# Patient Record
Sex: MALE | Race: BLACK OR AFRICAN AMERICAN | NOT HISPANIC OR LATINO | Employment: UNEMPLOYED | URBAN - METROPOLITAN AREA
[De-identification: names, ages, dates, MRNs, and addresses within clinical notes are randomized per-mention and may not be internally consistent; named-entity substitution may affect disease eponyms.]

---

## 2017-06-24 ENCOUNTER — HISTORICAL (OUTPATIENT)
Dept: ADMINISTRATIVE | Facility: HOSPITAL | Age: 52
End: 2017-06-24

## 2017-06-24 LAB
ABS NEUT (OLG): 7.22 X10(3)/MCL (ref 2.1–9.2)
ALBUMIN SERPL-MCNC: 4.1 GM/DL (ref 3.4–5)
ALBUMIN/GLOB SERPL: 1 RATIO (ref 1.1–2)
ALP SERPL-CCNC: 85 UNIT/L (ref 50–136)
ALT SERPL-CCNC: 46 UNIT/L (ref 12–78)
AMPHET UR QL SCN: NORMAL
APAP SERPL-MCNC: <2 MCG/ML (ref 10–30)
APPEARANCE, UA: CLEAR
AST SERPL-CCNC: 46 UNIT/L (ref 15–37)
BACTERIA SPEC CULT: ABNORMAL /HPF
BARBITURATE SCN PRESENT UR: NORMAL
BASOPHILS # BLD AUTO: 0.1 X10(3)/MCL (ref 0–0.2)
BASOPHILS NFR BLD AUTO: 0 %
BENZODIAZ UR QL SCN: NORMAL
BILIRUB SERPL-MCNC: 0.5 MG/DL (ref 0.2–1)
BILIRUB UR QL STRIP: ABNORMAL
BILIRUBIN DIRECT+TOT PNL SERPL-MCNC: 0.2 MG/DL (ref 0–0.5)
BILIRUBIN DIRECT+TOT PNL SERPL-MCNC: 0.3 MG/DL (ref 0–0.8)
BUN SERPL-MCNC: 37 MG/DL (ref 7–18)
CALCIUM SERPL-MCNC: 9 MG/DL (ref 8.5–10.1)
CANNABINOIDS UR QL SCN: NORMAL
CHLORIDE SERPL-SCNC: 100 MMOL/L (ref 98–107)
CK SERPL-CCNC: 1212 UNIT/L (ref 39–308)
CO2 SERPL-SCNC: 21 MMOL/L (ref 21–32)
COCAINE UR QL SCN: NORMAL
COLOR UR: ABNORMAL
CREAT SERPL-MCNC: 1.63 MG/DL (ref 0.7–1.3)
EOSINOPHIL # BLD AUTO: 0.3 X10(3)/MCL (ref 0–0.9)
EOSINOPHIL NFR BLD AUTO: 3 %
ERYTHROCYTE [DISTWIDTH] IN BLOOD BY AUTOMATED COUNT: 14.1 % (ref 11.5–17)
ETHANOL SERPL-MCNC: <3 MG/DL (ref 0–3)
GLOBULIN SER-MCNC: 4 GM/DL (ref 2.4–3.5)
GLUCOSE (UA): NEGATIVE
GLUCOSE SERPL-MCNC: 131 MG/DL (ref 74–106)
HCT VFR BLD AUTO: 41.4 % (ref 42–52)
HGB BLD-MCNC: 14.3 GM/DL (ref 14–18)
HGB UR QL STRIP: NEGATIVE
HYALINE CASTS #/AREA URNS LPF: ABNORMAL /[LPF]
KETONES UR QL STRIP: ABNORMAL
LEUKOCYTE ESTERASE UR QL STRIP: NEGATIVE
LYMPHOCYTES # BLD AUTO: 3.6 X10(3)/MCL (ref 0.6–4.6)
LYMPHOCYTES NFR BLD AUTO: 29 %
MCH RBC QN AUTO: 27.2 PG (ref 27–31)
MCHC RBC AUTO-ENTMCNC: 34.5 GM/DL (ref 33–36)
MCV RBC AUTO: 78.9 FL (ref 80–94)
MONOCYTES # BLD AUTO: 1.2 X10(3)/MCL (ref 0.1–1.3)
MONOCYTES NFR BLD AUTO: 10 %
NEUTROPHILS # BLD AUTO: 7.22 X10(3)/MCL (ref 2.1–9.2)
NEUTROPHILS NFR BLD AUTO: 58 %
NITRITE UR QL STRIP: NEGATIVE
OPIATES UR QL SCN: NORMAL
PCP UR QL: NORMAL
PH UR STRIP.AUTO: 5 [PH] (ref 5–7.5)
PH UR STRIP: 5 [PH] (ref 5–9)
PLATELET # BLD AUTO: 257 X10(3)/MCL (ref 130–400)
PMV BLD AUTO: 9.2 FL (ref 9.4–12.4)
POTASSIUM SERPL-SCNC: 3.2 MMOL/L (ref 3.5–5.1)
PROT SERPL-MCNC: 8.1 GM/DL (ref 6.4–8.2)
PROT UR QL STRIP: NEGATIVE
RBC # BLD AUTO: 5.25 X10(6)/MCL (ref 4.7–6.1)
RBC #/AREA URNS HPF: ABNORMAL /[HPF]
SALICYLATES SERPL-MCNC: 2.7 MG/DL (ref 2.8–20)
SODIUM SERPL-SCNC: 137 MMOL/L (ref 136–145)
SP GR FLD REFRACTOMETRY: 1.02 (ref 1–1.03)
SP GR UR STRIP: 1.02 (ref 1–1.03)
SQUAMOUS EPITHELIAL, UA: ABNORMAL
TSH SERPL-ACNC: 1.3 MIU/ML (ref 0.36–3.74)
UROBILINOGEN UR STRIP-ACNC: 1
WBC # SPEC AUTO: 12.4 X10(3)/MCL (ref 4.5–11.5)
WBC #/AREA URNS HPF: ABNORMAL /HPF

## 2017-06-25 LAB
CK SERPL-CCNC: 786 UNIT/L (ref 39–308)
CK SERPL-CCNC: 805 UNIT/L (ref 39–308)
CK SERPL-CCNC: 860 UNIT/L (ref 39–308)
CK SERPL-CCNC: 887 UNIT/L (ref 39–308)

## 2017-06-26 LAB
CK SERPL-CCNC: 681 UNIT/L (ref 39–308)
HAV IGM SERPL QL IA: NEGATIVE
HBV CORE IGM SERPL QL IA: NEGATIVE
HBV SURFACE AG SERPL QL IA: NEGATIVE
HCV AB SERPL QL IA: POSITIVE
HEPATITIS PANEL INTERP: ABNORMAL
MAGNESIUM SERPL-MCNC: 2 MG/DL (ref 1.8–2.4)

## 2017-06-27 LAB
ABS NEUT (OLG): 4.59 X10(3)/MCL (ref 2.1–9.2)
ALBUMIN SERPL-MCNC: 3.5 GM/DL (ref 3.4–5)
ALBUMIN/GLOB SERPL: 1 {RATIO}
ALP SERPL-CCNC: 77 UNIT/L (ref 50–136)
ALT SERPL-CCNC: 38 UNIT/L (ref 12–78)
AST SERPL-CCNC: 46 UNIT/L (ref 15–37)
BASOPHILS # BLD AUTO: 0.1 X10(3)/MCL (ref 0–0.2)
BASOPHILS NFR BLD AUTO: 1 %
BILIRUB SERPL-MCNC: 0.3 MG/DL (ref 0.2–1)
BILIRUBIN DIRECT+TOT PNL SERPL-MCNC: 0.1 MG/DL (ref 0–0.5)
BILIRUBIN DIRECT+TOT PNL SERPL-MCNC: 0.2 MG/DL (ref 0–0.8)
BUN SERPL-MCNC: 10 MG/DL (ref 7–18)
CALCIUM SERPL-MCNC: 8.8 MG/DL (ref 8.5–10.1)
CHLORIDE SERPL-SCNC: 106 MMOL/L (ref 98–107)
CK MB SERPL-MCNC: 3.4 NG/ML (ref 0.5–3.6)
CK SERPL-CCNC: 713 UNIT/L (ref 39–308)
CK SERPL-CCNC: 722 UNIT/L (ref 39–308)
CO2 SERPL-SCNC: 24 MMOL/L (ref 21–32)
COLOR STL: NORMAL
CONSISTENCY STL: NORMAL
CREAT SERPL-MCNC: 0.66 MG/DL (ref 0.7–1.3)
EOSINOPHIL # BLD AUTO: 0.4 X10(3)/MCL (ref 0–0.9)
EOSINOPHIL NFR BLD AUTO: 4 %
ERYTHROCYTE [DISTWIDTH] IN BLOOD BY AUTOMATED COUNT: 14.1 % (ref 11.5–17)
GLOBULIN SER-MCNC: 3.4 GM/DL (ref 2.4–3.5)
GLUCOSE SERPL-MCNC: 95 MG/DL (ref 74–106)
HCT VFR BLD AUTO: 40.5 % (ref 42–52)
HGB BLD-MCNC: 13.9 GM/DL (ref 14–18)
LYMPHOCYTES # BLD AUTO: 2.5 X10(3)/MCL (ref 0.6–4.6)
LYMPHOCYTES NFR BLD AUTO: 31 %
MAGNESIUM SERPL-MCNC: 1.7 MG/DL (ref 1.8–2.4)
MCH RBC QN AUTO: 27.7 PG (ref 27–31)
MCHC RBC AUTO-ENTMCNC: 34.3 GM/DL (ref 33–36)
MCV RBC AUTO: 80.7 FL (ref 80–94)
MONOCYTES # BLD AUTO: 0.6 X10(3)/MCL (ref 0.1–1.3)
MONOCYTES NFR BLD AUTO: 7 %
NEUTROPHILS # BLD AUTO: 4.59 X10(3)/MCL (ref 2.1–9.2)
NEUTROPHILS NFR BLD AUTO: 57 %
PLATELET # BLD AUTO: 251 X10(3)/MCL (ref 130–400)
PMV BLD AUTO: 9.4 FL (ref 9.4–12.4)
POTASSIUM SERPL-SCNC: 4.4 MMOL/L (ref 3.5–5.1)
PROT SERPL-MCNC: 6.9 GM/DL (ref 6.4–8.2)
RBC # BLD AUTO: 5.02 X10(6)/MCL (ref 4.7–6.1)
SODIUM SERPL-SCNC: 139 MMOL/L (ref 136–145)
TROPONIN I SERPL-MCNC: <0.02 NG/ML (ref 0.02–0.49)
WBC # SPEC AUTO: 8.1 X10(3)/MCL (ref 4.5–11.5)

## 2017-06-28 LAB
ABS NEUT (OLG): 5.47 X10(3)/MCL (ref 2.1–9.2)
BASOPHILS # BLD AUTO: 0 X10(3)/MCL (ref 0–0.2)
BASOPHILS NFR BLD AUTO: 1 %
CK SERPL-CCNC: 583 UNIT/L (ref 39–308)
EOSINOPHIL # BLD AUTO: 0.4 X10(3)/MCL (ref 0–0.9)
EOSINOPHIL NFR BLD AUTO: 5 %
ERYTHROCYTE [DISTWIDTH] IN BLOOD BY AUTOMATED COUNT: 13.8 % (ref 11.5–17)
HCT VFR BLD AUTO: 40.5 % (ref 42–52)
HGB BLD-MCNC: 13.9 GM/DL (ref 14–18)
LYMPHOCYTES # BLD AUTO: 1.9 X10(3)/MCL (ref 0.6–4.6)
LYMPHOCYTES NFR BLD AUTO: 22 %
MCH RBC QN AUTO: 27.3 PG (ref 27–31)
MCHC RBC AUTO-ENTMCNC: 34.3 GM/DL (ref 33–36)
MCV RBC AUTO: 79.6 FL (ref 80–94)
MONOCYTES # BLD AUTO: 0.7 X10(3)/MCL (ref 0.1–1.3)
MONOCYTES NFR BLD AUTO: 8 %
NEUTROPHILS # BLD AUTO: 5.47 X10(3)/MCL (ref 2.1–9.2)
NEUTROPHILS NFR BLD AUTO: 64 %
PLATELET # BLD AUTO: 233 X10(3)/MCL (ref 130–400)
PMV BLD AUTO: 9.5 FL (ref 9.4–12.4)
RBC # BLD AUTO: 5.09 X10(6)/MCL (ref 4.7–6.1)
WBC # SPEC AUTO: 8.6 X10(3)/MCL (ref 4.5–11.5)

## 2017-06-29 LAB — CK SERPL-CCNC: 480 UNIT/L (ref 39–308)

## 2017-07-13 ENCOUNTER — HISTORICAL (OUTPATIENT)
Dept: ADMINISTRATIVE | Facility: HOSPITAL | Age: 52
End: 2017-07-13

## 2017-07-13 LAB
ABS NEUT (OLG): 5.61 X10(3)/MCL (ref 2.1–9.2)
ALBUMIN SERPL-MCNC: 4.1 GM/DL (ref 3.4–5)
ALBUMIN/GLOB SERPL: 1 RATIO (ref 1–2)
ALP SERPL-CCNC: 80 UNIT/L (ref 45–117)
ALT SERPL-CCNC: 73 UNIT/L (ref 12–78)
AMPHET UR QL SCN: NEGATIVE
AST SERPL-CCNC: 46 UNIT/L (ref 15–37)
BARBITURATE SCN PRESENT UR: NEGATIVE
BASOPHILS # BLD AUTO: 0.07 X10(3)/MCL
BASOPHILS NFR BLD AUTO: 1 % (ref 0–1)
BENZODIAZ UR QL SCN: NEGATIVE
BILIRUB SERPL-MCNC: 0.4 MG/DL (ref 0.2–1)
BILIRUBIN DIRECT+TOT PNL SERPL-MCNC: 0.1 MG/DL
BILIRUBIN DIRECT+TOT PNL SERPL-MCNC: 0.3 MG/DL
BUN SERPL-MCNC: 12 MG/DL (ref 7–18)
CALCIUM SERPL-MCNC: 9.3 MG/DL (ref 8.5–10.1)
CANNABINOIDS UR QL SCN: NEGATIVE
CHLORIDE SERPL-SCNC: 103 MMOL/L (ref 98–107)
CO2 SERPL-SCNC: 25 MMOL/L (ref 21–32)
COCAINE UR QL SCN: NEGATIVE
CREAT SERPL-MCNC: 0.8 MG/DL (ref 0.6–1.3)
EOSINOPHIL # BLD AUTO: 0.34 X10(3)/MCL
EOSINOPHIL NFR BLD AUTO: 4 % (ref 0–5)
ERYTHROCYTE [DISTWIDTH] IN BLOOD BY AUTOMATED COUNT: 14.6 % (ref 11.5–14.5)
ETHANOL SERPL-MCNC: <3 MG/DL
FERRITIN SERPL-MCNC: 67 NG/ML (ref 26–388)
GLOBULIN SER-MCNC: 4.4 GM/ML (ref 2.3–3.5)
GLUCOSE SERPL-MCNC: 85 MG/DL (ref 74–106)
HAV AB SER QL IA: NONREACTIVE
HBV CORE AB SERPL QL IA: NONREACTIVE
HBV SURFACE AB SER-ACNC: <3.1 MIU/ML
HBV SURFACE AB SERPL IA-ACNC: NONREACTIVE M[IU]/ML
HBV SURFACE AG SERPL QL IA: NEGATIVE
HCT VFR BLD AUTO: 43.5 % (ref 40–51)
HGB BLD-MCNC: 15 GM/DL (ref 13.5–17.5)
HIV 1+2 AB+HIV1 P24 AG SERPL QL IA: NONREACTIVE
IMM GRANULOCYTES # BLD AUTO: 0.02 10*3/UL
IMM GRANULOCYTES NFR BLD AUTO: 0 %
INR PPP: 1.01 (ref 0.9–1.2)
IRON SATN MFR SERPL: 27.1 % (ref 15–50)
IRON SERPL-MCNC: 94 MCG/DL (ref 65–175)
LYMPHOCYTES # BLD AUTO: 2.57 X10(3)/MCL
LYMPHOCYTES NFR BLD AUTO: 28 % (ref 15–40)
MCH RBC QN AUTO: 27.3 PG (ref 26–34)
MCHC RBC AUTO-ENTMCNC: 34.5 GM/DL (ref 31–37)
MCV RBC AUTO: 79.2 FL (ref 80–100)
MONOCYTES # BLD AUTO: 0.45 X10(3)/MCL
MONOCYTES NFR BLD AUTO: 5 % (ref 4–12)
NEUTROPHILS # BLD AUTO: 5.61 X10(3)/MCL
NEUTROPHILS NFR BLD AUTO: 62 X10(3)/MCL
OPIATES UR QL SCN: NEGATIVE
PCP UR QL: NEGATIVE
PH UR STRIP.AUTO: 5 [PH] (ref 5–8)
PLATELET # BLD AUTO: 280 X10(3)/MCL (ref 130–400)
PMV BLD AUTO: 8.9 FL (ref 7.4–10.4)
POTASSIUM SERPL-SCNC: 3.9 MMOL/L (ref 3.5–5.1)
PROT SERPL-MCNC: 8.5 GM/DL (ref 6.4–8.2)
PROTHROMBIN TIME: 13.1 SECOND(S) (ref 11.9–14.4)
RBC # BLD AUTO: 5.49 X10(6)/MCL (ref 4.5–5.9)
RPR SER QL: NORMAL
SODIUM SERPL-SCNC: 137 MMOL/L (ref 136–145)
T4 FREE SERPL-MCNC: 1.04 NG/DL (ref 0.76–1.46)
TEMPERATURE, URINE (OHS): 25 DEGC (ref 20–25)
TIBC SERPL-MCNC: 347 MCG/DL (ref 250–450)
TRANSFERRIN SERPL-MCNC: 283 MG/DL (ref 200–360)
TSH SERPL-ACNC: 0.9 MIU/L (ref 0.36–3.74)
WBC # SPEC AUTO: 9.1 X10(3)/MCL (ref 4.5–11)

## 2017-11-17 ENCOUNTER — HISTORICAL (OUTPATIENT)
Dept: ADMINISTRATIVE | Facility: HOSPITAL | Age: 52
End: 2017-11-17

## 2017-12-20 ENCOUNTER — HISTORICAL (OUTPATIENT)
Dept: ADMINISTRATIVE | Facility: HOSPITAL | Age: 52
End: 2017-12-20

## 2018-03-19 ENCOUNTER — HISTORICAL (OUTPATIENT)
Dept: ADMINISTRATIVE | Facility: HOSPITAL | Age: 53
End: 2018-03-19

## 2018-03-21 ENCOUNTER — HISTORICAL (OUTPATIENT)
Dept: ADMINISTRATIVE | Facility: HOSPITAL | Age: 53
End: 2018-03-21

## 2018-05-18 ENCOUNTER — HISTORICAL (OUTPATIENT)
Dept: ADMINISTRATIVE | Facility: HOSPITAL | Age: 53
End: 2018-05-18

## 2018-06-23 ENCOUNTER — HISTORICAL (OUTPATIENT)
Dept: ADMINISTRATIVE | Facility: HOSPITAL | Age: 53
End: 2018-06-23

## 2018-10-16 ENCOUNTER — HISTORICAL (OUTPATIENT)
Dept: ADMINISTRATIVE | Facility: HOSPITAL | Age: 53
End: 2018-10-16

## 2018-10-21 ENCOUNTER — HISTORICAL (OUTPATIENT)
Dept: ADMINISTRATIVE | Facility: HOSPITAL | Age: 53
End: 2018-10-21

## 2019-02-05 ENCOUNTER — HISTORICAL (OUTPATIENT)
Dept: ADMINISTRATIVE | Facility: HOSPITAL | Age: 54
End: 2019-02-05

## 2019-07-06 ENCOUNTER — HISTORICAL (OUTPATIENT)
Dept: ADMINISTRATIVE | Facility: HOSPITAL | Age: 54
End: 2019-07-06

## 2019-07-06 LAB
ABS NEUT (OLG): 3.15 X10(3)/MCL (ref 2.1–9.2)
ALBUMIN SERPL-MCNC: 3.7 GM/DL (ref 3.4–5)
ALBUMIN/GLOB SERPL: 1 RATIO (ref 1.1–2)
ALP SERPL-CCNC: 108 UNIT/L (ref 45–117)
ALT SERPL-CCNC: 75 UNIT/L (ref 12–78)
AMPHET UR QL SCN: NEGATIVE
APPEARANCE, UA: CLEAR
AST SERPL-CCNC: 57 UNIT/L (ref 15–37)
BACTERIA #/AREA URNS AUTO: ABNORMAL /[HPF]
BARBITURATE SCN PRESENT UR: NEGATIVE
BASOPHILS # BLD AUTO: 0.1 X10(3)/MCL
BASOPHILS NFR BLD AUTO: 1 %
BENZODIAZ UR QL SCN: NEGATIVE
BILIRUB SERPL-MCNC: 0.4 MG/DL (ref 0.2–1)
BILIRUB UR QL STRIP: NEGATIVE
BILIRUBIN DIRECT+TOT PNL SERPL-MCNC: 0.1 MG/DL
BILIRUBIN DIRECT+TOT PNL SERPL-MCNC: 0.3 MG/DL
BUN SERPL-MCNC: 17 MG/DL (ref 7–18)
CALCIUM SERPL-MCNC: 9 MG/DL (ref 8.5–10.1)
CANNABINOIDS UR QL SCN: NEGATIVE
CHLORIDE SERPL-SCNC: 103 MMOL/L (ref 98–107)
CO2 SERPL-SCNC: 26 MMOL/L (ref 21–32)
COCAINE UR QL SCN: NEGATIVE
COLOR UR: YELLOW
CREAT SERPL-MCNC: 1.2 MG/DL (ref 0.6–1.3)
EOSINOPHIL # BLD AUTO: 0.34 10*3/UL
EOSINOPHIL NFR BLD AUTO: 4 %
ERYTHROCYTE [DISTWIDTH] IN BLOOD BY AUTOMATED COUNT: 13.1 % (ref 11.5–14.5)
ETHANOL SERPL-MCNC: <3 MG/DL
GLOBULIN SER-MCNC: 3.8 GM/ML (ref 2.3–3.5)
GLUCOSE (UA): NORMAL
GLUCOSE SERPL-MCNC: 163 MG/DL (ref 74–106)
HCT VFR BLD AUTO: 46.4 % (ref 40–51)
HGB BLD-MCNC: 15.9 GM/DL (ref 13.5–17.5)
HGB UR QL STRIP: 0.1 MG/DL
HYALINE CASTS #/AREA URNS LPF: ABNORMAL /[LPF]
IMM GRANULOCYTES # BLD AUTO: 0.01 10*3/UL
IMM GRANULOCYTES NFR BLD AUTO: 0 %
KETONES UR QL STRIP: 10 MG/DL
LEUKOCYTE ESTERASE UR QL STRIP: NEGATIVE
LIPASE SERPL-CCNC: 75 UNIT/L (ref 73–393)
LYMPHOCYTES # BLD AUTO: 3.77 X10(3)/MCL
LYMPHOCYTES NFR BLD AUTO: 47 % (ref 13–40)
MCH RBC QN AUTO: 28.2 PG (ref 26–34)
MCHC RBC AUTO-ENTMCNC: 34.3 GM/DL (ref 31–37)
MCV RBC AUTO: 82.4 FL (ref 80–100)
MONOCYTES # BLD AUTO: 0.63 X10(3)/MCL
MONOCYTES NFR BLD AUTO: 8 % (ref 4–12)
NEUTROPHILS # BLD AUTO: 3.15 X10(3)/MCL
NEUTROPHILS NFR BLD AUTO: 39 X10(3)/MCL
NITRITE UR QL STRIP: NEGATIVE
OPIATES UR QL SCN: NEGATIVE
PCP UR QL: NEGATIVE
PH UR STRIP.AUTO: 5.5 [PH] (ref 5–8)
PH UR STRIP: 5.5 [PH] (ref 4.5–8)
PLATELET # BLD AUTO: 258 X10(3)/MCL (ref 130–400)
PMV BLD AUTO: 9.5 FL (ref 7.4–10.4)
POTASSIUM SERPL-SCNC: 3.6 MMOL/L (ref 3.5–5.1)
PROT SERPL-MCNC: 7.5 GM/DL (ref 6.4–8.2)
PROT UR QL STRIP: 100 MG/DL
RBC # BLD AUTO: 5.63 X10(6)/MCL (ref 4.5–5.9)
RBC #/AREA URNS AUTO: ABNORMAL /[HPF]
SODIUM SERPL-SCNC: 139 MMOL/L (ref 136–145)
SP GR UR STRIP: 1.03 (ref 1–1.03)
SQUAMOUS #/AREA URNS LPF: ABNORMAL /[LPF]
TEMPERATURE, URINE (OHS): 20.1 DEGC (ref 20–25)
UROBILINOGEN UR STRIP-ACNC: 4 MG/DL
WBC # SPEC AUTO: 8 X10(3)/MCL (ref 4.5–11)
WBC #/AREA URNS AUTO: ABNORMAL /HPF

## 2019-10-12 ENCOUNTER — HISTORICAL (OUTPATIENT)
Dept: ADMINISTRATIVE | Facility: HOSPITAL | Age: 54
End: 2019-10-12

## 2019-10-14 LAB
FINAL CULTURE: NO GROWTH
FINAL CULTURE: NO GROWTH

## 2019-10-17 LAB
FINAL CULTURE: NORMAL

## 2019-10-22 ENCOUNTER — HISTORICAL (OUTPATIENT)
Dept: ADMINISTRATIVE | Facility: HOSPITAL | Age: 54
End: 2019-10-22

## 2019-10-22 LAB
ABS NEUT (OLG): 9.44 X10(3)/MCL (ref 2.1–9.2)
ALBUMIN SERPL-MCNC: 3.4 GM/DL (ref 3.4–5)
ALBUMIN/GLOB SERPL: 0.7 RATIO (ref 1.1–2)
ALP SERPL-CCNC: 79 UNIT/L (ref 50–136)
ALT SERPL-CCNC: 74 UNIT/L (ref 12–78)
AMPHET UR QL SCN: NEGATIVE
APAP SERPL-MCNC: <2 MCG/ML (ref 10–30)
APPEARANCE, UA: CLEAR
AST SERPL-CCNC: 47 UNIT/L (ref 15–37)
BACTERIA SPEC CULT: ABNORMAL /HPF
BARBITURATE SCN PRESENT UR: NEGATIVE
BASOPHILS # BLD AUTO: 0.1 X10(3)/MCL (ref 0–0.2)
BASOPHILS NFR BLD AUTO: 1 %
BENZODIAZ UR QL SCN: NEGATIVE
BILIRUB SERPL-MCNC: 0.6 MG/DL (ref 0.2–1)
BILIRUB UR QL STRIP: NEGATIVE
BILIRUBIN DIRECT+TOT PNL SERPL-MCNC: 0.3 MG/DL (ref 0–0.5)
BILIRUBIN DIRECT+TOT PNL SERPL-MCNC: 0.3 MG/DL (ref 0–0.8)
BUN SERPL-MCNC: 17 MG/DL (ref 7–18)
CALCIUM SERPL-MCNC: 8.7 MG/DL (ref 8.5–10.1)
CANNABINOIDS UR QL SCN: NEGATIVE
CHLORIDE SERPL-SCNC: 105 MMOL/L (ref 98–107)
CK SERPL-CCNC: 321 UNIT/L (ref 39–308)
CO2 SERPL-SCNC: 27 MMOL/L (ref 21–32)
COCAINE UR QL SCN: NEGATIVE
COLOR UR: YELLOW
CREAT SERPL-MCNC: 0.96 MG/DL (ref 0.7–1.3)
EOSINOPHIL # BLD AUTO: 0.1 X10(3)/MCL (ref 0–0.9)
EOSINOPHIL NFR BLD AUTO: 1 %
ERYTHROCYTE [DISTWIDTH] IN BLOOD BY AUTOMATED COUNT: 14.3 % (ref 11.5–17)
ETHANOL SERPL-MCNC: 4 MG/DL (ref 0–3)
GLOBULIN SER-MCNC: 4.8 GM/DL (ref 2.4–3.5)
GLUCOSE (UA): ABNORMAL
GLUCOSE SERPL-MCNC: 101 MG/DL (ref 74–106)
HCT VFR BLD AUTO: 45.2 % (ref 42–52)
HGB BLD-MCNC: 14.9 GM/DL (ref 14–18)
HGB UR QL STRIP: NEGATIVE
KETONES UR QL STRIP: NEGATIVE
LEUKOCYTE ESTERASE UR QL STRIP: NEGATIVE
LYMPHOCYTES # BLD AUTO: 1.9 X10(3)/MCL (ref 0.6–4.6)
LYMPHOCYTES NFR BLD AUTO: 16 %
MCH RBC QN AUTO: 28 PG (ref 27–31)
MCHC RBC AUTO-ENTMCNC: 33 GM/DL (ref 33–36)
MCV RBC AUTO: 84.8 FL (ref 80–94)
MONOCYTES # BLD AUTO: 0.8 X10(3)/MCL (ref 0.1–1.3)
MONOCYTES NFR BLD AUTO: 6 %
NEUTROPHILS # BLD AUTO: 9.44 X10(3)/MCL (ref 2.1–9.2)
NEUTROPHILS NFR BLD AUTO: 76 %
NITRITE UR QL STRIP: NEGATIVE
OPIATES UR QL SCN: NEGATIVE
PCP UR QL: NEGATIVE
PH UR STRIP.AUTO: 5.5 [PH] (ref 5–7.5)
PH UR STRIP: 5.5 [PH] (ref 5–9)
PLATELET # BLD AUTO: 352 X10(3)/MCL (ref 130–400)
PMV BLD AUTO: 8.7 FL (ref 9.4–12.4)
POTASSIUM SERPL-SCNC: 4.1 MMOL/L (ref 3.5–5.1)
PROT SERPL-MCNC: 8.2 GM/DL (ref 6.4–8.2)
PROT UR QL STRIP: NEGATIVE
RBC # BLD AUTO: 5.33 X10(6)/MCL (ref 4.7–6.1)
RBC #/AREA URNS HPF: ABNORMAL /[HPF]
SALICYLATES SERPL-MCNC: 1.8 MG/DL (ref 2.8–20)
SODIUM SERPL-SCNC: 139 MMOL/L (ref 136–145)
SP GR FLD REFRACTOMETRY: 1.02 (ref 1–1.03)
SP GR UR STRIP: 1.02 (ref 1–1.03)
SQUAMOUS EPITHELIAL, UA: ABNORMAL
T4 FREE SERPL-MCNC: 1.36 NG/DL (ref 0.76–1.46)
TSH SERPL-ACNC: 1.27 MIU/L (ref 0.36–3.74)
UROBILINOGEN UR STRIP-ACNC: 1
WBC # SPEC AUTO: 12.4 X10(3)/MCL (ref 4.5–11.5)
WBC #/AREA URNS HPF: ABNORMAL /HPF

## 2019-11-05 ENCOUNTER — HISTORICAL (OUTPATIENT)
Dept: ADMINISTRATIVE | Facility: HOSPITAL | Age: 54
End: 2019-11-05

## 2019-11-05 LAB
ABS NEUT (OLG): 2.76 X10(3)/MCL (ref 2.1–9.2)
ALBUMIN SERPL-MCNC: 3.5 GM/DL (ref 3.4–5)
ALBUMIN/GLOB SERPL: 0.9 RATIO (ref 1.1–2)
ALP SERPL-CCNC: 88 UNIT/L (ref 50–136)
ALT SERPL-CCNC: 35 UNIT/L (ref 12–78)
AST SERPL-CCNC: 39 UNIT/L (ref 15–37)
BASOPHILS # BLD AUTO: 0 X10(3)/MCL (ref 0–0.2)
BASOPHILS NFR BLD AUTO: 1 %
BILIRUB SERPL-MCNC: 0.3 MG/DL (ref 0.2–1)
BILIRUBIN DIRECT+TOT PNL SERPL-MCNC: 0.1 MG/DL (ref 0–0.5)
BILIRUBIN DIRECT+TOT PNL SERPL-MCNC: 0.2 MG/DL (ref 0–0.8)
BUN SERPL-MCNC: 16 MG/DL (ref 7–18)
CALCIUM SERPL-MCNC: 8.7 MG/DL (ref 8.5–10.1)
CHLORIDE SERPL-SCNC: 105 MMOL/L (ref 98–107)
CO2 SERPL-SCNC: 30 MMOL/L (ref 21–32)
CREAT SERPL-MCNC: 0.65 MG/DL (ref 0.7–1.3)
EOSINOPHIL # BLD AUTO: 0.1 X10(3)/MCL (ref 0–0.9)
EOSINOPHIL NFR BLD AUTO: 2 %
ERYTHROCYTE [DISTWIDTH] IN BLOOD BY AUTOMATED COUNT: 15 % (ref 11.5–17)
GLOBULIN SER-MCNC: 3.8 GM/DL (ref 2.4–3.5)
GLUCOSE SERPL-MCNC: 81 MG/DL (ref 74–106)
HCT VFR BLD AUTO: 47.4 % (ref 42–52)
HGB BLD-MCNC: 15.2 GM/DL (ref 14–18)
LYMPHOCYTES # BLD AUTO: 2.4 X10(3)/MCL (ref 0.6–4.6)
LYMPHOCYTES NFR BLD AUTO: 41 %
MCH RBC QN AUTO: 28.1 PG (ref 27–31)
MCHC RBC AUTO-ENTMCNC: 32.1 GM/DL (ref 33–36)
MCV RBC AUTO: 87.6 FL (ref 80–94)
MONOCYTES # BLD AUTO: 0.6 X10(3)/MCL (ref 0.1–1.3)
MONOCYTES NFR BLD AUTO: 10 %
NEUTROPHILS # BLD AUTO: 2.76 X10(3)/MCL (ref 2.1–9.2)
NEUTROPHILS NFR BLD AUTO: 46 %
PLATELET # BLD AUTO: 283 X10(3)/MCL (ref 130–400)
PMV BLD AUTO: 9.5 FL (ref 9.4–12.4)
POC TROPONIN: 0.01 NG/ML (ref 0–0.08)
POTASSIUM SERPL-SCNC: 4.7 MMOL/L (ref 3.5–5.1)
PROT SERPL-MCNC: 7.3 GM/DL (ref 6.4–8.2)
RBC # BLD AUTO: 5.41 X10(6)/MCL (ref 4.7–6.1)
SODIUM SERPL-SCNC: 140 MMOL/L (ref 136–145)
TROPONIN I SERPL-MCNC: <0.02 NG/ML (ref 0.02–0.49)
WBC # SPEC AUTO: 6 X10(3)/MCL (ref 4.5–11.5)

## 2019-11-06 ENCOUNTER — HISTORICAL (OUTPATIENT)
Dept: ADMINISTRATIVE | Facility: HOSPITAL | Age: 54
End: 2019-11-06

## 2019-11-06 LAB
AMPHET UR QL SCN: NEGATIVE
APPEARANCE, UA: CLEAR
BACTERIA #/AREA URNS AUTO: ABNORMAL /HPF
BARBITURATE SCN PRESENT UR: NEGATIVE
BENZODIAZ UR QL SCN: NEGATIVE
BILIRUB UR QL STRIP: NEGATIVE
BUN SERPL-MCNC: 12 MG/DL (ref 7–18)
CALCIUM SERPL-MCNC: 8.8 MG/DL (ref 8.5–10.1)
CANNABINOIDS UR QL SCN: NEGATIVE
CHLORIDE SERPL-SCNC: 105 MMOL/L (ref 98–107)
CK MB SERPL-MCNC: 4.3 NG/ML (ref 0.5–3.6)
CK MB SERPL-MCNC: 4.8 NG/ML (ref 0.5–3.6)
CK MB SERPL-MCNC: 5.2 NG/ML (ref 1–3.6)
CK SERPL-CCNC: 136 UNIT/L (ref 39–308)
CK SERPL-CCNC: 142 UNIT/L (ref 39–308)
CK SERPL-CCNC: 178 UNIT/L (ref 39–308)
CO2 SERPL-SCNC: 28 MMOL/L (ref 21–32)
COCAINE UR QL SCN: NEGATIVE
COLOR UR: NORMAL
CREAT SERPL-MCNC: 0.8 MG/DL (ref 0.6–1.3)
CREAT/UREA NIT SERPL: 15
ERYTHROCYTE [DISTWIDTH] IN BLOOD BY AUTOMATED COUNT: 14.8 % (ref 11.5–14.5)
GLUCOSE (UA): NEGATIVE MG/DL
GLUCOSE SERPL-MCNC: 116 MG/DL (ref 74–106)
HCT VFR BLD AUTO: 47.1 % (ref 40–51)
HGB BLD-MCNC: 15.5 GM/DL (ref 13.5–17.5)
HGB UR QL STRIP: NEGATIVE
HYALINE CASTS #/AREA URNS LPF: ABNORMAL /LPF
KETONES UR QL STRIP: NEGATIVE
LEUKOCYTE ESTERASE UR QL STRIP: NEGATIVE
MCH RBC QN AUTO: 28.7 PG (ref 26–34)
MCHC RBC AUTO-ENTMCNC: 32.9 GM/DL (ref 31–37)
MCV RBC AUTO: 87.2 FL (ref 80–100)
NITRITE UR QL STRIP: NEGATIVE
OPIATES UR QL SCN: NEGATIVE
PCP UR QL: NEGATIVE
PH UR STRIP.AUTO: 6.5 [PH] (ref 5–8)
PH UR STRIP: 6.5 [PH] (ref 4.5–8)
PLATELET # BLD AUTO: 298 X10(3)/MCL (ref 130–400)
PMV BLD AUTO: 8.9 FL (ref 7.4–10.4)
POTASSIUM SERPL-SCNC: 4 MMOL/L (ref 3.5–5.1)
PROT UR QL STRIP: NEGATIVE
RBC # BLD AUTO: 5.4 X10(6)/MCL (ref 4.5–5.9)
RBC #/AREA URNS AUTO: ABNORMAL /HPF
SODIUM SERPL-SCNC: 138 MMOL/L (ref 136–145)
SP GR UR STRIP: 1.01 (ref 1–1.03)
SQUAMOUS #/AREA URNS LPF: ABNORMAL /LPF
TEMPERATURE, URINE (OHS): 23 DEGC (ref 20–25)
TROPONIN I SERPL-MCNC: 0.02 NG/ML (ref 0.02–0.49)
TROPONIN I SERPL-MCNC: <0.015 NG/ML (ref 0–0.05)
TROPONIN I SERPL-MCNC: <0.02 NG/ML (ref 0.02–0.49)
UROBILINOGEN UR STRIP-ACNC: NORMAL
WBC # SPEC AUTO: 7 X10(3)/MCL (ref 4.5–11)
WBC #/AREA URNS AUTO: ABNORMAL /HPF

## 2019-11-09 ENCOUNTER — HISTORICAL (OUTPATIENT)
Dept: ADMINISTRATIVE | Facility: HOSPITAL | Age: 54
End: 2019-11-09

## 2019-11-09 LAB
ABS NEUT (OLG): 4.97 X10(3)/MCL (ref 2.1–9.2)
ALBUMIN SERPL-MCNC: 3.9 GM/DL (ref 3.4–5)
ALBUMIN/GLOB SERPL: 0.9 RATIO (ref 1.1–2)
ALP SERPL-CCNC: 92 UNIT/L (ref 50–136)
ALT SERPL-CCNC: 43 UNIT/L (ref 12–78)
AST SERPL-CCNC: 38 UNIT/L (ref 15–37)
BASOPHILS # BLD AUTO: 0.1 X10(3)/MCL (ref 0–0.2)
BASOPHILS NFR BLD AUTO: 1 %
BILIRUB SERPL-MCNC: 0.4 MG/DL (ref 0.2–1)
BILIRUBIN DIRECT+TOT PNL SERPL-MCNC: 0.2 MG/DL (ref 0–0.5)
BILIRUBIN DIRECT+TOT PNL SERPL-MCNC: 0.2 MG/DL (ref 0–0.8)
BUN SERPL-MCNC: 14 MG/DL (ref 7–18)
CALCIUM SERPL-MCNC: 9.9 MG/DL (ref 8.5–10.1)
CHLORIDE SERPL-SCNC: 103 MMOL/L (ref 98–107)
CO2 SERPL-SCNC: 28 MMOL/L (ref 21–32)
CREAT SERPL-MCNC: 0.78 MG/DL (ref 0.7–1.3)
EOSINOPHIL # BLD AUTO: 0.2 X10(3)/MCL (ref 0–0.9)
EOSINOPHIL NFR BLD AUTO: 2 %
ERYTHROCYTE [DISTWIDTH] IN BLOOD BY AUTOMATED COUNT: 14.4 % (ref 11.5–17)
GLOBULIN SER-MCNC: 4.2 GM/DL (ref 2.4–3.5)
GLUCOSE SERPL-MCNC: 110 MG/DL (ref 74–106)
HCT VFR BLD AUTO: 51.1 % (ref 42–52)
HGB BLD-MCNC: 16.6 GM/DL (ref 14–18)
LYMPHOCYTES # BLD AUTO: 2.7 X10(3)/MCL (ref 0.6–4.6)
LYMPHOCYTES NFR BLD AUTO: 31 %
MCH RBC QN AUTO: 28 PG (ref 27–31)
MCHC RBC AUTO-ENTMCNC: 32.5 GM/DL (ref 33–36)
MCV RBC AUTO: 86.2 FL (ref 80–94)
MONOCYTES # BLD AUTO: 0.6 X10(3)/MCL (ref 0.1–1.3)
MONOCYTES NFR BLD AUTO: 7 %
NEUTROPHILS # BLD AUTO: 4.97 X10(3)/MCL (ref 2.1–9.2)
NEUTROPHILS NFR BLD AUTO: 58 %
PLATELET # BLD AUTO: 296 X10(3)/MCL (ref 130–400)
PMV BLD AUTO: 9.1 FL (ref 9.4–12.4)
POC TROPONIN: 0.01 NG/ML (ref 0–0.08)
POC TROPONIN: 0.01 NG/ML (ref 0–0.08)
POTASSIUM SERPL-SCNC: 4.4 MMOL/L (ref 3.5–5.1)
PROT SERPL-MCNC: 8.1 GM/DL (ref 6.4–8.2)
RBC # BLD AUTO: 5.93 X10(6)/MCL (ref 4.7–6.1)
SODIUM SERPL-SCNC: 137 MMOL/L (ref 136–145)
TROPONIN I SERPL-MCNC: <0.02 NG/ML (ref 0.02–0.49)
WBC # SPEC AUTO: 8.6 X10(3)/MCL (ref 4.5–11.5)

## 2019-11-11 ENCOUNTER — HISTORICAL (OUTPATIENT)
Dept: ADMINISTRATIVE | Facility: HOSPITAL | Age: 54
End: 2019-11-11

## 2019-11-11 LAB
FLUAV AG UPPER RESP QL IA.RAPID: NEGATIVE
FLUBV AG UPPER RESP QL IA.RAPID: NEGATIVE

## 2019-11-12 ENCOUNTER — HISTORICAL (OUTPATIENT)
Dept: ADMINISTRATIVE | Facility: HOSPITAL | Age: 54
End: 2019-11-12

## 2019-11-12 LAB
ABS NEUT (OLG): 4.27 X10(3)/MCL (ref 2.1–9.2)
ALBUMIN SERPL-MCNC: 3.9 GM/DL (ref 3.4–5)
ALBUMIN/GLOB SERPL: 1 RATIO (ref 1.1–2)
ALP SERPL-CCNC: 103 UNIT/L (ref 50–136)
ALT SERPL-CCNC: 45 UNIT/L (ref 12–78)
AST SERPL-CCNC: 38 UNIT/L (ref 15–37)
BASOPHILS # BLD AUTO: 0.1 X10(3)/MCL (ref 0–0.2)
BASOPHILS NFR BLD AUTO: 1 %
BILIRUB SERPL-MCNC: 0.3 MG/DL (ref 0.2–1)
BILIRUBIN DIRECT+TOT PNL SERPL-MCNC: 0.1 MG/DL (ref 0–0.5)
BILIRUBIN DIRECT+TOT PNL SERPL-MCNC: 0.2 MG/DL (ref 0–0.8)
BUN SERPL-MCNC: 18 MG/DL (ref 7–18)
CALCIUM SERPL-MCNC: 9.7 MG/DL (ref 8.5–10.1)
CHLORIDE SERPL-SCNC: 103 MMOL/L (ref 98–107)
CO2 SERPL-SCNC: 32 MMOL/L (ref 21–32)
CREAT SERPL-MCNC: 0.81 MG/DL (ref 0.7–1.3)
EOSINOPHIL # BLD AUTO: 0.1 X10(3)/MCL (ref 0–0.9)
EOSINOPHIL NFR BLD AUTO: 2 %
ERYTHROCYTE [DISTWIDTH] IN BLOOD BY AUTOMATED COUNT: 14.6 % (ref 11.5–17)
GLOBULIN SER-MCNC: 4.1 GM/DL (ref 2.4–3.5)
GLUCOSE SERPL-MCNC: 91 MG/DL (ref 74–106)
HCT VFR BLD AUTO: 50.6 % (ref 42–52)
HGB BLD-MCNC: 16.4 GM/DL (ref 14–18)
LYMPHOCYTES # BLD AUTO: 2.3 X10(3)/MCL (ref 0.6–4.6)
LYMPHOCYTES NFR BLD AUTO: 31 %
MCH RBC QN AUTO: 28.3 PG (ref 27–31)
MCHC RBC AUTO-ENTMCNC: 32.4 GM/DL (ref 33–36)
MCV RBC AUTO: 87.4 FL (ref 80–94)
MONOCYTES # BLD AUTO: 0.6 X10(3)/MCL (ref 0.1–1.3)
MONOCYTES NFR BLD AUTO: 8 %
NEUTROPHILS # BLD AUTO: 4.27 X10(3)/MCL (ref 2.1–9.2)
NEUTROPHILS NFR BLD AUTO: 58 %
PLATELET # BLD AUTO: 267 X10(3)/MCL (ref 130–400)
PMV BLD AUTO: 9.1 FL (ref 9.4–12.4)
POTASSIUM SERPL-SCNC: 4.6 MMOL/L (ref 3.5–5.1)
PROT SERPL-MCNC: 8 GM/DL (ref 6.4–8.2)
RBC # BLD AUTO: 5.79 X10(6)/MCL (ref 4.7–6.1)
SODIUM SERPL-SCNC: 139 MMOL/L (ref 136–145)
TROPONIN I SERPL-MCNC: <0.02 NG/ML (ref 0.02–0.49)
WBC # SPEC AUTO: 7.4 X10(3)/MCL (ref 4.5–11.5)

## 2019-11-14 ENCOUNTER — HISTORICAL (OUTPATIENT)
Dept: ADMINISTRATIVE | Facility: HOSPITAL | Age: 54
End: 2019-11-14

## 2019-11-14 LAB
ABS NEUT (OLG): 4.18 X10(3)/MCL (ref 2.1–9.2)
ALBUMIN SERPL-MCNC: 3.7 GM/DL (ref 3.4–5)
ALBUMIN/GLOB SERPL: 1 RATIO (ref 1.1–2)
ALP SERPL-CCNC: 92 UNIT/L (ref 50–136)
ALT SERPL-CCNC: 50 UNIT/L (ref 12–78)
AMPHET UR QL SCN: NEGATIVE
APAP SERPL-MCNC: <2 MCG/ML (ref 10–30)
APPEARANCE, UA: CLEAR
AST SERPL-CCNC: 42 UNIT/L (ref 15–37)
BACTERIA SPEC CULT: ABNORMAL /HPF
BARBITURATE SCN PRESENT UR: NEGATIVE
BASOPHILS # BLD AUTO: 0.1 X10(3)/MCL (ref 0–0.2)
BASOPHILS NFR BLD AUTO: 1 %
BENZODIAZ UR QL SCN: NEGATIVE
BILIRUB SERPL-MCNC: 0.4 MG/DL (ref 0.2–1)
BILIRUB UR QL STRIP: NEGATIVE
BILIRUBIN DIRECT+TOT PNL SERPL-MCNC: 0.1 MG/DL (ref 0–0.5)
BILIRUBIN DIRECT+TOT PNL SERPL-MCNC: 0.3 MG/DL (ref 0–0.8)
BUN SERPL-MCNC: 24 MG/DL (ref 7–18)
CALCIUM SERPL-MCNC: 8.6 MG/DL (ref 8.5–10.1)
CANNABINOIDS UR QL SCN: NEGATIVE
CHLORIDE SERPL-SCNC: 106 MMOL/L (ref 98–107)
CO2 SERPL-SCNC: 25 MMOL/L (ref 21–32)
COCAINE UR QL SCN: NEGATIVE
COLOR UR: YELLOW
CREAT SERPL-MCNC: 0.93 MG/DL (ref 0.7–1.3)
EOSINOPHIL # BLD AUTO: 0.2 X10(3)/MCL (ref 0–0.9)
EOSINOPHIL NFR BLD AUTO: 2 %
ERYTHROCYTE [DISTWIDTH] IN BLOOD BY AUTOMATED COUNT: 14.4 % (ref 11.5–17)
ETHANOL SERPL-MCNC: <3 MG/DL
GLOBULIN SER-MCNC: 3.8 GM/DL (ref 2.4–3.5)
GLUCOSE (UA): NEGATIVE
GLUCOSE SERPL-MCNC: 92 MG/DL (ref 74–106)
HCT VFR BLD AUTO: 49.6 % (ref 42–52)
HGB BLD-MCNC: 16.1 GM/DL (ref 14–18)
HGB UR QL STRIP: ABNORMAL
KETONES UR QL STRIP: NEGATIVE
LEUKOCYTE ESTERASE UR QL STRIP: NEGATIVE
LYMPHOCYTES # BLD AUTO: 3.9 X10(3)/MCL (ref 0.6–4.6)
LYMPHOCYTES NFR BLD AUTO: 43 %
MCH RBC QN AUTO: 28.8 PG (ref 27–31)
MCHC RBC AUTO-ENTMCNC: 32.5 GM/DL (ref 33–36)
MCV RBC AUTO: 88.6 FL (ref 80–94)
MONOCYTES # BLD AUTO: 0.6 X10(3)/MCL (ref 0.1–1.3)
MONOCYTES NFR BLD AUTO: 7 %
NEUTROPHILS # BLD AUTO: 4.18 X10(3)/MCL (ref 2.1–9.2)
NEUTROPHILS NFR BLD AUTO: 46 %
NITRITE UR QL STRIP: NEGATIVE
OPIATES UR QL SCN: NEGATIVE
PCP UR QL: NEGATIVE
PH UR STRIP.AUTO: 5.5 [PH] (ref 5–7.5)
PH UR STRIP: 5.5 [PH] (ref 5–9)
PLATELET # BLD AUTO: 244 X10(3)/MCL (ref 130–400)
PMV BLD AUTO: 9 FL (ref 9.4–12.4)
POTASSIUM SERPL-SCNC: 4.2 MMOL/L (ref 3.5–5.1)
PROT SERPL-MCNC: 7.5 GM/DL (ref 6.4–8.2)
PROT UR QL STRIP: NEGATIVE
RBC # BLD AUTO: 5.6 X10(6)/MCL (ref 4.7–6.1)
RBC #/AREA URNS HPF: 5 /HPF (ref 0–2)
SALICYLATES SERPL-MCNC: <1.7 MG/DL (ref 2.8–20)
SODIUM SERPL-SCNC: 139 MMOL/L (ref 136–145)
SP GR FLD REFRACTOMETRY: 1.03 (ref 1–1.03)
SP GR UR STRIP: 1.03 (ref 1–1.03)
SQUAMOUS EPITHELIAL, UA: ABNORMAL
TSH SERPL-ACNC: 1.42 MIU/L (ref 0.36–3.74)
UROBILINOGEN UR STRIP-ACNC: 1
WBC # SPEC AUTO: 9 X10(3)/MCL (ref 4.5–11.5)
WBC #/AREA URNS HPF: ABNORMAL /HPF

## 2019-11-30 ENCOUNTER — HISTORICAL (OUTPATIENT)
Dept: ADMINISTRATIVE | Facility: HOSPITAL | Age: 54
End: 2019-11-30

## 2019-11-30 LAB
ABS NEUT (OLG): 3.67 X10(3)/MCL (ref 2.1–9.2)
ALBUMIN SERPL-MCNC: 3.8 GM/DL (ref 3.4–5)
ALBUMIN/GLOB SERPL: 0.9 {RATIO}
ALP SERPL-CCNC: 65 UNIT/L (ref 50–136)
ALT SERPL-CCNC: 44 UNIT/L (ref 12–78)
AMPHET UR QL SCN: NEGATIVE
APAP SERPL-MCNC: <2 MCG/ML (ref 10–30)
APPEARANCE, UA: CLEAR
AST SERPL-CCNC: 31 UNIT/L (ref 15–37)
BACTERIA SPEC CULT: NORMAL /HPF
BARBITURATE SCN PRESENT UR: NEGATIVE
BASOPHILS # BLD AUTO: 0.1 X10(3)/MCL (ref 0–0.2)
BASOPHILS NFR BLD AUTO: 1 %
BENZODIAZ UR QL SCN: NEGATIVE
BILIRUB SERPL-MCNC: 0.7 MG/DL (ref 0.2–1)
BILIRUB UR QL STRIP: NEGATIVE
BILIRUBIN DIRECT+TOT PNL SERPL-MCNC: 0.2 MG/DL (ref 0–0.2)
BILIRUBIN DIRECT+TOT PNL SERPL-MCNC: 0.5 MG/DL (ref 0–0.8)
BUN SERPL-MCNC: 16 MG/DL (ref 7–18)
CALCIUM SERPL-MCNC: 9.3 MG/DL (ref 8.5–10.1)
CANNABINOIDS UR QL SCN: NEGATIVE
CHLORIDE SERPL-SCNC: 104 MMOL/L (ref 98–107)
CO2 SERPL-SCNC: 26 MMOL/L (ref 21–32)
COCAINE UR QL SCN: NEGATIVE
COLOR UR: YELLOW
CREAT SERPL-MCNC: 0.71 MG/DL (ref 0.7–1.3)
EOSINOPHIL # BLD AUTO: 0.2 X10(3)/MCL (ref 0–0.9)
EOSINOPHIL NFR BLD AUTO: 3 %
ERYTHROCYTE [DISTWIDTH] IN BLOOD BY AUTOMATED COUNT: 14.5 % (ref 11.5–17)
ETHANOL SERPL-MCNC: <3 MG/DL (ref 0–3)
GLOBULIN SER-MCNC: 4.4 GM/DL (ref 2.4–3.5)
GLUCOSE (UA): NEGATIVE
GLUCOSE SERPL-MCNC: 107 MG/DL (ref 74–106)
HCT VFR BLD AUTO: 48.5 % (ref 42–52)
HGB BLD-MCNC: 16 GM/DL (ref 14–18)
HGB UR QL STRIP: NEGATIVE
KETONES UR QL STRIP: NEGATIVE
LEUKOCYTE ESTERASE UR QL STRIP: NEGATIVE
LYMPHOCYTES # BLD AUTO: 2.2 X10(3)/MCL (ref 0.6–4.6)
LYMPHOCYTES NFR BLD AUTO: 33 %
MCH RBC QN AUTO: 28.3 PG (ref 27–31)
MCHC RBC AUTO-ENTMCNC: 33 GM/DL (ref 33–36)
MCV RBC AUTO: 85.8 FL (ref 80–94)
MONOCYTES # BLD AUTO: 0.4 X10(3)/MCL (ref 0.1–1.3)
MONOCYTES NFR BLD AUTO: 7 %
NEUTROPHILS # BLD AUTO: 3.67 X10(3)/MCL (ref 2.1–9.2)
NEUTROPHILS NFR BLD AUTO: 55 %
NITRITE UR QL STRIP: NEGATIVE
OPIATES UR QL SCN: NEGATIVE
PCP UR QL: NEGATIVE
PH UR STRIP.AUTO: 6 [PH] (ref 5–7.5)
PH UR STRIP: 6 [PH] (ref 5–9)
PLATELET # BLD AUTO: 256 X10(3)/MCL (ref 130–400)
PMV BLD AUTO: 9.4 FL (ref 9.4–12.4)
POTASSIUM SERPL-SCNC: 4 MMOL/L (ref 3.5–5.1)
PROT SERPL-MCNC: 8.2 GM/DL (ref 6.4–8.2)
PROT UR QL STRIP: NEGATIVE
RBC # BLD AUTO: 5.65 X10(6)/MCL (ref 4.7–6.1)
RBC #/AREA URNS HPF: NORMAL /[HPF]
SALICYLATES SERPL-MCNC: <1.7 MG/DL (ref 2.8–20)
SODIUM SERPL-SCNC: 138 MMOL/L (ref 136–145)
SP GR FLD REFRACTOMETRY: 1.02 (ref 1–1.03)
SP GR UR STRIP: 1.02 (ref 1–1.03)
SQUAMOUS EPITHELIAL, UA: NORMAL
TSH SERPL-ACNC: 0.94 MIU/L (ref 0.36–3.74)
UROBILINOGEN UR STRIP-ACNC: 1
WBC # SPEC AUTO: 6.6 X10(3)/MCL (ref 4.5–11.5)
WBC #/AREA URNS HPF: NORMAL /HPF

## 2019-12-02 LAB
CHOLEST SERPL-MCNC: 166 MG/DL (ref 0–199)
CHOLEST/HDLC SERPL: 4 {RATIO}
HDLC SERPL-MCNC: 43 MG/DL
LDLC SERPL CALC-MCNC: 82 MG/DL (ref 0–129)
TRIGL SERPL-MCNC: 204 MG/DL (ref 0–149)
VLDLC SERPL CALC-MCNC: 41 MG/DL

## 2019-12-05 LAB — CARBAMAZEPINE FREE SERPL-MCNC: 8.4 MG/ML (ref 4–12)

## 2019-12-20 ENCOUNTER — HISTORICAL (OUTPATIENT)
Dept: ADMINISTRATIVE | Facility: HOSPITAL | Age: 54
End: 2019-12-20

## 2019-12-20 LAB
ABS NEUT (OLG): 10.27 X10(3)/MCL (ref 2.1–9.2)
ALBUMIN SERPL-MCNC: 3.9 GM/DL (ref 3.4–5)
ALBUMIN/GLOB SERPL: 1.1 RATIO (ref 1.1–2)
ALP SERPL-CCNC: 62 UNIT/L (ref 50–136)
ALT SERPL-CCNC: 63 UNIT/L (ref 12–78)
AMMONIA PLAS-MSCNC: 24 MCMOL/L (ref 11–32)
AMPHET UR QL SCN: NEGATIVE
APPEARANCE, UA: CLEAR
AST SERPL-CCNC: 60 UNIT/L (ref 15–37)
BACTERIA SPEC CULT: ABNORMAL /HPF
BARBITURATE SCN PRESENT UR: NEGATIVE
BASOPHILS # BLD AUTO: 0.1 X10(3)/MCL (ref 0–0.2)
BASOPHILS NFR BLD AUTO: 1 %
BENZODIAZ UR QL SCN: NEGATIVE
BILIRUB SERPL-MCNC: 0.7 MG/DL (ref 0.2–1)
BILIRUB UR QL STRIP: NEGATIVE
BILIRUBIN DIRECT+TOT PNL SERPL-MCNC: 0.2 MG/DL (ref 0–0.5)
BILIRUBIN DIRECT+TOT PNL SERPL-MCNC: 0.5 MG/DL (ref 0–0.8)
BUN SERPL-MCNC: 16 MG/DL (ref 7–18)
CALCIUM SERPL-MCNC: 9 MG/DL (ref 8.5–10.1)
CANNABINOIDS UR QL SCN: NEGATIVE
CHLORIDE SERPL-SCNC: 105 MMOL/L (ref 98–107)
CK SERPL-CCNC: 750 UNIT/L (ref 39–308)
CO2 SERPL-SCNC: 25 MMOL/L (ref 21–32)
COCAINE UR QL SCN: NEGATIVE
COLOR UR: YELLOW
CREAT SERPL-MCNC: 1.14 MG/DL (ref 0.7–1.3)
EOSINOPHIL # BLD AUTO: 0.1 X10(3)/MCL (ref 0–0.9)
EOSINOPHIL NFR BLD AUTO: 1 %
ERYTHROCYTE [DISTWIDTH] IN BLOOD BY AUTOMATED COUNT: 13.6 % (ref 11.5–17)
ETHANOL SERPL-MCNC: 10 MG/DL (ref 0–3)
GLOBULIN SER-MCNC: 3.5 GM/DL (ref 2.4–3.5)
GLUCOSE (UA): NEGATIVE
GLUCOSE SERPL-MCNC: 184 MG/DL (ref 74–106)
HCT VFR BLD AUTO: 48.4 % (ref 42–52)
HGB BLD-MCNC: 15.8 GM/DL (ref 14–18)
HGB UR QL STRIP: NEGATIVE
KETONES UR QL STRIP: ABNORMAL
LEUKOCYTE ESTERASE UR QL STRIP: NEGATIVE
LYMPHOCYTES # BLD AUTO: 2.1 X10(3)/MCL (ref 0.6–4.6)
LYMPHOCYTES NFR BLD AUTO: 15 %
MCH RBC QN AUTO: 28.4 PG (ref 27–31)
MCHC RBC AUTO-ENTMCNC: 32.6 GM/DL (ref 33–36)
MCV RBC AUTO: 86.9 FL (ref 80–94)
MONOCYTES # BLD AUTO: 1.3 X10(3)/MCL (ref 0.1–1.3)
MONOCYTES NFR BLD AUTO: 9 %
NEUTROPHILS # BLD AUTO: 10.27 X10(3)/MCL (ref 2.1–9.2)
NEUTROPHILS NFR BLD AUTO: 74 %
NITRITE UR QL STRIP: NEGATIVE
OPIATES UR QL SCN: NEGATIVE
PCP UR QL: NEGATIVE
PH UR STRIP.AUTO: 6 [PH] (ref 5–7.5)
PH UR STRIP: 6 [PH] (ref 5–9)
PLATELET # BLD AUTO: 318 X10(3)/MCL (ref 130–400)
PMV BLD AUTO: 9.1 FL (ref 9.4–12.4)
POTASSIUM SERPL-SCNC: 4.1 MMOL/L (ref 3.5–5.1)
PROT SERPL-MCNC: 7.4 GM/DL (ref 6.4–8.2)
PROT UR QL STRIP: ABNORMAL
RBC # BLD AUTO: 5.57 X10(6)/MCL (ref 4.7–6.1)
RBC #/AREA URNS HPF: ABNORMAL /[HPF]
SODIUM SERPL-SCNC: 141 MMOL/L (ref 136–145)
SP GR FLD REFRACTOMETRY: 1.02 (ref 1–1.03)
SP GR UR STRIP: 1.02 (ref 1–1.03)
SQUAMOUS EPITHELIAL, UA: ABNORMAL
T4 FREE SERPL-MCNC: 1.09 NG/DL (ref 0.76–1.46)
TROPONIN I SERPL-MCNC: 0.03 NG/ML (ref 0.02–0.49)
TSH SERPL-ACNC: 1.18 MIU/L (ref 0.36–3.74)
UA WBC MAN: ABNORMAL
UROBILINOGEN UR STRIP-ACNC: 1
WBC # SPEC AUTO: 13.9 X10(3)/MCL (ref 4.5–11.5)

## 2019-12-21 LAB
ABS NEUT (OLG): 3.54 X10(3)/MCL (ref 2.1–9.2)
ALBUMIN SERPL-MCNC: 3.2 GM/DL (ref 3.4–5)
ALBUMIN/GLOB SERPL: 1 RATIO (ref 1.1–2)
ALP SERPL-CCNC: 51 UNIT/L (ref 50–136)
ALT SERPL-CCNC: 53 UNIT/L (ref 12–78)
AST SERPL-CCNC: 56 UNIT/L (ref 15–37)
BASOPHILS # BLD AUTO: 0.1 X10(3)/MCL (ref 0–0.2)
BASOPHILS NFR BLD AUTO: 1 %
BILIRUB SERPL-MCNC: 0.7 MG/DL (ref 0.2–1)
BILIRUBIN DIRECT+TOT PNL SERPL-MCNC: 0.2 MG/DL (ref 0–0.5)
BILIRUBIN DIRECT+TOT PNL SERPL-MCNC: 0.5 MG/DL (ref 0–0.8)
BUN SERPL-MCNC: 19 MG/DL (ref 7–18)
CALCIUM SERPL-MCNC: 8.2 MG/DL (ref 8.5–10.1)
CARBAMAZEPINE FREE SERPL-MCNC: 1.9 MG/ML (ref 4–12)
CHLORIDE SERPL-SCNC: 109 MMOL/L (ref 98–107)
CO2 SERPL-SCNC: 25 MMOL/L (ref 21–32)
CREAT SERPL-MCNC: 0.82 MG/DL (ref 0.7–1.3)
EOSINOPHIL # BLD AUTO: 0.4 X10(3)/MCL (ref 0–0.9)
EOSINOPHIL NFR BLD AUTO: 4 %
ERYTHROCYTE [DISTWIDTH] IN BLOOD BY AUTOMATED COUNT: 13.6 % (ref 11.5–17)
GLOBULIN SER-MCNC: 3.1 GM/DL (ref 2.4–3.5)
GLUCOSE SERPL-MCNC: 88 MG/DL (ref 74–106)
HCT VFR BLD AUTO: 41.5 % (ref 42–52)
HGB BLD-MCNC: 13.5 GM/DL (ref 14–18)
LYMPHOCYTES # BLD AUTO: 3.3 X10(3)/MCL (ref 0.6–4.6)
LYMPHOCYTES NFR BLD AUTO: 40 %
MCH RBC QN AUTO: 28.1 PG (ref 27–31)
MCHC RBC AUTO-ENTMCNC: 32.5 GM/DL (ref 33–36)
MCV RBC AUTO: 86.5 FL (ref 80–94)
MONOCYTES # BLD AUTO: 0.9 X10(3)/MCL (ref 0.1–1.3)
MONOCYTES NFR BLD AUTO: 11 %
NEUTROPHILS # BLD AUTO: 3.54 X10(3)/MCL (ref 2.1–9.2)
NEUTROPHILS NFR BLD AUTO: 43 %
PLATELET # BLD AUTO: 271 X10(3)/MCL (ref 130–400)
PMV BLD AUTO: 9.2 FL (ref 9.4–12.4)
POTASSIUM SERPL-SCNC: 3.8 MMOL/L (ref 3.5–5.1)
PROT SERPL-MCNC: 6.3 GM/DL (ref 6.4–8.2)
RBC # BLD AUTO: 4.8 X10(6)/MCL (ref 4.7–6.1)
SODIUM SERPL-SCNC: 142 MMOL/L (ref 136–145)
WBC # SPEC AUTO: 8.2 X10(3)/MCL (ref 4.5–11.5)

## 2019-12-22 LAB — CK SERPL-CCNC: 467 UNIT/L (ref 39–308)

## 2020-02-23 ENCOUNTER — HISTORICAL (OUTPATIENT)
Dept: ADMINISTRATIVE | Facility: HOSPITAL | Age: 55
End: 2020-02-23

## 2020-08-14 ENCOUNTER — HISTORICAL (OUTPATIENT)
Dept: ADMINISTRATIVE | Facility: HOSPITAL | Age: 55
End: 2020-08-14

## 2021-04-29 ENCOUNTER — HISTORICAL (OUTPATIENT)
Dept: ADMINISTRATIVE | Facility: HOSPITAL | Age: 56
End: 2021-04-29

## 2021-04-29 LAB
ABS NEUT (OLG): 2.42 X10(3)/MCL (ref 2.1–9.2)
ALBUMIN SERPL-MCNC: 3.9 GM/DL (ref 3.5–5)
ALBUMIN/GLOB SERPL: 1 RATIO (ref 1.1–2)
ALP SERPL-CCNC: 62 UNIT/L (ref 40–150)
ALT SERPL-CCNC: 38 UNIT/L (ref 0–55)
AST SERPL-CCNC: 36 UNIT/L (ref 5–34)
BASOPHILS # BLD AUTO: 0 X10(3)/MCL (ref 0–0.2)
BASOPHILS NFR BLD AUTO: 1 %
BILIRUB SERPL-MCNC: 0.8 MG/DL
BILIRUBIN DIRECT+TOT PNL SERPL-MCNC: 0.3 MG/DL (ref 0–0.5)
BILIRUBIN DIRECT+TOT PNL SERPL-MCNC: 0.5 MG/DL (ref 0–0.8)
BUN SERPL-MCNC: 14.8 MG/DL (ref 8.4–25.7)
CALCIUM SERPL-MCNC: 9.7 MG/DL (ref 8.4–10.2)
CHLORIDE SERPL-SCNC: 107 MMOL/L (ref 98–107)
CO2 SERPL-SCNC: 28 MMOL/L (ref 22–29)
CREAT SERPL-MCNC: 0.81 MG/DL (ref 0.73–1.18)
EOSINOPHIL # BLD AUTO: 0.2 X10(3)/MCL (ref 0–0.9)
EOSINOPHIL NFR BLD AUTO: 5 %
ERYTHROCYTE [DISTWIDTH] IN BLOOD BY AUTOMATED COUNT: 13.8 % (ref 11.5–14.5)
FERRITIN SERPL-MCNC: 191.36 NG/ML (ref 21.81–274.66)
GLOBULIN SER-MCNC: 3.8 GM/DL (ref 2.4–3.5)
GLUCOSE SERPL-MCNC: 94 MG/DL (ref 74–100)
HAV AB SER QL IA: NONREACTIVE
HBV CORE AB SERPL QL IA: NONREACTIVE
HBV SURFACE AB SER-ACNC: 0.03 MIU/ML
HBV SURFACE AB SERPL IA-ACNC: NONREACTIVE M[IU]/ML
HBV SURFACE AG SERPL QL IA: NONREACTIVE
HCT VFR BLD AUTO: 47.1 % (ref 40–51)
HCV AB SERPL QL IA: REACTIVE
HGB BLD-MCNC: 15.9 GM/DL (ref 13.5–17.5)
HIV 1+2 AB+HIV1 P24 AG SERPL QL IA: NONREACTIVE
INR PPP: 1.02 (ref 0.9–1.2)
IRON SATN MFR SERPL: 57 % (ref 20–50)
IRON SERPL-MCNC: 171 UG/DL (ref 65–175)
LYMPHOCYTES # BLD AUTO: 1.4 X10(3)/MCL (ref 0.6–4.6)
LYMPHOCYTES NFR BLD AUTO: 31 %
MCH RBC QN AUTO: 29 PG (ref 26–34)
MCHC RBC AUTO-ENTMCNC: 33.8 GM/DL (ref 31–37)
MCV RBC AUTO: 85.9 FL (ref 80–100)
MONOCYTES # BLD AUTO: 0.4 X10(3)/MCL (ref 0.1–1.3)
MONOCYTES NFR BLD AUTO: 10 %
NEUTROPHILS # BLD AUTO: 2.42 X10(3)/MCL (ref 2.1–9.2)
NEUTROPHILS NFR BLD AUTO: 54 %
PLATELET # BLD AUTO: 191 X10(3)/MCL (ref 130–400)
PMV BLD AUTO: 9.6 FL (ref 7.4–10.4)
POTASSIUM SERPL-SCNC: 3.9 MMOL/L (ref 3.5–5.1)
PROT SERPL-MCNC: 7.7 GM/DL (ref 6.4–8.3)
PROTHROMBIN TIME: 13 SECOND(S) (ref 11.9–14.4)
RBC # BLD AUTO: 5.48 X10(6)/MCL (ref 4.5–5.9)
SODIUM SERPL-SCNC: 142 MMOL/L (ref 136–145)
T PALLIDUM AB SER QL: NONREACTIVE
T4 FREE SERPL-MCNC: 0.94 NG/DL (ref 0.7–1.48)
TIBC SERPL-MCNC: 127 UG/DL (ref 69–240)
TIBC SERPL-MCNC: 298 UG/DL (ref 250–450)
TRANSFERRIN SERPL-MCNC: 252 MG/DL (ref 174–364)
TSH SERPL-ACNC: 0.21 UIU/ML (ref 0.35–4.94)
WBC # SPEC AUTO: 4.5 X10(3)/MCL (ref 4.5–11)

## 2022-01-06 ENCOUNTER — HOSPITAL ENCOUNTER (OUTPATIENT)
Dept: EMERGENCY MEDICINE | Facility: HOSPITAL | Age: 57
End: 2022-01-07
Attending: INTERNAL MEDICINE | Admitting: INTERNAL MEDICINE

## 2022-01-06 LAB
ABS NEUT (OLG): 4.12 X10(3)/MCL (ref 2.1–9.2)
ALBUMIN SERPL-MCNC: 4 GM/DL (ref 3.5–5)
ALBUMIN/GLOB SERPL: 1.2 RATIO (ref 1.1–2)
ALP SERPL-CCNC: 56 UNIT/L (ref 40–150)
ALT SERPL-CCNC: 12 UNIT/L (ref 0–55)
AMPHET UR QL SCN: NEGATIVE
AST SERPL-CCNC: 16 UNIT/L (ref 5–34)
BARBITURATE SCN PRESENT UR: NEGATIVE
BASOPHILS # BLD AUTO: 0.1 X10(3)/MCL (ref 0–0.2)
BASOPHILS NFR BLD AUTO: 1 %
BENZODIAZ UR QL SCN: NEGATIVE
BILIRUB SERPL-MCNC: 0.9 MG/DL
BILIRUBIN DIRECT+TOT PNL SERPL-MCNC: 0.3 MG/DL (ref 0–0.5)
BILIRUBIN DIRECT+TOT PNL SERPL-MCNC: 0.6 MG/DL (ref 0–0.8)
BNP BLD-MCNC: 42.4 PG/ML
BUN SERPL-MCNC: 14.1 MG/DL (ref 8.4–25.7)
CALCIUM SERPL-MCNC: 9.6 MG/DL (ref 8.7–10.5)
CANNABINOIDS UR QL SCN: NEGATIVE
CHLORIDE SERPL-SCNC: 108 MMOL/L (ref 98–107)
CK MB SERPL-MCNC: 4.2 NG/ML
CO2 SERPL-SCNC: 23 MMOL/L (ref 22–29)
COCAINE UR QL SCN: NEGATIVE
CREAT SERPL-MCNC: 0.84 MG/DL (ref 0.73–1.18)
EOSINOPHIL # BLD AUTO: 0.2 X10(3)/MCL (ref 0–0.9)
EOSINOPHIL NFR BLD AUTO: 3 %
ERYTHROCYTE [DISTWIDTH] IN BLOOD BY AUTOMATED COUNT: 14.1 % (ref 11.5–17)
FENTANYL UR QL SCN: NEGATIVE
GLOBULIN SER-MCNC: 3.4 GM/DL (ref 2.4–3.5)
GLUCOSE SERPL-MCNC: 86 MG/DL (ref 74–100)
HCT VFR BLD AUTO: 45.9 % (ref 42–52)
HGB BLD-MCNC: 15.9 GM/DL (ref 14–18)
LYMPHOCYTES # BLD AUTO: 2.6 X10(3)/MCL (ref 0.6–4.6)
LYMPHOCYTES NFR BLD AUTO: 35 %
MCH RBC QN AUTO: 27.8 PG (ref 27–31)
MCHC RBC AUTO-ENTMCNC: 34.6 GM/DL (ref 33–36)
MCV RBC AUTO: 80.4 FL (ref 80–94)
MDMA UR QL SCN: NEGATIVE
MONOCYTES # BLD AUTO: 0.4 X10(3)/MCL (ref 0.1–1.3)
MONOCYTES NFR BLD AUTO: 6 %
NEUTROPHILS # BLD AUTO: 4.12 X10(3)/MCL (ref 2.1–9.2)
NEUTROPHILS NFR BLD AUTO: 55 %
OPIATES UR QL SCN: NEGATIVE
PCP UR QL: NEGATIVE
PH UR STRIP.AUTO: 5 [PH] (ref 5–7.5)
PLATELET # BLD AUTO: 273 X10(3)/MCL (ref 130–400)
PMV BLD AUTO: 9.7 FL (ref 9.4–12.4)
POTASSIUM SERPL-SCNC: 4.3 MMOL/L (ref 3.5–5.1)
PROT SERPL-MCNC: 7.4 GM/DL (ref 6.4–8.3)
RBC # BLD AUTO: 5.71 X10(6)/MCL (ref 4.7–6.1)
SARS-COV-2 AG RESP QL IA.RAPID: NEGATIVE
SODIUM SERPL-SCNC: 137 MMOL/L (ref 136–145)
SP GR FLD REFRACTOMETRY: 1.01 (ref 1–1.03)
TROPONIN I SERPL-MCNC: 0.01 NG/ML (ref 0–0.04)
TROPONIN I SERPL-MCNC: <0.01 NG/ML (ref 0–0.04)
WBC # SPEC AUTO: 7.5 X10(3)/MCL (ref 4.5–11.5)

## 2022-01-07 LAB
ABS NEUT (OLG): 3.25 X10(3)/MCL (ref 2.1–9.2)
BASOPHILS # BLD AUTO: 0.1 X10(3)/MCL (ref 0–0.2)
BASOPHILS NFR BLD AUTO: 1 %
BUN SERPL-MCNC: 14.2 MG/DL (ref 8.4–25.7)
CALCIUM SERPL-MCNC: 9.4 MG/DL (ref 8.7–10.5)
CHLORIDE SERPL-SCNC: 107 MMOL/L (ref 98–107)
CHOLEST SERPL-MCNC: 170 MG/DL
CHOLEST/HDLC SERPL: 5 {RATIO} (ref 0–5)
CO2 SERPL-SCNC: 21 MMOL/L (ref 22–29)
CREAT SERPL-MCNC: 0.83 MG/DL (ref 0.73–1.18)
CREAT/UREA NIT SERPL: 17
EOSINOPHIL # BLD AUTO: 0.3 X10(3)/MCL (ref 0–0.9)
EOSINOPHIL NFR BLD AUTO: 4 %
ERYTHROCYTE [DISTWIDTH] IN BLOOD BY AUTOMATED COUNT: 14 % (ref 11.5–17)
EST CREAT CLEARANCE SER (OHS): 95.45 ML/MIN
EST. AVERAGE GLUCOSE BLD GHB EST-MCNC: 122.6 MG/DL
GLUCOSE SERPL-MCNC: 89 MG/DL (ref 74–100)
HBA1C MFR BLD: 5.9 %
HCT VFR BLD AUTO: 46.3 % (ref 42–52)
HDLC SERPL-MCNC: 31 MG/DL (ref 35–60)
HGB BLD-MCNC: 15.9 GM/DL (ref 14–18)
LDLC SERPL CALC-MCNC: 116 MG/DL (ref 50–140)
LYMPHOCYTES # BLD AUTO: 3.1 X10(3)/MCL (ref 0.6–4.6)
LYMPHOCYTES NFR BLD AUTO: 43 %
MCH RBC QN AUTO: 28.4 PG (ref 27–31)
MCHC RBC AUTO-ENTMCNC: 34.3 GM/DL (ref 33–36)
MCV RBC AUTO: 82.8 FL (ref 80–94)
MONOCYTES # BLD AUTO: 0.4 X10(3)/MCL (ref 0.1–1.3)
MONOCYTES NFR BLD AUTO: 6 %
NEUTROPHILS # BLD AUTO: 3.25 X10(3)/MCL (ref 2.1–9.2)
NEUTROPHILS NFR BLD AUTO: 45 %
PLATELET # BLD AUTO: 247 X10(3)/MCL (ref 130–400)
PMV BLD AUTO: 9.8 FL (ref 9.4–12.4)
POTASSIUM SERPL-SCNC: 3.7 MMOL/L (ref 3.5–5.1)
RBC # BLD AUTO: 5.59 X10(6)/MCL (ref 4.7–6.1)
SODIUM SERPL-SCNC: 137 MMOL/L (ref 136–145)
TRIGL SERPL-MCNC: 115 MG/DL (ref 34–140)
TROPONIN I SERPL-MCNC: 0.01 NG/ML (ref 0–0.04)
VLDLC SERPL CALC-MCNC: 23 MG/DL
WBC # SPEC AUTO: 7.2 X10(3)/MCL (ref 4.5–11.5)

## 2022-03-23 ENCOUNTER — HOSPITAL ENCOUNTER (INPATIENT)
Facility: HOSPITAL | Age: 57
LOS: 2 days | Discharge: HOME | DRG: 282 | End: 2022-03-25
Attending: THORACIC SURGERY (CARDIOTHORACIC VASCULAR SURGERY) | Admitting: THORACIC SURGERY (CARDIOTHORACIC VASCULAR SURGERY)
Payer: MEDICAID

## 2022-03-23 DIAGNOSIS — I65.23 BILATERAL CAROTID ARTERY STENOSIS: ICD-10-CM

## 2022-03-23 DIAGNOSIS — I25.10 CAD (CORONARY ARTERY DISEASE): ICD-10-CM

## 2022-03-23 DIAGNOSIS — I21.4 NSTEMI (NON-ST ELEVATED MYOCARDIAL INFARCTION): ICD-10-CM

## 2022-03-23 PROBLEM — F31.9 BIPOLAR 1 DISORDER: Status: ACTIVE | Noted: 2022-03-23

## 2022-03-23 PROBLEM — I21.09 ACUTE MYOCARDIAL INFARCTION OF ANTEROLATERAL WALL: Status: ACTIVE | Noted: 2022-03-23

## 2022-03-23 PROBLEM — Z72.0 TOBACCO ABUSE: Status: ACTIVE | Noted: 2022-03-23

## 2022-03-23 LAB
ANION GAP SERPL CALC-SCNC: 8 MMOL/L (ref 8–16)
APTT BLDCRRT: 27 SEC (ref 21–32)
BASOPHILS # BLD AUTO: 0.04 K/UL (ref 0–0.2)
BASOPHILS NFR BLD: 0.4 % (ref 0–1.9)
BUN SERPL-MCNC: 15 MG/DL (ref 6–20)
CALCIUM SERPL-MCNC: 9.5 MG/DL (ref 8.7–10.5)
CHLORIDE SERPL-SCNC: 104 MMOL/L (ref 95–110)
CO2 SERPL-SCNC: 25 MMOL/L (ref 23–29)
CREAT SERPL-MCNC: 0.8 MG/DL (ref 0.5–1.4)
DIFFERENTIAL METHOD: NORMAL
EOSINOPHIL # BLD AUTO: 0.3 K/UL (ref 0–0.5)
EOSINOPHIL NFR BLD: 2.5 % (ref 0–8)
ERYTHROCYTE [DISTWIDTH] IN BLOOD BY AUTOMATED COUNT: 13.8 % (ref 11.5–14.5)
EST. GFR  (AFRICAN AMERICAN): >60 ML/MIN/1.73 M^2
EST. GFR  (NON AFRICAN AMERICAN): >60 ML/MIN/1.73 M^2
GLUCOSE SERPL-MCNC: 162 MG/DL (ref 70–110)
HCT VFR BLD AUTO: 42.8 % (ref 40–54)
HGB BLD-MCNC: 14.3 G/DL (ref 14–18)
IMM GRANULOCYTES # BLD AUTO: 0.04 K/UL (ref 0–0.04)
IMM GRANULOCYTES NFR BLD AUTO: 0.4 % (ref 0–0.5)
INR PPP: 1 (ref 0.8–1.2)
LYMPHOCYTES # BLD AUTO: 1.9 K/UL (ref 1–4.8)
LYMPHOCYTES NFR BLD: 18.5 % (ref 18–48)
MCH RBC QN AUTO: 28 PG (ref 27–31)
MCHC RBC AUTO-ENTMCNC: 33.4 G/DL (ref 32–36)
MCV RBC AUTO: 84 FL (ref 82–98)
MONOCYTES # BLD AUTO: 0.8 K/UL (ref 0.3–1)
MONOCYTES NFR BLD: 7.7 % (ref 4–15)
NEUTROPHILS # BLD AUTO: 7.4 K/UL (ref 1.8–7.7)
NEUTROPHILS NFR BLD: 70.5 % (ref 38–73)
NRBC BLD-RTO: 0 /100 WBC
PLATELET # BLD AUTO: 216 K/UL (ref 150–450)
PMV BLD AUTO: 9.9 FL (ref 9.2–12.9)
POTASSIUM SERPL-SCNC: 4 MMOL/L (ref 3.5–5.1)
PROTHROMBIN TIME: 10.8 SEC (ref 9–12.5)
RBC # BLD AUTO: 5.1 M/UL (ref 4.6–6.2)
SODIUM SERPL-SCNC: 137 MMOL/L (ref 136–145)
WBC # BLD AUTO: 10.49 K/UL (ref 3.9–12.7)

## 2022-03-23 PROCEDURE — 80048 BASIC METABOLIC PNL TOTAL CA: CPT | Performed by: STUDENT IN AN ORGANIZED HEALTH CARE EDUCATION/TRAINING PROGRAM

## 2022-03-23 PROCEDURE — 85610 PROTHROMBIN TIME: CPT | Performed by: STUDENT IN AN ORGANIZED HEALTH CARE EDUCATION/TRAINING PROGRAM

## 2022-03-23 PROCEDURE — 63600175 PHARM REV CODE 636 W HCPCS: Performed by: STUDENT IN AN ORGANIZED HEALTH CARE EDUCATION/TRAINING PROGRAM

## 2022-03-23 PROCEDURE — 85025 COMPLETE CBC W/AUTO DIFF WBC: CPT | Performed by: STUDENT IN AN ORGANIZED HEALTH CARE EDUCATION/TRAINING PROGRAM

## 2022-03-23 PROCEDURE — 25000003 PHARM REV CODE 250: Performed by: STUDENT IN AN ORGANIZED HEALTH CARE EDUCATION/TRAINING PROGRAM

## 2022-03-23 PROCEDURE — 36415 COLL VENOUS BLD VENIPUNCTURE: CPT | Performed by: STUDENT IN AN ORGANIZED HEALTH CARE EDUCATION/TRAINING PROGRAM

## 2022-03-23 PROCEDURE — 85730 THROMBOPLASTIN TIME PARTIAL: CPT | Performed by: STUDENT IN AN ORGANIZED HEALTH CARE EDUCATION/TRAINING PROGRAM

## 2022-03-23 PROCEDURE — 20600001 HC STEP DOWN PRIVATE ROOM

## 2022-03-23 RX ORDER — OLANZAPINE 5 MG/1
5 TABLET, ORALLY DISINTEGRATING ORAL NIGHTLY
Status: DISCONTINUED | OUTPATIENT
Start: 2022-03-24 | End: 2022-03-25 | Stop reason: HOSPADM

## 2022-03-23 RX ORDER — ACETAMINOPHEN 325 MG/1
650 TABLET ORAL EVERY 4 HOURS PRN
Status: DISCONTINUED | OUTPATIENT
Start: 2022-03-23 | End: 2022-03-25 | Stop reason: HOSPADM

## 2022-03-23 RX ORDER — TALC
6 POWDER (GRAM) TOPICAL NIGHTLY PRN
Status: DISCONTINUED | OUTPATIENT
Start: 2022-03-23 | End: 2022-03-25 | Stop reason: HOSPADM

## 2022-03-23 RX ORDER — NAPROXEN SODIUM 220 MG/1
81 TABLET, FILM COATED ORAL DAILY
Status: DISCONTINUED | OUTPATIENT
Start: 2022-03-23 | End: 2022-03-25 | Stop reason: HOSPADM

## 2022-03-23 RX ORDER — SODIUM CHLORIDE 0.9 % (FLUSH) 0.9 %
10 SYRINGE (ML) INJECTION
Status: DISCONTINUED | OUTPATIENT
Start: 2022-03-23 | End: 2022-03-25 | Stop reason: HOSPADM

## 2022-03-23 RX ORDER — ENOXAPARIN SODIUM 100 MG/ML
1 INJECTION SUBCUTANEOUS
Status: DISCONTINUED | OUTPATIENT
Start: 2022-03-23 | End: 2022-03-25 | Stop reason: HOSPADM

## 2022-03-23 RX ORDER — AMLODIPINE BESYLATE 5 MG/1
5 TABLET ORAL DAILY
Status: DISCONTINUED | OUTPATIENT
Start: 2022-03-24 | End: 2022-03-25 | Stop reason: HOSPADM

## 2022-03-23 RX ORDER — ONDANSETRON 2 MG/ML
4 INJECTION INTRAMUSCULAR; INTRAVENOUS EVERY 8 HOURS PRN
Status: DISCONTINUED | OUTPATIENT
Start: 2022-03-23 | End: 2022-03-25 | Stop reason: HOSPADM

## 2022-03-23 RX ORDER — ACETAMINOPHEN 325 MG/1
650 TABLET ORAL EVERY 8 HOURS PRN
Status: DISCONTINUED | OUTPATIENT
Start: 2022-03-23 | End: 2022-03-25 | Stop reason: HOSPADM

## 2022-03-23 RX ORDER — METOPROLOL TARTRATE 50 MG/1
50 TABLET ORAL DAILY
Status: DISCONTINUED | OUTPATIENT
Start: 2022-03-23 | End: 2022-03-25 | Stop reason: HOSPADM

## 2022-03-23 RX ORDER — PRAVASTATIN SODIUM 40 MG/1
40 TABLET ORAL NIGHTLY
Status: DISCONTINUED | OUTPATIENT
Start: 2022-03-23 | End: 2022-03-25 | Stop reason: HOSPADM

## 2022-03-23 RX ORDER — POLYETHYLENE GLYCOL 3350 17 G/17G
17 POWDER, FOR SOLUTION ORAL DAILY
Status: DISCONTINUED | OUTPATIENT
Start: 2022-03-24 | End: 2022-03-25 | Stop reason: HOSPADM

## 2022-03-23 RX ADMIN — Medication 6 MG: at 10:03

## 2022-03-23 RX ADMIN — PRAVASTATIN SODIUM 40 MG: 40 TABLET ORAL at 10:03

## 2022-03-23 RX ADMIN — ENOXAPARIN SODIUM 70 MG: 100 INJECTION SUBCUTANEOUS at 10:03

## 2022-03-23 RX ADMIN — ASPIRIN 81 MG CHEWABLE TABLET 81 MG: 81 TABLET CHEWABLE at 10:03

## 2022-03-24 DIAGNOSIS — I25.10 CORONARY ARTERY DISEASE INVOLVING NATIVE CORONARY ARTERY OF NATIVE HEART WITHOUT ANGINA PECTORIS: Primary | ICD-10-CM

## 2022-03-24 LAB
ANION GAP SERPL CALC-SCNC: 9 MMOL/L (ref 8–16)
ASCENDING AORTA: 3.88 CM
AV INDEX (PROSTH): 0.86
AV MEAN GRADIENT: 4 MMHG
AV PEAK GRADIENT: 7 MMHG
AV VALVE AREA: 3.88 CM2
AV VELOCITY RATIO: 0.84
BASOPHILS # BLD AUTO: 0.06 K/UL (ref 0–0.2)
BASOPHILS NFR BLD: 0.7 % (ref 0–1.9)
BSA FOR ECHO PROCEDURE: 1.84 M2
BUN SERPL-MCNC: 14 MG/DL (ref 6–20)
CALCIUM SERPL-MCNC: 9.3 MG/DL (ref 8.7–10.5)
CHLORIDE SERPL-SCNC: 104 MMOL/L (ref 95–110)
CO2 SERPL-SCNC: 23 MMOL/L (ref 23–29)
CREAT SERPL-MCNC: 0.7 MG/DL (ref 0.5–1.4)
CV ECHO LV RWT: 0.4 CM
DIFFERENTIAL METHOD: ABNORMAL
DOP CALC AO PEAK VEL: 1.31 M/S
DOP CALC AO VTI: 22.73 CM
DOP CALC LVOT AREA: 4.5 CM2
DOP CALC LVOT DIAMETER: 2.39 CM
DOP CALC LVOT PEAK VEL: 1.1 M/S
DOP CALC LVOT STROKE VOLUME: 88.11 CM3
DOP CALCLVOT PEAK VEL VTI: 19.65 CM
E WAVE DECELERATION TIME: 134.1 MSEC
E/A RATIO: 0.61
ECHO LV POSTERIOR WALL: 1.06 CM (ref 0.6–1.1)
EJECTION FRACTION: 38 %
EOSINOPHIL # BLD AUTO: 0.3 K/UL (ref 0–0.5)
EOSINOPHIL NFR BLD: 3.6 % (ref 0–8)
ERYTHROCYTE [DISTWIDTH] IN BLOOD BY AUTOMATED COUNT: 13.9 % (ref 11.5–14.5)
EST. GFR  (AFRICAN AMERICAN): >60 ML/MIN/1.73 M^2
EST. GFR  (NON AFRICAN AMERICAN): >60 ML/MIN/1.73 M^2
FRACTIONAL SHORTENING: 25 % (ref 28–44)
GLUCOSE SERPL-MCNC: 106 MG/DL (ref 70–110)
HCT VFR BLD AUTO: 41.7 % (ref 40–54)
HGB BLD-MCNC: 13.6 G/DL (ref 14–18)
IMM GRANULOCYTES # BLD AUTO: 0.01 K/UL (ref 0–0.04)
IMM GRANULOCYTES NFR BLD AUTO: 0.1 % (ref 0–0.5)
INTERVENTRICULAR SEPTUM: 1.22 CM (ref 0.6–1.1)
LA MAJOR: 4.17 CM
LA MINOR: 3.75 CM
LA WIDTH: 3.68 CM
LEFT ATRIUM SIZE: 3.84 CM
LEFT ATRIUM VOLUME INDEX MOD: 29.3 ML/M2
LEFT ATRIUM VOLUME INDEX: 26.1 ML/M2
LEFT ATRIUM VOLUME MOD: 53.4 CM3
LEFT ATRIUM VOLUME: 47.43 CM3
LEFT INTERNAL DIMENSION IN SYSTOLE: 3.98 CM (ref 2.1–4)
LEFT VENTRICLE DIASTOLIC VOLUME INDEX: 74.73 ML/M2
LEFT VENTRICLE DIASTOLIC VOLUME: 136.01 ML
LEFT VENTRICLE MASS INDEX: 132 G/M2
LEFT VENTRICLE SYSTOLIC VOLUME INDEX: 37.9 ML/M2
LEFT VENTRICLE SYSTOLIC VOLUME: 69.05 ML
LEFT VENTRICULAR INTERNAL DIMENSION IN DIASTOLE: 5.31 CM (ref 3.5–6)
LEFT VENTRICULAR MASS: 239.82 G
LYMPHOCYTES # BLD AUTO: 2.4 K/UL (ref 1–4.8)
LYMPHOCYTES NFR BLD: 26.9 % (ref 18–48)
MCH RBC QN AUTO: 27.9 PG (ref 27–31)
MCHC RBC AUTO-ENTMCNC: 32.6 G/DL (ref 32–36)
MCV RBC AUTO: 86 FL (ref 82–98)
MONOCYTES # BLD AUTO: 0.8 K/UL (ref 0.3–1)
MONOCYTES NFR BLD: 8.6 % (ref 4–15)
MV A" WAVE DURATION": 9.13 MSEC
MV PEAK A VEL: 1.09 M/S
MV PEAK E VEL: 0.66 M/S
MV STENOSIS PRESSURE HALF TIME: 38.89 MS
MV VALVE AREA P 1/2 METHOD: 5.66 CM2
NEUTROPHILS # BLD AUTO: 5.4 K/UL (ref 1.8–7.7)
NEUTROPHILS NFR BLD: 60.1 % (ref 38–73)
NRBC BLD-RTO: 0 /100 WBC
PISA MRMAX VEL: 0.05 M/S
PLATELET # BLD AUTO: 235 K/UL (ref 150–450)
PMV BLD AUTO: 10.2 FL (ref 9.2–12.9)
POTASSIUM SERPL-SCNC: 3.8 MMOL/L (ref 3.5–5.1)
PULM VEIN S/D RATIO: 0.97
PV PEAK D VEL: 0.39 M/S
PV PEAK S VEL: 0.38 M/S
RA MAJOR: 3.44 CM
RA PRESSURE: 3 MMHG
RA WIDTH: 2.72 CM
RBC # BLD AUTO: 4.87 M/UL (ref 4.6–6.2)
RIGHT VENTRICULAR END-DIASTOLIC DIMENSION: 2.77 CM
SINUS: 3.51 CM
SODIUM SERPL-SCNC: 136 MMOL/L (ref 136–145)
STJ: 3.4 CM
TRICUSPID ANNULAR PLANE SYSTOLIC EXCURSION: 2.02 CM
WBC # BLD AUTO: 8.96 K/UL (ref 3.9–12.7)

## 2022-03-24 PROCEDURE — 80048 BASIC METABOLIC PNL TOTAL CA: CPT | Performed by: STUDENT IN AN ORGANIZED HEALTH CARE EDUCATION/TRAINING PROGRAM

## 2022-03-24 PROCEDURE — 20600001 HC STEP DOWN PRIVATE ROOM

## 2022-03-24 PROCEDURE — 36415 COLL VENOUS BLD VENIPUNCTURE: CPT | Performed by: STUDENT IN AN ORGANIZED HEALTH CARE EDUCATION/TRAINING PROGRAM

## 2022-03-24 PROCEDURE — 85025 COMPLETE CBC W/AUTO DIFF WBC: CPT | Performed by: STUDENT IN AN ORGANIZED HEALTH CARE EDUCATION/TRAINING PROGRAM

## 2022-03-24 PROCEDURE — 63600175 PHARM REV CODE 636 W HCPCS: Performed by: STUDENT IN AN ORGANIZED HEALTH CARE EDUCATION/TRAINING PROGRAM

## 2022-03-24 PROCEDURE — 25000003 PHARM REV CODE 250: Performed by: STUDENT IN AN ORGANIZED HEALTH CARE EDUCATION/TRAINING PROGRAM

## 2022-03-24 PROCEDURE — 93880 PR DUPLEX SCAN EXTRACRANIAL,BILAT: ICD-10-PCS | Mod: 26,,, | Performed by: SURGERY

## 2022-03-24 PROCEDURE — 93880 EXTRACRANIAL BILAT STUDY: CPT | Mod: 26,,, | Performed by: SURGERY

## 2022-03-24 RX ORDER — TRAZODONE HYDROCHLORIDE 100 MG/1
100 TABLET ORAL NIGHTLY
Status: ON HOLD | COMMUNITY
Start: 2022-02-16 | End: 2022-06-17 | Stop reason: HOSPADM

## 2022-03-24 RX ORDER — DOXEPIN HYDROCHLORIDE 100 MG/1
100 CAPSULE ORAL NIGHTLY
Status: ON HOLD | COMMUNITY
Start: 2022-01-11 | End: 2022-03-24 | Stop reason: SDUPTHER

## 2022-03-24 RX ORDER — OXCARBAZEPINE 150 MG/1
150 TABLET, FILM COATED ORAL 2 TIMES DAILY
Status: ON HOLD | COMMUNITY
Start: 2022-02-16 | End: 2022-06-17 | Stop reason: HOSPADM

## 2022-03-24 RX ORDER — OLANZAPINE 5 MG/1
5 TABLET ORAL NIGHTLY
Status: ON HOLD | COMMUNITY
Start: 2022-02-16 | End: 2022-06-17 | Stop reason: HOSPADM

## 2022-03-24 RX ORDER — DOXEPIN HYDROCHLORIDE 25 MG/1
CAPSULE ORAL
Status: ON HOLD | COMMUNITY
Start: 2022-02-16 | End: 2022-06-17 | Stop reason: HOSPADM

## 2022-03-24 RX ORDER — AMLODIPINE BESYLATE 5 MG/1
5 TABLET ORAL NIGHTLY
Status: ON HOLD | COMMUNITY
Start: 2022-02-16 | End: 2022-03-24 | Stop reason: CLARIF

## 2022-03-24 RX ADMIN — OLANZAPINE 5 MG: 5 TABLET, ORALLY DISINTEGRATING ORAL at 08:03

## 2022-03-24 RX ADMIN — Medication 6 MG: at 08:03

## 2022-03-24 RX ADMIN — ACETAMINOPHEN 650 MG: 325 TABLET ORAL at 09:03

## 2022-03-24 RX ADMIN — METOPROLOL TARTRATE 50 MG: 50 TABLET, FILM COATED ORAL at 08:03

## 2022-03-24 RX ADMIN — TRAZODONE HYDROCHLORIDE 25 MG: 50 TABLET ORAL at 08:03

## 2022-03-24 RX ADMIN — ENOXAPARIN SODIUM 70 MG: 100 INJECTION SUBCUTANEOUS at 10:03

## 2022-03-24 RX ADMIN — ASPIRIN 81 MG CHEWABLE TABLET 81 MG: 81 TABLET CHEWABLE at 08:03

## 2022-03-24 RX ADMIN — METOPROLOL TARTRATE 50 MG: 50 TABLET, FILM COATED ORAL at 12:03

## 2022-03-24 RX ADMIN — ENOXAPARIN SODIUM 70 MG: 100 INJECTION SUBCUTANEOUS at 09:03

## 2022-03-24 RX ADMIN — PRAVASTATIN SODIUM 40 MG: 40 TABLET ORAL at 08:03

## 2022-03-24 RX ADMIN — AMLODIPINE BESYLATE 5 MG: 5 TABLET ORAL at 08:03

## 2022-03-24 RX ADMIN — ACETAMINOPHEN 650 MG: 325 TABLET ORAL at 12:03

## 2022-03-24 NOTE — PLAN OF CARE
Problem: Adult Inpatient Plan of Care  Goal: Plan of Care Review  Outcome: Ongoing, Progressing  Goal: Patient-Specific Goal (Individualized)  Outcome: Ongoing, Progressing  Goal: Absence of Hospital-Acquired Illness or Injury  Outcome: Ongoing, Progressing  Goal: Optimal Comfort and Wellbeing  Outcome: Ongoing, Progressing  Goal: Readiness for Transition of Care  Outcome: Ongoing, Progressing     Problem: Fall Injury Risk  Goal: Absence of Fall and Fall-Related Injury  Outcome: Ongoing, Progressing   Cardiology Step Down. See progress note and flowsheet. Plan: Continue to monitor patient and report any abnormalities in labs, vitals signs, and body system functions to physician as indicated. Patient was given education on their disease process and medications.

## 2022-03-24 NOTE — PLAN OF CARE
Pt assessed at bedside this am.  Pt went to CT and back now ambulatory on unit as told by physician to ambulate in the hallway on unit.  Pt daughter called and requested information about pt.  Pt verbalizes okay to give information to her.  Pt plan of care reviewed with pt and daughter.  Pt is pending surgery Tuesday.  Pt off unit again went for echo and carotid ultrasound. Will continue to monitor pt and keep him updated on new orders.

## 2022-03-24 NOTE — HPI
Kendall Duff is a 55 yo AAM with hx of HTN, HLD, CVA, tobacco abuse, CAD/PCI x3, and bipolar 1 disorder who was admitted to OSH on 3/20/22 with chief complaint of heavy, nonradiating, substernal chest pain that started the night prior and worsened the present afternoon. His associated symptoms at the time were N/V with pain lasting for hours and only relieved with nitroglycerin. Pertinent initial labs displayed troponin of 24,780 with EKG showing RBBB with acute anterolateral infract confirming STEMI. Pt was treated with ASA, plavix, nitro, morphine, heparin, zofran, and 1L NS. C[facility] performed PCI to LAD with angiogram showing multi-vessel disease. Cardiac echo showed ejection fraction of 35% and apical anterolateral wall akinesis. Was treated with Integrilin IV. Pt was transferred to our service for CABG evaluation and states that he currently is asymptomatic: negative for CP, SOB, headache, blurry vision, abdominal pain, or changes in urinary/bowel habits.

## 2022-03-24 NOTE — H&P
[facility]  History & Physical    SUBJECTIVE:     Chief Complaint:   CABG Eval    History of Present Illness:  Kendall Duff is a 55 yo AAM with hx of HTN, HLD, CVA, tobacco abuse, CAD/PCI x3, and bipolar 1 disorder who was admitted to OSH on 3/20/22 with chief complaint of heavy, nonradiating, substernal chest pain that started the night prior and worsened the present afternoon. His associated symptoms at the time were N/V with pain lasting for hours and only relieved with nitroglycerin. Pertinent initial labs displayed troponin of 24,780 with EKG showing RBBB with acute anterolateral infract confirming STEMI. Pt was treated with ASA, plavix, nitro, morphine, heparin, zofran, and 1L NS. CIS performed PCI to LAD with angiogram showing multi-vessel disease. Cardiac echo showed ejection fraction of 35% and apical anterolateral wall akinesis. Was treated with Integrilin IV. Pt was transferred to our service for CABG evaluation and states that he currently is asymptomatic: negative for CP, SOB, headache, blurry vision, abdominal pain, or changes in urinary/bowel habits.    Allergies:  Review of patient's allergies indicates:  Not on File    Home Medications:  No current facility-administered medications on file prior to encounter.     No current outpatient medications on file prior to encounter.       No past medical history on file.  No past surgical history on file.  No family history on file.        Review of Systems:  As Per HPI    OBJECTIVE:     Vital Signs:  Temp: 99.2 °F (37.3 °C) (03/23/22 2000)  Pulse: 89 (03/24/22 0004)  Resp: 20 (03/23/22 2000)  BP: (!) 151/104 (03/23/22 2000)  SpO2: 98 % (03/23/22 2000)    Physical Exam:  Gen: NAD  HEENT: anicteric sclera, MMM  Chest: RR, unlabored respirations  Abd: s/nt/nd  Ext: well perfused, no edema    Laboratory:  Labs Reviewed   BASIC METABOLIC PANEL - Abnormal; Notable for the following components:       Result Value    Glucose 162 (*)     All other components within  normal limits   CBC W/ AUTO DIFFERENTIAL   APTT   PROTIME-INR   POCT GLUCOSE MONITORING CONTINUOUS       Diagnostic Results:  None    ASSESSMENT:     57 yo M with above noted hx who presents as transfer from OSH for multivessel CABG evaluation.      PLAN:     -Cardiac diet  -MM pain control  -tLOV  -ASA, Statin  -tylenol for pain  -home meds  -CT noncon chest  -daily labs        Kehinde Donahue MD  [employer] General Surgery PGY2  12:16 AM    I have seen the patient and reviewed the resident's note above. I have personally interviewed and examined the patient at bedside and agree with the findings.     Mr. Duff is a pleasant 56 year old male smoker (2ppd x 40 years) with heart failure reduced ejection fraction (35% ejection fraction) secondary to ischemic cardiomyopathy s/p multiple stents in the past, prior stroke (no residual deficits), COPD, bipolar disorder, and hypertension who presented to an outside with STEMI, underwent multiple balloon angioplasty procedures to the totally occluded LAD then transferred to [facility].  Coronary angiogram details: small to moderate LAD target but after multiple balloon angioplasties, 80% proximal lesion in a bifurcating first diagonal artery with an upper branch that bifurcates with small to moderate targets, lower branch which has a moderate sized vessel (supplies much of the lateral left ventricle), 70% mid LCx lesion with a small to moderate sized OM1, possible totally occluded distal LCx with a small to moderate left posterolateral ventricular artery (difficult visualization on this angiogram), and 80% distal RCA with a moderate sized PDA.  The transthoracic echo is pending.      CT chest noncontrast shows minimal ascending aortic and aortic arch calcifications, 4.0cm ascending aorta at the level of the right pulmonary artery, and significant emphysema.  Carotid ultrasound shows nonsignificant disease.    Given his recent balloon angioplasty to the LAD, we will repeat  the coronary angiogram (by Dr. Gil) in one month and likely plan for coronary artery bypass surgery x 4 or 5 (LIMA-LAD, possible SVG-D1 upper branch, SVG-D1 lower branch, SVG-OM1, possible SVG-LPL, SVG-PDA) within a few days of the coronary angiogram (since he lives three hours away).  We will treat him with ASA and plavix until that time.      I had a lengthy discussion with him about the risks vs. benefits of the surgery.  We discussed the risks including the predicted chance of mortality as well as morbidity such as stroke, kidney injury, respiratory failure, limb ischemia, myocardial infarction, sternal wound infection, and bleeding.  The Society of Thoracic Surgery (STS) risk score was also discussed.  With this history, I noted the patient has a higher chance of respiratory complications due to his heavy smoking history.  Additionally, we discussed the likely length of stay in the ICU and in the hospital, as well as the overall recovery period.  Mr. Duff is in agreement.    Dr. Miller is in agreement with the plan.  Either Dr. Miller or I will perform the surgery.      Moshe Spence MD  Cardiothoracic Surgery  [facility]

## 2022-03-24 NOTE — ASSESSMENT & PLAN NOTE
- Amlodipine daily  - ASA  - Therapeutic Lovenox BID  - Metoprolol daily  - Statin  - Bowel Regimen   - CT Chest  - ECHO  - Carotids   - Plans for OR on Tuesday

## 2022-03-24 NOTE — PROGRESS NOTES
[facility] - Cardiology Stepdown  Cardiothoracic Surgery  Progress Note    Patient Name: Kendall Duff  MRN: [id number]  Admission Date: 3/23/2022  Hospital Length of Stay: 1 days  Code Status: Full Code   Attending Physician: Moshe Spence MD   Referring Provider: Raffy White MD  Principal Problem:CAD (coronary artery disease)    Subjective:     Post-Op Info:  Procedure(s) (LRB):  CORONARY ARTERY BYPASS GRAFT (CABG) (Left)  HARVEST-VEIN-ENDOVASCULAR (Left)         Interval History: Admitted yesterday, plans to continue medical optimization.      Review of Systems   Constitutional: Negative for malaise/fatigue.   Cardiovascular:  Negative for chest pain, claudication, dyspnea on exertion, irregular heartbeat, leg swelling and palpitations.   Respiratory:  Negative for cough and shortness of breath.    Hematologic/Lymphatic: Negative for bleeding problem.   Gastrointestinal:  Negative for abdominal pain.   Genitourinary:  Negative for dysuria.   Neurological:  Negative for headaches and weakness.   Medications:  Continuous Infusions:  Scheduled Meds:   amLODIPine  5 mg Oral Daily    aspirin  81 mg Oral Daily    enoxaparin  1 mg/kg Subcutaneous Q12H    metoprolol tartrate  50 mg Oral Daily    olanzapine zydis  5 mg Oral QHS    polyethylene glycol  17 g Oral Daily    pravastatin  40 mg Oral QHS    traZODone  25 mg Oral QHS     PRN Meds:acetaminophen, acetaminophen, melatonin, ondansetron, sodium chloride 0.9%     Objective:     Vital Signs (Most Recent):  Temp: 98 °F (36.7 °C) (03/24/22 0857)  Pulse: 83 (03/24/22 0857)  Resp: 20 (03/24/22 0857)  BP: (!) 126/97 (03/24/22 0858)  SpO2: 99 % (03/24/22 0857)   Vital Signs (24h Range):  Temp:  [98 °F (36.7 °C)-100.3 °F (37.9 °C)] 98 °F (36.7 °C)  Pulse:  [73-95] 83  Resp:  [17-25] 20  SpO2:  [95 %-99 %] 99 %  BP: (121-151)/() 126/97     Weight: 73.1 kg (161 lb 2.5 oz)  Body mass index is 26.01 kg/m².    SpO2: 99 %  O2 Device (Oxygen Therapy): room  air    Intake/Output - Last 3 Shifts         03/22 0700  03/23 0659 03/23 0700  03/24 0659 03/24 0700 03/25 0659    P.O.  700     Total Intake(mL/kg)  700 (9.6)     Urine (mL/kg/hr)  800     Total Output  800     Net  -100            Stool Occurrence   1 x            Lines/Drains/Airways       Peripheral Intravenous Line  Duration                  Peripheral IV - Single Lumen 03/23/22 1200 20 G Anterior;Proximal;Right Forearm <1 day                    Physical Exam  HENT:      Head: Normocephalic and atraumatic.   Eyes:      Extraocular Movements: Extraocular movements intact.   Cardiovascular:      Rate and Rhythm: Normal rate and regular rhythm.   Pulmonary:      Effort: Pulmonary effort is normal.   Abdominal:      General: Abdomen is flat.      Palpations: Abdomen is soft.   Musculoskeletal:         General: Normal range of motion.      Cervical back: Normal range of motion.   Skin:     General: Skin is warm and dry.      Capillary Refill: Capillary refill takes less than 2 seconds.   Neurological:      General: No focal deficit present.       Significant Labs:  BMP:   Recent Labs   Lab 03/24/22  0447         K 3.8      CO2 23   BUN 14   CREATININE 0.7   CALCIUM 9.3     CBC:   Recent Labs   Lab 03/24/22  0447   WBC 8.96   RBC 4.87   HGB 13.6*   HCT 41.7      MCV 86   MCH 27.9   MCHC 32.6     CMP:   Recent Labs   Lab 03/24/22  0447      CALCIUM 9.3      K 3.8   CO2 23      BUN 14   CREATININE 0.7     Coagulation:   Recent Labs   Lab 03/23/22  2159   INR 1.0   APTT 27.0       Significant Diagnostics:  I have reviewed and interpreted all pertinent imaging results/findings within the past 24 hours.    Assessment/Plan:     * CAD (coronary artery disease)  - Amlodipine daily  - ASA  - Therapeutic Lovenox BID  - Metoprolol daily  - Statin  - Bowel Regimen   - CT Chest  - ECHO  - Carotids   - Plans for OR on Tuesday    Bilateral carotid artery stenosis  - Carotids  ordered    Bipolar 1 disorder  - Continue home meds        Laisha May NP  Cardiothoracic Surgery  [facility] - Cardiology Stepdown

## 2022-03-24 NOTE — SUBJECTIVE & OBJECTIVE
Interval History: Admitted yesterday, plans to continue medical optimization.      Review of Systems   Constitutional: Negative for malaise/fatigue.   Cardiovascular:  Negative for chest pain, claudication, dyspnea on exertion, irregular heartbeat, leg swelling and palpitations.   Respiratory:  Negative for cough and shortness of breath.    Hematologic/Lymphatic: Negative for bleeding problem.   Gastrointestinal:  Negative for abdominal pain.   Genitourinary:  Negative for dysuria.   Neurological:  Negative for headaches and weakness.   Medications:  Continuous Infusions:  Scheduled Meds:   amLODIPine  5 mg Oral Daily    aspirin  81 mg Oral Daily    enoxaparin  1 mg/kg Subcutaneous Q12H    metoprolol tartrate  50 mg Oral Daily    olanzapine zydis  5 mg Oral QHS    polyethylene glycol  17 g Oral Daily    pravastatin  40 mg Oral QHS    traZODone  25 mg Oral QHS     PRN Meds:acetaminophen, acetaminophen, melatonin, ondansetron, sodium chloride 0.9%     Objective:     Vital Signs (Most Recent):  Temp: 98 °F (36.7 °C) (03/24/22 0857)  Pulse: 83 (03/24/22 0857)  Resp: 20 (03/24/22 0857)  BP: (!) 126/97 (03/24/22 0858)  SpO2: 99 % (03/24/22 0857)   Vital Signs (24h Range):  Temp:  [98 °F (36.7 °C)-100.3 °F (37.9 °C)] 98 °F (36.7 °C)  Pulse:  [73-95] 83  Resp:  [17-25] 20  SpO2:  [95 %-99 %] 99 %  BP: (121-151)/() 126/97     Weight: 73.1 kg (161 lb 2.5 oz)  Body mass index is 26.01 kg/m².    SpO2: 99 %  O2 Device (Oxygen Therapy): room air    Intake/Output - Last 3 Shifts         03/22 0700  03/23 0659 03/23 0700 03/24 0659 03/24 0700 03/25 0659    P.O.  700     Total Intake(mL/kg)  700 (9.6)     Urine (mL/kg/hr)  800     Total Output  800     Net  -100            Stool Occurrence   1 x            Lines/Drains/Airways       Peripheral Intravenous Line  Duration                  Peripheral IV - Single Lumen 03/23/22 1200 20 G Anterior;Proximal;Right Forearm <1 day                    Physical Exam  HENT:      Head:  Normocephalic and atraumatic.   Eyes:      Extraocular Movements: Extraocular movements intact.   Cardiovascular:      Rate and Rhythm: Normal rate and regular rhythm.   Pulmonary:      Effort: Pulmonary effort is normal.   Abdominal:      General: Abdomen is flat.      Palpations: Abdomen is soft.   Musculoskeletal:         General: Normal range of motion.      Cervical back: Normal range of motion.   Skin:     General: Skin is warm and dry.      Capillary Refill: Capillary refill takes less than 2 seconds.   Neurological:      General: No focal deficit present.       Significant Labs:  BMP:   Recent Labs   Lab 03/24/22  0447         K 3.8      CO2 23   BUN 14   CREATININE 0.7   CALCIUM 9.3     CBC:   Recent Labs   Lab 03/24/22  0447   WBC 8.96   RBC 4.87   HGB 13.6*   HCT 41.7      MCV 86   MCH 27.9   MCHC 32.6     CMP:   Recent Labs   Lab 03/24/22  0447      CALCIUM 9.3      K 3.8   CO2 23      BUN 14   CREATININE 0.7     Coagulation:   Recent Labs   Lab 03/23/22  2159   INR 1.0   APTT 27.0       Significant Diagnostics:  I have reviewed and interpreted all pertinent imaging results/findings within the past 24 hours.

## 2022-03-24 NOTE — PLAN OF CARE
[facility] - Cardiology Stepdown  Initial Discharge Assessment       CM completed pt's initial d/c assessment the bedside. Pt alert and oriented. Pt plans to return home with his dtr Arabella bradley [phone]. Pt has 3-4 JORGE and no DME. Prior to admit, pt was ind with all ADLs. His dtr can assist him if needed at d/c.     CM/SW will cont to follow for d/c needs.     Kyra Izaguirre, RN Case Manager  [facility]  [phone]    Primary Care Provider: Primary Doctor No    Admission Diagnosis: NSTEMI (non-ST elevated myocardial infarction) [I21.4]    Admission Date: 3/23/2022  Expected Discharge Date: 3/31/2022    Discharge Barriers Identified: (P) None    Payor: MEDICAID / Plan: [place] [employer] / Product Type: Managed Medicaid /     No emergency contact information on file.    Discharge Plan A: (P) Home with family  Discharge Plan B: (P) Group home      [id number] - [place] - [address] AT [facility] [address]  [facility] [place] [address]  Phone: [phone] Fax: [phone]      Initial Assessment (most recent)       Adult Discharge Assessment - 03/24/22 1237          Discharge Assessment    Assessment Type Discharge Planning Assessment     Confirmed/corrected address, phone number and insurance Yes     Confirmed Demographics Contacted registration to update   Pt stated his address is now [address] [place] [address] [place]    Source of Information patient     Reason For Admission CAD     Lives With child(karo), adult     Do you expect to return to your current living situation? Yes     Do you have help at home or someone to help you manage your care at home? Yes     Who are your caregiver(s) and their phone number(s)? Arabella Bradley (dtr)     Current cognitive status: Alert/Oriented (P)      Walking or Climbing Stairs Difficulty none (P)      Dressing/Bathing Difficulty none (P)      Home Accessibility stairs to enter home (P)      Number of Stairs, Main  Entrance four (P)      Surface of Stairs, Main Entrance hardwood (P)      Stair Railings, Main Entrance railings on both sides of stairs (P)      Home Layout Able to live on 1st floor (P)      Equipment Currently Used at Home none (P)      Readmission within 30 days? No (P)      Patient currently being followed by outpatient case management? No (P)      Do you currently have service(s) that help you manage your care at home? No (P)      Do you take prescription medications? Yes (P)      Do you have prescription coverage? Yes (P)      Do you have any problems affording any of your prescribed medications? No (P)      Is the patient taking medications as prescribed? yes (P)      Who is going to help you get home at discharge? Pt thinks his dtr can pick him up (P)      How do you get to doctors appointments? family or friend will provide (P)      Are you on dialysis? No (P)      Do you take coumadin? No (P)      Discharge Plan A Home with family (P)      Discharge Plan B Group home (P)      DME Needed Upon Discharge  none (P)      Discharge Plan discussed with: Patient (P)      Discharge Barriers Identified None (P)         Relationship/Environment    Name(s) of Who Lives With Patient Arabella Bradley (dtr) (P)

## 2022-03-24 NOTE — ASSESSMENT & PLAN NOTE
57 yo M with above noted hx who presents as transfer from OSH for multivessel CABG evaluation.    -Cardiac diet  -MM pain control  -tLOV  -ASA, Statin  -tylenol for pain  -home meds  -CT noncon chest  -daily labs

## 2022-03-24 NOTE — SUBJECTIVE & OBJECTIVE
No past medical history on file.    No past surgical history on file.    Review of patient's allergies indicates:  Not on File    Family History    None       Tobacco Use    Smoking status: Not on file    Smokeless tobacco: Not on file   Substance and Sexual Activity    Alcohol use: Not on file    Drug use: Not on file    Sexual activity: Not on file      Review of Systems  Objective:     Vital Signs (Most Recent):  Temp: 99.2 °F (37.3 °C) (03/23/22 2000)  Pulse: 88 (03/23/22 2000)  Resp: 20 (03/23/22 2000)  BP: (!) 151/104 (03/23/22 2000)  SpO2: 98 % (03/23/22 2000)   Vital Signs (24h Range):  Temp:  [98.4 °F (36.9 °C)-99.2 °F (37.3 °C)] 99.2 °F (37.3 °C)  Pulse:  [88-97] 88  Resp:  [19-25] 20  SpO2:  [95 %-98 %] 98 %  BP: (137-151)/() 151/104     Weight: 73.1 kg (161 lb 2.5 oz)  Body mass index is 26.01 kg/m².    No intake or output data in the 24 hours ending 03/23/22 9497    Gen: NAD  HEENT: anicteric sclera, MMM  Chest: RR, unlabored respirations  Abd: s/nt/nd  Ext: well perfused, no edema      Vents:       Lines/Drains/Airways       None                   Significant Labs:    CBC/Anemia Profile:  Recent Labs   Lab 03/23/22 2159   WBC 10.49   HGB 14.3   HCT 42.8      MCV 84   RDW 13.8        Chemistries:  Recent Labs   Lab 03/23/22  2158      K 4.0      CO2 25   BUN 15   CREATININE 0.8   CALCIUM 9.5       All pertinent labs within the past 24 hours have been reviewed.    Significant Imaging: I have reviewed all pertinent imaging results/findings within the past 24 hours.  I have reviewed and interpreted all pertinent imaging results/findings within the past 24 hours.

## 2022-03-25 VITALS
TEMPERATURE: 98 F | OXYGEN SATURATION: 94 % | WEIGHT: 162.69 LBS | BODY MASS INDEX: 26.14 KG/M2 | DIASTOLIC BLOOD PRESSURE: 89 MMHG | HEART RATE: 93 BPM | RESPIRATION RATE: 20 BRPM | SYSTOLIC BLOOD PRESSURE: 124 MMHG | HEIGHT: 66 IN

## 2022-03-25 DIAGNOSIS — I25.10 CORONARY ARTERY DISEASE INVOLVING NATIVE CORONARY ARTERY OF NATIVE HEART WITHOUT ANGINA PECTORIS: Primary | ICD-10-CM

## 2022-03-25 LAB
ANION GAP SERPL CALC-SCNC: 9 MMOL/L (ref 8–16)
BASOPHILS # BLD AUTO: 0.06 K/UL (ref 0–0.2)
BASOPHILS NFR BLD: 0.6 % (ref 0–1.9)
BUN SERPL-MCNC: 18 MG/DL (ref 6–20)
CALCIUM SERPL-MCNC: 10 MG/DL (ref 8.7–10.5)
CHLORIDE SERPL-SCNC: 101 MMOL/L (ref 95–110)
CO2 SERPL-SCNC: 25 MMOL/L (ref 23–29)
CREAT SERPL-MCNC: 0.8 MG/DL (ref 0.5–1.4)
DIFFERENTIAL METHOD: NORMAL
EOSINOPHIL # BLD AUTO: 0.4 K/UL (ref 0–0.5)
EOSINOPHIL NFR BLD: 4.3 % (ref 0–8)
ERYTHROCYTE [DISTWIDTH] IN BLOOD BY AUTOMATED COUNT: 13.7 % (ref 11.5–14.5)
EST. GFR  (AFRICAN AMERICAN): >60 ML/MIN/1.73 M^2
EST. GFR  (NON AFRICAN AMERICAN): >60 ML/MIN/1.73 M^2
GLUCOSE SERPL-MCNC: 89 MG/DL (ref 70–110)
HCT VFR BLD AUTO: 43 % (ref 40–54)
HGB BLD-MCNC: 14 G/DL (ref 14–18)
IMM GRANULOCYTES # BLD AUTO: 0.02 K/UL (ref 0–0.04)
IMM GRANULOCYTES NFR BLD AUTO: 0.2 % (ref 0–0.5)
LYMPHOCYTES # BLD AUTO: 3.2 K/UL (ref 1–4.8)
LYMPHOCYTES NFR BLD: 34 % (ref 18–48)
MCH RBC QN AUTO: 27.6 PG (ref 27–31)
MCHC RBC AUTO-ENTMCNC: 32.6 G/DL (ref 32–36)
MCV RBC AUTO: 85 FL (ref 82–98)
MONOCYTES # BLD AUTO: 0.8 K/UL (ref 0.3–1)
MONOCYTES NFR BLD: 9 % (ref 4–15)
NEUTROPHILS # BLD AUTO: 4.8 K/UL (ref 1.8–7.7)
NEUTROPHILS NFR BLD: 51.9 % (ref 38–73)
NRBC BLD-RTO: 0 /100 WBC
PLATELET # BLD AUTO: 290 K/UL (ref 150–450)
PMV BLD AUTO: 10.1 FL (ref 9.2–12.9)
POCT GLUCOSE: 107 MG/DL (ref 70–110)
POTASSIUM SERPL-SCNC: 4.2 MMOL/L (ref 3.5–5.1)
RBC # BLD AUTO: 5.07 M/UL (ref 4.6–6.2)
SODIUM SERPL-SCNC: 135 MMOL/L (ref 136–145)
WBC # BLD AUTO: 9.29 K/UL (ref 3.9–12.7)

## 2022-03-25 PROCEDURE — 80048 BASIC METABOLIC PNL TOTAL CA: CPT | Performed by: STUDENT IN AN ORGANIZED HEALTH CARE EDUCATION/TRAINING PROGRAM

## 2022-03-25 PROCEDURE — 63600175 PHARM REV CODE 636 W HCPCS: Performed by: STUDENT IN AN ORGANIZED HEALTH CARE EDUCATION/TRAINING PROGRAM

## 2022-03-25 PROCEDURE — 25000003 PHARM REV CODE 250: Performed by: STUDENT IN AN ORGANIZED HEALTH CARE EDUCATION/TRAINING PROGRAM

## 2022-03-25 PROCEDURE — 36415 COLL VENOUS BLD VENIPUNCTURE: CPT | Performed by: STUDENT IN AN ORGANIZED HEALTH CARE EDUCATION/TRAINING PROGRAM

## 2022-03-25 PROCEDURE — 85025 COMPLETE CBC W/AUTO DIFF WBC: CPT | Performed by: STUDENT IN AN ORGANIZED HEALTH CARE EDUCATION/TRAINING PROGRAM

## 2022-03-25 RX ORDER — METOPROLOL TARTRATE 50 MG/1
50 TABLET ORAL DAILY
Qty: 30 TABLET | Refills: 11 | Status: ON HOLD | OUTPATIENT
Start: 2022-03-26 | End: 2022-06-17 | Stop reason: HOSPADM

## 2022-03-25 RX ORDER — CLOPIDOGREL BISULFATE 75 MG/1
75 TABLET ORAL DAILY
Qty: 30 TABLET | Refills: 11 | Status: ON HOLD | OUTPATIENT
Start: 2022-03-25 | End: 2022-06-17 | Stop reason: HOSPADM

## 2022-03-25 RX ORDER — AMLODIPINE BESYLATE 5 MG/1
5 TABLET ORAL DAILY
Qty: 30 TABLET | Refills: 11 | Status: ON HOLD | OUTPATIENT
Start: 2022-03-26 | End: 2022-10-28

## 2022-03-25 RX ORDER — PRAVASTATIN SODIUM 40 MG/1
40 TABLET ORAL NIGHTLY
Qty: 90 TABLET | Refills: 3 | Status: ON HOLD | OUTPATIENT
Start: 2022-03-25 | End: 2022-06-17 | Stop reason: HOSPADM

## 2022-03-25 RX ORDER — NAPROXEN SODIUM 220 MG/1
81 TABLET, FILM COATED ORAL DAILY
Qty: 360 TABLET | Refills: 0 | Status: ON HOLD | OUTPATIENT
Start: 2022-03-26 | End: 2022-06-17 | Stop reason: HOSPADM

## 2022-03-25 RX ADMIN — ASPIRIN 81 MG CHEWABLE TABLET 81 MG: 81 TABLET CHEWABLE at 09:03

## 2022-03-25 RX ADMIN — AMLODIPINE BESYLATE 5 MG: 5 TABLET ORAL at 09:03

## 2022-03-25 RX ADMIN — ENOXAPARIN SODIUM 70 MG: 100 INJECTION SUBCUTANEOUS at 09:03

## 2022-03-25 RX ADMIN — METOPROLOL TARTRATE 50 MG: 50 TABLET, FILM COATED ORAL at 09:03

## 2022-03-25 NOTE — HOSPITAL COURSE
Patient seen and examined by team.  Decision was made to discharge patient home with follow up in one month.  Patient and family made aware.  He will see Dr. Margarito Gil for angiogram and at that time, surgical decision will be made.  Patient reports that he is currently taking HCV treatment.  Spoke with  who will get the records for his treating hepatology team.  He will be discharge home on ASA an Plavix.

## 2022-03-25 NOTE — DISCHARGE SUMMARY
[facility] - Cardiology Stepdown  Cardiothoracic Surgery  Discharge Summary      Patient Name: Kendall Duff  MRN: [id number]  Admission Date: 3/23/2022  Hospital Length of Stay: 2 days  Discharge Date and Time:  03/25/2022 12:23 PM  Attending Physician: Moshe Spence MD   Discharging Provider: Laisha May NP  Primary Care Provider: Primary Doctor No    HPI:   Kendall Duff is a 57 yo AAM with hx of HTN, HLD, CVA, tobacco abuse, CAD/PCI x3, and bipolar 1 disorder who was admitted to OSH on 3/20/22 with chief complaint of heavy, nonradiating, substernal chest pain that started the night prior and worsened the present afternoon. His associated symptoms at the time were N/V with pain lasting for hours and only relieved with nitroglycerin. Pertinent initial labs displayed troponin of 24,780 with EKG showing RBBB with acute anterolateral infract confirming STEMI. Pt was treated with ASA, plavix, nitro, morphine, heparin, zofran, and 1L NS. CIS performed PCI to LAD with angiogram showing multi-vessel disease. Cardiac echo showed ejection fraction of 35% and apical anterolateral wall akinesis. Was treated with Integrilin IV. Pt was transferred to our service for CABG evaluation and states that he currently is asymptomatic: negative for CP, SOB, headache, blurry vision, abdominal pain, or changes in urinary/bowel habits.      Procedure(s) (LRB):  CORONARY ARTERY BYPASS GRAFT (CABG) (Left)  HARVEST-VEIN-ENDOVASCULAR (Left)      Indwelling Lines/Drains at time of discharge:   Lines/Drains/Airways     None               Hospital Course: Patient seen and examined by team.  Decision was made to discharge patient home with follow up in one month.  Patient and family made aware.  He will see Dr. Margarito Gil for angiogram and at that time, surgical decision will be made.  Patient reports that he is currently taking HCV treatment.  Spoke with  who will get the records for his treating hepatology team.  He will be  discharge home on ASA an Plavix.      Goals of Care Treatment Preferences:  Code Status: Full Code          [id number]}    Pending Diagnostic Studies:     Procedure Component Value Units Date/Time    US Liver with Doppler (xpd) [[id number]]     Order Status: Sent Lab Status: No result           No new Assessment & Plan notes have been filed under this hospital service since the last note was generated.  Service: Cardiothoracic Surgery    Final Active Diagnoses:    Diagnosis Date Noted POA    PRINCIPAL PROBLEM:  CAD (coronary artery disease) [I25.10] 2022 Yes    NSTEMI (non-ST elevated myocardial infarction) [I21.4]  Unknown    Bilateral carotid artery stenosis [I65.23]  Unknown    Acute myocardial infarction of anterolateral wall [I21.09] 2022 Yes    Bipolar 1 disorder [F31.9] 2022 Yes    Tobacco abuse [Z72.0] 2022 Yes      Problems Resolved During this Admission:      Discharged Condition: stable    Disposition: Home or Self Care    Follow Up:    Patient Instructions:   No discharge procedures on file.  Medications:  Reconciled Home Medications:      Medication List      START taking these medications    amLODIPine 5 MG tablet  Commonly known as: NORVASC  Take 1 tablet (5 mg total) by mouth once daily.  Start taking on: 2022     aspirin 81 MG Chew  Chew and swallow 1 tablet (81 mg total) by mouth once daily.  Start taking on: 2022     clopidogreL 75 mg tablet  Commonly known as: PLAVIX  Take 1 tablet (75 mg total) by mouth once daily.     metoprolol tartrate 50 MG tablet  Commonly known as: LOPRESSOR  Take 1 tablet (50 mg total) by mouth once daily.  Start taking on: 2022     pravastatin 40 MG tablet  Commonly known as: PRAVACHOL  Take 1 tablet (40 mg total) by mouth every evening.        CONTINUE taking these medications    doxepin 25 MG capsule  Commonly known as: SINEQUAN  SMARTSI Capsule(s) By Mouth Every Evening     OLANZapine 5 MG tablet  Commonly known  as: ZyPREXA  Take 5 mg by mouth nightly.     OXcarbazepine 150 MG Tab  Commonly known as: TRILEPTAL  Take 150 mg by mouth 2 (two) times daily.     traZODone 100 MG tablet  Commonly known as: DESYREL  Take 100 mg by mouth nightly.          Time spent on the discharge of patient: 35 minutes    Laisha May NP  Cardiothoracic Surgery  [facility] - Cardiology Stepdown

## 2022-03-25 NOTE — PLAN OF CARE
Patient is ready for discharge. Patient stable alert and oriented. Tele, and IVs removed. No complaints of pain. Discussed discharge plan. Reviewed medications and side effects, appointments, and answered questions with patient. Awaiting Wheelchair van for transport home.

## 2022-03-25 NOTE — PLAN OF CARE
Medical records request faxed to [facility] at [phone] per CTS request.     Lucas Torres RN CM  Case Management  [id number]

## 2022-03-25 NOTE — HPI
Kendall Duff is a 57 yo AAM with hx of HTN, HLD, CVA, tobacco abuse, CAD/PCI x3, and bipolar 1 disorder who was admitted to OSH on 3/20/22 with chief complaint of heavy, nonradiating, substernal chest pain that started the night prior and worsened the present afternoon. His associated symptoms at the time were N/V with pain lasting for hours and only relieved with nitroglycerin. Pertinent initial labs displayed troponin of 24,780 with EKG showing RBBB with acute anterolateral infract confirming STEMI. Pt was treated with ASA, plavix, nitro, morphine, heparin, zofran, and 1L NS. CIS performed PCI to LAD with angiogram showing multi-vessel disease. Cardiac echo showed ejection fraction of 35% and apical anterolateral wall akinesis. Was treated with Integrilin IV. Pt was transferred to our service for CABG evaluation and states that he currently is asymptomatic: negative for CP, SOB, headache, blurry vision, abdominal pain, or changes in urinary/bowel habits.

## 2022-03-25 NOTE — PLAN OF CARE
[facility] - Cardiology Stepdown  Discharge Final Note    Primary Care Provider: Primary Doctor No    Expected Discharge Date: 3/25/2022    Final Discharge Note (most recent)     Final Note - 03/25/22 1438        Final Note    Assessment Type Final Discharge Note     Anticipated Discharge Disposition Home or Self Care with Planned Readmission               SW met with pt who confirmed that he was a transfer from another facility and now needs transportation home.  Pt notified that SW will schedule transportation but that it might be very late by the time it pick pt up.  Pt voiced understanding.  Transportation home scheduled via ambulatory/wheelchair van.  JANUSZ Neal notified of the above as well.    Pt scheduled to be readmitted for CABG on 3/29/22.     Isabel Flores LMSW  [facility]  [id number]

## 2022-03-29 NOTE — PHYSICIAN QUERY
PT Name: Kendall Duff  MR #: [id number]     DOCUMENTATION CLARIFICATION      CDS/: Luz Burger               Contact information:[email or web address]  This form is a permanent document in the medical record.    Query Date: March 29, 2022    By submitting this query, we are merely seeking further clarification of documentation to reflect the severity of illness of your patient. Please utilize your independent clinical judgment when addressing the question(s) below.     The Medical Record contains the following:   Indicators   Supporting Clinical Findings Location in Medical Record   x Chest Pain, Angina Substernal chest pain H&P    Coronary Artery Disease      EKG      Troponin      Echo Results      Angiography     x Documentation of acute cardiac condition NSTEMI (non-ST elevated myocardial infarction)    EKG showing RBBB with acute anterolateral infract confirming STEMI.   Discharge summary under active diagnosis    Discharge summary   x Medication/Treatment Pt was treated with ASA, plavix, nitro, morphine, heparin    Critical care surgery H&P    Other        Provider, please clarify the conflicting cardiac diagnosis related to the above documentation:  [   x] NSTEMI   [   ] STEMI (please specify site: anterolateral   [   ] Other Cardiac Diagnosis (please specify): _______________   [   ] Clinically Undetermined       Present on admission (POA) status:  [ x  ] Yes (Y)   [   ] No (N)   [   ] Documentation insufficient to determine if condition is POA (U)   [   ]  Clinically Undetermined (W)     Please document in your progress notes daily for the duration of treatment until resolved, and include in your discharge summary.  Form No. [id number]

## 2022-04-01 ENCOUNTER — HOSPITAL ENCOUNTER (INPATIENT)
Facility: HOSPITAL | Age: 57
LOS: 77 days | Discharge: HOME HEALTH | DRG: 003 | End: 2022-06-17
Attending: INTERNAL MEDICINE | Admitting: INTERNAL MEDICINE
Payer: MEDICAID

## 2022-04-01 ENCOUNTER — HISTORICAL (OUTPATIENT)
Dept: ADMINISTRATIVE | Facility: HOSPITAL | Age: 57
End: 2022-04-01

## 2022-04-01 PROCEDURE — 99285 EMERGENCY DEPT VISIT HI MDM: CPT | Mod: 25

## 2022-04-01 PROCEDURE — 80053 COMPREHEN METABOLIC PANEL: CPT

## 2022-04-01 PROCEDURE — 87635 SARS-COV-2 COVID-19 AMP PRB: CPT | Mod: QW

## 2022-04-01 PROCEDURE — 94761 N-INVAS EAR/PLS OXIMETRY MLT: CPT

## 2022-04-01 PROCEDURE — P9045 ALBUMIN (HUMAN), 5%, 250 ML: HCPCS

## 2022-04-01 PROCEDURE — 84484 ASSAY OF TROPONIN QUANT: CPT

## 2022-04-01 PROCEDURE — 99990 CHARGE CONVERSION: CPT | Mod: QW

## 2022-04-01 PROCEDURE — 71045 X-RAY EXAM CHEST 1 VIEW: CPT

## 2022-04-01 PROCEDURE — 93005 ELECTROCARDIOGRAM TRACING: CPT

## 2022-04-01 PROCEDURE — 85730 THROMBOPLASTIN TIME PARTIAL: CPT

## 2022-04-01 PROCEDURE — 80047 BASIC METABLC PNL IONIZED CA: CPT | Mod: QW

## 2022-04-01 PROCEDURE — 93880 EXTRACRANIAL BILAT STUDY: CPT

## 2022-04-01 PROCEDURE — 96375 TX/PRO/DX INJ NEW DRUG ADDON: CPT

## 2022-04-01 PROCEDURE — 85610 PROTHROMBIN TIME: CPT

## 2022-04-01 PROCEDURE — 96365 THER/PROPH/DIAG IV INF INIT: CPT

## 2022-04-01 PROCEDURE — 36415 COLL VENOUS BLD VENIPUNCTURE: CPT

## 2022-04-01 PROCEDURE — 85025 COMPLETE CBC W/AUTO DIFF WBC: CPT

## 2022-04-01 PROCEDURE — 82550 ASSAY OF CK (CPK): CPT

## 2022-04-01 PROCEDURE — 82553 CREATINE MB FRACTION: CPT

## 2022-04-01 PROCEDURE — A9150 MISC/EXPER NON-PRESCRIPT DRU: HCPCS

## 2022-04-02 PROCEDURE — 94761 N-INVAS EAR/PLS OXIMETRY MLT: CPT

## 2022-04-02 PROCEDURE — A9150 MISC/EXPER NON-PRESCRIPT DRU: HCPCS

## 2022-04-02 PROCEDURE — 99990 CHARGE CONVERSION: CPT

## 2022-04-02 PROCEDURE — 84484 ASSAY OF TROPONIN QUANT: CPT

## 2022-04-02 PROCEDURE — 83735 ASSAY OF MAGNESIUM: CPT

## 2022-04-02 PROCEDURE — 85730 THROMBOPLASTIN TIME PARTIAL: CPT

## 2022-04-02 PROCEDURE — 36415 COLL VENOUS BLD VENIPUNCTURE: CPT

## 2022-04-02 PROCEDURE — 80053 COMPREHEN METABOLIC PANEL: CPT

## 2022-04-02 PROCEDURE — 85025 COMPLETE CBC W/AUTO DIFF WBC: CPT

## 2022-04-03 PROCEDURE — 85730 THROMBOPLASTIN TIME PARTIAL: CPT

## 2022-04-03 PROCEDURE — 36415 COLL VENOUS BLD VENIPUNCTURE: CPT

## 2022-04-03 PROCEDURE — A9150 MISC/EXPER NON-PRESCRIPT DRU: HCPCS

## 2022-04-03 PROCEDURE — 99990 CHARGE CONVERSION: CPT

## 2022-04-04 PROCEDURE — A9150 MISC/EXPER NON-PRESCRIPT DRU: HCPCS

## 2022-04-04 PROCEDURE — 80053 COMPREHEN METABOLIC PANEL: CPT

## 2022-04-04 PROCEDURE — 99990 CHARGE CONVERSION: CPT

## 2022-04-04 PROCEDURE — 85730 THROMBOPLASTIN TIME PARTIAL: CPT

## 2022-04-04 PROCEDURE — 85025 COMPLETE CBC W/AUTO DIFF WBC: CPT

## 2022-04-04 PROCEDURE — 36415 COLL VENOUS BLD VENIPUNCTURE: CPT

## 2022-04-05 PROCEDURE — 86900 BLOOD TYPING SEROLOGIC ABO: CPT

## 2022-04-05 PROCEDURE — 99990 CHARGE CONVERSION: CPT

## 2022-04-05 PROCEDURE — A9150 MISC/EXPER NON-PRESCRIPT DRU: HCPCS

## 2022-04-05 PROCEDURE — 86920 COMPATIBILITY TEST SPIN: CPT

## 2022-04-05 PROCEDURE — 86850 RBC ANTIBODY SCREEN: CPT

## 2022-04-05 PROCEDURE — 86901 BLOOD TYPING SEROLOGIC RH(D): CPT

## 2022-04-05 PROCEDURE — 85730 THROMBOPLASTIN TIME PARTIAL: CPT

## 2022-04-05 PROCEDURE — 80053 COMPREHEN METABOLIC PANEL: CPT

## 2022-04-05 PROCEDURE — 36415 COLL VENOUS BLD VENIPUNCTURE: CPT

## 2022-04-05 PROCEDURE — 85025 COMPLETE CBC W/AUTO DIFF WBC: CPT

## 2022-04-06 PROCEDURE — 85014 HEMATOCRIT: CPT

## 2022-04-06 PROCEDURE — 94003 VENT MGMT INPAT SUBQ DAY: CPT

## 2022-04-06 PROCEDURE — 99990 CHARGE CONVERSION: CPT

## 2022-04-06 PROCEDURE — A9150 MISC/EXPER NON-PRESCRIPT DRU: HCPCS

## 2022-04-06 PROCEDURE — 84132 ASSAY OF SERUM POTASSIUM: CPT

## 2022-04-06 PROCEDURE — 85025 COMPLETE CBC W/AUTO DIFF WBC: CPT

## 2022-04-06 PROCEDURE — 85730 THROMBOPLASTIN TIME PARTIAL: CPT

## 2022-04-06 PROCEDURE — P9045 ALBUMIN (HUMAN), 5%, 250 ML: HCPCS

## 2022-04-06 PROCEDURE — 80048 BASIC METABOLIC PNL TOTAL CA: CPT

## 2022-04-06 PROCEDURE — 36415 COLL VENOUS BLD VENIPUNCTURE: CPT

## 2022-04-06 PROCEDURE — 36600 WITHDRAWAL OF ARTERIAL BLOOD: CPT

## 2022-04-06 PROCEDURE — 82805 BLOOD GASES W/O2 SATURATION: CPT

## 2022-04-06 PROCEDURE — 93005 ELECTROCARDIOGRAM TRACING: CPT

## 2022-04-06 PROCEDURE — 85018 HEMOGLOBIN: CPT

## 2022-04-06 PROCEDURE — 71045 X-RAY EXAM CHEST 1 VIEW: CPT

## 2022-04-06 PROCEDURE — 94799 UNLISTED PULMONARY SVC/PX: CPT

## 2022-04-06 PROCEDURE — 80053 COMPREHEN METABOLIC PANEL: CPT

## 2022-04-06 PROCEDURE — 85610 PROTHROMBIN TIME: CPT

## 2022-04-06 PROCEDURE — 71046 X-RAY EXAM CHEST 2 VIEWS: CPT

## 2022-04-06 PROCEDURE — C9248 INJ, CLEVIDIPINE BUTYRATE: HCPCS

## 2022-04-06 PROCEDURE — 94002 VENT MGMT INPAT INIT DAY: CPT

## 2022-04-07 ENCOUNTER — HISTORICAL (OUTPATIENT)
Dept: ADMINISTRATIVE | Facility: HOSPITAL | Age: 57
End: 2022-04-07
Payer: MEDICAID

## 2022-04-07 PROCEDURE — 99990 CHARGE CONVERSION: CPT

## 2022-04-07 PROCEDURE — 85025 COMPLETE CBC W/AUTO DIFF WBC: CPT

## 2022-04-07 PROCEDURE — 36415 COLL VENOUS BLD VENIPUNCTURE: CPT

## 2022-04-07 PROCEDURE — P9045 ALBUMIN (HUMAN), 5%, 250 ML: HCPCS

## 2022-04-07 PROCEDURE — A9150 MISC/EXPER NON-PRESCRIPT DRU: HCPCS

## 2022-04-07 PROCEDURE — 80048 BASIC METABOLIC PNL TOTAL CA: CPT

## 2022-04-07 PROCEDURE — 71045 X-RAY EXAM CHEST 1 VIEW: CPT

## 2022-04-08 PROCEDURE — 71045 X-RAY EXAM CHEST 1 VIEW: CPT

## 2022-04-08 PROCEDURE — 80053 COMPREHEN METABOLIC PANEL: CPT

## 2022-04-08 PROCEDURE — 99990 CHARGE CONVERSION: CPT

## 2022-04-08 PROCEDURE — 36415 COLL VENOUS BLD VENIPUNCTURE: CPT

## 2022-04-08 PROCEDURE — A9150 MISC/EXPER NON-PRESCRIPT DRU: HCPCS

## 2022-04-08 PROCEDURE — 85025 COMPLETE CBC W/AUTO DIFF WBC: CPT

## 2022-04-09 PROCEDURE — 74230 X-RAY XM SWLNG FUNCJ C+: CPT

## 2022-04-09 PROCEDURE — 92611 MOTION FLUOROSCOPY/SWALLOW: CPT | Mod: GN

## 2022-04-09 PROCEDURE — 85025 COMPLETE CBC W/AUTO DIFF WBC: CPT

## 2022-04-09 PROCEDURE — 36415 COLL VENOUS BLD VENIPUNCTURE: CPT

## 2022-04-09 PROCEDURE — 92610 EVALUATE SWALLOWING FUNCTION: CPT | Mod: GN

## 2022-04-09 PROCEDURE — A9150 MISC/EXPER NON-PRESCRIPT DRU: HCPCS

## 2022-04-09 PROCEDURE — 97162 PT EVAL MOD COMPLEX 30 MIN: CPT | Mod: GP

## 2022-04-09 PROCEDURE — 99990 CHARGE CONVERSION: CPT | Mod: GN

## 2022-04-10 PROCEDURE — 71045 X-RAY EXAM CHEST 1 VIEW: CPT

## 2022-04-10 PROCEDURE — 70450 CT HEAD/BRAIN W/O DYE: CPT

## 2022-04-10 PROCEDURE — 97110 THERAPEUTIC EXERCISES: CPT

## 2022-04-10 PROCEDURE — 94760 N-INVAS EAR/PLS OXIMETRY 1: CPT

## 2022-04-10 PROCEDURE — 36415 COLL VENOUS BLD VENIPUNCTURE: CPT

## 2022-04-10 PROCEDURE — 82805 BLOOD GASES W/O2 SATURATION: CPT

## 2022-04-10 PROCEDURE — 36600 WITHDRAWAL OF ARTERIAL BLOOD: CPT

## 2022-04-10 PROCEDURE — 85025 COMPLETE CBC W/AUTO DIFF WBC: CPT

## 2022-04-10 PROCEDURE — 99990 CHARGE CONVERSION: CPT

## 2022-04-10 PROCEDURE — A9150 MISC/EXPER NON-PRESCRIPT DRU: HCPCS

## 2022-04-11 PROCEDURE — 94761 N-INVAS EAR/PLS OXIMETRY MLT: CPT

## 2022-04-11 PROCEDURE — 83735 ASSAY OF MAGNESIUM: CPT

## 2022-04-11 PROCEDURE — 97530 THERAPEUTIC ACTIVITIES: CPT | Mod: GP

## 2022-04-11 PROCEDURE — 80053 COMPREHEN METABOLIC PANEL: CPT

## 2022-04-11 PROCEDURE — A9150 MISC/EXPER NON-PRESCRIPT DRU: HCPCS

## 2022-04-11 PROCEDURE — 85025 COMPLETE CBC W/AUTO DIFF WBC: CPT

## 2022-04-11 PROCEDURE — 36415 COLL VENOUS BLD VENIPUNCTURE: CPT

## 2022-04-11 PROCEDURE — 99990 CHARGE CONVERSION: CPT | Mod: GP

## 2022-04-11 PROCEDURE — 71045 X-RAY EXAM CHEST 1 VIEW: CPT

## 2022-04-11 PROCEDURE — 95812 EEG 41-60 MINUTES: CPT

## 2022-04-11 PROCEDURE — 84100 ASSAY OF PHOSPHORUS: CPT

## 2022-04-12 PROCEDURE — 80053 COMPREHEN METABOLIC PANEL: CPT

## 2022-04-12 PROCEDURE — 85025 COMPLETE CBC W/AUTO DIFF WBC: CPT

## 2022-04-12 PROCEDURE — A9150 MISC/EXPER NON-PRESCRIPT DRU: HCPCS

## 2022-04-12 PROCEDURE — 74018 RADEX ABDOMEN 1 VIEW: CPT | Mod: 52

## 2022-04-12 PROCEDURE — 71045 X-RAY EXAM CHEST 1 VIEW: CPT

## 2022-04-12 PROCEDURE — 93005 ELECTROCARDIOGRAM TRACING: CPT

## 2022-04-12 PROCEDURE — 99990 CHARGE CONVERSION: CPT | Mod: 52

## 2022-04-12 PROCEDURE — 51798 US URINE CAPACITY MEASURE: CPT

## 2022-04-12 PROCEDURE — 36415 COLL VENOUS BLD VENIPUNCTURE: CPT

## 2022-04-12 PROCEDURE — 51702 INSERT TEMP BLADDER CATH: CPT

## 2022-04-13 PROCEDURE — 99990 CHARGE CONVERSION: CPT

## 2022-04-13 PROCEDURE — 83735 ASSAY OF MAGNESIUM: CPT

## 2022-04-13 PROCEDURE — 84100 ASSAY OF PHOSPHORUS: CPT

## 2022-04-13 PROCEDURE — 85025 COMPLETE CBC W/AUTO DIFF WBC: CPT

## 2022-04-13 PROCEDURE — 80053 COMPREHEN METABOLIC PANEL: CPT

## 2022-04-13 PROCEDURE — A9150 MISC/EXPER NON-PRESCRIPT DRU: HCPCS

## 2022-04-13 PROCEDURE — 71045 X-RAY EXAM CHEST 1 VIEW: CPT

## 2022-04-13 PROCEDURE — 36415 COLL VENOUS BLD VENIPUNCTURE: CPT

## 2022-04-13 PROCEDURE — 93005 ELECTROCARDIOGRAM TRACING: CPT

## 2022-04-14 PROCEDURE — 84100 ASSAY OF PHOSPHORUS: CPT

## 2022-04-14 PROCEDURE — 85025 COMPLETE CBC W/AUTO DIFF WBC: CPT

## 2022-04-14 PROCEDURE — 36415 COLL VENOUS BLD VENIPUNCTURE: CPT

## 2022-04-14 PROCEDURE — 99990 CHARGE CONVERSION: CPT

## 2022-04-14 PROCEDURE — 80048 BASIC METABOLIC PNL TOTAL CA: CPT

## 2022-04-14 PROCEDURE — 71045 X-RAY EXAM CHEST 1 VIEW: CPT

## 2022-04-14 PROCEDURE — 83735 ASSAY OF MAGNESIUM: CPT

## 2022-04-14 PROCEDURE — A9150 MISC/EXPER NON-PRESCRIPT DRU: HCPCS

## 2022-04-15 PROCEDURE — 36415 COLL VENOUS BLD VENIPUNCTURE: CPT

## 2022-04-15 PROCEDURE — 71045 X-RAY EXAM CHEST 1 VIEW: CPT

## 2022-04-15 PROCEDURE — 99990 CHARGE CONVERSION: CPT | Mod: 52

## 2022-04-15 PROCEDURE — 85025 COMPLETE CBC W/AUTO DIFF WBC: CPT

## 2022-04-15 PROCEDURE — A9150 MISC/EXPER NON-PRESCRIPT DRU: HCPCS

## 2022-04-15 PROCEDURE — 93005 ELECTROCARDIOGRAM TRACING: CPT

## 2022-04-15 PROCEDURE — 94761 N-INVAS EAR/PLS OXIMETRY MLT: CPT

## 2022-04-15 PROCEDURE — 70551 MRI BRAIN STEM W/O DYE: CPT | Mod: 52

## 2022-04-16 PROCEDURE — 84100 ASSAY OF PHOSPHORUS: CPT

## 2022-04-16 PROCEDURE — 71260 CT THORAX DX C+: CPT

## 2022-04-16 PROCEDURE — 36600 WITHDRAWAL OF ARTERIAL BLOOD: CPT

## 2022-04-16 PROCEDURE — 80053 COMPREHEN METABOLIC PANEL: CPT

## 2022-04-16 PROCEDURE — 87070 CULTURE OTHR SPECIMN AEROBIC: CPT

## 2022-04-16 PROCEDURE — 93005 ELECTROCARDIOGRAM TRACING: CPT

## 2022-04-16 PROCEDURE — 87075 CULTR BACTERIA EXCEPT BLOOD: CPT

## 2022-04-16 PROCEDURE — 94002 VENT MGMT INPAT INIT DAY: CPT

## 2022-04-16 PROCEDURE — 83735 ASSAY OF MAGNESIUM: CPT

## 2022-04-16 PROCEDURE — 94799 UNLISTED PULMONARY SVC/PX: CPT

## 2022-04-16 PROCEDURE — 71045 X-RAY EXAM CHEST 1 VIEW: CPT

## 2022-04-16 PROCEDURE — 82805 BLOOD GASES W/O2 SATURATION: CPT

## 2022-04-16 PROCEDURE — 87184 SC STD DISK METHOD PER PLATE: CPT

## 2022-04-16 PROCEDURE — 84484 ASSAY OF TROPONIN QUANT: CPT

## 2022-04-16 PROCEDURE — 36415 COLL VENOUS BLD VENIPUNCTURE: CPT

## 2022-04-16 PROCEDURE — 85025 COMPLETE CBC W/AUTO DIFF WBC: CPT

## 2022-04-16 PROCEDURE — A9150 MISC/EXPER NON-PRESCRIPT DRU: HCPCS

## 2022-04-16 PROCEDURE — 31500 INSERT EMERGENCY AIRWAY: CPT

## 2022-04-16 PROCEDURE — 99990 CHARGE CONVERSION: CPT

## 2022-04-16 PROCEDURE — 94640 AIRWAY INHALATION TREATMENT: CPT

## 2022-04-16 PROCEDURE — 74018 RADEX ABDOMEN 1 VIEW: CPT

## 2022-04-16 PROCEDURE — 94003 VENT MGMT INPAT SUBQ DAY: CPT

## 2022-04-17 PROCEDURE — 80053 COMPREHEN METABOLIC PANEL: CPT

## 2022-04-17 PROCEDURE — 85025 COMPLETE CBC W/AUTO DIFF WBC: CPT

## 2022-04-17 PROCEDURE — 31500 INSERT EMERGENCY AIRWAY: CPT

## 2022-04-17 PROCEDURE — 71045 X-RAY EXAM CHEST 1 VIEW: CPT

## 2022-04-17 PROCEDURE — 87205 SMEAR GRAM STAIN: CPT

## 2022-04-17 PROCEDURE — 82805 BLOOD GASES W/O2 SATURATION: CPT

## 2022-04-17 PROCEDURE — 36573 INSJ PICC RS&I 5 YR+: CPT

## 2022-04-17 PROCEDURE — 87102 FUNGUS ISOLATION CULTURE: CPT

## 2022-04-17 PROCEDURE — A9150 MISC/EXPER NON-PRESCRIPT DRU: HCPCS

## 2022-04-17 PROCEDURE — 99990 CHARGE CONVERSION: CPT

## 2022-04-17 PROCEDURE — 87070 CULTURE OTHR SPECIMN AEROBIC: CPT

## 2022-04-17 PROCEDURE — 94640 AIRWAY INHALATION TREATMENT: CPT

## 2022-04-17 PROCEDURE — 36415 COLL VENOUS BLD VENIPUNCTURE: CPT

## 2022-04-17 PROCEDURE — 36600 WITHDRAWAL OF ARTERIAL BLOOD: CPT

## 2022-04-17 PROCEDURE — 83735 ASSAY OF MAGNESIUM: CPT

## 2022-04-17 PROCEDURE — 87184 SC STD DISK METHOD PER PLATE: CPT

## 2022-04-17 PROCEDURE — 94003 VENT MGMT INPAT SUBQ DAY: CPT

## 2022-04-17 PROCEDURE — 87075 CULTR BACTERIA EXCEPT BLOOD: CPT

## 2022-04-17 PROCEDURE — 80048 BASIC METABOLIC PNL TOTAL CA: CPT

## 2022-04-18 PROCEDURE — 36600 WITHDRAWAL OF ARTERIAL BLOOD: CPT

## 2022-04-18 PROCEDURE — 99990 CHARGE CONVERSION: CPT

## 2022-04-18 PROCEDURE — 71045 X-RAY EXAM CHEST 1 VIEW: CPT

## 2022-04-18 PROCEDURE — 85025 COMPLETE CBC W/AUTO DIFF WBC: CPT

## 2022-04-18 PROCEDURE — 36415 COLL VENOUS BLD VENIPUNCTURE: CPT

## 2022-04-18 PROCEDURE — 80202 ASSAY OF VANCOMYCIN: CPT

## 2022-04-18 PROCEDURE — A9150 MISC/EXPER NON-PRESCRIPT DRU: HCPCS

## 2022-04-18 PROCEDURE — 82805 BLOOD GASES W/O2 SATURATION: CPT

## 2022-04-18 PROCEDURE — 94640 AIRWAY INHALATION TREATMENT: CPT

## 2022-04-18 PROCEDURE — 94003 VENT MGMT INPAT SUBQ DAY: CPT

## 2022-04-19 PROCEDURE — 94761 N-INVAS EAR/PLS OXIMETRY MLT: CPT

## 2022-04-19 PROCEDURE — 80053 COMPREHEN METABOLIC PANEL: CPT

## 2022-04-19 PROCEDURE — 84100 ASSAY OF PHOSPHORUS: CPT

## 2022-04-19 PROCEDURE — 71045 X-RAY EXAM CHEST 1 VIEW: CPT

## 2022-04-19 PROCEDURE — 94640 AIRWAY INHALATION TREATMENT: CPT

## 2022-04-19 PROCEDURE — 85025 COMPLETE CBC W/AUTO DIFF WBC: CPT

## 2022-04-19 PROCEDURE — A9150 MISC/EXPER NON-PRESCRIPT DRU: HCPCS

## 2022-04-19 PROCEDURE — 82805 BLOOD GASES W/O2 SATURATION: CPT

## 2022-04-19 PROCEDURE — 94003 VENT MGMT INPAT SUBQ DAY: CPT

## 2022-04-19 PROCEDURE — 36415 COLL VENOUS BLD VENIPUNCTURE: CPT

## 2022-04-19 PROCEDURE — 99990 CHARGE CONVERSION: CPT

## 2022-04-19 PROCEDURE — 83735 ASSAY OF MAGNESIUM: CPT

## 2022-04-19 PROCEDURE — 36600 WITHDRAWAL OF ARTERIAL BLOOD: CPT

## 2022-04-19 PROCEDURE — 94799 UNLISTED PULMONARY SVC/PX: CPT

## 2022-04-20 PROCEDURE — 97606 NEG PRS WND THER DME>50 SQCM: CPT

## 2022-04-20 PROCEDURE — 84100 ASSAY OF PHOSPHORUS: CPT

## 2022-04-20 PROCEDURE — 94799 UNLISTED PULMONARY SVC/PX: CPT

## 2022-04-20 PROCEDURE — 36600 WITHDRAWAL OF ARTERIAL BLOOD: CPT

## 2022-04-20 PROCEDURE — 85025 COMPLETE CBC W/AUTO DIFF WBC: CPT

## 2022-04-20 PROCEDURE — 80053 COMPREHEN METABOLIC PANEL: CPT

## 2022-04-20 PROCEDURE — 99990 CHARGE CONVERSION: CPT

## 2022-04-20 PROCEDURE — 83735 ASSAY OF MAGNESIUM: CPT

## 2022-04-20 PROCEDURE — 36415 COLL VENOUS BLD VENIPUNCTURE: CPT

## 2022-04-20 PROCEDURE — 94640 AIRWAY INHALATION TREATMENT: CPT

## 2022-04-20 PROCEDURE — 94003 VENT MGMT INPAT SUBQ DAY: CPT

## 2022-04-20 PROCEDURE — 94761 N-INVAS EAR/PLS OXIMETRY MLT: CPT

## 2022-04-20 PROCEDURE — 80202 ASSAY OF VANCOMYCIN: CPT

## 2022-04-20 PROCEDURE — 82805 BLOOD GASES W/O2 SATURATION: CPT

## 2022-04-20 PROCEDURE — A9150 MISC/EXPER NON-PRESCRIPT DRU: HCPCS

## 2022-04-21 PROCEDURE — 99990 CHARGE CONVERSION: CPT

## 2022-04-21 PROCEDURE — 84134 ASSAY OF PREALBUMIN: CPT

## 2022-04-21 PROCEDURE — 36415 COLL VENOUS BLD VENIPUNCTURE: CPT

## 2022-04-21 PROCEDURE — 94799 UNLISTED PULMONARY SVC/PX: CPT

## 2022-04-21 PROCEDURE — 94640 AIRWAY INHALATION TREATMENT: CPT

## 2022-04-21 PROCEDURE — 94003 VENT MGMT INPAT SUBQ DAY: CPT

## 2022-04-21 PROCEDURE — 85025 COMPLETE CBC W/AUTO DIFF WBC: CPT

## 2022-04-21 PROCEDURE — A9150 MISC/EXPER NON-PRESCRIPT DRU: HCPCS

## 2022-04-22 PROCEDURE — 94640 AIRWAY INHALATION TREATMENT: CPT

## 2022-04-22 PROCEDURE — 36415 COLL VENOUS BLD VENIPUNCTURE: CPT

## 2022-04-22 PROCEDURE — 83735 ASSAY OF MAGNESIUM: CPT

## 2022-04-22 PROCEDURE — 94799 UNLISTED PULMONARY SVC/PX: CPT

## 2022-04-22 PROCEDURE — 84100 ASSAY OF PHOSPHORUS: CPT

## 2022-04-22 PROCEDURE — 82805 BLOOD GASES W/O2 SATURATION: CPT

## 2022-04-22 PROCEDURE — 97606 NEG PRS WND THER DME>50 SQCM: CPT

## 2022-04-22 PROCEDURE — 36600 WITHDRAWAL OF ARTERIAL BLOOD: CPT

## 2022-04-22 PROCEDURE — 85025 COMPLETE CBC W/AUTO DIFF WBC: CPT

## 2022-04-22 PROCEDURE — 80053 COMPREHEN METABOLIC PANEL: CPT

## 2022-04-22 PROCEDURE — 94761 N-INVAS EAR/PLS OXIMETRY MLT: CPT

## 2022-04-22 PROCEDURE — A9150 MISC/EXPER NON-PRESCRIPT DRU: HCPCS

## 2022-04-22 PROCEDURE — 99990 CHARGE CONVERSION: CPT

## 2022-04-22 PROCEDURE — 94003 VENT MGMT INPAT SUBQ DAY: CPT

## 2022-04-22 PROCEDURE — 80202 ASSAY OF VANCOMYCIN: CPT

## 2022-04-23 VITALS
BODY MASS INDEX: 26.46 KG/M2 | OXYGEN SATURATION: 98 % | SYSTOLIC BLOOD PRESSURE: 148 MMHG | HEIGHT: 67 IN | WEIGHT: 168.56 LBS | DIASTOLIC BLOOD PRESSURE: 98 MMHG

## 2022-04-23 PROCEDURE — 99990 CHARGE CONVERSION: CPT

## 2022-04-23 PROCEDURE — 94003 VENT MGMT INPAT SUBQ DAY: CPT

## 2022-04-23 PROCEDURE — A9150 MISC/EXPER NON-PRESCRIPT DRU: HCPCS

## 2022-04-23 PROCEDURE — 94799 UNLISTED PULMONARY SVC/PX: CPT

## 2022-04-23 PROCEDURE — 94761 N-INVAS EAR/PLS OXIMETRY MLT: CPT

## 2022-04-23 PROCEDURE — 80202 ASSAY OF VANCOMYCIN: CPT

## 2022-04-23 PROCEDURE — 36415 COLL VENOUS BLD VENIPUNCTURE: CPT

## 2022-04-23 PROCEDURE — 86140 C-REACTIVE PROTEIN: CPT

## 2022-04-23 PROCEDURE — 94640 AIRWAY INHALATION TREATMENT: CPT

## 2022-04-23 PROCEDURE — 85651 RBC SED RATE NONAUTOMATED: CPT

## 2022-04-23 PROCEDURE — 85025 COMPLETE CBC W/AUTO DIFF WBC: CPT

## 2022-04-24 PROCEDURE — 87077 CULTURE AEROBIC IDENTIFY: CPT

## 2022-04-24 PROCEDURE — 80053 COMPREHEN METABOLIC PANEL: CPT

## 2022-04-24 PROCEDURE — A9150 MISC/EXPER NON-PRESCRIPT DRU: HCPCS

## 2022-04-24 PROCEDURE — 94799 UNLISTED PULMONARY SVC/PX: CPT

## 2022-04-24 PROCEDURE — 93005 ELECTROCARDIOGRAM TRACING: CPT

## 2022-04-24 PROCEDURE — 94003 VENT MGMT INPAT SUBQ DAY: CPT

## 2022-04-24 PROCEDURE — 87184 SC STD DISK METHOD PER PLATE: CPT

## 2022-04-24 PROCEDURE — 85025 COMPLETE CBC W/AUTO DIFF WBC: CPT

## 2022-04-24 PROCEDURE — 94761 N-INVAS EAR/PLS OXIMETRY MLT: CPT

## 2022-04-24 PROCEDURE — 36415 COLL VENOUS BLD VENIPUNCTURE: CPT

## 2022-04-24 PROCEDURE — 87040 BLOOD CULTURE FOR BACTERIA: CPT

## 2022-04-24 PROCEDURE — 99990 CHARGE CONVERSION: CPT

## 2022-04-24 PROCEDURE — 87070 CULTURE OTHR SPECIMN AEROBIC: CPT

## 2022-04-25 PROCEDURE — 36415 COLL VENOUS BLD VENIPUNCTURE: CPT

## 2022-04-25 PROCEDURE — 97606 NEG PRS WND THER DME>50 SQCM: CPT

## 2022-04-25 PROCEDURE — 80202 ASSAY OF VANCOMYCIN: CPT

## 2022-04-25 PROCEDURE — 99990 CHARGE CONVERSION: CPT

## 2022-04-25 PROCEDURE — A9150 MISC/EXPER NON-PRESCRIPT DRU: HCPCS

## 2022-04-25 PROCEDURE — 94003 VENT MGMT INPAT SUBQ DAY: CPT

## 2022-04-25 PROCEDURE — 84134 ASSAY OF PREALBUMIN: CPT

## 2022-04-25 PROCEDURE — 85025 COMPLETE CBC W/AUTO DIFF WBC: CPT

## 2022-04-26 PROCEDURE — 36415 COLL VENOUS BLD VENIPUNCTURE: CPT

## 2022-04-26 PROCEDURE — 80053 COMPREHEN METABOLIC PANEL: CPT

## 2022-04-26 PROCEDURE — 94003 VENT MGMT INPAT SUBQ DAY: CPT

## 2022-04-26 PROCEDURE — 85025 COMPLETE CBC W/AUTO DIFF WBC: CPT

## 2022-04-26 PROCEDURE — 99990 CHARGE CONVERSION: CPT

## 2022-04-26 PROCEDURE — 94761 N-INVAS EAR/PLS OXIMETRY MLT: CPT

## 2022-04-26 PROCEDURE — A9150 MISC/EXPER NON-PRESCRIPT DRU: HCPCS

## 2022-04-26 PROCEDURE — 94799 UNLISTED PULMONARY SVC/PX: CPT

## 2022-04-26 PROCEDURE — 71045 X-RAY EXAM CHEST 1 VIEW: CPT

## 2022-04-27 PROCEDURE — 94799 UNLISTED PULMONARY SVC/PX: CPT

## 2022-04-27 PROCEDURE — A9150 MISC/EXPER NON-PRESCRIPT DRU: HCPCS

## 2022-04-27 PROCEDURE — 36415 COLL VENOUS BLD VENIPUNCTURE: CPT

## 2022-04-27 PROCEDURE — 97606 NEG PRS WND THER DME>50 SQCM: CPT

## 2022-04-27 PROCEDURE — 80202 ASSAY OF VANCOMYCIN: CPT

## 2022-04-27 PROCEDURE — 85025 COMPLETE CBC W/AUTO DIFF WBC: CPT

## 2022-04-27 PROCEDURE — 99990 CHARGE CONVERSION: CPT

## 2022-04-27 PROCEDURE — 84134 ASSAY OF PREALBUMIN: CPT

## 2022-04-28 PROCEDURE — 80053 COMPREHEN METABOLIC PANEL: CPT

## 2022-04-28 PROCEDURE — 94761 N-INVAS EAR/PLS OXIMETRY MLT: CPT

## 2022-04-28 PROCEDURE — 31500 INSERT EMERGENCY AIRWAY: CPT

## 2022-04-28 PROCEDURE — 99990 CHARGE CONVERSION: CPT

## 2022-04-28 PROCEDURE — 36415 COLL VENOUS BLD VENIPUNCTURE: CPT

## 2022-04-28 PROCEDURE — 94799 UNLISTED PULMONARY SVC/PX: CPT

## 2022-04-28 PROCEDURE — A9150 MISC/EXPER NON-PRESCRIPT DRU: HCPCS

## 2022-04-28 PROCEDURE — 94003 VENT MGMT INPAT SUBQ DAY: CPT

## 2022-04-28 PROCEDURE — 85025 COMPLETE CBC W/AUTO DIFF WBC: CPT

## 2022-04-29 DIAGNOSIS — R13.19 ESOPHAGEAL DYSPHAGIA: Primary | ICD-10-CM

## 2022-04-29 DIAGNOSIS — I49.9 IRREGULAR HEART RHYTHM: ICD-10-CM

## 2022-04-29 DIAGNOSIS — I25.10 CORONARY ARTERY DISEASE: ICD-10-CM

## 2022-04-29 DIAGNOSIS — I25.10 CORONARY ARTERY DISEASE, UNSPECIFIED VESSEL OR LESION TYPE, UNSPECIFIED WHETHER ANGINA PRESENT, UNSPECIFIED WHETHER NATIVE OR TRANSPLANTED HEART: ICD-10-CM

## 2022-04-29 DIAGNOSIS — Z95.1 HX OF CABG: ICD-10-CM

## 2022-04-29 DIAGNOSIS — T81.32XD STERNAL WOUND DEHISCENCE, SUBSEQUENT ENCOUNTER: ICD-10-CM

## 2022-04-29 DIAGNOSIS — R07.9 CHEST PAIN: ICD-10-CM

## 2022-04-29 DIAGNOSIS — R13.10 DYSPHAGIA, UNSPECIFIED TYPE: ICD-10-CM

## 2022-04-29 DIAGNOSIS — I82.409 DVT (DEEP VENOUS THROMBOSIS): ICD-10-CM

## 2022-04-29 PROCEDURE — 80048 BASIC METABOLIC PNL TOTAL CA: CPT

## 2022-04-29 PROCEDURE — 85025 COMPLETE CBC W/AUTO DIFF WBC: CPT

## 2022-04-29 PROCEDURE — 36415 COLL VENOUS BLD VENIPUNCTURE: CPT

## 2022-04-29 PROCEDURE — 36600 WITHDRAWAL OF ARTERIAL BLOOD: CPT

## 2022-04-29 PROCEDURE — 84134 ASSAY OF PREALBUMIN: CPT

## 2022-04-29 PROCEDURE — A9150 MISC/EXPER NON-PRESCRIPT DRU: HCPCS

## 2022-04-29 PROCEDURE — 97606 NEG PRS WND THER DME>50 SQCM: CPT

## 2022-04-29 PROCEDURE — 99990 CHARGE CONVERSION: CPT

## 2022-04-29 PROCEDURE — 94799 UNLISTED PULMONARY SVC/PX: CPT

## 2022-04-29 PROCEDURE — 94003 VENT MGMT INPAT SUBQ DAY: CPT

## 2022-04-29 PROCEDURE — 71045 X-RAY EXAM CHEST 1 VIEW: CPT

## 2022-04-29 PROCEDURE — 82805 BLOOD GASES W/O2 SATURATION: CPT

## 2022-04-30 PROCEDURE — 80053 COMPREHEN METABOLIC PANEL: CPT

## 2022-04-30 PROCEDURE — A9150 MISC/EXPER NON-PRESCRIPT DRU: HCPCS

## 2022-04-30 PROCEDURE — 36415 COLL VENOUS BLD VENIPUNCTURE: CPT

## 2022-04-30 PROCEDURE — 99990 CHARGE CONVERSION: CPT

## 2022-04-30 PROCEDURE — 80202 ASSAY OF VANCOMYCIN: CPT

## 2022-04-30 PROCEDURE — 94799 UNLISTED PULMONARY SVC/PX: CPT

## 2022-04-30 PROCEDURE — 85025 COMPLETE CBC W/AUTO DIFF WBC: CPT

## 2022-04-30 PROCEDURE — 94003 VENT MGMT INPAT SUBQ DAY: CPT

## 2022-04-30 RX ORDER — ENOXAPARIN SODIUM 100 MG/ML
40 INJECTION SUBCUTANEOUS EVERY 24 HOURS
Status: DISCONTINUED | OUTPATIENT
Start: 2022-05-01 | End: 2022-05-11

## 2022-04-30 RX ORDER — POTASSIUM CHLORIDE 14.9 MG/ML
60 INJECTION INTRAVENOUS
Status: DISCONTINUED | OUTPATIENT
Start: 2022-04-30 | End: 2022-06-10

## 2022-04-30 RX ORDER — ONDANSETRON 2 MG/ML
4 INJECTION INTRAMUSCULAR; INTRAVENOUS EVERY 4 HOURS PRN
Status: DISCONTINUED | OUTPATIENT
Start: 2022-04-30 | End: 2022-05-15

## 2022-04-30 RX ORDER — SODIUM CHLORIDE 0.9 % (FLUSH) 0.9 %
10 SYRINGE (ML) INJECTION
Status: DISCONTINUED | OUTPATIENT
Start: 2022-04-30 | End: 2022-06-17 | Stop reason: HOSPADM

## 2022-04-30 RX ORDER — ZIPRASIDONE MESYLATE 20 MG/ML
20 INJECTION, POWDER, LYOPHILIZED, FOR SOLUTION INTRAMUSCULAR EVERY 8 HOURS PRN
Status: DISCONTINUED | OUTPATIENT
Start: 2022-04-30 | End: 2022-06-17 | Stop reason: HOSPADM

## 2022-04-30 RX ORDER — RIFAMPIN 300 MG/1
300 CAPSULE ORAL 2 TIMES DAILY
Status: DISCONTINUED | OUTPATIENT
Start: 2022-05-01 | End: 2022-06-05

## 2022-04-30 RX ORDER — NOREPINEPHRINE BITARTRATE 0.03 MG/ML
0-3 INJECTION, SOLUTION INTRAVENOUS CONTINUOUS
Status: DISCONTINUED | OUTPATIENT
Start: 2022-04-30 | End: 2022-05-22

## 2022-04-30 RX ORDER — OXYCODONE HYDROCHLORIDE 5 MG/1
10 TABLET ORAL EVERY 4 HOURS PRN
Status: DISCONTINUED | OUTPATIENT
Start: 2022-04-30 | End: 2022-06-17 | Stop reason: HOSPADM

## 2022-04-30 RX ORDER — DOXEPIN HYDROCHLORIDE 25 MG/1
50 CAPSULE ORAL NIGHTLY
Status: DISCONTINUED | OUTPATIENT
Start: 2022-05-01 | End: 2022-06-17 | Stop reason: HOSPADM

## 2022-04-30 RX ORDER — LORAZEPAM 2 MG/ML
1 INJECTION INTRAMUSCULAR ONCE AS NEEDED
Status: COMPLETED | OUTPATIENT
Start: 2022-04-30 | End: 2022-05-07

## 2022-04-30 RX ORDER — MORPHINE SULFATE 4 MG/ML
2 INJECTION, SOLUTION INTRAMUSCULAR; INTRAVENOUS
Status: DISCONTINUED | OUTPATIENT
Start: 2022-05-01 | End: 2022-06-17 | Stop reason: HOSPADM

## 2022-04-30 RX ORDER — BISACODYL 10 MG
10 SUPPOSITORY, RECTAL RECTAL DAILY PRN
Status: DISCONTINUED | OUTPATIENT
Start: 2022-04-30 | End: 2022-06-17 | Stop reason: HOSPADM

## 2022-04-30 RX ORDER — LORAZEPAM 2 MG/ML
0.5 INJECTION INTRAMUSCULAR ONCE AS NEEDED
Status: COMPLETED | OUTPATIENT
Start: 2022-04-30 | End: 2022-05-08

## 2022-04-30 RX ORDER — SODIUM CHLORIDE 0.9 % (FLUSH) 0.9 %
10 SYRINGE (ML) INJECTION EVERY 6 HOURS
Status: DISCONTINUED | OUTPATIENT
Start: 2022-04-30 | End: 2022-05-05

## 2022-04-30 RX ORDER — PROPOFOL 10 MG/ML
5-50 INJECTION, EMULSION INTRAVENOUS CONTINUOUS
Status: DISCONTINUED | OUTPATIENT
Start: 2022-04-30 | End: 2022-05-03

## 2022-04-30 RX ORDER — POTASSIUM CHLORIDE 14.9 MG/ML
40 INJECTION INTRAVENOUS
Status: DISCONTINUED | OUTPATIENT
Start: 2022-04-30 | End: 2022-06-10

## 2022-04-30 RX ORDER — ATORVASTATIN CALCIUM 40 MG/1
80 TABLET, FILM COATED ORAL DAILY
Status: DISCONTINUED | OUTPATIENT
Start: 2022-05-01 | End: 2022-06-17 | Stop reason: HOSPADM

## 2022-04-30 RX ORDER — LISINOPRIL 20 MG/1
20 TABLET ORAL DAILY
COMMUNITY
Start: 2022-02-16 | End: 2022-07-30

## 2022-04-30 RX ORDER — HYDRALAZINE HYDROCHLORIDE 20 MG/ML
10 INJECTION INTRAMUSCULAR; INTRAVENOUS
Status: DISCONTINUED | OUTPATIENT
Start: 2022-04-30 | End: 2022-06-17 | Stop reason: HOSPADM

## 2022-04-30 RX ORDER — ACETAMINOPHEN 325 MG/1
650 TABLET ORAL EVERY 4 HOURS PRN
Status: DISCONTINUED | OUTPATIENT
Start: 2022-05-01 | End: 2022-06-17 | Stop reason: HOSPADM

## 2022-04-30 RX ORDER — METOPROLOL SUCCINATE 25 MG/1
25 TABLET, EXTENDED RELEASE ORAL DAILY
Status: ON HOLD | COMMUNITY
Start: 2022-03-27 | End: 2022-06-17 | Stop reason: HOSPADM

## 2022-04-30 RX ORDER — METOPROLOL TARTRATE 25 MG/1
25 TABLET, FILM COATED ORAL DAILY
Status: DISCONTINUED | OUTPATIENT
Start: 2022-05-01 | End: 2022-06-16

## 2022-04-30 RX ORDER — OXCARBAZEPINE 300 MG/1
300 TABLET, FILM COATED ORAL 2 TIMES DAILY
Status: DISCONTINUED | OUTPATIENT
Start: 2022-05-01 | End: 2022-06-09

## 2022-04-30 RX ORDER — FENTANYL CITRATE 50 UG/ML
50 INJECTION, SOLUTION INTRAMUSCULAR; INTRAVENOUS
Status: DISCONTINUED | OUTPATIENT
Start: 2022-04-30 | End: 2022-06-10

## 2022-04-30 RX ORDER — DOXEPIN HYDROCHLORIDE 25 MG/1
50 CAPSULE ORAL NIGHTLY
Status: ON HOLD | COMMUNITY
Start: 2022-02-16 | End: 2022-06-17 | Stop reason: HOSPADM

## 2022-04-30 RX ORDER — NAPROXEN SODIUM 220 MG/1
81 TABLET, FILM COATED ORAL DAILY
Status: DISCONTINUED | OUTPATIENT
Start: 2022-05-01 | End: 2022-05-06

## 2022-04-30 RX ORDER — MORPHINE SULFATE 4 MG/ML
4 INJECTION, SOLUTION INTRAMUSCULAR; INTRAVENOUS EVERY 4 HOURS PRN
Status: DISCONTINUED | OUTPATIENT
Start: 2022-04-30 | End: 2022-06-10

## 2022-04-30 RX ORDER — OXYCODONE HYDROCHLORIDE 5 MG/1
5 TABLET ORAL EVERY 4 HOURS PRN
Status: DISCONTINUED | OUTPATIENT
Start: 2022-04-30 | End: 2022-06-17 | Stop reason: HOSPADM

## 2022-04-30 RX ORDER — FAMOTIDINE 10 MG/ML
20 INJECTION INTRAVENOUS 2 TIMES DAILY
Status: DISCONTINUED | OUTPATIENT
Start: 2022-05-01 | End: 2022-06-10

## 2022-04-30 RX ORDER — SODIUM CHLORIDE 0.9 % (FLUSH) 0.9 %
10 SYRINGE (ML) INJECTION EVERY 12 HOURS
Status: DISCONTINUED | OUTPATIENT
Start: 2022-04-30 | End: 2022-06-17 | Stop reason: HOSPADM

## 2022-04-30 RX ORDER — FENTANYL CITRATE 50 UG/ML
100 INJECTION, SOLUTION INTRAMUSCULAR; INTRAVENOUS
Status: DISCONTINUED | OUTPATIENT
Start: 2022-05-01 | End: 2022-06-10

## 2022-04-30 RX ORDER — POTASSIUM CHLORIDE 14.9 MG/ML
20 INJECTION INTRAVENOUS
Status: DISCONTINUED | OUTPATIENT
Start: 2022-04-30 | End: 2022-06-10

## 2022-04-30 RX ORDER — NITROGLYCERIN 0.4 MG/1
0.4 TABLET SUBLINGUAL EVERY 5 MIN PRN
Status: DISCONTINUED | OUTPATIENT
Start: 2022-04-30 | End: 2022-06-17 | Stop reason: HOSPADM

## 2022-04-30 RX ORDER — HALOPERIDOL 5 MG/1
5 TABLET ORAL 2 TIMES DAILY
Status: DISCONTINUED | OUTPATIENT
Start: 2022-05-01 | End: 2022-05-21

## 2022-04-30 RX ORDER — OLANZAPINE 10 MG/2ML
10 INJECTION, POWDER, FOR SOLUTION INTRAMUSCULAR EVERY 8 HOURS PRN
Status: DISCONTINUED | OUTPATIENT
Start: 2022-04-30 | End: 2022-06-17 | Stop reason: HOSPADM

## 2022-04-30 RX ORDER — SODIUM CHLORIDE 0.9 % (FLUSH) 0.9 %
20 SYRINGE (ML) INJECTION
Status: DISCONTINUED | OUTPATIENT
Start: 2022-04-30 | End: 2022-05-05 | Stop reason: SDUPTHER

## 2022-04-30 RX ORDER — SODIUM CHLORIDE 0.9 % (FLUSH) 0.9 %
10 SYRINGE (ML) INJECTION
Status: DISCONTINUED | OUTPATIENT
Start: 2022-04-30 | End: 2022-05-05

## 2022-04-30 RX ORDER — IPRATROPIUM BROMIDE AND ALBUTEROL SULFATE 2.5; .5 MG/3ML; MG/3ML
3 SOLUTION RESPIRATORY (INHALATION) EVERY 12 HOURS PRN
Status: DISCONTINUED | OUTPATIENT
Start: 2022-04-30 | End: 2022-06-17 | Stop reason: HOSPADM

## 2022-04-30 RX ORDER — EZETIMIBE 10 MG/1
10 TABLET ORAL NIGHTLY
Status: DISCONTINUED | OUTPATIENT
Start: 2022-05-01 | End: 2022-06-17 | Stop reason: HOSPADM

## 2022-04-30 RX ORDER — BENZTROPINE MESYLATE 1 MG/1
1 TABLET ORAL 2 TIMES DAILY
Status: DISCONTINUED | OUTPATIENT
Start: 2022-05-01 | End: 2022-06-17 | Stop reason: HOSPADM

## 2022-04-30 NOTE — ED PROVIDER NOTES
Patient:   Kendall Duff             MRN: [id number]            FIN: [id number]               Age:   54 years     Sex:  Male     :  1965   Associated Diagnoses:   Psychiatric problem; Suicidal ideations   Author:   Jose Li MD      Basic Information   Time seen: Date & time 10/22/2019 12:56:00.   History source: Patient.   Arrival mode: Ambulance.   History limitation: None.      History of Present Illness   The patient presents with \53 y/o marshall with hx of bipolar schizophrenia and HTN presents to the ED after being found walking through traffic. According to EMS he reportedly told the police that he wanted to kill himself. The pt states that he has been off of his medication for two years, however, upon further history from staff, he was seen alert and normal visiting somebody in the hospital one month ago. The pt denies any symptoms and states he is non-suicidal and non-homicidal. He also reports that he would like to get back on his medication. Upon chart review, the pt was recently incarcerated for 60 days and was seen at [facility] for weakness and fatigue after refusing to drink, eat, or take his medication for two weeks.  .  The onset was just prior to arrival.  The course/duration of symptoms is constant.  Character of symptoms anxious suicidal thoughts.  The degree of symptoms is minimal.  Self injury: none.  The exacerbating factor is none.  The relieving factor is none.  Risk factors consist of non-compliance and Bipolar Schizophrenia.  Prior episodes: none.  Therapy today: none.  Associated symptoms: none.  Additional history: Recently incarcerated for 60 days (10.17.19).        Review of Systems   Constitutional symptoms:  Negative except as documented in HPI.   Skin symptoms:  Negative except as documented in HPI.   Eye symptoms:  Negative except as documented in HPI.   ENMT symptoms:  Negative except as documented in HPI.   Respiratory symptoms:  Negative except as documented in HPI.    Cardiovascular symptoms:  Negative except as documented in HPI.   Gastrointestinal symptoms:  Negative except as documented in HPI.   Genitourinary symptoms:  Negative except as documented in HPI.   Musculoskeletal symptoms:  Negative except as documented in HPI.   Neurologic symptoms:  Negative except as documented in HPI.   Psychiatric symptoms:  Suicidal, Not homicidal,    Endocrine symptoms:  Negative except as documented in HPI.   Hematologic/Lymphatic symptoms:  Negative except as documented in HPI.   Allergy/immunologic symptoms:  Negative except as documented in HPI.             Additional review of systems information: All other systems reviewed and otherwise negative.      Health Status   Allergies:    Allergic Reactions (All)  Severity Not Documented  Depakote- Seizure.  Depakote ER- Seizures.  Lithium- Seizures.  SEROquel- Seizures.  Canceled/Inactive Reactions (All)  No Known Allergies  No Known Medication Allergies.   Medications: Per nurse's notes.   Immunizations: Per nurse's notes.      Past Medical/ Family/ Social History   Medical history:    Resolved  HTN (hypertension) ([id number]):  Resolved.  Convulsions/seizures ([id number]):  Resolved.  Bipolar ([id number]):  Resolved.  Chronic hepatitis C ([id number]):  Resolved.,   CVA - Cerebrovascular accident   H/O: depression   Hep C w/o coma, chronic   Knowledge deficit   Malnutrition   Obesity   Suicidal ideation   Tobacco user .   Surgical history:    Intracoronary Stent Placement 1st Vessel on 8/14/2015 at 49 Years.  Comments:  8/17/2015 14:19 CDT - Mae Latham  auto-populated from documented surgical case  Intracoronary Stent Placement 1st Vessel (None) on 8/13/2015 at 49 Years.  Comments:  8/13/2015 16:44 CDT - Su Steinberg RN  auto-populated from documented surgical case  Left Heart Cath w/COR Angio Ventriculography (None) on 8/13/2015 at 49  Years.  Comments:  8/13/2015 16:44 JAGRUTI Steinberg RN, Su  auto-populated from documented surgical case.   Family history:    Father  Liver failure.....  Mother  Metastatic cancer  .   Social history:    Social & Psychosocial Habits    Alcohol  05/06/2012 Risk Assessment: Denies Alcohol Use    07/05/2018  Use: Past    Type: Beer, Liquor, Wine    Frequency: Daily      Comment: jessica - 10/08/2018 07:14 - Elizabeth Bhatti RN    Employment/School  12/31/2018  Status: Unemployed    Activity level: Desk/Office    Highest education: High school    Hazardous equipment operation: No    Home/Environment  12/31/2018  Lives with: Siblings    Living situation: Home/Independent    Alcohol abuse in household: No    Substance abuse in household: No    Smoker in household: No    Injuries/Abuse/Neglect in household: No    Feels unsafe at home: No    Safe place to go: Yes    Agency(s)/Others notified: No    Family/Friends available to help: Yes    Concern for family members at home: No    Major illness in household: No    Financial concerns: No    Concerns over TV/Computer/Game use: No    Nutrition/Health  12/31/2018  Type of diet: Regular    Wants to lose weight: No    Sleeping concerns: No    Feels highly stressed: Yes    Sexual  12/31/2018  Sexually active: No    First active at age: 10 Years    Current partners: 0    Number of lifetime partners: 5    Do you think of your sexual orientation as: Heterosexual    Uses condoms: No    History of sexual abuse: No    Substance Use  04/20/2013 Risk Assessment: Denies Substance Abuse      Comment: denies - 10/08/2018 07:14 - Elizabeth Bhatti RN    Tobacco  04/15/2015  Use: Current every day smoker    Type: Cigarettes    Tobacco use per day: 20    05/11/2012 Risk Assessment: High Risk    10/08/2018  Use: Current every day smoker    Patient Wants Consult For Cessation Counseling Yes    Tobacco use per day: 40    12/10/2018  Use: Current every day smoker    Patient Wants Consult For  Cessation Counseling No    12/16/2018  Use: Current every day smoker    Patient Wants Consult For Cessation Counseling N/A    12/31/2018  Use: Current every day smoker    Type: Cigarettes    Patient Wants Consult For Cessation Counseling No    Tobacco use per day: 20    Number of years: 43    Total pack years: 33    Started at age: 10 Years    Ready to change: No    Concerns about tobacco use in household: No    01/28/2019  Use: 10 or more cigarettes (1/    Type: Cigarettes    Patient Wants Consult For Cessation Counseling N/A    02/27/2019  Use: 10 or more cigarettes (1/    Patient Wants Consult For Cessation Counseling N/A    03/08/2019  Use: 10 or more cigarettes (1/    Patient Wants Consult For Cessation Counseling No    07/06/2019  Use: 10 or more cigarettes (1/    Type: Cigarettes    Patient Wants Consult For Cessation Counseling No    Tobacco use per day: 20    09/14/2019  Use: 10 or more cigarettes (1/    Type: Cigarettes    Patient Wants Consult For Cessation Counseling No    10/11/2019  Use: Smoker, current status un    Patient Wants Consult For Cessation Counseling N/A    Abuse/Neglect  07/06/2019  SHX Any signs of abuse or neglect No    09/14/2019  SHX Any signs of abuse or neglect No  , Tobacco use: Regularly.      Physical Examination               Vital Signs      Vital Signs (last 24 hrs)_____  Last Charted___________  Temp Oral     36.6 DegC  (OCT 22 12:43)  Heart Rate Peripheral   95 bpm  (OCT 22 12:43)  Resp Rate         18 br/min  (OCT 22 12:43)  SBP      H 154mmHg  (OCT 22 12:43)  DBP      81 mmHg  (OCT 22 12:43)  SpO2      100 %  (OCT 22 12:43)  .   Basic Oxygen Information   10/22/2019 12:43 CDT     SpO2                      100 %                             Oxygen Therapy            Room air  .   Measurements   10/22/2019 12:43 CDT     Weight Dosing             77 kg                             Weight Measured and Calculated in Lbs     169.75 lb                             Weight Estimated           77 kg  .   General:  Alert, no acute distress.    Skin:  Warm, dry, no rash.    Head:  Normocephalic, atraumatic.    Neck:  Supple, trachea midline, no JVD.    Eye:  Pupils are equal, round and reactive to light, extraocular movements are intact, normal conjunctiva, vision unchanged.    Ears, nose, mouth and throat:  Tympanic membranes clear, no pharyngeal erythema or exudate, Dry oral mucosa.    Cardiovascular:  Regular rate and rhythm, No murmur, No edema.    Respiratory:  Lungs are clear to auscultation, respirations are non-labored, breath sounds are equal, Symmetrical chest wall expansion.    Chest wall:  No tenderness.   Back:  Normal range of motion.   Musculoskeletal:  Normal ROM, normal strength, no tenderness.    Gastrointestinal:  Soft, Nontender, Non distended, Normal bowel sounds, No organomegaly.    Neurological:  Alert and oriented to person, place, time, and situation, No focal neurological deficit observed, CN II-XII intact, normal sensory observed, normal motor observed, normal speech observed.    Psychiatric:  Cooperative, normal judgment, Mood and affect: perseveration, Abnormal / Psychotic thoughts: not suicidal, not homicidal.             Medical Decision Making   Differential Diagnosis:  Depression, suicide risk, schizophrenia, bipolar disorder.    Rationale:  Patient with a history of hepatitis C, hypertension and bipolar disorder here for intention to jump out into the street in front of cars according to [place] [employer] here denies any suicidal homicidal ideation denies any hallucinations.  Patient here is having perseverations and at times appears confused.  See completed.  Vital signs stable, afebrile.  Exam as above.  EKG without ischemic changes when compared to prior.  K mildly elevated at 321, will give single bolus of IV fluids and patient may be able to drink p.o. fluids after urine no kidney dysfunction..  Labs including CMP, CBC, TFTs UA and UDS all unrevealing,  "positive for ethanol at 4.  Patient medically cleared for transfer when accepted.  Of note, patient states that has been off his medications for 3 years however was recently seen here over the last few weeks on his medications and alert and oriented..   Documents reviewed:  Emergency department nurses' notes.   Orders  Launch Order Profile (Selected)   Inpatient Orders  Ordered  30 Day Readmission: 10/22/19 12:47:58 CDT, Stop date 10/22/19 12:47:58 CDT, "This patient has had an inpatient, observation, outpatient bedded or emergency visit within the last 30 days."  EKG Adult 12 Lead: 10/22/19 13:04:00 CDT, Stat, Once, 10/22/19 13:04:00 CDT, Ambulatory, Standard Precautions, -1, -1, 10/22/19 13:04:00 CDT  Regular Diet: 10/22/19 13:10:00 CDT, Start Meal: Now  Ordered (Dispatched)  Urinalysis with microscopic a reflex to culture: Stat collect, Urine, 10/22/19 13:05:00 CDT, Stop date 10/22/19 13:05:00 CDT, Nurse collect  Urine Drug Screen: Stat collect, Urine, 10/22/19 13:04:00 CDT, Once, Stop date 10/22/19 13:05:00 CDT, Nurse collect, Print Label By Order Location  Ordered (In-Lab)  CK: STAT collect, 10/22/19 13:20:31 CDT, BLOOD, Collected, Stop date 10/22/19 13:20:00 CDT, Lab Collect  CMP: STAT collect, 10/22/19 13:20:31 CDT, BLOOD, Collected, Stop date 10/22/19 13:05:00 CDT, Lab Collect  Ethanol Level: STAT collect, 10/22/19 13:20:31 CDT, BLOOD, Collected, Stop date 10/22/19 13:20:00 CDT, Lab Collect  T4 Free: STAT collect, 10/22/19 13:20:31 CDT, BLOOD, Collected, Stop date 10/22/19 13:20:00 CDT, Lab Collect  TSH: STAT collect, 10/22/19 13:20:31 CDT, BLOOD, Collected, Stop date 10/22/19 13:20:00 CDT, Lab Collect  Completed  Automated Diff: STAT collect, 10/22/19 13:20:00 CDT, Blood, Collected, Stop date 10/22/19 13:20:00 CDT, Lab Collect, Print Label By Order Location, 10/22/19 13:04:00 CDT  CBC - includes Diff: STAT collect, 10/22/19 13:20:31 CDT, BLOOD, Collected, Stop date 10/22/19 13:20:00 CDT, Lab Collect. "   Electrocardiogram:  Time 10/22/2019 13:36:00, rate 83, normal sinus rhythm, No ST-T changes, no ectopy, normal SC & QRS intervals, EP Interp, poor quality baseline.    Results review:  Lab results : Lab View   10/22/2019 14:44 CDT     U pH                      5.5                             U Spec Grav               1.022                             UA Appear                 CLEAR                             UA Color                  YELLOW                             UA Spec Grav              1.022                             UA Bili                   Negative                             UA pH                     5.5                             UA Urobilinogen           1.0                             UA Blood                  Negative                             UA Glucose                Trace                             UA Ketones                Negative                             UA Protein                Negative                             UA Nitrite                Negative                             UA Leuk Est               Negative                             UA WBC                    NONE SEEN /HPF                             UA RBC                    None Seen                             UA Bacteria               NONE SEEN /HPF                             UA Squam Epithelial       NONE SEEN    10/22/2019 13:20 CDT     Sodium Lvl                139 mmol/L                             Potassium Lvl             4.1 mmol/L                             Chloride                  105 mmol/L                             CO2                       27.0 mmol/L                             Calcium Lvl               8.7 mg/dL                             Glucose Lvl               101 mg/dL                             BUN                       17.0 mg/dL                             Creatinine                0.96 mg/dL                             eGFR-AA                   >60 mL/min/1.73 m2  NA                              eGFR-JAYDON                  >60 mL/min/1.73 m2  NA                             Bili Total                0.6 mg/dL                             Bili Direct               0.30 mg/dL                             Bili Indirect             0.30 mg/dL                             AST                       47 unit/L  HI                             ALT                       74 unit/L                             Alk Phos                  79 unit/L                             Total Protein             8.2 gm/dL                             Albumin Lvl               3.40 gm/dL                             Globulin                  4.80 gm/dL  HI                             A/G Ratio                 0.7 ratio  LOW                             Total CK                  321 unit/L  HI                             T4 Free                   1.36 ng/dL                             TSH                       1.270 mIU/L                             WBC                       12.4 x10(3)/mcL  HI                             RBC                       5.33 x10(6)/mcL                             Hgb                       14.9 gm/dL                             Hct                       45.2 %                             Platelet                  352 x10(3)/mcL                             MCV                       84.8 fL                             MCH                       28.0 pg                             MCHC                      33.0 gm/dL                             RDW                       14.3 %                             MPV                       8.7 fL  LOW                             Abs Neut                  9.44 x10(3)/mcL  HI                             Neutro Auto               76 %  NA                             Lymph Auto                16 %  NA                             Mono Auto                 6 %  NA                             Eos Auto                  1 %  NA                             Abs Eos                   0.1  x10(3)/mcL                             Basophil Auto             1 %  NA                             Abs Neutro                9.44 x10(3)/mcL  HI                             Abs Lymph                 1.9 x10(3)/mcL                             Abs Mono                  0.8 x10(3)/mcL                             Abs Baso                  0.1 x10(3)/mcL                             Ethanol Lvl               4.0 mg/dL  HI  .      Impression and Plan   Diagnosis   Psychiatric problem (PNED [id number])   Suicidal ideations ([id number] R45.851)   Plan   Disposition: Medically cleared, Patient care transitioned to: Patient cleared medically for transfer to psychiatric facility once accepted.    Counseled: Patient, Regarding diagnosis, Regarding diagnostic results, Regarding treatment plan, Patient indicated understanding of instructions.    Notes:   IAlia, acted solely as a scribe for and in the presence of Dr. Li who performed the service., IJose M.D., personally performed the history, physical exam and medical decision making; and confirmed the accuracy of the information in the transcribed note.

## 2022-04-30 NOTE — ED PROVIDER NOTES
"   Patient:   Kendall Duff             MRN: [id number]            FIN: [id number]               Age:   54 years     Sex:  Male     :  1965   Associated Diagnoses:   None   Author:   Gabriela SOMERS, Jarrod MA      Addendum      Teaching-Supervisory Addendum-Brief   I participated in the following activities of this patients care: the medical history, the physical exam, medical decision making.   I personally performed: supervision of the patient's care, the medical history, the physical exam.   The case was discussed with: the physician assistant.   Evaluation and management service: I agree with the evaluation and management decisions made in this patient's care.   Results interpretation: I agree with the study interpretation in this patient's care.   Notes: I had face-to-face interaction with this patient..   My face-to-face interaction with this patient reveals that he has been having chest pain "a few days ".  He cannot really describe the pain but states she's had it before.    On physical examination normal heart tones  and lung sounds.    Initial ED evaluation is unremarkable.  PA has discussed with cardiology and will admit.   "

## 2022-04-30 NOTE — ED PROVIDER NOTES
"   Patient:   Kendall Duff             MRN: [id number]            FIN: [id number]               Age:   54 years     Sex:  Male     :  1965   Associated Diagnoses:   Rhabdomyolysis; Altered mental status, unspecified   Author:   Jose Li MD      Report Summary       General:       Alert, no acute distress.       Basic Information   Time seen: Date & time 2019 13:51:00.   History source: Patient, EMS.   Arrival mode: Ambulance.   History limitation: Clinical condition.   Additional information: Chief Complaint from Nursing Triage Note : Chief Complaint   2019 13:47 CST     Chief Complaint           pt found walking in road; currently not answering questions; denies any complaints; hx of psych  .      History of Present Illness   The patient presents with Pt found walking around [facility] in traffic.  EMS reports he was unaware of his surroundings until he "yawned and apparently became wide awake".  Pt does not know why he is here. ([facility]) and     I, Dr. Li, assumed care of this patient at 1403.        55 y/o AAM with history of bipolar disorder and hep C presents to ED by ambulance after being found walking on the street by [facility].  Pt had AMS, and was nonverbal when found however is now verbal. Pt states that he lives near [facility]. He denies headaches, chest pain, SOB, and denies any suicidal ideation or wanting to hurt anyone. .  The onset was just prior to arrival.  The course/duration of symptoms is constant.  Character of symptoms AMS.  The degree of symptoms is minimal.  Self injury: none.  The exacerbating factor is none.  The relieving factor is none.  Risk factors consist of none.  Prior episodes: occasional.  Therapy today: emergency medical services.  Associated symptoms: denies headache, denies chest pain and denies shortness of breath.  Additional history: none.        Review of Systems   Constitutional symptoms:  Negative except as " documented in HPI.   Skin symptoms:  Negative except as documented in HPI.   Eye symptoms:  Negative except as documented in HPI.   ENMT symptoms:  Negative except as documented in HPI.   Respiratory symptoms:  denies SOB.   Cardiovascular symptoms:  Denies chest pain.   Gastrointestinal symptoms:  Negative except as documented in HPI.   Genitourinary symptoms:  Negative except as documented in HPI.   Musculoskeletal symptoms:  Negative except as documented in HPI.   Neurologic symptoms:  No headache,    Psychiatric symptoms:  Denies SI, HI, and hallucinations.   Endocrine symptoms:  Negative except as documented in HPI.   Hematologic/Lymphatic symptoms:  Negative except as documented in HPI.   Allergy/immunologic symptoms:  Negative except as documented in HPI.      Health Status   Allergies:    Allergic Reactions (Selected)  Severity Not Documented  Depakote- Seizure.  Depakote ER- Seizures.  Lithium- Seizures.  SEROquel- Seizures..   Medications:  (Selected)   Inpatient Medications  Ordered  cloniDINE 0.1 mg oral tablet: 0.1 mg, form: Tab, Oral, Once, first dose 04/20/13 14:27:00 CDT, stop date 04/20/13 14:27:00 CDT, STAT  magnesium citrate: 300 mL, 1 bottle(s) =, form: Soln, Oral, Once, first dose 05/09/16 18:00:00 CDT, stop date 05/09/16 18:00:00 CDT  Prescriptions  Prescribed  Augmentin 875 mg-125 mg oral tablet: = 1 tab(s), Oral, BID, # 8 tab(s), 0 Refill(s)  Norvasc 10 mg oral tablet: 10 mg = 1 tab(s), Oral, Daily, # 30 tab(s), 0 Refill(s)  Prozac 20 mg oral capsule: 20 mg = 1 cap(s), Oral, Daily, # 30 cap(s), 0 Refill(s)  Remeron 15 mg oral tablet: 15 mg = 1 tab(s), Oral, At Bedtime, # 30 tab(s), 0 Refill(s)  Tegretol 200 mg oral tablet: 200 mg = 1 tab(s), Oral, BID, # 60 tab(s), 0 Refill(s)  aspirin 81 mg oral Delayed Release (EC) tablet: 81 mg = 1 tab(s), Oral, Daily, # 30 tab(s), 0 Refill(s)  cloniDINE 0.1 mg oral tablet: 0.1 mg = 1 tab(s), Oral, BID, # 60 tab(s), 0 Refill(s), per nurse's notes.       Past Medical/ Family/ Social History   Medical history:    Resolved  Bipolar ([id number]):  Resolved.  Chronic hepatitis C ([id number]):  Resolved.  Convulsions/seizures ([id number]):  Resolved.  HTN (hypertension) ([id number]):  Resolved.  Obesity ([id number]):  Resolved..   Surgical history:    Intracoronary Stent Placement 1st Vessel on 8/14/2015 at 49 Years.  Comments:  8/17/2015 14:19 Mae Singh  auto-populated from documented surgical case  Intracoronary Stent Placement 1st Vessel (None) on 8/13/2015 at 49 Years.  Comments:  8/13/2015 16:44 Su Mata RN  auto-populated from documented surgical case  Left Heart Cath w/COR Angio Ventriculography (None) on 8/13/2015 at 49 Years.  Comments:  8/13/2015 16:44 Su Mata RN  auto-populated from documented surgical case.   Family history:    Metastatic cancer  Mother  Liver failure.....  Father  .   Social history: Alcohol use: Denies, Drug use: Denies.      Physical Examination               Vital Signs   Vital Signs   12/20/2019 13:47 CST     Temperature Oral          37 DegC                             Temperature Oral (calculated)             98.60 DegF                             Peripheral Pulse Rate     88 bpm                             Respiratory Rate          18 br/min                             SpO2                      99 %                             Oxygen Therapy            Room air                             Systolic Blood Pressure   107 mmHg                             Diastolic Blood Pressure  70 mmHg  .   Measurements   12/20/2019 13:47 CST     Weight Dosing             73 kg                             Weight Measured and Calculated in Lbs     160.94 lb                             Weight Estimated          73 kg                             Height/Length Dosing      172 cm                             Height/Length Estimated   172 cm                              Body Mass Index Estimated 24.68 kg/m2  .   Basic Oxygen Information   12/20/2019 13:47 CST     SpO2                      99 %                             Oxygen Therapy            Room air  .   General:  Alert, no acute distress.    Skin:  Warm, dry, no rash.    Head:  Normocephalic, atraumatic.    Neck:  Supple, trachea midline, no JVD.    Eye:  Pupils are equal, round and reactive to light, extraocular movements are intact, normal conjunctiva, vision unchanged.    Ears, nose, mouth and throat:  Tympanic membranes clear, oral mucosa moist, no pharyngeal erythema or exudate.    Cardiovascular:  Regular rate and rhythm, No murmur, No edema.    Respiratory:  Lungs are clear to auscultation, respirations are non-labored, breath sounds are equal, Symmetrical chest wall expansion.    Chest wall:  No tenderness.   Back:  Normal range of motion.   Musculoskeletal:  Normal ROM, normal strength, no tenderness.    Gastrointestinal:  Soft, Nontender, Non distended, Normal bowel sounds, No organomegaly.    Neurological:  No focal neurological deficit observed, CN II-XII intact, A/O x 3, Slow to answer questions, not reacting to internal stimuli.    Psychiatric:  Cooperative, appropriate mood & affect, normal judgment, Abnormal / Psychotic thoughts: not suicidal, not homicidal.                   Medical Decision Making   Differential Diagnosis:  Schizophrenia, bipolar disorder, alcohol intoxication, drug abuse, dementia, hallucination, psychosis, delirium, not suicide risk.    Rationale:  54-year-old male with a history of bipolar as well as prior drug abuse, cocaine specifically, here with altered mental status, walking down the road nearby.  Vital signs stable, mild tachycardia,  afebrile.  Exam he is alert and oriented x3 however at times has episodes of waxing and waning slurred speech to gibberish but no RIS.  No indication for PEC however patient is altered more so than anything and denies any  recent drug use or being off of his medications.  Labs with only moderate elevation from his baseline kidney function but CK at 750.  Urinalysis pending.  Otherwise labs unremarkable revealing for etiology with only mild elevation of EtOH, negative UDS.  IV fluids given.  Upon reexamination of patient he is still altered unable to clearly communicate with me as well admit for altered mental status to the hospitalist..   Documents reviewed:  Emergency department nurses' notes.   Orders  Launch Order Profile (Selected)   Inpatient Orders  InProcess (In Process)  Urine Drug Screen: Stat collect, Urine, 12/20/19 15:02:00 CST, Once, Stop date 12/20/19 15:02:00 CST, Nurse collect, Print Label By Order Location  Ordered  Document [place]-Suicide Severity Rating Scale (C-SSRS): 12/20/19 13:50:00 CST, Once  Completed  Ammonia Level: STAT collect, 12/20/19 15:09:19 CST, BLOOD, Collected, Stop date 12/20/19 15:09:00 CST, Lab Collect  Automated Diff: STAT collect, 12/20/19 15:09:00 CST, Blood, Collected, Stop date 12/20/19 15:09:00 CST, Lab Collect, Print Label By Order Location, 12/20/19 15:02:00 CST  CBC - includes Diff: STAT collect, 12/20/19 15:09:19 CST, BLOOD, Collected, Stop date 12/20/19 15:09:00 CST, Lab Collect  CK: STAT collect, 12/20/19 15:09:19 CST, BLOOD, Collected, Stop date 12/20/19 15:09:00 CST, Lab Collect  CMP: STAT collect, 12/20/19 15:09:19 CST, BLOOD, Collected, Stop date 12/20/19 15:02:00 CST, Lab Collect  EKG Adult 12 Lead: 12/20/19 15:02:00 CST, Stat, Once, 12/20/19 15:02:00 CST, Ambulatory, Standard Precautions, -1, -1, 12/20/19 15:02:00 CST  Estimated Glomerular Filtration Rate: STAT collect, 12/20/19 15:09:00 CST, Blood, Collected, Stop date 12/20/19 15:09:00 CST, Lab Collect, Print Label By Order Location, 12/20/19 15:02:00 CST  Ethanol Level: STAT collect, 12/20/19 15:09:19 CST, BLOOD, Collected, Stop date 12/20/19 15:09:00 CST, Lab Collect  T4 Free: STAT collect, 12/20/19 15:09:19 CST, BLOOD,  Collected, Stop date 12/20/19 15:09:00 CST, Lab Collect  TSH: STAT collect, 12/20/19 15:09:19 CST, BLOOD, Collected, Stop date 12/20/19 15:09:00 CST, Lab Collect  Troponin-I: STAT collect, 12/20/19 15:09:19 CST, BLOOD, Collected, Stop date 12/20/19 15:09:00 CST, Lab Collect.   Results review:  Lab results : Lab View   12/20/2019 16:37 CST     U pH                      6.0                             U Spec Grav               1.020    12/20/2019 15:09 CST     Sodium Lvl                141 mmol/L                             Potassium Lvl             4.1 mmol/L                             Chloride                  105 mmol/L                             CO2                       25.0 mmol/L                             Calcium Lvl               9.0 mg/dL                             Glucose Lvl               184 mg/dL  HI                             BUN                       16.0 mg/dL                             Creatinine                1.14 mg/dL                             eGFR-AA                   >60 mL/min/1.73 m2  NA                             eGFR-JAYDON                  >60 mL/min/1.73 m2  NA                             Bili Total                0.7 mg/dL                             Bili Direct               0.20 mg/dL                             Bili Indirect             0.50 mg/dL                             AST                       60 unit/L  HI                             ALT                       63 unit/L                             Alk Phos                  62 unit/L                             Total Protein             7.4 gm/dL                             Albumin Lvl               3.90 gm/dL                             Globulin                  3.50 gm/dL                             A/G Ratio                 1.1 ratio                             Ammonia Lvl               24.0 mcmol/L                             Total CK                  750 unit/L  HI                             Troponin-I                 0.03 ng/mL                             T4 Free                   1.09 ng/dL                             TSH                       1.180 mIU/L                             WBC                       13.9 x10(3)/mcL  HI                             RBC                       5.57 x10(6)/mcL                             Hgb                       15.8 gm/dL                             Hct                       48.4 %                             Platelet                  318 x10(3)/mcL                             MCV                       86.9 fL                             MCH                       28.4 pg                             MCHC                      32.6 gm/dL  LOW                             RDW                       13.6 %                             MPV                       9.1 fL  LOW                             Abs Neut                  10.27 x10(3)/mcL  HI                             Neutro Auto               74 %  NA                             Lymph Auto                15 %  NA                             Mono Auto                 9 %  NA                             Eos Auto                  1 %  NA                             Abs Eos                   0.1 x10(3)/mcL                             Basophil Auto             1 %  NA                             Abs Neutro                10.27 x10(3)/mcL  HI                             Abs Lymph                 2.1 x10(3)/mcL                             Abs Mono                  1.3 x10(3)/mcL                             Abs Baso                  0.1 x10(3)/mcL                             Ethanol Lvl               10.0 mg/dL  HI  .      Impression and Plan   Diagnosis   Rhabdomyolysis ([id number] M62.82)   Altered mental status, unspecified ([id number] R41.82)      Calls-Consults   -  12/20/2019 17:47:00 , Jeffery SOMERS, Lee SALGADO, recommends CT Head.    Plan   Condition: Unchanged.    Disposition: Admit time  12/20/2019 17:43:00, Admit to Inpatient Unit.    Counseled:  Patient, Regarding diagnosis, Regarding diagnostic results, Regarding treatment plan, Patient indicated understanding of instructions.    Notes: ICoty, acted solely as a scribe for and in the presence of Dr. Li who performed the service. IRos, acted solely as a scribe for and in the presence of Dr. Li who performed the service. , IJose M.D., personally performed the history, physical exam and medical decision making; and confirmed the accuracy of the information in the transcribed note.

## 2022-04-30 NOTE — ED PROVIDER NOTES
Patient:   Kendall Duff             MRN: [id number]            FIN: [id number]               Age:   51 years     Sex:  Male     :  1965   Associated Diagnoses:   Psychosis   Author:   Tianna SOMERS, Aleks DAVE      Basic Information   Time seen: Date & time 2017 20:13:00.   History source: EMS.   Arrival mode: Ambulance.   History limitation: None.   Additional information: Chief Complaint from Nursing Triage Note : Chief Complaint   2017 19:56 CDT      Chief Complaint           Pt brought in because he was yelling in the streets. [employer] on scene on [facility] arrival. Pt admits to smoking Spice.  .      History of Present Illness   The patient presents with 51 year old male presents to the ED via EMS after patient was found yelling in the street. Patient admits to smoking Spice.  Patient says he is having racing thoughts an feels anxious..  The onset was just prior to arrival.  The course/duration of symptoms is constant.  Character of symptoms anxious agitated.  The degree of symptoms is moderate.  Self injury: none.  The exacerbating factor is none.  The relieving factor is none.  Risk factors consist of drug abuse.  Prior episodes: chronic.  Therapy today: emergency medical services.  Associated symptoms: none.  Additional history: eligible for legal hold.        Review of Systems   Constitutional symptoms:  Negative except as documented in HPI.   Skin symptoms:  Negative except as documented in HPI.   Eye symptoms:  Negative except as documented in HPI.   ENMT symptoms:  Negative except as documented in HPI.   Respiratory symptoms:  Negative except as documented in HPI.   Cardiovascular symptoms:  Negative except as documented in HPI.   Gastrointestinal symptoms:  Negative except as documented in HPI.   Genitourinary symptoms:  Negative except as documented in HPI.   Musculoskeletal symptoms:  Negative except as documented in HPI.   Neurologic symptoms:  Negative except as documented in HPI.    Psychiatric symptoms:  Anxiety, irritability, substance abuse.    Endocrine symptoms:  Negative except as documented in HPI.   Hematologic/Lymphatic symptoms:  Negative except as documented in HPI.   Allergy/immunologic symptoms:  Negative except as documented in HPI.             Additional review of systems information: All other systems reviewed and otherwise negative.      Health Status   Allergies:    Allergic Reactions (Selected)  Severity Not Documented  Depakote- No reactions were documented.  Lithium- No reactions were documented.  SEROquel- No reactions were documented..   Medications:  (Selected)   Inpatient Medications  Ordered  cloniDINE 0.1 mg oral tablet: 0.1 mg, form: Tab, Oral, Once, first dose 04/20/13 14:27:00 CDT, stop date 04/20/13 14:27:00 CDT, STAT  magnesium citrate: 300 mL, 1 bottle(s) =, form: Soln, Oral, Once, first dose 05/09/16 18:00:00 CDT, stop date 05/09/16 18:00:00 CDT  Prescriptions  Prescribed  lisinopril 20 mg oral tablet: 20 mg = 1 tab(s), Oral, Daily, # 30 tab(s), 0 Refill(s)  risperidone 3 mg oral tablet: 3 mg = 1 tab(s), Oral, BID, # 28 tab(s), 0 Refill(s)  Documented Medications  Documented  OMEPRAZOLE 40MG CAP: 40 mg = 1 cap(s), Oral, Daily  SERTRALINE 50MG TAB: 50 mg = 1 tab(s), Oral, Daily  Trileptal 600 mg oral tablet: 600 mg = 1 tab(s), Oral, BID, # 60 tab(s), 0 Refill(s)  Vistaril 50 mg oral capsule: 50 mg = 1 cap(s), Oral, QID, 0 Refill(s)  Zestril 10 mg oral tablet: 10 mg = 1 tab(s), Oral, Daily, # 30 tab(s), 0 Refill(s)  amLODIPine 10 mg oral tablet: 10 mg = 1 tab(s), Oral, At Bedtime, # 30 tab(s), 0 Refill(s)  traZODone 100 mg oral tablet: Oral, At Bedtime, 0 Refill(s).   Immunizations: Per nurse's notes.      Past Medical/ Family/ Social History   Medical history:    Resolved  HTN (hypertension) ([id number]):  Resolved.  Convulsions/seizures ([id number]):  Resolved.  Bipolar ([id number]):  Resolved.  Chronic  hepatitis C ([id number]):  Resolved., Reviewed as documented in chart.   Surgical history:    Intracoronary Stent Placement 1st Vessel on 8/14/2015 at 49 Years.  Comments:  8/17/2015 14:Mae Miller  auto-populated from documented surgical case  Intracoronary Stent Placement 1st Vessel (None) on 8/13/2015 at 49 Years.  Comments:  8/13/2015 16:44 - Su Steinberg RN  auto-populated from documented surgical case  Left Heart Cath w/COR Angio Ventriculography (None) on 8/13/2015 at 49 Years.  Comments:  8/13/2015 16:Gigi - Su Steinbreg RN  auto-populated from documented surgical case, Reviewed as documented in chart.   Family history: Not significant.   Social history: Drug use: Spice.      Physical Examination               Vital Signs   Vital Signs   6/24/2017 19:56 CDT      Temperature Oral          37.2 DegC                             Peripheral Pulse Rate     86 bpm                             Respiratory Rate          18 br/min                             SpO2                      99 %                             Systolic Blood Pressure   101 mmHg                             Diastolic Blood Pressure  70 mmHg  .      Vital Signs (last 24 hrs)_____  Last Charted___________  Temp Oral     37.2 DegC  (JUN 24 19:56)  Heart Rate Peripheral   86 bpm  (JUN 24 19:56)  Resp Rate         18 br/min  (JUN 24 19:56)  SBP      101 mmHg  (JUN 24 19:56)  DBP      70 mmHg  (JUN 24 19:56)  SpO2      99 %  (JUN 24 19:56)  .               Per nurse's notes.   Measurements   6/24/2017 19:56 CDT      Weight Dosing             79 kg                             Weight Measured and Calculated in Lbs     174.16 lb                             Weight Estimated          79 kg  .   Basic Oxygen Information   6/24/2017 19:56 CDT      SpO2                      99 %  .   General:  Alert, anxious.    Skin:  Warm, dry, pink, intact.    Head:  Normocephalic, atraumatic.    Neck:  Supple, trachea midline, no  tenderness, no JVD.    Eye:  Pupils are equal, round and reactive to light, extraocular movements are intact, normal conjunctiva (moist and pink), midrange and equal .    Ears, nose, mouth and throat:  Oral mucosa moist (moist and pink), no pharyngeal erythema or exudate.    Cardiovascular:  Regular rate and rhythm, No murmur, Normal peripheral perfusion, No edema.    Respiratory:  Lungs are clear to auscultation, respirations are non-labored, breath sounds are equal, Symmetrical chest wall expansion.    Chest wall:  No tenderness.   Back:  Nontender, Normal range of motion.    Musculoskeletal:  Normal ROM, normal strength, no tenderness, no swelling, no deformity.    Gastrointestinal:  Soft, Nontender, Non distended, Normal bowel sounds, No Pulsatile Mass, Guarding: Negative, Rebound: Negative.    Neurological:  Alert and oriented to person, place, time, and situation, No focal neurological deficit observed, CN II-XII intact, normal sensory observed, normal motor observed, normal speech observed, normal coordination observed.    Lymphatics:  No lymphadenopathy.   Psychiatric:  Cooperative, Mood and affect: Anxious, paranoid, Abnormal / Psychotic thoughts: Tangential, flight of ideas.       Medical Decision Making   Documents reviewed:  Emergency department nurses' notes.   Results review:  Lab results : Lab View   6/24/2017 21:51 CDT      U pH                      5.0                             U Spec Grav               1.022                             UA Appear                 CLEAR                             UA Color                  MISHA                             UA Spec Grav              1.022                             UA Bili                   1+                             UA pH                     5.0                             UA Urobilinogen           1.0                             UA Blood                  Negative                             UA Glucose                Negative                              UA Ketones                Trace                             UA Protein                Negative                             UA Nitrite                Negative                             UA Leuk Est               Negative                             UA WBC                    NONE SEEN /HPF                             UA RBC                    NONE SEEN                             UA Bacteria               NONE SEEN /HPF                             UA Squam Epithelial       NONE SEEN                             UA Hyal Cast              Many                             U Amph Scr                NEG                             U Katia Scr                NEG                             U Benzodia Scr            NEG                             U Cannab Scr              NEG                             U Cocaine Scr             NEG                             U Opiate Scr              NEG                             U Phencyclidine Scr       NEG    6/24/2017 20:57 CDT      Sodium Lvl                137 mmol/L                             Potassium Lvl             3.2 mmol/L  LOW                             Chloride                  100 mmol/L                             CO2                       21.0 mmol/L                             Calcium Lvl               9.0 mg/dL                             Glucose Lvl               131 mg/dL  HI                             BUN                       37.0 mg/dL  HI                             Creatinine                1.63 mg/dL  HI                             eGFR-AA                   57 mL/min/1.73 m2  NA                             eGFR-JAYDON                  47 mL/min/1.73 m2  NA                             Bili Total                0.5 mg/dL                             Bili Direct               0.20 mg/dL                             Bili Indirect             0.30 mg/dL                             AST                       46 unit/L  HI                             ALT                        46 unit/L                             Alk Phos                  85 unit/L                             Total Protein             8.1 gm/dL                             Albumin Lvl               4.10 gm/dL                             Globulin                  4.00 gm/dL  HI                             A/G Ratio                 1.0 ratio  LOW                             Total CK                  1,212 unit/L  HI                             TSH                       1.300 mIU/mL                             WBC                       12.4 x10(3)/mcL  HI                             RBC                       5.25 x10(6)/mcL                             Hgb                       14.3 gm/dL                             Hct                       41.4 %  LOW                             Platelet                  257 x10(3)/mcL                             MCV                       78.9 fL  LOW                             MCH                       27.2 pg                             MCHC                      34.5 gm/dL                             RDW                       14.1 %                             MPV                       9.2 fL  LOW                             Abs Neut                  7.22 x10(3)/mcL                             Neutro Auto               58 %  NA                             Lymph Auto                29 %  NA                             Mono Auto                 10 %  NA                             Eos Auto                  3 %  NA                             Abs Eos                   0.3 x10(3)/mcL                             Basophil Auto             0 %  NA                             Abs Neutro                7.22 x10(3)/mcL                             Abs Lymph                 3.6 x10(3)/mcL                             Abs Mono                  1.2 x10(3)/mcL                             Abs Baso                  0.1 x10(3)/mcL                             Acetaminoph Lvl           <2.0  mcg/mL  LOW                             Salicylate Lvl            2.7 mg/dL  LOW                             Ethanol Lvl               <3.0 mg/dL  ,    No qualifying data available.       Reexamination/ Reevaluation   Vital signs   Basic Oxygen Information   6/24/2017 19:56 CDT      SpO2                      99 %        Impression and Plan   Diagnosis   Psychosis ([id number])   Plan   Condition: Stable.    Disposition: Physicians emergency certificate has been filled out and the patient is medically cleared pending an improved cpk, awaiting transfer to psychiatric facility, pt given IVF - 2L, if cpk is still high, will give IVF through the night and recheck in am.    Counseled: Patient, Regarding diagnosis, Regarding diagnostic results, Regarding treatment plan, Patient indicated understanding of instructions.    Notes: I, Harvinder Mayfield, acted solely as a scribe for and in the presence of Dr. Wynn who performed the service..       Addendum      Teaching-Supervisory Addendum-Brief   Notes: This note accurately reflects work and decisions made by oleg Wynn   .

## 2022-04-30 NOTE — ED PROVIDER NOTES
"   Patient:   Kendall Duff             MRN: [id number]            FIN: [id number]               Age:   54 years     Sex:  Male     :  1965   Associated Diagnoses:   Chest pain   Author:   Brandon Felder PA-C      Basic Information   Time seen: Date & time 2019 15:52:00.   History source: Patient.   Arrival mode: Private vehicle.   History limitation: None.   Additional information: Chief Complaint from Nursing Triage Note : Chief Complaint   2019 15:44 CST      Chief Complaint           Left side chest pain that has been hurting for "a while".  Could not be more specific  .      History of Present Illness   The patient presents with 54-year-old -American male presents to the ER complaining of chest pain "for a while". Denies any other symptoms at this time.~Sort note by JAS Lopes NP and     I, Brandon Felder PA-C have assumed care of the patient.  54-year-old male with history of prior MI, hypertension, and smoking presents to ED for left-sided chest pain.  Patient states he has left-sided chest pain that radiates to the left side of his neck.  Patient is unsure of when his prior MI was.  Patient denies shortness of breath, abdominal pain, nausea, vomiting, syncope, dizziness, headache.  The course/duration of symptoms is worsening.  Radiating pain: left side of the neck. The character of symptoms is pressure.  The degree at onset was moderate.  The degree at maximum was moderate.  The degree at present is moderate.  Risk factors consist of hypertension and smoking.  Prior episodes: Prior MI.  Therapy today None.  Associated symptoms: denies shortness of breath, denies nausea, denies vomiting, denies diaphoresis, denies anxiety and denies palpitations.        Review of Systems   Constitutional symptoms:  No fever, no chills.    Skin symptoms:  No rash,    Eye symptoms:  Negative except as documented in HPI.   ENMT symptoms:  Negative except as documented in HPI.   Respiratory symptoms:  No " shortness of breath, no cough.    Cardiovascular symptoms:  Chest pain, left chest, anterior, pressure, No palpitations,    Gastrointestinal symptoms:  No abdominal pain, no nausea, no vomiting, no diarrhea.    Genitourinary symptoms:  No dysuria, no hematuria.    Musculoskeletal symptoms:  No back pain, no Muscle pain.    Neurologic symptoms:  No headache, no dizziness, no altered level of consciousness.    Psychiatric symptoms:  Negative except as documented in HPI.   Endocrine symptoms:  Negative except as documented in HPI.   Hematologic/Lymphatic symptoms:  Negative except as documented in HPI.   Allergy/immunologic symptoms:  Negative except as documented in HPI.             Additional review of systems information: All other systems reviewed and otherwise negative.      Health Status   Allergies:    Allergic Reactions (Selected)  Severity Not Documented  Depakote- Seizure.  Depakote ER- Seizures.  Lithium- Seizures.  SEROquel- Seizures.,    Allergies (4) Active Reaction  Depakote seizure  Depakote ER seizures  lithium seizures  SEROquel seizures  .   Medications:  (Selected)   Inpatient Medications  Ordered  cloniDINE 0.1 mg oral tablet: 0.1 mg, form: Tab, Oral, Once, first dose 04/20/13 14:27:00 CDT, stop date 04/20/13 14:27:00 CDT, STAT  magnesium citrate: 300 mL, 1 bottle(s) =, form: Soln, Oral, Once, first dose 05/09/16 18:00:00 CDT, stop date 05/09/16 18:00:00 CDT  Prescriptions  Prescribed  [facility] Zyprexa 10 mg oral tablet: 10 mg = 1 tab(s), Oral, QHS Resp, # 30 tab(s), 0 Refill(s), called to pharmacy (Rx)  Norvasc 5 mg oral tablet: 5 mg = 1 tab(s), Oral, Daily-Resp, # 30 tab(s), 0 Refill(s)  Tegretol 200 mg oral tablet: 200 mg = 1 tab(s), Oral, BID-Resp, # 60 tab(s), 0 Refill(s)  cloniDINE 0.1 mg oral tablet: 0.1 mg = 1 tab(s), Oral, BID-Resp, # 60 tab(s), 0 Refill(s).      Past Medical/ Family/ Social History   Medical history:    Resolved  HTN (hypertension) ([id number]):   Resolved.  Convulsions/seizures ([id number]):  Resolved.  Bipolar ([id number]):  Resolved.  Chronic hepatitis C ([id number]):  Resolved.  Obesity ([id number]):  Resolved..   Surgical history:    Intracoronary Stent Placement 1st Vessel on 8/14/2015 at 49 Years.  Comments:  8/17/2015 14:19 Mae Singh  auto-populated from documented surgical case  Intracoronary Stent Placement 1st Vessel (None) on 8/13/2015 at 49 Years.  Comments:  8/13/2015 16:44 Su Mata RN  auto-populated from documented surgical case  Left Heart Cath w/COR Angio Ventriculography (None) on 8/13/2015 at 49 Years.  Comments:  8/13/2015 16:44 Su Mata RN  auto-populated from documented surgical case.   Family history:    Metastatic cancer  Mother  Liver failure.....  Father  .   Social history: Reviewed as documented in chart.      Physical Examination               Vital Signs      Vital Signs (last 24 hrs)_____  Last Charted___________  SBP      H 171mmHg  (NOV 05 18:37)  DBP      H 117mmHg  (NOV 05 18:37)  .   General:  Alert, no acute distress.    Skin:  Warm, dry.    Head:  Normocephalic, atraumatic.    Neck:  Supple.   Eye:  Pupils are equal, round and reactive to light, extraocular movements are intact.    Cardiovascular:  Regular rate and rhythm, No murmur, Normal peripheral perfusion, No edema.    Respiratory:  Lungs are clear to auscultation, respirations are non-labored, breath sounds are equal, Symmetrical chest wall expansion.    Chest wall:  No tenderness, No deformity.    Back:  Nontender, Normal range of motion.    Musculoskeletal:  Normal ROM, normal strength.    Gastrointestinal:  Soft, Nontender, Non distended, Normal bowel sounds.    Neurological:  Alert and oriented to person, place, time, and situation, No focal neurological deficit observed, CN II-XII intact, normal speech observed.    Psychiatric:  Cooperative.      Medical Decision Making    Differential Diagnosis:  Angina.   Documents reviewed:  Emergency department nurses' notes.   Orders  Launch Orders   Pharmacy:  nitroglycerin 0.4 mg sublingual TAB (Order): 0.4 mg, form: Tab-SL, SL, q5min PRN for chest pain, first dose 11/5/2019 18:28 CST, STAT  aspirin 81 mg oral tablet, CHEWABLE (Order): 324 mg, form: Tab-Chew, Oral, Once, first dose 11/5/2019 18:28 CST, stop date 11/5/2019 18:28 CST, STAT, 4 chew tab = 5 grain ASA  , Launch Orders   Admit/Transfer/Discharge:  Admit as Inpatient (Order): 11/5/2019 18:43 CST, Telemetry with Monitor Gonzales Silva DO, Chest pain, No  , Launch Orders   Nutrition Services:  Cardiac Meal Plan (Order): 11/5/2019 18:46 CST, Start Meal: Next Meal  Laboratory:  CK MB (Order): Timed collect, 11/6/2019 0:46 CST, Blood, q6hr for 2 dose(s), Stop date 11/6/2019 12:59 CST, Lab Collect, Print Label By Order Location, 11/6/2019 1:00 CST  CPK (Order): Timed collect, 11/6/2019 0:46 CST, Blood, q6hr for 2 dose(s), Stop date 11/6/2019 12:59 CST, Lab Collect, Print Label By Order Location, 11/6/2019 1:00 CST  Troponin-I (Order): Timed collect, 11/6/2019 0:46 CST, Blood, q6hr for 2 dose(s), Stop date 11/6/2019 12:59 CST, Lab Collect, Print Label By Order Location, 11/6/2019 1:00 CST  Patient Care:  STAT 12 lead EKG for any chest pain or arrhythmia ans call MD with results (Order): 11/5/2019 18:46 CST, STAT 12 lead EKG for any chest pain or arrhythmia ans call MD with results  Check Magnesium level and Potassium level PRN for arrhythmias and call MD with results (Order): 11/5/2019 18:46 CST, Check Magnesium level and Potassium level PRN for arrhythmias and call MD with results  Notify MD for blood glucose less than 70 or greater than 400 (Order): 11/5/2019 18:46 CST, Notify MD for blood glucose less than 70 or greater than 400  For CHF patients: notify MD if urinary output is less than 750 ml in 8 hours since initial dose of diuretic. (Order): 11/5/2019 18:46 CST, For CHF patients:  notify MD if urinary output is less than 750 ml in 8 hours since initial dose of diuretic.  Peripheral IV Insertion (Order): 11/5/2019 18:46 CST  Weight (Order): 11/6/2019 5:00 CST, Daily at 0500  Intake and Output (Order): 11/5/2019 18:46 CST, qShift  Up ad Tricia (Order): 11/5/2019 18:46 CST, Constant Order  Vital Signs (Order): 11/5/2019 18:46 CST, q4hr  Pharmacy:  hydrALAZINE (Order): 10 mg, form: Injection, IV Push, q2hr PRN for hypertension, first dose 11/5/2019 18:46 CST, STAT, SBP > 160mmHg or DBP > 100 mmHg, if HR < 60 or when BP does not respond to Labetolol  labetalol (Order): 10 mg, form: Injection, IV Push, q1hr PRN for hypertension, first dose 11/5/2019 18:46 CST, STAT, SBP > 160mmHg or DBP > 110 mmHg, may repeat hourly as necessary if HR > 60  Tylenol (Order): 650 mg, form: Supp, MO, q6hr PRN for pain, first dose 11/5/2019 18:46 CST, STAT, mild or fever if patient unable to swallow; No more than 4 grams in 24 hours  Tylenol (Order): 1,000 mg, form: Tab, Oral, q6hr PRN for pain, mild, first dose 11/5/2019 18:46 CST, STAT, or fever; No more than 4 grams in 24 hours  Norco 5 mg-325 mg oral tablet (Order): 2 tab(s), form: Tab, Oral, q4hr PRN for pain, severe, first dose 11/5/2019 18:46 CST  Norco 5 mg-325 mg oral tablet (Order): 1 tab(s), form: Tab, Oral, q4hr PRN for pain, moderate, first dose 11/5/2019 18:46 CST  nitroglycerin (Order): 0.4 mg, form: Tab, SL, q5min PRN for chest pain, Order duration: 3 dose(s) - Limited # of times, first dose 11/5/2019 18:46 CST, STAT, if systolic BP > 100 until pain relieved of at level 1  aspirin (Order): 325 mg, form: Tab, Oral, Daily, first dose 11/5/2019 18:46 CST, STAT  Cardiology:  Telemetry Monitoring (Order): 11/5/2019 18:46 CST, Ambulatory, Standard Precautions, CM Telemetry Monitoring  Consults:  Consult to Pulmonary Rehabilitation (Order): 11/5/2019 18:46 CST, for smoking cessation  Consult to Cardiac Rehabilitation (Order): 11/5/2019 18:46 CST,  Education  Consult to Nutritionist Adult (Order): 11/5/2019 18:46 CST, for education on cardiac  Respiratory Therapy:  Oxygen Therapy (Order): 11/5/2019 18:46 CST, Routine, 2, Nasal Cannula, PRN, to maintain O2 Sat > 92%, CM Oxygen  .    Electrocardiogram:  Time 11/5/2019 15:43:00, rate 88, normal sinus rhythm, No ST-T changes, no ectopy.    Results review:  Lab results : Lab View   11/5/2019 16:59 CST      POC Troponin              0.01 ng/mL    11/5/2019 15:50 CST      Sodium Lvl                140 mmol/L                             Potassium Lvl             4.7 mmol/L                             Chloride                  105 mmol/L                             CO2                       30.0 mmol/L                             Calcium Lvl               8.7 mg/dL                             Glucose Lvl               81 mg/dL                             BUN                       16.0 mg/dL                             Creatinine                0.65 mg/dL  LOW                             eGFR-AA                   >60 mL/min/1.73 m2  NA                             eGFR-JAYDON                  >60 mL/min/1.73 m2  NA                             Bili Total                0.3 mg/dL                             Bili Direct               0.10 mg/dL                             Bili Indirect             0.20 mg/dL                             AST                       39 unit/L  HI                             ALT                       35 unit/L                             Alk Phos                  88 unit/L                             Total Protein             7.3 gm/dL                             Albumin Lvl               3.50 gm/dL                             Globulin                  3.80 gm/dL  HI                             A/G Ratio                 0.9 ratio  LOW                             Troponin-I                <0.02 ng/mL  LOW                             WBC                       6.0 x10(3)/mcL                              RBC                       5.41 x10(6)/mcL                             Hgb                       15.2 gm/dL                             Hct                       47.4 %                             Platelet                  283 x10(3)/mcL                             MCV                       87.6 fL                             MCH                       28.1 pg                             MCHC                      32.1 gm/dL  LOW                             RDW                       15.0 %                             MPV                       9.5 fL                             Abs Neut                  2.76 x10(3)/mcL                             Neutro Auto               46 %  NA                             Lymph Auto                41 %  NA                             Mono Auto                 10 %  NA                             Eos Auto                  2 %  NA                             Abs Eos                   0.1 x10(3)/mcL                             Basophil Auto             1 %  NA                             Abs Neutro                2.76 x10(3)/mcL                             Abs Lymph                 2.4 x10(3)/mcL                             Abs Mono                  0.6 x10(3)/mcL                             Abs Baso                  0.0 x10(3)/mcL    .   Radiology results:  Rad Results (ST)  < 12 hrs   Accession: [id number]  Order: XR Chest 2 Views  Report Dt/Tm: 11/05/2019 16:54  Report:      CLINICAL: Chest pain.     COMPARISON: October 11, 2019 and March 19, 2018.                       FINDINGS: Cardiopericardial silhouette is within normal limits. There  is chronic mildly displaced fracture of the right posterolateral 10th  rib. No acute dense focal or segmental consolidation, congestive  process, pleural effusions or pneumothorax.       IMPRESSION:     No acute findings identified.        .      Impression and Plan   Diagnosis   Chest pain ([id number]-AC R07.9)      Calls-Consults   -  Spoke with  MARINA Purdy with [facility]-pt to be admitted for cardiac lab trends and observation  Spoke with Dr. Ochoa-he is aware of the patient's admission and has made face to face contact with the patient. .   Plan   Disposition: Admit time  11/5/2019 18:41:00, Admit to Inpatient Unit, Gonzales Silva DO

## 2022-04-30 NOTE — ED PROVIDER NOTES
"   Patient:   Kendall Duff             MRN: [id number]            FIN: [id number]               Age:   54 years     Sex:  Male     :  1965   Associated Diagnoses:   Paranoid psychosis; Passive suicidal ideations   Author:   Arti Hankins MD      Basic Information   Time seen: Date & time 2019 07:44:00.   History source: Patient.   Arrival mode: Private vehicle.   History limitation: None.   Additional information: Chief Complaint from Nursing Triage Note : Chief Complaint   2019 7:43 CST      Chief Complaint           pt presents c/o anxiety.  denies si/hi.  denies pain/trauma.  states " i want to drop dead, my body, just something is not right"  .      History of Present Illness   The patient presents with anxiety, This is a 54-year-old male who presents with multiple somatic complaints which she relates to his anxiety that he states his prescribed medicine is not helping.  Patient states he feels like he wants to "drop dead" due to family issues at home. and     53 y/o BM with a history of bipolar disorder presents to the ED for complaints of anxiety. States his medication is not helping his anxious thoughts and he wants to be admitted to the hospital.  Field with his family is out to get him.  States something is wrong with them, he does not know what it is but states that he is not safe if he stays living with them.  Per triage note pt states "I want to drop dead, my body, just something is not right. Denies suicidal or homicidal ideations at time of my examination..  The onset was chronic.  The course/duration of symptoms is fluctuating in intensity.  Character of symptoms anxious paranoid.  The degree of symptoms is moderate.  Self injury: none.  The exacerbating factor is family problems.  The relieving factor is none.  Risk factors consist of not suicide risk and not homicide risk.  Prior episodes: chronic.  Therapy today: none.  Associated symptoms: none.  Additional history: " eligible for legal hold.        Review of Systems   Constitutional symptoms:  No fever, no chills.    Skin symptoms:  No jaundice, no rash.    Eye symptoms:  Vision unchangedNo blurred vision,    Respiratory symptoms:  No shortness of breath, no cough, no sputum production.    Cardiovascular symptoms:  No chest pain, no palpitations, no diaphoresis, no peripheral edema.    Gastrointestinal symptoms:  No abdominal pain, no nausea, no vomiting, no diarrhea, no constipation.    Neurologic symptoms:  No headache, no dizziness.    , not suicidal, not homicidal. Hematologic/Lymphatic symptoms:  Bleeding tendency negative,    Allergy/immunologic symptoms:  No impaired immunity,              Additional review of systems information: All other systems reviewed and otherwise negative.      Health Status   Allergies:    Allergic Reactions (Selected)  Severity Not Documented  Depakote- Seizure.  Depakote ER- Seizures.  Lithium- Seizures.  SEROquel- Seizures..   Medications: Per nurse's notes.      Past Medical/ Family/ Social History   Medical history:    Resolved  Bipolar ([id number]):  Resolved.  Chronic hepatitis C ([id number]):  Resolved.  Convulsions/seizures ([id number]):  Resolved.  HTN (hypertension) ([id number]):  Resolved.  Obesity ([id number]):  Resolved..   Surgical history:    Intracoronary Stent Placement 1st Vessel on 8/14/2015 at 49 Years.  Comments:  8/17/2015 14:19 Mae Singh  auto-populated from documented surgical case  Intracoronary Stent Placement 1st Vessel (None) on 8/13/2015 at 49 Years.  Comments:  8/13/2015 16:44 Su Mata RN  auto-populated from documented surgical case  Left Heart Cath w/COR Angio Ventriculography (None) on 8/13/2015 at 49 Years.  Comments:  8/13/2015 16:44 Su Mata RN  auto-populated from documented surgical case.   Family history:    Metastatic cancer  Mother  Liver  failure.....  Father  .   Social history: Alcohol use: Past, Tobacco use: Regularly, Drug use: Past.      Physical Examination               Vital Signs   Vital Signs   11/30/2019 7:43 CST      Temperature Oral          36.7 DegC                             Temperature Oral (calculated)             98.06 DegF                             Peripheral Pulse Rate     78 bpm                             Respiratory Rate          20 br/min                             SpO2                      99 %                             Oxygen Therapy            Room air                             Systolic Blood Pressure   174 mmHg  HI                             Diastolic Blood Pressure  101 mmHg  HI  .   Measurements   11/30/2019 7:43 CST      Weight Dosing             73 kg                             Weight Measured and Calculated in Lbs     160.94 lb                             Weight Estimated          73 kg  .   Basic Oxygen Information   11/30/2019 7:55 CST      Oxygen Therapy            Room air  General:  Alert, no acute distress   Neurological:  Alert and oriented to person, place, time, and situation   Skin:  Warm, dry   Head:  Normocephalic, atraumatic   Neck:  Supple, trachea midline   Eye:  Normal conjunctiva   Ears, nose, mouth and throat:  Oral mucosa moist   Cardiovascular:  Regular rate and rhythm, No murmur, Normal peripheral perfusion, No edema   Respiratory:  Lungs are clear to auscultation, respirations are non-labored   Gastrointestinal:  Soft, Nontender, Non distended, Normal bowel sounds   Psychiatric:  Cooperative, Rambling, Mood and affect: Paranoid, Abnormal / Psychotic thoughts: not suicidal, not homicidal.       Medical Decision Making   Rationale:  Patient well known to this emergency Department with frequent presentations for worsening paranoid psychosis.  Currently, having thoughts that his family is out to harm him, made vague statements to the triage personnel that he wanted to die rather than  "coping with these feelings but denies suicidal ideations at time of my evaluation.  States that he is not safe to go home at this time.  Physicians emergency certificate filed for grave disability/acute psychosis. Physical examination and laboratory studies with no acute abnormalities to suggest acute medical disease that would preclude transfer to a psychiatric facility for further evaluation and treatment.  .   Documents reviewed:  Emergency department nurses' notes.   Orders  Launch Order Profile (Selected)   Inpatient Orders  Ordered  30 Day Readmission: 11/30/19 7:48:10 CST, Stop date 11/30/19 7:48:10 CST, "This patient has had an inpatient, observation, outpatient bedded or emergency visit within the last 30 days."  Document [place]-Suicide Severity Rating Scale (C-SSRS): 11/30/19 7:48:10 CST, Once  Completed  Acetaminophen Level: STAT collect, 11/30/19 8:11:09 CST, BLOOD, Collected, Stop date 11/30/19 8:11:00 CST, Lab Collect  Automated Diff: STAT collect, 11/30/19 8:11:00 CST, Blood, Collected, Stop date 11/30/19 8:11:00 CST, Lab Collect, Print Label By Order Location, 11/30/19 7:47:00 CST  CBC w/ Auto Diff: STAT collect, 11/30/19 8:11:09 CST, BLOOD, Collected, Stop date 11/30/19 8:11:00 CST, Lab Collect  CMP: STAT collect, 11/30/19 8:11:09 CST, BLOOD, Collected, Stop date 11/30/19 8:11:00 CST, Lab Collect  Estimated Glomerular Filtration Rate: STAT collect, 11/30/19 8:11:00 CST, Blood, Collected, Stop date 11/30/19 8:11:00 CST, Lab Collect, Print Label By Order Location, 11/30/19 7:47:00 CST  EtOH Level: STAT collect, 11/30/19 8:11:09 CST, BLOOD, Collected, Stop date 11/30/19 8:11:00 CST, Lab Collect  Salicylate Level: STAT collect, 11/30/19 8:11:09 CST, BLOOD, Collected, Stop date 11/30/19 8:11:00 CST, Lab Collect  TSH: STAT collect, 11/30/19 8:11:09 CST, BLOOD, Collected, Stop date 11/30/19 7:47:00 CST, Lab Collect  Urinalysis Complete a reflex to culture: Stat collect, Urine, 11/30/19 7:47:00 CST, Stop " date 11/30/19 7:47:00 CST, Nurse collect, Print Label By Order Location  Urine Drug Screen: Stat collect, Urine, 11/30/19 7:47:00 CST, Stop date 11/30/19 7:47:00 CST, Nurse collect, Print Label By Order Location.   Results review:  Lab results : Lab [facility]   11/30/2019 8:11 CST      Sodium Lvl                138 mmol/L                             Potassium Lvl             4.0 mmol/L                             Chloride                  104 mmol/L                             CO2                       26.0 mmol/L                             Calcium Lvl               9.3 mg/dL                             Glucose Lvl               107 mg/dL  HI                             BUN                       16.0 mg/dL                             Creatinine                0.71 mg/dL                             eGFR-AA                   >60 mL/min/1.73 m2  NA                             eGFR-JAYDON                  >60 mL/min/1.73 m2  NA                             Bili Total                0.7 mg/dL                             Bili Direct               0.20 mg/dL                             Bili Indirect             0.50 mg/dL                             AST                       31 unit/L                             ALT                       44 unit/L                             Alk Phos                  65 unit/L                             Total Protein             8.2 gm/dL                             Albumin Lvl               3.80 gm/dL                             Globulin                  4.40 gm/dL  HI                             A/G Ratio                 0.9  NA                             TSH                       0.941 mIU/L                             WBC                       6.6 x10(3)/mcL                             RBC                       5.65 x10(6)/mcL                             Hgb                       16.0 gm/dL                             Hct                       48.5 %                             Platelet                   256 x10(3)/mcL                             MCV                       85.8 fL                             MCH                       28.3 pg                             MCHC                      33.0 gm/dL                             RDW                       14.5 %                             MPV                       9.4 fL                             Abs Neut                  3.67 x10(3)/mcL                             Neutro Auto               55 %  NA                             Lymph Auto                33 %  NA                             Mono Auto                 7 %  NA                             Eos Auto                  3 %  NA                             Abs Eos                   0.2 x10(3)/mcL                             Basophil Auto             1 %  NA                             Abs Neutro                3.67 x10(3)/mcL                             Abs Lymph                 2.2 x10(3)/mcL                             Abs Mono                  0.4 x10(3)/mcL                             Abs Baso                  0.1 x10(3)/mcL                             Acetaminoph Lvl           <2.0 mcg/mL  LOW                             Salicylate Lvl            <1.7 mg/dL  LOW                             Ethanol Lvl               <3.0 mg/dL    11/30/2019 8:10 CST      U pH                      6.0                             U Spec Grav               1.020                             UA Appear                 CLEAR                             UA Color                  YELLOW                             UA Spec Grav              1.020                             UA Bili                   Negative                             UA pH                     6.0                             UA Urobilinogen           1.0                             UA Blood                  Negative                             UA Glucose                Negative                             UA Ketones                Negative                              UA Protein                Negative                             UA Nitrite                Negative                             UA Leuk Est               Negative                             UA WBC                    NONE SEEN /HPF                             UA RBC                    NONE SEEN                             UA Bacteria               NONE SEEN /HPF                             UA Squam Epithelial       NONE SEEN                             U Amph Scr                Negative                             U Katia Scr                Negative                             U Benzodia Scr            Negative                             U Cannab Scr              Negative                             U Cocaine Scr             Negative                             U Opiate Scr              Negative                             U Phencyclidine Scr       Negative  , Interpretation Labs unremarkable.       Impression and Plan   Diagnosis   Paranoid psychosis ([id number] F22)   Passive suicidal ideations ([id number])   Plan   Condition: Stable.    Disposition: Medically cleared, Time 11/30/2019 10:25:00, Referral to psychiatric services.    Counseled: Patient, Regarding diagnosis, Regarding diagnostic results, Regarding treatment plan, Patient indicated understanding of instructions.    Notes: IAshleigh, acted solely as a scribe for and in the presence of Dr. Hankins who performed the service. , I, Arti Hankins, have independently performed the history, physical, medical decision making and procedures as documented above and agree with the scribe's documentation. .

## 2022-04-30 NOTE — CONSULTS
"   Patient:   Kendall Duff             MRN: [id number]            FIN: [id number]               Age:   54 years     Sex:  Male     :  1965   Associated Diagnoses:   Schizoaffective disorder   Author:   Joanne Hull      Basic Information   Source of history:  Self.       Chief Complaint      History of Present Illness   55y/o BM with a PMH of Hypertension, Hepatitis C, Seizures, and a long history of Bipolar Disorder who presented to the [facility] ER on 10/12 with complaints of fatigue and weakness after a 2 week spell of not eating or drinking water. Mr Duff was accompanied by a  from the detention where he is incarcerated for the last 60 days who helped with most of the history. Mr Duff was reported to have been refusing his regular medications as well as most of his meals. He denied having any fever, chest pain, cough, shortness of breath, sputum production, abdominal pain, nausea/vomiting, headache, dizziness, loss of consciousness, numbness, weakness, hallucinations or suicidal ideation but did report oliguria progressing to anuria for the last 1 week. He expressed the desire to go home but was told that he needed to be admitted to the hospital in order to ensure adequate rehydration.   Patient reports the reason for seeking treatment is, "My kidneys. They told me something about getting my kidneys right." Patient states that he just stopped eating in shelter; "I don't know why. I just freaked out. I had my own room. My own bed. I guess I got depressed and frustrated." States that he was in shelter because he was disturbing the peace. Told the  that he did not do that. He says that he does not remember disturbing the peace. States that he had not been to shelter in a long time. States that he is eating now; "I'm eating good! I eat good everytime. I appreciate what you're doing for me." Patient denies feeling sad or frustrated. Starts to tell this provider that he has not shaved " because he does not have a good razor. States that he does not like having hair on his face.  Patient is awake, alert, and oriented to place and situation. Is calm and cooperative. Makes good eye contact. Speech is rapid and difficult to understand at times as his pronunciation is poor but he answers questions appropriately. He denies hearing any voices and there is no obvious evidence of hallucinations or delusions. He denies being depressed or having any thoughts to harm himself or anyone else. States that while in snf, he was not trying to harm himself or anyone else; that he stayed to himself. Again, states that he was depressed and frustrated because he was in snf. He is concerned about getting in touch with his sister. States that he does not know where his sister is; that she has throat cancer and might be at home or in the hospital. He does not have his sisters number. When he is discharged, he wants to go to his sisters house. He is concerned that his sister is taking his money. States that he has a  named Sachin who is trying to help him get an apartment. This provider is having a difficult time understanding where Sachin works. It sounds as if he is saying [facility], in the [employer], near the [facility]. He used to see Dr. Maldonado but not anymore. He also used to be a patient with the [employer].          Histories     Psychiatric Hx  Patient has a long history of a psychotic disorder (Schizophrenia, Bipolar Disorder, Schizoaffective Disorder) and has had numerous in-patient psychiatric hospitalizations. He stopped eating, drinking and taking medications while in snf for disturbing the peace. Currently, there is no obvious evidence of AV Hallucinations. He denies being depressed or having any SI/HI. It appears that one of the most recent psychiatric hospitalizations was at [facility] in January 2019 for SI and paranoia. He was discharged on Tegretol and Zyprexa. Both of those have been  restarted and he denies having any side effects from the medication. States that he cannot take Depakote, Lithium, or Seroquel.           Past Medical History:    Resolved  HTN (hypertension) ([id number]):  Resolved.  Convulsions/seizures ([id number]):  Resolved.  Bipolar ([id number]):  Resolved.  Chronic hepatitis C ([id number]):  Resolved.   Family History:    Metastatic cancer  Mother  Liver failure.....  Father     Procedure history:    Intracoronary Stent Placement 1st Vessel on 8/14/2015 at 49 Years.  Comments:  8/17/2015 14:19 Mae Singh  auto-populated from documented surgical case  Intracoronary Stent Placement 1st Vessel (None) on 8/13/2015 at 49 Years.  Comments:  8/13/2015 16:44 Su Mata RN  auto-populated from documented surgical case  Left Heart Cath w/COR Angio Ventriculography (None) on 8/13/2015 at 49 Years.  Comments:  8/13/2015 16:44 Su Mata RN  auto-populated from documented surgical case      Health Status   Allergies:    Allergic Reactions (Selected)  Severity Not Documented  Depakote- Seizure.  Depakote ER- Seizures.  Lithium- Seizures.  SEROquel- Seizures.   Current medications:    Medications (8) Active  Scheduled: (6)  aspirin 81 mg Chew tab  81 mg 1 tab(s), Oral, Daily  carbamazepine 200 mg Tab UD  200 mg 1 tab(s), Oral, BID  folic acid 1 mg Tab UD  1 mg 1 tab(s), Oral, Daily  heparin 5000 units/ml Inj-1ml  5,000 units 1 mL, Subcutaneous, q12hr  OLANZapine 10 mg Tab UD ([facility])  10 mg 1 tab(s), Oral, Daily  thiamine 100 mg (Vit B1) Tab UD  100 mg 1 tab(s), Oral, Daily  Continuous: (1)  sodium chloride 0.45% 1,000 mL  1,000 mL, IV, 100 mL/hr  PRN: (1)  labetalol 5 mg/mL - 4ml syringe  5 mg 1 mL, IV Push, q2hr     Home meds reviewed      Review of Systems   Constitutional:  Negative except as documented in history of present illness.    Neurologic:  Alert and oriented X4.     Psychiatric:  No homocidal ideation, No depression, Not suicidal.       Physical Examination   Vital Signs   10/14/2019 9:45 CDT      Systolic Blood Pressure   117 mmHg                             Diastolic Blood Pressure  79 mmHg    10/14/2019 8:59 CDT      24 HR Intake Totals       124.67 mL                             24 HR Output Totals       0 mL                             24 HR I&O Balance         124.67 mL    10/14/2019 8:00 CDT      Temperature Oral          36.7 DegC                             Temperature Oral (calculated)             98.06 DegF                             Peripheral Pulse Rate     73 bpm                             Respiratory Rate          20 br/min                             SpO2                      96 %                             Oxygen Therapy            Room air                             Systolic Blood Pressure   149 mmHg  HI                             Diastolic Blood Pressure  110 mmHg  HI                             Mean Arterial Pressure, Cuff              123 mmHg    10/14/2019 7:51 CDT      FIO2                      21 %                             SpO2                      98 %                             Oxygen Therapy            Room air    10/14/2019 4:59 CDT      24 HR Intake Totals       1,954.68 mL                             24 HR Output Totals       2,000 mL                             24 HR I&O Balance         -45.32 mL    10/14/2019 4:00 CDT      Temperature Oral          36.8 DegC                             Temperature Oral (calculated)             98.24 DegF                             Peripheral Pulse Rate     64 bpm                             Respiratory Rate          20 br/min                             SpO2                      96 %                             Oxygen Therapy            Room air                             Systolic Blood Pressure   110 mmHg                             Diastolic Blood Pressure  69 mmHg                             Mean  Arterial Pressure, Cuff              83 mmHg     VS reviewed   General:  Alert and oriented, No acute distress.    Cognition and Speech:  Oriented, Functional cognition intact.         Articulation and speech: Poor articulation, Fast paced.    Psychiatric:  Cooperative, Non-suicidal.         Mood and affect: Calm.         Judgment: Appropriate in social situations.         Abnormal / Psychotic thoughts: No delusions, No grandiose ideas, No hallucinations, No homicidal ideation, No obsessions, No paranoia, No preoccupation with violence, No suicidal ideation.         Thought process: Appropriate.       Review / Management   Laboratory Results   Today's Lab Results : PowerNote Discrete Results   10/14/2019 4:29 CDT      Sodium Lvl                140 mmol/L                             Potassium Lvl             3.7 mmol/L                             Chloride                  108 mmol/L  HI                             CO2                       26 mmol/L                             Calcium Lvl               7.9 mg/dL  LOW                             Magnesium Lvl             2.1 mg/dL                             Glucose Lvl               156 mg/dL  HI                             BUN                       25 mg/dL  HI                             Creatinine                0.90 mg/dL                             BUN/Creat Ratio           28  NA                             eGFR-AA                   >105 mL/min                             eGFR-JAYDON                  93 mL/min                             Phosphorus                1.5 mg/dL  LOW                             Total CK                  1,718 unit/L  HI                             CK MB                     12.0 ng/mL  HI    10/13/2019 23:00 CDT     Sodium Lvl                143 mmol/L                             Potassium Lvl             4.2 mmol/L                             Chloride                  107 mmol/L                             CO2                       30  mmol/L                             Calcium Lvl               8.2 mg/dL  LOW                             Magnesium Lvl             2.0 mg/dL                             Glucose Lvl               89 mg/dL                             BUN                       29 mg/dL  HI                             Creatinine                0.90 mg/dL                             BUN/Creat Ratio           32  NA                             eGFR-AA                   >105 mL/min                             eGFR-JAYDON                  93 mL/min                             Phosphorus                2.1 mg/dL  LOW                             Total CK                  2,333 unit/L  HI                             CK MB                     15.7 ng/mL  HI    10/13/2019 16:55 CDT     Sodium Lvl                141 mmol/L                             Potassium Lvl             4.0 mmol/L                             Chloride                  107 mmol/L                             CO2                       31 mmol/L                             Calcium Lvl               8.2 mg/dL  LOW                             Magnesium Lvl             2.2 mg/dL                             Glucose Lvl               89 mg/dL                             BUN                       36 mg/dL  HI                             Creatinine                1.20 mg/dL                             BUN/Creat Ratio           30  NA                             eGFR-AA                   81 mL/min  LOW                             eGFR-JAYDON                  67 mL/min  LOW                             Phosphorus                2.4 mg/dL  LOW                             Total CK                  3,378 unit/L  HI                             CK MB                     25.8 ng/mL  HI    10/13/2019 11:00 CDT     Sodium Lvl                146 mmol/L  HI                             Potassium Lvl             3.9 mmol/L                             Chloride                  110 mmol/L  HI                              CO2                       29 mmol/L                             Calcium Lvl               8.5 mg/dL                             Magnesium Lvl             2.5 mg/dL                             Glucose Lvl               74 mg/dL                             BUN                       53 mg/dL  HI                             Creatinine                1.10 mg/dL                             BUN/Creat Ratio           48  NA                             eGFR-AA                   89 mL/min  LOW                             eGFR-JAYDON                  74 mL/min  LOW                             Phosphorus                2.0 mg/dL  LOW                             Total CK                  5,116 unit/L  HI                             CK MB                     46.2 ng/mL  HI    10/13/2019 8:05 CDT      WBC                       6.5 x10(3)/mcL                             RBC                       5.29 x10(6)/mcL                             Hgb                       14.9 gm/dL                             Hct                       44.5 %                             Platelet                  200 x10(3)/mcL                             MCV                       84.1 fL                             MCH                       28.2 pg                             MCHC                      33.5 gm/dL                             RDW                       13.5 %                             MPV                       10.7 fL  HI                             Abs Neut                  3.92 x10(3)/mcL                             Neutro Auto               61 %  NA                             Lymph Auto                27 %  NA                             Mono Auto                 10 %  NA                             Eos Auto                  2 %  NA                             Abs Eos                   0.1 x10(3)/mcL                             Basophil Auto             0 %  NA                             Abs Neutro                3.92 x10(3)/mcL                              Abs Lymph                 1.7 x10(3)/mcL                             Abs Mono                  0.7 x10(3)/mcL                             Abs Baso                  0.0 x10(3)/mcL                             IG%                       0 %  NA                             IG#                       0.0200  NA                             PT                        13.7 second(s)                             INR                       1.06                             PTT                       28.2 second(s)                             Sodium Lvl                144 mmol/L                             Potassium Lvl             3.9 mmol/L                             Chloride                  109 mmol/L  HI                             CO2                       30 mmol/L                             Calcium Lvl               8.3 mg/dL  LOW                             Magnesium Lvl             2.5 mg/dL                             Glucose Lvl               81 mg/dL                             EAG                       126 mg/dL  HI                             BUN                       51 mg/dL  HI                             Creatinine                1.20 mg/dL                             eGFR-AA                   81 mL/min  LOW                             eGFR-JAYDON                  67 mL/min  LOW                             Bili Total                0.9 mg/dL                             Bili Direct               0.3 mg/dL  HI                             Bili Indirect             0.6 mg/dL                             AST                       255 unit/L  HI                             ALT                       232 unit/L  HI                             Alk Phos                  60 unit/L                             Total Protein             6.7 gm/dL                             Albumin Lvl               2.9 gm/dL  LOW                             Globulin                  3.80 gm/mL  HI                              A/G Ratio                 0.8 ratio  LOW                             Phosphorus                2.2 mg/dL  LOW                             Hgb A1c                   6.0 %                             Total CK                  5,344 unit/L  HI                             CK MB                     47.0 ng/mL  HI     , Reviewed labs      Impression and Plan   Diagnosis     Schizoaffective disorder ([id number] F25.9).         Axis I    see diagnosis list above  Axis II    deferred  Axis III    see diagnosis list above  Axis IV    X problems with primary support group (divorce, abuse, deaths, births, etc.)    X problems related to social environment (prison, living alone/friendships, etc. )    _ educational problems (literacy, academic problems, conflict with teachers)    _ occupational problems (unemployment, difficult work conditions, job dissatisfaction, etc. )    _ housing problems (homelessness, unsafe neighborhood, problems with neighbors, etc. )    _ economic problems (poverty, insufficient finances, etc. )    _ problems with access to cliff care services (inadequate health care, transportation to health care, health insurance, etc. )    _ problems related to interaction with the legal system/crime (arrest, incarceration, victim of crime, etc. )    _ other psychosocial and environmental problems (disasters, problems with health care providers, etc. )    Axis V (GAF)     _ 100 no symptoms    _ 90 minimal symptoms, good functioning    _ 80 transient symptoms that are expected reactions to psychosocial stressors    _ 70 mild symptoms OR some difficulty in social, occupational, or school functioning    X 60 moderate symptoms OR moderate difficulty in social, occupational, or school functioning    _ 50 serious symptoms, OR any serious impairment in social, occupational, or school functioning    _ 40 some impairment in reality testing or communication OR major impairment in several areas such as work, school,  family relations, judgement, thinking, or mood    _ 30 behavior is considerably influenced by delusions/hallucinations OR serious impairment communication/judgement OR inability to function in almost all areas    _ 20 some danger of hurting self or others OR occasionally fails to maintain minimal personal hygiene OR gross impairment in communication    _ 10 persistent danger of severely hurting self or others OR persistent inability to maintain minimal personal hygiene OR serious suicidal act with clear expectation of death      Impression/Recommendations:  At this time, patient does not meet criteria for in-patient psychiatric hospitalization as he is not a danger to himself or others, and is not gravely disabled.   Continue Tegretol and Zyprexa.  Attempt to contact his sister      Thank you for the consult. Please feel free to contact me with any questions.        Inpatient detoxification treatment plans will be individually tailored to address the patient's acute intoxication/withdrawal symptoms. The patient's response will be monitored and the treatment plan will be updated as changes are observed. During treatment, our team will facilitate the patient's progression to the next, most appropriate level of care.

## 2022-04-30 NOTE — CONSULTS
Patient:   Kendall Duff             MRN: [id number]            FIN: [id number]               Age:   56 years     Sex:  Male     :  1965   Associated Diagnoses:   None   Author:   Slade GRAY, Hyun      Chief Complaint   Consult for chest pain      History of Present Illness   Mr. Duff is a 56 year old, known to Dr. Wright, who presented to [facility] on 22 with c/o chest pain and tightness. He was seen at clinic yesterday, and had elevated bp. He stated that he has been off of his bp meds for several days. He has a history of HTN, CVA, CAD s/p coronary angioplasty, VHD, pericarditis, MI, and Hepatitis C. He stated that his chest pain was right sided, and stabbing. Troponin was negative, and EKG revealed no ischemic changes. [facility] is being consulted for chest pain.    PMH: Bipolar, HTN, CVA, CAD s/p coronary angioplasty, VHD, pericarditis, MI, and Hepatitis C  PSH: Coronary stent placement, [facility]  Family History: Mother-metastatic cancer; Father-liver failure  Social History: Tobacco-current everyday smoker; Alcohol-denies; Illicit drug use-cocaine abuser    Previous Diagnostics:    Echo 2..  The study quality is average.   The left ventricle is normal in size. Global left ventricular systolic function is normal. The left ventricular ejection fraction is 60%. Severe septal left ventricular hypertrophy is noted. Mild-moderate posterior left ventricular hypertrophy is noted.  Mild (1+) mitral regurgitation. Trace tricuspid regurgitation.   The pulmonary artery systolic pressure is 18 mmHg         Review of Systems   Constitutional:  Negative.    Respiratory:  Negative.    Cardiovascular:  Negative except as documented in history of present illness.    Musculoskeletal:  Negative.    Neurologic:  Alert and oriented X4.       Health Status   Allergies:    Allergic Reactions (Selected)  Severity Not Documented  Depakote- Seizure.  Depakote ER- Seizures.  Divalproex sodium- Seizure.  Lithium- Seizures and  unknown.  QUEtiapine- Unknown.  SEROquel- Seizures.,    Allergies (6) Active Reaction  Depakote seizure  Depakote ER seizures  divalproex sodium seizure  lithium unknown  QUEtiapine unknown  SEROquel seizures     Current medications:  (Selected)   Inpatient Medications  Ordered  IVF Normal Saline NS Bolus 1000ml: 1,000 mL, 1,000 mL, IV, 1000 mL/hr, start date 10/11/19 21:04:00 CDT, stop date 10/11/19 21:04:00 CDT, STAT  IVF Normal Saline NS Bolus 1000ml: 1,000 mL, 1,000 mL, IV, 1000 mL/hr, start date 10/11/19 21:04:00 CDT, stop date 10/11/19 21:04:00 CDT, STAT  Lovenox: 40 mg, form: Injection, Subcutaneous, Daily, first dose 01/07/22 9:00:00 CST  Protonix: 40 mg, form: Tab-EC, Oral, Daily, first dose 01/07/22 6:00:00 CST  Zofran: 4 mg, form: Injection, IV Push, q4hr PRN for nausea, first dose 01/06/22 21:28:00 CST, choose first if ordered with other treatments for nausea  acetaminophen: 1,000 mg, form: Tab, Oral, q6hr PRN for pain, first dose 01/06/22 21:28:00 CST, mild/moderate  acetaminophen: 650 mg, form: Liquid, Oral, q6hr PRN for fever, first dose 01/06/22 21:28:00 CST, > 38.1 degrees Celsius  aspirin 81 mg oral Delayed Release (EC) tablet: 81 mg, form: Tab-EC, Oral, Daily, first dose 01/07/22 9:00:00 CST  atropine: 0.5 mg, form: Injection, IV Push, Once-NOW, first dose 10/11/19 21:17:00 CDT, stop date 10/11/19 21:17:00 CDT, STAT  cloniDINE 0.1 mg oral tablet: 0.1 mg, form: Tab, Oral, Once, first dose 04/20/13 14:27:00 CDT, stop date 04/20/13 14:27:00 CDT, STAT  lisinopril 10 mg oral tablet: 20 mg, form: Tab, Oral, Daily, first dose 01/07/22 9:00:00 CST  magnesium citrate: 300 mL, 1 bottle(s) =, form: Soln, Oral, Once, first dose 05/09/16 18:00:00 CDT, stop date 05/09/16 18:00:00 CDT  nicotine 21 mg/24 hr transdermal film, extended release: 21 mg, form: Patch-24, TD, Daily, first dose 01/07/22 9:00:00 CST, ***** WASTE SORT CODE:  [facility] *****  Future  Twinrix Adult intramuscular suspension: 1 mL, form: Susp, IM,  Once-Unscheduled, first dose 06/27/21 7:00:00 CDT, Future Order  Twinrix Adult intramuscular suspension: 1 mL, form: Susp, IM, Once-Unscheduled, first dose 11/27/21 7:00:00 CST, Future Order  Prescriptions  Prescribed  Epclusa 400 mg-100 mg oral tablet: = 1 tab(s), Oral, Daily, # 28 tab(s), 2 Refill(s)  Plavix 75 mg oral tablet: 75 mg = 1 tab(s), Oral, Daily, # 30 tab(s), 0 Refill(s), other reason (Rx)  pravastatin 20 mg oral tablet: 20 mg = 1 tab(s), Oral, Daily, discontinue atorvastatin and replace with pravastatin while taking epclusa, # 30 tab(s), 2 Refill(s), Pharmacy: [id number], 170, cm, Height/Length Dosing, 06/07/21 9:26:00 CDT, 75, kg, Weight Dosing, 06/...  Documented Medications  Documented  Abilify Maintena Prefilled Syringe 400 mg intramuscular injection, extended release: 400 mg = 400 mg, IM, qMonth  atorvastatin 80 mg oral tablet: 80 mg = 1 tab(s), Oral, Daily  doxepin 25 mg oral capsule: 25 mg = 1 cap(s), Oral, qPM  doxepin 50 mg oral capsule: 50 mg = 1 cap(s), Oral, qPM  ezetimibe 10 mg oral tablet: 10 mg = 1 tab(s), Oral, Daily  lisinopril 20 mg oral tablet: 20 mg = 1 tab(s), Oral, Daily  lisinopril 40 mg oral tablet: 40 mg = 1 tab(s), Oral, Daily  olanzapine 10 mg oral tablet: 10 mg = 1 tab(s), Oral, qPM,    Medications (8) Active  Scheduled: (5)  aspirin 81 mg EC Tab  81 mg 1 tab(s), Oral, Daily  enoxaparin 40mg/0.4ml Inj  40 mg 0.4 mL, Subcutaneous, Daily  lisinopril 10 mg Tab UD  20 mg 2 tab(s), Oral, Daily  nicotine 21 mg/24 hr Pat  21 mg 1 patch(es), TD, Daily  pantoprazole 40 mg EC Tab  40 mg 1 tab(s), Oral, Daily  Continuous: (0)  PRN: (3)  acetaminophen 500 mg Tab  1,000 mg 2 tab(s), Oral, q6hr  acetaminophen 650 mg/20.3mL Liqu Adult UD  650 mg 20.3 mL, Oral, q6hr  ondansetron 2 mg/mL inj - 2mL  4 mg 2 mL, IV Push, q4hr     Problem list:    Active Problems (10)  Bipolar disorder, current episode depressed, severe, without psychotic features   H/O: depression   Hep C w/o  coma, chronic   Knowledge deficit   Malnutrition   Paranoia   Schizoaffective disorder, depressive type   Suicidal ideation   Tobacco user   Tobacco user         Physical Examination   General:  Alert and oriented.    Respiratory:  Lungs are clear to auscultation, Respirations are non-labored.    Cardiovascular:  Normal rate, Regular rhythm, Good pulses equal in all extremities, Normal peripheral perfusion.       Vital Signs (last 24 hrs)_____  Last Charted___________  Temp Oral     36.7 DegC  (JAN 07 08:00)  Heart Rate Peripheral   67 bpm  (JAN 07 08:00)  Resp Rate         18 br/min  (JAN 07 08:00)  SBP      H 142mmHg  (JAN 07 08:00)  DBP      H 94mmHg  (JAN 07 08:00)  SpO2      97 %  (JAN 07 08:00)  Weight      76.5 kg  (JAN 06 22:41)  Height      174 cm  (JAN 06 22:41)  BMI      25.27  (JAN 06 22:41)        Review / Management   Laboratory Results   Today's Lab Results : PowerNote Discrete Results   1/7/2022 3:07 CST        WBC                       7.2 x10(3)/mcL                             RBC                       5.59 x10(6)/mcL                             Hgb                       15.9 gm/dL                             Hct                       46.3 %                             Platelet                  247 x10(3)/mcL                             Sodium Lvl                137 mmol/L                             Potassium Lvl             3.7 mmol/L                             Chloride                  107 mmol/L                             CO2                       21 mmol/L  LOW                             Calcium Lvl               9.4 mg/dL                             Glucose Lvl               89 mg/dL                             EAG                       122.6 mg/dL  NA                             BUN                       14.2 mg/dL                             Creatinine                0.83 mg/dL                             Chol                      170 mg/dL                             HDL                        31 mg/dL  LOW                             Trig                      115 mg/dL                             LDL                       116.00 mg/dL                             Chol/HDL                  5                             VLDL                      23  NA                             Troponin-I                0.014 ng/mL    1/6/2022 22:28 CST       Troponin-I                0.015 ng/mL    1/6/2022 14:23 CST       Troponin-I                <0.010 ng/mL        Radiology results   Rad Results Last 72 Hrs   Accession: [id number]  Order: XR Chest 2 Views  Report Dt/Tm: 01/06/2022 14:28  Report:   EXAMINATION  XR Chest 2 Views     INDICATION  Chest Pain     Comparison: None     FINDINGS  The heart is normal in size. The lungs are clear without focal  consolidation. There is no pleural effusion or pneumothorax. There is  no acute bony abnormality identified.     IMPRESSION  No acute abnormality of the chest.          Impression and Plan       Impression:  Atypical chest pain   -Troponin negative x 3   -EKG without ischemic changes  CAD   -[facility] 2015-PTCA/stent RCA and Left Cx  HTN-above goal  CVA  VHD   -Mild MR  Bipolar  Hepatitis C  Polysubstance abuse-tobacco and cocaine  Noncompliance-patient has been out of his bp medications for several days.    Plan:  Continue Asa/Statin/ACEi/BB  Follow up with Dr. Wright in 5-7 days.  Will be available as needed.

## 2022-04-30 NOTE — DISCHARGE SUMMARY
Patient:   Kendall Duff             MRN: [id number]            FIN: [id number]               Age:   54 years     Sex:  Male     :  1965   Associated Diagnoses:   Bipolar disorder, current episode depressed, severe, without psychotic features   Author:   Genoveva SOMERS, Aly HALE      Discharge Information      Discharge Summary Information   Admit/Discharge Dates   Admit Date: 2019  Discharge Date: 2019     Physicians   Attending Physician - Genoveva SOMERS, Aly HALE  Admitting Physician - Genoveva SOMERS, Aly HALE  Consulting Physician - Mega SOMERS, Kelton FREEMAN  Consulting Physician - Josh Bryant MD  Primary Care Physician - No PCP, No     Procedures   No procedures recorded for this visit.   Immunizations   No immunizations recorded for this visit.     Discharge Medications   Prescribed  FLUoxetine (Prozac 20 mg oral capsule) 20 mg, Oral, Daily  amLODIPine (Norvasc 10 mg oral tablet) 10 mg, Oral, Daily  amoxicillin-clavulanate (Augmentin 875 mg-125 mg oral tablet) 1 tab(s), Oral, BID  aspirin (aspirin 81 mg oral Delayed Release (EC) tablet) 81 mg, Oral, Daily  carBAMazepine (Tegretol 200 mg oral tablet) 200 mg, Oral, BID  cloNIDine (cloniDINE 0.1 mg oral tablet) 0.1 mg, Oral, BID  mirtazapine (Remeron 15 mg oral tablet) 15 mg, Oral, At Bedtime  Discontinue  OLANZapine ([facility] Zyprexa 10 mg oral tablet) 10 mg, Oral, QHS Resp  amLODIPine (Norvasc 5 mg oral tablet) 5 mg, Oral, Daily-Resp  amoxicillin (amoxicillin 875 mg oral tablet) 875 mg, Oral, BID  aspirin (aspirin 81 mg oral Delayed Release (EC) tablet) 81 mg, Oral, Daily  carBAMazepine (Tegretol 200 mg oral tablet) 200 mg, Oral, BID-Resp  cloNIDine (cloniDINE 0.1 mg oral tablet) 0.1 mg, Oral, BID-Resp  nitroglycerin (nitroglycerin 0.4 mg sublingual TAB) 0.4 mg, SL, q5min, PRN chest pain  ondansetron (Zofran ODT 4 mg oral tablet, disintegrating) 1, Oral, q4hr        Education   Suicidal Feelings: How to Help Yourself  Seasonal Affective  Disorder  Schizoaffective Disorder  Insomnia  BRIEF INTERVENTION - Alcohol Use and Your Health (Custom) (Custom) (Custom)  Discharge - 12/05/19 8:00:00 CST, Home         Followup   Ant, on 12/06/2019        Hospital Course   Hospital Course   Time Spent on Discharge: <30 minutes.     HPI:  54 year old male who left another psych hospital last week and says he was feeling bad so he went back to the ER. He was anxious and went to ER for chest pain. States he did not feel safe at home and had SI but no plan. No psychosis. No substance use. He did not get meds fill after recent hospital stay.     Hospital Course:    The patient was admitted to the psychiatric unit and provided with individual and group therapy. The patient had a physical exam and any medical issues were addressed by the primary care doctor. Medications were started and adjusted as needed. He was sexually inappropriate at times. Pharmacy was unable to get abilify maintena injection so he stayed on oral meds here. The patient responded well to treatment. The patient's mood and anxiety improved. Psychotic features resolved. At the time of discharge, the patient was more hopeful and had no suicidal or homicidal ideations. The patient was assessed to not be an acute danger to self or others. The patient had no side effects on the medications. Vitals signs were stable and there were no signs of withdrawals. The patient was safe to discharge home.       Physical Examination   Vital Signs   12/5/2019 7:14 CST       Temperature Oral          36.9 DegC                             Temperature Oral (calculated)             98.42 DegF                             Peripheral Pulse Rate     82 bpm                             Respiratory Rate          18 br/min                             Systolic Blood Pressure   146 mmHg  HI                             Diastolic Blood Pressure  94 mmHg  HI                             Mean Arterial Pressure, Cuff              111  mmHg    12/5/2019 5:00 CST       Hours of Sleep            7.75    12/4/2019 7:02 CST       Temperature Oral          36.8 DegC                             Temperature Oral (calculated)             98.24 DegF                             Peripheral Pulse Rate     75 bpm                             Respiratory Rate          18 br/min                             SpO2                      98 %                             Systolic Blood Pressure   125 mmHg                             Diastolic Blood Pressure  86 mmHg                             Mean Arterial Pressure, Cuff              99 mmHg    12/4/2019 5:00 CST       Hours of Sleep            7.5        Vital Signs (last 24 hrs)_____  Last Charted___________  Temp Oral     36.9 DegC  (DEC 05 07:14)  Heart Rate Peripheral   82 bpm  (DEC 05 07:14)  Resp Rate         18 br/min  (DEC 05 07:14)  SBP      H 146mmHg  (DEC 05 07:14)  DBP      H 94mmHg  (DEC 05 07:14)     Measurements from flowsheet : Measurements   12/4/2019 22:18 CST      Weight Measured and Calculated in Lbs     160.94 lb  (In Error)       Discharge Plan   Discharge Summary Plan   Discharge disposition: discharge to home self care.     Diagnosis     Bipolar disorder, current episode depressed, severe, without psychotic features ([id number] F31.4).

## 2022-04-30 NOTE — ED PROVIDER NOTES
Patient:   Kendall Duff             MRN: [id number]            FIN: [id number]               Age:   56 years     Sex:  Male     :  1965   Associated Diagnoses:   Chest pain, rule out acute myocardial infarction; CAD (coronary artery disease)   Author:   Letty Gandhi DO      Basic Information   Time seen: Date & time 2022 13:51:00.   History source: Patient, EMS.   Arrival mode: Ambulance.   History limitation: None.   Additional information: Chief Complaint from Nursing Triage Note : Chief Complaint   2022 13:37 CST       Chief Complaint           presents via [facility] c/o chest pain x 30 mins. out of bp meds x 3 meds. received 324 mg ASA and 1 SL nitro.  .      History of Present Illness   The patient presents with chest pain and     SORT: HTN CAD stented male who reports out of blood pressure medication several days seen at clinic with concerns for chest pain tightness and hypertension transported by ambulance to emergency department for evaluation given aspirin and nitroglycerin with moderate relief.  Letty Cutler DO took over this patient at 1938.    55 y/o male with hx of HTN, CVA, and hep C presents to the ED via EMS presents to the ED complaining of right sided stabbing chest pain onset 6 hours ago. Associated symptoms include SOB and nausea. The pt states the pain lasted for around 2 hours and resolved with NTG. He reports he was not doing anything when the pain started. The pt notes he stopped taking his BP medications. Therapy today includes ASA and NTG en route.     .  The onset was 6  hours ago.  The course/duration of symptoms is episodic: with a single episode and lasting 2  hour(s).  Location: Right chest. Radiating pain: none. The character of symptoms is stabbing.  The degree at onset was moderate.  The degree at maximum was moderate.  The degree at present is none.  The exacerbating factor is none.  The relieving factor is none.  Risk factors consist of  hypertension and smoking.  Prior episodes: none.  Therapy today Nitroglycerin and Aspirin.  Associated symptoms: shortness of breath and nausea.        Review of Systems   Constitutional symptoms:  No fever, no chills   Skin symptoms:  No rash, no lesion.    Eye symptoms:  Vision unchanged   ENMT symptoms:  No sore throat, no nasal congestion   Respiratory symptoms:  Shortness of breathNo cough   Cardiovascular symptoms:  Chest pain, right chest, no palpitations   Gastrointestinal symptoms:  Nausea, no abdominal pain, no vomiting, no diarrhea   Genitourinary symptoms:  No dysuria, no hematuria.    Neurologic symptoms:  No headache, no weakness.    Allergy/immunologic symptoms:  No recurrent infections, no impaired immunity.       Health Status   Allergies:    Allergic Reactions (Selected)  Severity Not Documented  Depakote- Seizure.  Depakote ER- Seizures.  Divalproex sodium- Seizure.  Lithium- Seizures and unknown.  QUEtiapine- Unknown.  SEROquel- Seizures..   Medications:  (Selected)   Inpatient Medications  Ordered  IVF Normal Saline NS Bolus 1000ml: 1,000 mL, 1,000 mL, IV, 1000 mL/hr, start date 10/11/19 21:04:00 CDT, stop date 10/11/19 21:04:00 CDT, STAT  IVF Normal Saline NS Bolus 1000ml: 1,000 mL, 1,000 mL, IV, 1000 mL/hr, start date 10/11/19 21:04:00 CDT, stop date 10/11/19 21:04:00 CDT, STAT  atropine: 0.5 mg, form: Injection, IV Push, Once-NOW, first dose 10/11/19 21:17:00 CDT, stop date 10/11/19 21:17:00 CDT, STAT  cloniDINE 0.1 mg oral tablet: 0.1 mg, form: Tab, Oral, Once, first dose 04/20/13 14:27:00 CDT, stop date 04/20/13 14:27:00 CDT, STAT  magnesium citrate: 300 mL, 1 bottle(s) =, form: Soln, Oral, Once, first dose 05/09/16 18:00:00 CDT, stop date 05/09/16 18:00:00 CDT  Future  Twinrix Adult intramuscular suspension: 1 mL, form: Susp, IM, Once-Unscheduled, first dose 06/27/21 7:00:00 CDT, Future Order  Twinrix Adult intramuscular suspension: 1 mL, form: Susp, IM, Once-Unscheduled, first dose  11/27/21 7:00:00 CST, Future Order  Prescriptions  Prescribed  Epclusa 400 mg-100 mg oral tablet: = 1 tab(s), Oral, Daily, # 28 tab(s), 2 Refill(s)  Plavix 75 mg oral tablet: 75 mg = 1 tab(s), Oral, Daily, # 30 tab(s), 0 Refill(s), other reason (Rx)  pravastatin 20 mg oral tablet: 20 mg = 1 tab(s), Oral, Daily, discontinue atorvastatin and replace with pravastatin while taking epclusa, # 30 tab(s), 2 Refill(s), Pharmacy: [id number], 170, cm, Height/Length Dosing, 06/07/21 9:26:00 CDT, 75, kg, Weight Dosing, 06/...  Documented Medications  Documented  Abilify Maintena Prefilled Syringe 400 mg intramuscular injection, extended release: 400 mg = 400 mg, IM, qMonth  atorvastatin 80 mg oral tablet: 80 mg = 1 tab(s), Oral, Daily  doxepin 25 mg oral capsule: 25 mg = 1 cap(s), Oral, qPM  doxepin 50 mg oral capsule: 50 mg = 1 cap(s), Oral, qPM  ezetimibe 10 mg oral tablet: 10 mg = 1 tab(s), Oral, Daily  lisinopril 20 mg oral tablet: 20 mg = 1 tab(s), Oral, Daily  lisinopril 40 mg oral tablet: 40 mg = 1 tab(s), Oral, Daily  olanzapine 10 mg oral tablet: 10 mg = 1 tab(s), Oral, qPM.   Immunizations: Unknown.      Past Medical/ Family/ Social History   Medical history:    Resolved  Bipolar ([id number]):  Resolved.  Chronic hepatitis C ([id number]):  Resolved.  Convulsions/seizures ([id number]):  Resolved.  CVA - Cerebrovascular accident ([id number]):  Resolved.  HTN (hypertension) ([id number]):  Resolved.  Obesity ([id number]):  Resolved., bipolar disorder  Paranoia   Schizoaffective disorder, depressive type .   Surgical history:    Intracoronary Stent Placement 1st Vessel on 8/14/2015 at 49 Years.  Comments:  8/17/2015 14:19 JAGRUTI - Mae Latham  auto-populated from documented surgical case  Intracoronary Stent Placement 1st Vessel (None) on 8/13/2015 at 49 Years.  Comments:  8/13/2015 16:44 JAGRUTI - Su Steinberg RN  auto-populated  from documented surgical case  Left Heart Cath w/COR Angio Ventriculography (None) on 8/13/2015 at 49 Years.  Comments:  8/13/2015 16:44 JACQUET - Idris BOUDREAUX, Su  auto-populated from documented surgical case.   Family history:    Metastatic cancer  Mother  Liver failure.....  Father  .   Social history: Alcohol use: Denies, Tobacco use: Regularly, Drug use: Hx of cocaine abuse.      Physical Examination               Vital Signs   Vital Signs   1/6/2022 13:37 CST       Temperature Oral          36.8 DegC                             Temperature Oral (calculated)             98.24 DegF                             Peripheral Pulse Rate     77 bpm                             Respiratory Rate          18 br/min                             SpO2                      98 %                             Oxygen Therapy            Room air                             Systolic Blood Pressure   157 mmHg  HI                             Diastolic Blood Pressure  112 mmHg  HI  .   Basic Oxygen Information   1/6/2022 13:37 CST       SpO2                      98 %                             Oxygen Therapy            Room air  .   General:  Alert, no acute distress   [place] coma scale:  Eye response: 4 /4, verbal response: 5 /5, motor response: 6 /6, Total score: Total score: 15.    Neurological:  Alert and oriented to person, place, time, and situation, No focal neurological deficit observed, normal motor observed, normal speech observed   Skin:  Warm, dry, no pallor   Head:  Normocephalic, atraumatic   Neck:  Supple, trachea midline.    Eye:  Pupils are equal, round and reactive to light, extraocular movements are intact, normal conjunctiva   Ears, nose, mouth and throat:  External ear: Bilateral, normal, Nose: Bilateral nares, normal, Mouth: Normal, oral mucosa moist.    Cardiovascular:  Regular rate and rhythm, No murmur, Normal peripheral perfusion, No edema   Respiratory:  Lungs are clear to auscultation, respirations are  non-labored   Gastrointestinal:  Soft, Nontender, Non distended, Guarding: Negative, Rebound: Negative.    Musculoskeletal:  Normal ROM.   Lymphatics:  No lymphadenopathy.   Psychiatric:  Cooperative, appropriate mood & affect      Medical Decision Making   Documents reviewed:  Emergency department nurses' notes, prior records, [facility] with stents in 2015, admit in 05/2021 with COVID and NSTEMI with trop up to 21, hx of cocaine abuse .    Orders  Launch Order Profile (Selected)   Inpatient Orders  Ordered  Capacity Management Bed Request: 01/06/22 20:46:07 CST, Remote Telemetry  EKG Adult 12 Lead: 01/06/22 14:12:00 CST, Stat, Once, 01/06/22 14:12:00 CST, Ambulatory, Standard Precautions, -1, -1, 01/06/22 14:12:00 CST  Place in Outpatient Observation: 01/06/22 20:45:00 CST, Remote Telemetry Ata SOMERS, Belinda WANG, No  Saline Lock Insert: 01/06/22 14:12:00 CST, Stop date 01/06/22 14:12:00 CST  Ordered (Dispatched)  Urine Drug Screen: Stat collect, Urine, 01/06/22 20:41:00 CST, Stop date 01/06/22 20:41:00 CST, Nurse collect  Completed  Automated Diff: NOW collect, 01/06/22 14:23:00 CST, Blood, Collected, Stop date 01/06/22 14:23:00 CST, Lab Collect, Print Label By Order Location, 01/06/22 14:12:00 CST  BNP: STAT collect, 01/06/22 14:23:14 CST, BLOOD, Collected, Stop date 01/06/22 14:23:00 CST, Lab Collect  CBC w/ Auto Diff: NOW collect, 01/06/22 14:23:14 CST, BLOOD, Collected, Stop date 01/06/22 14:23:00 CST, Lab Collect  CK MB: STAT collect, 01/06/22 14:23:14 CST, BLOOD, Collected, Stop date 01/06/22 14:23:00 CST, Lab Collect  CMP: STAT collect, 01/06/22 14:23:14 CST, BLOOD, Collected, Stop date 01/06/22 14:23:00 CST, Lab Collect  EKG Adult 12 Lead: 01/06/22 13:42:00 CST, Stat, Once, 01/06/22 13:42:00 CST, Ambulatory, Standard Precautions, -1, -1, 01/06/22 13:42:00 CST  Estimated Glomerular Filtration Rate: STAT collect, 01/06/22 14:23:00 CST, Blood, Collected, Stop date 01/06/22 14:23:00 CST, Lab Collect, Print Label By Order  Location, 01/06/22 14:12:00 CST  Troponin-I: STAT collect, 01/06/22 14:23:14 CST, BLOOD, Collected, Stop date 01/06/22 14:23:00 CST, Lab Collect  XR Chest 2 Views: Stat, 01/06/22 14:12:00 CST, Chest Pain, None, Ambulatory, Rad Type, Not Scheduled, 01/06/22 14:12:00 CST  Discontinued  Nitro-Bid 2% transdermal oint: 1 in, form: Ointment, TOP, Once, first dose 01/06/22 14:14:00 CST, stop date 01/06/22 14:14:00 CST, STAT.   Electrocardiogram:  Time 1/6/2022 13:32:00, rate 70, normal sinus rhythm, no ectopy, normal ID & QRS intervals, EP Interp, STT segments Non specific changes, Previous EKG available No changes.    Results review:  Lab results : Lab View   1/6/2022 14:23 CST       Sodium Lvl                137 mmol/L                             Potassium Lvl             4.3 mmol/L                             Chloride                  108 mmol/L  HI                             CO2                       23 mmol/L                             Calcium Lvl               9.6 mg/dL                             Glucose Lvl               86 mg/dL                             BUN                       14.1 mg/dL                             Creatinine                0.84 mg/dL                             eGFR-AA                   >60  NA                             eGFR-JAYDON                  >60 mL/min/1.73 m2  NA                             Bili Total                0.9 mg/dL                             Bili Direct               0.3 mg/dL                             Bili Indirect             0.60 mg/dL                             AST                       16 unit/L                             ALT                       12 unit/L                             Alk Phos                  56 unit/L                             Total Protein             7.4 gm/dL                             Albumin Lvl               4.0 gm/dL                             Globulin                  3.4 gm/dL                             A/G Ratio                  1.2 ratio                             BNP                       42.4 pg/mL                             CK MB                     4.2 ng/mL                             Troponin-I                <0.010 ng/mL                             WBC                       7.5 x10(3)/mcL                             RBC                       5.71 x10(6)/mcL                             Hgb                       15.9 gm/dL                             Hct                       45.9 %                             Platelet                  273 x10(3)/mcL                             MCV                       80.4 fL                             MCH                       27.8 pg                             MCHC                      34.6 gm/dL                             RDW                       14.1 %                             MPV                       9.7 fL                             Abs Neut                  4.12 x10(3)/mcL                             Neutro Auto               55 %  NA                             Lymph Auto                35 %  NA                             Mono Auto                 6 %  NA                             Eos Auto                  3 %  NA                             Abs Eos                   0.2 x10(3)/mcL                             Basophil Auto             1 %  NA                             Abs Neutro                4.12 x10(3)/mcL                             Abs Lymph                 2.6 x10(3)/mcL                             Abs Mono                  0.4 x10(3)/mcL                             Abs Baso                  0.1 x10(3)/mcL  .   Radiology results:  Rad Results (ST)  < 12 hrs   Accession: [id number]  Order: XR Chest 2 Views  Report Dt/Tm: 01/06/2022 14:28  Report:   EXAMINATION  XR Chest 2 Views     INDICATION  Chest Pain     Comparison: None     FINDINGS  The heart is normal in size. The lungs are clear without focal  consolidation. There is no pleural effusion or pneumothorax. There  is  no acute bony abnormality identified.     IMPRESSION  No acute abnormality of the chest.    .   Notes:  55 yo male with chest pain with associated nausea and SOB, given ASA and Nitro in route with relief. Significant CAD hx, will admit for serial enzymes .      Impression and Plan   Diagnosis   Chest pain, rule out acute myocardial infarction ([id number] R07.9)   CAD (coronary artery disease) ([id number] I25.10)      Calls-Consults   -  1/6/2022 20:40:00 , Hospitalist, consult.    -  1/6/2022 20:46:00 , Jackie BELL, Rashard Dos Santos, ADAN, phone call, consult.    Plan   Disposition: Place in Observation Telemetry Unit, Ata SOMERS, Belinda KRAUS    Counseled: Patient, Regarding diagnosis, Regarding diagnostic results, Regarding treatment plan, Patient indicated understanding of instructions.    Notes: I, April Bryant, acted solely as a scribe for and in the presence of Dr. Gandhi who performed this service., I, Dr. Gandhi, personally performed the services described in this documentation as scribed in my presence, and it is both accurate and complete..

## 2022-04-30 NOTE — ED PROVIDER NOTES
"   Patient:   Kendall Duff             MRN: [id number]            FIN: [id number]               Age:   53 years     Sex:  Male     :  1965   Associated Diagnoses:   Anxiety   Author:   Shiv Addison MD, James J      Basic Information   Time seen: Date & time 2019 02:15:00.   History source: Patient.   Arrival mode: Private vehicle.   History limitation: None.   Additional information: Chief Complaint from Nursing Triage Note : Chief Complaint   2019 2:09 CDT        Chief Complaint           not feeling well/ c/o "bad nerves". denies back pain.  .      History of Present Illness   The patient presents with "feeling sick" and This 53-year-old patient  with a very vague complaint that he feels nervous.  He is asking for a full physical but he does not have any real complaint.  He denies nausea vomiting diarrhea constipation abdominal pain chest pain and there is no fever..  The onset was 6  hours ago.  The course/duration of symptoms is improving.  Location: generalized. The degree at onset was minimal.  The degree at present is minimal.  Risk factors consist of none.  Therapy today: over the counter medications.  Associated symptoms: none.  Additional history: sexual history: non-contributory.        Review of Systems   Constitutional symptoms:  No fever, no chills, no sweats, no weakness, no fatigue.    Skin symptoms:  No jaundice, no rash.    Eye symptoms:  Vision unchanged.   ENMT symptoms:  No ear pain, no sore throat.    Respiratory symptoms:  No shortness of breath, no orthopnea, no cough, no hemoptysis.    Cardiovascular symptoms:  No chest pain, no palpitations, no tachycardia, no syncope.    Gastrointestinal symptoms:  No abdominal pain, no nausea, no vomiting.    Genitourinary symptoms:  No dysuria,    Musculoskeletal symptoms:  No back pain,    Neurologic symptoms:  No headache, no altered level of consciousness.              Additional review of systems information: All other systems " reviewed and otherwise negative.      Health Status   Allergies:    Allergic Reactions (Selected)  Severity Not Documented  Depakote- Seizure.  Depakote ER- Seizures.  Lithium- Seizures.  SEROquel- Seizures.,    Allergies (4) Active Reaction  Depakote seizure  Depakote ER seizures  lithium seizures  SEROquel seizures  .      Past Medical/ Family/ Social History   Medical history:    Resolved  HTN (hypertension) ([id number]):  Resolved.  Convulsions/seizures ([id number]):  Resolved.  Bipolar ([id number]):  Resolved.  Chronic hepatitis C ([id number]):  Resolved..   Surgical history:    Intracoronary Stent Placement 1st Vessel on 8/14/2015 at 49 Years.  Comments:  8/17/2015 14:19 Mae Singh  auto-populated from documented surgical case  Intracoronary Stent Placement 1st Vessel (None) on 8/13/2015 at 49 Years.  Comments:  8/13/2015 16:44 Su Mata RN  auto-populated from documented surgical case  Left Heart Cath w/COR Angio Ventriculography (None) on 8/13/2015 at 49 Years.  Comments:  8/13/2015 16:44 Su Mata RN  auto-populated from documented surgical case.   Family history:    Metastatic cancer  Mother  Liver failure                                                                                                                                                                                                                           07-JUN-2017 17:48:24<$>  Father  .   Social history:    Social & Psychosocial Habits    Alcohol  05/06/2012 Risk Assessment: Denies Alcohol Use    07/05/2018  Use: Past    Type: Beer, Liquor, Wine    Frequency: Daily      Comment: jessica - 10/08/2018 07:14 - Elizabeth Bhatti RN    Employment/School  12/31/2018  Status: Unemployed    Activity level: Desk/Office    Highest education: High school    Hazardous equipment operation: No    Home/Environment  12/31/2018  Lives with:  Siblings    Living situation: Home/Independent    Alcohol abuse in household: No    Substance abuse in household: No    Smoker in household: No    Injuries/Abuse/Neglect in household: No    Feels unsafe at home: No    Safe place to go: Yes    Agency(s)/Others notified: No    Family/Friends available to help: Yes    Concern for family members at home: No    Major illness in household: No    Financial concerns: No    Concerns over TV/Computer/Game use: No    Nutrition/Health  12/31/2018  Type of diet: Regular    Wants to lose weight: No    Sleeping concerns: No    Feels highly stressed: Yes    Sexual  12/31/2018  Sexually active: No    First active at age: 10 Years    Current partners: 0    Number of lifetime partners: 5    Do you think of your sexual orientation as: Heterosexual    Uses condoms: No    History of sexual abuse: No    Substance Use  04/20/2013 Risk Assessment: Denies Substance Abuse      Comment: denies - 10/08/2018 07:14 - Davy BOUDREAUX, Elizabeth    Tobacco  04/15/2015  Use: Current every day smoker    Type: Cigarettes    Tobacco use per day: 20    05/11/2012 Risk Assessment: High Risk    10/08/2018  Use: Current every day smoker    Patient Wants Consult For Cessation Counseling Yes    Tobacco use per day: 40    12/10/2018  Use: Current every day smoker    Patient Wants Consult For Cessation Counseling No    12/16/2018  Use: Current every day smoker    Patient Wants Consult For Cessation Counseling N/A    12/31/2018  Use: Current every day smoker    Type: Cigarettes    Patient Wants Consult For Cessation Counseling No    Tobacco use per day: 20    Number of years: 43    Total pack years: 33    Started at age: 10 Years    Ready to change: No    Concerns about tobacco use in household: No    01/28/2019  Use: 10 or more cigarettes (1/    Type: Cigarettes    Patient Wants Consult For Cessation Counseling N/A    02/27/2019  Use: 10 or more cigarettes (1/    Patient Wants Consult For Cessation Counseling  N/A    03/08/2019  Use: 10 or more cigarettes (1/    Patient Wants Consult For Cessation Counseling No    07/06/2019  Use: 10 or more cigarettes (1/    Type: Cigarettes    Patient Wants Consult For Cessation Counseling No    Tobacco use per day: 20    Abuse/Neglect  07/06/2019  SHX Any signs of abuse or neglect No  .   Problem list:    Active Problems (7)  CVA - Cerebrovascular accident   H/O: depression   Hep C w/o coma, chronic   Obesity   Suicidal ideation   Tobacco user   Tobacco user   .      Physical Examination               Vital Signs   Vital Signs   7/6/2019 5:14 CDT        Peripheral Pulse Rate     74 bpm                             SpO2                      97 %    7/6/2019 5:00 CDT        Peripheral Pulse Rate     75 bpm                             SpO2                      98 %                             Systolic Blood Pressure   145 mmHg  HI                             Diastolic Blood Pressure  90 mmHg                             Mean Arterial Pressure, Cuff              108 mmHg    7/6/2019 4:22 CDT        Peripheral Pulse Rate     69 bpm                             SpO2                      96 %    7/6/2019 2:41 CDT        Respiratory Rate          18 br/min                             SpO2                      97 %    7/6/2019 2:09 CDT        Temperature Oral          36.9 DegC                             Temperature Oral (calculated)             98.42 DegF                             Peripheral Pulse Rate     107 bpm  HI                             Respiratory Rate          18 br/min                             SpO2                      100 %                             Oxygen Therapy            Room air                             Systolic Blood Pressure   157 mmHg  HI                             Diastolic Blood Pressure  133 mmHg  HI  .      Vital Signs (last 24 hrs)_____  Last Charted___________  Temp Oral     36.9 DegC  (JUL 06 02:09)  Heart Rate Peripheral   74 bpm  (JUL 06 05:14)  Resp  Rate         18 br/min  (JUL 06 02:41)  SBP      H 145mmHg  (JUL 06 05:00)  DBP      90 mmHg  (JUL 06 05:00)  SpO2      97 %  (JUL 06 05:14)  Weight      73.7 kg  (JUL 06 02:09)  Height      164 cm  (JUL 06 02:09)  BMI      27.4  (JUL 06 02:09)  .   Measurements   7/6/2019 2:09 CDT        Weight Dosing             73.7 kg                             Weight Measured           73.7 kg                             Weight Measured and Calculated in Lbs     162.48 lb                             Height/Length Dosing      164 cm                             Height/Length Measured    164 cm                             Body Mass Index Measured  27.4 kg/m2  .   Basic Oxygen Information   7/6/2019 5:14 CDT        SpO2                      97 %    7/6/2019 5:00 CDT        SpO2                      98 %    7/6/2019 4:22 CDT        SpO2                      96 %    7/6/2019 2:41 CDT        SpO2                      97 %    7/6/2019 2:09 CDT        SpO2                      100 %                             Oxygen Therapy            Room air  .   General:  Alert, no acute distress.    Skin:  Warm, dry.    Head:  Normocephalic, atraumatic.    Neck:  Supple, trachea midline.    Eye:  Pupils are equal, round and reactive to light, extraocular movements are intact, vision grossly normal.    Ears, nose, mouth and throat:  Oral mucosa moist.   Cardiovascular:  Regular rate and rhythm, No murmur.    Respiratory:  Lungs are clear to auscultation, respirations are non-labored, breath sounds are equal.    Chest wall:  No tenderness, No deformity.    Back:  Nontender.   Musculoskeletal:  Normal ROM.   Gastrointestinal:  Soft, Nontender, Non distended.    Neurological:  Alert and oriented to person, place, time, and situation, No focal neurological deficit observed, CN II-XII intact.    Lymphatics:  No lymphadenopathy.   Psychiatric:  Cooperative.      Medical Decision Making   Differential Diagnosis:  Dehydration, weakness, urosepsis,  dizziness.    Documents reviewed:  Emergency department nurses' notes, emergency department records, prior records.    Electrocardiogram:  Time 7/6/2019 06:58:00, rate 84, normal sinus rhythm, No ST-T changes, no ectopy, normal CT & QRS intervals.    Results review:     Labs (Last four charted values)  WBC                  8.0 (JUL 06)   Hgb                  15.9 (JUL 06)   Hct                  46.4 (JUL 06)   Plt                  258 (JUL 06)   Na                   139 (JUL 06)   K                    3.6 (JUL 06)   CO2                  26 (JUL 06)   Cl                   103 (JUL 06)   Cr                   1.20 (JUL 06)   BUN                  17 (JUL 06)   Glucose Random       H 163 (JUL 06) , All Results   7/6/2019 2:25 CDT        WBC                       8.0 x10(3)/mcL                             RBC                       5.63 x10(6)/mcL                             Hgb                       15.9 gm/dL                             Hct                       46.4 %                             Platelet                  258 x10(3)/mcL                             MCV                       82.4 fL                             MCH                       28.2 pg                             MCHC                      34.3 gm/dL                             RDW                       13.1 %                             MPV                       9.5 fL                             Abs Neut                  3.15 x10(3)/mcL                             Neutro Auto               39 x10(3)/mcL  NA                             Lymph Auto                47 %  HI                             Mono Auto                 8 %                             Eos Auto                  4  NA                             Abs Eos                   0.34  NA                             Basophil Auto             1  NA                             Abs Neutro                3.15 x10(3)/mcL  NA                             Abs Lymph                 3.77 x10(3)/mcL   NA                             Abs Mono                  0.63 x10(3)/mcL  NA                             Abs Baso                  0.10 x10(3)/mcL  NA                             IG%                       0 %  NA                             IG#                       0.0100  NA                             Sodium Lvl                139 mmol/L                             Potassium Lvl             3.6 mmol/L                             Chloride                  103 mmol/L                             CO2                       26 mmol/L                             Calcium Lvl               9.0 mg/dL                             Glucose Lvl               163 mg/dL  HI                             BUN                       17 mg/dL                             Creatinine                1.20 mg/dL                             eGFR-AA                   81 mL/min  LOW                             eGFR-JAYDON                  67 mL/min  LOW                             Bili Total                0.4 mg/dL                             Bili Direct               0.1 mg/dL                             Bili Indirect             0.3 mg/dL                             AST                       57 unit/L  HI                             ALT                       75 unit/L                             Alk Phos                  108 unit/L                             Total Protein             7.5 gm/dL                             Albumin Lvl               3.7 gm/dL                             Globulin                  3.80 gm/mL  HI                             A/G Ratio                 1.0 ratio  LOW                             Lipase Lvl                75 unit/L    7/6/2019 2:18 CDT        UA Appear                 Clear                             UA Color                  YELLOW                             UA Spec Grav              1.027                             UA Bili                   Negative                             UA pH                      5.5                             UA Urobilinogen           4 mg/dL                             UA Blood                  0.1 mg/dL                             UA Glucose                Normal                             UA Ketones                10 mg/dL                             UA Protein                100 mg/dL                             UA Nitrite                Negative                             UA Leuk Est               Negative                             UA WBC Interp             0-2 /HPF                             UA RBC Interp             6-10                             UA Bact Interp            None Seen                             UA Squam Epi Interp       2-20                             UA Hyal Cast Interp       3-5                             U Temp                    20.1 DegC  .    Chest X-Ray:  Time reported 7/6/2019 06:59:00, no acute disease process.       Reexamination/ Reevaluation   Vital signs   Basic Oxygen Information   7/6/2019 5:14 CDT        SpO2                      97 %    7/6/2019 5:00 CDT        SpO2                      98 %    7/6/2019 4:22 CDT        SpO2                      96 %    7/6/2019 2:41 CDT        SpO2                      97 %    7/6/2019 2:09 CDT        SpO2                      100 %                             Oxygen Therapy            Room air        Impression and Plan   Diagnosis   Anxiety ([id number] F41.9)   Plan   Disposition: Medically cleared, Discharged: time  7/6/2019 07:00:00.    Patient was given the following educational materials: Generalized Anxiety Disorder, Adult, Generalized Anxiety Disorder, Adult.    Follow up with: No No PCP; [facility] Within 5 to 7 days.    Counseled: Patient, Regarding diagnosis, Regarding treatment plan, Patient indicated understanding of instructions.    Notes: Patient has rested well and looks much better on my 2nd examination he is feeling well and wishes to be discharged.  He needs to be seen in medical  clinic for his anxiety bipolar and other issues.

## 2022-04-30 NOTE — ED PROVIDER NOTES
"   Patient:   Kendall Duff             MRN: [id number]            FIN: [id number]               Age:   54 years     Sex:  Male     :  1965   Associated Diagnoses:   Atypical chest pain   Author:   Melody Mcelroy MD      Basic Information   Time seen: Date & time 2019 09:32:00.   History source: Patient.   Arrival mode: Private vehicle.   History limitation: None.   Provider/Visit info:    No qualifying data available.   .   History of Present Illness   The patient presents with 55y/o M presents to the ED with chest pain upon awakening. Reports " I'm just feeling bad and my whole body feels numb." Ben FNP-C and     I, Dr. Mcelroy, assumed care of this patient at 1003.     54 y/r old AAM present to ED with a hx of HTN, Convulsion/seizures, bipolar disorder, and chronic hepatitis C, with c/o fluctuating chest pain. Pt states he had a MI a few months ago and describes his pain as "feels like he is going to have another MI and feels like his heart is not there." Pt denies any LE edema, nausea, SOB, or vomiting. Pt states he took ibuprofen with no relief, and takes ASA almost everydayand is not on any blood thinners and takes Lisinopril for HTN..  The onset was 2019  and Hours PTA.  The course/duration of symptoms is fluctuating in intensity.  Location: chest. Radiating pain: none. The character of symptoms is "Feels like he is going to have a MI".  The degree at onset was minimal.  The degree at maximum was moderate.  The degree at present is minimal.  The exacerbating factor is none.  The relieving factor is none.  Risk factors consist of hypertension and smoking.  Prior episodes: none.  Therapy today Aspirin and Ibuprofen.  Associated symptoms: denies shortness of breath, denies nausea and denies vomiting.        Review of Systems   Constitutional symptoms:  No fever, no chills, no sweats.    Skin symptoms:  No rash, no petechiae.    Eye symptoms:  Vision unchangedNo pain, no " discharge, no icterus, no diplopia, no blurred vision, no blindness.    ENMT symptoms:  No ear pain, no sore throat, no nasal congestion, no sinus pain.    Respiratory symptoms:  No shortness of breath, no orthopnea, no cough, no hemoptysis, no stridor, no wheezing.    Cardiovascular symptoms:  Chest pain, intermittentNo palpitations, no syncope, no diaphoresis, no peripheral edema.    Gastrointestinal symptoms:  No abdominal pain, no nausea, no vomiting, no diarrhea, no constipation, no rectal bleeding, no rectal pain.    Genitourinary symptoms:  No dysuria, no hematuria   Musculoskeletal symptoms:  No back pain, no Muscle pain   Neurologic symptoms:  No headache, no dizziness, no altered level of consciousness, no numbness, no tingling.    Psychiatric symptoms:  Negative except as documented in HPI   Endocrine symptoms:  Negative except as documented in HPI.   Hematologic/Lymphatic symptoms:  Negative except as documented in HPI      Health Status   Allergies:    Allergic Reactions (Selected)  Severity Not Documented  Depakote- Seizure.  Depakote ER- Seizures.  Lithium- Seizures.  SEROquel- Seizures..   Medications:  (Selected)   Inpatient Medications  Ordered  cloniDINE 0.1 mg oral tablet: 0.1 mg, form: Tab, Oral, Once, first dose 04/20/13 14:27:00 CDT, stop date 04/20/13 14:27:00 CDT, STAT  magnesium citrate: 300 mL, 1 bottle(s) =, form: Soln, Oral, Once, first dose 05/09/16 18:00:00 CDT, stop date 05/09/16 18:00:00 CDT  Prescriptions  Prescribed  [facility] Zyprexa 10 mg oral tablet: 10 mg = 1 tab(s), Oral, QHS Resp, # 30 tab(s), 0 Refill(s), called to pharmacy (Rx)  Norvasc 5 mg oral tablet: 5 mg = 1 tab(s), Oral, Daily-Resp, # 30 tab(s), 0 Refill(s)  Tegretol 200 mg oral tablet: 200 mg = 1 tab(s), Oral, BID-Resp, # 60 tab(s), 0 Refill(s)  aspirin 81 mg oral Delayed Release (EC) tablet: 81 mg = 1 tab(s), Oral, Daily, # 30 tab(s), 11 Refill(s)  cloniDINE 0.1 mg oral tablet: 0.1 mg = 1 tab(s), Oral, BID-Resp, # 60  tab(s), 0 Refill(s)  nitroglycerin 0.4 mg sublingual TAB: 0.4 mg = 1 tab(s), SL, q5min, PRN PRN chest pain, # 100 tab(s), 3 Refill(s).      Past Medical/ Family/ Social History   Medical history:    Resolved  HTN (hypertension) ([id number]):  Resolved.  Convulsions/seizures ([id number]):  Resolved.  Bipolar ([id number]):  Resolved.  Chronic hepatitis C ([id number]):  Resolved.  Obesity ([id number]):  Resolved..   Surgical history:    Intracoronary Stent Placement 1st Vessel on 8/14/2015 at 49 Years.  Comments:  8/17/2015 14:19 Mae Singh  auto-populated from documented surgical case  Intracoronary Stent Placement 1st Vessel (None) on 8/13/2015 at 49 Years.  Comments:  8/13/2015 16:44 Su Mata RN  auto-populated from documented surgical case  Left Heart Cath w/COR Angio Ventriculography (None) on 8/13/2015 at 49 Years.  Comments:  8/13/2015 16:44 Su Mata RN  auto-populated from documented surgical case.   Family history:    Metastatic cancer  Mother  Liver failure.....  Father  .   Social history: Alcohol use: past, Tobacco use: Regularly, Drug use: past; cocaine.      Physical Examination               Vital Signs   Vital Signs   11/9/2019 9:46 CST       Peripheral Pulse Rate     73 bpm                             Heart Rate Monitored      72 bpm                             Respiratory Rate          28 br/min  HI                             SpO2                      99 %                             Oxygen Therapy            Room air                             Systolic Blood Pressure   155 mmHg  HI                             Diastolic Blood Pressure  120 mmHg  HI                             Mean Arterial Pressure, Cuff              132 mmHg    11/9/2019 9:42 CST       Peripheral Pulse Rate     75 bpm                             Heart Rate Monitored      73 bpm                             Respiratory  Rate          24 br/min                             SpO2                      98 %                             Oxygen Therapy            Room air                             Systolic Blood Pressure   180 mmHg  HI                             Diastolic Blood Pressure  121 mmHg  HI                             Mean Arterial Pressure, Cuff              141 mmHg    11/9/2019 9:30 CST       Temperature Oral          36.8 DegC                             Temperature Oral (calculated)             98.24 DegF                             Peripheral Pulse Rate     73 bpm                             Respiratory Rate          18 br/min                             SpO2                      98 %                             Oxygen Therapy            Room air                             Systolic Blood Pressure   167 mmHg  HI                             Diastolic Blood Pressure  120 mmHg  HI  .      No qualifying data available.   Measurements   11/9/2019 9:30 CST       Weight Dosing             90 kg                             Weight Measured and Calculated in Lbs     198.41 lb                             Weight Estimated          90 kg  .   Basic Oxygen Information   11/9/2019 9:46 CST       SpO2                      99 %                             Oxygen Therapy            Room air    11/9/2019 9:42 CST       SpO2                      98 %                             Oxygen Therapy            Room air    11/9/2019 9:30 CST       SpO2                      98 %                             Oxygen Therapy            Room air  .   General:  Alert, no acute distress   Skin:  Warm, dry, intact   Head:  Normocephalic, atraumatic   Neck:  Supple, trachea midline, no tenderness   Eye:  Pupils are equal, round and reactive to light, extraocular movements are intact   Cardiovascular:  Regular rate and rhythm, No murmur, Normal peripheral perfusion   Respiratory:  Lungs are clear to auscultation, respirations are non-labored.   "  Gastrointestinal:  Soft, Nontender   Back:  Nontender, Normal range of motion   Musculoskeletal:  Normal ROM, normal strength   Neurological:  Alert and oriented to person, place, time, and situation, No focal neurological deficit observed.    Psychiatric:  Cooperative, appropriate mood & affect      Medical Decision Making   Differential Diagnosis:  Unstable angina, angina, atypical chest pain, pneumonia, gastroesophageal reflux disease, costochondritis, pleurisy, chest wall pain, dyspnea.    Rationale:  54-year-old male with a history of hypertension and previous MI presents to ED with right-sided chest pain is very atypical in nature.  Mainly asking for food.  EKG without any changestroponin within normal limits.  Pain improved with eating.  We'll repeat troponin and if negative discharge patient with instructions to follow-up with his cardiologist.   Documents reviewed:  Emergency department nurses' notes, emergency department records.    Orders  Launch Order Profile (Selected)   Inpatient Orders  Ordered  30 Day Readmission: 11/09/19 9:36:04 CST, Stop date 11/09/19 9:36:04 CST, "This patient has had an inpatient, observation, outpatient bedded or emergency visit within the last 30 days."  Ordered (Collected)  POC iSTAT ER Troponin request: BLOOD, STAT collect, Collected, 11/09/19 9:46:31 CST, Stop date 11/09/19 9:46:00 CST, Lab Collect, Print Label By Order Location  Ordered (In-Lab)  CMP: STAT collect, 11/09/19 9:46:31 CST, BLOOD, Collected, Stop date 11/09/19 9:46:00 CST, Lab Collect  Troponin-I: STAT collect, 11/09/19 9:46:31 CST, BLOOD, Collected, Stop date 11/09/19 9:46:00 CST, Lab Collect  Completed  Automated Diff: NOW collect, 11/09/19 9:46:00 CST, Blood, Collected, Stop date 11/09/19 9:46:00 CST, Lab Collect, Print Label By Order Location, 11/09/19 9:33:00 CST  CBC w/ Auto Diff: NOW collect, 11/09/19 9:46:31 CST, BLOOD, Collected, Stop date 11/09/19 9:46:00 CST, Lab Collect  EKG Adult 12 Lead: " 11/09/19 9:33:00 CST, Stat, Once, 11/09/19 9:33:00 CST, Ambulatory, Standard Precautions, -1, -1, 11/09/19 9:33:00 CST  POC Istat ER Troponin: Blood, Stat collect, Collected, 11/09/19 9:50:27 CST  Tylenol: 500 mg, form: Tab, Oral, Once, first dose 11/09/19 10:08:00 CST, stop date 11/09/19 10:08:00 CST, STAT  XR Chest 2 Views: Stat, 11/09/19 9:33:00 CST, Chest Pain, None, Ambulatory, Rad Type, Not Scheduled, 11/09/19 9:33:00 CST  aspirin 81 mg oral tablet, CHEWABLE: 324 mg, form: Tab-Chew, Oral, Once, first dose 11/09/19 10:08:00 CST, stop date 11/09/19 10:08:00 CST, STAT, 4 chew tab = 5 grain ASA.   Electrocardiogram:  Time 11/9/2019 09:36:00, rate 72, normal sinus rhythm, No ST-T changes, no ectopy, normal NJ & QRS intervals, EP Interp.    Results review:  Lab results : Lab View   11/9/2019 13:37 CST      POC Troponin              0.01 ng/mL    11/9/2019 9:50 CST       POC Troponin              0.01 ng/mL    11/9/2019 9:46 CST       Sodium Lvl                137 mmol/L                             Potassium Lvl             4.4 mmol/L                             Chloride                  103 mmol/L                             CO2                       28.0 mmol/L                             Calcium Lvl               9.9 mg/dL                             Glucose Lvl               110 mg/dL  HI                             BUN                       14.0 mg/dL                             Creatinine                0.78 mg/dL                             eGFR-AA                   >60 mL/min/1.73 m2  NA                             eGFR-JAYDON                  >60 mL/min/1.73 m2  NA                             Bili Total                0.4 mg/dL                             Bili Direct               0.20 mg/dL                             Bili Indirect             0.20 mg/dL                             AST                       38 unit/L  HI                             ALT                       43 unit/L                              Alk Phos                  92 unit/L                             Total Protein             8.1 gm/dL                             Albumin Lvl               3.90 gm/dL                             Globulin                  4.20 gm/dL  HI                             A/G Ratio                 0.9 ratio  LOW                             Troponin-I                <0.02 ng/mL  LOW                             WBC                       8.6 x10(3)/mcL                             RBC                       5.93 x10(6)/mcL                             Hgb                       16.6 gm/dL                             Hct                       51.1 %                             Platelet                  296 x10(3)/mcL                             MCV                       86.2 fL                             MCH                       28.0 pg                             MCHC                      32.5 gm/dL  LOW                             RDW                       14.4 %                             MPV                       9.1 fL  LOW                             Abs Neut                  4.97 x10(3)/mcL                             Neutro Auto               58 %  NA                             Lymph Auto                31 %  NA                             Mono Auto                 7 %  NA                             Eos Auto                  2 %  NA                             Abs Eos                   0.2 x10(3)/mcL                             Basophil Auto             1 %  NA                             Abs Neutro                4.97 x10(3)/mcL                             Abs Lymph                 2.7 x10(3)/mcL                             Abs Mono                  0.6 x10(3)/mcL                             Abs Baso                  0.1 x10(3)/mcL  .   Radiology results:  Radiologist's interpretation: : Diagnostic Radiology   11/9/2019 10:00 CST      XR Chest 2 Views            , Rad Results (ST)  < 12 hrs   Accession:  [id number]  Order: XR Chest 2 Views  Report Dt/Tm: 11/09/2019 10:05  Report:      CLINICAL:  Chest pain.     COMPARISON: November 6, 2019.     FINDINGS:  Cardiopericardial silhouette is within normal limits.   Lungs are without dense focal or segmental consolidation, congestion,  pleural effusion or pneumothorax.       IMPRESSION:     NO ACUTE CARDIOPULMONARY PROCESS IDENTIFIED.       .       Reexamination/ Reevaluation   Time: 11/9/2019 12:18:00 .   Course: improving.   Pain status: decreased.   Notes: resolved with food.      Impression and Plan   Diagnosis   Atypical chest pain ([id number] R07.89)   Plan   Condition: Improved.    Disposition: Discharged: Time  11/9/2019 13:58:00, to home.    Patient was given the following educational materials: Chest Wall Pain, Easy-to-Read, OTC Pain Meds Pain Without a Known Cause (MRNAQUIN), Nonspecific Chest Pain, Easy-to-Read.    Follow up with: ; Siri Richards Call for followup appointment; Follow-up with PCP in 3 to 5 days; Report to Emergency Department if symptoms return or worsen, Report to Emergency Department if symptoms return or worsen; Follow-up with PCP in 3 to 5 days; Siri Richards Call for followup appointment.    Counseled: Patient, Regarding diagnosis, Regarding diagnostic results, Regarding treatment plan, Patient indicated understanding of instructions.    Orders: Launch Orders   Patient Care:  Discontinue IV (Order): 11/9/2019 13:58 CST  Admit/Transfer/Discharge:  Discharge Activity (Order): Activity as Tolerated  Discharge (Order): 11/9/2019 13:58 CST, Home.    Notes: IMiguel A, acted solely as a scribe for and in the presence of Dr. Mcelroy who performed the service., ICoty, acted solely as a scribe for and in the presence of Dr. Mcelroy who performed the service. .       Addendum   I, Melody Mcelroy MD, performed the history, physical examination, MDM and procedures as above and agree with the scribe's documentation

## 2022-04-30 NOTE — ED PROVIDER NOTES
"   Patient:   Kendall Duff             MRN: [id number]            FIN: [id number]               Age:   54 years     Sex:  Male     :  1965   Associated Diagnoses:   Schizoaffective disorder; Nausea in adult; Headache; Cephalgia; Hypertension   Author:   Viola SOMERS, Anthony HANDY      Basic Information   Time seen: Date & time 2019 12:51:00.   History source: Patient.   Arrival mode: Private vehicle, walking.   History limitation: None.   Additional information: Patient's physician(s): ALFREDO, Chief Complaint from Nursing Triage Note : Chief Complaint   2019 10:27 CST     Chief Complaint           patient reports having headache. started on antibiotics yesterday for strep throat. states is not feeling better. BP currently 195/126. states he took BP medications this AM>  (Modified)   2019 7:54 CST      Chief Complaint           Patient c/o cough, throat pain for 2 days, subjective fever  .      History of Present Illness   The patient presents with   55 y/o black male with hx of HTN and bipolar schizophrenia presents to the ED c/o a headache. He reports nausea however denies any other problems. He was dx with strep throat yesterday and was given Bicillin. The pt states this throat feels "a little" better. While in the ER his blood pressure was 195/126 and the pt states that he thinks he took his medication today but he is not certain. .  The onset was this morning .  The course/duration of symptoms is improving.  Location: generalized. Radiating pain: none. The character of symptoms is throbbing.  The degree at onset was minimal.  The degree at maximum was minimal.  The degree at present is none.  The exacerbating factor is none.  The relieving factor is none.  Risk factors consist of hypertension.  Prior episodes: none.  Therapy today: none.  Preceding symptoms: none.  Associated symptoms: nausea.  Additional history: none.        Review of Systems   Constitutional symptoms:  Negative except as " documented in HPI.   Skin symptoms:  Negative except as documented in HPI.   Eye symptoms:  Negative except as documented in HPI.   ENMT symptoms:  Negative except as documented in HPI.   Respiratory symptoms:  Negative except as documented in HPI.   Cardiovascular symptoms:  Negative except as documented in HPI.   Gastrointestinal symptoms:  Nausea.   Genitourinary symptoms:  Negative except as documented in HPI.   Musculoskeletal symptoms:  Negative except as documented in HPI.   Neurologic symptoms:  Headache.   Psychiatric symptoms:  Negative except as documented in HPI.   Endocrine symptoms:  Negative except as documented in HPI.   Hematologic/Lymphatic symptoms:  Negative except as documented in HPI.   Allergy/immunologic symptoms:  Negative except as documented in HPI.             Additional review of systems information: All other systems reviewed and otherwise negative.      Health Status   Allergies:    Allergic Reactions (Selected)  Severity Not Documented  Depakote- Seizure.  Depakote ER- Seizures.  Lithium- Seizures.  SEROquel- Seizures..   Medications:  (Selected)   Inpatient Medications  Ordered  Zofran ODT: 4 mg, form: Tab-Dis, Oral, Once, first dose 11/12/19 12:50:00 CST, stop date 11/12/19 12:50:00 CST, STAT  acetaminophen 325 mg oral tablet: 650 mg, form: Tab, Oral, Once, first dose 11/12/19 12:50:00 CST, stop date 11/12/19 12:50:00 CST, STAT  cloniDINE 0.1 mg oral tablet: 0.1 mg, form: Tab, Oral, Once, first dose 04/20/13 14:27:00 CDT, stop date 04/20/13 14:27:00 CDT, STAT  magnesium citrate: 300 mL, 1 bottle(s) =, form: Soln, Oral, Once, first dose 05/09/16 18:00:00 CDT, stop date 05/09/16 18:00:00 CDT  Prescriptions  Prescribed  [facility] Zyprexa 10 mg oral tablet: 10 mg = 1 tab(s), Oral, QHS Resp, # 30 tab(s), 0 Refill(s), called to pharmacy (Rx)  Norvasc 5 mg oral tablet: 5 mg = 1 tab(s), Oral, Daily-Resp, # 30 tab(s), 0 Refill(s)  Tegretol 200 mg oral tablet: 200 mg = 1 tab(s), Oral, BID-Resp, #  60 tab(s), 0 Refill(s)  Tessalon Perles 100 mg oral capsule: 100 mg = 1 cap(s), Oral, TID, PRN PRN as needed for cough, X 7 day(s), # 21 cap(s), 0 Refill(s)  aspirin 81 mg oral Delayed Release (EC) tablet: 81 mg = 1 tab(s), Oral, Daily, # 30 tab(s), 11 Refill(s)  cloniDINE 0.1 mg oral tablet: 0.1 mg = 1 tab(s), Oral, BID-Resp, # 60 tab(s), 0 Refill(s)  nitroglycerin 0.4 mg sublingual TAB: 0.4 mg = 1 tab(s), SL, q5min, PRN PRN chest pain, # 100 tab(s), 3 Refill(s).   Immunizations: Tetanus up to date, influenza, pneumococcal.      Past Medical/ Family/ Social History   Medical history:    Resolved  HTN (hypertension) ([id number]):  Resolved.  Convulsions/seizures ([id number]):  Resolved.  Bipolar ([id number]):  Resolved.  Chronic hepatitis C ([id number]):  Resolved.  Obesity ([id number]):  Resolved..   Surgical history:    Intracoronary Stent Placement 1st Vessel on 2015 at 49 Years.  Comments:  2015 14:19 Mae Singh  auto-populated from documented surgical case  Intracoronary Stent Placement 1st Vessel (None) on 2015 at 49 Years.  Comments:  2015 16:44 Su Mata RN  auto-populated from documented surgical case  Left Heart Cath w/COR Angio Ventriculography (None) on 2015 at 49 Years.  Comments:  2015 16:44 Su Mata RN  auto-populated from documented surgical case.   Family history:   Mother:  with hx of breast cancer    Father:  with hx of liver failure.   Social history: Alcohol use: Denies, Tobacco use: Regularly, Drug use: Denies, Occupation: On disability, Family/social situation: Unmarried.   Problem list:    Active Problems (9)  CVA - Cerebrovascular accident   H/O: depression   Hep C w/o coma, chronic   Knowledge deficit   Malnutrition   Schizoaffective disorder, depressive type   Suicidal ideation   Tobacco user   Tobacco user   .      Physical  Examination               Vital Signs   Vital Signs   11/12/2019 12:22 CST     Peripheral Pulse Rate     69 bpm                             SpO2                      97 %                             Oxygen Therapy            Room air                             Systolic Blood Pressure   124 mmHg                             Diastolic Blood Pressure  99 mmHg  HI                             Mean Arterial Pressure, Cuff              107 mmHg    11/12/2019 12:03 CST     Peripheral Pulse Rate     72 bpm                             SpO2                      99 %                             Oxygen Therapy            Room air                             Systolic Blood Pressure   134 mmHg                             Diastolic Blood Pressure  96 mmHg  HI    11/12/2019 11:44 CST     Systolic Blood Pressure   154 mmHg  HI                             Diastolic Blood Pressure  108 mmHg  HI    11/12/2019 10:27 CST     Temperature Temporal Artery               36.6 DegC                             Peripheral Pulse Rate     74 bpm                             Respiratory Rate          20 br/min                             SpO2                      99 %                             Oxygen Therapy            Room air                             Systolic Blood Pressure   195 mmHg  HI                             Diastolic Blood Pressure  126 mmHg  HI    11/11/2019 7:54 CST      Temperature Oral          36.6 DegC                             Temperature Oral (calculated)             97.88 DegF                             Peripheral Pulse Rate     85 bpm                             Respiratory Rate          18 br/min                             SpO2                      96 %                             Oxygen Therapy            Room air                             Systolic Blood Pressure   156 mmHg  HI                             Diastolic Blood Pressure  100 mmHg  HI  .      Vital Signs (last 24 hrs)_____  Last Charted___________  Heart  Rate Peripheral   69 bpm  (NOV 12 12:22)  Resp Rate         20 br/min  (NOV 12 10:27)  SBP      124 mmHg  (NOV 12 12:22)  DBP      H 99mmHg  (NOV 12 12:22)  SpO2      97 %  (NOV 12 12:22)  .   Measurements   11/11/2019 7:54 CST      Weight Dosing             68 kg                             Weight Measured and Calculated in Lbs     149.91 lb                             Weight Estimated          68 kg                             Height/Length Estimated   170.1 cm                             Body Mass Index Estimated 23.5 kg/m2  .   Basic Oxygen Information   11/12/2019 12:22 CST     SpO2                      97 %                             Oxygen Therapy            Room air    11/12/2019 12:03 CST     SpO2                      99 %                             Oxygen Therapy            Room air    11/12/2019 10:27 CST     SpO2                      99 %                             Oxygen Therapy            Room air    11/11/2019 7:54 CST      SpO2                      96 %                             Oxygen Therapy            Room air  .   General:  Alert, no acute distress, black male , not anxious, not ill-appearing.    Skin:  Warm, dry, no pallor, no rash, normal for ethnicity.    Head:  Normocephalic, atraumatic.    Neck:  Supple, trachea midline, no tenderness, no carotid bruit.    Eye:  Pupils are equal, round and reactive to light, extraocular movements are intact, normal conjunctiva.    Ears, nose, mouth and throat:  Tympanic membranes clear, oral mucosa moist, no pharyngeal erythema or exudate.    Cardiovascular:  Regular rate and rhythm, No murmur, Normal peripheral perfusion, No edema.    Respiratory:  Respirations are non-labored, breath sounds are equal, Breath sounds: Bilateral, rhonchi present (smokers).    Chest wall:  No tenderness, No deformity.    Back:  Nontender, Normal range of motion, Normal alignment, no step-offs.    Musculoskeletal:  Normal ROM, normal strength, no tenderness, no swelling,  "no deformity.    Gastrointestinal:  Soft, Nontender, Non distended, Normal bowel sounds, No organomegaly.    Genitourinary:  no CVA tenderness.   Neurological:  Alert and oriented to person, place, time, and situation, No focal neurological deficit observed, CN II-XII intact, normal sensory observed, normal motor observed, normal speech observed, normal coordination observed, normal and symmetrical reflexes, no meningeal signs noted upon examination.    Lymphatics:  No lymphadenopathy.   Psychiatric:  Cooperative, appropriate mood & affect, normal judgment, non-suicidal.       Medical Decision Making   Documents reviewed:  Emergency department nurses' notes.   Orders  Launch Order Profile (Selected)   Inpatient Orders  Ordered  30 Day Readmission: 11/12/19 10:30:33 CST, Stop date 11/12/19 10:30:33 CST, "This patient has had an inpatient, observation, outpatient bedded or emergency visit within the last 30 days."  Meal Supervision: 11/12/19 11:03:29 CST  Completed  Automated Diff: NOW collect, 11/12/19 10:55:00 CST, Blood, Collected, Stop date 11/12/19 10:55:00 CST, Lab Collect, Print Label By Order Location, 11/12/19 10:30:00 CST  CBC w/ Auto Diff: NOW collect, 11/12/19 10:55:51 CST, BLOOD, Collected, Stop date 11/12/19 10:55:00 CST, Lab Collect  CMP: STAT collect, 11/12/19 10:55:51 CST, BLOOD, Collected, Stop date 11/12/19 10:55:00 CST, Lab Collect  EKG Adult 12 Lead: 11/12/19 10:31:00 CST, Stat, Once, 11/12/19 10:31:00 CST, Ambulatory, Standard Precautions, -1, -1, 11/12/19 10:31:00 CST  Estimated Glomerular Filtration Rate: STAT collect, 11/12/19 10:55:00 CST, Blood, Collected, Stop date 11/12/19 10:55:00 CST, Lab Collect, Print Label By Order Location, 11/12/19 10:30:00 CST  Troponin-I: STAT collect, 11/12/19 10:55:51 CST, BLOOD, Collected, Stop date 11/12/19 10:55:00 CST, Lab Collect  Zofran ODT: 4 mg, form: Tab-Dis, Oral, Once, first dose 11/12/19 12:50:00 CST, stop date 11/12/19 12:50:00 CST, " STAT  acetaminophen 325 mg oral tablet: 650 mg, form: Tab, Oral, Once, first dose 19 12:50:00 CST, stop date 19 12:50:00 CST, STAT  cloniDINE 0.1 mg oral tablet: 0.2 mg, form: Tab, Oral, Once, first dose 19 10:30:00 CST, stop date 19 10:30:00 CST, STAT  Prescriptions  Prescribed  Zofran ODT 4 mg oral tablet, disintegratin, Oral, q4hr, # 10 tab(s), 0 Refill(s).   Electrocardiogram:  Time 2019 11:40:00, rate 73, normal sinus rhythm, No ST-T changes, no ectopy, normal KS & QRS intervals, EP Interp.    Results review:  Lab results : Lab View   2019 10:55 CST     Sodium Lvl                139 mmol/L                             Potassium Lvl             4.6 mmol/L                             Chloride                  103 mmol/L                             CO2                       32.0 mmol/L                             Calcium Lvl               9.7 mg/dL                             Glucose Lvl               91 mg/dL                             BUN                       18.0 mg/dL                             Creatinine                0.81 mg/dL                             eGFR-AA                   >60 mL/min/1.73 m2  NA                             eGFR-JAYDON                  >60 mL/min/1.73 m2  NA                             Bili Total                0.3 mg/dL                             Bili Direct               0.10 mg/dL                             Bili Indirect             0.20 mg/dL                             AST                       38 unit/L  HI                             ALT                       45 unit/L                             Alk Phos                  103 unit/L                             Total Protein             8.0 gm/dL                             Albumin Lvl               3.90 gm/dL                             Globulin                  4.10 gm/dL  HI                             A/G Ratio                 1.0 ratio  LOW                             Troponin-I                 <0.02 ng/mL                             WBC                       7.4 x10(3)/mcL                             RBC                       5.79 x10(6)/mcL                             Hgb                       16.4 gm/dL                             Hct                       50.6 %                             Platelet                  267 x10(3)/mcL                             MCV                       87.4 fL                             MCH                       28.3 pg                             MCHC                      32.4 gm/dL  LOW                             RDW                       14.6 %                             MPV                       9.1 fL  LOW                             Abs Neut                  4.27 x10(3)/mcL                             Neutro Auto               58 %  NA                             Lymph Auto                31 %  NA                             Mono Auto                 8 %  NA                             Eos Auto                  2 %  NA                             Abs Eos                   0.1 x10(3)/mcL                             Basophil Auto             1 %  NA                             Abs Neutro                4.27 x10(3)/mcL                             Abs Lymph                 2.3 x10(3)/mcL                             Abs Mono                  0.6 x10(3)/mcL                             Abs Baso                  0.1 x10(3)/mcL    11/11/2019 8:02 CST      Strep A PCR               DETECTED    11/11/2019 8:00 CST      Influ A PCR               Negative                             Influ B PCR               Negative  .      Reexamination/ Reevaluation   Vital signs   Basic Oxygen Information   11/12/2019 12:22 CST     SpO2                      97 %                             Oxygen Therapy            Room air    11/12/2019 12:03 CST     SpO2                      99 %                             Oxygen Therapy            Room air    11/12/2019 10:27 CST     SpO2                       99 %                             Oxygen Therapy            Room air    11/11/2019 7:54 CST      SpO2                      96 %                             Oxygen Therapy            Room air        Impression and Plan   Diagnosis   Schizoaffective disorder (IJO50-RM F25.9)   Nausea in adult (EEC67-CB R11.0)   Headache (PNED [id number])   Cephalgia (DPS58-MC R51)   Hypertension (ANE63-VK I10)   Plan   Condition: Unchanged.    Disposition: Discharged: Time  11/12/2019 13:15:00, to home.    Prescriptions: Launch prescriptions   Pharmacy:  Zofran ODT 4 mg oral tablet, disintegrating (Prescribe): 1, Oral, q4hr, # 10 tab(s), 0 Refill(s).    Patient was given the following educational materials: General Headache Without Cause, Hypertension, Schizoaffective Disorder.    Follow up with: Follow-up with PCP in 3 to 5 days Please contact your doctors at [facility] and seek follow-up in the next one to 5 days    You must stop smoking    Take your blood pressure and other medications as they are prescribed please    The nausea medication should help if needed take it only if needed for nausea.    Counseled: Patient, Regarding diagnosis, Regarding diagnostic results, Regarding treatment plan, Regarding prescription, Patient indicated understanding of instructions.    Notes:   I, Alia Jain, acted solely as a scribe for and in the presence of Dr. Rod who performed the service., I, Anthony Rod MD, a physician licensed to practice in this state, have  performed the physical evaluation,  history gathering,  and medical decision making that is reflected in this record..   ILANA Rod MD.

## 2022-04-30 NOTE — ED PROVIDER NOTES
Patient:   Kendall Duff             MRN: [id number]            FIN: [id number]               Age:   54 years     Sex:  Male     :  1965   Associated Diagnoses:   Psychosis, paranoid   Author:   Shana LAYTON MD, Jamel BAHENA      Basic Information   Time seen: Date & time 2019 22:49:00.   History source: Patient.   Arrival mode: Private vehicle.   History limitation: Clinical condition.   Provider/Visit info:    No qualifying data available.   .   History of Present Illness   The patient presents with 55y/o M presents to the ED with family reports patient wondering off and she has to find him daily. He denies any SI/HI. .Reports not feeling well. Ben BELL-SHRUTI and     I, Dr. Saldana, assumed care of this patient at 2309.    55 y/o AAM with hx of bipolar, depression, HTN presents to the ED after patient has been wandering off from family. Pt reports that he has been paranoid for some time now. He states that he has been complaint with all of his medications and denies using any drugs. Pt does state that he has been having auditory hallucinations telling him that his head hurt. Family was not in the room during initial exam, but told nurse that they have not seen the patient attempt to harm himself at home and they personally give him his medications daily. Pt denies any SI or HI. .  The onset was Ongoing.  The course/duration of symptoms is constant.  Character of symptoms paranoid.  The degree of symptoms is moderate.  Self injury: none.  The exacerbating factor is none.  The relieving factor is none.  Risk factors consist of not suicide risk and not homicide risk.  Prior episodes: none.  Therapy today: none.  Associated symptoms: none.  Additional history: none.        Review of Systems             Additional review of systems information: Unable to obtain due to: Clinical condition, altered mental status, mentally impaired.      Health Status   Allergies:    Allergic Reactions  (Selected)  Severity Not Documented  Depakote- Seizure.  Depakote ER- Seizures.  Lithium- Seizures.  SEROquel- Seizures..   Medications: Per nurse's notes.   Immunizations: Up to date.      Past Medical/ Family/ Social History   Medical history:    Resolved  HTN (hypertension) ([id number]):  Resolved.  Convulsions/seizures ([id number]):  Resolved.  Bipolar ([id number]):  Resolved.  Chronic hepatitis C ([id number]):  Resolved.  Obesity ([id number]):  Resolved..   Surgical history:    Intracoronary Stent Placement 1st Vessel on 8/14/2015 at 49 Years.  Comments:  8/17/2015 14:19 Mae Singh  auto-populated from documented surgical case  Intracoronary Stent Placement 1st Vessel (None) on 8/13/2015 at 49 Years.  Comments:  8/13/2015 16:44 Su Mata RN  auto-populated from documented surgical case  Left Heart Cath w/COR Angio Ventriculography (None) on 8/13/2015 at 49 Years.  Comments:  8/13/2015 16:44 Su Mata RN  auto-populated from documented surgical case.   Family history:    Metastatic cancer  Mother  Liver failure.....  Father  .   Social history: Alcohol use: Occasionally, Tobacco use: Regularly, Drug use: Cocaine, Occupation: On disability.      Physical Examination               Vital Signs   Vital Signs   11/14/2019 22:59 CST     Systolic Blood Pressure   157 mmHg  HI                             Diastolic Blood Pressure  97 mmHg  HI                             Mean Arterial Pressure, Cuff              117 mmHg    11/14/2019 22:49 CST     Temperature Oral          36.6 DegC                             Temperature Oral (calculated)             97.88 DegF                             Peripheral Pulse Rate     84 bpm                             Respiratory Rate          20 br/min                             SpO2                      98 %                             Oxygen Therapy            Room air                              Systolic Blood Pressure   198 mmHg  HI                             Diastolic Blood Pressure  125 mmHg  HI  .   Measurements   11/14/2019 22:49 CST     Weight Dosing             72.5 kg                             Weight Measured and Calculated in Lbs     159.83 lb                             Weight Estimated          72.5 kg                             Height/Length Dosing      170 cm                             Height/Length Estimated   170 cm                             Body Mass Index Estimated 25.09 kg/m2  .   Basic Oxygen Information   11/14/2019 22:49 CST     SpO2                      98 %                             Oxygen Therapy            Room air  .   General:  Alert, no acute distress   Skin:  Warm, dry   Head:  Normocephalic, atraumatic   Neck:  Supple, trachea midline, no tenderness   Eye:  Pupils are equal, round and reactive to light, extraocular movements are intact.    Ears, nose, mouth and throat:  Oral mucosa moist, no pharyngeal erythema or exudate.    Cardiovascular:  Regular rate and rhythm, No murmur, Normal peripheral perfusion, No edema.    Respiratory:  Lungs are clear to auscultation, respirations are non-labored, breath sounds are equal, Symmetrical chest wall expansion.    Chest wall:  No tenderness.   Back:  Nontender, Normal range of motion, Normal alignment.    Musculoskeletal:  Normal ROM, normal strength, no tenderness.    Gastrointestinal:  Soft, Nontender, Non distended, Normal bowel sounds   Neurological:  Alert and oriented to person, place, time, and situation, No focal neurological deficit observed, CN II-XII intact.    Lymphatics:  No lymphadenopathy.   Psychiatric:  Cooperative, Nonviolent, Responding to internal stimuli, Mood and affect: Paranoid, Abnormal / Psychotic thoughts: Delusional, tangential, flight of ideas, not suicidal, not homicidal.       Medical Decision Making   Documents reviewed:  Emergency department nurses' notes, emergency  "department records.    Orders  Launch Order Profile (Selected)   Inpatient Orders  Ordered  30 Day Readmission: 11/14/19 22:52:31 CST, Stop date 11/14/19 22:52:31 CST, "This patient has had an inpatient, observation, outpatient bedded or emergency visit within the last 30 days."  Document [place]-Suicide Severity Rating Scale (C-SSRS): 11/14/19 22:52:30 CST, Once  Ordered (Collected)  Acetaminophen Level: STAT collect, 11/14/19 23:40:14 CST, BLOOD, Collected, Stop date 11/14/19 23:40:00 CST, Lab Collect  Automated Diff: STAT collect, 11/14/19 23:40:00 CST, Blood, Collected, Stop date 11/14/19 23:40:00 CST, Lab Collect, Print Label By Order Location, 11/14/19 23:33:00 CST  CBC w/ Auto Diff: STAT collect, 11/14/19 23:40:14 CST, BLOOD, Collected, Stop date 11/14/19 23:40:00 CST, Lab Collect  CMP: STAT collect, 11/14/19 23:40:14 CST, BLOOD, Collected, Stop date 11/14/19 23:40:00 CST, Lab Collect  EtOH Level: STAT collect, 11/14/19 23:40:14 CST, BLOOD, Collected, Stop date 11/14/19 23:40:00 CST, Lab Collect  Salicylate Level: STAT collect, 11/14/19 23:40:14 CST, BLOOD, Collected, Stop date 11/14/19 23:40:00 CST, Lab Collect  TSH: STAT collect, 11/14/19 23:40:14 CST, BLOOD, Collected, Stop date 11/14/19 23:33:00 CST, Lab Collect  Ordered (In-Lab)  Urinalysis Complete a reflex to culture: Stat collect, Urine, 11/14/19 23:33:00 CST, Stop date 11/14/19 23:33:00 CST, Nurse collect  Urine Drug Screen: Stat collect, Urine, 11/14/19 23:33:00 CST, Stop date 11/14/19 23:33:00 CST, Nurse collect.   Results review:  Lab results : Lab View   11/14/2019 23:40 CST     WBC                       9.0 x10(3)/mcL                             RBC                       5.60 x10(6)/mcL                             Hgb                       16.1 gm/dL                             Hct                       49.6 %                             Platelet                  244 x10(3)/mcL                             MCV                       88.6 fL       "                       MCH                       28.8 pg                             MCHC                      32.5 gm/dL  LOW                             RDW                       14.4 %                             MPV                       9.0 fL  LOW                             Abs Neut                  4.18 x10(3)/mcL                             Neutro Auto               46 %  NA                             Lymph Auto                43 %  NA                             Mono Auto                 7 %  NA                             Eos Auto                  2 %  NA                             Abs Eos                   0.2 x10(3)/mcL                             Basophil Auto             1 %  NA                             Abs Neutro                4.18 x10(3)/mcL                             Abs Lymph                 3.9 x10(3)/mcL                             Abs Mono                  0.6 x10(3)/mcL                             Abs Baso                  0.1 x10(3)/mcL    11/14/2019 23:30 CST     U pH                      5.5                             U Spec Grav               1.026                             UA Appear                 CLEAR                             UA Color                  YELLOW                             UA Spec Grav              1.026                             UA Bili                   Negative                             UA pH                     5.5                             UA Urobilinogen           1.0                             UA Blood                  Trace                             UA Glucose                Negative                             UA Ketones                Negative                             UA Protein                Negative                             UA Nitrite                Negative                             UA Leuk Est               Negative                             UA WBC                    NONE SEEN /HPF                             UA RBC                     5 /HPF  HI                             UA Bacteria               NONE SEEN /HPF                             UA Squam Epithelial       NONE SEEN                             U Amph Scr                Negative                             U Katia Scr                Negative                             U Benzodia Scr            Negative                             U Cannab Scr              Negative                             U Cocaine Scr             Negative                             U Opiate Scr              Negative                             U Phencyclidine Scr       Negative  .      Reexamination/ Reevaluation   Time: 11/15/2019 01:09:00 .   Interventions: gravely disabled physician's emergency certificate filled out will place and psychiatric facility.      Impression and Plan   Diagnosis   Psychosis, paranoid ([id number])   Plan   Disposition: Medically cleared, Discharged: To psychiatric facility.    Counseled: Patient, Regarding diagnosis, Regarding diagnostic results, Regarding treatment plan, Patient indicated understanding of instructions.    Notes: I, Zack Singh, acted solely as a scribe for and in the presence of Dr. Saldana who performed the service. ,       This scribes note accurately reflects the work done by me I have reviewed the note and personally performed a history and physical and agree with all the documentation and findings.

## 2022-04-30 NOTE — PSYCH
"   Patient:   Kendall Duff             MRN: [id number]            FIN: [id number]               Age:   54 years     Sex:  Male     :  1965   Associated Diagnoses:   Bipolar disorder, current episode depressed, severe, without psychotic features   Author:   Aly Tomas MD      Chief Complaint    Believe this patient requires inpatient stay. I do not believe this pateint could be adequately treated in a less restrictive setting. I have based my decision on the following information:   Justification for 24 hr care   Psychiatric diagnosis indicated by a DSM-IV diagnosis on Axis I,  Potentially or actively dangerous to self, other or property with need for controlled environment,  Gravely  disabled due to inability to care for self, hallucinations, delusions, agitation, anxiety, depression or other factors      History of Present Illness   cc: "I was feeling bad"    54 year old male who left another psych hospital last week and says he was feeling bad so he went back to the ER. He was anxious and went to ER for chest pain. States he did not feel safe at home and had SI but no plan. No psychosis. No substance use. He did not get meds fill after recent hospital stay.     Psychiatric History: past admits, med noncompliant, history of bipolar  Family Psychiatric History: metal illness  Substance Use History: no recent alcohol or drugs; smokes 1 ppd  Social History: Lives with sister. 3 children. Never . Unemployed. No legal issues. 10th grade.  Medical History: HTN; CAD      Health Status   Current medications:  (Selected)   Inpatient Medications  Ordered  Atarax: 50 mg, form: Tab, Oral, q4hr PRN for anxiety, first dose 19 16:26:00 CST  Ativan 1 mg oral tablet: 2 mg, form: Tab, Oral, q6hr PRN for other (see comment), first dose 19 16:26:00 CST, exacerbation of anxiety  Ativan 2 mg/mL injectable solution: 2 mg, form: Injection, IM, q6hr PRN for other (see comment), first dose 19 " 16:26:00 CST, exacerbation of anxiety  Augmentin 875 mg-125 mg oral tablet: 875 mg, form: Tab, Oral, BID, Order duration: 7 day(s), first dose 12/01/19 21:00:00 CST, stop date 12/08/19 20:59:00 CST  Benadryl: 100 mg, form: Cap, Oral, At Bedtime PRN for insomnia, first dose 11/30/19 16:26:00 CST  Benadryl: 50 mg, form: Cap, Oral, q6hr PRN for other (see comment), first dose 11/30/19 16:26:00 CST, dystonia prevention  Benadryl: 50 mg, form: Injection, IM, q6hr PRN for other (see comment), first dose 11/30/19 16:26:00 CST, dystonia prevention  Haldol 5 mg/mL injectable solution: 10 mg, form: Injection, IM, q6hr PRN for other (see comment), first dose 11/30/19 16:26:00 CST, psychosis exacerbation  Haldol: 10 mg, form: Tab, Oral, q6hr PRN for other (see comment), first dose 11/30/19 16:26:00 CST, psychosis exacerbation  Milk of Magnesia: 30 mL, form: Susp, Oral, Daily PRN for constipation, first dose 11/30/19 16:26:00 CST  Mylanta Max with Simethicone  (400/400/40mg per 5mL) UD Susp: 30 mL, form: Susp, Oral, q4hr PRN for indigestion, first dose 11/30/19 16:26:00 CST  Norvasc: 5 mg, form: Tab, Oral, Daily, first dose 12/01/19 11:00:00 CST  Tylenol 325 mg oral tablet: 650 mg, form: Tab, Oral, q4hr PRN for pain, first dose 11/30/19 16:26:00 CST  acetaminophen: 650 mg, form: Tab, Oral, q4hr PRN for headache, first dose 11/30/19 10:50:00 CST, STAT, (maximum 4000 mg per day)  aspirin 81 mg oral Delayed Release (EC) tablet: 81 mg, form: Tab-EC, Oral, Daily, first dose 12/01/19 11:00:00 CST  cloNIDine: 0.1 mg, form: Tab, Oral, BID, first dose 12/01/19 21:00:00 CST  cloniDINE 0.1 mg oral tablet: 0.1 mg, form: Tab, Oral, Once, first dose 04/20/13 14:27:00 CDT, stop date 04/20/13 14:27:00 CDT, STAT  magnesium citrate: 300 mL, 1 bottle(s) =, form: Soln, Oral, Once, first dose 05/09/16 18:00:00 CDT, stop date 05/09/16 18:00:00 CDT  Prescriptions  Prescribed  [facility] Zyprexa 10 mg oral tablet: 10 mg = 1 tab(s), Oral, QHS Resp, # 30  tab(s), 0 Refill(s), called to pharmacy (Rx)  Norvasc 5 mg oral tablet: 5 mg = 1 tab(s), Oral, Daily-Resp, # 30 tab(s), 0 Refill(s)  Tegretol 200 mg oral tablet: 200 mg = 1 tab(s), Oral, BID-Resp, # 60 tab(s), 0 Refill(s)  Zofran ODT 4 mg oral tablet, disintegratin, Oral, q4hr, # 10 tab(s), 0 Refill(s)  aspirin 81 mg oral Delayed Release (EC) tablet: 81 mg = 1 tab(s), Oral, Daily, # 30 tab(s), 11 Refill(s)  cloniDINE 0.1 mg oral tablet: 0.1 mg = 1 tab(s), Oral, BID-Resp, # 60 tab(s), 0 Refill(s)  nitroglycerin 0.4 mg sublingual TAB: 0.4 mg = 1 tab(s), SL, q5min, PRN PRN chest pain, # 100 tab(s), 3 Refill(s)  Documented Medications  Documented  amoxicillin 875 mg oral tablet: 875 mg = 1 tab(s), Oral, BID, # 20 tab(s), 0 Refill(s)      Physical Examination      Vital Signs (last 24 hrs)_____  Last Charted___________  Temp Oral     36.6 DegC  (DEC 02 06:57)  Heart Rate Peripheral   78 bpm  (DEC 02 06:57)  Resp Rate         18 br/min  (DEC 02 06:57)  SBP      H 150mmHg  (DEC 02 06:57)  DBP      H 91mmHg  (DEC 02 06:57)  SpO2      98 %  (DEC 01 18:15)        Assessment and Plan   Mood:  Depressed, Anxious.    Thought Process:  Appropriate.    Delusions:  Not present.    Speech:  Normal.    Attitude:  Cooperative.    Affect:  Constricted.    Appearance:  Appropriate.    Hallucinations:  Not present.    Motor Activity:  Appropriate.    Attention/Concentration:  Impaired.    Judgement:  Impaired.    Orientation:  Time, Place, Person, Situation.    Memory:          Immediate: 3/3.         Recent: 3/3.         Remote: Intact.    Abstract Thinking:  Age appropriate.    Gross Est. of Intelligence:  Average.    Assets:  Independent.    Insight:  Limited.    Homicidality:  Not present.    Suicidality:  Passive death wishes.       Impression and Plan   Diagnosis     Bipolar disorder, current episode depressed, severe, without psychotic features ([id number] F31.4).     Patient Instructions:       Counseled: Patient, Regarding  treatment, Regarding medications, Importance of compliance with chosen treatment.    Summary:  He had depression and SI, no psychosis. He is frequently in the hospital for similar complaints. Will restart meds and work on discharge planning. He is on abilify maintena and will restart tegretol and prozac. Tegretol level on 12/5/19.    Orders     Orders (Selected)   Inpatient Orders  Ordered  Atarax: 50 mg, form: Tab, Oral, q4hr PRN for anxiety, first dose 11/30/19 16:26:00 CST  Ativan 1 mg oral tablet: 2 mg, form: Tab, Oral, q6hr PRN for other (see comment), first dose 11/30/19 16:26:00 CST, exacerbation of anxiety  Ativan 2 mg/mL injectable solution: 2 mg, form: Injection, IM, q6hr PRN for other (see comment), first dose 11/30/19 16:26:00 CST, exacerbation of anxiety  Augmentin 875 mg-125 mg oral tablet: 875 mg, form: Tab, Oral, BID, Order duration: 7 day(s), first dose 12/01/19 21:00:00 CST, stop date 12/08/19 20:59:00 CST  Benadryl: 100 mg, form: Cap, Oral, At Bedtime PRN for insomnia, first dose 11/30/19 16:26:00 CST  Benadryl: 50 mg, form: Cap, Oral, q6hr PRN for other (see comment), first dose 11/30/19 16:26:00 CST, dystonia prevention  Benadryl: 50 mg, form: Injection, IM, q6hr PRN for other (see comment), first dose 11/30/19 16:26:00 CST, dystonia prevention  Haldol 5 mg/mL injectable solution: 10 mg, form: Injection, IM, q6hr PRN for other (see comment), first dose 11/30/19 16:26:00 CST, psychosis exacerbation  Haldol: 10 mg, form: Tab, Oral, q6hr PRN for other (see comment), first dose 11/30/19 16:26:00 CST, psychosis exacerbation  Milk of Magnesia: 30 mL, form: Susp, Oral, Daily PRN for constipation, first dose 11/30/19 16:26:00 CST  Mylanta Max with Simethicone  (400/400/40mg per 5mL) UD Susp: 30 mL, form: Susp, Oral, q4hr PRN for indigestion, first dose 11/30/19 16:26:00 CST  Norvasc: 10 mg, form: Tab, Oral, Daily, first dose 12/02/19 9:00:00 CST  PROzac: 20 mg, form: Cap, Oral, Daily, first dose  12/02/19 9:00:00 CST  Tegretol 200 mg oral tablet: 200 mg, form: Tab, Oral, BID, first dose 12/02/19 9:00:00 CST  Tylenol 325 mg oral tablet: 650 mg, form: Tab, Oral, q4hr PRN for pain, first dose 11/30/19 16:26:00 CST  acetaminophen: 650 mg, form: Tab, Oral, q4hr PRN for headache, first dose 11/30/19 10:50:00 CST, STAT, (maximum 4000 mg per day)  aspirin 81 mg oral Delayed Release (EC) tablet: 81 mg, form: Tab-EC, Oral, Daily, first dose 12/01/19 11:00:00 CST  cloNIDine: 0.1 mg, form: Tab, Oral, BID, first dose 12/01/19 21:00:00 CST.     Projected Discharge Date:  12/5/2019 .

## 2022-04-30 NOTE — DISCHARGE SUMMARY
Patient:   Kendall Duff             MRN: [id number]            FIN: [id number]               Age:   51 years     Sex:  Male     :  1965   Associated Diagnoses:   None   Author:   Wong Coley MD      Physical Examination   General:  Alert and oriented, No acute distress.    Eye:  Pupils are equal, round and reactive to light, Extraocular movements are intact, Normal conjunctiva.    HENT:  Normocephalic, Normal hearing, Oral mucosa is moist.    Neck:  Supple, No carotid bruit, No jugular venous distention, No thyromegaly.    Respiratory:  Lungs are clear to auscultation, Respirations are non-labored, Breath sounds are equal, No chest wall tenderness.    Cardiovascular:  Normal rate, Regular rhythm, No murmur, No gallop, No edema.    Gastrointestinal:  Soft, Non-tender, Non-distended, Normal bowel sounds, No organomegaly.       Vital Signs (last 24 hrs)_____  Last Charted___________  Temp Oral     36.6 DegC  (:)  Heart Rate Peripheral   75 bpm  (:)  Resp Rate         18 br/min  ( 03:00)  SBP      H 151mmHg  (:)  DBP      90 mmHg  (:)  SpO2      98 %  (:)     Lymphatics:  No lymphadenopathy neck, axilla, groin.    Musculoskeletal:  Normal range of motion, Normal strength, No tenderness, No swelling, Normal gait.    Integumentary:  Warm, Dry, Intact, No pallor, No rash.    Neurologic:  Alert, Oriented, Normal sensory, Normal motor function, No focal deficits, Cranial Nerves II-XII are grossly intact, Normal deep tendon reflexes.    Psychiatric:  Cooperative, Appropriate mood & affect.    Genitourinary:  No costovertebral angle tenderness, No inguinal tenderness.       Hospital Course   Hospital Course   Admitted from: 52 yo AAM with bipolar, schizophrenia, chronic Hep C, and htn was brought in by EMS after he was found yelling in the street.  He admited to smoking Spice earlier.  In ED he complained of racing thoughts and anxiety.  He  received haldol and ativan in ED.  Initial CK was 1,200 and began trending down with aggressive IV fluids . Pt was PEC/CEC'd .  Psych facility will accept a patient with CK levels less than 500.  Rhabdomyolysis now resolved and CK levels less than 500. Blood Pressure medicines were adjusted for adequate control.  Patient during his stay at complained of some blood in the stools, which occult blood stool was tested and was negative.  He also complained of mild right low quadrant pain for which abdominal ultrasound was done and was negative.  H&H remained stable range.  Over the course of his stay patient is now clinically improved and hemodynamically stable for discharge to psych when accepted and arranged.    Discharge diagnosis:  Rhabdomyolosis  Acute agitated psychosis with history of schizophrenia and bipolar  Essential Hypertension  ANA  Hypokalemia  Tobacco abuse/synthetic marijuana abuse  History of Hep C     Discharge to psych when accepted.  Diet = regular  Encourage oral hydration  Activity ad erika.  Follow-up with PCP ×1 week  Further follow-up per psych  Total discharge time = 31 minutes.

## 2022-04-30 NOTE — H&P
"   Patient:   Kendall Duff             MRN: [id number]            FIN: [id number]               Age:   54 years     Sex:  Male     :  1965   Associated Diagnoses:   None   Author:   Josh Bryant MD      Basic Information   Source of history:  Self, Medical record.       Chief Complaint   2019 7:43 CST      pt presents c/o anxiety.  denies si/hi.  denies pain/trauma.  states " i want to drop dead, my body, just something is not right"        History of Present Illness   PEC'd for passive SI. history of somatic complaints per ER notes. He does c/o cough with yellow sputum today. denies fever/chills. does smoke.      Review of Systems   Constitutional:  Negative.    Eye:  Negative.    Ear/Nose/Mouth/Throat:  Negative.    Respiratory:  Cough, Sputum production, No wheezing.    Cardiovascular:  Negative.    Gastrointestinal:  Negative.    Genitourinary:  Negative.    Musculoskeletal:  Negative.    Integumentary:  Negative.    Neurologic:  Negative.       Health Status   Allergies:    Allergic Reactions (Selected)  Severity Not Documented  Depakote- Seizure.  Depakote ER- Seizures.  Lithium- Seizures.  SEROquel- Seizures.,    Allergies (4) Active Reaction  Depakote seizure  Depakote ER seizures  lithium seizures  SEROquel seizures     Problem list:    Active Problems (10)  CVA - Cerebrovascular accident   H/O: depression   Hep C w/o coma, chronic   Knowledge deficit   Malnutrition   Paranoia   Schizoaffective disorder, depressive type   Suicidal ideation   Tobacco user   Tobacco user         Histories   Past Medical History:    Resolved  HTN (hypertension) ([id number]):  Resolved.  Convulsions/seizures ([id number]):  Resolved.  Bipolar ([id number]):  Resolved.  Chronic hepatitis C ([id number]):  Resolved.  Obesity ([id number]):  Resolved.   Family History:    Metastatic cancer  Mother  Liver failure.....  Father     Procedure " history:    Intracoronary Stent Placement 1st Vessel on 8/14/2015 at 49 Years.  Comments:  8/17/2015 14:19 Mae Singh  auto-populated from documented surgical case  Intracoronary Stent Placement 1st Vessel (None) on 8/13/2015 at 49 Years.  Comments:  8/13/2015 16:44 Su Mata RN  auto-populated from documented surgical case  Left Heart Cath w/COR Angio Ventriculography (None) on 8/13/2015 at 49 Years.  Comments:  8/13/2015 16:44 Su Mata RN  auto-populated from documented surgical case   Social History        Social & Psychosocial Habits    Alcohol  05/06/2012 Risk Assessment: Denies Alcohol Use    11/06/2019  Use: Past    Employment/School  12/31/2018  Status: Unemployed    Activity level: Desk/Office    Highest education: High school    Hazardous equipment operation: No    Home/Environment  12/31/2018  Lives with: Siblings    Living situation: Home/Independent    Alcohol abuse in household: No    Substance abuse in household: No    Smoker in household: No    Injuries/Abuse/Neglect in household: No    Feels unsafe at home: No    Safe place to go: Yes    Agency(s)/Others notified: No    Family/Friends available to help: Yes    Concern for family members at home: No    Major illness in household: No    Financial concerns: No    Concerns over TV/Computer/Game use: No    11/06/2019  Lives with: Resides with nephew    Living situation: Home with assistance    Nutrition/Health  12/31/2018  Type of diet: Regular    Wants to lose weight: No    Sleeping concerns: No    Feels highly stressed: Yes    Sexual  12/31/2018  Sexually active: No    First active at age: 10 Years    Current partners: 0    Number of lifetime partners: 5    Do you think of your sexual orientation as: Heterosexual    Uses condoms: No    History of sexual abuse: No    Substance Use  04/20/2013 Risk Assessment: Denies Substance Abuse    Tobacco  05/11/2012 Risk Assessment: High Risk    11/09/2019  Use: Smoker, current  status un    Patient Wants Consult For Cessation Counseling N/A    11/12/2019  Use: 10 or more cigarettes (1/    Patient Wants Consult For Cessation Counseling N/A    11/30/2019  Use: 10 or more cigarettes (1/    Patient Wants Consult For Cessation Counseling No    Abuse/Neglect  07/06/2019  SHX Any signs of abuse or neglect No    11/09/2019  SHX Any signs of abuse or neglect No    11/12/2019  SHX Any signs of abuse or neglect No    11/30/2019  SHX Any signs of abuse or neglect No    Feels unsafe at home: No    Safe place to go: Yes  .        Physical Examination   General:  Alert and oriented, No acute distress.    Eye:  Normal conjunctiva.    HENT:  Oral mucosa is moist, No pharyngeal erythema.    Neck:  Supple, No lymphadenopathy.    Respiratory:  Lungs are clear to auscultation, Respirations are non-labored.    Cardiovascular:  Normal rate, Regular rhythm, No murmur, No gallop, No edema.    Gastrointestinal:  Soft, Non-tender, Non-distended.    Vital Signs   12/1/2019 7:01 CST       Temperature Oral          36.6 DegC                             Peripheral Pulse Rate     88 bpm                             Respiratory Rate          20 br/min                             Systolic Blood Pressure   141 mmHg  HI                             Diastolic Blood Pressure  104 mmHg  HI     Musculoskeletal:  No deformity, Normal gait.    Integumentary:  No rash.    Neurologic:  Normal deep tendon reflexes.         Cranial nerves: II : Optic ( Both eyes, Intact ), III/IV/VI: Oculomotor/trochlear/abducens ( Both eyes, Intact ), V: Trigeminal ( Bilaterally, Intact ), VII: Facial ( Bilateral, Forehead wrinkle ), VIII: Acoustic ( Bilateral ears, Intact ), IX/X:  Glossopharyngeal/Vagus ( Intact, Uvula elevation ), XI: Accessory ( Intact, Trapezius strength ), XII: Hypoglossal ( Intact, Speech ).    Psychiatric:  Cooperative.       Impression and Plan   1. mood disorder- per psychiatry  2. cough- probable mild bronchitis, continue  augmentin listed on home meds  3. htn- resume norvasc on home meds, hold off on clonidine for now  4. hx cva per records- resume aspirin daily

## 2022-04-30 NOTE — H&P
Patient:   Kendall Duff             MRN: [id number]            FIN: [id number]               Age:   51 years     Sex:  Male     :  1965   Associated Diagnoses:   None   Author:   Brandon SOMERS, Silas WANG      Basic Information   Source of history:  Self, Medical record.    Referral source:  Emergency department.    History limitation:  Clinical condition.    Advance directive:  Full code.    PCP Luz Moy MD      History of Present Illness   Chief Complaint - found yelling in the street  50 yo WM with bipolar, schizophrenia, chronic Hep C, and htn was brought in by EMS after he was found yelling in the street.  He admited to smoking Spice earlier.  In ED he complained of racing thoughts and anxiety.  He received haldol and ativan in ED and is currently sleeping.  Initial CK was 1,200 and began trending down with aggressive IV fluids and he was cleared for transfer to psych facility, however last CK had increased and thus he was admitted for observation.  Patient is under PEC.  UDS was negative      Review of Systems   Unable to obtain:  Due to clinical condition.       Health Status   Allergies:    Allergic Reactions (Selected)  Severity Not Documented  Depakote- No reactions were documented.  Lithium- No reactions were documented.  SEROquel- No reactions were documented.   Current medications:  (Selected)   Inpatient Medications  Ordered  Ativan 2 mg oral tablet: 2 mg, form: Tab, Oral, TID PRN for as needed for anxiety, first dose 17 14:37:00 CDT,   Ativan: 2 mg, form: Injection, IM, q6hr PRN for restlessness, first dose 17 23:41:00 CDT, ,  Benadryl: 50 mg, form: Injection, IM, q8hr PRN for other (see comment), first dose 17 23:41:00 CDT, dystonia symptoms (may give IV, IM, or PO), ,  Haldol: 5 mg, form: Injection, IM, q4hr PRN for agitation, first dose 17 23:41:00 CDT, may give IM or PO, ,051  IVF Normal Saline NS Bolus 1000ml 2,000 mL: 2,000 mL, 2,000 mL,  IV, 2,000 mL/hr, start date 06/24/17 22:08:00 CDT  Normal Saline (0.9% NS) IV 1,000 mL: 1,000 mL, 1,000 mL, IV, 250 mL/hr, start date 06/25/17 3:04:00 CDT  Normal Saline (0.9% NS) IV 1000 mL: 1,000 mL, 1,000 mL, IV, 250 mL/hr, start date 06/25/17 20:42:00 CDT  acetaminophen: 650 mg, form: Tab, Oral, q4hr PRN for headache, first dose 06/24/17 23:41:00 CDT, (maximum 4000 mg per day), 26,051  cloniDINE 0.1 mg oral tablet: 0.1 mg, form: Tab, Oral, Once, first dose 04/20/13 14:27:00 CDT, stop date 04/20/13 14:27:00 CDT, STAT  magnesium citrate: 300 mL, 1 bottle(s) =, form: Soln, Oral, Once, first dose 05/09/16 18:00:00 CDT, stop date 05/09/16 18:00:00 CDT  Prescriptions  Prescribed  risperidone 3 mg oral tablet: 3 mg = 1 tab(s), Oral, BID, # 28 tab(s), 0 Refill(s)  Documented Medications  Documented  Invega Sustenna 156 mg/mL intramuscular suspension, extended release: 0 Refill(s)  OMEPRAZOLE 40MG CAP: 40 mg = 1 cap(s), Oral, Daily  Prilosec 40 mg oral DR capsule: 40 mg = 1 cap(s), Oral, Daily, 0 Refill(s)  SERTRALINE 50MG TAB: 50 mg = 1 tab(s), Oral, Daily  Trileptal 600 mg oral tablet: 600 mg = 1 tab(s), Oral, BID, # 60 tab(s), 0 Refill(s)  Vistaril 50 mg oral capsule: 50 mg = 1 cap(s), Oral, QID, 0 Refill(s)  Zestril 10 mg oral tablet: 10 mg = 1 tab(s), Oral, Daily, # 30 tab(s), 0 Refill(s)  Zoloft 50 mg oral tablet: 50 mg = 1 tab(s), Oral, Daily, 0 Refill(s)  amLODIPine 10 mg oral tablet: 10 mg = 1 tab(s), Oral, At Bedtime, # 30 tab(s), 0 Refill(s)  traZODone 100 mg oral tablet: Oral, At Bedtime, 0 Refill(s)      Histories   Past Medical History:Schizophrenia, bipolar, chronic Hep C  Past Surgical History: negative  Family History: negative  Social History:  smokes 1 ppd x 30 yrs, denies alcohol, admits to smoking spice, but denies other drug use         Physical Examination   Vital Signs   6/26/2017 0:00 CDT       Peripheral Pulse Rate     69 bpm                             Respiratory Rate          18 br/min                              SpO2                      96 %                             Systolic Blood Pressure   138 mmHg                             Diastolic Blood Pressure  94 mmHg  HI                             Blood Pressure Location   Right arm                             Blood Pressure Cuff Size  Adult long large    6/25/2017 20:00 CDT      Peripheral Pulse Rate     74 bpm                             Respiratory Rate          18 br/min     General:  No acute distress, sedated and sleeping.    Eye:  Normal conjunctiva.    HENT:  Normocephalic.    Neck:  No carotid bruit, No jugular venous distention.    Respiratory:  Lungs are clear to auscultation, Respirations are non-labored, Breath sounds are equal.    Cardiovascular:  Normal rate, Regular rhythm, No murmur.    Gastrointestinal:  Soft, Non-tender, Non-distended, Normal bowel sounds.    Genitourinary:  No costovertebral angle tenderness.    Lymphatics:  No lymphadenopathy neck, axilla, groin.    Musculoskeletal:  No tenderness, No swelling.    Integumentary:  Warm, Dry, Intact.    Neurologic:  No focal deficits.       Review / Management   Results review:  All Results   6/25/2017 19:58 CDT      Total CK                  805 unit/L  HI    6/25/2017 14:10 CDT      Total CK                  786 unit/L  HI    6/25/2017 7:25 CDT       Total CK                  860 unit/L  HI    6/25/2017 2:11 CDT       Total CK                  887 unit/L  HI    6/24/2017 21:51 CDT      U Amph Scr                NEG                             U Katia Scr                NEG                             U Benzodia Scr            NEG                             U Cannab Scr              NEG                             U Cocaine Scr             NEG                             U Opiate Scr              NEG                             U Phencyclidine Scr       NEG    6/24/2017 20:57 CDT      Sodium Lvl                137 mmol/L                             Potassium Lvl             3.2 mmol/L   LOW                             Chloride                  100 mmol/L                             CO2                       21.0 mmol/L                             Calcium Lvl               9.0 mg/dL                             Glucose Lvl               131 mg/dL  HI                             BUN                       37.0 mg/dL  HI                             Creatinine                1.63 mg/dL  HI                             eGFR-AA                   57 mL/min/1.73 m2  NA                             eGFR-JAYDON                  47 mL/min/1.73 m2  NA                             Bili Total                0.5 mg/dL                             Bili Direct               0.20 mg/dL                             Bili Indirect             0.30 mg/dL                             AST                       46 unit/L  HI                             ALT                       46 unit/L                             Alk Phos                  85 unit/L                             Total Protein             8.1 gm/dL                             Albumin Lvl               4.10 gm/dL                             Globulin                  4.00 gm/dL  HI                             A/G Ratio                 1.0 ratio  LOW                             Total CK                  1,212 unit/L  HI                             TSH                       1.300 mIU/mL                             Acetaminoph Lvl           <2.0 mcg/mL  LOW                             Salicylate Lvl            2.7 mg/dL  LOW                             Ethanol Lvl               <3.0 mg/dL  .    Course:  Progressing as expected.       Impression and Plan   52 yo WM with  1)rhabdomyolosis   continue aggressive IV fluids.  repeat CK this AM.  If improved he can be transferred out to inpatient psych facility  2)acute agitated psychosis with history of schizophrenia and bipolar   likely exacerbated by smoking Spice.  haldol and ativan prn.  resume his home antipsychotics.   Patient is under PEC  3)ANA   aggressive IV hydration.  repeat bmp this AM  4)hypokalemia   kcl 40meq IV.  check Mg  5)tobacco abuse   counseled.  nicotine patch  6)history of Hep C   documented in medical record.  no hep panel in [facility].  Will get hep panel in AM for confirmation  7)prevention   scd's for dvt prophylaxis.      Admission took 74 minutes

## 2022-05-01 LAB — VANCOMYCIN TROUGH SERPL-MCNC: 30 UG/ML (ref 15–20)

## 2022-05-01 PROCEDURE — 25000003 PHARM REV CODE 250

## 2022-05-01 PROCEDURE — 63600175 PHARM REV CODE 636 W HCPCS

## 2022-05-01 PROCEDURE — A4216 STERILE WATER/SALINE, 10 ML: HCPCS

## 2022-05-01 PROCEDURE — 36415 COLL VENOUS BLD VENIPUNCTURE: CPT

## 2022-05-01 PROCEDURE — 20000000 HC ICU ROOM

## 2022-05-01 PROCEDURE — 27000124 HC DRESSING, WOUND VAC

## 2022-05-01 PROCEDURE — 27000221 HC OXYGEN, UP TO 24 HOURS

## 2022-05-01 PROCEDURE — 27200966 HC CLOSED SUCTION SYSTEM

## 2022-05-01 PROCEDURE — 99990 CHARGE CONVERSION: CPT

## 2022-05-01 PROCEDURE — 25000003 PHARM REV CODE 250: Performed by: INTERNAL MEDICINE

## 2022-05-01 PROCEDURE — 63600175 PHARM REV CODE 636 W HCPCS: Performed by: INTERNAL MEDICINE

## 2022-05-01 PROCEDURE — 80202 ASSAY OF VANCOMYCIN: CPT

## 2022-05-01 PROCEDURE — 94003 VENT MGMT INPAT SUBQ DAY: CPT

## 2022-05-01 RX ORDER — DEXMEDETOMIDINE HYDROCHLORIDE 4 UG/ML
0-1.4 INJECTION, SOLUTION INTRAVENOUS CONTINUOUS
Status: DISCONTINUED | OUTPATIENT
Start: 2022-05-01 | End: 2022-05-03

## 2022-05-01 RX ORDER — DEXMEDETOMIDINE HYDROCHLORIDE 4 UG/ML
INJECTION, SOLUTION INTRAVENOUS
Status: COMPLETED
Start: 2022-05-01 | End: 2022-05-06

## 2022-05-01 RX ORDER — MUPIROCIN 20 MG/G
OINTMENT TOPICAL 2 TIMES DAILY
Status: DISPENSED | OUTPATIENT
Start: 2022-05-01 | End: 2022-05-06

## 2022-05-01 RX ADMIN — RIFAMPIN 300 MG: 300 CAPSULE ORAL at 09:05

## 2022-05-01 RX ADMIN — VANCOMYCIN HYDROCHLORIDE 1250 MG: 1.25 INJECTION, POWDER, LYOPHILIZED, FOR SOLUTION INTRAVENOUS at 06:05

## 2022-05-01 RX ADMIN — BENZTROPINE MESYLATE 1 MG: 1 TABLET ORAL at 02:05

## 2022-05-01 RX ADMIN — DEXMEDETOMIDINE HYDROCHLORIDE 1.4 MCG/KG/HR: 400 INJECTION INTRAVENOUS at 09:05

## 2022-05-01 RX ADMIN — BENZTROPINE MESYLATE 1 MG: 1 TABLET ORAL at 09:05

## 2022-05-01 RX ADMIN — DEXMEDETOMIDINE HYDROCHLORIDE 1.2 MCG/KG/HR: 400 INJECTION INTRAVENOUS at 09:05

## 2022-05-01 RX ADMIN — FAMOTIDINE 20 MG: 10 INJECTION, SOLUTION INTRAVENOUS at 09:05

## 2022-05-01 RX ADMIN — LEVETIRACETAM: 100 INJECTION, SOLUTION INTRAVENOUS at 09:05

## 2022-05-01 RX ADMIN — LEVETIRACETAM 500 MG: 100 INJECTION, SOLUTION INTRAVENOUS at 09:05

## 2022-05-01 RX ADMIN — PROPOFOL 50 MCG/KG/MIN: 10 INJECTION, EMULSION INTRAVENOUS at 02:05

## 2022-05-01 RX ADMIN — SODIUM CHLORIDE, PRESERVATIVE FREE 10 ML: 5 INJECTION INTRAVENOUS at 06:05

## 2022-05-01 RX ADMIN — PROPOFOL 50 MCG/KG/MIN: 10 INJECTION, EMULSION INTRAVENOUS at 09:05

## 2022-05-01 RX ADMIN — ENOXAPARIN SODIUM 40 MG: 40 INJECTION SUBCUTANEOUS at 09:05

## 2022-05-01 RX ADMIN — OXCARBAZEPINE 300 MG: 300 TABLET, FILM COATED ORAL at 09:05

## 2022-05-01 RX ADMIN — HALOPERIDOL 5 MG: 5 TABLET ORAL at 09:05

## 2022-05-01 RX ADMIN — VANCOMYCIN HYDROCHLORIDE 1250 MG: 1.25 INJECTION, POWDER, LYOPHILIZED, FOR SOLUTION INTRAVENOUS at 02:05

## 2022-05-01 RX ADMIN — SODIUM CHLORIDE, PRESERVATIVE FREE 10 ML: 5 INJECTION INTRAVENOUS at 09:05

## 2022-05-01 RX ADMIN — SODIUM CHLORIDE, PRESERVATIVE FREE 10 ML: 5 INJECTION INTRAVENOUS at 02:05

## 2022-05-01 RX ADMIN — DOXEPIN HYDROCHLORIDE 50 MG: 50 CAPSULE ORAL at 09:05

## 2022-05-01 RX ADMIN — EZETIMIBE 10 MG: 10 TABLET ORAL at 09:05

## 2022-05-01 RX ADMIN — DEXMEDETOMIDINE HYDROCHLORIDE 1.4 MCG/KG/HR: 400 INJECTION INTRAVENOUS at 06:05

## 2022-05-01 RX ADMIN — VANCOMYCIN HYDROCHLORIDE 1250 MG: 1.25 INJECTION, POWDER, LYOPHILIZED, FOR SOLUTION INTRAVENOUS at 09:05

## 2022-05-01 RX ADMIN — PROPOFOL 50 MCG/KG/MIN: 10 INJECTION, EMULSION INTRAVENOUS at 06:05

## 2022-05-01 RX ADMIN — MUPIROCIN: 20 OINTMENT TOPICAL at 09:05

## 2022-05-01 RX ADMIN — DEXMEDETOMIDINE HYDROCHLORIDE 1.4 MCG/KG/HR: 400 INJECTION INTRAVENOUS at 02:05

## 2022-05-01 RX ADMIN — Medication 0.02 MCG/KG/MIN: at 09:05

## 2022-05-01 RX ADMIN — ASPIRIN 81 MG CHEWABLE TABLET 81 MG: 81 TABLET CHEWABLE at 09:05

## 2022-05-01 NOTE — PROGRESS NOTES
[facility] [address]  Critical Care - Medicine  Progress Note    Patient Name: Kendall Duff  MRN: [id number]  Admission Date: 4/1/2022  Hospital Length of Stay: 30 days  Code Status: Prior  Attending Provider: Carroll Hammer Jr., MD  Primary Care Provider: Primary Doctor No   Principal Problem: <principal problem not specified>    Subjective:     HPI: 55 yo admitted 4/1 and underwent CABG x 4 4/6.  Hx of schizophrenia and BPAD    Hospital/ICU Course: Developed MRSA sternal wound  And underwent debridement with wound vac on 4/17.  Putum culture from 4/26 with Klebsiella and proteus    Interval History/Significant Events:Remains intubated overnight.  On low dose vaspressors    Objective:     Vital Signs (Most Recent):  Temp: 99.1 °F (37.3 °C) (05/01/22 0700)  Pulse: 64 (05/01/22 0705)  Resp: (!) 22 (05/01/22 0705)  BP: 118/77 (05/01/22 0700)  SpO2: 98 % (05/01/22 0705) Vital Signs (24h Range):  Temp:  [97.7 °F (36.5 °C)-99.1 °F (37.3 °C)] 99.1 °F (37.3 °C)  Pulse:  [59-67] 64  Resp:  [17-33] 22  SpO2:  [95 %-100 %] 98 %  BP: ()/(63-89) 118/77     Weight: 76.1 kg (167 lb 12.3 oz)  Body mass index is 23.49 kg/m².      Intake/Output Summary (Last 24 hours) at 5/1/2022 0945  Last data filed at 5/1/2022 0532  Gross per 24 hour   Intake 1344 ml   Output 900 ml   Net 444 ml       Review of Systems     Physical Exam  Constitutional:       General: He is not in acute distress.     Comments: intubated   HENT:      Head: Normocephalic.   Cardiovascular:      Rate and Rhythm: Normal rate.   Pulmonary:      Effort: No respiratory distress.      Breath sounds: No stridor. Rhonchi present. No wheezing or rales.      Comments: Central wound vac  Neurological:      Comments: Sedated with precidex and propofol         Vents:  Vent Mode: A/C (05/01/22 0412)  Set Rate: 16 BPM (05/01/22 0412)  Vt Set: 450 mL (05/01/22 0412)  PEEP/CPAP: 5 cmH20 (05/01/22 0412)  Peak Airway Pressure: 22 cmH2O (05/01/22 0412)  Total  Ve: 12.2 mL (05/01/22 0412)  F/VT Ratio<105 (RSBI): (!) 68.65 (05/01/22 0412)    Lines/Drains/Airways     Peripherally Inserted Central Catheter Line  Duration           PICC Triple Lumen 05/01/22 0523 right basilic <1 day          Airway  Duration                Airway - Non-Surgical 05/01/22 0524 Endotracheal Tube <1 day          Peripheral Intravenous Line  Duration                Peripheral IV - Single Lumen 05/01/22 0522 18 G Anterior;Left;Proximal Forearm <1 day                  Assessment/Plan:     Assessment:  1. 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 MRSA Sternal wound dehiscence with debridement and wound vac 4/17  3. Respiratory culture positive Klebsiella and Proteus  4. Malnutrition.    Plan  - Continue MV support.  Adjust accordingly  - Enteral tube feeds  - Pepcid and lovenox prophylaxis        Critical care was time spent personally by me on the following activities: development of treatment plan with patient or surrogate and bedside caregivers, discussions with consultants, evaluation of patient's response to treatment, examination of patient, ordering and performing treatments and interventions, ordering and review of laboratory studies, ordering and review of radiographic studies, pulse oximetry, re-evaluation of patient's condition.  This critical care time did not overlap with that of any other provider or involve time for any procedures.     Sukhjinder Michael MD  Critical Care - Medicine  [facility] - [facility]

## 2022-05-02 LAB
PREALB SERPL-MCNC: 13 MG/DL (ref 18–45)
VANCOMYCIN TROUGH SERPL-MCNC: 17 UG/ML (ref 15–20)
VANCOMYCIN TROUGH SERPL-MCNC: 44.6 UG/ML (ref 15–20)

## 2022-05-02 PROCEDURE — 25000003 PHARM REV CODE 250

## 2022-05-02 PROCEDURE — 27200966 HC CLOSED SUCTION SYSTEM

## 2022-05-02 PROCEDURE — 27000124 HC DRESSING, WOUND VAC

## 2022-05-02 PROCEDURE — 84134 ASSAY OF PREALBUMIN: CPT | Performed by: SURGERY

## 2022-05-02 PROCEDURE — 25000003 PHARM REV CODE 250: Performed by: INTERNAL MEDICINE

## 2022-05-02 PROCEDURE — 63600175 PHARM REV CODE 636 W HCPCS: Performed by: INTERNAL MEDICINE

## 2022-05-02 PROCEDURE — 36415 COLL VENOUS BLD VENIPUNCTURE: CPT | Performed by: SURGERY

## 2022-05-02 PROCEDURE — 36415 COLL VENOUS BLD VENIPUNCTURE: CPT | Performed by: INTERNAL MEDICINE

## 2022-05-02 PROCEDURE — 27000221 HC OXYGEN, UP TO 24 HOURS

## 2022-05-02 PROCEDURE — 80202 ASSAY OF VANCOMYCIN: CPT | Performed by: INTERNAL MEDICINE

## 2022-05-02 PROCEDURE — 63600175 PHARM REV CODE 636 W HCPCS

## 2022-05-02 PROCEDURE — 97606 NEG PRS WND THER DME>50 SQCM: CPT

## 2022-05-02 PROCEDURE — 94761 N-INVAS EAR/PLS OXIMETRY MLT: CPT

## 2022-05-02 PROCEDURE — A4216 STERILE WATER/SALINE, 10 ML: HCPCS

## 2022-05-02 PROCEDURE — 94003 VENT MGMT INPAT SUBQ DAY: CPT

## 2022-05-02 PROCEDURE — 20000000 HC ICU ROOM

## 2022-05-02 RX ADMIN — PROPOFOL 50 MCG/KG/MIN: 10 INJECTION, EMULSION INTRAVENOUS at 08:05

## 2022-05-02 RX ADMIN — VANCOMYCIN HYDROCHLORIDE 1250 MG: 1.25 INJECTION, POWDER, LYOPHILIZED, FOR SOLUTION INTRAVENOUS at 05:05

## 2022-05-02 RX ADMIN — VANCOMYCIN HYDROCHLORIDE 1250 MG: 1.25 INJECTION, POWDER, LYOPHILIZED, FOR SOLUTION INTRAVENOUS at 10:05

## 2022-05-02 RX ADMIN — FAMOTIDINE 20 MG: 10 INJECTION, SOLUTION INTRAVENOUS at 08:05

## 2022-05-02 RX ADMIN — SODIUM CHLORIDE, PRESERVATIVE FREE 10 ML: 5 INJECTION INTRAVENOUS at 12:05

## 2022-05-02 RX ADMIN — EZETIMIBE 10 MG: 10 TABLET ORAL at 08:05

## 2022-05-02 RX ADMIN — DEXMEDETOMIDINE HYDROCHLORIDE 1.4 MCG/KG/HR: 400 INJECTION, SOLUTION INTRAVENOUS at 03:05

## 2022-05-02 RX ADMIN — SODIUM CHLORIDE, PRESERVATIVE FREE 10 ML: 5 INJECTION INTRAVENOUS at 06:05

## 2022-05-02 RX ADMIN — ATORVASTATIN CALCIUM 80 MG: 40 TABLET, FILM COATED ORAL at 09:05

## 2022-05-02 RX ADMIN — BENZTROPINE MESYLATE 1 MG: 1 TABLET ORAL at 09:05

## 2022-05-02 RX ADMIN — DEXMEDETOMIDINE HYDROCHLORIDE 1.4 MCG/KG/HR: 400 INJECTION INTRAVENOUS at 03:05

## 2022-05-02 RX ADMIN — OXCARBAZEPINE 300 MG: 300 TABLET, FILM COATED ORAL at 09:05

## 2022-05-02 RX ADMIN — METOPROLOL TARTRATE 25 MG: 25 TABLET, FILM COATED ORAL at 09:05

## 2022-05-02 RX ADMIN — HALOPERIDOL 5 MG: 5 TABLET ORAL at 08:05

## 2022-05-02 RX ADMIN — SODIUM CHLORIDE, PRESERVATIVE FREE 10 ML: 5 INJECTION INTRAVENOUS at 05:05

## 2022-05-02 RX ADMIN — SODIUM CHLORIDE, PRESERVATIVE FREE 10 ML: 5 INJECTION INTRAVENOUS at 08:05

## 2022-05-02 RX ADMIN — PROPOFOL 50 MCG/KG/MIN: 10 INJECTION, EMULSION INTRAVENOUS at 01:05

## 2022-05-02 RX ADMIN — MUPIROCIN: 20 OINTMENT TOPICAL at 09:05

## 2022-05-02 RX ADMIN — PROPOFOL 50 MCG/KG/MIN: 10 INJECTION, EMULSION INTRAVENOUS at 03:05

## 2022-05-02 RX ADMIN — OXCARBAZEPINE 300 MG: 300 TABLET, FILM COATED ORAL at 08:05

## 2022-05-02 RX ADMIN — ASPIRIN 81 MG CHEWABLE TABLET 81 MG: 81 TABLET CHEWABLE at 09:05

## 2022-05-02 RX ADMIN — FAMOTIDINE 20 MG: 10 INJECTION, SOLUTION INTRAVENOUS at 09:05

## 2022-05-02 RX ADMIN — DOXEPIN HYDROCHLORIDE 50 MG: 50 CAPSULE ORAL at 08:05

## 2022-05-02 RX ADMIN — VANCOMYCIN HYDROCHLORIDE 1250 MG: 1.25 INJECTION, POWDER, LYOPHILIZED, FOR SOLUTION INTRAVENOUS at 02:05

## 2022-05-02 RX ADMIN — PROPOFOL 50 MCG/KG/MIN: 10 INJECTION, EMULSION INTRAVENOUS at 05:05

## 2022-05-02 RX ADMIN — DEXMEDETOMIDINE HYDROCHLORIDE 1.4 MCG/KG/HR: 400 INJECTION, SOLUTION INTRAVENOUS at 05:05

## 2022-05-02 RX ADMIN — DEXMEDETOMIDINE HYDROCHLORIDE 1.4 MCG/KG/HR: 400 INJECTION INTRAVENOUS at 05:05

## 2022-05-02 RX ADMIN — ENOXAPARIN SODIUM 40 MG: 40 INJECTION SUBCUTANEOUS at 09:05

## 2022-05-02 RX ADMIN — MUPIROCIN: 20 OINTMENT TOPICAL at 08:05

## 2022-05-02 RX ADMIN — HALOPERIDOL 5 MG: 5 TABLET ORAL at 09:05

## 2022-05-02 RX ADMIN — LEVETIRACETAM 500 MG: 100 INJECTION, SOLUTION INTRAVENOUS at 09:05

## 2022-05-02 RX ADMIN — RIFAMPIN 300 MG: 300 CAPSULE ORAL at 08:05

## 2022-05-02 RX ADMIN — CEFTRIAXONE SODIUM 2 G: 2 INJECTION, POWDER, FOR SOLUTION INTRAMUSCULAR; INTRAVENOUS at 12:05

## 2022-05-02 RX ADMIN — DEXMEDETOMIDINE HYDROCHLORIDE 1.4 MCG/KG/HR: 400 INJECTION, SOLUTION INTRAVENOUS at 08:05

## 2022-05-02 RX ADMIN — LEVETIRACETAM 500 MG: 100 INJECTION, SOLUTION INTRAVENOUS at 08:05

## 2022-05-02 RX ADMIN — RIFAMPIN 300 MG: 300 CAPSULE ORAL at 09:05

## 2022-05-02 RX ADMIN — BENZTROPINE MESYLATE 1 MG: 1 TABLET ORAL at 10:05

## 2022-05-02 RX ADMIN — DEXMEDETOMIDINE HYDROCHLORIDE 1.4 MCG/KG/HR: 400 INJECTION INTRAVENOUS at 08:05

## 2022-05-02 NOTE — HISTORICAL [facility]
This is a historical note converted from [facility]. Formatting and pictures may have been removed.  Please reference [facility] for original formatting and attached multimedia. Admit and Discharge Dates  Admit Date: 01/06/2022  Discharge Date: 01/07/2022  Physicians  Attending Physician - Ata SOMERS, Belinda WANG  Admitting Physician - Ata SOMERS, Belinda WANG  Consulting Physician - Viral SOMERS, Costa  Primary Care Physician - Tejal Mobley NP  Discharge Diagnosis  ?atypical chest pain  CAD stent  HTN  Bipolar  Hepatitis C  Noncompliance  Surgical Procedures  No procedures recorded for this visit.  Immunizations  No immunizations recorded for this visit.  Admission Information  Mr. Duff is a 48-year-old male?who presented to the ED?with c/o midsternal chest pain starting this morning, along with?mild SOB and nausea.? Patient states that it lasted?a couple of hours?and was relieved?by nitroglycerin and aspirin given by EMS.? Patient states that whenever?the pain started he was not doing anything.? ED lab work today was unremarkable. ?Troponin negative.? EKG showed NSR. ?Chest x-ray negative.? Patient has not had any recurrence of pain.? Pain not reproducible with palpation.??Patient was moderately hypertensive on arrival to ED, but normalized. ?Cardiology was consulted in the ED. ?The patient was admitted to hospital medicine for management. [1]  ?  ?  Essentially admitted for work-up of chest pain.?Cardiology has seen him this morning and cleared him for discharge with outpatient follow-up.?He will follow-up with Dr. Wright. I have prescribed him aspirin as well?in addition to his lisinopril 20 mg which he normally takes at home but ran out of it?for the last couple of days.?Otherwise well be sending him home today.?Troponin x3 has been negative.?Patient denied having any complaints this morning no chest pain or shortness of breath.  Time Spent on discharge  35 minutes  Objective  Vitals & Measurements  T:?36.8? ?C (Oral)? TMIN:?36.4? ?C  (Oral)? TMAX:?36.8? ?C (Oral)? HR:?66(Peripheral)? RR:?18? BP:?149/98? SpO2:?100%? WT:?76.5?kg? BMI:?25.27?  Physical Exam  General: In no acute distress, afebrile  Chest: Clear to auscultation bilaterally  Heart: S1, S2, no appreciable murmur  Abdomen: Soft, nontender, BS +  MSK: Warm, no lower extremity edema, no clubbing or cyanosis  Neurologic: Alert and oriented x4, moving all extremities with good strength  Patient Discharge Condition  Improved and stable  Home   Discharge Medication Reconciliation  Prescribed  aspirin (aspirin 81 mg oral Delayed Release (EC) tablet)?81 mg, Oral, Daily  lisinopril (lisinopril 20 mg oral tablet)?20 mg, Oral, Daily  Continue  aripiprazole (ARIPiprazole 2 mg oral tablet)?2 mg, Oral, Daily  atorvastatin (atorvastatin 80 mg oral tablet)?80 mg, Oral, Daily  doxepin (doxepin 25 mg oral capsule)?25 mg, Oral, qPM  nystatin topical (Nystop 100,000 units/g topical powder)?1 elizabeth, TOP, BID  pravastatin (pravastatin 20 mg oral tablet)?20 mg, Oral, Daily  sofosbuvir-velpatasvir (Epclusa 400 mg-100 mg oral tablet)?1 tab(s), Oral, Daily  Discontinue  OLANZapine (olanzapine 10 mg oral tablet)?10 mg, Oral, qPM  aripiprazole (Abilify Maintena Prefilled Syringe 400 mg intramuscular injection, extended release)?400 mg, IM, qMonth  clopidogrel (Plavix 75 mg oral tablet)?75 mg, Oral, Daily  doxepin (doxepin 50 mg oral capsule)?50 mg, Oral, qPM  ezetimibe (ezetimibe 10 mg oral tablet)?10 mg, Oral, Daily  lisinopril (lisinopril 40 mg oral tablet)?40 mg, Oral, Daily  Education and Orders Provided  Angina, Easy-to-Read  Discharge - 01/07/22 12:11:00 CST, Home, Give all scheduled vaccinations prior to discharge.?  Discharge Activity - Activity as Tolerated?  Discharge Diet - Low Sodium?  Follow up  Anthony Wright, within 5 to 7 days  Car Seat Challenge  No Qualifying Data     [1]?Admission H & P; Amelia Garcia 01/06/2022 21:22 CST

## 2022-05-02 NOTE — HISTORICAL [facility]
This is a historical note converted from Cerall. Formatting and pictures may have been removed.  Please reference Cerall for original formatting and attached multimedia. Admit and Discharge Dates  Admit Date: 12/20/2019  Discharge Date: 12/23/2019  Physicians  Attending Physician - Lee Gil MD  Admitting Physician - Lee Gil MD  Primary Care Physician - No PCP, No  Discharge Diagnosis  Hallucination?R44.3  Medical non-compliance?Z91.19  Rhabdomyolysis?M62.82  Schizoaffective psychosis?F25.9  Surgical Procedures  No procedures recorded for this visit.  Immunizations  No immunizations recorded for this visit.  Admission Information  ?  ?  Mr. Duff is a 54-year-old -American male with past mental history of bipolar disorder and schizoaffective disorder presents to the emergency department after found walking in the road not answering questions.? EMS states that he was unaware of his surroundings and initially was nonverbal but when he arrived to the emergency department he started speaking.? He denied any chest pain, shortness of breath, fever, chills or any suicidal or homicidal ideations.? Patient does have a past medical history of cocaine abuse, however UDS is negative.? Upon arrival into the ED patient was mildly tachycardic but hemodynamically stable and afebrile.? Laboratory work remarkable for a CK of 750, WBCs 13,700 with a left shift, EtOH 10, UDS negative.? Patient was given IV fluids but he is still altered and unable to clearly communicate and therefore he is being admitted to the hospitalist service for further management.? Of note, patient was admitted to inpatient psychiatric facility earlier this month for bipolar disorder with suicidal ideations.  Patient was kept?under PEC?during this hospitalization. ?His mentation had improved. ?However did complain of some hallucinations.? Say that?he does not have money for his medications and he is not able to get his medications as outpatient  once he is discharged from the psych facility.? We will go ahead and discharge him back to psych facility?for?again reinitiation of his medication adjustments of his medication.? Hopefully he can get some?kind of?help from case management?there?to help get his medications.  Time Spent on discharge  35 minutes  Objective  Vitals & Measurements  T:?36.5? ?C (Oral)? TMIN:?36.4? ?C (Oral)? TMAX:?37? ?C (Oral)? HR:?67(Peripheral)? RR:?18? BP:?153/90? SpO2:?97%?  Physical Exam  General:?In no acute distress, afebrile  Chest: Clear to auscultation bilaterally  Heart:?S1, S2, no appreciable murmur  Abdomen:?Soft, nontender, BS +  MSK:?Warm, no lower extremity edema  Neurologic:?Alert and oriented x2?not oriented to?time?and situation, moving all extremities with good strength, difficult to understand speech  Psych: Hallucinations +,?denies suicidal or?homicidal ideations, denies?thoughts of harming others  Patient Discharge Condition  Improved and stable  Discharge Disposition  [facility]   Discharge Medication Reconciliation  Continue  FLUoxetine (Prozac 20 mg oral capsule)?20 mg, Oral, Daily  amLODIPine (amLODIPine 5 mg oral tablet)?5 mg, Oral, Daily  aripiprazole (Abilify Maintena Prefilled Syringe 400 mg intramuscular injection, extended release)?400 mg, IM, qMonth  carBAMazepine (Tegretol 200 mg oral tablet)?200 mg, Oral, BID  mirtazapine (Remeron 15 mg oral tablet)?15 mg, Oral, At Bedtime  propranolol (propranolol 10 mg oral tablet)?10 mg, Oral, BID  Discontinue  OLANZapine (olanzapine 10 mg oral tablet)?10 mg, Oral, qPM  amLODIPine (Norvasc 10 mg oral tablet)?10 mg, Oral, Daily  amoxicillin-clavulanate (Augmentin 875 mg-125 mg oral tablet)?1 tab(s), Oral, BID  aspirin (aspirin 81 mg oral Delayed Release (EC) tablet)?81 mg, Oral, Daily  benzonatate (benzonatate 100 mg oral capsule)?100 mg, Oral, TID  cloNIDine (cloniDINE 0.1 mg oral tablet)?0.1 mg, Oral, BID  multivitamin (Multiple  Vitamins oral tablet)?1 tab(s), Oral, Daily  temazepam (temazepam 30 mg oral capsule)?30 mg, Oral, qPM  traZODone (traZODone 100 mg oral tablet)?100 mg, Oral, TID  Education and Orders Provided  Discharge - 12/23/19 8:37:00 CST, Discharge to Psychiatric Facility, Give all scheduled vaccinations prior to discharge.?  Discharge Activity - Activity as Tolerated?  Discharge Diet - Low Sodium?  Follow up  [facility] [address] [phone]     [1] Progress/SOAP Note; Jeffery SOMERS, Lee SALGADO 12/22/2019 11:30 CST

## 2022-05-02 NOTE — HISTORICAL [facility]
This is a historical note converted from Neo. Formatting and pictures may have been removed.  Please reference Cerall for original formatting and attached multimedia. Chief Complaint  Fatigue, Weakness  History of Present Illness  Mr Kendall Duff is a 54 year old gentleman with a PMH of Hypertension, Bipolar Disorder, Tobacco Smoking?who presented to the [facility] ER on 10/12 with complaints of fatigue and weakness after a 2 week spell of not eating or drinking water. Mr Duff was accompanied by a  from the retirement where he is incarcerated for the last 60 days who helped with most of the history. Mr Duff was reported to have been refusing his regular medications as well as most of his meals. He denied having any fever, chest pain, cough, shortness of breath, sputum production, abdominal pain, nausea/vomiting, headache, dizziness, loss of consciousness, numbness, weakness, hallucinations or suicidal ideation?but did report oliguria progressing to anuria for the last 1 week. He expressed the desire to go home but was told that he needed to be admitted to the hospital in order to ensure adequate rehydration. Internal Medicine was paged for further evaluation and management of Mr Linton symptoms.  ?  In the ER: Mr Duff was found to have a BP of 58/45 mm Hg. His BUN/Creatinine were 151/7.7, AST/ALT were 370/207, Mg was 3.3, Phos was 6.8?and Lactic Acid was 1.4. His CK was 77985 and CK MB was 226 with a Troponin of 0.29. He had a WBC count of 14.1 and a H/H of 13.7/41.7. His EKG showed NSR with no abnormalities and?a bedside?Echocardiogram showed a normal EF with no pericardial effusion. He was started on 4 Liters of IV NS as boluses and given Aspirin 81 mg PO.  ?  PMH:  Hypertension, Bipolar Disorder  ?  FH:  Father: Hepatic Failure  Mother: Metastatic Cancer  ?  SH:  Tobacco Smoking: current smoker, 2 ppd x 42 years  Alcohol Use: Prior history of abuse  Illicit Drugs: Crack use in the past  Review of  Systems  Constitutional: no fever, chills, weight loss, night sweats, fatigue and weakness present for the last few days  ENMT: no ear pain or sore throat, no nasal congestion, no nasal discharge, no sinus pain  Respiratory: no cough, shortness of breath or wheezing, no orthopnea  Cardiovascular: no chest pain, palpitations or syncope  Gastrointestinal: no abdominal pain, no nausea, vomiting, diarrhea or constipation  Genitourinary: no dysuria, urinary urgency or hematuria  Musculoskeletal: no myalgias or back pain, no joint pain, no joint swelling  Physical Exam  Vitals & Measurements  T:?36.4? ?C (Oral)? TMIN:?36.4? ?C (Oral)? TMAX:?36.8? ?C (Oral)? HR:?62(Monitored)? RR:?20? BP:?109/71? SpO2:?98%? WT:?67?kg? BMI:?22.65?  General: Alert?male lying comfortably in bed, in no acute distress, mildly incoherent  HENT: oral and oropharyngeal mucosa moist, pink, with no erythema or exudates, no ear pain or discharge  Neck: normal neck movement,?no lymph nodes or swellings, no JVD or carotid bruit  Respiratory: clear breathing sounds bilaterally, no rales, rhonchi or wheezes  Cardiovascular: clear S1 and S2, no murmurs, rubs or gallops  Peripheral Vascular: no lesions, ulcers or erosions, normal peripheral pulses and no pedal edema  Gastrointestinal: soft, non-tender, non-distended abdomen, normal bowel sounds  Integumentary: normal skin color, no rashes or lesions  Neurological: Patient only oriented to person, not to place or time; CN II-XII intact; motor strength 5/5 in B/L upper and lower extremities; sensation intact to gross and fine touch; no past pointing and no dysdiadochokinesia  Assessment/Plan  Orders:  heparin, 1 mL, 5,000 units =, form: Injection, Subcutaneous, q12hr, first dose 10/12/19 0:43:00 CDT  labetalol, 5 mg, form: Soln, IV Push, q2hr PRN for hypertension, first dose 10/12/19 0:43:00 CDT, Routine, SBP > 180 and/or DBP > 110 not to exceed 300mg/24hrs  Lactated Ringers Injection intravenous solution  1,000 mL, 1,000 mL, 1,000 mL, IV, 200 mL/hr, start date 10/12/19 0:43:00 CDT, 1.79, m2  Admit as Inpatient, 10/12/19 0:43:00 CDT, Telemetry with Monitor Angelika SOMERS, Aleks Borja, No, Admit to List 4  CBC w/ Auto Diff, Routine collect, 10/13/19 5:00:00 CDT, Blood, Once, Stop date 10/13/19 5:00:00 CDT, Lab Collect, 10/13/19 5:00:00 CDT  Comprehensive Metabolic Panel, Routine collect, 10/13/19 5:00:00 CDT, Blood, Once, Stop date 10/13/19 5:00:00 CDT, Lab Collect, 10/13/19 5:00:00 CDT  Notify Provider, 10/12/19 0:43:00 CDT, Of chest pain [AFTER STAT: 1) EKG, 2) Pulse Ox, 3) Vital Signs]  Notify Provider, 10/12/19 0:43:00 CDT, of patient?s room number and as needed with any acute change or worsening condition  Notify Provider Vital Signs, 10/12/19 0:43:00 CDT, HR > 135, HR < 55, SBP > 190, SBP < 90, RR > 28, UO < 200mL in 8hr  Remove Compression Device, Inspect skin, Reapply, and Document Assessment, 10/12/19 0:43:00 CDT, qShift  Up ad Tricia, 10/12/19 0:43:00 CDT, Constant Order, with assistance ONLY  Vital Signs, 10/12/19 0:43:00 CDT, q4hr  Weight, 10/12/19 0:43:00 CDT, Daily at 0500  ?  ANA secondary to Dehydration vs Rhabdomyolysis  Metabolic Acidosis secondary to Uremia  Possible ATN?  Patient has not been eating or drinking fluids for the last 2 weeks  Patient had a BP of 58/45 mm Hg today  Patient had a BUN/Cr of 151/7.7 on admission  Patient had AST/ALT of 370/207 likely due to ischemic injury vs hemoconcentration 2/2 dehydration  Patient had a CK of 86674 with CK MB of 226 possibly due to muscle injury  Patients UA showed Sp Gravity of 1.011,?Protein of 50, WBCs of 6-10, RBCs of 3-5  Patient started on 4 Liters of IV NS as boluses  Patient continued on IV  ml/hour as maintenance fluids  Continue monitoring patient  ?  Troponinemia likely 2/2 demand ischemia from dehydration  Patient did not have any symptoms of chest pain or shortness of breath  Patient had a Troponin of 0.29 on admission with a CK MB of  226  Patients EKG showed NSR with HR of 62/min?and no other abnormalities  Bedside Echocardiogram in ER showed normal EF and no pericardial effusion  Patient?started?on?Aspirin 81 mg QD  Troponin levels repeated x 3, follow up  ?  Hypertension  Patients antihypertensive medications (Propanolol 10 mg, Lisinopril 20 mg, Amlodipine 5 mg)?held due to low BP at presentation  Patient on Labetalol 5 mg PRN in case BP goes high  Patient on low sodium meals  Continue monitoring patients BP  ?  Bipolar Disorder  Patient did not appear manic or agitated  Patient continued on Zyprexa 10 mg  ?  H/O Psychosis  Patient did not appear to be experiencing psychosis and denied hallucinations  Patient continued on Carbamazepine 200 mg?and Olanzapine 10 mg  ?  Prophylaxis: Heparin 5000 units  Disposition: Continue rehydrating patient; follow up on repeated Troponin levels; continue monitoring patients BP   Problem List/Past Medical History  Ongoing  CVA - Cerebrovascular accident  H/O: depression  Hep C w/o coma, chronic  Obesity  Suicidal ideation  Tobacco user  Tobacco user  Historical  Bipolar  Chronic hepatitis C  Convulsions/seizures  HTN (hypertension)  Procedure/Surgical History  Incision and drainage of abscess (eg, carbuncle, suppurative hidradenitis, cutaneous or subcutaneous abscess, cyst, furuncle, or paronychia); simple or single (10/15/2018)  Drainage of Buttock Skin, External Approach (07/11/2016)  Drainage of Left Upper Leg Skin, External Approach (07/11/2016)  Incision and drainage of abscess (eg, carbuncle, suppurative hidradenitis, cutaneous or subcutaneous abscess, cyst, furuncle, or paronychia); complicated or multiple (07/11/2016)  INSERTION OF DRUG-ELUTING CORONARY ARTERY STENT(S) (08/14/2015)  Insertion of one vascular stent (08/14/2015)  Intracoronary Stent Placement 1st Vessel (08/14/2015)  Percutaneous Transluminal Coronary Angioplasty [PTCA] (08/14/2015)  Procedure on single vessel (08/14/2015)  Coronary  arteriography using two catheters (08/13/2015)  INJECTION OR INFUSION OF PLATELET INHIBITOR (08/13/2015)  INSERTION OF DRUG-ELUTING CORONARY ARTERY STENT(S) (08/13/2015)  Insertion of two vascular stents (08/13/2015)  Intracoronary Stent Placement 1st Vessel (None) (08/13/2015)  Left heart cardiac catheterization (08/13/2015)  Left Heart Cath w/COR Angio Ventriculography (None) (08/13/2015)  Percutaneous Transluminal Coronary Angioplasty [PTCA] (08/13/2015)  Procedure on single vessel (08/13/2015)  Incision and drainage of abscess (eg, carbuncle, suppurative hidradenitis, cutaneous or subcutaneous abscess, cyst, furuncle, or paronychia); simple or single (04/15/2015)  Other incision with drainage of skin and subcutaneous tissue (04/15/2015)   Medications  Inpatient  aspirin 81 mg oral tablet, CHEWABLE, 81 mg= 1 tab(s), Oral, Daily  cloniDINE 0.1 mg oral tablet, 0.1 mg= 1 tab(s), Oral, Once  folic acid 1 mg oral tablet, 1 mg= 1 tab(s), Oral, Daily  heparin, 5000 units= 1 mL, Subcutaneous, q12hr  IVF Lactated Ringers LR Infusion 1,000 mL, 1000 mL, IV  labetalol, 5 mg= 1 mL, IV Push, q2hr, PRN  [facility] Zyprexa 10 mg oral tablet, 10 mg= 1 tab(s), Oral, Daily  magnesium citrate, 1 bottle(s)= 300 mL, Oral, Once  NS 1,000 mL, 1000 mL, IV  NS 1,000 mL, 1000 mL, IV  Tegretol 200 mg oral tablet, 1 tablet, Oral, BID  thiamine, 100 mg= 1 tab(s), Oral, Daily  Home  Abilify Maintena 400 mg intramuscular injection, extended release  hydrochlorothiazide-losartan 12.5 mg-50 mg oral tablet, 1 tab(s), Oral, Daily  Norvasc 5 mg oral tablet, 5 mg= 1 tab(s), Oral, Daily  Tegretol 200 mg oral tablet, 1 tablet, Oral, BID  ZyPREXA 10 mg oral tablet, 10 mg= 1 tab(s), Oral, Daily  Allergies  Depakote?(seizure)  Depakote ER?(seizures)  SEROquel?(seizures)  lithium?(seizures)  Social History  Abuse/Neglect  No, 09/14/2019  No, 07/06/2019  Alcohol - Denies Alcohol Use, 05/06/2012  Past, Beer, Wine, Liquor, Daily,  07/05/2018  Employment/School  Unemployed, Activity level: Desk/Office. Highest education level: High school. Operates hazardous equipment: No., 12/31/2018  Home/Environment  Lives with Siblings. Living situation: Home/Independent. Alcohol abuse in household: No. Substance abuse in household: No. Smoker in household: No. Injuries/Abuse/Neglect in household: No. Feels unsafe at home: No. Safe place to go: Yes. Agency(s)/Others notified: No. Family/Friends available for support: Yes. Concern for family members at home: No. Major illness in household: No. Financial concerns: No. TV/Computer concerns: No., 12/31/2018  Nutrition/Health  Regular, Wants to lose weight: No. Sleeping concerns: No. Feels highly stressed: Yes., 12/31/2018  Sexual  Sexually active: No. Sexually active at age 10 Years. Number of current partners 0. Number of lifetime partners 5. Sexual orientation: Heterosexual. Uses condoms: No. History of sexual abuse: No., 12/31/2018  Substance Use - Denies Substance Abuse, 04/20/2013  Tobacco - High Risk, 05/11/2012  Smoker, current status unknown, N/A, 10/11/2019  10 or more cigarettes (1/2 pack or more)/day in last 30 days, Cigarettes, No, 09/14/2019  10 or more cigarettes (1/2 pack or more)/day in last 30 days, Cigarettes, No, 20 per day., 07/06/2019  10 or more cigarettes (1/2 pack or more)/day in last 30 days, No, 03/08/2019  10 or more cigarettes (1/2 pack or more)/day in last 30 days, N/A, 02/27/2019  10 or more cigarettes (1/2 pack or more)/day in last 30 days, Cigarettes, N/A, 01/28/2019  Current every day smoker, Cigarettes, No, 20 per day. 43 year(s). Total pack years: 33. Started age 10 Years. Ready to change: No. Household tobacco concerns: No., 12/31/2018  Current every day smoker, N/A, 12/16/2018  Current every day smoker, No, 12/10/2018  Current every day smoker, Yes, 40 per day., 10/08/2018  Current every day smoker, Cigarettes, 20 per day., 04/15/2015  Family History  Liver failure.....:  Father.  Metastatic cancer: Mother.  Immunizations  Vaccine Date Status   tetanus/diphtheria/pertussis, acel(Tdap) 12/10/2018 Given   Lab Results  Labs Last 24 Hours?  ?Chemistry? Hematology/Coagulation?   Sodium Lvl: 145 mmol/L (10/11/19 22:00:00) PT:?16.3 second(s)?High (10/11/19 22:00:00)   Potassium Lvl: 4.2 mmol/L (10/11/19 22:00:00) INR:?1.32?High (10/11/19 22:00:00)   Chloride:?112 mmol/L?High (10/11/19 22:00:00) PTT: 26.9 second(s) (10/11/19 22:00:00)   CO2:?20 mmol/L?Low (10/11/19 22:00:00) WBC:?14.1 x10(3)/mcL?High (10/11/19 22:00:00)   Calcium Lvl:?7.5 mg/dL?Low (10/11/19 22:00:00) RBC: 4.89 x10(6)/mcL (10/11/19 22:00:00)   Magnesium Lvl:?3.3 mg/dL?High (10/11/19 22:00:00) Hgb: 13.7 gm/dL (10/11/19 22:00:00)   Glucose Lvl:?70 mg/dL?Low (10/11/19 22:00:00) Hct: 41.7 % (10/11/19 22:00:00)   BUN:?151 mg/dL?High (10/11/19 22:00:00) Platelet: 217 x10(3)/mcL (10/11/19 22:00:00)   Creatinine:?7.7 mg/dL?High (10/11/19 22:00:00) MCV: 85.3 fL (10/11/19 22:00:00)   eGFR-AA:?9 mL/min?Low (10/11/19 22:00:00) MCH: 28 pg (10/11/19 22:00:00)   eGFR-JAYDON:?8 mL/min?Low (10/11/19 22:00:00) MCHC: 32.9 gm/dL (10/11/19 22:00:00)   Bili Total: 0.7 mg/dL (10/11/19 22:00:00) RDW: 14.1 % (10/11/19 22:00:00)   Bili Direct:?0.4 mg/dL?High (10/11/19 22:00:00) MPV:?10.5 fL?High (10/11/19 22:00:00)   Bili Indirect: 0.3 mg/dL (10/11/19 22:00:00) Abs Neut:?11.08 x10(3)/mcL?High (10/11/19 22:00:00)   AST:?370 unit/L?High (10/11/19 22:00:00) Neutro Auto: 79 % (10/11/19 22:00:00)   ALT:?207 unit/L?High (10/11/19 22:00:00) Lymph Auto: 12 % (10/11/19 22:00:00)   Alk Phos: 48 unit/L (10/11/19 22:00:00) Mono Auto: 8 % (10/11/19 22:00:00)   Total Protein: 6.9 gm/dL (10/11/19 22:00:00) Eos Auto: 0 % (10/11/19 22:00:00)   Albumin Lvl:?3.2 gm/dL?Low (10/11/19 22:00:00) Abs Eos: 0 x10(3)/mcL (10/11/19 22:00:00)   Globulin:?3.7 gm/mL?High (10/11/19 22:00:00) Basophil Auto: 0 % (10/11/19 22:00:00)   A/G Ratio:?0.9 ratio?Low (10/11/19 22:00:00) Abs  Neutro:?11.08 x10(3)/mcL?High (10/11/19 22:00:00)   Phosphorus:?6.8 mg/dL?High (10/11/19 22:00:00) Abs Lymph: 1.7 x10(3)/mcL (10/11/19 22:00:00)   Lactic Acid Lvl: 1.4 mmol/L (10/11/19 22:00:00) Abs Mono: 1.2 x10(3)/mcL (10/11/19 22:00:00)   Total CK:?58903 unit/L?High (10/11/19 22:00:00) Abs Baso: 0 x10(3)/mcL (10/11/19 22:00:00)   CK MB:?226.4 ng/mL?High (10/11/19 22:00:00) IG%: 0 % (10/11/19 22:00:00)   POC Troponin:?0.29 ng/mL?High (10/11/19 21:56:00) IG#: 0.05 (10/11/19 22:00:00)   U Creatinine: 136 mg/dL (10/12/19 02:15:00)    U Creatinine: 143 mg/dL (10/12/19 02:15:00)    U pH: 5 (10/12/19 02:15:00)    U Prot/Creat:?789 mg/gm?High (10/12/19 02:15:00)    U Sodium: 55 mmol/L (10/12/19 02:15:00)    U Sodium: 55 mmol/L (10/12/19 02:15:00)    FESodium%: 2 % (10/12/19 02:15:00)    U Protein Lvl: 107.3 mg/dL (10/12/19 02:15:00)        PGY 2 addendum: Patient was seen and examined with Dr. Marie. Agree with assessment and plan. ?  ?  Essentially a 54-year-old -American male with significant past medical history of hypertension, schizophrenia, chronic tobacco use, hepatitis C treatment naïve and history of depression with psychosis presented to the ED after sustaining a fall and feeling lightheaded in his cell. [facility], patient was found to be severely hypertensive with systolic in the 70s. He was then immediately transferred to the ED for further evaluation. Patient states that he wanted to go home and thought that if he refused to eat or drink anything they would eventually let him go. He stopped eating and drinking approximately 2 weeks ago. ?He also stopped taking his medications. ?When he arrived patient was found to have a blood pressure of 50/45 and was bradycardic. ?Patient received 0.5 atropine and aggressive fluid hydration which his blood pressure adequately improved. ?Labs were significant for a BUN of 151 creatinine of 7.7, , , magnesium 3.3, phosphorus  6.8, CK [address], CK , troponin 0.29, WBC 14.1, Hemoglobin 13.7 and Hematocrit 41.7. Bedside echo showed EF. Received 4L of NS.?  ?  Physical exam: Elderly man, thin laying comfortably in bed alert and oriented x3. ?Lungs were clear to auscultation. ?Regular rate and rhythm without murmur. ?Soft nontender abdomen normal bowel sounds. ?Moves all 4 extremities spontaneously, no facial droop noted speech is clear and coherent answers questions appropriately denies any SI/HI  ?  Assessment/plan:  Acute renal failure, prerenal versus intrinsic renal-multifactorial with dehydration and ATN secondary to rhabdomyolysis  Metabolic acidosis secondary to uremia  Electrolyte abnormalities due to above  Rhabdomyolysis  Troponinemia, most likely demand ischemia and severe rhabdomyolysis  Transaminitis  Coagulopathy  Leukocytosis, most likely hemoconcentrated  History of alcohol abuse  Schizophrenia  Hypertension  Chronic tobacco use  ?  Aggressive IV hydration with BMP, magnesium, and phosphorus q6hr. ?Consult renal. ?Will not initiate ACS protocol at this time suspect troponin to decrease as rhabdomyolysis improves. ?We will continue to trend troponin, EKG CK-MB and CK every 6 hours. ?Will start aspirin. ?Ordered thiamine and folic acid due to his history of alcohol abuse. ?Consult dietitian for acute severe malnutrition and concerns for refeeding syndrome.  ?   Essentially, this is a 54-year-old male?with a history of schizophrenia/bipolar?who voluntarily stopped eating and drinking for 2 weeks and subsequently developed rhabdomyolysis with acute kidney injury. ?His hypotension and bradycardia, which were present on arrival, have resolved.  ?  Vitals?stable this morning  Physical exam?unremarkable  Labs show improvement of CK?to 15,000, improvement of creatinine to 5.2, AST/ALT stable?at 328/208  UOP has been poor?at about 300 cc?over 12 hours  ?  Plan:  -Continue aggressive IVFs for rhabdo and ANA. ?Nephrology consulted?and  changed patient to 1/2?NS?with HCO3. ?Appreciate their assistance with this patient  -Must monitor for?refeeding syndrome and rapid electrolyte derangements associated with it.? BMP every 6 hours?with mag and phosphorus levels. ?For now, limit?dietary intake to 1200 dari.? Nutrition services consulted  -If patient?still?in-house?Monday,?consider consultation to?psych NP   I have performed a history and physical examination of the patient and discussed the management with the resident.  ???  [x ] I reviewed the residents note and agree with the documented findings and plan of care.  [ ] I reviewed the residents note and agree with the documented findings and plan of care except:  ?   55 yo male with history of recent incarceration and schizoaffective disorder whom voluntarily stopped PO intake x 2 weeks admitted with impressive ANA and rhabdo and subsequent uremic acidosis, troponinemia.? On IVF hydration, obtaining urine studies (though keeping in mind he has a history of being on lithium in the past) as well as monitoring urine output.? Monitor electrolytes to include phos re: refeeding syndrome.?  ?   MD Rita  ID Staff

## 2022-05-02 NOTE — PROGRESS NOTES
Pharmacokinetic Assessment Follow Up: IV Vancomycin    Vancomycin serum concentration assessment(s):    The trough level was drawn correctly and can be used to guide therapy at this time. The measurement is within the desired definitive target range of 15 to 20 mcg/mL.    Vancomycin Regimen Plan:    Continue regimen to Vancomycin 1250mg mg IV every 8 hours with next serum trough concentration measured at 1300 prior to 6th dose on 5/4    Drug levels (last 3 results):  No results for input(s): VANCOMYCINRA, VANCOMYCINPE, VANCOMYCINTR, VANCOTROUGH in the last 72 hours.    Pharmacy will continue to follow and monitor vancomycin.    Please contact pharmacy at extension [phone] for questions regarding this assessment.    Thank you for the consult,   Oneal Edmonds       Patient brief summary:  Kendall Duff is a 56 y.o. male initiated on antimicrobial therapy with IV Vancomycin for treatment of post-op wound infection    The patient's current regimen is 1250 mg IV q8h    Drug Allergies:   Review of patient's allergies indicates:   Allergen Reactions    Depakote [divalproex]     Divalproex sodium Other (See Comments)    Lithium     Lithium analogues     Quetiapine Other (See Comments)       Actual Body Weight:   70kg    Renal Function:   CrCl cannot be calculated (Patient's most recent lab result is older than the maximum 7 days allowed.).,     Dialysis Method (if applicable):  N/A    CBC (last 72 hours):  No results for input(s): WHITE BLOOD CELL COUNT, HEMOGLOBIN, HEMATOCRIT, PLATELETS, GRAN%, LYMPH%, MONO%, EOSINOPHIL%, BASOPHIL%, DIFFERENTIAL METHOD in the last 72 hours.    Metabolic Panel (last 72 hours):  No results for input(s): SODIUM, POTASSIUM, CHLORIDE, CO2, GLUCOSE, BUN BLD, CREATININE, ALBUMIN, BILIRUBIN TOTAL, ALK PHOS, AST, ALT, MAGNESIUM, PHOSPHORUS in the last 72 hours.    Vancomycin Administrations:  vancomycin given in the last 96 hours                   vancomycin (VANCOCIN) 1,250 mg in dextrose 5 % 250  mL IVPB (mg) 1,250 mg New Bag 05/02/22 0506     1,250 mg New Bag 05/01/22 2157     1,250 mg New Bag  1401     1,250 mg New Bag  0600                Microbiologic Results:  Microbiology Results (last 7 days)     ** No results found for the last 168 hours. **

## 2022-05-02 NOTE — HISTORICAL [facility] CERNER
This is a historical note converted from [employer]. Formatting and pictures may have been removed.  Please reference [employer] for original formatting and attached multimedia. Admit and Discharge Dates  Admit Date: 10/12/2019  Discharge Date: 10/15/2019  Physicians  Attending Physician - Angelika SOMERS, Aleks Borja  Admitting Physician - Angelika SOMERS, Aleks Borja  Primary Care Physician - Chinmay SOMERS , Luz MA  Primary Care Physician - Mary Anne PCP, No  Discharge Diagnosis  Acute renal failure due to rhabdomyolysis?N17.9,?ANA (acute kidney injury)?N17.9  Dehydration, severe?E86.0  Unspecified severe protein-calorie malnutrition?E43  Schizoaffective disorder (ZDU63-GA F25.9)  Transaminitis  Troponinemia  HTN  Tobacco abuse  Surgical Procedures  No procedures recorded for this visit.  Immunizations  No immunizations recorded for this visit.  Admission Information  Mr Kendall Duff is a 54 year old gentleman with a PMH of Hypertension, Bipolar Disorder, Tobacco Smoking?who presented to the [facility] ER on 10/12 with complaints of fatigue and weakness after a 2 week spell of not eating or drinking water. Mr Duff was accompanied by a  from the [facility] where he is incarcerated for the last 60 days who helped with most of the history. Mr Duff was reported to have been refusing his regular medications as well as most of his meals. He denied having any fever, chest pain, cough, shortness of breath, sputum production, abdominal pain, nausea/vomiting, headache, dizziness, loss of consciousness, numbness, weakness, hallucinations or suicidal ideation?but did report oliguria progressing to anuria for the last 1 week. He expressed the desire to go home but was told that he needed to be admitted to the hospital in order to ensure adequate rehydration. Internal Medicine was paged for further evaluation and management of Mr Linton symptoms.  ?   In the ER: Mr Duff was found to have a BP of 58/45 mm Hg. His BUN/Creatinine were 151/7.7,  AST/ALT were 370/207, Mg was 3.3, Phos was 6.8?and Lactic Acid was 1.4. His CK was 54275 and CK MB was 226 with a Troponin of 0.29. He had a WBC count of 14.1 and a H/H of 13.7/41.7. His EKG showed NSR with no abnormalities and?a bedside?Echocardiogram showed a normal EF with no pericardial effusion. He was started on 4 Liters of IV NS as boluses and given Aspirin 81 mg PO.  ?  Hospital Course  55 y/o male was admitted for Acute kidney injury, rhabdomyolysis, transaminitis, troponinemia. On admission, he was found to have BUN/creatinine?151/7.7, CK 23,386,?CK-MB?226.4, AST/ALT of?370/207, Phos 6.8. Labs were remarkable for severe azotemia, metabolic acidosis, hyperphosphatemia.??Renal was?consulted for assistance with treatment of acute kidney injury. Patient was?started on?IVF hydration with 1/2?NS?0.45%, with continue serial monitoring of electrolytes. Electrolytes were replete as needed. Renal indices improved significantly with IVF hydration and CK/CKMB trended down. ANA and metabolic acidosis resolved around 10/14/19. Patients phosphorous started decreasing on 10/13/19 and became critically low indicating likely refeeding syndrome. He was started on aggressive phosphorous replacement with IV k-phos and neutro-phos oral powder packets. The phosphorous started increasing and stabilized to 2.6 on day of discharge. He was discharged with 2 phosphate oral powder packets BID for 3 days. He was advised to eat regular meals daily and not to go into starvation mode again. He was told to f/u with his PCP in 1-2wks.  ?   Patient had?abdominal ultrasound that revealed a liver?that was?normal in appearance;?increased gallbladder wall thickness, but no stones or?ductal dilation noted. HIV and hepatitis panels was negative except for HepC Ab reactive. Need to w/u HepC viral titers as OP. Troponinemia was likely 2/2 demand ischemia from dehydration, but?improved during hospital stay. Patient had no complaints of?chest pain or  shortness of breath. EKG within normal limits.? For his hx of Bipolar Disorder/Schizoaffective Disorder/Hx of psychosis, patient had no complaints of hallucinations?nor did he exhibit any symptoms of psychosis during this admission. psych was consulted. They advised to continue carbamazepine 200mg BID , olanzapine 10mg daily.  Significant Findings  Abdominal US 10/12/19  The gallbladder is free of stones. There is mild nonspecific gallbladder wall thickening. The common bile duct is normal in caliber and measures 4 mm.  The liver is not significantly enlarged. There is no focal liver lesion. Normal hepatopetal flow is noted in the portal vein.?  The right kidney measures 10 cm, while the left measures 12 cm. There is no hydronephrosis or solid renal mass. There is a 1 cm cyst right kidney.  ?  Time Spent on discharge  >45min  Objective  Vitals & Measurements  T:?36.6? ?C (Oral)? TMIN:?36.3? ?C (Oral)? TMAX:?36.9? ?C (Oral)? HR:?89(Monitored)? RR:?20? BP:?132/87? SpO2:?96%?  Physical Exam  General: Alert. Responsive. Not in?acute distress. Afebrile.  HEENT: Normocephalic, Atraumatic, No scleral icterus, Oral mucosa moist.  Neck: No JVD  Pulm: CTAB. No wheezes or crackles. symmetrical chest expansion.  Cardio:?RRR. no appreciable murmurs/gallops.  Abdomen: BS+, soft, non-distended, non-tender  Extremity:? no LE edema. good equal pulses felt bilaterally in all extremities.  Neurologic: alert and oriented x 3. Responds to questions/commands appropriately.  MSK: No obvious deformities. Moving all extremities purposefully.  Skin: Warm, dry and without rashes.  Patient Discharge Condition  Patient is clinically and hemodynamically stable for discharge.?  Discharge Disposition  Discharge to home.?  f/u with PCP in 1-2 weeks   Discharge Medication Reconciliation  Prescribed  OLANZapine (ZyPREXA 10 mg oral tablet)?10 mg, Oral, Daily  OLANZapine (ZyPREXA 10 mg oral tablet)?10 mg, Oral, Daily  amLODIPine (Norvasc 5 mg oral  tablet)?5 mg, Oral, Daily  amLODIPine (Norvasc 5 mg oral tablet)?5 mg, Oral, Daily  carBAMazepine (Tegretol 200 mg oral tablet)?1 tablet, Oral, BID  carBAMazepine (Tegretol 200 mg oral tablet)?1 tablet, Oral, BID  potassium phosphate-sodium phosphate (Phosphorous Supplement oral powder for reconstitution)?See Instructions  Continue  aripiprazole (Abilify Maintena 400 mg intramuscular injection, extended release)  Discontinue  hydrochlorothiazide-losartan (hydrochlorothiazide-losartan 12.5 mg-50 mg oral tablet)?1 tab(s), Oral, Daily  Education and Orders Provided  Refeeding Syndrome  Discharge - 10/15/19 12:03:00 CDT, Home?  Follow up  Report to Emergency Department if symptoms return or worsen  follow up with PCP in 1 -2 weeks      I was present with the Resident during discharge day management.  ???  [x ] I discussed the case with the Resident and agree with the discharge plan as above.  [ ] I discussed the case with the Resident and agree with the discharge plan as above except:  ???  Time spent on discharge [40 ] minutes  ?   Voluntary refusal of PO intake?while incarcerated which led to ANA and rhabdomyolysis coupled with refeeding syndrome and hypophosphatemia.? Significantly improved after aggressive IVF, electrolyte replacement and slow nutrition replacement over several days.?  ?   MD Rita  ID Staff

## 2022-05-02 NOTE — HISTORICAL [facility]
This is a historical note converted from [employer]. Formatting and pictures may have been removed.  Please reference Noe for original formatting and attached multimedia. Chief Complaint  presents via [facility] c/o chest pain x 30 mins. out of bp meds x 3 meds. received 324 mg ASA and 1 SL nitro.  History of Present Illness  Mr. Duff is a 48-year-old male?who presented to the ED?with c/o midsternal chest pain starting this morning, along with?mild SOB and nausea.? Patient states that it lasted?a couple of hours?and was relieved?by nitroglycerin and aspirin given by EMS.? Patient states that whenever?the pain started he was not doing anything.? ED lab work today was unremarkable. ?Troponin negative.? EKG showed NSR. ?Chest x-ray negative.? Patient has not had any recurrence of pain.? Pain not reproducible with palpation.??Patient was moderately hypertensive on arrival to ED, but normalized. ?Cardiology was consulted in the ED. ?The patient was admitted to hospital medicine for management.  ?  Review of Systems  Except as documented all systems reviewed and negative.  Physical Exam  Vitals & Measurements  T:?36.4? ?C (Oral)? TMIN:?36.4? ?C (Oral)? TMAX:?36.8? ?C (Oral)? HR:?65(Peripheral)? RR:?18? BP:?141/90? SpO2:?99%?  General: Awake, alert,?in no acute distress  Eye: PERRL, clear conjunctiva  HENT:,Atraumatic, normocephalic,?oropharynx and nasal mucosal surfaces moist  Neck: supple, full ROM  Respiratory:?No respiratory distress, no stridor, Lungs CTA bilaterally  Cardiovascular:?regular rate and rhythm without murmurs, gallops or rubs  Gastrointestinal:?soft, non-tender, non-distended with normal bowel sounds  Genitourinary: no CVA tenderness to palpation  Musculoskeletal:?full range of motion of all extremities  Integumentary: warm and dry  Neurologic: Awake, alert, answering questions, following commands. No facial droop.  Psychiatric: cooperative, appropriate mood and affect  Cognition and Speech: clear and  coherent  ?  Assessment/Plan  ?  1.? Atypical chest pain  2.? Hypertensive urgency  ?  ?   Hx:?CAD with stents, bipolar,?schizoaffective disorder, chronic hep C,?hypertension, tobacco use  ?   PLAN:  -Cardiology consulted-appreciate recommendations  -Troponin q6 hours until peak  -Antihypertensives as needed  -Nicotine patch  -Resume home meds as appropriate once reconciled  -Labs in AM  ?  ?   DVT Prophylaxis: Lovenox  GI Prophylaxis:? Protonix  ?   PCP:?Non-staff MD  ?   Code status: Full  ?  I, Amelia Bone NP have reviewed and discussed this case with Dr. Berumen.  Please see addendum for further assessment and plan from attending MD  ?  -----------------  Belinda Berumen MD  I personally performed a face-to-face evaluation of this patient?on [1/6/2021].?I?have reviewed?and agree?with the?nurse practitioners?history, physical exam and plan of care.  ?  Substernal chest pain?starting this morning?report pain is sharp?as well as squeezing, nonradiating, relieved by nitro?by EMS. ?Report similar?episode of chest pain few weeks ago?that resolved on its own. EKG?no ST-T wave changes. ?Initial troponin negative.  Currently chest pain-free  Continue aspirin?Plavix and statin?and ACE inhibitor  Spent 5 minutes counseling patient on smoking cessation, state will try to quit, nicotine patch offered   Problem List/Past Medical History  CAD with stents  Bipolar disorder  Chronic hepatitis C?on Epclusa  Schizoaffective disorder  Suicidal ideation  Hypertension  Smoker  Procedure/Surgical History  Incision and drainage of abscess (eg, carbuncle, suppurative hidradenitis, cutaneous or subcutaneous abscess, cyst, furuncle, or paronychia); simple or single (10/15/2018)  Drainage of Buttock Skin, External Approach (07/11/2016)  Drainage of Left Upper Leg Skin, External Approach (07/11/2016)  Incision and drainage of abscess (eg, carbuncle, suppurative hidradenitis, cutaneous or subcutaneous abscess, cyst, furuncle, or paronychia);  complicated or multiple (07/11/2016)  INSERTION OF DRUG-ELUTING CORONARY ARTERY STENT(S) (08/14/2015)  Insertion of one vascular stent (08/14/2015)  Intracoronary Stent Placement 1st Vessel (08/14/2015)  Percutaneous Transluminal Coronary Angioplasty [PTCA] (08/14/2015)  Procedure on single vessel (08/14/2015)  Coronary arteriography using two catheters (08/13/2015)  INJECTION OR INFUSION OF PLATELET INHIBITOR (08/13/2015)  INSERTION OF DRUG-ELUTING CORONARY ARTERY STENT(S) (08/13/2015)  Insertion of two vascular stents (08/13/2015)  Intracoronary Stent Placement 1st Vessel (None) (08/13/2015)  Left heart cardiac catheterization (08/13/2015)  Left Heart Cath w/COR Angio Ventriculography (None) (08/13/2015)  Percutaneous Transluminal Coronary Angioplasty [PTCA] (08/13/2015)  Procedure on single vessel (08/13/2015)  Incision and drainage of abscess (eg, carbuncle, suppurative hidradenitis, cutaneous or subcutaneous abscess, cyst, furuncle, or paronychia); simple or single (04/15/2015)  Other incision with drainage of skin and subcutaneous tissue (04/15/2015)   Medications  Inpatient  acetaminophen, 650 mg= 20.3 mL, Oral, q6hr, PRN  acetaminophen, 1000 mg= 2 tab(s), Oral, q6hr, PRN  aspirin 81 mg oral Delayed Release (EC) tablet, 81 mg= 1 tab(s), Oral, Daily  atropine, 0.5 mg= 1.25 mL, IV Push, Once-NOW  cloniDINE 0.1 mg oral tablet, 0.1 mg= 1 tab(s), Oral, Once  IVF Normal Saline NS Bolus 1000ml, 1000 mL, IV, Once-NOW  IVF Normal Saline NS Bolus 1000ml, 1000 mL, IV, Once-NOW  lisinopril 10 mg oral tablet, 20 mg= 2 tab(s), Oral, Daily  Lovenox, 40 mg= 0.4 mL, Subcutaneous, Daily  magnesium citrate, 1 bottle(s)= 300 mL, Oral, Once  nicotine 21 mg/24 hr transdermal film, extended release, 21 mg= 1 patch(es), TD, Daily  Protonix, 40 mg= 1 tab(s), Oral, Daily  Zofran, 4 mg= 2 mL, IV Push, q4hr, PRN  Home  Abilify Maintena Prefilled Syringe 400 mg intramuscular injection, extended release, 400 mg= 400 mg, IM,  qMonth  atorvastatin 80 mg oral tablet, 80 mg= 1 tab(s), Oral, Daily  doxepin 25 mg oral capsule, 25 mg= 1 cap(s), Oral, qPM  doxepin 50 mg oral capsule, 50 mg= 1 cap(s), Oral, qPM  Epclusa 400 mg-100 mg oral tablet, 1 tab(s), Oral, Daily, 2 refills  ezetimibe 10 mg oral tablet, 10 mg= 1 tab(s), Oral, Daily  lisinopril 20 mg oral tablet, 20 mg= 1 tab(s), Oral, Daily,? ?Not taking: Last Dose Date/Time Unknown  lisinopril 40 mg oral tablet, 40 mg= 1 tab(s), Oral, Daily  olanzapine 10 mg oral tablet, 10 mg= 1 tab(s), Oral, qPM,? ?Not taking  Plavix 75 mg oral tablet, 75 mg= 1 tab(s), Oral, Daily  pravastatin 20 mg oral tablet, 20 mg= 1 tab(s), Oral, Daily, 2 refills,? ?Not taking  Allergies  Depakote?(seizure)  Depakote ER?(seizures)  QUEtiapine?(unknown)  SEROquel?(seizures)  divalproex sodium?(seizure)  lithium?(unknown, seizures)  Social History  Alcohol - Denies Alcohol Use, 05/06/2012  Never, 05/27/2021  Substance Use - Denies Substance Abuse, 04/20/2013  Past, Cocaine, 04/29/2021  Tobacco - High Risk, 05/11/2012  10 or more cigarettes (1/2 pack or more)/day in last 30 days, Cigarettes, No, 20 per day., 09/07/2021  Family History  Liver failure.....: Father.  Metastatic cancer: Mother.  Immunizations  Vaccine Date Status   hepatitis A-hepatitis B vaccine 05/27/2021 Given   influenza virus vaccine, inactivated 12/17/2020 Recorded   tetanus/diphtheria/pertussis, acel(Tdap) 12/10/2018 Given   influenza virus vaccine, inactivated 10/18/2017 Recorded   hepatitis A-hepatitis B vaccine 07/03/2012 Recorded   influenza virus vaccine, inactivated 11/04/2009 Recorded   Lab Results  Labs Last 24 Hours?  ?Chemistry? Hematology/Coagulation?   Sodium Lvl: 137 mmol/L (01/06/22 14:23:00) WBC: 7.5 x10(3)/mcL (01/06/22 14:23:00)   Potassium Lvl: 4.3 mmol/L (01/06/22 14:23:00) RBC: 5.71 x10(6)/mcL (01/06/22 14:23:00)   Chloride:?108 mmol/L?High (01/06/22 14:23:00) Hgb: 15.9 gm/dL (01/06/22 14:23:00)   CO2: 23 mmol/L (01/06/22  14:23:00) Hct: 45.9 % (01/06/22 14:23:00)   Calcium Lvl: 9.6 mg/dL (01/06/22 14:23:00) Platelet: 273 x10(3)/mcL (01/06/22 14:23:00)   Glucose Lvl: 86 mg/dL (01/06/22 14:23:00) MCV: 80.4 fL (01/06/22 14:23:00)   BUN: 14.1 mg/dL (01/06/22 14:23:00) MCH: 27.8 pg (01/06/22 14:23:00)   Creatinine: 0.84 mg/dL (01/06/22 14:23:00) MCHC: 34.6 gm/dL (01/06/22 14:23:00)   eGFR-AA: >60 (01/06/22 14:23:00) RDW: 14.1 % (01/06/22 14:23:00)   eGFR-JAYDON: >60 (01/06/22 14:23:00) MPV: 9.7 fL (01/06/22 14:23:00)   Bili Total: 0.9 mg/dL (01/06/22 14:23:00) Abs Neut: 4.12 x10(3)/mcL (01/06/22 14:23:00)   Bili Direct: 0.3 mg/dL (01/06/22 14:23:00) Neutro Auto: 55 % (01/06/22 14:23:00)   Bili Indirect: 0.6 mg/dL (01/06/22 14:23:00) Lymph Auto: 35 % (01/06/22 14:23:00)   AST: 16 unit/L (01/06/22 14:23:00) Mono Auto: 6 % (01/06/22 14:23:00)   ALT: 12 unit/L (01/06/22 14:23:00) Eos Auto: 3 % (01/06/22 14:23:00)   Alk Phos: 56 unit/L (01/06/22 14:23:00) Abs Eos: 0.2 x10(3)/mcL (01/06/22 14:23:00)   Total Protein: 7.4 gm/dL (01/06/22 14:23:00) Basophil Auto: 1 % (01/06/22 14:23:00)   Albumin Lvl: 4 gm/dL (01/06/22 14:23:00) Abs Neutro: 4.12 x10(3)/mcL (01/06/22 14:23:00)   Globulin: 3.4 gm/dL (01/06/22 14:23:00) Abs Lymph: 2.6 x10(3)/mcL (01/06/22 14:23:00)   A/G Ratio: 1.2 ratio (01/06/22 14:23:00) Abs Mono: 0.4 x10(3)/mcL (01/06/22 14:23:00)   BNP: 42.4 pg/mL (01/06/22 14:23:00) Abs Baso: 0.1 x10(3)/mcL (01/06/22 14:23:00)   CK MB: 4.2 ng/mL (01/06/22 14:23:00)    Troponin-I: <0.010 (01/06/22 14:23:00)    Diagnostic Results  Accession:?[id number]  Order:?XR Chest 2 Views  Report Dt/Tm:?01/06/2022 14:28  Report:?  EXAMINATION  XR Chest 2 Views  ?  INDICATION  Chest Pain  ?  Comparison: None  ?  FINDINGS  The heart is normal in size. The lungs are clear without focal  consolidation. There is no pleural effusion or pneumothorax. There is  no acute bony abnormality identified.  ?  IMPRESSION  No acute abnormality of the chest.  ?

## 2022-05-02 NOTE — HISTORICAL [facility]
This is a historical note converted from Cerall. Formatting and pictures may have been removed.  Please reference Noe for original formatting and attached multimedia. Date of consultation: 10/13/2019  ?  Reason for consultation: Acute kidney injury  ?  Consulting physician: Dr. Carney  ?  History of present illness: This is a 54-year-old -American male with past medical history of bipolar disorder, schizophrenia, hypertension, CVA, and tobacco abuse.? Patient presented to the emergency department on 10/12/2019 with complaints of weakness and fatigue, patient stated that he has not been eating or drinking water for approximately 2 weeks.? He additionally reported decreased urinary output, denied shortness of breath, chills, fever, or chest pain.? Laboratory evaluation remarkable for severe azotemia (BUN of 138, creatinine 5.2), metabolic acidosis, hyperphosphatemia, and markedly elevated CK (36163).? Patient was admitted to medicine service for treatment of acute kidney injury and rhabdomyolysis.? We have been consulted for assistance with treatment of acute kidney injury.  ?  Review of systems: Negative except as stated in the history of present illness.  ?  Allergies: Depakote, lithium, Seroquel  ?  Past medical history: Hypertension, schizophrenia/bipolar disorder, hepatitis C, smoking.  ?  Surgical history: None  ?  Family history: Positive for liver disease and cancer.  ?  Social history: Prior history of alcohol and illicit drug use, current everyday smoker.  ?  Physical exam  36.7, 65, 114/79, 20, 98%  General appearance: Appears in no acute distress.  HEENT: NC/AT. PERRLA, EOMI, no scleral icterus. No thyromegaly, JVD, neck supple.?  Chest: Equal expansion, clear to auscultation bilaterally, respirations unlabored.  Heart: S1-S2.  Abdomen: Benign.?  Genitourinary: Deferred  Extremities: No edema, no cyanosis or clubbing, pulses equal and palpable throughout.  Neurological: No focal  deficits.  ?  Medications?  aspirin 81 mg Chew tab ?81 mg 1 tab(s), Oral, Daily  carbamazepine 200 mg Tab UD ?1 tablet, Oral, BID  folic acid 1 mg Tab UD ?1 mg 1 tab(s), Oral, Daily  heparin 5000 units/ml Inj-1ml ?5,000 units 1 mL, Subcutaneous, q12hr  OLANZapine 10 mg Tab UD ([facility]) ?10 mg 1 tab(s), Oral, Daily  thiamine 100 mg (Vit B1) Tab UD ?100 mg 1 tab(s), Oral, Daily  Continuous: (1)  sodium chloride 0.45% 1,000 mL ?1,000 mL, IV, 150 mL/hr  PRN: (1)  labetalol 5 mg/mL - 4ml syringe ?5 mg 1 mL, IV Push, q2hr  ?   Labs?  ?Chemistry? Hematology/Coagulation?   Sodium Lvl:?146 mmol/L?High (10/12/19 22:57:00) WBC: 6.5 x10(3)/mcL (10/13/19 08:05:00)   Potassium Lvl: 3.7 mmol/L (10/12/19 22:57:00) RBC: 5.29 x10(6)/mcL (10/13/19 08:05:00)   Chloride:?112 mmol/L?High (10/12/19 22:57:00) Hgb: 14.9 gm/dL (10/13/19 08:05:00)   CO2: 27 mmol/L (10/12/19 22:57:00) Hct: 44.5 % (10/13/19 08:05:00)   Calcium Lvl:?8 mg/dL?Low (10/12/19 22:57:00) Platelet: 200 x10(3)/mcL (10/13/19 08:05:00)   Magnesium Lvl: 2.6 mg/dL (10/12/19 22:57:00) MCV: 84.1 fL (10/13/19 08:05:00)   Glucose Lvl:?112 mg/dL?High (10/12/19 22:57:00) MCH: 28.2 pg (10/13/19 08:05:00)   BUN:?79 mg/dL?High (10/12/19 22:57:00) MCHC: 33.5 gm/dL (10/13/19 08:05:00)   Creatinine:?1.8 mg/dL?High (10/12/19 22:57:00) RDW: 13.5 % (10/13/19 08:05:00)   BUN/Creat Ratio: 44 (10/12/19 22:57:00) MPV:?10.7 fL?High (10/13/19 08:05:00)   eGFR-AA:?50 mL/min?Low (10/12/19 22:57:00) Abs Neut: 3.92 x10(3)/mcL (10/13/19 08:05:00)   eGFR-JAYDON:?42 mL/min?Low (10/12/19 22:57:00) Neutro Auto: 61 % (10/13/19 08:05:00)   Phosphorus: 2.8 mg/dL (10/12/19 22:57:00) Lymph Auto: 27 % (10/13/19 08:05:00)   Total CK:?7320 unit/L?High (10/12/19 22:57:00) Mono Auto: 10 % (10/13/19 08:05:00)   CK MB:?71.5 ng/mL?High (10/12/19 22:57:00) Eos Auto: 2 % (10/13/19 08:05:00)   Troponin-I:?0.196 ng/mL?High (10/12/19 17:25:00) Abs Eos: 0.1 x10(3)/mcL (10/13/19 08:05:00)   ? Basophil Auto: 0 % (10/13/19  08:05:00)   ? Abs Neutro: 3.92 x10(3)/mcL (10/13/19 08:05:00)   ? Abs Lymph: 1.7 x10(3)/mcL (10/13/19 08:05:00)   ? Abs Mono: 0.7 x10(3)/mcL (10/13/19 08:05:00)   ? Abs Baso: 0 x10(3)/mcL (10/13/19 08:05:00)   ? IG%: 0 % (10/13/19 08:05:00)   ? IG#: 0.02 (10/13/19 08:05:00)   ?  Imaging  US Abdomen Complete 10/12/2019 13:10  ?  The liver is not significantly enlarged. There is no focal liver lesion. Normal hepatopetal flow is noted in the portal vein.?The gallbladder is free of stones. There is mild nonspecific gallbladder wall thickening. The common bile duct is normal in caliber and measures 4 mm.?The right kidney measures 10 cm, while the left measures 12 cm. There  is no hydronephrosis or solid renal mass. There is a 1 cm cyst right kidney. The spleen is normal in size and echogenicity and measures 11 cm. ?  ?  Impression:?  1. No appreciable sonographic abnormality of the liver.  2. Mild nonspecific gallbladder wall thickening. No gallstones or biliary ductal dilatation.  ?  XR Chest 1 View?10/12/2019 09:09?????  FINDINGS: Cardiopericardial silhouette is within normal limits. ?Lungs are without dense focal or segmental consolidation, congestive process, pleural effusions or pneumothorax.??  ?  Impression  Acute kidney injury, secondary to intravascular volume depletion/rhabdomyolysis, resolving  Metabolic acidosis, resolved  Hypernatremia/hyperchloremia  Hyperphosphatemia, resolved  Transaminitis  Bipolar disorder  ?  Recommendations  Renal indices have improved significantly with?hydration, CK is trending down.? Continue?IV fluids, half-normal saline at 150 mL/min, continue serial monitoring of electrolytes, replete as needed.  ?  ?   Thank you very much for your consultation.?  ?   I saw and evaluated the patient. Pertinent physical exam findings, laboratory and diagnostic results, current treatment plan reviewed and discussed. I agree with findings and plan of care as documented in the Nurse Practitioners  note.  ?

## 2022-05-02 NOTE — HISTORICAL [facility]
This is a historical note converted from Noe. Formatting and pictures may have been removed.  Please reference Cerall for original formatting and attached multimedia. Chief Complaint  pt found walking in road; currently not answering questions; denies any complaints; hx of psych  History of Present Illness  Mr. Duff is a 54-year-old -American male with past mental history of bipolar disorder and schizoaffective disorder presents to the emergency department after found walking in the road not answering questions.? EMS states that he was unaware of his surroundings and initially was nonverbal but when he arrived to the emergency department he started speaking.? He denied any chest pain, shortness of breath, fever, chills or any suicidal or homicidal ideations.? Patient does have a past medical history of cocaine abuse, however UDS is negative.? Upon arrival into the ED patient was mildly tachycardic but hemodynamically stable and afebrile.? Laboratory work remarkable for a CK of 750, WBCs 13,700 with a left shift, EtOH 10, UDS negative.? Patient was given IV fluids but he is still altered and unable to clearly communicate and therefore he is being admitted to the hospitalist service for further management.??Of note, patient was admitted to inpatient psychiatric facility earlier this month?for bipolar disorder with?suicidal ideations.  Review of Systems  unable to obtain  Physical Exam  Vitals & Measurements  T:?36.8? ?C (Oral)? TMIN:?36.7? ?C (Temporal Artery)? TMAX:?37? ?C (Oral)? HR:?79(Monitored)? RR:?14? BP:?151/89? SpO2:?99%? WT:?73?kg?  General:?Awake, alert, oriented in no acute distress  HEENT:?Normocephalic atraumatic,?pupils equal and reactive, repetitive lip smacking  Cardiovascular: Regular rate and rhythm, normal peripheral perfusion  Respiratory: Lungs clear to auscultation bilaterally,?no tachypnea or accessory muscle use  Abdomen:?Soft, nontender, nondistended?with positive bowel  sounds  Extremities: Warm and well perfused, no clubbing or cyanosis  Neuro:?Grossly without focal deficits  Skin:?Warm, dry and intact  Psych:?Cooperative  ?  Assessment/Plan  Acute Encephalopathy, likely acute psychosis  Elevated CPK  SIRS  Lip Smacking ? tardive Dyskinesia  Bipolar Disorder with hx of SI earlier this month  Schizoaffective Disorder  Chronic Hep C  Essential HTN- stable  Chronic Hep C  ?  ?  PLAN:  - neuro checks Q4H. Pt denies SI/HI per ED recs, pt with unintelligible speech at present.  - Psychiatry consult pending. check tegretol level  - BC x2 collected. Hold off on abx, no s/s of infection at present. CBC in AM.  - IV hydration. Repeat CPK in AM  - HOLD psych meds until evaluated by psych, nursing staff to get list of home meds in AM and place in chart  - AM labs  ?  IYvonne, FNP-C have reviewed and discussed this case with Dr. Berumen  ----------  IBelinda MD, reviewed the NP documentation, treatment plan, and medical decision making; and I had face-to-face time with this patient.??Labs and imaging were reviewed and I agree with history, physical and medical decision making as detailed above.  ?  Physical exam:gibberish speech, inappropriate affect, follow commands with focal motor weakness  ?   Assessment & Plan: Acute psychosis -- psychiatry consult, 1:1 observation  ?   Problem List/Past Medical History  Bipolar disorder  Chronic hepatitis C  Schizoaffective disorder  Suicidal ideation  Hypertension  ?  Procedure/Surgical History  Incision and drainage of abscess (eg, carbuncle, suppurative hidradenitis, cutaneous or subcutaneous abscess, cyst, furuncle, or paronychia); simple or single (10/15/2018)  Drainage of Buttock Skin, External Approach (07/11/2016)  Drainage of Left Upper Leg Skin, External Approach (07/11/2016)  Incision and drainage of abscess (eg, carbuncle, suppurative hidradenitis, cutaneous or subcutaneous abscess, cyst, furuncle, or paronychia); complicated or  multiple (07/11/2016)  INSERTION OF DRUG-ELUTING CORONARY ARTERY STENT(S) (08/14/2015)  Insertion of one vascular stent (08/14/2015)  Intracoronary Stent Placement 1st Vessel (08/14/2015)  Percutaneous Transluminal Coronary Angioplasty [PTCA] (08/14/2015)  Procedure on single vessel (08/14/2015)  Coronary arteriography using two catheters (08/13/2015)  INJECTION OR INFUSION OF PLATELET INHIBITOR (08/13/2015)  INSERTION OF DRUG-ELUTING CORONARY ARTERY STENT(S) (08/13/2015)  Insertion of two vascular stents (08/13/2015)  Intracoronary Stent Placement 1st Vessel (None) (08/13/2015)  Left heart cardiac catheterization (08/13/2015)  Left Heart Cath w/COR Angio Ventriculography (None) (08/13/2015)  Percutaneous Transluminal Coronary Angioplasty [PTCA] (08/13/2015)  Procedure on single vessel (08/13/2015)  Incision and drainage of abscess (eg, carbuncle, suppurative hidradenitis, cutaneous or subcutaneous abscess, cyst, furuncle, or paronychia); simple or single (04/15/2015)  Other incision with drainage of skin and subcutaneous tissue (04/15/2015)   Medications  Inpatient  acetaminophen, 1000 mg= 2 tab(s), Oral, q6hr, PRN  cloniDINE 0.1 mg oral tablet, 0.1 mg= 1 tab(s), Oral, Once  Haldol 5 mg/mL injectable solution, 5 mg= 1 mL, IM, q4hr, PRN  magnesium citrate, 1 bottle(s)= 300 mL, Oral, Once  NS (0.9% Sodium Chloride) Infusion 1,000 mL, 1000 mL, IV  Zofran, 4 mg= 2 mL, IV Push, q4hr, PRN  Home  Abilify Maintena Prefilled Syringe 400 mg intramuscular injection, extended release, 400 mg, IM, qMonth  amLODIPine 5 mg oral tablet, 5 mg= 1 tab(s), Oral, Daily  aspirin 81 mg oral Delayed Release (EC) tablet, 81 mg= 1 tab(s), Oral, Daily,? ?Not taking  Augmentin 875 mg-125 mg oral tablet, 1 tab(s), Oral, BID,? ?Not taking  benzonatate 100 mg oral capsule, 100 mg= 1 cap(s), Oral, TID,? ?Not taking  cloniDINE 0.1 mg oral tablet, 0.1 mg= 1 tab(s), Oral, BID,? ?Not taking  Multiple Vitamins oral tablet, 1 tab(s), Oral,  Daily  Norvasc 10 mg oral tablet, 10 mg= 1 tab(s), Oral, Daily,? ?Not taking  olanzapine 10 mg oral tablet, 10 mg= 1 tab(s), Oral, qPM  propranolol 10 mg oral tablet, 10 mg= 1 tab(s), Oral, BID  Prozac 20 mg oral capsule, 20 mg= 1 cap(s), Oral, Daily,? ?Not taking  Remeron 15 mg oral tablet, 15 mg= 1 tab(s), Oral, At Bedtime,? ?Not taking  Tegretol 200 mg oral tablet, 200 mg= 1 tab(s), Oral, BID,? ?Not taking  temazepam 30 mg oral capsule, 30 mg= 1 cap(s), Oral, qPM  traZODone 100 mg oral tablet, 100 mg= 1 tab(s), Oral, TID  Allergies  Depakote?(seizure)  Depakote ER?(seizures)  SEROquel?(seizures)  lithium?(seizures)  Social History  Abuse/Neglect  No, 12/20/2019  No, No, Yes, 11/30/2019  No, 11/12/2019  No, 11/09/2019  Alcohol - Denies Alcohol Use, 05/06/2012  Past, 11/06/2019  Past, Past alcohol use, unknown amount or duration., 10/23/2019  Employment/School  Unemployed, Activity level: Desk/Office. Highest education level: High school. Operates hazardous equipment: No., 12/31/2018  Home/Environment  Lives with Resides with nephew . Living situation: Home with assistance., 11/06/2019  Lives with Siblings. Living situation: Home/Independent. Alcohol abuse in household: No. Substance abuse in household: No. Smoker in household: No. Injuries/Abuse/Neglect in household: No. Feels unsafe at home: No. Safe place to go: Yes. Agency(s)/Others notified: No. Family/Friends available for support: Yes. Concern for family members at home: No. Major illness in household: No. Financial concerns: No. TV/Computer concerns: No., 12/31/2018  Nutrition/Health  Regular, Wants to lose weight: No. Sleeping concerns: No. Feels highly stressed: Yes., 12/31/2018  Sexual  Sexually active: No. Sexually active at age 10 Years. Number of current partners 0. Number of lifetime partners 5. Sexual orientation: Heterosexual. Uses condoms: No. History of sexual abuse: No., 12/31/2018  Substance Use - Denies Substance Abuse, 04/20/2013  Past,  Cocaine, 10/23/2019  Tobacco - High Risk, 05/11/2012  10 or more cigarettes (1/2 pack or more)/day in last 30 days, Cigarettes, No, 30 per day. 40 year(s). Household tobacco concerns: No., 12/20/2019  10 or more cigarettes (1/2 pack or more)/day in last 30 days, No, 11/30/2019  10 or more cigarettes (1/2 pack or more)/day in last 30 days, N/A, 11/12/2019  Smoker, current status unknown, N/A, 11/09/2019  Family History  Liver failure.....: Father.  Metastatic cancer: Mother.  Immunizations  Vaccine Date Status   tetanus/diphtheria/pertussis, acel(Tdap) 12/10/2018 Given   Lab Results  Labs Last 24 Hours?  ?Chemistry? Hematology/Coagulation?   Sodium Lvl: 141 mmol/L (12/20/19 15:09:00) WBC:?13.9 x10(3)/mcL?High (12/20/19 15:09:00)   Potassium Lvl: 4.1 mmol/L (12/20/19 15:09:00) RBC: 5.57 x10(6)/mcL (12/20/19 15:09:00)   Chloride: 105 mmol/L (12/20/19 15:09:00) Hgb: 15.8 gm/dL (12/20/19 15:09:00)   CO2: 25 mmol/L (12/20/19 15:09:00) Hct: 48.4 % (12/20/19 15:09:00)   Calcium Lvl: 9 mg/dL (12/20/19 15:09:00) Platelet: 318 x10(3)/mcL (12/20/19 15:09:00)   Glucose Lvl:?184 mg/dL?High (12/20/19 15:09:00) MCV: 86.9 fL (12/20/19 15:09:00)   BUN: 16 mg/dL (12/20/19 15:09:00) MCH: 28.4 pg (12/20/19 15:09:00)   Creatinine: 1.14 mg/dL (12/20/19 15:09:00) MCHC:?32.6 gm/dL?Low (12/20/19 15:09:00)   eGFR-AA: >60 (12/20/19 15:09:00) RDW: 13.6 % (12/20/19 15:09:00)   eGFR-JAYDON: >60 (12/20/19 15:09:00) MPV:?9.1 fL?Low (12/20/19 15:09:00)   Bili Total: 0.7 mg/dL (12/20/19 15:09:00) Abs Neut:?10.27 x10(3)/mcL?High (12/20/19 15:09:00)   Bili Direct: 0.2 mg/dL (12/20/19 15:09:00) Neutro Auto: 74 % (12/20/19 15:09:00)   Bili Indirect: 0.5 mg/dL (12/20/19 15:09:00) Lymph Auto: 15 % (12/20/19 15:09:00)   AST:?60 unit/L?High (12/20/19 15:09:00) Mono Auto: 9 % (12/20/19 15:09:00)   ALT: 63 unit/L (12/20/19 15:09:00) Eos Auto: 1 % (12/20/19 15:09:00)   Alk Phos: 62 unit/L (12/20/19 15:09:00) Abs Eos: 0.1 x10(3)/mcL (12/20/19 15:09:00)   Total  Protein: 7.4 gm/dL (12/20/19 15:09:00) Basophil Auto: 1 % (12/20/19 15:09:00)   Albumin Lvl: 3.9 gm/dL (12/20/19 15:09:00) Abs Neutro:?10.27 x10(3)/mcL?High (12/20/19 15:09:00)   Globulin: 3.5 gm/dL (12/20/19 15:09:00) Abs Lymph: 2.1 x10(3)/mcL (12/20/19 15:09:00)   A/G Ratio: 1.1 ratio (12/20/19 15:09:00) Abs Mono: 1.3 x10(3)/mcL (12/20/19 15:09:00)   Ammonia Lvl: 24 mcmol/L (12/20/19 15:09:00) Abs Baso: 0.1 x10(3)/mcL (12/20/19 15:09:00)   Total CK:?750 unit/L?High (12/20/19 15:09:00)    Troponin-I: 0.03 ng/mL (12/20/19 15:09:00)    T4 Free: 1.09 ng/dL (12/20/19 15:09:00)    TSH: 1.18 mIU/L (12/20/19 15:09:00)    U pH: 6 (12/20/19 16:37:16)    U Spec Grav: 1.02 (12/20/19 16:37:16)

## 2022-05-02 NOTE — CONSULTS
"  [facility] - [address] [place] [address] ICU  Adult Nutrition  Consult Note    SUMMARY     Recommendations    Recommendation/Intervention: TF recommendations: Peptamen Intense VHP, goal rate of 70mL/hr. At goal (based on a 20hr per day run time), TF will provide:  1400 kcal (64% est needs, 89% with kcal from meds), 129g protein (98% est needs), 1176mL (45% est needs)  Goals: Meet >/=75% est energy and protein requirements by f/u  Nutrition Goal Status: new  Communication of RD Recs: reviewed with RN    Assessment and Plan  Consult to evaluate pt's protein intake. Per RN, MD will not take pt to surgery until prealbumin increased. Explained to RN that prealbumin can be negatively impacted by a pt's CRP level.  Will increase TF goal rate to better meet protein needs vs giving [employer] protein packets. Pt remains on vent and receiving Kcal from meds.      Malnutrition Assessment    Subcutaneous Fat Loss (Final Summary): well nourished  Muscle Loss Evaluation (Final Summary): well nourished  Fluid Accumulation Evaluation:  (unable to evaluate)    Moderate Weight Loss (Malnutrition):  (unable to evaluate)    Reason for Assessment    Reason For Assessment: consult, RD follow-up  Diagnosis: other (see comments) (1. CAD with 4 vessel CABG 4/6  2. MRSA Sternal wound dehiscence with debridement and wound vac 4/17.  Plastics following  3. Respiratory culture positive Klebsiella and Proteus  4. Malnutrition.  5. Respiratory failure.  Intubated 4/15)  Relevant Medical History: Hepatitis C, Depression, Bipolar Disorder, Stroke, HTN    Nutrition Risk Screen    Nutrition Risk Screen: tube feeding or parenteral nutrition    Nutrition/Diet History    Factors Affecting Nutritional Intake: on mechanical ventilation    Anthropometrics    Temp: 99.5 °F (37.5 °C)  Height Method: Estimated  Height: 5' 10.87" (180 cm)  Height (inches): 70.87 in  Weight Method: Estimated  Weight: 87.7 kg (193 lb 5.5 oz)  Weight (lb): 193.35 lb  Ideal Body " Weight (IBW), Male: 171.22 lb  % Ideal Body Weight, Male (lb): 112.92 %  BMI (Calculated): 27.1  BMI Grade: 25 - 29.9 - overweight       Lab/Procedures/Meds    Pertinent Labs Reviewed: reviewed  Pertinent Labs Comments: 5/2/22: Prealbumin 13  Pertinent Medications Reviewed: reviewed  Pertinent Medications Comments: diprivan @ 21mL/hr (+554 kcal/day)      Estimated/Assessed Needs    Weight Used For Calorie Calculations: 87.7 kg (193 lb 5.5 oz)  Energy Calorie Requirements (kcal): 25-30  Energy Need Method: Kcal/kg  Protein Requirements: 1.5g/kg  Weight Used For Protein Calculations: 87.7 kg (193 lb 5.5 oz)  Fluid Requirements (mL): 2631 (30mL/kg)      Nutrition Prescription Ordered    Current Nutrition Support Formula Ordered: Peptamen Intense VHP  Current Nutrition Support Rate Ordered: 55 (ml) (mL/hr)    Evaluation of Received Nutrient/Fluid Intake    Enteral Calories (kcal): 1100  Enteral Protein (gm): 101  Enteral (Free Water) Fluid (mL): 924  Lipid Calories (kcals): 554 kcals  % Kcal Needs: 75 (TF kcal + lipid kcal)  % Protein Needs: 77  Energy Calories Required: meeting needs  Protein Required: meeting needs  Fluid Required: not meeting needs  Tolerance: tolerating  % Intake of Estimated Energy Needs: 75 - 100 %  % Meal Intake: NPO     Nutrition Problem  Inadequate oral intake    Related to (etiology):   Intubation     Signs and Symptoms (as evidenced by):   NPO    Interventions/Recommendations (treatment strategy):  Enteral nutrition rate; collaboration with others    Nutrition Diagnosis Status:   New      Nutrition Risk    Level of Risk/Frequency of Follow-up: high       Monitor and Evaluation    Food and Nutrient Intake: enteral nutrition intake  Food and Nutrient Adminstration: enteral and parenteral nutrition administration       Nutrition Follow-Up    5/6/22

## 2022-05-02 NOTE — PROGRESS NOTES
[facility] [address]  Critical Care - Medicine  Progress Note    Patient Name: Kendall Duff  MRN: [id number]  Admission Date: 4/1/2022  Hospital Length of Stay: 31 days  Code Status: Prior  Attending Provider: Carroll Hammer Jr., MD  Primary Care Provider: Primary Doctor No   Principal Problem: <principal problem not specified>    Subjective:     HPI: 55 yo admitted 4/1 and underwent CABG x 4 on 4/6.  Hx of schizophrenia and BPAD    Hospital/ICU Course: Developed MRSA sternal wound. Intubated 4/15.   Underwent debridement with wound vac on 4/17.  Sputum culture from 4/26 with Klebsiella and proteus    Interval History/Significant Events:Remains intubated overnight.  On low dose vaspressors    Objective:     Vital Signs (Most Recent):  Temp: 99.3 °F (37.4 °C) (05/01/22 1600)  Pulse: 65 (05/02/22 0715)  Resp: (!) 31 (05/02/22 0715)  BP: 98/71 (05/02/22 0715)  SpO2: 99 % (05/02/22 0715) Vital Signs (24h Range):  Temp:  [99.1 °F (37.3 °C)-99.3 °F (37.4 °C)] 99.3 °F (37.4 °C)  Pulse:  [59-83] 65  Resp:  [15-42] 31  SpO2:  [93 %-100 %] 99 %  BP: ()/(61-92) 98/71     Weight: 87.7 kg (193 lb 5.5 oz)  Body mass index is 27.07 kg/m².      Intake/Output Summary (Last 24 hours) at 5/2/2022 0821  Last data filed at 5/2/2022 0600  Gross per 24 hour   Intake 2310.95 ml   Output 4600 ml   Net -2289.05 ml       Review of Systems     Physical Exam  Constitutional:       General: He is not in acute distress.     Comments: intubated   HENT:      Head: Normocephalic.   Cardiovascular:      Rate and Rhythm: Normal rate.   Pulmonary:      Effort: No respiratory distress.      Breath sounds: No stridor. Rhonchi present. No wheezing or rales.      Comments: Central wound vac  Neurological:      Comments: Sedated with precidex and propofol         Vents:  Vent Mode: PRVC A/C (05/02/22 0630)  Set Rate: 16 BPM (05/02/22 0433)  Vt Set: 450 mL (05/02/22 0630)  PEEP/CPAP: 3 cmH20 (05/02/22 0630)  Oxygen Concentration  (%): 35 (05/02/22 0715)  Peak Airway Pressure: 21 cmH2O (05/02/22 0630)  Total Ve: 12.9 mL (05/02/22 0630)  F/VT Ratio<105 (RSBI): (!) 73.56 (05/01/22 1948)    Lines/Drains/Airways     Peripherally Inserted Central Catheter Line  Duration           PICC Triple Lumen 05/01/22 0523 right basilic 1 day          Drain  Duration                NG/OG Tube orogastric Center mouth -- days          Airway  Duration                Airway - Non-Surgical 05/01/22 0524 Endotracheal Tube 1 day          Peripheral Intravenous Line  Duration                Peripheral IV - Single Lumen 05/01/22 0522 18 G Anterior;Left;Proximal Forearm 1 day                  Assessment/Plan:     Assessment:  1. 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 MRSA Sternal wound dehiscence with debridement and wound vac 4/17.  Plastics following  3. Respiratory culture positive Klebsiella and Proteus  4. Malnutrition.  5. Respiratory failure.  Intubated 4/15    Plan  - Continue MV support.  Adjust accordingly  - Enteral tube feeds  - Pepcid and lovenox prophylaxis  - Probably should proceed with trach and PEG this week        Critical care was time spent personally by me on the following activities: development of treatment plan with patient or surrogate and bedside caregivers, discussions with consultants, evaluation of patient's response to treatment, examination of patient, ordering and performing treatments and interventions, ordering and review of laboratory studies, ordering and review of radiographic studies, pulse oximetry, re-evaluation of patient's condition.  This critical care time did not overlap with that of any other provider or involve time for any procedures.     Sukhjinder Michael MD  Critical Care - Medicine  [facility] - [facility] [address] [facility]

## 2022-05-03 LAB
ANION GAP SERPL CALC-SCNC: 9 MEQ/L
BASOPHILS # BLD AUTO: 0.09 X10(3)/MCL (ref 0–0.2)
BASOPHILS NFR BLD AUTO: 1.1 %
BUN SERPL-MCNC: 14.2 MG/DL (ref 8.4–25.7)
CALCIUM SERPL-MCNC: 8.3 MG/DL (ref 8.4–10.2)
CHLORIDE SERPL-SCNC: 107 MMOL/L (ref 98–107)
CO2 SERPL-SCNC: 22 MMOL/L (ref 22–29)
CREAT SERPL-MCNC: 0.51 MG/DL (ref 0.73–1.18)
CREAT/UREA NIT SERPL: 28
EOSINOPHIL # BLD AUTO: 0.99 X10(3)/MCL (ref 0–0.9)
EOSINOPHIL NFR BLD AUTO: 11.6 %
ERYTHROCYTE [DISTWIDTH] IN BLOOD BY AUTOMATED COUNT: 15 % (ref 11.5–17)
GLUCOSE SERPL-MCNC: 143 MG/DL (ref 74–100)
HCT VFR BLD AUTO: 35.4 % (ref 42–52)
HGB BLD-MCNC: 10.6 GM/DL (ref 14–18)
IMM GRANULOCYTES # BLD AUTO: 0.04 X10(3)/MCL (ref 0–0.02)
IMM GRANULOCYTES NFR BLD AUTO: 0.5 % (ref 0–0.43)
LYMPHOCYTES # BLD AUTO: 1.93 X10(3)/MCL (ref 0.6–4.6)
LYMPHOCYTES NFR BLD AUTO: 22.7 %
MCH RBC QN AUTO: 26.8 PG (ref 27–31)
MCHC RBC AUTO-ENTMCNC: 29.9 MG/DL (ref 33–36)
MCV RBC AUTO: 89.6 FL (ref 80–94)
MONOCYTES # BLD AUTO: 0.84 X10(3)/MCL (ref 0.1–1.3)
MONOCYTES NFR BLD AUTO: 9.9 %
NEUTROPHILS # BLD AUTO: 4.6 X10(3)/MCL (ref 2.1–9.2)
NEUTROPHILS NFR BLD AUTO: 54.2 %
NRBC BLD AUTO-RTO: 0 %
PLATELET # BLD AUTO: 331 X10(3)/MCL (ref 130–400)
PMV BLD AUTO: 10.2 FL (ref 9.4–12.4)
POTASSIUM SERPL-SCNC: 4.5 MMOL/L (ref 3.5–5.1)
RBC # BLD AUTO: 3.95 X10(6)/MCL (ref 4.7–6.1)
SODIUM SERPL-SCNC: 138 MMOL/L (ref 136–145)
WBC # SPEC AUTO: 8.5 X10(3)/MCL (ref 4.5–11.5)

## 2022-05-03 PROCEDURE — 27000124 HC DRESSING, WOUND VAC

## 2022-05-03 PROCEDURE — 25000003 PHARM REV CODE 250: Performed by: HOSPITALIST

## 2022-05-03 PROCEDURE — A4216 STERILE WATER/SALINE, 10 ML: HCPCS

## 2022-05-03 PROCEDURE — 63600175 PHARM REV CODE 636 W HCPCS: Performed by: INTERNAL MEDICINE

## 2022-05-03 PROCEDURE — 85025 COMPLETE CBC W/AUTO DIFF WBC: CPT | Performed by: HOSPITALIST

## 2022-05-03 PROCEDURE — 63600175 PHARM REV CODE 636 W HCPCS

## 2022-05-03 PROCEDURE — 27000221 HC OXYGEN, UP TO 24 HOURS

## 2022-05-03 PROCEDURE — 36415 COLL VENOUS BLD VENIPUNCTURE: CPT | Performed by: HOSPITALIST

## 2022-05-03 PROCEDURE — 99231 SBSQ HOSP IP/OBS SF/LOW 25: CPT | Mod: ,,, | Performed by: SURGERY

## 2022-05-03 PROCEDURE — 99231 PR SUBSEQUENT HOSPITAL CARE,LEVL I: ICD-10-PCS | Mod: ,,, | Performed by: SURGERY

## 2022-05-03 PROCEDURE — 25000003 PHARM REV CODE 250: Performed by: INTERNAL MEDICINE

## 2022-05-03 PROCEDURE — 25000003 PHARM REV CODE 250

## 2022-05-03 PROCEDURE — 80048 BASIC METABOLIC PNL TOTAL CA: CPT | Performed by: HOSPITALIST

## 2022-05-03 PROCEDURE — 94003 VENT MGMT INPAT SUBQ DAY: CPT

## 2022-05-03 PROCEDURE — 27200966 HC CLOSED SUCTION SYSTEM

## 2022-05-03 PROCEDURE — 63600175 PHARM REV CODE 636 W HCPCS: Performed by: HOSPITALIST

## 2022-05-03 PROCEDURE — 20000000 HC ICU ROOM

## 2022-05-03 RX ORDER — PROPOFOL 10 MG/ML
5-50 INJECTION, EMULSION INTRAVENOUS CONTINUOUS
Status: DISCONTINUED | OUTPATIENT
Start: 2022-05-03 | End: 2022-05-22

## 2022-05-03 RX ORDER — DEXMEDETOMIDINE HYDROCHLORIDE 4 UG/ML
0-1.4 INJECTION, SOLUTION INTRAVENOUS CONTINUOUS
Status: DISCONTINUED | OUTPATIENT
Start: 2022-05-03 | End: 2022-05-22

## 2022-05-03 RX ORDER — GLYCOPYRROLATE 1 MG/1
1 TABLET ORAL 3 TIMES DAILY
Status: DISCONTINUED | OUTPATIENT
Start: 2022-05-03 | End: 2022-06-09

## 2022-05-03 RX ADMIN — Medication 0.02 MCG/KG/MIN: at 09:05

## 2022-05-03 RX ADMIN — FAMOTIDINE 20 MG: 10 INJECTION, SOLUTION INTRAVENOUS at 09:05

## 2022-05-03 RX ADMIN — PROPOFOL 50 MCG/KG/MIN: 10 INJECTION, EMULSION INTRAVENOUS at 06:05

## 2022-05-03 RX ADMIN — HALOPERIDOL 5 MG: 5 TABLET ORAL at 09:05

## 2022-05-03 RX ADMIN — VANCOMYCIN HYDROCHLORIDE 1250 MG: 1.25 INJECTION, POWDER, LYOPHILIZED, FOR SOLUTION INTRAVENOUS at 03:05

## 2022-05-03 RX ADMIN — MUPIROCIN: 20 OINTMENT TOPICAL at 09:05

## 2022-05-03 RX ADMIN — SODIUM CHLORIDE, PRESERVATIVE FREE 10 ML: 5 INJECTION INTRAVENOUS at 12:05

## 2022-05-03 RX ADMIN — PROPOFOL 50 MCG/KG/MIN: 10 INJECTION, EMULSION INTRAVENOUS at 07:05

## 2022-05-03 RX ADMIN — DEXMEDETOMIDINE HYDROCHLORIDE 1.4 MCG/KG/HR: 400 INJECTION, SOLUTION INTRAVENOUS at 04:05

## 2022-05-03 RX ADMIN — DEXMEDETOMIDINE HYDROCHLORIDE 1.4 MCG/KG/HR: 400 INJECTION, SOLUTION INTRAVENOUS at 12:05

## 2022-05-03 RX ADMIN — SODIUM CHLORIDE, PRESERVATIVE FREE 10 ML: 5 INJECTION INTRAVENOUS at 09:05

## 2022-05-03 RX ADMIN — RIFAMPIN 300 MG: 300 CAPSULE ORAL at 09:05

## 2022-05-03 RX ADMIN — SODIUM CHLORIDE, PRESERVATIVE FREE 10 ML: 5 INJECTION INTRAVENOUS at 05:05

## 2022-05-03 RX ADMIN — BENZTROPINE MESYLATE 1 MG: 1 TABLET ORAL at 09:05

## 2022-05-03 RX ADMIN — DOXEPIN HYDROCHLORIDE 50 MG: 50 CAPSULE ORAL at 09:05

## 2022-05-03 RX ADMIN — DEXMEDETOMIDINE HYDROCHLORIDE 1.4 MCG/KG/HR: 400 INJECTION INTRAVENOUS at 12:05

## 2022-05-03 RX ADMIN — LEVETIRACETAM 500 MG: 100 INJECTION, SOLUTION INTRAVENOUS at 09:05

## 2022-05-03 RX ADMIN — DEXMEDETOMIDINE HYDROCHLORIDE 1.4 MCG/KG/HR: 400 INJECTION INTRAVENOUS at 04:05

## 2022-05-03 RX ADMIN — ENOXAPARIN SODIUM 40 MG: 40 INJECTION SUBCUTANEOUS at 09:05

## 2022-05-03 RX ADMIN — DEXMEDETOMIDINE HYDROCHLORIDE 1 MCG/KG/HR: 400 INJECTION INTRAVENOUS at 07:05

## 2022-05-03 RX ADMIN — PROPOFOL 50 MCG/KG/MIN: 10 INJECTION, EMULSION INTRAVENOUS at 09:05

## 2022-05-03 RX ADMIN — GLYCOPYRROLATE 1 MG: 1 TABLET ORAL at 05:05

## 2022-05-03 RX ADMIN — PROPOFOL 50 MCG/KG/MIN: 10 INJECTION, EMULSION INTRAVENOUS at 04:05

## 2022-05-03 RX ADMIN — VANCOMYCIN HYDROCHLORIDE 1250 MG: 1.25 INJECTION, POWDER, LYOPHILIZED, FOR SOLUTION INTRAVENOUS at 09:05

## 2022-05-03 RX ADMIN — EZETIMIBE 10 MG: 10 TABLET ORAL at 09:05

## 2022-05-03 RX ADMIN — OXCARBAZEPINE 300 MG: 300 TABLET, FILM COATED ORAL at 09:05

## 2022-05-03 RX ADMIN — SODIUM CHLORIDE, PRESERVATIVE FREE 10 ML: 5 INJECTION INTRAVENOUS at 06:05

## 2022-05-03 RX ADMIN — DEXMEDETOMIDINE HYDROCHLORIDE 1 MCG/KG/HR: 400 INJECTION INTRAVENOUS at 09:05

## 2022-05-03 RX ADMIN — ATORVASTATIN CALCIUM 80 MG: 40 TABLET, FILM COATED ORAL at 09:05

## 2022-05-03 RX ADMIN — DEXMEDETOMIDINE HYDROCHLORIDE 1.4 MCG/KG/HR: 400 INJECTION INTRAVENOUS at 01:05

## 2022-05-03 RX ADMIN — PROPOFOL 50 MCG/KG/MIN: 10 INJECTION, EMULSION INTRAVENOUS at 01:05

## 2022-05-03 RX ADMIN — ASPIRIN 81 MG CHEWABLE TABLET 81 MG: 81 TABLET CHEWABLE at 09:05

## 2022-05-03 RX ADMIN — GLYCOPYRROLATE 1 MG: 1 TABLET ORAL at 09:05

## 2022-05-03 RX ADMIN — VANCOMYCIN HYDROCHLORIDE 1250 MG: 1.25 INJECTION, POWDER, LYOPHILIZED, FOR SOLUTION INTRAVENOUS at 06:05

## 2022-05-03 RX ADMIN — PROPOFOL 50 MCG/KG/MIN: 10 INJECTION, EMULSION INTRAVENOUS at 12:05

## 2022-05-03 RX ADMIN — CEFTRIAXONE SODIUM 2 G: 2 INJECTION, POWDER, FOR SOLUTION INTRAMUSCULAR; INTRAVENOUS at 12:05

## 2022-05-03 NOTE — CONSULTS
"Gastroenterology     CC:  Dysphagia    HPI 56 y.o. male status post four-vessel coronary artery bypass graft on 04/06/2022 who we have asked to see for possible gastrostomy tube placement.  The patient has developed respiratory failure requiring mechanical ventilation.  Family is considering trach and PEG    Past Medical History:   Diagnosis Date    Bipolar disorder, unspecified     Chronic hepatitis C     Hypertension     Obesity, unspecified     Seizures     Stroke        Past Surgical History:   Procedure Laterality Date    CORONARY STENT PLACEMENT  08/14/2015    DEBRIDEMENT  04/17/2022    LEFT HEART CATHETERIZATION Left 08/13/2015       Social History     Tobacco Use    Smoking status: Current Every Day Smoker     Types: Cigarettes    Smokeless tobacco: Never Used   Substance Use Topics    Alcohol use: Never    Drug use: Not Currently     Types: Cocaine       Family History   Problem Relation Age of Onset    Cancer Mother     Liver disease Father        Review of Systems  Unobtainable  Physical Examination  /89   Pulse 67   Temp 98.8 °F (37.1 °C) (Oral)   Resp (!) 23   Ht 5' 10.87" (1.8 m)   Wt 88.5 kg (195 lb 1.7 oz)   SpO2 97%   BMI 27.31 kg/m²   General unresponsive on mechanical ventilator  HENT: Normocephalic, atraumatic, neck symmetrical, no nasal discharge ET tube in place  Eyes: conjunctivae/corneas clear, PERRL, EOM's intact  Lungs: clear to auscultation bilaterally, no dullness to percussion bilaterally  Heart: regular rate and rhythm without rub; no displacement of the PMI.  CABG incision clean   Abdomen:  Soft, nontender, no masses organomegaly appreciated, bowel sounds present  Extremities: extremities symmetric; no clubbing, cyanosis, or edema  Integument: Skin color, texture, turgor normal; no rashes; hair distrubution normal  Neurologic:  Unresponsive on ventilator      Imaging:    I have personally reviewed these images    Assessment:   Dysphagia associated with " respiratory failure    Plan:Okay for gastrostomy tube placement.  Family to decide whether they want to pursue.

## 2022-05-03 NOTE — NURSING
Attempted to wean sedation for a RASS of -3, Patient is now  Tachypenic, O2 sats 89%, coughing, and desynchronous with the ventilator. Dr. Michael notified ok to increase propofol to previous rate of 50mcg.

## 2022-05-03 NOTE — PROGRESS NOTES
PRS  Intubated and sedated  VAC in place and functional, changed yesterday, pictures evaluated  Prealbumin 13  On levo    A/P:  Sternal nonunion/dehisence.  Pt pending reconstruction once prealbumin reaches a more acceptable level.  He will also need to be off vasoactive medications prior to an attempt at reconstruction.  I am ok with trach and peg if needed.  Will follow closely.  Continue VAC changes.

## 2022-05-03 NOTE — PROGRESS NOTES
[facility] [address]  Critical Care - Medicine  Progress Note    Patient Name: Kendall Duff  MRN: [id number]  Admission Date: 4/1/2022  Hospital Length of Stay: 32 days  Code Status: Prior  Attending Provider: Carroll Hammer Jr., MD  Primary Care Provider: Primary Doctor No   Principal Problem: <principal problem not specified>    Subjective:     HPI: 55 yo admitted 4/1 and underwent CABG x 4 on 4/6.  Hx of schizophrenia and BPAD    Hospital/ICU Course: Developed MRSA sternal wound. Intubated 4/15.   Underwent debridement with wound vac on 4/17.  Sputum culture from 4/26 with Klebsiella and proteus    Interval History/Significant Events: Remains intubated overnight.  On low dose vaspressors secondary to sedation Rx    Objective:     Vital Signs (Most Recent):  Temp: 98.8 °F (37.1 °C) (05/03/22 0400)  Pulse: 61 (05/03/22 0545)  Resp: (!) 26 (05/03/22 0545)  BP: 107/67 (05/03/22 0545)  SpO2: 98 % (05/03/22 0545) Vital Signs (24h Range):  Temp:  [98.4 °F (36.9 °C)-99.5 °F (37.5 °C)] 98.8 °F (37.1 °C)  Pulse:  [59-70] 61  Resp:  [7-37] 26  SpO2:  [92 %-100 %] 98 %  BP: ()/(61-90) 107/67     Weight: 88.5 kg (195 lb 1.7 oz)  Body mass index is 27.31 kg/m².      Intake/Output Summary (Last 24 hours) at 5/3/2022 1035  Last data filed at 5/3/2022 0800  Gross per 24 hour   Intake 3208 ml   Output 4100 ml   Net -892 ml       Review of Systems     Physical Exam  Constitutional:       General: He is not in acute distress.     Comments: intubated   HENT:      Head: Normocephalic.   Cardiovascular:      Rate and Rhythm: Normal rate.   Pulmonary:      Effort: No respiratory distress.      Breath sounds: No stridor. Rhonchi present. No wheezing or rales.      Comments: Central wound vac  Neurological:      Comments: Sedated with precidex and propofol         Vents:  Vent Mode: PRVC A/C (05/03/22 0859)  Set Rate: 16 BPM (05/03/22 0859)  Vt Set: 450 mL (05/03/22 0859)  PEEP/CPAP: 5 cmH20 (05/03/22  0859)  Oxygen Concentration (%): 35 (05/03/22 0545)  Peak Airway Pressure: 17 cmH2O (05/03/22 0859)  Total Ve: 13.1 mL (05/03/22 0859)  F/VT Ratio<105 (RSBI): (!) 66.34 (05/03/22 0110)    Lines/Drains/Airways     Peripherally Inserted Central Catheter Line  Duration           PICC Triple Lumen 05/01/22 0523 right basilic 2 days          Drain  Duration                NG/OG Tube orogastric Center mouth -- days          Airway  Duration                Airway - Non-Surgical 05/01/22 0524 Endotracheal Tube 2 days          Peripheral Intravenous Line  Duration                Peripheral IV - Single Lumen 05/01/22 0522 18 G Anterior;Left;Proximal Forearm 2 days                  Assessment/Plan:     Assessment:  1. 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 MRSA Sternal wound dehiscence with debridement and wound vac 4/17.  Plastics following  3. Respiratory culture positive Klebsiella and Proteus  4. Malnutrition.  5. Respiratory failure.  Intubated 4/15    Plan  - Continue MV support.  Adjust accordingly  - Enteral tube feeds  - Pepcid and lovenox prophylaxis  - Should proceed with trach and PEG this week.  Discussed with daughter Henrik over the phone.  She is ok with PEG but is hesitant about trach due patient's previous wishes.  She will talk with rest of family and call back with desires        Critical care was time spent personally by me on the following activities: development of treatment plan with patient or surrogate and bedside caregivers, discussions with consultants, evaluation of patient's response to treatment, examination of patient, ordering and performing treatments and interventions, ordering and review of laboratory studies, ordering and review of radiographic studies, pulse oximetry, re-evaluation of patient's condition.  This critical care time did not overlap with that of any other provider or involve time for any procedures.     Sukhjinder Michael MD  Critical Care - Medicine  [facility] - [address]  [facility]

## 2022-05-04 LAB
BASE EXCESS ARTERIAL: 2.4 MMOL/L (ref -2–2)
CO2 TOTAL: 27.4
COHB ART: 1.7
COHB ART: 93.9
HCO3 ARTERIAL: 26.3 MMOL/L (ref 18–23)
IONIZED CALCIUM (RESP. THERAPY): 1.14
METHB ART: 1.6
PCO2 ARTERIAL: 37
PH ARTERIAL: 7.46
PO2 ARTERIAL: 79 MM HG (ref 83–108)
POTASSIUM BLOOD ARTERIAL: 4
SATURATED O2 ARTERIAL, I-STAT: 96.2
SODIUM BLOOD ARTERIAL: 133
THB ART: 10.3
VANCOMYCIN TROUGH SERPL-MCNC: 19.7 UG/ML (ref 15–20)

## 2022-05-04 PROCEDURE — 82803 BLOOD GASES ANY COMBINATION: CPT

## 2022-05-04 PROCEDURE — 63600175 PHARM REV CODE 636 W HCPCS: Performed by: INTERNAL MEDICINE

## 2022-05-04 PROCEDURE — 25000003 PHARM REV CODE 250

## 2022-05-04 PROCEDURE — 97606 NEG PRS WND THER DME>50 SQCM: CPT

## 2022-05-04 PROCEDURE — 80202 ASSAY OF VANCOMYCIN: CPT | Performed by: INTERNAL MEDICINE

## 2022-05-04 PROCEDURE — 27000124 HC DRESSING, WOUND VAC

## 2022-05-04 PROCEDURE — 94761 N-INVAS EAR/PLS OXIMETRY MLT: CPT

## 2022-05-04 PROCEDURE — 25000003 PHARM REV CODE 250: Performed by: HOSPITALIST

## 2022-05-04 PROCEDURE — 20000000 HC ICU ROOM

## 2022-05-04 PROCEDURE — 36415 COLL VENOUS BLD VENIPUNCTURE: CPT | Performed by: INTERNAL MEDICINE

## 2022-05-04 PROCEDURE — A4216 STERILE WATER/SALINE, 10 ML: HCPCS

## 2022-05-04 PROCEDURE — 27000221 HC OXYGEN, UP TO 24 HOURS

## 2022-05-04 PROCEDURE — 36600 WITHDRAWAL OF ARTERIAL BLOOD: CPT

## 2022-05-04 PROCEDURE — 27200966 HC CLOSED SUCTION SYSTEM

## 2022-05-04 PROCEDURE — 63600175 PHARM REV CODE 636 W HCPCS

## 2022-05-04 PROCEDURE — 99900035 HC TECH TIME PER 15 MIN (STAT)

## 2022-05-04 PROCEDURE — 63600175 PHARM REV CODE 636 W HCPCS: Performed by: HOSPITALIST

## 2022-05-04 PROCEDURE — 94003 VENT MGMT INPAT SUBQ DAY: CPT

## 2022-05-04 PROCEDURE — 25000003 PHARM REV CODE 250: Performed by: INTERNAL MEDICINE

## 2022-05-04 RX ADMIN — LEVETIRACETAM 500 MG: 100 INJECTION, SOLUTION INTRAVENOUS at 10:05

## 2022-05-04 RX ADMIN — ASPIRIN 81 MG CHEWABLE TABLET 81 MG: 81 TABLET CHEWABLE at 10:05

## 2022-05-04 RX ADMIN — SODIUM CHLORIDE, PRESERVATIVE FREE 10 ML: 5 INJECTION INTRAVENOUS at 09:05

## 2022-05-04 RX ADMIN — FAMOTIDINE 20 MG: 10 INJECTION, SOLUTION INTRAVENOUS at 09:05

## 2022-05-04 RX ADMIN — LEVETIRACETAM 500 MG: 100 INJECTION, SOLUTION INTRAVENOUS at 09:05

## 2022-05-04 RX ADMIN — VANCOMYCIN HYDROCHLORIDE 1250 MG: 1.25 INJECTION, POWDER, LYOPHILIZED, FOR SOLUTION INTRAVENOUS at 05:05

## 2022-05-04 RX ADMIN — OXCARBAZEPINE 300 MG: 300 TABLET, FILM COATED ORAL at 09:05

## 2022-05-04 RX ADMIN — FAMOTIDINE 20 MG: 10 INJECTION, SOLUTION INTRAVENOUS at 10:05

## 2022-05-04 RX ADMIN — BENZTROPINE MESYLATE 1 MG: 1 TABLET ORAL at 11:05

## 2022-05-04 RX ADMIN — PROPOFOL 50 MCG/KG/MIN: 10 INJECTION, EMULSION INTRAVENOUS at 06:05

## 2022-05-04 RX ADMIN — HALOPERIDOL 5 MG: 5 TABLET ORAL at 10:05

## 2022-05-04 RX ADMIN — SODIUM CHLORIDE, PRESERVATIVE FREE 10 ML: 5 INJECTION INTRAVENOUS at 06:05

## 2022-05-04 RX ADMIN — DEXMEDETOMIDINE HYDROCHLORIDE 1 MCG/KG/HR: 400 INJECTION INTRAVENOUS at 06:05

## 2022-05-04 RX ADMIN — RIFAMPIN 300 MG: 300 CAPSULE ORAL at 09:05

## 2022-05-04 RX ADMIN — EZETIMIBE 10 MG: 10 TABLET ORAL at 09:05

## 2022-05-04 RX ADMIN — PROPOFOL 50 MCG/KG/MIN: 10 INJECTION, EMULSION INTRAVENOUS at 12:05

## 2022-05-04 RX ADMIN — MUPIROCIN: 20 OINTMENT TOPICAL at 09:05

## 2022-05-04 RX ADMIN — VANCOMYCIN HYDROCHLORIDE 1250 MG: 1.25 INJECTION, POWDER, LYOPHILIZED, FOR SOLUTION INTRAVENOUS at 10:05

## 2022-05-04 RX ADMIN — PROPOFOL 50 MCG/KG/MIN: 10 INJECTION, EMULSION INTRAVENOUS at 10:05

## 2022-05-04 RX ADMIN — OXCARBAZEPINE 300 MG: 300 TABLET, FILM COATED ORAL at 10:05

## 2022-05-04 RX ADMIN — HALOPERIDOL 5 MG: 5 TABLET ORAL at 09:05

## 2022-05-04 RX ADMIN — PROPOFOL 50 MCG/KG/MIN: 10 INJECTION, EMULSION INTRAVENOUS at 01:05

## 2022-05-04 RX ADMIN — BENZTROPINE MESYLATE 1 MG: 1 TABLET ORAL at 09:05

## 2022-05-04 RX ADMIN — SODIUM CHLORIDE, PRESERVATIVE FREE 10 ML: 5 INJECTION INTRAVENOUS at 12:05

## 2022-05-04 RX ADMIN — SODIUM CHLORIDE, PRESERVATIVE FREE 10 ML: 5 INJECTION INTRAVENOUS at 05:05

## 2022-05-04 RX ADMIN — ENOXAPARIN SODIUM 40 MG: 40 INJECTION SUBCUTANEOUS at 10:05

## 2022-05-04 RX ADMIN — DOXEPIN HYDROCHLORIDE 50 MG: 50 CAPSULE ORAL at 09:05

## 2022-05-04 RX ADMIN — GLYCOPYRROLATE 1 MG: 1 TABLET ORAL at 04:05

## 2022-05-04 RX ADMIN — GLYCOPYRROLATE 1 MG: 1 TABLET ORAL at 11:05

## 2022-05-04 RX ADMIN — PROPOFOL 50 MCG/KG/MIN: 10 INJECTION, EMULSION INTRAVENOUS at 09:05

## 2022-05-04 RX ADMIN — DEXMEDETOMIDINE HYDROCHLORIDE 1 MCG/KG/HR: 400 INJECTION INTRAVENOUS at 10:05

## 2022-05-04 RX ADMIN — DEXMEDETOMIDINE HYDROCHLORIDE 1 MCG/KG/HR: 400 INJECTION INTRAVENOUS at 12:05

## 2022-05-04 RX ADMIN — ATORVASTATIN CALCIUM 80 MG: 40 TABLET, FILM COATED ORAL at 10:05

## 2022-05-04 RX ADMIN — RIFAMPIN 300 MG: 300 CAPSULE ORAL at 10:05

## 2022-05-04 RX ADMIN — CEFTRIAXONE SODIUM 2 G: 2 INJECTION, POWDER, FOR SOLUTION INTRAMUSCULAR; INTRAVENOUS at 12:05

## 2022-05-04 RX ADMIN — VANCOMYCIN HYDROCHLORIDE 1250 MG: 1.25 INJECTION, POWDER, LYOPHILIZED, FOR SOLUTION INTRAVENOUS at 02:05

## 2022-05-04 RX ADMIN — GLYCOPYRROLATE 1 MG: 1 TABLET ORAL at 10:05

## 2022-05-04 RX ADMIN — PROPOFOL 0.02 MCG/KG/MIN: 10 INJECTION, EMULSION INTRAVENOUS at 03:05

## 2022-05-04 NOTE — PROGRESS NOTES
Pharmacokinetic Assessment Follow Up: IV Vancomycin    Start Date: 04/16/2022    Vancomycin serum concentration assessment(s):    The trough level was drawn correctly and can be used to guide therapy at this time. The measurement is within the desired definitive target range of 10 to 15 mcg/mL.    Vancomycin Regimen Plan:    Continue regimen to Vancomycin 1250 mg IV every 8 hours with next serum trough concentration measured at 0500 prior to 6th dose on 05/06.    Drug levels (last 3 results):  Vancomycin trough is 19.7 @ 1255 on 05/04.    Pharmacy will continue to follow and monitor vancomycin.    Please contact pharmacy at extension [phone] for questions regarding this assessment.    Thank you for the consult,   Rachel Garcia       Patient brief summary:  Kendall Duff is a 56 y.o. male initiated on antimicrobial therapy with IV Vancomycin for treatment of skin & soft tissue infection    The patient's current regimen is 1250mg q8hr    Drug Allergies:   Review of patient's allergies indicates:   Allergen Reactions    Depakote [divalproex]     Divalproex sodium Other (See Comments)    Lithium     Lithium analogues     Quetiapine Other (See Comments)       Actual Body Weight:   88.5kg    Renal Function:   Estimated Creatinine Clearance: 167.5 mL/min (A) (based on SCr of 0.51 mg/dL (L)).,     Dialysis Method (if applicable):  N/A    CBC (last 72 hours):  Recent Labs   Lab Result Units 05/03/22  0116   WBC x10(3)/mcL 8.5   Hgb gm/dL 10.6*   Hct % 35.4*   Platelet x10(3)/mcL 331   Mono Auto % 9.9   Eos Auto % 11.6   Basophil Auto % 1.1       Metabolic Panel (last 72 hours):  Recent Labs   Lab Result Units 05/03/22  0116 05/04/22  0555   Sodium   --  133   Sodium Level mmol/L 138  --    Potassium Level mmol/L 4.5  --    Potassium, Bld   --  4.0   Chloride mmol/L 107  --    Carbon Dioxide mmol/L 22  --    CO2 TOTAL   --  27.4   Glucose Level mg/dL 143*  --    Blood Urea Nitrogen mg/dL 14.2  --    Creatinine mg/dL 0.51*  --         Vancomycin Administrations:  vancomycin given in the last 96 hours                     vancomycin (VANCOCIN) 1,250 mg in dextrose 5 % 250 mL IVPB (mg) 1,250 mg New Bag 05/04/22 1444     1,250 mg New Bag  0532     1,250 mg New Bag 05/03/22 2158     1,250 mg New Bag  1534     1,250 mg New Bag  0600     1,250 mg New Bag 05/02/22 2231     1,250 mg New Bag  1400     1,250 mg New Bag  0506     1,250 mg New Bag 05/01/22 2157     1,250 mg New Bag  1401     1,250 mg New Bag  0600                    Microbiologic Results:  Microbiology Results (last 7 days)       ** No results found for the last 168 hours. **

## 2022-05-04 NOTE — PROGRESS NOTES
[facility] [address]  Critical Care - Medicine  Progress Note    Patient Name: Kendall Duff  MRN: [id number]  Admission Date: 4/1/2022  Hospital Length of Stay: 33 days  Code Status: Prior  Attending Provider: Carroll Hammer Jr., MD  Primary Care Provider: Primary Doctor No   Principal Problem: <principal problem not specified>    Subjective:     HPI:  55 yo admitted 4/1 and underwent CABG x 4 on 4/6.  Hx of schizophrenia and BPAD       Hospital/ICU Course: Developed MRSA sternal wound. Intubated 4/15.   Underwent debridement with wound vac on 4/17.  Sputum culture from 4/26 with Klebsiella and proteus    Interval History/Significant Events: No change overnight.  Intubated and sedated    Objective:     Vital Signs (Most Recent):  Temp: 98.1 °F (36.7 °C) (05/04/22 0400)  Pulse: 69 (05/04/22 0836)  Resp: (!) 32 (05/04/22 0836)  BP: (!) 88/61 (05/04/22 0630)  SpO2: 97 % (05/04/22 0836) Vital Signs (24h Range):  Temp:  [98.1 °F (36.7 °C)-98.8 °F (37.1 °C)] 98.1 °F (36.7 °C)  Pulse:  [62-82] 69  Resp:  [0-36] 32  SpO2:  [89 %-100 %] 97 %  BP: ()/(53-91) 88/61     Weight: 88.5 kg (195 lb 1.7 oz)  Body mass index is 27.31 kg/m².      Intake/Output Summary (Last 24 hours) at 5/4/2022 0903  Last data filed at 5/4/2022 0532  Gross per 24 hour   Intake 5320.47 ml   Output 2675 ml   Net 2645.47 ml        Physical Exam  Constitutional:       General: He is not in acute distress.     Appearance: He is not toxic-appearing.      Comments: Intubated and sedated   HENT:      Head: Normocephalic and atraumatic.   Eyes:      Extraocular Movements: Extraocular movements intact.      Pupils: Pupils are equal, round, and reactive to light.   Cardiovascular:      Rate and Rhythm: Normal rate and regular rhythm.      Pulses: Normal pulses.      Heart sounds: No murmur heard.    No gallop.   Pulmonary:      Effort: No respiratory distress.      Breath sounds: No stridor. No wheezing, rhonchi or rales.   Abdominal:       General: Abdomen is flat.      Palpations: Abdomen is soft.   Musculoskeletal:         General: No swelling or deformity.      Left lower leg: No edema.   Skin:     General: Skin is warm.      Coloration: Skin is not jaundiced.      Findings: No bruising.   Neurological:      Comments: Patient sedated and therefore unable to accurately exam           Current Facility-Administered Medications:     acetaminophen tablet 650 mg, 650 mg, Oral, Q4H PRN, Juancarlos Foreman MD    albuterol-ipratropium 2.5 mg-0.5 mg/3 mL nebulizer solution 3 mL, 3 mL, Nebulization, Q12H PRN, Abstracted Order, MD    aspirin chewable tablet 81 mg, 81 mg, Per OG tube, Daily, Abstracted MD Kellen, 81 mg at 05/03/22 0942    atorvastatin tablet 80 mg, 80 mg, Oral, Daily, Abstracted MD Kellen, 80 mg at 05/03/22 0939    benztropine tablet 1 mg, 1 mg, Per OG tube, BID, Abstracted MD Kellen, 1 mg at 05/03/22 2154    bisacodyL suppository 10 mg, 10 mg, Rectal, Daily PRN, Abstracted MD Kellen    cefTRIAXone (ROCEPHIN) 2 g in dextrose 5 % 50 mL IVPB, 2 g, Intravenous, Q24H, Carroll Hammer Jr., MD, Stopped at 05/04/22 0030    dexmedetomidine (PRECEDEX) 400mcg/100mL 0.9% NaCL infusion, 0-1.4 mcg/kg/hr, Intravenous, Continuous, Sukhjinder Michael MD, Last Rate: 19 mL/hr at 05/04/22 0646, 1 mcg/kg/hr at 05/04/22 0646    dextrose 10% bolus 250 mL, 250 mL, Intravenous, Once PRN, Juancarlos Foreman MD    doxepin capsule 50 mg, 50 mg, Oral, QHS, Abstracted Order, MD, 50 mg at 05/03/22 2154    enoxaparin injection 40 mg, 40 mg, Subcutaneous, Daily, Abstracted MD Kellen, 40 mg at 05/03/22 0939    ezetimibe tablet 10 mg, 10 mg, Oral, QHS, Abstracted Order, MD, 10 mg at 05/03/22 2155    famotidine (PF) injection 20 mg, 20 mg, Intravenous, BID, Abstracted MD Kellen, 20 mg at 05/03/22 2155    fentaNYL injection 100 mcg, 100 mcg, Intravenous, Q1H PRN, Abstracted Order, MD    fentaNYL injection 50 mcg, 50 mcg, Intravenous, Q1H PRN, Abstracted Order, MD     glycopyrrolate tablet 1 mg, 1 mg, Per NG tube, TID, Sukhjinder Michael MD, 1 mg at 05/03/22 2155    haloperidoL tablet 5 mg, 5 mg, Per OG tube, BID, Abstracted Order, MD, 5 mg at 05/03/22 2155    hydrALAZINE injection 10 mg, 10 mg, Intravenous, Q2H PRN, Abstracted Order, MD    levETIRAcetam (KEPPRA) 500 mg in sodium chloride 0.9 % 100 mL IVPB (MB+), 500 mg, Intravenous, Q12H, Carroll Hammer Jr., MD, 500 mg at 05/03/22 2153    lorazepam injection 0.5 mg, 0.5 mg, Intravenous, Once PRN, Abstracted Order, MD    lorazepam injection 1 mg, 1 mg, Intravenous, Once PRN, Abstracted Order, MD    metoprolol tartrate (LOPRESSOR) tablet 25 mg, 25 mg, Per OG tube, Daily, Abstracted Order, MD, 25 mg at 05/02/22 0919    morphine injection 2 mg, 2 mg, Intravenous, Q1H PRN, Abstracted Order, MD    morphine injection 4 mg, 4 mg, Intravenous, Q4H PRN, Abstracted Order, MD    mupirocin 2 % ointment, , Nasal, BID, Carroll Hammer Jr., MD, Given at 05/03/22 2156    nitroGLYCERIN SL tablet 0.4 mg, 0.4 mg, Sublingual, Q5 Min PRN, Abstracted Order, MD    NORepinephrine bitartrate-NaCl 8 mg/250 mL (32 mcg/mL) infusion, 0-3 mcg/kg/min (Dosing Weight), Intravenous, Continuous, Abstracted Order, MD, Paused at 05/04/22 0600    OLANZapine injection 10 mg, 10 mg, Intramuscular, Q8H PRN, Abstracted Order, MD    ondansetron injection 4 mg, 4 mg, Intravenous, Q4H PRN, Abstracted Order, MD    OXcarbazepine tablet 300 mg, 300 mg, Per OG tube, BID, Abstracted Order, MD, 300 mg at 05/03/22 2156    oxyCODONE immediate release tablet 10 mg, 10 mg, Oral, Q4H PRN, Abstracted Order, MD    oxyCODONE immediate release tablet 5 mg, 5 mg, Oral, Q4H PRN, Abstracted Order, MD    potassium chloride 20 mEq in 100 mL IVPB (FOR CENTRAL LINE ADMINISTRATION ONLY), 20 mEq, Intravenous, PRN, Abstracted Order, MD    potassium chloride 20 mEq in 100 mL IVPB (FOR CENTRAL LINE ADMINISTRATION ONLY), 40 mEq, Intravenous, PRN, Abstracted Order, MD    potassium  chloride 20 mEq in 100 mL IVPB (FOR CENTRAL LINE ADMINISTRATION ONLY), 60 mEq, Intravenous, PRN, Juancarlos Foreman MD    propofol (DIPRIVAN) 10 mg/mL infusion, 5-50 mcg/kg/min (Dosing Weight), Intravenous, Continuous, Sukhjinder Michael MD, Last Rate: 0.1 mL/hr at 05/04/22 0350, 0.02 mcg/kg/min at 05/04/22 0350    rifAMpin capsule 300 mg, 300 mg, Oral, BID, Juancarlos Foreman MD, 300 mg at 05/03/22 2154    Flushing PICC Protocol, , , Until Discontinued **AND** sodium chloride 0.9% flush 10 mL, 10 mL, Intravenous, Q6H, 10 mL at 05/04/22 0532 **AND** sodium chloride 0.9% flush 10 mL, 10 mL, Intravenous, PRN, Juancarlos Foreman MD    sodium chloride 0.9% flush 10 mL, 10 mL, Intravenous, PRN, Juancarlos Foreman MD    sodium chloride 0.9% flush 10 mL, 10 mL, Intravenous, Q12H, Juancarlos Foreman MD, 10 mL at 05/03/22 2100    sodium chloride 0.9% flush 20 mL, 20 mL, Intravenous, PRN, Juancarlos Foreman MD    vancomycin (VANCOCIN) 1,250 mg in dextrose 5 % 250 mL IVPB, 1,250 mg, Intravenous, Q8H, Juancarlos Foreman MD, Stopped at 05/04/22 0702    ziprasidone injection 20 mg, 20 mg, Intramuscular, Q8H PRN, Juancarlos Foreman MD     Vents:  Vent Mode: A/C (05/04/22 0836)  Set Rate: 16 BPM (05/04/22 0836)  Vt Set: 450 mL (05/04/22 0836)  PEEP/CPAP: 5 cmH20 (05/04/22 0836)  Oxygen Concentration (%): 35 (05/04/22 0836)  Peak Airway Pressure: 18 cmH2O (05/04/22 0836)  Total Ve: 12.7 mL (05/04/22 0836)  F/VT Ratio<105 (RSBI): (!) 77.86 (05/04/22 0836)    Lines/Drains/Airways     Peripherally Inserted Central Catheter Line  Duration           PICC Triple Lumen 05/01/22 0523 right basilic 3 days          Drain  Duration                NG/OG Tube 05/03/22 1530 Left nostril <1 day          Airway  Duration                Airway - Non-Surgical 05/01/22 0524 Endotracheal Tube 3 days          Peripheral Intravenous Line  Duration                Peripheral IV - Single Lumen 05/01/22 0522 18 G Anterior;Left;Proximal Forearm 3 days                 Significant Labs:    Lab Results   Component Value Date    WBC 8.5 05/03/2022    HGB 10.6 (L) 05/03/2022    HCT 35.4 (L) 05/03/2022    MCV 89.6 05/03/2022     03/25/2022         BMP  Lab Results   Component Value Date     05/04/2022    K 4.0 05/04/2022     03/25/2022    CO2 22 05/03/2022    BUN 14.2 05/03/2022    CREATININE 0.51 (L) 05/03/2022    CALCIUM 8.3 (L) 05/03/2022    ANIONGAP 9 03/25/2022    ESTGFRAFRICA >60.0 03/25/2022    EGFRNONAA >60.0 03/25/2022       ABG  No results for input(s): PH, PO2, PCO2, HCO3, BE in the last 168 hours.        Significant Imaging:  I have reviewed all pertinent imaging results/findings within the past 24 hours.  CXR: I have reviewed all pertinent results/findings within the past 24 hours and my personal findings are:  CXR is unchanged    DVT Prophylaxis: LOVENOX   GI prophylaxis: PEPCID    Assessment/Plan:        Assessment:  1. 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 MRSA Sternal wound dehiscence with debridement and wound vac 4/17.  Plastics following  3. Respiratory culture positive Klebsiella and Proteus  4. Malnutrition.  5. Respiratory failure.  Intubated 4/15     Plan  - Continue MV support.  Adjust accordingly  - Enteral tube feeds  - Pepcid and lovenox prophylaxis  - Family is planning on extubation/withdrawal of care 5/6         Greater than 30 minutes of critical care was time spent personally by me on the following activities: development of treatment plan with patient or surrogate and bedside caregivers, discussions with consultants, evaluation of patient's response to treatment, examination of patient, ordering and performing treatments and interventions, ordering and review of laboratory studies, ordering and review of radiographic studies, pulse oximetry, re-evaluation of patient's condition.  This critical care time did not overlap with that of any other provider or involve time for any procedures.     Sukhjinder Michael MD  Critical Care -  Medicine  [facility] - [address] [place] [address]

## 2022-05-05 PROCEDURE — 25000003 PHARM REV CODE 250

## 2022-05-05 PROCEDURE — 27200966 HC CLOSED SUCTION SYSTEM

## 2022-05-05 PROCEDURE — 63600175 PHARM REV CODE 636 W HCPCS

## 2022-05-05 PROCEDURE — 63600175 PHARM REV CODE 636 W HCPCS: Performed by: HOSPITALIST

## 2022-05-05 PROCEDURE — 27000221 HC OXYGEN, UP TO 24 HOURS

## 2022-05-05 PROCEDURE — 25000003 PHARM REV CODE 250: Performed by: HOSPITALIST

## 2022-05-05 PROCEDURE — A4216 STERILE WATER/SALINE, 10 ML: HCPCS

## 2022-05-05 PROCEDURE — 63600175 PHARM REV CODE 636 W HCPCS: Performed by: INTERNAL MEDICINE

## 2022-05-05 PROCEDURE — 25000003 PHARM REV CODE 250: Performed by: INTERNAL MEDICINE

## 2022-05-05 PROCEDURE — 94003 VENT MGMT INPAT SUBQ DAY: CPT

## 2022-05-05 PROCEDURE — 20000000 HC ICU ROOM

## 2022-05-05 RX ADMIN — GLYCOPYRROLATE 1 MG: 1 TABLET ORAL at 09:05

## 2022-05-05 RX ADMIN — HALOPERIDOL 5 MG: 5 TABLET ORAL at 08:05

## 2022-05-05 RX ADMIN — FAMOTIDINE 20 MG: 10 INJECTION, SOLUTION INTRAVENOUS at 08:05

## 2022-05-05 RX ADMIN — OXCARBAZEPINE 300 MG: 300 TABLET, FILM COATED ORAL at 08:05

## 2022-05-05 RX ADMIN — GLYCOPYRROLATE 1 MG: 1 TABLET ORAL at 08:05

## 2022-05-05 RX ADMIN — ENOXAPARIN SODIUM 40 MG: 40 INJECTION SUBCUTANEOUS at 08:05

## 2022-05-05 RX ADMIN — MUPIROCIN: 20 OINTMENT TOPICAL at 08:05

## 2022-05-05 RX ADMIN — DOXEPIN HYDROCHLORIDE 50 MG: 50 CAPSULE ORAL at 08:05

## 2022-05-05 RX ADMIN — PROPOFOL 50 MCG/KG/MIN: 10 INJECTION, EMULSION INTRAVENOUS at 03:05

## 2022-05-05 RX ADMIN — DEXMEDETOMIDINE HYDROCHLORIDE 1 MCG/KG/HR: 400 INJECTION INTRAVENOUS at 05:05

## 2022-05-05 RX ADMIN — SODIUM CHLORIDE, PRESERVATIVE FREE 10 ML: 5 INJECTION INTRAVENOUS at 12:05

## 2022-05-05 RX ADMIN — SODIUM CHLORIDE, PRESERVATIVE FREE 10 ML: 5 INJECTION INTRAVENOUS at 08:05

## 2022-05-05 RX ADMIN — RIFAMPIN 300 MG: 300 CAPSULE ORAL at 08:05

## 2022-05-05 RX ADMIN — LEVETIRACETAM 500 MG: 100 INJECTION, SOLUTION INTRAVENOUS at 08:05

## 2022-05-05 RX ADMIN — ASPIRIN 81 MG CHEWABLE TABLET 81 MG: 81 TABLET CHEWABLE at 08:05

## 2022-05-05 RX ADMIN — SODIUM CHLORIDE, PRESERVATIVE FREE 10 ML: 5 INJECTION INTRAVENOUS at 05:05

## 2022-05-05 RX ADMIN — VANCOMYCIN HYDROCHLORIDE 1250 MG: 1.25 INJECTION, POWDER, LYOPHILIZED, FOR SOLUTION INTRAVENOUS at 05:05

## 2022-05-05 RX ADMIN — EZETIMIBE 10 MG: 10 TABLET ORAL at 08:05

## 2022-05-05 RX ADMIN — PROPOFOL 40 MCG/KG/MIN: 10 INJECTION, EMULSION INTRAVENOUS at 03:05

## 2022-05-05 RX ADMIN — METOPROLOL TARTRATE 25 MG: 25 TABLET, FILM COATED ORAL at 08:05

## 2022-05-05 RX ADMIN — ATORVASTATIN CALCIUM 80 MG: 40 TABLET, FILM COATED ORAL at 08:05

## 2022-05-05 RX ADMIN — BENZTROPINE MESYLATE 1 MG: 1 TABLET ORAL at 08:05

## 2022-05-05 RX ADMIN — DEXMEDETOMIDINE HYDROCHLORIDE 0.8 MCG/KG/HR: 400 INJECTION INTRAVENOUS at 03:05

## 2022-05-05 RX ADMIN — MUPIROCIN: 20 OINTMENT TOPICAL at 09:05

## 2022-05-05 RX ADMIN — CEFTRIAXONE SODIUM 2 G: 2 INJECTION, POWDER, FOR SOLUTION INTRAMUSCULAR; INTRAVENOUS at 12:05

## 2022-05-05 RX ADMIN — VANCOMYCIN HYDROCHLORIDE 1250 MG: 1.25 INJECTION, POWDER, LYOPHILIZED, FOR SOLUTION INTRAVENOUS at 09:05

## 2022-05-05 RX ADMIN — VANCOMYCIN HYDROCHLORIDE 1250 MG: 1.25 INJECTION, POWDER, LYOPHILIZED, FOR SOLUTION INTRAVENOUS at 02:05

## 2022-05-05 RX ADMIN — GLYCOPYRROLATE 1 MG: 1 TABLET ORAL at 03:05

## 2022-05-05 RX ADMIN — DEXMEDETOMIDINE HYDROCHLORIDE 1 MCG/KG/HR: 400 INJECTION, SOLUTION INTRAVENOUS at 05:05

## 2022-05-05 RX ADMIN — PROPOFOL 45 MCG/KG/MIN: 10 INJECTION, EMULSION INTRAVENOUS at 08:05

## 2022-05-05 NOTE — PROGRESS NOTES
Mr. Duff remains intubated and sedated today.  He is on no pressors and is on 30% FiO2 on the vent.  I had long discussion with his daughter.  I do not believe withdrawal of care with the appropriate decision here as the patient is not terminal.  Preoperatively the patient was able to mentate well and I had a good discussion with him about his surgery.  Though not an easy way to recovery I believe the patient will benefit from tracheostomy and PEG tube placement.  I discussed with Dr. Weiss the need for pectoral flaps.

## 2022-05-05 NOTE — PROGRESS NOTES
[facility] [address]  Critical Care - Medicine  Progress Note    Patient Name: Kendall Duff  MRN: [id number]  Admission Date: 4/1/2022  Hospital Length of Stay: 34 days  Code Status: Prior  Attending Provider: Carroll Hammer Jr., MD  Primary Care Provider: Primary Doctor No   Principal Problem: <principal problem not specified>    Subjective:     HPI:  57 yo admitted 4/1 and underwent CABG x 4 on 4/6.  Hx of schizophrenia and BPAD       Hospital/ICU Course: Developed MRSA sternal wound. Intubated 4/15.   Underwent debridement with wound vac on 4/17.  Sputum culture from 4/26 with Klebsiella and proteus    Interval History/Significant Events: No change overnight.  Intubated and sedated. Off vasopressor support    Objective:     Vital Signs (Most Recent):  Temp: 98.5 °F (36.9 °C) (05/05/22 0400)  Pulse: (!) 117 (05/05/22 0809)  Resp: (!) 24 (05/05/22 0700)  BP: (!) 142/92 (05/05/22 0809)  SpO2: 100 % (05/05/22 0700) Vital Signs (24h Range):  Temp:  [98.1 °F (36.7 °C)-98.7 °F (37.1 °C)] 98.5 °F (36.9 °C)  Pulse:  [] 117  Resp:  [2-31] 24  SpO2:  [86 %-100 %] 100 %  BP: ()/() 142/92     Weight: 88.5 kg (195 lb 1.7 oz)  Body mass index is 27.31 kg/m².      Intake/Output Summary (Last 24 hours) at 5/5/2022 0907  Last data filed at 5/5/2022 0526  Gross per 24 hour   Intake 2828.33 ml   Output 2500 ml   Net 328.33 ml        Physical Exam  Constitutional:       General: He is not in acute distress.     Appearance: He is not toxic-appearing.      Comments: Intubated and sedated   HENT:      Head: Normocephalic and atraumatic.   Eyes:      Extraocular Movements: Extraocular movements intact.      Pupils: Pupils are equal, round, and reactive to light.   Cardiovascular:      Rate and Rhythm: Normal rate and regular rhythm.      Pulses: Normal pulses.      Heart sounds: No murmur heard.    No gallop.   Pulmonary:      Effort: No respiratory distress.      Breath sounds: No stridor. No  wheezing, rhonchi or rales.   Abdominal:      General: Abdomen is flat.      Palpations: Abdomen is soft.   Musculoskeletal:         General: No swelling or deformity.      Left lower leg: No edema.   Skin:     General: Skin is warm.      Coloration: Skin is not jaundiced.      Findings: No bruising.   Neurological:      Comments: Patient sedated and therefore unable to accurately exam           Current Facility-Administered Medications:     acetaminophen tablet 650 mg, 650 mg, Oral, Q4H PRN, Abstracted Order, MD    albuterol-ipratropium 2.5 mg-0.5 mg/3 mL nebulizer solution 3 mL, 3 mL, Nebulization, Q12H PRN, Abstracted Order, MD    aspirin chewable tablet 81 mg, 81 mg, Per OG tube, Daily, Abstracted MD Kellen, 81 mg at 05/05/22 0809    atorvastatin tablet 80 mg, 80 mg, Oral, Daily, Abstracted Order, MD, 80 mg at 05/05/22 0809    benztropine tablet 1 mg, 1 mg, Per OG tube, BID, Abstracted Order, MD, 1 mg at 05/05/22 0813    bisacodyL suppository 10 mg, 10 mg, Rectal, Daily PRN, Abstracted Order, MD    cefTRIAXone (ROCEPHIN) 2 g in dextrose 5 % 50 mL IVPB, 2 g, Intravenous, Q24H, Carroll Hammer Jr., MD, Stopped at 05/05/22 0054    dexmedetomidine (PRECEDEX) 400mcg/100mL 0.9% NaCL infusion, 0-1.4 mcg/kg/hr, Intravenous, Continuous, Sukhjinder Michael MD, Last Rate: 0.8 mL/hr at 05/05/22 0630, 0.04 mcg/kg/hr at 05/05/22 0630    dextrose 10% bolus 250 mL, 250 mL, Intravenous, Once PRN, Abstracted Order, MD    doxepin capsule 50 mg, 50 mg, Oral, QHS, Abstracted Order, MD, 50 mg at 05/04/22 2132    enoxaparin injection 40 mg, 40 mg, Subcutaneous, Daily, Abstracted Order, MD, 40 mg at 05/05/22 0809    ezetimibe tablet 10 mg, 10 mg, Oral, QHS, Abstracted Order, MD, 10 mg at 05/04/22 2133    famotidine (PF) injection 20 mg, 20 mg, Intravenous, BID, Abstracted Order, MD, 20 mg at 05/05/22 0809    fentaNYL injection 100 mcg, 100 mcg, Intravenous, Q1H PRN, Abstracted Order, MD    fentaNYL injection 50 mcg, 50 mcg,  Intravenous, Q1H PRN, Abstracted Order, MD    glycopyrrolate tablet 1 mg, 1 mg, Per NG tube, TID, Sukhjinder Michael MD, 1 mg at 05/05/22 0812    haloperidoL tablet 5 mg, 5 mg, Per OG tube, BID, Abstracted Order, MD, 5 mg at 05/05/22 0809    hydrALAZINE injection 10 mg, 10 mg, Intravenous, Q2H PRN, Abstracted Order, MD    levETIRAcetam (KEPPRA) 500 mg in sodium chloride 0.9 % 100 mL IVPB (MB+), 500 mg, Intravenous, Q12H, Carroll Hammer Jr., MD, 500 mg at 05/05/22 0809    lorazepam injection 0.5 mg, 0.5 mg, Intravenous, Once PRN, Abstracted Order, MD    lorazepam injection 1 mg, 1 mg, Intravenous, Once PRN, Abstracted Order, MD    metoprolol tartrate (LOPRESSOR) tablet 25 mg, 25 mg, Per OG tube, Daily, Abstracted Order, MD, 25 mg at 05/05/22 0809    morphine injection 2 mg, 2 mg, Intravenous, Q1H PRN, Abstracted Order, MD    morphine injection 4 mg, 4 mg, Intravenous, Q4H PRN, Abstracted Order, MD    mupirocin 2 % ointment, , Nasal, BID, Carroll Hammer Jr., MD, Given at 05/04/22 2136    nitroGLYCERIN SL tablet 0.4 mg, 0.4 mg, Sublingual, Q5 Min PRN, Abstracted Order, MD    NORepinephrine bitartrate-NaCl 8 mg/250 mL (32 mcg/mL) infusion, 0-3 mcg/kg/min (Dosing Weight), Intravenous, Continuous, Abstracted Order, MD, Paused at 05/04/22 0600    OLANZapine injection 10 mg, 10 mg, Intramuscular, Q8H PRN, Abstracted Order, MD    ondansetron injection 4 mg, 4 mg, Intravenous, Q4H PRN, Abstracted Order, MD    OXcarbazepine tablet 300 mg, 300 mg, Per OG tube, BID, Abstracted Order, MD, 300 mg at 05/05/22 0809    oxyCODONE immediate release tablet 10 mg, 10 mg, Oral, Q4H PRN, Abstracted Order, MD    oxyCODONE immediate release tablet 5 mg, 5 mg, Oral, Q4H PRN, Abstracted Order, MD    potassium chloride 20 mEq in 100 mL IVPB (FOR CENTRAL LINE ADMINISTRATION ONLY), 20 mEq, Intravenous, PRN, Abstracted Order, MD    potassium chloride 20 mEq in 100 mL IVPB (FOR CENTRAL LINE ADMINISTRATION ONLY), 40 mEq, Intravenous,  PRN, Juancarlos Foreman MD    potassium chloride 20 mEq in 100 mL IVPB (FOR CENTRAL LINE ADMINISTRATION ONLY), 60 mEq, Intravenous, PRN, Juancarlos Foreman MD    propofol (DIPRIVAN) 10 mg/mL infusion, 5-50 mcg/kg/min (Dosing Weight), Intravenous, Continuous, Sukhjinder Michael MD, Last Rate: 9.1 mL/hr at 05/05/22 0642, 20 mcg/kg/min at 05/05/22 0642    rifAMpin capsule 300 mg, 300 mg, Oral, BID, Juancarlos Foreman MD, 300 mg at 05/05/22 0809    Flushing PICC Protocol, , , Until Discontinued **AND** sodium chloride 0.9% flush 10 mL, 10 mL, Intravenous, Q6H, 10 mL at 05/05/22 0527 **AND** sodium chloride 0.9% flush 10 mL, 10 mL, Intravenous, PRN, Juancarlos Foreman MD    sodium chloride 0.9% flush 10 mL, 10 mL, Intravenous, PRN, Juancarlos Foreman MD    sodium chloride 0.9% flush 10 mL, 10 mL, Intravenous, Q12H, Juancarlos Foreman MD, 10 mL at 05/05/22 0821    sodium chloride 0.9% flush 20 mL, 20 mL, Intravenous, PRN, Juancarlos Foreman MD    vancomycin (VANCOCIN) 1,250 mg in dextrose 5 % 250 mL IVPB, 1,250 mg, Intravenous, Q8H, Juancarlos Foreman MD, Last Rate: 166.7 mL/hr at 05/05/22 0645, Restarted at 05/05/22 0645    ziprasidone injection 20 mg, 20 mg, Intramuscular, Q8H PRN, Juancarlos Foreman MD     Vents:  Vent Mode: A/C (05/05/22 0730)  Set Rate: 16 BPM (05/05/22 0730)  Vt Set: 450 mL (05/05/22 0730)  PEEP/CPAP: 5 cmH20 (05/05/22 0730)  Oxygen Concentration (%): 30 (05/05/22 0700)  Peak Airway Pressure: 10 cmH2O (05/05/22 0730)  Total Ve: 11.8 mL (05/05/22 0730)  F/VT Ratio<105 (RSBI): (!) 52.97 (05/05/22 0600)    Lines/Drains/Airways     Peripherally Inserted Central Catheter Line  Duration           PICC Triple Lumen 05/01/22 0523 right basilic 4 days          Drain  Duration                NG/OG Tube 05/03/22 1530 Left nostril 1 day          Airway  Duration                Airway - Non-Surgical 05/01/22 0524 Endotracheal Tube 4 days          Peripheral Intravenous Line  Duration                Peripheral IV -  Single Lumen 05/01/22 0522 18 G Anterior;Left;Proximal Forearm 4 days                Significant Labs:    Lab Results   Component Value Date    WBC 8.5 05/03/2022    HGB 10.6 (L) 05/03/2022    HCT 35.4 (L) 05/03/2022    MCV 89.6 05/03/2022     03/25/2022         BMP  Lab Results   Component Value Date     05/04/2022    K 4.0 05/04/2022     03/25/2022    CO2 22 05/03/2022    BUN 14.2 05/03/2022    CREATININE 0.51 (L) 05/03/2022    CALCIUM 8.3 (L) 05/03/2022    ANIONGAP 9 03/25/2022    ESTGFRAFRICA >60.0 03/25/2022    EGFRNONAA >60 05/03/2022       ABG  No results for input(s): PH, PO2, PCO2, HCO3, BE in the last 168 hours.        Significant Imaging:  No new imaging    DVT Prophylaxis: LOVENOX   GI prophylaxis: PEPCID    Assessment/Plan:        Assessment:  1. 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 MRSA Sternal wound dehiscence with debridement and wound vac 4/17.  Plastics following  3. Respiratory culture positive Klebsiella and Proteus  4. Malnutrition.  5. Respiratory failure.  Intubated 4/15     Plan  - Continue MV support.  Adjust accordingly  - Enteral tube feeds  - Pepcid and lovenox prophylaxis  - Family is planning on extubation/withdrawal of care 5/6         Greater than 30 minutes of critical care was time spent personally by me on the following activities: development of treatment plan with patient or surrogate and bedside caregivers, discussions with consultants, evaluation of patient's response to treatment, examination of patient, ordering and performing treatments and interventions, ordering and review of laboratory studies, ordering and review of radiographic studies, pulse oximetry, re-evaluation of patient's condition.  This critical care time did not overlap with that of any other provider or involve time for any procedures.     Sukhjinder Michael MD  Critical Care - Medicine  [facility] [address] [facility]

## 2022-05-05 NOTE — PROGRESS NOTES
"[facility] - [address] [place] [address] ICU  Adult Nutrition  Progress Note    SUMMARY       Recommendations    Recommendation/Intervention:   Peptamen VHP @ 55ml/hr (goal rate, based on tube feeding running ~20 hours/day + 1 packet [employer] Protein NoCarb 4x/day    Goals: Meet >/=75% est energy and protein requirements by f/u  Nutrition Goal Status: progressing towards goal  Communication of RD Recs: reviewed with RN    Assessment and Plan    Discussed with RN. Protein packets to be given. Receiving kcal from meds.      Malnutrition Assessment     Subcutaneous Fat Loss (Final Summary): well nourished  Muscle Loss Evaluation (Final Summary): well nourished  Fluid Accumulation Evaluation:  (unable to evaluate)    Moderate Weight Loss (Malnutrition):  (unable to evaluate)    Reason for Assessment    Reason For Assessment: RD follow-up  Diagnosis: other (see comments) (1. CAD with 4 vessel CABG 4/6  2. MRSA Sternal wound dehiscence with debridement and wound vac 4/17.  Plastics following  3. Respiratory culture positive Klebsiella and Proteus  4. Malnutrition.  5. Respiratory failure.  Intubated 4/15)  Relevant Medical History: Hepatitis C, Depression, Bipolar Disorder, Stroke, HTN    Nutrition Risk Screen    Nutrition Risk Screen: tube feeding or parenteral nutrition    Nutrition/Diet History    Factors Affecting Nutritional Intake: on mechanical ventilation    Anthropometrics    Temp: 99.5 °F (37.5 °C)  Height Method: Estimated  Height: 5' 10.87" (180 cm)  Height (inches): 70.87 in  Weight Method: Bed Scale  Weight: 88.5 kg (195 lb 1.7 oz)  Weight (lb): 195.11 lb  Ideal Body Weight (IBW), Male: 171.22 lb  % Ideal Body Weight, Male (lb): 112.92 %  BMI (Calculated): 27.3  BMI Grade: 25 - 29.9 - overweight       Lab/Procedures/Meds    Pertinent Labs Reviewed: reviewed  Pertinent Labs Comments: 5/3/22 Glu 143  Pertinent Medications Reviewed: reviewed  Pertinent Medications Comments: diprivan @ 19ml/hr " (500kcal/day)    Estimated/Assessed Needs    Weight Used For Calorie Calculations: 87.7 kg (193 lb 5.5 oz)  Energy Calorie Requirements (kcal): 2167kcal  Energy Need Method: [place] [employer]  Protein Requirements: 133gm  Weight Used For Protein Calculations: 87.7 kg (193 lb 5.5 oz)  Fluid Requirements (mL): 30mL/kg  Estimated Fluid Requirement Method: RDA Method  RDA Method (mL): 2167     Nutrition Prescription Ordered    Current Nutrition Support Formula Ordered: Peptamen Intense VHP  Current Nutrition Support Rate Ordered: 55 (ml) (mL/hr)    Evaluation of Received Nutrient/Fluid Intake    Enteral Calories (kcal): 1340  Enteral Protein (gm): 101  Enteral (Free Water) Fluid (mL): 924  Lipid Calories (kcals): 554 kcals  % Kcal Needs: 62  Other Protein (gm): 60  Total Protein (gm): 161  Total Protein (gm/kg): 1.8  % Protein Needs: 124  Energy Calories Required: meeting needs  Protein Required: meeting needs  Fluid Required: not meeting needs  Tolerance: tolerating  % Intake of Estimated Energy Needs: 75 - 100 %  % Meal Intake: NPO    Nutrition Risk    Level of Risk/Frequency of Follow-up: high     Monitor and Evaluation    Food and Nutrient Intake: enteral nutrition intake  Food and Nutrient Adminstration: enteral and parenteral nutrition administration     Nutrition Follow-Up    RD Follow-up?: Yes

## 2022-05-06 LAB
PREALB SERPL-MCNC: 13.1 MG/DL (ref 18–45)
VANCOMYCIN TROUGH SERPL-MCNC: 18.3 UG/ML (ref 15–20)

## 2022-05-06 PROCEDURE — 80202 ASSAY OF VANCOMYCIN: CPT | Performed by: INTERNAL MEDICINE

## 2022-05-06 PROCEDURE — 25000003 PHARM REV CODE 250

## 2022-05-06 PROCEDURE — 63600175 PHARM REV CODE 636 W HCPCS

## 2022-05-06 PROCEDURE — 25000003 PHARM REV CODE 250: Performed by: HOSPITALIST

## 2022-05-06 PROCEDURE — A4216 STERILE WATER/SALINE, 10 ML: HCPCS

## 2022-05-06 PROCEDURE — 99231 SBSQ HOSP IP/OBS SF/LOW 25: CPT | Mod: ,,, | Performed by: SURGERY

## 2022-05-06 PROCEDURE — 94003 VENT MGMT INPAT SUBQ DAY: CPT

## 2022-05-06 PROCEDURE — 36415 COLL VENOUS BLD VENIPUNCTURE: CPT | Performed by: SURGERY

## 2022-05-06 PROCEDURE — 99231 PR SUBSEQUENT HOSPITAL CARE,LEVL I: ICD-10-PCS | Mod: ,,, | Performed by: SURGERY

## 2022-05-06 PROCEDURE — 94761 N-INVAS EAR/PLS OXIMETRY MLT: CPT

## 2022-05-06 PROCEDURE — 63600175 PHARM REV CODE 636 W HCPCS: Performed by: HOSPITALIST

## 2022-05-06 PROCEDURE — 99900035 HC TECH TIME PER 15 MIN (STAT)

## 2022-05-06 PROCEDURE — 84134 ASSAY OF PREALBUMIN: CPT | Performed by: SURGERY

## 2022-05-06 PROCEDURE — 25000003 PHARM REV CODE 250: Performed by: INTERNAL MEDICINE

## 2022-05-06 PROCEDURE — 20000000 HC ICU ROOM

## 2022-05-06 PROCEDURE — 99900026 HC AIRWAY MAINTENANCE (STAT)

## 2022-05-06 PROCEDURE — 97606 NEG PRS WND THER DME>50 SQCM: CPT

## 2022-05-06 PROCEDURE — 63600175 PHARM REV CODE 636 W HCPCS: Performed by: INTERNAL MEDICINE

## 2022-05-06 PROCEDURE — 27200966 HC CLOSED SUCTION SYSTEM

## 2022-05-06 PROCEDURE — 27000221 HC OXYGEN, UP TO 24 HOURS

## 2022-05-06 RX ORDER — CEFTRIAXONE 2 G/50ML
2 INJECTION, SOLUTION INTRAVENOUS
Status: DISCONTINUED | OUTPATIENT
Start: 2022-05-06 | End: 2022-05-12

## 2022-05-06 RX ADMIN — CEFTRIAXONE 2 G: 2 INJECTION, SOLUTION INTRAVENOUS at 04:05

## 2022-05-06 RX ADMIN — BENZTROPINE MESYLATE 1 MG: 1 TABLET ORAL at 08:05

## 2022-05-06 RX ADMIN — PROPOFOL 45 MCG/KG/MIN: 10 INJECTION, EMULSION INTRAVENOUS at 01:05

## 2022-05-06 RX ADMIN — PROPOFOL 40 MCG/KG/MIN: 10 INJECTION, EMULSION INTRAVENOUS at 10:05

## 2022-05-06 RX ADMIN — GLYCOPYRROLATE 1 MG: 1 TABLET ORAL at 03:05

## 2022-05-06 RX ADMIN — HALOPERIDOL 5 MG: 5 TABLET ORAL at 08:05

## 2022-05-06 RX ADMIN — RIFAMPIN 300 MG: 300 CAPSULE ORAL at 08:05

## 2022-05-06 RX ADMIN — FAMOTIDINE 20 MG: 10 INJECTION, SOLUTION INTRAVENOUS at 08:05

## 2022-05-06 RX ADMIN — SODIUM CHLORIDE, PRESERVATIVE FREE 10 ML: 5 INJECTION INTRAVENOUS at 08:05

## 2022-05-06 RX ADMIN — PROPOFOL 35 MCG/KG/MIN: 10 INJECTION, EMULSION INTRAVENOUS at 10:05

## 2022-05-06 RX ADMIN — VANCOMYCIN HYDROCHLORIDE 1250 MG: 1.25 INJECTION, POWDER, LYOPHILIZED, FOR SOLUTION INTRAVENOUS at 05:05

## 2022-05-06 RX ADMIN — DOXEPIN HYDROCHLORIDE 50 MG: 50 CAPSULE ORAL at 08:05

## 2022-05-06 RX ADMIN — LEVETIRACETAM 500 MG: 100 INJECTION, SOLUTION INTRAVENOUS at 08:05

## 2022-05-06 RX ADMIN — VANCOMYCIN HYDROCHLORIDE 1250 MG: 1.25 INJECTION, POWDER, LYOPHILIZED, FOR SOLUTION INTRAVENOUS at 02:05

## 2022-05-06 RX ADMIN — OXCARBAZEPINE 300 MG: 300 TABLET, FILM COATED ORAL at 08:05

## 2022-05-06 RX ADMIN — DEXMEDETOMIDINE HYDROCHLORIDE 0.8 MCG/KG/HR: 400 INJECTION INTRAVENOUS at 05:05

## 2022-05-06 RX ADMIN — GLYCOPYRROLATE 1 MG: 1 TABLET ORAL at 10:05

## 2022-05-06 RX ADMIN — ATORVASTATIN CALCIUM 80 MG: 40 TABLET, FILM COATED ORAL at 08:05

## 2022-05-06 RX ADMIN — ACETAMINOPHEN 650 MG: 325 TABLET ORAL at 08:05

## 2022-05-06 RX ADMIN — VANCOMYCIN HYDROCHLORIDE 1250 MG: 1.25 INJECTION, POWDER, LYOPHILIZED, FOR SOLUTION INTRAVENOUS at 10:05

## 2022-05-06 RX ADMIN — METOPROLOL TARTRATE 25 MG: 25 TABLET, FILM COATED ORAL at 08:05

## 2022-05-06 RX ADMIN — EZETIMIBE 10 MG: 10 TABLET ORAL at 08:05

## 2022-05-06 RX ADMIN — PROPOFOL 35 MCG/KG/MIN: 10 INJECTION, EMULSION INTRAVENOUS at 04:05

## 2022-05-06 RX ADMIN — DEXMEDETOMIDINE HYDROCHLORIDE 0.8 MCG/KG/HR: 400 INJECTION INTRAVENOUS at 02:05

## 2022-05-06 RX ADMIN — GLYCOPYRROLATE 1 MG: 1 TABLET ORAL at 08:05

## 2022-05-06 RX ADMIN — PROPOFOL 45 MCG/KG/MIN: 10 INJECTION, EMULSION INTRAVENOUS at 05:05

## 2022-05-06 RX ADMIN — DEXMEDETOMIDINE HYDROCHLORIDE 0.8 MCG/KG/HR: 400 INJECTION, SOLUTION INTRAVENOUS at 04:05

## 2022-05-06 RX ADMIN — DEXMEDETOMIDINE HYDROCHLORIDE 0.8 MCG/KG/HR: 400 INJECTION INTRAVENOUS at 04:05

## 2022-05-06 NOTE — PROGRESS NOTES
Pharmacokinetic Assessment Follow Up: IV Vancomycin    Vancomycin serum concentration assessment(s):    The trough level was drawn correctly and can be used to guide therapy at this time. The measurement is within the desired definitive target range of 15 to 20 mcg/mL.    Vancomycin Regimen Plan:    Continue regimen to Vancomycin 1250 mg IV every 8 hours with next serum trough concentration measured at 0500 prior to 7th dose on 05/09.    Drug levels (last 3 results):  Vancomycin trough is 18.3 at 0500 on 05/09.  Previous vancomycin trough was 19.7 at 1255 on 05/04.    Pharmacy will continue to follow and monitor vancomycin.    Please contact pharmacy at extension [phone] for questions regarding this assessment.    Thank you for the consult,   Rachel Garcia       Patient brief summary:  Kendall Duff is a 56 y.o. male initiated on antimicrobial therapy with IV Vancomycin for treatment of skin & soft tissue infection    The patient's current regimen is 1250mg IV q8hr.    Drug Allergies:   Review of patient's allergies indicates:   Allergen Reactions    Depakote [divalproex]     Divalproex sodium Other (See Comments)    Lithium     Lithium analogues     Quetiapine Other (See Comments)       Actual Body Weight:   88 kg    Renal Function:   Estimated Creatinine Clearance: 167.5 mL/min (A) (based on SCr of 0.51 mg/dL (L)).,     Dialysis Method (if applicable):  N/A    CBC (last 72 hours):  No results for input(s): WHITE BLOOD CELL COUNT, HEMOGLOBIN, HEMATOCRIT, PLATELETS, GRAN%, LYMPH%, MONO%, EOSINOPHIL%, BASOPHIL%, DIFFERENTIAL METHOD in the last 72 hours.    Metabolic Panel (last 72 hours):  Recent Labs   Lab Result Units 05/04/22  0555   Sodium  133   Potassium, Bld  4.0   CO2 TOTAL  27.4       Vancomycin Administrations:  vancomycin given in the last 96 hours                     vancomycin (VANCOCIN) 1,250 mg in dextrose 5 % 250 mL IVPB (mg) 1,250 mg New Bag 05/06/22 0558     1,250 mg New Bag 05/05/22 9171     1,250 mg  New Bag  1457      Restarted  0645     1,250 mg New Bag  0525     1,250 mg New Bag 05/04/22 2239     1,250 mg New Bag  1444     1,250 mg New Bag  0532     1,250 mg New Bag 05/03/22 2158     1,250 mg New Bag  1534     1,250 mg New Bag  0600     1,250 mg New Bag 05/02/22 2231     1,250 mg New Bag  1400                    Microbiologic Results:  Microbiology Results (last 7 days)       ** No results found for the last 168 hours. **

## 2022-05-06 NOTE — PROGRESS NOTES
PRS  Intubated and sedated  VAC in place and functional    Family has decided to proceed with further care.  I will place the patient on the schedule for reconstruction of his sternum on Thursday. His prealbumin remains low and I will also ask nutrition to increase his protein intake.  Stop ASA in prep for surgery.  Will hold Lovenox on the the day of surgery.  Continue VAC dressing changes in the interim.  OK to proceed with PEG and Trach from my standpoint.  Will follow.

## 2022-05-06 NOTE — PROGRESS NOTES
[facility] [address]  Critical Care - Medicine  Progress Note    Patient Name: Kendall Duff  MRN: [id number]  Admission Date: 4/1/2022  Hospital Length of Stay: 35 days  Code Status: Prior  Attending Provider: Max Castillo MD  Primary Care Provider: Primary Doctor No   Principal Problem: <principal problem not specified>    Subjective:     HPI:  57 yo admitted 4/1 and underwent CABG x 4 on 4/6.  Hx of schizophrenia and BPAD       Hospital/ICU Course: Developed MRSA sternal wound. Intubated 4/15.   Underwent debridement with wound vac on 4/17.  Sputum culture from 4/26 with Klebsiella and proteus    Interval History/Significant Events:  No events overnight.  Family did have further discussions with Dr. Carney after Dr. Michael and they have decided to pursue trach and PEG.    Objective:     Vital Signs (Most Recent):  Temp: 99 °F (37.2 °C) (05/06/22 0800)  Pulse: 70 (05/06/22 0951)  Resp: (!) 0 (05/06/22 0951)  BP: (!) 137/96 (05/06/22 0902)  SpO2: 99 % (05/06/22 0951) Vital Signs (24h Range):  Temp:  [98.5 °F (36.9 °C)-100.4 °F (38 °C)] 99 °F (37.2 °C)  Pulse:  [] 70  Resp:  [0-40] 0  SpO2:  [95 %-100 %] 99 %  BP: ()/() 137/96     Weight: 88.5 kg (195 lb 1.7 oz)  Body mass index is 27.31 kg/m².      Intake/Output Summary (Last 24 hours) at 5/6/2022 1009  Last data filed at 5/6/2022 0800  Gross per 24 hour   Intake 3608.51 ml   Output 3675 ml   Net -66.49 ml        Physical Exam  Constitutional:       General: He is not in acute distress.     Appearance: He is not toxic-appearing.      Comments: Intubated and sedated   HENT:      Head: Normocephalic and atraumatic.   Eyes:      Extraocular Movements: Extraocular movements intact.      Pupils: Pupils are equal, round, and reactive to light.   Cardiovascular:      Rate and Rhythm: Normal rate and regular rhythm.      Pulses: Normal pulses.      Heart sounds: No murmur heard.    No gallop.   Pulmonary:      Effort: No respiratory  distress.      Breath sounds: No stridor. No wheezing, rhonchi or rales.   Abdominal:      General: Abdomen is flat.      Palpations: Abdomen is soft.   Musculoskeletal:         General: No swelling or deformity.      Left lower leg: No edema.   Skin:     General: Skin is warm.      Coloration: Skin is not jaundiced.      Findings: No bruising.   Neurological:      Comments: Patient sedated and therefore unable to accurately exam           Current Facility-Administered Medications:     acetaminophen tablet 650 mg, 650 mg, Oral, Q4H PRN, Juancarlos Foreman MD    albuterol-ipratropium 2.5 mg-0.5 mg/3 mL nebulizer solution 3 mL, 3 mL, Nebulization, Q12H PRN, Juancarlos Foreman MD    atorvastatin tablet 80 mg, 80 mg, Oral, Daily, Juacnarlos Foreman MD, 80 mg at 05/06/22 0824    benztropine tablet 1 mg, 1 mg, Per OG tube, BID, Abstracted MD Kellen, 1 mg at 05/06/22 0824    bisacodyL suppository 10 mg, 10 mg, Rectal, Daily PRN, Juancarlos Foreman MD    cefTRIAXone 2 gram/50 mL IVPB 2 g, 2 g, Intravenous, Q24H, Carroll Hammer Jr., MD, FCCP, Stopped at 05/06/22 0433    dexmedetomidine (PRECEDEX) 400mcg/100mL 0.9% NaCL infusion, 0-1.4 mcg/kg/hr, Intravenous, Continuous, Sukhjinder Michael MD, Last Rate: 15.2 mL/hr at 05/06/22 0557, 0.8 mcg/kg/hr at 05/06/22 0557    dextrose 10% bolus 250 mL, 250 mL, Intravenous, Once PRN, Juancarlos Foreman MD    doxepin capsule 50 mg, 50 mg, Oral, QHS, Abstracted MD Kellen, 50 mg at 05/05/22 2022    enoxaparin injection 40 mg, 40 mg, Subcutaneous, Daily, Juancarlos Foreman MD, 40 mg at 05/05/22 0809    ezetimibe tablet 10 mg, 10 mg, Oral, QHS, Abstracted MD Kellen, 10 mg at 05/05/22 2021    famotidine (PF) injection 20 mg, 20 mg, Intravenous, BID, Juancarlos Foreman MD, 20 mg at 05/06/22 0824    fentaNYL injection 100 mcg, 100 mcg, Intravenous, Q1H PRN, Abstracted Order, MD    fentaNYL injection 50 mcg, 50 mcg, Intravenous, Q1H PRN, Abstracted Order, MD    glycopyrrolate tablet 1 mg, 1  mg, Per NG tube, TID, Sukhjinder Michael MD, 1 mg at 05/06/22 0824    haloperidoL tablet 5 mg, 5 mg, Per OG tube, BID, Abstracted Order, MD, 5 mg at 05/06/22 0824    hydrALAZINE injection 10 mg, 10 mg, Intravenous, Q2H PRN, Abstracted Order, MD    levETIRAcetam (KEPPRA) 500 mg in sodium chloride 0.9 % 100 mL IVPB (MB+), 500 mg, Intravenous, Q12H, Carroll Hammer Jr., MD, FCCP, 500 mg at 05/06/22 0824    lorazepam injection 0.5 mg, 0.5 mg, Intravenous, Once PRN, Abstracted Order, MD    lorazepam injection 1 mg, 1 mg, Intravenous, Once PRN, Abstracted Order, MD    metoprolol tartrate (LOPRESSOR) tablet 25 mg, 25 mg, Per OG tube, Daily, Abstracted Order, MD, 25 mg at 05/06/22 0824    morphine injection 2 mg, 2 mg, Intravenous, Q1H PRN, Abstracted Order, MD    morphine injection 4 mg, 4 mg, Intravenous, Q4H PRN, Abstracted Order, MD    nitroGLYCERIN SL tablet 0.4 mg, 0.4 mg, Sublingual, Q5 Min PRN, Abstracted Order, MD    NORepinephrine bitartrate-NaCl 8 mg/250 mL (32 mcg/mL) infusion, 0-3 mcg/kg/min (Dosing Weight), Intravenous, Continuous, Abstracted Order, MD, Paused at 05/04/22 0600    OLANZapine injection 10 mg, 10 mg, Intramuscular, Q8H PRN, Abstracted Order, MD    ondansetron injection 4 mg, 4 mg, Intravenous, Q4H PRN, Abstracted Order, MD    OXcarbazepine tablet 300 mg, 300 mg, Per OG tube, BID, Abstracted Order, MD, 300 mg at 05/06/22 0824    oxyCODONE immediate release tablet 10 mg, 10 mg, Oral, Q4H PRN, Abstracted Order, MD    oxyCODONE immediate release tablet 5 mg, 5 mg, Oral, Q4H PRN, Abstracted Order, MD    potassium chloride 20 mEq in 100 mL IVPB (FOR CENTRAL LINE ADMINISTRATION ONLY), 20 mEq, Intravenous, PRN, Abstracted Order, MD    potassium chloride 20 mEq in 100 mL IVPB (FOR CENTRAL LINE ADMINISTRATION ONLY), 40 mEq, Intravenous, PRN, Abstracted Order, MD    potassium chloride 20 mEq in 100 mL IVPB (FOR CENTRAL LINE ADMINISTRATION ONLY), 60 mEq, Intravenous, PRN, Abstracted Order,  MD    propofol (DIPRIVAN) 10 mg/mL infusion, 5-50 mcg/kg/min (Dosing Weight), Intravenous, Continuous, Sukhjinder Michael MD, Last Rate: 20.5 mL/hr at 05/06/22 0558, 45 mcg/kg/min at 05/06/22 0558    rifAMpin capsule 300 mg, 300 mg, Oral, BID, Juancarlos Foreman MD, 300 mg at 05/06/22 0824    sodium chloride 0.9% flush 10 mL, 10 mL, Intravenous, PRN, Juancarlos Foreman MD    sodium chloride 0.9% flush 10 mL, 10 mL, Intravenous, Q12H, Juancarlos Foreman MD, 10 mL at 05/06/22 0825    vancomycin (VANCOCIN) 1,250 mg in dextrose 5 % 250 mL IVPB, 1,250 mg, Intravenous, Q8H, Juancarlos Foreman MD, Stopped at 05/06/22 0728    ziprasidone injection 20 mg, 20 mg, Intramuscular, Q8H PRN, Juancarlos Order, MD     Vents:  Vent Mode: PRVC A/C (05/06/22 0844)  Set Rate: 16 BPM (05/06/22 0844)  Vt Set: 450 mL (05/06/22 0844)  PEEP/CPAP: 5 cmH20 (05/06/22 0844)  Oxygen Concentration (%): 30 (05/06/22 0952)  Peak Airway Pressure: 12 cmH2O (05/06/22 0844)  Plateau Pressure: 6 cmH20 (05/06/22 0600)  Total Ve: 14.8 mL (05/06/22 0844)  F/VT Ratio<105 (RSBI): (!) 70.92 (05/06/22 0844)    Lines/Drains/Airways     Peripherally Inserted Central Catheter Line  Duration           PICC Triple Lumen 05/01/22 0523 right basilic 5 days          Drain  Duration                NG/OG Tube 05/03/22 1530 Left nostril 2 days          Airway  Duration                Airway - Non-Surgical 05/01/22 0524 Endotracheal Tube 5 days          Peripheral Intravenous Line  Duration                Peripheral IV - Single Lumen 05/01/22 0522 18 G Anterior;Left;Proximal Forearm 5 days                Significant Labs:    Lab Results   Component Value Date    WBC 8.5 05/03/2022    HGB 10.6 (L) 05/03/2022    HCT 35.4 (L) 05/03/2022    MCV 89.6 05/03/2022     03/25/2022         BMP  Lab Results   Component Value Date     05/04/2022    K 4.0 05/04/2022     03/25/2022    CO2 22 05/03/2022    BUN 14.2 05/03/2022    CREATININE 0.51 (L) 05/03/2022    CALCIUM  8.3 (L) 05/03/2022    ANIONGAP 9 03/25/2022    ESTGFRAFRICA >60.0 03/25/2022    EGFRNONAA >60 05/03/2022       ABG  No results for input(s): PH, PO2, PCO2, HCO3, BE in the last 168 hours.        Significant Imaging:  No new imaging    DVT Prophylaxis: LOVENOX   GI prophylaxis: PEPCID    Assessment/Plan:        Assessment:  1. 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 MRSA Sternal wound dehiscence with debridement and wound vac 4/17.  Plastics following  3. Respiratory culture positive Klebsiella and Proteus  4. Malnutrition.  5. Respiratory failure.  Intubated 4/15     Plan  - Continue MV support.  Adjust accordingly  - Enteral tube feeds  - Pepcid and lovenox prophylaxis  -  family has now decided to proceed with PEG and trach in preparation for pectoral flap.  Dr. Weiss reportedly is planning on surgery late next week provided his nutritional status continues to improve.  I did explain to the family that I anticipate his recovery will take quite some time and that his underlying psychiatric issues will complicate things.         Greater than 30 minutes of critical care was time spent personally by me on the following activities: development of treatment plan with patient or surrogate and bedside caregivers, discussions with consultants, evaluation of patient's response to treatment, examination of patient, ordering and performing treatments and interventions, ordering and review of laboratory studies, ordering and review of radiographic studies, pulse oximetry, re-evaluation of patient's condition.  This critical care time did not overlap with that of any other provider or involve time for any procedures.     Max Castillo MD  Critical Care - Medicine  [facility] - [facility] [address] [facility]

## 2022-05-07 LAB
ANION GAP SERPL CALC-SCNC: 8 MEQ/L
BE (B): 1.3
BUN SERPL-MCNC: 16.3 MG/DL (ref 8.4–25.7)
CALCIUM SERPL-MCNC: 8 MG/DL (ref 8.4–10.2)
CARBOXYHEMOGLOBIN ARTERIAL: 2
CHLORIDE SERPL-SCNC: 105 MMOL/L (ref 98–107)
CO2 SERPL-SCNC: 25 MMOL/L (ref 22–29)
CO2 TOTAL: 27.1
CREAT SERPL-MCNC: 0.48 MG/DL (ref 0.73–1.18)
CREAT/UREA NIT SERPL: 34
GLUCOSE SERPL-MCNC: 131 MG/DL (ref 74–100)
HCO3 ARTERIAL: 25.9 MMOL/L (ref 18–23)
IONIZED CALCIUM (RESP. THERAPY): 1.16
Lab: 135
METHEMOGLOBIN: 0.5 % (ref 0–3)
PCO2 ARTERIAL: 40
PH ARTERIAL: 7.42
PO2 ARTERIAL: 77
POTASSIUM (RESP. THERAPY): 4
POTASSIUM SERPL-SCNC: 4 MMOL/L (ref 3.5–5.1)
PREALB SERPL-MCNC: 12.7 MG/DL (ref 18–45)
SATURATED O2 ARTERIAL, I-STAT: 95.5
SODIUM SERPL-SCNC: 138 MMOL/L (ref 136–145)
TOTAL HEMOGLOBIN, PLASMA: 10.4

## 2022-05-07 PROCEDURE — 20000000 HC ICU ROOM

## 2022-05-07 PROCEDURE — 27000221 HC OXYGEN, UP TO 24 HOURS

## 2022-05-07 PROCEDURE — 99900026 HC AIRWAY MAINTENANCE (STAT)

## 2022-05-07 PROCEDURE — 25000003 PHARM REV CODE 250: Performed by: HOSPITALIST

## 2022-05-07 PROCEDURE — 63600175 PHARM REV CODE 636 W HCPCS: Performed by: INTERNAL MEDICINE

## 2022-05-07 PROCEDURE — 84134 ASSAY OF PREALBUMIN: CPT | Performed by: INTERNAL MEDICINE

## 2022-05-07 PROCEDURE — 63600175 PHARM REV CODE 636 W HCPCS

## 2022-05-07 PROCEDURE — 94003 VENT MGMT INPAT SUBQ DAY: CPT | Performed by: INTERNAL MEDICINE

## 2022-05-07 PROCEDURE — A4216 STERILE WATER/SALINE, 10 ML: HCPCS

## 2022-05-07 PROCEDURE — 36600 WITHDRAWAL OF ARTERIAL BLOOD: CPT

## 2022-05-07 PROCEDURE — 80048 BASIC METABOLIC PNL TOTAL CA: CPT | Performed by: INTERNAL MEDICINE

## 2022-05-07 PROCEDURE — 99900035 HC TECH TIME PER 15 MIN (STAT)

## 2022-05-07 PROCEDURE — 25000003 PHARM REV CODE 250

## 2022-05-07 PROCEDURE — 63600175 PHARM REV CODE 636 W HCPCS: Performed by: HOSPITALIST

## 2022-05-07 PROCEDURE — 27000124 HC DRESSING, WOUND VAC

## 2022-05-07 PROCEDURE — 36415 COLL VENOUS BLD VENIPUNCTURE: CPT | Performed by: INTERNAL MEDICINE

## 2022-05-07 PROCEDURE — 94761 N-INVAS EAR/PLS OXIMETRY MLT: CPT

## 2022-05-07 PROCEDURE — 99900026 HC AIRWAY MAINTENANCE (STAT): Performed by: INTERNAL MEDICINE

## 2022-05-07 PROCEDURE — 25000003 PHARM REV CODE 250: Performed by: INTERNAL MEDICINE

## 2022-05-07 RX ADMIN — BENZTROPINE MESYLATE 1 MG: 1 TABLET ORAL at 09:05

## 2022-05-07 RX ADMIN — LORAZEPAM 1 MG: 2 INJECTION INTRAMUSCULAR; INTRAVENOUS at 05:05

## 2022-05-07 RX ADMIN — METOPROLOL TARTRATE 25 MG: 25 TABLET, FILM COATED ORAL at 08:05

## 2022-05-07 RX ADMIN — LEVETIRACETAM 500 MG: 100 INJECTION, SOLUTION INTRAVENOUS at 08:05

## 2022-05-07 RX ADMIN — RIFAMPIN 300 MG: 300 CAPSULE ORAL at 08:05

## 2022-05-07 RX ADMIN — PROPOFOL 27.59 MCG/KG/MIN: 10 INJECTION, EMULSION INTRAVENOUS at 12:05

## 2022-05-07 RX ADMIN — HALOPERIDOL 5 MG: 5 TABLET ORAL at 09:05

## 2022-05-07 RX ADMIN — PROPOFOL 40 MCG/KG/MIN: 10 INJECTION, EMULSION INTRAVENOUS at 03:05

## 2022-05-07 RX ADMIN — LEVETIRACETAM 500 MG: 100 INJECTION, SOLUTION INTRAVENOUS at 09:05

## 2022-05-07 RX ADMIN — FAMOTIDINE 20 MG: 10 INJECTION, SOLUTION INTRAVENOUS at 08:05

## 2022-05-07 RX ADMIN — SODIUM CHLORIDE, PRESERVATIVE FREE 10 ML: 5 INJECTION INTRAVENOUS at 08:05

## 2022-05-07 RX ADMIN — DOXEPIN HYDROCHLORIDE 50 MG: 50 CAPSULE ORAL at 09:05

## 2022-05-07 RX ADMIN — DEXMEDETOMIDINE HYDROCHLORIDE 0.8 MCG/KG/HR: 400 INJECTION INTRAVENOUS at 03:05

## 2022-05-07 RX ADMIN — DEXMEDETOMIDINE HYDROCHLORIDE 0.74 MCG/KG/HR: 400 INJECTION INTRAVENOUS at 12:05

## 2022-05-07 RX ADMIN — OXCARBAZEPINE 300 MG: 300 TABLET, FILM COATED ORAL at 08:05

## 2022-05-07 RX ADMIN — FENTANYL CITRATE 50 MCG: 50 INJECTION INTRAMUSCULAR; INTRAVENOUS at 05:05

## 2022-05-07 RX ADMIN — PROPOFOL 40 MCG/KG/MIN: 10 INJECTION, EMULSION INTRAVENOUS at 10:05

## 2022-05-07 RX ADMIN — GLYCOPYRROLATE 1 MG: 1 TABLET ORAL at 08:05

## 2022-05-07 RX ADMIN — FAMOTIDINE 20 MG: 10 INJECTION, SOLUTION INTRAVENOUS at 09:05

## 2022-05-07 RX ADMIN — VANCOMYCIN HYDROCHLORIDE 1250 MG: 1.25 INJECTION, POWDER, LYOPHILIZED, FOR SOLUTION INTRAVENOUS at 04:05

## 2022-05-07 RX ADMIN — GLYCOPYRROLATE 1 MG: 1 TABLET ORAL at 02:05

## 2022-05-07 RX ADMIN — ATORVASTATIN CALCIUM 80 MG: 40 TABLET, FILM COATED ORAL at 08:05

## 2022-05-07 RX ADMIN — SODIUM CHLORIDE, PRESERVATIVE FREE 10 ML: 5 INJECTION INTRAVENOUS at 09:05

## 2022-05-07 RX ADMIN — CEFTRIAXONE 2 G: 2 INJECTION, SOLUTION INTRAVENOUS at 04:05

## 2022-05-07 RX ADMIN — BENZTROPINE MESYLATE 1 MG: 1 TABLET ORAL at 08:05

## 2022-05-07 RX ADMIN — DEXMEDETOMIDINE HYDROCHLORIDE 0.92 MCG/KG/HR: 400 INJECTION INTRAVENOUS at 07:05

## 2022-05-07 RX ADMIN — EZETIMIBE 10 MG: 10 TABLET ORAL at 09:05

## 2022-05-07 RX ADMIN — GLYCOPYRROLATE 1 MG: 1 TABLET ORAL at 09:05

## 2022-05-07 RX ADMIN — HALOPERIDOL 5 MG: 5 TABLET ORAL at 08:05

## 2022-05-07 RX ADMIN — PROPOFOL 41.39 MCG/KG/MIN: 10 INJECTION, EMULSION INTRAVENOUS at 07:05

## 2022-05-07 RX ADMIN — VANCOMYCIN HYDROCHLORIDE 1250 MG: 1.25 INJECTION, POWDER, LYOPHILIZED, FOR SOLUTION INTRAVENOUS at 09:05

## 2022-05-07 RX ADMIN — OXCARBAZEPINE 300 MG: 300 TABLET, FILM COATED ORAL at 09:05

## 2022-05-07 RX ADMIN — VANCOMYCIN HYDROCHLORIDE 1250 MG: 1.25 INJECTION, POWDER, LYOPHILIZED, FOR SOLUTION INTRAVENOUS at 06:05

## 2022-05-07 RX ADMIN — ENOXAPARIN SODIUM 40 MG: 40 INJECTION SUBCUTANEOUS at 08:05

## 2022-05-07 RX ADMIN — RIFAMPIN 300 MG: 300 CAPSULE ORAL at 09:05

## 2022-05-07 NOTE — PROGRESS NOTES
[facility] [address]  Critical Care - Medicine  Progress Note    Patient Name: Kendall Duff  MRN: [id number]  Admission Date: 4/1/2022  Hospital Length of Stay: 36 days  Code Status: Prior  Attending Provider: aMx Castillo MD  Primary Care Provider: Primary Doctor No   Principal Problem: <principal problem not specified>    Subjective:     HPI:  57 yo admitted 4/1 and underwent CABG x 4 on 4/6.  Hx of schizophrenia and BPAD       Hospital/ICU Course: Developed MRSA sternal wound. Intubated 4/15.   Underwent debridement with wound vac on 4/17.  Sputum culture from 4/26 with Klebsiella and proteus    Interval History/Significant Events:  Patient remains intubated mechanically ventilated and sedated on Precedex.  He does squirm intermittently to minimal stimuli.  Plan is for pectoral flap later next week.    Objective:     Vital Signs (Most Recent):  Temp: 98.7 °F (37.1 °C) (05/07/22 0400)  Pulse: 70 (05/07/22 0600)  Resp: (!) 0 (05/07/22 0600)  BP: 94/69 (05/07/22 0829)  SpO2: 100 % (05/07/22 0600) Vital Signs (24h Range):  Temp:  [98.2 °F (36.8 °C)-101.1 °F (38.4 °C)] 98.7 °F (37.1 °C)  Pulse:  [70-96] 70  Resp:  [0-35] 0  SpO2:  [98 %-100 %] 100 %  BP: ()/(63-91) 94/69     Weight: 78.5 kg (173 lb 1 oz)  Body mass index is 24.23 kg/m².      Intake/Output Summary (Last 24 hours) at 5/7/2022 0924  Last data filed at 5/7/2022 0500  Gross per 24 hour   Intake 3006.07 ml   Output 3415 ml   Net -408.93 ml        Physical Exam  Constitutional:       General: He is not in acute distress.     Appearance: He is not toxic-appearing.      Comments: Intubated and sedated   HENT:      Head: Normocephalic and atraumatic.   Eyes:      Extraocular Movements: Extraocular movements intact.      Pupils: Pupils are equal, round, and reactive to light.   Cardiovascular:      Rate and Rhythm: Normal rate and regular rhythm.      Pulses: Normal pulses.      Heart sounds: No murmur heard.    No gallop.    Pulmonary:      Effort: No respiratory distress.      Breath sounds: No stridor. No wheezing, rhonchi or rales.   Abdominal:      General: Abdomen is flat.      Palpations: Abdomen is soft.   Musculoskeletal:         General: No swelling or deformity.      Left lower leg: No edema.   Skin:     General: Skin is warm.      Coloration: Skin is not jaundiced.      Findings: No bruising.   Neurological:      Comments: Patient sedated and therefore unable to accurately exam           Current Facility-Administered Medications:     acetaminophen tablet 650 mg, 650 mg, Oral, Q4H PRN, Juancarlos Foreman MD, 650 mg at 05/06/22 2029    albuterol-ipratropium 2.5 mg-0.5 mg/3 mL nebulizer solution 3 mL, 3 mL, Nebulization, Q12H PRN, Juancarlos Foreman MD    atorvastatin tablet 80 mg, 80 mg, Oral, Daily, Abstracted MD Kellen, 80 mg at 05/07/22 0829    benztropine tablet 1 mg, 1 mg, Per OG tube, BID, Abstracted MD Kellen, 1 mg at 05/07/22 0832    bisacodyL suppository 10 mg, 10 mg, Rectal, Daily PRN, Abstracted MD Kellen    cefTRIAXone 2 gram/50 mL IVPB 2 g, 2 g, Intravenous, Q24H, Carroll Hammer Jr., [facility], [facility], Stopped at 05/07/22 0529    dexmedetomidine (PRECEDEX) 400mcg/100mL 0.9% NaCL infusion, 0-1.4 mcg/kg/hr, Intravenous, Continuous, Sukhjinder Michael MD, Last Rate: 15.2 mL/hr at 05/07/22 0340, 0.8 mcg/kg/hr at 05/07/22 0340    dextrose 10% bolus 250 mL, 250 mL, Intravenous, Once PRN, Abstracted MD Kellen    doxepin capsule 50 mg, 50 mg, Oral, QHS, Abstracted MD Kellen, 50 mg at 05/06/22 2031    enoxaparin injection 40 mg, 40 mg, Subcutaneous, Daily, Abstracted MD Kellen, 40 mg at 05/07/22 0828    ezetimibe tablet 10 mg, 10 mg, Oral, QHS, Juancarlos Foreman MD, 10 mg at 05/06/22 2031    famotidine (PF) injection 20 mg, 20 mg, Intravenous, BID, Abstracted MD Kellen, 20 mg at 05/07/22 0829    fentaNYL injection 100 mcg, 100 mcg, Intravenous, Q1H PRN, Abstracted Order, MD    fentaNYL injection 50 mcg, 50 mcg,  Intravenous, Q1H PRN, Abstracted Order, MD    glycopyrrolate tablet 1 mg, 1 mg, Per NG tube, TID, Sukhjinder Michael MD, 1 mg at 05/07/22 0832    haloperidoL tablet 5 mg, 5 mg, Per OG tube, BID, Abstracted Order, MD, 5 mg at 05/07/22 0831    hydrALAZINE injection 10 mg, 10 mg, Intravenous, Q2H PRN, Abstracted Order, MD    levETIRAcetam (KEPPRA) 500 mg in sodium chloride 0.9 % 100 mL IVPB (MB+), 500 mg, Intravenous, Q12H, Carroll Hammer Jr., MD, FCCP, 500 mg at 05/07/22 0829    lorazepam injection 0.5 mg, 0.5 mg, Intravenous, Once PRN, Abstracted Order, MD    lorazepam injection 1 mg, 1 mg, Intravenous, Once PRN, Abstracted Order, MD    metoprolol tartrate (LOPRESSOR) tablet 25 mg, 25 mg, Per OG tube, Daily, Abstracted Order, MD, 25 mg at 05/07/22 0829    morphine injection 2 mg, 2 mg, Intravenous, Q1H PRN, Abstracted Order, MD    morphine injection 4 mg, 4 mg, Intravenous, Q4H PRN, Abstracted Order, MD    nitroGLYCERIN SL tablet 0.4 mg, 0.4 mg, Sublingual, Q5 Min PRN, Abstracted Order, MD    NORepinephrine bitartrate-NaCl 8 mg/250 mL (32 mcg/mL) infusion, 0-3 mcg/kg/min (Dosing Weight), Intravenous, Continuous, Abstracted Order, MD, Paused at 05/04/22 0600    OLANZapine injection 10 mg, 10 mg, Intramuscular, Q8H PRN, Abstracted Order, MD    ondansetron injection 4 mg, 4 mg, Intravenous, Q4H PRN, Abstracted Order, MD    OXcarbazepine tablet 300 mg, 300 mg, Per OG tube, BID, Abstracted Order, MD, 300 mg at 05/07/22 0830    oxyCODONE immediate release tablet 10 mg, 10 mg, Oral, Q4H PRN, Abstracted Order, MD    oxyCODONE immediate release tablet 5 mg, 5 mg, Oral, Q4H PRN, Abstracted Order, MD    potassium chloride 20 mEq in 100 mL IVPB (FOR CENTRAL LINE ADMINISTRATION ONLY), 20 mEq, Intravenous, PRN, Abstracted Order, MD    potassium chloride 20 mEq in 100 mL IVPB (FOR CENTRAL LINE ADMINISTRATION ONLY), 40 mEq, Intravenous, PRN, Abstracted Order, MD    potassium chloride 20 mEq in 100 mL IVPB (FOR  CENTRAL LINE ADMINISTRATION ONLY), 60 mEq, Intravenous, PRN, Juancarlos Foreman MD    propofol (DIPRIVAN) 10 mg/mL infusion, 5-50 mcg/kg/min (Dosing Weight), Intravenous, Continuous, Sukhjinder Michael MD, Last Rate: 16 mL/hr at 05/07/22 0500, 35 mcg/kg/min at 05/07/22 0500    rifAMpin capsule 300 mg, 300 mg, Oral, BID, Juancarlos Foreman MD, 300 mg at 05/07/22 0830    sodium chloride 0.9% flush 10 mL, 10 mL, Intravenous, PRN, Juancarlos Foreman MD    sodium chloride 0.9% flush 10 mL, 10 mL, Intravenous, Q12H, Juancarlos Foreman MD, 10 mL at 05/07/22 0850    vancomycin (VANCOCIN) 1,250 mg in dextrose 5 % 250 mL IVPB, 1,250 mg, Intravenous, Q8H, Juancarlos Foreman MD, Last Rate: 166.7 mL/hr at 05/07/22 0629, 1,250 mg at 05/07/22 0629    ziprasidone injection 20 mg, 20 mg, Intramuscular, Q8H PRN, Juancarlos Foreman MD     Vents:  Vent Mode: A/C (05/07/22 0413)  Set Rate: 16 BPM (05/07/22 0413)  Vt Set: 450 mL (05/07/22 0413)  PEEP/CPAP: 5 cmH20 (05/07/22 0413)  Oxygen Concentration (%): 30 (05/07/22 0600)  Peak Airway Pressure: 12 cmH2O (05/07/22 0413)  Plateau Pressure: 6 cmH20 (05/06/22 0600)  Total Ve: 10.5 mL (05/07/22 0413)  F/VT Ratio<105 (RSBI): (!) 46.22 (05/07/22 0413)    Lines/Drains/Airways     Peripherally Inserted Central Catheter Line  Duration           PICC Triple Lumen 05/01/22 0523 right basilic 6 days          Drain  Duration                NG/OG Tube 05/06/22 0800 Dane sump 16 Fr. Center mouth 1 day          Airway  Duration                Airway - Non-Surgical 05/01/22 0524 Endotracheal Tube 6 days          Peripheral Intravenous Line  Duration                Peripheral IV - Single Lumen 05/01/22 0522 18 G Anterior;Left;Proximal Forearm 6 days                Significant Labs:    Lab Results   Component Value Date    WBC 8.5 05/03/2022    HGB 10.6 (L) 05/03/2022    HCT 35.4 (L) 05/03/2022    MCV 89.6 05/03/2022     03/25/2022         BMP  Lab Results   Component Value Date     05/04/2022     K 4.0 05/04/2022     03/25/2022    CO2 22 05/03/2022    BUN 14.2 05/03/2022    CREATININE 0.51 (L) 05/03/2022    CALCIUM 8.3 (L) 05/03/2022    ANIONGAP 9 03/25/2022    ESTGFRAFRICA >60.0 03/25/2022    EGFRNONAA >60 05/03/2022       ABG  No results for input(s): PH, PO2, PCO2, HCO3, BE in the last 168 hours.        Significant Imaging:  No new imaging    DVT Prophylaxis: LOVENOX   GI prophylaxis: PEPCID    Assessment/Plan:        Assessment:  1. 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 MRSA Sternal wound dehiscence with debridement and wound vac 4/17.  Plastics following  3. Respiratory culture positive Klebsiella and Proteus  4. Malnutrition.  5. Respiratory failure.  Intubated 4/15     Plan  - Continue MV support.  Adjust accordingly  - Enteral tube feeds  - Pepcid and lovenox prophylaxis  -  family has now decided to proceed with PEG and trach in preparation for pectoral flap.  Dr. Weiss reportedly is planning on surgery late next week provided his nutritional status continues to improve.  I did explain to the family that I anticipate his recovery will take quite some time and that his underlying psychiatric issues will complicate things.    Will check pre-albumin, routine labs, and ABG         Greater than 30 minutes of critical care was time spent personally by me on the following activities: development of treatment plan with patient or surrogate and bedside caregivers, discussions with consultants, evaluation of patient's response to treatment, examination of patient, ordering and performing treatments and interventions, ordering and review of laboratory studies, ordering and review of radiographic studies, pulse oximetry, re-evaluation of patient's condition.  This critical care time did not overlap with that of any other provider or involve time for any procedures.     Max Castillo MD  Critical Care - Medicine  [facility] - [address] [place] [address]

## 2022-05-08 PROCEDURE — 27000221 HC OXYGEN, UP TO 24 HOURS

## 2022-05-08 PROCEDURE — 99900026 HC AIRWAY MAINTENANCE (STAT): Performed by: INTERNAL MEDICINE

## 2022-05-08 PROCEDURE — 63600175 PHARM REV CODE 636 W HCPCS: Performed by: INTERNAL MEDICINE

## 2022-05-08 PROCEDURE — 20000000 HC ICU ROOM

## 2022-05-08 PROCEDURE — 94003 VENT MGMT INPAT SUBQ DAY: CPT | Performed by: INTERNAL MEDICINE

## 2022-05-08 PROCEDURE — 25000003 PHARM REV CODE 250

## 2022-05-08 PROCEDURE — 63600175 PHARM REV CODE 636 W HCPCS

## 2022-05-08 PROCEDURE — 99900026 HC AIRWAY MAINTENANCE (STAT)

## 2022-05-08 PROCEDURE — 25000003 PHARM REV CODE 250: Performed by: INTERNAL MEDICINE

## 2022-05-08 PROCEDURE — 25000003 PHARM REV CODE 250: Performed by: HOSPITALIST

## 2022-05-08 PROCEDURE — A4216 STERILE WATER/SALINE, 10 ML: HCPCS

## 2022-05-08 PROCEDURE — 63600175 PHARM REV CODE 636 W HCPCS: Performed by: HOSPITALIST

## 2022-05-08 PROCEDURE — 27000124 HC DRESSING, WOUND VAC

## 2022-05-08 PROCEDURE — 99900035 HC TECH TIME PER 15 MIN (STAT)

## 2022-05-08 PROCEDURE — 27200966 HC CLOSED SUCTION SYSTEM

## 2022-05-08 PROCEDURE — 94761 N-INVAS EAR/PLS OXIMETRY MLT: CPT

## 2022-05-08 RX ORDER — LORAZEPAM 2 MG/ML
1 INJECTION INTRAMUSCULAR
Status: CANCELLED | OUTPATIENT
Start: 2022-05-08

## 2022-05-08 RX ORDER — LORAZEPAM 2 MG/ML
2 INJECTION INTRAMUSCULAR
Status: DISCONTINUED | OUTPATIENT
Start: 2022-05-08 | End: 2022-06-10

## 2022-05-08 RX ADMIN — EZETIMIBE 10 MG: 10 TABLET ORAL at 09:05

## 2022-05-08 RX ADMIN — DEXMEDETOMIDINE HYDROCHLORIDE 1 MCG/KG/HR: 400 INJECTION INTRAVENOUS at 05:05

## 2022-05-08 RX ADMIN — VANCOMYCIN HYDROCHLORIDE 1250 MG: 1.25 INJECTION, POWDER, LYOPHILIZED, FOR SOLUTION INTRAVENOUS at 11:05

## 2022-05-08 RX ADMIN — RIFAMPIN 300 MG: 300 CAPSULE ORAL at 08:05

## 2022-05-08 RX ADMIN — SODIUM CHLORIDE, PRESERVATIVE FREE 10 ML: 5 INJECTION INTRAVENOUS at 08:05

## 2022-05-08 RX ADMIN — OXCARBAZEPINE 300 MG: 300 TABLET, FILM COATED ORAL at 08:05

## 2022-05-08 RX ADMIN — PROPOFOL 30 MCG/KG/MIN: 10 INJECTION, EMULSION INTRAVENOUS at 04:05

## 2022-05-08 RX ADMIN — HALOPERIDOL 5 MG: 5 TABLET ORAL at 09:05

## 2022-05-08 RX ADMIN — METOPROLOL TARTRATE 25 MG: 25 TABLET, FILM COATED ORAL at 08:05

## 2022-05-08 RX ADMIN — GLYCOPYRROLATE 1 MG: 1 TABLET ORAL at 08:05

## 2022-05-08 RX ADMIN — ENOXAPARIN SODIUM 40 MG: 40 INJECTION SUBCUTANEOUS at 08:05

## 2022-05-08 RX ADMIN — ACETAMINOPHEN 650 MG: 325 TABLET ORAL at 09:05

## 2022-05-08 RX ADMIN — GLYCOPYRROLATE 1 MG: 1 TABLET ORAL at 09:05

## 2022-05-08 RX ADMIN — PROPOFOL 36.79 MCG/KG/MIN: 10 INJECTION, EMULSION INTRAVENOUS at 10:05

## 2022-05-08 RX ADMIN — SODIUM CHLORIDE, PRESERVATIVE FREE 10 ML: 5 INJECTION INTRAVENOUS at 09:05

## 2022-05-08 RX ADMIN — FAMOTIDINE 20 MG: 10 INJECTION, SOLUTION INTRAVENOUS at 08:05

## 2022-05-08 RX ADMIN — PROPOFOL 25 MCG/KG/MIN: 10 INJECTION, EMULSION INTRAVENOUS at 03:05

## 2022-05-08 RX ADMIN — LEVETIRACETAM 500 MG: 100 INJECTION, SOLUTION INTRAVENOUS at 08:05

## 2022-05-08 RX ADMIN — OXCARBAZEPINE 300 MG: 300 TABLET, FILM COATED ORAL at 09:05

## 2022-05-08 RX ADMIN — HALOPERIDOL 5 MG: 5 TABLET ORAL at 08:05

## 2022-05-08 RX ADMIN — LORAZEPAM 0.5 MG: 2 INJECTION INTRAMUSCULAR; INTRAVENOUS at 10:05

## 2022-05-08 RX ADMIN — PROPOFOL 35 MCG/KG/MIN: 10 INJECTION, EMULSION INTRAVENOUS at 09:05

## 2022-05-08 RX ADMIN — CEFTRIAXONE 2 G: 2 INJECTION, SOLUTION INTRAVENOUS at 04:05

## 2022-05-08 RX ADMIN — VANCOMYCIN HYDROCHLORIDE 1250 MG: 1.25 INJECTION, POWDER, LYOPHILIZED, FOR SOLUTION INTRAVENOUS at 02:05

## 2022-05-08 RX ADMIN — LEVETIRACETAM 500 MG: 100 INJECTION, SOLUTION INTRAVENOUS at 09:05

## 2022-05-08 RX ADMIN — ATORVASTATIN CALCIUM 80 MG: 40 TABLET, FILM COATED ORAL at 08:05

## 2022-05-08 RX ADMIN — DOXEPIN HYDROCHLORIDE 50 MG: 50 CAPSULE ORAL at 09:05

## 2022-05-08 RX ADMIN — RIFAMPIN 300 MG: 300 CAPSULE ORAL at 09:05

## 2022-05-08 RX ADMIN — DEXMEDETOMIDINE HYDROCHLORIDE 0.92 MCG/KG/HR: 400 INJECTION INTRAVENOUS at 11:05

## 2022-05-08 RX ADMIN — FAMOTIDINE 20 MG: 10 INJECTION, SOLUTION INTRAVENOUS at 09:05

## 2022-05-08 RX ADMIN — BENZTROPINE MESYLATE 1 MG: 1 TABLET ORAL at 08:05

## 2022-05-08 RX ADMIN — GLYCOPYRROLATE 1 MG: 1 TABLET ORAL at 02:05

## 2022-05-08 RX ADMIN — DEXMEDETOMIDINE HYDROCHLORIDE 1 MCG/KG/HR: 400 INJECTION INTRAVENOUS at 11:05

## 2022-05-08 RX ADMIN — LORAZEPAM 2 MG: 2 INJECTION INTRAMUSCULAR; INTRAVENOUS at 10:05

## 2022-05-08 RX ADMIN — BENZTROPINE MESYLATE 1 MG: 1 TABLET ORAL at 09:05

## 2022-05-08 NOTE — PROGRESS NOTES
[facility] [address] ICU  Critical Care - Medicine  Progress Note    Patient Name: Kendall Duff  MRN: [id number]  Admission Date: 4/1/2022  Hospital Length of Stay: 37 days  Code Status: Prior  Attending Provider: Max Castillo MD  Primary Care Provider: Primary Doctor No   Principal Problem: <principal problem not specified>    Subjective:     HPI:  57 yo admitted 4/1 and underwent CABG x 4 on 4/6.  Hx of schizophrenia and BPAD       Hospital/ICU Course: Developed MRSA sternal wound. Intubated 4/15.   Underwent debridement with wound vac on 4/17.  Sputum culture from 4/26 with Klebsiella and proteus    Interval History/Significant Events:  Patient has remained intubated mechanically ventilated sedated.  We have weaned vent rate slightly.  For some reason the routine labs are ordered either have never been drawn or are not resulted.  We will follow up on that    Objective:     Vital Signs (Most Recent):  Temp: 99.7 °F (37.6 °C) (05/08/22 0800)  Pulse: 76 (05/08/22 0645)  Resp: (!) 2 (05/08/22 0645)  BP: (!) 91/59 (05/08/22 0815)  SpO2: 95 % (05/08/22 0645) Vital Signs (24h Range):  Temp:  [98.2 °F (36.8 °C)-100.2 °F (37.9 °C)] 99.7 °F (37.6 °C)  Pulse:  [62-89] 76  Resp:  [2-51] 2  SpO2:  [90 %-100 %] 95 %  BP: ()/(53-98) 91/59     Weight: 80.2 kg (176 lb 12.9 oz)  Body mass index is 24.75 kg/m².      Intake/Output Summary (Last 24 hours) at 5/8/2022 0821  Last data filed at 5/8/2022 0800  Gross per 24 hour   Intake 3229.76 ml   Output 3270 ml   Net -40.24 ml        Physical Exam  Vitals reviewed.   Constitutional:       General: He is not in acute distress.     Appearance: He is not toxic-appearing.      Comments: Intubated and sedated   HENT:      Head: Normocephalic and atraumatic.      Mouth/Throat:      Comments: Tongue with some persistent swelling  Eyes:      Extraocular Movements: Extraocular movements intact.      Pupils: Pupils are equal, round, and reactive to light.    Cardiovascular:      Rate and Rhythm: Normal rate and regular rhythm.      Pulses: Normal pulses.      Heart sounds: No murmur heard.    No gallop.   Pulmonary:      Effort: No respiratory distress.      Breath sounds: No stridor. No wheezing, rhonchi or rales.   Abdominal:      General: Abdomen is flat.      Palpations: Abdomen is soft.   Musculoskeletal:         General: No swelling or deformity.      Left lower leg: No edema.   Skin:     General: Skin is warm.      Coloration: Skin is not jaundiced.      Findings: No bruising.   Neurological:      Comments: Patient sedated and therefore unable to accurately exam           Current Facility-Administered Medications:     acetaminophen tablet 650 mg, 650 mg, Oral, Q4H PRN, Abstracted MD Kellen, 650 mg at 05/06/22 2029    albuterol-ipratropium 2.5 mg-0.5 mg/3 mL nebulizer solution 3 mL, 3 mL, Nebulization, Q12H PRN, Abstracted MD Kellen    atorvastatin tablet 80 mg, 80 mg, Oral, Daily, Abstracted MD Kellen, 80 mg at 05/08/22 0818    benztropine tablet 1 mg, 1 mg, Per OG tube, BID, Abstracted MD Kellen, 1 mg at 05/08/22 0816    bisacodyL suppository 10 mg, 10 mg, Rectal, Daily PRN, Abstracted MD Kellen    cefTRIAXone 2 gram/50 mL IVPB 2 g, 2 g, Intravenous, Q24H, Carroll Hammer Jr., MD, FCCP, Stopped at 05/08/22 0441    dexmedetomidine (PRECEDEX) 400mcg/100mL 0.9% NaCL infusion, 0-1.4 mcg/kg/hr, Intravenous, Continuous, Sukhjinder Michael MD, Last Rate: 19 mL/hr at 05/08/22 0542, 1 mcg/kg/hr at 05/08/22 0542    dextrose 10% bolus 250 mL, 250 mL, Intravenous, Once PRN, Abstracted MD Kellen    doxepin capsule 50 mg, 50 mg, Oral, QHS, Abstracted MD Kellen, 50 mg at 05/07/22 2150    enoxaparin injection 40 mg, 40 mg, Subcutaneous, Daily, Abstracted MD Kellen, 40 mg at 05/08/22 0817    ezetimibe tablet 10 mg, 10 mg, Oral, QHS, Abstracted MD Kellen, 10 mg at 05/07/22 2150    famotidine (PF) injection 20 mg, 20 mg, Intravenous, BID, Abstracted Order, MD, 20 mg at  05/08/22 0815    fentaNYL injection 100 mcg, 100 mcg, Intravenous, Q1H PRN, Abstracted Order, MD    fentaNYL injection 50 mcg, 50 mcg, Intravenous, Q1H PRN, Abstracted Order, MD, 50 mcg at 05/07/22 1707    glycopyrrolate tablet 1 mg, 1 mg, Per NG tube, TID, Sukhjinder Michael MD, 1 mg at 05/08/22 0817    haloperidoL tablet 5 mg, 5 mg, Per OG tube, BID, Abstracted Order, MD, 5 mg at 05/08/22 0817    hydrALAZINE injection 10 mg, 10 mg, Intravenous, Q2H PRN, Abstracted Order, MD    levETIRAcetam (KEPPRA) 500 mg in sodium chloride 0.9 % 100 mL IVPB (MB+), 500 mg, Intravenous, Q12H, Carroll Hammer Jr., MD, FCCP, 500 mg at 05/08/22 0814    lorazepam injection 0.5 mg, 0.5 mg, Intravenous, Once PRN, Abstracted Order, MD    metoprolol tartrate (LOPRESSOR) tablet 25 mg, 25 mg, Per OG tube, Daily, Abstracted Order, MD, 25 mg at 05/08/22 0815    morphine injection 2 mg, 2 mg, Intravenous, Q1H PRN, Abstracted Order, MD    morphine injection 4 mg, 4 mg, Intravenous, Q4H PRN, Abstracted Order, MD    nitroGLYCERIN SL tablet 0.4 mg, 0.4 mg, Sublingual, Q5 Min PRN, Abstracted Order, MD    NORepinephrine bitartrate-NaCl 8 mg/250 mL (32 mcg/mL) infusion, 0-3 mcg/kg/min (Dosing Weight), Intravenous, Continuous, Abstracted Order, MD, Paused at 05/04/22 0600    OLANZapine injection 10 mg, 10 mg, Intramuscular, Q8H PRN, Abstracted Order, MD    ondansetron injection 4 mg, 4 mg, Intravenous, Q4H PRN, Abstracted Order, MD    OXcarbazepine tablet 300 mg, 300 mg, Per OG tube, BID, Abstracted Order, MD, 300 mg at 05/08/22 0818    oxyCODONE immediate release tablet 10 mg, 10 mg, Oral, Q4H PRN, Abstracted Order, MD    oxyCODONE immediate release tablet 5 mg, 5 mg, Oral, Q4H PRN, Abstracted Order, MD    potassium chloride 20 mEq in 100 mL IVPB (FOR CENTRAL LINE ADMINISTRATION ONLY), 20 mEq, Intravenous, PRN, Abstracted Order, MD    potassium chloride 20 mEq in 100 mL IVPB (FOR CENTRAL LINE ADMINISTRATION ONLY), 40 mEq, Intravenous,  PRN, Juancarlos Foreman MD    potassium chloride 20 mEq in 100 mL IVPB (FOR CENTRAL LINE ADMINISTRATION ONLY), 60 mEq, Intravenous, PRN, Juancarlos Foreman MD    propofol (DIPRIVAN) 10 mg/mL infusion, 5-50 mcg/kg/min (Dosing Weight), Intravenous, Continuous, Sukhjinder Michael MD, Last Rate: 18.3 mL/hr at 05/08/22 0625, 40 mcg/kg/min at 05/08/22 0625    rifAMpin capsule 300 mg, 300 mg, Oral, BID, Juancarlos Foreman MD, 300 mg at 05/08/22 0817    sodium chloride 0.9% flush 10 mL, 10 mL, Intravenous, PRN, Juancarlos Foreman MD    sodium chloride 0.9% flush 10 mL, 10 mL, Intravenous, Q12H, Juancarlos Foreman MD, 10 mL at 05/08/22 0819    vancomycin (VANCOCIN) 1,250 mg in dextrose 5 % 250 mL IVPB, 1,250 mg, Intravenous, Q8H, Juancarlos Foreman MD, Stopped at 05/07/22 2323    ziprasidone injection 20 mg, 20 mg, Intramuscular, Q8H PRN, Juancarlos Foreman MD     Vents:  Vent Mode: PRVC A/C (05/08/22 0436)  Set Rate: 14 BPM (05/08/22 0436)  Vt Set: 450 mL (05/08/22 0436)  PEEP/CPAP: 5 cmH20 (05/08/22 0436)  Oxygen Concentration (%): 30 (05/08/22 0645)  Peak Airway Pressure: 10 cmH2O (05/08/22 0436)  Plateau Pressure: 6 cmH20 (05/06/22 0600)  Total Ve: 11 mL (05/08/22 0436)  F/VT Ratio<105 (RSBI): (!) 50 (05/08/22 0436)    Lines/Drains/Airways     Peripherally Inserted Central Catheter Line  Duration           PICC Triple Lumen 05/01/22 0523 right basilic 7 days          Drain  Duration                NG/OG Tube 05/06/22 0800 Orange sump 16 Fr. Center mouth 2 days          Airway  Duration                Airway - Non-Surgical 05/01/22 0524 Endotracheal Tube 7 days          Peripheral Intravenous Line  Duration                Peripheral IV - Single Lumen 05/01/22 0522 18 G Anterior;Left;Proximal Forearm 7 days                Significant Labs:    Lab Results   Component Value Date    WBC 8.5 05/03/2022    HGB 10.6 (L) 05/03/2022    HCT 35.4 (L) 05/03/2022    MCV 89.6 05/03/2022     03/25/2022         BMP  Lab Results    Component Value Date     05/04/2022    K 4.0 05/04/2022     03/25/2022    CO2 25 05/07/2022    BUN 16.3 05/07/2022    CREATININE 0.48 (L) 05/07/2022    CALCIUM 8.0 (L) 05/07/2022    ANIONGAP 9 03/25/2022    ESTGFRAFRICA >60.0 03/25/2022    EGFRNONAA >60 05/07/2022       ABG  No results for input(s): PH, PO2, PCO2, HCO3, BE in the last 168 hours.        Significant Imaging:  No new imaging    DVT Prophylaxis: LOVENOX   GI prophylaxis: PEPCID    Assessment/Plan:        Assessment:  1. 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 MRSA Sternal wound dehiscence with debridement and wound vac 4/17.  Plastics following  3. Respiratory culture positive Klebsiella and Proteus  4. Malnutrition.  5. Respiratory failure.  Intubated 4/15     Plan  - Continue MV support.  Adjust accordingly  - Enteral tube feeds  - Pepcid and lovenox prophylaxis  -  continuing to wean vent rate slightly.  We will plan on tracheostomy either tomorrow or Tuesday depending upon availability of help.  Will follow up on what happened to the routine labs ordered yesterday.  Continue sedation       Greater than 30 minutes of critical care was time spent personally by me on the following activities: development of treatment plan with patient or surrogate and bedside caregivers, discussions with consultants, evaluation of patient's response to treatment, examination of patient, ordering and performing treatments and interventions, ordering and review of laboratory studies, ordering and review of radiographic studies, pulse oximetry, re-evaluation of patient's condition.  This critical care time did not overlap with that of any other provider or involve time for any procedures.     Max Castillo MD  Critical Care - Medicine  [facility] - [address] [place] [address]

## 2022-05-09 LAB
ALBUMIN SERPL-MCNC: 1.8 GM/DL (ref 3.5–5)
ALBUMIN/GLOB SERPL: 0.4 RATIO (ref 1.1–2)
ALP SERPL-CCNC: 100 UNIT/L (ref 40–150)
ALT SERPL-CCNC: 18 UNIT/L (ref 0–55)
AST SERPL-CCNC: 31 UNIT/L (ref 5–34)
BASOPHILS # BLD AUTO: 0.07 X10(3)/MCL (ref 0–0.2)
BASOPHILS NFR BLD AUTO: 1.1 %
BILIRUBIN DIRECT+TOT PNL SERPL-MCNC: 0.1 MG/DL (ref 0–0.8)
BILIRUBIN DIRECT+TOT PNL SERPL-MCNC: 0.2 MG/DL (ref 0–0.5)
BILIRUBIN DIRECT+TOT PNL SERPL-MCNC: 0.3 MG/DL
BUN SERPL-MCNC: 19.8 MG/DL (ref 8.4–25.7)
CALCIUM SERPL-MCNC: 8.2 MG/DL (ref 8.4–10.2)
CHLORIDE SERPL-SCNC: 106 MMOL/L (ref 98–107)
CO2 SERPL-SCNC: 26 MMOL/L (ref 22–29)
CREAT SERPL-MCNC: 0.53 MG/DL (ref 0.73–1.18)
EOSINOPHIL # BLD AUTO: 0.74 X10(3)/MCL (ref 0–0.9)
EOSINOPHIL NFR BLD AUTO: 11.1 %
ERYTHROCYTE [DISTWIDTH] IN BLOOD BY AUTOMATED COUNT: 14.9 % (ref 11.5–17)
GLOBULIN SER-MCNC: 4.1 GM/DL (ref 2.4–3.5)
GLUCOSE SERPL-MCNC: 136 MG/DL (ref 74–100)
HCT VFR BLD AUTO: 32 % (ref 42–52)
HGB BLD-MCNC: 10.1 GM/DL (ref 14–18)
IMM GRANULOCYTES # BLD AUTO: 0.01 X10(3)/MCL (ref 0–0.02)
IMM GRANULOCYTES NFR BLD AUTO: 0.2 % (ref 0–0.43)
LYMPHOCYTES # BLD AUTO: 1.44 X10(3)/MCL (ref 0.6–4.6)
LYMPHOCYTES NFR BLD AUTO: 21.7 %
MCH RBC QN AUTO: 27 PG (ref 27–31)
MCHC RBC AUTO-ENTMCNC: 31.6 MG/DL (ref 33–36)
MCV RBC AUTO: 85.6 FL (ref 80–94)
MONOCYTES # BLD AUTO: 0.67 X10(3)/MCL (ref 0.1–1.3)
MONOCYTES NFR BLD AUTO: 10.1 %
NEUTROPHILS # BLD AUTO: 3.7 X10(3)/MCL (ref 2.1–9.2)
NEUTROPHILS NFR BLD AUTO: 55.8 %
NRBC BLD AUTO-RTO: 0 %
PLATELET # BLD AUTO: 363 X10(3)/MCL (ref 130–400)
PMV BLD AUTO: 9.4 FL (ref 9.4–12.4)
POTASSIUM SERPL-SCNC: 4.2 MMOL/L (ref 3.5–5.1)
PROT SERPL-MCNC: 5.9 GM/DL (ref 6.4–8.3)
RBC # BLD AUTO: 3.74 X10(6)/MCL (ref 4.7–6.1)
SODIUM SERPL-SCNC: 140 MMOL/L (ref 136–145)
VANCOMYCIN TROUGH SERPL-MCNC: 17.3 UG/ML (ref 15–20)
WBC # SPEC AUTO: 6.7 X10(3)/MCL (ref 4.5–11.5)

## 2022-05-09 PROCEDURE — A4216 STERILE WATER/SALINE, 10 ML: HCPCS

## 2022-05-09 PROCEDURE — 99231 PR SUBSEQUENT HOSPITAL CARE,LEVL I: ICD-10-PCS | Mod: ,,, | Performed by: SURGERY

## 2022-05-09 PROCEDURE — 63600175 PHARM REV CODE 636 W HCPCS

## 2022-05-09 PROCEDURE — 27100171 HC OXYGEN HIGH FLOW UP TO 24 HOURS

## 2022-05-09 PROCEDURE — 97606 NEG PRS WND THER DME>50 SQCM: CPT

## 2022-05-09 PROCEDURE — 99231 SBSQ HOSP IP/OBS SF/LOW 25: CPT | Mod: ,,, | Performed by: SURGERY

## 2022-05-09 PROCEDURE — 25000003 PHARM REV CODE 250

## 2022-05-09 PROCEDURE — 20000000 HC ICU ROOM

## 2022-05-09 PROCEDURE — 25000003 PHARM REV CODE 250: Performed by: INTERNAL MEDICINE

## 2022-05-09 PROCEDURE — 25000003 PHARM REV CODE 250: Performed by: HOSPITALIST

## 2022-05-09 PROCEDURE — 94761 N-INVAS EAR/PLS OXIMETRY MLT: CPT

## 2022-05-09 PROCEDURE — 36415 COLL VENOUS BLD VENIPUNCTURE: CPT | Performed by: INTERNAL MEDICINE

## 2022-05-09 PROCEDURE — 80202 ASSAY OF VANCOMYCIN: CPT | Performed by: INTERNAL MEDICINE

## 2022-05-09 PROCEDURE — 94003 VENT MGMT INPAT SUBQ DAY: CPT

## 2022-05-09 PROCEDURE — 63600175 PHARM REV CODE 636 W HCPCS: Performed by: INTERNAL MEDICINE

## 2022-05-09 PROCEDURE — 63600175 PHARM REV CODE 636 W HCPCS: Performed by: HOSPITALIST

## 2022-05-09 PROCEDURE — 27200966 HC CLOSED SUCTION SYSTEM

## 2022-05-09 PROCEDURE — 99900026 HC AIRWAY MAINTENANCE (STAT)

## 2022-05-09 PROCEDURE — 85025 COMPLETE CBC W/AUTO DIFF WBC: CPT | Performed by: INTERNAL MEDICINE

## 2022-05-09 PROCEDURE — 80053 COMPREHEN METABOLIC PANEL: CPT | Performed by: INTERNAL MEDICINE

## 2022-05-09 RX ADMIN — FAMOTIDINE 20 MG: 10 INJECTION, SOLUTION INTRAVENOUS at 08:05

## 2022-05-09 RX ADMIN — VANCOMYCIN HYDROCHLORIDE 1250 MG: 1.25 INJECTION, POWDER, LYOPHILIZED, FOR SOLUTION INTRAVENOUS at 06:05

## 2022-05-09 RX ADMIN — SODIUM CHLORIDE, PRESERVATIVE FREE 10 ML: 5 INJECTION INTRAVENOUS at 08:05

## 2022-05-09 RX ADMIN — LORAZEPAM 2 MG: 2 INJECTION INTRAMUSCULAR; INTRAVENOUS at 08:05

## 2022-05-09 RX ADMIN — GLYCOPYRROLATE 1 MG: 1 TABLET ORAL at 02:05

## 2022-05-09 RX ADMIN — BENZTROPINE MESYLATE 1 MG: 1 TABLET ORAL at 08:05

## 2022-05-09 RX ADMIN — DEXMEDETOMIDINE HYDROCHLORIDE 1 MCG/KG/HR: 400 INJECTION INTRAVENOUS at 01:05

## 2022-05-09 RX ADMIN — OXCARBAZEPINE 300 MG: 300 TABLET, FILM COATED ORAL at 08:05

## 2022-05-09 RX ADMIN — RIFAMPIN 300 MG: 300 CAPSULE ORAL at 08:05

## 2022-05-09 RX ADMIN — DEXMEDETOMIDINE HYDROCHLORIDE 1 MCG/KG/HR: 400 INJECTION INTRAVENOUS at 07:05

## 2022-05-09 RX ADMIN — PROPOFOL 35 MCG/KG/MIN: 10 INJECTION, EMULSION INTRAVENOUS at 01:05

## 2022-05-09 RX ADMIN — HALOPERIDOL 5 MG: 5 TABLET ORAL at 08:05

## 2022-05-09 RX ADMIN — DOXEPIN HYDROCHLORIDE 50 MG: 50 CAPSULE ORAL at 08:05

## 2022-05-09 RX ADMIN — LEVETIRACETAM 500 MG: 100 INJECTION, SOLUTION INTRAVENOUS at 08:05

## 2022-05-09 RX ADMIN — VANCOMYCIN HYDROCHLORIDE 1250 MG: 1.25 INJECTION, POWDER, LYOPHILIZED, FOR SOLUTION INTRAVENOUS at 10:05

## 2022-05-09 RX ADMIN — SODIUM CHLORIDE, PRESERVATIVE FREE 10 ML: 5 INJECTION INTRAVENOUS at 09:05

## 2022-05-09 RX ADMIN — GLYCOPYRROLATE 1 MG: 1 TABLET ORAL at 08:05

## 2022-05-09 RX ADMIN — CEFTRIAXONE 2 G: 2 INJECTION, SOLUTION INTRAVENOUS at 04:05

## 2022-05-09 RX ADMIN — ENOXAPARIN SODIUM 40 MG: 40 INJECTION SUBCUTANEOUS at 08:05

## 2022-05-09 RX ADMIN — PROPOFOL 30 MCG/KG/MIN: 10 INJECTION, EMULSION INTRAVENOUS at 07:05

## 2022-05-09 RX ADMIN — VANCOMYCIN HYDROCHLORIDE 1250 MG: 1.25 INJECTION, POWDER, LYOPHILIZED, FOR SOLUTION INTRAVENOUS at 02:05

## 2022-05-09 RX ADMIN — GLYCOPYRROLATE 1 MG: 1 TABLET ORAL at 10:05

## 2022-05-09 RX ADMIN — EZETIMIBE 10 MG: 10 TABLET ORAL at 08:05

## 2022-05-09 RX ADMIN — PROPOFOL 20 MCG/KG/MIN: 10 INJECTION, EMULSION INTRAVENOUS at 04:05

## 2022-05-09 RX ADMIN — ATORVASTATIN CALCIUM 80 MG: 40 TABLET, FILM COATED ORAL at 08:05

## 2022-05-09 RX ADMIN — DEXMEDETOMIDINE HYDROCHLORIDE 1 MCG/KG/HR: 400 INJECTION INTRAVENOUS at 04:05

## 2022-05-09 NOTE — PROGRESS NOTES
"Gastroenterology Progress Note    Subjective:  Patient remains intubated, sedated, and has OG tube. Trach planned for sometime this week. GI on the case for PEG.      ROS:    ROS      Vital Signs:  BP (!) 81/58 (BP Location: Left arm, Patient Position: Lying)   Pulse 60   Temp 98.8 °F (37.1 °C) (Oral)   Resp (!) 23   Ht 5' 10.87" (1.8 m)   Wt 79.7 kg (175 lb 11.3 oz)   SpO2 97%   BMI 24.60 kg/m²   Body mass index is 24.6 kg/m².    Physical Exam:    Physical Exam  Constitutional:       General: He is not in acute distress.  HENT:      Head: Normocephalic and atraumatic.      Mouth/Throat:      Comments: Swollen tongue protruding from mouth  Pulmonary:      Comments: intubated  Abdominal:      General: There is no distension.      Palpations: Abdomen is soft.      Tenderness: There is no abdominal tenderness.   Neurological:      Comments: sedated         Labs:  Recent Results (from the past 48 hour(s))   VANCOMYCIN, TROUGH    Collection Time: 05/09/22  5:03 AM   Result Value Ref Range    Vancomycin Trough 17.3 15.0 - 20.0 ug/ml   Comprehensive Metabolic Panel    Collection Time: 05/09/22  8:46 AM   Result Value Ref Range    Sodium Level 140 136 - 145 mmol/L    Potassium Level 4.2 3.5 - 5.1 mmol/L    Chloride 106 98 - 107 mmol/L    Carbon Dioxide 26 22 - 29 mmol/L    Glucose Level 136 (H) 74 - 100 mg/dL    Blood Urea Nitrogen 19.8 8.4 - 25.7 mg/dL    Creatinine 0.53 (L) 0.73 - 1.18 mg/dL    Calcium Level Total 8.2 (L) 8.4 - 10.2 mg/dL    Protein Total 5.9 (L) 6.4 - 8.3 gm/dL    Albumin Level 1.8 (L) 3.5 - 5.0 gm/dL    Globulin 4.1 (H) 2.4 - 3.5 gm/dL    Albumin/Globulin Ratio 0.4 (L) 1.1 - 2.0 ratio    Bilirubin Total 0.3 <=1.5 mg/dL    Bilirubin Direct 0.2 0.0 - 0.5 mg/dL    Bilirubin Indirect 0.10 0.00 - 0.80 mg/dL    Alkaline Phosphatase 100 40 - 150 unit/L    Alanine Aminotransferase 18 0 - 55 unit/L    Aspartate Aminotransferase 31 5 - 34 unit/L    Estimated GFR- >60 mls/min/1.73/m2    " Estimated GFR-Non  >60 mls/min/1.73/m2   CBC with Differential    Collection Time: 05/09/22  8:46 AM   Result Value Ref Range    WBC 6.7 4.5 - 11.5 x10(3)/mcL    RBC 3.74 (L) 4.70 - 6.10 x10(6)/mcL    Hgb 10.1 (L) 14.0 - 18.0 gm/dL    Hct 32.0 (L) 42.0 - 52.0 %    MCV 85.6 80.0 - 94.0 fL    MCH 27.0 27.0 - 31.0 pg    MCHC 31.6 (L) 33.0 - 36.0 mg/dL    RDW 14.9 11.5 - 17.0 %    Platelet 363 130 - 400 x10(3)/mcL    MPV 9.4 9.4 - 12.4 fL    Neutro Auto 55.8 %    Lymph Auto 21.7 %    Mono Auto 10.1 %    Eos Auto 11.1 %    Basophil Auto 1.1 %    Abs Lymph 1.44 0.6 - 4.6 x10(3)/mcL    Abs Neutro 3.7 2.1 - 9.2 x10(3)/mcL    Abs Mono 0.67 0.1 - 1.3 x10(3)/mcL    Abs Eos 0.74 0 - 0.9 x10(3)/mcL    Abs Baso 0.07 0 - 0.2 x10(3)/mcL    IG# 0.01 0 - 0.0155 x10(3)/mcL    IG% 0.2 0 - 0.43 %    NRBC% 0.0 %         Assessment/Plan:  1. Coronary artery disease      57 yo admitted 4/1 and underwent CABG x 4 on 4/6.  Hx of schizophrenia and BPAD. Remains intubated and sedated.    1. Alternative means of nutrition  -Trach likely tomorrow and sternal reconstruction Wednesday , per ICU notes  -Will discuss appropriate timing for PEG. Would need to hold Lovenox morning of procedure. His swollen tongue may stop us from placing the tube endoscopically       Daniel Oneil

## 2022-05-09 NOTE — PROGRESS NOTES
"[facility] - [address] [place] [address] ICU  Adult Nutrition  Progress Note    SUMMARY       Recommendations    Recommendation/Intervention:   Peptamen VHP @ 70ml/hr (goal rate, based on tube feeding running ~20 hours/day) + 1 packet [employer] Protein NoCarb 4x/day    Goals: Meet >/=75% est energy and protein requirements by f/u  Nutrition Goal Status: progressing towards goal  Communication of RD Recs: reviewed with RN    Malnutrition Assessment    Subcutaneous Fat Loss (Final Summary): well nourished  Muscle Loss Evaluation (Final Summary): well nourished  Fluid Accumulation Evaluation:  (unable to evaluate)    Moderate Weight Loss (Malnutrition):  (unable to evaluate)    Reason for Assessment    Reason For Assessment: RD follow-up  Diagnosis: other (see comments) (1. CAD with 4 vessel CABG 4/6  2. MRSA Sternal wound dehiscence with debridement and wound vac 4/17.  Plastics following  3. Respiratory culture positive Klebsiella and Proteus  4. Malnutrition.  5. Respiratory failure.  Intubated 4/15)  Relevant Medical History: Hepatitis C, Depression, Bipolar Disorder, Stroke, HTN    General Information Comments: Tube feeding and protein held at this time. Plans for trach today. Discussed with RNs on multiple occasions regarding giving protein packets. Plans to restart tube feeding post- trach placement. Receiving kcal from meds.    Nutrition Risk Screen    Nutrition Risk Screen: tube feeding or parenteral nutrition    Nutrition/Diet History    Factors Affecting Nutritional Intake: on mechanical ventilation    Anthropometrics    Temp: 98.8 °F (37.1 °C)  Height Method: Estimated  Height: 5' 10.87" (180 cm)  Height (inches): 70.87 in  Weight Method: Bed Scale  Weight: 79.7 kg (175 lb 11.3 oz)  Weight (lb): 175.71 lb  Ideal Body Weight (IBW), Male: 171.22 lb  % Ideal Body Weight, Male (lb): 112.92 %  BMI (Calculated): 24.6  BMI Grade: 25 - 29.9 - overweight    Lab/Procedures/Meds    Pertinent Labs Reviewed: " reviewed  Pertinent Labs Comments: 5/9/22 Glu 136, Crea 0.53  Pertinent Medications Reviewed: reviewed  Pertinent Medications Comments: diprivan @ 16ml/hr    Estimated/Assessed Needs    Weight Used For Calorie Calculations: 87.7 kg (193 lb 5.5 oz)  Energy Calorie Requirements (kcal): 2167kcal  Energy Need Method: [facility]  Protein Requirements: 133gm  Weight Used For Protein Calculations: 87.7 kg (193 lb 5.5 oz)  Fluid Requirements (mL): 30mL/kg  Estimated Fluid Requirement Method: RDA Method  RDA Method (mL): 2167     Nutrition Prescription Ordered    Current Nutrition Support Formula Ordered: Peptamen Intense VHP  Current Nutrition Support Rate Ordered: 70 (ml)    Evaluation of Received Nutrient/Fluid Intake    Enteral Calories (kcal): 1400  Enteral Protein (gm): 130  Enteral (Free Water) Fluid (mL): 1176  Lipid Calories (kcals): 420 kcals  Other Calories (kcal): 240  Total Calories (kcal): 1640  % Kcal Needs: 76% (95% with meds)  Other Protein (gm): 60  Total Protein (gm): 190  Total Protein (gm/kg): 2.38  % Protein Needs: 143  Energy Calories Required: meeting needs  Protein Required: meeting needs  Fluid Required: not meeting needs  Tolerance: tolerating  % Intake of Estimated Energy Needs: 75 - 100 %  % Meal Intake: NPO    Nutrition Risk    Level of Risk/Frequency of Follow-up: high     Monitor and Evaluation    Food and Nutrient Intake: enteral nutrition intake  Food and Nutrient Adminstration: enteral and parenteral nutrition administration     Nutrition Follow-Up    RD Follow-up?: Yes

## 2022-05-09 NOTE — PROGRESS NOTES
[facility] [address] ICU  Critical Care - Medicine  Progress Note    Patient Name: Kendall Duff  MRN: [id number]  Admission Date: 4/1/2022  Hospital Length of Stay: 38 days  Code Status: Prior  Attending Provider: Max Castillo MD  Primary Care Provider: Primary Doctor No   Principal Problem: <principal problem not specified>    Subjective:     HPI:  57 yo admitted 4/1 and underwent CABG x 4 on 4/6.  Hx of schizophrenia and BPAD       Hospital/ICU Course: Developed MRSA sternal wound. Intubated 4/15.   Underwent debridement with wound vac on 4/17.  Sputum culture from 4/26 with Klebsiella and proteus    Interval History/Significant Events:  Patient remains intubated sedated mechanically ventilated.  Case was discussed with Dr. Weiss this morning.  Plan is for flap on a Wednesday.  We will perform tracheostomy prior to that.  Tongue remains swollen as well seemingly a bit more so today where is it was slightly improved yesterday.    Objective:     Vital Signs (Most Recent):  Temp: 99.1 °F (37.3 °C) (05/09/22 0715)  Pulse: 64 (05/09/22 0730)  Resp: (!) 24 (05/09/22 0730)  BP: 104/73 (05/09/22 0730)  SpO2: 100 % (05/09/22 0730) Vital Signs (24h Range):  Temp:  [98.8 °F (37.1 °C)-100.4 °F (38 °C)] 99.1 °F (37.3 °C)  Pulse:  [] 64  Resp:  [10-44] 24  SpO2:  [88 %-100 %] 100 %  BP: ()/() 104/73     Weight: 79.7 kg (175 lb 11.3 oz)  Body mass index is 24.6 kg/m².      Intake/Output Summary (Last 24 hours) at 5/9/2022 0815  Last data filed at 5/9/2022 0701  Gross per 24 hour   Intake 3431.58 ml   Output 2800 ml   Net 631.58 ml        Physical Exam  Vitals reviewed.   Constitutional:       General: He is not in acute distress.     Appearance: He is not toxic-appearing.      Comments: Intubated and sedated   HENT:      Head: Normocephalic and atraumatic.      Mouth/Throat:      Comments: Tongue with some persistent swelling  Eyes:      Extraocular Movements: Extraocular movements intact.       Pupils: Pupils are equal, round, and reactive to light.   Cardiovascular:      Rate and Rhythm: Normal rate and regular rhythm.      Pulses: Normal pulses.      Heart sounds: No murmur heard.    No gallop.   Pulmonary:      Effort: No respiratory distress.      Breath sounds: No stridor. No wheezing, rhonchi or rales.   Abdominal:      General: Abdomen is flat.      Palpations: Abdomen is soft.   Musculoskeletal:         General: No swelling or deformity.      Left lower leg: No edema.   Skin:     General: Skin is warm.      Coloration: Skin is not jaundiced.      Findings: No bruising.   Neurological:      Comments: Patient sedated and therefore unable to accurately exam           Current Facility-Administered Medications:     acetaminophen tablet 650 mg, 650 mg, Oral, Q4H PRN, Abstracted MD Kellen, 650 mg at 05/08/22 0928    albuterol-ipratropium 2.5 mg-0.5 mg/3 mL nebulizer solution 3 mL, 3 mL, Nebulization, Q12H PRN, Juancarlos Foreman MD    atorvastatin tablet 80 mg, 80 mg, Oral, Daily, Abstracted MD Kellen, 80 mg at 05/08/22 0818    benztropine tablet 1 mg, 1 mg, Per OG tube, BID, Abstracted MD Kellen, 1 mg at 05/08/22 2134    bisacodyL suppository 10 mg, 10 mg, Rectal, Daily PRN, Juancarlos Foreman MD    cefTRIAXone 2 gram/50 mL IVPB 2 g, 2 g, Intravenous, Q24H, Carroll Hammer Jr., MD, FCCP, Stopped at 05/09/22 0439    dexmedetomidine (PRECEDEX) 400mcg/100mL 0.9% NaCL infusion, 0-1.4 mcg/kg/hr, Intravenous, Continuous, Sukhjinder Michael MD, Last Rate: 19 mL/hr at 05/09/22 0730, 1 mcg/kg/hr at 05/09/22 0730    dextrose 10% bolus 250 mL, 250 mL, Intravenous, Once PRN, Juancarlos Foreman MD    doxepin capsule 50 mg, 50 mg, Oral, QHS, Abstracted MD Kellen, 50 mg at 05/08/22 2134    enoxaparin injection 40 mg, 40 mg, Subcutaneous, Daily, Abstracted MD Kellen, 40 mg at 05/08/22 0817    ezetimibe tablet 10 mg, 10 mg, Oral, QHS, Abstracted Order, MD, 10 mg at 05/08/22 2133    famotidine (PF) injection 20 mg,  20 mg, Intravenous, BID, Abstracted Order, MD, 20 mg at 05/08/22 2132    fentaNYL injection 100 mcg, 100 mcg, Intravenous, Q1H PRN, Abstracted MD Kellen    fentaNYL injection 50 mcg, 50 mcg, Intravenous, Q1H PRN, Abstracted Order, MD, 50 mcg at 05/07/22 1707    glycopyrrolate tablet 1 mg, 1 mg, Per NG tube, TID, Sukhjinder Michael MD, 1 mg at 05/08/22 2134    haloperidoL tablet 5 mg, 5 mg, Per OG tube, BID, Abstracted Order, MD, 5 mg at 05/08/22 2132    hydrALAZINE injection 10 mg, 10 mg, Intravenous, Q2H PRN, Abstracted Order, MD    levETIRAcetam (KEPPRA) 500 mg in sodium chloride 0.9 % 100 mL IVPB (MB+), 500 mg, Intravenous, Q12H, Carroll Hammer Jr., MD, FCCP, 500 mg at 05/08/22 2132    lorazepam injection 2 mg, 2 mg, Intravenous, Q2H PRN, Max Castillo MD, 2 mg at 05/08/22 2201    metoprolol tartrate (LOPRESSOR) tablet 25 mg, 25 mg, Per OG tube, Daily, Abstracted Order, MD, 25 mg at 05/08/22 0815    morphine injection 2 mg, 2 mg, Intravenous, Q1H PRN, Abstracted MD Kellen    morphine injection 4 mg, 4 mg, Intravenous, Q4H PRN, Abstracted Order, MD    nitroGLYCERIN SL tablet 0.4 mg, 0.4 mg, Sublingual, Q5 Min PRN, Abstracted Order, MD    NORepinephrine bitartrate-NaCl 8 mg/250 mL (32 mcg/mL) infusion, 0-3 mcg/kg/min (Dosing Weight), Intravenous, Continuous, Abstracted Order, MD, Paused at 05/04/22 0600    OLANZapine injection 10 mg, 10 mg, Intramuscular, Q8H PRN, Abstracted MD Kellen    ondansetron injection 4 mg, 4 mg, Intravenous, Q4H PRN, Juancarlos Order, MD    OXcarbazepine tablet 300 mg, 300 mg, Per OG tube, BID, Abstracted Order, MD, 300 mg at 05/08/22 2132    oxyCODONE immediate release tablet 10 mg, 10 mg, Oral, Q4H PRN, Abstracted Order, MD    oxyCODONE immediate release tablet 5 mg, 5 mg, Oral, Q4H PRN, Abstracted Order, MD    potassium chloride 20 mEq in 100 mL IVPB (FOR CENTRAL LINE ADMINISTRATION ONLY), 20 mEq, Intravenous, PRN, Abstracted Order, MD    potassium chloride 20 mEq in  100 mL IVPB (FOR CENTRAL LINE ADMINISTRATION ONLY), 40 mEq, Intravenous, PRN, Juancarlos Foreman MD    potassium chloride 20 mEq in 100 mL IVPB (FOR CENTRAL LINE ADMINISTRATION ONLY), 60 mEq, Intravenous, PRN, Juancarlos Foreman MD    propofol (DIPRIVAN) 10 mg/mL infusion, 5-50 mcg/kg/min (Dosing Weight), Intravenous, Continuous, Sukhjinder Michael MD, Last Rate: 16 mL/hr at 05/09/22 0530, 35 mcg/kg/min at 05/09/22 0530    rifAMpin capsule 300 mg, 300 mg, Oral, BID, Juancarlos Foreman MD, 300 mg at 05/08/22 2132    sodium chloride 0.9% flush 10 mL, 10 mL, Intravenous, PRN, Juancarlos Foreman MD    sodium chloride 0.9% flush 10 mL, 10 mL, Intravenous, Q12H, Juancarlos Foreman MD, 10 mL at 05/08/22 2135    vancomycin (VANCOCIN) 1,250 mg in dextrose 5 % 250 mL IVPB, 1,250 mg, Intravenous, Q8H, Juancarlos Foreman MD, Last Rate: 166.7 mL/hr at 05/09/22 0629, 1,250 mg at 05/09/22 0629    ziprasidone injection 20 mg, 20 mg, Intramuscular, Q8H PRN, Juancarlos Foreman MD     Vents:  Vent Mode: A/C (05/09/22 0730)  Set Rate: 14 BPM (05/09/22 0730)  Vt Set: 450 mL (05/09/22 0730)  PEEP/CPAP: 5 cmH20 (05/09/22 0730)  Oxygen Concentration (%): 40 (05/09/22 0730)  Peak Airway Pressure: 17 cmH2O (05/09/22 0730)  Plateau Pressure: 6 cmH20 (05/06/22 0600)  Total Ve: 11.5 mL (05/09/22 0415)  F/VT Ratio<105 (RSBI): (!) 48.19 (05/09/22 0730)    Lines/Drains/Airways     Peripherally Inserted Central Catheter Line  Duration           PICC Triple Lumen 05/01/22 0523 right basilic 8 days          Drain  Duration                NG/OG Tube 05/06/22 0800 [place] sump 16 Fr. Center mouth 3 days          Airway  Duration                Airway - Non-Surgical 05/01/22 0524 Endotracheal Tube 8 days                Significant Labs:    Lab Results   Component Value Date    WBC 8.5 05/03/2022    HGB 10.6 (L) 05/03/2022    HCT 35.4 (L) 05/03/2022    MCV 89.6 05/03/2022     03/25/2022         BMP  Lab Results   Component Value Date      05/04/2022    K 4.0 05/04/2022     03/25/2022    CO2 25 05/07/2022    BUN 16.3 05/07/2022    CREATININE 0.48 (L) 05/07/2022    CALCIUM 8.0 (L) 05/07/2022    ANIONGAP 9 03/25/2022    ESTGFRAFRICA >60.0 03/25/2022    EGFRNONAA >60 05/07/2022       ABG  No results for input(s): PH, PO2, PCO2, HCO3, BE in the last 168 hours.        Significant Imaging:  No new imaging    DVT Prophylaxis: LOVENOX   GI prophylaxis: PEPCID    Assessment/Plan:        Assessment:  1. 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 MRSA Sternal wound dehiscence with debridement and wound vac 4/17.  Plastics following  3. Respiratory culture positive Klebsiella and Proteus  4. Malnutrition.  5. Respiratory failure.  Intubated 4/15     Plan  - Continue MV support.  Adjust accordingly  - Enteral tube feeds  - Pepcid and lovenox prophylaxis    I have attempted to review his labs yet again but no new results are populate Ng in the computer.  I attempted to order labs again this morning and was prompted by the EMR to indicate if I wanted them added to the samples that were drawn 3 hours ago.  There are no orders in the computer for labs drawn 3 hours ago and a phone call to the lab confirms that no labs were drawn 3 hours ago.    Trach likely to be done tomorrow     Greater than 30 minutes of critical care was time spent personally by me on the following activities: development of treatment plan with patient or surrogate and bedside caregivers, discussions with consultants, evaluation of patient's response to treatment, examination of patient, ordering and performing treatments and interventions, ordering and review of laboratory studies, ordering and     Max MARIBELL Castillo MD  Critical Care - Medicine  [facility] [address] [facility]

## 2022-05-09 NOTE — PLAN OF CARE
Chart review done - pt remains sedated and intubated. Sternal wound debrided and wound vac applied 4/17.    Plan Flap Dr. Weiss on Wed. 5/11.  Trach will be before Flap.

## 2022-05-09 NOTE — NURSING
Dr. Castillo otified due to patient's systolic BP in the 80's-90's throughout the day, MAP between 60-65. Ok, per MD. Keep MAP >60.

## 2022-05-09 NOTE — PROGRESS NOTES
PRS  Intubated and sedated  VAC in place and functional  VAC change later today with wound care  Plan for sternal reconstruction on Wed afternoon.

## 2022-05-09 NOTE — PROGRESS NOTES
Pharmacokinetic Assessment Follow Up: IV Vancomycin    Vancomycin serum concentration assessment(s):    The trough level was drawn correctly and can be used to guide therapy at this time. The measurement is within the desired definitive target range of 15 to 20 mcg/mL.    Vancomycin Regimen Plan:    Continue regimen to Vancomycin 1250 mg IV every 8 hours with next serum trough concentration measured at 0500 prior to tenth dose on 05/13.    Drug levels (last 3 results):    Vancomycin trough is 17.3 at 0503 on 05/09.  Previous vancomycin trough was 18.3 at 0500 on 05/06.    Pharmacy will continue to follow and monitor vancomycin.    Please contact pharmacy at extension [phone] for questions regarding this assessment.    Thank you for the consult,   Rachel Garcia       Patient brief summary:  Kendall Duff is a 56 y.o. male initiated on antimicrobial therapy with IV Vancomycin for treatment of skin & soft tissue infection    The patient's current regimen is 1250mg q8hr    Drug Allergies:   Review of patient's allergies indicates:   Allergen Reactions    Depakote [divalproex]     Divalproex sodium Other (See Comments)    Lithium     Lithium analogues     Quetiapine Other (See Comments)       Actual Body Weight:   79.7 kg    Renal Function:   Estimated Creatinine Clearance: 178 mL/min (A) (based on SCr of 0.48 mg/dL (L)).,     Dialysis Method (if applicable):  N/A    CBC (last 72 hours):  No results for input(s): WHITE BLOOD CELL COUNT, HEMOGLOBIN, HEMATOCRIT, PLATELETS, GRAN%, LYMPH%, MONO%, EOSINOPHIL%, BASOPHIL%, DIFFERENTIAL METHOD in the last 72 hours.    Metabolic Panel (last 72 hours):  Recent Labs   Lab Result Units 05/07/22  1025 05/07/22  1038   Sodium Level mmol/L 138  --    SODIUM (RESP. THERAPY)   --  135   Potassium Level mmol/L 4.0  --    POTASSIUM (RESP. THERAPY)   --  4.0   Chloride mmol/L 105  --    Carbon Dioxide mmol/L 25  --    CO2 TOTAL   --  27.1   Glucose Level mg/dL 131*  --    Blood Urea Nitrogen  mg/dL 16.3  --    Creatinine mg/dL 0.48*  --        Vancomycin Administrations:  vancomycin given in the last 96 hours                     vancomycin (VANCOCIN) 1,250 mg in dextrose 5 % 250 mL IVPB (mg) 1,250 mg New Bag 05/09/22 0629     1,250 mg New Bag 05/08/22 2306     1,250 mg New Bag  1407     1,250 mg New Bag 05/07/22 2153     1,250 mg New Bag  1607     1,250 mg New Bag  0629     1,250 mg New Bag 05/06/22 2216     1,250 mg New Bag  1454     1,250 mg New Bag  0558     1,250 mg New Bag 05/05/22 2143     1,250 mg New Bag  1457                    Microbiologic Results:  Microbiology Results (last 7 days)       ** No results found for the last 168 hours. **

## 2022-05-10 LAB
HCO3 ARTERIAL: 26 MMOL/L (ref 18–23)
INR BLD: 1.17 (ref 0–1.3)
PROTHROMBIN TIME: 14.6 SECONDS (ref 12.5–14.5)
SARS-COV-2 RDRP RESP QL NAA+PROBE: NOT DETECTED

## 2022-05-10 PROCEDURE — 94003 VENT MGMT INPAT SUBQ DAY: CPT

## 2022-05-10 PROCEDURE — 99900035 HC TECH TIME PER 15 MIN (STAT)

## 2022-05-10 PROCEDURE — A4216 STERILE WATER/SALINE, 10 ML: HCPCS

## 2022-05-10 PROCEDURE — 94761 N-INVAS EAR/PLS OXIMETRY MLT: CPT

## 2022-05-10 PROCEDURE — 27201112

## 2022-05-10 PROCEDURE — 99900026 HC AIRWAY MAINTENANCE (STAT)

## 2022-05-10 PROCEDURE — 25000003 PHARM REV CODE 250: Performed by: INTERNAL MEDICINE

## 2022-05-10 PROCEDURE — 36415 COLL VENOUS BLD VENIPUNCTURE: CPT | Performed by: INTERNAL MEDICINE

## 2022-05-10 PROCEDURE — 99231 SBSQ HOSP IP/OBS SF/LOW 25: CPT | Mod: ,,, | Performed by: SURGERY

## 2022-05-10 PROCEDURE — 63600175 PHARM REV CODE 636 W HCPCS: Performed by: INTERNAL MEDICINE

## 2022-05-10 PROCEDURE — 25000003 PHARM REV CODE 250

## 2022-05-10 PROCEDURE — 27000221 HC OXYGEN, UP TO 24 HOURS

## 2022-05-10 PROCEDURE — 25000003 PHARM REV CODE 250: Performed by: HOSPITALIST

## 2022-05-10 PROCEDURE — 43246 EGD PLACE GASTROSTOMY TUBE: CPT | Performed by: INTERNAL MEDICINE

## 2022-05-10 PROCEDURE — 63600175 PHARM REV CODE 636 W HCPCS: Performed by: HOSPITALIST

## 2022-05-10 PROCEDURE — 31622 DX BRONCHOSCOPE/WASH: CPT

## 2022-05-10 PROCEDURE — 99900025 HC BRONCHOSCOPY-ASST (STAT)

## 2022-05-10 PROCEDURE — 27200966 HC CLOSED SUCTION SYSTEM

## 2022-05-10 PROCEDURE — 36600 WITHDRAWAL OF ARTERIAL BLOOD: CPT

## 2022-05-10 PROCEDURE — 20000000 HC ICU ROOM

## 2022-05-10 PROCEDURE — 87635 SARS-COV-2 COVID-19 AMP PRB: CPT | Performed by: INTERNAL MEDICINE

## 2022-05-10 PROCEDURE — 63600175 PHARM REV CODE 636 W HCPCS

## 2022-05-10 PROCEDURE — 85610 PROTHROMBIN TIME: CPT | Performed by: INTERNAL MEDICINE

## 2022-05-10 PROCEDURE — 99231 PR SUBSEQUENT HOSPITAL CARE,LEVL I: ICD-10-PCS | Mod: ,,, | Performed by: SURGERY

## 2022-05-10 RX ORDER — CISATRACURIUM BESYLATE 2 MG/ML
20 INJECTION, SOLUTION INTRAVENOUS ONCE
Status: COMPLETED | OUTPATIENT
Start: 2022-05-10 | End: 2022-05-10

## 2022-05-10 RX ORDER — MORPHINE SULFATE 10 MG/ML
10 INJECTION INTRAMUSCULAR; INTRAVENOUS; SUBCUTANEOUS ONCE
Status: COMPLETED | OUTPATIENT
Start: 2022-05-10 | End: 2022-05-10

## 2022-05-10 RX ORDER — MIDAZOLAM HYDROCHLORIDE 5 MG/ML
4 INJECTION INTRAMUSCULAR; INTRAVENOUS ONCE
Status: COMPLETED | OUTPATIENT
Start: 2022-05-10 | End: 2022-05-10

## 2022-05-10 RX ADMIN — GLYCOPYRROLATE 1 MG: 1 TABLET ORAL at 04:05

## 2022-05-10 RX ADMIN — MORPHINE SULFATE 5 MG: 10 INJECTION INTRAVENOUS at 11:05

## 2022-05-10 RX ADMIN — FAMOTIDINE 20 MG: 10 INJECTION, SOLUTION INTRAVENOUS at 08:05

## 2022-05-10 RX ADMIN — DEXMEDETOMIDINE HYDROCHLORIDE 1.2 MCG/KG/HR: 400 INJECTION INTRAVENOUS at 04:05

## 2022-05-10 RX ADMIN — PROPOFOL 30 MCG/KG/MIN: 10 INJECTION, EMULSION INTRAVENOUS at 03:05

## 2022-05-10 RX ADMIN — CISATRACURIUM BESYLATE 20 MG: 2 INJECTION INTRAVENOUS at 11:05

## 2022-05-10 RX ADMIN — EZETIMIBE 10 MG: 10 TABLET ORAL at 08:05

## 2022-05-10 RX ADMIN — BENZTROPINE MESYLATE 1 MG: 1 TABLET ORAL at 08:05

## 2022-05-10 RX ADMIN — PROPOFOL 35 MCG/KG/MIN: 10 INJECTION, EMULSION INTRAVENOUS at 01:05

## 2022-05-10 RX ADMIN — LEVETIRACETAM 500 MG: 100 INJECTION, SOLUTION INTRAVENOUS at 08:05

## 2022-05-10 RX ADMIN — VANCOMYCIN HYDROCHLORIDE 1250 MG: 1.25 INJECTION, POWDER, LYOPHILIZED, FOR SOLUTION INTRAVENOUS at 03:05

## 2022-05-10 RX ADMIN — HALOPERIDOL 5 MG: 5 TABLET ORAL at 08:05

## 2022-05-10 RX ADMIN — DEXMEDETOMIDINE HYDROCHLORIDE 1.2 MCG/KG/HR: 400 INJECTION INTRAVENOUS at 08:05

## 2022-05-10 RX ADMIN — OXCARBAZEPINE 300 MG: 300 TABLET, FILM COATED ORAL at 08:05

## 2022-05-10 RX ADMIN — RIFAMPIN 300 MG: 300 CAPSULE ORAL at 08:05

## 2022-05-10 RX ADMIN — GLYCOPYRROLATE 1 MG: 1 TABLET ORAL at 08:05

## 2022-05-10 RX ADMIN — DEXMEDETOMIDINE HYDROCHLORIDE 0.9 MCG/KG/HR: 400 INJECTION INTRAVENOUS at 01:05

## 2022-05-10 RX ADMIN — MIDAZOLAM HYDROCHLORIDE 2 MG: 5 INJECTION, SOLUTION INTRAMUSCULAR; INTRAVENOUS at 11:05

## 2022-05-10 RX ADMIN — PROPOFOL 30 MCG/KG/MIN: 10 INJECTION, EMULSION INTRAVENOUS at 08:05

## 2022-05-10 RX ADMIN — GLYCOPYRROLATE 1 MG: 1 TABLET ORAL at 09:05

## 2022-05-10 RX ADMIN — CEFTRIAXONE 2 G: 2 INJECTION, SOLUTION INTRAVENOUS at 04:05

## 2022-05-10 RX ADMIN — DEXMEDETOMIDINE HYDROCHLORIDE 1.2 MCG/KG/HR: 400 INJECTION INTRAVENOUS at 11:05

## 2022-05-10 RX ADMIN — SODIUM CHLORIDE, PRESERVATIVE FREE 10 ML: 5 INJECTION INTRAVENOUS at 08:05

## 2022-05-10 RX ADMIN — ATORVASTATIN CALCIUM 80 MG: 40 TABLET, FILM COATED ORAL at 08:05

## 2022-05-10 RX ADMIN — DOXEPIN HYDROCHLORIDE 50 MG: 50 CAPSULE ORAL at 08:05

## 2022-05-10 RX ADMIN — DEXMEDETOMIDINE HYDROCHLORIDE 1.2 MCG/KG/HR: 400 INJECTION INTRAVENOUS at 03:05

## 2022-05-10 RX ADMIN — VANCOMYCIN HYDROCHLORIDE 1250 MG: 1.25 INJECTION, POWDER, LYOPHILIZED, FOR SOLUTION INTRAVENOUS at 06:05

## 2022-05-10 RX ADMIN — PROPOFOL 30 MCG/KG/MIN: 10 INJECTION, EMULSION INTRAVENOUS at 04:05

## 2022-05-10 RX ADMIN — VANCOMYCIN HYDROCHLORIDE 1250 MG: 1.25 INJECTION, POWDER, LYOPHILIZED, FOR SOLUTION INTRAVENOUS at 09:05

## 2022-05-10 NOTE — [employer]OVATION PATIENT INSTRUCTIONS
Discharge Summary/Instructions after an Endoscopic Procedure  Patient Name: Kendall Duff  Patient MRN: [id number]  Patient YOB: 1965  Tuesday, May 10, 2022  Ravindra Tineo MD  Dear patient,  As a result of recent federal legislation (The Federal Cures Act), you may   receive lab or pathology results from your procedure in your [facility]   account before your physician is able to contact you. Your physician or   their representative will relay the results to you with their   recommendations at their soonest availability.  Thank you,  RESTRICTIONS:  During your procedure today, you received medications for sedation.  These   medications may affect your judgment, balance and coordination.  Therefore,   for 24 hours, you have the following restrictions:   - DO NOT drive a car, operate machinery, make legal/financial decisions,   sign important papers or drink alcohol.    ACTIVITY:  Today: no heavy lifting, straining or running due to procedural   sedation/anesthesia.  The following day: return to full activity including work.  DIET:  Eat and drink normally unless instructed otherwise.     TREATMENT FOR COMMON SIDE EFFECTS:  - Mild abdominal pain, nausea, belching, bloating or excessive gas:  rest,   eat lightly and use a heating pad.  - Sore Throat: treat with throat lozenges and/or gargle with warm salt   water.  - Because air was used during the procedure, expelling large amounts of air   from your rectum or belching is normal.  - If a bowel prep was taken, you may not have a bowel movement for 1-3 days.    This is normal.  SYMPTOMS TO WATCH FOR AND REPORT TO YOUR PHYSICIAN:  1. Abdominal pain or bloating, other than gas cramps.  2. Chest pain.  3. Back pain.  4. Signs of infection such as: chills or fever occurring within 24 hours   after the procedure.  5. Rectal bleeding, which would show as bright red, maroon, or black stools.   (A tablespoon of blood from the rectum is not serious, especially  if   hemorrhoids are present.)  6. Vomiting.  7. Weakness or dizziness.  GO DIRECTLY TO THE NEAREST EMERGENCY ROOM IF YOU HAVE ANY OF THE FOLLOWING:      Difficulty breathing              Chills and/or fever over 101 F   Persistent vomiting and/or vomiting blood   Severe abdominal pain   Severe chest pain   Black, tarry stools   Bleeding- more than one tablespoon   Any other symptom or condition that you feel may need urgent attention  Your doctor recommends these additional instructions:  If any biopsies were taken, your doctors clinic will contact you in 1 to 2   weeks with any results.  ok for water and meds today  ok for feeds in am  will check on him tomorrow.  For questions, problems or results please call your physician - Ravindra Tineo MD at Work:  [phone].  [facility] EMERGENCY ROOM PHONE NUMBER: [phone]  IF A COMPLICATION OR EMERGENCY SITUATION ARISES AND YOU ARE UNABLE TO REACH   YOUR PHYSICIAN - GO DIRECTLY TO THE EMERGENCY ROOM.  MD Ravindra Streeter MD  5/10/2022 4:08:47 PM  This report has been verified and signed electronically.  Dear patient,  As a result of recent federal legislation (The Federal Cures Act), you may   receive lab or pathology results from your procedure in your [facility]   account before your physician is able to contact you. Your physician or   their representative will relay the results to you with their   recommendations at their soonest availability.  Thank you,  PROVATION

## 2022-05-10 NOTE — PROGRESS NOTES
[facility] [address] ICU  Critical Care - Medicine  Progress Note    Patient Name: Kendall Duff  MRN: [id number]  Admission Date: 4/1/2022  Hospital Length of Stay: 39 days  Code Status: Prior  Attending Provider: Max Castillo MD  Primary Care Provider: Primary Doctor No   Principal Problem: <principal problem not specified>    Subjective:     HPI:  55 yo admitted 4/1 and underwent CABG x 4 on 4/6.  Hx of schizophrenia and BPAD       Hospital/ICU Course: Developed MRSA sternal wound. Intubated 4/15.   Underwent debridement with wound vac on 4/17.  Sputum culture from 4/26 with Klebsiella and proteus    Interval History/Significant Events:  No change in condition overnight.  Blood pressure at times runs slightly low but adequate.  Tongue remains quite swollen.  He is to undergo flap tomorrow.  We are performing tracheostomy shortly.  ABG was done earlier to reassess acid-base status and things look nominal.    Objective:     Vital Signs (Most Recent):  Temp: 98.6 °F (37 °C) (05/10/22 0400)  Pulse: (!) 56 (05/10/22 0900)  Resp: 12 (05/10/22 0900)  BP: 107/77 (05/10/22 0900)  SpO2: 99 % (05/10/22 0900) Vital Signs (24h Range):  Temp:  [97 °F (36.1 °C)-98.8 °F (37.1 °C)] 98.6 °F (37 °C)  Pulse:  [55-81] 56  Resp:  [6-31] 12  SpO2:  [92 %-100 %] 99 %  BP: ()/(56-98) 107/77     Weight: 78.6 kg (173 lb 4.5 oz)  Body mass index is 24.26 kg/m².      Intake/Output Summary (Last 24 hours) at 5/10/2022 1118  Last data filed at 5/10/2022 0500  Gross per 24 hour   Intake 1674.24 ml   Output 800 ml   Net 874.24 ml        Physical Exam  Vitals reviewed.   Constitutional:       General: He is not in acute distress.     Appearance: He is not toxic-appearing.      Comments: Intubated and sedated   HENT:      Head: Normocephalic and atraumatic.      Mouth/Throat:      Comments: Tongue with some persistent swelling  Eyes:      Extraocular Movements: Extraocular movements intact.      Pupils: Pupils are equal,  round, and reactive to light.   Cardiovascular:      Rate and Rhythm: Normal rate and regular rhythm.      Pulses: Normal pulses.      Heart sounds: No murmur heard.    No gallop.   Pulmonary:      Effort: No respiratory distress.      Breath sounds: No stridor. No wheezing, rhonchi or rales.   Abdominal:      General: Abdomen is flat.      Palpations: Abdomen is soft.   Musculoskeletal:         General: No swelling or deformity.      Left lower leg: No edema.   Skin:     General: Skin is warm.      Coloration: Skin is not jaundiced.      Findings: No bruising.   Neurological:      Comments: Patient sedated and therefore unable to accurately exam           Current Facility-Administered Medications:     acetaminophen tablet 650 mg, 650 mg, Oral, Q4H PRN, Juancarlos Foreman MD, 650 mg at 05/08/22 0928    albuterol-ipratropium 2.5 mg-0.5 mg/3 mL nebulizer solution 3 mL, 3 mL, Nebulization, Q12H PRN, Juancarlos Foreman MD    atorvastatin tablet 80 mg, 80 mg, Oral, Daily, Juancarlos Foreman MD, 80 mg at 05/10/22 0834    benztropine tablet 1 mg, 1 mg, Per OG tube, BID, Juancarlos Foreman MD, 1 mg at 05/10/22 0833    bisacodyL suppository 10 mg, 10 mg, Rectal, Daily PRN, Juancarlos Foreman MD    cefTRIAXone 2 gram/50 mL IVPB 2 g, 2 g, Intravenous, Q24H, Carroll Hammer Jr., MD, FCCP, Stopped at 05/10/22 0456    cisatracurium injection 20 mg, 20 mg, Intravenous, Once, Max Castillo MD    dexmedetomidine (PRECEDEX) 400mcg/100mL 0.9% NaCL infusion, 0-1.4 mcg/kg/hr, Intravenous, Continuous, Sukhjinder Michael MD, Last Rate: 22.8 mL/hr at 05/10/22 0837, 1.2 mcg/kg/hr at 05/10/22 0837    dextrose 10% bolus 250 mL, 250 mL, Intravenous, Once PRN, Juancarlos Foreman MD    doxepin capsule 50 mg, 50 mg, Oral, QHS, Juancarlos Foreman MD, 50 mg at 05/09/22 2039    enoxaparin injection 40 mg, 40 mg, Subcutaneous, Daily, Juancarlos Foreman MD, 40 mg at 05/09/22 0843    ezetimibe tablet 10 mg, 10 mg, Oral, QHS, Abstracted Order, MD,  10 mg at 05/09/22 2040    famotidine (PF) injection 20 mg, 20 mg, Intravenous, BID, Juancarlos Foreman MD, 20 mg at 05/10/22 0836    fentaNYL injection 100 mcg, 100 mcg, Intravenous, Q1H PRN, Juancarlos Foreman MD    fentaNYL injection 50 mcg, 50 mcg, Intravenous, Q1H PRN, Juancarlos Foreman MD, 50 mcg at 05/07/22 1707    glycopyrrolate tablet 1 mg, 1 mg, Per NG tube, TID, Sukhjinder Michael MD, 1 mg at 05/10/22 0836    haloperidoL tablet 5 mg, 5 mg, Per OG tube, BID, Abstracted MD Kellen, 5 mg at 05/10/22 0834    hydrALAZINE injection 10 mg, 10 mg, Intravenous, Q2H PRN, Juancarlos Foreman MD    levETIRAcetam (KEPPRA) 500 mg in sodium chloride 0.9 % 100 mL IVPB (MB+), 500 mg, Intravenous, Q12H, Carroll Hammer Jr., MD, FCCP, 500 mg at 05/10/22 0833    lorazepam injection 2 mg, 2 mg, Intravenous, Q2H PRN, Max Castillo MD, 2 mg at 05/09/22 2036    metoprolol tartrate (LOPRESSOR) tablet 25 mg, 25 mg, Per OG tube, Daily, Juancarlos Foreman MD, 25 mg at 05/08/22 0815    midazolam (VERSED) 5 mg/mL injection 4 mg, 4 mg, Intravenous, Once, Max Castillo MD    morphine injection 10 mg, 10 mg, Intravenous, Once, Max Castillo MD    morphine injection 2 mg, 2 mg, Intravenous, Q1H PRN, Juancarlos Foreman MD    morphine injection 4 mg, 4 mg, Intravenous, Q4H PRN, Juancarlos Foreman MD    nitroGLYCERIN SL tablet 0.4 mg, 0.4 mg, Sublingual, Q5 Min PRN, Juancarlos Foreman MD    NORepinephrine bitartrate-NaCl 8 mg/250 mL (32 mcg/mL) infusion, 0-3 mcg/kg/min (Dosing Weight), Intravenous, Continuous, Juancarlos Foreman MD, Paused at 05/04/22 0600    OLANZapine injection 10 mg, 10 mg, Intramuscular, Q8H PRN, Juancarlos Foreman MD    ondansetron injection 4 mg, 4 mg, Intravenous, Q4H PRN, Abstracted Order, MD    OXcarbazepine tablet 300 mg, 300 mg, Per OG tube, BID, Abstracted Order, MD, 300 mg at 05/10/22 0834    oxyCODONE immediate release tablet 10 mg, 10 mg, Oral, Q4H PRN, Abstracted Order, MD    oxyCODONE  immediate release tablet 5 mg, 5 mg, Oral, Q4H PRN, Juancarlos Foreman, MD    potassium chloride 20 mEq in 100 mL IVPB (FOR CENTRAL LINE ADMINISTRATION ONLY), 20 mEq, Intravenous, PRN, Juancarlos Foreman, MD    potassium chloride 20 mEq in 100 mL IVPB (FOR CENTRAL LINE ADMINISTRATION ONLY), 40 mEq, Intravenous, PRN, Juancarlos Foreman, MD    potassium chloride 20 mEq in 100 mL IVPB (FOR CENTRAL LINE ADMINISTRATION ONLY), 60 mEq, Intravenous, PRN, Juancarlos Foreman MD    propofol (DIPRIVAN) 10 mg/mL infusion, 5-50 mcg/kg/min (Dosing Weight), Intravenous, Continuous, Sukhjinder Michael MD, Last Rate: 13.7 mL/hr at 05/10/22 0836, 30 mcg/kg/min at 05/10/22 0836    rifAMpin capsule 300 mg, 300 mg, Oral, BID, Abstracted MD Kellen, 300 mg at 05/10/22 0834    sodium chloride 0.9% flush 10 mL, 10 mL, Intravenous, PRN, Juancarlos Foreman MD    sodium chloride 0.9% flush 10 mL, 10 mL, Intravenous, Q12H, Juancarlos Foreman MD, 10 mL at 05/09/22 2041    vancomycin (VANCOCIN) 1,250 mg in dextrose 5 % 250 mL IVPB, 1,250 mg, Intravenous, Q8H, Juancarlos Foreman MD, Stopped at 05/10/22 0737    ziprasidone injection 20 mg, 20 mg, Intramuscular, Q8H PRN, Juancarlos Foreman MD     Vents:  Vent Mode: PRVC A/C (05/10/22 0443)  Set Rate: 14 BPM (05/10/22 0806)  Vt Set: 450 mL (05/10/22 0443)  PEEP/CPAP: 5 cmH20 (05/10/22 0443)  Oxygen Concentration (%): 40 (05/10/22 0900)  Peak Airway Pressure: 20 cmH2O (05/10/22 0443)  Plateau Pressure: 6 cmH20 (05/06/22 0600)  Total Ve: 9.5 mL (05/10/22 0443)  F/VT Ratio<105 (RSBI): (!) 43.91 (05/10/22 0443)    Lines/Drains/Airways     Peripherally Inserted Central Catheter Line  Duration           PICC Triple Lumen 05/01/22 0523 right basilic 9 days          Drain  Duration                NG/OG Tube 05/06/22 0800 Titus sump 16 Fr. Center mouth 4 days          Airway  Duration                Airway - Non-Surgical 05/01/22 0524 Endotracheal Tube 9 days                Significant Labs:    Lab Results    Component Value Date    WBC 6.7 05/09/2022    HGB 10.1 (L) 05/09/2022    HCT 32.0 (L) 05/09/2022    MCV 85.6 05/09/2022     03/25/2022         BMP  Lab Results   Component Value Date     05/04/2022    K 4.0 05/04/2022     03/25/2022    CO2 26 05/09/2022    BUN 19.8 05/09/2022    CREATININE 0.53 (L) 05/09/2022    CALCIUM 8.2 (L) 05/09/2022    ANIONGAP 9 03/25/2022    ESTGFRAFRICA >60.0 03/25/2022    EGFRNONAA >60 05/09/2022       ABG  Recent Labs   Lab 05/10/22  0927   PH 7.41   PO2 105*   PCO2 41           Significant Imaging:  No new imaging    DVT Prophylaxis: LOVENOX   GI prophylaxis: PEPCID    Assessment/Plan:        Assessment:  1. CAD with 4 vessel CABG 4/6  2. MRSA Sternal wound dehiscence with debridement and wound vac 4/17.  Plastics following  3. Respiratory culture positive Klebsiella and Proteus  4. Malnutrition.  5. Respiratory failure.  Intubated 4/15     Plan  - Continue MV support.  Adjust accordingly  - Enteral tube feeds  - Pepcid and lovenox prophylaxis will resume immediately following trach  - tracheostomy to be performed shortly followed by flap by plastics tomorrow  - plan for aggressive mechanical ventilatory weaning following flap    Critical care diagnosis:  Respiratory failure  Critical care interventions:  Direct hands-on care of the patient, review of lab and physiologic parameters  Critical care time:  31 minutes personally excluding any procedural time       Max Castillo MD  Critical Care - Medicine  [facility] [address] [facility]

## 2022-05-10 NOTE — OP NOTE
[facility]                       [address] [id number]    PATIENT NAME:      NITESH BHANDARI   YOB: 1965  CSN:               [id number]  MRN:               [id number]  ADMIT DATE:        04/01/2022 12:44:00  PHYSICIAN:         Max Castillo MD                          OPERATIVE REPORT      DATE OF SURGERY:    05/10/2022 00:00:00    SURGEON:  Max Castillo MD    PROCEDURE:  Percutaneous dilatational tracheostomy.    INDICATIONS:  Respiratory failure.    FINAL DIAGNOSIS:  Respiratory failure.    Consent was obtained verbally from family.  Sedation was existing propofol   infusion with the addition of 5 mg of morphine IV, 2 mg of Versed IV, and 20 mg   of Nimbex, or neuromuscular blockade.  The procedure was performed by myself   with the assistance of Dr. Jarrod Ramires, who performed a therapeutic   bronchoscopy for removal of secretions and to aid in the visualization of the   trachea.  Please see his note dictated separately.    DESCRIPTION OF PROCEDURE:  After the patient was adequately sedated and   paralyzed, he was placed on 100% Fi02 and the vent rate was increased.  Dr. Ramires performed his therapeutic bronchoscopy, please see his note at that time.    He then provided visualization so that I was able to puncture the midline on   the trachea between the 1st and 2nd tracheal rings and pass a finder needle in   place.  I then passed a larger needle with catheter in the same location.  I   removed the needles, leaving the catheter in place.  Guidewire was then passed   and visualized moving distally and caudally.  Angiocath was then removed over   the guidewire.  A small nick was made in the skin at the guidewire entry point,   and the small blue dilator was passed until it was visualized entering the   tracheal lumen over the guidewire.  It was then removed and the larger Blue   Rhino tapered  dilator was advanced until the sizing william was visualized within   the tracheal lumen.  It was then withdrawn and an 8.0 percutaneous trach was   passed over the guidewire into position and then the guidewire assembly was   removed.  The scope was then quickly passed via the lumen of the new trach to   confirm its positioning, which was appropriate.  Inner cannula was then placed   and the patient was ventilated.  I then sutured the trach in place with four   simple sutures located one at each of the four corners of the trach face plate.    Patient tolerated the procedure well.  No immediate complications.  Estimated   blood loss was less than 5 cc.  Chest x-ray is pending.        ______________________________  MD BECKY Bell/AQS  DD:  05/10/2022  Time:  11:57AM  DT:  05/10/2022  Time:  01:26PM  Job #:  [id number]      OPERATIVE REPORT

## 2022-05-10 NOTE — PROGRESS NOTES
PRS  Intubated and sedated, trach planned for later today  AFVSS  VAC in place and functional  Recon of sternum planned for tomorrow afternoon.  NPO.  Hold Lovenox tomorrow.

## 2022-05-10 NOTE — PROCEDURES
[facility] [address]  Bronchoscopy   Procedure Note    SUMMARY     Date of Procedure: 5/10/2022    Procedure: Bronchoscopy, Diagnostic  Bronchoscopy, Therapeutic    Provider: Jarrod Ramires MD    Pre-Procedure Diagnosis:  Bronchoscopy for percutaneous tracheostomy    Post-Procedure Diagnosis:  Same    Indication:  Airway clearance and survey while performing percutaneous tracheostomy    Anesthesia: General Anesthesia, see MAR for neuromuscular blockade and sedation use        Description of the Findings of the Procedure:     After consent was obtained from the appropriate legal guardian the bronchocope was inserted into the main airway via the endotracheal tube. An anatomical survey was done of the main airways and the subsegmental bronchus.  There is mucous plugging seen in the endotracheal tube as well as at the level the left mainstem bronchus which was easily aspirated back with full survey of the trachea judson left and right mainstem bronchi visualized to be widely patent without any signs of mucosal abnormalities or obstruction, all subsegments of the left lung and right lung were performed demonstrating widely patent airways without any signs of mucosal abnormalities or obstructions.  The bronchoscope was then withdrawn back to the level of the endotracheal tube in the trachea with visualization of the distal trachea performed while the endotracheal tube was pulled back to above the 1st tracheal ring for performance of the percutaneous tracheostomy.  A percutaneous tracheostomy procedure was performed by my partner Dr. Castillo.  Please see his note for further details    Significant Surgical Tasks Conducted by the Assistant(s), if Applicable:  Dr. Max Castillo    Complications: None; patient tolerated the procedure well.    Estimated Blood Loss (EBL): Minimal    Condition: Good        Disposition: ICU - intubated and hemodynamically stable.    Attestation: I performed the  procedure.       Jarrod Ramires M.D.  Pulmonology/Critical Care

## 2022-05-11 ENCOUNTER — ANESTHESIA EVENT (OUTPATIENT)
Dept: SURGERY | Facility: HOSPITAL | Age: 57
DRG: 003 | End: 2022-05-11
Payer: MEDICAID

## 2022-05-11 ENCOUNTER — ANESTHESIA (OUTPATIENT)
Dept: SURGERY | Facility: HOSPITAL | Age: 57
DRG: 003 | End: 2022-05-11
Payer: MEDICAID

## 2022-05-11 LAB
ABO + RH BLD: NORMAL
ABO + RH BLD: NORMAL
BLD PROD TYP BPU: NORMAL
BLD PROD TYP BPU: NORMAL
BLOOD UNIT EXPIRATION DATE: NORMAL
BLOOD UNIT EXPIRATION DATE: NORMAL
BLOOD UNIT TYPE CODE: 5100
BLOOD UNIT TYPE CODE: 5100
CROSSMATCH INTERPRETATION: NORMAL
CROSSMATCH INTERPRETATION: NORMAL
DISPENSE STATUS: NORMAL
DISPENSE STATUS: NORMAL
GROUP & RH: NORMAL
INDIRECT COOMBS GEL: NORMAL
UNIT NUMBER: NORMAL
UNIT NUMBER: NORMAL

## 2022-05-11 PROCEDURE — 25000003 PHARM REV CODE 250: Performed by: HOSPITALIST

## 2022-05-11 PROCEDURE — 27201423 OPTIME MED/SURG SUP & DEVICES STERILE SUPPLY: Performed by: SURGERY

## 2022-05-11 PROCEDURE — 27100171 HC OXYGEN HIGH FLOW UP TO 24 HOURS

## 2022-05-11 PROCEDURE — 36000708 HC OR TIME LEV III 1ST 15 MIN: Performed by: SURGERY

## 2022-05-11 PROCEDURE — 63600175 PHARM REV CODE 636 W HCPCS: Performed by: HOSPITALIST

## 2022-05-11 PROCEDURE — 94761 N-INVAS EAR/PLS OXIMETRY MLT: CPT

## 2022-05-11 PROCEDURE — P9016 RBC LEUKOCYTES REDUCED: HCPCS | Performed by: INTERNAL MEDICINE

## 2022-05-11 PROCEDURE — 15002 PR WOUND PREP, PED, TRK/ARM/LG 1ST 100 CM: ICD-10-PCS | Mod: 59,,, | Performed by: SURGERY

## 2022-05-11 PROCEDURE — 36000709 HC OR TIME LEV III EA ADD 15 MIN: Performed by: SURGERY

## 2022-05-11 PROCEDURE — 25000003 PHARM REV CODE 250

## 2022-05-11 PROCEDURE — 15003 WOUND PREP ADDL 100 CM: CPT | Mod: 59,,, | Performed by: SURGERY

## 2022-05-11 PROCEDURE — 15002 WOUND PREP TRK/ARM/LEG: CPT | Mod: 59,,, | Performed by: SURGERY

## 2022-05-11 PROCEDURE — 87205 SMEAR GRAM STAIN: CPT | Performed by: SURGERY

## 2022-05-11 PROCEDURE — C1729 CATH, DRAINAGE: HCPCS | Performed by: SURGERY

## 2022-05-11 PROCEDURE — 86850 RBC ANTIBODY SCREEN: CPT | Performed by: SURGERY

## 2022-05-11 PROCEDURE — A4216 STERILE WATER/SALINE, 10 ML: HCPCS

## 2022-05-11 PROCEDURE — 87070 CULTURE OTHR SPECIMN AEROBIC: CPT | Performed by: SURGERY

## 2022-05-11 PROCEDURE — 15003 PR WOUND PREP, PED, TRK/ARM/LG ADDL 100 CM: ICD-10-PCS | Mod: 59,,, | Performed by: SURGERY

## 2022-05-11 PROCEDURE — 87102 FUNGUS ISOLATION CULTURE: CPT | Performed by: SURGERY

## 2022-05-11 PROCEDURE — 37000008 HC ANESTHESIA 1ST 15 MINUTES: Performed by: SURGERY

## 2022-05-11 PROCEDURE — 99900026 HC AIRWAY MAINTENANCE (STAT)

## 2022-05-11 PROCEDURE — 37000009 HC ANESTHESIA EA ADD 15 MINS: Performed by: SURGERY

## 2022-05-11 PROCEDURE — 97606 NEG PRS WND THER DME>50 SQCM: CPT

## 2022-05-11 PROCEDURE — 15734 MUSCLE-SKIN GRAFT TRUNK: CPT | Mod: ,,, | Performed by: SURGERY

## 2022-05-11 PROCEDURE — 87206 SMEAR FLUORESCENT/ACID STAI: CPT | Performed by: SURGERY

## 2022-05-11 PROCEDURE — 20000000 HC ICU ROOM

## 2022-05-11 PROCEDURE — 63600175 PHARM REV CODE 636 W HCPCS: Performed by: INTERNAL MEDICINE

## 2022-05-11 PROCEDURE — 25000003 PHARM REV CODE 250: Performed by: INTERNAL MEDICINE

## 2022-05-11 PROCEDURE — 86920 COMPATIBILITY TEST SPIN: CPT | Performed by: INTERNAL MEDICINE

## 2022-05-11 PROCEDURE — 63600175 PHARM REV CODE 636 W HCPCS: Performed by: NURSE ANESTHETIST, CERTIFIED REGISTERED

## 2022-05-11 PROCEDURE — 63600175 PHARM REV CODE 636 W HCPCS

## 2022-05-11 PROCEDURE — 63600175 PHARM REV CODE 636 W HCPCS: Mod: JG | Performed by: SURGERY

## 2022-05-11 PROCEDURE — 87075 CULTR BACTERIA EXCEPT BLOOD: CPT | Performed by: SURGERY

## 2022-05-11 PROCEDURE — 27200966 HC CLOSED SUCTION SYSTEM

## 2022-05-11 PROCEDURE — 36415 COLL VENOUS BLD VENIPUNCTURE: CPT | Performed by: SURGERY

## 2022-05-11 PROCEDURE — 94003 VENT MGMT INPAT SUBQ DAY: CPT

## 2022-05-11 PROCEDURE — 15734 PR MUSCLE-SKIN FLAP,TRUNK: ICD-10-PCS | Mod: ,,, | Performed by: SURGERY

## 2022-05-11 DEVICE — IMPLANTABLE DEVICE: Type: IMPLANTABLE DEVICE | Site: CHEST | Status: FUNCTIONAL

## 2022-05-11 RX ORDER — HEPARIN 100 UNIT/ML
SYRINGE INTRAVENOUS
Status: DISPENSED
Start: 2022-05-11 | End: 2022-05-11

## 2022-05-11 RX ORDER — METHYLENE BLUE 5 MG/ML
INJECTION INTRAVENOUS
Status: DISCONTINUED | OUTPATIENT
Start: 2022-05-11 | End: 2022-05-11 | Stop reason: HOSPADM

## 2022-05-11 RX ORDER — MIDAZOLAM HYDROCHLORIDE 1 MG/ML
INJECTION INTRAMUSCULAR; INTRAVENOUS
Status: DISCONTINUED | OUTPATIENT
Start: 2022-05-11 | End: 2022-05-11

## 2022-05-11 RX ORDER — LIDOCAINE HYDROCHLORIDE 20 MG/ML
INJECTION, SOLUTION EPIDURAL; INFILTRATION; INTRACAUDAL; PERINEURAL
Status: DISPENSED
Start: 2022-05-11 | End: 2022-05-11

## 2022-05-11 RX ORDER — PROPOFOL 10 MG/ML
VIAL (ML) INTRAVENOUS
Status: DISCONTINUED | OUTPATIENT
Start: 2022-05-11 | End: 2022-05-11

## 2022-05-11 RX ORDER — ENOXAPARIN SODIUM 100 MG/ML
1 INJECTION SUBCUTANEOUS
Status: DISCONTINUED | OUTPATIENT
Start: 2022-05-11 | End: 2022-05-20

## 2022-05-11 RX ORDER — HYDROCODONE BITARTRATE AND ACETAMINOPHEN 500; 5 MG/1; MG/1
TABLET ORAL
Status: DISCONTINUED | OUTPATIENT
Start: 2022-05-11 | End: 2022-06-10

## 2022-05-11 RX ORDER — FENTANYL CITRATE 50 UG/ML
INJECTION, SOLUTION INTRAMUSCULAR; INTRAVENOUS
Status: DISCONTINUED | OUTPATIENT
Start: 2022-05-11 | End: 2022-05-11

## 2022-05-11 RX ORDER — ROCURONIUM BROMIDE 10 MG/ML
INJECTION, SOLUTION INTRAVENOUS
Status: DISCONTINUED | OUTPATIENT
Start: 2022-05-11 | End: 2022-05-11

## 2022-05-11 RX ADMIN — FENTANYL CITRATE 50 MCG: 50 INJECTION, SOLUTION INTRAMUSCULAR; INTRAVENOUS at 02:05

## 2022-05-11 RX ADMIN — PROPOFOL 30 MCG/KG/MIN: 10 INJECTION, EMULSION INTRAVENOUS at 05:05

## 2022-05-11 RX ADMIN — PROPOFOL 30 MG: 10 INJECTION, EMULSION INTRAVENOUS at 04:05

## 2022-05-11 RX ADMIN — ROCURONIUM BROMIDE 10 MG: 10 INJECTION, SOLUTION INTRAVENOUS at 02:05

## 2022-05-11 RX ADMIN — VANCOMYCIN HYDROCHLORIDE 1250 MG: 1.25 INJECTION, POWDER, LYOPHILIZED, FOR SOLUTION INTRAVENOUS at 06:05

## 2022-05-11 RX ADMIN — FAMOTIDINE 20 MG: 10 INJECTION, SOLUTION INTRAVENOUS at 08:05

## 2022-05-11 RX ADMIN — MIDAZOLAM 2 MG: 1 INJECTION INTRAMUSCULAR; INTRAVENOUS at 01:05

## 2022-05-11 RX ADMIN — FAMOTIDINE 20 MG: 10 INJECTION, SOLUTION INTRAVENOUS at 09:05

## 2022-05-11 RX ADMIN — BENZTROPINE MESYLATE 1 MG: 1 TABLET ORAL at 09:05

## 2022-05-11 RX ADMIN — DEXMEDETOMIDINE HYDROCHLORIDE 1.2 MCG/KG/HR: 400 INJECTION INTRAVENOUS at 03:05

## 2022-05-11 RX ADMIN — DEXMEDETOMIDINE HYDROCHLORIDE 1.2 MCG/KG/HR: 400 INJECTION INTRAVENOUS at 08:05

## 2022-05-11 RX ADMIN — LEVETIRACETAM 500 MG: 100 INJECTION, SOLUTION INTRAVENOUS at 08:05

## 2022-05-11 RX ADMIN — VANCOMYCIN HYDROCHLORIDE 1250 MG: 1.25 INJECTION, POWDER, LYOPHILIZED, FOR SOLUTION INTRAVENOUS at 10:05

## 2022-05-11 RX ADMIN — SODIUM CHLORIDE, PRESERVATIVE FREE 10 ML: 5 INJECTION INTRAVENOUS at 08:05

## 2022-05-11 RX ADMIN — VANCOMYCIN HYDROCHLORIDE 1250 MG: 1.25 INJECTION, POWDER, LYOPHILIZED, FOR SOLUTION INTRAVENOUS at 02:05

## 2022-05-11 RX ADMIN — BENZTROPINE MESYLATE 1 MG: 1 TABLET ORAL at 08:05

## 2022-05-11 RX ADMIN — DOXEPIN HYDROCHLORIDE 50 MG: 50 CAPSULE ORAL at 09:05

## 2022-05-11 RX ADMIN — GLYCOPYRROLATE 1 MG: 1 TABLET ORAL at 08:05

## 2022-05-11 RX ADMIN — RIFAMPIN 300 MG: 300 CAPSULE ORAL at 08:05

## 2022-05-11 RX ADMIN — CEFTRIAXONE 2 G: 2 INJECTION, SOLUTION INTRAVENOUS at 03:05

## 2022-05-11 RX ADMIN — FENTANYL CITRATE 100 MCG: 50 INJECTION INTRAMUSCULAR; INTRAVENOUS at 09:05

## 2022-05-11 RX ADMIN — ROCURONIUM BROMIDE 20 MG: 10 INJECTION, SOLUTION INTRAVENOUS at 02:05

## 2022-05-11 RX ADMIN — HALOPERIDOL 5 MG: 5 TABLET ORAL at 09:05

## 2022-05-11 RX ADMIN — PROPOFOL 40 MCG/KG/MIN: 10 INJECTION, EMULSION INTRAVENOUS at 11:05

## 2022-05-11 RX ADMIN — OXCARBAZEPINE 300 MG: 300 TABLET, FILM COATED ORAL at 09:05

## 2022-05-11 RX ADMIN — HALOPERIDOL 5 MG: 5 TABLET ORAL at 08:05

## 2022-05-11 RX ADMIN — EZETIMIBE 10 MG: 10 TABLET ORAL at 09:05

## 2022-05-11 RX ADMIN — ROCURONIUM BROMIDE 50 MG: 10 INJECTION, SOLUTION INTRAVENOUS at 01:05

## 2022-05-11 RX ADMIN — RIFAMPIN 300 MG: 300 CAPSULE ORAL at 09:05

## 2022-05-11 RX ADMIN — PROPOFOL 35 MCG/KG/MIN: 10 INJECTION, EMULSION INTRAVENOUS at 03:05

## 2022-05-11 RX ADMIN — ACETAMINOPHEN 650 MG: 325 TABLET ORAL at 09:05

## 2022-05-11 RX ADMIN — GLYCOPYRROLATE 1 MG: 1 TABLET ORAL at 10:05

## 2022-05-11 RX ADMIN — MORPHINE SULFATE 4 MG: 4 INJECTION INTRAVENOUS at 07:05

## 2022-05-11 RX ADMIN — OXCARBAZEPINE 300 MG: 300 TABLET, FILM COATED ORAL at 08:05

## 2022-05-11 RX ADMIN — PROPOFOL 50 MCG/KG/MIN: 10 INJECTION, EMULSION INTRAVENOUS at 09:05

## 2022-05-11 RX ADMIN — DEXMEDETOMIDINE HYDROCHLORIDE 1.2 MCG/KG/HR: 400 INJECTION INTRAVENOUS at 10:05

## 2022-05-11 RX ADMIN — ATORVASTATIN CALCIUM 80 MG: 40 TABLET, FILM COATED ORAL at 08:05

## 2022-05-11 RX ADMIN — DEXMEDETOMIDINE HYDROCHLORIDE 1.2 MCG/KG/HR: 400 INJECTION INTRAVENOUS at 05:05

## 2022-05-11 RX ADMIN — LEVETIRACETAM 500 MG: 100 INJECTION, SOLUTION INTRAVENOUS at 09:05

## 2022-05-11 RX ADMIN — PROPOFOL 30 MCG/KG/MIN: 10 INJECTION, EMULSION INTRAVENOUS at 08:05

## 2022-05-11 NOTE — PROGRESS NOTES
Received call from RN that pt's DVT study was positive. However, daily prophylactic Lovenox held today for trach/peg and pt scheduled to go to OR for flap in AM. Will continue to hold lovenox in AM and will plan to switch to FD Lovenox after OR procedure tomorrow.

## 2022-05-11 NOTE — TRANSFER OF CARE
"Anesthesia Transfer of Care Note    Patient: Kendall Duff    Procedure(s) Performed: Procedure(s) (LRB):  CLOSURE, STERNAL INCISION, REPEAT (N/A)    Patient location: ICU    Transport from OR: Upon arrival to PACU/ICU, patient attached to ventilator and auscultated to confirm bilateral breath sounds and adequate TV. Continuous ECG monitoring in transport. Continuous SpO2 monitoring in transport. Transported from OR intubated on 100% O2 by AMBU with assisted ventilation    Post pain: adequate analgesia    Post assessment: no apparent anesthetic complications    Post vital signs: stable    Level of consciousness: sedated    Nausea/Vomiting: no nausea/vomiting    Complications: none    Transfer of care protocol was followed      Last vitals:   Visit Vitals  /87 (BP Location: Left arm, Patient Position: Lying)   Pulse 86   Temp 36.6 °C (97.9 °F)   Resp (!) 24   Ht 5' 10.87" (1.8 m)   Wt 77.2 kg (170 lb 3.1 oz)   SpO2 99%   BMI 23.83 kg/m²     "

## 2022-05-11 NOTE — PROGRESS NOTES
PRS  Pt taken to surgery for uncomplicated sternal wound debridement and reconstruction with bilateral pec major muscle flaps and fasciocutaneous adv flaps.  Sternal wound and bone cultures taken.    Arms in at sides at all times. Drain care.

## 2022-05-11 NOTE — PROGRESS NOTES
[facility] [address] ICU  Critical Care - Medicine  Progress Note    Patient Name: Kendall Duff  MRN: [id number]  Admission Date: 4/1/2022  Hospital Length of Stay: 40 days  Code Status: Prior  Attending Provider: Max Castillo MD  Primary Care Provider: Primary Doctor No   Principal Problem: <principal problem not specified>    Subjective:     HPI:  57 yo admitted 4/1 and underwent CABG x 4 on 4/6.  Hx of schizophrenia and BPAD       Hospital/ICU Course: Developed MRSA sternal wound. Intubated 4/15.   Underwent debridement with wound vac on 4/17.  Sputum culture from 4/26 with Klebsiella and proteus    Interval History/Significant Events:  Patient had tracheostomy and PEG placement yesterday without incident.  He is slated to undergo flap procedure later today for his sternal wound.  Of note, NIVA done yesterday on the right upper extremity revealed the presence of a DVT.  Objective:     Vital Signs (Most Recent):  Temp: 98.6 °F (37 °C) (05/11/22 0400)  Pulse: (!) 58 (05/11/22 0745)  Resp: (!) 21 (05/11/22 0745)  BP: 115/79 (05/11/22 0730)  SpO2: 97 % (05/11/22 0745) Vital Signs (24h Range):  Temp:  [98.2 °F (36.8 °C)-98.7 °F (37.1 °C)] 98.6 °F (37 °C)  Pulse:  [54-87] 58  Resp:  [12-24] 21  SpO2:  [84 %-100 %] 97 %  BP: ()/(53-80) 115/79     Weight: 77.2 kg (170 lb 3.1 oz)  Body mass index is 23.83 kg/m².      Intake/Output Summary (Last 24 hours) at 5/11/2022 0813  Last data filed at 5/11/2022 0600  Gross per 24 hour   Intake 1591.91 ml   Output 2800 ml   Net -1208.09 ml        Physical Exam  Vitals reviewed.   Constitutional:       General: He is not in acute distress.     Appearance: He is not toxic-appearing.      Comments: Sedated and mechanically ventilated via trach.   HENT:      Head: Normocephalic and atraumatic.      Mouth/Throat:      Comments: Tongue swelling notably improved  Eyes:      Extraocular Movements: Extraocular movements intact.      Pupils: Pupils are equal, round,  and reactive to light.   Cardiovascular:      Rate and Rhythm: Normal rate and regular rhythm.      Pulses: Normal pulses.      Heart sounds: No murmur heard.    No gallop.   Pulmonary:      Effort: No respiratory distress.      Breath sounds: No stridor. No wheezing, rhonchi or rales.   Abdominal:      General: Abdomen is flat.      Palpations: Abdomen is soft.      Comments: PEG in place   Musculoskeletal:         General: No swelling or deformity.      Right lower leg: Edema present.      Left lower leg: No edema.   Skin:     General: Skin is warm.      Coloration: Skin is not jaundiced.      Findings: No bruising.   Neurological:      Comments: Patient sedated and therefore unable to accurately exam           Current Facility-Administered Medications:     acetaminophen tablet 650 mg, 650 mg, Oral, Q4H PRN, Abstracted MD Kellen, 650 mg at 05/08/22 0928    albuterol-ipratropium 2.5 mg-0.5 mg/3 mL nebulizer solution 3 mL, 3 mL, Nebulization, Q12H PRN, Juancarlos Foreman MD    atorvastatin tablet 80 mg, 80 mg, Oral, Daily, Abstracted MD Kellen, 80 mg at 05/10/22 0834    benztropine tablet 1 mg, 1 mg, Per OG tube, BID, Abstracted MD Kellen, 1 mg at 05/10/22 2012    bisacodyL suppository 10 mg, 10 mg, Rectal, Daily PRN, Juancarlos Foreman MD    cefTRIAXone 2 gram/50 mL IVPB 2 g, 2 g, Intravenous, Q24H, Carroll Hammer Jr., MD, FCCP, Stopped at 05/11/22 0422    dexmedetomidine (PRECEDEX) 400mcg/100mL 0.9% NaCL infusion, 0-1.4 mcg/kg/hr, Intravenous, Continuous, Sukhjinder Michael MD, Last Rate: 22.8 mL/hr at 05/11/22 0352, 1.2 mcg/kg/hr at 05/11/22 0352    dextrose 10% bolus 250 mL, 250 mL, Intravenous, Once PRN, Juancarlos Foreman MD    doxepin capsule 50 mg, 50 mg, Oral, QHS, Juancarlos Foreman MD, 50 mg at 05/10/22 2011    enoxaparin injection 80 mg, 1 mg/kg (Dosing Weight), Subcutaneous, Q12H, Mariah Zhang MD    ezetimibe tablet 10 mg, 10 mg, Oral, QHS, Abstracted Order, MD, 10 mg at 05/10/22 2012     famotidine (PF) injection 20 mg, 20 mg, Intravenous, BID, Abstracted Order, MD, 20 mg at 05/10/22 2012    fentaNYL injection 100 mcg, 100 mcg, Intravenous, Q1H PRN, Abstracted MD Kellen    fentaNYL injection 50 mcg, 50 mcg, Intravenous, Q1H PRN, Abstracted Order, MD, 50 mcg at 05/07/22 1707    glycopyrrolate tablet 1 mg, 1 mg, Per NG tube, TID, Sukhjinder Michael MD, 1 mg at 05/10/22 2141    haloperidoL tablet 5 mg, 5 mg, Per OG tube, BID, Abstracted Order, MD, 5 mg at 05/10/22 2012    heparin, porcine (PF) (heparin flush 100 units/mL) 100 unit/mL injection flush, , , ,     hydrALAZINE injection 10 mg, 10 mg, Intravenous, Q2H PRN, Abstracted MD Kellen    levETIRAcetam (KEPPRA) 500 mg in sodium chloride 0.9 % 100 mL IVPB (MB+), 500 mg, Intravenous, Q12H, Carroll Hammer Jr., MD, FCCP, 500 mg at 05/10/22 2011    LIDOcaine (PF) 20 mg/mL (2%) 20 mg/mL (2 %) injection, , , ,     lorazepam injection 2 mg, 2 mg, Intravenous, Q2H PRN, Max Castillo MD, 2 mg at 05/09/22 2036    metoprolol tartrate (LOPRESSOR) tablet 25 mg, 25 mg, Per OG tube, Daily, Abstracted Order, MD, 25 mg at 05/08/22 0815    morphine injection 2 mg, 2 mg, Intravenous, Q1H PRN, Abstracted MD Kellen    morphine injection 4 mg, 4 mg, Intravenous, Q4H PRN, Abstracted Order, MD    nitroGLYCERIN SL tablet 0.4 mg, 0.4 mg, Sublingual, Q5 Min PRN, Abstracted MD Kellen    NORepinephrine bitartrate-NaCl 8 mg/250 mL (32 mcg/mL) infusion, 0-3 mcg/kg/min (Dosing Weight), Intravenous, Continuous, Abstracted MD Kellen, Paused at 05/04/22 0600    OLANZapine injection 10 mg, 10 mg, Intramuscular, Q8H PRN, Abstracted MD Kellen    ondansetron injection 4 mg, 4 mg, Intravenous, Q4H PRN, Abstracted Order, MD    OXcarbazepine tablet 300 mg, 300 mg, Per OG tube, BID, Abstracted Order, MD, 300 mg at 05/10/22 2012    oxyCODONE immediate release tablet 10 mg, 10 mg, Oral, Q4H PRN, Abstracted Order, MD    oxyCODONE immediate release tablet 5 mg, 5 mg, Oral, Q4H  PRN, Abstracted Kellen, MD    potassium chloride 20 mEq in 100 mL IVPB (FOR CENTRAL LINE ADMINISTRATION ONLY), 20 mEq, Intravenous, PRN, Juancarlos Foreman, MD    potassium chloride 20 mEq in 100 mL IVPB (FOR CENTRAL LINE ADMINISTRATION ONLY), 40 mEq, Intravenous, PRN, Juancarlos Order, MD    potassium chloride 20 mEq in 100 mL IVPB (FOR CENTRAL LINE ADMINISTRATION ONLY), 60 mEq, Intravenous, PRN, Juancarlos Foreman MD    propofol (DIPRIVAN) 10 mg/mL infusion, 5-50 mcg/kg/min (Dosing Weight), Intravenous, Continuous, Sukhjinder Michael MD, Last Rate: 16 mL/hr at 05/11/22 0352, 35 mcg/kg/min at 05/11/22 0352    rifAMpin capsule 300 mg, 300 mg, Oral, BID, Juancarlos Foreman MD, 300 mg at 05/10/22 2011    sodium chloride 0.9% flush 10 mL, 10 mL, Intravenous, PRN, Juancarlos Foreman MD    sodium chloride 0.9% flush 10 mL, 10 mL, Intravenous, Q12H, Juancarlos Foreman MD, 10 mL at 05/10/22 2020    vancomycin (VANCOCIN) 1,250 mg in dextrose 5 % 250 mL IVPB, 1,250 mg, Intravenous, Q8H, Juancarlos Foreman MD, Last Rate: 166.7 mL/hr at 05/11/22 0602, 1,250 mg at 05/11/22 0602    ziprasidone injection 20 mg, 20 mg, Intramuscular, Q8H PRN, Juancarlos Foreman MD     Vents:  Vent Mode: PRVC A/C (05/11/22 0451)  Set Rate: 16 BPM (05/11/22 0451)  Vt Set: 450 mL (05/11/22 0451)  PEEP/CPAP: 5 cmH20 (05/11/22 0451)  Oxygen Concentration (%): 40 (05/11/22 0745)  Peak Airway Pressure: 12 cmH2O (05/11/22 0451)  Plateau Pressure: 6 cmH20 (05/06/22 0600)  Total Ve: 9.5 mL (05/11/22 0451)  F/VT Ratio<105 (RSBI): (!) 53.49 (05/11/22 0451)    Lines/Drains/Airways     Peripherally Inserted Central Catheter Line  Duration           PICC Triple Lumen 05/01/22 0523 right basilic 10 days          Drain  Duration                Gastrostomy/Enterostomy 05/10/22  Percutaneous endoscopic gastrostomy (PEG) midline feeding 1 day          Airway  Duration                Surgical Airway 05/10/22 1139 Jared Cuffed <1 day                Significant  Labs:    Lab Results   Component Value Date    WBC 6.7 05/09/2022    HGB 10.1 (L) 05/09/2022    HCT 32.0 (L) 05/09/2022    MCV 85.6 05/09/2022     05/09/2022         BMP  Lab Results   Component Value Date     05/09/2022    K 4.2 05/09/2022     03/25/2022    CO2 26 05/09/2022    BUN 19.8 05/09/2022    CREATININE 0.53 (L) 05/09/2022    CALCIUM 8.2 (L) 05/09/2022    ANIONGAP 9 03/25/2022    ESTGFRAFRICA >60.0 03/25/2022    EGFRNONAA >60 05/09/2022       ABG  Recent Labs   Lab 05/10/22  0927   PH 7.41   PO2 105*   PCO2 41           Significant Imaging:  No new imaging    DVT Prophylaxis: LOVENOX - held for procedure today  GI prophylaxis: PEPCID    Assessment/Plan:        Assessment:  1. CAD with 4 vessel CABG 4/6  2. MRSA Sternal wound dehiscence with debridement and wound vac 4/17.  Plastics following  3. Respiratory culture positive Klebsiella and Proteus  4. Malnutrition.  5. Respiratory failure.  Intubated 4/15  6. RUE DVT     Plan  - Continue MV support.  Adjust accordingly  - Enteral tube feeds  - Going for flap today.  - Needs full dose lovenox following surgery    Critical care diagnosis:  Respiratory failure  Critical care interventions:  Direct hands-on care of the patient, review of lab and physiologic parameters  Critical care time:  32 minutes personally excluding any procedural time       Max Castillo MD  Critical Care - Medicine  [facility] - [address] [facility]

## 2022-05-11 NOTE — ANESTHESIA POSTPROCEDURE EVALUATION
Anesthesia Post Evaluation    Patient: Kendall Duff    Procedure(s) Performed: Procedure(s) (LRB):  CLOSURE, STERNAL INCISION, REPEAT (N/A)    Final Anesthesia Type: general      Patient location during evaluation: ICU  Patient participation: No - Unable to Participate, Sedation  Level of consciousness: sedated  Post-procedure vital signs: reviewed and stable  Pain management: adequate  Airway patency: patent  JACQUELIN mitigation strategies: Multimodal analgesia  PONV status at discharge: No PONV  Anesthetic complications: no      Respiratory status: ETT  Hydration status: euvolemic  Follow-up not needed.          Vitals Value Taken Time   /87 05/11/22 1658   Temp 36.6 05/11/22 1707   Pulse 111 05/11/22 1706   Resp 33 05/11/22 1706   SpO2 88 % 05/11/22 1706   Vitals shown include unvalidated device data.      No case tracking events are documented in the log.      Pain/Diogo Score: Pain Rating Prior to Med Admin: 0 (5/10/2022 11:05 AM)

## 2022-05-11 NOTE — ANESTHESIA PREPROCEDURE EVALUATION
2022  Kendall Duff is a 56 y.o., male . Recent MI NSTEMI s/p CABG 2022. MRSA, Wound dehisce for sternal debride and closure. Tracheostomy vent ICU on Precedex and Propofol  Infusion. No IV  Pressors.    Pre-operative evaluation for Procedure(s) (LRB):  CLOSURE, STERNAL INCISION, REPEAT (N/A)    Kendall Duff is a 56 y.o. male     Patient Active Problem List   Diagnosis    Coronary artery disease    Acute myocardial infarction of anterolateral wall    Bipolar 1 disorder    Tobacco abuse    NSTEMI (non-ST elevated myocardial infarction)    Bilateral carotid artery stenosis       Review of patient's allergies indicates:   Allergen Reactions    Depakote [divalproex]     Divalproex sodium Other (See Comments)    Lithium     Lithium analogues     Quetiapine Other (See Comments)       No current facility-administered medications on file prior to encounter.     Current Outpatient Medications on File Prior to Encounter   Medication Sig Dispense Refill    amLODIPine (NORVASC) 5 MG tablet Take 1 tablet (5 mg total) by mouth once daily. 30 tablet 11    aspirin 81 MG Chew Chew and swallow 1 tablet (81 mg total) by mouth once daily. 360 tablet 0    doxepin (SINEQUAN) 25 MG capsule Take 50 mg by mouth nightly.      lisinopriL (PRINIVIL,ZESTRIL) 20 MG tablet Take 20 mg by mouth once daily.      clopidogreL (PLAVIX) 75 mg tablet Take 1 tablet (75 mg total) by mouth once daily. 30 tablet 11    doxepin (SINEQUAN) 25 MG capsule SMARTSI Capsule(s) By Mouth Every Evening      metoprolol succinate (TOPROL-XL) 25 MG 24 hr tablet Take 25 mg by mouth once daily.      metoprolol tartrate (LOPRESSOR) 50 MG tablet Take 1 tablet (50 mg total) by mouth once daily. 30 tablet 11    OLANZapine (ZYPREXA) 5 MG tablet Take 5 mg by mouth nightly.      OXcarbazepine (TRILEPTAL) 150 MG Tab Take 150 mg by mouth 2 (two)  times daily.      pravastatin (PRAVACHOL) 40 MG tablet Take 1 tablet (40 mg total) by mouth every evening. 90 tablet 3    traZODone (DESYREL) 100 MG tablet Take 100 mg by mouth nightly.         Past Surgical History:   Procedure Laterality Date    CORONARY STENT PLACEMENT  08/14/2015    DEBRIDEMENT  04/17/2022    LEFT HEART CATHETERIZATION Left 08/13/2015       Social History     Socioeconomic History    Marital status: Single   Tobacco Use    Smoking status: Current Every Day Smoker     Types: Cigarettes    Smokeless tobacco: Never Used   Substance and Sexual Activity    Alcohol use: Never    Drug use: Not Currently     Types: Cocaine       Blood Type O Pos  CBC:   Recent Labs     05/09/22  0846   WBC 6.7   RBC 3.74*   HGB 10.1*   HCT 32.0*      MCV 85.6   MCH 27.0   MCHC 31.6*       CMP:   Recent Labs     05/09/22  0846      K 4.2   CO2 26   BUN 19.8   CREATININE 0.53*   CALCIUM 8.2*   ALBUMIN 1.8*   ALKPHOS 100   ALT 18   AST 31   BILITOT 0.3       INR  Recent Labs     05/10/22  0546   INR 1.17   PROTIME 14.6*           Diagnostic Studies:  CXR5/10/2022 :Interval insertion of tracheostomy cannula. CMG.  Increase in interstitial and pulmo vasc markings indicating some degree of congestion.  US Carotid 3/24/2022: Bilat 1-39%  EKG :      2D Echo :  No results found for this or any previous visit..      Pre-op Assessment    I have reviewed the Patient Summary Reports.     I have reviewed the Nursing Notes. I have reviewed the NPO Status.   I have reviewed the Medications.     Review of Systems  Anesthesia Hx:  No problems with previous Anesthesia  History of prior surgery of interest to airway management or planning: heart surgery. Previous anesthesia: General 4/first week/2022 CABG: ; with general anesthesia.  Procedure performed at an [facility] Facility. Airway issues documented on chart review include GETA  Denies Family Hx of Anesthesia complications.   Denies Personal Hx of Anesthesia  complications.   Social:  Smoker Chr COcaine abuse.   Hematology/Oncology:     Oncology Normal    -- Anemia: Hematology Comments: Plavix    EENT/Dental:EENT/Dental Normal   Cardiovascular:   Hypertension Past MI CAD  CABG/stent  hyperlipidemia ECG has been reviewed. Noncompliant meds. 3/23/2022: NSTEMI. 3/29/2022 CABGx3.    Pulmonary:  Pulmonary Normal On Albuterol Nebs. Tracheostomy in ICU on Precedex and Diprivan Infusion, Berto and Versed.Other  Meds for agitation.   Renal/:  Renal/ Normal     Hepatic/GI:   Liver Disease, Hepatitis    Musculoskeletal:  Musculoskeletal Normal    Neurological:   TIA, CVA Seizures B ICA stenoses 1-39%.   SZ type?: Keppra. Last SZ: ?   Endocrine:  Endocrine Normal    Dermatological:  Skin Normal    Psych:   anxiety depression Bipolar.Schizoaffective disorder.SinequanBenrexa,         Physical Exam  General: Tracheostomy ICU  Airway:  Pre-Existing Airway: Tracheostomy tube        Anesthesia Plan  Type of Anesthesia, risks & benefits discussed:    Anesthesia Type: Gen ETT  Intra-op Monitoring Plan: Standard ASA Monitors and Central Line  Post Op Pain Control Plan: multimodal analgesia  Induction:  IV  Airway Plan: Via Tracheostomy  Informed Consent: Informed consent signed with the Patient representative and all parties understand the risks and agree with anesthesia plan.  All questions answered.   ASA Score: 4 Emergent  Day of Surgery Review of History & Physical: H&P Update referred to the surgeon/provider.I have interviewed and examined the patient. I have reviewed the patient's H&P dated: 4/22/2022.   Anesthesia Plan Notes: Consent obtained from Ms Bell witnessed by Nurse. Triple lumen Tracheostomy in situ.    Ready For Surgery From Anesthesia Perspective.     .

## 2022-05-11 NOTE — PLAN OF CARE
tracheostomy and PEG placement yesterday   He is slated to undergo flap procedure later today for his sternal wound. NIVA done yesterday on the right upper extremity revealed the presence of a DVT.

## 2022-05-11 NOTE — PROGRESS NOTES
"Gastroenterology Progress Note    Subjective:    S/p EGD + PEG. Tube feedings not yet started until after sternal flap surgery. No issues with PEG.      ROS:    ROS      Vital Signs:  /78   Pulse (!) 58   Temp 99.1 °F (37.3 °C)   Resp (!) 21   Ht 5' 10.87" (1.8 m)   Wt 77.2 kg (170 lb 3.1 oz)   SpO2 96%   BMI 23.83 kg/m²   Body mass index is 23.83 kg/m².    Physical Exam:    Physical Exam  Constitutional:       General: He is not in acute distress.  Cardiovascular:      Rate and Rhythm: Normal rate and regular rhythm.   Pulmonary:      Effort: Pulmonary effort is normal.      Comments: +trach  Abdominal:      General: There is no distension.      Tenderness: There is no abdominal tenderness.      Comments: PEG in place with external bumper at ~3-4 cm. Site is c/d/i   Skin:     General: Skin is warm and dry.   Neurological:      Mental Status: He is alert.      Comments: sedated         Labs:  Recent Results (from the past 48 hour(s))   Protime-INR    Collection Time: 05/10/22  5:46 AM   Result Value Ref Range    PT 14.6 (H) 12.5 - 14.5 seconds    INR 1.17 0.00 - 1.30   POCT ARTERIAL BLOOD GAS Blood Gas    Collection Time: 05/10/22  9:02 AM   Result Value Ref Range    HCO3, Arterial 26.0 (A) 18.0 - 23.0 MMOL/L   POCT ARTERIAL BLOOD GAS    Collection Time: 05/10/22  9:27 AM   Result Value Ref Range    POC PH 7.41 7.35 - 7.45    POC PCO2 41 35 - 45 mmHg    POC PO2 105 (A) 80.0 - 100 mmHg    POC HEMOGLOBIN 10.4 (A) 12.0 - 16.0 g/dL    POC SATURATED O2 98.1 %    POC O2Hb 96.4 94.0 - 97.0 %    POC COHb 1.7 %    POC MetHb 0.9 0.4 - 2 %    POC Potassium 3.9 3.5 - 5.0 mmol/l    POC Sodium 137 137 - 145 mmol/l    POC Ionized Calcium 1.20 1.12 - 1.23 mmol/l    Correct Temperature (PH) 7.41 7.35 - 7.45    Corrected Temperature (pCO2) 41 35 - 45 mmHg    Corrected Temperature (pO2) 105 (A) 80.0 - 100 mmHg    Collection Duration 26.0 mmol/l    Base Deficit 1.2 -2.0 - 2.0 mmol/l    POC Temp 37.0 C    Specimen source " Arterial sample    COVID-19 Rapid Screening    Collection Time: 05/10/22 12:30 PM   Result Value Ref Range    SARS COV-2 MOLECULAR Not Detected    Type & Screen    Collection Time: 05/11/22 12:24 AM   Result Value Ref Range    Group & Rh O POS     Indirect Db GEL NEG          Assessment/Plan:  1. Esophageal dysphagia    2. Coronary artery disease    3. Dysphagia, unspecified type    4. Coronary artery disease, unspecified vessel or lesion type, unspecified whether angina present, unspecified whether native or transplanted heart    5. DVT (deep venous thrombosis)          57 yo admitted 4/1 and underwent CABG x 4 on 4/6.  Hx of schizophrenia and BPAD. Remains intubated and sedated.     1. Alternative means of nutrition  -S/p EGD plus PEG 5/10/22  -After surgery, okay to start tube feedings at 20 cc/hr and slowly increase by 10 cc/hr q 4 hr until goal rate is met  -Continue adequate wound care  -No further recommendations from a GI standpoint. Please call back as needed             Daniel Oneil PA-C

## 2022-05-12 LAB
ALBUMIN SERPL-MCNC: 1.9 GM/DL (ref 3.5–5)
ALBUMIN/GLOB SERPL: 0.5 RATIO (ref 1.1–2)
ALP SERPL-CCNC: 124 UNIT/L (ref 40–150)
ALT SERPL-CCNC: 28 UNIT/L (ref 0–55)
AST SERPL-CCNC: 62 UNIT/L (ref 5–34)
BASOPHILS # BLD AUTO: 0.06 X10(3)/MCL (ref 0–0.2)
BASOPHILS # BLD AUTO: 0.06 X10(3)/MCL (ref 0–0.2)
BASOPHILS NFR BLD AUTO: 0.9 %
BASOPHILS NFR BLD AUTO: 0.9 %
BILIRUBIN DIRECT+TOT PNL SERPL-MCNC: 1.8 MG/DL
BUN SERPL-MCNC: 13.5 MG/DL (ref 8.4–25.7)
CALCIUM SERPL-MCNC: 7.9 MG/DL (ref 8.4–10.2)
CHLORIDE SERPL-SCNC: 104 MMOL/L (ref 98–107)
CO2 SERPL-SCNC: 24 MMOL/L (ref 22–29)
CREAT SERPL-MCNC: 0.7 MG/DL (ref 0.73–1.18)
EOSINOPHIL # BLD AUTO: 0.05 X10(3)/MCL (ref 0–0.9)
EOSINOPHIL # BLD AUTO: 0.26 X10(3)/MCL (ref 0–0.9)
EOSINOPHIL NFR BLD AUTO: 0.7 %
EOSINOPHIL NFR BLD AUTO: 3.7 %
ERYTHROCYTE [DISTWIDTH] IN BLOOD BY AUTOMATED COUNT: 15.2 % (ref 11.5–17)
ERYTHROCYTE [DISTWIDTH] IN BLOOD BY AUTOMATED COUNT: 15.3 % (ref 11.5–17)
GLOBULIN SER-MCNC: 3.7 GM/DL (ref 2.4–3.5)
GLUCOSE SERPL-MCNC: 112 MG/DL (ref 74–100)
GRAM STN SPEC: NORMAL
GRAM STN SPEC: NORMAL
HCT VFR BLD AUTO: 29.2 % (ref 42–52)
HCT VFR BLD AUTO: 33.5 % (ref 42–52)
HGB BLD-MCNC: 10.5 GM/DL (ref 14–18)
HGB BLD-MCNC: 9.2 GM/DL (ref 14–18)
IMM GRANULOCYTES # BLD AUTO: 0.02 X10(3)/MCL (ref 0–0.02)
IMM GRANULOCYTES # BLD AUTO: 0.03 X10(3)/MCL (ref 0–0.02)
IMM GRANULOCYTES NFR BLD AUTO: 0.3 % (ref 0–0.43)
IMM GRANULOCYTES NFR BLD AUTO: 0.4 % (ref 0–0.43)
LYMPHOCYTES # BLD AUTO: 1.11 X10(3)/MCL (ref 0.6–4.6)
LYMPHOCYTES # BLD AUTO: 1.9 X10(3)/MCL (ref 0.6–4.6)
LYMPHOCYTES NFR BLD AUTO: 16.4 %
LYMPHOCYTES NFR BLD AUTO: 27.4 %
M AVIUM PARATB TISS QL ZN STN: NORMAL
M AVIUM PARATB TISS QL ZN STN: NORMAL
MCH RBC QN AUTO: 26.5 PG (ref 27–31)
MCH RBC QN AUTO: 27.1 PG (ref 27–31)
MCHC RBC AUTO-ENTMCNC: 31.3 MG/DL (ref 33–36)
MCHC RBC AUTO-ENTMCNC: 31.5 MG/DL (ref 33–36)
MCV RBC AUTO: 84.6 FL (ref 80–94)
MCV RBC AUTO: 85.9 FL (ref 80–94)
MONOCYTES # BLD AUTO: 0.85 X10(3)/MCL (ref 0.1–1.3)
MONOCYTES # BLD AUTO: 0.94 X10(3)/MCL (ref 0.1–1.3)
MONOCYTES NFR BLD AUTO: 12.6 %
MONOCYTES NFR BLD AUTO: 13.5 %
NEUTROPHILS # BLD AUTO: 3.8 X10(3)/MCL (ref 2.1–9.2)
NEUTROPHILS # BLD AUTO: 4.7 X10(3)/MCL (ref 2.1–9.2)
NEUTROPHILS NFR BLD AUTO: 54.1 %
NEUTROPHILS NFR BLD AUTO: 69.1 %
NRBC BLD AUTO-RTO: 0 %
NRBC BLD AUTO-RTO: 0 %
PLATELET # BLD AUTO: 328 X10(3)/MCL (ref 130–400)
PLATELET # BLD AUTO: 339 X10(3)/MCL (ref 130–400)
PMV BLD AUTO: 9.4 FL (ref 9.4–12.4)
PMV BLD AUTO: 9.4 FL (ref 9.4–12.4)
POTASSIUM SERPL-SCNC: 4.5 MMOL/L (ref 3.5–5.1)
PROT SERPL-MCNC: 5.6 GM/DL (ref 6.4–8.3)
RBC # BLD AUTO: 3.4 X10(6)/MCL (ref 4.7–6.1)
RBC # BLD AUTO: 3.96 X10(6)/MCL (ref 4.7–6.1)
SODIUM SERPL-SCNC: 137 MMOL/L (ref 136–145)
VANCOMYCIN TROUGH SERPL-MCNC: 21.6 UG/ML (ref 15–20)
WBC # SPEC AUTO: 6.8 X10(3)/MCL (ref 4.5–11.5)
WBC # SPEC AUTO: 6.9 X10(3)/MCL (ref 4.5–11.5)

## 2022-05-12 PROCEDURE — 63600175 PHARM REV CODE 636 W HCPCS: Performed by: INTERNAL MEDICINE

## 2022-05-12 PROCEDURE — 85025 COMPLETE CBC W/AUTO DIFF WBC: CPT | Performed by: INTERNAL MEDICINE

## 2022-05-12 PROCEDURE — 63600175 PHARM REV CODE 636 W HCPCS: Performed by: STUDENT IN AN ORGANIZED HEALTH CARE EDUCATION/TRAINING PROGRAM

## 2022-05-12 PROCEDURE — 87040 BLOOD CULTURE FOR BACTERIA: CPT | Performed by: STUDENT IN AN ORGANIZED HEALTH CARE EDUCATION/TRAINING PROGRAM

## 2022-05-12 PROCEDURE — 25000003 PHARM REV CODE 250: Performed by: HOSPITALIST

## 2022-05-12 PROCEDURE — 36415 COLL VENOUS BLD VENIPUNCTURE: CPT | Performed by: INTERNAL MEDICINE

## 2022-05-12 PROCEDURE — 63600175 PHARM REV CODE 636 W HCPCS

## 2022-05-12 PROCEDURE — 27000221 HC OXYGEN, UP TO 24 HOURS

## 2022-05-12 PROCEDURE — 20000000 HC ICU ROOM

## 2022-05-12 PROCEDURE — A4216 STERILE WATER/SALINE, 10 ML: HCPCS

## 2022-05-12 PROCEDURE — 25000003 PHARM REV CODE 250: Performed by: INTERNAL MEDICINE

## 2022-05-12 PROCEDURE — 63600175 PHARM REV CODE 636 W HCPCS: Performed by: HOSPITALIST

## 2022-05-12 PROCEDURE — 85025 COMPLETE CBC W/AUTO DIFF WBC: CPT | Performed by: SURGERY

## 2022-05-12 PROCEDURE — 99900026 HC AIRWAY MAINTENANCE (STAT)

## 2022-05-12 PROCEDURE — 25000003 PHARM REV CODE 250

## 2022-05-12 PROCEDURE — 80202 ASSAY OF VANCOMYCIN: CPT | Performed by: INTERNAL MEDICINE

## 2022-05-12 PROCEDURE — 94003 VENT MGMT INPAT SUBQ DAY: CPT

## 2022-05-12 PROCEDURE — 99900035 HC TECH TIME PER 15 MIN (STAT)

## 2022-05-12 PROCEDURE — 80053 COMPREHEN METABOLIC PANEL: CPT | Performed by: INTERNAL MEDICINE

## 2022-05-12 PROCEDURE — 25000003 PHARM REV CODE 250: Performed by: STUDENT IN AN ORGANIZED HEALTH CARE EDUCATION/TRAINING PROGRAM

## 2022-05-12 PROCEDURE — 27200966 HC CLOSED SUCTION SYSTEM

## 2022-05-12 RX ADMIN — RIFAMPIN 300 MG: 300 CAPSULE ORAL at 09:05

## 2022-05-12 RX ADMIN — ACETAMINOPHEN 650 MG: 325 TABLET ORAL at 09:05

## 2022-05-12 RX ADMIN — PIPERACILLIN SODIUM AND TAZOBACTAM SODIUM 4.5 G: 4; .5 INJECTION, POWDER, LYOPHILIZED, FOR SOLUTION INTRAVENOUS at 11:05

## 2022-05-12 RX ADMIN — PROPOFOL 50 MCG/KG/MIN: 10 INJECTION, EMULSION INTRAVENOUS at 09:05

## 2022-05-12 RX ADMIN — PIPERACILLIN SODIUM AND TAZOBACTAM SODIUM 4.5 G: 4; .5 INJECTION, POWDER, LYOPHILIZED, FOR SOLUTION INTRAVENOUS at 04:05

## 2022-05-12 RX ADMIN — ACETAMINOPHEN 650 MG: 325 TABLET ORAL at 02:05

## 2022-05-12 RX ADMIN — FAMOTIDINE 20 MG: 10 INJECTION, SOLUTION INTRAVENOUS at 09:05

## 2022-05-12 RX ADMIN — GLYCOPYRROLATE 1 MG: 1 TABLET ORAL at 09:05

## 2022-05-12 RX ADMIN — DEXMEDETOMIDINE HYDROCHLORIDE 1.4 MCG/KG/HR: 400 INJECTION INTRAVENOUS at 04:05

## 2022-05-12 RX ADMIN — FAMOTIDINE 20 MG: 10 INJECTION, SOLUTION INTRAVENOUS at 08:05

## 2022-05-12 RX ADMIN — FENTANYL CITRATE 100 MCG: 50 INJECTION INTRAMUSCULAR; INTRAVENOUS at 10:05

## 2022-05-12 RX ADMIN — SODIUM CHLORIDE, PRESERVATIVE FREE 10 ML: 5 INJECTION INTRAVENOUS at 09:05

## 2022-05-12 RX ADMIN — CEFTRIAXONE 2 G: 2 INJECTION, SOLUTION INTRAVENOUS at 04:05

## 2022-05-12 RX ADMIN — GLYCOPYRROLATE 1 MG: 1 TABLET ORAL at 02:05

## 2022-05-12 RX ADMIN — LORAZEPAM 2 MG: 2 INJECTION INTRAMUSCULAR; INTRAVENOUS at 08:05

## 2022-05-12 RX ADMIN — PROPOFOL 40 MCG/KG/MIN: 10 INJECTION, EMULSION INTRAVENOUS at 06:05

## 2022-05-12 RX ADMIN — OXYCODONE HYDROCHLORIDE 5 MG: 5 TABLET ORAL at 01:05

## 2022-05-12 RX ADMIN — OXCARBAZEPINE 300 MG: 300 TABLET, FILM COATED ORAL at 09:05

## 2022-05-12 RX ADMIN — PROPOFOL 50 MCG/KG/MIN: 10 INJECTION, EMULSION INTRAVENOUS at 10:05

## 2022-05-12 RX ADMIN — DEXMEDETOMIDINE HYDROCHLORIDE 1.4 MCG/KG/HR: 400 INJECTION INTRAVENOUS at 12:05

## 2022-05-12 RX ADMIN — ENOXAPARIN SODIUM 80 MG: 80 INJECTION SUBCUTANEOUS at 09:05

## 2022-05-12 RX ADMIN — GLYCOPYRROLATE 1 MG: 1 TABLET ORAL at 08:05

## 2022-05-12 RX ADMIN — BENZTROPINE MESYLATE 1 MG: 1 TABLET ORAL at 09:05

## 2022-05-12 RX ADMIN — LEVETIRACETAM 500 MG: 100 INJECTION, SOLUTION INTRAVENOUS at 08:05

## 2022-05-12 RX ADMIN — DOXEPIN HYDROCHLORIDE 50 MG: 50 CAPSULE ORAL at 08:05

## 2022-05-12 RX ADMIN — EZETIMIBE 10 MG: 10 TABLET ORAL at 09:05

## 2022-05-12 RX ADMIN — HALOPERIDOL 5 MG: 5 TABLET ORAL at 08:05

## 2022-05-12 RX ADMIN — OXCARBAZEPINE 300 MG: 300 TABLET, FILM COATED ORAL at 08:05

## 2022-05-12 RX ADMIN — DEXMEDETOMIDINE HYDROCHLORIDE 1.4 MCG/KG/HR: 400 INJECTION INTRAVENOUS at 09:05

## 2022-05-12 RX ADMIN — ATORVASTATIN CALCIUM 80 MG: 40 TABLET, FILM COATED ORAL at 08:05

## 2022-05-12 RX ADMIN — METOPROLOL TARTRATE 25 MG: 25 TABLET, FILM COATED ORAL at 08:05

## 2022-05-12 RX ADMIN — DEXMEDETOMIDINE HYDROCHLORIDE 1.3 MCG/KG/HR: 400 INJECTION INTRAVENOUS at 03:05

## 2022-05-12 RX ADMIN — SODIUM CHLORIDE 1000 ML: 9 INJECTION, SOLUTION INTRAVENOUS at 01:05

## 2022-05-12 RX ADMIN — PROPOFOL 50 MCG/KG/MIN: 10 INJECTION, EMULSION INTRAVENOUS at 02:05

## 2022-05-12 RX ADMIN — PROPOFOL 50 MCG/KG/MIN: 10 INJECTION, EMULSION INTRAVENOUS at 12:05

## 2022-05-12 RX ADMIN — ACETAMINOPHEN 650 MG: 325 TABLET ORAL at 11:05

## 2022-05-12 RX ADMIN — HALOPERIDOL 5 MG: 5 TABLET ORAL at 09:05

## 2022-05-12 RX ADMIN — VANCOMYCIN HYDROCHLORIDE 1250 MG: 1.25 INJECTION, POWDER, LYOPHILIZED, FOR SOLUTION INTRAVENOUS at 09:05

## 2022-05-12 RX ADMIN — LEVETIRACETAM 500 MG: 100 INJECTION, SOLUTION INTRAVENOUS at 09:05

## 2022-05-12 RX ADMIN — BENZTROPINE MESYLATE 1 MG: 1 TABLET ORAL at 08:05

## 2022-05-12 RX ADMIN — Medication 0.02 MCG/KG/MIN: at 11:05

## 2022-05-12 RX ADMIN — VANCOMYCIN HYDROCHLORIDE 1250 MG: 1.25 INJECTION, POWDER, LYOPHILIZED, FOR SOLUTION INTRAVENOUS at 02:05

## 2022-05-12 NOTE — NURSING
Dr. Weiss at bedside. Notified of fevers overnight as well as significant KELLIE drain output. No new orders given at this time. Instructed to continue drain care, keep arms at sides, and to minimize excessive movement.

## 2022-05-12 NOTE — NURSING
Patient bladder scanned, 356mls shown. Dr. Lange notified. Instructed to continue to monitor. Instructed to notify if the patient does not void in the new few hours.

## 2022-05-12 NOTE — PROGRESS NOTES
[facility] - [address] [place] ICU  Adult Nutrition  Progress Note    SUMMARY       Recommendations    Recommendation/Intervention:   Peptamen VHP @ 70ml/hr (goal rate, based on tube feeding running ~20 hours/day) + 1 packet [employer] Protein NoCarb 4x/day    Goals: Meet >/=75% est energy and protein requirements by f/u  Nutrition Goal Status: progressing towards goal  Communication of RD Recs: reviewed with RN    Assessment and Plan    Nutrition Problem  Inadequate Oral Intake    Related to (etiology):   Acute respiratory failure          Signs and Symptoms (as evidenced by):   Intubation     Interventions(treatment strategy):  Collaboration with other providers    Nutrition Diagnosis Status:   Continues        Malnutrition Assessment  Malnutrition Type: acute illness or injury  Weight Loss (Malnutrition): other (see comments) (does not meet criteria)  Energy Intake (Malnutrition): other (see comments) (does not meet criteria)  Subcutaneous Fat (Malnutrition):  (does not meet criteria)  Muscle Mass (Malnutrition): mild depletion  Fluid Accumulation (Malnutrition):  (does not meet criteria)  Hand  Strength, Left (Malnutrition):  (unable to eval)  Hand  Strength, Right (Malnutrition):  (unable to eval)   [place] Region (Muscle Loss): mild depletion  Clavicle Bone Region (Muscle Loss): mild depletion   Edema (Fluid Accumulation): 0-->no edema present   Subcutaneous Fat Loss (Final Summary): well nourished  Muscle Loss Evaluation (Final Summary): well nourished  Fluid Accumulation Evaluation:  (unable to evaluate)    Moderate Weight Loss (Malnutrition):  (unable to evaluate)    Reason for Assessment    Reason For Assessment: RD follow-up  Diagnosis: other (see comments) (1. CAD with 4 vessel CABG 4/6  2. MRSA Sternal wound dehiscence with debridement and wound vac 4/17.  Plastics following  3. Respiratory culture positive Klebsiella and Proteus  4. Malnutrition.  5. Respiratory failure.  Intubated  "4/15)  Relevant Medical History: none    General Information Comments: Noted surgery completed. Will need to continue with supplemental protein to help with wound healing. Still receiving kcal from meds. Noted PAB still low, likely related to inflamation vs. tube feeding/protein being held multiple times for surgery/trach placement, etc.    Nutrition Risk Screen    Nutrition Risk Screen: tube feeding or parenteral nutrition    Nutrition/Diet History    Factors Affecting Nutritional Intake: on mechanical ventilation    Anthropometrics    Temp: (!) 101.7 °F (38.7 °C)  Height Method: Estimated  Height: 5' 10.87" (180 cm)  Height (inches): 70.87 in  Weight Method: Bed Scale  Weight: 77.2 kg (170 lb 3.1 oz)  Weight (lb): 170.2 lb  Ideal Body Weight (IBW), Male: 171.22 lb  % Ideal Body Weight, Male (lb): 112.92 %  BMI (Calculated): 23.8  BMI Grade: 25 - 29.9 - overweight    Lab/Procedures/Meds    Pertinent Labs Reviewed: reviewed  Pertinent Labs Comments: no new labs  Pertinent Medications Reviewed: reviewed  Pertinent Medications Comments: diprivan @ 18ml/hr    Estimated/Assessed Needs    Weight Used For Calorie Calculations: 87.7 kg (193 lb 5.5 oz)  Energy Calorie Requirements (kcal): 2167kcal  Energy Need Method: [facility]  Protein Requirements: 133gm  Weight Used For Protein Calculations: 87.7 kg (193 lb 5.5 oz)  Fluid Requirements (mL): 30mL/kg  Estimated Fluid Requirement Method: RDA Method  RDA Method (mL): 2167    Nutrition Prescription Ordered    Current Diet Order: NPO  Current Nutrition Support Formula Ordered: Peptamen Intense VHP  Current Nutrition Support Rate Ordered: 70 (ml)    Evaluation of Received Nutrient/Fluid Intake    Enteral Calories (kcal): 1400  Enteral Protein (gm): 130  Enteral (Free Water) Fluid (mL): 1176  Lipid Calories (kcals): 475 kcals  Other Calories (kcal): 240  Total Calories (kcal): 1640  % Kcal Needs: 76% (98% with meds)  Other Protein (gm): 60  Total Protein (gm): 190  Total " Protein (gm/kg): 2.38  % Protein Needs: 143  Energy Calories Required: meeting needs  Protein Required: meeting needs  Fluid Required: not meeting needs  Tolerance: tolerating  % Intake of Estimated Energy Needs: 75 - 100 %  % Meal Intake: NPO    Nutrition Risk    Level of Risk/Frequency of Follow-up: high     Monitor and Evaluation    Food and Nutrient Intake: enteral nutrition intake  Food and Nutrient Adminstration: enteral and parenteral nutrition administration     Nutrition Follow-Up    RD Follow-up?: Yes

## 2022-05-12 NOTE — PROGRESS NOTES
Pharmacokinetic Assessment Follow Up: IV Vancomycin    Vancomycin serum concentration assessment(s):    The trough level was drawn incorrectly and cannot be used to guide therapy at this time.      Vancomycin Regimen Plan:    Pt missed dose due to surgery. Retimed regimen (continue on 1250mg q8h) and will get new trough on 5/13 @ 0900.    Drug levels (last 3 results):  No results for input(s): VANCOMYCINRA, VANCOMYCINPE, VANCOMYCINTR, VANCOTROUGH in the last 72 hours.    Pharmacy will continue to follow and monitor vancomycin.    Please contact pharmacy at extension [phone] for questions regarding this assessment.    Thank you for the consult,   Adrian LeJeune       Patient brief summary:  Kendall Duff is a 56 y.o. male initiated on antimicrobial therapy with IV Vancomycin for treatment of  post-op wound infection    The patient's current regimen is 1250 mg q8h    Drug Allergies:   Review of patient's allergies indicates:   Allergen Reactions    Depakote [divalproex]     Divalproex sodium Other (See Comments)    Lithium     Lithium analogues     Quetiapine Other (See Comments)       Actual Body Weight:   77.2 kg    Renal Function:   Estimated Creatinine Clearance: 161.2 mL/min (A) (based on SCr of 0.53 mg/dL (L)).,     Dialysis Method (if applicable):  N/A    CBC (last 72 hours):  No results for input(s): WHITE BLOOD CELL COUNT, HEMOGLOBIN, HEMATOCRIT, PLATELETS, GRAN%, LYMPH%, MONO%, EOSINOPHIL%, BASOPHIL%, DIFFERENTIAL METHOD in the last 72 hours.    Metabolic Panel (last 72 hours):  No results for input(s): SODIUM, POTASSIUM, CHLORIDE, CO2, GLUCOSE, BUN BLD, CREATININE, ALBUMIN, BILIRUBIN TOTAL, ALK PHOS, AST, ALT, MAGNESIUM, PHOSPHORUS in the last 72 hours.    Vancomycin Administrations:  vancomycin given in the last 96 hours                     vancomycin (VANCOCIN) 1,250 mg in dextrose 5 % 250 mL IVPB (mg) 1,250 mg New Bag 05/12/22 0856     1,250 mg New Bag 05/11/22 2206     1,250 mg New Bag  1400     1,250 mg New  Bag  0602     1,250 mg New Bag 05/10/22 2141     1,250 mg New Bag  1500     1,250 mg New Bag  0607     1,250 mg New Bag 05/09/22 2202     1,250 mg New Bag  1435     1,250 mg New Bag  0629     1,250 mg New Bag 05/08/22 2306     1,250 mg New Bag  1407                    Microbiologic Results:  Microbiology Results (last 7 days)       Procedure Component Value Units Date/Time    Gram Stain [[id number]] Collected: 05/11/22 1429    Order Status: Completed Specimen: Tissue from Sternum Updated: 05/12/22 0829     GRAM STAIN No WBCs, No bacteria seen     Gram Stain [[id number]] Collected: 05/11/22 1432    Order Status: Completed Specimen: Bone from Sternum Updated: 05/12/22 0828     GRAM STAIN No WBCs, No bacteria seen     AFB Smear [[id number]] Collected: 05/11/22 1432    Order Status: Completed Specimen: Bone from Sternum Updated: 05/12/22 0757     AFB Smear No AFB seen (Direct smear only)    AFB Smear [[id number]] Collected: 05/11/22 1429    Order Status: Completed Specimen: Tissue from Sternum Updated: 05/12/22 0757     AFB Smear No AFB seen (Direct smear only)    Tissue Culture - Aerobic [[id number]] Collected: 05/11/22 1432    Order Status: Completed Specimen: Bone from Sternum Updated: 05/12/22 0723     CULTURE, TISSUE- AEROBIC ([facility]) No Growth At 24 Hours    Tissue Culture - Aerobic [[id number]] Collected: 05/11/22 1429    Order Status: Completed Specimen: Tissue from Sternum Updated: 05/12/22 0723     CULTURE, TISSUE- AEROBIC ([facility]) No Growth At 24 Hours    Fungal Culture [[id number]] Collected: 05/11/22 1432    Order Status: Sent Specimen: Bone from Sternum Updated: 05/11/22 1515    Anaerobic Culture [[id number]] Collected: 05/11/22 1432    Order Status: Sent Specimen: Bone from Sternum Updated: 05/11/22 1515    Fungal Culture [[id number]] Collected: 05/11/22 1429    Order Status: Sent Specimen: Tissue from Sternum Updated: 05/11/22 1514    Anaerobic Culture [[id number]] Collected: 05/11/22 1425    Order Status: Sent  Specimen: Tissue from Sternum Updated: 05/11/22 1510

## 2022-05-12 NOTE — NURSING
Dr. Castillo at bedside. Patient extremely restless and maxed out on all sedation. Instructed to give 2mg IVP of ativan for restlessness, wants to keep patient very still due to recent surgery.

## 2022-05-12 NOTE — PROGRESS NOTES
[facility] - [facility]  Critical Care - Medicine  Progress Note    Patient Name: Kendall Duff  MRN: [id number]  Admission Date: 4/1/2022  Hospital Length of Stay: 41 days  Code Status: Prior  Attending Provider: Max Castillo MD  Primary Care Provider: Primary Doctor No   Principal Problem: <principal problem not specified>    Subjective:     HPI:  55 yo admitted 4/1 and underwent CABG x 4 on 4/6.  Hx of schizophrenia and BPAD       Hospital/ICU Course: Developed MRSA sternal wound. Intubated 4/15.   Underwent debridement with wound vac on 4/17.  Sputum culture from 4/26 with Klebsiella and proteus    Interval History/Significant Events:  Patient underwent surgery yesterday and had his bilateral pectoral flaps done for closure.  He has been intermittently febrile.  Bone and tissue cultures were obtained during the procedure and are in progress.  The patient has relatively well sedated although he is intermittently moving his legs some.  We will adjust sedation.  Objective:     Vital Signs (Most Recent):  Temp: (!) 101.7 °F (38.7 °C) (05/12/22 0800)  Pulse: 85 (05/12/22 0800)  Resp: (!) 22 (05/12/22 0800)  BP: 106/73 (05/12/22 0800)  SpO2: 100 % (05/12/22 0800) Vital Signs (24h Range):  Temp:  [97.9 °F (36.6 °C)-102.9 °F (39.4 °C)] 101.7 °F (38.7 °C)  Pulse:  [] 85  Resp:  [20-65] 22  SpO2:  [90 %-100 %] 100 %  BP: ()/() 106/73     Weight: 77.2 kg (170 lb 3.1 oz)  Body mass index is 23.83 kg/m².      Intake/Output Summary (Last 24 hours) at 5/12/2022 0838  Last data filed at 5/12/2022 0400  Gross per 24 hour   Intake 872.75 ml   Output 2050 ml   Net -1177.25 ml            Current Facility-Administered Medications:     0.9%  NaCl infusion (for blood administration), , Intravenous, Q24H PRN, Salbador Alvarez MD    acetaminophen tablet 650 mg, 650 mg, Oral, Q4H PRN, Juancarlos Foreman MD, 650 mg at 05/12/22 0230    albuterol-ipratropium 2.5 mg-0.5 mg/3 mL nebulizer solution 3  mL, 3 mL, Nebulization, Q12H PRN, Juancarlos Foreman MD    atorvastatin tablet 80 mg, 80 mg, Oral, Daily, Juancarlos Foreman MD, 80 mg at 05/11/22 0815    benztropine tablet 1 mg, 1 mg, Per OG tube, BID, Juancarlos Foreman MD, 1 mg at 05/11/22 2117    bisacodyL suppository 10 mg, 10 mg, Rectal, Daily PRN, Juancarlos Foreman MD    cefTRIAXone 2 gram/50 mL IVPB 2 g, 2 g, Intravenous, Q24H, Carroll Hammer Jr., MD, FCCP, Last Rate: 100 mL/hr at 05/12/22 0428, 2 g at 05/12/22 0428    dexmedetomidine (PRECEDEX) 400mcg/100mL 0.9% NaCL infusion, 0-1.4 mcg/kg/hr, Intravenous, Continuous, Sukhjinder Michael MD, Last Rate: 24.7 mL/hr at 05/12/22 0355, 1.3 mcg/kg/hr at 05/12/22 0355    dextrose 10% bolus 250 mL, 250 mL, Intravenous, Once PRN, Juancarlos Foreman MD    doxepin capsule 50 mg, 50 mg, Oral, QHS, Juancarlos Foreman MD, 50 mg at 05/11/22 2130    enoxaparin injection 80 mg, 1 mg/kg (Dosing Weight), Subcutaneous, Q12H, Mariah Zhang MD    ezetimibe tablet 10 mg, 10 mg, Oral, QHS, Juancarlos Foreman MD, 10 mg at 05/11/22 2117    famotidine (PF) injection 20 mg, 20 mg, Intravenous, BID, Juancarlos Foreman MD, 20 mg at 05/11/22 2117    fentaNYL injection 100 mcg, 100 mcg, Intravenous, Q1H PRN, Juancarlos Foreman MD, 100 mcg at 05/11/22 2117    fentaNYL injection 50 mcg, 50 mcg, Intravenous, Q1H PRN, Juancarlos Foreman MD, 50 mcg at 05/07/22 1707    glycopyrrolate tablet 1 mg, 1 mg, Per NG tube, TID, Sukhjinder Michael MD, 1 mg at 05/11/22 2205    haloperidoL tablet 5 mg, 5 mg, Per OG tube, BID, Juancarlos Foreman MD, 5 mg at 05/11/22 2117    hydrALAZINE injection 10 mg, 10 mg, Intravenous, Q2H PRN, Juancarlos Foreman MD    levETIRAcetam (KEPPRA) 500 mg in sodium chloride 0.9 % 100 mL IVPB (MB+), 500 mg, Intravenous, Q12H, Carroll Hammer Jr., MD, FCCP, 500 mg at 05/11/22 2117    lorazepam injection 2 mg, 2 mg, Intravenous, Q2H PRN, Max Castillo MD, 2 mg at 05/09/22 2036    metoprolol tartrate (LOPRESSOR) tablet  25 mg, 25 mg, Per OG tube, Daily, Abstracted Order, MD, 25 mg at 05/08/22 0815    morphine injection 2 mg, 2 mg, Intravenous, Q1H PRN, Abstracted Order, MD    morphine injection 4 mg, 4 mg, Intravenous, Q4H PRN, Abstracted Order, MD, 4 mg at 05/11/22 1913    nitroGLYCERIN SL tablet 0.4 mg, 0.4 mg, Sublingual, Q5 Min PRN, Abstracted Order, MD    NORepinephrine bitartrate-NaCl 8 mg/250 mL (32 mcg/mL) infusion, 0-3 mcg/kg/min (Dosing Weight), Intravenous, Continuous, Abstracted Order, MD, Paused at 05/04/22 0600    OLANZapine injection 10 mg, 10 mg, Intramuscular, Q8H PRN, Abstracted Order, MD    ondansetron injection 4 mg, 4 mg, Intravenous, Q4H PRN, Abstracted Order, MD    OXcarbazepine tablet 300 mg, 300 mg, Per OG tube, BID, Abstracted Order, MD, 300 mg at 05/11/22 2117    oxyCODONE immediate release tablet 10 mg, 10 mg, Oral, Q4H PRN, Abstracted Order, MD    oxyCODONE immediate release tablet 5 mg, 5 mg, Oral, Q4H PRN, Abstracted Order, MD, 5 mg at 05/12/22 0117    potassium chloride 20 mEq in 100 mL IVPB (FOR CENTRAL LINE ADMINISTRATION ONLY), 20 mEq, Intravenous, PRN, Abstracted Order, MD    potassium chloride 20 mEq in 100 mL IVPB (FOR CENTRAL LINE ADMINISTRATION ONLY), 40 mEq, Intravenous, PRN, Abstracted Order, MD    potassium chloride 20 mEq in 100 mL IVPB (FOR CENTRAL LINE ADMINISTRATION ONLY), 60 mEq, Intravenous, PRN, Abstracted Order, MD    propofol (DIPRIVAN) 10 mg/mL infusion, 5-50 mcg/kg/min (Dosing Weight), Intravenous, Continuous, Sukhjinder Michael MD, Last Rate: 18.3 mL/hr at 05/12/22 0650, 40 mcg/kg/min at 05/12/22 0650    rifAMpin capsule 300 mg, 300 mg, Oral, BID, Abstracted Order, MD, 300 mg at 05/11/22 2116    sodium chloride 0.9% flush 10 mL, 10 mL, Intravenous, PRN, Abstracted Order, MD    sodium chloride 0.9% flush 10 mL, 10 mL, Intravenous, Q12H, Juancarlos Foreman MD, 10 mL at 05/11/22 0818    vancomycin (VANCOCIN) 1,250 mg in dextrose 5 % 250 mL IVPB, 1,250 mg, Intravenous,  Q8H, Abstracted Order, MD, Stopped at 05/11/22 2336    ziprasidone injection 20 mg, 20 mg, Intramuscular, Q8H PRN, Abstracted Order, MD     Vents:  Vent Mode: PRVC A/C (05/12/22 0449)  Set Rate: 16 BPM (05/12/22 0449)  Vt Set: 450 mL (05/12/22 0449)  PEEP/CPAP: 5 cmH20 (05/12/22 0449)  Oxygen Concentration (%): 40 (05/12/22 0449)  Peak Airway Pressure: 16 cmH2O (05/12/22 0449)  Plateau Pressure: 6 cmH20 (05/11/22 1800)  Total Ve: 11.5 mL (05/12/22 0449)  F/VT Ratio<105 (RSBI): (!) 70.35 (05/12/22 0449)    Lines/Drains/Airways     Peripherally Inserted Central Catheter Line  Duration           PICC Triple Lumen 05/01/22 0523 right basilic 11 days          Drain  Duration                Gastrostomy/Enterostomy 05/10/22  Percutaneous endoscopic gastrostomy (PEG) midline feeding 2 days         Closed/Suction Drain 05/11/22 Lateral LUQ 15 Fr. 1 day         Closed/Suction Drain 05/11/22 Lateral RUQ Bulb 15 Fr. 1 day         Closed/Suction Drain 05/11/22 1724 Lateral;Right RUQ Bulb <1 day         Closed/Suction Drain 05/11/22 1726 Lateral;Left LUQ Bulb <1 day         Closed/Suction Drain 05/11/22 1726 Lateral;Left LUQ Bulb <1 day          Airway  Duration                Surgical Airway 05/10/22 1139 Shiley Cuffed 1 day              Constitutional:       General: He is not in acute distress.     Appearance: He is not toxic-appearing.      Comments: Sedated and mechanically ventilated via trach.   HENT:      Head: Normocephalic and atraumatic.      Mouth/Throat:      Comments: Tongue swelling notably improved  Eyes:      Extraocular Movements: Extraocular movements intact.      Pupils: Pupils are equal, round, and reactive to light.   Cardiovascular:      Rate and Rhythm: Normal rate and regular rhythm.      Pulses: Normal pulses.      Heart sounds: No murmur heard.    No gallop. , dressing is in place over sternal revision.  There are 5 KELLIE drains with various quantities of bloody output  Pulmonary:      Effort: No  respiratory distress.      Breath sounds: No stridor. No wheezing, rhonchi or rales.   Abdominal:      General: Abdomen is flat.      Palpations: Abdomen is soft.      Comments: PEG in place , binder is in place  Musculoskeletal:         General: No swelling or deformity.      Right lower leg: no present.      Left lower leg: No edema.   Skin:     General: Skin is warm.      Coloration: Skin is not jaundiced.      Findings: No bruising.   Neurological:      Comments: Patient sedated and therefore unable to accurately exam     Significant Labs:    Lab Results   Component Value Date    WBC 6.7 05/09/2022    HGB 10.1 (L) 05/09/2022    HCT 32.0 (L) 05/09/2022    MCV 85.6 05/09/2022     05/09/2022         BMP  Lab Results   Component Value Date     05/09/2022    K 4.2 05/09/2022     03/25/2022    CO2 26 05/09/2022    BUN 19.8 05/09/2022    CREATININE 0.53 (L) 05/09/2022    CALCIUM 8.2 (L) 05/09/2022    ANIONGAP 9 03/25/2022    ESTGFRAFRICA >60.0 03/25/2022    EGFRNONAA >60 05/09/2022       ABG  Recent Labs   Lab 05/10/22  0927   PH 7.41   PO2 105*   PCO2 41       CBC and CMP are still pending for this morning    Significant Imaging:  No new imaging    DVT Prophylaxis: LOVENOX - held for procedure today  GI prophylaxis: PEPCID    Assessment/Plan:        Assessment:  1. CAD with 4 vessel CABG 4/6  2. MRSA Sternal wound dehiscence with debridement and wound vac 4/17.  Plastics following  3. Respiratory culture positive Klebsiella and Proteus  4. Malnutrition.  5. Respiratory failure.  Intubated 4/15  6. RUE DVT  7. Schizophrenia and Bipolar D/O- severe agitation when sedation interrupted.     Plan  - Continue MV support.  - Enteral tube feeds to optimize nutritional status  - postop care for pectoral flaps as per plastics, patient does need to be kept still and sedated for some time  - I am concerned about full-dose Lovenox with the amount of drain output he is demonstrating.  Patient does have the right  upper extremity DVT.  Will discuss with Dr. Weiss.  Patient has been intermittently febrile, additional cultures from debridement are in progress.  As noted above we are still waiting on a.m. labs yet again    Critical care diagnosis:  Respiratory failure  Critical care interventions:  Direct hands-on care of the patient, review of lab and physiologic parameters  Critical care time:  32 minutes personally excluding any procedural time       Max Castillo MD  Critical Care - Medicine  [facility] - [address] [place] [address]

## 2022-05-12 NOTE — NURSING
Dr. Bennett notified due patient only having a total urine output of 200mls. No new orders given at this time.

## 2022-05-12 NOTE — NURSING
Dr. Castillo due to patients MAP in 40's-50's and a temperature of 102.6. Instructed to restart on levophed and give a 1L bolus of NS over one hour.

## 2022-05-12 NOTE — OP NOTE
[facility]                       [address] [place]                      [place] [address]    PATIENT NAME:      NITESH BHANDARI   YOB: 1965  CSN:               [id number]  MRN:               [id number]  ADMIT DATE:        04/01/2022 12:44:00  PHYSICIAN:         Adolfo Weiss MD                          OPERATIVE REPORT      DATE OF SURGERY:    05/11/2022 00:00:00    SURGEON:  Adolfo Weiss MD    PROCEDURES:    1. Sternal wound debridement consisting of skin, subcutaneous tissue, fascia,   muscle and bone measuring 27 x 6 cm.  2. Bilateral fasciocutaneous flap advancement closure over sternum.  3. Bilateral pectoralis major muscle flap reconstruction of sternum.    INDICATIONS FOR THE PROCEDURE:   This is a 56-year-old gentleman who is status   post coronary artery bypass grafting.  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  He now presents for sternal reconstruction.  Informed   consent was obtained from the patient's daughter for this procedure.  She did   understand that he is at increased risk of wound healing complications due to   his multiple medical problems and malnourished state.    DESCRIPTION OF THE PROCEDURE:  Patient was brought to the operating room and   placed in supine position.  Anesthesia was provided by our anesthesia   colleagues.  He was prepped and draped in a sterile surgical fashion.  A   time-out was taken, everyone was in agreement.  I initially started the   procedure by staining his entire wound bed with methylene blue and then made an   elliptical incision around his wound and using electrocautery, removed the skin,   subcutaneous tissue, muscle, fascia, down to the bone using electrocautery.    The specimens passed off the field and sent to Pathology and also sent for   culture, and then used a rongeur to resect  sternal bone on the right and left   sides in the area of nonunion throughout its entire length, back down to   bleeding tissue in all areas.  This was then passed off the field also to   Pathology and as a microbiology specimen for culture.  After this was   accomplished, I then elevated fasciocutaneous flaps off the anterior chest wall,   elevating this off the pectoralis major muscle.  This was elevated from the   sternal area, all the way to the level of the anterior axillary line on the   right and left sides.  After this was accomplished, I then elevated the   pectoralis major muscles, initially on the left side and then the right side   using electrocautery.  This was elevated off the chest wall, releasing its   sternal IMF and clavicular insertions.  I then proceeded to identify the   thoracoacromial pedicle, and these were preserved on the right and left sides.    I then made a counter incision in the right and left axilla.  Dissection was   achieved with electrocautery to identify the humeral insertion of the pectoralis   major muscles.  These were released on the right and left side using   electrocautery.  These counter incisions were then closed using 0 Vicryl sutures   and 2-0 subcuticular Stratafix.  We then ensured hemostasis throughout the   wound bed.  Please note, prior to elevating the flaps, I did Pulsavac the entire   wound bed with saline.  Hemostasis was ensured.  I then placed Shawn   throughout the wound bed to aid in hemostasis.  We then began with insetting the   flaps.  The left flap was inset inferiorly and the superior flap was inset   superiorly to the costochondral cartilages.  The flaps were also sutured to each   other using mattress style 0 Vicryl sutures.  This covered the entire span of   the sternal defect.  The muscle was dumped down over the mediastinal contents   for excellent repair.  I then placed a total of five 15 round Kehinde drains   within the wound bed.  These exited  inferiorly and secured to the skin with 2-0   silk suture.  We sprayed just a seal throughout the wound bed and then proceeded   with closure by advancing the fasciocutaneous flaps centrally.  0 Vicryl   sutures were used in the subcutaneous tissue and dermis in interrupted fashion,   and then 2-0 subcuticular Monocryl.  Dermabond tape was used as a dressing on   our sternal incision and Dermabond was used on the axillary incisions.  There   were no complications and all counts were correct.        ______________________________  MD POOL Varma/AQS  DD:  05/11/2022  Time:  08:58PM  DT:  05/11/2022  Time:  10:12PM  Job #:  [id number]      OPERATIVE REPORT

## 2022-05-12 NOTE — PROGRESS NOTES
PRS  POD 1  AFVSS  Incisions c/d/i, drains thin dark sanguinous, output noted  CBC pending this morning  Cultures from surgery pending    Stable after surgery yesterday.  Arms in at sides at all times.  Minimize agitation and excessive movement with sedation as needed.  Continue drain care.  Aggressive nutritional support.  Will follow.

## 2022-05-13 LAB
ALBUMIN SERPL-MCNC: 1.6 GM/DL (ref 3.5–5)
ALBUMIN/GLOB SERPL: 0.4 RATIO (ref 1.1–2)
ALP SERPL-CCNC: 114 UNIT/L (ref 40–150)
ALT SERPL-CCNC: 23 UNIT/L (ref 0–55)
AST SERPL-CCNC: 45 UNIT/L (ref 5–34)
BASE EXCESS ARTERIAL: -3.1 MMOL/L (ref -2–2)
BASOPHILS # BLD AUTO: 0.05 X10(3)/MCL (ref 0–0.2)
BASOPHILS NFR BLD AUTO: 0.7 %
BILIRUBIN DIRECT+TOT PNL SERPL-MCNC: 0.6 MG/DL
BUN SERPL-MCNC: 14.7 MG/DL (ref 8.4–25.7)
CALCIUM SERPL-MCNC: 7.5 MG/DL (ref 8.4–10.2)
CHLORIDE SERPL-SCNC: 105 MMOL/L (ref 98–107)
CO2 SERPL-SCNC: 23 MMOL/L (ref 22–29)
CREAT SERPL-MCNC: 0.59 MG/DL (ref 0.73–1.18)
EOSINOPHIL # BLD AUTO: 0.55 X10(3)/MCL (ref 0–0.9)
EOSINOPHIL NFR BLD AUTO: 8 %
ERYTHROCYTE [DISTWIDTH] IN BLOOD BY AUTOMATED COUNT: 15.3 % (ref 11.5–17)
GLOBULIN SER-MCNC: 3.6 GM/DL (ref 2.4–3.5)
GLUCOSE SERPL-MCNC: 197 MG/DL (ref 74–100)
HCO3 ARTERIAL: 20.9 MMOL/L (ref 18–23)
HCT VFR BLD AUTO: 27.3 % (ref 42–52)
HGB BLD-MCNC: 11.2 G/DL
HGB BLD-MCNC: 8.5 GM/DL (ref 14–18)
IMM GRANULOCYTES # BLD AUTO: 0.02 X10(3)/MCL (ref 0–0.02)
IMM GRANULOCYTES NFR BLD AUTO: 0.3 % (ref 0–0.43)
LYMPHOCYTES # BLD AUTO: 1.31 X10(3)/MCL (ref 0.6–4.6)
LYMPHOCYTES NFR BLD AUTO: 19.1 %
MCH RBC QN AUTO: 27.2 PG (ref 27–31)
MCHC RBC AUTO-ENTMCNC: 31.1 MG/DL (ref 33–36)
MCV RBC AUTO: 87.5 FL (ref 80–94)
MONOCYTES # BLD AUTO: 0.87 X10(3)/MCL (ref 0.1–1.3)
MONOCYTES NFR BLD AUTO: 12.7 %
NEUTROPHILS # BLD AUTO: 4.1 X10(3)/MCL (ref 2.1–9.2)
NEUTROPHILS NFR BLD AUTO: 59.2 %
NRBC BLD AUTO-RTO: 0 %
PCO2 BLDA: 33 MM[HG]
PCO2 BLDA: ABNORMAL MM[HG]
PH SMN: 7.41 [PH]
PLATELET # BLD AUTO: 259 X10(3)/MCL (ref 130–400)
PMV BLD AUTO: 9.5 FL (ref 9.4–12.4)
PO2 BLDA: 65 MM[HG]
POC COHB: 2.4
POC IONIZED CALCIUM: 1.12
POC METHB: 1
POC O2HB: 92.4
POTASSIUM BLD-SCNC: 3.5 MMOL/L
POTASSIUM SERPL-SCNC: 3.8 MMOL/L (ref 3.5–5.1)
PROT SERPL-MCNC: 5.2 GM/DL (ref 6.4–8.3)
RBC # BLD AUTO: 3.12 X10(6)/MCL (ref 4.7–6.1)
SATURATED O2 ARTERIAL, I-STAT: ABNORMAL
SODIUM BLD-SCNC: 129 MMOL/L
SODIUM SERPL-SCNC: 135 MMOL/L (ref 136–145)
VANCOMYCIN TROUGH SERPL-MCNC: 22.1 UG/ML (ref 15–20)
WBC # SPEC AUTO: 6.9 X10(3)/MCL (ref 4.5–11.5)

## 2022-05-13 PROCEDURE — 63600175 PHARM REV CODE 636 W HCPCS: Performed by: STUDENT IN AN ORGANIZED HEALTH CARE EDUCATION/TRAINING PROGRAM

## 2022-05-13 PROCEDURE — 36415 COLL VENOUS BLD VENIPUNCTURE: CPT | Performed by: INTERNAL MEDICINE

## 2022-05-13 PROCEDURE — 25000003 PHARM REV CODE 250: Performed by: HOSPITALIST

## 2022-05-13 PROCEDURE — 82803 BLOOD GASES ANY COMBINATION: CPT

## 2022-05-13 PROCEDURE — 36600 WITHDRAWAL OF ARTERIAL BLOOD: CPT

## 2022-05-13 PROCEDURE — 99231 SBSQ HOSP IP/OBS SF/LOW 25: CPT | Mod: ,,, | Performed by: SURGERY

## 2022-05-13 PROCEDURE — 85025 COMPLETE CBC W/AUTO DIFF WBC: CPT | Performed by: STUDENT IN AN ORGANIZED HEALTH CARE EDUCATION/TRAINING PROGRAM

## 2022-05-13 PROCEDURE — 63600175 PHARM REV CODE 636 W HCPCS

## 2022-05-13 PROCEDURE — 25000003 PHARM REV CODE 250: Performed by: INTERNAL MEDICINE

## 2022-05-13 PROCEDURE — 20000000 HC ICU ROOM

## 2022-05-13 PROCEDURE — 25000003 PHARM REV CODE 250

## 2022-05-13 PROCEDURE — 94003 VENT MGMT INPAT SUBQ DAY: CPT

## 2022-05-13 PROCEDURE — 80053 COMPREHEN METABOLIC PANEL: CPT | Performed by: STUDENT IN AN ORGANIZED HEALTH CARE EDUCATION/TRAINING PROGRAM

## 2022-05-13 PROCEDURE — 27200966 HC CLOSED SUCTION SYSTEM

## 2022-05-13 PROCEDURE — 63600175 PHARM REV CODE 636 W HCPCS: Performed by: INTERNAL MEDICINE

## 2022-05-13 PROCEDURE — 94761 N-INVAS EAR/PLS OXIMETRY MLT: CPT

## 2022-05-13 PROCEDURE — 99231 PR SUBSEQUENT HOSPITAL CARE,LEVL I: ICD-10-PCS | Mod: ,,, | Performed by: SURGERY

## 2022-05-13 PROCEDURE — 80202 ASSAY OF VANCOMYCIN: CPT | Performed by: INTERNAL MEDICINE

## 2022-05-13 PROCEDURE — 27000221 HC OXYGEN, UP TO 24 HOURS

## 2022-05-13 PROCEDURE — A4216 STERILE WATER/SALINE, 10 ML: HCPCS

## 2022-05-13 PROCEDURE — 25000003 PHARM REV CODE 250: Performed by: STUDENT IN AN ORGANIZED HEALTH CARE EDUCATION/TRAINING PROGRAM

## 2022-05-13 PROCEDURE — 99900035 HC TECH TIME PER 15 MIN (STAT)

## 2022-05-13 PROCEDURE — 36415 COLL VENOUS BLD VENIPUNCTURE: CPT | Performed by: STUDENT IN AN ORGANIZED HEALTH CARE EDUCATION/TRAINING PROGRAM

## 2022-05-13 PROCEDURE — 63600175 PHARM REV CODE 636 W HCPCS: Performed by: HOSPITALIST

## 2022-05-13 PROCEDURE — 99900026 HC AIRWAY MAINTENANCE (STAT)

## 2022-05-13 RX ORDER — VANCOMYCIN HCL IN 5 % DEXTROSE 1G/250ML
1000 PLASTIC BAG, INJECTION (ML) INTRAVENOUS EVERY 8 HOURS
Status: DISCONTINUED | OUTPATIENT
Start: 2022-05-14 | End: 2022-05-20

## 2022-05-13 RX ADMIN — RIFAMPIN 300 MG: 300 CAPSULE ORAL at 08:05

## 2022-05-13 RX ADMIN — FAMOTIDINE 20 MG: 10 INJECTION, SOLUTION INTRAVENOUS at 08:05

## 2022-05-13 RX ADMIN — PROPOFOL 50 MCG/KG/MIN: 10 INJECTION, EMULSION INTRAVENOUS at 05:05

## 2022-05-13 RX ADMIN — DEXMEDETOMIDINE HYDROCHLORIDE 1.4 MCG/KG/HR: 400 INJECTION INTRAVENOUS at 04:05

## 2022-05-13 RX ADMIN — PROPOFOL 50 MCG/KG/MIN: 10 INJECTION, EMULSION INTRAVENOUS at 02:05

## 2022-05-13 RX ADMIN — HALOPERIDOL 5 MG: 5 TABLET ORAL at 08:05

## 2022-05-13 RX ADMIN — OXCARBAZEPINE 300 MG: 300 TABLET, FILM COATED ORAL at 08:05

## 2022-05-13 RX ADMIN — FENTANYL CITRATE 100 MCG: 50 INJECTION INTRAMUSCULAR; INTRAVENOUS at 02:05

## 2022-05-13 RX ADMIN — DEXMEDETOMIDINE HYDROCHLORIDE 1.4 MCG/KG/HR: 400 INJECTION INTRAVENOUS at 05:05

## 2022-05-13 RX ADMIN — BENZTROPINE MESYLATE 1 MG: 1 TABLET ORAL at 08:05

## 2022-05-13 RX ADMIN — PROPOFOL 40 MCG/KG/MIN: 10 INJECTION, EMULSION INTRAVENOUS at 09:05

## 2022-05-13 RX ADMIN — DEXMEDETOMIDINE HYDROCHLORIDE 1.4 MCG/KG/HR: 400 INJECTION INTRAVENOUS at 11:05

## 2022-05-13 RX ADMIN — DEXMEDETOMIDINE HYDROCHLORIDE 1.4 MCG/KG/HR: 400 INJECTION INTRAVENOUS at 01:05

## 2022-05-13 RX ADMIN — GLYCOPYRROLATE 1 MG: 1 TABLET ORAL at 09:05

## 2022-05-13 RX ADMIN — LEVETIRACETAM 500 MG: 100 INJECTION, SOLUTION INTRAVENOUS at 08:05

## 2022-05-13 RX ADMIN — DOXEPIN HYDROCHLORIDE 50 MG: 50 CAPSULE ORAL at 08:05

## 2022-05-13 RX ADMIN — PROPOFOL 50 MCG/KG/MIN: 10 INJECTION, EMULSION INTRAVENOUS at 01:05

## 2022-05-13 RX ADMIN — PIPERACILLIN SODIUM AND TAZOBACTAM SODIUM 4.5 G: 4; .5 INJECTION, POWDER, LYOPHILIZED, FOR SOLUTION INTRAVENOUS at 08:05

## 2022-05-13 RX ADMIN — GLYCOPYRROLATE 1 MG: 1 TABLET ORAL at 08:05

## 2022-05-13 RX ADMIN — SODIUM CHLORIDE, PRESERVATIVE FREE 10 ML: 5 INJECTION INTRAVENOUS at 08:05

## 2022-05-13 RX ADMIN — Medication 0.04 MCG/KG/MIN: at 09:05

## 2022-05-13 RX ADMIN — VANCOMYCIN HYDROCHLORIDE 1250 MG: 1.25 INJECTION, POWDER, LYOPHILIZED, FOR SOLUTION INTRAVENOUS at 06:05

## 2022-05-13 RX ADMIN — PIPERACILLIN SODIUM AND TAZOBACTAM SODIUM 4.5 G: 4; .5 INJECTION, POWDER, LYOPHILIZED, FOR SOLUTION INTRAVENOUS at 04:05

## 2022-05-13 RX ADMIN — DEXMEDETOMIDINE HYDROCHLORIDE 1.4 MCG/KG/HR: 400 INJECTION INTRAVENOUS at 12:05

## 2022-05-13 RX ADMIN — PROPOFOL 50 MCG/KG/MIN: 10 INJECTION, EMULSION INTRAVENOUS at 06:05

## 2022-05-13 RX ADMIN — PROPOFOL 50 MCG/KG/MIN: 10 INJECTION, EMULSION INTRAVENOUS at 09:05

## 2022-05-13 RX ADMIN — ATORVASTATIN CALCIUM 80 MG: 40 TABLET, FILM COATED ORAL at 08:05

## 2022-05-13 RX ADMIN — EZETIMIBE 10 MG: 10 TABLET ORAL at 08:05

## 2022-05-13 RX ADMIN — VANCOMYCIN HYDROCHLORIDE 1000 MG: 1 INJECTION, POWDER, LYOPHILIZED, FOR SOLUTION INTRAVENOUS at 11:05

## 2022-05-13 RX ADMIN — DEXMEDETOMIDINE HYDROCHLORIDE 1.4 MCG/KG/HR: 400 INJECTION INTRAVENOUS at 08:05

## 2022-05-13 RX ADMIN — SODIUM CHLORIDE, PRESERVATIVE FREE 10 ML: 5 INJECTION INTRAVENOUS at 09:05

## 2022-05-13 RX ADMIN — DEXMEDETOMIDINE HYDROCHLORIDE 1.4 MCG/KG/HR: 400 INJECTION INTRAVENOUS at 09:05

## 2022-05-13 RX ADMIN — GLYCOPYRROLATE 1 MG: 1 TABLET ORAL at 03:05

## 2022-05-13 NOTE — PROGRESS NOTES
PRS  Intubated and sedated  Incisions c/d/i, drains more this serosanginous, output noted  No signs of hematoma or breakdown  CBC pending  Cx NGTD    No changes to care from my standpoint.  Continue drain care. Arms in at sides.

## 2022-05-13 NOTE — PROGRESS NOTES
Pharmacokinetic Assessment Follow Up: IV Vancomycin    Vancomycin serum concentration assessment(s):    Patient was on 1250 mg IV q8h    The trough level was drawn correctly and can be used to guide therapy at this time. The measurement is above the desired definitive target range of 15 to 20 mcg/mL.    Vancomycin Regimen Plan:    Change regimen to Vancomycin 1000 mg IV every 8 hours with next serum trough concentration measured at 0700 prior to 5th dose on 5/15    Drug levels (last 3 results):  Recent Labs   Lab Result Units 05/13/22  1353   Vancomycin Trough ug/ml 22.1*       Pharmacy will continue to follow and monitor vancomycin.    Please contact pharmacy at extension [phone] for questions regarding this assessment.    Thank you for the consult,   Oneal Edmonds       Patient brief summary:  Kendall Duff is a 56 y.o. male initiated on antimicrobial therapy with IV Vancomycin for treatment of  post-op wound infection    The patient's current regimen is 1000mg IV q8h    Drug Allergies:   Review of patient's allergies indicates:   Allergen Reactions    Depakote [divalproex]     Divalproex sodium Other (See Comments)    Lithium     Lithium analogues     Quetiapine Other (See Comments)       Actual Body Weight:   70kg    Renal Function:   Estimated Creatinine Clearance: 144.8 mL/min (A) (based on SCr of 0.59 mg/dL (L)).,         CBC (last 72 hours):  Recent Labs   Lab Result Units 05/12/22  0930 05/12/22  1734 05/13/22  0757   WBC x10(3)/mcL 6.8 6.9 6.9   Hgb gm/dL 10.5* 9.2* 8.5*   Hct % 33.5* 29.2* 27.3*   Platelet x10(3)/mcL 339 328 259   Mono Auto % 12.6 13.5 12.7   Eos Auto % 0.7 3.7 8.0   Basophil Auto % 0.9 0.9 0.7       Metabolic Panel (last 72 hours):  Recent Labs   Lab Result Units 05/12/22  0930 05/13/22  0757   Sodium Level mmol/L 137 135*   Potassium Level mmol/L 4.5 3.8   Chloride mmol/L 104 105   Carbon Dioxide mmol/L 24 23   Glucose Level mg/dL 112* 197*   Blood Urea Nitrogen mg/dL 13.5 14.7   Creatinine  mg/dL 0.70* 0.59*   Albumin Level gm/dL 1.9* 1.6*   Bilirubin Total mg/dL 1.8* 0.6   Alkaline Phosphatase unit/L 124 114   Aspartate Aminotransferase unit/L 62* 45*   Alanine Aminotransferase unit/L 28 23       Vancomycin Administrations:  vancomycin given in the last 96 hours                     vancomycin 1.25 g in dextrose 5% 250 mL IVPB (ready to mix) (mg) 1,250 mg New Bag 05/13/22 0602     1,250 mg New Bag 05/12/22 2124     1,250 mg New Bag  1426    vancomycin (VANCOCIN) 1,250 mg in dextrose 5 % 250 mL IVPB (mg) 1,250 mg New Bag 05/11/22 2206     1,250 mg New Bag  1400     1,250 mg New Bag  0602     1,250 mg New Bag 05/10/22 2141     1,250 mg New Bag  1500     1,250 mg New Bag  0607     1,250 mg New Bag 05/09/22 2202                    Microbiologic Results:  Microbiology Results (last 7 days)       Procedure Component Value Units Date/Time    Tissue Culture - Aerobic [[id number]] Collected: 05/11/22 1429    Order Status: Completed Specimen: Tissue from Sternum Updated: 05/13/22 1301     CULTURE, TISSUE- AEROBIC ([facility]) No Growth At 48 Hours    Tissue Culture - Aerobic [[id number]] Collected: 05/11/22 1432    Order Status: Completed Specimen: Bone from Sternum Updated: 05/13/22 1259     CULTURE, TISSUE- AEROBIC ([facility]) No Growth At 48 Hours    Blood Culture [[id number]] Collected: 05/12/22 1741    Order Status: Resulted Specimen: Blood from Hand, Left Updated: 05/12/22 1741    Blood Culture [[id number]] Collected: 05/12/22 1740    Order Status: Resulted Specimen: Blood from Arm Updated: 05/12/22 1740    Gram Stain [[id number]] Collected: 05/11/22 1429    Order Status: Completed Specimen: Tissue from Sternum Updated: 05/12/22 0829     GRAM STAIN No WBCs, No bacteria seen     Gram Stain [[id number]] Collected: 05/11/22 1432    Order Status: Completed Specimen: Bone from Sternum Updated: 05/12/22 0828     GRAM STAIN No WBCs, No bacteria seen     AFB Smear [[id number]] Collected: 05/11/22 1432    Order Status: Completed  Specimen: Bone from Sternum Updated: 05/12/22 0757     AFB Smear No AFB seen (Direct smear only)    AFB Smear [[id number]] Collected: 05/11/22 1429    Order Status: Completed Specimen: Tissue from Sternum Updated: 05/12/22 0757     AFB Smear No AFB seen (Direct smear only)    Fungal Culture [[id number]] Collected: 05/11/22 1432    Order Status: Sent Specimen: Bone from Sternum Updated: 05/11/22 1515    Anaerobic Culture [[id number]] Collected: 05/11/22 1432    Order Status: Sent Specimen: Bone from Sternum Updated: 05/11/22 1515    Fungal Culture [[id number]] Collected: 05/11/22 1429    Order Status: Sent Specimen: Tissue from Sternum Updated: 05/11/22 1514    Anaerobic Culture [[id number]] Collected: 05/11/22 1429    Order Status: Sent Specimen: Tissue from Sternum Updated: 05/11/22 1514

## 2022-05-13 NOTE — PROGRESS NOTES
[facility]                       [address] [address]    PATIENT NAME:       NITESH BHANDARI   YOB: 1965  CSN:                [id number]   MRN:                [id number]  ADMIT DATE:         04/01/2022 12:44:00  PHYSICIAN:          YOLANDE Pearson MD                            PROGRESS NOTE    DATE:      Mr. Bhandari is a 56-year-old black male patient who underwent a CABG x4 by Dr. Carney in April.  The patient has schizophrenia, was very difficult to control   postoperatively, had sternal dehiscence, and developed wound infection.  He was   initially seen by Dr. Arevalo of infectious disease and we will be calling him   back again to revisit today.  He underwent surgery by Dr. Weiss for a sternal   flap, which was done day before yesterday and which he had bilateral pectoral   flaps done for closure.  Bone and tissue cultures were obtained during that   procedure.  I spoke with Dr. Weiss this morning.  He said he has had and   continues to have significant oozing.  He said it is a very bloody surgery.  We   are waiting on labs from this morning.  He wants the patient to be completely   sedated for several days to protect these pectoral flaps.  In addition, he would   like to hold off on any Lovenox for now because of the oozing from the   operative site.  The patient has a known right upper extremity DVT and so we are   holding that for the time being.  We will consider reinstitution of that   tomorrow, which would be okay with Dr. Weiss.  Labs were drawn earlier, but they   for some reason clotted and are having to be redone.    MEDICATIONS:  Reviewed.    PHYSICAL EXAMINATION:  VITAL SIGNS:  Temp is 100.6.  We do not have one from this morning.  That was   the T-max of 100.6.  Blood pressure 96/63.  He is on low-dose Levophed.    Respiratory rate is 20.  Heart rate 61.  O2 sats are 98% on 25% FiO2.  The    patient had good urine output as well.    HEENT:  Unremarkable.  NECK:  He has a Shiley trach tube in place.  CHEST:  He has a binder in place.  The breath sounds are heard bilaterally and   are clear.  HEART:  Regular in rhythm without significant murmur.  ABDOMEN:  A PEG tube is in place.  It is also covered by the binder.  He is   tolerating tube feedings and has active bowel sounds.    LOWER EXTREMITIES:  Without significant edema.  He does have some dependent   edema of the upper extremities.    NEUROLOGICAL:  He is sedated with both Precedex and Diprivan.    LABORATORY DATA:  His lab from this morning as mentioned is pending.  We have a   CBC, a CMP, an ABG, which are all pending.  There are currently no radiographs.    Cultures of blood done on 05/12 are pending at this time.      Dr. Castillo's note said he previously had a klebsiella and proteus sputum   positive for 04/26.  That is not on the current chart.    IMPRESSION:    1. Coronary artery disease with 4-vessel coronary artery bypass graft on   04/06/2022.    2. Methicillin-resistant Staphylococcus aureus sternal wound dehiscence with   debridement and wound vacuum-assisted closure placement, 04/17/2022.    3. Status post bilateral pectoral flaps for sternal wound closure, 05/11/2022.  4. Bipolar and schizophrenia with severe agitation postoperatively which caused   the sternal wound dehiscence in the first place.  5. Respiratory failure, status post tracheostomy, percutaneous endoscopic   gastrostomy.  6. Right upper extremity deep vein thrombosis.  7. Protein calorie malnutrition.    PLAN:    1. As mentioned, we are going to hold further Lovenox for now because of the   continued oozing from the sternal wound from surgery.  2. Will likely need transfusion of blood once we get results of the morning lab,   which is still pending.  3. I spoke with Dr. Weiss.  He would like to continue him on mechanical   ventilation and very sedated for the next  several days to allow healing to   begin.    4. Will continue with nutritional support at this time.  5. Will recheck a chest x-ray in the morning and get morning labs as well.    6. Will review blood work from this morning as they become available and will   likely order transfusion.    CRITICAL CARE DIAGNOSES:    1. Respiratory failure.  2. Septic shock, requiring pressors.  3. Other diagnoses listed above.    CRITICAL CARE INTERVENTIONS:  Hands-on eval, review of labs, adjustment of   pressors and other physiological parameters, discussion of plan of care with Dr. Weiss at bedside.    NOTE:  Patient's daughter, Arabella Bradley, can be reached at [phone].    CRITICAL CARE TIME:  Personally spent has been greater than 36 minutes.        ______________________________  G MD ALICIA Gutierres/AQS  DD:  05/13/2022  Time:  06:56AM  DT:  05/13/2022  Time:  08:27AM  Job #:  [id number]      PROGRESS NOTE

## 2022-05-14 LAB
ALBUMIN SERPL-MCNC: 1.6 GM/DL (ref 3.5–5)
ALBUMIN/GLOB SERPL: 0.4 RATIO (ref 1.1–2)
ALP SERPL-CCNC: 98 UNIT/L (ref 40–150)
ALT SERPL-CCNC: 24 UNIT/L (ref 0–55)
AST SERPL-CCNC: 42 UNIT/L (ref 5–34)
BASOPHILS # BLD AUTO: 0.04 X10(3)/MCL (ref 0–0.2)
BASOPHILS NFR BLD AUTO: 0.6 %
BILIRUBIN DIRECT+TOT PNL SERPL-MCNC: 0.6 MG/DL
BUN SERPL-MCNC: 15.6 MG/DL (ref 8.4–25.7)
CALCIUM SERPL-MCNC: 8.1 MG/DL (ref 8.4–10.2)
CHLORIDE SERPL-SCNC: 108 MMOL/L (ref 98–107)
CO2 SERPL-SCNC: 22 MMOL/L (ref 22–29)
CREAT SERPL-MCNC: 0.6 MG/DL (ref 0.73–1.18)
EOSINOPHIL # BLD AUTO: 0.65 X10(3)/MCL (ref 0–0.9)
EOSINOPHIL NFR BLD AUTO: 9.5 %
ERYTHROCYTE [DISTWIDTH] IN BLOOD BY AUTOMATED COUNT: 15.4 % (ref 11.5–17)
GLOBULIN SER-MCNC: 4.1 GM/DL (ref 2.4–3.5)
GLUCOSE SERPL-MCNC: 172 MG/DL (ref 74–100)
HCO3 UR-SCNC: 24.9 MMOL/L
HCT VFR BLD AUTO: 26.3 % (ref 42–52)
HGB BLD-MCNC: 8.2 GM/DL (ref 14–18)
IMM GRANULOCYTES # BLD AUTO: 0.04 X10(3)/MCL (ref 0–0.02)
IMM GRANULOCYTES NFR BLD AUTO: 0.6 % (ref 0–0.43)
LYMPHOCYTES # BLD AUTO: 1.34 X10(3)/MCL (ref 0.6–4.6)
LYMPHOCYTES NFR BLD AUTO: 19.7 %
MCH RBC QN AUTO: 27 PG (ref 27–31)
MCHC RBC AUTO-ENTMCNC: 31.2 MG/DL (ref 33–36)
MCV RBC AUTO: 86.5 FL (ref 80–94)
MONOCYTES # BLD AUTO: 0.68 X10(3)/MCL (ref 0.1–1.3)
MONOCYTES NFR BLD AUTO: 10 %
NEUTROPHILS # BLD AUTO: 4.1 X10(3)/MCL (ref 2.1–9.2)
NEUTROPHILS NFR BLD AUTO: 59.6 %
NRBC BLD AUTO-RTO: 0 %
PCO2 BLDA: 35 MMHG
PH SMN: 7.46 [PH] (ref 7.35–7.45)
PLATELET # BLD AUTO: 277 X10(3)/MCL (ref 130–400)
PMV BLD AUTO: 10.1 FL (ref 9.4–12.4)
PO2 BLDA: 84 MMHG
POC BASE DEFICIT: 1.3 MMOL/L
POC IONIZED CALCIUM: 1.12 MMOL/L
POC SATURATED O2: 97 %
POC TEMPERATURE: 37 C
POTASSIUM BLD-SCNC: 3.7 MMOL/L
POTASSIUM SERPL-SCNC: 4 MMOL/L (ref 3.5–5.1)
PROT SERPL-MCNC: 5.7 GM/DL (ref 6.4–8.3)
RBC # BLD AUTO: 3.04 X10(6)/MCL (ref 4.7–6.1)
SODIUM BLD-SCNC: 137 MMOL/L
SODIUM SERPL-SCNC: 137 MMOL/L (ref 136–145)
SPECIMEN SOURCE: ABNORMAL
WBC # SPEC AUTO: 6.8 X10(3)/MCL (ref 4.5–11.5)

## 2022-05-14 PROCEDURE — 94003 VENT MGMT INPAT SUBQ DAY: CPT

## 2022-05-14 PROCEDURE — 63600175 PHARM REV CODE 636 W HCPCS: Performed by: HOSPITALIST

## 2022-05-14 PROCEDURE — 25000003 PHARM REV CODE 250: Performed by: HOSPITALIST

## 2022-05-14 PROCEDURE — 20000000 HC ICU ROOM

## 2022-05-14 PROCEDURE — A4216 STERILE WATER/SALINE, 10 ML: HCPCS

## 2022-05-14 PROCEDURE — 63600175 PHARM REV CODE 636 W HCPCS: Performed by: INTERNAL MEDICINE

## 2022-05-14 PROCEDURE — 36415 COLL VENOUS BLD VENIPUNCTURE: CPT | Performed by: INTERNAL MEDICINE

## 2022-05-14 PROCEDURE — 63600175 PHARM REV CODE 636 W HCPCS: Performed by: STUDENT IN AN ORGANIZED HEALTH CARE EDUCATION/TRAINING PROGRAM

## 2022-05-14 PROCEDURE — 25000003 PHARM REV CODE 250

## 2022-05-14 PROCEDURE — 99900026 HC AIRWAY MAINTENANCE (STAT)

## 2022-05-14 PROCEDURE — 99231 PR SUBSEQUENT HOSPITAL CARE,LEVL I: ICD-10-PCS | Mod: ,,, | Performed by: SURGERY

## 2022-05-14 PROCEDURE — 99231 SBSQ HOSP IP/OBS SF/LOW 25: CPT | Mod: ,,, | Performed by: SURGERY

## 2022-05-14 PROCEDURE — 25000003 PHARM REV CODE 250: Performed by: STUDENT IN AN ORGANIZED HEALTH CARE EDUCATION/TRAINING PROGRAM

## 2022-05-14 PROCEDURE — 80053 COMPREHEN METABOLIC PANEL: CPT | Performed by: INTERNAL MEDICINE

## 2022-05-14 PROCEDURE — 85025 COMPLETE CBC W/AUTO DIFF WBC: CPT | Performed by: INTERNAL MEDICINE

## 2022-05-14 PROCEDURE — 25000003 PHARM REV CODE 250: Performed by: INTERNAL MEDICINE

## 2022-05-14 PROCEDURE — 94761 N-INVAS EAR/PLS OXIMETRY MLT: CPT

## 2022-05-14 PROCEDURE — 27000221 HC OXYGEN, UP TO 24 HOURS

## 2022-05-14 RX ADMIN — OXCARBAZEPINE 300 MG: 300 TABLET, FILM COATED ORAL at 08:05

## 2022-05-14 RX ADMIN — PROPOFOL 45 MCG/KG/MIN: 10 INJECTION, EMULSION INTRAVENOUS at 02:05

## 2022-05-14 RX ADMIN — DEXMEDETOMIDINE HYDROCHLORIDE 1.4 MCG/KG/HR: 400 INJECTION INTRAVENOUS at 02:05

## 2022-05-14 RX ADMIN — HALOPERIDOL 5 MG: 5 TABLET ORAL at 08:05

## 2022-05-14 RX ADMIN — BENZTROPINE MESYLATE 1 MG: 1 TABLET ORAL at 08:05

## 2022-05-14 RX ADMIN — RIFAMPIN 300 MG: 300 CAPSULE ORAL at 08:05

## 2022-05-14 RX ADMIN — PIPERACILLIN SODIUM AND TAZOBACTAM SODIUM 4.5 G: 4; .5 INJECTION, POWDER, LYOPHILIZED, FOR SOLUTION INTRAVENOUS at 12:05

## 2022-05-14 RX ADMIN — ENOXAPARIN SODIUM 80 MG: 80 INJECTION SUBCUTANEOUS at 08:05

## 2022-05-14 RX ADMIN — SODIUM CHLORIDE, PRESERVATIVE FREE 10 ML: 5 INJECTION INTRAVENOUS at 08:05

## 2022-05-14 RX ADMIN — DOXEPIN HYDROCHLORIDE 50 MG: 50 CAPSULE ORAL at 08:05

## 2022-05-14 RX ADMIN — PROPOFOL 50 MCG/KG/MIN: 10 INJECTION, EMULSION INTRAVENOUS at 07:05

## 2022-05-14 RX ADMIN — VANCOMYCIN HYDROCHLORIDE 1000 MG: 1 INJECTION, POWDER, LYOPHILIZED, FOR SOLUTION INTRAVENOUS at 07:05

## 2022-05-14 RX ADMIN — PIPERACILLIN SODIUM AND TAZOBACTAM SODIUM 4.5 G: 4; .5 INJECTION, POWDER, LYOPHILIZED, FOR SOLUTION INTRAVENOUS at 11:05

## 2022-05-14 RX ADMIN — METOPROLOL TARTRATE 25 MG: 25 TABLET, FILM COATED ORAL at 08:05

## 2022-05-14 RX ADMIN — PROPOFOL 50 MCG/KG/MIN: 10 INJECTION, EMULSION INTRAVENOUS at 06:05

## 2022-05-14 RX ADMIN — DEXMEDETOMIDINE HYDROCHLORIDE 1.4 MCG/KG/HR: 400 INJECTION INTRAVENOUS at 11:05

## 2022-05-14 RX ADMIN — FAMOTIDINE 20 MG: 10 INJECTION, SOLUTION INTRAVENOUS at 08:05

## 2022-05-14 RX ADMIN — GLYCOPYRROLATE 1 MG: 1 TABLET ORAL at 02:05

## 2022-05-14 RX ADMIN — EZETIMIBE 10 MG: 10 TABLET ORAL at 08:05

## 2022-05-14 RX ADMIN — DEXMEDETOMIDINE HYDROCHLORIDE 1.4 MCG/KG/HR: 400 INJECTION INTRAVENOUS at 03:05

## 2022-05-14 RX ADMIN — PROPOFOL 50 MCG/KG/MIN: 10 INJECTION, EMULSION INTRAVENOUS at 10:05

## 2022-05-14 RX ADMIN — GLYCOPYRROLATE 1 MG: 1 TABLET ORAL at 08:05

## 2022-05-14 RX ADMIN — DEXMEDETOMIDINE HYDROCHLORIDE 1.4 MCG/KG/HR: 400 INJECTION INTRAVENOUS at 07:05

## 2022-05-14 RX ADMIN — PROPOFOL 50 MCG/KG/MIN: 10 INJECTION, EMULSION INTRAVENOUS at 02:05

## 2022-05-14 RX ADMIN — VANCOMYCIN HYDROCHLORIDE 1000 MG: 1 INJECTION, POWDER, LYOPHILIZED, FOR SOLUTION INTRAVENOUS at 11:05

## 2022-05-14 RX ADMIN — LEVETIRACETAM 500 MG: 100 INJECTION, SOLUTION INTRAVENOUS at 10:05

## 2022-05-14 RX ADMIN — DEXMEDETOMIDINE HYDROCHLORIDE 1.4 MCG/KG/HR: 400 INJECTION INTRAVENOUS at 06:05

## 2022-05-14 RX ADMIN — PIPERACILLIN SODIUM AND TAZOBACTAM SODIUM 4.5 G: 4; .5 INJECTION, POWDER, LYOPHILIZED, FOR SOLUTION INTRAVENOUS at 10:05

## 2022-05-14 RX ADMIN — PROPOFOL 50 MCG/KG/MIN: 10 INJECTION, EMULSION INTRAVENOUS at 11:05

## 2022-05-14 RX ADMIN — SODIUM CHLORIDE, PRESERVATIVE FREE 10 ML: 5 INJECTION INTRAVENOUS at 09:05

## 2022-05-14 RX ADMIN — LEVETIRACETAM 500 MG: 100 INJECTION, SOLUTION INTRAVENOUS at 08:05

## 2022-05-14 RX ADMIN — ATORVASTATIN CALCIUM 80 MG: 40 TABLET, FILM COATED ORAL at 08:05

## 2022-05-14 RX ADMIN — VANCOMYCIN HYDROCHLORIDE 1000 MG: 1 INJECTION, POWDER, LYOPHILIZED, FOR SOLUTION INTRAVENOUS at 04:05

## 2022-05-14 RX ADMIN — PIPERACILLIN SODIUM AND TAZOBACTAM SODIUM 4.5 G: 4; .5 INJECTION, POWDER, LYOPHILIZED, FOR SOLUTION INTRAVENOUS at 04:05

## 2022-05-14 NOTE — PLAN OF CARE
Problem: Adult Inpatient Plan of Care  Goal: Plan of Care Review  Outcome: Ongoing, Progressing  Flowsheets (Taken 5/14/2022 0315)  Plan of Care Reviewed With: patient     Problem: Communication Impairment (Mechanical Ventilation, Invasive)  Goal: Effective Communication  Intervention: Ensure Effective Communication  Flowsheets (Taken 5/14/2022 0315)  Communication Enhancement Strategies: nonverbal strategies used     Problem: Inability to Wean (Mechanical Ventilation, Invasive)  Goal: Mechanical Ventilation Liberation  Intervention: Promote Extubation and Mechanical Ventilation Liberation  Flowsheets (Taken 5/14/2022 0315)  Environmental Support: calm environment promoted  Medication Review/Management:   medications reviewed   high-risk medications identified     Problem: Skin and Tissue Injury (Mechanical Ventilation, Invasive)  Goal: Absence of Device-Related Skin and Tissue Injury  Intervention: Maintain Skin and Tissue Health  Flowsheets (Taken 5/14/2022 0315)  Device Skin Pressure Protection:   absorbent pad utilized/changed   adhesive use limited   positioning supports utilized   pressure points protected   skin-to-device areas padded   skin-to-skin areas padded     Problem: Ventilator-Induced Lung Injury (Mechanical Ventilation, Invasive)  Goal: Absence of Ventilator-Induced Lung Injury  Intervention: Facilitate Lung-Protection Measures  Flowsheets (Taken 5/14/2022 0315)  Lung Protection Measures: fluid excess minimized     Problem: Fall Injury Risk  Goal: Absence of Fall and Fall-Related Injury  Intervention: Identify and Manage Contributors  Flowsheets (Taken 5/14/2022 0315)  Medication Review/Management:   medications reviewed   high-risk medications identified     Problem: Skin Injury Risk Increased  Goal: Skin Health and Integrity  Intervention: Optimize Skin Protection  Flowsheets (Taken 5/14/2022 0315)  Pressure Reduction Techniques:   heels elevated off bed   positioned off wounds   pressure points  protected  Pressure Reduction Devices: pressure-redistributing mattress utilized  Skin Protection:   adhesive use limited   skin-to-device areas padded   pulse oximeter probe site changed   skin sealant/moisture barrier applied   silicone foam dressing in place   skin-to-skin areas padded   transparent dressing maintained  Head of Bed (HOB) Positioning: HOB at 30 degrees

## 2022-05-14 NOTE — PROGRESS NOTES
[facility]                       [address] [address]    PATIENT NAME:       NITESH BHANDARI   YOB: 1965  CSN:                [id number]   MRN:                [id number]  ADMIT DATE:         04/01/2022 12:44:00  PHYSICIAN:          YOLANDE Pearson MD                            PROGRESS NOTE    DATE:      A 56-year-old patient who underwent a CABG x4 by Dr. Carney in April 2022.    Postoperatively, the patient was very difficult to control from a mental   standpoint as he has bipolar disorder and schizophrenia.  He was very agitated,   which caused sternal dehiscence, and ultimately developed a sternal wound   infection.  Long story short, he underwent a sternal flap revision with   bilateral pectoral flaps done for closure by Dr. Weiss 05/11/2022.  He is also on   antibiotics under the direction of Dr. Arevalo, or Dr. CODY.  In any event, he   wants to keep him very sedated over the next couple of days to keep the sternal   area stable; therefore, he is on Precedex and propofol, which has also required   him to be on low-dose Levophed at this time.    PHYSICAL EXAMINATION:  VITAL SIGNS:  Temperature is 100.  Blood pressure this morning 100/66, heart   rate 71, respiratory rate 28.    HEENT:  Unremarkable.  He has a Shiley trach tube in place.  CHEST:  A few scattered crackles.  He does have a binder in place.    HEART:  Regular in rhythm.  ABDOMEN:  Soft.  Active bowel sounds.  PEG tube is in place, also, the binder   covers over the PEG tube.  LOWER EXTREMITIES:  He has heel protectors in place.  Some mild edema.  NEUROLOGIC:  The patient is sedated.    LABORATORY DATA:  From this morning, hemoglobin/hematocrit 8.2/26, white count   6800, platelet count 277.  Chemistry profile:  Sodium 137, potassium 4.0,   chloride 108, bicarb 22, BUN 15, creatinine 0.60.      No ABG this morning.    Chest x-ray shows trach  tube to be in good position.  Lung fields are clear.    IMPRESSION:    1. Coronary artery disease, status post 4-vessel coronary artery bypass graft on   04/06/2022.  2. Methicillin-resistant Staphylococcus aureus sternal wound dehiscence with   debridement and wound vacuum-assisted closure placement, 04/17/2022.  3. Status post bilateral pectoral flaps for sternal wound closure, 05/11/2022,   by Dr. Adolfo Weiss, Plastic Surgery.  4. Bipolar and schizophrenia with severe agitation postoperatively, which caused   a sternal wound dehiscence.    5. Respiratory failure, status post tracheostomy and percutaneous endoscopic   gastrostomy tube.  6. Right upper extremity deep venous thrombosis on this admission.  7. Protein-calorie malnutrition.    PLAN:    1. Continue to monitor the blood work, because he likely will need transfusion   because of continued oozing from the surgical site.    2. Hold off on Lovenox for now because of the continued bleeding (risk of   embolization from upper extremity DVT much lower than lower extremity).  3. Continue nutritional support.    4. Will recheck lab work in the a.m.  5. Continued heavy sedation until cleared by Plastic Surgery.    CRITICAL CARE DIAGNOSES:    1. Respiratory failure.  2. Coronary artery disease, status post 4-vessel coronary artery bypass graft on   04/06/2022.  3. Methicillin-resistant Staphylococcus aureus sternal wound dehisence with   debridement and wound vacuum-assisted closure placement, 04/17/2022.  4. Status post bilateral pectoral flaps for sternal wound closure, 05/11/2022,   by Dr. Adolfo Weiss, Plastic Surgery.  5. Bipolar and schizophrenia with severe agitation postoperatively, which caused   a sternal wound dehiscence.    6. Respiratory failure, status post tracheostomy and percutaneous endoscopic   gastrostomy tube.  7. Right upper extremity deep venous thrombosis on this admission.  8. Protein-calorie malnutrition.    CRITICAL CARE INTERVENTIONS:   Hands-on evaluation, review of labs, adjustment of   pressors and other physiological parameters, adjustment of mechanical   ventilation.    CRITICAL CARE TIME:  Personally spent has been greater than 31 minutes.    The patient's daughter, Arabella Bradley, can be reached at [phone].        ______________________________  G MD ALICIA Gutierres/PAO  DD:  05/14/2022  Time:  06:37AM  DT:  05/14/2022  Time:  07:52AM  Job #:  [id number]      PROGRESS NOTE

## 2022-05-14 NOTE — PROGRESS NOTES
PRS  Intubated and sedated  Sternal and axillary incisions are c/d/i, drains s/s, output noted  Cultures neg thus far    No changes to care from my standpoint.  Drain care.  Arms in at sides at all times.  Sedation as needed to avoid agitation.  Will follow.

## 2022-05-15 LAB
ALBUMIN SERPL-MCNC: 1.6 GM/DL (ref 3.5–5)
ALBUMIN/GLOB SERPL: 0.4 RATIO (ref 1.1–2)
ALP SERPL-CCNC: 94 UNIT/L (ref 40–150)
ALT SERPL-CCNC: 21 UNIT/L (ref 0–55)
AST SERPL-CCNC: 41 UNIT/L (ref 5–34)
BACTERIA SPEC ANAEROBE CULT: NORMAL
BACTERIA SPEC ANAEROBE CULT: NORMAL
BASE EXCESS ARTERIAL: -0.1 MMOL/L (ref -2–2)
BASOPHILS # BLD AUTO: 0.04 X10(3)/MCL (ref 0–0.2)
BASOPHILS NFR BLD AUTO: 0.6 %
BILIRUBIN DIRECT+TOT PNL SERPL-MCNC: 0.6 MG/DL
BUN SERPL-MCNC: 14.1 MG/DL (ref 8.4–25.7)
CALCIUM SERPL-MCNC: 8.1 MG/DL (ref 8.4–10.2)
CHLORIDE SERPL-SCNC: 111 MMOL/L (ref 98–107)
CO2 SERPL-SCNC: 20 MMOL/L (ref 22–29)
CREAT SERPL-MCNC: 0.58 MG/DL (ref 0.73–1.18)
EOSINOPHIL # BLD AUTO: 0.87 X10(3)/MCL (ref 0–0.9)
EOSINOPHIL NFR BLD AUTO: 12 %
ERYTHROCYTE [DISTWIDTH] IN BLOOD BY AUTOMATED COUNT: 15.6 % (ref 11.5–17)
GLOBULIN SER-MCNC: 4.3 GM/DL (ref 2.4–3.5)
GLUCOSE SERPL-MCNC: 167 MG/DL (ref 74–100)
HCO3 ARTERIAL: 22.6 MMOL/L (ref 18–23)
HCT VFR BLD AUTO: 30.5 % (ref 42–52)
HGB BLD-MCNC: 8.8 GM/DL (ref 14–18)
HGB BLD-MCNC: NORMAL G/DL
IMM GRANULOCYTES # BLD AUTO: 0.04 X10(3)/MCL (ref 0–0.02)
IMM GRANULOCYTES NFR BLD AUTO: 0.6 % (ref 0–0.43)
LYMPHOCYTES # BLD AUTO: 1.6 X10(3)/MCL (ref 0.6–4.6)
LYMPHOCYTES NFR BLD AUTO: 22.2 %
MAGNESIUM SERPL-MCNC: 1.6 MG/DL (ref 1.6–2.6)
MCH RBC QN AUTO: 26.7 PG (ref 27–31)
MCHC RBC AUTO-ENTMCNC: 28.9 MG/DL (ref 33–36)
MCV RBC AUTO: 92.7 FL (ref 80–94)
MONOCYTES # BLD AUTO: 0.63 X10(3)/MCL (ref 0.1–1.3)
MONOCYTES NFR BLD AUTO: 8.7 %
NEUTROPHILS # BLD AUTO: 4 X10(3)/MCL (ref 2.1–9.2)
NEUTROPHILS NFR BLD AUTO: 55.9 %
NRBC BLD AUTO-RTO: 0 %
PCO2 BLDA: NORMAL MM[HG]
PCO2 BLDA: NORMAL MM[HG]
PH SMN: NORMAL [PH]
PHOSPHATE SERPL-MCNC: 2.5 MG/DL (ref 2.3–4.7)
PLATELET # BLD AUTO: 260 X10(3)/MCL (ref 130–400)
PMV BLD AUTO: 10 FL (ref 9.4–12.4)
PO2 BLDA: NORMAL MM[HG]
POC COHB: NORMAL
POC IONIZED CALCIUM: NORMAL
POC METHB: NORMAL
POC O2HB: NORMAL
POTASSIUM BLD-SCNC: NORMAL MMOL/L
POTASSIUM SERPL-SCNC: 3.9 MMOL/L (ref 3.5–5.1)
PROT SERPL-MCNC: 5.9 GM/DL (ref 6.4–8.3)
RBC # BLD AUTO: 3.29 X10(6)/MCL (ref 4.7–6.1)
SATURATED O2 ARTERIAL, I-STAT: NORMAL
SODIUM BLD-SCNC: NORMAL MMOL/L
SODIUM SERPL-SCNC: 139 MMOL/L (ref 136–145)
VANCOMYCIN TROUGH SERPL-MCNC: 18.2 UG/ML (ref 15–20)
WBC # SPEC AUTO: 7.2 X10(3)/MCL (ref 4.5–11.5)

## 2022-05-15 PROCEDURE — 83735 ASSAY OF MAGNESIUM: CPT | Performed by: INTERNAL MEDICINE

## 2022-05-15 PROCEDURE — 84100 ASSAY OF PHOSPHORUS: CPT | Performed by: INTERNAL MEDICINE

## 2022-05-15 PROCEDURE — 25000003 PHARM REV CODE 250

## 2022-05-15 PROCEDURE — 99900035 HC TECH TIME PER 15 MIN (STAT)

## 2022-05-15 PROCEDURE — 36415 COLL VENOUS BLD VENIPUNCTURE: CPT | Performed by: INTERNAL MEDICINE

## 2022-05-15 PROCEDURE — 63600175 PHARM REV CODE 636 W HCPCS: Performed by: STUDENT IN AN ORGANIZED HEALTH CARE EDUCATION/TRAINING PROGRAM

## 2022-05-15 PROCEDURE — 20000000 HC ICU ROOM

## 2022-05-15 PROCEDURE — 63600175 PHARM REV CODE 636 W HCPCS: Performed by: INTERNAL MEDICINE

## 2022-05-15 PROCEDURE — 36600 WITHDRAWAL OF ARTERIAL BLOOD: CPT

## 2022-05-15 PROCEDURE — 80202 ASSAY OF VANCOMYCIN: CPT | Performed by: INTERNAL MEDICINE

## 2022-05-15 PROCEDURE — 27000221 HC OXYGEN, UP TO 24 HOURS

## 2022-05-15 PROCEDURE — 25000003 PHARM REV CODE 250: Performed by: INTERNAL MEDICINE

## 2022-05-15 PROCEDURE — 25000003 PHARM REV CODE 250: Performed by: HOSPITALIST

## 2022-05-15 PROCEDURE — 25000003 PHARM REV CODE 250: Performed by: STUDENT IN AN ORGANIZED HEALTH CARE EDUCATION/TRAINING PROGRAM

## 2022-05-15 PROCEDURE — 99900026 HC AIRWAY MAINTENANCE (STAT)

## 2022-05-15 PROCEDURE — 80053 COMPREHEN METABOLIC PANEL: CPT | Performed by: INTERNAL MEDICINE

## 2022-05-15 PROCEDURE — 85025 COMPLETE CBC W/AUTO DIFF WBC: CPT | Performed by: INTERNAL MEDICINE

## 2022-05-15 PROCEDURE — 99231 SBSQ HOSP IP/OBS SF/LOW 25: CPT | Mod: ,,, | Performed by: SURGERY

## 2022-05-15 PROCEDURE — A4216 STERILE WATER/SALINE, 10 ML: HCPCS

## 2022-05-15 PROCEDURE — 94003 VENT MGMT INPAT SUBQ DAY: CPT

## 2022-05-15 PROCEDURE — 63600175 PHARM REV CODE 636 W HCPCS: Performed by: HOSPITALIST

## 2022-05-15 PROCEDURE — 99231 PR SUBSEQUENT HOSPITAL CARE,LEVL I: ICD-10-PCS | Mod: ,,, | Performed by: SURGERY

## 2022-05-15 PROCEDURE — 94761 N-INVAS EAR/PLS OXIMETRY MLT: CPT

## 2022-05-15 RX ORDER — ONDANSETRON 4 MG/1
4 TABLET, ORALLY DISINTEGRATING ORAL ONCE
Status: DISCONTINUED | OUTPATIENT
Start: 2022-05-15 | End: 2022-05-23

## 2022-05-15 RX ADMIN — EZETIMIBE 10 MG: 10 TABLET ORAL at 08:05

## 2022-05-15 RX ADMIN — DOXEPIN HYDROCHLORIDE 50 MG: 50 CAPSULE ORAL at 08:05

## 2022-05-15 RX ADMIN — METOPROLOL TARTRATE 25 MG: 25 TABLET, FILM COATED ORAL at 08:05

## 2022-05-15 RX ADMIN — BENZTROPINE MESYLATE 1 MG: 1 TABLET ORAL at 08:05

## 2022-05-15 RX ADMIN — HALOPERIDOL 5 MG: 5 TABLET ORAL at 08:05

## 2022-05-15 RX ADMIN — SODIUM CHLORIDE, PRESERVATIVE FREE 10 ML: 5 INJECTION INTRAVENOUS at 09:05

## 2022-05-15 RX ADMIN — Medication 0.03 MCG/KG/MIN: at 03:05

## 2022-05-15 RX ADMIN — PROPOFOL 50 MCG/KG/MIN: 10 INJECTION, EMULSION INTRAVENOUS at 11:05

## 2022-05-15 RX ADMIN — RIFAMPIN 300 MG: 300 CAPSULE ORAL at 08:05

## 2022-05-15 RX ADMIN — VANCOMYCIN HYDROCHLORIDE 1000 MG: 1 INJECTION, POWDER, LYOPHILIZED, FOR SOLUTION INTRAVENOUS at 04:05

## 2022-05-15 RX ADMIN — PIPERACILLIN SODIUM AND TAZOBACTAM SODIUM 4.5 G: 4; .5 INJECTION, POWDER, LYOPHILIZED, FOR SOLUTION INTRAVENOUS at 07:05

## 2022-05-15 RX ADMIN — ATORVASTATIN CALCIUM 80 MG: 40 TABLET, FILM COATED ORAL at 08:05

## 2022-05-15 RX ADMIN — ENOXAPARIN SODIUM 80 MG: 80 INJECTION SUBCUTANEOUS at 08:05

## 2022-05-15 RX ADMIN — DEXMEDETOMIDINE HYDROCHLORIDE 1.4 MCG/KG/HR: 400 INJECTION INTRAVENOUS at 07:05

## 2022-05-15 RX ADMIN — DEXMEDETOMIDINE HYDROCHLORIDE 1.4 MCG/KG/HR: 400 INJECTION INTRAVENOUS at 01:05

## 2022-05-15 RX ADMIN — VANCOMYCIN HYDROCHLORIDE 1000 MG: 1 INJECTION, POWDER, LYOPHILIZED, FOR SOLUTION INTRAVENOUS at 07:05

## 2022-05-15 RX ADMIN — PROPOFOL 50 MCG/KG/MIN: 10 INJECTION, EMULSION INTRAVENOUS at 03:05

## 2022-05-15 RX ADMIN — ACETAMINOPHEN 650 MG: 325 TABLET ORAL at 03:05

## 2022-05-15 RX ADMIN — LEVETIRACETAM 500 MG: 100 INJECTION, SOLUTION INTRAVENOUS at 08:05

## 2022-05-15 RX ADMIN — GLYCOPYRROLATE 1 MG: 1 TABLET ORAL at 09:05

## 2022-05-15 RX ADMIN — GLYCOPYRROLATE 1 MG: 1 TABLET ORAL at 03:05

## 2022-05-15 RX ADMIN — ACETAMINOPHEN 650 MG: 325 TABLET ORAL at 11:05

## 2022-05-15 RX ADMIN — FAMOTIDINE 20 MG: 10 INJECTION, SOLUTION INTRAVENOUS at 08:05

## 2022-05-15 RX ADMIN — VANCOMYCIN HYDROCHLORIDE 1000 MG: 1 INJECTION, POWDER, LYOPHILIZED, FOR SOLUTION INTRAVENOUS at 11:05

## 2022-05-15 RX ADMIN — OXCARBAZEPINE 300 MG: 300 TABLET, FILM COATED ORAL at 08:05

## 2022-05-15 RX ADMIN — PROPOFOL 50 MCG/KG/MIN: 10 INJECTION, EMULSION INTRAVENOUS at 04:05

## 2022-05-15 RX ADMIN — ENOXAPARIN SODIUM 80 MG: 80 INJECTION SUBCUTANEOUS at 09:05

## 2022-05-15 RX ADMIN — PIPERACILLIN SODIUM AND TAZOBACTAM SODIUM 4.5 G: 4; .5 INJECTION, POWDER, LYOPHILIZED, FOR SOLUTION INTRAVENOUS at 04:05

## 2022-05-15 RX ADMIN — Medication 0.03 MCG/KG/MIN: at 08:05

## 2022-05-15 RX ADMIN — SODIUM CHLORIDE, PRESERVATIVE FREE 10 ML: 5 INJECTION INTRAVENOUS at 08:05

## 2022-05-15 RX ADMIN — DEXMEDETOMIDINE HYDROCHLORIDE 1.4 MCG/KG/HR: 400 INJECTION INTRAVENOUS at 08:05

## 2022-05-15 RX ADMIN — PIPERACILLIN SODIUM AND TAZOBACTAM SODIUM 4.5 G: 4; .5 INJECTION, POWDER, LYOPHILIZED, FOR SOLUTION INTRAVENOUS at 11:05

## 2022-05-15 RX ADMIN — PROPOFOL 50 MCG/KG/MIN: 10 INJECTION, EMULSION INTRAVENOUS at 08:05

## 2022-05-15 RX ADMIN — GLYCOPYRROLATE 1 MG: 1 TABLET ORAL at 08:05

## 2022-05-15 RX ADMIN — PROPOFOL 50 MCG/KG/MIN: 10 INJECTION, EMULSION INTRAVENOUS at 01:05

## 2022-05-15 RX ADMIN — DEXMEDETOMIDINE HYDROCHLORIDE 1.4 MCG/KG/HR: 400 INJECTION INTRAVENOUS at 02:05

## 2022-05-15 RX ADMIN — BISACODYL 10 MG: 10 SUPPOSITORY RECTAL at 12:05

## 2022-05-15 RX ADMIN — DEXMEDETOMIDINE HYDROCHLORIDE 1.4 MCG/KG/HR: 400 INJECTION INTRAVENOUS at 06:05

## 2022-05-15 RX ADMIN — PROPOFOL 50 MCG/KG/MIN: 10 INJECTION, EMULSION INTRAVENOUS at 07:05

## 2022-05-15 NOTE — PROGRESS NOTES
PRS  Intubated and sedated  Incisions all c/d/i, drains more serous  Hgb stable  Surgical cultures remain negative thus far    Doing well.  Ok to restart Lovenox.  Continue drain care.  Arms in at sides at all times.  Will follow.

## 2022-05-15 NOTE — PROGRESS NOTES
Pharmacokinetic Assessment Follow Up: IV Vancomycin    Vancomycin serum concentration assessment(s):    The trough level was drawn correctly and can be used to guide therapy at this time. The measurement is within the desired definitive target range of 15 to 20 mcg/mL.    Vancomycin Regimen Plan:    Continue regimen of 1000mg IV q8h  Check trough on 5/18 @0700    Drug levels (last 3 results):  Recent Labs   Lab Result Units 05/15/22  1457   Vancomycin Trough ug/ml 18.2       Pharmacy will continue to follow and monitor vancomycin.    Please contact pharmacy at extension [phone] for questions regarding this assessment.    Thank you for the consult,   Oneal Edmonds       Patient brief summary:  Kendall Duff is a 56 y.o. male initiated on antimicrobial therapy with IV Vancomycin for treatment of  post-op wound infection    The patient's current regimen is 1000 mg IV q8h    Drug Allergies:   Review of patient's allergies indicates:   Allergen Reactions    Depakote [divalproex]     Divalproex sodium Other (See Comments)    Lithium     Lithium analogues     Quetiapine Other (See Comments)       Actual Body Weight:   77kg     Renal Function:   Estimated Creatinine Clearance: 147.3 mL/min (A) (based on SCr of 0.58 mg/dL (L)).,     Dialysis Method (if applicable):  N/A    CBC (last 72 hours):  Recent Labs   Lab Result Units 05/12/22  1734 05/13/22  0757 05/14/22  0135 05/15/22  0328   WBC x10(3)/mcL 6.9 6.9 6.8 7.2   Hgb gm/dL 9.2* 8.5* 8.2* 8.8*   Hct % 29.2* 27.3* 26.3* 30.5*   Platelet x10(3)/mcL 328 259 277 260   Mono Auto % 13.5 12.7 10.0 8.7   Eos Auto % 3.7 8.0 9.5 12.0   Basophil Auto % 0.9 0.7 0.6 0.6       Metabolic Panel (last 72 hours):  Recent Labs   Lab Result Units 05/13/22  0757 05/14/22  0135 05/15/22  0328   Sodium Level mmol/L 135* 137 139   Potassium Level mmol/L 3.8 4.0 3.9   Chloride mmol/L 105 108* 111*   Carbon Dioxide mmol/L 23 22 20*   Glucose Level mg/dL 197* 172* 167*   Blood Urea Nitrogen mg/dL 14.7  15.6 14.1   Creatinine mg/dL 0.59* 0.60* 0.58*   Albumin Level gm/dL 1.6* 1.6* 1.6*   Bilirubin Total mg/dL 0.6 0.6 0.6   Alkaline Phosphatase unit/L 114 98 94   Aspartate Aminotransferase unit/L 45* 42* 41*   Alanine Aminotransferase unit/L 23 24 21   Magnesium Level mg/dL  --   --  1.60   Phosphorus Level mg/dL  --   --  2.5       Vancomycin Administrations:  vancomycin given in the last 96 hours                     vancomycin in dextrose 5 % 1 gram/250 mL IVPB 1,000 mg (mg) 1,000 mg New Bag 05/15/22 0747     1,000 mg New Bag 05/14/22 2359     1,000 mg New Bag  1630     1,000 mg New Bag  0744     1,000 mg New Bag 05/13/22 2317    vancomycin 1.25 g in dextrose 5% 250 mL IVPB (ready to mix) (mg) 1,250 mg New Bag 05/13/22 0602     1,250 mg New Bag 05/12/22 2124     1,250 mg New Bag  1426    vancomycin (VANCOCIN) 1,250 mg in dextrose 5 % 250 mL IVPB (mg) 1,250 mg New Bag 05/11/22 2206                    Microbiologic Results:  Microbiology Results (last 7 days)       Procedure Component Value Units Date/Time    Tissue Culture - Aerobic [[id number]] Collected: 05/11/22 1432    Order Status: Completed Specimen: Bone from Sternum Updated: 05/15/22 0934     CULTURE, TISSUE- AEROBIC ([facility]) No growth at 4 days    Tissue Culture - Aerobic [[id number]] Collected: 05/11/22 1429    Order Status: Completed Specimen: Tissue from Sternum Updated: 05/15/22 0928     CULTURE, TISSUE- AEROBIC ([facility]) No growth at 4 days    Anaerobic Culture [[id number]] Collected: 05/11/22 1432    Order Status: Completed Specimen: Bone from Sternum Updated: 05/15/22 0741     Anaerobe Culture No Anaerobes Isolated    Anaerobic Culture [[id number]] Collected: 05/11/22 1429    Order Status: Completed Specimen: Tissue from Sternum Updated: 05/15/22 0740     Anaerobe Culture No Anaerobes Isolated    Blood Culture [[id number]]  (Normal) Collected: 05/12/22 0621    Order Status: Completed Specimen: Blood from Arm Updated: 05/14/22 1902     CULTURE, BLOOD ([facility]) No  Growth At 48 Hours    Blood Culture [[id number]]  (Normal) Collected: 05/12/22 1741    Order Status: Completed Specimen: Blood from Hand, Left Updated: 05/14/22 1902     CULTURE, BLOOD ([facility]) No Growth At 48 Hours    Gram Stain [[id number]] Collected: 05/11/22 1429    Order Status: Completed Specimen: Tissue from Sternum Updated: 05/12/22 0829     GRAM STAIN No WBCs, No bacteria seen     Gram Stain [[id number]] Collected: 05/11/22 1432    Order Status: Completed Specimen: Bone from Sternum Updated: 05/12/22 0828     GRAM STAIN No WBCs, No bacteria seen     AFB Smear [[id number]] Collected: 05/11/22 1432    Order Status: Completed Specimen: Bone from Sternum Updated: 05/12/22 0757     AFB Smear No AFB seen (Direct smear only)    AFB Smear [[id number]] Collected: 05/11/22 1429    Order Status: Completed Specimen: Tissue from Sternum Updated: 05/12/22 0757     AFB Smear No AFB seen (Direct smear only)    Fungal Culture [[id number]] Collected: 05/11/22 1432    Order Status: Sent Specimen: Bone from Sternum Updated: 05/11/22 1515    Fungal Culture [[id number]] Collected: 05/11/22 1429    Order Status: Sent Specimen: Tissue from Sternum Updated: 05/11/22 1514

## 2022-05-15 NOTE — PROGRESS NOTES
[facility]                       [address] [address]    PATIENT NAME:       NITESH BHANDARI   YOB: 1965  CSN:                [id number]   MRN:                [id number]  ADMIT DATE:         04/01/2022 12:44:00  PHYSICIAN:          YOLANDE Pearson MD                            PROGRESS NOTE    DATE:      A 56-year-old gentleman with bipolar disorder and schizophrenia who underwent a   CABG x4 by Dr. Carney in April of 2022.  Postoperatively developed wound   dehiscence and subsequent infection with MRSA and has been on long-term   antibiotics.  He underwent a sternal wound dehiscence/debridement and wound   vacuum-assisted closure device on 04/17/2022, and subsequently underwent a   bilateral pectoral flap for sternal wound closure on 05/11/2022.  In the   meantime, he also developed a right subclavian vein thrombosis.  His   anticoagulation has been placed on hold because of oozing from the wound.  We   are waiting for clearance by Dr. Weiss to resume anticoagulation.  He still has   significant oozing from the sternal wound, but his H and H have been stable.    PHYSICAL EXAMINATION:  VITAL SIGNS:  His current vital signs are as follows:  Temp recorded at 100.4,   blood pressure 104/80, respiratory rate is variable between 16 and 30, heart   rate 73.  HEENT:  Unremarkable.  NECK:  Shiley trach tube is in place.  CHEST:  Somewhat more diminished on the left than the right and he has a few   crackles.  HEART:  Irregular in rhythm.  ABDOMEN:  Soft.  PEG tube is in place.  He has a binder in place.  EXTREMITIES:  Lower extremities without significant edema.  NEUROLOGIC:  He is sedated with both propofol and Precedex and remains on   low-dose Levophed.    LABORATORY DATA:  Lab work from this morning:  H and H 8.8 and 30, white count   7200, platelet count 260.  Sodium 139, potassium 3.9, chloride 111, bicarb 20,    BUN 14, creatinine 0.50.  ABG this morning:  PH 7.50, pCO2 of 29, pO2 of 118,   that is on assist control of 16 x 500.    IMAGING:  No radiographs done today.    IMPRESSION:    1. Coronary artery bypass graft.  Status post 4-vessel bypass graft on   04/06/2022.  2. Methicillin-resistant Staphylococcus aureus sternal wound dehiscence with   debridement and wound vacuum-assisted closure device placement 04/17/2022.  3. Status post bilateral pectoral flaps for sternal wound closure, 05/11/2022,   by Dr. David Weiss, Plastic Surgery.  4. Bipolar and schizophrenia with severe agitation postoperatively which caused   sternal wound dehiscence.  5. Respiratory failure, status post trach and PEG tube placement.  6. Right upper extremity deep vein thrombosis (currently Lovenox on hold).  7. Protein-calorie malnutrition.    PLAN:  We will check with Dr. Weiss about the timing of resuming the Lovenox,   continue to draw daily blood work to monitor his H and H since he is continuing   to have oozing, continue with nutritional support, continue with heavy sedation   and mechanical ventilation until cleared by Plastic Surgery for weaning.    CRITICAL CARE DIAGNOSES:  Respiratory failure and other diagnoses as listed   above.    CRITICAL CARE INTERVENTIONS:  Hands-on eval, review of labs, adjustment of   pressors and other physiologic parameters, adjustment of mechanical ventilation.    TIME SPENT:  Critical care time personally spent greater than 31 minutes.    Note:  Patient's daughter, Arabella Bradley, is reached at [phone].        ______________________________  G MD ALICIA Gutierres/CHRISITNAS  DD:  05/15/2022  Time:  06:32AM  DT:  05/15/2022  Time:  06:56AM  Job #:  [id number]      PROGRESS NOTE

## 2022-05-16 LAB
ALBUMIN SERPL-MCNC: 1.6 GM/DL (ref 3.5–5)
ALBUMIN/GLOB SERPL: 0.3 RATIO (ref 1.1–2)
ALP SERPL-CCNC: 93 UNIT/L (ref 40–150)
ALT SERPL-CCNC: 23 UNIT/L (ref 0–55)
AST SERPL-CCNC: 39 UNIT/L (ref 5–34)
BASE EXCESS ARTERIAL: -1 MMOL/L (ref -2–2)
BASOPHILS # BLD AUTO: 0.07 X10(3)/MCL (ref 0–0.2)
BASOPHILS NFR BLD AUTO: 0.9 %
BILIRUBIN DIRECT+TOT PNL SERPL-MCNC: 0.6 MG/DL
BUN SERPL-MCNC: 18.1 MG/DL (ref 8.4–25.7)
CALCIUM SERPL-MCNC: 8.5 MG/DL (ref 8.4–10.2)
CHLORIDE SERPL-SCNC: 108 MMOL/L (ref 98–107)
CO2 SERPL-SCNC: 22 MMOL/L (ref 22–29)
CREAT SERPL-MCNC: 0.58 MG/DL (ref 0.73–1.18)
EOSINOPHIL # BLD AUTO: 0.91 X10(3)/MCL (ref 0–0.9)
EOSINOPHIL NFR BLD AUTO: 11.6 %
ERYTHROCYTE [DISTWIDTH] IN BLOOD BY AUTOMATED COUNT: 15.6 % (ref 11.5–17)
GLOBULIN SER-MCNC: 4.6 GM/DL (ref 2.4–3.5)
GLUCOSE SERPL-MCNC: 173 MG/DL (ref 74–100)
HCO3 ARTERIAL: 23.5 MMOL/L (ref 18–23)
HCT VFR BLD AUTO: 29.2 % (ref 42–52)
HGB BLD-MCNC: 8.8 GM/DL (ref 14–18)
HGB BLD-MCNC: 9 G/DL
IMM GRANULOCYTES # BLD AUTO: 0.02 X10(3)/MCL (ref 0–0.02)
IMM GRANULOCYTES NFR BLD AUTO: 0.3 % (ref 0–0.43)
LYMPHOCYTES # BLD AUTO: 1.73 X10(3)/MCL (ref 0.6–4.6)
LYMPHOCYTES NFR BLD AUTO: 22.1 %
MCH RBC QN AUTO: 27 PG (ref 27–31)
MCHC RBC AUTO-ENTMCNC: 30.1 MG/DL (ref 33–36)
MCV RBC AUTO: 89.6 FL (ref 80–94)
MONOCYTES # BLD AUTO: 0.7 X10(3)/MCL (ref 0.1–1.3)
MONOCYTES NFR BLD AUTO: 9 %
NEUTROPHILS # BLD AUTO: 4.4 X10(3)/MCL (ref 2.1–9.2)
NEUTROPHILS NFR BLD AUTO: 56.1 %
NRBC BLD AUTO-RTO: 0 %
PCO2 BLDA: 37 MM[HG]
PCO2 BLDA: ABNORMAL MM[HG]
PH SMN: 7.41 [PH]
PLATELET # BLD AUTO: 327 X10(3)/MCL (ref 130–400)
PMV BLD AUTO: 9.5 FL (ref 9.4–12.4)
PO2 BLDA: 85 MM[HG]
POC COHB: 2.2
POC IONIZED CALCIUM: 1.21
POC METHB: 0.9
POC O2HB: 95.4
POTASSIUM BLD-SCNC: 4 MMOL/L
POTASSIUM SERPL-SCNC: 4 MMOL/L (ref 3.5–5.1)
PROT SERPL-MCNC: 6.2 GM/DL (ref 6.4–8.3)
RBC # BLD AUTO: 3.26 X10(6)/MCL (ref 4.7–6.1)
SATURATED O2 ARTERIAL, I-STAT: 96.5
SODIUM BLD-SCNC: 136 MMOL/L
SODIUM SERPL-SCNC: 139 MMOL/L (ref 136–145)
WBC # SPEC AUTO: 7.8 X10(3)/MCL (ref 4.5–11.5)

## 2022-05-16 PROCEDURE — 25000003 PHARM REV CODE 250: Performed by: HOSPITALIST

## 2022-05-16 PROCEDURE — 25000003 PHARM REV CODE 250: Performed by: INTERNAL MEDICINE

## 2022-05-16 PROCEDURE — A4216 STERILE WATER/SALINE, 10 ML: HCPCS

## 2022-05-16 PROCEDURE — 25000003 PHARM REV CODE 250

## 2022-05-16 PROCEDURE — 94761 N-INVAS EAR/PLS OXIMETRY MLT: CPT

## 2022-05-16 PROCEDURE — 63600175 PHARM REV CODE 636 W HCPCS: Performed by: HOSPITALIST

## 2022-05-16 PROCEDURE — 27200966 HC CLOSED SUCTION SYSTEM

## 2022-05-16 PROCEDURE — 63600175 PHARM REV CODE 636 W HCPCS: Performed by: INTERNAL MEDICINE

## 2022-05-16 PROCEDURE — 80053 COMPREHEN METABOLIC PANEL: CPT | Performed by: INTERNAL MEDICINE

## 2022-05-16 PROCEDURE — 63600175 PHARM REV CODE 636 W HCPCS: Performed by: STUDENT IN AN ORGANIZED HEALTH CARE EDUCATION/TRAINING PROGRAM

## 2022-05-16 PROCEDURE — 85025 COMPLETE CBC W/AUTO DIFF WBC: CPT | Performed by: INTERNAL MEDICINE

## 2022-05-16 PROCEDURE — 36600 WITHDRAWAL OF ARTERIAL BLOOD: CPT

## 2022-05-16 PROCEDURE — 99231 PR SUBSEQUENT HOSPITAL CARE,LEVL I: ICD-10-PCS | Mod: ,,, | Performed by: SURGERY

## 2022-05-16 PROCEDURE — C1751 CATH, INF, PER/CENT/MIDLINE: HCPCS

## 2022-05-16 PROCEDURE — 99231 SBSQ HOSP IP/OBS SF/LOW 25: CPT | Mod: ,,, | Performed by: SURGERY

## 2022-05-16 PROCEDURE — 27000221 HC OXYGEN, UP TO 24 HOURS

## 2022-05-16 PROCEDURE — 94003 VENT MGMT INPAT SUBQ DAY: CPT

## 2022-05-16 PROCEDURE — 99900035 HC TECH TIME PER 15 MIN (STAT)

## 2022-05-16 PROCEDURE — 36415 COLL VENOUS BLD VENIPUNCTURE: CPT | Performed by: INTERNAL MEDICINE

## 2022-05-16 PROCEDURE — 20000000 HC ICU ROOM

## 2022-05-16 PROCEDURE — 99900026 HC AIRWAY MAINTENANCE (STAT)

## 2022-05-16 PROCEDURE — 36569 INSJ PICC 5 YR+ W/O IMAGING: CPT

## 2022-05-16 PROCEDURE — 82803 BLOOD GASES ANY COMBINATION: CPT

## 2022-05-16 PROCEDURE — 25000003 PHARM REV CODE 250: Performed by: STUDENT IN AN ORGANIZED HEALTH CARE EDUCATION/TRAINING PROGRAM

## 2022-05-16 RX ORDER — POLYETHYLENE GLYCOL 3350 17 G/17G
17 POWDER, FOR SOLUTION ORAL DAILY
Status: DISCONTINUED | OUTPATIENT
Start: 2022-05-16 | End: 2022-06-17 | Stop reason: HOSPADM

## 2022-05-16 RX ADMIN — GLYCOPYRROLATE 1 MG: 1 TABLET ORAL at 02:05

## 2022-05-16 RX ADMIN — PROPOFOL 50 MCG/KG/MIN: 10 INJECTION, EMULSION INTRAVENOUS at 12:05

## 2022-05-16 RX ADMIN — DOXEPIN HYDROCHLORIDE 50 MG: 50 CAPSULE ORAL at 08:05

## 2022-05-16 RX ADMIN — SODIUM CHLORIDE, PRESERVATIVE FREE 10 ML: 5 INJECTION INTRAVENOUS at 09:05

## 2022-05-16 RX ADMIN — DEXMEDETOMIDINE HYDROCHLORIDE 1.4 MCG/KG/HR: 400 INJECTION INTRAVENOUS at 05:05

## 2022-05-16 RX ADMIN — RIFAMPIN 300 MG: 300 CAPSULE ORAL at 08:05

## 2022-05-16 RX ADMIN — METOPROLOL TARTRATE 25 MG: 25 TABLET, FILM COATED ORAL at 08:05

## 2022-05-16 RX ADMIN — HALOPERIDOL 5 MG: 5 TABLET ORAL at 08:05

## 2022-05-16 RX ADMIN — PROPOFOL 50 MCG/KG/MIN: 10 INJECTION, EMULSION INTRAVENOUS at 08:05

## 2022-05-16 RX ADMIN — DEXMEDETOMIDINE HYDROCHLORIDE 1.4 MCG/KG/HR: 400 INJECTION INTRAVENOUS at 04:05

## 2022-05-16 RX ADMIN — VANCOMYCIN HYDROCHLORIDE 1000 MG: 1 INJECTION, POWDER, LYOPHILIZED, FOR SOLUTION INTRAVENOUS at 08:05

## 2022-05-16 RX ADMIN — OXCARBAZEPINE 300 MG: 300 TABLET, FILM COATED ORAL at 08:05

## 2022-05-16 RX ADMIN — EZETIMIBE 10 MG: 10 TABLET ORAL at 08:05

## 2022-05-16 RX ADMIN — FAMOTIDINE 20 MG: 10 INJECTION, SOLUTION INTRAVENOUS at 08:05

## 2022-05-16 RX ADMIN — BENZTROPINE MESYLATE 1 MG: 1 TABLET ORAL at 08:05

## 2022-05-16 RX ADMIN — PROPOFOL 50 MCG/KG/MIN: 10 INJECTION, EMULSION INTRAVENOUS at 04:05

## 2022-05-16 RX ADMIN — ENOXAPARIN SODIUM 80 MG: 80 INJECTION SUBCUTANEOUS at 08:05

## 2022-05-16 RX ADMIN — PROPOFOL 50 MCG/KG/MIN: 10 INJECTION, EMULSION INTRAVENOUS at 10:05

## 2022-05-16 RX ADMIN — LEVETIRACETAM 500 MG: 100 INJECTION, SOLUTION INTRAVENOUS at 08:05

## 2022-05-16 RX ADMIN — DEXMEDETOMIDINE HYDROCHLORIDE 1.4 MCG/KG/HR: 400 INJECTION INTRAVENOUS at 02:05

## 2022-05-16 RX ADMIN — ATORVASTATIN CALCIUM 80 MG: 40 TABLET, FILM COATED ORAL at 08:05

## 2022-05-16 RX ADMIN — GLYCOPYRROLATE 1 MG: 1 TABLET ORAL at 10:05

## 2022-05-16 RX ADMIN — DEXMEDETOMIDINE HYDROCHLORIDE 1.4 MCG/KG/HR: 400 INJECTION INTRAVENOUS at 07:05

## 2022-05-16 RX ADMIN — POLYETHYLENE GLYCOL 3350 17 G: 17 POWDER, FOR SOLUTION ORAL at 09:05

## 2022-05-16 RX ADMIN — DEXMEDETOMIDINE HYDROCHLORIDE 1.4 MCG/KG/HR: 400 INJECTION INTRAVENOUS at 08:05

## 2022-05-16 RX ADMIN — GLYCOPYRROLATE 1 MG: 1 TABLET ORAL at 08:05

## 2022-05-16 RX ADMIN — DEXMEDETOMIDINE HYDROCHLORIDE 1.4 MCG/KG/HR: 400 INJECTION INTRAVENOUS at 12:05

## 2022-05-16 RX ADMIN — PIPERACILLIN SODIUM AND TAZOBACTAM SODIUM 4.5 G: 4; .5 INJECTION, POWDER, LYOPHILIZED, FOR SOLUTION INTRAVENOUS at 04:05

## 2022-05-16 RX ADMIN — ACETAMINOPHEN 650 MG: 325 TABLET ORAL at 08:05

## 2022-05-16 RX ADMIN — VANCOMYCIN HYDROCHLORIDE 1000 MG: 1 INJECTION, POWDER, LYOPHILIZED, FOR SOLUTION INTRAVENOUS at 04:05

## 2022-05-16 RX ADMIN — PIPERACILLIN SODIUM AND TAZOBACTAM SODIUM 4.5 G: 4; .5 INJECTION, POWDER, LYOPHILIZED, FOR SOLUTION INTRAVENOUS at 08:05

## 2022-05-16 NOTE — PROGRESS NOTES
[facility] - [facility] [place] [facility]  Adult Nutrition  Progress Note    SUMMARY       Recommendations    Recommendation/Intervention: Peptamen VHP @ 70ml/hr (goal rate, based on tube feeding running ~20 hours/day) + 1 packet [employer] Protein NoCarb 4x/day  Goals: Meet >/=75% est energy and protein requirements by f/u  Nutrition Goal Status: progressing towards goal  Communication of RD Recs: reviewed with RN    Assessment and Plan    Nutrition Problem  Inadequate Oral Intake    Related to (etiology):   Current condition     Signs and Symptoms (as evidenced by):   trach    Interventions(treatment strategy):  Collaboration with other providers    Nutrition Diagnosis Status:   Continues        Malnutrition Assessment  Malnutrition Type: acute illness or injury  Weight Loss (Malnutrition): other (see comments) (does not meet criteria)  Energy Intake (Malnutrition): other (see comments) (does not meet criteria)  Subcutaneous Fat (Malnutrition):  (does not meet criteria)  Muscle Mass (Malnutrition): mild depletion  Fluid Accumulation (Malnutrition):  (does not meet criteria)  Hand  Strength, Left (Malnutrition):  (unable to eval)  Hand  Strength, Right (Malnutrition):  (unable to eval)   [place] (Muscle Loss): mild depletion  Clavicle Bone Region (Muscle Loss): mild depletion   Edema (Fluid Accumulation): 0-->no edema present   Subcutaneous Fat Loss (Final Summary): well nourished  Muscle Loss Evaluation (Final Summary): well nourished  Fluid Accumulation Evaluation:  (unable to evaluate)    Moderate Weight Loss (Malnutrition):  (unable to evaluate)    Reason for Assessment    Reason For Assessment: RD follow-up  Diagnosis: other (see comments) (1. CAD with 4 vessel CABG 4/6  2. MRSA Sternal wound dehiscence with debridement and wound vac 4/17.  Plastics following  3. Respiratory culture positive Klebsiella and Proteus  4. Malnutrition.  5. Respiratory failure.  Intubated 4/15)  Relevant Medical  "History:  (.riley)  General Information Comments: Tube feeding continues, tolerated per RN. Still receiving protein packs (3 given so far today). Receiving kcal from meds.    Nutrition Risk Screen    Nutrition Risk Screen: tube feeding or parenteral nutrition    Nutrition/Diet History    Factors Affecting Nutritional Intake: on mechanical ventilation    Anthropometrics    Temp: 99.6 °F (37.6 °C)  Height Method: Estimated  Height: 5' 10.87" (180 cm)  Height (inches): 70.87 in  Weight Method: Bed Scale  Weight: 77.2 kg (170 lb 3.1 oz)  Weight (lb): 170.2 lb  Ideal Body Weight (IBW), Male: 171.22 lb  % Ideal Body Weight, Male (lb): 112.92 %  BMI (Calculated): 23.8  BMI Grade: 25 - 29.9 - overweight    Lab/Procedures/Meds    Pertinent Labs Reviewed: reviewed  Pertinent Labs Comments: 5/16/22 Cl 108, Glu 173  Pertinent Medications Reviewed: reviewed  Pertinent Medications Comments: diprivan @ 23ml/hr    Estimated/Assessed Needs    Weight Used For Calorie Calculations: 87.7 kg (193 lb 5.5 oz)  Energy Calorie Requirements (kcal): 2167kcal  Energy Need Method: [facility]  Protein Requirements: 133gm  Weight Used For Protein Calculations: 87.7 kg (193 lb 5.5 oz)  Fluid Requirements (mL): 30mL/kg  Estimated Fluid Requirement Method: RDA Method  RDA Method (mL): 2167     Nutrition Prescription Ordered    Current Diet Order: NPO  Current Nutrition Support Formula Ordered: Peptamen Intense VHP  Current Nutrition Support Rate Ordered: 70 (ml)    Evaluation of Received Nutrient/Fluid Intake    Enteral Calories (kcal): 1400  Enteral Protein (gm): 130  Enteral (Free Water) Fluid (mL): 1176  Lipid Calories (kcals): 475 kcals  Other Calories (kcal): 240  Total Calories (kcal): 1640  % Kcal Needs: 76% (98% with meds)  Other Protein (gm): 60  Total Protein (gm): 190  Total Protein (gm/kg): 2.38  % Protein Needs: 143  Energy Calories Required: meeting needs  Protein Required: meeting needs  Fluid Required: not meeting needs  Tolerance: " tolerating  % Intake of Estimated Energy Needs: 75 - 100 %  % Meal Intake: NPO    Nutrition Risk    Level of Risk/Frequency of Follow-up: high     Monitor and Evaluation    Food and Nutrient Intake: enteral nutrition intake  Food and Nutrient Adminstration: enteral and parenteral nutrition administration     Nutrition Follow-Up    RD Follow-up?: Yes

## 2022-05-16 NOTE — PROCEDURES
"Kendall Duff is a 56 y.o. male patient.    Temp: 98.6 °F (37 °C) (05/16/22 0400)  Pulse: 65 (05/16/22 0807)  Resp: (!) 32 (05/16/22 0636)  BP: (!) 143/71 (05/16/22 0807)  SpO2: 97 % (05/16/22 0636)  Weight: 77.2 kg (170 lb 3.1 oz) (05/11/22 0600)  Height: 5' 10.87" (180 cm) (05/05/22 1403)    PICC  Performed by: Magui Sky RN  Consent Done: Yes  Time out: Immediately prior to procedure a time out was called to verify the correct patient, procedure, equipment, support staff and site/side marked as required  Indications: med administration  Local anesthetic: lidocaine 1% without epinephrine  Anesthetic Total (mL): 2  Preparation: skin prepped with ChloraPrep  Skin prep agent dried: skin prep agent completely dried prior to procedure  Sterile barriers: all five maximum sterile barriers used - cap, mask, sterile gown, sterile gloves, and large sterile sheet  Hand hygiene: hand hygiene performed prior to central venous catheter insertion  Location details: left basilic  Catheter type: triple lumen  Catheter size: 5 Fr  Catheter Length: 41cm    Ultrasound guidance: yes  Vessel Caliber: large, compressibility normal  Vascular Doppler: not done  Needle advanced into vessel with real time Ultrasound guidance.  Guidewire confirmed in vessel.  Sterile sheath used.  Number of attempts: 1  Post-procedure: blood return through all ports, chlorhexidine patch and sterile dressing applied    Assessment: placement verified by x-ray, tip termination and successful placement  Complications: none          Magui Sky rn  5/16/2022  "

## 2022-05-16 NOTE — PROGRESS NOTES
PRS  Intubated and sedated  Incisions all c/d/i, drains s/s  cultures from surgery remain negative  No changes to care from my standpoint.  Continue arms in at sides, drain care.

## 2022-05-16 NOTE — PROGRESS NOTES
[facility]                       [address] [address]    PATIENT NAME:       NITESH BHANDARI   YOB: 1965  CSN:                [id number]   MRN:                [id number]  ADMIT DATE:         04/01/2022 12:44:00  PHYSICIAN:          YOLANED Pearson MD                            PROGRESS NOTE    DATE:      Nitesh Bhandari is a 56-year-old gentleman who has bipolar disorder and   schizophrenia, who underwent a CABG x4 by Dr. Carney in April of 2022.    Postoperatively, developed wound dehiscence and infection with MRSA.  He had a   vacuum assisted closure device and debridement done on 04/17/2022, followed   subsequently by bilateral pectoral flaps for sternal wound closure on   05/11/2022.  He has had a right subclavian vein thrombosis develop.  He has a   PIC in that right arm.  We are going to take that out now.  We have been able to   restart anticoagulation, so we will change the PIC to the other side.  Dr. Weiss   has stated it is okay to resume Lovenox.  However, we still want him sedated on   mechanical ventilation at the present time.      VITAL SIGNS:  Currently are as follows, the T-max has been 100.8, currently he   is 98.6, blood pressure 118/81.  He is on low-dose Levophed.  Heart rate 66,   respiratory rate in the 20s.    HEENT:  Unremarkable.    NECK:  Shiley trach tube was in place.  CHEST:  He has a chest binder in place.  He also has some drains 2 drains   bilaterally.  He has a few crackles on auscultation.    HEART:  Regular in rhythm.    ABDOMEN:  He has a PEG tube that is also in place and it is in a binder.    EXTREMITIES:  Lower extremities, he is losing a lot of muscle mass on his lower   extremities.  Some dependent edema.     The intake and output for the day was -1.346 L.    LABORATORY DATA:  From today, H and H 8.8 and 29, white count 7,800, platelet   count 327.  Sodium 139, potassium  4.0, chloride 108, bicarb 22, BUN 18,   creatinine 0.58.  No blood gas done today or radiograph.  Blood cultures   previously done on 05/12 remain negative.    IMPRESSION:    1. Coronary artery bypass graft with 4 vessel bypass graft, 04/06/2022.  2. Methicillin-resistant Staphylococcus aureus sternal wound dehiscence with   debridement and wound vacuum assisted closure device placement 04/17/2022   secondary to severe agitation postoperatively.  3. Status post bilateral pectoral flaps for sternal wound closure, 05/11/2022 by   Dr. Weiss.    4. Bipolar and schizophrenia.    5. Right upper extremity deep venous thrombosis, currently on Lovenox.   6. Respiratory failure, status post trach and PEG.    7. Protein calorie malnutrition.    PLAN:  We are going to take that right PIC out and place it on the left arm.  He   has continued to run low fever and possibly from the DVT.  Continue IV   antibiotics and nutritional support.  Wean Levophed for map greater than 65.    CRITICAL CARE DIAGNOSIS:  Respiratory failure and other diagnoses listed above.    CRITICAL CARE INTERVENTION:  Hands-on eval, review of labs, adjustment of   pressors and other physiological parameters as well as adjustment of mechanical   ventilation.      Discussion with the patient's daughter, Arabella Bradley, who can be reached at   [phone].    CRITICAL CARE TIME:  Personally spent greater than 31 minutes.        ______________________________  G MD ALICIA Gutierres/CHRISTINAS  DD:  05/16/2022  Time:  06:29AM  DT:  05/16/2022  Time:  06:57AM  Job #:  [id number]      PROGRESS NOTE

## 2022-05-17 LAB
ALBUMIN SERPL-MCNC: 1.5 GM/DL (ref 3.5–5)
ALBUMIN/GLOB SERPL: 0.3 RATIO (ref 1.1–2)
ALP SERPL-CCNC: 85 UNIT/L (ref 40–150)
ALT SERPL-CCNC: 21 UNIT/L (ref 0–55)
AST SERPL-CCNC: 36 UNIT/L (ref 5–34)
BACTERIA BLD CULT: NORMAL
BACTERIA BLD CULT: NORMAL
BACTERIA SPEC CULT: NORMAL
BACTERIA SPEC CULT: NORMAL
BASE EXCESS ARTERIAL: ABNORMAL
BASOPHILS # BLD AUTO: 0.05 X10(3)/MCL (ref 0–0.2)
BASOPHILS NFR BLD AUTO: 0.6 %
BILIRUBIN DIRECT+TOT PNL SERPL-MCNC: 0.4 MG/DL
BUN SERPL-MCNC: 21.8 MG/DL (ref 8.4–25.7)
CALCIUM SERPL-MCNC: 8.2 MG/DL (ref 8.4–10.2)
CHLORIDE SERPL-SCNC: 107 MMOL/L (ref 98–107)
CO2 SERPL-SCNC: 22 MMOL/L (ref 22–29)
CORRECTED TEMPERATURE (PCO2): 29 MMHG (ref 35–45)
CORRECTED TEMPERATURE (PCO2): 33 MMHG (ref 35–45)
CORRECTED TEMPERATURE (PCO2): 41 MMHG (ref 35–45)
CORRECTED TEMPERATURE (PH): 7.41 (ref 7.35–7.45)
CORRECTED TEMPERATURE (PH): 7.46 (ref 7.35–7.45)
CORRECTED TEMPERATURE (PH): 7.5 (ref 7.35–7.45)
CORRECTED TEMPERATURE (PO2): 105 MMHG (ref 80–100)
CORRECTED TEMPERATURE (PO2): 118 MMHG (ref 80–100)
CORRECTED TEMPERATURE (PO2): 75 MMHG (ref 80–100)
CREAT SERPL-MCNC: 0.65 MG/DL (ref 0.73–1.18)
EOSINOPHIL # BLD AUTO: 0.87 X10(3)/MCL (ref 0–0.9)
EOSINOPHIL NFR BLD AUTO: 10.3 %
ERYTHROCYTE [DISTWIDTH] IN BLOOD BY AUTOMATED COUNT: 15.8 % (ref 11.5–17)
GLOBULIN SER-MCNC: 4.6 GM/DL (ref 2.4–3.5)
GLUCOSE SERPL-MCNC: 167 MG/DL (ref 74–100)
HCO3 ARTERIAL: 23.5 MMOL/L (ref 18–23)
HCO3 UR-SCNC: 22.6 MMOL/L
HCO3 UR-SCNC: 23.5 MMOL/L
HCO3 UR-SCNC: 26 MMOL/L
HCT VFR BLD AUTO: 27.6 % (ref 42–52)
HGB BLD-MCNC: 10.4 G/DL (ref 12–16)
HGB BLD-MCNC: 10.8 G/DL (ref 12–16)
HGB BLD-MCNC: 8.5 GM/DL (ref 14–18)
HGB BLD-MCNC: 9 G/DL (ref 12–16)
HGB BLD-MCNC: ABNORMAL G/DL
IMM GRANULOCYTES # BLD AUTO: 0.02 X10(3)/MCL (ref 0–0.02)
IMM GRANULOCYTES NFR BLD AUTO: 0.2 % (ref 0–0.43)
LYMPHOCYTES # BLD AUTO: 1.81 X10(3)/MCL (ref 0.6–4.6)
LYMPHOCYTES NFR BLD AUTO: 21.5 %
Lab: 22.6 MMOL/L
Lab: 23.5 MMOL/L
Lab: 26 MMOL/L
MAGNESIUM SERPL-MCNC: 1.9 MG/DL (ref 1.6–2.6)
MCH RBC QN AUTO: 26.6 PG (ref 27–31)
MCHC RBC AUTO-ENTMCNC: 30.8 MG/DL (ref 33–36)
MCV RBC AUTO: 86.3 FL (ref 80–94)
MONOCYTES # BLD AUTO: 0.77 X10(3)/MCL (ref 0.1–1.3)
MONOCYTES NFR BLD AUTO: 9.1 %
NEUTROPHILS # BLD AUTO: 4.9 X10(3)/MCL (ref 2.1–9.2)
NEUTROPHILS NFR BLD AUTO: 58.3 %
NRBC BLD AUTO-RTO: 0 %
PCO2 BLDA: 29 MMHG (ref 35–45)
PCO2 BLDA: 33 MMHG (ref 35–45)
PCO2 BLDA: 41 MMHG (ref 35–45)
PCO2 BLDA: ABNORMAL MM[HG]
PCO2 BLDA: ABNORMAL MM[HG]
PH SMN: 7.41 [PH] (ref 7.35–7.45)
PH SMN: 7.46 [PH] (ref 7.35–7.45)
PH SMN: 7.5 [PH] (ref 7.35–7.45)
PH SMN: ABNORMAL [PH]
PHOSPHATE SERPL-MCNC: 4 MG/DL (ref 2.3–4.7)
PLATELET # BLD AUTO: 355 X10(3)/MCL (ref 130–400)
PMV BLD AUTO: 9.7 FL (ref 9.4–12.4)
PO2 BLDA: 105 MMHG (ref 80–100)
PO2 BLDA: 118 MMHG (ref 80–100)
PO2 BLDA: 75 MMHG (ref 80–100)
PO2 BLDA: ABNORMAL MM[HG]
POC BASE DEFICIT: -0.1 MMOL/L (ref -2–2)
POC BASE DEFICIT: 0.1 MMOL/L (ref -2–2)
POC BASE DEFICIT: 1.2 MMOL/L (ref -2–2)
POC COHB: 1.7 %
POC COHB: 1.9 %
POC COHB: 2.5 %
POC COHB: ABNORMAL
POC IONIZED CALCIUM: 1.17 MMOL/L (ref 1.12–1.23)
POC IONIZED CALCIUM: 1.18 MMOL/L (ref 1.12–1.23)
POC IONIZED CALCIUM: 1.2 MMOL/L (ref 1.12–1.23)
POC IONIZED CALCIUM: ABNORMAL
POC METHB: 0.8 % (ref 0.4–2)
POC METHB: 0.9 % (ref 0.4–2)
POC METHB: 0.9 % (ref 0.4–2)
POC METHB: ABNORMAL
POC O2HB: 94.2 % (ref 94–97)
POC O2HB: 96.4 % (ref 94–97)
POC O2HB: 96.7 % (ref 94–97)
POC O2HB: ABNORMAL
POC SATURATED O2: 95.7 %
POC SATURATED O2: 98.1 %
POC SATURATED O2: 98.9 %
POC TEMPERATURE: 37 C
POC TEMPERATURE: 37 °C
POC TEMPERATURE: 37 °C
POTASSIUM BLD-SCNC: 3.9 MMOL/L (ref 3.5–5)
POTASSIUM BLD-SCNC: 4 MMOL/L (ref 3.5–5)
POTASSIUM BLD-SCNC: 4.2 MMOL/L (ref 3.5–5)
POTASSIUM BLD-SCNC: ABNORMAL MMOL/L
POTASSIUM SERPL-SCNC: 4.3 MMOL/L (ref 3.5–5.1)
PROT SERPL-MCNC: 6.1 GM/DL (ref 6.4–8.3)
RBC # BLD AUTO: 3.2 X10(6)/MCL (ref 4.7–6.1)
SATURATED O2 ARTERIAL, I-STAT: ABNORMAL
SODIUM BLD-SCNC: 133 MMOL/L (ref 137–145)
SODIUM BLD-SCNC: 136 MMOL/L (ref 137–145)
SODIUM BLD-SCNC: 137 MMOL/L (ref 137–145)
SODIUM BLD-SCNC: ABNORMAL MMOL/L
SODIUM SERPL-SCNC: 136 MMOL/L (ref 136–145)
SPECIMEN SOURCE: ABNORMAL
WBC # SPEC AUTO: 8.4 X10(3)/MCL (ref 4.5–11.5)

## 2022-05-17 PROCEDURE — 36415 COLL VENOUS BLD VENIPUNCTURE: CPT | Performed by: INTERNAL MEDICINE

## 2022-05-17 PROCEDURE — 36600 WITHDRAWAL OF ARTERIAL BLOOD: CPT

## 2022-05-17 PROCEDURE — 27000221 HC OXYGEN, UP TO 24 HOURS

## 2022-05-17 PROCEDURE — 99231 SBSQ HOSP IP/OBS SF/LOW 25: CPT | Mod: ,,, | Performed by: SURGERY

## 2022-05-17 PROCEDURE — 25000003 PHARM REV CODE 250: Performed by: HOSPITALIST

## 2022-05-17 PROCEDURE — 94761 N-INVAS EAR/PLS OXIMETRY MLT: CPT

## 2022-05-17 PROCEDURE — 99900026 HC AIRWAY MAINTENANCE (STAT)

## 2022-05-17 PROCEDURE — 99900035 HC TECH TIME PER 15 MIN (STAT)

## 2022-05-17 PROCEDURE — 82803 BLOOD GASES ANY COMBINATION: CPT

## 2022-05-17 PROCEDURE — 25000003 PHARM REV CODE 250: Performed by: INTERNAL MEDICINE

## 2022-05-17 PROCEDURE — 25000003 PHARM REV CODE 250: Performed by: STUDENT IN AN ORGANIZED HEALTH CARE EDUCATION/TRAINING PROGRAM

## 2022-05-17 PROCEDURE — 94003 VENT MGMT INPAT SUBQ DAY: CPT

## 2022-05-17 PROCEDURE — 63600175 PHARM REV CODE 636 W HCPCS: Performed by: STUDENT IN AN ORGANIZED HEALTH CARE EDUCATION/TRAINING PROGRAM

## 2022-05-17 PROCEDURE — 93010 EKG 12-LEAD: ICD-10-PCS | Mod: ,,, | Performed by: INTERNAL MEDICINE

## 2022-05-17 PROCEDURE — 63600175 PHARM REV CODE 636 W HCPCS

## 2022-05-17 PROCEDURE — 80053 COMPREHEN METABOLIC PANEL: CPT | Performed by: INTERNAL MEDICINE

## 2022-05-17 PROCEDURE — 63600175 PHARM REV CODE 636 W HCPCS: Performed by: INTERNAL MEDICINE

## 2022-05-17 PROCEDURE — 93010 ELECTROCARDIOGRAM REPORT: CPT | Mod: ,,, | Performed by: INTERNAL MEDICINE

## 2022-05-17 PROCEDURE — 63600175 PHARM REV CODE 636 W HCPCS: Performed by: HOSPITALIST

## 2022-05-17 PROCEDURE — 25000003 PHARM REV CODE 250

## 2022-05-17 PROCEDURE — 83735 ASSAY OF MAGNESIUM: CPT | Performed by: INTERNAL MEDICINE

## 2022-05-17 PROCEDURE — A4216 STERILE WATER/SALINE, 10 ML: HCPCS

## 2022-05-17 PROCEDURE — 84100 ASSAY OF PHOSPHORUS: CPT | Performed by: INTERNAL MEDICINE

## 2022-05-17 PROCEDURE — 20000000 HC ICU ROOM

## 2022-05-17 PROCEDURE — 85025 COMPLETE CBC W/AUTO DIFF WBC: CPT | Performed by: INTERNAL MEDICINE

## 2022-05-17 PROCEDURE — 99231 PR SUBSEQUENT HOSPITAL CARE,LEVL I: ICD-10-PCS | Mod: ,,, | Performed by: SURGERY

## 2022-05-17 PROCEDURE — 93005 ELECTROCARDIOGRAM TRACING: CPT

## 2022-05-17 RX ADMIN — ATORVASTATIN CALCIUM 80 MG: 40 TABLET, FILM COATED ORAL at 08:05

## 2022-05-17 RX ADMIN — BENZTROPINE MESYLATE 1 MG: 1 TABLET ORAL at 08:05

## 2022-05-17 RX ADMIN — PROPOFOL 50 MCG/KG/MIN: 10 INJECTION, EMULSION INTRAVENOUS at 02:05

## 2022-05-17 RX ADMIN — PROPOFOL 35 MCG/KG/MIN: 10 INJECTION, EMULSION INTRAVENOUS at 08:05

## 2022-05-17 RX ADMIN — OXCARBAZEPINE 300 MG: 300 TABLET, FILM COATED ORAL at 08:05

## 2022-05-17 RX ADMIN — VANCOMYCIN HYDROCHLORIDE 1000 MG: 1 INJECTION, POWDER, LYOPHILIZED, FOR SOLUTION INTRAVENOUS at 12:05

## 2022-05-17 RX ADMIN — DEXMEDETOMIDINE HYDROCHLORIDE 1 MCG/KG/HR: 400 INJECTION INTRAVENOUS at 08:05

## 2022-05-17 RX ADMIN — SODIUM CHLORIDE, PRESERVATIVE FREE 10 ML: 5 INJECTION INTRAVENOUS at 08:05

## 2022-05-17 RX ADMIN — PIPERACILLIN SODIUM AND TAZOBACTAM SODIUM 4.5 G: 4; .5 INJECTION, POWDER, LYOPHILIZED, FOR SOLUTION INTRAVENOUS at 08:05

## 2022-05-17 RX ADMIN — RIFAMPIN 300 MG: 300 CAPSULE ORAL at 08:05

## 2022-05-17 RX ADMIN — DEXMEDETOMIDINE HYDROCHLORIDE 1 MCG/KG/HR: 400 INJECTION INTRAVENOUS at 02:05

## 2022-05-17 RX ADMIN — LEVETIRACETAM 500 MG: 100 INJECTION, SOLUTION INTRAVENOUS at 08:05

## 2022-05-17 RX ADMIN — POLYETHYLENE GLYCOL 3350 17 G: 17 POWDER, FOR SOLUTION ORAL at 08:05

## 2022-05-17 RX ADMIN — HALOPERIDOL 5 MG: 5 TABLET ORAL at 08:05

## 2022-05-17 RX ADMIN — EZETIMIBE 10 MG: 10 TABLET ORAL at 08:05

## 2022-05-17 RX ADMIN — GLYCOPYRROLATE 1 MG: 1 TABLET ORAL at 10:05

## 2022-05-17 RX ADMIN — PROPOFOL 40 MCG/KG/MIN: 10 INJECTION, EMULSION INTRAVENOUS at 11:05

## 2022-05-17 RX ADMIN — DEXMEDETOMIDINE HYDROCHLORIDE 1.4 MCG/KG/HR: 400 INJECTION INTRAVENOUS at 05:05

## 2022-05-17 RX ADMIN — ENOXAPARIN SODIUM 80 MG: 80 INJECTION SUBCUTANEOUS at 08:05

## 2022-05-17 RX ADMIN — PIPERACILLIN SODIUM AND TAZOBACTAM SODIUM 4.5 G: 4; .5 INJECTION, POWDER, LYOPHILIZED, FOR SOLUTION INTRAVENOUS at 04:05

## 2022-05-17 RX ADMIN — PIPERACILLIN SODIUM AND TAZOBACTAM SODIUM 4.5 G: 4; .5 INJECTION, POWDER, LYOPHILIZED, FOR SOLUTION INTRAVENOUS at 12:05

## 2022-05-17 RX ADMIN — DEXMEDETOMIDINE HYDROCHLORIDE 1.4 MCG/KG/HR: 400 INJECTION INTRAVENOUS at 11:05

## 2022-05-17 RX ADMIN — GLYCOPYRROLATE 1 MG: 1 TABLET ORAL at 03:05

## 2022-05-17 RX ADMIN — VANCOMYCIN HYDROCHLORIDE 1000 MG: 1 INJECTION, POWDER, LYOPHILIZED, FOR SOLUTION INTRAVENOUS at 08:05

## 2022-05-17 RX ADMIN — VANCOMYCIN HYDROCHLORIDE 1000 MG: 1 INJECTION, POWDER, LYOPHILIZED, FOR SOLUTION INTRAVENOUS at 04:05

## 2022-05-17 RX ADMIN — METOPROLOL TARTRATE 25 MG: 25 TABLET, FILM COATED ORAL at 08:05

## 2022-05-17 RX ADMIN — FENTANYL CITRATE 50 MCG: 50 INJECTION INTRAMUSCULAR; INTRAVENOUS at 12:05

## 2022-05-17 RX ADMIN — PROPOFOL 50 MCG/KG/MIN: 10 INJECTION, EMULSION INTRAVENOUS at 05:05

## 2022-05-17 RX ADMIN — FAMOTIDINE 20 MG: 10 INJECTION, SOLUTION INTRAVENOUS at 08:05

## 2022-05-17 RX ADMIN — DOXEPIN HYDROCHLORIDE 50 MG: 50 CAPSULE ORAL at 08:05

## 2022-05-17 RX ADMIN — ACETAMINOPHEN 650 MG: 325 TABLET ORAL at 08:05

## 2022-05-17 RX ADMIN — GLYCOPYRROLATE 1 MG: 1 TABLET ORAL at 08:05

## 2022-05-17 RX ADMIN — DEXMEDETOMIDINE HYDROCHLORIDE 1 MCG/KG/HR: 400 INJECTION INTRAVENOUS at 06:05

## 2022-05-17 NOTE — PROGRESS NOTES
[facility]                       [address] [address]    PATIENT NAME:       NITESH BHANDARI   YOB: 1965  CSN:                [id number]   MRN:                [id number]  ADMIT DATE:         04/01/2022 12:44:00  PHYSICIAN:          YOLANDE Pearson MD                            PROGRESS NOTE    DATE:      SUBJECTIVE:  A 56-year-old gentleman who underwent a CABG 4-vessel on   04/06/2022, complicated by MRSA sternal wound dehiscence because of severe   agitation as the patient has underlying bipolar disorder and schizophrenia.  He   underwent a wound closure device placement on 04/17/2022, and subsequently   underwent a bilateral pectoral flap for sternal wound closure on 05/11/2022, by   Dr. Weiss.  We have been keeping him sedated so as not to disrupt the pectoral   flaps.  He is continued on antibiotics under the direction of Infectious   Disease, Dr. Burgos.  We will re-consult him for antibiotic management and length of   duration.  Currently the patient is on low-dose Levophed.  He is on sedation.    PHYSICAL EXAMINATION:  VITAL SIGNS:  Current temp is 99.5, blood pressure is 76/54, mean of 62, heart   rate 70, respiratory rate 26.    HEENT:  Unremarkable.  NECK:  He has a Shiley trach tube in place.  CHEST:  Diminished but clear bilaterally.  HEART:  Regular in rhythm.    ABDOMEN:  Soft.  Tolerating tube feedings.    EXTREMITIES:  He has loss of muscle mass, and he has his right upper extremity   where he has a DVT and still has significant edema.  The other extremities are   not significantly edematous.    LAB WORK:  From today, H and H 8.5 and 27.6, white count 8400, platelet count   355.  Sodium 136, potassium 4.3, chloride 107, bicarb 22, BUN 21.8, creatinine   0.65.  ABG this morning:  PH 7.46, pCO2 of 33, pO2 of 75.  We did order a chest   x-ray this morning.  It shows the trach tube to be in good  position.  The lung   fields are clear.    IMPRESSION:    1. Coronary artery bypass graft x4 vessels 04/06/2022.  2. Methicillin-resistant Staphylococcus aureus sternal wound dehiscence with   debridement and wound vacuum-assisted closure device 04/17/2022.   3. Status post bilateral pectoral flaps for sternal wound closure 05/11/2022, by   Dr. Weiss.  4. Postoperative agitation secondary to underlying bipolar disorder and   schizophrenia.  5. Right upper extremity deep venous thrombosis, currently on full-dose Lovenox.  6. Status post trach and percutaneous endoscopic gastrostomy.    7. Protein-calorie malnutrition.    PLAN:  Continue supportive care.  Dr. Weiss would like to keep him sedated still   in order for the flap to heal.  We did have a great deal of difficulty keeping   him calm prior to the surgery.  We will look at possibly toward the end of this   week lightening up his sedation and resuming some of his previous Seroquel   dosage.  We will also ask Infectious Disease to opine on the length of treatment   and what particular antibiotics they want to keep him on.    CRITICAL CARE DIAGNOSIS:  Respiratory failure and other diagnoses listed above.    CRITICAL CARE INTERVENTION:  Hands-on eval, review of labs, adjustment of   pressors and other physiological parameters, discussion of plan of care with   patient's daughter Arabella Bradley, who can be reached at [phone].    CRITICAL CARE TIME:  Personally spent greater than 31 minutes.        ______________________________  G MD ALICIA Gutierres/POA  DD:  05/17/2022  Time:  09:54AM  DT:  05/17/2022  Time:  10:25AM  Job #:  [id number]      PROGRESS NOTE

## 2022-05-17 NOTE — PROGRESS NOTES
PRS  Intubated and sedated  He is off levo  Incisions all c/d/i  Drains s/s  No changes to care from my standpoint.  Continue arms in at sides and drain care.  Sedation as needed to avoid agitation that may disrupt repair.

## 2022-05-18 LAB
ALBUMIN SERPL-MCNC: 1.6 GM/DL (ref 3.5–5)
ALBUMIN/GLOB SERPL: 0.4 RATIO (ref 1.1–2)
ALP SERPL-CCNC: 76 UNIT/L (ref 40–150)
ALT SERPL-CCNC: 22 UNIT/L (ref 0–55)
AST SERPL-CCNC: 36 UNIT/L (ref 5–34)
BASOPHILS # BLD AUTO: 0.07 X10(3)/MCL (ref 0–0.2)
BASOPHILS NFR BLD AUTO: 0.7 %
BILIRUBIN DIRECT+TOT PNL SERPL-MCNC: 0.5 MG/DL
BUN SERPL-MCNC: 19.8 MG/DL (ref 8.4–25.7)
CALCIUM SERPL-MCNC: 8.2 MG/DL (ref 8.4–10.2)
CHLORIDE SERPL-SCNC: 105 MMOL/L (ref 98–107)
CO2 SERPL-SCNC: 23 MMOL/L (ref 22–29)
CREAT SERPL-MCNC: 0.66 MG/DL (ref 0.73–1.18)
CRP SERPL-MCNC: 55.1 MG/L
EOSINOPHIL # BLD AUTO: 0.78 X10(3)/MCL (ref 0–0.9)
EOSINOPHIL NFR BLD AUTO: 7.8 %
ERYTHROCYTE [DISTWIDTH] IN BLOOD BY AUTOMATED COUNT: 15.9 % (ref 11.5–17)
ERYTHROCYTE [SEDIMENTATION RATE] IN BLOOD: 106 MM/HR (ref 0–15)
ESTROGEN SERPL-MCNC: NORMAL PG/ML
GLOBULIN SER-MCNC: 4.5 GM/DL (ref 2.4–3.5)
GLUCOSE SERPL-MCNC: 170 MG/DL (ref 74–100)
HCT VFR BLD AUTO: 26.5 % (ref 42–52)
HGB BLD-MCNC: 8.5 GM/DL (ref 14–18)
IMM GRANULOCYTES # BLD AUTO: 0.06 X10(3)/MCL (ref 0–0.02)
IMM GRANULOCYTES NFR BLD AUTO: 0.6 % (ref 0–0.43)
INSULIN SERPL-ACNC: NORMAL U[IU]/ML
LAB AP CLINICAL INFORMATION: NORMAL
LAB AP GROSS DESCRIPTION: NORMAL
LAB AP REPORT FOOTNOTES: NORMAL
LYMPHOCYTES # BLD AUTO: 1.88 X10(3)/MCL (ref 0.6–4.6)
LYMPHOCYTES NFR BLD AUTO: 18.8 %
MAGNESIUM SERPL-MCNC: 1.9 MG/DL (ref 1.6–2.6)
MCH RBC QN AUTO: 26.5 PG (ref 27–31)
MCHC RBC AUTO-ENTMCNC: 32.1 MG/DL (ref 33–36)
MCV RBC AUTO: 82.6 FL (ref 80–94)
MONOCYTES # BLD AUTO: 0.87 X10(3)/MCL (ref 0.1–1.3)
MONOCYTES NFR BLD AUTO: 8.7 %
NEUTROPHILS # BLD AUTO: 6.3 X10(3)/MCL (ref 2.1–9.2)
NEUTROPHILS NFR BLD AUTO: 63.4 %
NRBC BLD AUTO-RTO: 0 %
PLATELET # BLD AUTO: 358 X10(3)/MCL (ref 130–400)
PMV BLD AUTO: 9.7 FL (ref 9.4–12.4)
POTASSIUM SERPL-SCNC: 4.1 MMOL/L (ref 3.5–5.1)
PROT SERPL-MCNC: 6.1 GM/DL (ref 6.4–8.3)
RBC # BLD AUTO: 3.21 X10(6)/MCL (ref 4.7–6.1)
SODIUM SERPL-SCNC: 135 MMOL/L (ref 136–145)
T3RU NFR SERPL: NORMAL %
VANCOMYCIN TROUGH SERPL-MCNC: 20.5 UG/ML (ref 15–20)
WBC # SPEC AUTO: 10 X10(3)/MCL (ref 4.5–11.5)

## 2022-05-18 PROCEDURE — 99900035 HC TECH TIME PER 15 MIN (STAT)

## 2022-05-18 PROCEDURE — 27000221 HC OXYGEN, UP TO 24 HOURS

## 2022-05-18 PROCEDURE — 83735 ASSAY OF MAGNESIUM: CPT | Performed by: INTERNAL MEDICINE

## 2022-05-18 PROCEDURE — 25000003 PHARM REV CODE 250: Performed by: HOSPITALIST

## 2022-05-18 PROCEDURE — 93010 ELECTROCARDIOGRAM REPORT: CPT | Mod: ,,, | Performed by: INTERNAL MEDICINE

## 2022-05-18 PROCEDURE — 36415 COLL VENOUS BLD VENIPUNCTURE: CPT | Performed by: NURSE PRACTITIONER

## 2022-05-18 PROCEDURE — 63600175 PHARM REV CODE 636 W HCPCS: Performed by: INTERNAL MEDICINE

## 2022-05-18 PROCEDURE — 99900026 HC AIRWAY MAINTENANCE (STAT)

## 2022-05-18 PROCEDURE — 99231 PR SUBSEQUENT HOSPITAL CARE,LEVL I: ICD-10-PCS | Mod: ,,, | Performed by: SURGERY

## 2022-05-18 PROCEDURE — 94761 N-INVAS EAR/PLS OXIMETRY MLT: CPT

## 2022-05-18 PROCEDURE — 20000000 HC ICU ROOM

## 2022-05-18 PROCEDURE — 63600175 PHARM REV CODE 636 W HCPCS: Performed by: STUDENT IN AN ORGANIZED HEALTH CARE EDUCATION/TRAINING PROGRAM

## 2022-05-18 PROCEDURE — 80053 COMPREHEN METABOLIC PANEL: CPT | Performed by: NURSE PRACTITIONER

## 2022-05-18 PROCEDURE — 94003 VENT MGMT INPAT SUBQ DAY: CPT

## 2022-05-18 PROCEDURE — 80202 ASSAY OF VANCOMYCIN: CPT | Performed by: NURSE PRACTITIONER

## 2022-05-18 PROCEDURE — 63600175 PHARM REV CODE 636 W HCPCS

## 2022-05-18 PROCEDURE — A4216 STERILE WATER/SALINE, 10 ML: HCPCS

## 2022-05-18 PROCEDURE — 85651 RBC SED RATE NONAUTOMATED: CPT | Performed by: NURSE PRACTITIONER

## 2022-05-18 PROCEDURE — 25000003 PHARM REV CODE 250

## 2022-05-18 PROCEDURE — 93005 ELECTROCARDIOGRAM TRACING: CPT

## 2022-05-18 PROCEDURE — 25000003 PHARM REV CODE 250: Performed by: STUDENT IN AN ORGANIZED HEALTH CARE EDUCATION/TRAINING PROGRAM

## 2022-05-18 PROCEDURE — 25000003 PHARM REV CODE 250: Performed by: INTERNAL MEDICINE

## 2022-05-18 PROCEDURE — 93010 EKG 12-LEAD: ICD-10-PCS | Mod: ,,, | Performed by: INTERNAL MEDICINE

## 2022-05-18 PROCEDURE — 86140 C-REACTIVE PROTEIN: CPT | Performed by: NURSE PRACTITIONER

## 2022-05-18 PROCEDURE — 27200966 HC CLOSED SUCTION SYSTEM

## 2022-05-18 PROCEDURE — 99231 SBSQ HOSP IP/OBS SF/LOW 25: CPT | Mod: ,,, | Performed by: SURGERY

## 2022-05-18 PROCEDURE — 85025 COMPLETE CBC W/AUTO DIFF WBC: CPT | Performed by: NURSE PRACTITIONER

## 2022-05-18 PROCEDURE — 63600175 PHARM REV CODE 636 W HCPCS: Performed by: HOSPITALIST

## 2022-05-18 RX ORDER — ONDANSETRON 2 MG/ML
4 INJECTION INTRAMUSCULAR; INTRAVENOUS EVERY 6 HOURS PRN
Status: DISCONTINUED | OUTPATIENT
Start: 2022-05-18 | End: 2022-06-17 | Stop reason: HOSPADM

## 2022-05-18 RX ORDER — ONDANSETRON 2 MG/ML
INJECTION INTRAMUSCULAR; INTRAVENOUS
Status: COMPLETED
Start: 2022-05-18 | End: 2022-05-18

## 2022-05-18 RX ADMIN — HALOPERIDOL 5 MG: 5 TABLET ORAL at 08:05

## 2022-05-18 RX ADMIN — LEVETIRACETAM 500 MG: 100 INJECTION, SOLUTION INTRAVENOUS at 07:05

## 2022-05-18 RX ADMIN — LEVETIRACETAM 500 MG: 100 INJECTION, SOLUTION INTRAVENOUS at 08:05

## 2022-05-18 RX ADMIN — FAMOTIDINE 20 MG: 10 INJECTION, SOLUTION INTRAVENOUS at 07:05

## 2022-05-18 RX ADMIN — ONDANSETRON 4 MG: 2 INJECTION INTRAMUSCULAR; INTRAVENOUS at 11:05

## 2022-05-18 RX ADMIN — BENZTROPINE MESYLATE 1 MG: 1 TABLET ORAL at 07:05

## 2022-05-18 RX ADMIN — RIFAMPIN 300 MG: 300 CAPSULE ORAL at 07:05

## 2022-05-18 RX ADMIN — GLYCOPYRROLATE 1 MG: 1 TABLET ORAL at 09:05

## 2022-05-18 RX ADMIN — HALOPERIDOL 5 MG: 5 TABLET ORAL at 07:05

## 2022-05-18 RX ADMIN — PIPERACILLIN SODIUM AND TAZOBACTAM SODIUM 4.5 G: 4; .5 INJECTION, POWDER, LYOPHILIZED, FOR SOLUTION INTRAVENOUS at 12:05

## 2022-05-18 RX ADMIN — DEXMEDETOMIDINE HYDROCHLORIDE 1 MCG/KG/HR: 400 INJECTION INTRAVENOUS at 02:05

## 2022-05-18 RX ADMIN — VANCOMYCIN HYDROCHLORIDE 1000 MG: 1 INJECTION, POWDER, LYOPHILIZED, FOR SOLUTION INTRAVENOUS at 08:05

## 2022-05-18 RX ADMIN — ATORVASTATIN CALCIUM 80 MG: 40 TABLET, FILM COATED ORAL at 07:05

## 2022-05-18 RX ADMIN — SODIUM CHLORIDE, PRESERVATIVE FREE 10 ML: 5 INJECTION INTRAVENOUS at 08:05

## 2022-05-18 RX ADMIN — GLYCOPYRROLATE 1 MG: 1 TABLET ORAL at 07:05

## 2022-05-18 RX ADMIN — OXCARBAZEPINE 300 MG: 300 TABLET, FILM COATED ORAL at 07:05

## 2022-05-18 RX ADMIN — VANCOMYCIN HYDROCHLORIDE 1000 MG: 1 INJECTION, POWDER, LYOPHILIZED, FOR SOLUTION INTRAVENOUS at 12:05

## 2022-05-18 RX ADMIN — FENTANYL CITRATE 100 MCG: 50 INJECTION INTRAMUSCULAR; INTRAVENOUS at 11:05

## 2022-05-18 RX ADMIN — DOXEPIN HYDROCHLORIDE 50 MG: 50 CAPSULE ORAL at 08:05

## 2022-05-18 RX ADMIN — EZETIMIBE 10 MG: 10 TABLET ORAL at 08:05

## 2022-05-18 RX ADMIN — ENOXAPARIN SODIUM 80 MG: 80 INJECTION SUBCUTANEOUS at 08:05

## 2022-05-18 RX ADMIN — DEXMEDETOMIDINE HYDROCHLORIDE 1 MCG/KG/HR: 400 INJECTION INTRAVENOUS at 05:05

## 2022-05-18 RX ADMIN — OXCARBAZEPINE 300 MG: 300 TABLET, FILM COATED ORAL at 08:05

## 2022-05-18 RX ADMIN — DEXMEDETOMIDINE HYDROCHLORIDE 1 MCG/KG/HR: 400 INJECTION INTRAVENOUS at 11:05

## 2022-05-18 RX ADMIN — GLYCOPYRROLATE 1 MG: 1 TABLET ORAL at 03:05

## 2022-05-18 RX ADMIN — DEXMEDETOMIDINE HYDROCHLORIDE 1 MCG/KG/HR: 400 INJECTION INTRAVENOUS at 08:05

## 2022-05-18 RX ADMIN — METOPROLOL TARTRATE 25 MG: 25 TABLET, FILM COATED ORAL at 07:05

## 2022-05-18 RX ADMIN — VANCOMYCIN HYDROCHLORIDE 1000 MG: 1 INJECTION, POWDER, LYOPHILIZED, FOR SOLUTION INTRAVENOUS at 04:05

## 2022-05-18 RX ADMIN — BENZTROPINE MESYLATE 1 MG: 1 TABLET ORAL at 08:05

## 2022-05-18 RX ADMIN — RIFAMPIN 300 MG: 300 CAPSULE ORAL at 08:05

## 2022-05-18 RX ADMIN — POLYETHYLENE GLYCOL 3350 17 G: 17 POWDER, FOR SOLUTION ORAL at 07:05

## 2022-05-18 RX ADMIN — FAMOTIDINE 20 MG: 10 INJECTION, SOLUTION INTRAVENOUS at 08:05

## 2022-05-18 RX ADMIN — PROPOFOL 35 MCG/KG/MIN: 10 INJECTION, EMULSION INTRAVENOUS at 02:05

## 2022-05-18 RX ADMIN — ACETAMINOPHEN 650 MG: 325 TABLET ORAL at 08:05

## 2022-05-18 RX ADMIN — ENOXAPARIN SODIUM 80 MG: 80 INJECTION SUBCUTANEOUS at 07:05

## 2022-05-18 NOTE — PROGRESS NOTES
[facility]                       [address] [address]    PATIENT NAME:       NITESH BHANDARI   YOB: 1965  CSN:                [id number]   MRN:                [id number]  ADMIT DATE:         04/01/2022 12:44:00  PHYSICIAN:          YOLANDE Pearson MD                            PROGRESS NOTE    DATE:      SUBJECTIVE:  56-year-old gentleman who has bipolar disorder and schizophrenia   who underwent a CABG, 4-vessel, on 04/06/2022.  It was complicated by sternal   dehiscence secondary to severe agitation postsurgery.  He underwent a wound   closure device placement on 04/17/2022, and subsequently underwent a bilateral   pectoral flap for sternal wound closure on 05/11/2022.  Dr. Weiss of Plastic   Surgery has wanted to keep him sedated in order to allow the healing process to   take place.  He wants his arms at his sides.  Yesterday, we got a repeat   infectious disease consult.  Dr. Burgos came to see him and discontinued the Zosyn   but will continue vancomycin and p.o. rifampin.  Diprivan has been taken off.    He is still on Precedex and getting Haldol, and currently he has been quiet and   cooperative.  This morning, I asked him a question, and he did nod his head   affirmatively.    PHYSICAL EXAMINATION:  VITAL SIGNS:  Today, temp is 98.4, blood pressure 136/74, heart rate 67,   respirations 24.  HEENT:  Unremarkable.  He has a Shiley trach tube in place.  CHEST:  He has some mild rhonchi bilaterally, better with suctioning.  HEART:  Regular in rhythm.  ABDOMEN:  Soft.  Tube feedings via PEG are coursing and tolerating well.  EXTREMITIES:  The right upper extremity where he had the DVT is still a little   edematous.  The lower extremities, we are seeing considerable muscle wasting.  NEUROLOGICAL:  As mentioned, when I asked him if he was doing okay, he nodded   his head affirmatively.    LAB WORK:  From today:   H and H are 8.5 and 26, white count 10,000, platelet   count 358.  Sodium 135, potassium 4.1, chloride 105, bicarbonate 23, BUN 19.8,   creatinine 0.66.  There were no ABGs done this morning, nor radiograph.    IMPRESSION:    1. CABG x4, 04/06/2022.  2. Methicillin-resistant Staphylococcus aureus sternal wound infection and   dehiscence, status post wound vacuum-assisted closure device, 04/17/2022.  3. Status post bilateral pectoral flaps for sternal wound closure, 05/11/2022,   by Dr. Weiss.  4. Postoperative agitation secondary to underlying bipolar disorder and   schizophrenia.  5. Right upper extremity deep vein thrombosis, currently on full-dose Lovenox.  6. Status post trach and PEG tube.  7. Protein-calorie malnutrition, improving.    PLAN:  I am going to keep him on the Lovenox for now.  We will need to adjust,   and we will place him on Eliquis at some point.  I have changed his ventilatory   mode from assist control to IMV, and we will begin weaning mechanical   ventilation as tolerated.  In addition, we have stopped the Diprivan.  He is on   Precedex, and we will continue that as well.  He is on Haldol.  We need to be   able to be sure that we keep him from having significant agitation to avoid   injuring the pectoral flaps.    CRITICAL CARE DIAGNOSES:    1. Respiratory failure.  2. Other diagnoses listed above.    CRITICAL CARE INTERVENTIONS:  Hands-on evaluation, review of labs, adjustment of   mechanical ventilation and physiological parameters.    Note, patient's daughter, Arabella Bradley, can be reached at [phone].    CRITICAL CARE TIME PERSONALLY SPENT:  Greater than 31 minutes.        ______________________________  G MD ALICIA Gutierres/AQS  DD:  05/18/2022  Time:  08:43AM  DT:  05/18/2022  Time:  10:14AM  Job #:  [id number]      PROGRESS NOTE

## 2022-05-18 NOTE — PROGRESS NOTES
Infectious Diseases Progress Note  See full detailed consult from 4/22. Interval history noted  56-year-old male with past medical history of bipolar disorder schizophrenia, CAD, is admitted to [facility] on 01/20/2020 with chief complaints of chest pain with work-up revealing significant coronary artery disease requiring surgical intervention.  He was taken for CABG x 4 on 4/6/2022 requiring ICU stay postoperatively downgraded to the floor subsequently but again readmitted to the ICU on 4/10.  He did have issues with wound healing with subsequent dehiscence and suspected infection.  4/16 wound culture positive for MRSA.  CT scan of the thoracic done on 4/16 showed changes of sternotomy from CABG with presternal, retrosternal and anterior mediastinum combination of inflammation, fluid and air locules, bilateral pleural effusion and bilateral basilar consolidation. He had debridement of the sternal wound with placement of wound VAC on 4/17 with cultures from surgery showing MRSA as well.  He has been pretty much without fevers but is noted to have leukocytosis of up to 23.9 on 4/17 which has had a downward trend.  He is anemic with low albumin.  Chest x-ray from 4/19 showed no significant changes. During his hospital stay, he eventually underwent tracheostomy placement on 5/10 and on 5/11 he underwent sternal wound debridement, B/L fasciocutaneous flap closure, B/L pectoralis major muscle flap reconstruction of sternum  He remains in the ICU, trached and on ventilator.   He is on antibiotic coverage with vancomycin.     Subjective:  Remains in ICU. Trached and on vent. No new complaints reported. Low grade temps    Review of Systems   Unable to perform ROS: Medical condition       Review of patient's allergies indicates:   Allergen Reactions    Depakote [divalproex]     Divalproex sodium Other (See Comments)    Lithium     Lithium analogues     Quetiapine Other (See Comments)        Past Medical History:   Diagnosis Date    Bipolar disorder, unspecified     Chronic hepatitis C     Hypertension     Obesity, unspecified     Seizures     Stroke        Past Surgical History:   Procedure Laterality Date    CORONARY STENT PLACEMENT  08/14/2015    DEBRIDEMENT  04/17/2022    LEFT HEART CATHETERIZATION Left 08/13/2015    REPEAT CLOSURE OF STERNAL INCISION N/A 5/11/2022    Procedure: CLOSURE, STERNAL INCISION, REPEAT;  Surgeon: Adolfo Weiss MD;  [place] [facility];  Service: Plastics;  Laterality: N/A;  STERNAL WOUND DEBRIDEMENT AND RECONSTRUCTION // MULTIPLE MUSCLE FLAPS // REQ 1400       Social History     Socioeconomic History    Marital status: Single   Tobacco Use    Smoking status: Current Every Day Smoker     Types: Cigarettes    Smokeless tobacco: Never Used   Substance and Sexual Activity    Alcohol use: Never    Drug use: Not Currently     Types: Cocaine         Scheduled Meds:   atorvastatin  80 mg Oral Daily    benztropine  1 mg Per OG tube BID    doxepin  50 mg Oral QHS    enoxaparin  1 mg/kg (Dosing Weight) Subcutaneous Q12H    ezetimibe  10 mg Oral QHS    famotidine (PF)  20 mg Intravenous BID    glycopyrrolate  1 mg Per NG tube TID    haloperidoL  5 mg Per OG tube BID    levetiracetam IV  500 mg Intravenous Q12H    metoprolol tartrate  25 mg Per OG tube Daily    ondansetron  4 mg Oral Once    OXcarbazepine  300 mg Per OG tube BID    piperacillin-tazobactam (ZOSYN) IVPB  4.5 g Intravenous Q8H    polyethylene glycol  17 g Oral Daily    rifAMpin  300 mg Oral BID    sodium chloride 0.9%  10 mL Intravenous Q12H    vancomycin (VANCOCIN) IVPB  1,000 mg Intravenous Q8H     Continuous Infusions:   dexmedetomidine (PRECEDEX) infusion 1 mcg/kg/hr (05/17/22 2012)    NORepinephrine bitartrate-D5W Stopped (05/17/22 1030)    propofoL 35 mcg/kg/min (05/17/22 2012)     PRN Meds:sodium chloride, acetaminophen, albuterol-ipratropium, bisacodyL, dextrose 10%,  "fentaNYL, fentaNYL, hydrALAZINE, lorazepam, morphine, morphine, nitroGLYCERIN, OLANZapine, oxyCODONE, oxyCODONE, potassium chloride in water, potassium chloride in water, potassium chloride in water, sodium chloride 0.9%, vancomycin - pharmacy to dose, ziprasidone    Objective:  BP 96/70   Pulse 69   Temp 100.2 °F (37.9 °C)   Resp (!) 24   Ht 5' 10.87" (1.8 m)   Wt 77.2 kg (170 lb 3.1 oz)   SpO2 100%   BMI 23.83 kg/m²     Physical Exam:   Physical Exam  Vitals reviewed.   Constitutional:       Appearance: He is ill-appearing.   HENT:      Head: Normocephalic.   Cardiovascular:      Rate and Rhythm: Normal rate and regular rhythm.   Pulmonary:      Effort: Pulmonary effort is normal. No respiratory distress.      Breath sounds: No wheezing.      Comments: B/L BS coarse. Trached and on vent  Abdominal:      General: Bowel sounds are normal. There is no distension.      Palpations: Abdomen is soft.      Tenderness: There is no abdominal tenderness.      Comments: Peg tube   Musculoskeletal:      Comments: No movement in extremities at this time   Skin:     Findings: No rash.   Neurological:      Comments: Unable to fully assess. Pt trached and on vent       Imaging  5/17/22 CXR  FINDINGS:   Portable frontal view of the chest was obtained. Right-sided PICC has been removed.  Tracheostomy cannula and left-sided PICC remain.  Cardiac silhouette unchanged.  Grossly clear lungs.  No pneumothorax.    Impression:     Right-sided PICC has been removed.  No other significant change.      Lab Review   Recent Results (from the past 24 hour(s))   Phosphorus    Collection Time: 05/17/22  1:36 AM   Result Value Ref Range    Phosphorus Level 4.0 2.3 - 4.7 mg/dL   Magnesium    Collection Time: 05/17/22  1:36 AM   Result Value Ref Range    Magnesium Level 1.90 1.60 - 2.60 mg/dL   Comprehensive Metabolic Panel    Collection Time: 05/17/22  1:36 AM   Result Value Ref Range    Sodium Level 136 136 - 145 mmol/L    Potassium Level " 4.3 3.5 - 5.1 mmol/L    Chloride 107 98 - 107 mmol/L    Carbon Dioxide 22 22 - 29 mmol/L    Glucose Level 167 (H) 74 - 100 mg/dL    Blood Urea Nitrogen 21.8 8.4 - 25.7 mg/dL    Creatinine 0.65 (L) 0.73 - 1.18 mg/dL    Calcium Level Total 8.2 (L) 8.4 - 10.2 mg/dL    Protein Total 6.1 (L) 6.4 - 8.3 gm/dL    Albumin Level 1.5 (L) 3.5 - 5.0 gm/dL    Globulin 4.6 (H) 2.4 - 3.5 gm/dL    Albumin/Globulin Ratio 0.3 (L) 1.1 - 2.0 ratio    Bilirubin Total 0.4 <=1.5 mg/dL    Alkaline Phosphatase 85 40 - 150 unit/L    Alanine Aminotransferase 21 0 - 55 unit/L    Aspartate Aminotransferase 36 (H) 5 - 34 unit/L    Estimated GFR- >60 mls/min/1.73/m2    Estimated GFR-Non  >60 mls/min/1.73/m2   CBC with Differential    Collection Time: 05/17/22  1:36 AM   Result Value Ref Range    WBC 8.4 4.5 - 11.5 x10(3)/mcL    RBC 3.20 (L) 4.70 - 6.10 x10(6)/mcL    Hgb 8.5 (L) 14.0 - 18.0 gm/dL    Hct 27.6 (L) 42.0 - 52.0 %    MCV 86.3 80.0 - 94.0 fL    MCH 26.6 (L) 27.0 - 31.0 pg    MCHC 30.8 (L) 33.0 - 36.0 mg/dL    RDW 15.8 11.5 - 17.0 %    Platelet 355 130 - 400 x10(3)/mcL    MPV 9.7 9.4 - 12.4 fL    Neut % 58.3 %    Lymph % 21.5 %    Mono Auto 9.1 %    Eos Auto 10.3 %    Basophil Auto 0.6 %    Lymph # 1.81 0.6 - 4.6 x10(3)/mcL    Neut # 4.9 2.1 - 9.2 x10(3)/mcL    Abs Mono 0.77 0.1 - 1.3 x10(3)/mcL    Abs Eos 0.87 0 - 0.9 x10(3)/mcL    Abs Baso 0.05 0 - 0.2 x10(3)/mcL    IG# 0.02 (H) 0 - 0.0155 x10(3)/mcL    IG% 0.2 0 - 0.43 %    NRBC% 0.0 %   POCT ARTERIAL BLOOD GAS Blood Gas    Collection Time: 05/17/22  4:26 AM   Result Value Ref Range    POC PH      POC PCO2      POC PO2      POC Sodium      POC Potassium      POC Ionized Calcium      POC HEMOGLOBIN      POC O2Hb      POC COHb      POC MetHb      POC PCO2      Base Excess, Arterial      O2 Sat, Art      HCO3, Arterial 23.5 (A) 18.0 - 23.0 MMOL/L   POCT ARTERIAL BLOOD GAS    Collection Time: 05/17/22  4:32 AM   Result Value Ref Range    POC PH 7.46 (A) 7.35  - 7.45    POC PCO2 33 (A) 35 - 45 mmHg    POC PO2 75 (A) 80 - 100 mmHg    POC HEMOGLOBIN 10.8 (A) 12.0 - 16.0 g/dL    POC SATURATED O2 95.7 %    POC O2Hb 94.2 94.0 - 97.0 %    POC COHb 2.5 %    POC MetHb 0.9 0.4 - 2 %    POC Potassium 4.2 3.5 - 5.0 mmol/l    POC Sodium 133 (A) 137 - 145 mmol/l    POC Ionized Calcium 1.17 1.12 - 1.23 mmol/l    Correct Temperature (PH) 7.46 (A) 7.35 - 7.45    Corrected Temperature (pCO2) 33 (A) 35 - 45 mmHg    Corrected Temperature (pO2) 75 (A) 80 - 100 mmHg    Collection Duration 23.5 mmol/l    Base Deficit 0.1 -2 - 2 mmol/l    POC Temp 37.0 °C    Specimen source Arterial sample     POC HCO3 23.5 mmol/l     Assessment/Plan:  1.  Sepsis syndrome  2.  MRSA sternal wound infection/dehiscence  3.  Leukocytosis  4.  Acute hypoxic respiratory failure  5.  Anemia  6.  Coronary artery disease s/p CABG  7.  Protein calorie malnutrition  8.  Pneumonia -  Proteus/Klebsiella isolates    -Continue Vancomycin #32 and Rifampin #26. Plan a 6 week course following inflammatory markers  -On Zosyn #6  -Low grade temps without leukocytosis  -S/P sternal debridement with flap closure on 5/11 per Dr Weiss  -Continue wound care and drain management per Plastics  -Vent management and ICU support per Intensivist  -Discussed with nursing

## 2022-05-18 NOTE — PROGRESS NOTES
PRS  Intubated and sedated  Off propofol  AFVSS  Incisions c/d/i  Drains s/s  No changes to care from my standpoint.  Will follow

## 2022-05-18 NOTE — PROGRESS NOTES
Pharmacokinetic Assessment Follow Up: IV Vancomycin    Vancomycin serum concentration assessment(s):    The trough level was drawn incorrectly and cannot be used to guide therapy at this time.    Vancomycin Regimen Plan:    Trough level was drawn 2 hours early resulting in a level of 20.5  True level would be below 20 if taken on time  Continue regimen to Vancomycin 1000 mg IV every 8 hours with next serum trough concentration measured at 0700 prior to 6th dose on 5/20    Drug levels (last 3 results):  Recent Labs   Lab Result Units 05/18/22  0524   Vancomycin Trough ug/ml 20.5*       Pharmacy will continue to follow and monitor vancomycin.    Please contact pharmacy at extension [phone] for questions regarding this assessment.    Thank you for the consult,   Oneal Edmonds       Patient brief summary:  Kendall Duff is a 56 y.o. male initiated on antimicrobial therapy with IV Vancomycin for treatment of  Post op wound infection    The patient's current regimen is 1000 mg IV q8h    Drug Allergies:   Review of patient's allergies indicates:   Allergen Reactions    Depakote [divalproex]     Divalproex sodium Other (See Comments)    Lithium     Lithium analogues     Quetiapine Other (See Comments)       Actual Body Weight:   77 kg    Renal Function:   Estimated Creatinine Clearance: 129.4 mL/min (A) (based on SCr of 0.66 mg/dL (L)).,     Dialysis Method (if applicable):  N/A    CBC (last 72 hours):  Recent Labs   Lab Result Units 05/16/22 0138 05/17/22 0136 05/18/22 0139   WBC x10(3)/mcL 7.8 8.4 10.0   Hgb gm/dL 8.8* 8.5* 8.5*   Hct % 29.2* 27.6* 26.5*   Platelet x10(3)/mcL 327 355 358   Mono Auto % 9.0 9.1 8.7   Eos Auto % 11.6 10.3 7.8   Basophil Auto % 0.9 0.6 0.7       Metabolic Panel (last 72 hours):  Recent Labs   Lab Result Units 05/16/22 0138 05/17/22 0136 05/18/22 0139   Sodium Level mmol/L 139 136 135*   Potassium Level mmol/L 4.0 4.3 4.1   Chloride mmol/L 108* 107 105   Carbon Dioxide mmol/L 22 22 23    Glucose Level mg/dL 173* 167* 170*   Blood Urea Nitrogen mg/dL 18.1 21.8 19.8   Creatinine mg/dL 0.58* 0.65* 0.66*   Albumin Level gm/dL 1.6* 1.5* 1.6*   Bilirubin Total mg/dL 0.6 0.4 0.5   Alkaline Phosphatase unit/L 93 85 76   Aspartate Aminotransferase unit/L 39* 36* 36*   Alanine Aminotransferase unit/L 23 21 22   Magnesium Level mg/dL  --  1.90 1.90   Phosphorus Level mg/dL  --  4.0  --        Vancomycin Administrations:  vancomycin given in the last 96 hours                     vancomycin in dextrose 5 % 1 gram/250 mL IVPB 1,000 mg (mg) 1,000 mg New Bag 05/18/22 0800     1,000 mg New Bag  0018     1,000 mg New Bag 05/17/22 1600     1,000 mg New Bag  0806     1,000 mg New Bag  0017     1,000 mg New Bag 05/16/22 1624     1,000 mg New Bag  0800     1,000 mg New Bag 05/15/22 2352     1,000 mg New Bag  1641     1,000 mg New Bag  0747     1,000 mg New Bag 05/14/22 2359     1,000 mg New Bag  1630                    Microbiologic Results:  Microbiology Results (last 7 days)       Procedure Component Value Units Date/Time    Blood Culture [[id number]]  (Normal) Collected: 05/12/22 1740    Order Status: Completed Specimen: Blood from Arm Updated: 05/17/22 1902     CULTURE, BLOOD ([facility]) No Growth at 5 days    Blood Culture [[id number]]  (Normal) Collected: 05/12/22 1741    Order Status: Completed Specimen: Blood from Hand, Left Updated: 05/17/22 1902     CULTURE, BLOOD ([facility]) No Growth at 5 days    Tissue Culture - Aerobic [[id number]] Collected: 05/11/22 1432    Order Status: Completed Specimen: Bone from Sternum Updated: 05/17/22 0936     CULTURE, TISSUE- AEROBIC ([facility]) No Growth at 5 days    Tissue Culture - Aerobic [[id number]] Collected: 05/11/22 1429    Order Status: Completed Specimen: Tissue from Sternum Updated: 05/17/22 0935     CULTURE, TISSUE- AEROBIC ([facility]) No Growth at 5 days    Anaerobic Culture [[id number]] Collected: 05/11/22 1432    Order Status: Completed Specimen: Bone from Sternum Updated: 05/15/22 0741      Anaerobe Culture No Anaerobes Isolated    Anaerobic Culture [[id number]] Collected: 05/11/22 1429    Order Status: Completed Specimen: Tissue from Sternum Updated: 05/15/22 0740     Anaerobe Culture No Anaerobes Isolated    Gram Stain [[id number]] Collected: 05/11/22 1429    Order Status: Completed Specimen: Tissue from Sternum Updated: 05/12/22 0829     GRAM STAIN No WBCs, No bacteria seen     Gram Stain [[id number]] Collected: 05/11/22 1432    Order Status: Completed Specimen: Bone from Sternum Updated: 05/12/22 0828     GRAM STAIN No WBCs, No bacteria seen     AFB Smear [[id number]] Collected: 05/11/22 1432    Order Status: Completed Specimen: Bone from Sternum Updated: 05/12/22 0757     AFB Smear No AFB seen (Direct smear only)    AFB Smear [[id number]] Collected: 05/11/22 1429    Order Status: Completed Specimen: Tissue from Sternum Updated: 05/12/22 0757     AFB Smear No AFB seen (Direct smear only)    Fungal Culture [[id number]] Collected: 05/11/22 1432    Order Status: Sent Specimen: Bone from Sternum Updated: 05/11/22 1515    Fungal Culture [[id number]] Collected: 05/11/22 1429    Order Status: Sent Specimen: Tissue from Sternum Updated: 05/11/22 1514

## 2022-05-18 NOTE — PLAN OF CARE
Pt still sedated to allow for healing of flap, [facility] w/ [facility] call for updates, given

## 2022-05-19 LAB
ALBUMIN SERPL-MCNC: 1.7 GM/DL (ref 3.5–5)
ALBUMIN/GLOB SERPL: 0.4 RATIO (ref 1.1–2)
ALP SERPL-CCNC: 73 UNIT/L (ref 40–150)
ALT SERPL-CCNC: 24 UNIT/L (ref 0–55)
AST SERPL-CCNC: 43 UNIT/L (ref 5–34)
BASE EXCESS ARTERIAL: 0.4 MMOL/L (ref -2–2)
BASOPHILS # BLD AUTO: 0.06 X10(3)/MCL (ref 0–0.2)
BASOPHILS NFR BLD AUTO: 0.6 %
BILIRUBIN DIRECT+TOT PNL SERPL-MCNC: 0.7 MG/DL
BUN SERPL-MCNC: 18 MG/DL (ref 8.4–25.7)
CALCIUM SERPL-MCNC: 8.4 MG/DL (ref 8.4–10.2)
CHLORIDE SERPL-SCNC: 107 MMOL/L (ref 98–107)
CO2 SERPL-SCNC: 22 MMOL/L (ref 22–29)
CORRECTED TEMPERATURE (PCO2): 36 MMHG (ref 35–45)
CORRECTED TEMPERATURE (PH): 7.44 (ref 7.35–7.45)
CORRECTED TEMPERATURE (PO2): 90 MMHG (ref 80–100)
CREAT SERPL-MCNC: 0.59 MG/DL (ref 0.73–1.18)
EOSINOPHIL # BLD AUTO: 0.37 X10(3)/MCL (ref 0–0.9)
EOSINOPHIL NFR BLD AUTO: 3.9 %
ERYTHROCYTE [DISTWIDTH] IN BLOOD BY AUTOMATED COUNT: 15.9 % (ref 11.5–17)
GLOBULIN SER-MCNC: 4.2 GM/DL (ref 2.4–3.5)
GLUCOSE SERPL-MCNC: 146 MG/DL (ref 74–100)
HCO3 ARTERIAL: 24.5 MMOL/L (ref 18–23)
HCO3 UR-SCNC: 24.5 MMOL/L
HCT VFR BLD AUTO: 27.7 % (ref 42–52)
HGB BLD-MCNC: 8.6 GM/DL (ref 14–18)
HGB BLD-MCNC: 8.7 G/DL (ref 12–16)
HGB BLD-MCNC: ABNORMAL G/DL
IMM GRANULOCYTES # BLD AUTO: 0.04 X10(3)/MCL (ref 0–0.02)
IMM GRANULOCYTES NFR BLD AUTO: 0.4 % (ref 0–0.43)
LYMPHOCYTES # BLD AUTO: 1.6 X10(3)/MCL (ref 0.6–4.6)
LYMPHOCYTES NFR BLD AUTO: 16.6 %
MCH RBC QN AUTO: 26.5 PG (ref 27–31)
MCHC RBC AUTO-ENTMCNC: 31 MG/DL (ref 33–36)
MCV RBC AUTO: 85.2 FL (ref 80–94)
MONOCYTES # BLD AUTO: 0.8 X10(3)/MCL (ref 0.1–1.3)
MONOCYTES NFR BLD AUTO: 8.3 %
NEUTROPHILS # BLD AUTO: 6.7 X10(3)/MCL (ref 2.1–9.2)
NEUTROPHILS NFR BLD AUTO: 70.2 %
NRBC BLD AUTO-RTO: 0 %
PCO2 BLDA: 36 MMHG (ref 35–45)
PCO2 BLDA: ABNORMAL MM[HG]
PCO2 BLDA: ABNORMAL MM[HG]
PEEP: 5 CM H2O
PH SMN: 7.44 [PH] (ref 7.35–7.45)
PH SMN: ABNORMAL [PH]
PLATELET # BLD AUTO: 296 X10(3)/MCL (ref 130–400)
PMV BLD AUTO: 10.3 FL (ref 9.4–12.4)
PO2 BLDA: 90 MMHG (ref 80–100)
PO2 BLDA: ABNORMAL MM[HG]
POC BASE DEFICIT: 0.4 MMOL/L (ref -2–2)
POC COHB: 2.4 %
POC COHB: ABNORMAL
POC IONIZED CALCIUM: 1.19 MMOL/L (ref 1.12–1.23)
POC IONIZED CALCIUM: ABNORMAL
POC METHB: 0.7 % (ref 0.4–2)
POC METHB: ABNORMAL
POC O2HB: 95.9 % (ref 94–97)
POC O2HB: ABNORMAL
POC SATURATED O2: 97.3 %
POC TEMPERATURE: 37 °C
POTASSIUM BLD-SCNC: 3.7 MMOL/L (ref 3.5–5)
POTASSIUM BLD-SCNC: ABNORMAL MMOL/L
POTASSIUM SERPL-SCNC: 3.9 MMOL/L (ref 3.5–5.1)
PROT SERPL-MCNC: 5.9 GM/DL (ref 6.4–8.3)
RBC # BLD AUTO: 3.25 X10(6)/MCL (ref 4.7–6.1)
SATURATED O2 ARTERIAL, I-STAT: ABNORMAL
SODIUM BLD-SCNC: 133 MMOL/L (ref 137–145)
SODIUM BLD-SCNC: ABNORMAL MMOL/L
SODIUM SERPL-SCNC: 135 MMOL/L (ref 136–145)
SPECIMEN SOURCE: ABNORMAL
WBC # SPEC AUTO: 9.6 X10(3)/MCL (ref 4.5–11.5)

## 2022-05-19 PROCEDURE — 82803 BLOOD GASES ANY COMBINATION: CPT

## 2022-05-19 PROCEDURE — 99900026 HC AIRWAY MAINTENANCE (STAT)

## 2022-05-19 PROCEDURE — 94761 N-INVAS EAR/PLS OXIMETRY MLT: CPT

## 2022-05-19 PROCEDURE — 93005 ELECTROCARDIOGRAM TRACING: CPT

## 2022-05-19 PROCEDURE — 25000003 PHARM REV CODE 250

## 2022-05-19 PROCEDURE — 63600175 PHARM REV CODE 636 W HCPCS: Performed by: STUDENT IN AN ORGANIZED HEALTH CARE EDUCATION/TRAINING PROGRAM

## 2022-05-19 PROCEDURE — C1751 CATH, INF, PER/CENT/MIDLINE: HCPCS

## 2022-05-19 PROCEDURE — 94003 VENT MGMT INPAT SUBQ DAY: CPT

## 2022-05-19 PROCEDURE — 63600175 PHARM REV CODE 636 W HCPCS: Performed by: INTERNAL MEDICINE

## 2022-05-19 PROCEDURE — 80053 COMPREHEN METABOLIC PANEL: CPT | Performed by: INTERNAL MEDICINE

## 2022-05-19 PROCEDURE — 25000003 PHARM REV CODE 250: Performed by: HOSPITALIST

## 2022-05-19 PROCEDURE — 36415 COLL VENOUS BLD VENIPUNCTURE: CPT | Performed by: INTERNAL MEDICINE

## 2022-05-19 PROCEDURE — 36600 WITHDRAWAL OF ARTERIAL BLOOD: CPT

## 2022-05-19 PROCEDURE — 20000000 HC ICU ROOM

## 2022-05-19 PROCEDURE — 99231 PR SUBSEQUENT HOSPITAL CARE,LEVL I: ICD-10-PCS | Mod: ,,, | Performed by: SURGERY

## 2022-05-19 PROCEDURE — 85025 COMPLETE CBC W/AUTO DIFF WBC: CPT | Performed by: INTERNAL MEDICINE

## 2022-05-19 PROCEDURE — 93010 ELECTROCARDIOGRAM REPORT: CPT | Mod: ,,, | Performed by: INTERNAL MEDICINE

## 2022-05-19 PROCEDURE — 93010 EKG 12-LEAD: ICD-10-PCS | Mod: ,,, | Performed by: INTERNAL MEDICINE

## 2022-05-19 PROCEDURE — 25000003 PHARM REV CODE 250: Performed by: INTERNAL MEDICINE

## 2022-05-19 PROCEDURE — 27200966 HC CLOSED SUCTION SYSTEM

## 2022-05-19 PROCEDURE — 27000221 HC OXYGEN, UP TO 24 HOURS

## 2022-05-19 PROCEDURE — A4216 STERILE WATER/SALINE, 10 ML: HCPCS

## 2022-05-19 PROCEDURE — 99231 SBSQ HOSP IP/OBS SF/LOW 25: CPT | Mod: ,,, | Performed by: SURGERY

## 2022-05-19 PROCEDURE — 99900035 HC TECH TIME PER 15 MIN (STAT)

## 2022-05-19 RX ADMIN — LEVETIRACETAM 500 MG: 100 INJECTION, SOLUTION INTRAVENOUS at 09:05

## 2022-05-19 RX ADMIN — FAMOTIDINE 20 MG: 10 INJECTION, SOLUTION INTRAVENOUS at 09:05

## 2022-05-19 RX ADMIN — SODIUM CHLORIDE, PRESERVATIVE FREE 10 ML: 5 INJECTION INTRAVENOUS at 09:05

## 2022-05-19 RX ADMIN — HALOPERIDOL 5 MG: 5 TABLET ORAL at 09:05

## 2022-05-19 RX ADMIN — RIFAMPIN 300 MG: 300 CAPSULE ORAL at 09:05

## 2022-05-19 RX ADMIN — BENZTROPINE MESYLATE 1 MG: 1 TABLET ORAL at 09:05

## 2022-05-19 RX ADMIN — GLYCOPYRROLATE 1 MG: 1 TABLET ORAL at 04:05

## 2022-05-19 RX ADMIN — GLYCOPYRROLATE 1 MG: 1 TABLET ORAL at 09:05

## 2022-05-19 RX ADMIN — ATORVASTATIN CALCIUM 80 MG: 40 TABLET, FILM COATED ORAL at 09:05

## 2022-05-19 RX ADMIN — DEXMEDETOMIDINE HYDROCHLORIDE 1 MCG/KG/HR: 400 INJECTION INTRAVENOUS at 04:05

## 2022-05-19 RX ADMIN — VANCOMYCIN HYDROCHLORIDE 1000 MG: 1 INJECTION, POWDER, LYOPHILIZED, FOR SOLUTION INTRAVENOUS at 12:05

## 2022-05-19 RX ADMIN — ENOXAPARIN SODIUM 80 MG: 80 INJECTION SUBCUTANEOUS at 09:05

## 2022-05-19 RX ADMIN — DOXEPIN HYDROCHLORIDE 50 MG: 50 CAPSULE ORAL at 09:05

## 2022-05-19 RX ADMIN — DEXMEDETOMIDINE HYDROCHLORIDE 1 MCG/KG/HR: 400 INJECTION INTRAVENOUS at 01:05

## 2022-05-19 RX ADMIN — DEXMEDETOMIDINE HYDROCHLORIDE 1 MCG/KG/HR: 400 INJECTION INTRAVENOUS at 08:05

## 2022-05-19 RX ADMIN — DEXMEDETOMIDINE HYDROCHLORIDE 1 MCG/KG/HR: 400 INJECTION INTRAVENOUS at 06:05

## 2022-05-19 RX ADMIN — VANCOMYCIN HYDROCHLORIDE 1000 MG: 1 INJECTION, POWDER, LYOPHILIZED, FOR SOLUTION INTRAVENOUS at 04:05

## 2022-05-19 RX ADMIN — VANCOMYCIN HYDROCHLORIDE 1000 MG: 1 INJECTION, POWDER, LYOPHILIZED, FOR SOLUTION INTRAVENOUS at 11:05

## 2022-05-19 RX ADMIN — OXCARBAZEPINE 300 MG: 300 TABLET, FILM COATED ORAL at 09:05

## 2022-05-19 RX ADMIN — VANCOMYCIN HYDROCHLORIDE 1000 MG: 1 INJECTION, POWDER, LYOPHILIZED, FOR SOLUTION INTRAVENOUS at 08:05

## 2022-05-19 RX ADMIN — METOPROLOL TARTRATE 25 MG: 25 TABLET, FILM COATED ORAL at 09:05

## 2022-05-19 RX ADMIN — POLYETHYLENE GLYCOL 3350 17 G: 17 POWDER, FOR SOLUTION ORAL at 09:05

## 2022-05-19 RX ADMIN — EZETIMIBE 10 MG: 10 TABLET ORAL at 09:05

## 2022-05-19 NOTE — PROGRESS NOTES
PRS  On less sedation today  AFVSS  Incisions cdi  christina dorantes  No changes to care from my standpoint  Will follow

## 2022-05-19 NOTE — PROGRESS NOTES
[facility]                       [address] [place] [id number]    PATIENT NAME:       NITESH BHANDARI   YOB: 1965  CSN:                [id number]   MRN:                [id number]  ADMIT DATE:         04/01/2022 12:44:00  PHYSICIAN:          YOLANDE Pearson MD                            PROGRESS NOTE    DATE:      SUBJECTIVE:  A 56-year-old gentleman who has bipolar disorder and schizophrenia   who underwent a CABG x4 by Dr. Carney on 04/06/2022.  Postoperatively, was   complicated by sternal wound infection and dehiscence due to his severe   agitation post surgery.  He underwent a wound closure device placement on   04/17/2022, and subsequently underwent a bilateral pectoral flap for sternal   wound closure on 05/11/2022, by Dr. Adolfo Weiss of Plastic Surgery.  He has   requested that we keep him calm and quiet, which we have done so for about a   week.  We have now started to let him reduce his sedation.  This morning, he is   awake, alert.  No complaints.  He nods negative to complaints of pain or   shortness of breath.  He has been very calm.  We have changed his vent mode to   IMV.  He is breathing comfortably.  We will continue to wean as tolerated.  He   is tolerating tube feedings well.  Dr. Burgos is managing his antibiotics.  He has   opined that he plans on keeping him on antibiotics for approximately 6 weeks,   and he is managing the antibiotics.    PHYSICAL EXAMINATION:  VITAL SIGNS:  Current temp is 99.3, blood pressure 97/54, heart rate is 72, sat   is 96%.  HEENT:  Unremarkable.  He has a Shiley trach tube in place.  CHEST:  He does have a binder in place.  He has few crackles, but mostly clear.  HEART:  Regular, but with an ectopic beat.  He is currently in bigeminy.  ABDOMEN:  He has an abdominal binder in place, but the PEG tube is placed, and   he is tolerating tube feedings well.  EXTREMITIES:  Lower  extremities:  He has decreased muscle mass.  The right upper   extremity edema is less.  He has a DVT in the right subclavian vein, is on   full-dose anticoagulation for that.  NEUROLOGICAL:  Today, he nodded his head, and he is calm and cooperative.    LABS:  From today are as follows:  H and H 8.6 and 27, white count 9,600,   platelet count 296.  Sodium 135, potassium 3.9, chloride 107, bicarbonate 22,   BUN 18, creatinine 0.59.  Albumin 1.7.  AB.44, pCO2 of 36, PO2 of 90.  That   is on an IMV of 18.  Chest x-ray was also done this morning.  Shows trach tube   to be in good position.  Lung fields are clear.    IMPRESSION:    1. CABG x4, 2022.  2. Methicillin-resistant Staphylococcus aureus sternal wound infection and   dehiscence, status post wound vacuum-assisted closure device, 2022, and   bilateral pectoral flaps for sternal wound closure, 2022.  3. Postoperative agitation secondary to underlying bipolar and schizophrenia.  4. Right upper extremity deep vein thrombosis, currently on full-dose Lovenox.  5. Status post tracheostomy and percutaneous endoscopic gastrostomy tube.  6. Protein-calorie malnutrition, improving.    PLAN:  His behavior has been very good.  We are going to continue to wean the   vent, try to get him to CPAP, pressure support, and possibly even trach collar   by tomorrow.  We will need to institute Eliquis and discontinue the full-dose   Lovenox.  I think that could probably be accomplished starting tomorrow as well.    We are keeping him on low-dose Precedex.  He is on antibiotics under the   direction of Infectious Disease.  He is going to need considerable amount of   PT/OT because he has significant critical care myopathy because we have had to   keep him sedated for so long.    CRITICAL CARE DIAGNOSES:    1. Respiratory failure.  2. Other diagnoses listed above.    CRITICAL CARE INTERVENTION:  Hands-on evaluation, review of labs, adjustment of   mechanical  ventilation and other physiological parameters, discussion of plan of   care with patient's daughter, Arabella Bradley, who can be reached at   [phone].    CRITICAL CARE TIME PERSONALLY SPENT:  Has been greater than 31 minutes.        ______________________________  G MD ALICIA Gutierres/PAO  DD:  05/19/2022  Time:  07:57AM  DT:  05/19/2022  Time:  08:20AM  Job #:  [id number]      PROGRESS NOTE

## 2022-05-19 NOTE — PROGRESS NOTES
Infectious Diseases Progress Note  56-year-old male with past medical history of bipolar disorder schizophrenia, .  I have a, is admitted to [facility] on 01/20/2020 with chief complaints of chest pain with work-up revealing significant coronary artery disease requiring surgical intervention.  He was taken for CABG x 4 on 4/6/2022 requiring ICU stay postoperatively downgraded to the floor subsequently but again readmitted to the ICU on 4/10.  He did have issues with wound healing with subsequent dehiscence and suspected infection.  4/16 wound culture positive for MRSA.  CT scan of the thoracic done on 4/16 showed changes of sternotomy from CABG with presternal, retrosternal and anterior mediastinum combination of inflammation, fluid and air locules, bilateral pleural effusion and bilateral basilar consolidation. He had debridement of the sternal wound with placement of wound VAC on 4/17 with cultures from surgery showing MRSA as well.  He has been pretty much without fevers but is noted to have leukocytosis of up to 23.9 on 4/17 which has had a downward trend.  He is anemic with low albumin.  Chest x-ray from 4/19 showed no significant changes. During his hospital stay, he eventually underwent tracheostomy placement on 5/10 and on 5/11 he underwent sternal wound debridement, B/L fasciocutaneous flap closure, B/L pectoralis major muscle flap reconstruction of sternum  He remains in the ICU, trached and on ventilator.   He is on antibiotic coverage with vancomycin and rifampin.     Subjective:  Remains in the ICU. No new complaints, low-grade fevers noted, doing about the same.  In no acute distress      Past Medical History:   Diagnosis Date    Bipolar disorder, unspecified     Chronic hepatitis C     Hypertension     Obesity, unspecified     Seizures     Stroke      Past Surgical History:   Procedure Laterality Date    CORONARY STENT PLACEMENT  08/14/2015    DEBRIDEMENT   04/17/2022    LEFT HEART CATHETERIZATION Left 08/13/2015    REPEAT CLOSURE OF STERNAL INCISION N/A 5/11/2022    Procedure: CLOSURE, STERNAL INCISION, REPEAT;  Surgeon: Adolfo Weiss MD;  Location: [facility] OR;  Service: Plastics;  Laterality: N/A;  STERNAL WOUND DEBRIDEMENT AND RECONSTRUCTION // MULTIPLE MUSCLE FLAPS // REQ 1400     Social History     Socioeconomic History    Marital status: Single   Tobacco Use    Smoking status: Current Every Day Smoker     Types: Cigarettes    Smokeless tobacco: Never Used   Substance and Sexual Activity    Alcohol use: Never    Drug use: Not Currently     Types: Cocaine       Review of Systems   Unable to perform ROS: Medical condition       Review of patient's allergies indicates:   Allergen Reactions    Depakote [divalproex]     Divalproex sodium Other (See Comments)    Lithium     Lithium analogues     Quetiapine Other (See Comments)         Scheduled Meds:   atorvastatin  80 mg Oral Daily    benztropine  1 mg Per OG tube BID    doxepin  50 mg Oral QHS    enoxaparin  1 mg/kg (Dosing Weight) Subcutaneous Q12H    ezetimibe  10 mg Oral QHS    famotidine (PF)  20 mg Intravenous BID    glycopyrrolate  1 mg Per NG tube TID    haloperidoL  5 mg Per OG tube BID    levetiracetam IV  500 mg Intravenous Q12H    metoprolol tartrate  25 mg Per OG tube Daily    ondansetron  4 mg Oral Once    OXcarbazepine  300 mg Per OG tube BID    polyethylene glycol  17 g Oral Daily    rifAMpin  300 mg Oral BID    sodium chloride 0.9%  10 mL Intravenous Q12H    vancomycin (VANCOCIN) IVPB  1,000 mg Intravenous Q8H     Continuous Infusions:   dexmedetomidine (PRECEDEX) infusion 1 mcg/kg/hr (05/18/22 2003)    NORepinephrine bitartrate-D5W Stopped (05/17/22 1030)    propofoL Stopped (05/18/22 0900)     PRN Meds:sodium chloride, acetaminophen, albuterol-ipratropium, bisacodyL, dextrose 10%, fentaNYL, fentaNYL, hydrALAZINE, lorazepam, morphine, morphine, nitroGLYCERIN, OLANZapine,  "ondansetron, oxyCODONE, oxyCODONE, potassium chloride in water, potassium chloride in water, potassium chloride in water, sodium chloride 0.9%, vancomycin - pharmacy to dose, ziprasidone    Objective:  BP (!) 149/66   Pulse 77   Temp 100.2 °F (37.9 °C)   Resp 20   Ht 5' 10.87" (1.8 m)   Wt 77.2 kg (170 lb 3.1 oz)   SpO2 99%   BMI 23.83 kg/m²     Physical Exam:   Physical Exam  Vitals reviewed.   Constitutional:       General: He is not in acute distress.  HENT:      Head: Normocephalic and atraumatic.   Neck:      Comments: Tracheostomy noted  Cardiovascular:      Rate and Rhythm: Normal rate and regular rhythm.      Heart sounds: Normal heart sounds.   Pulmonary:      Effort: No respiratory distress.      Breath sounds: Normal breath sounds.      Comments: On ventilator   Abdominal:      General: Bowel sounds are normal. There is no distension.      Palpations: Abdomen is soft.      Tenderness: There is no abdominal tenderness.      Comments: Peg tube in place   Musculoskeletal:         General: No deformity.      Cervical back: Neck supple.   Skin:     Findings: No erythema or rash.   Neurological:      Mental Status: He is alert.      Comments: Does not follow commands   Psychiatric:      Comments: Not communicative         Imaging       Lab Review   Recent Results (from the past 24 hour(s))   Sedimentation rate    Collection Time: 05/18/22  1:39 AM   Result Value Ref Range    Sed Rate 106 (H) 0 - 15 mm/hr   CBC with Differential    Collection Time: 05/18/22  1:39 AM   Result Value Ref Range    WBC 10.0 4.5 - 11.5 x10(3)/mcL    RBC 3.21 (L) 4.70 - 6.10 x10(6)/mcL    Hgb 8.5 (L) 14.0 - 18.0 gm/dL    Hct 26.5 (L) 42.0 - 52.0 %    MCV 82.6 80.0 - 94.0 fL    MCH 26.5 (L) 27.0 - 31.0 pg    MCHC 32.1 (L) 33.0 - 36.0 mg/dL    RDW 15.9 11.5 - 17.0 %    Platelet 358 130 - 400 x10(3)/mcL    MPV 9.7 9.4 - 12.4 fL    Neut % 63.4 %    Lymph % 18.8 %    Mono % 8.7 %    Eos % 7.8 %    Basophil % 0.7 %    Lymph # 1.88 0.6 " - 4.6 x10(3)/mcL    Neut # 6.3 2.1 - 9.2 x10(3)/mcL    Mono # 0.87 0.1 - 1.3 x10(3)/mcL    Eos # 0.78 0 - 0.9 x10(3)/mcL    Baso # 0.07 0 - 0.2 x10(3)/mcL    IG# 0.06 (H) 0 - 0.0155 x10(3)/mcL    IG% 0.6 (H) 0 - 0.43 %    NRBC% 0.00 %   Comprehensive Metabolic Panel    Collection Time: 05/18/22  1:39 AM   Result Value Ref Range    Sodium Level 135 (L) 136 - 145 mmol/L    Potassium Level 4.1 3.5 - 5.1 mmol/L    Chloride 105 98 - 107 mmol/L    Carbon Dioxide 23 22 - 29 mmol/L    Glucose Level 170 (H) 74 - 100 mg/dL    Blood Urea Nitrogen 19.8 8.4 - 25.7 mg/dL    Creatinine 0.66 (L) 0.73 - 1.18 mg/dL    Calcium Level Total 8.2 (L) 8.4 - 10.2 mg/dL    Protein Total 6.1 (L) 6.4 - 8.3 gm/dL    Albumin Level 1.6 (L) 3.5 - 5.0 gm/dL    Globulin 4.5 (H) 2.4 - 3.5 gm/dL    Albumin/Globulin Ratio 0.4 (L) 1.1 - 2.0 ratio    Bilirubin Total 0.5 <=1.5 mg/dL    Alkaline Phosphatase 76 40 - 150 unit/L    Alanine Aminotransferase 22 0 - 55 unit/L    Aspartate Aminotransferase 36 (H) 5 - 34 unit/L    Estimated GFR- >60 mls/min/1.73/m2    Estimated GFR-Non  >60 mls/min/1.73/m2   C-Reactive Protein    Collection Time: 05/18/22  1:39 AM   Result Value Ref Range    C-Reactive Protein 55.10 (H) <5.00 mg/L   Magnesium    Collection Time: 05/18/22  1:39 AM   Result Value Ref Range    Magnesium Level 1.90 1.60 - 2.60 mg/dL   VANCOMYCIN, TROUGH    Collection Time: 05/18/22  5:24 AM   Result Value Ref Range    Vancomycin Trough 20.5 (H) 15.0 - 20.0 ug/ml             Assessment/Plan:  1.  Sepsis syndrome  2.  MRSA sternal wound infection/dehiscence  3.  Leukocytosis  4.  Acute hypoxic respiratory failure  5.  Anemia  6.  Coronary artery disease s/p CABG  7.  Protein calorie malnutrition  8.  Pneumonia -  Proteus/Klebsiella isolates     -Continue Vancomycin #33 and Rifampin #27. Plan a 6 week course following inflammatory markers  -On Zosyn #7  -Low grade fevers and without leukocytosis  -S/P sternal debridement  with flap closure on 5/11 per Dr Weiss  -Continue wound care and drain management per Plastics  -Vent management and ICU support per Intensivist  -Discussed with nursing

## 2022-05-20 LAB
BASE EXCESS ARTERIAL: 0.1 MMOL/L (ref -2–2)
HCO3 ARTERIAL: 23.6 MMOL/L (ref 18–23)
HGB BLD-MCNC: ABNORMAL G/DL
PCO2 BLDA: 34 MM[HG]
PCO2 BLDA: ABNORMAL MM[HG]
PH SMN: 7.45 [PH]
PO2 BLDA: 82 MM[HG]
POC COHB: ABNORMAL
POC IONIZED CALCIUM: 1.15
POC METHB: ABNORMAL
POC O2HB: ABNORMAL
POTASSIUM BLD-SCNC: 3.1 MMOL/L
SATURATED O2 ARTERIAL, I-STAT: 97
SODIUM BLD-SCNC: 135 MMOL/L
VANCOMYCIN TROUGH SERPL-MCNC: 22.1 UG/ML (ref 15–20)

## 2022-05-20 PROCEDURE — A4216 STERILE WATER/SALINE, 10 ML: HCPCS

## 2022-05-20 PROCEDURE — 25000003 PHARM REV CODE 250

## 2022-05-20 PROCEDURE — 99900026 HC AIRWAY MAINTENANCE (STAT)

## 2022-05-20 PROCEDURE — 80202 ASSAY OF VANCOMYCIN: CPT | Performed by: INTERNAL MEDICINE

## 2022-05-20 PROCEDURE — 82803 BLOOD GASES ANY COMBINATION: CPT

## 2022-05-20 PROCEDURE — 63600175 PHARM REV CODE 636 W HCPCS: Performed by: STUDENT IN AN ORGANIZED HEALTH CARE EDUCATION/TRAINING PROGRAM

## 2022-05-20 PROCEDURE — 25000003 PHARM REV CODE 250: Performed by: HOSPITALIST

## 2022-05-20 PROCEDURE — 27000221 HC OXYGEN, UP TO 24 HOURS

## 2022-05-20 PROCEDURE — 25000003 PHARM REV CODE 250: Performed by: INTERNAL MEDICINE

## 2022-05-20 PROCEDURE — 99900035 HC TECH TIME PER 15 MIN (STAT)

## 2022-05-20 PROCEDURE — 63600175 PHARM REV CODE 636 W HCPCS: Performed by: INTERNAL MEDICINE

## 2022-05-20 PROCEDURE — 20000000 HC ICU ROOM

## 2022-05-20 PROCEDURE — 27200966 HC CLOSED SUCTION SYSTEM

## 2022-05-20 PROCEDURE — 94003 VENT MGMT INPAT SUBQ DAY: CPT

## 2022-05-20 PROCEDURE — 63600175 PHARM REV CODE 636 W HCPCS

## 2022-05-20 PROCEDURE — 36415 COLL VENOUS BLD VENIPUNCTURE: CPT | Performed by: INTERNAL MEDICINE

## 2022-05-20 PROCEDURE — C1751 CATH, INF, PER/CENT/MIDLINE: HCPCS

## 2022-05-20 PROCEDURE — 94761 N-INVAS EAR/PLS OXIMETRY MLT: CPT

## 2022-05-20 PROCEDURE — 36600 WITHDRAWAL OF ARTERIAL BLOOD: CPT

## 2022-05-20 PROCEDURE — 99231 PR SUBSEQUENT HOSPITAL CARE,LEVL I: ICD-10-PCS | Mod: ,,, | Performed by: SURGERY

## 2022-05-20 PROCEDURE — 99231 SBSQ HOSP IP/OBS SF/LOW 25: CPT | Mod: ,,, | Performed by: SURGERY

## 2022-05-20 PROCEDURE — 25000003 PHARM REV CODE 250: Performed by: STUDENT IN AN ORGANIZED HEALTH CARE EDUCATION/TRAINING PROGRAM

## 2022-05-20 RX ORDER — PROMETHAZINE HYDROCHLORIDE 25 MG/ML
12.5 INJECTION, SOLUTION INTRAMUSCULAR; INTRAVENOUS EVERY 6 HOURS PRN
Status: DISCONTINUED | OUTPATIENT
Start: 2022-05-20 | End: 2022-06-17 | Stop reason: HOSPADM

## 2022-05-20 RX ADMIN — DEXMEDETOMIDINE HYDROCHLORIDE 1.4 MCG/KG/HR: 400 INJECTION INTRAVENOUS at 10:05

## 2022-05-20 RX ADMIN — DEXMEDETOMIDINE HYDROCHLORIDE 1.4 MCG/KG/HR: 400 INJECTION INTRAVENOUS at 01:05

## 2022-05-20 RX ADMIN — OXCARBAZEPINE 300 MG: 300 TABLET, FILM COATED ORAL at 09:05

## 2022-05-20 RX ADMIN — POLYETHYLENE GLYCOL 3350 17 G: 17 POWDER, FOR SOLUTION ORAL at 09:05

## 2022-05-20 RX ADMIN — OXCARBAZEPINE 300 MG: 300 TABLET, FILM COATED ORAL at 10:05

## 2022-05-20 RX ADMIN — GLYCOPYRROLATE 1 MG: 1 TABLET ORAL at 03:05

## 2022-05-20 RX ADMIN — GLYCOPYRROLATE 1 MG: 1 TABLET ORAL at 10:05

## 2022-05-20 RX ADMIN — EZETIMIBE 10 MG: 10 TABLET ORAL at 10:05

## 2022-05-20 RX ADMIN — FAMOTIDINE 20 MG: 10 INJECTION, SOLUTION INTRAVENOUS at 10:05

## 2022-05-20 RX ADMIN — HALOPERIDOL 5 MG: 5 TABLET ORAL at 09:05

## 2022-05-20 RX ADMIN — BENZTROPINE MESYLATE 1 MG: 1 TABLET ORAL at 09:05

## 2022-05-20 RX ADMIN — RIFAMPIN 300 MG: 300 CAPSULE ORAL at 09:05

## 2022-05-20 RX ADMIN — DEXMEDETOMIDINE HYDROCHLORIDE 1.4 MCG/KG/HR: 400 INJECTION INTRAVENOUS at 05:05

## 2022-05-20 RX ADMIN — SODIUM CHLORIDE, PRESERVATIVE FREE 10 ML: 5 INJECTION INTRAVENOUS at 09:05

## 2022-05-20 RX ADMIN — BENZTROPINE MESYLATE 1 MG: 1 TABLET ORAL at 10:05

## 2022-05-20 RX ADMIN — VANCOMYCIN HYDROCHLORIDE 750 MG: 1 INJECTION, POWDER, LYOPHILIZED, FOR SOLUTION INTRAVENOUS at 11:05

## 2022-05-20 RX ADMIN — ATORVASTATIN CALCIUM 80 MG: 40 TABLET, FILM COATED ORAL at 09:05

## 2022-05-20 RX ADMIN — SODIUM CHLORIDE, PRESERVATIVE FREE 10 ML: 5 INJECTION INTRAVENOUS at 10:05

## 2022-05-20 RX ADMIN — HALOPERIDOL 5 MG: 5 TABLET ORAL at 10:05

## 2022-05-20 RX ADMIN — RIFAMPIN 300 MG: 300 CAPSULE ORAL at 10:05

## 2022-05-20 RX ADMIN — GLYCOPYRROLATE 1 MG: 1 TABLET ORAL at 09:05

## 2022-05-20 RX ADMIN — METOPROLOL TARTRATE 25 MG: 25 TABLET, FILM COATED ORAL at 09:05

## 2022-05-20 RX ADMIN — LEVETIRACETAM 500 MG: 100 INJECTION, SOLUTION INTRAVENOUS at 10:05

## 2022-05-20 RX ADMIN — ENOXAPARIN SODIUM 80 MG: 80 INJECTION SUBCUTANEOUS at 09:05

## 2022-05-20 RX ADMIN — LEVETIRACETAM 500 MG: 100 INJECTION, SOLUTION INTRAVENOUS at 09:05

## 2022-05-20 RX ADMIN — FENTANYL CITRATE 50 MCG: 50 INJECTION INTRAMUSCULAR; INTRAVENOUS at 04:05

## 2022-05-20 RX ADMIN — VANCOMYCIN HYDROCHLORIDE 750 MG: 1 INJECTION, POWDER, LYOPHILIZED, FOR SOLUTION INTRAVENOUS at 07:05

## 2022-05-20 RX ADMIN — DEXMEDETOMIDINE HYDROCHLORIDE 1 MCG/KG/HR: 400 INJECTION INTRAVENOUS at 11:05

## 2022-05-20 RX ADMIN — APIXABAN 10 MG: 5 TABLET, FILM COATED ORAL at 10:05

## 2022-05-20 RX ADMIN — FAMOTIDINE 20 MG: 10 INJECTION, SOLUTION INTRAVENOUS at 09:05

## 2022-05-20 RX ADMIN — FENTANYL CITRATE 50 MCG: 50 INJECTION INTRAMUSCULAR; INTRAVENOUS at 11:05

## 2022-05-20 RX ADMIN — DOXEPIN HYDROCHLORIDE 50 MG: 50 CAPSULE ORAL at 10:05

## 2022-05-20 RX ADMIN — ONDANSETRON 4 MG: 2 INJECTION INTRAMUSCULAR; INTRAVENOUS at 08:05

## 2022-05-20 RX ADMIN — SODIUM CHLORIDE 500 ML: 9 INJECTION, SOLUTION INTRAVENOUS at 03:05

## 2022-05-20 NOTE — PROGRESS NOTES
Pharmacokinetic Assessment Follow Up: IV Vancomycin    Vancomycin serum concentration assessment(s):    The trough level was drawn correctly and can be used to guide therapy at this time. The measurement is above the desired definitive target range of 15 to 20 mcg/mL.    Vancomycin Regimen Plan:  Patient currently on Vancomycin 1000 mg every 8 hours  Change regimen to Vancomycin 750 mg IV every 8 hours with next serum trough concentration measured at 1000 prior to 7th dose on 5/22    Drug levels (last 3 results):  Recent Labs   Lab Result Units 05/20/22  0649   Vancomycin Trough ug/ml 22.1*       Pharmacy will continue to follow and monitor vancomycin.    Please contact pharmacy at extension [phone] for questions regarding this assessment.    Thank you for the consult,   Oneal Edmonds       Patient brief summary:  Kendall Duff is a 56 y.o. male initiated on antimicrobial therapy with IV Vancomycin for treatment of skin & soft tissue infection    The patient's current regimen is 750 mg IV q8h    Drug Allergies:   Review of patient's allergies indicates:   Allergen Reactions    Depakote [divalproex]     Divalproex sodium Other (See Comments)    Lithium     Lithium analogues     Quetiapine Other (See Comments)       Actual Body Weight:   77 kg    Renal Function:   Estimated Creatinine Clearance: 144.8 mL/min (A) (based on SCr of 0.59 mg/dL (L)).,     Dialysis Method (if applicable):  N/A    CBC (last 72 hours):  Recent Labs   Lab Result Units 05/18/22  0139 05/19/22  0149   WBC x10(3)/mcL 10.0 9.6   Hgb gm/dL 8.5* 8.6*   Hct % 26.5* 27.7*   Platelet x10(3)/mcL 358 296   Mono % % 8.7 8.3   Eos % % 7.8 3.9   Basophil % % 0.7 0.6       Metabolic Panel (last 72 hours):  Recent Labs   Lab Result Units 05/18/22  0139 05/19/22  0149   Sodium Level mmol/L 135* 135*   Potassium Level mmol/L 4.1 3.9   Chloride mmol/L 105 107   Carbon Dioxide mmol/L 23 22   Glucose Level mg/dL 170* 146*   Blood Urea Nitrogen mg/dL 19.8 18.0    Creatinine mg/dL 0.66* 0.59*   Albumin Level gm/dL 1.6* 1.7*   Bilirubin Total mg/dL 0.5 0.7   Alkaline Phosphatase unit/L 76 73   Aspartate Aminotransferase unit/L 36* 43*   Alanine Aminotransferase unit/L 22 24   Magnesium Level mg/dL 1.90  --        Vancomycin Administrations:  vancomycin given in the last 96 hours                     vancomycin in dextrose 5 % 1 gram/250 mL IVPB 1,000 mg (mg) 1,000 mg New Bag 05/19/22 2350     1,000 mg New Bag  1637     1,000 mg New Bag  0859     1,000 mg New Bag  0003     1,000 mg New Bag 05/18/22 1600     1,000 mg New Bag  0800     1,000 mg New Bag  0018     1,000 mg New Bag 05/17/22 1600     1,000 mg New Bag  0806     1,000 mg New Bag  0017     1,000 mg New Bag 05/16/22 1624                    Microbiologic Results:  Microbiology Results (last 7 days)       Procedure Component Value Units Date/Time    Blood Culture [[id number]]  (Normal) Collected: 05/12/22 1740    Order Status: Completed Specimen: Blood from Arm Updated: 05/17/22 1902     CULTURE, BLOOD ([facility]) No Growth at 5 days    Blood Culture [[id number]]  (Normal) Collected: 05/12/22 1741    Order Status: Completed Specimen: Blood from Hand, Left Updated: 05/17/22 1902     CULTURE, BLOOD ([facility]) No Growth at 5 days    Tissue Culture - Aerobic [[id number]] Collected: 05/11/22 1432    Order Status: Completed Specimen: Bone from Sternum Updated: 05/17/22 0936     CULTURE, TISSUE- AEROBIC ([facility]) No Growth at 5 days    Tissue Culture - Aerobic [[id number]] Collected: 05/11/22 1429    Order Status: Completed Specimen: Tissue from Sternum Updated: 05/17/22 0935     CULTURE, TISSUE- AEROBIC ([facility]) No Growth at 5 days    Anaerobic Culture [[id number]] Collected: 05/11/22 1432    Order Status: Completed Specimen: Bone from Sternum Updated: 05/15/22 0741     Anaerobe Culture No Anaerobes Isolated    Anaerobic Culture [[id number]] Collected: 05/11/22 1429    Order Status: Completed Specimen: Tissue from Sternum Updated: 05/15/22 0740      Anaerobe Culture No Anaerobes Isolated

## 2022-05-20 NOTE — PROGRESS NOTES
PRS  Awake this morning, communicating with me  AFVSS  Incisions c/d/i, drains s/s, output noted    Doing well from my standpoint.  Continue drain care and arms in at sides.  Sedation as need to avoid disruption of sternal repair.  Dr. Alvarez will be covering for me this weekend, I will return on Monday.

## 2022-05-20 NOTE — PROGRESS NOTES
[facility] - [facility] [address] [facility]  Adult Nutrition  Progress Note    SUMMARY       Recommendations    Recommendation/Intervention: Peptamen VHP @ 70ml/hr (goal rate, based on tube feeding running ~20 hours/day) + 1 packet [employer] Protein NoCarb 4x/day  Goals: Meet >/=75% est energy and protein requirements by f/u  Nutrition Goal Status: progressing towards goal  Communication of RD Recs: reviewed with RN    Assessment and Plan    Nutrition Problem  Inadequate Oral Intake     Related to (etiology):   Current condition             Signs and Symptoms (as evidenced by):   trach     Interventions(treatment strategy):  Collaboration with other providers     Nutrition Diagnosis Status:   Continues    Malnutrition Assessment  Malnutrition Type: acute illness or injury  Weight Loss (Malnutrition): other (see comments) (does not meet criteria)  Energy Intake (Malnutrition): other (see comments) (does not meet criteria)  Subcutaneous Fat (Malnutrition):  (does not meet criteria)  Muscle Mass (Malnutrition): mild depletion  Fluid Accumulation (Malnutrition):  (does not meet criteria)  Hand  Strength, Left (Malnutrition):  (unable to eval)  Hand  Strength, Right (Malnutrition):  (unable to eval)   [place] (Muscle Loss): mild depletion  Clavicle Bone Region (Muscle Loss): mild depletion   Edema (Fluid Accumulation): 0-->no edema present   Subcutaneous Fat Loss (Final Summary): well nourished  Muscle Loss Evaluation (Final Summary): well nourished  Fluid Accumulation Evaluation:  (unable to evaluate)    Moderate Weight Loss (Malnutrition):  (unable to evaluate)    Reason for Assessment    Reason For Assessment: RD follow-up  Diagnosis: other (see comments) (1. CAD with 4 vessel CABG 4/6  2. MRSA Sternal wound dehiscence with debridement and wound vac 4/17.  Plastics following  3. Respiratory culture positive Klebsiella and Proteus  4. Malnutrition.  5. Respiratory failure.  Intubated 4/15)  Relevant  "Medical History:  (.riley)  General Information Comments: Noted tube feeding held for vomiting 5/19/22. Restarted today per RN, plans for increasing to goal rate. No kcal from meds.    Nutrition Risk Screen    Nutrition Risk Screen: large or nonhealing wound, burn or pressure injury    Nutrition/Diet History    Factors Affecting Nutritional Intake: on mechanical ventilation    Anthropometrics    Temp: 99.3 °F (37.4 °C)  Height Method: Estimated  Height: 5' 10.87" (180 cm)  Height (inches): 70.87 in  Weight Method: Bed Scale  Weight: 77.2 kg (170 lb 3.1 oz)  Weight (lb): 170.2 lb  Ideal Body Weight (IBW), Male: 171.22 lb  % Ideal Body Weight, Male (lb): 112.92 %  BMI (Calculated): 23.8  BMI Grade: 18.5-24.9 - normal    Lab/Procedures/Meds    Pertinent Labs Reviewed: reviewed  Pertinent Labs Comments: 5/19/22 Na 135, Glu 146  Pertinent Medications Reviewed: reviewed  Pertinent Medications Comments: miralax     Estimated/Assessed Needs    Weight Used For Calorie Calculations: 77.2 kg (170 lb 3.1 oz)  Energy Calorie Requirements (kcal): 1880kcal  Energy Need Method: [facility]  Protein Requirements: 116-175gm  Weight Used For Protein Calculations: 77.2 kg (170 lb 3.1 oz)  Fluid Requirements (mL): 30mL/kg  Estimated Fluid Requirement Method: RDA Method  RDA Method (mL): 1880    Nutrition Prescription Ordered    Current Diet Order: NPO  Current Nutrition Support Formula Ordered: Peptamen Intense VHP  Current Nutrition Support Rate Ordered: 70 (ml)    Evaluation of Received Nutrient/Fluid Intake    Enteral Calories (kcal): 1400  Enteral Protein (gm): 130  Enteral (Free Water) Fluid (mL): 1176  Lipid Calories (kcals): 475 kcals  Other Calories (kcal): 240  Total Calories (kcal): 1640  % Kcal Needs: 87% est needs  Other Protein (gm): 60  Total Protein (gm): 190  Total Protein (gm/kg): 2.46  % Protein Needs: 158%  Energy Calories Required: meeting needs  Protein Required: meeting needs  Fluid Required: not meeting " needs  Tolerance: tolerating  % Intake of Estimated Energy Needs: 75 - 100 %  % Meal Intake: NPO    Nutrition Risk    Level of Risk/Frequency of Follow-up: high     Monitor and Evaluation    Food and Nutrient Intake: enteral nutrition intake  Food and Nutrient Adminstration: enteral and parenteral nutrition administration     Nutrition Follow-Up    RD Follow-up?: Yes

## 2022-05-20 NOTE — PROGRESS NOTES
Pulmonary & Critical Care Medicine   Progress Note      Short History:  A 56-year-old gentleman who has bipolar disorder and schizophrenia   who underwent a CABG x4 by Dr. Carney on 04/06/2022.  Postoperatively, was   complicated by sternal wound infection and dehiscence due to his severe   agitation post surgery.  He underwent a wound closure device placement on   04/17/2022, and subsequently underwent a bilateral pectoral flap for sternal   wound closure on 05/11/2022, by Dr. Adolfo Weiss of Plastic Surgery.  He has   requested that we keep him calm and quiet, which we have done so for about a   week.  We have now started to let him reduce his sedation.     Objective/Subjective:  -no major issues or changes overnight  -patient remains on mechanical ventilation per tracheostomy currently on volume control SIMV with no signs of dyssynchrony distress  -patient with 5-6 KELLIE drains for sternal wound all with bloody output  -currently sedated on Precedex at 1.4 but arousable opens eyes to name call able to follow some commands  -currently on norepinephrine at 0.02 micrograms/kilogram per minute      Scheduled Medications:    atorvastatin  80 mg Oral Daily    benztropine  1 mg Per OG tube BID    doxepin  50 mg Oral QHS    enoxaparin  1 mg/kg (Dosing Weight) Subcutaneous Q12H    ezetimibe  10 mg Oral QHS    famotidine (PF)  20 mg Intravenous BID    glycopyrrolate  1 mg Per NG tube TID    haloperidoL  5 mg Per OG tube BID    levetiracetam IV  500 mg Intravenous Q12H    metoprolol tartrate  25 mg Per OG tube Daily    ondansetron  4 mg Oral Once    OXcarbazepine  300 mg Per OG tube BID    polyethylene glycol  17 g Oral Daily    rifAMpin  300 mg Oral BID    sodium chloride 0.9%  10 mL Intravenous Q12H    vancomycin (VANCOCIN) IVPB  750 mg Intravenous Q8H       PRN Medications:   sodium chloride, acetaminophen, albuterol-ipratropium, bisacodyL, dextrose 10%, fentaNYL, fentaNYL, hydrALAZINE, lorazepam,  morphine, morphine, nitroGLYCERIN, OLANZapine, ondansetron, oxyCODONE, oxyCODONE, potassium chloride in water, potassium chloride in water, potassium chloride in water, sodium chloride 0.9%, vancomycin - pharmacy to dose, ziprasidone      Infusions:     dexmedetomidine (PRECEDEX) infusion 1 mcg/kg/hr (05/20/22 1104)    NORepinephrine bitartrate-D5W 0.02 mcg/kg/min (05/20/22 0436)    propofoL Stopped (05/18/22 0900)         Fluid Balance:     Intake/Output Summary (Last 24 hours) at 5/20/2022 1151  Last data filed at 5/20/2022 0600  Gross per 24 hour   Intake 555 ml   Output 1845 ml   Net -1290 ml           Vital Signs:   Vitals:    05/20/22 1030   BP: 121/64   Pulse: 65   Resp: 10   Temp:          Physical Exam  Vitals and nursing note reviewed.   Constitutional:       General: He is not in acute distress.     Appearance: Normal appearance. He is not ill-appearing or toxic-appearing.   HENT:      Head: Normocephalic and atraumatic.      Right Ear: External ear normal.      Left Ear: External ear normal.      Nose: Nose normal.      Mouth/Throat:      Mouth: Mucous membranes are moist.      Pharynx: Oropharynx is clear. No oropharyngeal exudate or posterior oropharyngeal erythema.   Eyes:      General: No scleral icterus.     Extraocular Movements: Extraocular movements intact.      Conjunctiva/sclera: Conjunctivae normal.      Pupils: Pupils are equal, round, and reactive to light.   Neck:      Vascular: No carotid bruit.      Comments: Tracheostomy in place, site clean and dry  Cardiovascular:      Rate and Rhythm: Normal rate and regular rhythm.      Pulses: Normal pulses.      Heart sounds: Normal heart sounds. No murmur heard.    No friction rub. No gallop.   Pulmonary:      Effort: Pulmonary effort is normal. No respiratory distress.      Breath sounds: Normal breath sounds. No wheezing, rhonchi or rales.      Comments: On mechanical ventilation on volume control SIMV with no signs suggesting greasy,  appropriate tidal volumes visualized with vital capacity of 2 L  Abdominal:      General: Abdomen is flat. Bowel sounds are normal. There is no distension.      Palpations: Abdomen is soft.      Tenderness: There is no abdominal tenderness. There is no guarding or rebound.   Genitourinary:     Comments: deferred  Musculoskeletal:         General: No swelling or deformity. Normal range of motion.      Cervical back: Normal range of motion and neck supple. No rigidity or tenderness.   Lymphadenopathy:      Cervical: No cervical adenopathy.   Skin:     General: Skin is warm and dry.      Capillary Refill: Capillary refill takes less than 2 seconds.      Coloration: Skin is not jaundiced.      Findings: No bruising, lesion or rash.   Neurological:      General: No focal deficit present.      Mental Status: He is alert.      Sensory: No sensory deficit.      Motor: No weakness.      Comments: Unable to fully assess orientation secondary to patient being sedated on Precedex and slightly somnolent   Psychiatric:         Mood and Affect: Mood normal.         Laboratory Studies:   Recent Labs   Lab 05/20/22 0542   PH 7.450   PCO2 34   PO2 82     No results for input(s): WBC, RBC, HGB, HCT, PLT, MCV, MCH, MCHC in the last 24 hours.  No results for input(s): GLUCOSE, NA, K, CL, CO2, BUN, CREATININE, MG in the last 24 hours.    Invalid input(s):  CALCIUM      Microbiology Data:   Microbiology Results (last 7 days)     Procedure Component Value Units Date/Time    Blood Culture [[id number]]  (Normal) Collected: 05/12/22 1740    Order Status: Completed Specimen: Blood from Arm Updated: 05/17/22 1902     CULTURE, BLOOD ([facility]) No Growth at 5 days    Blood Culture [[id number]]  (Normal) Collected: 05/12/22 1741    Order Status: Completed Specimen: Blood from Hand, Left Updated: 05/17/22 1902     CULTURE, BLOOD ([facility]) No Growth at 5 days    Tissue Culture - Aerobic [[id number]] Collected: 05/11/22 1432    Order Status: Completed Specimen:  Bone from Sternum Updated: 05/17/22 0936     CULTURE, TISSUE- AEROBIC ([facility]) No Growth at 5 days    Tissue Culture - Aerobic [[id number]] Collected: 05/11/22 1429    Order Status: Completed Specimen: Tissue from Sternum Updated: 05/17/22 0935     CULTURE, TISSUE- AEROBIC ([facility]) No Growth at 5 days    Anaerobic Culture [[id number]] Collected: 05/11/22 1432    Order Status: Completed Specimen: Bone from Sternum Updated: 05/15/22 0741     Anaerobe Culture No Anaerobes Isolated    Anaerobic Culture [[id number]] Collected: 05/11/22 1429    Order Status: Completed Specimen: Tissue from Sternum Updated: 05/15/22 0740     Anaerobe Culture No Anaerobes Isolated            Imaging:   X-Ray Chest 1 View  Narrative: EXAMINATION:  XR CHEST 1 VIEW    CLINICAL HISTORY:  respiratory failure;    TECHNIQUE:  AP chest    COMPARISON:  Chest x-ray dated 05/17/2022    FINDINGS:  Tracheostomy and left sided PICC line remain in place.  The heart is stable in size.  There is no new focal airspace consolidation.  No pleural effusion or visible pneumothorax  Impression: Stable exam without significant interval change.    Electronically signed by: Pascale Vences  Date:    05/19/2022  Time:    06:00          Assessment and Plan    Assessment:  1. CABG x4, 04/06/2022.  2. Methicillin-resistant Staphylococcus aureus sternal wound infection and   dehiscence, status post wound vacuum-assisted closure device, 04/17/2022, and   bilateral pectoral flaps for sternal wound closure, 05/11/2022.  3. Postoperative agitation secondary to underlying bipolar and schizophrenia.  4. Right upper extremity deep vein thrombosis, currently on full-dose Lovenox.  5. Status post tracheostomy and percutaneous endoscopic gastrostomy tube.  6. Protein-calorie malnutrition, improving.      Plan:  -continue to titrate mechanical ventilation for ARDS net protocol  -supplement oxygen to maintain saturation 94-96%  -patient's level of postoperative agitation/ICU  delirium/critical illness related delirium seems to be improving only on sedation with Precedex and can wean this down today and were to get the patient to wake up more, continue to wean as necessary secondary to significant history of agitation  -only finishing 6 weeks of antibiotics per Infectious Disease recommendations with vancomycin and rifampin for MRSA sternal wound infection with dehiscence, continue drains per recommendation from Plastic surgery   -continue tube feeds to goal free water flushes  -will attempt placement on pressure support this morning to determine if patient will tolerate continue weaning down on mechanical ventilation ultimately tracheostomy collar  -will discontinue vasopressors this morning as the patient has a map greater than 65 with a very low dose  -patient right upper extremity DVT currently on Lovenox will change to Eliquis today at therapeutic dosing      DVT ppx/tx with lovenox-will change to Eliquis  GI ppx with protonix  Keep HOB elevated > 30*      The patient remains at high risk of decompensation and death and will remain in ICU level care    > 38 min was spent reviewing the patient's chart including medications, radiographs, labs, pertinent cultures and pathology data, other consultant notes/recomendations as well as discussion of goals of care with nursing staff, respiratory therapy at the bedside as well as with family at the bedside/via phone           Jarrod Ramires MD  5/20/2022  Pulmonology/Critical Care

## 2022-05-20 NOTE — PROGRESS NOTES
Infectious Diseases Progress Note  56-year-old male with past medical history of bipolar disorder schizophrenia, .  I have a, is admitted to [facility] on 01/20/2020 with chief complaints of chest pain with work-up revealing significant coronary artery disease requiring surgical intervention.  He was taken for CABG x 4 on 4/6/2022 requiring ICU stay postoperatively downgraded to the floor subsequently but again readmitted to the ICU on 4/10.  He did have issues with wound healing with subsequent dehiscence and suspected infection.  4/16 wound culture positive for MRSA.  CT scan of the thoracic done on 4/16 showed changes of sternotomy from CABG with presternal, retrosternal and anterior mediastinum combination of inflammation, fluid and air locules, bilateral pleural effusion and bilateral basilar consolidation. He had debridement of the sternal wound with placement of wound VAC on 4/17 with cultures from surgery showing MRSA as well.  He has been pretty much without fevers but is noted to have leukocytosis of up to 23.9 on 4/17 which has had a downward trend.  He is anemic with low albumin.  Chest x-ray from 4/19 showed no significant changes. During his hospital stay, he eventually underwent tracheostomy placement on 5/10 and on 5/11 he underwent sternal wound debridement, B/L fasciocutaneous flap closure, B/L pectoralis major muscle flap reconstruction of sternum  He remains in the ICU, trached and on ventilator.   He is on antibiotic coverage with vancomycin and rifampin.        Subjective:  Remains in the ICU. No new complaints, no fevers, doing about the same.  In no acute distress    ROS  Unable to perform ROS: Medical condition     Review of patient's allergies indicates:   Allergen Reactions    Depakote [divalproex]     Divalproex sodium Other (See Comments)    Lithium     Lithium analogues     Quetiapine Other (See Comments)       Past Medical History:   Diagnosis Date     Bipolar disorder, unspecified     Chronic hepatitis C     Hypertension     Obesity, unspecified     Seizures     Stroke        Past Surgical History:   Procedure Laterality Date    CORONARY STENT PLACEMENT  08/14/2015    DEBRIDEMENT  04/17/2022    LEFT HEART CATHETERIZATION Left 08/13/2015    REPEAT CLOSURE OF STERNAL INCISION N/A 5/11/2022    Procedure: CLOSURE, STERNAL INCISION, REPEAT;  Surgeon: Adolfo Weiss MD;  Location: [facility] OR;  Service: Plastics;  Laterality: N/A;  STERNAL WOUND DEBRIDEMENT AND RECONSTRUCTION // MULTIPLE MUSCLE FLAPS // REQ 1400       Social History     Socioeconomic History    Marital status: Single   Tobacco Use    Smoking status: Current Every Day Smoker     Types: Cigarettes    Smokeless tobacco: Never Used   Substance and Sexual Activity    Alcohol use: Never    Drug use: Not Currently     Types: Cocaine         Scheduled Meds:   atorvastatin  80 mg Oral Daily    benztropine  1 mg Per OG tube BID    doxepin  50 mg Oral QHS    enoxaparin  1 mg/kg (Dosing Weight) Subcutaneous Q12H    ezetimibe  10 mg Oral QHS    famotidine (PF)  20 mg Intravenous BID    glycopyrrolate  1 mg Per NG tube TID    haloperidoL  5 mg Per OG tube BID    levetiracetam IV  500 mg Intravenous Q12H    metoprolol tartrate  25 mg Per OG tube Daily    ondansetron  4 mg Oral Once    OXcarbazepine  300 mg Per OG tube BID    polyethylene glycol  17 g Oral Daily    rifAMpin  300 mg Oral BID    sodium chloride 0.9%  10 mL Intravenous Q12H    vancomycin (VANCOCIN) IVPB  1,000 mg Intravenous Q8H     Continuous Infusions:   dexmedetomidine (PRECEDEX) infusion 1 mcg/kg/hr (05/19/22 1637)    NORepinephrine bitartrate-D5W Stopped (05/17/22 1030)    propofoL Stopped (05/18/22 0900)     PRN Meds:sodium chloride, acetaminophen, albuterol-ipratropium, bisacodyL, dextrose 10%, fentaNYL, fentaNYL, hydrALAZINE, lorazepam, morphine, morphine, nitroGLYCERIN, OLANZapine, ondansetron, oxyCODONE,  "oxyCODONE, potassium chloride in water, potassium chloride in water, potassium chloride in water, sodium chloride 0.9%, vancomycin - pharmacy to dose, ziprasidone    Objective:  /60 (BP Location: Left arm, Patient Position: Lying)   Pulse 61   Temp 98.6 °F (37 °C) (Axillary)   Resp 20   Ht 5' 10.87" (1.8 m)   Wt 77.2 kg (170 lb 3.1 oz)   SpO2 97%   BMI 23.83 kg/m²     Physical Exam:   Physical Exam  Vitals reviewed.   Constitutional:       General: He is not in acute distress.  HENT:      Head: Normocephalic and atraumatic.   Neck:      Comments: Tracheostomy noted  Cardiovascular:      Rate and Rhythm: Normal rate and regular rhythm.      Heart sounds: Normal heart sounds.   Pulmonary:      Effort: No respiratory distress.      Breath sounds: Normal breath sounds.      Comments: On ventilator   Abdominal:      General: Bowel sounds are normal. Peg tube     There is no distension.      Palpations: Abdomen is soft.      Tenderness: There is no abdominal tenderness.      Comments: Peg tube in place   Musculoskeletal:         General: No deformity.      Cervical back: Neck supple.   Skin:     Findings: No erythema or rash. Sternal incision with KELLIE drains noted  Neurological:      Mental Status: He is alert.      Comments: Does not follow commands   Psychiatric:      Comments: Not communicative      Imaging  5/19/22 CXR  FINDINGS:   Tracheostomy and left sided PICC line remain in place.  The heart is stable in size.  There is no new focal airspace consolidation.  No pleural effusion or visible pneumothorax    Impression:     Stable exam without significant interval change.        Lab Review   Recent Results (from the past 24 hour(s))   Comprehensive Metabolic Panel    Collection Time: 05/19/22  1:49 AM   Result Value Ref Range    Sodium Level 135 (L) 136 - 145 mmol/L    Potassium Level 3.9 3.5 - 5.1 mmol/L    Chloride 107 98 - 107 mmol/L    Carbon Dioxide 22 22 - 29 mmol/L    Glucose Level 146 (H) 74 - 100 " mg/dL    Blood Urea Nitrogen 18.0 8.4 - 25.7 mg/dL    Creatinine 0.59 (L) 0.73 - 1.18 mg/dL    Calcium Level Total 8.4 8.4 - 10.2 mg/dL    Protein Total 5.9 (L) 6.4 - 8.3 gm/dL    Albumin Level 1.7 (L) 3.5 - 5.0 gm/dL    Globulin 4.2 (H) 2.4 - 3.5 gm/dL    Albumin/Globulin Ratio 0.4 (L) 1.1 - 2.0 ratio    Bilirubin Total 0.7 <=1.5 mg/dL    Alkaline Phosphatase 73 40 - 150 unit/L    Alanine Aminotransferase 24 0 - 55 unit/L    Aspartate Aminotransferase 43 (H) 5 - 34 unit/L    Estimated GFR- >60 mls/min/1.73/m2    Estimated GFR-Non  >60 mls/min/1.73/m2   CBC with Differential    Collection Time: 05/19/22  1:49 AM   Result Value Ref Range    WBC 9.6 4.5 - 11.5 x10(3)/mcL    RBC 3.25 (L) 4.70 - 6.10 x10(6)/mcL    Hgb 8.6 (L) 14.0 - 18.0 gm/dL    Hct 27.7 (L) 42.0 - 52.0 %    MCV 85.2 80.0 - 94.0 fL    MCH 26.5 (L) 27.0 - 31.0 pg    MCHC 31.0 (L) 33.0 - 36.0 mg/dL    RDW 15.9 11.5 - 17.0 %    Platelet 296 130 - 400 x10(3)/mcL    MPV 10.3 9.4 - 12.4 fL    Neut % 70.2 %    Lymph % 16.6 %    Mono % 8.3 %    Eos % 3.9 %    Basophil % 0.6 %    Lymph # 1.60 0.6 - 4.6 x10(3)/mcL    Neut # 6.7 2.1 - 9.2 x10(3)/mcL    Mono # 0.80 0.1 - 1.3 x10(3)/mcL    Eos # 0.37 0 - 0.9 x10(3)/mcL    Baso # 0.06 0 - 0.2 x10(3)/mcL    IG# 0.04 (H) 0 - 0.0155 x10(3)/mcL    IG% 0.4 0 - 0.43 %    NRBC% 0.0 %   POCT ARTERIAL BLOOD GAS Blood Gas    Collection Time: 05/19/22  5:30 AM   Result Value Ref Range    POC PH      POC PCO2      POC PO2      POC Sodium      POC Potassium      POC Ionized Calcium      POC HEMOGLOBIN      POC O2Hb      POC COHb      POC MetHb      POC PCO2      Base Excess, Arterial 0.4 -2.0 - 2.0 mmol/L    O2 Sat, Art      HCO3, Arterial 24.5 (A) 18.0 - 23.0 MMOL/L   POCT ARTERIAL BLOOD GAS    Collection Time: 05/19/22  5:42 AM   Result Value Ref Range    POC PH 7.44 7.35 - 7.45    POC PCO2 36 35 - 45 mmHg    POC PO2 90 80 - 100 mmHg    POC HEMOGLOBIN 8.7 (A) 12 - 16 g/dL    POC SATURATED O2  97.3 %    POC O2Hb 95.9 94.0 - 97.0 %    POC COHb 2.4 %    POC MetHb 0.7 0.4 - 2 %    POC Potassium 3.7 3.5 - 5.0 mmol/l    POC Sodium 133 (A) 137 - 145 mmol/l    POC Ionized Calcium 1.19 1.12 - 1.23 mmol/l    Correct Temperature (PH) 7.44 7.35 - 7.45    Corrected Temperature (pCO2) 36 35 - 45 mmHg    Corrected Temperature (pO2) 90 80 - 100 mmHg    POC HCO3 24.5 mmol/l    Base Deficit 0.4 -2 - 2 mmol/l    POC Temp 37.0 °C    Specimen source Arterial sample     PEEP 5 cm H2O       Assessment/Plan:  1.  Sepsis syndrome  2.  MRSA sternal wound infection/dehiscence  3.  Leukocytosis  4.  Acute hypoxic respiratory failure  5.  Anemia  6.  Coronary artery disease s/p CABG  7.  Protein calorie malnutrition  8.  Pneumonia -  Proteus/Klebsiella isolates     -Continue Vancomycin #34 and Rifampin #28. Plan a 6 week course following inflammatory markers  -No fevers and without leukocytosis  -S/P sternal debridement with flap closure on 5/11 per Dr Weiss  -Continue wound care and drain management per Plastics  -Vent management and ICU support per Intensivist  -Discussed with nursing

## 2022-05-21 LAB
ANION GAP SERPL CALC-SCNC: 12 MEQ/L
BUN SERPL-MCNC: 9.2 MG/DL (ref 8.4–25.7)
CALCIUM SERPL-MCNC: 8.6 MG/DL (ref 8.4–10.2)
CHLORIDE SERPL-SCNC: 104 MMOL/L (ref 98–107)
CO2 SERPL-SCNC: 21 MMOL/L (ref 22–29)
CREAT SERPL-MCNC: 0.58 MG/DL (ref 0.73–1.18)
CREAT/UREA NIT SERPL: 16
GLUCOSE SERPL-MCNC: 128 MG/DL (ref 74–100)
MAGNESIUM SERPL-MCNC: 1.8 MG/DL (ref 1.6–2.6)
PHOSPHATE SERPL-MCNC: 3 MG/DL (ref 2.3–4.7)
POTASSIUM SERPL-SCNC: 3.1 MMOL/L (ref 3.5–5.1)
SODIUM SERPL-SCNC: 137 MMOL/L (ref 136–145)

## 2022-05-21 PROCEDURE — 94003 VENT MGMT INPAT SUBQ DAY: CPT

## 2022-05-21 PROCEDURE — 27100171 HC OXYGEN HIGH FLOW UP TO 24 HOURS

## 2022-05-21 PROCEDURE — 25000003 PHARM REV CODE 250: Performed by: HOSPITALIST

## 2022-05-21 PROCEDURE — 63600175 PHARM REV CODE 636 W HCPCS: Performed by: INTERNAL MEDICINE

## 2022-05-21 PROCEDURE — 99900035 HC TECH TIME PER 15 MIN (STAT)

## 2022-05-21 PROCEDURE — C1751 CATH, INF, PER/CENT/MIDLINE: HCPCS

## 2022-05-21 PROCEDURE — 25000003 PHARM REV CODE 250

## 2022-05-21 PROCEDURE — 83735 ASSAY OF MAGNESIUM: CPT | Performed by: STUDENT IN AN ORGANIZED HEALTH CARE EDUCATION/TRAINING PROGRAM

## 2022-05-21 PROCEDURE — 63600175 PHARM REV CODE 636 W HCPCS: Performed by: STUDENT IN AN ORGANIZED HEALTH CARE EDUCATION/TRAINING PROGRAM

## 2022-05-21 PROCEDURE — 20000000 HC ICU ROOM

## 2022-05-21 PROCEDURE — 25000003 PHARM REV CODE 250: Performed by: INTERNAL MEDICINE

## 2022-05-21 PROCEDURE — 27200966 HC CLOSED SUCTION SYSTEM

## 2022-05-21 PROCEDURE — 94761 N-INVAS EAR/PLS OXIMETRY MLT: CPT

## 2022-05-21 PROCEDURE — 27000221 HC OXYGEN, UP TO 24 HOURS

## 2022-05-21 PROCEDURE — A4216 STERILE WATER/SALINE, 10 ML: HCPCS

## 2022-05-21 PROCEDURE — 80048 BASIC METABOLIC PNL TOTAL CA: CPT | Performed by: STUDENT IN AN ORGANIZED HEALTH CARE EDUCATION/TRAINING PROGRAM

## 2022-05-21 PROCEDURE — 84100 ASSAY OF PHOSPHORUS: CPT | Performed by: STUDENT IN AN ORGANIZED HEALTH CARE EDUCATION/TRAINING PROGRAM

## 2022-05-21 PROCEDURE — 99900026 HC AIRWAY MAINTENANCE (STAT)

## 2022-05-21 PROCEDURE — 25000003 PHARM REV CODE 250: Performed by: STUDENT IN AN ORGANIZED HEALTH CARE EDUCATION/TRAINING PROGRAM

## 2022-05-21 PROCEDURE — 36415 COLL VENOUS BLD VENIPUNCTURE: CPT | Performed by: STUDENT IN AN ORGANIZED HEALTH CARE EDUCATION/TRAINING PROGRAM

## 2022-05-21 RX ORDER — METOPROLOL TARTRATE 1 MG/ML
5 INJECTION, SOLUTION INTRAVENOUS EVERY 5 MIN PRN
Status: DISCONTINUED | OUTPATIENT
Start: 2022-05-21 | End: 2022-06-17 | Stop reason: HOSPADM

## 2022-05-21 RX ORDER — MAGNESIUM SULFATE HEPTAHYDRATE 40 MG/ML
2 INJECTION, SOLUTION INTRAVENOUS ONCE
Status: COMPLETED | OUTPATIENT
Start: 2022-05-21 | End: 2022-05-21

## 2022-05-21 RX ORDER — ENOXAPARIN SODIUM 100 MG/ML
80 INJECTION SUBCUTANEOUS ONCE
Status: COMPLETED | OUTPATIENT
Start: 2022-05-21 | End: 2022-05-21

## 2022-05-21 RX ORDER — HALOPERIDOL 5 MG/1
5 TABLET ORAL 2 TIMES DAILY
Status: DISCONTINUED | OUTPATIENT
Start: 2022-05-22 | End: 2022-06-09

## 2022-05-21 RX ORDER — HALOPERIDOL 5 MG/ML
5 INJECTION INTRAMUSCULAR ONCE
Status: COMPLETED | OUTPATIENT
Start: 2022-05-21 | End: 2022-05-21

## 2022-05-21 RX ADMIN — VANCOMYCIN HYDROCHLORIDE 750 MG: 1 INJECTION, POWDER, LYOPHILIZED, FOR SOLUTION INTRAVENOUS at 11:05

## 2022-05-21 RX ADMIN — ENOXAPARIN SODIUM 80 MG: 100 INJECTION SUBCUTANEOUS at 07:05

## 2022-05-21 RX ADMIN — HALOPERIDOL 5 MG: 5 TABLET ORAL at 09:05

## 2022-05-21 RX ADMIN — VANCOMYCIN HYDROCHLORIDE 750 MG: 1 INJECTION, POWDER, LYOPHILIZED, FOR SOLUTION INTRAVENOUS at 06:05

## 2022-05-21 RX ADMIN — DEXMEDETOMIDINE HYDROCHLORIDE 0.4 MCG/KG/HR: 400 INJECTION INTRAVENOUS at 12:05

## 2022-05-21 RX ADMIN — LORAZEPAM 2 MG: 2 INJECTION INTRAMUSCULAR; INTRAVENOUS at 09:05

## 2022-05-21 RX ADMIN — FAMOTIDINE 20 MG: 10 INJECTION, SOLUTION INTRAVENOUS at 09:05

## 2022-05-21 RX ADMIN — POTASSIUM PHOSPHATE, MONOBASIC AND POTASSIUM PHOSPHATE, DIBASIC 30 MMOL: 224; 236 INJECTION, SOLUTION, CONCENTRATE INTRAVENOUS at 10:05

## 2022-05-21 RX ADMIN — SODIUM CHLORIDE, PRESERVATIVE FREE 10 ML: 5 INJECTION INTRAVENOUS at 09:05

## 2022-05-21 RX ADMIN — GLYCOPYRROLATE 1 MG: 1 TABLET ORAL at 09:05

## 2022-05-21 RX ADMIN — LEVETIRACETAM 500 MG: 100 INJECTION, SOLUTION INTRAVENOUS at 09:05

## 2022-05-21 RX ADMIN — HALOPERIDOL LACTATE 5 MG: 5 INJECTION, SOLUTION INTRAMUSCULAR at 08:05

## 2022-05-21 RX ADMIN — BENZTROPINE MESYLATE 1 MG: 1 TABLET ORAL at 09:05

## 2022-05-21 RX ADMIN — METOPROLOL TARTRATE 25 MG: 25 TABLET, FILM COATED ORAL at 09:05

## 2022-05-21 RX ADMIN — VANCOMYCIN HYDROCHLORIDE 750 MG: 1 INJECTION, POWDER, LYOPHILIZED, FOR SOLUTION INTRAVENOUS at 03:05

## 2022-05-21 RX ADMIN — LEVETIRACETAM 500 MG: 100 INJECTION, SOLUTION INTRAVENOUS at 08:05

## 2022-05-21 RX ADMIN — Medication 0.04 MCG/KG/MIN: at 05:05

## 2022-05-21 RX ADMIN — POLYETHYLENE GLYCOL 3350 17 G: 17 POWDER, FOR SOLUTION ORAL at 09:05

## 2022-05-21 RX ADMIN — MAGNESIUM SULFATE IN WATER 2 G: 40 INJECTION, SOLUTION INTRAVENOUS at 09:05

## 2022-05-21 RX ADMIN — APIXABAN 10 MG: 5 TABLET, FILM COATED ORAL at 09:05

## 2022-05-21 RX ADMIN — ATORVASTATIN CALCIUM 80 MG: 40 TABLET, FILM COATED ORAL at 09:05

## 2022-05-21 RX ADMIN — DEXMEDETOMIDINE HYDROCHLORIDE 1.4 MCG/KG/HR: 400 INJECTION INTRAVENOUS at 12:05

## 2022-05-21 RX ADMIN — FAMOTIDINE 20 MG: 10 INJECTION, SOLUTION INTRAVENOUS at 08:05

## 2022-05-21 RX ADMIN — DEXMEDETOMIDINE HYDROCHLORIDE 0.6 MCG/KG/HR: 400 INJECTION INTRAVENOUS at 04:05

## 2022-05-21 RX ADMIN — OXCARBAZEPINE 300 MG: 300 TABLET, FILM COATED ORAL at 09:05

## 2022-05-21 RX ADMIN — LORAZEPAM 2 MG: 2 INJECTION INTRAMUSCULAR; INTRAVENOUS at 05:05

## 2022-05-21 RX ADMIN — DEXMEDETOMIDINE HYDROCHLORIDE 0.6 MCG/KG/HR: 400 INJECTION INTRAVENOUS at 11:05

## 2022-05-21 RX ADMIN — RIFAMPIN 300 MG: 300 CAPSULE ORAL at 09:05

## 2022-05-21 NOTE — PROGRESS NOTES
Pulmonary & Critical Care Medicine   Progress Note      Short History:  A 56-year-old gentleman who has bipolar disorder and schizophrenia   who underwent a CABG x4 by Dr. Carney on 04/06/2022.  Postoperatively, was   complicated by sternal wound infection and dehiscence due to his severe   agitation post surgery.  He underwent a wound closure device placement on   04/17/2022, and subsequently underwent a bilateral pectoral flap for sternal   wound closure on 05/11/2022, by Dr. Adolfo Weiss of Plastic Surgery.      Objective/Subjective:  -per nursing staff, patient  had episodes of vomiting overnight  -patient remains on mechanical ventilation per tracheostomy currently on volume control SIMV with no signs of dyssynchrony or distress  -patient with 5-6 KELLIE drains for sternal wound all with bloody output  -currently sedated on Precedex at 0.6 but arousable opens eyes to name call able to follow some commands  -currently on norepinephrine at 0.08 micrograms/kilogram per minute      Scheduled Medications:    apixaban  10 mg Oral BID    [START ON 5/27/2022] apixaban  5 mg Oral BID    atorvastatin  80 mg Oral Daily    benztropine  1 mg Per OG tube BID    doxepin  50 mg Oral QHS    ezetimibe  10 mg Oral QHS    famotidine (PF)  20 mg Intravenous BID    glycopyrrolate  1 mg Per NG tube TID    haloperidoL  5 mg Per OG tube BID    levetiracetam IV  500 mg Intravenous Q12H    metoprolol tartrate  25 mg Per OG tube Daily    ondansetron  4 mg Oral Once    OXcarbazepine  300 mg Per OG tube BID    polyethylene glycol  17 g Oral Daily    rifAMpin  300 mg Oral BID    sodium chloride 0.9%  10 mL Intravenous Q12H    vancomycin (VANCOCIN) IVPB  750 mg Intravenous Q8H       PRN Medications:   sodium chloride, acetaminophen, albuterol-ipratropium, bisacodyL, dextrose 10%, fentaNYL, fentaNYL, hydrALAZINE, lorazepam, morphine, morphine, nitroGLYCERIN, OLANZapine, ondansetron, oxyCODONE, oxyCODONE, potassium chloride in  water, potassium chloride in water, potassium chloride in water, promethazine, sodium chloride 0.9%, vancomycin - pharmacy to dose, ziprasidone      Infusions:     dexmedetomidine (PRECEDEX) infusion 0.6 mcg/kg/hr (05/21/22 0443)    NORepinephrine bitartrate-D5W 0.06 mcg/kg/min (05/21/22 0633)    propofoL Stopped (05/18/22 0900)         Fluid Balance:     Intake/Output Summary (Last 24 hours) at 5/21/2022 1049  Last data filed at 5/21/2022 0600  Gross per 24 hour   Intake 1138.33 ml   Output 1080 ml   Net 58.33 ml           Vital Signs:   Vitals:    05/21/22 1030   BP: (!) 142/119   Pulse: 85   Resp: 14   Temp:          Physical Exam  Vitals and nursing note reviewed.   Constitutional:       General: He is not in acute distress.     Appearance: Normal appearance. He is not ill-appearing or toxic-appearing.   HENT:      Head: Normocephalic and atraumatic.      Right Ear: External ear normal.      Left Ear: External ear normal.      Nose: Nose normal.      Mouth/Throat:      Mouth: Mucous membranes are moist.      Pharynx: Oropharynx is clear. No oropharyngeal exudate or posterior oropharyngeal erythema.   Eyes:      General: No scleral icterus.     Extraocular Movements: Extraocular movements intact.      Conjunctiva/sclera: Conjunctivae normal.      Pupils: Pupils are equal, round, and reactive to light.   Neck:      Vascular: No carotid bruit.      Comments: Tracheostomy in place, site clean and dry  Cardiovascular:      Rate and Rhythm: Normal rate and regular rhythm.      Pulses: Normal pulses.      Heart sounds: Normal heart sounds. No murmur heard.    No friction rub. No gallop.   Pulmonary:      Effort: Pulmonary effort is normal. No respiratory distress.      Breath sounds: Normal breath sounds. No wheezing, rhonchi or rales.      Comments: On mechanical ventilation on volume control SIMV with no signs suggesting greasy, appropriate tidal volumes visualized   Abdominal:      General: Abdomen is flat. Bowel  sounds are normal. There is no distension.      Palpations: Abdomen is soft.      Tenderness: There is no abdominal tenderness. There is no guarding or rebound.      Comments: Peg tube in place, underneath chest binder   Genitourinary:     Comments: deferred  Musculoskeletal:         General: No swelling or deformity. Normal range of motion.      Cervical back: Normal range of motion and neck supple. No rigidity or tenderness.   Lymphadenopathy:      Cervical: No cervical adenopathy.   Skin:     General: Skin is warm and dry.      Capillary Refill: Capillary refill takes less than 2 seconds.      Coloration: Skin is not jaundiced.      Findings: No bruising, lesion or rash.   Neurological:      General: No focal deficit present.      Mental Status: He is alert.      Sensory: No sensory deficit.      Motor: No weakness.      Comments: Unable to fully assess orientation secondary to patient being sedated on Precedex and slightly somnolent   Psychiatric:         Mood and Affect: Mood normal.         Laboratory Studies:   No results for input(s): PH, PCO2, PO2, HCO3, POCSATURATED, BE in the last 24 hours.  No results for input(s): WBC, RBC, HGB, HCT, PLT, MCV, MCH, MCHC in the last 24 hours.  No results for input(s): GLUCOSE, NA, K, CL, CO2, BUN, CREATININE, MG in the last 24 hours.    Invalid input(s):  CALCIUM      Microbiology Data:   Microbiology Results (last 7 days)     Procedure Component Value Units Date/Time    Blood Culture [[id number]]  (Normal) Collected: 05/12/22 1740    Order Status: Completed Specimen: Blood from Arm Updated: 05/17/22 1902     CULTURE, BLOOD ([facility]) No Growth at 5 days    Blood Culture [[id number]]  (Normal) Collected: 05/12/22 1741    Order Status: Completed Specimen: Blood from Hand, Left Updated: 05/17/22 1902     CULTURE, BLOOD ([facility]) No Growth at 5 days    Tissue Culture - Aerobic [[id number]] Collected: 05/11/22 1432    Order Status: Completed Specimen: Bone from Sternum Updated: 05/17/22  0936     CULTURE, TISSUE- AEROBIC ([facility]) No Growth at 5 days    Tissue Culture - Aerobic [[id number]] Collected: 05/11/22 1429    Order Status: Completed Specimen: Tissue from Sternum Updated: 05/17/22 0935     CULTURE, TISSUE- AEROBIC ([facility]) No Growth at 5 days    Anaerobic Culture [[id number]] Collected: 05/11/22 1432    Order Status: Completed Specimen: Bone from Sternum Updated: 05/15/22 0741     Anaerobe Culture No Anaerobes Isolated    Anaerobic Culture [[id number]] Collected: 05/11/22 1429    Order Status: Completed Specimen: Tissue from Sternum Updated: 05/15/22 0740     Anaerobe Culture No Anaerobes Isolated            Imaging:   No new imaging to review      Assessment and Plan    Assessment:  1. CABG x4, 04/06/2022.  2. Methicillin-resistant Staphylococcus aureus sternal wound infection and   dehiscence, status post wound vacuum-assisted closure device, 04/17/2022, and   bilateral pectoral flaps for sternal wound closure, 05/11/2022.  3. Postoperative agitation secondary to underlying bipolar and schizophrenia.  4. Right upper extremity deep vein thrombosis, currently on full-dose Lovenox.  5. Status post tracheostomy and percutaneous endoscopic gastrostomy tube.  6. Protein-calorie malnutrition, improving.      Plan:  -continue to titrate mechanical ventilation for ARDS net protocol  -supplement oxygen to maintain saturation 94-96%  -patient's level of postoperative agitation/ICU delirium/critical illness related delirium seems to be improving only on sedation with Precedex and can wean this down today and were to get the patient to wake up more, continue to wean as necessary secondary to significant history of agitation  -finishing 6 weeks of antibiotics per Infectious Disease recommendations with vancomycin and rifampin for MRSA sternal wound infection with dehiscence, continue drains per recommendation from Plastic surgery   -hold tube feeds for this time, obtain KUB and determine if the patient has any  mechanical reason to explain vomiting, bowel sounds present on exam low probability of ileus or bowel obstruction is the patient is having bowel movements as well  -will attempt placement on pressure support this morning to determine if patient will tolerate continue weaning down on mechanical ventilation ultimately tracheostomy collar  -continue to titrate norepinephrine to maintain map 65 mmHg  -patient right upper extremity DVT currently on Lovenox will change to Eliquis today at therapeutic dosing      DVT ppx/tx with lovenox-will change to Eliquis  GI ppx with protonix  Keep HOB elevated > 30*      The patient remains at high risk of decompensation and death and will remain in ICU level care    > 32 min was spent reviewing the patient's chart including medications, radiographs, labs, pertinent cultures and pathology data, other consultant notes/recomendations as well as discussion of goals of care with nursing staff, respiratory therapy at the bedside as well as with family at the bedside/via phone           Jarrod Ramires MD  5/21/2022  Pulmonology/Critical Care

## 2022-05-21 NOTE — PROGRESS NOTES
Infectious Diseases Progress Note  56-year-old male with past medical history of bipolar disorder schizophrenia, .  I have a, is admitted to [facility] on 01/20/2020 with chief complaints of chest pain with work-up revealing significant coronary artery disease requiring surgical intervention.  He was taken for CABG x 4 on 4/6/2022 requiring ICU stay postoperatively downgraded to the floor subsequently but again readmitted to the ICU on 4/10.  He did have issues with wound healing with subsequent dehiscence and suspected infection.  4/16 wound culture positive for MRSA.  CT scan of the thoracic done on 4/16 showed changes of sternotomy from CABG with presternal, retrosternal and anterior mediastinum combination of inflammation, fluid and air locules, bilateral pleural effusion and bilateral basilar consolidation. He had debridement of the sternal wound with placement of wound VAC on 4/17 with cultures from surgery showing MRSA as well.  He has been pretty much without fevers but is noted to have leukocytosis of up to 23.9 on 4/17 which has had a downward trend.  He is anemic with low albumin.  Chest x-ray from 4/19 showed no significant changes. During his hospital stay, he eventually underwent tracheostomy placement on 5/10 and on 5/11 he underwent sternal wound debridement, B/L fasciocutaneous flap closure, B/L pectoralis major muscle flap reconstruction of sternum  He remains in the ICU, trached and on ventilator.   He is on antibiotic coverage with vancomycin and rifampin.     Subjective:  Report of some vomiting per nursing staff.  Low-grade fevers noted, doing about the same.  No acute distress      Past Medical History:   Diagnosis Date    Bipolar disorder, unspecified     Chronic hepatitis C     Hypertension     Obesity, unspecified     Seizures     Stroke      Past Surgical History:   Procedure Laterality Date    CORONARY STENT PLACEMENT  08/14/2015    DEBRIDEMENT   04/17/2022    LEFT HEART CATHETERIZATION Left 08/13/2015    REPEAT CLOSURE OF STERNAL INCISION N/A 5/11/2022    Procedure: CLOSURE, STERNAL INCISION, REPEAT;  Surgeon: Adolfo Weiss MD;  Location: [facility] OR;  Service: Plastics;  Laterality: N/A;  STERNAL WOUND DEBRIDEMENT AND RECONSTRUCTION // MULTIPLE MUSCLE FLAPS // REQ 1400     Social History     Socioeconomic History    Marital status: Single   Tobacco Use    Smoking status: Current Every Day Smoker     Types: Cigarettes    Smokeless tobacco: Never Used   Substance and Sexual Activity    Alcohol use: Never    Drug use: Not Currently     Types: Cocaine       Review of Systems   Unable to perform ROS: Medical condition         Review of patient's allergies indicates:   Allergen Reactions    Depakote [divalproex]     Divalproex sodium Other (See Comments)    Lithium     Lithium analogues     Quetiapine Other (See Comments)         Scheduled Meds:   apixaban  10 mg Oral BID    [START ON 5/27/2022] apixaban  5 mg Oral BID    atorvastatin  80 mg Oral Daily    benztropine  1 mg Per OG tube BID    doxepin  50 mg Oral QHS    ezetimibe  10 mg Oral QHS    famotidine (PF)  20 mg Intravenous BID    glycopyrrolate  1 mg Per NG tube TID    haloperidoL  5 mg Per OG tube BID    levetiracetam IV  500 mg Intravenous Q12H    metoprolol tartrate  25 mg Per OG tube Daily    ondansetron  4 mg Oral Once    OXcarbazepine  300 mg Per OG tube BID    polyethylene glycol  17 g Oral Daily    rifAMpin  300 mg Oral BID    sodium chloride 0.9%  10 mL Intravenous Q12H    vancomycin (VANCOCIN) IVPB  750 mg Intravenous Q8H     Continuous Infusions:   dexmedetomidine (PRECEDEX) infusion 1 mcg/kg/hr (05/20/22 1104)    NORepinephrine bitartrate-D5W 0.02 mcg/kg/min (05/20/22 0436)    propofoL Stopped (05/18/22 0900)     PRN Meds:sodium chloride, acetaminophen, albuterol-ipratropium, bisacodyL, dextrose 10%, fentaNYL, fentaNYL, hydrALAZINE, lorazepam, morphine, morphine,  "nitroGLYCERIN, OLANZapine, ondansetron, oxyCODONE, oxyCODONE, potassium chloride in water, potassium chloride in water, potassium chloride in water, promethazine, sodium chloride 0.9%, vancomycin - pharmacy to dose, ziprasidone    Objective:  BP (!) 156/126   Pulse 85   Temp 99.3 °F (37.4 °C) (Oral)   Resp 16   Ht 5' 10.87" (1.8 m)   Wt 77.2 kg (170 lb 3.1 oz)   SpO2 (!) 90%   BMI 23.83 kg/m²     Physical Exam:   Physical Exam   Vitals reviewed.   Constitutional:       General: He is not in acute distress.  HENT:      Head: Normocephalic and atraumatic.   Neck:      Comments: Tracheostomy noted  Cardiovascular:      Rate and Rhythm: Normal rate and regular rhythm.      Heart sounds: Normal heart sounds.   Pulmonary:      Effort: No respiratory distress.      Breath sounds: Normal breath sounds.      Comments: On ventilator   Abdominal:      General: Bowel sounds are normal. There is no distension.      Palpations: Abdomen is soft.      Tenderness: There is no abdominal tenderness.      Comments: Peg tube in place   Musculoskeletal:         General: No deformity.      Cervical back: Neck supple.   Skin:     Findings: No erythema or rash.   Neurological:      Mental Status: He is alert.      Comments: Does not follow commands   Psychiatric:      Comments: Not communicative     Imaging       Lab Review   Recent Results (from the past 24 hour(s))   POCT ARTERIAL BLOOD GAS Blood Gas    Collection Time: 05/20/22  5:42 AM   Result Value Ref Range    POC PH 7.450     POC PCO2 34     POC PO2 82     POC Sodium 135     POC Potassium 3.1     POC Ionized Calcium 1.15     POC HEMOGLOBIN      POC O2Hb      POC COHb      POC MetHb      POC PCO2      Base Excess, Arterial 0.1 -2.0 - 2.0 mmol/L    O2 Sat, Art 97     HCO3, Arterial 23.6 (A) 18.0 - 23.0 MMOL/L   VANCOMYCIN, TROUGH    Collection Time: 05/20/22  6:49 AM   Result Value Ref Range    Vancomycin Trough 22.1 (H) 15.0 - 20.0 ug/ml             Assessment/Plan:  1. "  Sepsis syndrome  2.  MRSA sternal wound infection/dehiscence  3.  Leukocytosis  4.  Acute hypoxic respiratory failure  5.  Anemia  6.  Coronary artery disease s/p CABG  7.  Protein calorie malnutrition  8.  Pneumonia -  Proteus/Klebsiella isolates     -Continue Vancomycin #35 and Rifampin #29. Plan a 6 week course following inflammatory markers  -No fevers and without leukocytosis  -S/P sternal debridement with flap closure on 5/11 per Dr Weiss  -Continue wound care and drain management per Plastics  -Vent management and ICU support per Intensivist  -Discussed with nursing

## 2022-05-21 NOTE — PROGRESS NOTES
Pharmacokinetic Assessment Follow Up: IV Vancomycin    Vancomycin Regimen Plan:    Continue regimen to Vancomycin 750 mg IV every 8 hours with next serum trough concentration measured at 1000 prior to 4th dose on 5/22    Drug levels (last 3 results):  Recent Labs   Lab Result Units 05/20/22  0649   Vancomycin Trough ug/ml 22.1*       Pharmacy will continue to follow and monitor vancomycin.    Please contact pharmacy at extension [phone] for questions regarding this assessment.    Thank you for the consult,   Faye Ni PharmD       Patient brief summary:  Kendall Duff is a 56 y.o. male initiated on antimicrobial therapy with IV Vancomycin for treatment of skin & soft tissue infection    The patient's current regimen is vancomycin 750 mg IV q 8hrs    Drug Allergies:   Review of patient's allergies indicates:   Allergen Reactions    Depakote [divalproex]     Divalproex sodium Other (See Comments)    Lithium     Lithium analogues     Quetiapine Other (See Comments)       Actual Body Weight:   75 kg    Renal Function:   Estimated Creatinine Clearance: 144.8 mL/min (A) (based on SCr of 0.59 mg/dL (L)).,     Dialysis Method (if applicable):  N/A    CBC (last 72 hours):  Recent Labs   Lab Result Units 05/19/22  0149   WBC x10(3)/mcL 9.6   Hgb gm/dL 8.6*   Hct % 27.7*   Platelet x10(3)/mcL 296   Mono % % 8.3   Eos % % 3.9   Basophil % % 0.6       Metabolic Panel (last 72 hours):  Recent Labs   Lab Result Units 05/19/22  0149   Sodium Level mmol/L 135*   Potassium Level mmol/L 3.9   Chloride mmol/L 107   Carbon Dioxide mmol/L 22   Glucose Level mg/dL 146*   Blood Urea Nitrogen mg/dL 18.0   Creatinine mg/dL 0.59*   Albumin Level gm/dL 1.7*   Bilirubin Total mg/dL 0.7   Alkaline Phosphatase unit/L 73   Aspartate Aminotransferase unit/L 43*   Alanine Aminotransferase unit/L 24       Vancomycin Administrations:  vancomycin given in the last 96 hours                     vancomycin (VANCOCIN) 750 mg in dextrose 5 % 250 mL IVPB  (mg) 750 mg New Bag 05/21/22 1126     750 mg New Bag  0337     750 mg New Bag 05/20/22 1901     750 mg New Bag  1104    vancomycin in dextrose 5 % 1 gram/250 mL IVPB 1,000 mg (mg) 1,000 mg New Bag 05/19/22 2350     1,000 mg New Bag  1637     1,000 mg New Bag  0859     1,000 mg New Bag  0003     1,000 mg New Bag 05/18/22 1600     1,000 mg New Bag  0800     1,000 mg New Bag  0018     1,000 mg New Bag 05/17/22 1600                    Microbiologic Results:  Microbiology Results (last 7 days)       Procedure Component Value Units Date/Time    Blood Culture [[id number]]  (Normal) Collected: 05/12/22 1740    Order Status: Completed Specimen: Blood from Arm Updated: 05/17/22 1902     CULTURE, BLOOD ([facility]) No Growth at 5 days    Blood Culture [[id number]]  (Normal) Collected: 05/12/22 1741    Order Status: Completed Specimen: Blood from Hand, Left Updated: 05/17/22 1902     CULTURE, BLOOD ([facility]) No Growth at 5 days    Tissue Culture - Aerobic [[id number]] Collected: 05/11/22 1432    Order Status: Completed Specimen: Bone from Sternum Updated: 05/17/22 0936     CULTURE, TISSUE- AEROBIC ([facility]) No Growth at 5 days    Tissue Culture - Aerobic [[id number]] Collected: 05/11/22 1429    Order Status: Completed Specimen: Tissue from Sternum Updated: 05/17/22 0935     CULTURE, TISSUE- AEROBIC ([facility]) No Growth at 5 days    Anaerobic Culture [[id number]] Collected: 05/11/22 1432    Order Status: Completed Specimen: Bone from Sternum Updated: 05/15/22 0741     Anaerobe Culture No Anaerobes Isolated    Anaerobic Culture [[id number]] Collected: 05/11/22 1429    Order Status: Completed Specimen: Tissue from Sternum Updated: 05/15/22 0740     Anaerobe Culture No Anaerobes Isolated

## 2022-05-22 LAB
ALBUMIN SERPL-MCNC: 2 GM/DL (ref 3.5–5)
ALBUMIN/GLOB SERPL: 0.5 RATIO (ref 1.1–2)
ALP SERPL-CCNC: 88 UNIT/L (ref 40–150)
ALT SERPL-CCNC: 29 UNIT/L (ref 0–55)
AST SERPL-CCNC: 42 UNIT/L (ref 5–34)
BASOPHILS # BLD AUTO: 0.05 X10(3)/MCL (ref 0–0.2)
BASOPHILS NFR BLD AUTO: 0.5 %
BILIRUBIN DIRECT+TOT PNL SERPL-MCNC: 0.7 MG/DL
BUN SERPL-MCNC: 7.5 MG/DL (ref 8.4–25.7)
CALCIUM SERPL-MCNC: 8.1 MG/DL (ref 8.4–10.2)
CHLORIDE SERPL-SCNC: 104 MMOL/L (ref 98–107)
CO2 SERPL-SCNC: 22 MMOL/L (ref 22–29)
CREAT SERPL-MCNC: 0.62 MG/DL (ref 0.73–1.18)
EOSINOPHIL # BLD AUTO: 0.06 X10(3)/MCL (ref 0–0.9)
EOSINOPHIL NFR BLD AUTO: 0.6 %
ERYTHROCYTE [DISTWIDTH] IN BLOOD BY AUTOMATED COUNT: 16.6 % (ref 11.5–17)
GLOBULIN SER-MCNC: 4 GM/DL (ref 2.4–3.5)
GLUCOSE SERPL-MCNC: 165 MG/DL (ref 74–100)
HCT VFR BLD AUTO: 29.2 % (ref 42–52)
HGB BLD-MCNC: 9.1 GM/DL (ref 14–18)
IMM GRANULOCYTES # BLD AUTO: 0.07 X10(3)/MCL (ref 0–0.02)
IMM GRANULOCYTES NFR BLD AUTO: 0.7 % (ref 0–0.43)
LYMPHOCYTES # BLD AUTO: 1 X10(3)/MCL (ref 0.6–4.6)
LYMPHOCYTES NFR BLD AUTO: 10 %
MAGNESIUM SERPL-MCNC: 2.1 MG/DL (ref 1.6–2.6)
MCH RBC QN AUTO: 26.8 PG (ref 27–31)
MCHC RBC AUTO-ENTMCNC: 31.2 MG/DL (ref 33–36)
MCV RBC AUTO: 85.9 FL (ref 80–94)
MONOCYTES # BLD AUTO: 0.92 X10(3)/MCL (ref 0.1–1.3)
MONOCYTES NFR BLD AUTO: 9.2 %
NEUTROPHILS # BLD AUTO: 7.9 X10(3)/MCL (ref 2.1–9.2)
NEUTROPHILS NFR BLD AUTO: 79 %
NRBC BLD AUTO-RTO: 0 %
PHOSPHATE SERPL-MCNC: 4 MG/DL (ref 2.3–4.7)
PLATELET # BLD AUTO: 399 X10(3)/MCL (ref 130–400)
PMV BLD AUTO: 10.1 FL (ref 9.4–12.4)
POCT GLUCOSE: 201 MG/DL (ref 70–110)
POTASSIUM SERPL-SCNC: 3.3 MMOL/L (ref 3.5–5.1)
PROT SERPL-MCNC: 6 GM/DL (ref 6.4–8.3)
RBC # BLD AUTO: 3.4 X10(6)/MCL (ref 4.7–6.1)
SODIUM SERPL-SCNC: 136 MMOL/L (ref 136–145)
VANCOMYCIN TROUGH SERPL-MCNC: 14.4 UG/ML (ref 15–20)
WBC # SPEC AUTO: 10 X10(3)/MCL (ref 4.5–11.5)

## 2022-05-22 PROCEDURE — 83735 ASSAY OF MAGNESIUM: CPT | Performed by: STUDENT IN AN ORGANIZED HEALTH CARE EDUCATION/TRAINING PROGRAM

## 2022-05-22 PROCEDURE — 94761 N-INVAS EAR/PLS OXIMETRY MLT: CPT

## 2022-05-22 PROCEDURE — 27200966 HC CLOSED SUCTION SYSTEM

## 2022-05-22 PROCEDURE — 20000000 HC ICU ROOM

## 2022-05-22 PROCEDURE — C1751 CATH, INF, PER/CENT/MIDLINE: HCPCS

## 2022-05-22 PROCEDURE — 85025 COMPLETE CBC W/AUTO DIFF WBC: CPT | Performed by: STUDENT IN AN ORGANIZED HEALTH CARE EDUCATION/TRAINING PROGRAM

## 2022-05-22 PROCEDURE — 63600175 PHARM REV CODE 636 W HCPCS: Performed by: STUDENT IN AN ORGANIZED HEALTH CARE EDUCATION/TRAINING PROGRAM

## 2022-05-22 PROCEDURE — 25000003 PHARM REV CODE 250

## 2022-05-22 PROCEDURE — 99900026 HC AIRWAY MAINTENANCE (STAT)

## 2022-05-22 PROCEDURE — 63600175 PHARM REV CODE 636 W HCPCS: Performed by: INTERNAL MEDICINE

## 2022-05-22 PROCEDURE — 25000003 PHARM REV CODE 250: Performed by: HOSPITALIST

## 2022-05-22 PROCEDURE — 80053 COMPREHEN METABOLIC PANEL: CPT | Performed by: STUDENT IN AN ORGANIZED HEALTH CARE EDUCATION/TRAINING PROGRAM

## 2022-05-22 PROCEDURE — 36415 COLL VENOUS BLD VENIPUNCTURE: CPT | Performed by: STUDENT IN AN ORGANIZED HEALTH CARE EDUCATION/TRAINING PROGRAM

## 2022-05-22 PROCEDURE — 25000003 PHARM REV CODE 250: Performed by: INTERNAL MEDICINE

## 2022-05-22 PROCEDURE — 36415 COLL VENOUS BLD VENIPUNCTURE: CPT | Performed by: INTERNAL MEDICINE

## 2022-05-22 PROCEDURE — 99900035 HC TECH TIME PER 15 MIN (STAT)

## 2022-05-22 PROCEDURE — 80202 ASSAY OF VANCOMYCIN: CPT | Performed by: INTERNAL MEDICINE

## 2022-05-22 PROCEDURE — A4216 STERILE WATER/SALINE, 10 ML: HCPCS

## 2022-05-22 PROCEDURE — 25000003 PHARM REV CODE 250: Performed by: STUDENT IN AN ORGANIZED HEALTH CARE EDUCATION/TRAINING PROGRAM

## 2022-05-22 PROCEDURE — 84100 ASSAY OF PHOSPHORUS: CPT | Performed by: STUDENT IN AN ORGANIZED HEALTH CARE EDUCATION/TRAINING PROGRAM

## 2022-05-22 PROCEDURE — 63600175 PHARM REV CODE 636 W HCPCS

## 2022-05-22 PROCEDURE — 27000221 HC OXYGEN, UP TO 24 HOURS

## 2022-05-22 PROCEDURE — 27100171 HC OXYGEN HIGH FLOW UP TO 24 HOURS

## 2022-05-22 RX ORDER — POTASSIUM CHLORIDE 20 MEQ/1
40 TABLET, EXTENDED RELEASE ORAL ONCE
Status: DISCONTINUED | OUTPATIENT
Start: 2022-05-22 | End: 2022-05-22

## 2022-05-22 RX ORDER — POTASSIUM CHLORIDE 14.9 MG/ML
40 INJECTION INTRAVENOUS ONCE
Status: COMPLETED | OUTPATIENT
Start: 2022-05-22 | End: 2022-05-22

## 2022-05-22 RX ADMIN — BENZTROPINE MESYLATE 1 MG: 1 TABLET ORAL at 08:05

## 2022-05-22 RX ADMIN — METOPROLOL TARTRATE 25 MG: 25 TABLET, FILM COATED ORAL at 09:05

## 2022-05-22 RX ADMIN — APIXABAN 10 MG: 5 TABLET, FILM COATED ORAL at 08:05

## 2022-05-22 RX ADMIN — DOXEPIN HYDROCHLORIDE 50 MG: 50 CAPSULE ORAL at 08:05

## 2022-05-22 RX ADMIN — FENTANYL CITRATE 100 MCG: 50 INJECTION INTRAMUSCULAR; INTRAVENOUS at 09:05

## 2022-05-22 RX ADMIN — POTASSIUM BICARBONATE 50 MEQ: 977.5 TABLET, EFFERVESCENT ORAL at 04:05

## 2022-05-22 RX ADMIN — FENTANYL CITRATE 50 MCG: 50 INJECTION INTRAMUSCULAR; INTRAVENOUS at 01:05

## 2022-05-22 RX ADMIN — RIFAMPIN 300 MG: 300 CAPSULE ORAL at 09:05

## 2022-05-22 RX ADMIN — METOPROLOL TARTRATE 5 MG: 5 INJECTION, SOLUTION INTRAVENOUS at 04:05

## 2022-05-22 RX ADMIN — SODIUM CHLORIDE, PRESERVATIVE FREE 10 ML: 5 INJECTION INTRAVENOUS at 09:05

## 2022-05-22 RX ADMIN — LORAZEPAM 2 MG: 2 INJECTION INTRAMUSCULAR; INTRAVENOUS at 03:05

## 2022-05-22 RX ADMIN — EZETIMIBE 10 MG: 10 TABLET ORAL at 08:05

## 2022-05-22 RX ADMIN — SODIUM CHLORIDE, PRESERVATIVE FREE 10 ML: 5 INJECTION INTRAVENOUS at 08:05

## 2022-05-22 RX ADMIN — GLYCOPYRROLATE 1 MG: 1 TABLET ORAL at 09:05

## 2022-05-22 RX ADMIN — FENTANYL CITRATE 100 MCG: 50 INJECTION INTRAMUSCULAR; INTRAVENOUS at 11:05

## 2022-05-22 RX ADMIN — ATORVASTATIN CALCIUM 80 MG: 40 TABLET, FILM COATED ORAL at 09:05

## 2022-05-22 RX ADMIN — FENTANYL CITRATE 100 MCG: 50 INJECTION INTRAMUSCULAR; INTRAVENOUS at 06:05

## 2022-05-22 RX ADMIN — POLYETHYLENE GLYCOL 3350 17 G: 17 POWDER, FOR SOLUTION ORAL at 09:05

## 2022-05-22 RX ADMIN — OXCARBAZEPINE 300 MG: 300 TABLET, FILM COATED ORAL at 08:05

## 2022-05-22 RX ADMIN — LORAZEPAM 2 MG: 2 INJECTION INTRAMUSCULAR; INTRAVENOUS at 07:05

## 2022-05-22 RX ADMIN — OXCARBAZEPINE 300 MG: 300 TABLET, FILM COATED ORAL at 09:05

## 2022-05-22 RX ADMIN — BENZTROPINE MESYLATE 1 MG: 1 TABLET ORAL at 09:05

## 2022-05-22 RX ADMIN — VANCOMYCIN HYDROCHLORIDE 750 MG: 1 INJECTION, POWDER, LYOPHILIZED, FOR SOLUTION INTRAVENOUS at 03:05

## 2022-05-22 RX ADMIN — VANCOMYCIN HYDROCHLORIDE 750 MG: 1 INJECTION, POWDER, LYOPHILIZED, FOR SOLUTION INTRAVENOUS at 07:05

## 2022-05-22 RX ADMIN — LEVETIRACETAM 500 MG: 100 INJECTION, SOLUTION INTRAVENOUS at 09:05

## 2022-05-22 RX ADMIN — APIXABAN 10 MG: 5 TABLET, FILM COATED ORAL at 09:05

## 2022-05-22 RX ADMIN — POTASSIUM CHLORIDE 40 MEQ: 200 INJECTION, SOLUTION INTRAVENOUS at 07:05

## 2022-05-22 RX ADMIN — FAMOTIDINE 20 MG: 10 INJECTION, SOLUTION INTRAVENOUS at 08:05

## 2022-05-22 RX ADMIN — LEVETIRACETAM 500 MG: 100 INJECTION, SOLUTION INTRAVENOUS at 08:05

## 2022-05-22 RX ADMIN — HALOPERIDOL 5 MG: 5 TABLET ORAL at 09:05

## 2022-05-22 RX ADMIN — VANCOMYCIN HYDROCHLORIDE 750 MG: 1 INJECTION, POWDER, LYOPHILIZED, FOR SOLUTION INTRAVENOUS at 11:05

## 2022-05-22 RX ADMIN — GLYCOPYRROLATE 1 MG: 1 TABLET ORAL at 04:05

## 2022-05-22 RX ADMIN — FENTANYL CITRATE 100 MCG: 50 INJECTION INTRAMUSCULAR; INTRAVENOUS at 03:05

## 2022-05-22 RX ADMIN — RIFAMPIN 300 MG: 300 CAPSULE ORAL at 08:05

## 2022-05-22 RX ADMIN — HALOPERIDOL 5 MG: 5 TABLET ORAL at 08:05

## 2022-05-22 RX ADMIN — FENTANYL CITRATE 100 MCG: 50 INJECTION INTRAMUSCULAR; INTRAVENOUS at 02:05

## 2022-05-22 RX ADMIN — FAMOTIDINE 20 MG: 10 INJECTION, SOLUTION INTRAVENOUS at 09:05

## 2022-05-22 NOTE — PROGRESS NOTES
Pharmacokinetic Assessment Follow Up: IV Vancomycin    Vancomycin serum concentration assessment(s):    The trough level was drawn correctly and can be used to guide therapy at this time. The measurement is within the desired definitive target range of 10 to 20 mcg/mL.    Vancomycin Regimen Plan:    Continue regimen to Vancomycin 750 mg IV every 8 hours with next serum trough concentration measured at 1000 prior to 6th dose on 5/24    Drug levels (last 3 results):  Recent Labs   Lab Result Units 05/22/22  0953   Vancomycin Trough ug/ml 14.4*       Pharmacy will continue to follow and monitor vancomycin.    Please contact pharmacy at extension [phone] for questions regarding this assessment.    Thank you for the consult,   Faye Ni PharmD       Patient brief summary:  Kendall Duff is a 56 y.o. male initiated on antimicrobial therapy with IV Vancomycin for treatment of skin & soft tissue infection    The patient's current regimen is vancomycin 750 mg IV q 8hr    Drug Allergies:   Review of patient's allergies indicates:   Allergen Reactions    Depakote [divalproex]     Divalproex sodium Other (See Comments)    Lithium     Lithium analogues     Quetiapine Other (See Comments)       Actual Body Weight:   77.2 kg    Renal Function:   Estimated Creatinine Clearance: 137.8 mL/min (A) (based on SCr of 0.62 mg/dL (L)).,     Dialysis Method (if applicable):  N/A    CBC (last 72 hours):  Recent Labs   Lab Result Units 05/22/22  0447   WBC x10(3)/mcL 10.0   Hgb gm/dL 9.1*   Hct % 29.2*   Platelet x10(3)/mcL 399   Mono % % 9.2   Eos % % 0.6   Basophil % % 0.5       Metabolic Panel (last 72 hours):  Recent Labs   Lab Result Units 05/21/22  1904 05/22/22  0447   Sodium Level mmol/L 137 136   Potassium Level mmol/L 3.1* 3.3*   Chloride mmol/L 104 104   Carbon Dioxide mmol/L 21* 22   Glucose Level mg/dL 128* 165*   Blood Urea Nitrogen mg/dL 9.2 7.5*   Creatinine mg/dL 0.58* 0.62*   Albumin Level gm/dL  --  2.0*   Bilirubin  Total mg/dL  --  0.7   Alkaline Phosphatase unit/L  --  88   Aspartate Aminotransferase unit/L  --  42*   Alanine Aminotransferase unit/L  --  29   Magnesium Level mg/dL 1.80 2.10   Phosphorus Level mg/dL 3.0 4.0       Vancomycin Administrations:  vancomycin given in the last 96 hours                     vancomycin (VANCOCIN) 750 mg in dextrose 5 % 250 mL IVPB (mg) 750 mg New Bag 05/22/22 1114     750 mg New Bag  0331     750 mg New Bag 05/21/22 1838     750 mg New Bag  1126     750 mg New Bag  0337     750 mg New Bag 05/20/22 1901     750 mg New Bag  1104    vancomycin in dextrose 5 % 1 gram/250 mL IVPB 1,000 mg (mg) 1,000 mg New Bag 05/19/22 2350     1,000 mg New Bag  1637     1,000 mg New Bag  0859     1,000 mg New Bag  0003     1,000 mg New Bag 05/18/22 1600                    Microbiologic Results:  Microbiology Results (last 7 days)       Procedure Component Value Units Date/Time    Clostridium difficile EIA [[id number]]     Order Status: Canceled Specimen: Stool     Blood Culture [[id number]]  (Normal) Collected: 05/12/22 1740    Order Status: Completed Specimen: Blood from Arm Updated: 05/17/22 1902     CULTURE, BLOOD ([facility]) No Growth at 5 days    Blood Culture [[id number]]  (Normal) Collected: 05/12/22 1741    Order Status: Completed Specimen: Blood from Hand, Left Updated: 05/17/22 1902     CULTURE, BLOOD ([facility]) No Growth at 5 days    Tissue Culture - Aerobic [[id number]] Collected: 05/11/22 1432    Order Status: Completed Specimen: Bone from Sternum Updated: 05/17/22 0936     CULTURE, TISSUE- AEROBIC ([facility]) No Growth at 5 days    Tissue Culture - Aerobic [[id number]] Collected: 05/11/22 1429    Order Status: Completed Specimen: Tissue from Sternum Updated: 05/17/22 0935     CULTURE, TISSUE- AEROBIC ([facility]) No Growth at 5 days

## 2022-05-22 NOTE — PROGRESS NOTES
Pulmonary & Critical Care Medicine   Progress Note      Short History:  A 56-year-old gentleman who has bipolar disorder and schizophrenia   who underwent a CABG x4 by Dr. Carney on 04/06/2022.  Postoperatively, was   complicated by sternal wound infection and dehiscence due to his severe   agitation post surgery.  He underwent a wound closure device placement on   04/17/2022, and subsequently underwent a bilateral pectoral flap for sternal   wound closure on 05/11/2022, by Dr. Adolfo Weiss of Plastic Surgery.      Objective/Subjective:  -per nursing staff, there were no more major issues or changes overnight  -no more episodes of vomiting with little to no output from PEG to  -patient is currently off of vasopressors and on very low dosage Precedex  -patient has been tolerating T-piece for tracheostomy tube for gait within 24 hours without complications or desaturation  -Tmax = 100.8  -Potassium down to 3.3      Scheduled Medications:    apixaban  10 mg Oral BID    [START ON 5/27/2022] apixaban  5 mg Oral BID    [START ON 5/29/2022] apixaban  5 mg Oral BID    atorvastatin  80 mg Oral Daily    benztropine  1 mg Per OG tube BID    doxepin  50 mg Oral QHS    ezetimibe  10 mg Oral QHS    famotidine (PF)  20 mg Intravenous BID    glycopyrrolate  1 mg Per NG tube TID    haloperidoL  5 mg Oral BID    levetiracetam IV  500 mg Intravenous Q12H    metoprolol tartrate  25 mg Per OG tube Daily    ondansetron  4 mg Oral Once    OXcarbazepine  300 mg Per OG tube BID    polyethylene glycol  17 g Oral Daily    potassium chloride in water  40 mEq Intravenous Once    rifAMpin  300 mg Oral BID    sodium chloride 0.9%  10 mL Intravenous Q12H    vancomycin (VANCOCIN) IVPB  750 mg Intravenous Q8H       PRN Medications:   sodium chloride, acetaminophen, albuterol-ipratropium, bisacodyL, dextrose 10%, fentaNYL, fentaNYL, hydrALAZINE, lorazepam, metoprolol, morphine, morphine, nitroGLYCERIN, OLANZapine, ondansetron,  oxyCODONE, oxyCODONE, potassium chloride in water, potassium chloride in water, potassium chloride in water, promethazine, sodium chloride 0.9%, vancomycin - pharmacy to dose, ziprasidone      Infusions:     dexmedetomidine (PRECEDEX) infusion 0.4 mcg/kg/hr (05/22/22 0045)    NORepinephrine bitartrate-D5W Stopped (05/21/22 0830)    propofoL Stopped (05/18/22 0900)         Fluid Balance:     Intake/Output Summary (Last 24 hours) at 5/22/2022 0958  Last data filed at 5/22/2022 0600  Gross per 24 hour   Intake 253.39 ml   Output 187 ml   Net 66.39 ml           Vital Signs:   Vitals:    05/22/22 0935   BP: (!) 126/104   Pulse:    Resp:    Temp:          Physical Exam  Vitals and nursing note reviewed.   Constitutional:       General: He is not in acute distress.     Appearance: Normal appearance. He is not ill-appearing or toxic-appearing.   HENT:      Head: Normocephalic and atraumatic.      Right Ear: External ear normal.      Left Ear: External ear normal.      Nose: Nose normal.      Mouth/Throat:      Mouth: Mucous membranes are moist.      Pharynx: Oropharynx is clear. No oropharyngeal exudate or posterior oropharyngeal erythema.   Eyes:      General: No scleral icterus.     Extraocular Movements: Extraocular movements intact.      Conjunctiva/sclera: Conjunctivae normal.      Pupils: Pupils are equal, round, and reactive to light.   Neck:      Vascular: No carotid bruit.      Comments: Tracheostomy in place, site clean and dry  Cardiovascular:      Rate and Rhythm: Normal rate and regular rhythm.      Pulses: Normal pulses.      Heart sounds: Normal heart sounds. No murmur heard.    No friction rub. No gallop.   Pulmonary:      Effort: Pulmonary effort is normal. No respiratory distress.      Breath sounds: Rhonchi present. No wheezing or rales.      Comments: T-piece connected to tracheostomy tube with whitish/pleural colored secretions in circuit  Abdominal:      General: Abdomen is flat. Bowel sounds are  normal. There is no distension.      Palpations: Abdomen is soft.      Tenderness: There is no abdominal tenderness. There is no guarding or rebound.      Comments: Peg tube in place, underneath chest binder   Genitourinary:     Comments: deferred  Musculoskeletal:         General: No swelling or deformity. Normal range of motion.      Cervical back: Normal range of motion and neck supple. No rigidity or tenderness.   Lymphadenopathy:      Cervical: No cervical adenopathy.   Skin:     General: Skin is warm and dry.      Capillary Refill: Capillary refill takes less than 2 seconds.      Coloration: Skin is not jaundiced.      Findings: No bruising, lesion or rash.   Neurological:      General: No focal deficit present.      Mental Status: He is alert.      Sensory: No sensory deficit.      Motor: No weakness.      Comments: Awake, alert, follows commands, moves all extremities, unable to assess orientation secondary to nonverbal status   Psychiatric:         Mood and Affect: Mood normal.         Laboratory Studies:   No results for input(s): PH, PCO2, PO2, HCO3, POCSATURATED, BE in the last 24 hours.  Recent Labs   Lab 05/22/22 0447   WBC 10.0   RBC 3.40*   HGB 9.1*   HCT 29.2*      MCV 85.9   MCH 26.8*   MCHC 31.2*     Recent Labs   Lab 05/22/22 0447   GLUCOSE 165*      K 3.3*   CO2 22   BUN 7.5*   CREATININE 0.62*   MG 2.10         Microbiology Data:   Microbiology Results (last 7 days)     Procedure Component Value Units Date/Time    Clostridium difficile EIA [[id number]]     Order Status: Canceled Specimen: Stool     Blood Culture [[id number]]  (Normal) Collected: 05/12/22 1740    Order Status: Completed Specimen: Blood from Arm Updated: 05/17/22 1902     CULTURE, BLOOD ([facility]) No Growth at 5 days    Blood Culture [[id number]]  (Normal) Collected: 05/12/22 1741    Order Status: Completed Specimen: Blood from Hand, Left Updated: 05/17/22 1902     CULTURE, BLOOD ([facility]) No Growth at 5 days    Tissue Culture  - Aerobic [[id number]] Collected: 05/11/22 1432    Order Status: Completed Specimen: Bone from Sternum Updated: 05/17/22 0936     CULTURE, TISSUE- AEROBIC ([facility]) No Growth at 5 days    Tissue Culture - Aerobic [[id number]] Collected: 05/11/22 1429    Order Status: Completed Specimen: Tissue from Sternum Updated: 05/17/22 0935     CULTURE, TISSUE- AEROBIC ([facility]) No Growth at 5 days            Imaging:   No new imaging to review      Assessment and Plan    Assessment:  1. CABG x4, 04/06/2022.  2. Methicillin-resistant Staphylococcus aureus sternal wound infection and   dehiscence, status post wound vacuum-assisted closure device, 04/17/2022, and   bilateral pectoral flaps for sternal wound closure, 05/11/2022.  3. Postoperative agitation secondary to underlying bipolar and schizophrenia.  4. Right upper extremity deep vein thrombosis, currently on full-dose Lovenox.  5. Status post tracheostomy and percutaneous endoscopic gastrostomy tube.  6. Protein-calorie malnutrition, improving.  7. Hypokalemia      Plan:  -tolerating noninvasive ventilation currently on T-piece, will switch to trach collar as appropriate with speaking valve trials  -supplement oxygen to maintain saturation 94-96%  -patient's level of postoperative agitation/ICU delirium/critical illness related delirium seems to be improving, cleansing completely discontinue Precedex today  -finishing 6 weeks of antibiotics per Infectious Disease recommendations with vancomycin and rifampin for MRSA sternal wound infection with dehiscence, continue drains per recommendation from Plastic surgery   -abdominal film demonstrated what appeared to be minimally dilated bowel loops without any signs of air-fluid level secondary to supine film, no more issues with vomiting overnight with multiple pasty bowel movements in the interim, no bowel obstruction or ileus, restart tube feeds on trickle and restart oral medications  -now off of all vasopressors  -patient right upper  extremity DVT currently on Eliquis  -Replace potassium      DVT ppx/tx with lovenox-will change to Eliquis  GI ppx with protonix  Keep HOB elevated > 30*    Needs aggressive PT OT and speech evaluation  Transfer out of ICU level care      Jarrod Ramires MD  5/22/2022  Pulmonology/Critical Care

## 2022-05-22 NOTE — H&P
[facility] Hospital Medicine History & Physical Examination       Patient Name: Kendall Duff  MRN: [id number]  Patient Class: IP- Inpatient   Admission Date: 05/22/2022   Admitting Physician: Carroll Hammer Jr., MD, [facility]   Length of Stay: 51  Attending Physician: Carroll Hammer Jr., MD, [facility]  Primary Care Provider: Primary Doctor No  Face-to-Face encounter date: 05/22/2022  Code Status:   Chief Complaint: No chief complaint on file.    Present at Bedside:  Source of Information: Patient. Medical Records      HISTORY OF PRESENT ILLNESS:   Kendall Duff is a 56 y.o. male with a PMHx of chronic hepatitis C, hypertension, seizures, CAD s/p stents who initially presented to [facility] on 4/1/2022 with a primary complaint of chest pain. Of note, he was seen at an outlying ED on 3/20/22 and was dx with MI, subsequently transferred to [facility] where he underwent LHC with angioplasty, and deemed appropriate for CABG, which had been scheduled for 3/29/22, but was cancelled. He then presented to ED on 4/1 with c/o CP earlier in the day. He was admitted to cardiology services, started on a heparin gtt. CV Surgery was consulted and the patient underwent CABG x4 on 4/6/22. He was admitted to ICU post CABG, intubated on mechanical ventilation. Psych was consulted during his stay to evaluate for some recent agitation, restlessness, fidgetiness, insomnia, confusion, and sexual inappropriateness, and he was started on PRN haldol. He was extubated to NC 4L on 4/7, remained restless and confused requiring Precedex gtt.  On 4/10/22 he experienced seizure-like activity and confusion for which neuro was consulted. EEG revealed moderate generalized slowing. CT Head was negative for acute intracranial abnormality, gas noted throughout the bilateral subcutaneous soft tissues.MRI on 4/15/22 revealed small linear focus of acute small vessel ischemia in the right frontal white matter. Developed diffuse  subcutaneous emphysema per CXR. He remained agitated with delirium.  Unfortunately,he pulled out his left chest tube on 4/16/22 and he had been thrashing in bed, had to be restrained at times.  He developed an unstable sternum with purulence drainage. CT thorax revealed surgical changes with inflammation, fluid and air locules, minimal anterior pericardial effusion, extensive soft tissue emphysema, and bilateral pleural effusions and bilateral lower lung lobes collapse consolidations. He has known subcutaneous air, which has been present on previous x-rays.  Cultures positive for MRSA, and he was placed on Vancomycin. He required intubation on 4/16/22. On 4/17/22 he underwent sternal wound debridement with wound vac placement. ID was consulted on 4/22/22 due to sepsis. He was started on rifampin in addition to vancomycin for a planned 6 week course. Respiratory cultures from 4/24/22 returned positive for proteus and klebsiella. Remained intubated on mechanical ventilation. GI was consulted on 5/3/22 for PEG placement. Plastic surgery was also following for sternal reconstruction. He underwent tracheostomy and PEG placement on 5/10/22. NIVA done on 5/10/22 noted a RUE DVT. On 5/11/22 he underwent sternal wound debridement, bilateral fasciocutaneous flap advanced closure over sternum and bilateral pectoralis major muscle flap reconstruction of sternum. He was started on full dose Lovenox. He remained on mechanical ventilation until 5/21/22. Tolerating T-piece now on trach collar. He required vasopressors for BP support throughout his stay, which have since been weaned off. He has been weaned off of Precedex. He did have some vomiting on 5/21/22. Lovenox was transitioned to Eliquis on 5/21/22. KUB unrevealing, tube feeds restarted on 5/22/22. He was cleared for downgrade to the floor on 5/22/22 under hospitalist services.    PAST MEDICAL HISTORY:     Past Medical History:   Diagnosis Date    Bipolar disorder,  unspecified     Chronic hepatitis C     Hypertension     Obesity, unspecified     Seizures     Stroke    Bipolar Disorder, Schizophrenia    PAST SURGICAL HISTORY:     Past Surgical History:   Procedure Laterality Date    CORONARY STENT PLACEMENT  2015    DEBRIDEMENT  2022    LEFT HEART CATHETERIZATION Left 2015    REPEAT CLOSURE OF STERNAL INCISION N/A 2022    Procedure: CLOSURE, STERNAL INCISION, REPEAT;  Surgeon: Adolfo Weiss MD;  Location: [facility] OR;  Service: Plastics;  Laterality: N/A;  STERNAL WOUND DEBRIDEMENT AND RECONSTRUCTION // MULTIPLE MUSCLE FLAPS // REQ 1400       ALLERGIES:   Depakote [divalproex], Divalproex sodium, Lithium, Lithium analogues, and Quetiapine    FAMILY HISTORY:   Reviewed and negative    SOCIAL HISTORY:   Denied alcohol, tobacco or illicit drug use    HOME MEDICATIONS:     Prior to Admission medications    Medication Sig Start Date End Date Taking? Authorizing Provider   amLODIPine (NORVASC) 5 MG tablet Take 1 tablet (5 mg total) by mouth once daily. 3/26/22 3/26/23 Yes Laisha May NP   aspirin 81 MG Chew Chew and swallow 1 tablet (81 mg total) by mouth once daily. 3/26/22 3/26/23 Yes Laisha May NP   doxepin (SINEQUAN) 25 MG capsule Take 50 mg by mouth nightly. 22  Yes Historical Provider   lisinopriL (PRINIVIL,ZESTRIL) 20 MG tablet Take 20 mg by mouth once daily. 22  Yes Historical Provider   clopidogreL (PLAVIX) 75 mg tablet Take 1 tablet (75 mg total) by mouth once daily. 3/25/22 3/25/23  Laisha May NP   doxepin (SINEQUAN) 25 MG capsule SMARTSI Capsule(s) By Mouth Every Evening 22   Historical Provider   metoprolol succinate (TOPROL-XL) 25 MG 24 hr tablet Take 25 mg by mouth once daily. 3/27/22   Historical Provider   metoprolol tartrate (LOPRESSOR) 50 MG tablet Take 1 tablet (50 mg total) by mouth once daily. 3/26/22 3/26/23  Laisha May NP   OLANZapine (ZYPREXA) 5 MG tablet Take 5 mg by mouth nightly. 22    Historical Provider   OXcarbazepine (TRILEPTAL) 150 MG Tab Take 150 mg by mouth 2 (two) times daily. 2/16/22   Historical Provider   pravastatin (PRAVACHOL) 40 MG tablet Take 1 tablet (40 mg total) by mouth every evening. 3/25/22 3/25/23  Laisha May NP   traZODone (DESYREL) 100 MG tablet Take 100 mg by mouth nightly. 2/16/22   Historical Provider       REVIEW OF SYSTEMS:   Except as documented, all other systems reviewed and negative     PHYSICAL EXAM:     VITAL SIGNS: 24 HRS MIN & MAX LAST   Temp  Min: 98.8 °F (37.1 °C)  Max: 100.8 °F (38.2 °C) 99.7 °F (37.6 °C)   BP  Min: 102/64  Max: 150/102 (!) 126/104   Pulse  Min: 54  Max: 145  98   Resp  Min: 0  Max: 44 (!) 26   SpO2  Min: 72 %  Max: 100 % (!) 94 %       General appearance: Well-developed male in no apparent distress.  HENT: Atraumatic head. Moist mucous membranes of oral cavity.  Eyes: Normal extraocular movements.   Neck: Supple.   Lungs: No respiratory distress. trach  Heart: Regular rate and rhythm. S1 and S2 present. No pedal edema.  Abdomen: Soft, non-distended, non-tender. PEG  Extremities: No cyanosis, clubbing, or edema.  Skin: sternal incision C/D/I with KELLIE drains  Neuro: Motor and sensory exams grossly intact  Psych/mental status: Appropriate mood and affect.     LABS AND IMAGING:     Recent Labs   Lab 05/18/22  0139 05/19/22 0149 05/22/22  0447   WBC 10.0 9.6 10.0   RBC 3.21* 3.25* 3.40*   HGB 8.5* 8.6* 9.1*   HCT 26.5* 27.7* 29.2*   MCV 82.6 85.2 85.9   MCH 26.5* 26.5* 26.8*   MCHC 32.1* 31.0* 31.2*   RDW 15.9 15.9 16.6    296 399   MPV 9.7 10.3 10.1       Recent Labs   Lab 05/17/22  0432 05/18/22  0139 05/19/22  0149 05/19/22  0542 05/20/22  0542 05/21/22  1904 05/22/22  0447   NA  --  135* 135*  --   --  137 136   K  --  4.1 3.9  --   --  3.1* 3.3*   CO2  --  23 22  --   --  21* 22   BUN  --  19.8 18.0  --   --  9.2 7.5*   CREATININE  --  0.66* 0.59*  --   --  0.58* 0.62*   CALCIUM  --  8.2* 8.4  --   --  8.6 8.1*   PH 7.46*   --   --  7.44 7.450  --   --    MG  --  1.90  --   --   --  1.80 2.10   ALBUMIN  --  1.6* 1.7*  --   --   --  2.0*   ALKPHOS  --  76 73  --   --   --  88   ALT  --  22 24  --   --   --  29   AST  --  36* 43*  --   --   --  42*   BILITOT  --  0.5 0.7  --   --   --  0.7       Microbiology Results (last 7 days)       Procedure Component Value Units Date/Time    Clostridium difficile EIA [[id number]]     Order Status: Canceled Specimen: Stool     Blood Culture [[id number]]  (Normal) Collected: 05/12/22 1740    Order Status: Completed Specimen: Blood from Arm Updated: 05/17/22 1902     CULTURE, BLOOD ([facility]) No Growth at 5 days    Blood Culture [[id number]]  (Normal) Collected: 05/12/22 1741    Order Status: Completed Specimen: Blood from Hand, Left Updated: 05/17/22 1902     CULTURE, BLOOD ([facility]) No Growth at 5 days    Tissue Culture - Aerobic [[id number]] Collected: 05/11/22 1432    Order Status: Completed Specimen: Bone from Sternum Updated: 05/17/22 0936     CULTURE, TISSUE- AEROBIC ([facility]) No Growth at 5 days    Tissue Culture - Aerobic [[id number]] Collected: 05/11/22 1429    Order Status: Completed Specimen: Tissue from Sternum Updated: 05/17/22 0935     CULTURE, TISSUE- AEROBIC ([facility]) No Growth at 5 days             X-Ray Abdomen AP 1 View  Narrative: EXAMINATION:  XR ABDOMEN AP 1 VIEW    CLINICAL HISTORY:  ; Vomiting;    TECHNIQUE:  Supine AP view of the abdomen.    COMPARISON:  3 May 2022    FINDINGS:  Lines/tubes/devices: Enteric tube is no longer visualized.  Multiple other lines/tubes are partially visualized over the imaged region.    Detection of air-fluid levels and low-volume pneumoperitoneum is limited due to supine technique.    A non-obstructed bowel gas pattern is present. No intra-abdominal mass effect is appreciated.    Included lower thoracic cavity and osseous structures are without acute abnormality.  Impression: 1. Interval removal of NG tube.  2. No convincing evidence of new or worsening intra-abdominal  process.    Electronically signed by: Abner Reyes  Date:    05/21/2022  Time:    13:57        ASSESSMENT & PLAN:   Acute Hypoxemic Respiratory Failure s/p intubation, trach placement on 5/10/22  Multivessel CAD s/p CABG x4, 04/06/2022.  MRSA sternal wound infection and dehiscence, status post wound vacuum-assisted closure device, 04/17/2022, and bilateral pectoral flaps for sternal wound closure, 05/11/2022.  Postoperative agitation and Delirium  Acute RUE DVT, currently on full-dose Lovenox  Normocytic Anemia  s/p PEG placment on 5/10/22  Severe PCM  Hypokalemia  Hx of Bipolar Disorder and Schizophrenia, chronic hepatitis C, hypertension, seizures, CAD s/p stents       Plan:  Continue supplemental O2 via trach collar  Cardiac Monitoring  ID, CV surgery, Plastics all following  Abx per ID - Rifampin and Vancomycin for a 6 week course  Continue fiull dose Lovenox  Continue Tube feeds  Potassium was repleted prior to downgrade  Otherwise, continue present management  Labs in AM      VTE Prophylaxis: will be placed on Lovenox for DVT prophylaxis and will be advised to be as mobile as possible and sit in a chair as tolerated    __________________________________________________________________________  INPATIENT LIST OF MEDICATIONS     Current Facility-Administered Medications:     0.9%  NaCl infusion (for blood administration), , Intravenous, Q24H PRN, Salbador Alvarez MD    acetaminophen tablet 650 mg, 650 mg, Oral, Q4H PRN, Abstracted Order, MD, 650 mg at 05/18/22 2010    albuterol-ipratropium 2.5 mg-0.5 mg/3 mL nebulizer solution 3 mL, 3 mL, Nebulization, Q12H PRN, Abstracted MD Kellen    apixaban tablet 10 mg, 10 mg, Oral, BID, Supriya Logan MD, 10 mg at 05/22/22 0936    [START ON 5/27/2022] apixaban tablet 5 mg, 5 mg, Oral, BID, Jarrod Ramires MD    [START ON 5/29/2022] apixaban tablet 5 mg, 5 mg, Oral, BID, Supriya Logan MD    atorvastatin tablet 80 mg, 80 mg, Oral, Daily, Juancarlos Foreman MD,  80 mg at 05/22/22 0936    benztropine tablet 1 mg, 1 mg, Per OG tube, BID, Abstracted Order, MD, 1 mg at 05/22/22 0936    bisacodyL suppository 10 mg, 10 mg, Rectal, Daily PRN, Abstracted Order, MD, 10 mg at 05/15/22 0000    dextrose 10% bolus 250 mL, 250 mL, Intravenous, Once PRN, Abstracted Order, MD    doxepin capsule 50 mg, 50 mg, Oral, QHS, Abstracted Order, MD, 50 mg at 05/20/22 2230    ezetimibe tablet 10 mg, 10 mg, Oral, QHS, Abstracted Order, MD, 10 mg at 05/20/22 2230    famotidine (PF) injection 20 mg, 20 mg, Intravenous, BID, Abstracted Order, MD, 20 mg at 05/22/22 0935    fentaNYL injection 100 mcg, 100 mcg, Intravenous, Q1H PRN, Abstracted Order, MD, 100 mcg at 05/22/22 1406    fentaNYL injection 50 mcg, 50 mcg, Intravenous, Q1H PRN, Abstracted Order, MD, 50 mcg at 05/22/22 0128    glycopyrrolate tablet 1 mg, 1 mg, Per NG tube, TID, Sukhjinder Michael MD, 1 mg at 05/22/22 0936    haloperidoL tablet 5 mg, 5 mg, Oral, BID, Supriya Logan MD, 5 mg at 05/22/22 0936    hydrALAZINE injection 10 mg, 10 mg, Intravenous, Q2H PRN, Abstracted MD Kellen    levETIRAcetam (KEPPRA) 500 mg in sodium chloride 0.9 % 100 mL IVPB (MB+), 500 mg, Intravenous, Q12H, Carroll Hammer Jr., MD, [facility], 500 mg at 05/22/22 0935    lorazepam injection 2 mg, 2 mg, Intravenous, Q2H PRN, Max Castillo MD, 2 mg at 05/21/22 2143    metoprolol injection 5 mg, 5 mg, Intravenous, Q5 Min PRN, Supriya Logan MD    metoprolol tartrate (LOPRESSOR) tablet 25 mg, 25 mg, Per OG tube, Daily, Abstracted Order, MD, 25 mg at 05/22/22 0935    morphine injection 2 mg, 2 mg, Intravenous, Q1H PRN, Abstracted Order, MD    morphine injection 4 mg, 4 mg, Intravenous, Q4H PRN, Abstracted Order, MD, 4 mg at 05/11/22 1913    nitroGLYCERIN SL tablet 0.4 mg, 0.4 mg, Sublingual, Q5 Min PRN, Abstracted Order, MD    OLANZapine injection 10 mg, 10 mg, Intramuscular, Q8H PRN, Abstracted Order, MD    ondansetron disintegrating tablet 4 mg, 4 mg, Oral, Once,  Carroll Hammer Jr., MD, [facility]    ondansetron injection 4 mg, 4 mg, Intravenous, Q6H PRN, GAVIN Pearson MD, 4 mg at 05/20/22 2055    OXcarbazepine tablet 300 mg, 300 mg, Per OG tube, BID, Juancarlos Foreman MD, 300 mg at 05/22/22 0936    oxyCODONE immediate release tablet 10 mg, 10 mg, Oral, Q4H PRN, Juancarlos Foreman MD    oxyCODONE immediate release tablet 5 mg, 5 mg, Oral, Q4H PRN, Juancarlos Foreman MD, 5 mg at 05/12/22 0117    polyethylene glycol packet 17 g, 17 g, Oral, Daily, Carroll Hammer Jr., MD, [facility], 17 g at 05/22/22 0935    potassium chloride 20 mEq in 100 mL IVPB (FOR CENTRAL LINE ADMINISTRATION ONLY), 20 mEq, Intravenous, PRN, Juancarlos Foreman MD    potassium chloride 20 mEq in 100 mL IVPB (FOR CENTRAL LINE ADMINISTRATION ONLY), 40 mEq, Intravenous, PRN, Juancarlos Foreman MD, Last Rate: 50 mL/hr at 05/22/22 0738, 40 mEq at 05/22/22 0738    potassium chloride 20 mEq in 100 mL IVPB (FOR CENTRAL LINE ADMINISTRATION ONLY), 60 mEq, Intravenous, PRN, Juancarlos Foreman MD    potassium chloride SA CR tablet 40 mEq, 40 mEq, Oral, Once, Jarrod Ramires MD    promethazine injection 12.5 mg, 12.5 mg, Intramuscular, Q6H PRN, Paloma Mari MD    rifAMpin capsule 300 mg, 300 mg, Oral, BID, Juancarlos Foreman MD, 300 mg at 05/22/22 0935    sodium chloride 0.9% flush 10 mL, 10 mL, Intravenous, PRN, Juancarlos Foreman MD    sodium chloride 0.9% flush 10 mL, 10 mL, Intravenous, Q12H, Juancarlos Foreman MD, 10 mL at 05/22/22 0937    vancomycin (VANCOCIN) 750 mg in dextrose 5 % 250 mL IVPB, 750 mg, Intravenous, Q8H, Carroll Hammer Jr., MD, [facility], Stopped at 05/22/22 1214    vancomycin - pharmacy to dose, , Intravenous, pharmacy to manage frequency, Jasmyne Morales, VAISHALI    ziprasidone injection 20 mg, 20 mg, Intramuscular, Q8H PRN, Abstracted Order, MD      Scheduled Meds:   apixaban  10 mg Oral BID    [START ON 5/27/2022] apixaban  5 mg Oral BID    [START ON 5/29/2022] apixaban  5 mg Oral BID    atorvastatin  80 mg Oral Daily     benztropine  1 mg Per OG tube BID    doxepin  50 mg Oral QHS    ezetimibe  10 mg Oral QHS    famotidine (PF)  20 mg Intravenous BID    glycopyrrolate  1 mg Per NG tube TID    haloperidoL  5 mg Oral BID    levetiracetam IV  500 mg Intravenous Q12H    metoprolol tartrate  25 mg Per OG tube Daily    ondansetron  4 mg Oral Once    OXcarbazepine  300 mg Per OG tube BID    polyethylene glycol  17 g Oral Daily    potassium chloride  40 mEq Oral Once    rifAMpin  300 mg Oral BID    sodium chloride 0.9%  10 mL Intravenous Q12H    vancomycin (VANCOCIN) IVPB  750 mg Intravenous Q8H     Continuous Infusions:    PRN Meds:.sodium chloride, acetaminophen, albuterol-ipratropium, bisacodyL, dextrose 10%, fentaNYL, fentaNYL, hydrALAZINE, lorazepam, metoprolol, morphine, morphine, nitroGLYCERIN, OLANZapine, ondansetron, oxyCODONE, oxyCODONE, potassium chloride in water, potassium chloride in water, potassium chloride in water, promethazine, sodium chloride 0.9%, vancomycin - pharmacy to dose, ziprasidone      Discharge Planning and Disposition: TBD    I, Gabriela Hopper, NP have reviewed and discussed the case with Dr. Isrrael Layne.     Please see the following addendum for further assessment and plan from the attending MD.    Gabriela Hopper, [facility]  05/22/2022    ________________________________________________________________________________    MD Addendum:  Dr. MAK , assumed care of this patient today at --am/pm  For the patient encounter, I performed the substantive portion of the visit, I reviewed the NP/PA documentation, treatment plan, and medical decision making.  I had face to face time with this patient     A. History:    56 y.o. male with a PMHx of chronic hepatitis C, hypertension, seizures, CAD s/p stents who initially presented to [facility] on 4/1/2022 with a primary complaint of chest pain. Of note, he was seen at an outlying ED on 3/20/22 and was dx with MI, subsequently transferred to [facility] where he  underwent LHC with angioplasty, and deemed appropriate for CABG, which had been scheduled for 3/29/22, but was cancelled. He then presented to ED on 4/1 with c/o CP earlier in the day. He was admitted to cardiology services, started on a heparin gtt. CV Surgery was consulted and the patient underwent CABG x4 on 4/6/22. He was admitted to ICU post CABG, intubated on mechanical ventilation. Psych was consulted during his stay to evaluate for some recent agitation, restlessness, fidgetiness, insomnia, confusion, and sexual inappropriateness, and he was started on PRN haldol. He was extubated to NC 4L on 4/7, remained restless and confused requiring Precedex gtt.  On 4/10/22 he experienced seizure-like activity and confusion for which neuro was consulted. EEG revealed moderate generalized slowing. CT Head was negative for acute intracranial abnormality, gas noted throughout the bilateral subcutaneous soft tissues.MRI on 4/15/22 revealed small linear focus of acute small vessel ischemia in the right frontal white matter. Developed diffuse subcutaneous emphysema per CXR. He remained agitated with delirium.  Unfortunately,he pulled out his left chest tube on 4/16/22 and he had been thrashing in bed, had to be restrained at times.  He developed an unstable sternum with purulence drainage. CT thorax revealed surgical changes with inflammation, fluid and air locules, minimal anterior pericardial effusion, extensive soft tissue emphysema, and bilateral pleural effusions and bilateral lower lung lobes collapse consolidations. He has known subcutaneous air, which has been present on previous x-rays.  Cultures positive for MRSA, and he was placed on Vancomycin. He required intubation on 4/16/22. On 4/17/22 he underwent sternal wound debridement with wound vac placement. ID was consulted on 4/22/22 due to sepsis. He was started on rifampin in addition to vancomycin for a planned 6 week course. Respiratory cultures from 4/24/22  returned positive for proteus and klebsiella. Remained intubated on mechanical ventilation. GI was consulted on 5/3/22 for PEG placement. Plastic surgery was also following for sternal reconstruction. He underwent tracheostomy and PEG placement on 5/10/22. NIVA done on 5/10/22 noted a RUE DVT. On 5/11/22 he underwent sternal wound debridement, bilateral fasciocutaneous flap advanced closure over sternum and bilateral pectoralis major muscle flap reconstruction of sternum. He was started on full dose Lovenox. He remained on mechanical ventilation until 5/21/22. Tolerating T-piece now on trach collar. He required vasopressors for BP support throughout his stay, which have since been weaned off. He has been weaned off of Precedex. He did have some vomiting on 5/21/22. Lovenox was transitioned to Eliquis on 5/21/22. KUB unrevealing, tube feeds restarted on 5/22/22. He was cleared for downgrade to the floor on 5/22/22 under hospitalist services.        C. Medical decision making:  Acute Hypoxemic Respiratory Failure s/p intubation, trach placement on 5/10/22  Multivessel CAD s/p CABG x4, 04/06/2022.  MRSA sternal wound infection and dehiscence, status post wound vacuum-assisted closure device, 04/17/2022, and bilateral pectoral flaps for sternal wound closure, 05/11/2022.  Postoperative agitation and Delirium  Acute RUE DVT, currently on full-dose Lovenox  Normocytic Anemia  s/p PEG placment on 5/10/22  Severe PCM  Hypokalemia  Hx of Bipolar Disorder and Schizophrenia, chronic hepatitis C, hypertension, seizures, CAD s/p stents       Plan:  Continue supplemental O2 via trach collar  Cardiac Monitoring  ID, CV surgery, Plastics all following  Abx per ID - Rifampin and Vancomycin for a 6 week course  Continue fiull dose Lovenox  Continue Tube feeds  Labs in AM    All diagnosis and differential diagnosis have been reviewed; assessment and plan has been documented; I have personally reviewed the labs and test results that  are presently available; I have reviewed the patients medication list; I have reviewed the consulting providers response and recommendations. I have reviewed or attempted to review medical records based upon their availability.    All of the patient and family questions have been addressed and answered. Patient's is agreeable to the above stated plan. I will continue to monitor closely and make adjustments to medical management as needed.      05/22/2022

## 2022-05-23 LAB
ALBUMIN SERPL-MCNC: 2.4 GM/DL (ref 3.5–5)
ALBUMIN/GLOB SERPL: 0.5 RATIO (ref 1.1–2)
ALP SERPL-CCNC: 95 UNIT/L (ref 40–150)
ALT SERPL-CCNC: 88 UNIT/L (ref 0–55)
AST SERPL-CCNC: 110 UNIT/L (ref 5–34)
BASOPHILS # BLD AUTO: 0.04 X10(3)/MCL (ref 0–0.2)
BASOPHILS NFR BLD AUTO: 0.2 %
BILIRUBIN DIRECT+TOT PNL SERPL-MCNC: 1.3 MG/DL
BUN SERPL-MCNC: 11.4 MG/DL (ref 8.4–25.7)
CALCIUM SERPL-MCNC: 8.7 MG/DL (ref 8.4–10.2)
CHLORIDE SERPL-SCNC: 104 MMOL/L (ref 98–107)
CO2 SERPL-SCNC: 21 MMOL/L (ref 22–29)
CREAT SERPL-MCNC: 0.68 MG/DL (ref 0.73–1.18)
EOSINOPHIL # BLD AUTO: 0.01 X10(3)/MCL (ref 0–0.9)
EOSINOPHIL NFR BLD AUTO: 0.1 %
ERYTHROCYTE [DISTWIDTH] IN BLOOD BY AUTOMATED COUNT: 17.1 % (ref 11.5–17)
GLOBULIN SER-MCNC: 4.4 GM/DL (ref 2.4–3.5)
GLUCOSE SERPL-MCNC: 132 MG/DL (ref 74–100)
HCT VFR BLD AUTO: 28.5 % (ref 42–52)
HGB BLD-MCNC: 8.5 GM/DL (ref 14–18)
IMM GRANULOCYTES # BLD AUTO: 0.13 X10(3)/MCL (ref 0–0.02)
IMM GRANULOCYTES NFR BLD AUTO: 0.8 % (ref 0–0.43)
LYMPHOCYTES # BLD AUTO: 1.98 X10(3)/MCL (ref 0.6–4.6)
LYMPHOCYTES NFR BLD AUTO: 11.5 %
MCH RBC QN AUTO: 26.9 PG (ref 27–31)
MCHC RBC AUTO-ENTMCNC: 29.8 MG/DL (ref 33–36)
MCV RBC AUTO: 90.2 FL (ref 80–94)
MONOCYTES # BLD AUTO: 1.43 X10(3)/MCL (ref 0.1–1.3)
MONOCYTES NFR BLD AUTO: 8.3 %
NEUTROPHILS # BLD AUTO: 13.7 X10(3)/MCL (ref 2.1–9.2)
NEUTROPHILS NFR BLD AUTO: 79.1 %
NRBC BLD AUTO-RTO: 0 %
PLATELET # BLD AUTO: 510 X10(3)/MCL (ref 130–400)
PMV BLD AUTO: 9.6 FL (ref 9.4–12.4)
POTASSIUM SERPL-SCNC: 4.4 MMOL/L (ref 3.5–5.1)
PROT SERPL-MCNC: 6.8 GM/DL (ref 6.4–8.3)
RBC # BLD AUTO: 3.16 X10(6)/MCL (ref 4.7–6.1)
SODIUM SERPL-SCNC: 136 MMOL/L (ref 136–145)
WBC # SPEC AUTO: 17.3 X10(3)/MCL (ref 4.5–11.5)

## 2022-05-23 PROCEDURE — 25000003 PHARM REV CODE 250: Performed by: INTERNAL MEDICINE

## 2022-05-23 PROCEDURE — C1751 CATH, INF, PER/CENT/MIDLINE: HCPCS

## 2022-05-23 PROCEDURE — A4216 STERILE WATER/SALINE, 10 ML: HCPCS

## 2022-05-23 PROCEDURE — 94761 N-INVAS EAR/PLS OXIMETRY MLT: CPT

## 2022-05-23 PROCEDURE — 63600175 PHARM REV CODE 636 W HCPCS: Performed by: INTERNAL MEDICINE

## 2022-05-23 PROCEDURE — 20000000 HC ICU ROOM

## 2022-05-23 PROCEDURE — 25000003 PHARM REV CODE 250: Performed by: STUDENT IN AN ORGANIZED HEALTH CARE EDUCATION/TRAINING PROGRAM

## 2022-05-23 PROCEDURE — 80053 COMPREHEN METABOLIC PANEL: CPT | Performed by: NURSE PRACTITIONER

## 2022-05-23 PROCEDURE — 36415 COLL VENOUS BLD VENIPUNCTURE: CPT | Performed by: NURSE PRACTITIONER

## 2022-05-23 PROCEDURE — 25000242 PHARM REV CODE 250 ALT 637 W/ HCPCS

## 2022-05-23 PROCEDURE — 27000221 HC OXYGEN, UP TO 24 HOURS

## 2022-05-23 PROCEDURE — 85025 COMPLETE CBC W/AUTO DIFF WBC: CPT | Performed by: NURSE PRACTITIONER

## 2022-05-23 PROCEDURE — 63600175 PHARM REV CODE 636 W HCPCS

## 2022-05-23 PROCEDURE — 25000003 PHARM REV CODE 250: Performed by: HOSPITALIST

## 2022-05-23 PROCEDURE — 99900026 HC AIRWAY MAINTENANCE (STAT)

## 2022-05-23 PROCEDURE — 25000003 PHARM REV CODE 250

## 2022-05-23 RX ORDER — LEVETIRACETAM 500 MG/1
500 TABLET ORAL 2 TIMES DAILY
Status: DISCONTINUED | OUTPATIENT
Start: 2022-05-23 | End: 2022-06-17 | Stop reason: HOSPADM

## 2022-05-23 RX ADMIN — APIXABAN 10 MG: 5 TABLET, FILM COATED ORAL at 09:05

## 2022-05-23 RX ADMIN — OXCARBAZEPINE 300 MG: 300 TABLET, FILM COATED ORAL at 08:05

## 2022-05-23 RX ADMIN — VANCOMYCIN HYDROCHLORIDE 750 MG: 1 INJECTION, POWDER, LYOPHILIZED, FOR SOLUTION INTRAVENOUS at 10:05

## 2022-05-23 RX ADMIN — LORAZEPAM 2 MG: 2 INJECTION INTRAMUSCULAR; INTRAVENOUS at 11:05

## 2022-05-23 RX ADMIN — GLYCOPYRROLATE 1 MG: 1 TABLET ORAL at 09:05

## 2022-05-23 RX ADMIN — LEVETIRACETAM 500 MG: 100 INJECTION, SOLUTION INTRAVENOUS at 08:05

## 2022-05-23 RX ADMIN — ZIPRASIDONE MESYLATE 20 MG: 20 INJECTION, POWDER, LYOPHILIZED, FOR SOLUTION INTRAMUSCULAR at 04:05

## 2022-05-23 RX ADMIN — VANCOMYCIN HYDROCHLORIDE 750 MG: 1 INJECTION, POWDER, LYOPHILIZED, FOR SOLUTION INTRAVENOUS at 03:05

## 2022-05-23 RX ADMIN — BENZTROPINE MESYLATE 1 MG: 1 TABLET ORAL at 08:05

## 2022-05-23 RX ADMIN — RIFAMPIN 300 MG: 300 CAPSULE ORAL at 08:05

## 2022-05-23 RX ADMIN — SODIUM CHLORIDE, PRESERVATIVE FREE 10 ML: 5 INJECTION INTRAVENOUS at 09:05

## 2022-05-23 RX ADMIN — OXCARBAZEPINE 300 MG: 300 TABLET, FILM COATED ORAL at 09:05

## 2022-05-23 RX ADMIN — METOPROLOL TARTRATE 25 MG: 25 TABLET, FILM COATED ORAL at 08:05

## 2022-05-23 RX ADMIN — FAMOTIDINE 20 MG: 10 INJECTION, SOLUTION INTRAVENOUS at 09:05

## 2022-05-23 RX ADMIN — EZETIMIBE 10 MG: 10 TABLET ORAL at 09:05

## 2022-05-23 RX ADMIN — LORAZEPAM 2 MG: 2 INJECTION INTRAMUSCULAR; INTRAVENOUS at 01:05

## 2022-05-23 RX ADMIN — VANCOMYCIN HYDROCHLORIDE 750 MG: 1 INJECTION, POWDER, LYOPHILIZED, FOR SOLUTION INTRAVENOUS at 06:05

## 2022-05-23 RX ADMIN — GLYCOPYRROLATE 1 MG: 1 TABLET ORAL at 02:05

## 2022-05-23 RX ADMIN — LEVETIRACETAM 500 MG: 500 TABLET, FILM COATED ORAL at 09:05

## 2022-05-23 RX ADMIN — GLYCOPYRROLATE 1 MG: 1 TABLET ORAL at 08:05

## 2022-05-23 RX ADMIN — HALOPERIDOL 5 MG: 5 TABLET ORAL at 09:05

## 2022-05-23 RX ADMIN — FENTANYL CITRATE 100 MCG: 50 INJECTION INTRAMUSCULAR; INTRAVENOUS at 02:05

## 2022-05-23 RX ADMIN — POLYETHYLENE GLYCOL 3350 17 G: 17 POWDER, FOR SOLUTION ORAL at 08:05

## 2022-05-23 RX ADMIN — RIFAMPIN 300 MG: 300 CAPSULE ORAL at 09:05

## 2022-05-23 RX ADMIN — IPRATROPIUM BROMIDE AND ALBUTEROL SULFATE 3 ML: .5; 2.5 SOLUTION RESPIRATORY (INHALATION) at 03:05

## 2022-05-23 RX ADMIN — SODIUM CHLORIDE, PRESERVATIVE FREE 10 ML: 5 INJECTION INTRAVENOUS at 08:05

## 2022-05-23 RX ADMIN — LORAZEPAM 2 MG: 2 INJECTION INTRAMUSCULAR; INTRAVENOUS at 04:05

## 2022-05-23 RX ADMIN — FAMOTIDINE 20 MG: 10 INJECTION, SOLUTION INTRAVENOUS at 08:05

## 2022-05-23 RX ADMIN — ATORVASTATIN CALCIUM 80 MG: 40 TABLET, FILM COATED ORAL at 08:05

## 2022-05-23 RX ADMIN — DOXEPIN HYDROCHLORIDE 50 MG: 50 CAPSULE ORAL at 09:05

## 2022-05-23 RX ADMIN — APIXABAN 10 MG: 5 TABLET, FILM COATED ORAL at 08:05

## 2022-05-23 RX ADMIN — BENZTROPINE MESYLATE 1 MG: 1 TABLET ORAL at 09:05

## 2022-05-23 RX ADMIN — HALOPERIDOL 5 MG: 5 TABLET ORAL at 08:05

## 2022-05-23 NOTE — PROGRESS NOTES
[facility]  Hospital Medicine Progress Note        Chief Complaint: Inpatient Follow-up for surgical site infection    HPI:   Kendall Duff is a 56 y.o. male with a PMHx of chronic hepatitis C, hypertension, seizures, CAD s/p stents who initially presented to [facility] on 4/1/2022 with a primary complaint of chest pain. Of note, he was seen at an outlying ED on 3/20/22 and was dx with MI, subsequently transferred to [facility] where he underwent LHC with angioplasty, and deemed appropriate for CABG, which had been scheduled for 3/29/22, but was cancelled. He then presented to ED on 4/1 with c/o CP earlier in the day. He was admitted to cardiology services, started on a heparin gtt. CV Surgery was consulted and the patient underwent CABG x4 on 4/6/22. He was admitted to ICU post CABG, intubated on mechanical ventilation. Psych was consulted during his stay to evaluate for some recent agitation, restlessness, fidgetiness, insomnia, confusion, and sexual inappropriateness, and he was started on PRN haldol. He was extubated to NC 4L on 4/7, remained restless and confused requiring Precedex gtt.  On 4/10/22 he experienced seizure-like activity and confusion for which neuro was consulted. EEG revealed moderate generalized slowing. CT Head was negative for acute intracranial abnormality, gas noted throughout the bilateral subcutaneous soft tissues.MRI on 4/15/22 revealed small linear focus of acute small vessel ischemia in the right frontal white matter. Developed diffuse subcutaneous emphysema per CXR. He remained agitated with delirium.  Unfortunately,he pulled out his left chest tube on 4/16/22 and he had been thrashing in bed, had to be restrained at times.  He developed an unstable sternum with purulence drainage. CT thorax revealed surgical changes with inflammation, fluid and air locules, minimal anterior pericardial effusion, extensive soft tissue emphysema, and bilateral pleural  effusions and bilateral lower lung lobes collapse consolidations. He has known subcutaneous air, which has been present on previous x-rays.  Cultures positive for MRSA, and he was placed on Vancomycin. He required intubation on 4/16/22. On 4/17/22 he underwent sternal wound debridement with wound vac placement. ID was consulted on 4/22/22 due to sepsis. He was started on rifampin in addition to vancomycin for a planned 6 week course. Respiratory cultures from 4/24/22 returned positive for proteus and klebsiella. Remained intubated on mechanical ventilation. GI was consulted on 5/3/22 for PEG placement. Plastic surgery was also following for sternal reconstruction. He underwent tracheostomy and PEG placement on 5/10/22. NIVA done on 5/10/22 noted a RUE DVT. On 5/11/22 he underwent sternal wound debridement, bilateral fasciocutaneous flap advanced closure over sternum and bilateral pectoralis major muscle flap reconstruction of sternum. He was started on full dose Lovenox. He remained on mechanical ventilation until 5/21/22. Tolerating T-piece now on trach collar. He required vasopressors for BP support throughout his stay, which have since been weaned off. He has been weaned off of Precedex. He did have some vomiting on 5/21/22. Lovenox was transitioned to Eliquis on 5/21/22. KUB unrevealing, tube feeds restarted on 5/22/22. He was cleared for downgrade to the floor on 5/22/22 under hospitalist services.         Interval Hx:   Vitals stable- bp elevated slightly   Sudden jump in wbc to 17.3, ??? , will monitor , on antibiotics per ID recs   hgb 8.5, monitor   Liver enzymes elevated , monitor  PRS input noted , drains in place       Objective/physical exam:  General appearance: disheveled male,   HENT: Atraumatic head. dry membranes of oral cavity.  Eyes: Normal extraocular movements.   Lungs: No respiratory distress. Trach in place   Heart: Regular rate and rhythm. S1 and S2 present. No pedal edema.  Abdomen: Soft,  non-distended, non-tender. PEG, abd binder on  Extremities: No cyanosis, clubbing, or edema.  Skin: sternal incision C/D/I with KELLIE drains  Neuro: alert, not oriented , not conversational, moves limbs spontaneously   Psych/mental status: non conversational        VITAL SIGNS: 24 HRS MIN & MAX LAST   Temp  Min: 98.4 °F (36.9 °C)  Max: 100 °F (37.8 °C) 99.7 °F (37.6 °C)   BP  Min: 118/92  Max: 169/119 (!) 155/91   Pulse  Min: 91  Max: 133  110   Resp  Min: 18  Max: 46 (!) 43   SpO2  Min: 68 %  Max: 97 % (!) 93 %       Recent Labs   Lab 05/19/22  0149 05/22/22 0447 05/23/22  0156   WBC 9.6 10.0 17.3*   RBC 3.25* 3.40* 3.16*   HGB 8.6* 9.1* 8.5*   HCT 27.7* 29.2* 28.5*   MCV 85.2 85.9 90.2   MCH 26.5* 26.8* 26.9*   MCHC 31.0* 31.2* 29.8*   RDW 15.9 16.6 17.1*    399 510*   MPV 10.3 10.1 9.6       Recent Labs   Lab 05/17/22  0432 05/18/22  0139 05/19/22  0149 05/19/22  0542 05/20/22  0542 05/21/22  1904 05/22/22 0447 05/23/22  0156   NA  --  135* 135*  --   --  137 136 136   K  --  4.1 3.9  --   --  3.1* 3.3* 4.4   CO2  --  23 22  --   --  21* 22 21*   BUN  --  19.8 18.0  --   --  9.2 7.5* 11.4   CREATININE  --  0.66* 0.59*  --   --  0.58* 0.62* 0.68*   CALCIUM  --  8.2* 8.4  --   --  8.6 8.1* 8.7   PH 7.46*  --   --  7.44 7.450  --   --   --    MG  --  1.90  --   --   --  1.80 2.10  --    ALBUMIN  --  1.6* 1.7*  --   --   --  2.0* 2.4*   ALKPHOS  --  76 73  --   --   --  88 95   ALT  --  22 24  --   --   --  29 88*   AST  --  36* 43*  --   --   --  42* 110*   BILITOT  --  0.5 0.7  --   --   --  0.7 1.3          Microbiology Results (last 7 days)     Procedure Component Value Units Date/Time    Clostridium difficile EIA [[id number]]     Order Status: Canceled Specimen: Stool     Blood Culture [[id number]]  (Normal) Collected: 05/12/22 1740    Order Status: Completed Specimen: Blood from Arm Updated: 05/17/22 1902     CULTURE, BLOOD ([facility]) No Growth at 5 days    Blood Culture [[id number]]  (Normal) Collected: 05/12/22  1741    Order Status: Completed Specimen: Blood from Hand, Left Updated: 05/17/22 1902     CULTURE, BLOOD ([facility]) No Growth at 5 days    Tissue Culture - Aerobic [[id number]] Collected: 05/11/22 1432    Order Status: Completed Specimen: Bone from Sternum Updated: 05/17/22 0936     CULTURE, TISSUE- AEROBIC ([facility]) No Growth at 5 days    Tissue Culture - Aerobic [[id number]] Collected: 05/11/22 1429    Order Status: Completed Specimen: Tissue from Sternum Updated: 05/17/22 0935     CULTURE, TISSUE- AEROBIC ([facility]) No Growth at 5 days           See below for Radiology    Scheduled Med:   apixaban  10 mg Oral BID    [START ON 5/29/2022] apixaban  5 mg Oral BID    atorvastatin  80 mg Oral Daily    benztropine  1 mg Per OG tube BID    doxepin  50 mg Oral QHS    ezetimibe  10 mg Oral QHS    famotidine (PF)  20 mg Intravenous BID    glycopyrrolate  1 mg Per NG tube TID    haloperidoL  5 mg Oral BID    levETIRAcetam  500 mg Oral BID    metoprolol tartrate  25 mg Per OG tube Daily    OXcarbazepine  300 mg Per OG tube BID    polyethylene glycol  17 g Oral Daily    rifAMpin  300 mg Oral BID    sodium chloride 0.9%  10 mL Intravenous Q12H    vancomycin (VANCOCIN) IVPB  750 mg Intravenous Q8H        Continuous Infusions:       PRN Meds:  sodium chloride, acetaminophen, albuterol-ipratropium, bisacodyL, dextrose 10%, fentaNYL, fentaNYL, hydrALAZINE, lorazepam, metoprolol, morphine, morphine, nitroGLYCERIN, OLANZapine, ondansetron, oxyCODONE, oxyCODONE, potassium chloride in water, potassium chloride in water, potassium chloride in water, promethazine, sodium chloride 0.9%, vancomycin - pharmacy to dose, ziprasidone       Assessment/Plan:  Acute Hypoxemic Respiratory Failure s/p intubation, trach placement on 5/10/22  Multivessel CAD s/p CABG x4, 04/06/2022.  MRSA sternal wound infection and dehiscence, status post wound vacuum-assisted closure device, 04/17/2022, and bilateral pectoral flaps for sternal wound closure,  05/11/2022.  Postoperative agitation and Delirium  Acute RUE DVT  Normocytic Anemia  s/p PEG placment on 5/10/22  Severe PCM  Hypokalemia  HTN, uncontrolled  Seizure disorder   Leucocytosis ???cause   Transaminitis, mild     Hx of Bipolar Disorder and Schizophrenia, chronic hepatitis C, hypertension, seizures, CAD s/p stents         Plan:  Continue supplemental O2 via trach collar  Cardiac Monitoring  bp control  Change to po keppra 500mg bid   Continue apixaban   Sudden jump in wbc to 17.3, ??? , will monitor , on antibiotics per ID recs   Continue Abx per ID - Rifampin and Vancomycin for a 6 week course  PRS input noted , drains in place   Trend labs- cbc, cmp  Continue tube feeds, po meds   Pt/st/ot   ID, CV surgery, Plastics, neuro, psych, GI all following    Patient condition:  Stable/Fair/Guarded/ Serious/ Critical    Anticipated discharge and Disposition:      All diagnosis and differential diagnosis have been reviewed; assessment and plan has been documented; I have personally reviewed the labs and test results that are presently available; I have reviewed the patients medication list; I have reviewed the consulting providers response and recommendations. I have reviewed or attempted to review medical records based upon their availability    All of the patient's questions have been  addressed and answered. Patient's is agreeable to the above stated plan. I will continue to monitor closely and make adjustments to medical management as needed.  _____________________________________________________________________    Nutrition Status:    Radiology:  X-Ray Abdomen AP 1 View  Narrative: EXAMINATION:  XR ABDOMEN AP 1 VIEW    CLINICAL HISTORY:  ; Vomiting;    TECHNIQUE:  Supine AP view of the abdomen.    COMPARISON:  3 May 2022    FINDINGS:  Lines/tubes/devices: Enteric tube is no longer visualized.  Multiple other lines/tubes are partially visualized over the imaged region.    Detection of air-fluid levels and  low-volume pneumoperitoneum is limited due to supine technique.    A non-obstructed bowel gas pattern is present. No intra-abdominal mass effect is appreciated.    Included lower thoracic cavity and osseous structures are without acute abnormality.  Impression: 1. Interval removal of NG tube.  2. No convincing evidence of new or worsening intra-abdominal process.    Electronically signed by: Abner Reyes  Date:    05/21/2022  Time:    13:57      Isrrael Layne MD   05/23/2022

## 2022-05-23 NOTE — PLAN OF CARE
Problem: Adult Inpatient Plan of Care  Goal: Plan of Care Review  Outcome: Ongoing, Progressing  Goal: Absence of Hospital-Acquired Illness or Injury  Outcome: Ongoing, Progressing  Goal: Optimal Comfort and Wellbeing  Outcome: Ongoing, Progressing  Goal: Readiness for Transition of Care  Outcome: Ongoing, Progressing     Problem: Infection  Goal: Absence of Infection Signs and Symptoms  Outcome: Ongoing, Progressing     Problem: Communication Impairment (Mechanical Ventilation, Invasive)  Goal: Effective Communication  Outcome: Ongoing, Progressing     Problem: Device-Related Complication Risk (Mechanical Ventilation, Invasive)  Goal: Optimal Device Function  Outcome: Ongoing, Progressing     Problem: Nutrition Impairment (Mechanical Ventilation, Invasive)  Goal: Optimal Nutrition Delivery  Outcome: Ongoing, Progressing     Problem: Skin and Tissue Injury (Mechanical Ventilation, Invasive)  Goal: Absence of Device-Related Skin and Tissue Injury  Outcome: Ongoing, Progressing     Problem: Communication Impairment (Artificial Airway)  Goal: Effective Communication  Outcome: Ongoing, Progressing     Problem: Skin and Tissue Injury (Artificial Airway)  Goal: Absence of Device-Related Skin or Tissue Injury  Outcome: Ongoing, Progressing     Problem: Fall Injury Risk  Goal: Absence of Fall and Fall-Related Injury  Outcome: Ongoing, Progressing     Problem: Skin Injury Risk Increased  Goal: Skin Health and Integrity  Outcome: Ongoing, Progressing     Problem: Impaired Wound Healing  Goal: Optimal Wound Healing  Outcome: Ongoing, Progressing

## 2022-05-23 NOTE — PROGRESS NOTES
PRS  Awake, moving around but not having conversation  Incisions c/d/i  Drains s/s  He is doing well.  Continue sedation as needed to avoid damage to his repair.  Arms in at sides at all times, drain care

## 2022-05-24 LAB
ALBUMIN SERPL-MCNC: 2.3 GM/DL (ref 3.5–5)
ALBUMIN/GLOB SERPL: 0.5 RATIO (ref 1.1–2)
ALP SERPL-CCNC: 110 UNIT/L (ref 40–150)
ALT SERPL-CCNC: 364 UNIT/L (ref 0–55)
AST SERPL-CCNC: 439 UNIT/L (ref 5–34)
BASOPHILS # BLD AUTO: 0.04 X10(3)/MCL (ref 0–0.2)
BASOPHILS NFR BLD AUTO: 0.2 %
BILIRUBIN DIRECT+TOT PNL SERPL-MCNC: 1.2 MG/DL
BUN SERPL-MCNC: 20.3 MG/DL (ref 8.4–25.7)
CALCIUM SERPL-MCNC: 8.6 MG/DL (ref 8.4–10.2)
CHLORIDE SERPL-SCNC: 104 MMOL/L (ref 98–107)
CO2 SERPL-SCNC: 21 MMOL/L (ref 22–29)
CREAT SERPL-MCNC: 0.73 MG/DL (ref 0.73–1.18)
EOSINOPHIL # BLD AUTO: 0.01 X10(3)/MCL (ref 0–0.9)
EOSINOPHIL NFR BLD AUTO: 0.1 %
ERYTHROCYTE [DISTWIDTH] IN BLOOD BY AUTOMATED COUNT: 17.8 % (ref 11.5–17)
GLOBULIN SER-MCNC: 4.3 GM/DL (ref 2.4–3.5)
GLUCOSE SERPL-MCNC: 140 MG/DL (ref 74–100)
HCT VFR BLD AUTO: 27.4 % (ref 42–52)
HGB BLD-MCNC: 8.4 GM/DL (ref 14–18)
IMM GRANULOCYTES # BLD AUTO: 0.11 X10(3)/MCL (ref 0–0.02)
IMM GRANULOCYTES NFR BLD AUTO: 0.7 % (ref 0–0.43)
LYMPHOCYTES # BLD AUTO: 1.88 X10(3)/MCL (ref 0.6–4.6)
LYMPHOCYTES NFR BLD AUTO: 11.7 %
MCH RBC QN AUTO: 26.8 PG (ref 27–31)
MCHC RBC AUTO-ENTMCNC: 30.7 MG/DL (ref 33–36)
MCV RBC AUTO: 87.3 FL (ref 80–94)
MONOCYTES # BLD AUTO: 1.02 X10(3)/MCL (ref 0.1–1.3)
MONOCYTES NFR BLD AUTO: 6.3 %
NEUTROPHILS # BLD AUTO: 13 X10(3)/MCL (ref 2.1–9.2)
NEUTROPHILS NFR BLD AUTO: 81 %
NRBC BLD AUTO-RTO: 0 %
PLATELET # BLD AUTO: 407 X10(3)/MCL (ref 130–400)
PMV BLD AUTO: 10.1 FL (ref 9.4–12.4)
POTASSIUM SERPL-SCNC: 4.3 MMOL/L (ref 3.5–5.1)
PROT SERPL-MCNC: 6.6 GM/DL (ref 6.4–8.3)
RBC # BLD AUTO: 3.14 X10(6)/MCL (ref 4.7–6.1)
SODIUM SERPL-SCNC: 134 MMOL/L (ref 136–145)
VANCOMYCIN TROUGH SERPL-MCNC: 22.6 UG/ML (ref 15–20)
WBC # SPEC AUTO: 16.1 X10(3)/MCL (ref 4.5–11.5)

## 2022-05-24 PROCEDURE — 94761 N-INVAS EAR/PLS OXIMETRY MLT: CPT

## 2022-05-24 PROCEDURE — 36415 COLL VENOUS BLD VENIPUNCTURE: CPT | Performed by: INTERNAL MEDICINE

## 2022-05-24 PROCEDURE — 25000003 PHARM REV CODE 250: Performed by: STUDENT IN AN ORGANIZED HEALTH CARE EDUCATION/TRAINING PROGRAM

## 2022-05-24 PROCEDURE — 25000003 PHARM REV CODE 250: Performed by: INTERNAL MEDICINE

## 2022-05-24 PROCEDURE — 25000003 PHARM REV CODE 250: Performed by: HOSPITALIST

## 2022-05-24 PROCEDURE — 99900035 HC TECH TIME PER 15 MIN (STAT)

## 2022-05-24 PROCEDURE — 92597 ORAL SPEECH DEVICE EVAL: CPT

## 2022-05-24 PROCEDURE — 63600175 PHARM REV CODE 636 W HCPCS: Performed by: HOSPITALIST

## 2022-05-24 PROCEDURE — 85025 COMPLETE CBC W/AUTO DIFF WBC: CPT | Performed by: INTERNAL MEDICINE

## 2022-05-24 PROCEDURE — 25000003 PHARM REV CODE 250

## 2022-05-24 PROCEDURE — 63600175 PHARM REV CODE 636 W HCPCS

## 2022-05-24 PROCEDURE — 80202 ASSAY OF VANCOMYCIN: CPT | Performed by: INTERNAL MEDICINE

## 2022-05-24 PROCEDURE — 63600175 PHARM REV CODE 636 W HCPCS: Performed by: INTERNAL MEDICINE

## 2022-05-24 PROCEDURE — 97167 OT EVAL HIGH COMPLEX 60 MIN: CPT

## 2022-05-24 PROCEDURE — 25000242 PHARM REV CODE 250 ALT 637 W/ HCPCS

## 2022-05-24 PROCEDURE — 20000000 HC ICU ROOM

## 2022-05-24 PROCEDURE — 99900026 HC AIRWAY MAINTENANCE (STAT)

## 2022-05-24 PROCEDURE — 80053 COMPREHEN METABOLIC PANEL: CPT | Performed by: INTERNAL MEDICINE

## 2022-05-24 PROCEDURE — 27200966 HC CLOSED SUCTION SYSTEM

## 2022-05-24 PROCEDURE — 97163 PT EVAL HIGH COMPLEX 45 MIN: CPT

## 2022-05-24 PROCEDURE — A4216 STERILE WATER/SALINE, 10 ML: HCPCS

## 2022-05-24 PROCEDURE — 27000221 HC OXYGEN, UP TO 24 HOURS

## 2022-05-24 PROCEDURE — 92607 EX FOR SPEECH DEVICE RX 1HR: CPT

## 2022-05-24 RX ORDER — FUROSEMIDE 10 MG/ML
INJECTION INTRAMUSCULAR; INTRAVENOUS
Status: DISPENSED
Start: 2022-05-24 | End: 2022-05-25

## 2022-05-24 RX ORDER — FUROSEMIDE 10 MG/ML
40 INJECTION INTRAMUSCULAR; INTRAVENOUS ONCE
Status: COMPLETED | OUTPATIENT
Start: 2022-05-24 | End: 2022-05-24

## 2022-05-24 RX ORDER — SODIUM CHLORIDE 900 MG/100ML
1000 INJECTION INTRAVENOUS ONCE
Status: DISCONTINUED | OUTPATIENT
Start: 2022-05-24 | End: 2022-05-24

## 2022-05-24 RX ORDER — SODIUM CHLORIDE 9 MG/ML
1000 INJECTION, SOLUTION INTRAVENOUS ONCE
Status: COMPLETED | OUTPATIENT
Start: 2022-05-24 | End: 2022-05-24

## 2022-05-24 RX ADMIN — VANCOMYCIN HYDROCHLORIDE 1250 MG: 1.25 INJECTION, POWDER, LYOPHILIZED, FOR SOLUTION INTRAVENOUS at 03:05

## 2022-05-24 RX ADMIN — HALOPERIDOL 5 MG: 5 TABLET ORAL at 08:05

## 2022-05-24 RX ADMIN — ACETAMINOPHEN 650 MG: 325 TABLET ORAL at 08:05

## 2022-05-24 RX ADMIN — APIXABAN 10 MG: 5 TABLET, FILM COATED ORAL at 08:05

## 2022-05-24 RX ADMIN — SODIUM CHLORIDE 1000 ML: 9 INJECTION, SOLUTION INTRAVENOUS at 01:05

## 2022-05-24 RX ADMIN — GLYCOPYRROLATE 1 MG: 1 TABLET ORAL at 08:05

## 2022-05-24 RX ADMIN — OXCARBAZEPINE 300 MG: 300 TABLET, FILM COATED ORAL at 08:05

## 2022-05-24 RX ADMIN — GLYCOPYRROLATE 1 MG: 1 TABLET ORAL at 03:05

## 2022-05-24 RX ADMIN — RIFAMPIN 300 MG: 300 CAPSULE ORAL at 08:05

## 2022-05-24 RX ADMIN — LORAZEPAM 2 MG: 2 INJECTION INTRAMUSCULAR; INTRAVENOUS at 05:05

## 2022-05-24 RX ADMIN — SODIUM CHLORIDE, PRESERVATIVE FREE 10 ML: 5 INJECTION INTRAVENOUS at 08:05

## 2022-05-24 RX ADMIN — VANCOMYCIN HYDROCHLORIDE 750 MG: 1 INJECTION, POWDER, LYOPHILIZED, FOR SOLUTION INTRAVENOUS at 02:05

## 2022-05-24 RX ADMIN — BENZTROPINE MESYLATE 1 MG: 1 TABLET ORAL at 08:05

## 2022-05-24 RX ADMIN — POLYETHYLENE GLYCOL 3350 17 G: 17 POWDER, FOR SOLUTION ORAL at 08:05

## 2022-05-24 RX ADMIN — FUROSEMIDE 40 MG: 10 INJECTION, SOLUTION INTRAMUSCULAR; INTRAVENOUS at 06:05

## 2022-05-24 RX ADMIN — FENTANYL CITRATE 50 MCG: 50 INJECTION INTRAMUSCULAR; INTRAVENOUS at 04:05

## 2022-05-24 RX ADMIN — LEVETIRACETAM 500 MG: 500 TABLET, FILM COATED ORAL at 08:05

## 2022-05-24 RX ADMIN — ACETAMINOPHEN 650 MG: 325 TABLET ORAL at 04:05

## 2022-05-24 RX ADMIN — METOPROLOL TARTRATE 25 MG: 25 TABLET, FILM COATED ORAL at 08:05

## 2022-05-24 RX ADMIN — FAMOTIDINE 20 MG: 10 INJECTION, SOLUTION INTRAVENOUS at 08:05

## 2022-05-24 RX ADMIN — ATORVASTATIN CALCIUM 80 MG: 40 TABLET, FILM COATED ORAL at 08:05

## 2022-05-24 RX ADMIN — GLYCOPYRROLATE 1 MG: 1 TABLET ORAL at 09:05

## 2022-05-24 RX ADMIN — HYDRALAZINE HYDROCHLORIDE 10 MG: 20 INJECTION INTRAMUSCULAR; INTRAVENOUS at 04:05

## 2022-05-24 RX ADMIN — IPRATROPIUM BROMIDE AND ALBUTEROL SULFATE 3 ML: .5; 2.5 SOLUTION RESPIRATORY (INHALATION) at 05:05

## 2022-05-24 RX ADMIN — DOXEPIN HYDROCHLORIDE 50 MG: 50 CAPSULE ORAL at 08:05

## 2022-05-24 RX ADMIN — EZETIMIBE 10 MG: 10 TABLET ORAL at 08:05

## 2022-05-24 NOTE — PLAN OF CARE
Problem: Occupational Therapy  Goal: Occupational Therapy Goal  Description: Goals to be met by: 6/21/22     Patient will increase functional independence with ADLs by performing:    Feeding with Moderate Assistance. When appropriate to initiate, or simulated hand to mouth.   UE Dressing with Moderate Assistance.  Grooming while long sitting/EOB/supported with Moderate Assistance.  Sitting at edge of bed x10-15 minutes with Moderate Assistance.  Toilet transfer to bedside commode with Moderate Assistance.    Outcome: Ongoing, Progressing

## 2022-05-24 NOTE — PROGRESS NOTES
[facility] - [facility] [place] [facility]  Adult Nutrition  Progress Note    SUMMARY       Recommendations    Recommendation/Intervention: Impact Peptide 1.5 @ 80ml/hr (goal rate)  Goals: Meet >/=75% est energy and protein requirements by f/u  Nutrition Goal Status: progressing towards goal  Communication of RD Recs: reviewed with RN    Assessment and Plan    Nutrition Problem  Inadequate Oral Intake     Related to (etiology):   Current condition             Signs and Symptoms (as evidenced by):   trach     Interventions(treatment strategy):  Collaboration with other providers     Nutrition Diagnosis Status:   Continues    Malnutrition Assessment  Malnutrition Type: acute illness or injury  Weight Loss (Malnutrition): other (see comments) (does not meet criteria)  Energy Intake (Malnutrition): other (see comments) (does not meet criteria)  Subcutaneous Fat (Malnutrition):  (does not meet criteria)  Muscle Mass (Malnutrition): mild depletion  Fluid Accumulation (Malnutrition):  (does not meet criteria)  Hand  Strength, Left (Malnutrition):  (unable to eval)  Hand  Strength, Right (Malnutrition):  (unable to eval)   Yarsani Region (Muscle Loss): mild depletion  Clavicle Bone Region (Muscle Loss): mild depletion   Edema (Fluid Accumulation): 0-->no edema present   Subcutaneous Fat Loss (Final Summary): well nourished  Muscle Loss Evaluation (Final Summary): well nourished  Fluid Accumulation Evaluation:  (unable to evaluate)    Moderate Weight Loss (Malnutrition):  (unable to evaluate)    Reason for Assessment    Reason For Assessment: RD follow-up  Diagnosis:   1. CAD with 4 vessel CABG 4/6    2. MRSA Sternal wound dehiscence with debridement  3. Respiratory culture positive Klebsiella and Proteus    4. Malnutrition.    5. Respiratory failure  Relevant Medical History: noted    General Information Comments: Tube feeding continues. Tolerated per RN. Noted now off diprivan. Will update tube feeding to provide  "est kcal needs and eliminate need for protein packets.    Nutrition Risk Screen    Nutrition Risk Screen: tube feeding or parenteral nutrition    Nutrition/Diet History    Factors Affecting Nutritional Intake: on mechanical ventilation    Anthropometrics    Temp: 100 °F (37.8 °C)  Height Method: Estimated  Height: 5' 10.87" (180 cm)  Height (inches): 70.87 in  Weight Method: Bed Scale  Weight: 77.2 kg (170 lb 3.1 oz)  Weight (lb): 170.2 lb  Ideal Body Weight (IBW), Male: 171.22 lb  % Ideal Body Weight, Male (lb): 112.92 %  BMI (Calculated): 23.8  BMI Grade: 18.5-24.9 - normal    Lab/Procedures/Meds    Pertinent Labs Reviewed: reviewed  Pertinent Labs Comments: 5/24/22 Na 134, Glu 140  Pertinent Medications Reviewed: reviewed  Pertinent Medications Comments: miralax    Estimated/Assessed Needs    Weight Used For Calorie Calculations: 77.2 kg (170 lb 3.1 oz)  Energy Calorie Requirements (kcal): 1880kcal  Energy Need Method: [facility]  Protein Requirements: 116-175gm  Weight Used For Protein Calculations: 77.2 kg (170 lb 3.1 oz)  Fluid Requirements (mL): 30mL/kg  Estimated Fluid Requirement Method: RDA Method  RDA Method (mL): 1880    Nutrition Prescription Ordered    Current Diet Order: NPO  Current Nutrition Support Formula Ordered: Peptamen Intense VHP  Current Nutrition Support Rate Ordered: 70 (ml)  Current Nutrition Support Frequency Ordered: based on tube feeding running ~20 hours/day    Evaluation of Received Nutrient/Fluid Intake    Enteral Calories (kcal): 1400  Enteral Protein (gm): 130  Enteral (Free Water) Fluid (mL): 1176  Lipid Calories (kcals): 475 kcals  Other Calories (kcal): 240  Total Calories (kcal): 1640  % Kcal Needs: 87% est needs  Other Protein (gm): 60  Total Protein (gm): 190  Total Protein (gm/kg): 2.46  % Protein Needs: 158%  Energy Calories Required: not meeting needs  Protein Required: meeting needs  Fluid Required: not meeting needs  Tolerance: tolerating  % Intake of Estimated Energy " Needs: 50 - 75 %  % Meal Intake: NPO    Nutrition Risk    Level of Risk/Frequency of Follow-up: high     Monitor and Evaluation    Food and Nutrient Intake: enteral nutrition intake  Food and Nutrient Adminstration: enteral and parenteral nutrition administration     Nutrition Follow-Up    RD Follow-up?: Yes

## 2022-05-24 NOTE — PROGRESS NOTES
Pharmacokinetic Assessment Follow Up: IV Vancomycin    Vancomycin serum concentration assessment(s):    The trough level was drawn correctly and can be used to guide therapy at this time. The measurement is above the desired definitive target range of 15 to 20 mcg/mL.    Vancomycin Regimen Plan:    Change regimen to Vancomycin 1250 mg IV every 12 hours with next serum trough concentration measured at 0200 prior to fourth dose on 05/26.    End Date per ID note: 5/27    Drug levels (last 3 results):  Recent Labs   Lab Result Units 05/22/22  0953 05/24/22  1002   Vancomycin Trough ug/ml 14.4* 22.6*       Pharmacy will continue to follow and monitor vancomycin.    Please contact pharmacy at extension [phone] for questions regarding this assessment.    Thank you for the consult,   Rachel Garcia       Patient brief summary:  Kendall Duff is a 56 y.o. male initiated on antimicrobial therapy with IV Vancomycin for treatment of skin & soft tissue infection    The patient's current regimen is 1250 mg every 12 hours    Drug Allergies:   Review of patient's allergies indicates:   Allergen Reactions    Depakote [divalproex]     Divalproex sodium Other (See Comments)    Lithium     Lithium analogues     Quetiapine Other (See Comments)       Actual Body Weight:   77.2 kg    Renal Function:   Estimated Creatinine Clearance: 117 mL/min (based on SCr of 0.73 mg/dL).,     Dialysis Method (if applicable):  N/A    CBC (last 72 hours):  Recent Labs   Lab Result Units 05/22/22  0447 05/23/22  0156 05/24/22  0208   WBC x10(3)/mcL 10.0 17.3* 16.1*   Hgb gm/dL 9.1* 8.5* 8.4*   Hct % 29.2* 28.5* 27.4*   Platelet x10(3)/mcL 399 510* 407*   Mono % % 9.2 8.3 6.3   Eos % % 0.6 0.1 0.1   Basophil % % 0.5 0.2 0.2       Metabolic Panel (last 72 hours):  Recent Labs   Lab Result Units 05/21/22  1904 05/22/22  0447 05/23/22  0156 05/24/22  0208   Sodium Level mmol/L 137 136 136 134*   Potassium Level mmol/L 3.1* 3.3* 4.4 4.3   Chloride mmol/L 104 104 104  104   Carbon Dioxide mmol/L 21* 22 21* 21*   Glucose Level mg/dL 128* 165* 132* 140*   Blood Urea Nitrogen mg/dL 9.2 7.5* 11.4 20.3   Creatinine mg/dL 0.58* 0.62* 0.68* 0.73   Albumin Level gm/dL  --  2.0* 2.4* 2.3*   Bilirubin Total mg/dL  --  0.7 1.3 1.2   Alkaline Phosphatase unit/L  --  88 95 110   Aspartate Aminotransferase unit/L  --  42* 110* 439*   Alanine Aminotransferase unit/L  --  29 88* 364*   Magnesium Level mg/dL 1.80 2.10  --   --    Phosphorus Level mg/dL 3.0 4.0  --   --        Vancomycin Administrations:  vancomycin given in the last 96 hours                     vancomycin (VANCOCIN) 750 mg in dextrose 5 % 250 mL IVPB (mg) 750 mg New Bag 05/24/22 0256     750 mg New Bag 05/23/22 1825     750 mg New Bag  1038     750 mg New Bag  0300     750 mg New Bag 05/22/22 1906     750 mg New Bag  1114     750 mg New Bag  0331     750 mg New Bag 05/21/22 1838     750 mg New Bag  1126     750 mg New Bag  0337     750 mg New Bag 05/20/22 1901                    Microbiologic Results:  Microbiology Results (last 7 days)       Procedure Component Value Units Date/Time    Clostridium difficile EIA [[id number]]     Order Status: Canceled Specimen: Stool     Blood Culture [[id number]]  (Normal) Collected: 05/12/22 1740    Order Status: Completed Specimen: Blood from Arm Updated: 05/17/22 1902     CULTURE, BLOOD ([facility]) No Growth at 5 days    Blood Culture [[id number]]  (Normal) Collected: 05/12/22 1741    Order Status: Completed Specimen: Blood from Hand, Left Updated: 05/17/22 1902     CULTURE, BLOOD ([facility]) No Growth at 5 days

## 2022-05-24 NOTE — PHYSICIAN QUERY
"PT Name: Kendall Duff  MR #: [id number]    DOCUMENTATION CLARIFICATION     CDS/: Hyun Anna RN              Contact information:  [email or web address]    This form is a permanent document in the medical record.     Query Date: May 24, 2022    By submitting this query, we are merely seeking further clarification of documentation.. Please utilize your independent clinical judgment when addressing the question(s) below.    The medical record contains the following:   Indicators  Supporting Clinical Findings Location in Medical Record   x % of Estimated Energy Intake over a time frame from p.o., TF, or TPN Energy Intake (Malnutrition): other (see comments) (does not meet criteria) Progress Note 5/20       Nutrition   x Weight Status over a time frame Weight Loss (Malnutrition): other (see comments) (does not meet criteria) Progress Note 5/20       Nutrition     x Subcutaneous Fat and/or Muscle Loss Subcutaneous Fat (Malnutrition):  (does not meet criteria)  Muscle Mass (Malnutrition): mild depletion    Subcutaneous Fat Loss (Final Summary): well nourished  Muscle Loss Evaluation (Final Summary): well nourished   Progress Note 5/20       Nutrition   x Fluid Accumulation or Edema Fluid Accumulation (Malnutrition):  (does not meet criteria) Progress Note 5/20       Nutrition     x Wt/BMI/Usual Body Weight Height: 5' 10.87" (180 cm)  Weight: 77.2 kg (170 lb 3.1 oz)  BMI (Calculated): 23.8 Progress Note 5/20       Nutrition    Delayed Wound Healing/Failure to Thrive     x Acute or Chronic Illness Diagnosis: other (see comments) (1. CAD with 4 vessel CABG 4/6  2. MRSA Sternal wound dehiscence with debridement and wound vac 4/17.  Plastics following  3. Respiratory culture positive Klebsiella and Proteus  4. Malnutrition.  5. Respiratory failure.   Intubated 4/15) Progress Note 5/20       Nutrition    Medication     x Treatment s/p PEG placment on 5/10/22  Severe PCM H&P 5/22       Hospital Medicine          x Other " 4. Malnutrition. Progress Note 5/1       Critical Care     AND / ASPEN Clinical Characteristics (October 2011)  A minimum of two characteristics is recommended for diagnosing either moderate or severe malnutrition   Mild Malnutrition Moderate Malnutrition Severe Malnutrition   Energy Intake from p.o., TF or TPN. < 75% intake of estimated energy needs for less than 7 days < 75% intake of estimated energy needs for greater than 7 days < 50% intake of estimated energy needs for > 5 days   Weight Loss 1-2% in 1 month  5% in 3 months  7.5% in 6 months  10% in 1 year 1-2 % in 1 week  5% in 1 month  7.5% in 3 months  10% in 6 months  20% in 1 year > 2% in 1 week  > 5% in 1 month  > 7.5% in 3 months  > 10% in 6 months  > 20% in 1 year   Physical Findings     None *Mild subcutaneous fat and/or muscle loss  *Mild fluid accumulation  *Stage II decubitus  *Surgical wound or non-healing wound *Mod/severe subcutaneous fat and/or muscle loss  *Mod/severe fluid accumulation  *Stage III or IV decubitus  *Non-healing surgical wound     Provider, please clarify the Malnutrtion diagnosis associated with above clinical findings.    [  ] Severe Protein-Calorie Malnutrition      [ x ] Malnutrition, Unspecified degree     [  ] Other Nutritional Diagnosis (please specify): _______     [  ] Malnutrition ruled out     [  ] Clinically Undetermined       Please document in your progress notes daily for the duration of treatment until resolved and include in your discharge summary.

## 2022-05-24 NOTE — PLAN OF CARE
Problem: Skin and Tissue Injury (Mechanical Ventilation, Invasive)  Goal: Absence of Device-Related Skin and Tissue Injury  Outcome: Ongoing, Progressing     Problem: Communication Impairment (Artificial Airway)  Goal: Effective Communication  Outcome: Ongoing, Progressing  Intervention: Ensure Effective Communication  Flowsheets (Taken 5/24/2022 0411)  Communication Enhancement Strategies:   extra time allowed for response   nonverbal strategies used   one-step directions provided   repetition utilized   verbal communication attempts encouraged     Problem: Nutrition Impairment (Mechanical Ventilation, Invasive)  Goal: Optimal Nutrition Delivery  Intervention: Optimize Nutrition Delivery  Flowsheets (Taken 5/24/2022 0411)  Nutrition Support Management: tube feeding continued as ordered     Problem: Infection  Goal: Absence of Infection Signs and Symptoms  Outcome: Ongoing, Progressing  Intervention: Prevent or Manage Infection  Flowsheets (Taken 5/24/2022 0412)  Infection Management: aseptic technique maintained

## 2022-05-24 NOTE — PT/OT/SLP EVAL
Speech Language Pathology Evaluation  One Way Speaking Valve Evaluation    Patient Name:  Kendall Duff   MRN:  [id number]  Admitting Diagnosis: Acute myocardial infarction of anterolateral wall    IMPORTANT  CAUTION: CUFF MUST ALWAYS BE DEFLATED WHEN VALVE IN USE    Recommendations:                 General Recommendations:  One Way Speaking Valve with SLP only; Clinical swallow evaluation as appropriate  Diet recommendations: NPO (has PEG tube)  General Precautions: Standard, aspiration    History:     Past Medical History:   Diagnosis Date    Bipolar disorder, unspecified     Chronic hepatitis C     Hypertension     Obesity, unspecified     Seizures     Stroke        Past Surgical History:   Procedure Laterality Date    CORONARY STENT PLACEMENT  08/14/2015    DEBRIDEMENT  04/17/2022    LEFT HEART CATHETERIZATION Left 08/13/2015    REPEAT CLOSURE OF STERNAL INCISION N/A 5/11/2022    Procedure: CLOSURE, STERNAL INCISION, REPEAT;  Surgeon: Adolfo Weiss MD;  Location: [facility];  Service: Plastics;  Laterality: N/A;  STERNAL WOUND DEBRIDEMENT AND RECONSTRUCTION // MULTIPLE MUSCLE FLAPS // REQ 1400       Subjective     Patient awake and uncooperative.    Patient goals: unable to state     Objective:     Level of Alertness:  · Distractible    Secretion Management:  ·  Patient was suctioned prior to placement of Passy Tabitha Valve (PMV)    Pain/Comfort:  · Pain Rating 1: 0/10    Respiratory Status: Trach collar    Oral Musculature Evaluation  · Oral Musculature: general weakness  · Dentition: edentulous  · Mucosal Quality: good    Trach/Vent:  · Tracheostomy Type  · Shiley  · Tracheostomy Size: 8.0  · Tolerates cuff deflated: Yes    One Way Speaking Valve Placement:  Type of speaking valve: One Way Speaking Valve  Purple    Sp02 before trial: 96  Sp02 during trial: 96  Time on valve: 10 minutes  Phonation on exhalation: breathy    Patient reaction: Flat Affect      Assessment:     Pt presents with impaired verbal  communication secondary to tracheostomy and will benefit from continued skilled SLP services for speaking valve trials.    Goals:   Multidisciplinary Problems     SLP Goals        Problem: SLP    Goal Priority Disciplines Outcome   SLP Goal     SLP Ongoing, Progressing   Description: LTG: Tolerate speaking valve during all waking hours with no signs/sx respiratory distress/compromise  STG: tolerate speaking valve x1 hour with no signs/sx respiratory distress/compromise                   Plan:     · Patient to be seen:  5 x/week   · Plan of Care expires:  06/21/22  · SLP Follow-Up:  Yes       Discharge recommendations:  LTACH (long-term acute care hospital), nursing facility, skilled   Barriers to Discharge:  Level of Skilled Assistance Needed      Time Tracking:     SLP Treatment Date:   05/24/22  Speech Start Time:  1310  Speech Stop Time:  1330     Speech Total Time (min):  20 min    Billable Minutes: Eval 20 minutes     05/24/2022

## 2022-05-24 NOTE — PT/OT/SLP EVAL
Physical Therapy Evaluation    Patient Name:  Kendall Duff   MRN:  [id number]    Recommendations:     Discharge Recommendations:  LTACH (long-term acute care hospital)   Discharge Equipment Recommendations:  (unknown)   Barriers to discharge: medical diagnoses    Assessment:     Kendall Duff is a 56 y.o. male admitted with Acute myocardial infarction of anterolateral wall.  Patient with extensive medical issues since admit on 4/1/22 including: CABG x4 (4/6), extubated (4/7), seizure-like activity (4/10), acute small vessel ischemia R frontal matter (4/15), MRSA, reintubated (4/16), sternal wound debridement (4/17), sepsis, proteus and klebsiella, tech and PEG (5/10), R UE DVT (5/10), sternal debridement and B pec major flap reconstruction (5/11).     He presents with the following impairments/functional limitations:  weakness, gait instability, decreased upper extremity function, impaired cardiopulmonary response to activity, impaired endurance, impaired balance, decreased lower extremity function, impaired coordination, decreased safety awareness, orthopedic precautions, impaired functional mobilty.    Patient is significantly debilitated at this time. Would benefit from acute PT services with placement beyond this stay at an LTAC facility.     Rehab Prognosis: Fair; patient would benefit from acute skilled PT services to address these deficits and reach maximum level of function.    Recent Surgery: Procedure(s) (LRB):  CLOSURE, STERNAL INCISION, REPEAT (N/A) 13 Days Post-Op    Plan:     During this hospitalization, patient to be seen 3 x/week to address the identified rehab impairments via therapeutic activities, therapeutic exercises and progress toward the following goals:    · Plan of Care Expires:  06/21/22    Subjective     Chief Complaint: attempted to mouth words; difficulty understanding  Patient/Family Comments/goals: same as above  Pain/Comfort:  · Pain Rating 1:  (unable to state)    Patients cultural,  "spiritual, Alevism conflicts given the current situation: no    Living Environment:  Unable to gather from pt at this time    Objective:     Communicated with NSG prior to session.  Patient found HOB elevated with blood pressure cuff, central line, cohn catheter, G/J tube, oxygen, peripheral IV, telemetry, Tracheostomy, wound vac upon PT entry to room.    General Precautions: Standard, aspiration, sternal, seizure (per doctor "arms at sides at all times")   Orthopedic Precautions:N/A   Braces: N/A  Respiratory Status: trach collar 11L    Exams:  · RLE Strength: grossly 3-/5    · LLE Strength: grossly 3-/5    Functional Mobility:  · With totA pt able to long sit for approx 30 seconds      Patient left HOB elevated with all lines intact, call button in reach and restraints reapplied at end of session.    GOALS:   Multidisciplinary Problems     Physical Therapy Goals        Problem: Physical Therapy    Goal Priority Disciplines Outcome Goal Variances Interventions   Physical Therapy Goal     PT, PT/OT Ongoing, Progressing     Description: Goals to be met by: 22     Patient will increase functional independence with mobility by performin. Supine to sit with Moderate Assistance  2. Sit to supine with Moderate Assistance  3. Bed to chair transfer with Moderate Assistance using LRAD  4. Sitting at edge of bed x10 minutes with Minimal Assistance                     History:     Past Medical History:   Diagnosis Date    Bipolar disorder, unspecified     Chronic hepatitis C     Hypertension     Obesity, unspecified     Seizures     Stroke        Past Surgical History:   Procedure Laterality Date    CORONARY STENT PLACEMENT  2015    DEBRIDEMENT  2022    LEFT HEART CATHETERIZATION Left 2015    REPEAT CLOSURE OF STERNAL INCISION N/A 2022    Procedure: CLOSURE, STERNAL INCISION, REPEAT;  Surgeon: Adolfo Weiss MD;  Location: [facility];  Service: Plastics;  Laterality: N/A;  STERNAL " WOUND DEBRIDEMENT AND RECONSTRUCTION // MULTIPLE MUSCLE FLAPS // REQ 1400       Time Tracking:     PT Received On: 05/24/22  PT Start Time: 1213     PT Stop Time: 1230  PT Total Time (min): 17 min     Billable Minutes: Evaluation 17      05/24/2022

## 2022-05-24 NOTE — PT/OT/SLP EVAL
Occupational Therapy   Evaluation    Name: Kendall Duff  MRN: [id number]  Admitting Diagnosis:  Acute myocardial infarction of anterolateral wall  Recent Surgery: Procedure(s) (LRB):  CLOSURE, STERNAL INCISION, REPEAT (N/A) 13 Days Post-Op    Recommendations:     Discharge Recommendations: LTACH (long-term acute care hospital)  Discharge Equipment Recommendations:  other (see comments) (tbd)  Barriers to discharge:       Assessment:     Kendall Duff is a 56 y.o. male with a medical diagnosis of Acute myocardial infarction of anterolateral wall.  He presents with significant weakness BUE's, cognitive deficits, decreased activity tolerance, and significant deficits to all self care skills. Will con't to assess any neuro and further UE strength/use as able/appropriate.  Performance deficits affecting function: weakness, impaired endurance, impaired sensation, impaired self care skills, impaired functional mobilty, impaired balance, impaired cognition, decreased coordination, decreased upper extremity function, decreased lower extremity function, decreased safety awareness, decreased ROM, impaired skin, impaired cardiopulmonary response to activity, other (comment) (B pec flaps- arms at sides).      Rehab Prognosis: Fair; patient would benefit from acute skilled OT services to address these deficits and reach maximum level of function.       Plan:     Patient to be seen 3 x/week, 5 x/week to address the above listed problems via self-care/home management, neuromuscular re-education, therapeutic activities, cognitive retraining, therapeutic exercises  · Plan of Care Expires:    · Plan of Care Reviewed with: patient    Subjective     Chief Complaint: none stated, trying to mouth words  Patient/Family Comments/goals: u/a to state    Occupational Profile:  Living Environment: unknown  Previous level of function: unknown  Roles and Routines: u/a to state  Equipment Used at Home:  other (see comments) (unknown)  Assistance upon  Discharge: unknown    Pain/Comfort:  · Pain Rating 1: 0/10    Patients cultural, spiritual, Baptist conflicts given the current situation:      Objective:     Communicated with: nsg and PT prior to session.  Patient found supine with blood pressure cuff, central line, cohn catheter, G/J tube, oxygen, peripheral IV, PICC line, telemetry, Tracheostomy, wound vac upon OT entry to room.    General Precautions: Standard, aspiration, seizure, sternal, other (see comments) (arms at sides at all times)   Orthopedic Precautions:    Braces:    Respiratory Status: trach collar    Occupational Performance:    Bed Mobility:    · Long sitting with total assist, u/a to assist at all with balance and coming to sit    Functional Mobility/Transfers:  ·   · Functional Mobility: u/a at this time.     Activities of Daily Living:  · Total assist simple grooming, u/a to get hand to face    Cognitive/Visual Perceptual:  Follow some simple commands with increased cues, inconsistently throughout    Physical Exam:  Active grasp with RUE and minimal on L, trace biceps LUE, 2-/5 RUE, shoulders NT d/t precautions    AMPAC 6 Click ADL:  AMPAC Total Score: 6    Treatment & Education:  Performed long sitting with total assist, performed elbow and hand AA/PROm exercises; will con't to assess further cognitive function/vision and BUE's as able/appropriate. Educated pt on POC.   Education:    Patient left supine with all lines intact, call button in reach and restraints reapplied at end of session    GOALS:   Multidisciplinary Problems     Occupational Therapy Goals        Problem: Occupational Therapy    Goal Priority Disciplines Outcome Interventions   Occupational Therapy Goal     OT, PT/OT Ongoing, Progressing    Description: Goals to be met by: 6/21/22     Patient will increase functional independence with ADLs by performing:    Feeding with Moderate Assistance. When appropriate to initiate, or simulated hand to mouth.   UE Dressing with  Moderate Assistance.  Grooming while long sitting/EOB/supported with Moderate Assistance.  Sitting at edge of bed x10-15 minutes with Moderate Assistance.  Toilet transfer to bedside commode with Moderate Assistance.                     History:     Past Medical History:   Diagnosis Date    Bipolar disorder, unspecified     Chronic hepatitis C     Hypertension     Obesity, unspecified     Seizures     Stroke        Past Surgical History:   Procedure Laterality Date    CORONARY STENT PLACEMENT  08/14/2015    DEBRIDEMENT  04/17/2022    LEFT HEART CATHETERIZATION Left 08/13/2015    REPEAT CLOSURE OF STERNAL INCISION N/A 5/11/2022    Procedure: CLOSURE, STERNAL INCISION, REPEAT;  Surgeon: Adolfo Weiss MD;  Location: [facility];  Service: Plastics;  Laterality: N/A;  STERNAL WOUND DEBRIDEMENT AND RECONSTRUCTION // MULTIPLE MUSCLE FLAPS // REQ 1400       Time Tracking:     OT Date of Treatment: 05/24/22  OT Start Time: 1212  OT Stop Time: 1228  OT Total Time (min): 16 min    Billable Minutes:Evaluation 16 min16 min    5/24/2022

## 2022-05-24 NOTE — PROGRESS NOTES
Infectious Diseases Progress Note  56-year-old male with past medical history of bipolar disorder schizophrenia, .  I have a, is admitted to [facility] on 01/20/2020 with chief complaints of chest pain with work-up revealing significant coronary artery disease requiring surgical intervention.  He was taken for CABG x 4 on 4/6/2022 requiring ICU stay postoperatively downgraded to the floor subsequently but again readmitted to the ICU on 4/10.  He did have issues with wound healing with subsequent dehiscence and suspected infection.  4/16 wound culture positive for MRSA.  CT scan of the thoracic done on 4/16 showed changes of sternotomy from CABG with presternal, retrosternal and anterior mediastinum combination of inflammation, fluid and air locules, bilateral pleural effusion and bilateral basilar consolidation. He had debridement of the sternal wound with placement of wound VAC on 4/17 with cultures from surgery showing MRSA as well.  He has been pretty much without fevers but is noted to have leukocytosis of up to 23.9 on 4/17 which has had a downward trend.  He is anemic with low albumin.  Chest x-ray from 4/19 showed no significant changes. During his hospital stay, he eventually underwent tracheostomy placement on 5/10 and on 5/11 he underwent sternal wound debridement, B/L fasciocutaneous flap closure, B/L pectoralis major muscle flap reconstruction of sternum  He remains in the ICU, trached and on ventilator.   He is on antibiotic coverage with vancomycin and rifampin.        Subjective:  Interval history noted .  Remains in the ICU , downgraded to the hospitalist service .  No new complaints, low-grade fevers, doing about the same.      Past Medical History:   Diagnosis Date    Bipolar disorder, unspecified     Chronic hepatitis C     Hypertension     Obesity, unspecified     Seizures     Stroke      Past Surgical History:   Procedure Laterality Date    CORONARY STENT  PLACEMENT  08/14/2015    DEBRIDEMENT  04/17/2022    LEFT HEART CATHETERIZATION Left 08/13/2015    REPEAT CLOSURE OF STERNAL INCISION N/A 5/11/2022    Procedure: CLOSURE, STERNAL INCISION, REPEAT;  Surgeon: Adolfo Weiss MD;  Location: [facility];  Service: Plastics;  Laterality: N/A;  STERNAL WOUND DEBRIDEMENT AND RECONSTRUCTION // MULTIPLE MUSCLE FLAPS // REQ 1400     Social History     Socioeconomic History    Marital status: Single   Tobacco Use    Smoking status: Current Every Day Smoker     Types: Cigarettes    Smokeless tobacco: Never Used   Substance and Sexual Activity    Alcohol use: Never    Drug use: Not Currently     Types: Cocaine       Review of Systems   Unable to perform ROS: Medical condition       Review of patient's allergies indicates:   Allergen Reactions    Depakote [divalproex]     Divalproex sodium Other (See Comments)    Lithium     Lithium analogues     Quetiapine Other (See Comments)         Scheduled Meds:   apixaban  10 mg Oral BID    [START ON 5/29/2022] apixaban  5 mg Oral BID    atorvastatin  80 mg Oral Daily    benztropine  1 mg Per OG tube BID    doxepin  50 mg Oral QHS    ezetimibe  10 mg Oral QHS    famotidine (PF)  20 mg Intravenous BID    glycopyrrolate  1 mg Per NG tube TID    haloperidoL  5 mg Oral BID    levETIRAcetam  500 mg Oral BID    metoprolol tartrate  25 mg Per OG tube Daily    OXcarbazepine  300 mg Per OG tube BID    polyethylene glycol  17 g Oral Daily    rifAMpin  300 mg Oral BID    sodium chloride 0.9%  10 mL Intravenous Q12H    vancomycin (VANCOCIN) IVPB  750 mg Intravenous Q8H     Continuous Infusions:  PRN Meds:sodium chloride, acetaminophen, albuterol-ipratropium, bisacodyL, dextrose 10%, fentaNYL, fentaNYL, hydrALAZINE, lorazepam, metoprolol, morphine, morphine, nitroGLYCERIN, OLANZapine, ondansetron, oxyCODONE, oxyCODONE, potassium chloride in water, potassium chloride in water, potassium chloride in water, promethazine, sodium  "chloride 0.9%, vancomycin - pharmacy to dose, ziprasidone    Objective:  /86   Pulse (!) 115   Temp 99 °F (37.2 °C) (Oral)   Resp (!) 34   Ht 5' 10.87" (1.8 m)   Wt 77.2 kg (170 lb 3.1 oz)   SpO2 (!) 89%   BMI 23.83 kg/m²     Physical Exam:   Physical Exam   Vitals reviewed.   Constitutional:       General: He is not in acute distress.  HENT:      Head: Normocephalic and atraumatic.   Neck:      Comments: Tracheostomy noted  Cardiovascular:      Rate and Rhythm: Normal rate and regular rhythm.      Heart sounds: Normal heart sounds.   Pulmonary:      Effort: No respiratory distress.      Breath sounds: Normal breath sounds.      Comments: On ventilator   Abdominal:      General: Bowel sounds are normal. There is no distension.      Palpations: Abdomen is soft.      Tenderness: There is no abdominal tenderness.      Comments: Peg tube in place   Musculoskeletal:         General: No deformity.      Cervical back: Neck supple.   Skin:     Findings: No erythema or rash.   Neurological:      Mental Status: He is alert.      Comments: Does not follow commands   Psychiatric:      Comments: Not communicative     Imaging       Lab Review   Recent Results (from the past 24 hour(s))   Comprehensive Metabolic Panel    Collection Time: 05/23/22  1:56 AM   Result Value Ref Range    Sodium Level 136 136 - 145 mmol/L    Potassium Level 4.4 3.5 - 5.1 mmol/L    Chloride 104 98 - 107 mmol/L    Carbon Dioxide 21 (L) 22 - 29 mmol/L    Glucose Level 132 (H) 74 - 100 mg/dL    Blood Urea Nitrogen 11.4 8.4 - 25.7 mg/dL    Creatinine 0.68 (L) 0.73 - 1.18 mg/dL    Calcium Level Total 8.7 8.4 - 10.2 mg/dL    Protein Total 6.8 6.4 - 8.3 gm/dL    Albumin Level 2.4 (L) 3.5 - 5.0 gm/dL    Globulin 4.4 (H) 2.4 - 3.5 gm/dL    Albumin/Globulin Ratio 0.5 (L) 1.1 - 2.0 ratio    Bilirubin Total 1.3 <=1.5 mg/dL    Alkaline Phosphatase 95 40 - 150 unit/L    Alanine Aminotransferase 88 (H) 0 - 55 unit/L    Aspartate Aminotransferase 110 (H) 5 " - 34 unit/L    Estimated GFR- >60 mls/min/1.73/m2    Estimated GFR-Non  >60 mls/min/1.73/m2   CBC with Differential    Collection Time: 05/23/22  1:56 AM   Result Value Ref Range    WBC 17.3 (H) 4.5 - 11.5 x10(3)/mcL    RBC 3.16 (L) 4.70 - 6.10 x10(6)/mcL    Hgb 8.5 (L) 14.0 - 18.0 gm/dL    Hct 28.5 (L) 42.0 - 52.0 %    MCV 90.2 80.0 - 94.0 fL    MCH 26.9 (L) 27.0 - 31.0 pg    MCHC 29.8 (L) 33.0 - 36.0 mg/dL    RDW 17.1 (H) 11.5 - 17.0 %    Platelet 510 (H) 130 - 400 x10(3)/mcL    MPV 9.6 9.4 - 12.4 fL    Neut % 79.1 %    Lymph % 11.5 %    Mono % 8.3 %    Eos % 0.1 %    Basophil % 0.2 %    Lymph # 1.98 0.6 - 4.6 x10(3)/mcL    Neut # 13.7 (H) 2.1 - 9.2 x10(3)/mcL    Mono # 1.43 (H) 0.1 - 1.3 x10(3)/mcL    Eos # 0.01 0 - 0.9 x10(3)/mcL    Baso # 0.04 0 - 0.2 x10(3)/mcL    IG# 0.13 (H) 0 - 0.0155 x10(3)/mcL    IG% 0.8 (H) 0 - 0.43 %    NRBC% 0.0 %             Assessment/Plan:  1.  Sepsis syndrome  2.  MRSA sternal wound infection/dehiscence  3.  Leukocytosis  4.  Acute hypoxic respiratory failure  5.  Anemia  6.  Coronary artery disease s/p CABG  7.  Protein calorie malnutrition  8.  Pneumonia -  Proteus/Klebsiella isolates     -Continue Vancomycin #38 and Rifampin #32. Plan a 6 week course following inflammatory markers  -Low-grade fevers and now without leukocytosis, follow  -S/P sternal debridement with flap closure on 5/11 per Dr Weiss  -Continue wound care and drain management per Plastics  -Vent management and ICU support per Intensivist  -Discussed with nursing

## 2022-05-24 NOTE — PHYSICIAN QUERY
PT Name: Kendall Duff  MR #: [id number]    DOCUMENTATION CLARIFICATION     CDS/: Hyun Anna RN              Contact information: [email or web address]  This form is a permanent document in the medical record.     Query Date: May 24, 2022    By submitting this query, we are merely seeking further clarification of documentation.  Please utilize your independent clinical judgment when addressing the question(s) below.    The medical record contains the following:  Pathology Findings Location in Medical Record    Surgical pathology 5/11       Please clarify:  [ x ] Pathology findings noted above are ruled in/confirmed as diagnoses     [  ] Pathology findings noted above are not confirmed as diagnoses     [  ] Pathology findings noted above are incidental     [  ] Other diagnosis (please specify): ___________     [  ] Clinically Undetermined         Please document in your progress notes daily for the duration of treatment until resolved and include in your discharge summary.    Form No. [id number]

## 2022-05-24 NOTE — PLAN OF CARE
Problem: SLP  Goal: SLP Goal  Description: LTG: Tolerate speaking valve during all waking hours with no signs/sx respiratory distress/compromise  STG: tolerate speaking valve x1 hour with no signs/sx respiratory distress/compromise  Outcome: Ongoing, Progressing

## 2022-05-24 NOTE — PROGRESS NOTES
[facility]  Hospital Medicine Progress Note        Chief Complaint: Inpatient Follow-up for  surgical site infection     HPI:   Kendall Duff is a 56 y.o. male with a PMHx of chronic hepatitis C, hypertension, seizures, CAD s/p stents who initially presented to [facility] on 4/1/2022 with a primary complaint of chest pain. Of note, he was seen at an outlying ED on 3/20/22 and was dx with MI, subsequently transferred to [facility] where he underwent LHC with angioplasty, and deemed appropriate for CABG, which had been scheduled for 3/29/22, but was cancelled. He then presented to ED on 4/1 with c/o CP earlier in the day. He was admitted to cardiology services, started on a heparin gtt. CV Surgery was consulted and the patient underwent CABG x4 on 4/6/22. He was admitted to ICU post CABG, intubated on mechanical ventilation. Psych was consulted during his stay to evaluate for some recent agitation, restlessness, fidgetiness, insomnia, confusion, and sexual inappropriateness, and he was started on PRN haldol. He was extubated to NC 4L on 4/7, remained restless and confused requiring Precedex gtt.  On 4/10/22 he experienced seizure-like activity and confusion for which neuro was consulted. EEG revealed moderate generalized slowing. CT Head was negative for acute intracranial abnormality, gas noted throughout the bilateral subcutaneous soft tissues.MRI on 4/15/22 revealed small linear focus of acute small vessel ischemia in the right frontal white matter. Developed diffuse subcutaneous emphysema per CXR. He remained agitated with delirium.  Unfortunately,he pulled out his left chest tube on 4/16/22 and he had been thrashing in bed, had to be restrained at times.  He developed an unstable sternum with purulence drainage. CT thorax revealed surgical changes with inflammation, fluid and air locules, minimal anterior pericardial effusion, extensive soft tissue emphysema, and bilateral pleural  effusions and bilateral lower lung lobes collapse consolidations. He has known subcutaneous air, which has been present on previous x-rays.  Cultures positive for MRSA, and he was placed on Vancomycin. He required intubation on 4/16/22. On 4/17/22 he underwent sternal wound debridement with wound vac placement. ID was consulted on 4/22/22 due to sepsis. He was started on rifampin in addition to vancomycin for a planned 6 week course. Respiratory cultures from 4/24/22 returned positive for proteus and klebsiella. Remained intubated on mechanical ventilation. GI was consulted on 5/3/22 for PEG placement. Plastic surgery was also following for sternal reconstruction. He underwent tracheostomy and PEG placement on 5/10/22. NIVA done on 5/10/22 noted a RUE DVT. On 5/11/22 he underwent sternal wound debridement, bilateral fasciocutaneous flap advanced closure over sternum and bilateral pectoralis major muscle flap reconstruction of sternum. He was started on full dose Lovenox. He remained on mechanical ventilation until 5/21/22. Tolerating T-piece now on trach collar. He required vasopressors for BP support throughout his stay, which have since been weaned off. He has been weaned off of Precedex. He did have some vomiting on 5/21/22. Lovenox was transitioned to Eliquis on 5/21/22. KUB unrevealing, tube feeds restarted on 5/22/22. He was cleared for downgrade to the floor on 5/22/22 under hospitalist services.    Interval Hx:  No changes overnight.  Nursing at the bedside.  He was switched over to trach collar and tolerating well.  Her oxygenation at 93 percent.  The to reported low-grade fever but resolved without intervention.    Objective/physical exam:  Gen: NC AT, Awake, alert, nonverbal  HEENT: PERRL EOMI normal conjunctiva, neck supple, Trach  Cardiac: Regular rhythm and rate, no murmur, rubs, or gallops  Respiratory: Clear to auscultation bilaterally. No wheezes, rales, or crackles  Gastrointestinal: soft,  nondistended, nontender, +bowel sounds, +PEG  Musculoskeletal: KELLIE drain x 5    VITAL SIGNS: 24 HRS MIN & MAX LAST   Temp  Min: 98.8 °F (37.1 °C)  Max: 101.1 °F (38.4 °C) 99.5 °F (37.5 °C)   BP  Min: 130/69  Max: 161/81 130/69   Pulse  Min: 50  Max: 117  (!) 50   Resp  Min: 13  Max: 43 (!) 23   SpO2  Min: 68 %  Max: 94 % (!) 93 %       Recent Labs   Lab 05/22/22  0447 05/23/22  0156 05/24/22  0208   WBC 10.0 17.3* 16.1*   RBC 3.40* 3.16* 3.14*   HGB 9.1* 8.5* 8.4*   HCT 29.2* 28.5* 27.4*   MCV 85.9 90.2 87.3   MCH 26.8* 26.9* 26.8*   MCHC 31.2* 29.8* 30.7*   RDW 16.6 17.1* 17.8*    510* 407*   MPV 10.1 9.6 10.1       Recent Labs   Lab 05/18/22  0139 05/19/22  0149 05/19/22  0542 05/20/22  0542 05/21/22  1904 05/22/22 0447 05/23/22  0156 05/24/22  0208   *   < >  --   --  137 136 136 134*   K 4.1   < >  --   --  3.1* 3.3* 4.4 4.3   CO2 23   < >  --   --  21* 22 21* 21*   BUN 19.8   < >  --   --  9.2 7.5* 11.4 20.3   CREATININE 0.66*   < >  --   --  0.58* 0.62* 0.68* 0.73   CALCIUM 8.2*   < >  --   --  8.6 8.1* 8.7 8.6   PH  --   --  7.44 7.450  --   --   --   --    MG 1.90  --   --   --  1.80 2.10  --   --    ALBUMIN 1.6*   < >  --   --   --  2.0* 2.4* 2.3*   ALKPHOS 76   < >  --   --   --  88 95 110   ALT 22   < >  --   --   --  29 88* 364*   AST 36*   < >  --   --   --  42* 110* 439*   BILITOT 0.5   < >  --   --   --  0.7 1.3 1.2    < > = values in this interval not displayed.          Microbiology Results (last 7 days)     Procedure Component Value Units Date/Time    Clostridium difficile EIA [[id number]]     Order Status: Canceled Specimen: Stool     Blood Culture [[id number]]  (Normal) Collected: 05/12/22 1740    Order Status: Completed Specimen: Blood from Arm Updated: 05/17/22 1902     CULTURE, BLOOD ([facility]) No Growth at 5 days    Blood Culture [[id number]]  (Normal) Collected: 05/12/22 1741    Order Status: Completed Specimen: Blood from Hand, Left Updated: 05/17/22 1902     CULTURE, BLOOD ([facility]) No  Growth at 5 days    Tissue Culture - Aerobic [[id number]] Collected: 05/11/22 1432    Order Status: Completed Specimen: Bone from Sternum Updated: 05/17/22 0936     CULTURE, TISSUE- AEROBIC ([facility]) No Growth at 5 days    Tissue Culture - Aerobic [[id number]] Collected: 05/11/22 1429    Order Status: Completed Specimen: Tissue from Sternum Updated: 05/17/22 0935     CULTURE, TISSUE- AEROBIC ([facility]) No Growth at 5 days           See below for Radiology    Scheduled Med:   apixaban  10 mg Oral BID    [START ON 5/29/2022] apixaban  5 mg Oral BID    atorvastatin  80 mg Oral Daily    benztropine  1 mg Per OG tube BID    doxepin  50 mg Oral QHS    ezetimibe  10 mg Oral QHS    famotidine (PF)  20 mg Intravenous BID    glycopyrrolate  1 mg Per NG tube TID    haloperidoL  5 mg Oral BID    levETIRAcetam  500 mg Oral BID    metoprolol tartrate  25 mg Per OG tube Daily    OXcarbazepine  300 mg Per OG tube BID    polyethylene glycol  17 g Oral Daily    rifAMpin  300 mg Oral BID    sodium chloride 0.9%  10 mL Intravenous Q12H    vancomycin (VANCOCIN) IVPB  750 mg Intravenous Q8H        Continuous Infusions:       PRN Meds:  sodium chloride, acetaminophen, albuterol-ipratropium, bisacodyL, dextrose 10%, fentaNYL, fentaNYL, hydrALAZINE, lorazepam, metoprolol, morphine, morphine, nitroGLYCERIN, OLANZapine, ondansetron, oxyCODONE, oxyCODONE, potassium chloride in water, potassium chloride in water, potassium chloride in water, promethazine, sodium chloride 0.9%, vancomycin - pharmacy to dose, ziprasidone       Assessment/Plan:   Acute Hypoxemic Respiratory Failure s/p intubation, trach placement on 5/10/22  Multivessel CAD s/p CABG x4, 04/06/2022.  MRSA sternal wound infection and dehiscence, status post wound vacuum-assisted closure device, 04/17/2022, and bilateral pectoral flaps for sternal wound closure, 05/11/2022.  Postoperative agitation and Delirium  Acute RUE DVT  Normocytic Anemia  s/p PEG placment on  5/10/22  Severe PCM  Hypokalemia  HTN, uncontrolled  Seizure disorder   Leucocytosis ???cause   Transaminitis, mild      Hx of Bipolar Disorder and Schizophrenia, chronic hepatitis C, hypertension, seizures, CAD s/p stents      Patient doing well with trach collar.  Continue to wean O2 as tolerated.  He remains on vancomycin and rifampin per ID recommendations.  He did have some low-grade temps last night.  Yesterday his leukocytosis increased to 17.3 that back down to 16.1 today.  Will make sure that id is aware.  Planning on 6 week course, follow inflammatory markers  Drain management per Plastic surgery.  Continue Keppra 500 milligrams for seizure prophylaxis.  Tolerating tube feeds at goal through PEG.  Nursing tells me that PT and OT were previously following the patient but dropped off after surgery.  Will make sure gets reordered today.    Critical care diagnosis: sepsis, iv antibiotics  Critical care interventions: hands on evaluation, review of labs/radiographs/records and discussions with family  Critical care time spent: >32 minute    Patient condition:  guarded    Anticipated discharge and Disposition: LTAC when cleared by surgery      All diagnosis and differential diagnosis have been reviewed; assessment and plan has been documented; I have personally reviewed the labs and test results that are presently available; I have reviewed the patients medication list; I have reviewed the consulting providers response and recommendations. I have reviewed or attempted to review medical records based upon their availability    All of the patient's questions have been  addressed and answered. Patient's is agreeable to the above stated plan. I will continue to monitor closely and make adjustments to medical management as needed.  _____________________________________________________________________      Radiology:  X-Ray Abdomen AP 1 View  Narrative: EXAMINATION:  XR ABDOMEN AP 1 VIEW    CLINICAL HISTORY:  ;  Vomiting;    TECHNIQUE:  Supine AP view of the abdomen.    COMPARISON:  3 May 2022    FINDINGS:  Lines/tubes/devices: Enteric tube is no longer visualized.  Multiple other lines/tubes are partially visualized over the imaged region.    Detection of air-fluid levels and low-volume pneumoperitoneum is limited due to supine technique.    A non-obstructed bowel gas pattern is present. No intra-abdominal mass effect is appreciated.    Included lower thoracic cavity and osseous structures are without acute abnormality.  Impression: 1. Interval removal of NG tube.  2. No convincing evidence of new or worsening intra-abdominal process.    Electronically signed by: Abner Reyes  Date:    05/21/2022  Time:    13:57      Anthony Lewis MD   05/24/2022

## 2022-05-24 NOTE — PLAN OF CARE
Problem: Adult Inpatient Plan of Care  Goal: Plan of Care Review  Outcome: Ongoing, Progressing  Goal: Absence of Hospital-Acquired Illness or Injury  Outcome: Ongoing, Progressing  Goal: Optimal Comfort and Wellbeing  Outcome: Ongoing, Progressing  Goal: Readiness for Transition of Care  Outcome: Ongoing, Progressing     Problem: Infection  Goal: Absence of Infection Signs and Symptoms  Outcome: Ongoing, Progressing     Problem: Fall Injury Risk  Goal: Absence of Fall and Fall-Related Injury  Outcome: Ongoing, Progressing     Problem: Skin Injury Risk Increased  Goal: Skin Health and Integrity  Outcome: Ongoing, Progressing     Problem: Impaired Wound Healing  Goal: Optimal Wound Healing  Outcome: Ongoing, Progressing

## 2022-05-24 NOTE — PLAN OF CARE
Problem: Physical Therapy  Goal: Physical Therapy Goal  Description: Goals to be met by: 22     Patient will increase functional independence with mobility by performin. Supine to sit with Moderate Assistance  2. Sit to supine with Moderate Assistance  3. Bed to chair transfer with Moderate Assistance using LRAD  4. Sitting at edge of bed x10 minutes with Minimal Assistance    Outcome: Ongoing, Progressing

## 2022-05-25 LAB
ANION GAP SERPL CALC-SCNC: 10 MEQ/L
BASOPHILS # BLD AUTO: 0.09 X10(3)/MCL (ref 0–0.2)
BASOPHILS NFR BLD AUTO: 0.6 %
BUN SERPL-MCNC: 36.2 MG/DL (ref 8.4–25.7)
CALCIUM SERPL-MCNC: 8.1 MG/DL (ref 8.4–10.2)
CHLORIDE SERPL-SCNC: 106 MMOL/L (ref 98–107)
CO2 SERPL-SCNC: 21 MMOL/L (ref 22–29)
CREAT SERPL-MCNC: 0.83 MG/DL (ref 0.73–1.18)
CREAT/UREA NIT SERPL: 44
EOSINOPHIL # BLD AUTO: 0.16 X10(3)/MCL (ref 0–0.9)
EOSINOPHIL NFR BLD AUTO: 1.1 %
ERYTHROCYTE [DISTWIDTH] IN BLOOD BY AUTOMATED COUNT: 18 % (ref 11.5–17)
GLUCOSE SERPL-MCNC: 152 MG/DL (ref 74–100)
HCT VFR BLD AUTO: 26.4 % (ref 42–52)
HGB BLD-MCNC: 8.6 GM/DL (ref 14–18)
IMM GRANULOCYTES # BLD AUTO: 0.09 X10(3)/MCL (ref 0–0.02)
IMM GRANULOCYTES NFR BLD AUTO: 0.6 % (ref 0–0.43)
LYMPHOCYTES # BLD AUTO: 1.99 X10(3)/MCL (ref 0.6–4.6)
LYMPHOCYTES NFR BLD AUTO: 13.2 %
MCH RBC QN AUTO: 27 PG (ref 27–31)
MCHC RBC AUTO-ENTMCNC: 32.6 MG/DL (ref 33–36)
MCV RBC AUTO: 83 FL (ref 80–94)
MONOCYTES # BLD AUTO: 0.96 X10(3)/MCL (ref 0.1–1.3)
MONOCYTES NFR BLD AUTO: 6.4 %
NEUTROPHILS # BLD AUTO: 11.8 X10(3)/MCL (ref 2.1–9.2)
NEUTROPHILS NFR BLD AUTO: 78.1 %
NRBC BLD AUTO-RTO: 0.3 %
PLATELET # BLD AUTO: 316 X10(3)/MCL (ref 130–400)
PMV BLD AUTO: 10.5 FL (ref 9.4–12.4)
POTASSIUM SERPL-SCNC: 4.2 MMOL/L (ref 3.5–5.1)
RBC # BLD AUTO: 3.18 X10(6)/MCL (ref 4.7–6.1)
SODIUM SERPL-SCNC: 137 MMOL/L (ref 136–145)
WBC # SPEC AUTO: 15.1 X10(3)/MCL (ref 4.5–11.5)

## 2022-05-25 PROCEDURE — 25000003 PHARM REV CODE 250: Performed by: HOSPITALIST

## 2022-05-25 PROCEDURE — 63600175 PHARM REV CODE 636 W HCPCS: Performed by: HOSPITALIST

## 2022-05-25 PROCEDURE — 99900026 HC AIRWAY MAINTENANCE (STAT)

## 2022-05-25 PROCEDURE — 63600175 PHARM REV CODE 636 W HCPCS: Performed by: INTERNAL MEDICINE

## 2022-05-25 PROCEDURE — 36415 COLL VENOUS BLD VENIPUNCTURE: CPT | Performed by: HOSPITALIST

## 2022-05-25 PROCEDURE — 94761 N-INVAS EAR/PLS OXIMETRY MLT: CPT

## 2022-05-25 PROCEDURE — 97530 THERAPEUTIC ACTIVITIES: CPT

## 2022-05-25 PROCEDURE — 99231 PR SUBSEQUENT HOSPITAL CARE,LEVL I: ICD-10-PCS | Mod: ,,, | Performed by: SURGERY

## 2022-05-25 PROCEDURE — 27100171 HC OXYGEN HIGH FLOW UP TO 24 HOURS

## 2022-05-25 PROCEDURE — 99900031 HC PATIENT EDUCATION (STAT)

## 2022-05-25 PROCEDURE — 85025 COMPLETE CBC W/AUTO DIFF WBC: CPT | Performed by: HOSPITALIST

## 2022-05-25 PROCEDURE — C1751 CATH, INF, PER/CENT/MIDLINE: HCPCS

## 2022-05-25 PROCEDURE — 25000003 PHARM REV CODE 250: Performed by: INTERNAL MEDICINE

## 2022-05-25 PROCEDURE — 20000000 HC ICU ROOM

## 2022-05-25 PROCEDURE — 27200966 HC CLOSED SUCTION SYSTEM

## 2022-05-25 PROCEDURE — 27000221 HC OXYGEN, UP TO 24 HOURS

## 2022-05-25 PROCEDURE — A4216 STERILE WATER/SALINE, 10 ML: HCPCS

## 2022-05-25 PROCEDURE — 80048 BASIC METABOLIC PNL TOTAL CA: CPT | Performed by: HOSPITALIST

## 2022-05-25 PROCEDURE — 99231 SBSQ HOSP IP/OBS SF/LOW 25: CPT | Mod: ,,, | Performed by: SURGERY

## 2022-05-25 PROCEDURE — 25000003 PHARM REV CODE 250

## 2022-05-25 PROCEDURE — 63600175 PHARM REV CODE 636 W HCPCS

## 2022-05-25 PROCEDURE — 25000003 PHARM REV CODE 250: Performed by: STUDENT IN AN ORGANIZED HEALTH CARE EDUCATION/TRAINING PROGRAM

## 2022-05-25 RX ORDER — FUROSEMIDE 10 MG/ML
40 INJECTION INTRAMUSCULAR; INTRAVENOUS ONCE
Status: COMPLETED | OUTPATIENT
Start: 2022-05-25 | End: 2022-05-25

## 2022-05-25 RX ADMIN — RIFAMPIN 300 MG: 300 CAPSULE ORAL at 09:05

## 2022-05-25 RX ADMIN — APIXABAN 10 MG: 5 TABLET, FILM COATED ORAL at 09:05

## 2022-05-25 RX ADMIN — SODIUM CHLORIDE, PRESERVATIVE FREE 10 ML: 5 INJECTION INTRAVENOUS at 09:05

## 2022-05-25 RX ADMIN — EZETIMIBE 10 MG: 10 TABLET ORAL at 09:05

## 2022-05-25 RX ADMIN — GLYCOPYRROLATE 1 MG: 1 TABLET ORAL at 02:05

## 2022-05-25 RX ADMIN — HALOPERIDOL 5 MG: 5 TABLET ORAL at 09:05

## 2022-05-25 RX ADMIN — GLYCOPYRROLATE 1 MG: 1 TABLET ORAL at 09:05

## 2022-05-25 RX ADMIN — LORAZEPAM 2 MG: 2 INJECTION INTRAMUSCULAR; INTRAVENOUS at 02:05

## 2022-05-25 RX ADMIN — VANCOMYCIN HYDROCHLORIDE 1250 MG: 1.25 INJECTION, POWDER, LYOPHILIZED, FOR SOLUTION INTRAVENOUS at 02:05

## 2022-05-25 RX ADMIN — METOPROLOL TARTRATE 25 MG: 25 TABLET, FILM COATED ORAL at 09:05

## 2022-05-25 RX ADMIN — LEVETIRACETAM 500 MG: 500 TABLET, FILM COATED ORAL at 09:05

## 2022-05-25 RX ADMIN — BENZTROPINE MESYLATE 1 MG: 1 TABLET ORAL at 09:05

## 2022-05-25 RX ADMIN — ATORVASTATIN CALCIUM 80 MG: 40 TABLET, FILM COATED ORAL at 09:05

## 2022-05-25 RX ADMIN — OXCARBAZEPINE 300 MG: 300 TABLET, FILM COATED ORAL at 09:05

## 2022-05-25 RX ADMIN — ACETAMINOPHEN 650 MG: 325 TABLET ORAL at 09:05

## 2022-05-25 RX ADMIN — POLYETHYLENE GLYCOL 3350 17 G: 17 POWDER, FOR SOLUTION ORAL at 09:05

## 2022-05-25 RX ADMIN — DOXEPIN HYDROCHLORIDE 50 MG: 50 CAPSULE ORAL at 09:05

## 2022-05-25 RX ADMIN — FENTANYL CITRATE 50 MCG: 50 INJECTION INTRAMUSCULAR; INTRAVENOUS at 05:05

## 2022-05-25 RX ADMIN — FENTANYL CITRATE 100 MCG: 50 INJECTION INTRAMUSCULAR; INTRAVENOUS at 09:05

## 2022-05-25 RX ADMIN — FAMOTIDINE 20 MG: 10 INJECTION, SOLUTION INTRAVENOUS at 09:05

## 2022-05-25 RX ADMIN — FUROSEMIDE 40 MG: 10 INJECTION, SOLUTION INTRAMUSCULAR; INTRAVENOUS at 09:05

## 2022-05-25 NOTE — PT/OT/SLP PROGRESS
Speech Language Pathology      Kendall Duff  MRN: [id number]    Patient not seen today secondary as Patient unwilling to participate. Will follow-up as schedule allows.

## 2022-05-25 NOTE — PT/OT/SLP PROGRESS
Occupational Therapy      Patient Name:  Kendall Duff   MRN:  [id number]    Patient not seen today secondary to Other (Comment). Will follow-up 5/26. Pt. Just received ativan, unarousable will follow up tomorrow if schedule permits.    5/25/2022

## 2022-05-25 NOTE — PROGRESS NOTES
PRS  Awake, not communicating with me  AFVSS  Incisions c/d/i, drains s/s output noted  No changes to care from my standpoint  Will follow

## 2022-05-25 NOTE — PROGRESS NOTES
[facility]  Hospital Medicine Progress Note        Chief Complaint: Inpatient Follow-up for surgical site infection     HPI:   Kendall Duff is a 56 y.o. male with a PMHx of chronic hepatitis C, hypertension, seizures, CAD s/p stents who initially presented to [facility] on 4/1/2022 with a primary complaint of chest pain. Of note, he was seen at an outlying ED on 3/20/22 and was dx with MI, subsequently transferred to [facility] [place] where he underwent LHC with angioplasty, and deemed appropriate for CABG, which had been scheduled for 3/29/22, but was cancelled. He then presented to ED on 4/1 with c/o CP earlier in the day. He was admitted to cardiology services, started on a heparin gtt. CV Surgery was consulted and the patient underwent CABG x4 on 4/6/22. He was admitted to ICU post CABG, intubated on mechanical ventilation. Psych was consulted during his stay to evaluate for some recent agitation, restlessness, fidgetiness, insomnia, confusion, and sexual inappropriateness, and he was started on PRN haldol. He was extubated to NC 4L on 4/7, remained restless and confused requiring Precedex gtt.  On 4/10/22 he experienced seizure-like activity and confusion for which neuro was consulted. EEG revealed moderate generalized slowing. CT Head was negative for acute intracranial abnormality, gas noted throughout the bilateral subcutaneous soft tissues.MRI on 4/15/22 revealed small linear focus of acute small vessel ischemia in the right frontal white matter. Developed diffuse subcutaneous emphysema per CXR. He remained agitated with delirium.  Unfortunately,he pulled out his left chest tube on 4/16/22 and he had been thrashing in bed, had to be restrained at times.  He developed an unstable sternum with purulence drainage. CT thorax revealed surgical changes with inflammation, fluid and air locules, minimal anterior pericardial effusion, extensive soft tissue emphysema, and bilateral pleural  effusions and bilateral lower lung lobes collapse consolidations. He has known subcutaneous air, which has been present on previous x-rays.  Cultures positive for MRSA, and he was placed on Vancomycin. He required intubation on 4/16/22. On 4/17/22 he underwent sternal wound debridement with wound vac placement. ID was consulted on 4/22/22 due to sepsis. He was started on rifampin in addition to vancomycin for a planned 6 week course. Respiratory cultures from 4/24/22 returned positive for proteus and klebsiella. Remained intubated on mechanical ventilation. GI was consulted on 5/3/22 for PEG placement. Plastic surgery was also following for sternal reconstruction. He underwent tracheostomy and PEG placement on 5/10/22. NIVA done on 5/10/22 noted a RUE DVT. On 5/11/22 he underwent sternal wound debridement, bilateral fasciocutaneous flap advanced closure over sternum and bilateral pectoralis major muscle flap reconstruction of sternum. He was started on full dose Lovenox. He remained on mechanical ventilation until 5/21/22. Tolerating T-piece now on trach collar. He required vasopressors for BP support throughout his stay, which have since been weaned off. He has been weaned off of Precedex. He did have some vomiting on 5/21/22. Lovenox was transitioned to Eliquis on 5/21/22. KUB unrevealing, tube feeds restarted on 5/22/22. He was cleared for downgrade to the floor on 5/22/22 under hospitalist services.    Interval Hx:  Patient urine output picked up with some IV fluids yesterday but unfortunately developed some pulmonary edema and desaturations and his oxygen status.  He responded well to the breathing treatment and Lasix.  This morning still with some coarse breath sounds.  Will give another dose of Lasix but currently saturating 98%.  He is also able to work with speech therapy a bit yesterday.  Though his endurance is low.    Objective/physical exam:  Gen: NC AT, Awake, alert, nonverbal  HEENT: PERRL EOMI normal  conjunctiva, neck supple, Trach  Cardiac: Regular rhythm and rate, no murmur, rubs, or gallops  Respiratory: Clear to auscultation bilaterally. No wheezes, rales, or crackles  Gastrointestinal: soft, nondistended, nontender, +bowel sounds, +PEG  Musculoskeletal: KELLIE drain x 5       VITAL SIGNS: 24 HRS MIN & MAX LAST   Temp  Min: 98.5 °F (36.9 °C)  Max: 102.4 °F (39.1 °C) 99.1 °F (37.3 °C)   BP  Min: 128/74  Max: 166/94 (!) 143/86   Pulse  Min: 50  Max: 107  92   Resp  Min: 14  Max: 39 (!) 21   SpO2  Min: 74 %  Max: 99 % 96 %       Recent Labs   Lab 05/23/22  0156 05/24/22  0208 05/25/22  0113   WBC 17.3* 16.1* 15.1*   RBC 3.16* 3.14* 3.18*   HGB 8.5* 8.4* 8.6*   HCT 28.5* 27.4* 26.4*   MCV 90.2 87.3 83.0   MCH 26.9* 26.8* 27.0   MCHC 29.8* 30.7* 32.6*   RDW 17.1* 17.8* 18.0*   * 407* 316   MPV 9.6 10.1 10.5       Recent Labs   Lab 05/19/22  0542 05/20/22  0542 05/21/22  1904 05/22/22  0447 05/23/22 0156 05/24/22  0208 05/25/22  0113   NA  --   --  137 136 136 134* 137   K  --   --  3.1* 3.3* 4.4 4.3 4.2   CO2  --   --  21* 22 21* 21* 21*   BUN  --   --  9.2 7.5* 11.4 20.3 36.2*   CREATININE  --   --  0.58* 0.62* 0.68* 0.73 0.83   CALCIUM  --   --  8.6 8.1* 8.7 8.6 8.1*   PH 7.44 7.450  --   --   --   --   --    MG  --   --  1.80 2.10  --   --   --    ALBUMIN  --   --   --  2.0* 2.4* 2.3*  --    ALKPHOS  --   --   --  88 95 110  --    ALT  --   --   --  29 88* 364*  --    AST  --   --   --  42* 110* 439*  --    BILITOT  --   --   --  0.7 1.3 1.2  --           Microbiology Results (last 7 days)     Procedure Component Value Units Date/Time    Clostridium difficile EIA [[id number]]     Order Status: Canceled Specimen: Stool            See below for Radiology    Scheduled Med:   apixaban  10 mg Oral BID    [START ON 5/29/2022] apixaban  5 mg Oral BID    atorvastatin  80 mg Oral Daily    benztropine  1 mg Per OG tube BID    doxepin  50 mg Oral QHS    ezetimibe  10 mg Oral QHS    famotidine (PF)  20 mg  Intravenous BID    furosemide (LASIX) injection  40 mg Intravenous Once    glycopyrrolate  1 mg Per NG tube TID    haloperidoL  5 mg Oral BID    levETIRAcetam  500 mg Oral BID    metoprolol tartrate  25 mg Per OG tube Daily    OXcarbazepine  300 mg Per OG tube BID    polyethylene glycol  17 g Oral Daily    rifAMpin  300 mg Oral BID    sodium chloride 0.9%  10 mL Intravenous Q12H    vancomycin (VANCOCIN) IVPB  1,250 mg Intravenous Q12H        Continuous Infusions:       PRN Meds:  sodium chloride, acetaminophen, albuterol-ipratropium, bisacodyL, dextrose 10%, fentaNYL, fentaNYL, hydrALAZINE, lorazepam, metoprolol, morphine, morphine, nitroGLYCERIN, OLANZapine, ondansetron, oxyCODONE, oxyCODONE, potassium chloride in water, potassium chloride in water, potassium chloride in water, promethazine, sodium chloride 0.9%, vancomycin - pharmacy to dose, ziprasidone       Assessment/Plan:   Acute Hypoxemic Respiratory Failure s/p intubation, trach placement on 5/10/22  Multivessel CAD s/p CABG x4, 04/06/2022.  MRSA sternal wound infection and dehiscence, status post wound vacuum-assisted closure device, 04/17/2022, and bilateral pectoral flaps for sternal wound closure, 05/11/2022.  Postoperative agitation and Delirium  Acute RUE DVT  Normocytic Anemia  s/p PEG placment on 5/10/22  Severe PCM  Hypokalemia  HTN, uncontrolled  Seizure disorder   Leucocytosis ???cause   Transaminitis, mild      Hx of Bipolar Disorder and Schizophrenia, chronic hepatitis C, hypertension, seizures, CAD s/p stents      Leukocytosis elevated but stable.  Actually down a little bit from yesterday.  No further fevers.  Will give another dose of IV Lasix this morning and continue to wean O2 as tolerated.  Continue IV vancomycin were and rifampin per ID recommendations.  Planning on 6 week course and following inflammatory markers weekly.  He continues to have drains in place.  Plastic surgery following along.  Keppra 500 mg for seizure  prophylaxis.  Tube feeds are at goal.  Appreciate therapy services working with him yesterday.  Continue to monitor progress.     Critical care diagnosis: sepsis, iv antibiotics  Critical care interventions: hands on evaluation, review of labs/radiographs/records and discussions with family  Critical care time spent: >32 minute    Patient condition:  fair    Anticipated discharge and Disposition: TBD      All diagnosis and differential diagnosis have been reviewed; assessment and plan has been documented; I have personally reviewed the labs and test results that are presently available; I have reviewed the patients medication list; I have reviewed the consulting providers response and recommendations. I have reviewed or attempted to review medical records based upon their availability    All of the patient's questions have been  addressed and answered. Patient's is agreeable to the above stated plan. I will continue to monitor closely and make adjustments to medical management as needed.  _____________________________________________________________________      Radiology:  X-Ray Chest 1 View  Narrative: EXAMINATION:  XR CHEST 1 VIEW    CLINICAL HISTORY:  dyspnea;    TECHNIQUE:  Single frontal view of the chest was performed.    COMPARISON:  Chest radiograph 05/19/2022    FINDINGS:  LINES AND TUBES: Tracheostomy cannula projects over the trachea with tip at the level of the clavicular heads.  EKG/telemetry leads overlie the chest.  Left upper extremity PICC tip projects over the SVC.  Lines overlie the chest, possible surgical drains.    MEDIASTINUM AND JACKSON: Cardiac silhouette is enlarged. There is a left atrial appendage clip.    LUNGS: There are progressive, bilateral alveolar opacities most pronounced in the bilateral perihilar lung zones.  Lung volumes are low with associated atelectatic change.    PLEURA:No appreciable pleural effusion. No pneumothorax.    BONES: No acute osseous abnormality.  Impression: New  bilateral alveolar opacities most pronounced in the perihilar lung zones may be related to edema in the setting of enlarged cardiac silhouette.  Other differential considerations include multifocal pneumonia or ARDS.    Electronically signed by: Sharon Cantu  Date:    05/24/2022  Time:    18:36      Anthony Lewis MD   05/25/2022

## 2022-05-25 NOTE — PT/OT/SLP PROGRESS
"Physical Therapy         Treatment        Kendall Duff   MRN: [id number]     PT Received On: 05/25/22  PT Start Time: 1108     PT Stop Time: 1146    PT Total Time (min): 38 min       Billable Minutes:  Therapeutic Activity 38  Total Minutes: 38    Treatment Type: Treatment  PT/PTA: PT     PTA Visit Number: 1       General Precautions: Standard, aspiration, sternal, seizure (per MD "arms at sides at all times")  Orthopedic Precautions: Orthopedic Precautions : N/A   Braces: Braces: N/A    Spiritual, Cultural Beliefs, Anglican Practices, Values that Affect Care: no    Subjective:  Communicated with NSG prior to session.    Pain/Comfort  Pain Rating 1:  (unable to state)    Objective:  Patient found in bed with HOB elevated,with: blood pressure cuff, KELLIE drain, peripheral IV, Tracheostomy    Vitals:   At rest   IG=042/103 (reported to RN who ok'd for mobility)   O2= 100%   HR= 95     After activity   BP= 156/100   O2= 92%   * during rolling for mary jo-care would fluctuate; 78%-95%    Functional Mobility:  Bed Mobility:   Supine to sit: Total Assistance   Sit to supine: Total Assistance   *maxA of 3 persons- x1 for trunk, x1 to stabilize UEs in place for adherence to pxns, x1 for LEs    Balance:   Static Sit:    Patient sat EOB for 3-4 minutes   1 person constantly holding arms in place due to unpredictability of pt   Mod-MaxA to maintain an upright position 2/2 posterior leaning   Constant VCs for anterior shift of trunk and core engagement- unsure of pt ability to understand such commands      Additional Treatment:  Pt found very soiled. Required rolling L and R + extended time for mary jo-care, garment changing, and bedding changing.     Activity Tolerance:  Patient tolerated treatment well    Patient left HOB elevated with all lines intact, call button in reach and restraints reapplied at end of session.    Assessment:  Kendall Duff is a 56 y.o. male with a medical diagnosis of Acute myocardial infarction of anterolateral " wall. + multiple diagnoses (refer to initial eval)      Rehab potential is good.    Activity tolerance: Fair    Discharge recommendations: Discharge Facility/Level of Care Needs: LTACH (long-term acute care hospital)     Equipment recommendations: Equipment Needed After Discharge:  (unknown)     GOALS:   Multidisciplinary Problems     Physical Therapy Goals        Problem: Physical Therapy    Goal Priority Disciplines Outcome Goal Variances Interventions   Physical Therapy Goal     PT, PT/OT Ongoing, Progressing     Description: Goals to be met by: 22     Patient will increase functional independence with mobility by performin. Supine to sit with Moderate Assistance  2. Sit to supine with Moderate Assistance  3. Bed to chair transfer with Moderate Assistance using LRAD  4. Sitting at edge of bed x10 minutes with Minimal Assistance                     PLAN:    Patient to be seen 3 x/week  to address the above listed problems via therapeutic activities, therapeutic exercises  Plan of Care expires: 22  Plan of Care reviewed with: patient         2022

## 2022-05-26 LAB
ANION GAP SERPL CALC-SCNC: 10 MEQ/L
BASOPHILS # BLD AUTO: 0.06 X10(3)/MCL (ref 0–0.2)
BASOPHILS NFR BLD AUTO: 0.5 %
BUN SERPL-MCNC: 41.7 MG/DL (ref 8.4–25.7)
CALCIUM SERPL-MCNC: 8 MG/DL (ref 8.4–10.2)
CHLORIDE SERPL-SCNC: 108 MMOL/L (ref 98–107)
CO2 SERPL-SCNC: 23 MMOL/L (ref 22–29)
CREAT SERPL-MCNC: 0.72 MG/DL (ref 0.73–1.18)
CREAT/UREA NIT SERPL: 58
EOSINOPHIL # BLD AUTO: 0.33 X10(3)/MCL (ref 0–0.9)
EOSINOPHIL NFR BLD AUTO: 2.5 %
ERYTHROCYTE [DISTWIDTH] IN BLOOD BY AUTOMATED COUNT: 18.7 % (ref 11.5–17)
GLUCOSE SERPL-MCNC: 141 MG/DL (ref 74–100)
HCT VFR BLD AUTO: 28.6 % (ref 42–52)
HGB BLD-MCNC: 8.7 GM/DL (ref 14–18)
IMM GRANULOCYTES # BLD AUTO: 0.08 X10(3)/MCL (ref 0–0.02)
IMM GRANULOCYTES NFR BLD AUTO: 0.6 % (ref 0–0.43)
LYMPHOCYTES # BLD AUTO: 1.95 X10(3)/MCL (ref 0.6–4.6)
LYMPHOCYTES NFR BLD AUTO: 14.8 %
MCH RBC QN AUTO: 26.9 PG (ref 27–31)
MCHC RBC AUTO-ENTMCNC: 30.4 MG/DL (ref 33–36)
MCV RBC AUTO: 88.3 FL (ref 80–94)
MONOCYTES # BLD AUTO: 1.12 X10(3)/MCL (ref 0.1–1.3)
MONOCYTES NFR BLD AUTO: 8.5 %
NEUTROPHILS # BLD AUTO: 9.6 X10(3)/MCL (ref 2.1–9.2)
NEUTROPHILS NFR BLD AUTO: 73.1 %
NRBC BLD AUTO-RTO: 0.6 %
PLATELET # BLD AUTO: 244 X10(3)/MCL (ref 130–400)
PMV BLD AUTO: 10.8 FL (ref 9.4–12.4)
POTASSIUM SERPL-SCNC: 4.1 MMOL/L (ref 3.5–5.1)
RBC # BLD AUTO: 3.24 X10(6)/MCL (ref 4.7–6.1)
SODIUM SERPL-SCNC: 141 MMOL/L (ref 136–145)
VANCOMYCIN TROUGH SERPL-MCNC: 20.3 UG/ML (ref 15–20)
WBC # SPEC AUTO: 13.2 X10(3)/MCL (ref 4.5–11.5)

## 2022-05-26 PROCEDURE — A4216 STERILE WATER/SALINE, 10 ML: HCPCS

## 2022-05-26 PROCEDURE — 63600175 PHARM REV CODE 636 W HCPCS: Performed by: INTERNAL MEDICINE

## 2022-05-26 PROCEDURE — 99900031 HC PATIENT EDUCATION (STAT)

## 2022-05-26 PROCEDURE — 63600175 PHARM REV CODE 636 W HCPCS

## 2022-05-26 PROCEDURE — 99900035 HC TECH TIME PER 15 MIN (STAT)

## 2022-05-26 PROCEDURE — 20000000 HC ICU ROOM

## 2022-05-26 PROCEDURE — 25000003 PHARM REV CODE 250

## 2022-05-26 PROCEDURE — 99900026 HC AIRWAY MAINTENANCE (STAT)

## 2022-05-26 PROCEDURE — 36415 COLL VENOUS BLD VENIPUNCTURE: CPT | Performed by: HOSPITALIST

## 2022-05-26 PROCEDURE — 27000221 HC OXYGEN, UP TO 24 HOURS

## 2022-05-26 PROCEDURE — 25000003 PHARM REV CODE 250: Performed by: STUDENT IN AN ORGANIZED HEALTH CARE EDUCATION/TRAINING PROGRAM

## 2022-05-26 PROCEDURE — 25000003 PHARM REV CODE 250: Performed by: INTERNAL MEDICINE

## 2022-05-26 PROCEDURE — 85025 COMPLETE CBC W/AUTO DIFF WBC: CPT | Performed by: HOSPITALIST

## 2022-05-26 PROCEDURE — 80202 ASSAY OF VANCOMYCIN: CPT | Performed by: INTERNAL MEDICINE

## 2022-05-26 PROCEDURE — 80048 BASIC METABOLIC PNL TOTAL CA: CPT | Performed by: HOSPITALIST

## 2022-05-26 PROCEDURE — 25000003 PHARM REV CODE 250: Performed by: HOSPITALIST

## 2022-05-26 PROCEDURE — 97530 THERAPEUTIC ACTIVITIES: CPT | Mod: CO

## 2022-05-26 RX ADMIN — FAMOTIDINE 20 MG: 10 INJECTION, SOLUTION INTRAVENOUS at 09:05

## 2022-05-26 RX ADMIN — GLYCOPYRROLATE 1 MG: 1 TABLET ORAL at 10:05

## 2022-05-26 RX ADMIN — APIXABAN 10 MG: 5 TABLET, FILM COATED ORAL at 08:05

## 2022-05-26 RX ADMIN — VANCOMYCIN HYDROCHLORIDE 1250 MG: 1.25 INJECTION, POWDER, LYOPHILIZED, FOR SOLUTION INTRAVENOUS at 02:05

## 2022-05-26 RX ADMIN — HALOPERIDOL 5 MG: 5 TABLET ORAL at 09:05

## 2022-05-26 RX ADMIN — SODIUM CHLORIDE, PRESERVATIVE FREE 10 ML: 5 INJECTION INTRAVENOUS at 09:05

## 2022-05-26 RX ADMIN — RIFAMPIN 300 MG: 300 CAPSULE ORAL at 08:05

## 2022-05-26 RX ADMIN — OXCARBAZEPINE 300 MG: 300 TABLET, FILM COATED ORAL at 09:05

## 2022-05-26 RX ADMIN — BENZTROPINE MESYLATE 1 MG: 1 TABLET ORAL at 08:05

## 2022-05-26 RX ADMIN — GLYCOPYRROLATE 1 MG: 1 TABLET ORAL at 09:05

## 2022-05-26 RX ADMIN — GLYCOPYRROLATE 1 MG: 1 TABLET ORAL at 02:05

## 2022-05-26 RX ADMIN — ATORVASTATIN CALCIUM 80 MG: 40 TABLET, FILM COATED ORAL at 09:05

## 2022-05-26 RX ADMIN — RIFAMPIN 300 MG: 300 CAPSULE ORAL at 09:05

## 2022-05-26 RX ADMIN — EZETIMIBE 10 MG: 10 TABLET ORAL at 08:05

## 2022-05-26 RX ADMIN — FENTANYL CITRATE 100 MCG: 50 INJECTION INTRAMUSCULAR; INTRAVENOUS at 05:05

## 2022-05-26 RX ADMIN — APIXABAN 10 MG: 5 TABLET, FILM COATED ORAL at 10:05

## 2022-05-26 RX ADMIN — METOPROLOL TARTRATE 25 MG: 25 TABLET, FILM COATED ORAL at 09:05

## 2022-05-26 RX ADMIN — OXCARBAZEPINE 300 MG: 300 TABLET, FILM COATED ORAL at 08:05

## 2022-05-26 RX ADMIN — HALOPERIDOL 5 MG: 5 TABLET ORAL at 08:05

## 2022-05-26 RX ADMIN — FENTANYL CITRATE 100 MCG: 50 INJECTION INTRAMUSCULAR; INTRAVENOUS at 03:05

## 2022-05-26 RX ADMIN — SODIUM CHLORIDE, PRESERVATIVE FREE 10 ML: 5 INJECTION INTRAVENOUS at 08:05

## 2022-05-26 RX ADMIN — FAMOTIDINE 20 MG: 10 INJECTION, SOLUTION INTRAVENOUS at 08:05

## 2022-05-26 RX ADMIN — POLYETHYLENE GLYCOL 3350 17 G: 17 POWDER, FOR SOLUTION ORAL at 09:05

## 2022-05-26 RX ADMIN — LEVETIRACETAM 500 MG: 500 TABLET, FILM COATED ORAL at 08:05

## 2022-05-26 RX ADMIN — LORAZEPAM 2 MG: 2 INJECTION INTRAMUSCULAR; INTRAVENOUS at 04:05

## 2022-05-26 RX ADMIN — BENZTROPINE MESYLATE 1 MG: 1 TABLET ORAL at 09:05

## 2022-05-26 RX ADMIN — FENTANYL CITRATE 100 MCG: 50 INJECTION INTRAMUSCULAR; INTRAVENOUS at 01:05

## 2022-05-26 RX ADMIN — DOXEPIN HYDROCHLORIDE 50 MG: 50 CAPSULE ORAL at 08:05

## 2022-05-26 RX ADMIN — LEVETIRACETAM 500 MG: 500 TABLET, FILM COATED ORAL at 09:05

## 2022-05-26 NOTE — PT/OT/SLP PROGRESS
Occupational Therapy  Treatment    Kendall Duff   MRN: [id number]   Admitting Diagnosis: Acute myocardial infarction of anterolateral wall    OT Date of Treatment: 05/26/22   OT Start Time: 1434  OT Stop Time: 1451  OT Total Time (min): 17 min     Billable Minutes:  Therapeutic Activity 1  Total Minutes: 17     OT/ALEKSANDR: ALEKSANDR     ALEKSANDR Visit Number: 1    General Precautions: Standard, aspiration, seizure, sternal, other (see comments) (arms at sides at all times)  Orthopedic Precautions:    Braces:           Subjective:  Communicated with RN prior to session.  Distracted  90 HR, 93 o2, 140/93     Objective: Pt. Requiring total A for sitting balance EOB demonstrating a strong posterior lean requiring constant correction with cueing required for adherence to sternal pxns. Pt. Resistive attempting to lay down several times      Functional Mobility:  Bed Mobility:   Supine to sit: Total Assistance   Sit to supine: Total Assistance   Rolling: Total Assistance   Scooting: Total Assistance    Patient left supine with all lines intact    ASSESSMENT:  Kendall Duff is a 56 y.o. male with a medical diagnosis of Acute myocardial infarction of anterolateral wall. Pt. Tolerated session fairly overall total A.    Activity tolerance: Fair    Discharge recommendations: [facility] ([facility] care [facility])     Equipment recommendations:       GOALS:   Multidisciplinary Problems     Occupational Therapy Goals        Problem: Occupational Therapy    Goal Priority Disciplines Outcome Interventions   Occupational Therapy Goal     OT, PT/OT Ongoing, Progressing    Description: Goals to be met by: 6/21/22     Patient will increase functional independence with ADLs by performing:    Feeding with Moderate Assistance. When appropriate to initiate, or simulated hand to mouth.   UE Dressing with Moderate Assistance.  Grooming while long sitting/EOB/supported with Moderate Assistance.  Sitting at edge of bed x10-15 minutes with Moderate  Assistance.  Toilet transfer to bedside commode with Moderate Assistance.                     Plan:  Patient to be seen 3 x/week, 5 x/week to address the above listed problems via self-care/home management, therapeutic activities, therapeutic exercises, cognitive retraining  Plan of Care expires:    Plan of Care reviewed with: patient         05/26/2022

## 2022-05-26 NOTE — PROGRESS NOTES
Pharmacokinetic Assessment Follow Up: IV Vancomycin    Vancomycin serum concentration assessment(s):    The trough level was drawn correctly and can be used to guide therapy at this time. The measurement is within the desired definitive target range of 15 to 20 mcg/mL.    Vancomycin Regimen Plan:    Continue regimen to Vancomycin 1250 mg IV every 12 hours with next serum trough concentration measured at 1400 prior to 4th dose on 5/28    Drug levels (last 3 results):  Recent Labs   Lab Result Units 05/24/22  1002 05/26/22  0145   Vancomycin Trough ug/ml 22.6* 20.3*       Pharmacy will continue to follow and monitor vancomycin.    Please contact pharmacy at extension [phone] for questions regarding this assessment.    Thank you for the consult,   Oneal Edmonds       Patient brief summary:  Kendall Duff is a 56 y.o. male initiated on antimicrobial therapy with IV Vancomycin for treatment of  MRSA wound infection    The patient's current regimen is 1250 mg every 12 hours    Drug Allergies:   Review of patient's allergies indicates:   Allergen Reactions    Depakote [divalproex]     Divalproex sodium Other (See Comments)    Lithium     Lithium analogues     Quetiapine Other (See Comments)       Actual Body Weight:   77 kg    Renal Function:   Estimated Creatinine Clearance: 118.6 mL/min (A) (based on SCr of 0.72 mg/dL (L)).,     Dialysis Method (if applicable):  N/A    CBC (last 72 hours):  Recent Labs   Lab Result Units 05/24/22 0208 05/25/22  0113 05/26/22  0145   WBC x10(3)/mcL 16.1* 15.1* 13.2*   Hgb gm/dL 8.4* 8.6* 8.7*   Hct % 27.4* 26.4* 28.6*   Platelet x10(3)/mcL 407* 316 244   Mono % % 6.3 6.4 8.5   Eos % % 0.1 1.1 2.5   Basophil % % 0.2 0.6 0.5       Metabolic Panel (last 72 hours):  Recent Labs   Lab Result Units 05/24/22 0208 05/25/22  0113 05/26/22  0145   Sodium Level mmol/L 134* 137 141   Potassium Level mmol/L 4.3 4.2 4.1   Chloride mmol/L 104 106 108*   Carbon Dioxide mmol/L 21* 21* 23   Glucose Level  mg/dL 140* 152* 141*   Blood Urea Nitrogen mg/dL 20.3 36.2* 41.7*   Creatinine mg/dL 0.73 0.83 0.72*   Albumin Level gm/dL 2.3*  --   --    Bilirubin Total mg/dL 1.2  --   --    Alkaline Phosphatase unit/L 110  --   --    Aspartate Aminotransferase unit/L 439*  --   --    Alanine Aminotransferase unit/L 364*  --   --        Vancomycin Administrations:  vancomycin given in the last 96 hours                     vancomycin 1.25 g in dextrose 5% 250 mL IVPB (ready to mix) (mg) 1,250 mg New Bag 05/26/22 0235     1,250 mg New Bag 05/25/22 1411     1,250 mg New Bag  0229     1,250 mg New Bag 05/24/22 1512    vancomycin (VANCOCIN) 750 mg in dextrose 5 % 250 mL IVPB (mg) 750 mg New Bag 05/24/22 0256     750 mg New Bag 05/23/22 1825     750 mg New Bag  1038     750 mg New Bag  0300     750 mg New Bag 05/22/22 1906     750 mg New Bag  1114                    Microbiologic Results:  Microbiology Results (last 7 days)       Procedure Component Value Units Date/Time    Clostridium difficile EIA [[id number]]     Order Status: Canceled Specimen: Stool

## 2022-05-26 NOTE — PROGRESS NOTES
[facility]  Hospital Medicine Progress Note        Chief Complaint: Inpatient Follow-up for surgical site infection     HPI:   Kendall Duff is a 56 y.o. male with a PMHx of chronic hepatitis C, hypertension, seizures, CAD s/p stents who initially presented to [facility] on 4/1/2022 with a primary complaint of chest pain. Of note, he was seen at an outlying ED on 3/20/22 and was dx with MI, subsequently transferred to [facility] where he underwent LHC with angioplasty, and deemed appropriate for CABG, which had been scheduled for 3/29/22, but was cancelled. He then presented to ED on 4/1 with c/o CP earlier in the day. He was admitted to cardiology services, started on a heparin gtt. CV Surgery was consulted and the patient underwent CABG x4 on 4/6/22. He was admitted to ICU post CABG, intubated on mechanical ventilation. Psych was consulted during his stay to evaluate for some recent agitation, restlessness, fidgetiness, insomnia, confusion, and sexual inappropriateness, and he was started on PRN haldol. He was extubated to NC 4L on 4/7, remained restless and confused requiring Precedex gtt.  On 4/10/22 he experienced seizure-like activity and confusion for which neuro was consulted. EEG revealed moderate generalized slowing. CT Head was negative for acute intracranial abnormality, gas noted throughout the bilateral subcutaneous soft tissues.MRI on 4/15/22 revealed small linear focus of acute small vessel ischemia in the right frontal white matter. Developed diffuse subcutaneous emphysema per CXR. He remained agitated with delirium.  Unfortunately,he pulled out his left chest tube on 4/16/22 and he had been thrashing in bed, had to be restrained at times.  He developed an unstable sternum with purulence drainage. CT thorax revealed surgical changes with inflammation, fluid and air locules, minimal anterior pericardial effusion, extensive soft tissue emphysema, and bilateral pleural  effusions and bilateral lower lung lobes collapse consolidations. He has known subcutaneous air, which has been present on previous x-rays.  Cultures positive for MRSA, and he was placed on Vancomycin. He required intubation on 4/16/22. On 4/17/22 he underwent sternal wound debridement with wound vac placement. ID was consulted on 4/22/22 due to sepsis. He was started on rifampin in addition to vancomycin for a planned 6 week course. Respiratory cultures from 4/24/22 returned positive for proteus and klebsiella. Remained intubated on mechanical ventilation. GI was consulted on 5/3/22 for PEG placement. Plastic surgery was also following for sternal reconstruction. He underwent tracheostomy and PEG placement on 5/10/22. NIVA done on 5/10/22 noted a RUE DVT. On 5/11/22 he underwent sternal wound debridement, bilateral fasciocutaneous flap advanced closure over sternum and bilateral pectoralis major muscle flap reconstruction of sternum. He was started on full dose Lovenox. He remained on mechanical ventilation until 5/21/22. Tolerating T-piece now on trach collar. He required vasopressors for BP support throughout his stay, which have since been weaned off. He has been weaned off of Precedex. He did have some vomiting on 5/21/22. Lovenox was transitioned to Eliquis on 5/21/22. KUB unrevealing, tube feeds restarted on 5/22/22. He was cleared for downgrade to the floor on 5/22/22 under hospitalist services.    Interval Hx:  No acute issues overnight.  Still some low-grade fevers.  He is on trach collar and doing well.  Oxygenating in the upper 90s to 100%.  Does not appear to be volume overloaded.  Tolerating tube feeds.  Mental status unchanged.    Objective/physical exam:  Gen: NC AT, Awake, alert, nonverbal  HEENT: PERRL EOMI normal conjunctiva, neck supple, Trach  Cardiac: Regular rhythm and rate, no murmur, rubs, or gallops  Respiratory: Clear to auscultation bilaterally. No wheezes, rales, or  crackles  Gastrointestinal: soft, nondistended, nontender, +bowel sounds, +PEG  Musculoskeletal: KELLIE drain x 5    VITAL SIGNS: 24 HRS MIN & MAX LAST   Temp  Min: 99 °F (37.2 °C)  Max: 100.6 °F (38.1 °C) 99.1 °F (37.3 °C)   BP  Min: 120/86  Max: 169/87 (!) 146/92   Pulse  Min: 79  Max: 98  89   Resp  Min: 11  Max: 33 (!) 21   SpO2  Min: 91 %  Max: 100 % 100 %       Recent Labs   Lab 05/24/22  0208 05/25/22  0113 05/26/22  0145   WBC 16.1* 15.1* 13.2*   RBC 3.14* 3.18* 3.24*   HGB 8.4* 8.6* 8.7*   HCT 27.4* 26.4* 28.6*   MCV 87.3 83.0 88.3   MCH 26.8* 27.0 26.9*   MCHC 30.7* 32.6* 30.4*   RDW 17.8* 18.0* 18.7*   * 316 244   MPV 10.1 10.5 10.8       Recent Labs   Lab 05/20/22  0542 05/21/22  1904 05/21/22  1904 05/22/22  0447 05/23/22  0156 05/24/22 0208 05/25/22 0113 05/26/22  0145   NA  --  137   < > 136 136 134* 137 141   K  --  3.1*   < > 3.3* 4.4 4.3 4.2 4.1   CO2  --  21*   < > 22 21* 21* 21* 23   BUN  --  9.2   < > 7.5* 11.4 20.3 36.2* 41.7*   CREATININE  --  0.58*   < > 0.62* 0.68* 0.73 0.83 0.72*   CALCIUM  --  8.6   < > 8.1* 8.7 8.6 8.1* 8.0*   PH 7.450  --   --   --   --   --   --   --    MG  --  1.80  --  2.10  --   --   --   --    ALBUMIN  --   --   --  2.0* 2.4* 2.3*  --   --    ALKPHOS  --   --   --  88 95 110  --   --    ALT  --   --   --  29 88* 364*  --   --    AST  --   --   --  42* 110* 439*  --   --    BILITOT  --   --   --  0.7 1.3 1.2  --   --     < > = values in this interval not displayed.          Microbiology Results (last 7 days)     Procedure Component Value Units Date/Time    Clostridium difficile EIA [[id number]]     Order Status: Canceled Specimen: Stool            See below for Radiology    Scheduled Med:   apixaban  10 mg Oral BID    [START ON 5/29/2022] apixaban  5 mg Oral BID    atorvastatin  80 mg Oral Daily    benztropine  1 mg Per OG tube BID    doxepin  50 mg Oral QHS    ezetimibe  10 mg Oral QHS    famotidine (PF)  20 mg Intravenous BID    glycopyrrolate  1 mg Per  NG tube TID    haloperidoL  5 mg Oral BID    levETIRAcetam  500 mg Oral BID    metoprolol tartrate  25 mg Per OG tube Daily    OXcarbazepine  300 mg Per OG tube BID    polyethylene glycol  17 g Oral Daily    rifAMpin  300 mg Oral BID    sodium chloride 0.9%  10 mL Intravenous Q12H    vancomycin (VANCOCIN) IVPB  1,250 mg Intravenous Q12H        Continuous Infusions:       PRN Meds:  sodium chloride, acetaminophen, albuterol-ipratropium, bisacodyL, dextrose 10%, fentaNYL, fentaNYL, hydrALAZINE, lorazepam, metoprolol, morphine, morphine, nitroGLYCERIN, OLANZapine, ondansetron, oxyCODONE, oxyCODONE, potassium chloride in water, potassium chloride in water, potassium chloride in water, promethazine, sodium chloride 0.9%, vancomycin - pharmacy to dose, ziprasidone       Assessment/Plan:   Acute Hypoxemic Respiratory Failure s/p intubation, trach placement on 5/10/22  Multivessel CAD s/p CABG x4, 04/06/2022.  MRSA sternal wound infection and dehiscence, status post wound vacuum-assisted closure device, 04/17/2022, and bilateral pectoral flaps for sternal wound closure, 05/11/2022.  Postoperative agitation and Delirium  Acute RUE DVT  Normocytic Anemia  s/p PEG placment on 5/10/22  Severe PCM  Hypokalemia  HTN, uncontrolled  Seizure disorder   Leucocytosis ???cause   Transaminitis, mild      Hx of Bipolar Disorder and Schizophrenia, chronic hepatitis C, hypertension, seizures, CAD s/p stents      Leukocytosis continues to trend down.  13.2 today.  Hemoglobin remained stable as well.  Electrolytes could.  Continue tube feeds which are currently at goal.  Continue to monitor vital signs.  On IV vancomycin and rifampin per ID recommendations for 6 weeks.  Needs weekly inflammatory markers.  Once leukocytosis resolves will start spacing out his blood draws.  Continue Keppra 500 mg b.i.d. for seizure prophylaxis.  PT OT and speech therapy now working with him.     Critical care diagnosis: sepsis, iv antibiotics  Critical  care interventions: hands on evaluation, review of labs/radiographs/records and discussions with family  Critical care time spent: >32 minute    Patient condition:  Stable    Anticipated discharge and Disposition: TBD      All diagnosis and differential diagnosis have been reviewed; assessment and plan has been documented; I have personally reviewed the labs and test results that are presently available; I have reviewed the patients medication list; I have reviewed the consulting providers response and recommendations. I have reviewed or attempted to review medical records based upon their availability    All of the patient's questions have been  addressed and answered. Patient's is agreeable to the above stated plan. I will continue to monitor closely and make adjustments to medical management as needed.  _____________________________________________________________________      Radiology:  X-Ray Chest 1 View  Narrative: EXAMINATION:  XR CHEST 1 VIEW    CLINICAL HISTORY:  dyspnea;    TECHNIQUE:  Single frontal view of the chest was performed.    COMPARISON:  Chest radiograph 05/19/2022    FINDINGS:  LINES AND TUBES: Tracheostomy cannula projects over the trachea with tip at the level of the clavicular heads.  EKG/telemetry leads overlie the chest.  Left upper extremity PICC tip projects over the SVC.  Lines overlie the chest, possible surgical drains.    MEDIASTINUM AND JACKSON: Cardiac silhouette is enlarged. There is a left atrial appendage clip.    LUNGS: There are progressive, bilateral alveolar opacities most pronounced in the bilateral perihilar lung zones.  Lung volumes are low with associated atelectatic change.    PLEURA:No appreciable pleural effusion. No pneumothorax.    BONES: No acute osseous abnormality.  Impression: New bilateral alveolar opacities most pronounced in the perihilar lung zones may be related to edema in the setting of enlarged cardiac silhouette.  Other differential considerations include  multifocal pneumonia or ARDS.    Electronically signed by: Sharon Cantu  Date:    05/24/2022  Time:    18:36      Anthony Lewis MD   05/26/2022

## 2022-05-26 NOTE — NURSING
57 y/o who had a sternal wd dehiscence 04- and had closure 05-.   Chest remains closed without redness or signs of infection.   Pt remains intubated an awake at present.   Spoke with nurse Krishnamurthy and reports awaiting LTAC placement.

## 2022-05-26 NOTE — PROGRESS NOTES
[facility] - [facility] [address] [facility]  Adult Nutrition  Progress Note    SUMMARY       Recommendations    Recommendation/Intervention: Impact Peptide 1.5 @ 80ml/hr (goal rate)  Goals: Meet >/=75% est energy and protein requirements by f/u  Nutrition Goal Status: progressing towards goal  Communication of RD Recs: reviewed with RN    Assessment and Plan    Nutrition Problem  Inadequate Oral Intake     Related to (etiology):   Current condition             Signs and Symptoms (as evidenced by):   trach     Interventions(treatment strategy):  Collaboration with other providers     Nutrition Diagnosis Status:   Continues    Malnutrition Assessment  Malnutrition Type: acute illness or injury  Weight Loss (Malnutrition): other (see comments) (does not meet criteria)  Energy Intake (Malnutrition): other (see comments) (does not meet criteria)  Subcutaneous Fat (Malnutrition):  (does not meet criteria)  Muscle Mass (Malnutrition): mild depletion  Fluid Accumulation (Malnutrition):  (does not meet criteria)  Hand  Strength, Left (Malnutrition):  (unable to eval)  Hand  Strength, Right (Malnutrition):  (unable to eval)   Oriental orthodox Region (Muscle Loss): mild depletion  Clavicle Bone Region (Muscle Loss): mild depletion   Edema (Fluid Accumulation): 0-->no edema present   Subcutaneous Fat Loss (Final Summary): well nourished  Muscle Loss Evaluation (Final Summary): well nourished  Fluid Accumulation Evaluation:  (unable to evaluate)    Moderate Weight Loss (Malnutrition):  (unable to evaluate)    Reason for Assessment    Reason For Assessment: RD follow-up  Diagnosis:   1. CAD with 4 vessel CABG 4/6    2. MRSA Sternal wound dehiscence with debridement  3. Respiratory culture positive Klebsiella and Proteus    4. Malnutrition.    5. Respiratory failure  Relevant Medical History: noted    General Information Comments:   5/24/22: Tube feeding continues. Tolerated per RN. Noted now off diprivan. Will update tube feeding  "to provide est kcal needs and eliminate need for protein packets.  5/26/22: Tube feeding continues, tolerated @ goal rate. No kcal from meds.     Nutrition Risk Screen    Nutrition Risk Screen: tube feeding or parenteral nutrition    Nutrition/Diet History    Spiritual, Cultural Beliefs, Methodist Practices, Values that Affect Care: no  Factors Affecting Nutritional Intake: on mechanical ventilation    Anthropometrics    Temp: 99.1 °F (37.3 °C)  Height Method: Estimated  Height: 5' 10.87" (180 cm)  Height (inches): 70.87 in  Weight Method: Bed Scale  Weight: 77.2 kg (170 lb 3.1 oz)  Weight (lb): 170.2 lb  Ideal Body Weight (IBW), Male: 171.22 lb  % Ideal Body Weight, Male (lb): 112.92 %  BMI (Calculated): 23.8  BMI Grade: 18.5-24.9 - normal    Lab/Procedures/Meds    Pertinent Labs Reviewed: reviewed  Pertinent Labs Comments: 5/26 Cl 108, BUN 41.7, Glu 141  Pertinent Medications Reviewed: reviewed  Pertinent Medications Comments: miralax    Estimated/Assessed Needs    Weight Used For Calorie Calculations: 77.2 kg (170 lb 3.1 oz)  Energy Calorie Requirements (kcal): 1880kcal  Energy Need Method: [facility]  Protein Requirements: 116-175gm  Weight Used For Protein Calculations: 77.2 kg (170 lb 3.1 oz)  Fluid Requirements (mL): 30mL/kg  Estimated Fluid Requirement Method: RDA Method  RDA Method (mL): 1880    Nutrition Prescription Ordered    Current Diet Order: NPO  Current Nutrition Support Formula Ordered: Peptamen Intense VHP  Current Nutrition Support Rate Ordered: 70 (ml)  Current Nutrition Support Frequency Ordered: based on tube feeding running ~20 hours/day    Evaluation of Received Nutrient/Fluid Intake    Enteral Calories (kcal): 1400  Enteral Protein (gm): 130  Enteral (Free Water) Fluid (mL): 1176  Lipid Calories (kcals): 475 kcals  Other Calories (kcal): 240  Total Calories (kcal): 1640  % Kcal Needs: 87% est needs  Other Protein (gm): 60  Total Protein (gm): 190  Total Protein (gm/kg): 2.46  % Protein " Needs: 158%  Energy Calories Required: not meeting needs  Protein Required: meeting needs  Fluid Required: not meeting needs  Tolerance: tolerating  % Intake of Estimated Energy Needs: 50 - 75 %  % Meal Intake: NPO    Nutrition Risk    Level of Risk/Frequency of Follow-up: high     Monitor and Evaluation    Food and Nutrient Intake: enteral nutrition intake  Food and Nutrient Adminstration: enteral and parenteral nutrition administration     Nutrition Follow-Up    RD Follow-up?: Yes

## 2022-05-26 NOTE — PT/OT/SLP PROGRESS
Attempted to see pt for speaking valve trials and possible clinical swallow evaluation, however pt refused to participate.  Will reattempt as schedule allows.

## 2022-05-27 LAB
ANION GAP SERPL CALC-SCNC: 8 MEQ/L
BASOPHILS # BLD AUTO: 0.06 X10(3)/MCL (ref 0–0.2)
BASOPHILS NFR BLD AUTO: 0.5 %
BUN SERPL-MCNC: 35.2 MG/DL (ref 8.4–25.7)
CALCIUM SERPL-MCNC: 8.2 MG/DL (ref 8.4–10.2)
CHLORIDE SERPL-SCNC: 107 MMOL/L (ref 98–107)
CO2 SERPL-SCNC: 25 MMOL/L (ref 22–29)
CREAT SERPL-MCNC: 0.65 MG/DL (ref 0.73–1.18)
CREAT/UREA NIT SERPL: 54
EOSINOPHIL # BLD AUTO: 0.28 X10(3)/MCL (ref 0–0.9)
EOSINOPHIL NFR BLD AUTO: 2.3 %
ERYTHROCYTE [DISTWIDTH] IN BLOOD BY AUTOMATED COUNT: 19.3 % (ref 11.5–17)
GLUCOSE SERPL-MCNC: 99 MG/DL (ref 74–100)
HCT VFR BLD AUTO: 33.3 % (ref 42–52)
HGB BLD-MCNC: 9.9 GM/DL (ref 14–18)
IMM GRANULOCYTES # BLD AUTO: 0.07 X10(3)/MCL (ref 0–0.02)
IMM GRANULOCYTES NFR BLD AUTO: 0.6 % (ref 0–0.43)
LYMPHOCYTES # BLD AUTO: 1.36 X10(3)/MCL (ref 0.6–4.6)
LYMPHOCYTES NFR BLD AUTO: 11.4 %
MCH RBC QN AUTO: 27.3 PG (ref 27–31)
MCHC RBC AUTO-ENTMCNC: 29.7 MG/DL (ref 33–36)
MCV RBC AUTO: 91.7 FL (ref 80–94)
MONOCYTES # BLD AUTO: 1.11 X10(3)/MCL (ref 0.1–1.3)
MONOCYTES NFR BLD AUTO: 9.3 %
NEUTROPHILS # BLD AUTO: 9.1 X10(3)/MCL (ref 2.1–9.2)
NEUTROPHILS NFR BLD AUTO: 75.9 %
NRBC BLD AUTO-RTO: 0.3 %
PLATELET # BLD AUTO: 222 X10(3)/MCL (ref 130–400)
PMV BLD AUTO: 11.3 FL (ref 9.4–12.4)
POTASSIUM SERPL-SCNC: 4.3 MMOL/L (ref 3.5–5.1)
RBC # BLD AUTO: 3.63 X10(6)/MCL (ref 4.7–6.1)
SODIUM SERPL-SCNC: 140 MMOL/L (ref 136–145)
WBC # SPEC AUTO: 12 X10(3)/MCL (ref 4.5–11.5)

## 2022-05-27 PROCEDURE — 97535 SELF CARE MNGMENT TRAINING: CPT | Mod: CO

## 2022-05-27 PROCEDURE — 63600175 PHARM REV CODE 636 W HCPCS: Performed by: INTERNAL MEDICINE

## 2022-05-27 PROCEDURE — 85025 COMPLETE CBC W/AUTO DIFF WBC: CPT | Performed by: HOSPITALIST

## 2022-05-27 PROCEDURE — 63600175 PHARM REV CODE 636 W HCPCS

## 2022-05-27 PROCEDURE — 25000003 PHARM REV CODE 250: Performed by: INTERNAL MEDICINE

## 2022-05-27 PROCEDURE — 27000221 HC OXYGEN, UP TO 24 HOURS

## 2022-05-27 PROCEDURE — 99231 PR SUBSEQUENT HOSPITAL CARE,LEVL I: ICD-10-PCS | Mod: ,,, | Performed by: SURGERY

## 2022-05-27 PROCEDURE — 25000003 PHARM REV CODE 250

## 2022-05-27 PROCEDURE — 94761 N-INVAS EAR/PLS OXIMETRY MLT: CPT

## 2022-05-27 PROCEDURE — 25000003 PHARM REV CODE 250: Performed by: HOSPITALIST

## 2022-05-27 PROCEDURE — 97530 THERAPEUTIC ACTIVITIES: CPT

## 2022-05-27 PROCEDURE — 25000003 PHARM REV CODE 250: Performed by: STUDENT IN AN ORGANIZED HEALTH CARE EDUCATION/TRAINING PROGRAM

## 2022-05-27 PROCEDURE — 80048 BASIC METABOLIC PNL TOTAL CA: CPT | Performed by: HOSPITALIST

## 2022-05-27 PROCEDURE — A4216 STERILE WATER/SALINE, 10 ML: HCPCS

## 2022-05-27 PROCEDURE — 99231 SBSQ HOSP IP/OBS SF/LOW 25: CPT | Mod: ,,, | Performed by: SURGERY

## 2022-05-27 PROCEDURE — 99900026 HC AIRWAY MAINTENANCE (STAT)

## 2022-05-27 PROCEDURE — 20000000 HC ICU ROOM

## 2022-05-27 PROCEDURE — 36415 COLL VENOUS BLD VENIPUNCTURE: CPT | Performed by: HOSPITALIST

## 2022-05-27 PROCEDURE — 92610 EVALUATE SWALLOWING FUNCTION: CPT

## 2022-05-27 RX ADMIN — HALOPERIDOL 5 MG: 5 TABLET ORAL at 09:05

## 2022-05-27 RX ADMIN — FAMOTIDINE 20 MG: 10 INJECTION, SOLUTION INTRAVENOUS at 09:05

## 2022-05-27 RX ADMIN — GLYCOPYRROLATE 1 MG: 1 TABLET ORAL at 02:05

## 2022-05-27 RX ADMIN — BENZTROPINE MESYLATE 1 MG: 1 TABLET ORAL at 09:05

## 2022-05-27 RX ADMIN — VANCOMYCIN HYDROCHLORIDE 1250 MG: 1.25 INJECTION, POWDER, LYOPHILIZED, FOR SOLUTION INTRAVENOUS at 02:05

## 2022-05-27 RX ADMIN — ATORVASTATIN CALCIUM 80 MG: 40 TABLET, FILM COATED ORAL at 09:05

## 2022-05-27 RX ADMIN — APIXABAN 10 MG: 5 TABLET, FILM COATED ORAL at 09:05

## 2022-05-27 RX ADMIN — OXCARBAZEPINE 300 MG: 300 TABLET, FILM COATED ORAL at 09:05

## 2022-05-27 RX ADMIN — EZETIMIBE 10 MG: 10 TABLET ORAL at 09:05

## 2022-05-27 RX ADMIN — RIFAMPIN 300 MG: 300 CAPSULE ORAL at 09:05

## 2022-05-27 RX ADMIN — LEVETIRACETAM 500 MG: 500 TABLET, FILM COATED ORAL at 09:05

## 2022-05-27 RX ADMIN — DOXEPIN HYDROCHLORIDE 50 MG: 50 CAPSULE ORAL at 09:05

## 2022-05-27 RX ADMIN — SODIUM CHLORIDE, PRESERVATIVE FREE 10 ML: 5 INJECTION INTRAVENOUS at 09:05

## 2022-05-27 RX ADMIN — FENTANYL CITRATE 100 MCG: 50 INJECTION INTRAMUSCULAR; INTRAVENOUS at 12:05

## 2022-05-27 RX ADMIN — FENTANYL CITRATE 100 MCG: 50 INJECTION INTRAMUSCULAR; INTRAVENOUS at 09:05

## 2022-05-27 RX ADMIN — GLYCOPYRROLATE 1 MG: 1 TABLET ORAL at 09:05

## 2022-05-27 RX ADMIN — VANCOMYCIN HYDROCHLORIDE 1250 MG: 1.25 INJECTION, POWDER, LYOPHILIZED, FOR SOLUTION INTRAVENOUS at 03:05

## 2022-05-27 RX ADMIN — METOPROLOL TARTRATE 25 MG: 25 TABLET, FILM COATED ORAL at 09:05

## 2022-05-27 RX ADMIN — POLYETHYLENE GLYCOL 3350 17 G: 17 POWDER, FOR SOLUTION ORAL at 09:05

## 2022-05-27 NOTE — PROGRESS NOTES
PRS  Pt has been extremely hyperactive despite aggressive use of sedation  AFVSS  Incisions c/d/i  Drain s/s output noted  No changes to care from my standpoint.  Please continue aggressive measures to keep patient still as to not disrupt his repair.  I will follow.

## 2022-05-27 NOTE — PROGRESS NOTES
[facility] Hospital Medicine Progress Note        Chief Complaint: Inpatient Follow-up for surgical site infection    HPI:   56 y.o. male with a PMHx of chronic hepatitis C, hypertension, seizures, CAD s/p stents who initially presented to [facility] on 4/1/2022 with a primary complaint of chest pain. Of note, he was seen at an outlying ED on 3/20/22 and was dx with MI, subsequently transferred to [facility] where he underwent LHC with angioplasty, and deemed appropriate for CABG, which had been scheduled for 3/29/22, but was cancelled. He then presented to ED on 4/1 with c/o CP earlier in the day. He was admitted to cardiology services, started on a heparin gtt. CV Surgery was consulted and the patient underwent CABG x4 on 4/6/22. He was admitted to ICU post CABG, intubated on mechanical ventilation. Psych was consulted during his stay to evaluate for some recent agitation, restlessness, fidgetiness, insomnia, confusion, and sexual inappropriateness, and he was started on PRN haldol. He was extubated to NC 4L on 4/7, remained restless and confused requiring Precedex gtt.  On 4/10/22 he experienced seizure-like activity and confusion for which neuro was consulted. EEG revealed moderate generalized slowing. CT Head was negative for acute intracranial abnormality, gas noted throughout the bilateral subcutaneous soft tissues.MRI on 4/15/22 revealed small linear focus of acute small vessel ischemia in the right frontal white matter. Developed diffuse subcutaneous emphysema per CXR. He remained agitated with delirium.  Unfortunately,he pulled out his left chest tube on 4/16/22 and he had been thrashing in bed, had to be restrained at times.  He developed an unstable sternum with purulence drainage. CT thorax revealed surgical changes with inflammation, fluid and air locules, minimal anterior pericardial effusion, extensive soft tissue emphysema, and bilateral pleural effusions and  bilateral lower lung lobes collapse consolidations. He has known subcutaneous air, which has been present on previous x-rays.  Cultures positive for MRSA, and he was placed on Vancomycin. He required intubation on 4/16/22. On 4/17/22 he underwent sternal wound debridement with wound vac placement. ID was consulted on 4/22/22 due to sepsis. He was started on rifampin in addition to vancomycin for a planned 6 week course. Respiratory cultures from 4/24/22 returned positive for proteus and klebsiella. Remained intubated on mechanical ventilation. GI was consulted on 5/3/22 for PEG placement. Plastic surgery was also following for sternal reconstruction. He underwent tracheostomy and PEG placement on 5/10/22. NIVA done on 5/10/22 noted a RUE DVT. On 5/11/22 he underwent sternal wound debridement, bilateral fasciocutaneous flap advanced closure over sternum and bilateral pectoralis major muscle flap reconstruction of sternum. He was started on full dose Lovenox. He remained on mechanical ventilation until 5/21/22. Tolerating T-piece now on trach collar. He required vasopressors for BP support throughout his stay, which have since been weaned off. He has been weaned off of Precedex. He did have some vomiting on 5/21/22. Lovenox was transitioned to Eliquis on 5/21/22. KUB unrevealing, tube feeds restarted on 5/22/22. He was cleared for downgrade to the floor on 5/22/22 under hospitalist services.    Interval Hx:  No changes overnight.  Nursing at the bedside.  Still with all 5 drains in place and with output.  He was not able to work with speech or therapy services yesterday as he was uncooperative.  He is more awake this morning and attempt to communicate when I ask him questions.  Reports that he is little agitated at night.    Objective/physical exam:  Gen: NC AT, Awake, alert, nonverbal  HEENT: PERRL EOMI normal conjunctiva, neck supple, Trach  Cardiac: Regular rhythm and rate, no murmur, rubs, or gallops  Respiratory:  Clear to auscultation bilaterally. No wheezes, rales, or crackles  Gastrointestinal: soft, nondistended, nontender, +bowel sounds, +PEG  Musculoskeletal: KELLIE drain x 5    VITAL SIGNS: 24 HRS MIN & MAX LAST   Temp  Min: 99 °F (37.2 °C)  Max: 100.1 °F (37.8 °C) 99.1 °F (37.3 °C)   BP  Min: 114/90  Max: 144/95 (!) 122/90   Pulse  Min: 47  Max: 100  85   Resp  Min: 6  Max: 34 20   SpO2  Min: 90 %  Max: 100 % 100 %       Recent Labs   Lab 05/25/22  0113 05/26/22  0145 05/27/22  0130   WBC 15.1* 13.2* 12.0*   RBC 3.18* 3.24* 3.63*   HGB 8.6* 8.7* 9.9*   HCT 26.4* 28.6* 33.3*   MCV 83.0 88.3 91.7   MCH 27.0 26.9* 27.3   MCHC 32.6* 30.4* 29.7*   RDW 18.0* 18.7* 19.3*    244 222   MPV 10.5 10.8 11.3       Recent Labs   Lab 05/21/22  1904 05/22/22  0447 05/23/22  0156 05/24/22  0208 05/25/22  0113 05/26/22  0145 05/27/22  0130    136 136 134* 137 141 140   K 3.1* 3.3* 4.4 4.3 4.2 4.1 4.3   CO2 21* 22 21* 21* 21* 23 25   BUN 9.2 7.5* 11.4 20.3 36.2* 41.7* 35.2*   CREATININE 0.58* 0.62* 0.68* 0.73 0.83 0.72* 0.65*   CALCIUM 8.6 8.1* 8.7 8.6 8.1* 8.0* 8.2*   MG 1.80 2.10  --   --   --   --   --    ALBUMIN  --  2.0* 2.4* 2.3*  --   --   --    ALKPHOS  --  88 95 110  --   --   --    ALT  --  29 88* 364*  --   --   --    AST  --  42* 110* 439*  --   --   --    BILITOT  --  0.7 1.3 1.2  --   --   --           Microbiology Results (last 7 days)     Procedure Component Value Units Date/Time    Clostridium difficile EIA [[id number]]     Order Status: Canceled Specimen: Stool            See below for Radiology    Scheduled Med:   apixaban  10 mg Oral BID    [START ON 5/29/2022] apixaban  5 mg Oral BID    atorvastatin  80 mg Oral Daily    benztropine  1 mg Per OG tube BID    doxepin  50 mg Oral QHS    ezetimibe  10 mg Oral QHS    famotidine (PF)  20 mg Intravenous BID    glycopyrrolate  1 mg Per NG tube TID    haloperidoL  5 mg Oral BID    levETIRAcetam  500 mg Oral BID    metoprolol tartrate  25 mg Per OG tube  Daily    OXcarbazepine  300 mg Per OG tube BID    polyethylene glycol  17 g Oral Daily    rifAMpin  300 mg Oral BID    sodium chloride 0.9%  10 mL Intravenous Q12H    vancomycin (VANCOCIN) IVPB  1,250 mg Intravenous Q12H        Continuous Infusions:       PRN Meds:  sodium chloride, acetaminophen, albuterol-ipratropium, bisacodyL, dextrose 10%, fentaNYL, fentaNYL, hydrALAZINE, lorazepam, metoprolol, morphine, morphine, nitroGLYCERIN, OLANZapine, ondansetron, oxyCODONE, oxyCODONE, potassium chloride in water, potassium chloride in water, potassium chloride in water, promethazine, sodium chloride 0.9%, vancomycin - pharmacy to dose, ziprasidone       Assessment/Plan:   Acute Hypoxemic Respiratory Failure s/p intubation, trach placement on 5/10/22  Multivessel CAD s/p CABG x4, 04/06/2022.  MRSA sternal wound infection and dehiscence, status post wound vacuum-assisted closure device, 04/17/2022, and bilateral pectoral flaps for sternal wound closure, 05/11/2022.  Postoperative agitation and Delirium  Acute RUE DVT  Normocytic Anemia  s/p PEG placment on 5/10/22  Severe PCM  Hypokalemia  HTN, uncontrolled  Seizure disorder   Leucocytosis ???cause   Transaminitis, mild      Hx of Bipolar Disorder and Schizophrenia, chronic hepatitis C, hypertension, seizures, CAD s/p stents      Leukocytosis continues to trend down. 12 today. Expected to normalize maybe by tomorrow.  On IV vancomycin and rifampin per ID recommendations for 6 weeks.  Inflammatory markers qMon  Continue tube feeds, at goal  Keppra 500mg BID per PEG  PT/OT/Speech following  Drain mgmt per surgery  Vitals stable     Critical care diagnosis: sepsis, iv antibiotics  Critical care interventions: hands on evaluation, review of labs/radiographs/records and discussions with family  Critical care time spent: >32 minute    Patient condition:  guarded    Anticipated discharge and Disposition: TBD      All diagnosis and differential diagnosis have been reviewed;  assessment and plan has been documented; I have personally reviewed the labs and test results that are presently available; I have reviewed the patients medication list; I have reviewed the consulting providers response and recommendations. I have reviewed or attempted to review medical records based upon their availability    All of the patient's questions have been  addressed and answered. Patient's is agreeable to the above stated plan. I will continue to monitor closely and make adjustments to medical management as needed.  _____________________________________________________________________      Radiology:  X-Ray Chest 1 View  Narrative: EXAMINATION:  XR CHEST 1 VIEW    CLINICAL HISTORY:  dyspnea;    TECHNIQUE:  Single frontal view of the chest was performed.    COMPARISON:  Chest radiograph 05/19/2022    FINDINGS:  LINES AND TUBES: Tracheostomy cannula projects over the trachea with tip at the level of the clavicular heads.  EKG/telemetry leads overlie the chest.  Left upper extremity PICC tip projects over the SVC.  Lines overlie the chest, possible surgical drains.    MEDIASTINUM AND JACKSON: Cardiac silhouette is enlarged. There is a left atrial appendage clip.    LUNGS: There are progressive, bilateral alveolar opacities most pronounced in the bilateral perihilar lung zones.  Lung volumes are low with associated atelectatic change.    PLEURA:No appreciable pleural effusion. No pneumothorax.    BONES: No acute osseous abnormality.  Impression: New bilateral alveolar opacities most pronounced in the perihilar lung zones may be related to edema in the setting of enlarged cardiac silhouette.  Other differential considerations include multifocal pneumonia or ARDS.    Electronically signed by: Sharon Cantu  Date:    05/24/2022  Time:    18:36      Anthony Lewis MD   05/27/2022

## 2022-05-27 NOTE — PT/OT/SLP PROGRESS
"Physical Therapy         Treatment        Kendall Duff   MRN: [id number]     PT Received On: 05/27/22  PT Start Time: 1050     PT Stop Time: 1114    PT Total Time (min): 24 min       Billable Minutes:  Therapeutic Activity 24  Total Minutes: 24    Treatment Type: Treatment  PT/PTA: PT     PTA Visit Number: 2       General Precautions: Standard, sternal (per MD "arms at side at all times")  Orthopedic Precautions: Orthopedic Precautions : N/A   Braces: Braces: N/A    Spiritual, Cultural Beliefs, Faith Practices, Values that Affect Care: no    Subjective:  Communicated with NSG prior to session.    Pain/Comfort  Pain Rating 1: 0/10    Main complaint: "i'm hungry"    Objective:  Patient found in bed with HOB elevated, with: blood pressure cuff, KELLIE drain, PEG Tube, Tracheostomy    Functional Mobility:  Bed Mobility:   Supine to sit: Maximum Assistance; able to advance LEs off bed, assist required for safety/control   Sit to supine: Mod-MaxA x2     Balance:   Static/Dynamic Sit:    Pt sat EOB for multiple minutes. Stronger posterior lean demonstrated throughout requiring max-totA to maintain an upright position   Attempted to get pt to perform LE there ex; decreased command following for proper motions   Performed oral hygiene with assist of JONES   Participate with SLP as well   VC/TC to maintain pxns    Activity Tolerance:  Patient tolerated treatment well    Patient left HOB elevated with all lines intact, call button in reach and restraints reapplied at end of session.    Assessment:  Kendall Duff is a 56 y.o. male with a medical diagnosis of Acute myocardial infarction of anterolateral wall.     Rehab potential is good.    Activity tolerance: Fair    Discharge recommendations: Discharge Facility/Level of Care Needs: LTACH (long-term acute care hospital)     Equipment recommendations: Equipment Needed After Discharge:  (unknown)     GOALS:   Multidisciplinary Problems     Physical Therapy Goals        Problem: " Physical Therapy    Goal Priority Disciplines Outcome Goal Variances Interventions   Physical Therapy Goal     PT, PT/OT Ongoing, Progressing     Description: Goals to be met by: 22     Patient will increase functional independence with mobility by performin. Supine to sit with Moderate Assistance  2. Sit to supine with Moderate Assistance  3. Bed to chair transfer with Moderate Assistance using LRAD  4. Sitting at edge of bed x10 minutes with Minimal Assistance                     PLAN:    Patient to be seen 3 x/week  to address the above listed problems via therapeutic activities, therapeutic exercises  Plan of Care expires: 22  Plan of Care reviewed with: patient         2022

## 2022-05-27 NOTE — PT/OT/SLP EVAL
"Speech Language Pathology Department  Clinical Swallow Evaluation    Patient Name:  Kendall Duff   MRN:  [id number]  Admitting Diagnosis: Acute myocardial infarction of anterolateral wall    Recommendations:                 General Recommendations:  Modified barium swallow study when patient consistently cooperative and vocal quality improved  Diet recommendations:  Continue PEG tube feeding  Swallow Strategies/Precautions: medication via PEG  General Precautions: Standard, aspiration    History:     Past Medical History:   Diagnosis Date    Bipolar disorder, unspecified     Chronic hepatitis C     Hypertension     Obesity, unspecified     Seizures     Stroke        Past Surgical History:   Procedure Laterality Date    CORONARY STENT PLACEMENT  08/14/2015    DEBRIDEMENT  04/17/2022    LEFT HEART CATHETERIZATION Left 08/13/2015    REPEAT CLOSURE OF STERNAL INCISION N/A 5/11/2022    Procedure: CLOSURE, STERNAL INCISION, REPEAT;  Surgeon: Adolfo Weiss MD;  Location: [facility] OR;  Service: Plastics;  Laterality: N/A;  STERNAL WOUND DEBRIDEMENT AND RECONSTRUCTION // MULTIPLE MUSCLE FLAPS // REQ 1400     Respiratory Status: trach collar  Home Diet: Regular and thin liquids  Current Method of Nutrition: Tube feeding (PEG)    Subjective     Patient awake, alert and cooperative and seated at the edge of bed with PT/OT.  Pt tolerating cuff deflation and speaking valve placed for evaluation. Minimal phonation noted.    Patient goals: "I'm hungry"     Pain/Comfort:  Pain Rating 1: 0/10      Objective:     Consistency Fed By Oral Symptoms Pharyngeal Symptoms   Ice chips SLP None Wet vocal quality     Assessment:     Pt presents with signs/sx oropharyngeal dysphagia warranting comprehensive assessment of swallow function to determine safety of PO intake.    Goals:   Multidisciplinary Problems     SLP Goals        Problem: SLP    Goal Priority Disciplines Outcome   SLP Goal     SLP Ongoing, Progressing   Description: LTG: " Tolerate speaking valve during all waking hours with no signs/sx respiratory distress/compromise  STG: tolerate speaking valve x1 hour with no signs/sx respiratory distress/compromise                   Plan:     · Patient to be seen:  5 x/week   · Plan of Care expires:  06/21/22  · Plan of Care reviewed with:  patient   · SLP Follow-Up:  Yes      Time Tracking:     SLP Treatment Date:   05/27/22  Speech Start Time:  1100  Speech Stop Time:  1115     Speech Total Time (min):  15 min    Billable minutes:  Swallow and Oral Function Evaluation, 15 minutes       05/27/2022

## 2022-05-27 NOTE — PT/OT/SLP PROGRESS
Occupational Therapy  Treatment    Kendall Duff   MRN: [id number]   Admitting Diagnosis: Acute myocardial infarction of anterolateral wall    OT Date of Treatment: 05/27/22   OT Start Time: 1052  OT Stop Time: 1115  OT Total Time (min): 23 min     Billable Minutes:  Self Care/Home Management 2  Total Minutes: 23     OT/ALEKSANDR: ALEKSANDR     ALEKSANDR Visit Number: 2    General Precautions: Standard, sternal  Orthopedic Precautions:    Braces:           Subjective:  Communicated with RN prior to session.  Distracted  Restless  82 HR, 100 02     Objective:  Pt. Requiring Total A for sitting balance EOB demonstrating a strong posterior lean requiring constant correction. Pt. Requiring [place] A during grooming task using L UE for excursion to mouth. Limited grasp noted in B UE. SLP entering room, performing assessment with pt. EOB. Pt. Demonstrating increased tolerance to sitting balance.      Functional Mobility:  Bed Mobility:   Supine to sit: Total Assistance   Sit to supine: Total Assistance   Rolling: Total Assistance   Scooting: Total Assistance    Balance:   Static Sit: total A  Dynamic Sit:  Total A     Patient left supine with all lines intact and call button in reach    ASSESSMENT:  Kendall Duff is a 56 y.o. male with a medical diagnosis of Acute myocardial infarction of anterolateral wall. Pt. Tolerated session well overall demonstrating increased activity tolerance throughout session.    Activity tolerance: Fair    Discharge recommendations: LTACH (long-term acute care hospital)     Equipment recommendations:       GOALS:   Multidisciplinary Problems     Occupational Therapy Goals        Problem: Occupational Therapy    Goal Priority Disciplines Outcome Interventions   Occupational Therapy Goal     OT, PT/OT Ongoing, Progressing    Description: Goals to be met by: 6/21/22     Patient will increase functional independence with ADLs by performing:    Feeding with Moderate Assistance. When appropriate to initiate, or simulated hand  to mouth.   UE Dressing with Moderate Assistance.  Grooming while long sitting/EOB/supported with Moderate Assistance.  Sitting at edge of bed x10-15 minutes with Moderate Assistance.  Toilet transfer to bedside commode with Moderate Assistance.                     Plan:  Patient to be seen 3 x/week, 5 x/week to address the above listed problems via self-care/home management, therapeutic activities, therapeutic exercises  Plan of Care expires:    Plan of Care reviewed with: patient         05/27/2022

## 2022-05-28 LAB — VANCOMYCIN TROUGH SERPL-MCNC: 22.3 UG/ML (ref 15–20)

## 2022-05-28 PROCEDURE — 25000003 PHARM REV CODE 250: Performed by: HOSPITALIST

## 2022-05-28 PROCEDURE — 63600175 PHARM REV CODE 636 W HCPCS

## 2022-05-28 PROCEDURE — 25000003 PHARM REV CODE 250: Performed by: STUDENT IN AN ORGANIZED HEALTH CARE EDUCATION/TRAINING PROGRAM

## 2022-05-28 PROCEDURE — 27000221 HC OXYGEN, UP TO 24 HOURS

## 2022-05-28 PROCEDURE — A4216 STERILE WATER/SALINE, 10 ML: HCPCS

## 2022-05-28 PROCEDURE — 36415 COLL VENOUS BLD VENIPUNCTURE: CPT | Performed by: HOSPITALIST

## 2022-05-28 PROCEDURE — 99900026 HC AIRWAY MAINTENANCE (STAT)

## 2022-05-28 PROCEDURE — 80202 ASSAY OF VANCOMYCIN: CPT | Performed by: HOSPITALIST

## 2022-05-28 PROCEDURE — 63600175 PHARM REV CODE 636 W HCPCS: Performed by: INTERNAL MEDICINE

## 2022-05-28 PROCEDURE — 25000003 PHARM REV CODE 250: Performed by: INTERNAL MEDICINE

## 2022-05-28 PROCEDURE — 25000003 PHARM REV CODE 250

## 2022-05-28 PROCEDURE — 20000000 HC ICU ROOM

## 2022-05-28 PROCEDURE — 94761 N-INVAS EAR/PLS OXIMETRY MLT: CPT

## 2022-05-28 RX ORDER — OLANZAPINE 5 MG/1
10 TABLET, ORALLY DISINTEGRATING ORAL NIGHTLY
Status: DISCONTINUED | OUTPATIENT
Start: 2022-05-28 | End: 2022-06-10

## 2022-05-28 RX ADMIN — GLYCOPYRROLATE 1 MG: 1 TABLET ORAL at 08:05

## 2022-05-28 RX ADMIN — METOPROLOL TARTRATE 25 MG: 25 TABLET, FILM COATED ORAL at 08:05

## 2022-05-28 RX ADMIN — HALOPERIDOL 5 MG: 5 TABLET ORAL at 09:05

## 2022-05-28 RX ADMIN — GLYCOPYRROLATE 1 MG: 1 TABLET ORAL at 02:05

## 2022-05-28 RX ADMIN — HALOPERIDOL 5 MG: 5 TABLET ORAL at 08:05

## 2022-05-28 RX ADMIN — APIXABAN 10 MG: 5 TABLET, FILM COATED ORAL at 08:05

## 2022-05-28 RX ADMIN — SODIUM CHLORIDE, PRESERVATIVE FREE 10 ML: 5 INJECTION INTRAVENOUS at 09:05

## 2022-05-28 RX ADMIN — BENZTROPINE MESYLATE 1 MG: 1 TABLET ORAL at 08:05

## 2022-05-28 RX ADMIN — FENTANYL CITRATE 100 MCG: 50 INJECTION INTRAMUSCULAR; INTRAVENOUS at 11:05

## 2022-05-28 RX ADMIN — VANCOMYCIN HYDROCHLORIDE 750 MG: 1 INJECTION, POWDER, LYOPHILIZED, FOR SOLUTION INTRAVENOUS at 08:05

## 2022-05-28 RX ADMIN — FAMOTIDINE 20 MG: 10 INJECTION, SOLUTION INTRAVENOUS at 08:05

## 2022-05-28 RX ADMIN — LORAZEPAM 2 MG: 2 INJECTION INTRAMUSCULAR; INTRAVENOUS at 12:05

## 2022-05-28 RX ADMIN — POLYETHYLENE GLYCOL 3350 17 G: 17 POWDER, FOR SOLUTION ORAL at 09:05

## 2022-05-28 RX ADMIN — OXCARBAZEPINE 300 MG: 300 TABLET, FILM COATED ORAL at 08:05

## 2022-05-28 RX ADMIN — VANCOMYCIN HYDROCHLORIDE 1250 MG: 1.25 INJECTION, POWDER, LYOPHILIZED, FOR SOLUTION INTRAVENOUS at 03:05

## 2022-05-28 RX ADMIN — FENTANYL CITRATE 100 MCG: 50 INJECTION INTRAMUSCULAR; INTRAVENOUS at 03:05

## 2022-05-28 RX ADMIN — RIFAMPIN 300 MG: 300 CAPSULE ORAL at 08:05

## 2022-05-28 RX ADMIN — MORPHINE SULFATE 4 MG: 4 INJECTION INTRAVENOUS at 02:05

## 2022-05-28 RX ADMIN — DOXEPIN HYDROCHLORIDE 50 MG: 50 CAPSULE ORAL at 08:05

## 2022-05-28 RX ADMIN — OLANZAPINE 10 MG: 5 TABLET, ORALLY DISINTEGRATING ORAL at 08:05

## 2022-05-28 RX ADMIN — EZETIMIBE 10 MG: 10 TABLET ORAL at 08:05

## 2022-05-28 RX ADMIN — LEVETIRACETAM 500 MG: 500 TABLET, FILM COATED ORAL at 08:05

## 2022-05-28 RX ADMIN — ATORVASTATIN CALCIUM 80 MG: 40 TABLET, FILM COATED ORAL at 08:05

## 2022-05-28 RX ADMIN — LORAZEPAM 2 MG: 2 INJECTION INTRAMUSCULAR; INTRAVENOUS at 05:05

## 2022-05-28 NOTE — PROGRESS NOTES
[facility] Hospital Medicine Progress Note        Chief Complaint: Inpatient Follow-up for surgical site infection     HPI:   56 y.o. male with a PMHx of chronic hepatitis C, hypertension, seizures, CAD s/p stents who initially presented to [facility] on 4/1/2022 with a primary complaint of chest pain. Of note, he was seen at an outlying ED on 3/20/22 and was dx with MI, subsequently transferred to [facility] where he underwent LHC with angioplasty, and deemed appropriate for CABG, which had been scheduled for 3/29/22, but was cancelled. He then presented to ED on 4/1 with c/o CP earlier in the day. He was admitted to cardiology services, started on a heparin gtt. CV Surgery was consulted and the patient underwent CABG x4 on 4/6/22. He was admitted to ICU post CABG, intubated on mechanical ventilation. Psych was consulted during his stay to evaluate for some recent agitation, restlessness, fidgetiness, insomnia, confusion, and sexual inappropriateness, and he was started on PRN haldol. He was extubated to NC 4L on 4/7, remained restless and confused requiring Precedex gtt.  On 4/10/22 he experienced seizure-like activity and confusion for which neuro was consulted. EEG revealed moderate generalized slowing. CT Head was negative for acute intracranial abnormality, gas noted throughout the bilateral subcutaneous soft tissues.MRI on 4/15/22 revealed small linear focus of acute small vessel ischemia in the right frontal white matter. Developed diffuse subcutaneous emphysema per CXR. He remained agitated with delirium.  Unfortunately,he pulled out his left chest tube on 4/16/22 and he had been thrashing in bed, had to be restrained at times.  He developed an unstable sternum with purulence drainage. CT thorax revealed surgical changes with inflammation, fluid and air locules, minimal anterior pericardial effusion, extensive soft tissue emphysema, and bilateral pleural effusions and  bilateral lower lung lobes collapse consolidations. He has known subcutaneous air, which has been present on previous x-rays.  Cultures positive for MRSA, and he was placed on Vancomycin. He required intubation on 4/16/22. On 4/17/22 he underwent sternal wound debridement with wound vac placement. ID was consulted on 4/22/22 due to sepsis. He was started on rifampin in addition to vancomycin for a planned 6 week course. Respiratory cultures from 4/24/22 returned positive for proteus and klebsiella. Remained intubated on mechanical ventilation. GI was consulted on 5/3/22 for PEG placement. Plastic surgery was also following for sternal reconstruction. He underwent tracheostomy and PEG placement on 5/10/22. NIVA done on 5/10/22 noted a RUE DVT. On 5/11/22 he underwent sternal wound debridement, bilateral fasciocutaneous flap advanced closure over sternum and bilateral pectoralis major muscle flap reconstruction of sternum. He was started on full dose Lovenox. He remained on mechanical ventilation until 5/21/22. Tolerating T-piece now on trach collar. He required vasopressors for BP support throughout his stay, which have since been weaned off. He has been weaned off of Precedex. He did have some vomiting on 5/21/22. Lovenox was transitioned to Eliquis on 5/21/22. KUB unrevealing, tube feeds restarted on 5/22/22. He was cleared for downgrade to the floor on 5/22/22 under hospitalist services.     Interval Hx:  Nurse reports some agitation overnight.  Trying to keep him semi-sedated with p.r.n. Ativan due to his wounds.  We do want him alert enough so he can work with therapy though.  Speech reports that he urine tried this a few words yesterday.  Will continue working with the bowels.  May benefit from some scheduled zyprexa at night to keep him calm and so he can get some better sleep     Objective/physical exam:  Gen: NC AT, Awake, alert, nonverbal  HEENT: PERRL EOMI normal conjunctiva, neck supple, Trach  Cardiac:  Regular rhythm and rate, no murmur, rubs, or gallops  Respiratory: Clear to auscultation bilaterally. No wheezes, rales, or crackles  Gastrointestinal: soft, nondistended, nontender, +bowel sounds, +PEG  Musculoskeletal: KELLIE drain x 5  VITAL SIGNS: 24 HRS MIN & MAX LAST   Temp  Min: 98.8 °F (37.1 °C)  Max: 100.2 °F (37.9 °C) 100.2 °F (37.9 °C)   BP  Min: 123/92  Max: 162/99 137/81   Pulse  Min: 45  Max: 94  (!) 46   Resp  Min: 9  Max: 31 (!) 29   SpO2  Min: 91 %  Max: 100 % 98 %       Recent Labs   Lab 05/25/22  0113 05/26/22  0145 05/27/22  0130   WBC 15.1* 13.2* 12.0*   RBC 3.18* 3.24* 3.63*   HGB 8.6* 8.7* 9.9*   HCT 26.4* 28.6* 33.3*   MCV 83.0 88.3 91.7   MCH 27.0 26.9* 27.3   MCHC 32.6* 30.4* 29.7*   RDW 18.0* 18.7* 19.3*    244 222   MPV 10.5 10.8 11.3       Recent Labs   Lab 05/21/22  1904 05/22/22  0447 05/23/22  0156 05/24/22  0208 05/25/22  0113 05/26/22  0145 05/27/22  0130    136 136 134* 137 141 140   K 3.1* 3.3* 4.4 4.3 4.2 4.1 4.3   CO2 21* 22 21* 21* 21* 23 25   BUN 9.2 7.5* 11.4 20.3 36.2* 41.7* 35.2*   CREATININE 0.58* 0.62* 0.68* 0.73 0.83 0.72* 0.65*   CALCIUM 8.6 8.1* 8.7 8.6 8.1* 8.0* 8.2*   MG 1.80 2.10  --   --   --   --   --    ALBUMIN  --  2.0* 2.4* 2.3*  --   --   --    ALKPHOS  --  88 95 110  --   --   --    ALT  --  29 88* 364*  --   --   --    AST  --  42* 110* 439*  --   --   --    BILITOT  --  0.7 1.3 1.2  --   --   --           Microbiology Results (last 7 days)     Procedure Component Value Units Date/Time    Clostridium difficile EIA [[id number]]     Order Status: Canceled Specimen: Stool            See below for Radiology    Scheduled Med:   apixaban  10 mg Oral BID    [START ON 5/29/2022] apixaban  5 mg Oral BID    atorvastatin  80 mg Oral Daily    benztropine  1 mg Per OG tube BID    doxepin  50 mg Oral QHS    ezetimibe  10 mg Oral QHS    famotidine (PF)  20 mg Intravenous BID    glycopyrrolate  1 mg Per NG tube TID    haloperidoL  5 mg Oral BID     levETIRAcetam  500 mg Oral BID    metoprolol tartrate  25 mg Per OG tube Daily    OXcarbazepine  300 mg Per OG tube BID    polyethylene glycol  17 g Oral Daily    rifAMpin  300 mg Oral BID    sodium chloride 0.9%  10 mL Intravenous Q12H    vancomycin (VANCOCIN) IVPB  1,250 mg Intravenous Q12H        Continuous Infusions:       PRN Meds:  sodium chloride, acetaminophen, albuterol-ipratropium, bisacodyL, dextrose 10%, fentaNYL, fentaNYL, hydrALAZINE, lorazepam, metoprolol, morphine, morphine, nitroGLYCERIN, OLANZapine, ondansetron, oxyCODONE, oxyCODONE, potassium chloride in water, potassium chloride in water, potassium chloride in water, promethazine, sodium chloride 0.9%, vancomycin - pharmacy to dose, ziprasidone       Assessment/Plan:   Acute Hypoxemic Respiratory Failure s/p intubation, trach placement on 5/10/22  Multivessel CAD s/p CABG x4, 04/06/2022.  MRSA sternal wound infection and dehiscence, status post wound vacuum-assisted closure device, 04/17/2022, and bilateral pectoral flaps for sternal wound closure, 05/11/2022.  Postoperative agitation and Delirium  Acute RUE DVT  Normocytic Anemia  s/p PEG placment on 5/10/22  Severe PCM  Hypokalemia  HTN, uncontrolled  Seizure disorder   Leucocytosis ???cause   Transaminitis, mild      Hx of Bipolar Disorder and Schizophrenia, chronic hepatitis C, hypertension, seizures, CAD s/p stents      Labs pending this morning.  Leukocytosis has been trending down.  Still some tachypnea.  Looks like he also had some bradycardic episodes overnight.  Will continue to monitor and adjust his medications as needed.  Continue IV vancomycin and rifampin per ID recommendations.  Will need 6 weeks of extended course.  Repeat inflammatory markers Q Monday.  Continue tube feeds are trach goal.  Patient has multiple antipsychotics on for sedation.  May benefit from some scheduled zyprexa at night to help him rest.  Continue Keppra 500 mg b.i.d. per PEG for seizure  prophylaxis.  Continue PT OT and speech therapy.     Critical care diagnosis: sepsis, iv antibiotics  Critical care interventions: hands on evaluation, review of labs/radiographs/records and discussions with family  Critical care time spent: >32 minute    Patient condition:  fair    Anticipated discharge and Disposition: TBD      All diagnosis and differential diagnosis have been reviewed; assessment and plan has been documented; I have personally reviewed the labs and test results that are presently available; I have reviewed the patients medication list; I have reviewed the consulting providers response and recommendations. I have reviewed or attempted to review medical records based upon their availability    All of the patient's questions have been  addressed and answered. Patient's is agreeable to the above stated plan. I will continue to monitor closely and make adjustments to medical management as needed.  _____________________________________________________________________      Radiology:  X-Ray Chest 1 View  Narrative: EXAMINATION:  XR CHEST 1 VIEW    CLINICAL HISTORY:  dyspnea;    TECHNIQUE:  Single frontal view of the chest was performed.    COMPARISON:  Chest radiograph 05/19/2022    FINDINGS:  LINES AND TUBES: Tracheostomy cannula projects over the trachea with tip at the level of the clavicular heads.  EKG/telemetry leads overlie the chest.  Left upper extremity PICC tip projects over the SVC.  Lines overlie the chest, possible surgical drains.    MEDIASTINUM AND JACKSON: Cardiac silhouette is enlarged. There is a left atrial appendage clip.    LUNGS: There are progressive, bilateral alveolar opacities most pronounced in the bilateral perihilar lung zones.  Lung volumes are low with associated atelectatic change.    PLEURA:No appreciable pleural effusion. No pneumothorax.    BONES: No acute osseous abnormality.  Impression: New bilateral alveolar opacities most pronounced in the perihilar lung zones may  be related to edema in the setting of enlarged cardiac silhouette.  Other differential considerations include multifocal pneumonia or ARDS.    Electronically signed by: Sharon Cantu  Date:    05/24/2022  Time:    18:36      Anthony Lewis MD   05/28/2022

## 2022-05-28 NOTE — PROGRESS NOTES
Pharmacokinetic Assessment Follow Up: IV Vancomycin    Vancomycin serum concentration assessment(s):    The trough level was drawn correctly and can be used to guide therapy at this time. The measurement is above the desired definitive target range of 15 to 20 mcg/mL.    Vancomycin Regimen Plan:    -ID 05/28 note says to continue Vancomycin Day #43    Change regimen to Vancomycin 750 mg IV every 12 hours with next serum trough concentration measured at 0700 prior to fourth dose on 05/30      Drug levels (last 3 results):  Recent Labs   Lab Result Units 05/26/22  0145 05/28/22  1338   Vancomycin Trough ug/ml 20.3* 22.3*       Pharmacy will continue to follow and monitor vancomycin.    Please contact pharmacy at extension [phone] for questions regarding this assessment.    Thank you for the consult,   Rachel Garcia       Patient brief summary:  Kendall Duff is a 56 y.o. male initiated on antimicrobial therapy with IV Vancomycin for treatment of skin & soft tissue infection    The patient's current regimen is 750mg q12hr    Drug Allergies:   Review of patient's allergies indicates:   Allergen Reactions    Depakote [divalproex]     Divalproex sodium Other (See Comments)    Lithium     Lithium analogues     Quetiapine Other (See Comments)       Actual Body Weight:   77.2 kg    Renal Function:   Estimated Creatinine Clearance: 131.4 mL/min (A) (based on SCr of 0.65 mg/dL (L)).,     Dialysis Method (if applicable):  N/A    CBC (last 72 hours):  Recent Labs   Lab Result Units 05/26/22  0145 05/27/22  0130   WBC x10(3)/mcL 13.2* 12.0*   Hgb gm/dL 8.7* 9.9*   Hct % 28.6* 33.3*   Platelet x10(3)/mcL 244 222   Mono % % 8.5 9.3   Eos % % 2.5 2.3   Basophil % % 0.5 0.5       Metabolic Panel (last 72 hours):  Recent Labs   Lab Result Units 05/26/22  0145 05/27/22  0130   Sodium Level mmol/L 141 140   Potassium Level mmol/L 4.1 4.3   Chloride mmol/L 108* 107   Carbon Dioxide mmol/L 23 25   Glucose Level mg/dL 141* 99   Blood Urea  Nitrogen mg/dL 41.7* 35.2*   Creatinine mg/dL 0.72* 0.65*       Vancomycin Administrations:  vancomycin given in the last 96 hours                     vancomycin 1.25 g in dextrose 5% 250 mL IVPB (ready to mix) (mg) 1,250 mg New Bag 05/28/22 0325     1,250 mg New Bag 05/27/22 1406     1,250 mg New Bag  0350     1,250 mg New Bag 05/26/22 1446     1,250 mg New Bag  0235     1,250 mg New Bag 05/25/22 1411     1,250 mg New Bag  0229                    Microbiologic Results:  Microbiology Results (last 7 days)       ** No results found for the last 168 hours. **

## 2022-05-28 NOTE — PROGRESS NOTES
Infectious Diseases Progress Note  56-year-old male with past medical history of bipolar disorder schizophrenia, .  I have a, is admitted to [facility] on 01/20/2020 with chief complaints of chest pain with work-up revealing significant coronary artery disease requiring surgical intervention.  He was taken for CABG x 4 on 4/6/2022 requiring ICU stay postoperatively downgraded to the floor subsequently but again readmitted to the ICU on 4/10.  He did have issues with wound healing with subsequent dehiscence and suspected infection.  4/16 wound culture positive for MRSA.  CT scan of the thoracic done on 4/16 showed changes of sternotomy from CABG with presternal, retrosternal and anterior mediastinum combination of inflammation, fluid and air locules, bilateral pleural effusion and bilateral basilar consolidation. He had debridement of the sternal wound with placement of wound VAC on 4/17 with cultures from surgery showing MRSA as well.  He has been pretty much without fevers but is noted to have leukocytosis of up to 23.9 on 4/17 which has had a downward trend.  He is anemic with low albumin.  Chest x-ray from 4/19 showed no significant changes. During his hospital stay, he eventually underwent tracheostomy placement on 5/10 and on 5/11 he underwent sternal wound debridement, B/L fasciocutaneous flap closure, B/L pectoralis major muscle flap reconstruction of sternum  He remains in the ICU, trached and on ventilator.   He is on antibiotic coverage with vancomycin and rifampin.      Subjective:  Remains in the ICU. No new complaints, low-grade fevers, doing about the same.    ROS  Unable to perform ROS: Medical condition     Review of patient's allergies indicates:   Allergen Reactions    Depakote [divalproex]     Divalproex sodium Other (See Comments)    Lithium     Lithium analogues     Quetiapine Other (See Comments)       Past Medical History:   Diagnosis Date    Bipolar  disorder, unspecified     Chronic hepatitis C     Hypertension     Obesity, unspecified     Seizures     Stroke        Past Surgical History:   Procedure Laterality Date    CORONARY STENT PLACEMENT  08/14/2015    DEBRIDEMENT  04/17/2022    LEFT HEART CATHETERIZATION Left 08/13/2015    REPEAT CLOSURE OF STERNAL INCISION N/A 5/11/2022    Procedure: CLOSURE, STERNAL INCISION, REPEAT;  Surgeon: Adolfo Weiss MD;  Location: [facility];  Service: Plastics;  Laterality: N/A;  STERNAL WOUND DEBRIDEMENT AND RECONSTRUCTION // MULTIPLE MUSCLE FLAPS // REQ 1400       Social History     Socioeconomic History    Marital status: Single   Tobacco Use    Smoking status: Current Every Day Smoker     Types: Cigarettes    Smokeless tobacco: Never Used   Substance and Sexual Activity    Alcohol use: Never    Drug use: Not Currently     Types: Cocaine         Scheduled Meds:   apixaban  10 mg Oral BID    [START ON 5/29/2022] apixaban  5 mg Oral BID    atorvastatin  80 mg Oral Daily    benztropine  1 mg Per OG tube BID    doxepin  50 mg Oral QHS    ezetimibe  10 mg Oral QHS    famotidine (PF)  20 mg Intravenous BID    glycopyrrolate  1 mg Per NG tube TID    haloperidoL  5 mg Oral BID    levETIRAcetam  500 mg Oral BID    metoprolol tartrate  25 mg Per OG tube Daily    olanzapine zydis  10 mg Oral QHS    OXcarbazepine  300 mg Per OG tube BID    polyethylene glycol  17 g Oral Daily    rifAMpin  300 mg Oral BID    sodium chloride 0.9%  10 mL Intravenous Q12H    vancomycin (VANCOCIN) IVPB  1,250 mg Intravenous Q12H     Continuous Infusions:  PRN Meds:sodium chloride, acetaminophen, albuterol-ipratropium, bisacodyL, dextrose 10%, fentaNYL, fentaNYL, hydrALAZINE, lorazepam, metoprolol, morphine, morphine, nitroGLYCERIN, OLANZapine, ondansetron, oxyCODONE, oxyCODONE, potassium chloride in water, potassium chloride in water, potassium chloride in water, promethazine, sodium chloride 0.9%, vancomycin - pharmacy to dose,  "ziprasidone    Objective:  /84   Pulse 87   Temp 98.8 °F (37.1 °C)   Resp (!) 22   Ht 5' 10.87" (1.8 m)   Wt 77.2 kg (170 lb 3.1 oz)   SpO2 98%   BMI 23.83 kg/m²     Physical Exam:   Physical Exam  Vitals reviewed.   Constitutional:       General: He is not in acute distress.  HENT:      Head: Normocephalic and atraumatic.   Neck:      Comments: Tracheostomy noted  Cardiovascular:      Rate and Rhythm: Normal rate and regular rhythm.      Heart sounds: Normal heart sounds.   Pulmonary:      Effort: No respiratory distress.      Breath sounds: Normal breath sounds.      Comments: Trach with TC in  place   Abdominal:      General: Bowel sounds are normal. There is no distension.      Palpations: Abdomen is soft.      Tenderness: There is no abdominal tenderness.      Comments: Peg tube in place   Musculoskeletal:         General: No deformity.      Cervical back: Neck supple.   Skin:     Findings: No erythema or rash.   Neurological:      Mental Status: He is alert.      Comments: Nods to answer simple questions appropriately   Psychiatric:      Comments: Cooperative     Imaging      Lab Review   No results found for this or any previous visit (from the past 24 hour(s)).    Assessment/Plan:  1.  Sepsis syndrome  2.  MRSA sternal wound infection/dehiscence  3.  Leukocytosis  4.  Acute hypoxic respiratory failure  5.  Anemia  6.  Coronary artery disease s/p CABG  7.  Protein calorie malnutrition  8.  Pneumonia -  Proteus/Klebsiella isolates     -Continue Vancomycin #43 and Rifampin #37. Plan a 6 week course following inflammatory markers  -Low-grade fevers and with leukocytosis trending down, follow  -S/P sternal debridement with flap closure on 5/11 per Dr Weiss  -Continue wound care and drain management per Plastics  -Vent management and ICU support per Intensivist  -Discussed with nursing    "

## 2022-05-29 LAB
ANION GAP SERPL CALC-SCNC: 7 MEQ/L
BASOPHILS # BLD AUTO: 0.06 X10(3)/MCL (ref 0–0.2)
BASOPHILS NFR BLD AUTO: 0.5 %
BUN SERPL-MCNC: 31.5 MG/DL (ref 8.4–25.7)
CALCIUM SERPL-MCNC: 8.1 MG/DL (ref 8.4–10.2)
CHLORIDE SERPL-SCNC: 109 MMOL/L (ref 98–107)
CO2 SERPL-SCNC: 24 MMOL/L (ref 22–29)
CREAT SERPL-MCNC: 0.7 MG/DL (ref 0.73–1.18)
CREAT/UREA NIT SERPL: 45
EOSINOPHIL # BLD AUTO: 0.47 X10(3)/MCL (ref 0–0.9)
EOSINOPHIL NFR BLD AUTO: 3.8 %
ERYTHROCYTE [DISTWIDTH] IN BLOOD BY AUTOMATED COUNT: 19.3 % (ref 11.5–17)
GLUCOSE SERPL-MCNC: 119 MG/DL (ref 74–100)
HCT VFR BLD AUTO: 32.2 % (ref 42–52)
HGB BLD-MCNC: 9.6 GM/DL (ref 14–18)
IMM GRANULOCYTES # BLD AUTO: 0.05 X10(3)/MCL (ref 0–0.02)
IMM GRANULOCYTES NFR BLD AUTO: 0.4 % (ref 0–0.43)
LYMPHOCYTES # BLD AUTO: 2.08 X10(3)/MCL (ref 0.6–4.6)
LYMPHOCYTES NFR BLD AUTO: 16.9 %
MCH RBC QN AUTO: 26.1 PG (ref 27–31)
MCHC RBC AUTO-ENTMCNC: 29.8 MG/DL (ref 33–36)
MCV RBC AUTO: 87.5 FL (ref 80–94)
MONOCYTES # BLD AUTO: 1.68 X10(3)/MCL (ref 0.1–1.3)
MONOCYTES NFR BLD AUTO: 13.7 %
NEUTROPHILS # BLD AUTO: 8 X10(3)/MCL (ref 2.1–9.2)
NEUTROPHILS NFR BLD AUTO: 64.7 %
NRBC BLD AUTO-RTO: 0.2 %
PLATELET # BLD AUTO: 288 X10(3)/MCL (ref 130–400)
PMV BLD AUTO: 11.7 FL (ref 9.4–12.4)
POTASSIUM SERPL-SCNC: 4.3 MMOL/L (ref 3.5–5.1)
RBC # BLD AUTO: 3.68 X10(6)/MCL (ref 4.7–6.1)
SODIUM SERPL-SCNC: 140 MMOL/L (ref 136–145)
WBC # SPEC AUTO: 12.3 X10(3)/MCL (ref 4.5–11.5)

## 2022-05-29 PROCEDURE — 80048 BASIC METABOLIC PNL TOTAL CA: CPT | Performed by: HOSPITALIST

## 2022-05-29 PROCEDURE — 27000221 HC OXYGEN, UP TO 24 HOURS

## 2022-05-29 PROCEDURE — 85025 COMPLETE CBC W/AUTO DIFF WBC: CPT | Performed by: HOSPITALIST

## 2022-05-29 PROCEDURE — 63600175 PHARM REV CODE 636 W HCPCS

## 2022-05-29 PROCEDURE — 63600175 PHARM REV CODE 636 W HCPCS: Performed by: INTERNAL MEDICINE

## 2022-05-29 PROCEDURE — 25000003 PHARM REV CODE 250: Performed by: INTERNAL MEDICINE

## 2022-05-29 PROCEDURE — A4216 STERILE WATER/SALINE, 10 ML: HCPCS

## 2022-05-29 PROCEDURE — 25000003 PHARM REV CODE 250: Performed by: HOSPITALIST

## 2022-05-29 PROCEDURE — 25000003 PHARM REV CODE 250: Performed by: STUDENT IN AN ORGANIZED HEALTH CARE EDUCATION/TRAINING PROGRAM

## 2022-05-29 PROCEDURE — 25000003 PHARM REV CODE 250

## 2022-05-29 PROCEDURE — 36415 COLL VENOUS BLD VENIPUNCTURE: CPT | Performed by: HOSPITALIST

## 2022-05-29 PROCEDURE — 20000000 HC ICU ROOM

## 2022-05-29 PROCEDURE — 99900026 HC AIRWAY MAINTENANCE (STAT)

## 2022-05-29 RX ADMIN — LEVETIRACETAM 500 MG: 500 TABLET, FILM COATED ORAL at 08:05

## 2022-05-29 RX ADMIN — OLANZAPINE 10 MG: 5 TABLET, ORALLY DISINTEGRATING ORAL at 08:05

## 2022-05-29 RX ADMIN — GLYCOPYRROLATE 1 MG: 1 TABLET ORAL at 02:05

## 2022-05-29 RX ADMIN — RIFAMPIN 300 MG: 300 CAPSULE ORAL at 08:05

## 2022-05-29 RX ADMIN — APIXABAN 5 MG: 5 TABLET, FILM COATED ORAL at 08:05

## 2022-05-29 RX ADMIN — BENZTROPINE MESYLATE 1 MG: 1 TABLET ORAL at 08:05

## 2022-05-29 RX ADMIN — VANCOMYCIN HYDROCHLORIDE 750 MG: 1 INJECTION, POWDER, LYOPHILIZED, FOR SOLUTION INTRAVENOUS at 08:05

## 2022-05-29 RX ADMIN — POLYETHYLENE GLYCOL 3350 17 G: 17 POWDER, FOR SOLUTION ORAL at 08:05

## 2022-05-29 RX ADMIN — OXCARBAZEPINE 300 MG: 300 TABLET, FILM COATED ORAL at 08:05

## 2022-05-29 RX ADMIN — HALOPERIDOL 5 MG: 5 TABLET ORAL at 08:05

## 2022-05-29 RX ADMIN — EZETIMIBE 10 MG: 10 TABLET ORAL at 08:05

## 2022-05-29 RX ADMIN — METOPROLOL TARTRATE 25 MG: 25 TABLET, FILM COATED ORAL at 08:05

## 2022-05-29 RX ADMIN — FENTANYL CITRATE 100 MCG: 50 INJECTION INTRAMUSCULAR; INTRAVENOUS at 08:05

## 2022-05-29 RX ADMIN — DOXEPIN HYDROCHLORIDE 50 MG: 50 CAPSULE ORAL at 08:05

## 2022-05-29 RX ADMIN — SODIUM CHLORIDE, PRESERVATIVE FREE 10 ML: 5 INJECTION INTRAVENOUS at 08:05

## 2022-05-29 RX ADMIN — SODIUM CHLORIDE, PRESERVATIVE FREE 10 ML: 5 INJECTION INTRAVENOUS at 09:05

## 2022-05-29 RX ADMIN — ONDANSETRON 4 MG: 2 INJECTION INTRAMUSCULAR; INTRAVENOUS at 08:05

## 2022-05-29 RX ADMIN — FAMOTIDINE 20 MG: 10 INJECTION, SOLUTION INTRAVENOUS at 08:05

## 2022-05-29 RX ADMIN — BENZTROPINE MESYLATE 1 MG: 1 TABLET ORAL at 09:05

## 2022-05-29 RX ADMIN — ATORVASTATIN CALCIUM 80 MG: 40 TABLET, FILM COATED ORAL at 08:05

## 2022-05-29 RX ADMIN — GLYCOPYRROLATE 1 MG: 1 TABLET ORAL at 08:05

## 2022-05-29 NOTE — PROGRESS NOTES
[facility] Hospital Medicine Progress Note        Chief Complaint: Inpatient Follow-up for surgical site infection     HPI:   56 y.o. male with a PMHx of chronic hepatitis C, hypertension, seizures, CAD s/p stents who initially presented to [facility] on 4/1/2022 with a primary complaint of chest pain. Of note, he was seen at an outlying ED on 3/20/22 and was dx with MI, subsequently transferred to [facility] where he underwent LHC with angioplasty, and deemed appropriate for CABG, which had been scheduled for 3/29/22, but was cancelled. He then presented to ED on 4/1 with c/o CP earlier in the day. He was admitted to cardiology services, started on a heparin gtt. CV Surgery was consulted and the patient underwent CABG x4 on 4/6/22. He was admitted to ICU post CABG, intubated on mechanical ventilation. Psych was consulted during his stay to evaluate for some recent agitation, restlessness, fidgetiness, insomnia, confusion, and sexual inappropriateness, and he was started on PRN haldol. He was extubated to NC 4L on 4/7, remained restless and confused requiring Precedex gtt.  On 4/10/22 he experienced seizure-like activity and confusion for which neuro was consulted. EEG revealed moderate generalized slowing. CT Head was negative for acute intracranial abnormality, gas noted throughout the bilateral subcutaneous soft tissues.MRI on 4/15/22 revealed small linear focus of acute small vessel ischemia in the right frontal white matter. Developed diffuse subcutaneous emphysema per CXR. He remained agitated with delirium.  Unfortunately,he pulled out his left chest tube on 4/16/22 and he had been thrashing in bed, had to be restrained at times.  He developed an unstable sternum with purulence drainage. CT thorax revealed surgical changes with inflammation, fluid and air locules, minimal anterior pericardial effusion, extensive soft tissue emphysema, and bilateral pleural effusions and  bilateral lower lung lobes collapse consolidations. He has known subcutaneous air, which has been present on previous x-rays.  Cultures positive for MRSA, and he was placed on Vancomycin. He required intubation on 4/16/22. On 4/17/22 he underwent sternal wound debridement with wound vac placement. ID was consulted on 4/22/22 due to sepsis. He was started on rifampin in addition to vancomycin for a planned 6 week course. Respiratory cultures from 4/24/22 returned positive for proteus and klebsiella. Remained intubated on mechanical ventilation. GI was consulted on 5/3/22 for PEG placement. Plastic surgery was also following for sternal reconstruction. He underwent tracheostomy and PEG placement on 5/10/22. NIVA done on 5/10/22 noted a RUE DVT. On 5/11/22 he underwent sternal wound debridement, bilateral fasciocutaneous flap advanced closure over sternum and bilateral pectoralis major muscle flap reconstruction of sternum. He was started on full dose Lovenox. He remained on mechanical ventilation until 5/21/22. Tolerating T-piece now on trach collar. He required vasopressors for BP support throughout his stay, which have since been weaned off. He has been weaned off of Precedex. He did have some vomiting on 5/21/22. Lovenox was transitioned to Eliquis on 5/21/22. KUB unrevealing, tube feeds restarted on 5/22/22. He was cleared for downgrade to the floor on 5/22/22 under hospitalist services.     Interval Hx:  NO changes overnight  Agitation about the same  One of the drains seems to be having lesser output  Vitals stable     Objective/physical exam:  Gen: NC AT, Awake, alert, nonverbal  HEENT: PERRL EOMI normal conjunctiva, neck supple, Trach  Cardiac: Regular rhythm and rate, no murmur, rubs, or gallops  Respiratory: Clear to auscultation bilaterally. No wheezes, rales, or crackles  Gastrointestinal: soft, nondistended, nontender, +bowel sounds, +PEG    VITAL SIGNS: 24 HRS MIN & MAX LAST   Temp  Min: 97.7 °F (36.5  °C)  Max: 99 °F (37.2 °C) 98.5 °F (36.9 °C)   BP  Min: 113/74  Max: 154/85 (!) 140/78   Pulse  Min: 81  Max: 99  91   Resp  Min: 16  Max: 27 20   SpO2  Min: 94 %  Max: 100 % 99 %       Recent Labs   Lab 05/26/22  0145 05/27/22  0130 05/29/22  0244   WBC 13.2* 12.0* 12.3*   RBC 3.24* 3.63* 3.68*   HGB 8.7* 9.9* 9.6*   HCT 28.6* 33.3* 32.2*   MCV 88.3 91.7 87.5   MCH 26.9* 27.3 26.1*   MCHC 30.4* 29.7* 29.8*   RDW 18.7* 19.3* 19.3*    222 288   MPV 10.8 11.3 11.7       Recent Labs   Lab 05/23/22  0156 05/24/22  0208 05/25/22  0113 05/26/22  0145 05/27/22  0130 05/29/22  0244    134*   < > 141 140 140   K 4.4 4.3   < > 4.1 4.3 4.3   CO2 21* 21*   < > 23 25 24   BUN 11.4 20.3   < > 41.7* 35.2* 31.5*   CREATININE 0.68* 0.73   < > 0.72* 0.65* 0.70*   CALCIUM 8.7 8.6   < > 8.0* 8.2* 8.1*   ALBUMIN 2.4* 2.3*  --   --   --   --    ALKPHOS 95 110  --   --   --   --    ALT 88* 364*  --   --   --   --    * 439*  --   --   --   --    BILITOT 1.3 1.2  --   --   --   --     < > = values in this interval not displayed.          Microbiology Results (last 7 days)     ** No results found for the last 168 hours. **           See below for Radiology    Scheduled Med:   apixaban  5 mg Oral BID    atorvastatin  80 mg Oral Daily    benztropine  1 mg Per OG tube BID    doxepin  50 mg Oral QHS    ezetimibe  10 mg Oral QHS    famotidine (PF)  20 mg Intravenous BID    glycopyrrolate  1 mg Per NG tube TID    haloperidoL  5 mg Oral BID    levETIRAcetam  500 mg Oral BID    metoprolol tartrate  25 mg Per OG tube Daily    olanzapine zydis  10 mg Oral QHS    OXcarbazepine  300 mg Per OG tube BID    polyethylene glycol  17 g Oral Daily    rifAMpin  300 mg Oral BID    sodium chloride 0.9%  10 mL Intravenous Q12H    vancomycin (VANCOCIN) IVPB  750 mg Intravenous Q12H        Continuous Infusions:       PRN Meds:  sodium chloride, acetaminophen, albuterol-ipratropium, bisacodyL, dextrose 10%, fentaNYL, fentaNYL,  hydrALAZINE, lorazepam, metoprolol, morphine, morphine, nitroGLYCERIN, OLANZapine, ondansetron, oxyCODONE, oxyCODONE, potassium chloride in water, potassium chloride in water, potassium chloride in water, promethazine, sodium chloride 0.9%, vancomycin - pharmacy to dose, ziprasidone       Assessment/Plan:   Acute Hypoxemic Respiratory Failure s/p intubation, trach placement on 5/10/22  Multivessel CAD s/p CABG x4, 04/06/2022.  MRSA sternal wound infection and dehiscence, status post wound vacuum-assisted closure device, 04/17/2022, and bilateral pectoral flaps for sternal wound closure, 05/11/2022.  Postoperative agitation and Delirium  Acute RUE DVT  Normocytic Anemia  s/p PEG placment on 5/10/22  Severe PCM  Hypokalemia  HTN, uncontrolled  Seizure disorder   Leucocytosis ???cause   Transaminitis, mild      Hx of Bipolar Disorder and Schizophrenia, chronic hepatitis C, hypertension, seizures, CAD s/p stents      Persistent leukocytosis despite continue vanc and rifampin.  Infectious Disease following along.  Plan to repeat inflammatory markers tomorrow.  Agitation stable.  Consider increasing Zyprexa tomorrow at night.  Continue tube feeds.  Wean O2 as tolerated.  PT OT and speech therapy working with him.     Critical care diagnosis: sepsis, iv antibiotics  Critical care interventions: hands on evaluation, review of labs/radiographs/records and discussions with family  Critical care time spent: >32 minute    Patient condition:  guarded    Anticipated discharge and Disposition: TBD      All diagnosis and differential diagnosis have been reviewed; assessment and plan has been documented; I have personally reviewed the labs and test results that are presently available; I have reviewed the patients medication list; I have reviewed the consulting providers response and recommendations. I have reviewed or attempted to review medical records based upon their availability    All of the patient's questions have been   addressed and answered. Patient's is agreeable to the above stated plan. I will continue to monitor closely and make adjustments to medical management as needed.  _____________________________________________________________________      Radiology:  X-Ray Chest 1 View  Narrative: EXAMINATION:  XR CHEST 1 VIEW    CLINICAL HISTORY:  dyspnea;    TECHNIQUE:  Single frontal view of the chest was performed.    COMPARISON:  Chest radiograph 05/19/2022    FINDINGS:  LINES AND TUBES: Tracheostomy cannula projects over the trachea with tip at the level of the clavicular heads.  EKG/telemetry leads overlie the chest.  Left upper extremity PICC tip projects over the SVC.  Lines overlie the chest, possible surgical drains.    MEDIASTINUM AND JACKSON: Cardiac silhouette is enlarged. There is a left atrial appendage clip.    LUNGS: There are progressive, bilateral alveolar opacities most pronounced in the bilateral perihilar lung zones.  Lung volumes are low with associated atelectatic change.    PLEURA:No appreciable pleural effusion. No pneumothorax.    BONES: No acute osseous abnormality.  Impression: New bilateral alveolar opacities most pronounced in the perihilar lung zones may be related to edema in the setting of enlarged cardiac silhouette.  Other differential considerations include multifocal pneumonia or ARDS.    Electronically signed by: Sharon Cantu  Date:    05/24/2022  Time:    18:36      Anthony Lewis MD   05/29/2022

## 2022-05-30 LAB
ALBUMIN SERPL-MCNC: 2.1 GM/DL (ref 3.5–5)
ALBUMIN/GLOB SERPL: 0.5 RATIO (ref 1.1–2)
ALP SERPL-CCNC: 109 UNIT/L (ref 40–150)
ALT SERPL-CCNC: 221 UNIT/L (ref 0–55)
AST SERPL-CCNC: 82 UNIT/L (ref 5–34)
BASOPHILS # BLD AUTO: 0.05 X10(3)/MCL (ref 0–0.2)
BASOPHILS NFR BLD AUTO: 0.5 %
BILIRUBIN DIRECT+TOT PNL SERPL-MCNC: 1 MG/DL
BUN SERPL-MCNC: 28.2 MG/DL (ref 8.4–25.7)
CALCIUM SERPL-MCNC: 7.9 MG/DL (ref 8.4–10.2)
CHLORIDE SERPL-SCNC: 109 MMOL/L (ref 98–107)
CO2 SERPL-SCNC: 25 MMOL/L (ref 22–29)
CREAT SERPL-MCNC: 0.65 MG/DL (ref 0.73–1.18)
CRP SERPL-MCNC: 54.7 MG/L
EOSINOPHIL # BLD AUTO: 0.46 X10(3)/MCL (ref 0–0.9)
EOSINOPHIL NFR BLD AUTO: 4.6 %
ERYTHROCYTE [DISTWIDTH] IN BLOOD BY AUTOMATED COUNT: 18.9 % (ref 11.5–17)
ERYTHROCYTE [SEDIMENTATION RATE] IN BLOOD: 22 MM/HR (ref 0–15)
GLOBULIN SER-MCNC: 4 GM/DL (ref 2.4–3.5)
GLUCOSE SERPL-MCNC: 131 MG/DL (ref 74–100)
HCT VFR BLD AUTO: 31.2 % (ref 42–52)
HGB BLD-MCNC: 9.4 GM/DL (ref 14–18)
IMM GRANULOCYTES # BLD AUTO: 0.04 X10(3)/MCL (ref 0–0.02)
IMM GRANULOCYTES NFR BLD AUTO: 0.4 % (ref 0–0.43)
LYMPHOCYTES # BLD AUTO: 2.02 X10(3)/MCL (ref 0.6–4.6)
LYMPHOCYTES NFR BLD AUTO: 20.3 %
MCH RBC QN AUTO: 26.6 PG (ref 27–31)
MCHC RBC AUTO-ENTMCNC: 30.1 MG/DL (ref 33–36)
MCV RBC AUTO: 88.1 FL (ref 80–94)
MONOCYTES # BLD AUTO: 1.28 X10(3)/MCL (ref 0.1–1.3)
MONOCYTES NFR BLD AUTO: 12.8 %
NEUTROPHILS # BLD AUTO: 6.1 X10(3)/MCL (ref 2.1–9.2)
NEUTROPHILS NFR BLD AUTO: 61.4 %
NRBC BLD AUTO-RTO: 0 %
PLATELET # BLD AUTO: 261 X10(3)/MCL (ref 130–400)
PMV BLD AUTO: 11.2 FL (ref 9.4–12.4)
POTASSIUM SERPL-SCNC: 3.9 MMOL/L (ref 3.5–5.1)
PROT SERPL-MCNC: 6.1 GM/DL (ref 6.4–8.3)
RBC # BLD AUTO: 3.54 X10(6)/MCL (ref 4.7–6.1)
SODIUM SERPL-SCNC: 140 MMOL/L (ref 136–145)
VANCOMYCIN TROUGH SERPL-MCNC: 15.9 UG/ML (ref 15–20)
VANCOMYCIN TROUGH SERPL-MCNC: 22.8 UG/ML (ref 15–20)
WBC # SPEC AUTO: 10 X10(3)/MCL (ref 4.5–11.5)

## 2022-05-30 PROCEDURE — 25000003 PHARM REV CODE 250: Performed by: HOSPITALIST

## 2022-05-30 PROCEDURE — 85025 COMPLETE CBC W/AUTO DIFF WBC: CPT | Performed by: HOSPITALIST

## 2022-05-30 PROCEDURE — 25000003 PHARM REV CODE 250: Performed by: STUDENT IN AN ORGANIZED HEALTH CARE EDUCATION/TRAINING PROGRAM

## 2022-05-30 PROCEDURE — A4216 STERILE WATER/SALINE, 10 ML: HCPCS

## 2022-05-30 PROCEDURE — 27000221 HC OXYGEN, UP TO 24 HOURS

## 2022-05-30 PROCEDURE — 97535 SELF CARE MNGMENT TRAINING: CPT | Mod: CO

## 2022-05-30 PROCEDURE — 36415 COLL VENOUS BLD VENIPUNCTURE: CPT | Performed by: INTERNAL MEDICINE

## 2022-05-30 PROCEDURE — 86140 C-REACTIVE PROTEIN: CPT | Performed by: HOSPITALIST

## 2022-05-30 PROCEDURE — 63600175 PHARM REV CODE 636 W HCPCS: Performed by: INTERNAL MEDICINE

## 2022-05-30 PROCEDURE — 94761 N-INVAS EAR/PLS OXIMETRY MLT: CPT

## 2022-05-30 PROCEDURE — 25000003 PHARM REV CODE 250: Performed by: INTERNAL MEDICINE

## 2022-05-30 PROCEDURE — 97530 THERAPEUTIC ACTIVITIES: CPT

## 2022-05-30 PROCEDURE — 20000000 HC ICU ROOM

## 2022-05-30 PROCEDURE — 99900026 HC AIRWAY MAINTENANCE (STAT)

## 2022-05-30 PROCEDURE — 85651 RBC SED RATE NONAUTOMATED: CPT | Performed by: HOSPITALIST

## 2022-05-30 PROCEDURE — 63600175 PHARM REV CODE 636 W HCPCS

## 2022-05-30 PROCEDURE — 80053 COMPREHEN METABOLIC PANEL: CPT | Performed by: HOSPITALIST

## 2022-05-30 PROCEDURE — 25000003 PHARM REV CODE 250

## 2022-05-30 PROCEDURE — 80202 ASSAY OF VANCOMYCIN: CPT | Performed by: INTERNAL MEDICINE

## 2022-05-30 PROCEDURE — 36415 COLL VENOUS BLD VENIPUNCTURE: CPT | Performed by: HOSPITALIST

## 2022-05-30 PROCEDURE — 99900031 HC PATIENT EDUCATION (STAT)

## 2022-05-30 PROCEDURE — 92611 MOTION FLUOROSCOPY/SWALLOW: CPT

## 2022-05-30 RX ADMIN — HYDRALAZINE HYDROCHLORIDE 10 MG: 20 INJECTION INTRAMUSCULAR; INTRAVENOUS at 11:05

## 2022-05-30 RX ADMIN — DOXEPIN HYDROCHLORIDE 50 MG: 50 CAPSULE ORAL at 08:05

## 2022-05-30 RX ADMIN — GLYCOPYRROLATE 1 MG: 1 TABLET ORAL at 08:05

## 2022-05-30 RX ADMIN — SODIUM CHLORIDE, PRESERVATIVE FREE 10 ML: 5 INJECTION INTRAVENOUS at 08:05

## 2022-05-30 RX ADMIN — METOPROLOL TARTRATE 25 MG: 25 TABLET, FILM COATED ORAL at 08:05

## 2022-05-30 RX ADMIN — APIXABAN 5 MG: 5 TABLET, FILM COATED ORAL at 08:05

## 2022-05-30 RX ADMIN — GLYCOPYRROLATE 1 MG: 1 TABLET ORAL at 03:05

## 2022-05-30 RX ADMIN — HALOPERIDOL 5 MG: 5 TABLET ORAL at 08:05

## 2022-05-30 RX ADMIN — ATORVASTATIN CALCIUM 80 MG: 40 TABLET, FILM COATED ORAL at 08:05

## 2022-05-30 RX ADMIN — BENZTROPINE MESYLATE 1 MG: 1 TABLET ORAL at 08:05

## 2022-05-30 RX ADMIN — VANCOMYCIN HYDROCHLORIDE 750 MG: 1 INJECTION, POWDER, LYOPHILIZED, FOR SOLUTION INTRAVENOUS at 08:05

## 2022-05-30 RX ADMIN — WHITE PETROLATUM: 1.75 OINTMENT TOPICAL at 08:05

## 2022-05-30 RX ADMIN — RIFAMPIN 300 MG: 300 CAPSULE ORAL at 08:05

## 2022-05-30 RX ADMIN — LEVETIRACETAM 500 MG: 500 TABLET, FILM COATED ORAL at 08:05

## 2022-05-30 RX ADMIN — EZETIMIBE 10 MG: 10 TABLET ORAL at 08:05

## 2022-05-30 RX ADMIN — FENTANYL CITRATE 100 MCG: 50 INJECTION INTRAMUSCULAR; INTRAVENOUS at 07:05

## 2022-05-30 RX ADMIN — OXCARBAZEPINE 300 MG: 300 TABLET, FILM COATED ORAL at 08:05

## 2022-05-30 RX ADMIN — GLYCOPYRROLATE 1 MG: 1 TABLET ORAL at 10:05

## 2022-05-30 RX ADMIN — POLYETHYLENE GLYCOL 3350 17 G: 17 POWDER, FOR SOLUTION ORAL at 08:05

## 2022-05-30 RX ADMIN — FAMOTIDINE 20 MG: 10 INJECTION, SOLUTION INTRAVENOUS at 08:05

## 2022-05-30 RX ADMIN — OLANZAPINE 10 MG: 5 TABLET, ORALLY DISINTEGRATING ORAL at 10:05

## 2022-05-30 RX ADMIN — FENTANYL CITRATE 100 MCG: 50 INJECTION INTRAMUSCULAR; INTRAVENOUS at 08:05

## 2022-05-30 NOTE — PLAN OF CARE
Problem: SLP  Goal: SLP Goal  Description: LTG1: Tolerate speaking valve during all waking hours with no signs/sx respiratory distress/compromise  STG: tolerate speaking valve x1 hour with no signs/sx respiratory distress/compromise    LTG2: Tolerate least restrictive PO diet with no clinical signs/sx aspiration  STG2a: Tolerate thin liquids by cup with no clinical signs/sx aspiration  STG2b: Tolerate puree solids with no clinical signs/sx aspiration.  Outcome: Ongoing, Progressing

## 2022-05-30 NOTE — PT/OT/SLP PROGRESS
Occupational Therapy  Treatment    Kendall Duff   MRN: [id number]   Admitting Diagnosis: Acute myocardial infarction of anterolateral wall    OT Date of Treatment: 05/30/22   OT Start Time: 0954  OT Stop Time: 1017  OT Total Time (min): 23 min     Billable Minutes:  Self Care/Home Management 2  Total Minutes: 23     OT/ALEKSANDR: ALEKSANDR     ALEKSANDR Visit Number: 3    General Precautions: Standard, sternal  Orthopedic Precautions:    Braces:           Subjective:  Communicated with RN prior to session.  Distracted  Restless  86HR, 97 o2, 132/83    Objective:  Pt. Requiring Max A progressing to CGA at times however demonstrating a posterior lean requiring cueing for correction. Pt. Performing grooming task while washing face on command with R UE.    Functional Mobility:  Bed Mobility:   Supine to sit: Total Assistance   Sit to supine: Total Assistance   Rolling: Total Assistance   Scooting: Total Assistance    Transfer Training:   Sit to stand:Total Assistance with HHA with adherence given to sternal pxns.    Patient left supine with all lines intact and call button in reach    ASSESSMENT:  Kendall Duff is a 56 y.o. male with a medical diagnosis of Acute myocardial infarction of anterolateral wall Pt. Tolerated session well overall increased activity tolerance noted during session.    Activity tolerance: Fair    Discharge recommendations: LTACH (long-term acute care hospital)     Equipment recommendations:       GOALS:   Multidisciplinary Problems     Occupational Therapy Goals        Problem: Occupational Therapy    Goal Priority Disciplines Outcome Interventions   Occupational Therapy Goal     OT, PT/OT Ongoing, Progressing    Description: Goals to be met by: 6/21/22     Patient will increase functional independence with ADLs by performing:    Feeding with Moderate Assistance. When appropriate to initiate, or simulated hand to mouth.   UE Dressing with Moderate Assistance.  Grooming while long sitting/EOB/supported with Moderate  Assistance.  Sitting at edge of bed x10-15 minutes with Moderate Assistance.  Toilet transfer to bedside commode with Moderate Assistance.                     Plan:  Patient to be seen 3 x/week, 5 x/week to address the above listed problems via self-care/home management, therapeutic activities, therapeutic exercises  Plan of Care expires:    Plan of Care reviewed with: patient         05/30/2022

## 2022-05-30 NOTE — PROGRESS NOTES
[facility] - [facility] [place] [facility]  Adult Nutrition  Progress Note    SUMMARY       Recommendations    Recommendation/Intervention: Impact Peptide 1.5 @ 80ml/hr (goal rate)  Goals: Meet >/=75% est energy and protein requirements by f/u  Nutrition Goal Status: progressing towards goal  Communication of RD Recs: reviewed with RN    Assessment and Plan    Nutrition Problem  Inadequate Oral Intake     Related to (etiology):   Current condition             Signs and Symptoms (as evidenced by):   trach     Interventions(treatment strategy):  Collaboration with other providers     Nutrition Diagnosis Status:   Continues    Malnutrition Assessment  Malnutrition Type: acute illness or injury  Weight Loss (Malnutrition): other (see comments) (does not meet criteria)  Energy Intake (Malnutrition): other (see comments) (does not meet criteria)  Subcutaneous Fat (Malnutrition):  (does not meet criteria)  Muscle Mass (Malnutrition): mild depletion  Fluid Accumulation (Malnutrition):  (does not meet criteria)  Hand  Strength, Left (Malnutrition):  (unable to eval)  Hand  Strength, Right (Malnutrition):  (unable to eval)   Hindu Region (Muscle Loss): mild depletion  Clavicle Bone Region (Muscle Loss): mild depletion   Edema (Fluid Accumulation): 0-->no edema present   Subcutaneous Fat Loss (Final Summary): well nourished  Muscle Loss Evaluation (Final Summary): well nourished  Fluid Accumulation Evaluation:  (unable to evaluate)    Moderate Weight Loss (Malnutrition):  (unable to evaluate)    Reason for Assessment    Reason For Assessment: RD follow-up  Diagnosis: 1. CAD with 4 vessel CABG 4/6  2. MRSA Sternal wound dehiscence with debridement and wound vac 4/17.  Plastics following  3. Respiratory culture positive Klebsiella and Proteus  4. Malnutrition.  5. Respiratory failure.  Intubated 4/15  Relevant Medical History:    General Information Comments:   5/24/22: Tube feeding continues. Tolerated per RN. Noted  "now off diprivan. Will update tube feeding to provide est kcal needs and eliminate need for protein packets.    5/26/22: Tube feeding continues, tolerated @ goal rate. No kcal from meds.     5/30/22: Tube feeding continues, tolerated @ goal rate per RN.    Nutrition Risk Screen    Nutrition Risk Screen: tube feeding or parenteral nutrition    Nutrition/Diet History    Spiritual, Cultural Beliefs, Druze Practices, Values that Affect Care: no  Factors Affecting Nutritional Intake: on mechanical ventilation    Anthropometrics    Temp: 98 °F (36.7 °C)  Height Method: Estimated  Height: 5' 10.87" (180 cm)  Height (inches): 70.87 in  Weight Method: Bed Scale  Weight: 77.2 kg (170 lb 3.1 oz)  Weight (lb): 170.2 lb  Ideal Body Weight (IBW), Male: 171.22 lb  % Ideal Body Weight, Male (lb): 112.92 %  BMI (Calculated): 23.8  BMI Grade: 18.5-24.9 - normal    Lab/Procedures/Meds    Pertinent Labs Reviewed: reviewed  Pertinent Labs Comments: 5/30/22 Cl 109, Glu 131  Pertinent Medications Reviewed: reviewed  Pertinent Medications Comments: miralax    Estimated/Assessed Needs    Weight Used For Calorie Calculations: 77.2 kg (170 lb 3.1 oz)  Energy Calorie Requirements (kcal): 2257kcal  Energy Need Method: [place]-St Jeor (1.4 stress factor)  Protein Requirements: 116-175gm  Weight Used For Protein Calculations: 77.2 kg (170 lb 3.1 oz)  Fluid Requirements (mL): 30mL/kg  Estimated Fluid Requirement Method: RDA Method  RDA Method (mL): 2257     Nutrition Prescription Ordered    Current Diet Order: NPO  Current Nutrition Support Formula Ordered: Impact Peptide 1.5  Current Nutrition Support Rate Ordered: 80 (ml)  Current Nutrition Support Frequency Ordered: based on tube feeding running ~20 hours/day    Evaluation of Received Nutrient/Fluid Intake    Enteral Calories (kcal): 2400  Enteral Protein (gm): 148.8  Enteral (Free Water) Fluid (mL): 1216  % Kcal Needs: 106%  % Protein Needs: 100%  Energy Calories Required: meeting " needs  Protein Required: meeting needs  Fluid Required: not meeting needs  Tolerance: tolerating  % Intake of Estimated Energy Needs: 75 - 100 %  % Meal Intake: NPO    Nutrition Risk    Level of Risk/Frequency of Follow-up: high     Monitor and Evaluation    Food and Nutrient Intake: enteral nutrition intake  Food and Nutrient Adminstration: enteral and parenteral nutrition administration     Nutrition Follow-Up    RD Follow-up?: Yes

## 2022-05-30 NOTE — PROGRESS NOTES
[facility] Hospital Medicine Progress Note        Chief Complaint: Inpatient Follow-up for surgical site infection     HPI:   56 y.o. male with a PMHx of chronic hepatitis C, hypertension, seizures, CAD s/p stents who initially presented to [facility] on 4/1/2022 with a primary complaint of chest pain. Of note, he was seen at an outlying ED on 3/20/22 and was dx with MI, subsequently transferred to [facility] where he underwent LHC with angioplasty, and deemed appropriate for CABG, which had been scheduled for 3/29/22, but was cancelled. He then presented to ED on 4/1 with c/o CP earlier in the day. He was admitted to cardiology services, started on a heparin gtt. CV Surgery was consulted and the patient underwent CABG x4 on 4/6/22. He was admitted to ICU post CABG, intubated on mechanical ventilation. Psych was consulted during his stay to evaluate for some recent agitation, restlessness, fidgetiness, insomnia, confusion, and sexual inappropriateness, and he was started on PRN haldol. He was extubated to NC 4L on 4/7, remained restless and confused requiring Precedex gtt.  On 4/10/22 he experienced seizure-like activity and confusion for which neuro was consulted. EEG revealed moderate generalized slowing. CT Head was negative for acute intracranial abnormality, gas noted throughout the bilateral subcutaneous soft tissues.MRI on 4/15/22 revealed small linear focus of acute small vessel ischemia in the right frontal white matter. Developed diffuse subcutaneous emphysema per CXR. He remained agitated with delirium.  Unfortunately,he pulled out his left chest tube on 4/16/22 and he had been thrashing in bed, had to be restrained at times.  He developed an unstable sternum with purulence drainage. CT thorax revealed surgical changes with inflammation, fluid and air locules, minimal anterior pericardial effusion, extensive soft tissue emphysema, and bilateral pleural effusions and  bilateral lower lung lobes collapse consolidations. He has known subcutaneous air, which has been present on previous x-rays.  Cultures positive for MRSA, and he was placed on Vancomycin. He required intubation on 4/16/22. On 4/17/22 he underwent sternal wound debridement with wound vac placement. ID was consulted on 4/22/22 due to sepsis. He was started on rifampin in addition to vancomycin for a planned 6 week course. Respiratory cultures from 4/24/22 returned positive for Proteus and Klebsiella. Remained intubated on mechanical ventilation. GI was consulted on 5/3/22 for PEG placement. Plastic surgery was also following for sternal reconstruction. He underwent tracheostomy and PEG placement on 5/10/22. NIVA done on 5/10/22 noted a RUE DVT. On 5/11/22 he underwent sternal wound debridement, bilateral fasciocutaneous flap advanced closure over sternum and bilateral pectoralis major muscle flap reconstruction of sternum. He was started on full dose Lovenox. He remained on mechanical ventilation until 5/21/22. Tolerating T-piece now on trach collar. He required vasopressors for BP support throughout his stay, which have since been weaned off. He has been weaned off of Precedex. He did have some vomiting on 5/21/22. Lovenox was transitioned to Eliquis on 5/21/22. KUB unrevealing, tube feeds restarted on 5/22/22. He was cleared for downgrade to the floor on 5/22/22 under the hospital medicine service.     Interval Hx:  Patient is boarding in the ICU.  RN at bedside.  No major changes reported.  Patient remains on supplemental oxygen via trach collar.  Patient is tolerating PEG tube feedings at 80 mL/hr.  Patient is afebrile hemodynamically stable.     Objective/physical exam:  General: in no acute distress  HENT: normocephalic, atraumatic  Eye: PERRL, EOMI, clear conjunctiva  Neck:  Tracheostomy  Respiratory: clear to auscultation bilaterally  Cardiovascular: regular rate and rhythm, no murmur  Gastrointestinal:  non-distended, positive bowel sounds, PEG tube in place  Musculoskeletal: no gross deformity  Integumentary:  Sternal wound with drains in place  Neurological: cranial nerves grossly intact, no focal neurological deficit  Psychiatric: cooperative, flat affect, depressed      VITAL SIGNS: 24 HRS MIN & MAX LAST   Temp  Min: 98 °F (36.7 °C)  Max: 99 °F (37.2 °C) 98 °F (36.7 °C)   BP  Min: 117/92  Max: 178/90 118/75   Pulse  Min: 45  Max: 107  90   Resp  Min: 18  Max: 35 (!) 26   SpO2  Min: 92 %  Max: 99 % 97 %       Recent Labs   Lab 05/27/22  0130 05/29/22  0244 05/30/22  0109   WBC 12.0* 12.3* 10.0   RBC 3.63* 3.68* 3.54*   HGB 9.9* 9.6* 9.4*   HCT 33.3* 32.2* 31.2*   MCV 91.7 87.5 88.1   MCH 27.3 26.1* 26.6*   MCHC 29.7* 29.8* 30.1*   RDW 19.3* 19.3* 18.9*    288 261   MPV 11.3 11.7 11.2       Recent Labs   Lab 05/24/22  0208 05/25/22  0113 05/27/22  0130 05/29/22  0244 05/30/22  0109   *   < > 140 140 140   K 4.3   < > 4.3 4.3 3.9   CO2 21*   < > 25 24 25   BUN 20.3   < > 35.2* 31.5* 28.2*   CREATININE 0.73   < > 0.65* 0.70* 0.65*   CALCIUM 8.6   < > 8.2* 8.1* 7.9*   ALBUMIN 2.3*  --   --   --  2.1*   ALKPHOS 110  --   --   --  109   *  --   --   --  221*   *  --   --   --  82*   BILITOT 1.2  --   --   --  1.0    < > = values in this interval not displayed.          Microbiology Results (last 7 days)     ** No results found for the last 168 hours. **           See below for Radiology    Scheduled Med:   apixaban  5 mg Oral BID    atorvastatin  80 mg Oral Daily    benztropine  1 mg Per OG tube BID    doxepin  50 mg Oral QHS    ezetimibe  10 mg Oral QHS    famotidine (PF)  20 mg Intravenous BID    glycopyrrolate  1 mg Per NG tube TID    haloperidoL  5 mg Oral BID    levETIRAcetam  500 mg Oral BID    metoprolol tartrate  25 mg Per OG tube Daily    olanzapine zydis  10 mg Oral QHS    OXcarbazepine  300 mg Per OG tube BID    polyethylene glycol  17 g Oral Daily    rifAMpin  300  mg Oral BID    sodium chloride 0.9%  10 mL Intravenous Q12H    vancomycin (VANCOCIN) IVPB  750 mg Intravenous Q12H        Continuous Infusions:  None       PRN Meds:  sodium chloride, acetaminophen, albuterol-ipratropium, bisacodyL, dextrose 10%, fentaNYL, fentaNYL, hydrALAZINE, lorazepam, metoprolol, morphine, morphine, nitroGLYCERIN, OLANZapine, ondansetron, oxyCODONE, oxyCODONE, potassium chloride in water, potassium chloride in water, potassium chloride in water, promethazine, sodium chloride 0.9%, vancomycin - pharmacy to dose, ziprasidone       Assessment/Plan:   Chronic hypoxic respiratory failure status post tracheostomy on 5/10/22  Oropharyngeal dysphagia s/p PEG placment on 5/10/22  Multivessel CAD s/p CABG x 4 on 04/06/2022.  MRSA sternal wound infection and dehiscence, status post wound vacuum-assisted closure device on 04/17/2022, and bilateral pectoral flaps for sternal wound closure on 05/11/2022.  Metabolic encephalopathy  Acute right upper extremity deep venous thrombosis  Anemia chronic disease  Severe protein calorie malnutrition  Hypertension  Seizure disorder   Transaminitis, mild      Hx of  Schizophrenia, chronic hepatitis C, hypertension, CAD s/p stents       Plan:   Remains on vancomycin and rifampin per Infectious Disease recommendations for 6 week course  Continue PEG tube feeds.  Wean O2 as tolerated.  PT, OT, and SLP have been consulted    Critical Care Diagnosis:  Sepsis requiring the use of broad-spectrum IV antibiotics  Critical Care Interventions: Hands-on evaluation, review of labs, radiographs, medical records, and discussion with the patient and medical staff in order to assess and manage the high probability of imminent or life-threatening deterioration of cardio-respiratory status requiring vasopressor support and/or intubation and mechanical ventilation.  Critical Care Time Spent: 35 minutes    Patient condition:  guarded    Anticipated discharge and Disposition: Unclear,  possible LTAC candidate      All diagnosis and differential diagnosis have been reviewed; assessment and plan has been documented; I have personally reviewed the labs and test results that are presently available; I have reviewed the patients medication list; I have reviewed the consulting providers response and recommendations. I have reviewed or attempted to review medical records based upon their availability    All of the patient's questions have been  addressed and answered. Patient's is agreeable to the above stated plan. I will continue to monitor closely and make adjustments to medical management as needed.  _____________________________________________________________________      Radiology:  X-Ray Chest 1 View  Narrative: EXAMINATION:  XR CHEST 1 VIEW    CLINICAL HISTORY:  dyspnea;    TECHNIQUE:  Single frontal view of the chest was performed.    COMPARISON:  Chest radiograph 05/19/2022    FINDINGS:  LINES AND TUBES: Tracheostomy cannula projects over the trachea with tip at the level of the clavicular heads.  EKG/telemetry leads overlie the chest.  Left upper extremity PICC tip projects over the SVC.  Lines overlie the chest, possible surgical drains.    MEDIASTINUM AND JACKSON: Cardiac silhouette is enlarged. There is a left atrial appendage clip.    LUNGS: There are progressive, bilateral alveolar opacities most pronounced in the bilateral perihilar lung zones.  Lung volumes are low with associated atelectatic change.    PLEURA:No appreciable pleural effusion. No pneumothorax.    BONES: No acute osseous abnormality.  Impression: New bilateral alveolar opacities most pronounced in the perihilar lung zones may be related to edema in the setting of enlarged cardiac silhouette.  Other differential considerations include multifocal pneumonia or ARDS.    Electronically signed by: Sharon Cantu  Date:    05/24/2022  Time:    18:36      Lance Alvarez MD   05/30/2022

## 2022-05-30 NOTE — PROGRESS NOTES
Pharmacokinetic Assessment Follow Up: IV Vancomycin    Vancomycin serum concentration assessment(s):    The trough level was drawn incorrectly and cannot be used to guide therapy at this time.    Vancomycin Regimen Plan:    Continue regimen to Vancomycin 750 mg IV every 12 hours with next serum trough concentration measured at 1900 prior to next dose on 5/30    Drug levels (last 3 results):  Recent Labs   Lab Result Units 05/28/22  1338 05/30/22  0109   Vancomycin Trough ug/ml 22.3* 22.8*       Pharmacy will continue to follow and monitor vancomycin.    Please contact pharmacy at extension [phone] for questions regarding this assessment.    Thank you for the consult,   Adrian LeJeune       Patient brief summary:  Kendall Duff is a 56 y.o. male initiated on antimicrobial therapy with IV Vancomycin for treatment of skin & soft tissue infection    The patient's current regimen is 7886    Drug Allergies:   Review of patient's allergies indicates:   Allergen Reactions    Depakote [divalproex]     Divalproex sodium Other (See Comments)    Lithium     Lithium analogues     Quetiapine Other (See Comments)       Actual Body Weight:   77.2 kg    Renal Function:   Estimated Creatinine Clearance: 131.4 mL/min (A) (based on SCr of 0.65 mg/dL (L)).,     Dialysis Method (if applicable):  N/A    CBC (last 72 hours):  Recent Labs   Lab Result Units 05/29/22  0244 05/30/22  0109   WBC x10(3)/mcL 12.3* 10.0   Hgb gm/dL 9.6* 9.4*   Hct % 32.2* 31.2*   Platelet x10(3)/mcL 288 261   Mono % % 13.7 12.8   Eos % % 3.8 4.6   Basophil % % 0.5 0.5       Metabolic Panel (last 72 hours):  Recent Labs   Lab Result Units 05/29/22  0244 05/30/22  0109   Sodium Level mmol/L 140 140   Potassium Level mmol/L 4.3 3.9   Chloride mmol/L 109* 109*   Carbon Dioxide mmol/L 24 25   Glucose Level mg/dL 119* 131*   Blood Urea Nitrogen mg/dL 31.5* 28.2*   Creatinine mg/dL 0.70* 0.65*   Albumin Level gm/dL  --  2.1*   Bilirubin Total mg/dL  --  1.0   Alkaline  Phosphatase unit/L  --  109   Aspartate Aminotransferase unit/L  --  82*   Alanine Aminotransferase unit/L  --  221*       Vancomycin Administrations:  vancomycin given in the last 96 hours                     vancomycin (VANCOCIN) 750 mg in dextrose 5 % 250 mL IVPB (mg) 750 mg New Bag 05/29/22 2024     750 mg New Bag  0803     750 mg New Bag 05/28/22 2025    vancomycin 1.25 g in dextrose 5% 250 mL IVPB (ready to mix) (mg) 1,250 mg New Bag 05/28/22 0325     1,250 mg New Bag 05/27/22 1406     1,250 mg New Bag  0350     1,250 mg New Bag 05/26/22 1446                    Microbiologic Results:  Microbiology Results (last 7 days)       ** No results found for the last 168 hours. **

## 2022-05-30 NOTE — PROCEDURES
"Speech Language Pathology Department  Modified Barium Swallow Study    Patient Name:  Kendall Duff   MRN:  [id number]  Diagnosis: Acute myocardial infarction of anterolateral wall     Recommendations:     Recommendations:                General Recommendations:  Dysphagia therapy and One Way Speaking Valve with SLP only  Diet recommendations:  Continue tube feedings as primary means of nutrition  Swallow Strategies/Precautions: medication via PEG tube  General Precautions: Standard, aspiration    History:     A Modified Barium Swallow Study was completed at the bedside to assess the efficiency of his/her swallow function, rule out aspiration and make recommendations regarding safe dietary consistencies, effective compensatory strategies, and safe eating environment.     Past Medical History:   Diagnosis Date    Bipolar disorder, unspecified     Chronic hepatitis C     Hypertension     Obesity, unspecified     Seizures     Stroke        Home Diet: Regular and thin liquids  Current Method of Nutrition: Tube feeding (PEG)    Patient complaint: "I'm hungry"    Subjective:     Patient awake and alert.    Fluoroscopic Results:     Current Respiratory Status: 05/30/22    Oral Musculature Evaluation:   Oral Musculature: WFL   Dentition: present and adequate   Mucosal Quality: good   Voice Prior to PO Intake: minimal phonation with speaking valve    Visualization  · Patient was seen in the lateral view    Oral Phase:    Prolonged/disorganized bolus formation   Disorganized lingual movement   Poor anterior-posterior transport   Tongue pumping   Bolus holding   Reduced bolus control    Pharyngeal Phase:     Consistency Laryngeal Penetration Aspiration Residue Strategy   Mildly thick liquids by spoon none none none N/A   Puree none none none N/A   Thin liquids by spoon none none none N/A      Swallow delay with spill to the valleculae   Reduced base of tongue retraction   Reduced hyolaryngeal " excursion   Adequate airway protection    Cervical Esophageal Phase:    UES appeared to accommodate all bolus types without stasis or retrograde movement visualized    Additional Comments:  Trials limited due to pt refusal for additional intake  Speaking valve in place for exam    Assessment:     Pt present with mild-moderate oropharyngeal dysphagia with no laryngeal penetration or aspiration visualized during this study.    Goals:   Multidisciplinary Problems     SLP Goals        Problem: SLP    Goal Priority Disciplines Outcome   SLP Goal     SLP Ongoing, Progressing   Description: LTG1: Tolerate speaking valve during all waking hours with no signs/sx respiratory distress/compromise  STG: tolerate speaking valve x1 hour with no signs/sx respiratory distress/compromise    LTG2: Tolerate least restrictive PO diet with no clinical signs/sx aspiration  STG2a: Tolerate thin liquids by cup with no clinical signs/sx aspiration  STG2b: Tolerate puree solids with no clinical signs/sx aspiration.                   Plan:     · Patient to be seen:  5 x/week   · Plan of Care expires:  06/21/22  · Plan of Care reviewed with:  patient   · SLP Follow-Up:  Yes      Time Tracking:   SLP Treatment Date:   05/30/22  Speech Start Time:  1350  Speech Stop Time:  1415     Speech Total Time (min):  25 min    Billable minutes: Motion Fluoroscopic Evaluation, Video Recording, 25 minutes       05/30/2022

## 2022-05-30 NOTE — PROGRESS NOTES
PRS  Resting in bed  AFVSS  Sternal and axillary incisions are c/d/i  Drains s/s, no ready for removal  No changes to care  Will follow

## 2022-05-30 NOTE — PT/OT/SLP PROGRESS
"Physical Therapy         Treatment        Kendall Duff   MRN: [id number]     PT Received On: 05/30/22  PT Start Time: 0958     PT Stop Time: 1016    PT Total Time (min): 18 min       Billable Minutes:  Therapeutic Activity 18  Total Minutes: 18    Treatment Type: Treatment  PT/PTA: PT     PTA Visit Number: 3       General Precautions: Standard, aspiration  Orthopedic Precautions: Orthopedic Precautions : sternal; per MD "arms at sides at all times"   Braces: Braces: N/A    Spiritual, Cultural Beliefs, Muslim Practices, Values that Affect Care: no    Subjective:  Communicated with NSG prior to session.    Pain/Comfort  Pain Rating 1: 0/10    Objective:  Patient found in bed with HOB elevated,  with: blood pressure cuff, KELLIE drain, PEG Tube, Tracheostomy    Functional Mobility:  Bed Mobility:   Supine to sit: Total Assistance   Sit to supine: Total Assistance    Balance:   Static/dynamic Sit: pt sat EOB for multiple minutes. Able to clean face. Attempted to perform LE TherEx; however, pt unable to consistently perform appropriately. Occasionally requiring MaxA for stability; constant cueing for forward lean/core engagement; able to progress to SBA at times.     Transfer Training:   Sit to stand:Moderate Assistance of 2 persons with HHA (asssit provided under arm pits w/ cautionary measures taken to keep arms at side); VC/TC for trunk posterior lean      Activity Tolerance:  Patient tolerated treatment well    Patient left HOB elevated with all lines intact, call button in reach and restraints reapplied at end of session.    Assessment:    Rehab potential is good.    Activity tolerance: Good    Discharge recommendations: Discharge Facility/Level of Care Needs: LTACH (long-term acute care hospital)     Equipment recommendations: Equipment Needed After Discharge:  (unknown)     GOALS:   Multidisciplinary Problems     Physical Therapy Goals        Problem: Physical Therapy    Goal Priority Disciplines Outcome Goal " Variances Interventions   Physical Therapy Goal     PT, PT/OT Ongoing, Progressing     Description: Goals to be met by: 22     Patient will increase functional independence with mobility by performin. Supine to sit with Moderate Assistance  2. Sit to supine with Moderate Assistance  3. Bed to chair transfer with Moderate Assistance using LRAD  4. Sitting at edge of bed x10 minutes with Minimal Assistance                     PLAN:    Patient to be seen 3 x/week  to address the above listed problems via therapeutic activities, therapeutic exercises, gait training  Plan of Care expires: 22  Plan of Care reviewed with: patient         2022

## 2022-05-30 NOTE — PLAN OF CARE
Problem: Adult Inpatient Plan of Care  Goal: Plan of Care Review  Outcome: Ongoing, Progressing  Goal: Patient-Specific Goal (Individualized)  Outcome: Ongoing, Progressing  Goal: Absence of Hospital-Acquired Illness or Injury  Outcome: Ongoing, Progressing  Goal: Optimal Comfort and Wellbeing  Outcome: Ongoing, Progressing  Goal: Readiness for Transition of Care  Outcome: Ongoing, Progressing     Problem: Infection  Goal: Absence of Infection Signs and Symptoms  Outcome: Ongoing, Progressing     Problem: Communication Impairment (Mechanical Ventilation, Invasive)  Goal: Effective Communication  Outcome: Ongoing, Progressing     Problem: Device-Related Complication Risk (Mechanical Ventilation, Invasive)  Goal: Optimal Device Function  Outcome: Ongoing, Progressing     Problem: Nutrition Impairment (Mechanical Ventilation, Invasive)  Goal: Optimal Nutrition Delivery  Outcome: Ongoing, Progressing     Problem: Skin and Tissue Injury (Mechanical Ventilation, Invasive)  Goal: Absence of Device-Related Skin and Tissue Injury  Outcome: Ongoing, Progressing     Problem: Ventilator-Induced Lung Injury (Mechanical Ventilation, Invasive)  Goal: Absence of Ventilator-Induced Lung Injury  Outcome: Ongoing, Progressing     Problem: Communication Impairment (Artificial Airway)  Goal: Effective Communication  Outcome: Ongoing, Progressing     Problem: Skin and Tissue Injury (Artificial Airway)  Goal: Absence of Device-Related Skin or Tissue Injury  Outcome: Ongoing, Progressing     Problem: Fall Injury Risk  Goal: Absence of Fall and Fall-Related Injury  Outcome: Ongoing, Progressing     Problem: Skin Injury Risk Increased  Goal: Skin Health and Integrity  Outcome: Ongoing, Progressing     Problem: Impaired Wound Healing  Goal: Optimal Wound Healing  Outcome: Ongoing, Progressing

## 2022-05-31 PROCEDURE — 25000003 PHARM REV CODE 250: Performed by: INTERNAL MEDICINE

## 2022-05-31 PROCEDURE — 63600175 PHARM REV CODE 636 W HCPCS

## 2022-05-31 PROCEDURE — 97530 THERAPEUTIC ACTIVITIES: CPT

## 2022-05-31 PROCEDURE — 25000003 PHARM REV CODE 250: Performed by: HOSPITALIST

## 2022-05-31 PROCEDURE — 25000003 PHARM REV CODE 250

## 2022-05-31 PROCEDURE — 27000221 HC OXYGEN, UP TO 24 HOURS

## 2022-05-31 PROCEDURE — 99231 SBSQ HOSP IP/OBS SF/LOW 25: CPT | Mod: ,,, | Performed by: SURGERY

## 2022-05-31 PROCEDURE — 63600175 PHARM REV CODE 636 W HCPCS: Performed by: INTERNAL MEDICINE

## 2022-05-31 PROCEDURE — 25000003 PHARM REV CODE 250: Performed by: STUDENT IN AN ORGANIZED HEALTH CARE EDUCATION/TRAINING PROGRAM

## 2022-05-31 PROCEDURE — 20000000 HC ICU ROOM

## 2022-05-31 PROCEDURE — 31502 CHANGE OF WINDPIPE AIRWAY: CPT

## 2022-05-31 PROCEDURE — 97530 THERAPEUTIC ACTIVITIES: CPT | Mod: CO

## 2022-05-31 PROCEDURE — 94761 N-INVAS EAR/PLS OXIMETRY MLT: CPT

## 2022-05-31 PROCEDURE — 92507 TX SP LANG VOICE COMM INDIV: CPT

## 2022-05-31 PROCEDURE — 99900035 HC TECH TIME PER 15 MIN (STAT)

## 2022-05-31 PROCEDURE — A4216 STERILE WATER/SALINE, 10 ML: HCPCS

## 2022-05-31 PROCEDURE — 99231 PR SUBSEQUENT HOSPITAL CARE,LEVL I: ICD-10-PCS | Mod: ,,, | Performed by: SURGERY

## 2022-05-31 RX ADMIN — BENZTROPINE MESYLATE 1 MG: 1 TABLET ORAL at 08:05

## 2022-05-31 RX ADMIN — GLYCOPYRROLATE 1 MG: 1 TABLET ORAL at 03:05

## 2022-05-31 RX ADMIN — GLYCOPYRROLATE 1 MG: 1 TABLET ORAL at 10:05

## 2022-05-31 RX ADMIN — POLYETHYLENE GLYCOL 3350 17 G: 17 POWDER, FOR SOLUTION ORAL at 08:05

## 2022-05-31 RX ADMIN — RIFAMPIN 300 MG: 300 CAPSULE ORAL at 08:05

## 2022-05-31 RX ADMIN — HALOPERIDOL 5 MG: 5 TABLET ORAL at 08:05

## 2022-05-31 RX ADMIN — ATORVASTATIN CALCIUM 80 MG: 40 TABLET, FILM COATED ORAL at 08:05

## 2022-05-31 RX ADMIN — DOXEPIN HYDROCHLORIDE 50 MG: 50 CAPSULE ORAL at 10:05

## 2022-05-31 RX ADMIN — FAMOTIDINE 20 MG: 10 INJECTION, SOLUTION INTRAVENOUS at 08:05

## 2022-05-31 RX ADMIN — FENTANYL CITRATE 100 MCG: 50 INJECTION INTRAMUSCULAR; INTRAVENOUS at 05:05

## 2022-05-31 RX ADMIN — METOPROLOL TARTRATE 25 MG: 25 TABLET, FILM COATED ORAL at 08:05

## 2022-05-31 RX ADMIN — EZETIMIBE 10 MG: 10 TABLET ORAL at 08:05

## 2022-05-31 RX ADMIN — LORAZEPAM 2 MG: 2 INJECTION INTRAMUSCULAR; INTRAVENOUS at 02:05

## 2022-05-31 RX ADMIN — LORAZEPAM 2 MG: 2 INJECTION INTRAMUSCULAR; INTRAVENOUS at 06:05

## 2022-05-31 RX ADMIN — VANCOMYCIN HYDROCHLORIDE 750 MG: 1 INJECTION, POWDER, LYOPHILIZED, FOR SOLUTION INTRAVENOUS at 08:05

## 2022-05-31 RX ADMIN — OXCARBAZEPINE 300 MG: 300 TABLET, FILM COATED ORAL at 08:05

## 2022-05-31 RX ADMIN — GLYCOPYRROLATE 1 MG: 1 TABLET ORAL at 08:05

## 2022-05-31 RX ADMIN — SODIUM CHLORIDE, PRESERVATIVE FREE 10 ML: 5 INJECTION INTRAVENOUS at 08:05

## 2022-05-31 RX ADMIN — WHITE PETROLATUM: 1.75 OINTMENT TOPICAL at 08:05

## 2022-05-31 RX ADMIN — LEVETIRACETAM 500 MG: 500 TABLET, FILM COATED ORAL at 08:05

## 2022-05-31 RX ADMIN — ZIPRASIDONE MESYLATE 20 MG: 20 INJECTION, POWDER, LYOPHILIZED, FOR SOLUTION INTRAMUSCULAR at 12:05

## 2022-05-31 RX ADMIN — APIXABAN 5 MG: 5 TABLET, FILM COATED ORAL at 08:05

## 2022-05-31 RX ADMIN — OLANZAPINE 10 MG: 5 TABLET, ORALLY DISINTEGRATING ORAL at 08:05

## 2022-05-31 NOTE — PT/OT/SLP PROGRESS
Speech Language Pathology Department  Progress Note    Patient Name:  Kendall Duff   MRN:  [id number]  Admitting Diagnosis: Acute myocardial infarction of anterolateral wall    Recommendations:                 General Recommendations:  Dysphagia therapy and One Way Speaking Valve  Diet recommendations:  NPO,     Aspiration Precautions: medication via PEG tube    Discharge recommendations:  nursing facility, skilled, LTACH (long-term acute care hospital)   Barriers to Discharge:  Level of Skilled Assistance Needed      Subjective     Patient awake and alert.    Patient goals: to eat/drink     Respiratory Status: trach collar    Objective:     Nursing in to provide suction.  Pt tolerating cuff deflation.  Speaking valve placed.  Phonation minimal.  Speaking valve removed due to thick secretions and weak cough.  Pt tolerated 1 spoon mildly thick liquids with no overt signs/sx aspiration, however refused additional trials.    Oral Musculature Evaluation:   Oral Musculature: WFL   Mucosal Quality: good   Volitional Cough: weak   Voice Prior to PO Intake: hoarse/breathy with speaking valve      Assessment:     Pt continues to present with impaired verbal communication and oropharyngeal dysphagia.    Goals:   Multidisciplinary Problems     SLP Goals        Problem: SLP    Goal Priority Disciplines Outcome   SLP Goal     SLP Ongoing, Progressing   Description: LTG1: Tolerate speaking valve during all waking hours with no signs/sx respiratory distress/compromise  STG: tolerate speaking valve x1 hour with no signs/sx respiratory distress/compromise    LTG2: Tolerate least restrictive PO diet with no clinical signs/sx aspiration  STG2a: Tolerate thin liquids by cup with no clinical signs/sx aspiration  STG2b: Tolerate puree solids with no clinical signs/sx aspiration.                   Plan:     Will continue to follow and tx as appropriate.    · Patient to be seen:  5 x/week   · Plan of Care expires:  06/21/22  · Plan of  Care reviewed with:  patient   · SLP Follow-Up:  Yes      Time Tracking:     SLP Treatment Date:   05/31/22  Speech Start Time:  1400  Speech Stop Time:  1410     Speech Total Time (min):  10 min    Billable minutes:  Speech/Language Therapy, 10 minutes       05/31/2022

## 2022-05-31 NOTE — PROGRESS NOTES
Pharmacokinetic Assessment Follow Up: IV Vancomycin    Vancomycin serum concentration assessment(s):    The random level was drawn correctly and can be used to guide therapy at this time. The measurement is within the desired definitive target range of 15 to 20 mcg/mL.    Vancomycin Regimen Plan:    Continue regimen to Vancomycin 750 mg IV every 12 hours with next serum trough concentration measured at 0700 prior to 4th dose on 06/01    Drug levels (last 3 results):  Recent Labs   Lab Result Units 05/28/22  1338 05/30/22  0109 05/30/22  1847   Vancomycin Trough ug/ml 22.3* 22.8* 15.9       Pharmacy will continue to follow and monitor vancomycin.    Please contact pharmacy at extension [phone] for questions regarding this assessment.    Thank you for the consult,   Rosy Santos       Patient brief summary:  Kendall Duff is a 56 y.o. male initiated on antimicrobial therapy with IV Vancomycin for treatment of skin & soft tissue infection    The patient's current regimen is 750 mg IV q12h     Drug Allergies:   Review of patient's allergies indicates:   Allergen Reactions    Depakote [divalproex]     Divalproex sodium Other (See Comments)    Lithium     Lithium analogues     Quetiapine Other (See Comments)       Actual Body Weight:   70 kg     Renal Function:   Estimated Creatinine Clearance: 131.4 mL/min (A) (based on SCr of 0.65 mg/dL (L)).,     Dialysis Method (if applicable):  N/A    CBC (last 72 hours):  Recent Labs   Lab Result Units 05/29/22  0244 05/30/22  0109   WBC x10(3)/mcL 12.3* 10.0   Hgb gm/dL 9.6* 9.4*   Hct % 32.2* 31.2*   Platelet x10(3)/mcL 288 261   Mono % % 13.7 12.8   Eos % % 3.8 4.6   Basophil % % 0.5 0.5       Metabolic Panel (last 72 hours):  Recent Labs   Lab Result Units 05/29/22  0244 05/30/22  0109   Sodium Level mmol/L 140 140   Potassium Level mmol/L 4.3 3.9   Chloride mmol/L 109* 109*   Carbon Dioxide mmol/L 24 25   Glucose Level mg/dL 119* 131*   Blood Urea Nitrogen mg/dL 31.5* 28.2*    Creatinine mg/dL 0.70* 0.65*   Albumin Level gm/dL  --  2.1*   Bilirubin Total mg/dL  --  1.0   Alkaline Phosphatase unit/L  --  109   Aspartate Aminotransferase unit/L  --  82*   Alanine Aminotransferase unit/L  --  221*       Vancomycin Administrations:  vancomycin given in the last 96 hours                     vancomycin (VANCOCIN) 750 mg in dextrose 5 % 250 mL IVPB (mg) 750 mg New Bag 05/30/22 2022     750 mg New Bag  0829     750 mg New Bag 05/29/22 2024     750 mg New Bag  0803     750 mg New Bag 05/28/22 2025    vancomycin 1.25 g in dextrose 5% 250 mL IVPB (ready to mix) (mg) 1,250 mg New Bag 05/28/22 0325     1,250 mg New Bag 05/27/22 1406     1,250 mg New Bag  0350                    Microbiologic Results:  Microbiology Results (last 7 days)       ** No results found for the last 168 hours. **

## 2022-05-31 NOTE — PT/OT/SLP PROGRESS
Occupational Therapy  Treatment    Kendall Duff   MRN: [id number]   Admitting Diagnosis: Acute myocardial infarction of anterolateral wall    OT Date of Treatment: 05/31/22   OT Start Time: 1433  OT Stop Time: 1448  OT Total Time (min): 15 min     Billable Minutes:  Therapeutic Activity 1  Total Minutes: 15     OT/ALEKSANDR: ALEKSANDR     ALEKSANDR Visit Number: 4    General Precautions: Standard, sternal  Orthopedic Precautions:    Braces:           Subjective:  Communicated with RN prior to session.  Distracted  Restless    Objective:  Pt. Requiring Max A for sitting balance, resistive at times, laying self back down x 2 times with 2 attempts. Pt. Repositioned appropriately in bed.    Functional Mobility:  Bed Mobility:   Supine to sit: Total Assistance   Sit to supine: Total Assistance   Rolling: Total Assistance   Scooting: Total Assistance    Patient left supine with all lines intact and call button in reach    ASSESSMENT:  Kendall Duff is a 56 y.o. male with a medical diagnosis of Acute myocardial infarction of anterolateral wall.    Activity tolerance: Fair    Discharge recommendations: nursing facility, skilled, LTACH (long-term acute care hospital)     Equipment recommendations:       GOALS:   Multidisciplinary Problems     Occupational Therapy Goals        Problem: Occupational Therapy    Goal Priority Disciplines Outcome Interventions   Occupational Therapy Goal     OT, PT/OT Ongoing, Progressing    Description: Goals to be met by: 6/21/22     Patient will increase functional independence with ADLs by performing:    Feeding with Moderate Assistance. When appropriate to initiate, or simulated hand to mouth.   UE Dressing with Moderate Assistance.  Grooming while long sitting/EOB/supported with Moderate Assistance.  Sitting at edge of bed x10-15 minutes with Moderate Assistance.  Toilet transfer to bedside commode with Moderate Assistance.                     Plan:  Patient to be seen 3 x/week, 5 x/week to address the above  listed problems via self-care/home management, therapeutic activities, therapeutic exercises  Plan of Care expires:    Plan of Care reviewed with: patient         05/31/2022

## 2022-05-31 NOTE — PT/OT/SLP PROGRESS
"Physical Therapy         Treatment        Kendall Duff   MRN: [id number]     PT Received On: 05/31/22  PT Start Time: 1435     PT Stop Time: 1448    PT Total Time (min): 13 min       Billable Minutes:  Therapeutic Activity 13  Total Minutes: 13      Treatment Type: Treatment  PT/PTA: PT     PTA Visit Number: 4       General Precautions: Standard, aspiration  Orthopedic Precautions: Orthopedic Precautions :  (sternal; "arms at sides at all times")   Braces:      Spiritual, Cultural Beliefs, Latter day Practices, Values that Affect Care: no    Subjective:  Communicated with NSG prior to session.    Pain/Comfort  Pain Rating 1: 0/10    Objective:  Patient found in bed with HOB elevated, with: blood pressure cuff, KELLIE drain, PEG Tube, Tracheostomy    Functional Mobility:  Bed Mobility:   Supine to sit: Maximum Assistance x2   Sit to supine: Maximum Assistance    *demonstrated improved ability to transfer, still requiring a good amount as assistance though; tried to initiate some with trunk    Balance:   Static Sit: sat EOB x2 trials; Inconsistent assist required ranging from SBA-maxA; became resistive to activity both trials and began attempted to return himself to bed      Activity Tolerance:  limited by pt's desire to participate   Pt appeared upset 2/2 "lost my wallet"    Patient left HOB elevated with all lines intact, call button in reach and restraints reapplied at end of session.    Assessment:    Rehab potential is good.    Activity tolerance: Fair    Discharge recommendations: Discharge Facility/Level of Care Needs: LTACH (long-term acute care hospital)     Equipment recommendations: Equipment Needed After Discharge:  (unknown)     GOALS:   Multidisciplinary Problems     Physical Therapy Goals        Problem: Physical Therapy    Goal Priority Disciplines Outcome Goal Variances Interventions   Physical Therapy Goal     PT, PT/OT Ongoing, Progressing     Description: Goals to be met by: 6/21/22     Patient will " increase functional independence with mobility by performin. Supine to sit with Moderate Assistance  2. Sit to supine with Moderate Assistance  3. Bed to chair transfer with Moderate Assistance using LRAD  4. Sitting at edge of bed x10 minutes with Minimal Assistance                     PLAN:    Patient to be seen 3 x/week  to address the above listed problems via gait training, therapeutic activities, therapeutic exercises  Plan of Care expires: 22  Plan of Care reviewed with: patient         2022

## 2022-05-31 NOTE — PROGRESS NOTES
PRS  Awake, no complaints  Incisions c/d/i  Drains s/s, not ready for removal yet  No changes to care from my standpoint.  Will follow.

## 2022-06-01 LAB
ANION GAP SERPL CALC-SCNC: 8 MEQ/L
BASOPHILS # BLD AUTO: 0.05 X10(3)/MCL (ref 0–0.2)
BASOPHILS NFR BLD AUTO: 0.5 %
BUN SERPL-MCNC: 24.2 MG/DL (ref 8.4–25.7)
CALCIUM SERPL-MCNC: 8.4 MG/DL (ref 8.4–10.2)
CHLORIDE SERPL-SCNC: 108 MMOL/L (ref 98–107)
CO2 SERPL-SCNC: 23 MMOL/L (ref 22–29)
CREAT SERPL-MCNC: 0.67 MG/DL (ref 0.73–1.18)
CREAT/UREA NIT SERPL: 36
EOSINOPHIL # BLD AUTO: 0.42 X10(3)/MCL (ref 0–0.9)
EOSINOPHIL NFR BLD AUTO: 4.1 %
ERYTHROCYTE [DISTWIDTH] IN BLOOD BY AUTOMATED COUNT: 18.4 % (ref 11.5–17)
GLUCOSE SERPL-MCNC: 150 MG/DL (ref 74–100)
HCT VFR BLD AUTO: 33.3 % (ref 42–52)
HGB BLD-MCNC: 10.3 GM/DL (ref 14–18)
IMM GRANULOCYTES # BLD AUTO: 0.04 X10(3)/MCL (ref 0–0.02)
IMM GRANULOCYTES NFR BLD AUTO: 0.4 % (ref 0–0.43)
LYMPHOCYTES # BLD AUTO: 1.63 X10(3)/MCL (ref 0.6–4.6)
LYMPHOCYTES NFR BLD AUTO: 15.8 %
MAGNESIUM SERPL-MCNC: 1.9 MG/DL (ref 1.6–2.6)
MCH RBC QN AUTO: 26.1 PG (ref 27–31)
MCHC RBC AUTO-ENTMCNC: 30.9 MG/DL (ref 33–36)
MCV RBC AUTO: 84.5 FL (ref 80–94)
MONOCYTES # BLD AUTO: 1.13 X10(3)/MCL (ref 0.1–1.3)
MONOCYTES NFR BLD AUTO: 11 %
NEUTROPHILS # BLD AUTO: 7 X10(3)/MCL (ref 2.1–9.2)
NEUTROPHILS NFR BLD AUTO: 68.2 %
NRBC BLD AUTO-RTO: 0 %
PLATELET # BLD AUTO: 325 X10(3)/MCL (ref 130–400)
PMV BLD AUTO: 10.9 FL (ref 9.4–12.4)
POTASSIUM SERPL-SCNC: 4.5 MMOL/L (ref 3.5–5.1)
RBC # BLD AUTO: 3.94 X10(6)/MCL (ref 4.7–6.1)
SODIUM SERPL-SCNC: 139 MMOL/L (ref 136–145)
VANCOMYCIN TROUGH SERPL-MCNC: 13.9 UG/ML (ref 15–20)
WBC # SPEC AUTO: 10.3 X10(3)/MCL (ref 4.5–11.5)

## 2022-06-01 PROCEDURE — 25000003 PHARM REV CODE 250: Performed by: HOSPITALIST

## 2022-06-01 PROCEDURE — 99231 SBSQ HOSP IP/OBS SF/LOW 25: CPT | Mod: ,,, | Performed by: SURGERY

## 2022-06-01 PROCEDURE — 80048 BASIC METABOLIC PNL TOTAL CA: CPT | Performed by: INTERNAL MEDICINE

## 2022-06-01 PROCEDURE — 97535 SELF CARE MNGMENT TRAINING: CPT

## 2022-06-01 PROCEDURE — 25000003 PHARM REV CODE 250: Performed by: STUDENT IN AN ORGANIZED HEALTH CARE EDUCATION/TRAINING PROGRAM

## 2022-06-01 PROCEDURE — 99900035 HC TECH TIME PER 15 MIN (STAT)

## 2022-06-01 PROCEDURE — 63600175 PHARM REV CODE 636 W HCPCS: Performed by: STUDENT IN AN ORGANIZED HEALTH CARE EDUCATION/TRAINING PROGRAM

## 2022-06-01 PROCEDURE — 20000000 HC ICU ROOM

## 2022-06-01 PROCEDURE — 25000003 PHARM REV CODE 250: Performed by: INTERNAL MEDICINE

## 2022-06-01 PROCEDURE — 92507 TX SP LANG VOICE COMM INDIV: CPT

## 2022-06-01 PROCEDURE — 36415 COLL VENOUS BLD VENIPUNCTURE: CPT | Performed by: STUDENT IN AN ORGANIZED HEALTH CARE EDUCATION/TRAINING PROGRAM

## 2022-06-01 PROCEDURE — 80202 ASSAY OF VANCOMYCIN: CPT | Performed by: STUDENT IN AN ORGANIZED HEALTH CARE EDUCATION/TRAINING PROGRAM

## 2022-06-01 PROCEDURE — 27000221 HC OXYGEN, UP TO 24 HOURS

## 2022-06-01 PROCEDURE — A4216 STERILE WATER/SALINE, 10 ML: HCPCS

## 2022-06-01 PROCEDURE — 63600175 PHARM REV CODE 636 W HCPCS: Performed by: INTERNAL MEDICINE

## 2022-06-01 PROCEDURE — 36415 COLL VENOUS BLD VENIPUNCTURE: CPT | Performed by: INTERNAL MEDICINE

## 2022-06-01 PROCEDURE — 94761 N-INVAS EAR/PLS OXIMETRY MLT: CPT

## 2022-06-01 PROCEDURE — 25000003 PHARM REV CODE 250

## 2022-06-01 PROCEDURE — 83735 ASSAY OF MAGNESIUM: CPT | Performed by: INTERNAL MEDICINE

## 2022-06-01 PROCEDURE — 85025 COMPLETE CBC W/AUTO DIFF WBC: CPT | Performed by: INTERNAL MEDICINE

## 2022-06-01 PROCEDURE — 63600175 PHARM REV CODE 636 W HCPCS

## 2022-06-01 PROCEDURE — 92526 ORAL FUNCTION THERAPY: CPT

## 2022-06-01 PROCEDURE — 99231 PR SUBSEQUENT HOSPITAL CARE,LEVL I: ICD-10-PCS | Mod: ,,, | Performed by: SURGERY

## 2022-06-01 RX ORDER — VANCOMYCIN HCL IN 5 % DEXTROSE 1G/250ML
1000 PLASTIC BAG, INJECTION (ML) INTRAVENOUS
Status: DISCONTINUED | OUTPATIENT
Start: 2022-06-01 | End: 2022-06-05

## 2022-06-01 RX ADMIN — FAMOTIDINE 20 MG: 10 INJECTION, SOLUTION INTRAVENOUS at 10:06

## 2022-06-01 RX ADMIN — POLYETHYLENE GLYCOL 3350 17 G: 17 POWDER, FOR SOLUTION ORAL at 10:06

## 2022-06-01 RX ADMIN — MORPHINE SULFATE 4 MG: 4 INJECTION INTRAVENOUS at 02:06

## 2022-06-01 RX ADMIN — SODIUM CHLORIDE, PRESERVATIVE FREE 10 ML: 5 INJECTION INTRAVENOUS at 08:06

## 2022-06-01 RX ADMIN — LEVETIRACETAM 500 MG: 500 TABLET, FILM COATED ORAL at 10:06

## 2022-06-01 RX ADMIN — EZETIMIBE 10 MG: 10 TABLET ORAL at 08:06

## 2022-06-01 RX ADMIN — SODIUM CHLORIDE, PRESERVATIVE FREE 10 ML: 5 INJECTION INTRAVENOUS at 10:06

## 2022-06-01 RX ADMIN — ATORVASTATIN CALCIUM 80 MG: 40 TABLET, FILM COATED ORAL at 10:06

## 2022-06-01 RX ADMIN — BENZTROPINE MESYLATE 1 MG: 1 TABLET ORAL at 10:06

## 2022-06-01 RX ADMIN — DOXEPIN HYDROCHLORIDE 50 MG: 50 CAPSULE ORAL at 08:06

## 2022-06-01 RX ADMIN — FAMOTIDINE 20 MG: 10 INJECTION, SOLUTION INTRAVENOUS at 08:06

## 2022-06-01 RX ADMIN — GLYCOPYRROLATE 1 MG: 1 TABLET ORAL at 10:06

## 2022-06-01 RX ADMIN — LEVETIRACETAM 500 MG: 500 TABLET, FILM COATED ORAL at 08:06

## 2022-06-01 RX ADMIN — WHITE PETROLATUM: 1.75 OINTMENT TOPICAL at 10:06

## 2022-06-01 RX ADMIN — OXCARBAZEPINE 300 MG: 300 TABLET, FILM COATED ORAL at 08:06

## 2022-06-01 RX ADMIN — MORPHINE SULFATE 4 MG: 4 INJECTION INTRAVENOUS at 10:06

## 2022-06-01 RX ADMIN — HALOPERIDOL 5 MG: 5 TABLET ORAL at 08:06

## 2022-06-01 RX ADMIN — OXCARBAZEPINE 300 MG: 300 TABLET, FILM COATED ORAL at 10:06

## 2022-06-01 RX ADMIN — APIXABAN 5 MG: 5 TABLET, FILM COATED ORAL at 10:06

## 2022-06-01 RX ADMIN — APIXABAN 5 MG: 5 TABLET, FILM COATED ORAL at 08:06

## 2022-06-01 RX ADMIN — LORAZEPAM 2 MG: 2 INJECTION INTRAMUSCULAR; INTRAVENOUS at 01:06

## 2022-06-01 RX ADMIN — OLANZAPINE 10 MG: 5 TABLET, ORALLY DISINTEGRATING ORAL at 08:06

## 2022-06-01 RX ADMIN — HALOPERIDOL 5 MG: 5 TABLET ORAL at 10:06

## 2022-06-01 RX ADMIN — RIFAMPIN 300 MG: 300 CAPSULE ORAL at 08:06

## 2022-06-01 RX ADMIN — RIFAMPIN 300 MG: 300 CAPSULE ORAL at 10:06

## 2022-06-01 RX ADMIN — VANCOMYCIN HYDROCHLORIDE 1000 MG: 1 INJECTION, POWDER, LYOPHILIZED, FOR SOLUTION INTRAVENOUS at 11:06

## 2022-06-01 RX ADMIN — METOPROLOL TARTRATE 25 MG: 25 TABLET, FILM COATED ORAL at 10:06

## 2022-06-01 RX ADMIN — WHITE PETROLATUM: 1.75 OINTMENT TOPICAL at 08:06

## 2022-06-01 RX ADMIN — BENZTROPINE MESYLATE 1 MG: 1 TABLET ORAL at 08:06

## 2022-06-01 RX ADMIN — VANCOMYCIN HYDROCHLORIDE 1000 MG: 1 INJECTION, POWDER, LYOPHILIZED, FOR SOLUTION INTRAVENOUS at 10:06

## 2022-06-01 NOTE — PLAN OF CARE
Shahab allen with [facility] re pt  ready for LTAC placement  per Dr Weiss's note, he states that they cant take pts if they are in restraints or require  1:1 at bedside as per DR Anthony note on 31st that pt will require 1:1 when downgraded to floor

## 2022-06-01 NOTE — PLAN OF CARE
Problem: Adult Inpatient Plan of Care  Goal: Plan of Care Review  Outcome: Ongoing, Progressing  Goal: Patient-Specific Goal (Individualized)  Outcome: Ongoing, Progressing  Goal: Absence of Hospital-Acquired Illness or Injury  Outcome: Ongoing, Progressing  Goal: Optimal Comfort and Wellbeing  Outcome: Ongoing, Progressing  Goal: Readiness for Transition of Care  Outcome: Ongoing, Progressing     Problem: Infection  Goal: Absence of Infection Signs and Symptoms  Outcome: Ongoing, Progressing     Problem: Adjustment to Illness (Delirium)  Goal: Optimal Coping  Outcome: Ongoing, Progressing     Problem: Altered Behavior (Delirium)  Goal: Improved Behavioral Control  Outcome: Ongoing, Progressing     Problem: Attention and Thought Clarity Impairment (Delirium)  Goal: Improved Attention and Thought Clarity  Outcome: Ongoing, Progressing     Problem: Sleep Disturbance (Delirium)  Goal: Improved Sleep  Outcome: Ongoing, Progressing

## 2022-06-01 NOTE — PROGRESS NOTES
[facility] Hospital Medicine Progress Note        Chief Complaint: Inpatient Follow-up for surgical site infection     HPI:   56 y.o. male with a PMHx of chronic hepatitis C, hypertension, seizures, CAD s/p stents who initially presented to [facility] on 4/1/2022 with a primary complaint of chest pain. Of note, he was seen at an outlying ED on 3/20/22 and was dx with MI, subsequently transferred to [facility] where he underwent LHC with angioplasty, and deemed appropriate for CABG, which had been scheduled for 3/29/22, but was cancelled. He then presented to ED on 4/1 with c/o CP earlier in the day. He was admitted to cardiology services, started on a heparin gtt. CV Surgery was consulted and the patient underwent CABG x4 on 4/6/22. He was admitted to ICU post CABG, intubated on mechanical ventilation. Psych was consulted during his stay to evaluate for some recent agitation, restlessness, fidgetiness, insomnia, confusion, and sexual inappropriateness, and he was started on PRN haldol. He was extubated to NC 4L on 4/7, remained restless and confused requiring Precedex gtt.  On 4/10/22 he experienced seizure-like activity and confusion for which neuro was consulted. EEG revealed moderate generalized slowing. CT Head was negative for acute intracranial abnormality, gas noted throughout the bilateral subcutaneous soft tissues.MRI on 4/15/22 revealed small linear focus of acute small vessel ischemia in the right frontal white matter. Developed diffuse subcutaneous emphysema per CXR. He remained agitated with delirium.  Unfortunately,he pulled out his left chest tube on 4/16/22 and he had been thrashing in bed, had to be restrained at times.  He developed an unstable sternum with purulence drainage. CT thorax revealed surgical changes with inflammation, fluid and air locules, minimal anterior pericardial effusion, extensive soft tissue emphysema, and bilateral pleural effusions and  bilateral lower lung lobes collapse consolidations. He has known subcutaneous air, which has been present on previous x-rays.  Cultures positive for MRSA, and he was placed on Vancomycin. He required intubation on 4/16/22. On 4/17/22 he underwent sternal wound debridement with wound vac placement. ID was consulted on 4/22/22 due to sepsis. He was started on rifampin in addition to vancomycin for a planned 6 week course. Respiratory cultures from 4/24/22 returned positive for Proteus and Klebsiella. Remained intubated on mechanical ventilation. GI was consulted on 5/3/22 for PEG placement. Plastic surgery was also following for sternal reconstruction. He underwent tracheostomy and PEG placement on 5/10/22. NIVA done on 5/10/22 noted a RUE DVT. On 5/11/22 he underwent sternal wound debridement, bilateral fasciocutaneous flap advanced closure over sternum and bilateral pectoralis major muscle flap reconstruction of sternum. He was started on full dose Lovenox. He remained on mechanical ventilation until 5/21/22. Tolerating T-piece now on trach collar. He required vasopressors for BP support throughout his stay, which have since been weaned off. He has been weaned off of Precedex. He did have some vomiting on 5/21/22. Lovenox was transitioned to Eliquis on 5/21/22. KUB unrevealing, tube feeds restarted on 5/22/22. He was cleared for downgrade to the floor on 5/22/22 under the hospital medicine service.     Interval Hx:  5/30-Patient is boarding in the ICU.  RN at bedside.  No major changes reported.  Patient remains on supplemental oxygen via trach collar.  Patient is tolerating PEG tube feedings at 80 mL/hr.  Patient is afebrile hemodynamically stable.    5/31- vss/bp a little elevated but restless. Still has drains from sternal wound. If he ever gets to floors will need sitter 24 hours. working on ltac once clear by plastics     Objective/physical exam:  General: in no acute distress  HENT: normocephalic,  atraumatic  Eye: PERRL, EOMI, clear conjunctiva  Neck:  Tracheostomy  Respiratory: clear to auscultation bilaterally  Cardiovascular: regular rate and rhythm, no murmur  Gastrointestinal: non-distended, positive bowel sounds, PEG tube in place  Musculoskeletal: no gross deformity  Integumentary:  Sternal wound with drains in place  Neurological: cranial nerves grossly intact, no focal neurological deficit  Psychiatric: cooperative, flat affect, depressed      VITAL SIGNS: 24 HRS MIN & MAX LAST   Temp  Min: 98.2 °F (36.8 °C)  Max: 99.7 °F (37.6 °C) (P) 98.4 °F (36.9 °C)   BP  Min: 121/97  Max: 186/101 (!) 157/116   Pulse  Min: 73  Max: 112  107   Resp  Min: 14  Max: 38 14   SpO2  Min: 92 %  Max: 99 % (!) 94 %       Recent Labs   Lab 05/27/22 0130 05/29/22  0244 05/30/22  0109   WBC 12.0* 12.3* 10.0   RBC 3.63* 3.68* 3.54*   HGB 9.9* 9.6* 9.4*   HCT 33.3* 32.2* 31.2*   MCV 91.7 87.5 88.1   MCH 27.3 26.1* 26.6*   MCHC 29.7* 29.8* 30.1*   RDW 19.3* 19.3* 18.9*    288 261   MPV 11.3 11.7 11.2       Recent Labs   Lab 05/27/22 0130 05/29/22  0244 05/30/22  0109    140 140   K 4.3 4.3 3.9   CO2 25 24 25   BUN 35.2* 31.5* 28.2*   CREATININE 0.65* 0.70* 0.65*   CALCIUM 8.2* 8.1* 7.9*   ALBUMIN  --   --  2.1*   ALKPHOS  --   --  109   ALT  --   --  221*   AST  --   --  82*   BILITOT  --   --  1.0          Microbiology Results (last 7 days)     ** No results found for the last 168 hours. **           See below for Radiology    Scheduled Med:   apixaban  5 mg Oral BID    atorvastatin  80 mg Oral Daily    benztropine  1 mg Per OG tube BID    doxepin  50 mg Oral QHS    ezetimibe  10 mg Oral QHS    famotidine (PF)  20 mg Intravenous BID    glycopyrrolate  1 mg Per NG tube TID    haloperidoL  5 mg Oral BID    levETIRAcetam  500 mg Oral BID    metoprolol tartrate  25 mg Per OG tube Daily    olanzapine zydis  10 mg Oral QHS    OXcarbazepine  300 mg Per OG tube BID    polyethylene glycol  17 g Oral Daily     rifAMpin  300 mg Oral BID    sodium chloride 0.9%  10 mL Intravenous Q12H    vancomycin (VANCOCIN) IVPB  750 mg Intravenous Q12H    white petrolatum   Topical (Top) BID        Continuous Infusions:  None       PRN Meds:  sodium chloride, acetaminophen, albuterol-ipratropium, bisacodyL, dextrose 10%, fentaNYL, fentaNYL, hydrALAZINE, lorazepam, metoprolol, morphine, morphine, nitroGLYCERIN, OLANZapine, ondansetron, oxyCODONE, oxyCODONE, potassium chloride in water, potassium chloride in water, potassium chloride in water, promethazine, sodium chloride 0.9%, vancomycin - pharmacy to dose, ziprasidone       Assessment/Plan:   Chronic hypoxic respiratory failure status post tracheostomy on 5/10/22  Oropharyngeal dysphagia s/p PEG placment on 5/10/22  Multivessel CAD s/p CABG x 4 on 04/06/2022.  MRSA sternal wound infection and dehiscence, status post wound vacuum-assisted closure device on 04/17/2022, and bilateral pectoral flaps for sternal wound closure on 05/11/2022.  Metabolic encephalopathy  Acute right upper extremity deep venous thrombosis  Anemia chronic disease  Severe protein calorie malnutrition  Hypertension  Seizure disorder   Transaminitis, mild      Hx of  Schizophrenia, chronic hepatitis C, hypertension, CAD s/p stents       Plan:   Remains on vancomycin and rifampin per Infectious Disease recommendations for 6 week course  Continue PEG tube feeds.  Wean O2 as tolerated.  PT, OT, and SLP have been consulted    Patient condition:  guarded    Anticipated discharge and Disposition: Unclear, possible LTAC candidate      All diagnosis and differential diagnosis have been reviewed; assessment and plan has been documented; I have personally reviewed the labs and test results that are presently available; I have reviewed the patients medication list; I have reviewed the consulting providers response and recommendations. I have reviewed or attempted to review medical records based upon their availability    All  of the patient's questions have been  addressed and answered. Patient's is agreeable to the above stated plan. I will continue to monitor closely and make adjustments to medical management as needed.  _____________________________________________________________________      Radiology:  Fl Modified Barium Swallow Speech  See Speech Therapist Notes    This procedure was auto-finalized.      Silviano Lyn MD   05/31/2022

## 2022-06-01 NOTE — PROGRESS NOTES
PRS  Continued restlessness, in restraints  Incisions c/d/i, drains serous, not ready for removal  No changes to care from my standpoint.  Ok to start looking into LTAC placement.

## 2022-06-01 NOTE — PROGRESS NOTES
Pharmacokinetic Assessment Follow Up: IV Vancomycin    Vancomycin serum concentration assessment(s):    The trough level was drawn correctly and can be used to guide therapy at this time. The measurement is below the desired definitive target range of 15 to 20 mcg/mL.    Vancomycin Regimen Plan:  Patient was receiving Vancomycin 750 mg IV every 12 hours  Change regimen to Vancomycin 1000 mg IV every 12 hours with next serum trough concentration measured at 0900 prior to 5th dose on 6/3    Drug levels (last 3 results):  Recent Labs   Lab Result Units 05/30/22  0109 05/30/22  1847 06/01/22  0704   Vancomycin Trough ug/ml 22.8* 15.9 13.9*       Pharmacy will continue to follow and monitor vancomycin.    Please contact pharmacy at extension [phone] for questions regarding this assessment.    Thank you for the consult,   Oneal Edmonds       Patient brief summary:  Kendall Duff is a 56 y.o. male initiated on antimicrobial therapy with IV Vancomycin for treatment of  post-op wound infection    The patient's current regimen is 1000 mg IV q12h    Drug Allergies:   Review of patient's allergies indicates:   Allergen Reactions    Depakote [divalproex]     Divalproex sodium Other (See Comments)    Lithium     Lithium analogues     Quetiapine Other (See Comments)       Actual Body Weight:   70 kg    Renal Function:   Estimated Creatinine Clearance: 127.5 mL/min (A) (based on SCr of 0.67 mg/dL (L)).,     Dialysis Method (if applicable):  N/A    CBC (last 72 hours):  Recent Labs   Lab Result Units 05/30/22  0109 06/01/22  0307   WBC x10(3)/mcL 10.0 10.3   Hgb gm/dL 9.4* 10.3*   Hct % 31.2* 33.3*   Platelet x10(3)/mcL 261 325   Mono % % 12.8 11.0   Eos % % 4.6 4.1   Basophil % % 0.5 0.5       Metabolic Panel (last 72 hours):  Recent Labs   Lab Result Units 05/30/22  0109 06/01/22  0307   Sodium Level mmol/L 140 139   Potassium Level mmol/L 3.9 4.5   Chloride mmol/L 109* 108*   Carbon Dioxide mmol/L 25 23   Glucose Level mg/dL 131* 150*    Blood Urea Nitrogen mg/dL 28.2* 24.2   Creatinine mg/dL 0.65* 0.67*   Albumin Level gm/dL 2.1*  --    Bilirubin Total mg/dL 1.0  --    Alkaline Phosphatase unit/L 109  --    Aspartate Aminotransferase unit/L 82*  --    Alanine Aminotransferase unit/L 221*  --    Magnesium Level mg/dL  --  1.90       Vancomycin Administrations:  vancomycin given in the last 96 hours                     vancomycin (VANCOCIN) 750 mg in dextrose 5 % 250 mL IVPB (mg) 750 mg New Bag 05/31/22 2003     750 mg New Bag  0820     750 mg New Bag 05/30/22 2022     750 mg New Bag  0829     750 mg New Bag 05/29/22 2024     750 mg New Bag  0803     750 mg New Bag 05/28/22 2025                    Microbiologic Results:  Microbiology Results (last 7 days)       ** No results found for the last 168 hours. **

## 2022-06-01 NOTE — PT/OT/SLP PROGRESS
Occupational Therapy   Treatment    Name: Kendall Duff  MRN: [id number]  Admitting Diagnosis:  Acute myocardial infarction of anterolateral wall  21 Days Post-Op    Recommendations:     Discharge Recommendations: nursing facility, skilled  Discharge Equipment Recommendations:  other (see comments) (tbd)  Barriers to discharge:       Assessment:     Kendall Duff is a 56 y.o. male with a medical diagnosis of Acute myocardial infarction of anterolateral wall.  He presents with improvement in cognition and ability to participate with therapy, noted with likely proprioceptive/sensory deficits BUE's and LUE weaker than R, pt following at least 75% basic commands. Performance deficits affecting function are weakness, impaired endurance, impaired sensation, impaired self care skills, impaired functional mobilty, impaired balance, impaired cognition, decreased upper extremity function, impaired fine motor, impaired coordination, impaired skin.     Rehab Prognosis:  Fair; patient would benefit from acute skilled OT services to address these deficits and reach maximum level of function.       Plan:     Patient to be seen 3 x/week, 5 x/week to address the above listed problems via self-care/home management, therapeutic activities, therapeutic exercises, neuromuscular re-education  · Plan of Care Expires:    · Plan of Care Reviewed with: patient    Subjective     Pain/Comfort:  · Pain Rating 1: 0/10    Objective:     Communicated with: nsg prior to session.  Patient found supine with KELLIE drain, blood pressure cuff, peripheral IV, Tracheostomy, PEG Tube, telemetry upon OT entry to room.    General Precautions: Standard, fall, sternal (arms at side at all times)   Orthopedic Precautions:    Braces:    Respiratory Status: trach collar     Occupational Performance:   Balance: sitting EOB with SBA/occasional CGA     Bed Mobility:    · Patient completed Supine to Sit with minimum assistance     Functional Mobility/Transfers:  · Patient  completed Sit <> Stand Transfer with moderate assistance  with  hand-held assist , mod x 2 for safety   · Functional Mobility:     Activities of Daily Living:  · Grooming: moderate assistance wiping face seated EOB, drops washcloth frequently with RUE, more difficult with LUE; applied lotion to B hands and upper thighs sitting EOB- SBA balance      [facility] 6 Click ADL:      Treatment & Education:  Educated on POC, safety in bed, performed grooming seated EOB and standing x1.    Patient left HOB elevated with all lines intact, call button in reach and restraints reapplied at end of sessionEducation:   roll belt.  GOALS:   Multidisciplinary Problems     Occupational Therapy Goals        Problem: Occupational Therapy    Goal Priority Disciplines Outcome Interventions   Occupational Therapy Goal     OT, PT/OT Ongoing, Progressing    Description: Goals to be met by: 6/21/22     Patient will increase functional independence with ADLs by performing:    Feeding with Moderate Assistance. When appropriate to initiate, or simulated hand to mouth.   UE Dressing with Moderate Assistance.  Grooming while long sitting/EOB/supported with Moderate Assistance.  Sitting at edge of bed x10-15 minutes with Moderate Assistance.  Toilet transfer to bedside commode with Moderate Assistance.                     Time Tracking:     OT Date of Treatment: 06/01/22  OT Start Time: 1054  OT Stop Time: 1117  OT Total Time (min): 23 min    Billable Minutes:Self Care/Home Management 23 min    OT/ALEKSANDR: OT     ALEKSANDR Visit Number: 5    6/1/2022

## 2022-06-01 NOTE — PT/OT/SLP PROGRESS
Speech Language Pathology Department  Progress Note    Patient Name:  Kendall Duff   MRN:  [id number]  Admitting Diagnosis: Acute myocardial infarction of anterolateral wall    Recommendations:                 General Recommendations:  Dysphagia therapy and One Way Speaking Valve trials with SLP  Diet Recommendations: NPO    Subjective     Patient awake, alert and oriented x4.    Patient goals: to eat/drink     Pain/Comfort:  · Pain Rating 1: 0/10    Respiratory Status: trach collar    Objective:     Pt seen to address dysphagia and impaired verbal communication.    Pt tolerating cuff deflation without difficulty and secretion management improved.  Speaking valve placed and phonation improving.  Pt tolerated valve x30 minutes.  Pt with poor tolerance of PO trials.  Cough noted with mildly thick liquids and puree.    Assessment:     Pt continues to present with dysphagia requiring nonoral means of nutrition and impaired verbal communication secondary to tracheostomy.    Goals:   Multidisciplinary Problems     SLP Goals        Problem: SLP    Goal Priority Disciplines Outcome   SLP Goal     SLP Ongoing, Progressing   Description: LTG1: Tolerate speaking valve during all waking hours with no signs/sx respiratory distress/compromise  STG: tolerate speaking valve x1 hour with no signs/sx respiratory distress/compromise    LTG2: Tolerate least restrictive PO diet with no clinical signs/sx aspiration  STG2a: Tolerate thin liquids by cup with no clinical signs/sx aspiration  STG2b: Tolerate puree solids with no clinical signs/sx aspiration.                   Plan:     Will continue to follow and tx as appropriate.    Discharge recommendations:  LTACH (long-term acute care hospital), nursing facility, skilled   Barriers to Discharge:  Level of Skilled Assistance Needed      Time Tracking:     SLP Treatment Date:   06/01/22  Speech Start Time:  1405  Speech Stop Time:  1425     Speech Total Time (min):  20 min    Billable  minutes:  Speech/Language Therapy, 10 minutes  Treatment of Swallow Dysfunction, 10 minutes       06/01/2022

## 2022-06-02 PROCEDURE — A4216 STERILE WATER/SALINE, 10 ML: HCPCS

## 2022-06-02 PROCEDURE — 97530 THERAPEUTIC ACTIVITIES: CPT

## 2022-06-02 PROCEDURE — 97116 GAIT TRAINING THERAPY: CPT

## 2022-06-02 PROCEDURE — 20000000 HC ICU ROOM

## 2022-06-02 PROCEDURE — 25000003 PHARM REV CODE 250: Performed by: HOSPITALIST

## 2022-06-02 PROCEDURE — 25000003 PHARM REV CODE 250: Performed by: INTERNAL MEDICINE

## 2022-06-02 PROCEDURE — 63600175 PHARM REV CODE 636 W HCPCS: Mod: JG | Performed by: NURSE PRACTITIONER

## 2022-06-02 PROCEDURE — 99900026 HC AIRWAY MAINTENANCE (STAT)

## 2022-06-02 PROCEDURE — 97535 SELF CARE MNGMENT TRAINING: CPT

## 2022-06-02 PROCEDURE — C1751 CATH, INF, PER/CENT/MIDLINE: HCPCS

## 2022-06-02 PROCEDURE — 92526 ORAL FUNCTION THERAPY: CPT

## 2022-06-02 PROCEDURE — 63600175 PHARM REV CODE 636 W HCPCS: Performed by: STUDENT IN AN ORGANIZED HEALTH CARE EDUCATION/TRAINING PROGRAM

## 2022-06-02 PROCEDURE — 63600175 PHARM REV CODE 636 W HCPCS

## 2022-06-02 PROCEDURE — 99900031 HC PATIENT EDUCATION (STAT)

## 2022-06-02 PROCEDURE — 25000003 PHARM REV CODE 250: Performed by: STUDENT IN AN ORGANIZED HEALTH CARE EDUCATION/TRAINING PROGRAM

## 2022-06-02 PROCEDURE — 25000003 PHARM REV CODE 250

## 2022-06-02 PROCEDURE — 97168 OT RE-EVAL EST PLAN CARE: CPT

## 2022-06-02 PROCEDURE — 94761 N-INVAS EAR/PLS OXIMETRY MLT: CPT

## 2022-06-02 RX ADMIN — VANCOMYCIN HYDROCHLORIDE 1000 MG: 1 INJECTION, POWDER, LYOPHILIZED, FOR SOLUTION INTRAVENOUS at 11:06

## 2022-06-02 RX ADMIN — DOXEPIN HYDROCHLORIDE 50 MG: 50 CAPSULE ORAL at 08:06

## 2022-06-02 RX ADMIN — SODIUM CHLORIDE, PRESERVATIVE FREE 10 ML: 5 INJECTION INTRAVENOUS at 09:06

## 2022-06-02 RX ADMIN — EZETIMIBE 10 MG: 10 TABLET ORAL at 08:06

## 2022-06-02 RX ADMIN — BENZTROPINE MESYLATE 1 MG: 1 TABLET ORAL at 09:06

## 2022-06-02 RX ADMIN — FAMOTIDINE 20 MG: 10 INJECTION, SOLUTION INTRAVENOUS at 08:06

## 2022-06-02 RX ADMIN — HALOPERIDOL 5 MG: 5 TABLET ORAL at 08:06

## 2022-06-02 RX ADMIN — GLYCOPYRROLATE 1 MG: 1 TABLET ORAL at 09:06

## 2022-06-02 RX ADMIN — MORPHINE SULFATE 4 MG: 4 INJECTION INTRAVENOUS at 09:06

## 2022-06-02 RX ADMIN — WHITE PETROLATUM: 1.75 OINTMENT TOPICAL at 08:06

## 2022-06-02 RX ADMIN — BENZTROPINE MESYLATE 1 MG: 1 TABLET ORAL at 08:06

## 2022-06-02 RX ADMIN — SODIUM CHLORIDE, PRESERVATIVE FREE 10 ML: 5 INJECTION INTRAVENOUS at 08:06

## 2022-06-02 RX ADMIN — ALTEPLASE 2 MG: 2.2 INJECTION, POWDER, LYOPHILIZED, FOR SOLUTION INTRAVENOUS at 06:06

## 2022-06-02 RX ADMIN — POLYETHYLENE GLYCOL 3350 17 G: 17 POWDER, FOR SOLUTION ORAL at 09:06

## 2022-06-02 RX ADMIN — HYDRALAZINE HYDROCHLORIDE 10 MG: 20 INJECTION INTRAMUSCULAR; INTRAVENOUS at 02:06

## 2022-06-02 RX ADMIN — RIFAMPIN 300 MG: 300 CAPSULE ORAL at 09:06

## 2022-06-02 RX ADMIN — WHITE PETROLATUM: 1.75 OINTMENT TOPICAL at 09:06

## 2022-06-02 RX ADMIN — APIXABAN 5 MG: 5 TABLET, FILM COATED ORAL at 08:06

## 2022-06-02 RX ADMIN — RIFAMPIN 300 MG: 300 CAPSULE ORAL at 08:06

## 2022-06-02 RX ADMIN — APIXABAN 5 MG: 5 TABLET, FILM COATED ORAL at 09:06

## 2022-06-02 RX ADMIN — OLANZAPINE 10 MG: 5 TABLET, ORALLY DISINTEGRATING ORAL at 08:06

## 2022-06-02 RX ADMIN — METOPROLOL TARTRATE 25 MG: 25 TABLET, FILM COATED ORAL at 09:06

## 2022-06-02 RX ADMIN — OXCARBAZEPINE 300 MG: 300 TABLET, FILM COATED ORAL at 09:06

## 2022-06-02 RX ADMIN — HALOPERIDOL 5 MG: 5 TABLET ORAL at 09:06

## 2022-06-02 RX ADMIN — LEVETIRACETAM 500 MG: 500 TABLET, FILM COATED ORAL at 09:06

## 2022-06-02 RX ADMIN — OXCARBAZEPINE 300 MG: 300 TABLET, FILM COATED ORAL at 08:06

## 2022-06-02 RX ADMIN — GLYCOPYRROLATE 1 MG: 1 TABLET ORAL at 03:06

## 2022-06-02 RX ADMIN — LEVETIRACETAM 500 MG: 500 TABLET, FILM COATED ORAL at 08:06

## 2022-06-02 RX ADMIN — FAMOTIDINE 20 MG: 10 INJECTION, SOLUTION INTRAVENOUS at 09:06

## 2022-06-02 RX ADMIN — ATORVASTATIN CALCIUM 80 MG: 40 TABLET, FILM COATED ORAL at 09:06

## 2022-06-02 NOTE — PLAN OF CARE
Problem: Occupational Therapy  Goal: Occupational Therapy Goal  Description: Goals to be met by: 6/30/22     Patient will increase functional independence with ADLs by performing:    Feeding with Moderate Assistance. When appropriate to initiate, or simulated hand to mouth.   UE Dressing with Moderate Assistance.  Grooming EOB/standing with good balance and with Min Assistance.  Sitting at edge of bed x10-15 minutes with Moderate Assistance.-met  Toilet transfer to bedside commode with Min Assistance.  LE dress with min assist.  Outcome: Ongoing, Progressing      ANIMATED

## 2022-06-02 NOTE — PT/OT/SLP RE-EVAL
Occupational Therapy   Re-evaluation    Name: Kendall Duff  MRN: [id number]  Admitting Diagnosis:  Acute myocardial infarction of anterolateral wall  Recent Surgery: Procedure(s) (LRB):  CLOSURE, STERNAL INCISION, REPEAT (N/A) 22 Days Post-Op    Recommendations:     Discharge Recommendations: rehabilitation facility  Discharge Equipment Recommendations:  other (see comments)  Barriers to discharge:       Assessment:     Kendall Duff is a 56 y.o. male with a medical diagnosis of Acute myocardial infarction of anterolateral wall.  He presents with significantly improved cognition, mobility, and ADL's. He is oriented to place and year, states it's April (which is start date of hospitalization), follows all basic commands but demo's some impulsivity at times. BUE use is improved but noted some decreased sensation and strength LUE, pt requiring cues to maintain arms by side and sternal precautions but can follow when told, able to stand and take steps along EOB with physical support at waist for safety and mod assist, performed multiple reps. Performance deficits affecting function are weakness, impaired endurance, impaired sensation, impaired self care skills, impaired functional mobilty, gait instability, impaired balance, decreased safety awareness, impaired fine motor, impaired coordination.      Rehab Prognosis:  good; patient would benefit from acute skilled OT services to address these deficits and reach maximum level of function.       Plan:     Patient to be seen 5 x/week to address the above listed problems via self-care/home management, neuromuscular re-education, therapeutic activities, therapeutic exercises  · Plan of Care Expires: 06/30/22  · Plan of Care Reviewed with: patient    Subjective     Chief Complaint: hungry  Patient/Family stated goals: wants to eat  Communicated with: nsg and PT prior to session.  Pain/Comfort:  · Pain Rating 1: 0/10    Objective:     Communicated with: nsg prior to session.   Patient found supine with mits and roll belt with: KELLIE drain, blood pressure cuff, peripheral IV, Tracheostomy, PEG Tube, telemetry upon OT entry to room.    General Precautions: Standard, aspiration, fall   Orthopedic Precautions:    Braces:    Respiratory Status: Room air    Occupational Performance:    Bed Mobility:    · Patient completed Supine to Sit with minimum assistance    Functional Mobility/Transfers:  · Patient completed Sit <> Stand Transfer with minimum assistance  with  assist at waist   · Functional Mobility: took side steps with mod assist, assist at waist    Activities of Daily Living:  · Grooming: supervision to wipe face, seated, no assist for balance; lotion to hands with setup  and some difficulty with L hand use    Cognitive/Visual Perceptual:  Oriented x name, place, year, able to talk about his kids and give history PTA, following commands appropriately.     Physical Exam:  wfl AROM, LUE with some decreased sensation and decreased coordination: RUE grossly functional and improved coordination today    [facility] 6 Click:  [facility] Total Score: 11    Treatment & Education:  PEducation:  erformed grooming today, mobility activities, cognitive tasks, educated on sternal precautions and modifications with arms at side for flap precautions.     Patient left HOB elevated with all lines intact, call button in reach and restraints reapplied at end of session    GOALS:   Multidisciplinary Problems     Occupational Therapy Goals        Problem: Occupational Therapy    Goal Priority Disciplines Outcome Interventions   Occupational Therapy Goal     OT, PT/OT Ongoing, Progressing    Description: Goals to be met by: 6/21/22     Patient will increase functional independence with ADLs by performing:    Feeding with Moderate Assistance. When appropriate to initiate, or simulated hand to mouth.   UE Dressing with Moderate Assistance.  Grooming while long sitting/EOB/supported with Moderate Assistance.  Sitting at edge  of bed x10-15 minutes with Moderate Assistance.  Toilet transfer to bedside commode with Moderate Assistance.                     History:     Past Medical History:   Diagnosis Date    Bipolar disorder, unspecified     Chronic hepatitis C     Hypertension     Obesity, unspecified     Seizures     Stroke        Past Surgical History:   Procedure Laterality Date    CORONARY STENT PLACEMENT  08/14/2015    DEBRIDEMENT  04/17/2022    LEFT HEART CATHETERIZATION Left 08/13/2015    REPEAT CLOSURE OF STERNAL INCISION N/A 5/11/2022    Procedure: CLOSURE, STERNAL INCISION, REPEAT;  Surgeon: Adolfo Weiss MD;  Location: [facility];  Service: Plastics;  Laterality: N/A;  STERNAL WOUND DEBRIDEMENT AND RECONSTRUCTION // MULTIPLE MUSCLE FLAPS // REQ 1400       Time Tracking:     OT Date of Treatment: 06/02/22  OT Start Time: 0934  OT Stop Time: 0958  OT Total Time (min): 24 min    Billable Minutes:Re-eval 12  Self Care/Home Management 12    6/2/2022

## 2022-06-02 NOTE — PT/OT/SLP PROGRESS
Speech Language Pathology Department  Dysphagia Therapy    Patient Name:  Kendall Duff   MRN:  [id number]  Admitting Diagnosis: Acute myocardial infarction of anterolateral wall    Recommendations:                 General Recommendations:  Dysphagia therapy and One Way Speaking Valve  Diet recommendations:  NPO, Liquid Diet Level: NPO   Aspiration Precautions: medications via PEG\    Discharge recommendations:  LTACH ([facility]), nursing facility, skilled   Barriers to Discharge:  Level of Skilled Assistance Needed      Subjective     Patient awake, alert and oriented x4.    Patient goals: to eat a hamburger     Pain/Comfort:  · Pain Rating 1: 0/10    Respiratory Status: trach collar    Objective:     Oral Musculature Evaluation:   Oral Musculature: WFL   Dentition: scattered dentition   Mucosal Quality: good   Volitional Cough: weak   Voice Prior to PO Intake: hoarse with speaking valve    Speaking valve placed for session.  Pt with no overt signs/sx aspiration with mildly thick liquids by spoon/straw and puree solids, however vocal quality remains hoarse and difficult to assess.  Secretion management improved and good tolerance of speaking valve noted.    Assessment:     Pt continues to present with dysphagia requiring nonoral means of nutrition.  OK for pt to wear speaking valve as tolerate while awake as mental status is improving.    Goals:   Multidisciplinary Problems     SLP Goals        Problem: SLP    Goal Priority Disciplines Outcome   SLP Goal     SLP Ongoing, Progressing   Description: LTG1: Tolerate speaking valve during all waking hours with no signs/sx respiratory distress/compromise  STG: tolerate speaking valve x1 hour with no signs/sx respiratory distress/compromise    LTG2: Tolerate least restrictive PO diet with no clinical signs/sx aspiration  STG2a: Tolerate thin liquids by cup with no clinical signs/sx aspiration  STG2b: Tolerate puree solids with no clinical signs/sx  aspiration.                   Plan:     Will continue to follow and tx as appropriate.    · Patient to be seen:  5 x/week   · Plan of Care expires:  06/21/22  · Plan of Care reviewed with:  patient   · SLP Follow-Up:  Yes      Time Tracking:     SLP Treatment Date:   06/02/22  Speech Start Time:  1345  Speech Stop Time:  1400     Speech Total Time (min):  15 min    Billable minutes:  Treatment of Swallow Dysfunction, 15 minutes       06/02/2022

## 2022-06-02 NOTE — PT/OT/SLP PROGRESS
"Physical Therapy         Treatment        Kendall Duff   MRN: [id number]     PT Received On: 06/02/22  PT Start Time: 0934     PT Stop Time: 0958    PT Total Time (min): 24 min       Billable Minutes:  Gait [id number] and Therapeutic Activity 9  Total Minutes: 24    Treatment Type: Treatment  PT/PTA: PT     PTA Visit Number: 5       General Precautions: Standard, aspiration  Orthopedic Precautions: Orthopedic Precautions :  (sternal; "arms at sides at all times")   Braces: Braces: N/A    Spiritual, Cultural Beliefs, Jainism Practices, Values that Affect Care: no    Subjective:  Communicated with NSG prior to session.    Pain/Comfort  Pain Rating 1: 0/10    Objective:  Patient found in bed with HOB elevated, with: KELLIE drain, blood pressure cuff, Tracheostomy, PEG Tube, pulse ox (continuous)    Functional Mobility:  Bed Mobility:   Supine to sit: Minimal Assistance   Sit to supine: Minimal Assistance   *VCs for proper tech to maintain pxns    Transfer Training:   Sit to stand:Minimal Assistance with No Assistive Device x3 trials; assist at waist, slightly impulsive    Gait Training:   x2 trials of lateral stepping towards HOB; unsteady throughout; improper foot contact despite VCs likely related to poor proprioception and coordination; slight  posterior trunk lean; min-modA assist; no AD      Activity Tolerance:  Patient tolerated treatment well    Patient left HOB elevated with all lines intact, call button in reach and restraints reapplied at end of session.    Assessment:  Patient tolerated PT tx session well; showing good progress. Patient is impulsive and unpredictable at time.     Rehab potential is excellent.    Activity tolerance: Excellent    Discharge recommendations: Discharge Facility/Level of Care Needs: nursing facility, skilled, LTACH (long-term acute care hospital)     Equipment recommendations: Equipment Needed After Discharge:  (unknown)     GOALS:   Multidisciplinary Problems     Physical Therapy " Goals        Problem: Physical Therapy    Goal Priority Disciplines Outcome Goal Variances Interventions   Physical Therapy Goal     PT, PT/OT Ongoing, Progressing     Description: Goals to be met by: 22     Patient will increase functional independence with mobility by performin. Supine to sit with Moderate Assistance  2. Sit to supine with Moderate Assistance  3. Bed to chair transfer with Moderate Assistance using LRAD  4. Sitting at edge of bed x10 minutes with Minimal Assistance                     PLAN:    Patient to be seen 5 x/week  to address the above listed problems via gait training, therapeutic activities, therapeutic exercises  Plan of Care expires: 22  Plan of Care reviewed with: patient         2022

## 2022-06-02 NOTE — PROGRESS NOTES
[facility] Hospital Medicine Progress Note        Chief Complaint: Inpatient Follow-up for Sternal wound infection     HPI:   56 y.o. male with a PMHx of chronic hepatitis C, hypertension, seizures, CAD s/p stents who initially presented to [facility] on 4/1/2022 with a primary complaint of chest pain. Of note, he was seen at an outlying ED on 3/20/22 and was dx with MI, subsequently transferred to [facility] where he underwent LHC with angioplasty, and deemed appropriate for CABG, which had been scheduled for 3/29/22, but was cancelled. He then presented to ED on 4/1 with c/o CP earlier in the day. He was admitted to cardiology services, started on a heparin gtt. CV Surgery was consulted and the patient underwent CABG x4 on 4/6/22. He was admitted to ICU post CABG, intubated on mechanical ventilation. Psych was consulted during his stay to evaluate for some recent agitation, restlessness, fidgetiness, insomnia, confusion, and sexual inappropriateness, and he was started on PRN haldol. He was extubated to NC 4L on 4/7, remained restless and confused requiring Precedex gtt.  On 4/10/22 he experienced seizure-like activity and confusion for which neuro was consulted. EEG revealed moderate generalized slowing. CT Head was negative for acute intracranial abnormality, gas noted throughout the bilateral subcutaneous soft tissues.MRI on 4/15/22 revealed small linear focus of acute small vessel ischemia in the right frontal white matter. Developed diffuse subcutaneous emphysema per CXR. He remained agitated with delirium.  Unfortunately,he pulled out his left chest tube on 4/16/22 and he had been thrashing in bed, had to be restrained at times.  He developed an unstable sternum with purulence drainage. CT thorax revealed surgical changes with inflammation, fluid and air locules, minimal anterior pericardial effusion, extensive soft tissue emphysema, and bilateral pleural effusions and  bilateral lower lung lobes collapse consolidations. He has known subcutaneous air, which has been present on previous x-rays.  Cultures positive for MRSA, and he was placed on Vancomycin. He required intubation on 4/16/22. On 4/17/22 he underwent sternal wound debridement with wound vac placement. ID was consulted on 4/22/22 due to sepsis. He was started on rifampin in addition to vancomycin for a planned 6 week course. Respiratory cultures from 4/24/22 returned positive for Proteus and Klebsiella. Remained intubated on mechanical ventilation. GI was consulted on 5/3/22 for PEG placement. Plastic surgery was also following for sternal reconstruction. He underwent tracheostomy and PEG placement on 5/10/22. NIVA done on 5/10/22 noted a RUE DVT. On 5/11/22 he underwent sternal wound debridement, bilateral fasciocutaneous flap advanced closure over sternum and bilateral pectoralis major muscle flap reconstruction of sternum. He was started on full dose Lovenox. He remained on mechanical ventilation until 5/21/22. Tolerating T-piece now on trach collar. He required vasopressors for BP support throughout his stay, which have since been weaned off. He has been weaned off of Precedex. He did have some vomiting on 5/21/22. Lovenox was transitioned to Eliquis on 5/21/22. KUB unrevealing, tube feeds restarted on 5/22/22. He was cleared for downgrade to the floor on 5/22/22 under the hospital medicine service.  Interval Hx:   Patient doing well with no acute issues. Has drains from his chest with serosanguinous discharge. Has been afebrile. Pain well controlled.      Objective/physical exam:  General: In no acute distress, afebrile  Chest: Clear to auscultation bilaterally  Heart: RRR, +S1, S2, no appreciable murmur  Abdomen: Soft, nontender, BS +  MSK: Warm, no lower extremity edema, no clubbing or cyanosis      VITAL SIGNS: 24 HRS MIN & MAX LAST   Temp  Min: 98.4 °F (36.9 °C)  Max: 98.4 °F (36.9 °C) 98.4 °F (36.9 °C)   BP   Min: 86/54  Max: 163/110 (!) 148/108   Pulse  Min: 93  Max: 115  103   Resp  Min: 12  Max: 33 (!) 25   SpO2  Min: 93 %  Max: 100 % 97 %       Recent Labs   Lab 05/29/22 0244 05/30/22  0109 06/01/22  0307   WBC 12.3* 10.0 10.3   RBC 3.68* 3.54* 3.94*   HGB 9.6* 9.4* 10.3*   HCT 32.2* 31.2* 33.3*   MCV 87.5 88.1 84.5   MCH 26.1* 26.6* 26.1*   MCHC 29.8* 30.1* 30.9*   RDW 19.3* 18.9* 18.4*    261 325   MPV 11.7 11.2 10.9       Recent Labs   Lab 05/29/22 0244 05/30/22 0109 06/01/22  0307    140 139   K 4.3 3.9 4.5   CO2 24 25 23   BUN 31.5* 28.2* 24.2   CREATININE 0.70* 0.65* 0.67*   CALCIUM 8.1* 7.9* 8.4   MG  --   --  1.90   ALBUMIN  --  2.1*  --    ALKPHOS  --  109  --    ALT  --  221*  --    AST  --  82*  --    BILITOT  --  1.0  --           Microbiology Results (last 7 days)     ** No results found for the last 168 hours. **           See below for Radiology    Scheduled Med:   apixaban  5 mg Oral BID    atorvastatin  80 mg Oral Daily    benztropine  1 mg Per OG tube BID    doxepin  50 mg Oral QHS    ezetimibe  10 mg Oral QHS    famotidine (PF)  20 mg Intravenous BID    glycopyrrolate  1 mg Per NG tube TID    haloperidoL  5 mg Oral BID    levETIRAcetam  500 mg Oral BID    metoprolol tartrate  25 mg Per OG tube Daily    olanzapine zydis  10 mg Oral QHS    OXcarbazepine  300 mg Per OG tube BID    polyethylene glycol  17 g Oral Daily    rifAMpin  300 mg Oral BID    sodium chloride 0.9%  10 mL Intravenous Q12H    vancomycin (VANCOCIN) IVPB  1,000 mg Intravenous Q12H    white petrolatum   Topical (Top) BID        Continuous Infusions:       PRN Meds:  sodium chloride, acetaminophen, albuterol-ipratropium, bisacodyL, dextrose 10%, fentaNYL, fentaNYL, hydrALAZINE, lorazepam, metoprolol, morphine, morphine, nitroGLYCERIN, OLANZapine, ondansetron, oxyCODONE, oxyCODONE, potassium chloride in water, potassium chloride in water, potassium chloride in water, promethazine, sodium chloride 0.9%,  vancomycin - pharmacy to dose, ziprasidone       Assessment/Plan:    Chronic hypoxic respiratory failure status post tracheostomy on 5/10/22  Oropharyngeal dysphagia s/p PEG placment on 5/10/22  Multivessel CAD s/p CABG x 4 on 04/06/2022.  MRSA sternal wound infection and dehiscence, status post wound vacuum-assisted closure device on 04/17/2022, and bilateral pectoral flaps for sternal wound closure on 05/11/2022.  Metabolic encephalopathy  Acute right upper extremity deep venous thrombosis  Anemia chronic disease  Severe protein calorie malnutrition  Hypertension  Seizure disorder   Transaminitis, mild      Hx of  Schizophrenia, chronic hepatitis C, hypertension, CAD s/p stents    Plan:  Patient doing well and has been afebrile  Cont local wound care and iv Vanc and Rifampin per ID team   F/U by plastic surgery team   ConT PTx   Cont supportive care       Patient condition:  Fair    Anticipated discharge and Disposition:LTAC       All diagnosis and differential diagnosis have been reviewed; assessment and plan has been documented; I have personally reviewed the labs and test results that are presently available; I have reviewed the patients medication list; I have reviewed the consulting providers response and recommendations. I have reviewed or attempted to review medical records based upon their availability    All of the patient's questions have been  addressed and answered. Patient's is agreeable to the above stated plan. I will continue to monitor closely and make adjustments to medical management as needed.  _____________________________________________________________________    Nutrition Status:    Radiology:  Fl Modified Barium Swallow Speech  See Speech Therapist Notes    This procedure was auto-finalized.      Hawk Asif MD   06/01/2022

## 2022-06-03 LAB — VANCOMYCIN TROUGH SERPL-MCNC: 17 UG/ML (ref 15–20)

## 2022-06-03 PROCEDURE — 94640 AIRWAY INHALATION TREATMENT: CPT

## 2022-06-03 PROCEDURE — 99900035 HC TECH TIME PER 15 MIN (STAT)

## 2022-06-03 PROCEDURE — 25000003 PHARM REV CODE 250: Performed by: HOSPITALIST

## 2022-06-03 PROCEDURE — 80202 ASSAY OF VANCOMYCIN: CPT | Performed by: STUDENT IN AN ORGANIZED HEALTH CARE EDUCATION/TRAINING PROGRAM

## 2022-06-03 PROCEDURE — 63600175 PHARM REV CODE 636 W HCPCS: Performed by: INTERNAL MEDICINE

## 2022-06-03 PROCEDURE — 27000221 HC OXYGEN, UP TO 24 HOURS

## 2022-06-03 PROCEDURE — 25000003 PHARM REV CODE 250: Performed by: INTERNAL MEDICINE

## 2022-06-03 PROCEDURE — 36415 COLL VENOUS BLD VENIPUNCTURE: CPT | Performed by: STUDENT IN AN ORGANIZED HEALTH CARE EDUCATION/TRAINING PROGRAM

## 2022-06-03 PROCEDURE — 25000242 PHARM REV CODE 250 ALT 637 W/ HCPCS: Performed by: INTERNAL MEDICINE

## 2022-06-03 PROCEDURE — 63600175 PHARM REV CODE 636 W HCPCS: Performed by: STUDENT IN AN ORGANIZED HEALTH CARE EDUCATION/TRAINING PROGRAM

## 2022-06-03 PROCEDURE — 97164 PT RE-EVAL EST PLAN CARE: CPT

## 2022-06-03 PROCEDURE — 94761 N-INVAS EAR/PLS OXIMETRY MLT: CPT

## 2022-06-03 PROCEDURE — 25000003 PHARM REV CODE 250

## 2022-06-03 PROCEDURE — 25000003 PHARM REV CODE 250: Performed by: STUDENT IN AN ORGANIZED HEALTH CARE EDUCATION/TRAINING PROGRAM

## 2022-06-03 PROCEDURE — A4216 STERILE WATER/SALINE, 10 ML: HCPCS

## 2022-06-03 PROCEDURE — 20000000 HC ICU ROOM

## 2022-06-03 RX ORDER — FUROSEMIDE 10 MG/ML
20 INJECTION INTRAMUSCULAR; INTRAVENOUS ONCE
Status: COMPLETED | OUTPATIENT
Start: 2022-06-03 | End: 2022-06-03

## 2022-06-03 RX ORDER — LEVALBUTEROL 1.25 MG/.5ML
1.25 SOLUTION, CONCENTRATE RESPIRATORY (INHALATION) EVERY 8 HOURS
Status: DISCONTINUED | OUTPATIENT
Start: 2022-06-03 | End: 2022-06-09

## 2022-06-03 RX ORDER — FLUTICASONE PROPIONATE 50 MCG
2 SPRAY, SUSPENSION (ML) NASAL DAILY
Status: DISCONTINUED | OUTPATIENT
Start: 2022-06-03 | End: 2022-06-17 | Stop reason: HOSPADM

## 2022-06-03 RX ADMIN — HALOPERIDOL 5 MG: 5 TABLET ORAL at 10:06

## 2022-06-03 RX ADMIN — GLYCOPYRROLATE 1 MG: 1 TABLET ORAL at 08:06

## 2022-06-03 RX ADMIN — VANCOMYCIN HYDROCHLORIDE 1000 MG: 1 INJECTION, POWDER, LYOPHILIZED, FOR SOLUTION INTRAVENOUS at 09:06

## 2022-06-03 RX ADMIN — APIXABAN 5 MG: 5 TABLET, FILM COATED ORAL at 08:06

## 2022-06-03 RX ADMIN — ATORVASTATIN CALCIUM 80 MG: 40 TABLET, FILM COATED ORAL at 08:06

## 2022-06-03 RX ADMIN — VANCOMYCIN HYDROCHLORIDE 1000 MG: 1 INJECTION, POWDER, LYOPHILIZED, FOR SOLUTION INTRAVENOUS at 10:06

## 2022-06-03 RX ADMIN — METOPROLOL TARTRATE 25 MG: 25 TABLET, FILM COATED ORAL at 10:06

## 2022-06-03 RX ADMIN — LEVALBUTEROL HYDROCHLORIDE 1.25 MG: 1.25 SOLUTION, CONCENTRATE RESPIRATORY (INHALATION) at 11:06

## 2022-06-03 RX ADMIN — RIFAMPIN 300 MG: 300 CAPSULE ORAL at 08:06

## 2022-06-03 RX ADMIN — FAMOTIDINE 20 MG: 10 INJECTION, SOLUTION INTRAVENOUS at 08:06

## 2022-06-03 RX ADMIN — LEVETIRACETAM 500 MG: 500 TABLET, FILM COATED ORAL at 10:06

## 2022-06-03 RX ADMIN — FUROSEMIDE 20 MG: 10 INJECTION INTRAMUSCULAR; INTRAVENOUS at 01:06

## 2022-06-03 RX ADMIN — SODIUM CHLORIDE, PRESERVATIVE FREE 10 ML: 5 INJECTION INTRAVENOUS at 09:06

## 2022-06-03 RX ADMIN — LEVALBUTEROL HYDROCHLORIDE 1.25 MG: 1.25 SOLUTION, CONCENTRATE RESPIRATORY (INHALATION) at 03:06

## 2022-06-03 RX ADMIN — GLYCOPYRROLATE 1 MG: 1 TABLET ORAL at 03:06

## 2022-06-03 RX ADMIN — BENZTROPINE MESYLATE 1 MG: 1 TABLET ORAL at 08:06

## 2022-06-03 RX ADMIN — HALOPERIDOL 5 MG: 5 TABLET ORAL at 08:06

## 2022-06-03 RX ADMIN — GLYCOPYRROLATE 1 MG: 1 TABLET ORAL at 09:06

## 2022-06-03 RX ADMIN — RIFAMPIN 300 MG: 300 CAPSULE ORAL at 10:06

## 2022-06-03 RX ADMIN — OXCARBAZEPINE 300 MG: 300 TABLET, FILM COATED ORAL at 08:06

## 2022-06-03 RX ADMIN — WHITE PETROLATUM: 1.75 OINTMENT TOPICAL at 08:06

## 2022-06-03 RX ADMIN — GLYCOPYRROLATE 1 MG: 1 TABLET ORAL at 10:06

## 2022-06-03 RX ADMIN — SODIUM CHLORIDE, PRESERVATIVE FREE 10 ML: 5 INJECTION INTRAVENOUS at 08:06

## 2022-06-03 RX ADMIN — EZETIMIBE 10 MG: 10 TABLET ORAL at 08:06

## 2022-06-03 RX ADMIN — POLYETHYLENE GLYCOL 3350 17 G: 17 POWDER, FOR SOLUTION ORAL at 09:06

## 2022-06-03 RX ADMIN — ATORVASTATIN CALCIUM 80 MG: 40 TABLET, FILM COATED ORAL at 10:06

## 2022-06-03 RX ADMIN — BENZTROPINE MESYLATE 1 MG: 1 TABLET ORAL at 10:06

## 2022-06-03 RX ADMIN — OXCARBAZEPINE 300 MG: 300 TABLET, FILM COATED ORAL at 10:06

## 2022-06-03 RX ADMIN — WHITE PETROLATUM: 1.75 OINTMENT TOPICAL at 09:06

## 2022-06-03 RX ADMIN — DOXEPIN HYDROCHLORIDE 50 MG: 50 CAPSULE ORAL at 08:06

## 2022-06-03 RX ADMIN — METOPROLOL TARTRATE 25 MG: 25 TABLET, FILM COATED ORAL at 08:06

## 2022-06-03 RX ADMIN — FLUTICASONE PROPIONATE 100 MCG: 50 SPRAY, METERED NASAL at 11:06

## 2022-06-03 RX ADMIN — OLANZAPINE 10 MG: 5 TABLET, ORALLY DISINTEGRATING ORAL at 08:06

## 2022-06-03 RX ADMIN — LEVETIRACETAM 500 MG: 500 TABLET, FILM COATED ORAL at 08:06

## 2022-06-03 NOTE — PT/OT/SLP RE-EVAL
Physical Therapy Re-evaluation    Patient Name:  Kendall Duff   MRN:  [id number]    Recommendations:     Discharge Recommendations:  nursing facility, skilled vs rehab  Discharge Equipment Recommendations:  (pending)   Barriers to discharge: medical diagnosis; decreased safety awareness; decreased functional mobility    Assessment:     Kendall Duff is a 56 y.o. male admitted with a medical diagnosis of Acute myocardial infarction of anterolateral wall.  He presents with the following impairments/functional limitations:  weakness, gait instability, impaired endurance, impaired balance, decreased upper extremity function, impaired cardiopulmonary response to activity, impaired coordination, decreased safety awareness, impaired self care skills, impaired functional mobilty, decreased coordination.    (6/3): Patient tolerated PT re-eval very well demonstrating significant improvements. Patient has made good progress; goals updated to reflect pt's progress. This AM there was an incident where pt's trach came out, but since then pt has tolerated 2L/min NC well. Kept him on 2L/min during session. Patient remains impulsive and unpredictable posing safety concerns. Frequent cueing required to maintain pxns. Patient would benefit from continued acute PT services in order to improve functional mobility.     Rehab Prognosis:  good; patient would benefit from acute skilled PT services to address these deficits and reach maximum level of function.      Recent Surgery: Procedure(s) (LRB):  CLOSURE, STERNAL INCISION, REPEAT (N/A) 23 Days Post-Op    Plan:     During this hospitalization, patient to be seen 6 x/week to address the above listed problems via gait training, therapeutic activities, therapeutic exercises  · Plan of Care Expires:  06/21/22   Plan of Care Reviewed with: patient    Subjective     Communicated with NSG prior to, during and after session.  Patient found HOB elevated with KELLIE drain, blood pressure cuff, Condom  "Catheter, PEG Tube, restraints, pulse ox (continuous), oxygen upon PT entry to room, agreeable to evaluation.      Chief Complaint: n/a  Patient comments/goals: to return home  Pain/Comfort:  · Pain Rating 1: 0/10    Patients cultural, spiritual, Gnosticism conflicts given the current situation: no      Objective:     Patient found with: KELLIE drain, blood pressure cuff, Condom Catheter, PEG Tube, restraints, pulse ox (continuous), oxygen     General Precautions: Standard, fall   Orthopedic Precautions: (sternal; "arms at side at all times")   Braces:  (abdominal brace 2/2 PEG)  Respiratory Status: Nasal cannula, flow 2 L/min    Exams:  · Cognitive Exam:  Patient is oriented to Person and Place; not oriented to time    · RLE Strength: WFL  · LLE Strength: WFL    Functional Mobility:  · Bed Mobility:     · Supine to Sit: minimum assistance    · Transfers:     · Sit to Stand:  moderate assistance with rolling walker  · Gait: 75 feet with use of Rolling Walker and ModA x2 persons; very steady 2/2 extreme scissoring- difficulty consistently advancing LE straight forward despite max VCs; constant cueing required for attention to task; TC/VC for proper RW management and negotiation; appeared SOB and fatigued at end of distance      Patient left up in chair with all lines intact, call button in reach, restraints reapplied at end of session, RN notified and RN present.    GOALS:   Multidisciplinary Problems     Physical Therapy Goals        Problem: Physical Therapy    Goal Priority Disciplines Outcome Goal Variances Interventions   Physical Therapy Goal     PT, PT/OT Ongoing, Progressing     Description: Goals to be met by: 22     Patient will increase functional independence with mobility by performin. Supine to sit with Standby Assistance  2. Sit to supine with Standby Assistance  3. Bed to chair transfer with Moderate Assistance using RW  4. Sit to stand with Minimal Assistance using RW  5. Gait x 100 feet with " Minimal Assistance using RW                     History:     Past Medical History:   Diagnosis Date    Bipolar disorder, unspecified     Chronic hepatitis C     Hypertension     Obesity, unspecified     Seizures     Stroke        Past Surgical History:   Procedure Laterality Date    CORONARY STENT PLACEMENT  08/14/2015    DEBRIDEMENT  04/17/2022    LEFT HEART CATHETERIZATION Left 08/13/2015    REPEAT CLOSURE OF STERNAL INCISION N/A 5/11/2022    Procedure: CLOSURE, STERNAL INCISION, REPEAT;  Surgeon: Adolfo Weiss MD;  Location: [facility];  Service: Plastics;  Laterality: N/A;  STERNAL WOUND DEBRIDEMENT AND RECONSTRUCTION // MULTIPLE MUSCLE FLAPS // REQ 1400       Time Tracking:     PT Received On: 06/03/22  PT Start Time: 1414     PT Stop Time: 1435  PT Total Time (min): 21 min     Billable Minutes: Re-eval 21 06/03/2022

## 2022-06-03 NOTE — PT/OT/SLP PROGRESS
Occupational Therapy      Patient Name:  Kendall Duff   MRN:  [id number]    Patient not seen today secondary to Other (Comment). Accidental decannulation. Follow up as appropriate.    6/3/2022

## 2022-06-03 NOTE — PROGRESS NOTES
[facility] - [facility] [place] [facility]  Adult Nutrition  Progress Note    SUMMARY       Recommendations    Recommendation/Intervention: Impact Peptide 1.5 @ 80ml/hr (goal rate)  Goals: Meet >/=75% est energy and protein requirements by f/u    Assessment and Plan    Nutrition Problem  Inadequate Oral Intake     Related to (etiology):   Current condition             Signs and Symptoms (as evidenced by):   trach     Interventions(treatment strategy):  Collaboration with other providers     Nutrition Diagnosis Status:   Continues    Nutrition Goal Status: progressing towards goal  Communication of RD Recs: reviewed with RN    Malnutrition Assessment  Malnutrition Type: acute illness or injury  Weight Loss (Malnutrition): other (see comments) (does not meet criteria)  Energy Intake (Malnutrition): other (see comments) (does not meet criteria)  Subcutaneous Fat (Malnutrition):  (does not meet criteria)  Muscle Mass (Malnutrition): mild depletion  Fluid Accumulation (Malnutrition):  (does not meet criteria)  Hand  Strength, Left (Malnutrition):  (unable to eval)  Hand  Strength, Right (Malnutrition):  (unable to eval)   Mormon Region (Muscle Loss): mild depletion  Clavicle Bone Region (Muscle Loss): mild depletion   Edema (Fluid Accumulation): 0-->no edema present   Subcutaneous Fat Loss (Final Summary): well nourished  Muscle Loss Evaluation (Final Summary): well nourished  Fluid Accumulation Evaluation:  (unable to evaluate)    Moderate Weight Loss (Malnutrition):  (unable to evaluate)    Reason for Assessment    Reason For Assessment: RD follow-up  Diagnosis: 1. CAD with 4 vessel CABG 4/6  2. MRSA Sternal wound dehiscence with debridement and wound vac 4/17.  Plastics following  3. Respiratory culture positive Klebsiella and Proteus  4. Malnutrition.  5. Respiratory failure.  Intubated 4/15  Relevant Medical History:    General Information Comments:   5/24/22: Tube feeding continues. Tolerated per RN. Noted  "now off diprivan. Will update tube feeding to provide est kcal needs and eliminate need for protein packets.    5/26/22: Tube feeding continues, tolerated @ goal rate. No kcal from meds.     5/30/22: Tube feeding continues, tolerated @ goal rate per RN.  6/3/22: Tube feeding continues, tolerated per RN. Noted SLP continues to work with pt.     Nutrition Risk Screen    Nutrition Risk Screen: tube feeding or parenteral nutrition    Nutrition/Diet History    Spiritual, Cultural Beliefs, Amish Practices, Values that Affect Care: no  Factors Affecting Nutritional Intake: on mechanical ventilation    Anthropometrics    Temp: 98.3 °F (36.8 °C)  Height Method: Estimated  Height: 5' 10.87" (180 cm)  Height (inches): 70.87 in  Weight Method: Bed Scale  Weight: 77.2 kg (170 lb 3.1 oz)  Weight (lb): 170.2 lb  Ideal Body Weight (IBW), Male: 171.22 lb  % Ideal Body Weight, Male (lb): 112.92 %  BMI (Calculated): 23.8  BMI Grade: 18.5-24.9 - normal    Lab/Procedures/Meds    Pertinent Labs Reviewed: reviewed  Pertinent Labs Comments: 6/1/22: Cl 108, Glu 150  Pertinent Medications Reviewed: reviewed    Estimated/Assessed Needs    Weight Used For Calorie Calculations: 77.2 kg (170 lb 3.1 oz)  Energy Calorie Requirements (kcal): 2257kcal  Energy Need Method: Duffield-St Jeor (1.4 stress factor)  Protein Requirements: 116-175gm  Weight Used For Protein Calculations: 77.2 kg (170 lb 3.1 oz)  Fluid Requirements (mL): 30mL/kg  Estimated Fluid Requirement Method: RDA Method  RDA Method (mL): 2257     Nutrition Prescription Ordered    Current Diet Order: NPO  Current Nutrition Support Formula Ordered: Impact Peptide 1.5  Current Nutrition Support Rate Ordered: 80 (ml)  Current Nutrition Support Frequency Ordered: based on tube feeding running ~20 hours/day    Evaluation of Received Nutrient/Fluid Intake    Enteral Calories (kcal): 2400  Enteral Protein (gm): 148.8  Enteral (Free Water) Fluid (mL): 1216  % Kcal Needs: 106%  % Protein " Needs: 100%  Energy Calories Required: meeting needs  Protein Required: meeting needs  Fluid Required: not meeting needs  Tolerance: tolerating  % Intake of Estimated Energy Needs: 75 - 100 %  % Meal Intake: NPO    Nutrition Risk    Level of Risk/Frequency of Follow-up: high     Monitor and Evaluation    Food and Nutrient Intake: enteral nutrition intake  Food and Nutrient Adminstration: enteral and parenteral nutrition administration     Nutrition Follow-Up    RD Follow-up?: Yes

## 2022-06-03 NOTE — PT/OT/SLP PROGRESS
POC discussed with nursing.  Pt accidentally decannulated this AM and tracheostomy tube unable to be replaced.  SLP to hold PO trials in AM given open stoma.  Will follow up in AM regarding continued trials vs repeat MBS study.

## 2022-06-03 NOTE — PROGRESS NOTES
[facility] Hospital Medicine Progress Note        Chief Complaint: Inpatient Follow-up for sternal wound infection    HPI:   56 y.o. male with a PMHx of chronic hepatitis C, hypertension, seizures, CAD s/p stents who initially presented to [facility] on 4/1/2022 with a primary complaint of chest pain. Of note, he was seen at an outlying ED on 3/20/22 and was dx with MI, subsequently transferred to [facility] where he underwent LHC with angioplasty, and deemed appropriate for CABG, which had been scheduled for 3/29/22, but was cancelled. He then presented to ED on 4/1 with c/o CP earlier in the day. He was admitted to cardiology services, started on a heparin gtt. CV Surgery was consulted and the patient underwent CABG x4 on 4/6/22. He was admitted to ICU post CABG, intubated on mechanical ventilation. Psych was consulted during his stay to evaluate for some recent agitation, restlessness, fidgetiness, insomnia, confusion, and sexual inappropriateness, and he was started on PRN haldol. He was extubated to NC 4L on 4/7, remained restless and confused requiring Precedex gtt.  On 4/10/22 he experienced seizure-like activity and confusion for which neuro was consulted. EEG revealed moderate generalized slowing. CT Head was negative for acute intracranial abnormality, gas noted throughout the bilateral subcutaneous soft tissues.MRI on 4/15/22 revealed small linear focus of acute small vessel ischemia in the right frontal white matter. Developed diffuse subcutaneous emphysema per CXR. He remained agitated with delirium.  Unfortunately,he pulled out his left chest tube on 4/16/22 and he had been thrashing in bed, had to be restrained at times.  He developed an unstable sternum with purulence drainage. CT thorax revealed surgical changes with inflammation, fluid and air locules, minimal anterior pericardial effusion, extensive soft tissue emphysema, and bilateral pleural effusions and  bilateral lower lung lobes collapse consolidations. He has known subcutaneous air, which has been present on previous x-rays.  Cultures positive for MRSA, and he was placed on Vancomycin. He required intubation on 4/16/22. On 4/17/22 he underwent sternal wound debridement with wound vac placement. ID was consulted on 4/22/22 due to sepsis. He was started on rifampin in addition to vancomycin for a planned 6 week course. Respiratory cultures from 4/24/22 returned positive for Proteus and Klebsiella. Remained intubated on mechanical ventilation. GI was consulted on 5/3/22 for PEG placement. Plastic surgery was also following for sternal reconstruction. He underwent tracheostomy and PEG placement on 5/10/22. NIVA done on 5/10/22 noted a RUE DVT. On 5/11/22 he underwent sternal wound debridement, bilateral fasciocutaneous flap advanced closure over sternum and bilateral pectoralis major muscle flap reconstruction of sternum. He was started on full dose Lovenox. He remained on mechanical ventilation until 5/21/22. Tolerating T-piece now on trach collar. He required vasopressors for BP support throughout his stay, which have since been weaned off. He has been weaned off of Precedex. He did have some vomiting on 5/21/22. Lovenox was transitioned to Eliquis on 5/21/22. KUB unrevealing, tube feeds restarted on 5/22/22. He was cleared for downgrade to the floor on 5/22/22 under the hospital medicine service.    Interval Hx:   Afebrile.  When seen and examined at bedside patient was alert, oriented, answers all questions appropriately.  Appears restless.  Tracheostomy tube fell off during a coughing spell.  He saturating well.  Trach site dressed    Objective/physical exam:  Vitals:    06/03/22 0630   BP: (!) 164/88   Pulse: 101   Resp: (!) 26   Temp:         General: restless, afebrile  HEENT: Trach site dressed  Respiratory: Good air entry  Cardiovascular: S1, S2, no appreciable murmur  Abdomen: Soft, nontender, BS +  MSK:  Warm, no lower extremity edema, no clubbing or cyanosis  Neurologic: Alert and oriented x4, moving all extremities with good strength     Lab Results   Component Value Date     06/01/2022    K 4.5 06/01/2022     03/25/2022    CO2 23 06/01/2022    BUN 24.2 06/01/2022    CREATININE 0.67 (L) 06/01/2022    CALCIUM 8.4 06/01/2022    ANIONGAP 9 03/25/2022    ESTGFRAFRICA >60.0 03/25/2022    EGFRNONAA >60 06/01/2022      Lab Results   Component Value Date     (H) 05/30/2022    AST 82 (H) 05/30/2022    ALKPHOS 109 05/30/2022    BILITOT 1.0 05/30/2022      Lab Results   Component Value Date    WBC 10.3 06/01/2022    HGB 10.3 (L) 06/01/2022    HCT 33.3 (L) 06/01/2022    MCV 84.5 06/01/2022     06/01/2022      Scheduled Med:   amitriptyline  75 mg Oral QHS    atenoloL  100 mg Oral Daily    atorvastatin  80 mg Oral QHS    bisacodyL  10 mg Rectal Daily    ceFAZolin (ANCEF) IVPB  2 g Intravenous Q8H    [START ON 5/8/2022] enoxaparin  40 mg Subcutaneous Daily    gabapentin  600 mg Oral TID    hydrOXYzine HCL  25 mg Oral TID    LIDOcaine (PF) 10 mg/ml (1%)  1 mL Intradermal Once    lisinopriL  5 mg Oral Daily    metFORMIN  1,000 mg Oral BID WM    methocarbamoL  750 mg Oral TID    senna-docusate 8.6-50 mg  2 tablet Oral BID    terbinafine HCL  250 mg Oral Daily    triamterene-hydrochlorothiazide 37.5-25 mg  2 tablet Oral Daily    Continuous Infusions:   sodium chloride 0.9% 100 mL/hr at 05/07/22 0646    electrolyte-A      PRN Meds:  acetaminophen, acetaminophen, aluminum-magnesium hydroxide-simethicone, calcium carbonate, ceFAZolin 2 g/50mL Dextrose IVPB, dextrose 10%, dextrose 50%, dextrose 50%, glucagon (human recombinant), glucose, glucose, HYDROcodone-acetaminophen, HYDROcodone-acetaminophen, HYDROcodone-acetaminophen, HYDROmorphone, insulin aspart U-100, methocarbamoL, midazolam, morphine, morphine, ondansetron, ondansetron, prochlorperazine, sodium chloride 0.9%, sodium chloride 0.9%        Assessment/Plan:    Chronic hypoxic respiratory failure status post tracheostomy on 5/10/22  Oropharyngeal dysphagia s/p PEG placment on 5/10/22  Multivessel CAD s/p CABG x 4 on 04/06/2022.  MRSA sternal wound infection and dehiscence, status post wound vacuum-assisted closure device on 04/17/2022, and bilateral pectoral flaps for sternal wound closure on 05/11/2022.  Metabolic encephalopathy  Acute right upper extremity deep venous thrombosis  Anemia chronic disease  Severe protein calorie malnutrition  Hypertension  Seizure disorder   Transaminitis, mild      Hx of  Schizophrenia, chronic hepatitis C, hypertension, CAD s/p stents    Plan:  - Continue IV vanc and Rifampin  - Trach could not be reinserted. Needs closer monitoring. He is saturating well now. CXR showing congestion. Add bronchodilators. Give a dose of lasix  - Plastics following        Negrito Hernandez MD

## 2022-06-03 NOTE — PROGRESS NOTES
[facility] - [facility] [address] [facility]  Pulmonology  Consult Note    Patient Name: Kendall Duff  MRN: [id number]  Admission Date: 4/1/2022  Hospital Length of Stay: 63 days  Code Status: Prior  Attending Physician: Allyson Mendoza DO  Primary Care Provider: Primary Doctor No   Principal Problem: Acute myocardial infarction of anterolateral wall    Consults  Subjective:     HPI:   This is a 56-year-old male with past medical history of hepatitis-C, coronary artery disease.  [facility], underwent left heart catheterization showing multivessel coronary artery disease, reportedly was planned for CABG however this was eventually canceled.  Presented back to the ED shortly after in CABG x4 was performed 04/06/2022.  He has had issues with delirium, restlessness, agitation over the course of his stay.  He has also had a sternal wound complication for which Plastic surgery has been involved in Infectious Disease has been involved.  Underwent sternal reconstruction surgery over the course of his stay.  Also underwent trach and PEG on 05/10/2022.  We are being consulted as he coughed out his trach this morning and we were not able to place it back in.    Past Medical History:   Diagnosis Date    Bipolar disorder, unspecified     Chronic hepatitis C     Hypertension     Obesity, unspecified     Seizures     Stroke        Past Surgical History:   Procedure Laterality Date    CORONARY STENT PLACEMENT  08/14/2015    DEBRIDEMENT  04/17/2022    LEFT HEART CATHETERIZATION Left 08/13/2015    REPEAT CLOSURE OF STERNAL INCISION N/A 5/11/2022    Procedure: CLOSURE, STERNAL INCISION, REPEAT;  Surgeon: Adolfo Weiss MD;  Location: [facility];  Service: Plastics;  Laterality: N/A;  STERNAL WOUND DEBRIDEMENT AND RECONSTRUCTION // MULTIPLE MUSCLE FLAPS // REQ 1400       Review of patient's allergies indicates:   Allergen Reactions    Depakote [divalproex]     Divalproex  sodium Other (See Comments)    Lithium     Lithium analogues     Quetiapine Other (See Comments)       Family History     Problem Relation (Age of Onset)    Cancer Mother    Liver disease Father        Tobacco Use    Smoking status: Current Every Day Smoker     Types: Cigarettes    Smokeless tobacco: Never Used   Substance and Sexual Activity    Alcohol use: Never    Drug use: Not Currently     Types: Cocaine    Sexual activity: Not on file        Review of Systems  Objective:     Vital Signs (Most Recent):  Temp: 98.3 °F (36.8 °C) (06/03/22 0400)  Pulse: 101 (06/03/22 0630)  Resp: (!) 26 (06/03/22 0630)  BP: (!) 164/88 (06/03/22 0630)  SpO2: 97 % (06/03/22 0915) Vital Signs (24h Range):  Temp:  [98.2 °F (36.8 °C)-98.5 °F (36.9 °C)] 98.3 °F (36.8 °C)  Pulse:  [] 101  Resp:  [12-40] 26  SpO2:  [93 %-97 %] 97 %  BP: (110-190)/() 164/88     Body mass index is 23.83 kg/m².      Intake/Output Summary (Last 24 hours) at 6/3/2022 1209  Last data filed at 6/3/2022 0600  Gross per 24 hour   Intake 978 ml   Output 1260 ml   Net -282 ml       Physical Exam  GENERAL: NAD, lying in bed, calm  CARDIAC: RRR, NO m/g/r  PULM: CTA BL, No wheezing or rales  ABD: NT/ND/S. Bowel sounds heard  EXT: no cyanosis, clubbing, or edema, peripheral pulses intact  NEURO:  Responsive to pain all 4 extremities, no obvious deficits   PSYCH: no agitation or anxiety   EYES: tracks examiner around room, pupil reactive to light  NECK: trachea midline, no obvious JVD    ROS: unable to obtain      Vents:  Off mechanical ventilation      Lines/Drains/Airways     Peripherally Inserted Central Catheter Line  Duration           PICC Triple Lumen 05/16/22 0812 left basilic 18 days          Drain  Duration                Gastrostomy/Enterostomy 05/10/22  Percutaneous endoscopic gastrostomy (PEG) midline feeding 24 days         Closed/Suction Drain 05/11/22 Lateral LUQ 15 Fr. 23 days         Closed/Suction Drain 05/11/22 Lateral RUQ Bulb 15  Fr. 23 days         Closed/Suction Drain 05/11/22 1724 Lateral;Right RUQ Bulb 22 days         Closed/Suction Drain 05/11/22 1726 Lateral;Left LUQ Bulb 22 days         Closed/Suction Drain 05/11/22 1726 Lateral;Left LUQ Bulb 22 days    Male External Urinary Catheter 06/01/22 0700 Medium 2 days                Significant Labs:    Lab Results   Component Value Date    WBC 10.3 06/01/2022    HGB 10.3 (L) 06/01/2022    HCT 33.3 (L) 06/01/2022    MCV 84.5 06/01/2022     06/01/2022         BMP  Lab Results   Component Value Date     06/01/2022    K 4.5 06/01/2022     03/25/2022    CO2 23 06/01/2022    BUN 24.2 06/01/2022    CREATININE 0.67 (L) 06/01/2022    CALCIUM 8.4 06/01/2022    ANIONGAP 9 03/25/2022    ESTGFRAFRICA >60.0 03/25/2022    EGFRNONAA >60 06/01/2022           Significant Imaging:   I have reviewed all pertinent imaging results/findings within the past 24 hours.    Assessment/Plan:     1) chronic respiratory failure status post tracheotomy 05/10/2022 with removal (accidental) on 06/03/2022  2) prolonged hospitalization related to CABG and subsequent sternal wound infection  3) history of bipolar disorder with ongoing delirium and encephalopathy    -unfortunately we were not successful in placing tracheotomy back in after it was called out this morning.  However from a respiratory standpoint he is doing well on nasal cannula with no signs of respiratory distress.  -continue with pulmonary hygiene including DuoNebs for the next 24 hours.  Chest x-ray shows no obvious consolidation   -no leukocytosis this morning.  No fevers reported.  No need for antibiotics at this time beyond vancomycin currently being recieved    Thank you for your consult.   Lance Rosas MD  Pulmonology  [facility] - [address] [place] [address]

## 2022-06-03 NOTE — PROGRESS NOTES
Pharmacokinetic Assessment Follow Up: IV Vancomycin    Vancomycin serum concentration assessment(s):    The trough level was drawn correctly and can be used to guide therapy at this time. The measurement is within the desired definitive target range of 15 to 20 mcg/mL.    Vancomycin Regimen Plan:    Continue regimen to Vancomycin 1000 mg IV every 12 hours with next serum trough concentration measured at 0900 prior to 7th dose on 0900    Drug levels (last 3 results):  Recent Labs   Lab Result Units 06/01/22  0704 06/03/22  1022   Vancomycin Trough ug/ml 13.9* 17.0       Pharmacy will continue to follow and monitor vancomycin.    Please contact pharmacy at extension [phone] for questions regarding this assessment.    Thank you for the consult,   Oneal Edmonds       Patient brief summary:  Kendall Duff is a 56 y.o. male initiated on antimicrobial therapy with IV Vancomycin for treatment of  MRSA post-op wound infection    The patient's current regimen is 1000 mg IV q12h    Drug Allergies:   Review of patient's allergies indicates:   Allergen Reactions    Depakote [divalproex]     Divalproex sodium Other (See Comments)    Lithium     Lithium analogues     Quetiapine Other (See Comments)       Actual Body Weight:   77 kg    Renal Function:   Estimated Creatinine Clearance: 127.5 mL/min (A) (based on SCr of 0.67 mg/dL (L)).,     Dialysis Method (if applicable):  N/A    CBC (last 72 hours):  Recent Labs   Lab Result Units 06/01/22  0307   WBC x10(3)/mcL 10.3   Hgb gm/dL 10.3*   Hct % 33.3*   Platelet x10(3)/mcL 325   Mono % % 11.0   Eos % % 4.1   Basophil % % 0.5       Metabolic Panel (last 72 hours):  Recent Labs   Lab Result Units 06/01/22  0307   Sodium Level mmol/L 139   Potassium Level mmol/L 4.5   Chloride mmol/L 108*   Carbon Dioxide mmol/L 23   Glucose Level mg/dL 150*   Blood Urea Nitrogen mg/dL 24.2   Creatinine mg/dL 0.67*   Magnesium Level mg/dL 1.90       Vancomycin Administrations:  vancomycin given in the last  96 hours                     vancomycin in dextrose 5 % 1 gram/250 mL IVPB 1,000 mg (mg) 1,000 mg New Bag 06/03/22 1017     1,000 mg New Bag 06/02/22 2330     1,000 mg New Bag  1122     1,000 mg New Bag 06/01/22 2205     1,000 mg New Bag  1100    vancomycin (VANCOCIN) 750 mg in dextrose 5 % 250 mL IVPB (mg) 750 mg New Bag 05/31/22 2003     750 mg New Bag  0820     750 mg New Bag 05/30/22 2022                    Microbiologic Results:  Microbiology Results (last 7 days)       ** No results found for the last 168 hours. **

## 2022-06-03 NOTE — PLAN OF CARE
Problem: Physical Therapy  Goal: Physical Therapy Goal  Description: Goals to be met by: 22     Patient will increase functional independence with mobility by performin. Supine to sit with Standby Assistance  2. Sit to supine with Standby Assistance  3. Bed to chair transfer with Moderate Assistance using RW  4. Sit to stand with Minimal Assistance using RW  5. Gait x 100 feet with Minimal Assistance using RW     POC updated to reflect progress   Outcome: Ongoing, Progressing

## 2022-06-04 LAB
ALBUMIN SERPL-MCNC: 2.1 GM/DL (ref 3.5–5)
ALBUMIN/GLOB SERPL: 0.5 RATIO (ref 1.1–2)
ALP SERPL-CCNC: 101 UNIT/L (ref 40–150)
ALT SERPL-CCNC: 53 UNIT/L (ref 0–55)
AST SERPL-CCNC: 29 UNIT/L (ref 5–34)
BASOPHILS # BLD AUTO: 0.06 X10(3)/MCL (ref 0–0.2)
BASOPHILS NFR BLD AUTO: 0.6 %
BILIRUBIN DIRECT+TOT PNL SERPL-MCNC: 0.7 MG/DL
BUN SERPL-MCNC: 22.1 MG/DL (ref 8.4–25.7)
CALCIUM SERPL-MCNC: 8.5 MG/DL (ref 8.4–10.2)
CHLORIDE SERPL-SCNC: 106 MMOL/L (ref 98–107)
CO2 SERPL-SCNC: 23 MMOL/L (ref 22–29)
CREAT SERPL-MCNC: 0.64 MG/DL (ref 0.73–1.18)
EOSINOPHIL # BLD AUTO: 0.45 X10(3)/MCL (ref 0–0.9)
EOSINOPHIL NFR BLD AUTO: 4.3 %
ERYTHROCYTE [DISTWIDTH] IN BLOOD BY AUTOMATED COUNT: 17.8 % (ref 11.5–17)
GLOBULIN SER-MCNC: 4.2 GM/DL (ref 2.4–3.5)
GLUCOSE SERPL-MCNC: 141 MG/DL (ref 74–100)
HCT VFR BLD AUTO: 35.9 % (ref 42–52)
HGB BLD-MCNC: 11.2 GM/DL (ref 14–18)
IMM GRANULOCYTES # BLD AUTO: 0.03 X10(3)/MCL (ref 0–0.02)
IMM GRANULOCYTES NFR BLD AUTO: 0.3 % (ref 0–0.43)
LYMPHOCYTES # BLD AUTO: 1.68 X10(3)/MCL (ref 0.6–4.6)
LYMPHOCYTES NFR BLD AUTO: 16 %
MAGNESIUM SERPL-MCNC: 1.9 MG/DL (ref 1.6–2.6)
MCH RBC QN AUTO: 26.2 PG (ref 27–31)
MCHC RBC AUTO-ENTMCNC: 31.2 MG/DL (ref 33–36)
MCV RBC AUTO: 83.9 FL (ref 80–94)
MONOCYTES # BLD AUTO: 0.95 X10(3)/MCL (ref 0.1–1.3)
MONOCYTES NFR BLD AUTO: 9 %
NEUTROPHILS # BLD AUTO: 7.3 X10(3)/MCL (ref 2.1–9.2)
NEUTROPHILS NFR BLD AUTO: 69.8 %
NRBC BLD AUTO-RTO: 0 %
PLATELET # BLD AUTO: 354 X10(3)/MCL (ref 130–400)
PMV BLD AUTO: 10 FL (ref 9.4–12.4)
POTASSIUM SERPL-SCNC: 4.1 MMOL/L (ref 3.5–5.1)
PROT SERPL-MCNC: 6.3 GM/DL (ref 6.4–8.3)
RBC # BLD AUTO: 4.28 X10(6)/MCL (ref 4.7–6.1)
SODIUM SERPL-SCNC: 136 MMOL/L (ref 136–145)
WBC # SPEC AUTO: 10.5 X10(3)/MCL (ref 4.5–11.5)

## 2022-06-04 PROCEDURE — 25000003 PHARM REV CODE 250

## 2022-06-04 PROCEDURE — 99231 PR SUBSEQUENT HOSPITAL CARE,LEVL I: ICD-10-PCS | Mod: ,,, | Performed by: SURGERY

## 2022-06-04 PROCEDURE — 25000003 PHARM REV CODE 250: Performed by: STUDENT IN AN ORGANIZED HEALTH CARE EDUCATION/TRAINING PROGRAM

## 2022-06-04 PROCEDURE — 99231 SBSQ HOSP IP/OBS SF/LOW 25: CPT | Mod: ,,, | Performed by: SURGERY

## 2022-06-04 PROCEDURE — 25000003 PHARM REV CODE 250: Performed by: HOSPITALIST

## 2022-06-04 PROCEDURE — 20000000 HC ICU ROOM

## 2022-06-04 PROCEDURE — 25000003 PHARM REV CODE 250: Performed by: INTERNAL MEDICINE

## 2022-06-04 PROCEDURE — 63600175 PHARM REV CODE 636 W HCPCS: Performed by: STUDENT IN AN ORGANIZED HEALTH CARE EDUCATION/TRAINING PROGRAM

## 2022-06-04 PROCEDURE — 83735 ASSAY OF MAGNESIUM: CPT | Performed by: INTERNAL MEDICINE

## 2022-06-04 PROCEDURE — 36415 COLL VENOUS BLD VENIPUNCTURE: CPT | Performed by: INTERNAL MEDICINE

## 2022-06-04 PROCEDURE — 94640 AIRWAY INHALATION TREATMENT: CPT

## 2022-06-04 PROCEDURE — 99900022

## 2022-06-04 PROCEDURE — A4216 STERILE WATER/SALINE, 10 ML: HCPCS

## 2022-06-04 PROCEDURE — 25000242 PHARM REV CODE 250 ALT 637 W/ HCPCS: Performed by: INTERNAL MEDICINE

## 2022-06-04 PROCEDURE — 85025 COMPLETE CBC W/AUTO DIFF WBC: CPT | Performed by: INTERNAL MEDICINE

## 2022-06-04 PROCEDURE — 94761 N-INVAS EAR/PLS OXIMETRY MLT: CPT

## 2022-06-04 PROCEDURE — 80053 COMPREHEN METABOLIC PANEL: CPT | Performed by: INTERNAL MEDICINE

## 2022-06-04 PROCEDURE — 99900035 HC TECH TIME PER 15 MIN (STAT)

## 2022-06-04 PROCEDURE — 63600175 PHARM REV CODE 636 W HCPCS

## 2022-06-04 RX ORDER — AMLODIPINE BESYLATE 5 MG/1
10 TABLET ORAL DAILY
Status: DISCONTINUED | OUTPATIENT
Start: 2022-06-04 | End: 2022-06-17 | Stop reason: HOSPADM

## 2022-06-04 RX ORDER — LISINOPRIL 20 MG/1
20 TABLET ORAL DAILY
Status: DISCONTINUED | OUTPATIENT
Start: 2022-06-04 | End: 2022-06-17 | Stop reason: HOSPADM

## 2022-06-04 RX ADMIN — POLYETHYLENE GLYCOL 3350 17 G: 17 POWDER, FOR SOLUTION ORAL at 08:06

## 2022-06-04 RX ADMIN — LEVALBUTEROL HYDROCHLORIDE 1.25 MG: 1.25 SOLUTION, CONCENTRATE RESPIRATORY (INHALATION) at 08:06

## 2022-06-04 RX ADMIN — APIXABAN 5 MG: 5 TABLET, FILM COATED ORAL at 09:06

## 2022-06-04 RX ADMIN — HYDRALAZINE HYDROCHLORIDE 10 MG: 20 INJECTION INTRAMUSCULAR; INTRAVENOUS at 06:06

## 2022-06-04 RX ADMIN — SODIUM CHLORIDE, PRESERVATIVE FREE 10 ML: 5 INJECTION INTRAVENOUS at 09:06

## 2022-06-04 RX ADMIN — VANCOMYCIN HYDROCHLORIDE 1000 MG: 1 INJECTION, POWDER, LYOPHILIZED, FOR SOLUTION INTRAVENOUS at 12:06

## 2022-06-04 RX ADMIN — WHITE PETROLATUM: 1.75 OINTMENT TOPICAL at 09:06

## 2022-06-04 RX ADMIN — GLYCOPYRROLATE 1 MG: 1 TABLET ORAL at 02:06

## 2022-06-04 RX ADMIN — AMLODIPINE BESYLATE 10 MG: 5 TABLET ORAL at 08:06

## 2022-06-04 RX ADMIN — DOXEPIN HYDROCHLORIDE 50 MG: 50 CAPSULE ORAL at 09:06

## 2022-06-04 RX ADMIN — APIXABAN 5 MG: 5 TABLET, FILM COATED ORAL at 08:06

## 2022-06-04 RX ADMIN — BENZTROPINE MESYLATE 1 MG: 1 TABLET ORAL at 08:06

## 2022-06-04 RX ADMIN — OXCARBAZEPINE 300 MG: 300 TABLET, FILM COATED ORAL at 09:06

## 2022-06-04 RX ADMIN — GLYCOPYRROLATE 1 MG: 1 TABLET ORAL at 08:06

## 2022-06-04 RX ADMIN — FLUTICASONE PROPIONATE 100 MCG: 50 SPRAY, METERED NASAL at 08:06

## 2022-06-04 RX ADMIN — RIFAMPIN 300 MG: 300 CAPSULE ORAL at 08:06

## 2022-06-04 RX ADMIN — OLANZAPINE 10 MG: 5 TABLET, ORALLY DISINTEGRATING ORAL at 09:06

## 2022-06-04 RX ADMIN — LISINOPRIL 20 MG: 20 TABLET ORAL at 08:06

## 2022-06-04 RX ADMIN — GLYCOPYRROLATE 1 MG: 1 TABLET ORAL at 09:06

## 2022-06-04 RX ADMIN — HALOPERIDOL 5 MG: 5 TABLET ORAL at 08:06

## 2022-06-04 RX ADMIN — EZETIMIBE 10 MG: 10 TABLET ORAL at 09:06

## 2022-06-04 RX ADMIN — LEVALBUTEROL HYDROCHLORIDE 1.25 MG: 1.25 SOLUTION, CONCENTRATE RESPIRATORY (INHALATION) at 04:06

## 2022-06-04 RX ADMIN — LEVETIRACETAM 500 MG: 500 TABLET, FILM COATED ORAL at 08:06

## 2022-06-04 RX ADMIN — METOPROLOL TARTRATE 25 MG: 25 TABLET, FILM COATED ORAL at 08:06

## 2022-06-04 RX ADMIN — FAMOTIDINE 20 MG: 10 INJECTION, SOLUTION INTRAVENOUS at 09:06

## 2022-06-04 RX ADMIN — ATORVASTATIN CALCIUM 80 MG: 40 TABLET, FILM COATED ORAL at 08:06

## 2022-06-04 RX ADMIN — FAMOTIDINE 20 MG: 10 INJECTION, SOLUTION INTRAVENOUS at 08:06

## 2022-06-04 RX ADMIN — RIFAMPIN 300 MG: 300 CAPSULE ORAL at 09:06

## 2022-06-04 RX ADMIN — LEVETIRACETAM 500 MG: 500 TABLET, FILM COATED ORAL at 09:06

## 2022-06-04 RX ADMIN — BENZTROPINE MESYLATE 1 MG: 1 TABLET ORAL at 09:06

## 2022-06-04 RX ADMIN — HALOPERIDOL 5 MG: 5 TABLET ORAL at 09:06

## 2022-06-04 NOTE — PROGRESS NOTES
[facility] Hospital Medicine Progress Note        Chief Complaint: Inpatient Follow-up for sternal wound infection    HPI:   56 y.o. male with a PMHx of chronic hepatitis C, hypertension, seizures, CAD s/p stents who initially presented to [facility] on 4/1/2022 with a primary complaint of chest pain. Of note, he was seen at an outlying ED on 3/20/22 and was dx with MI, subsequently transferred to [facility] where he underwent LHC with angioplasty, and deemed appropriate for CABG, which had been scheduled for 3/29/22, but was cancelled. He then presented to ED on 4/1 with c/o CP earlier in the day. He was admitted to cardiology services, started on a heparin gtt. CV Surgery was consulted and the patient underwent CABG x4 on 4/6/22. He was admitted to ICU post CABG, intubated on mechanical ventilation. Psych was consulted during his stay to evaluate for some recent agitation, restlessness, fidgetiness, insomnia, confusion, and sexual inappropriateness, and he was started on PRN haldol. He was extubated to NC 4L on 4/7, remained restless and confused requiring Precedex gtt.  On 4/10/22 he experienced seizure-like activity and confusion for which neuro was consulted. EEG revealed moderate generalized slowing. CT Head was negative for acute intracranial abnormality, gas noted throughout the bilateral subcutaneous soft tissues.MRI on 4/15/22 revealed small linear focus of acute small vessel ischemia in the right frontal white matter. Developed diffuse subcutaneous emphysema per CXR. He remained agitated with delirium.  Unfortunately,he pulled out his left chest tube on 4/16/22 and he had been thrashing in bed, had to be restrained at times.  He developed an unstable sternum with purulence drainage. CT thorax revealed surgical changes with inflammation, fluid and air locules, minimal anterior pericardial effusion, extensive soft tissue emphysema, and bilateral pleural effusions and  bilateral lower lung lobes collapse consolidations. He has known subcutaneous air, which has been present on previous x-rays.  Cultures positive for MRSA, and he was placed on Vancomycin. He required intubation on 4/16/22. On 4/17/22 he underwent sternal wound debridement with wound vac placement. ID was consulted on 4/22/22 due to sepsis. He was started on rifampin in addition to vancomycin for a planned 6 week course. Respiratory cultures from 4/24/22 returned positive for Proteus and Klebsiella. Remained intubated on mechanical ventilation. GI was consulted on 5/3/22 for PEG placement. Plastic surgery was also following for sternal reconstruction. He underwent tracheostomy and PEG placement on 5/10/22. NIVA done on 5/10/22 noted a RUE DVT. On 5/11/22 he underwent sternal wound debridement, bilateral fasciocutaneous flap advanced closure over sternum and bilateral pectoralis major muscle flap reconstruction of sternum. He was started on full dose Lovenox. He remained on mechanical ventilation until 5/21/22. Tolerating T-piece now on trach collar. He required vasopressors for BP support throughout his stay, which have since been weaned off. He has been weaned off of Precedex. He did have some vomiting on 5/21/22. Lovenox was transitioned to Eliquis on 5/21/22. KUB unrevealing, tube feeds restarted on 5/22/22. He was cleared for downgrade to the floor on 5/22/22 under the hospital medicine service. His trach tube fell of during cough and were ubnable to reinserted. He was stable from respiratory standpoint and close monitoring was continued    Interval Hx:   He was afebrile overnight.  Intermittently tachycardic, tachypneic and accelerated blood pressure.  When seen examined bedside he was alert, comfortably resting in the bed.  Mittens were in place.  He answers all questions appropriately.  Denies any worsening of shortness of breath, chest pain, dizziness, shortness of breath.  Tolerating tube feeds.         Objective/physical exam:  Vitals:    06/04/22 0625   BP: (!) 188/97   Pulse:    Resp:    Temp:       General: restless, afebrile  HEENT: Trach site dressed  Respiratory: Good air entry  Cardiovascular: S1, S2, no appreciable murmur  Abdomen: Soft, nontender, BS +  MSK: Mittens  Neurologic: Alert and oriented x4, moving all extremities with good strength     Lab Results   Component Value Date     06/04/2022    K 4.1 06/04/2022     03/25/2022    CO2 23 06/04/2022    BUN 22.1 06/04/2022    CREATININE 0.64 (L) 06/04/2022    CALCIUM 8.5 06/04/2022    ANIONGAP 9 03/25/2022    ESTGFRAFRICA >60.0 03/25/2022    EGFRNONAA >60 06/04/2022      Lab Results   Component Value Date    ALT 53 06/04/2022    AST 29 06/04/2022    ALKPHOS 101 06/04/2022    BILITOT 0.7 06/04/2022      Lab Results   Component Value Date    WBC 10.5 06/04/2022    HGB 11.2 (L) 06/04/2022    HCT 35.9 (L) 06/04/2022    MCV 83.9 06/04/2022     06/04/2022      Scheduled Med:   amitriptyline  75 mg Oral QHS    atenoloL  100 mg Oral Daily    atorvastatin  80 mg Oral QHS    bisacodyL  10 mg Rectal Daily    ceFAZolin (ANCEF) IVPB  2 g Intravenous Q8H    [START ON 5/8/2022] enoxaparin  40 mg Subcutaneous Daily    gabapentin  600 mg Oral TID    hydrOXYzine HCL  25 mg Oral TID    LIDOcaine (PF) 10 mg/ml (1%)  1 mL Intradermal Once    lisinopriL  5 mg Oral Daily    metFORMIN  1,000 mg Oral BID WM    methocarbamoL  750 mg Oral TID    senna-docusate 8.6-50 mg  2 tablet Oral BID    terbinafine HCL  250 mg Oral Daily    triamterene-hydrochlorothiazide 37.5-25 mg  2 tablet Oral Daily    Continuous Infusions:   sodium chloride 0.9% 100 mL/hr at 05/07/22 0646    electrolyte-A      PRN Meds:  acetaminophen, acetaminophen, aluminum-magnesium hydroxide-simethicone, calcium carbonate, ceFAZolin 2 g/50mL Dextrose IVPB, dextrose 10%, dextrose 50%, dextrose 50%, glucagon (human recombinant), glucose, glucose, HYDROcodone-acetaminophen,  HYDROcodone-acetaminophen, HYDROcodone-acetaminophen, HYDROmorphone, insulin aspart U-100, methocarbamoL, midazolam, morphine, morphine, ondansetron, ondansetron, prochlorperazine, sodium chloride 0.9%, sodium chloride 0.9%       Assessment/Plan:    Chronic hypoxic respiratory failure status post tracheostomy on 5/10/22  Oropharyngeal dysphagia s/p PEG placment on 5/10/22  Multivessel CAD s/p CABG x 4 on 04/06/2022.  MRSA sternal wound infection and dehiscence, status post wound vacuum-assisted closure device on 04/17/2022, and bilateral pectoral flaps for sternal wound closure on 05/11/2022.  Metabolic encephalopathy- improving  Acute right upper extremity deep venous thrombosis  Anemia chronic disease  Severe protein calorie malnutrition  Hypertension  Seizure disorder   Transaminitis, mild - resolved     Hx of  Schizophrenia, chronic hepatitis C, hypertension, CAD s/p stents    Plan:  - Continue IV vanc and Rifampin  - Trach could not be reinserted. Needs closer monitoring. He is saturating well now. CXR showing congestion. Will get CXR tomorrow  - Plastics following. No new recs. Continue wound care and trach site care  - Will continue current management including keppra, trileptal, Doxepin. Will monitor BP  - PO feeds when apt. SLP on board    No labs for am  apixaban  Famotidine    Negrito Hernandez MD

## 2022-06-04 NOTE — PROGRESS NOTES
PRS  Sleeping this morning, still in restraints  Incisions cdi  Drain ss, not ready for removal yet  No changes to care from my standpoint.  DC planning.

## 2022-06-05 PROCEDURE — 94640 AIRWAY INHALATION TREATMENT: CPT

## 2022-06-05 PROCEDURE — 94761 N-INVAS EAR/PLS OXIMETRY MLT: CPT

## 2022-06-05 PROCEDURE — 25000003 PHARM REV CODE 250: Performed by: HOSPITALIST

## 2022-06-05 PROCEDURE — 25000003 PHARM REV CODE 250

## 2022-06-05 PROCEDURE — 25000003 PHARM REV CODE 250: Performed by: INTERNAL MEDICINE

## 2022-06-05 PROCEDURE — 27000221 HC OXYGEN, UP TO 24 HOURS

## 2022-06-05 PROCEDURE — 25000003 PHARM REV CODE 250: Performed by: STUDENT IN AN ORGANIZED HEALTH CARE EDUCATION/TRAINING PROGRAM

## 2022-06-05 PROCEDURE — 63600175 PHARM REV CODE 636 W HCPCS: Performed by: STUDENT IN AN ORGANIZED HEALTH CARE EDUCATION/TRAINING PROGRAM

## 2022-06-05 PROCEDURE — 20000000 HC ICU ROOM

## 2022-06-05 PROCEDURE — 99900035 HC TECH TIME PER 15 MIN (STAT)

## 2022-06-05 PROCEDURE — 97530 THERAPEUTIC ACTIVITIES: CPT | Mod: CQ

## 2022-06-05 PROCEDURE — A4216 STERILE WATER/SALINE, 10 ML: HCPCS

## 2022-06-05 PROCEDURE — 25000242 PHARM REV CODE 250 ALT 637 W/ HCPCS: Performed by: INTERNAL MEDICINE

## 2022-06-05 RX ADMIN — SODIUM CHLORIDE, PRESERVATIVE FREE 10 ML: 5 INJECTION INTRAVENOUS at 08:06

## 2022-06-05 RX ADMIN — FAMOTIDINE 20 MG: 10 INJECTION, SOLUTION INTRAVENOUS at 09:06

## 2022-06-05 RX ADMIN — VANCOMYCIN HYDROCHLORIDE 1000 MG: 1 INJECTION, POWDER, LYOPHILIZED, FOR SOLUTION INTRAVENOUS at 12:06

## 2022-06-05 RX ADMIN — LISINOPRIL 20 MG: 20 TABLET ORAL at 09:06

## 2022-06-05 RX ADMIN — WHITE PETROLATUM: 1.75 OINTMENT TOPICAL at 09:06

## 2022-06-05 RX ADMIN — WHITE PETROLATUM: 1.75 OINTMENT TOPICAL at 08:06

## 2022-06-05 RX ADMIN — AMLODIPINE BESYLATE 10 MG: 5 TABLET ORAL at 09:06

## 2022-06-05 RX ADMIN — ATORVASTATIN CALCIUM 80 MG: 40 TABLET, FILM COATED ORAL at 09:06

## 2022-06-05 RX ADMIN — LEVALBUTEROL HYDROCHLORIDE 1.25 MG: 1.25 SOLUTION, CONCENTRATE RESPIRATORY (INHALATION) at 12:06

## 2022-06-05 RX ADMIN — HALOPERIDOL 5 MG: 5 TABLET ORAL at 09:06

## 2022-06-05 RX ADMIN — GLYCOPYRROLATE 1 MG: 1 TABLET ORAL at 09:06

## 2022-06-05 RX ADMIN — OXCARBAZEPINE 300 MG: 300 TABLET, FILM COATED ORAL at 09:06

## 2022-06-05 RX ADMIN — GLYCOPYRROLATE 1 MG: 1 TABLET ORAL at 03:06

## 2022-06-05 RX ADMIN — APIXABAN 5 MG: 5 TABLET, FILM COATED ORAL at 09:06

## 2022-06-05 RX ADMIN — LEVALBUTEROL HYDROCHLORIDE 1.25 MG: 1.25 SOLUTION, CONCENTRATE RESPIRATORY (INHALATION) at 08:06

## 2022-06-05 RX ADMIN — LEVETIRACETAM 500 MG: 500 TABLET, FILM COATED ORAL at 08:06

## 2022-06-05 RX ADMIN — BENZTROPINE MESYLATE 1 MG: 1 TABLET ORAL at 09:06

## 2022-06-05 RX ADMIN — HALOPERIDOL 5 MG: 5 TABLET ORAL at 08:06

## 2022-06-05 RX ADMIN — OLANZAPINE 10 MG: 5 TABLET, ORALLY DISINTEGRATING ORAL at 08:06

## 2022-06-05 RX ADMIN — EZETIMIBE 10 MG: 10 TABLET ORAL at 08:06

## 2022-06-05 RX ADMIN — LEVETIRACETAM 500 MG: 500 TABLET, FILM COATED ORAL at 09:06

## 2022-06-05 RX ADMIN — OXCARBAZEPINE 300 MG: 300 TABLET, FILM COATED ORAL at 08:06

## 2022-06-05 RX ADMIN — FLUTICASONE PROPIONATE 100 MCG: 50 SPRAY, METERED NASAL at 09:06

## 2022-06-05 RX ADMIN — SODIUM CHLORIDE, PRESERVATIVE FREE 10 ML: 5 INJECTION INTRAVENOUS at 09:06

## 2022-06-05 RX ADMIN — BENZTROPINE MESYLATE 1 MG: 1 TABLET ORAL at 08:06

## 2022-06-05 RX ADMIN — APIXABAN 5 MG: 5 TABLET, FILM COATED ORAL at 08:06

## 2022-06-05 RX ADMIN — RIFAMPIN 300 MG: 300 CAPSULE ORAL at 09:06

## 2022-06-05 RX ADMIN — LEVALBUTEROL HYDROCHLORIDE 1.25 MG: 1.25 SOLUTION, CONCENTRATE RESPIRATORY (INHALATION) at 04:06

## 2022-06-05 RX ADMIN — METOPROLOL TARTRATE 25 MG: 25 TABLET, FILM COATED ORAL at 09:06

## 2022-06-05 RX ADMIN — POLYETHYLENE GLYCOL 3350 17 G: 17 POWDER, FOR SOLUTION ORAL at 09:06

## 2022-06-05 RX ADMIN — FAMOTIDINE 20 MG: 10 INJECTION, SOLUTION INTRAVENOUS at 08:06

## 2022-06-05 RX ADMIN — DOXEPIN HYDROCHLORIDE 50 MG: 50 CAPSULE ORAL at 08:06

## 2022-06-05 NOTE — PROGRESS NOTES
[facility] Hospital Medicine Progress Note        Chief Complaint: Inpatient Follow-up for sternal wound infection    HPI:   56 y.o. male with a PMHx of chronic hepatitis C, hypertension, seizures, CAD s/p stents who initially presented to [facility] on 4/1/2022 with a primary complaint of chest pain. Of note, he was seen at an outlying ED on 3/20/22 and was dx with MI, subsequently transferred to [facility] where he underwent LHC with angioplasty, and deemed appropriate for CABG, which had been scheduled for 3/29/22, but was cancelled. He then presented to ED on 4/1 with c/o CP earlier in the day. He was admitted to cardiology services, started on a heparin gtt. CV Surgery was consulted and the patient underwent CABG x4 on 4/6/22. He was admitted to ICU post CABG, intubated on mechanical ventilation. Psych was consulted during his stay to evaluate for some recent agitation, restlessness, fidgetiness, insomnia, confusion, and sexual inappropriateness, and he was started on PRN haldol. He was extubated to NC 4L on 4/7, remained restless and confused requiring Precedex gtt.  On 4/10/22 he experienced seizure-like activity and confusion for which neuro was consulted. EEG revealed moderate generalized slowing. CT Head was negative for acute intracranial abnormality, gas noted throughout the bilateral subcutaneous soft tissues.MRI on 4/15/22 revealed small linear focus of acute small vessel ischemia in the right frontal white matter. Developed diffuse subcutaneous emphysema per CXR. He remained agitated with delirium.  Unfortunately,he pulled out his left chest tube on 4/16/22 and he had been thrashing in bed, had to be restrained at times.  He developed an unstable sternum with purulence drainage. CT thorax revealed surgical changes with inflammation, fluid and air locules, minimal anterior pericardial effusion, extensive soft tissue emphysema, and bilateral pleural effusions and  bilateral lower lung lobes collapse consolidations. He has known subcutaneous air, which has been present on previous x-rays.  Cultures positive for MRSA, and he was placed on Vancomycin. He required intubation on 4/16/22. On 4/17/22 he underwent sternal wound debridement with wound vac placement. ID was consulted on 4/22/22 due to sepsis. He was started on rifampin in addition to vancomycin for a planned 6 week course. Respiratory cultures from 4/24/22 returned positive for Proteus and Klebsiella. Remained intubated on mechanical ventilation. GI was consulted on 5/3/22 for PEG placement. Plastic surgery was also following for sternal reconstruction. He underwent tracheostomy and PEG placement on 5/10/22. NIVA done on 5/10/22 noted a RUE DVT. On 5/11/22 he underwent sternal wound debridement, bilateral fasciocutaneous flap advanced closure over sternum and bilateral pectoralis major muscle flap reconstruction of sternum. He was started on full dose Lovenox. He remained on mechanical ventilation until 5/21/22. Tolerating T-piece now on trach collar. He required vasopressors for BP support throughout his stay, which have since been weaned off. He has been weaned off of Precedex. He did have some vomiting on 5/21/22. Lovenox was transitioned to Eliquis on 5/21/22. KUB unrevealing, tube feeds restarted on 5/22/22. He was cleared for downgrade to the floor on 5/22/22 under the hospital medicine service. His trach tube fell of during cough and were ubnable to reinserted. He was stable from respiratory standpoint and close monitoring was continued.     Interval Hx:   No acute events overnight.  Hemodynamically stable except for intermittent acceleration blood pressure and tachypnea.  When seen examined at bedside he was alert, resting in the bed.  Tolerating tube feeds.  Did well with PT.  Stable from respiratory standpoint.  Follow up morning chest x-ray showed slightly improved aeration in the right upper lobe with no  other significant changes.    Objective/physical exam:  Vitals:    06/05/22 0603   BP:    Pulse: 105   Resp: (!) 23   Temp:       General: restless, afebrile  HEENT: Trach site dressed  Respiratory: Good air entry  Cardiovascular: S1, S2, no appreciable murmur  Abdomen: Soft, nontender, BS +  MSK: Mittens  Neurologic: Alert and oriented x4, moving all extremities with good strength     Lab Results   Component Value Date     06/04/2022    K 4.1 06/04/2022     03/25/2022    CO2 23 06/04/2022    BUN 22.1 06/04/2022    CREATININE 0.64 (L) 06/04/2022    CALCIUM 8.5 06/04/2022    ANIONGAP 9 03/25/2022    ESTGFRAFRICA >60.0 03/25/2022    EGFRNONAA >60 06/04/2022      Lab Results   Component Value Date    ALT 53 06/04/2022    AST 29 06/04/2022    ALKPHOS 101 06/04/2022    BILITOT 0.7 06/04/2022      Lab Results   Component Value Date    WBC 10.5 06/04/2022    HGB 11.2 (L) 06/04/2022    HCT 35.9 (L) 06/04/2022    MCV 83.9 06/04/2022     06/04/2022      Scheduled Med:   amitriptyline  75 mg Oral QHS    atenoloL  100 mg Oral Daily    atorvastatin  80 mg Oral QHS    bisacodyL  10 mg Rectal Daily    ceFAZolin (ANCEF) IVPB  2 g Intravenous Q8H    [START ON 5/8/2022] enoxaparin  40 mg Subcutaneous Daily    gabapentin  600 mg Oral TID    hydrOXYzine HCL  25 mg Oral TID    LIDOcaine (PF) 10 mg/ml (1%)  1 mL Intradermal Once    lisinopriL  5 mg Oral Daily    metFORMIN  1,000 mg Oral BID WM    methocarbamoL  750 mg Oral TID    senna-docusate 8.6-50 mg  2 tablet Oral BID    terbinafine HCL  250 mg Oral Daily    triamterene-hydrochlorothiazide 37.5-25 mg  2 tablet Oral Daily    Continuous Infusions:   sodium chloride 0.9% 100 mL/hr at 05/07/22 0646    electrolyte-A      PRN Meds:  acetaminophen, acetaminophen, aluminum-magnesium hydroxide-simethicone, calcium carbonate, ceFAZolin 2 g/50mL Dextrose IVPB, dextrose 10%, dextrose 50%, dextrose 50%, glucagon (human recombinant), glucose, glucose,  HYDROcodone-acetaminophen, HYDROcodone-acetaminophen, HYDROcodone-acetaminophen, HYDROmorphone, insulin aspart U-100, methocarbamoL, midazolam, morphine, morphine, ondansetron, ondansetron, prochlorperazine, sodium chloride 0.9%, sodium chloride 0.9%       Assessment/Plan:    Chronic hypoxic respiratory failure status post tracheostomy on 5/10/22  Oropharyngeal dysphagia s/p PEG placment on 5/10/22  Multivessel CAD s/p CABG x 4 on 04/06/2022.  MRSA sternal wound infection and dehiscence, status post wound vacuum-assisted closure device on 04/17/2022, and bilateral pectoral flaps for sternal wound closure on 05/11/2022.  Metabolic encephalopathy- improving  Acute right upper extremity deep venous thrombosis  Anemia chronic disease  Severe protein calorie malnutrition, hypoalbuminemia  Hypertension  Seizure disorder   Transaminitis, mild - resolved     Hx of  Schizophrenia, chronic hepatitis C, hypertension, CAD s/p stents    Plan:  - Trach fell off and could not be reinserted. Needs closer monitoring. He is saturating well now. CXR showing improved aeration.  Given 1 time Lasix yesterday  - Completing six weeks IV vanc and Rifampin.  Plastic surgery following  -other appropriate home medications were reviewed and renewed.  Remove patent when appropriate.  May benefit from psychiatric evaluation eventually  -tube feeds as tolerated      Negrito Hernandez MD

## 2022-06-05 NOTE — PT/OT/SLP PROGRESS
Physical Therapy Treatment    Patient Name:  Kendall Duff   MRN:  [id number]    Recommendations:     Discharge Recommendations:  nursing facility, skilled   Discharge Equipment Recommendations: walker, rolling   Barriers to discharge:      Assessment:     Kendall Duff is a 56 y.o. male admitted with a medical diagnosis of Acute myocardial infarction of anterolateral wall, s/p CABG.  He presents with the following impairments/functional limitations:  weakness, impaired self care skills, impaired balance, decreased coordination, impaired endurance, impaired functional mobilty, impaired sensation, gait instability, impaired cognition, decreased safety awareness.    Rehab Prognosis: Good; patient would benefit from acute skilled PT services to address these deficits and reach maximum level of function.    Recent Surgery: Procedure(s) (LRB):  CLOSURE, STERNAL INCISION, REPEAT (N/A) 25 Days Post-Op      / 87    Sat: 98% on RA.     Plan:     During this hospitalization, patient to be seen 6 x/week to address the identified rehab impairments via gait training, therapeutic activities, therapeutic exercises and progress toward the following goals:    · Plan of Care Expires:  06/21/22    Subjective     Chief Complaint:   Patient/Family Comments/goals:    Pain/Comfort:  ·        Objective:     Communicated with NSG prior to session.  Patient found HOB elevated with Condom Catheter, blood pressure cuff, pulse ox (continuous), telemetry (multiple ludin drains, RN clamped peg tube) upon PT entry to room.     General Precautions: Standard, fall   Orthopedic Precautions:N/A   Braces: N/A  Respiratory Status: Room air    Roll belt and CJ mittens doff at beginning of session, donned at conclusion of session.      Functional Mobility:    Bed: ModA supine to sit EOB    T/F: Johnathon sit <-> stand from EOB; TCs and VCs to maintain sternal pxn.     Gait: ModA with RW for balance and hand over hand assist with RW management. Pt ambulated  for approximately 40ft. Step through scissoring gait pattern.     Dynamic standing balance activity while standing unsupported for 2 min: ModA for balance.     Patient left HOB elevated with all lines intact and call button in reach..    GOALS:   Multidisciplinary Problems     Physical Therapy Goals        Problem: Physical Therapy    Goal Priority Disciplines Outcome Goal Variances Interventions   Physical Therapy Goal     PT, PT/OT Ongoing, Progressing     Description: Goals to be met by: 22     Patient will increase functional independence with mobility by performin. Supine to sit with Standby Assistance  2. Sit to supine with Standby Assistance  3. Bed to chair transfer with Moderate Assistance using RW  4. Sit to stand with Minimal Assistance using RW  5. Gait x 100 feet with Minimal Assistance using RW                     Time Tracking:     PT Received On:    PT Start Time: 843     PT Stop Time: 906  PT Total Time (min): 23 min     Billable Minutes: Therapeutic Activity 23    Treatment Type: Treatment  PT/PTA: PTA     PTA Visit Number: 2022

## 2022-06-06 PROCEDURE — 99231 PR SUBSEQUENT HOSPITAL CARE,LEVL I: ICD-10-PCS | Mod: ,,, | Performed by: SURGERY

## 2022-06-06 PROCEDURE — 25000003 PHARM REV CODE 250: Performed by: HOSPITALIST

## 2022-06-06 PROCEDURE — 94761 N-INVAS EAR/PLS OXIMETRY MLT: CPT

## 2022-06-06 PROCEDURE — 20000000 HC ICU ROOM

## 2022-06-06 PROCEDURE — 92611 MOTION FLUOROSCOPY/SWALLOW: CPT

## 2022-06-06 PROCEDURE — 25500020 PHARM REV CODE 255: Performed by: STUDENT IN AN ORGANIZED HEALTH CARE EDUCATION/TRAINING PROGRAM

## 2022-06-06 PROCEDURE — 25000003 PHARM REV CODE 250: Performed by: STUDENT IN AN ORGANIZED HEALTH CARE EDUCATION/TRAINING PROGRAM

## 2022-06-06 PROCEDURE — 99900035 HC TECH TIME PER 15 MIN (STAT)

## 2022-06-06 PROCEDURE — 99231 SBSQ HOSP IP/OBS SF/LOW 25: CPT | Mod: ,,, | Performed by: SURGERY

## 2022-06-06 PROCEDURE — 25000003 PHARM REV CODE 250

## 2022-06-06 PROCEDURE — A4216 STERILE WATER/SALINE, 10 ML: HCPCS

## 2022-06-06 PROCEDURE — 99900031 HC PATIENT EDUCATION (STAT)

## 2022-06-06 PROCEDURE — A9698 NON-RAD CONTRAST MATERIALNOC: HCPCS | Performed by: STUDENT IN AN ORGANIZED HEALTH CARE EDUCATION/TRAINING PROGRAM

## 2022-06-06 PROCEDURE — 94640 AIRWAY INHALATION TREATMENT: CPT

## 2022-06-06 PROCEDURE — 25000242 PHARM REV CODE 250 ALT 637 W/ HCPCS: Performed by: INTERNAL MEDICINE

## 2022-06-06 PROCEDURE — 97116 GAIT TRAINING THERAPY: CPT

## 2022-06-06 PROCEDURE — 25000003 PHARM REV CODE 250: Performed by: INTERNAL MEDICINE

## 2022-06-06 PROCEDURE — 97535 SELF CARE MNGMENT TRAINING: CPT | Mod: CO

## 2022-06-06 RX ADMIN — FLUTICASONE PROPIONATE 100 MCG: 50 SPRAY, METERED NASAL at 09:06

## 2022-06-06 RX ADMIN — LEVETIRACETAM 500 MG: 500 TABLET, FILM COATED ORAL at 09:06

## 2022-06-06 RX ADMIN — APIXABAN 5 MG: 5 TABLET, FILM COATED ORAL at 09:06

## 2022-06-06 RX ADMIN — OLANZAPINE 10 MG: 5 TABLET, ORALLY DISINTEGRATING ORAL at 08:06

## 2022-06-06 RX ADMIN — LEVALBUTEROL HYDROCHLORIDE 1.25 MG: 1.25 SOLUTION, CONCENTRATE RESPIRATORY (INHALATION) at 12:06

## 2022-06-06 RX ADMIN — LEVETIRACETAM 500 MG: 500 TABLET, FILM COATED ORAL at 08:06

## 2022-06-06 RX ADMIN — GLYCOPYRROLATE 1 MG: 1 TABLET ORAL at 09:06

## 2022-06-06 RX ADMIN — BENZTROPINE MESYLATE 1 MG: 1 TABLET ORAL at 08:06

## 2022-06-06 RX ADMIN — OXCARBAZEPINE 300 MG: 300 TABLET, FILM COATED ORAL at 08:06

## 2022-06-06 RX ADMIN — EZETIMIBE 10 MG: 10 TABLET ORAL at 08:06

## 2022-06-06 RX ADMIN — BARIUM SULFATE 10 ML: 0.81 POWDER, FOR SUSPENSION ORAL at 11:06

## 2022-06-06 RX ADMIN — SODIUM CHLORIDE, PRESERVATIVE FREE 10 ML: 5 INJECTION INTRAVENOUS at 08:06

## 2022-06-06 RX ADMIN — GLYCOPYRROLATE 1 MG: 1 TABLET ORAL at 03:06

## 2022-06-06 RX ADMIN — WHITE PETROLATUM: 1.75 OINTMENT TOPICAL at 09:06

## 2022-06-06 RX ADMIN — LEVALBUTEROL HYDROCHLORIDE 1.25 MG: 1.25 SOLUTION, CONCENTRATE RESPIRATORY (INHALATION) at 07:06

## 2022-06-06 RX ADMIN — OXCARBAZEPINE 300 MG: 300 TABLET, FILM COATED ORAL at 09:06

## 2022-06-06 RX ADMIN — SODIUM CHLORIDE, PRESERVATIVE FREE 10 ML: 5 INJECTION INTRAVENOUS at 09:06

## 2022-06-06 RX ADMIN — DOXEPIN HYDROCHLORIDE 50 MG: 50 CAPSULE ORAL at 08:06

## 2022-06-06 RX ADMIN — FAMOTIDINE 20 MG: 10 INJECTION, SOLUTION INTRAVENOUS at 09:06

## 2022-06-06 RX ADMIN — OXYCODONE 10 MG: 5 TABLET ORAL at 08:06

## 2022-06-06 RX ADMIN — METOPROLOL TARTRATE 25 MG: 25 TABLET, FILM COATED ORAL at 09:06

## 2022-06-06 RX ADMIN — LISINOPRIL 20 MG: 20 TABLET ORAL at 09:06

## 2022-06-06 RX ADMIN — ATORVASTATIN CALCIUM 80 MG: 40 TABLET, FILM COATED ORAL at 09:06

## 2022-06-06 RX ADMIN — AMLODIPINE BESYLATE 10 MG: 5 TABLET ORAL at 09:06

## 2022-06-06 RX ADMIN — LEVALBUTEROL HYDROCHLORIDE 1.25 MG: 1.25 SOLUTION, CONCENTRATE RESPIRATORY (INHALATION) at 03:06

## 2022-06-06 RX ADMIN — BENZTROPINE MESYLATE 1 MG: 1 TABLET ORAL at 09:06

## 2022-06-06 RX ADMIN — HALOPERIDOL 5 MG: 5 TABLET ORAL at 09:06

## 2022-06-06 RX ADMIN — POLYETHYLENE GLYCOL 3350 17 G: 17 POWDER, FOR SOLUTION ORAL at 09:06

## 2022-06-06 RX ADMIN — APIXABAN 5 MG: 5 TABLET, FILM COATED ORAL at 08:06

## 2022-06-06 RX ADMIN — HALOPERIDOL 5 MG: 5 TABLET ORAL at 08:06

## 2022-06-06 NOTE — PROGRESS NOTES
PRS  Resting  Incisions c/d/i  Drain s/s, not ready for removal  No changes to care from my standpoint  Will follow

## 2022-06-06 NOTE — PT/OT/SLP PROGRESS
"Physical Therapy         Treatment        Kendall Duff   MRN: [id number]     PT Received On: 06/06/22  PT Start Time: 1148     PT Stop Time: 1157    PT Total Time (min): 9 min       Billable Minutes:  Gait Training9  Total Minutes: 9      Treatment Type: Treatment  PT/PTA: PT     PTA Visit Number: 2       General Precautions: Standard, fall, sternal ("arms at sides at all times")  Orthopedic Precautions: Orthopedic Precautions : N/A   Braces: Braces:  (abdominal brace)    Spiritual, Cultural Beliefs, Christian Practices, Values that Affect Care: no    Subjective:  Communicated with NSG prior to session.    Pain/Comfort  Pain Rating 1: 0/10    Objective:  Patient found sitting UIC, with Patient found with: telemetry, blood pressure cuff, restraints (posy vest)    A& O x4 today.     Functional Mobility:    Transfer Training:  Sit to stand:Minimal Assistance with Rolling Walker x1 trial from bedside chair; unsteady upon stand with slight posterior lean    Gait Training:  Patient ambulated approx 45 feet with use of Rolling Walker and Moderate Assistance; scissoring gait pattern, occasionally able to demo normalized step-through pattern; Constant TC/VC for proper RW management, negotiation, and hand placement; unstable throughout      Activity Tolerance:  Patient tolerated PT tx session fairly; stated "I got the don't wants".   Of note, pt able to order real food for the first time and he was very fixated on this.    Patient left up in chair with all lines intact, call button in reach and restraints reapplied at end of session.    Assessment:  Kendall Duff is a 56 y.o. male with a medical diagnosis of Acute myocardial infarction of anterolateral wall.    Rehab potential is excellent.    Activity tolerance: Fair    Discharge recommendations: Discharge Facility/Level of Care Needs: nursing facility, skilled     Equipment recommendations: Equipment Needed After Discharge:  (pending)     GOALS:   Multidisciplinary Problems     " Physical Therapy Goals        Problem: Physical Therapy    Goal Priority Disciplines Outcome Goal Variances Interventions   Physical Therapy Goal     PT, PT/OT Ongoing, Progressing     Description: Goals to be met by: 22     Patient will increase functional independence with mobility by performin. Supine to sit with Standby Assistance  2. Sit to supine with Standby Assistance  3. Bed to chair transfer with Moderate Assistance using RW  4. Sit to stand with Minimal Assistance using RW  5. Gait x 100 feet with Minimal Assistance using RW                     PLAN:    Patient to be seen 6 x/week  to address the above listed problems via gait training, therapeutic activities, therapeutic exercises  Plan of Care expires: 22  Plan of Care reviewed with: patient         2022

## 2022-06-06 NOTE — PT/OT/SLP PROGRESS
Occupational Therapy  Treatment    Kendall Duff   MRN: [id number]   Admitting Diagnosis: Acute myocardial infarction of anterolateral wall    OT Date of Treatment: 06/06/22   OT Start Time: 1009  OT Stop Time: 1036  OT Total Time (min): 27 min     Billable Minutes:  Self Care/Home Management 2  Total Minutes: 27     OT/ALEKSANDR: ALEKSANDR BRANDON Visit Number: 1    General Precautions: Standard, fall  Orthopedic Precautions: N/A  Braces: N/A         Subjective:  Communicated with RN prior to session.  Orientated x 4  Distracted  Alert  Impulsive   BP- 159/110 89 HR    Objective:  Pt. Ambulating to sink using RW for UE support with balance, requiring constant cueing for safety during mobility. Pt. Requiring Mod-Min A for standing balance at sink demonstrating a posterior lean while brushing teeth, with assistance required for setup. Pt. Then t/f to BS chair. Adherence given to sternal pxns with intermittent cueing.  Patient found with: telemetry, blood pressure cuff    Functional Mobility:  Bed Mobility:   Supine to sit: Moderate Assistance   Sit to supine: Moderate Assistance   Rolling: Moderate Assistance   Scooting: Moderate Assistance    Transfer Training:   Sit to stand:Moderate Assistance with Rolling Walker , pt. using cardiac bear for adherence to sternal pxns. B LE slipping requiring cueing to bend knees.    Grooming:  Patient performed oral hygeine with Supervision or Set-up Assistance at standing at sink.    Balance:   Static Sit: SBA  Dynamic Sit:  SBA  Static Stand: Mod A  Dynamic stand: Mod A due to posterior lean    Patient left up in chair with all lines intact and call button in reach, with posey vest.    ASSESSMENT:  Kendall Duff is a 56 y.o. male with a medical diagnosis of Acute myocardial infarction of anterolateral wall. Pt. Tolerated session well overall requiring cueing for safety throughout session. Continue POC    Activity tolerance: Fair    Discharge recommendations: nursing facility, skilled      Equipment recommendations: walker, rolling     GOALS:   Multidisciplinary Problems     Occupational Therapy Goals        Problem: Occupational Therapy    Goal Priority Disciplines Outcome Interventions   Occupational Therapy Goal     OT, PT/OT Ongoing, Progressing    Description: Goals to be met by: 6/21/22     Patient will increase functional independence with ADLs by performing:    Feeding with Moderate Assistance. When appropriate to initiate, or simulated hand to mouth.   UE Dressing with Moderate Assistance.  Grooming while long sitting/EOB/supported with Moderate Assistance.  Sitting at edge of bed x10-15 minutes with Moderate Assistance.  Toilet transfer to bedside commode with Moderate Assistance.                     Plan:  Patient to be seen 5 x/week to address the above listed problems via self-care/home management, therapeutic activities, therapeutic exercises  Plan of Care expires: 06/30/22  Plan of Care reviewed with: patient         06/06/2022

## 2022-06-06 NOTE — PROGRESS NOTES
[facility] Hospital Medicine Progress Note        Chief Complaint: Inpatient Follow-up for sternal wound infection    HPI:   56 y.o. male with a history that includes chronic hepatitis C, hypertension, seizures, CAD s/p stents, initially presented to [facility] on 4/1/2022 with a primary complaint of chest pain. Of note, he was seen at an outlying ED on 3/20/22 and was dx with MI, subsequently transferred to [facility] where he underwent LHC with angioplasty, and deemed appropriate for CABG, which had been scheduled for 3/29/22, but was cancelled. He then presented to ED on 4/1 with c/o CP earlier in the day. He was admitted to cardiology services, started on a heparin gtt. CV Surgery was consulted and the patient underwent CABG x4 on 4/6/22. He was admitted to ICU post CABG, intubated on mechanical ventilation. Psych was consulted during his stay to evaluate for some recent agitation, restlessness, fidgetiness, insomnia, confusion, and sexual inappropriateness, and he was started on PRN haldol. He was extubated to NC 4L on 4/7, remained restless and confused requiring Precedex gtt.  On 4/10/22 he experienced seizure-like activity and confusion for which neuro was consulted. EEG revealed moderate generalized slowing. CT Head was negative for acute intracranial abnormality, gas noted throughout the bilateral subcutaneous soft tissues.MRI on 4/15/22 revealed small linear focus of acute small vessel ischemia in the right frontal white matter. Developed diffuse subcutaneous emphysema per CXR. He remained agitated with delirium.  Unfortunately,he pulled out his left chest tube on 4/16/22 and he had been thrashing in bed, had to be restrained at times.  He developed an unstable sternum with purulence drainage. CT thorax revealed surgical changes with inflammation, fluid and air locules, minimal anterior pericardial effusion, extensive soft tissue emphysema, and bilateral pleural  effusions and bilateral lower lung lobes collapse consolidations. He has known subcutaneous air, which has been present on previous x-rays.  Cultures positive for MRSA, and he was placed on Vancomycin. He required intubation on 4/16/22. On 4/17/22 he underwent sternal wound debridement with wound vac placement. ID was consulted on 4/22/22 due to sepsis. He was started on rifampin in addition to vancomycin for a planned 6 week course. Respiratory cultures from 4/24/22 returned positive for Proteus and Klebsiella. Remained intubated on mechanical ventilation. GI was consulted on 5/3/22 for PEG placement. Plastic surgery was also following for sternal reconstruction. He underwent tracheostomy and PEG placement on 5/10/22. NIVA done on 5/10/22 noted a RUE DVT. On 5/11/22 he underwent sternal wound debridement, bilateral fasciocutaneous flap advanced closure over sternum and bilateral pectoralis major muscle flap reconstruction of sternum. He was started on full dose Lovenox. He remained on mechanical ventilation until 5/21/22. Tolerating T-piece now on trach collar. He required vasopressors for BP support throughout his stay, which have since been weaned off. He has been weaned off of Precedex. He did have some vomiting on 5/21/22. Lovenox was transitioned to Eliquis on 5/21/22. KUB unrevealing, tube feeds restarted on 5/22/22. He was cleared for downgrade to the floor on 5/22/22 under the hospital medicine service. His trach tube fell of during cough and were ubnable to reinserted. He was stable from respiratory standpoint and close monitoring was continued.     Interval Hx:   Patient seen and examined at bedside, and chart reviewed.  Nursing notes no new issues; he has no new complaints.  No respiratory issues, oxygenating well on room air.  He tolerating tube feeds at goal and continues to work with therapy services.      Objective/physical exam:  Vitals:    06/06/22 0911   BP: (!) 127/95   Pulse: 98   Resp:    Temp:        General: awake, in NAD  HEENT: Trach removed, site dressed, but appears clean and dry  Respiratory: Good air movement B/L  Cardiovascular: RRR  Abdomen: Soft, nontender, BS +  Neurologic: Awake, alert and mostly oriented, no focal neurological deficit  Psych: Cooperative, appropriate mood and affect    Lab Results   Component Value Date     06/04/2022    K 4.1 06/04/2022     03/25/2022    CO2 23 06/04/2022    BUN 22.1 06/04/2022    CREATININE 0.64 (L) 06/04/2022    CALCIUM 8.5 06/04/2022    ANIONGAP 9 03/25/2022    ESTGFRAFRICA >60.0 03/25/2022    EGFRNONAA >60 06/04/2022      Lab Results   Component Value Date    ALT 53 06/04/2022    AST 29 06/04/2022    ALKPHOS 101 06/04/2022    BILITOT 0.7 06/04/2022      Lab Results   Component Value Date    WBC 10.5 06/04/2022    HGB 11.2 (L) 06/04/2022    HCT 35.9 (L) 06/04/2022    MCV 83.9 06/04/2022     06/04/2022      Scheduled Med:   amitriptyline  75 mg Oral QHS    atenoloL  100 mg Oral Daily    atorvastatin  80 mg Oral QHS    bisacodyL  10 mg Rectal Daily    ceFAZolin (ANCEF) IVPB  2 g Intravenous Q8H    [START ON 5/8/2022] enoxaparin  40 mg Subcutaneous Daily    gabapentin  600 mg Oral TID    hydrOXYzine HCL  25 mg Oral TID    LIDOcaine (PF) 10 mg/ml (1%)  1 mL Intradermal Once    lisinopriL  5 mg Oral Daily    metFORMIN  1,000 mg Oral BID WM    methocarbamoL  750 mg Oral TID    senna-docusate 8.6-50 mg  2 tablet Oral BID    terbinafine HCL  250 mg Oral Daily    triamterene-hydrochlorothiazide 37.5-25 mg  2 tablet Oral Daily    Continuous Infusions:   sodium chloride 0.9% 100 mL/hr at 05/07/22 0646    electrolyte-A      PRN Meds:  acetaminophen, acetaminophen, aluminum-magnesium hydroxide-simethicone, calcium carbonate, ceFAZolin 2 g/50mL Dextrose IVPB, dextrose 10%, dextrose 50%, dextrose 50%, glucagon (human recombinant), glucose, glucose, HYDROcodone-acetaminophen, HYDROcodone-acetaminophen, HYDROcodone-acetaminophen,  HYDROmorphone, insulin aspart U-100, methocarbamoL, midazolam, morphine, morphine, ondansetron, ondansetron, prochlorperazine, sodium chloride 0.9%, sodium chloride 0.9%       Assessment/Plan:    Chronic hypoxic respiratory failure status post tracheostomy on 5/10/22  Oropharyngeal dysphagia s/p PEG placment on 5/10/22  Multivessel CAD s/p CABG x 4 on 04/06/2022.  MRSA sternal wound infection and dehiscence, status post wound vacuum-assisted closure device on 04/17/2022, and bilateral pectoral flaps for sternal wound closure on 05/11/2022.  Metabolic encephalopathy- improving  Acute right upper extremity deep venous thrombosis  Anemia chronic disease  Severe protein calorie malnutrition, hypoalbuminemia  Hypertension  Seizure disorder   Transaminitis, mild - resolved     Hx of  Schizophrenia, chronic hepatitis C, hypertension, CAD s/p stents    Plan:  - Continue with IV vanc and oral Rifampin as planned x 6 weeks.    - appreciate Plastic surgery's assistance in wound management  -otherwise continue current management and monitoring  -continue tube feeds at goal, possible repeat MBS as indicated        Cordell Robbins MD  [facility] Medicine  06/06/2022

## 2022-06-06 NOTE — PLAN OF CARE
Problem: SLP  Goal: SLP Goal  Description: LTG1: Tolerate speaking valve during all waking hours with no signs/sx respiratory distress/compromise - discontinue  STG: tolerate speaking valve x1 hour with no signs/sx respiratory distress/compromise - discontinue    LTG2: Tolerate least restrictive PO diet with no clinical signs/sx aspiration - progressing  STG2a: Tolerate thin liquids by cup with no clinical signs/sx aspiration - progressing  STG2b: Tolerate puree solids with no clinical signs/sx aspiration. - progressing  Outcome: Ongoing, Progressing

## 2022-06-06 NOTE — PROCEDURES
"Speech Language Pathology Department  Modified Barium Swallow Study    Patient Name:  Kendall Duff   MRN:  [id number]  Diagnosis: Acute myocardial infarction of anterolateral wall     Recommendations:     Recommendations:                General Recommendations:  Speech language evaluation  Diet recommendations:  Easy to Chew Diet - IDDSI Level 7, Liquid Diet Level: Thin liquids - IDDSI Level 0   Swallow Strategies/Precautions: small bites/sips, slow rate, alternate solids/liquids and medication whole in puree  General Precautions: Standard, aspiration    History:     A Modified Barium Swallow Study was completed to assess the efficiency of his/her swallow function, rule out aspiration and make recommendations regarding safe dietary consistencies, effective compensatory strategies, and safe eating environment.     Past Medical History:   Diagnosis Date    Bipolar disorder, unspecified     Chronic hepatitis C     Hypertension     Obesity, unspecified     Seizures     Stroke        Home Diet: Regular and thin liquids  Current Method of Nutrition: Tube feeding (PEG)    Patient complaint: "I'm hungry"    Subjective:     Patient awake, alert and oriented x4.    Fluoroscopic Results:     Current Respiratory Status: 06/06/22    Oral Musculature Evaluation:   Oral Musculature: WFL   Dentition: scattered dentition   Secretion Management: adequate   Mucosal Quality: good   Volitional Cough: weak   Voice Prior to PO Intake: hoarse    Visualization  · Patient was seen in the lateral view    Oral Phase:    Prolonged/disorganized bolus formation   Prolonged mastication   Piecemeal deglutition   Tongue pumping   Reduced bolus control    Pharyngeal Phase:     Consistency Laryngeal Penetration Aspiration   Mildly thick liquid by cup None None   Puree None None   Thin liquid by cup None None   Thin liquid by straw None None   Chewable solid None None      Swallow delay with spill to the valleculae   Adequate epiglottic " deflection   Adequate hyolaryngeal excursion   Adequate airway protection    Cervical Esophageal Phase:    UES appeared to accommodate all bolus types without stasis or retrograde movement visualized    Assessment:     Pt presents with mild oropharyngeal dysphagia with no laryngeal penetration or aspiration visualized during this study.    Goals:   Multidisciplinary Problems     SLP Goals        Problem: SLP    Goal Priority Disciplines Outcome   SLP Goal     SLP Ongoing, Progressing   Description: LTG1: Tolerate speaking valve during all waking hours with no signs/sx respiratory distress/compromise - discontinue  STG: tolerate speaking valve x1 hour with no signs/sx respiratory distress/compromise - discontinue    LTG2: Tolerate least restrictive PO diet with no clinical signs/sx aspiration - progressing  STG2a: Tolerate thin liquids by cup with no clinical signs/sx aspiration - progressing  STG2b: Tolerate puree solids with no clinical signs/sx aspiration. - progressing                   Plan:     · Patient to be seen:  5 x/week   · Plan of Care expires:  06/21/22  · Plan of Care reviewed with:  patient   · SLP Follow-Up:  Yes      Time Tracking:   SLP Treatment Date:   06/06/22  Speech Start Time:  1100  Speech Stop Time:  1125     Speech Total Time (min):  25 min    Billable minutes: Motion Fluoroscopic Evaluation, Video Recording, 25 minutes       06/06/2022

## 2022-06-07 PROCEDURE — 25000003 PHARM REV CODE 250: Performed by: HOSPITALIST

## 2022-06-07 PROCEDURE — 97530 THERAPEUTIC ACTIVITIES: CPT | Mod: CO

## 2022-06-07 PROCEDURE — 25000003 PHARM REV CODE 250: Performed by: STUDENT IN AN ORGANIZED HEALTH CARE EDUCATION/TRAINING PROGRAM

## 2022-06-07 PROCEDURE — 94640 AIRWAY INHALATION TREATMENT: CPT

## 2022-06-07 PROCEDURE — A4216 STERILE WATER/SALINE, 10 ML: HCPCS

## 2022-06-07 PROCEDURE — 25000003 PHARM REV CODE 250

## 2022-06-07 PROCEDURE — 25000242 PHARM REV CODE 250 ALT 637 W/ HCPCS: Performed by: INTERNAL MEDICINE

## 2022-06-07 PROCEDURE — 99900035 HC TECH TIME PER 15 MIN (STAT)

## 2022-06-07 PROCEDURE — 20000000 HC ICU ROOM

## 2022-06-07 PROCEDURE — 97530 THERAPEUTIC ACTIVITIES: CPT

## 2022-06-07 PROCEDURE — 25000003 PHARM REV CODE 250: Performed by: INTERNAL MEDICINE

## 2022-06-07 PROCEDURE — 63600175 PHARM REV CODE 636 W HCPCS

## 2022-06-07 PROCEDURE — 94761 N-INVAS EAR/PLS OXIMETRY MLT: CPT

## 2022-06-07 PROCEDURE — 99900022

## 2022-06-07 RX ADMIN — LEVALBUTEROL HYDROCHLORIDE 1.25 MG: 1.25 SOLUTION, CONCENTRATE RESPIRATORY (INHALATION) at 09:06

## 2022-06-07 RX ADMIN — FENTANYL CITRATE 100 MCG: 50 INJECTION INTRAMUSCULAR; INTRAVENOUS at 06:06

## 2022-06-07 RX ADMIN — OXCARBAZEPINE 300 MG: 300 TABLET, FILM COATED ORAL at 08:06

## 2022-06-07 RX ADMIN — APIXABAN 5 MG: 5 TABLET, FILM COATED ORAL at 08:06

## 2022-06-07 RX ADMIN — LEVALBUTEROL HYDROCHLORIDE 1.25 MG: 1.25 SOLUTION, CONCENTRATE RESPIRATORY (INHALATION) at 12:06

## 2022-06-07 RX ADMIN — EZETIMIBE 10 MG: 10 TABLET ORAL at 09:06

## 2022-06-07 RX ADMIN — HALOPERIDOL 5 MG: 5 TABLET ORAL at 09:06

## 2022-06-07 RX ADMIN — BENZTROPINE MESYLATE 1 MG: 1 TABLET ORAL at 09:06

## 2022-06-07 RX ADMIN — FAMOTIDINE 20 MG: 10 INJECTION, SOLUTION INTRAVENOUS at 09:06

## 2022-06-07 RX ADMIN — ATORVASTATIN CALCIUM 80 MG: 40 TABLET, FILM COATED ORAL at 08:06

## 2022-06-07 RX ADMIN — LISINOPRIL 20 MG: 20 TABLET ORAL at 08:06

## 2022-06-07 RX ADMIN — AMLODIPINE BESYLATE 10 MG: 5 TABLET ORAL at 08:06

## 2022-06-07 RX ADMIN — GLYCOPYRROLATE 1 MG: 1 TABLET ORAL at 08:06

## 2022-06-07 RX ADMIN — FAMOTIDINE 20 MG: 10 INJECTION, SOLUTION INTRAVENOUS at 08:06

## 2022-06-07 RX ADMIN — BENZTROPINE MESYLATE 1 MG: 1 TABLET ORAL at 08:06

## 2022-06-07 RX ADMIN — APIXABAN 5 MG: 5 TABLET, FILM COATED ORAL at 09:06

## 2022-06-07 RX ADMIN — LEVETIRACETAM 500 MG: 500 TABLET, FILM COATED ORAL at 09:06

## 2022-06-07 RX ADMIN — WHITE PETROLATUM: 1.75 OINTMENT TOPICAL at 09:06

## 2022-06-07 RX ADMIN — ZIPRASIDONE MESYLATE 20 MG: 20 INJECTION, POWDER, LYOPHILIZED, FOR SOLUTION INTRAMUSCULAR at 02:06

## 2022-06-07 RX ADMIN — ZIPRASIDONE MESYLATE 20 MG: 20 INJECTION, POWDER, LYOPHILIZED, FOR SOLUTION INTRAMUSCULAR at 04:06

## 2022-06-07 RX ADMIN — METOPROLOL TARTRATE 25 MG: 25 TABLET, FILM COATED ORAL at 08:06

## 2022-06-07 RX ADMIN — LEVALBUTEROL HYDROCHLORIDE 1.25 MG: 1.25 SOLUTION, CONCENTRATE RESPIRATORY (INHALATION) at 04:06

## 2022-06-07 RX ADMIN — GLYCOPYRROLATE 1 MG: 1 TABLET ORAL at 09:06

## 2022-06-07 RX ADMIN — DOXEPIN HYDROCHLORIDE 50 MG: 50 CAPSULE ORAL at 09:06

## 2022-06-07 RX ADMIN — HALOPERIDOL 5 MG: 5 TABLET ORAL at 08:06

## 2022-06-07 RX ADMIN — OXCARBAZEPINE 300 MG: 300 TABLET, FILM COATED ORAL at 09:06

## 2022-06-07 RX ADMIN — OXYCODONE 10 MG: 5 TABLET ORAL at 04:06

## 2022-06-07 RX ADMIN — SODIUM CHLORIDE, PRESERVATIVE FREE 10 ML: 5 INJECTION INTRAVENOUS at 09:06

## 2022-06-07 RX ADMIN — LEVETIRACETAM 500 MG: 500 TABLET, FILM COATED ORAL at 08:06

## 2022-06-07 RX ADMIN — OLANZAPINE 10 MG: 5 TABLET, ORALLY DISINTEGRATING ORAL at 09:06

## 2022-06-07 RX ADMIN — FAMOTIDINE 20 MG: 10 INJECTION, SOLUTION INTRAVENOUS at 05:06

## 2022-06-07 NOTE — PT/OT/SLP PROGRESS
Followed up with nursing regarding diet tolerance.  No difficulty reported.  Attempted to see pt for re-evaluation of speech/language/cognitive, however pt sleepy following morning medications and unable to fully participate.  SLP to reattempt as schedule allows.

## 2022-06-07 NOTE — PT/OT/SLP PROGRESS
Physical Therapy         Treatment        Kendall Duff   MRN: [id number]     PT Received On: 06/07/22  PT Start Time: 1540     PT Stop Time: 1557    PT Total Time (min): 17 min       Billable Minutes:  Therapeutic Activity 17  Total Minutes: 17    Treatment Type: Treatment  PT/PTA: PT     PTA Visit Number: 3       General Precautions: Standard, fall, sternal  Orthopedic Precautions: Orthopedic Precautions : N/A   Braces: Braces: N/A    Spiritual, Cultural Beliefs, Yazidism Practices, Values that Affect Care: no    Subjective:  Communicated with NSG prior to session.    Pain/Comfort  Pain Rating 1: 0/10    Objective:  Patient found sitting UIC, with: blood pressure cuff, telemetry, Condom Catheter, posy vest    Functional Mobility:    Transfer Training:   Sit to stand:Minimal Assistance with Rolling Walker x4 trials throughout session; unsteady and impulsive requiring cues for safety      Gait Training:   Patient ambulated approx 70 feet with use of RW and modA; demo'd instability and inconsistent gait pattern; maximal TC/VC for proper RW management and  hand placement (hand over hand at times)    Additional Treatment:  Pt performed cone taps x5 reps x2 trials on each LE with a seated rest break in btw trials; demo'd decreased coordination/proprioception in B LEs; required cuing for proper performance and increased hip/knee flex to clear; frequently cued to look down to see cone before tapping 2/2 pt being distracted    Activity Tolerance:  Patient tolerated treatment well although appeared more confused today. Frequent redirection required throughout 2/2 impulsivity and distraction     Patient left up in chair with all lines intact, call button in reach, restraints reapplied at end of session and RN notified.    Assessment:  Kendall Duff is a 56 y.o. male with a medical diagnosis of Acute myocardial infarction of anterolateral wall.     Rehab potential is good.    Activity tolerance: Good    Discharge recommendations:  Discharge Facility/Level of Care Needs: nursing facility, skilled     Equipment recommendations: Equipment Needed After Discharge:  (pending)     GOALS:   Multidisciplinary Problems     Physical Therapy Goals        Problem: Physical Therapy    Goal Priority Disciplines Outcome Goal Variances Interventions   Physical Therapy Goal     PT, PT/OT Ongoing, Progressing     Description: Goals to be met by: 22     Patient will increase functional independence with mobility by performin. Supine to sit with Standby Assistance  2. Sit to supine with Standby Assistance  3. Bed to chair transfer with Moderate Assistance using RW  4. Sit to stand with Minimal Assistance using RW  5. Gait x 100 feet with Minimal Assistance using RW                     PLAN:    Patient to be seen 6 x/week  to address the above listed problems via gait training, therapeutic activities, therapeutic exercises, neuromuscular re-education  Plan of Care expires: 22  Plan of Care reviewed with: patient         2022

## 2022-06-07 NOTE — PLAN OF CARE
Problem: Adult Inpatient Plan of Care  Goal: Plan of Care Review  Outcome: Ongoing, Progressing  Goal: Patient-Specific Goal (Individualized)  Outcome: Ongoing, Progressing  Goal: Absence of Hospital-Acquired Illness or Injury  Outcome: Ongoing, Progressing  Goal: Optimal Comfort and Wellbeing  Outcome: Ongoing, Progressing  Goal: Readiness for Transition of Care  Outcome: Ongoing, Progressing     Problem: Infection  Goal: Absence of Infection Signs and Symptoms  Outcome: Ongoing, Progressing     Problem: Communication Impairment (Mechanical Ventilation, Invasive)  Goal: Effective Communication  Outcome: Ongoing, Progressing     Problem: Device-Related Complication Risk (Mechanical Ventilation, Invasive)  Goal: Optimal Device Function  Outcome: Ongoing, Progressing     Problem: Nutrition Impairment (Mechanical Ventilation, Invasive)  Goal: Optimal Nutrition Delivery  Outcome: Ongoing, Progressing     Problem: Skin and Tissue Injury (Mechanical Ventilation, Invasive)  Goal: Absence of Device-Related Skin and Tissue Injury  Outcome: Ongoing, Progressing     Problem: Ventilator-Induced Lung Injury (Mechanical Ventilation, Invasive)  Goal: Absence of Ventilator-Induced Lung Injury  Outcome: Ongoing, Progressing     Problem: Communication Impairment (Artificial Airway)  Goal: Effective Communication  Outcome: Ongoing, Progressing     Problem: Skin and Tissue Injury (Artificial Airway)  Goal: Absence of Device-Related Skin or Tissue Injury  Outcome: Ongoing, Progressing     Problem: Fall Injury Risk  Goal: Absence of Fall and Fall-Related Injury  Outcome: Ongoing, Progressing     Problem: Skin Injury Risk Increased  Goal: Skin Health and Integrity  Outcome: Ongoing, Progressing     Problem: Impaired Wound Healing  Goal: Optimal Wound Healing  Outcome: Ongoing, Progressing     Problem: Adjustment to Illness (Delirium)  Goal: Optimal Coping  Outcome: Ongoing, Progressing     Problem: Altered Behavior (Delirium)  Goal:  Improved Behavioral Control  Outcome: Ongoing, Progressing     Problem: Attention and Thought Clarity Impairment (Delirium)  Goal: Improved Attention and Thought Clarity  Outcome: Ongoing, Progressing     Problem: Sleep Disturbance (Delirium)  Goal: Improved Sleep  Outcome: Ongoing, Progressing

## 2022-06-07 NOTE — PLAN OF CARE
Per alejandro with [facility] pt will need to have roll belt off for 24 hours, notified nurse Gonzales of message

## 2022-06-07 NOTE — PROGRESS NOTES
[facility] Hospital Medicine Progress Note        Chief Complaint: Inpatient Follow-up for sternal wound infection    HPI:   56 y.o. male with a history that includes chronic hepatitis C, hypertension, seizures, CAD s/p stents, initially presented to [facility] on 4/1/2022 with a primary complaint of chest pain. Of note, he was seen at an outlying ED on 3/20/22 and was dx with MI, subsequently transferred to [facility] where he underwent LHC with angioplasty, and deemed appropriate for CABG, which had been scheduled for 3/29/22, but was cancelled. He then presented to ED on 4/1 with c/o CP earlier in the day. He was admitted to cardiology services, started on a heparin gtt. CV Surgery was consulted and the patient underwent CABG x4 on 4/6/22. He was admitted to ICU post CABG, intubated on mechanical ventilation. Psych was consulted during his stay to evaluate for some recent agitation, restlessness, fidgetiness, insomnia, confusion, and sexual inappropriateness, and he was started on PRN haldol. He was extubated to NC 4L on 4/7, remained restless and confused requiring Precedex gtt.  On 4/10/22 he experienced seizure-like activity and confusion for which neuro was consulted. EEG revealed moderate generalized slowing. CT Head was negative for acute intracranial abnormality, gas noted throughout the bilateral subcutaneous soft tissues.MRI on 4/15/22 revealed small linear focus of acute small vessel ischemia in the right frontal white matter. Developed diffuse subcutaneous emphysema per CXR. He remained agitated with delirium.  Unfortunately,he pulled out his left chest tube on 4/16/22 and he had been thrashing in bed, had to be restrained at times.  He developed an unstable sternum with purulence drainage. CT thorax revealed surgical changes with inflammation, fluid and air locules, minimal anterior pericardial effusion, extensive soft tissue emphysema, and bilateral pleural  effusions and bilateral lower lung lobes collapse consolidations. He has known subcutaneous air, which has been present on previous x-rays.  Cultures positive for MRSA, and he was placed on Vancomycin. He required intubation on 4/16/22. On 4/17/22 he underwent sternal wound debridement with wound vac placement. ID was consulted on 4/22/22 due to sepsis. He was started on rifampin in addition to vancomycin for a planned 6 week course. Respiratory cultures from 4/24/22 returned positive for Proteus and Klebsiella. Remained intubated on mechanical ventilation. GI was consulted on 5/3/22 for PEG placement. Plastic surgery was also following for sternal reconstruction. He underwent tracheostomy and PEG placement on 5/10/22. NIVA done on 5/10/22 noted a RUE DVT. On 5/11/22 he underwent sternal wound debridement, bilateral fasciocutaneous flap advanced closure over sternum and bilateral pectoralis major muscle flap reconstruction of sternum. He was started on full dose Lovenox. He remained on mechanical ventilation until 5/21/22. Tolerating T-piece now on trach collar. He required vasopressors for BP support throughout his stay, which have since been weaned off. He has been weaned off of Precedex. He did have some vomiting on 5/21/22. Lovenox was transitioned to Eliquis on 5/21/22. KUB unrevealing, tube feeds restarted on 5/22/22. He was cleared for downgrade to the floor on 5/22/22 under the hospital medicine service. His trach tube fell of during cough and were ubnable to reinserted. He was stable from respiratory standpoint and close monitoring was continued.        Interval Hx:  Patient doing well.  For stoma seen him in about a week.  He is definitely more alert now.  Able to tell me his name.  Clear voice.  Denies any current complaints.  Still confused though.    Objective/physical exam:  General: In no acute distress, afebrile  Chest: Clear to auscultation bilaterally, KELLIE drain x5  Heart: RRR, +S1, S2, no  appreciable murmur  Abdomen: Soft, nontender, BS +  MSK: Warm, no lower extremity edema, no clubbing or cyanosis  Neurologic: Alert and oriented to person, Cranial nerve II-XII intact, Strength 5/5 in all 4 extremities    VITAL SIGNS: 24 HRS MIN & MAX LAST   Temp  Min: 98.6 °F (37 °C)  Max: 98.8 °F (37.1 °C) 98.8 °F (37.1 °C)   BP  Min: 119/82  Max: 165/79 (!) 163/70   Pulse  Min: 72  Max: 119  98   Resp  Min: 6  Max: 34 (!) 22   SpO2  Min: 95 %  Max: 97 % 97 %       Recent Labs   Lab 06/01/22  0307 06/04/22  0240   WBC 10.3 10.5   RBC 3.94* 4.28*   HGB 10.3* 11.2*   HCT 33.3* 35.9*   MCV 84.5 83.9   MCH 26.1* 26.2*   MCHC 30.9* 31.2*   RDW 18.4* 17.8*    354   MPV 10.9 10.0       Recent Labs   Lab 06/01/22  0307 06/04/22  0240    136   K 4.5 4.1   CO2 23 23   BUN 24.2 22.1   CREATININE 0.67* 0.64*   CALCIUM 8.4 8.5   MG 1.90 1.90   ALBUMIN  --  2.1*   ALKPHOS  --  101   ALT  --  53   AST  --  29   BILITOT  --  0.7          Microbiology Results (last 7 days)     ** No results found for the last 168 hours. **           See below for Radiology    Scheduled Med:   amLODIPine  10 mg Oral Daily    apixaban  5 mg Oral BID    atorvastatin  80 mg Oral Daily    benztropine  1 mg Per OG tube BID    doxepin  50 mg Oral QHS    ezetimibe  10 mg Oral QHS    famotidine (PF)  20 mg Intravenous BID    fluticasone propionate  2 spray Each Nostril Daily    glycopyrrolate  1 mg Per NG tube TID    haloperidoL  5 mg Oral BID    levalbuterol  1.25 mg Nebulization Q8H    levETIRAcetam  500 mg Oral BID    lisinopriL  20 mg Oral Daily    metoprolol tartrate  25 mg Per OG tube Daily    olanzapine zydis  10 mg Oral QHS    OXcarbazepine  300 mg Per OG tube BID    polyethylene glycol  17 g Oral Daily    sodium chloride 0.9%  10 mL Intravenous Q12H    white petrolatum   Topical (Top) BID        Continuous Infusions:       PRN Meds:  sodium chloride, acetaminophen, albuterol-ipratropium, bisacodyL, dextrose 10%,  fentaNYL, fentaNYL, hydrALAZINE, lorazepam, metoprolol, morphine, morphine, nitroGLYCERIN, OLANZapine, ondansetron, oxyCODONE, oxyCODONE, potassium chloride in water, potassium chloride in water, potassium chloride in water, promethazine, sodium chloride 0.9%, ziprasidone       Assessment/Plan:   Chronic hypoxic respiratory failure status post tracheostomy on 5/10/22  Oropharyngeal dysphagia s/p PEG placment on 5/10/22  Multivessel CAD s/p CABG x 4 on 04/06/2022.  MRSA sternal wound infection and dehiscence, status post wound vacuum-assisted closure device on 04/17/2022, and bilateral pectoral flaps for sternal wound closure on 05/11/2022.  Metabolic encephalopathy- improving  Acute right upper extremity deep venous thrombosis  Anemia chronic disease  Severe protein calorie malnutrition, hypoalbuminemia  Hypertension  Seizure disorder   Transaminitis, mild - resolved     Hx of  Schizophrenia, chronic hepatitis C, hypertension, CAD s/p stents    Patient remains on vancomycin rifampin per ID recommendations.  Plastic surgery following along for drain management.  Patient remains at all 5 KELLIE drains.  Monitoring his output.  Continues to work well with PT OT and speech therapy.  Considering repeat MBS in the near future.  In the meantime he is tolerating tube feeds which are at goal.  Vital signs stable.  Monitor blood work every 3-5 days or more often if medically necessary.      Patient condition:  Stable    Anticipated discharge and Disposition: TBD      All diagnosis and differential diagnosis have been reviewed; assessment and plan has been documented; I have personally reviewed the labs and test results that are presently available; I have reviewed the patients medication list; I have reviewed the consulting providers response and recommendations. I have reviewed or attempted to review medical records based upon their availability    All of the patient's questions have been  addressed and answered. Patient's is  agreeable to the above stated plan. I will continue to monitor closely and make adjustments to medical management as needed.  _____________________________________________________________________      Radiology:  Fl Modified Barium Swallow Speech  See Speech Therapist Notes    This procedure was auto-finalized.      Anthony Lewis MD   06/07/2022

## 2022-06-07 NOTE — PT/OT/SLP PROGRESS
Occupational Therapy  Treatment    Kendall Duff   MRN: [id number]   Admitting Diagnosis: Acute myocardial infarction of anterolateral wall    OT Date of Treatment: 06/07/22   OT Start Time: 1430  OT Stop Time: 1444  OT Total Time (min): 14 min     Billable Minutes:  Therapeutic Activity 1  Total Minutes: 14     OT/ALEKSANDR: ALEKSANDR     ALEKSANDR Visit Number: 2    General Precautions: Standard, fall  Orthopedic Precautions: N/A  Braces:           Subjective:  Communicated with RN prior to session.  Distracted  Impulsive  Restless  94 HR      Objective:  Pt. Sitting EOB with SBA for safety with balance. Pt. Performing grooming task (oral hygiene), on command appropriately. Pt. Distracted at times however redirection provided. Pt. Performing stand step t/f to BS chair using RW for UE support with balance. Pt. Left in chair with posey vest.    Functional Mobility:  Bed Mobility:   Supine to sit: Moderate Assistance   Sit to supine: Moderate Assistance   Rolling: Moderate Assistance   Scooting: Minimal Assistance    Transfer Training:   Sit to stand:Minimal Assistance with Rolling Walker .    Patient left up in chair with all lines intact and call button in reach    ASSESSMENT:  Kendall Duff is a 56 y.o. male with a medical diagnosis of Acute myocardial infarction of anterolateral wall. Pt. Tolerated session fairly overall requiring cueing for safety.    Activity tolerance: Good    Discharge recommendations: nursing facility, skilled     Equipment recommendations: walker, rolling     GOALS:   Multidisciplinary Problems     Occupational Therapy Goals        Problem: Occupational Therapy    Goal Priority Disciplines Outcome Interventions   Occupational Therapy Goal     OT, PT/OT Ongoing, Progressing    Description: Goals to be met by: 6/21/22     Patient will increase functional independence with ADLs by performing:    Feeding with Moderate Assistance. When appropriate to initiate, or simulated hand to mouth.   UE Dressing with Moderate  Assistance.  Grooming while long sitting/EOB/supported with Moderate Assistance.  Sitting at edge of bed x10-15 minutes with Moderate Assistance.  Toilet transfer to bedside commode with Moderate Assistance.                     Plan:  Patient to be seen 5 x/week to address the above listed problems via self-care/home management, therapeutic activities, therapeutic exercises  Plan of Care expires: 06/30/22  Plan of Care reviewed with: patient         06/07/2022

## 2022-06-07 NOTE — PROGRESS NOTES
[facility] [place] [facility]  Adult Nutrition  Progress Note    SUMMARY       Recommendations    Recommendation/Intervention: Regular diet      Assessment and Plan    Nutrition Problem  Inadequate Oral Intake     Related to (etiology):   Current condition             Signs and Symptoms (as evidenced by):   trach     Interventions(treatment strategy):  Collaboration with other providers     Nutrition Diagnosis Status:   Continues    Nutrition Goal Status: progressing towards goal  Communication of RD Recs: reviewed with RN  Goals: Meet >/=75% est energy and protein requirements by f/u    Malnutrition Assessment  Malnutrition Type: acute illness or injury  Weight Loss (Malnutrition): other (see comments) (does not meet criteria)  Energy Intake (Malnutrition): other (see comments) (does not meet criteria)  Subcutaneous Fat (Malnutrition):  (does not meet criteria)  Muscle Mass (Malnutrition): mild depletion  Fluid Accumulation (Malnutrition):  (does not meet criteria)  Hand  Strength, Left (Malnutrition):  (unable to eval)  Hand  Strength, Right (Malnutrition):  (unable to eval)   Religion Region (Muscle Loss): mild depletion  Clavicle Bone Region (Muscle Loss): mild depletion   Edema (Fluid Accumulation): 0-->no edema present   Subcutaneous Fat Loss (Final Summary): well nourished  Muscle Loss Evaluation (Final Summary): well nourished  Fluid Accumulation Evaluation:  (unable to evaluate)    Moderate Weight Loss (Malnutrition):  (unable to evaluate)    Reason for Assessment    Reason For Assessment: RD follow-up  Diagnosis: 1. CAD with 4 vessel CABG 4/6  2. MRSA Sternal wound dehiscence with debridement and wound vac 4/17.  Plastics following  3. Respiratory culture positive Klebsiella and Proteus  4. Malnutrition.  5. Respiratory failure.  Intubated 4/15  Relevant Medical History:    General Information Comments:   5/24/22: Tube feeding continues. Tolerated per RN. Noted now off diprivan. Will  "update tube feeding to provide est kcal needs and eliminate need for protein packets.    5/26/22: Tube feeding continues, tolerated @ goal rate. No kcal from meds.     5/30/22: Tube feeding continues, tolerated @ goal rate per RN.  6/3/22: Tube feeding continues, tolerated per RN. Noted SLP continues to work with pt.   6/7/22: Pt now on regular diet. Very excited about being able to eat. Eating 100% of meals.     Nutrition Risk Screen    Nutrition Risk Screen: none    Nutrition/Diet History    Spiritual, Cultural Beliefs, Episcopal Practices, Values that Affect Care: no  Factors Affecting Nutritional Intake: none    Anthropometrics    Temp: 98.8 °F (37.1 °C)  Height Method: Estimated  Height: 5' 10.87" (180 cm)  Height (inches): 70.87 in  Weight Method: Bed Scale  Weight: 77.2 kg (170 lb 3.1 oz)  Weight (lb): 170.2 lb  Ideal Body Weight (IBW), Male: 171.22 lb  % Ideal Body Weight, Male (lb): 112.92 %  BMI (Calculated): 23.8  BMI Grade: 18.5-24.9 - normal    Lab/Procedures/Meds    Pertinent Labs Reviewed: reviewed  Pertinent Labs Comments: no new  Pertinent Medications Reviewed: reviewed    Estimated/Assessed Needs    Weight Used For Calorie Calculations: 77.2 kg (170 lb 3.1 oz)  Energy Calorie Requirements (kcal): 2257kcal  Energy Need Method: [place]-St Jeor (1.4 stress factor)  Protein Requirements: 116-175gm  Weight Used For Protein Calculations: 77.2 kg (170 lb 3.1 oz)  Fluid Requirements (mL): 30mL/kg  Estimated Fluid Requirement Method: RDA Method  RDA Method (mL): 2257     Nutrition Prescription Ordered    Current Diet Order: Easy to Chew    Nutrition Risk    Level of Risk/Frequency of Follow-up: moderate     Monitor and Evaluation    Food and Nutrient Intake: energy intake  Food and Nutrient Adminstration: po diet    Nutrition Follow-Up    RD Follow-up?: Yes    "

## 2022-06-08 PROCEDURE — 94761 N-INVAS EAR/PLS OXIMETRY MLT: CPT

## 2022-06-08 PROCEDURE — 25000003 PHARM REV CODE 250: Performed by: STUDENT IN AN ORGANIZED HEALTH CARE EDUCATION/TRAINING PROGRAM

## 2022-06-08 PROCEDURE — 97535 SELF CARE MNGMENT TRAINING: CPT | Mod: CO

## 2022-06-08 PROCEDURE — 99231 PR SUBSEQUENT HOSPITAL CARE,LEVL I: ICD-10-PCS | Mod: ,,, | Performed by: SURGERY

## 2022-06-08 PROCEDURE — 25000003 PHARM REV CODE 250: Performed by: INTERNAL MEDICINE

## 2022-06-08 PROCEDURE — 25000003 PHARM REV CODE 250

## 2022-06-08 PROCEDURE — 25000242 PHARM REV CODE 250 ALT 637 W/ HCPCS: Performed by: INTERNAL MEDICINE

## 2022-06-08 PROCEDURE — 97116 GAIT TRAINING THERAPY: CPT

## 2022-06-08 PROCEDURE — A4216 STERILE WATER/SALINE, 10 ML: HCPCS

## 2022-06-08 PROCEDURE — 25000003 PHARM REV CODE 250: Performed by: HOSPITALIST

## 2022-06-08 PROCEDURE — 99231 SBSQ HOSP IP/OBS SF/LOW 25: CPT | Mod: ,,, | Performed by: SURGERY

## 2022-06-08 PROCEDURE — 20000000 HC ICU ROOM

## 2022-06-08 PROCEDURE — 94640 AIRWAY INHALATION TREATMENT: CPT

## 2022-06-08 PROCEDURE — 92523 SPEECH SOUND LANG COMPREHEN: CPT

## 2022-06-08 PROCEDURE — 97530 THERAPEUTIC ACTIVITIES: CPT

## 2022-06-08 RX ADMIN — DOXEPIN HYDROCHLORIDE 50 MG: 50 CAPSULE ORAL at 08:06

## 2022-06-08 RX ADMIN — LEVETIRACETAM 500 MG: 500 TABLET, FILM COATED ORAL at 08:06

## 2022-06-08 RX ADMIN — OXCARBAZEPINE 300 MG: 300 TABLET, FILM COATED ORAL at 08:06

## 2022-06-08 RX ADMIN — FAMOTIDINE 20 MG: 10 INJECTION, SOLUTION INTRAVENOUS at 09:06

## 2022-06-08 RX ADMIN — FLUTICASONE PROPIONATE 100 MCG: 50 SPRAY, METERED NASAL at 09:06

## 2022-06-08 RX ADMIN — AMLODIPINE BESYLATE 10 MG: 5 TABLET ORAL at 09:06

## 2022-06-08 RX ADMIN — APIXABAN 5 MG: 5 TABLET, FILM COATED ORAL at 09:06

## 2022-06-08 RX ADMIN — BENZTROPINE MESYLATE 1 MG: 1 TABLET ORAL at 08:06

## 2022-06-08 RX ADMIN — WHITE PETROLATUM: 1.75 OINTMENT TOPICAL at 09:06

## 2022-06-08 RX ADMIN — EZETIMIBE 10 MG: 10 TABLET ORAL at 08:06

## 2022-06-08 RX ADMIN — HALOPERIDOL 5 MG: 5 TABLET ORAL at 09:06

## 2022-06-08 RX ADMIN — LISINOPRIL 20 MG: 20 TABLET ORAL at 09:06

## 2022-06-08 RX ADMIN — LEVETIRACETAM 500 MG: 500 TABLET, FILM COATED ORAL at 09:06

## 2022-06-08 RX ADMIN — HALOPERIDOL 5 MG: 5 TABLET ORAL at 08:06

## 2022-06-08 RX ADMIN — SODIUM CHLORIDE, PRESERVATIVE FREE 10 ML: 5 INJECTION INTRAVENOUS at 09:06

## 2022-06-08 RX ADMIN — GLYCOPYRROLATE 1 MG: 1 TABLET ORAL at 09:06

## 2022-06-08 RX ADMIN — LEVALBUTEROL HYDROCHLORIDE 1.25 MG: 1.25 SOLUTION, CONCENTRATE RESPIRATORY (INHALATION) at 03:06

## 2022-06-08 RX ADMIN — FAMOTIDINE 20 MG: 10 INJECTION, SOLUTION INTRAVENOUS at 08:06

## 2022-06-08 RX ADMIN — BENZTROPINE MESYLATE 1 MG: 1 TABLET ORAL at 09:06

## 2022-06-08 RX ADMIN — APIXABAN 5 MG: 5 TABLET, FILM COATED ORAL at 08:06

## 2022-06-08 RX ADMIN — OLANZAPINE 10 MG: 5 TABLET, ORALLY DISINTEGRATING ORAL at 08:06

## 2022-06-08 RX ADMIN — GLYCOPYRROLATE 1 MG: 1 TABLET ORAL at 03:06

## 2022-06-08 RX ADMIN — SODIUM CHLORIDE, PRESERVATIVE FREE 10 ML: 5 INJECTION INTRAVENOUS at 08:06

## 2022-06-08 RX ADMIN — OXCARBAZEPINE 300 MG: 300 TABLET, FILM COATED ORAL at 09:06

## 2022-06-08 RX ADMIN — METOPROLOL TARTRATE 25 MG: 25 TABLET, FILM COATED ORAL at 09:06

## 2022-06-08 RX ADMIN — ATORVASTATIN CALCIUM 80 MG: 40 TABLET, FILM COATED ORAL at 09:06

## 2022-06-08 RX ADMIN — LEVALBUTEROL HYDROCHLORIDE 1.25 MG: 1.25 SOLUTION, CONCENTRATE RESPIRATORY (INHALATION) at 12:06

## 2022-06-08 RX ADMIN — LEVALBUTEROL HYDROCHLORIDE 1.25 MG: 1.25 SOLUTION, CONCENTRATE RESPIRATORY (INHALATION) at 08:06

## 2022-06-08 NOTE — PT/OT/SLP PROGRESS
Occupational Therapy  Treatment    Kendall Duff   MRN: [id number]   Admitting Diagnosis: Acute myocardial infarction of anterolateral wall    OT Date of Treatment: 06/08/22   OT Start Time: 1029  OT Stop Time: 1048  OT Total Time (min): 19 min     Billable Minutes:  Self Care/Home Management 1  Total Minutes: 19     OT/ALEKSANDR: ALEKSANDR     ALEKSANDR Visit Number: 3    General Precautions: Standard, fall, sternal  Orthopedic Precautions: N/A  Braces:           Subjective:  Communicated with RN prior to session.  Distracted  Impulsive  86 HR  139/64     Objective:  Pt. Ambulating from BS chair to sink using RW for UE support with balance with constant cueing required for safety. Pt. Requiring Min A for standing balance while performing grooming task (brushing teeth) with appropriate preparation noted.    Functional Mobility:  Transfer Training:   Sit to stand:Minimal Assistance with Rolling Walker .    Patient left up in chair with all lines intact and call button in reach    ASSESSMENT:  Kendall Duff is a 56 y.o. male with a medical diagnosis of Acute myocardial infarction of anterolateral wall. Pt. Tolerated session well overall requiring cueing for safety.    Activity tolerance: Good    Discharge recommendations: nursing facility, skilled     Equipment recommendations: walker, rolling     GOALS:   Multidisciplinary Problems     Occupational Therapy Goals        Problem: Occupational Therapy    Goal Priority Disciplines Outcome Interventions   Occupational Therapy Goal     OT, PT/OT Ongoing, Progressing    Description: Goals to be met by: 6/21/22     Patient will increase functional independence with ADLs by performing:    Feeding with Moderate Assistance. When appropriate to initiate, or simulated hand to mouth.   UE Dressing with Moderate Assistance.  Grooming while long sitting/EOB/supported with Moderate Assistance.  Sitting at edge of bed x10-15 minutes with Moderate Assistance.  Toilet transfer to bedside commode with Moderate  Assistance.                     Plan:  Patient to be seen 5 x/week to address the above listed problems via self-care/home management, therapeutic activities, therapeutic exercises  Plan of Care expires: 06/30/22  Plan of Care reviewed with: patient         06/08/2022

## 2022-06-08 NOTE — PROGRESS NOTES
[facility]  HOSPITAL MEDICINE   PROGRESS NOTE      CHIEF COMPLAINT  Hospital follow up    HOSPITAL COURSE  56 y.o. male with a history that includes chronic hepatitis C, hypertension, seizures, CAD s/p stents, initially presented to [facility] on 4/1/2022 with a primary complaint of chest pain. Of note, he was seen at an outlying ED on 3/20/22 and was dx with MI, subsequently transferred to [facility] where he underwent LHC with angioplasty, and deemed appropriate for CABG, which had been scheduled for 3/29/22, but was cancelled. He then presented to ED on 4/1 with c/o CP earlier in the day. He was admitted to cardiology services, started on a heparin gtt. CV Surgery was consulted and the patient underwent CABG x4 on 4/6/22. He was admitted to ICU post CABG, intubated on mechanical ventilation. Psych was consulted during his stay to evaluate for some recent agitation, restlessness, fidgetiness, insomnia, confusion, and sexual inappropriateness, and he was started on PRN haldol. He was extubated to NC 4L on 4/7, remained restless and confused requiring Precedex gtt.  On 4/10/22 he experienced seizure-like activity and confusion for which neuro was consulted. EEG revealed moderate generalized slowing. CT Head was negative for acute intracranial abnormality, gas noted throughout the bilateral subcutaneous soft tissues.MRI on 4/15/22 revealed small linear focus of acute small vessel ischemia in the right frontal white matter. Developed diffuse subcutaneous emphysema per CXR. He remained agitated with delirium.  Unfortunately,he pulled out his left chest tube on 4/16/22 and he had been thrashing in bed, had to be restrained at times.  He developed an unstable sternum with purulence drainage. CT thorax revealed surgical changes with inflammation, fluid and air locules, minimal anterior pericardial effusion, extensive soft tissue emphysema, and bilateral pleural effusions and bilateral  "lower lung lobes collapse consolidations. He has known subcutaneous air, which has been present on previous x-rays.  Cultures positive for MRSA, and he was placed on Vancomycin. He required intubation on 4/16/22. On 4/17/22 he underwent sternal wound debridement with wound vac placement. ID was consulted on 4/22/22 due to sepsis. He was started on rifampin in addition to vancomycin for a planned 6 week course. Respiratory cultures from 4/24/22 returned positive for Proteus and Klebsiella. Remained intubated on mechanical ventilation. GI was consulted on 5/3/22 for PEG placement. Plastic surgery was also following for sternal reconstruction. He underwent tracheostomy and PEG placement on 5/10/22. NIVA done on 5/10/22 noted a RUE DVT. On 5/11/22 he underwent sternal wound debridement, bilateral fasciocutaneous flap advanced closure over sternum and bilateral pectoralis major muscle flap reconstruction of sternum. He was started on full dose Lovenox. He remained on mechanical ventilation until 5/21/22. Tolerating T-piece now on trach collar. He required vasopressors for BP support throughout his stay, which have since been weaned off. He has been weaned off of Precedex. He did have some vomiting on 5/21/22. Lovenox was transitioned to Eliquis on 5/21/22. KUB unrevealing, tube feeds restarted on 5/22/22. He was cleared for downgrade to the floor on 5/22/22 under the hospital medicine service. His trach tube fell of during cough and were ubnable to reinserted. He was stable from respiratory standpoint and close monitoring was continued.     Today  Seen examined this morning.  He is currently out of the roll belt sitting up in a chair.  No other issues at this time.        OBJECTIVE/PHYSICAL EXAM  /74   Pulse 93   Temp 98.6 °F (37 °C) (Axillary)   Resp 16   Ht 5' 10.87" (1.8 m)   Wt 77.2 kg (170 lb 3.1 oz)   SpO2 97%   BMI 23.83 kg/m²   General: In no acute distress, afebrile  Chest: Clear to auscultation " bilaterally, chest wounds covered with gauze  Heart: S1, S2, no appreciable murmur  Abdomen: Soft, nontender, BS +  MSK: Warm, no lower extremity edema, no clubbing or cyanosis  Neurologic: Alert and oriented x4, moving all extremities with good strength         ASSESSMENT/PLAN  Chronic hypoxic respiratory failure status post tracheostomy on 5/10/22 status post trach fell out post coughing  Oropharyngeal dysphagia s/p PEG placment on 5/10/22-status post started oral diet her SLP  Multivessel CAD s/p CABG x 4 on 04/06/2022.  MRSA sternal wound infection and dehiscence, status post wound vacuum-assisted closure device on 04/17/2022, and bilateral pectoral flaps for sternal wound closure on 05/11/2022.  Metabolic encephalopathy- improving  Acute right upper extremity deep venous thrombosis  Anemia chronic disease  Severe protein calorie malnutrition, hypoalbuminemia  Hypertension  Seizure disorder   Transaminitis, mild - resolved     Hx of  Schizophrenia, chronic hepatitis C, hypertension, CAD s/p stents       ID following.  Completed antibiotics.  Plastic surgery following along for drain management.  Three drains removed today.   Continues to work well with PT OT and speech therapy.  Tolerating diet without issues.  Labs tomorrow  Case management following.  Pending LTAC placement.  Roll belt removed this morning and tolerating well so far.  No agitation and follows commands appropriately.  Plan to start cutting back on antipsychotics slowly as agitation resolving.    Anticipated discharge and disposition:   __________________________________________________________________________    LABS/MICROBIOLOGY/MEDICATIONS/DIAGNOSTICS    LABS  No results for input(s): NA, K, CHLORIDE, CO2, BUN, CREATININE, GLUCOSE, CALCIUM, ALKPHOS, AST, ALT, ALBUMIN in the last 72 hours.  No results for input(s): WBC, RBC, HCT, MCV, PLT in the last 72 hours.    Invalid input(s):     MICROBIOLOGY  Microbiology Results (last 7 days)     **  No results found for the last 168 hours. **          MEDICATIONS   amLODIPine  10 mg Oral Daily    apixaban  5 mg Oral BID    atorvastatin  80 mg Oral Daily    benztropine  1 mg Per OG tube BID    doxepin  50 mg Oral QHS    ezetimibe  10 mg Oral QHS    famotidine (PF)  20 mg Intravenous BID    fluticasone propionate  2 spray Each Nostril Daily    glycopyrrolate  1 mg Per NG tube TID    haloperidoL  5 mg Oral BID    levalbuterol  1.25 mg Nebulization Q8H    levETIRAcetam  500 mg Oral BID    lisinopriL  20 mg Oral Daily    metoprolol tartrate  25 mg Per OG tube Daily    olanzapine zydis  10 mg Oral QHS    OXcarbazepine  300 mg Per OG tube BID    polyethylene glycol  17 g Oral Daily    sodium chloride 0.9%  10 mL Intravenous Q12H    white petrolatum   Topical (Top) BID      INFUSIONS      DIAGNOSTIC TESTS  Fl Modified Barium Swallow Speech   Final Result      X-Ray Chest 1 View   Final Result      Slightly improved aeration in the right upper lobe.      No other significant change         Electronically signed by: Chad Jason   Date:    06/05/2022   Time:    07:53      X-Ray Chest AP Portable   Final Result      Tracheostomy cannula is not seen.      Cardiomediastinal silhouette is unchanged as compared with previous exam.      Increase in interstitial and pulmonary vascular markings         Electronically signed by: Chad Jason   Date:    06/03/2022   Time:    11:06      X-Ray Chest 1 View   Final Result      There is effusion of the left lung is noted increased in size. The right lung the demonstrates improved aeration in the interval. Recommend continued follow-up.         Electronically signed by: Rex Denson   Date:    06/02/2022   Time:    06:10      Fl Modified Barium Swallow Speech   Final Result      X-Ray Chest 1 View   Final Result      New bilateral alveolar opacities most pronounced in the perihilar lung zones may be related to edema in the setting of enlarged cardiac  silhouette.  Other differential considerations include multifocal pneumonia or ARDS.         Electronically signed by: Sharon Cantu   Date:    05/24/2022   Time:    18:36      X-Ray Abdomen AP 1 View   Final Result         1. Interval removal of NG tube.   2. No convincing evidence of new or worsening intra-abdominal process.         Electronically signed by: Abner Reyes   Date:    05/21/2022   Time:    13:57      X-Ray Chest 1 View   Final Result      Stable exam without significant interval change.         Electronically signed by: Pascale Vences   Date:    05/19/2022   Time:    06:00      X-Ray Chest 1 View   Final Result      Right-sided PICC has been removed.  No other significant change.         Electronically signed by: Harjit Sellers   Date:    05/17/2022   Time:    07:05      X-Ray Chest 1 View   Final Result      Interval insertion of left-sided PICC line.      No other interval change         Electronically signed by: Chad Jason   Date:    05/16/2022   Time:    09:01      X-Ray Chest 1 View   Final Result      LINES/TUBES: Tracheostomy and right PICC with grossly unchanged positioning.      Markedly improved aeration bilaterally with now only minimal basilar opacities.      No pneumothoraces.         Electronically signed by: Jyoti López   Date:    05/14/2022   Time:    17:37      X-Ray Chest 1 View   Final Result      Interval insertion of tracheostomy cannula.      Cardiomegaly.      Increase in interstitial and pulmonary vascular markings indicating some degree of congestion.      Interval removal of nasogastric tube         Electronically signed by: Chad Jason   Date:    05/10/2022   Time:    13:52      X-Ray Chest 1 View   Final Result      Improved left pleural effusion and lower lung zone atelectasis.         Electronically signed by: Javier Soto   Date:    05/04/2022   Time:    08:03      X-Ray Abdomen AP 1 View   Final Result      Optimal placement of the nasogastric tube.          Electronically signed by: Javier Soto   Date:    05/03/2022   Time:    16:28      RADIOLOGY REPORT   Final Result      VAS US Venous Arm Right    (Results Pending)            All diagnosis and differential diagnosis have been reviewed; assessment and plan has been documented. I have personally reviewed the labs and test results that are presently available; I have reviewed the patients medication list. I have reviewed the consulting providers response and recommendations. I have reviewed or attempted to review medical records based upon their availability.  All of the patient's questions have been addressed and answered. Patient's is agreeable to the above stated plan. I will continue to monitor closely and make adjustments to medical management as needed.  This document was created using [employer] Fluency Direct.  Transcription errors may have been made.  Please contact me if any questions may rise regarding documentation to clarify verbiage.        Lee Gil MD   06/08/2022   Internal Medicine

## 2022-06-08 NOTE — PLAN OF CARE
rec message from Kwaku at [facility] states pt does not meet LTAC criteria anymore, PT OT rec is for SNF, spoke with Ghulam our d/c coordinator, states SNF will not take pt with any drains, TCU and [facility] do not accept medicaid,

## 2022-06-08 NOTE — PROGRESS NOTES
Infectious Diseases Progress Note  56-year-old male with past medical history of bipolar disorder schizophrenia, .  I have a, is admitted to [facility] on 01/20/2020 with chief complaints of chest pain with work-up revealing significant coronary artery disease requiring surgical intervention.  He was taken for CABG x 4 on 4/6/2022 requiring ICU stay postoperatively downgraded to the floor subsequently but again readmitted to the ICU on 4/10.  He did have issues with wound healing with subsequent dehiscence and suspected infection.  4/16 wound culture positive for MRSA.  CT scan of the thoracic done on 4/16 showed changes of sternotomy from CABG with presternal, retrosternal and anterior mediastinum combination of inflammation, fluid and air locules, bilateral pleural effusion and bilateral basilar consolidation. He had debridement of the sternal wound with placement of wound VAC on 4/17 with cultures from surgery showing MRSA as well.  He has been pretty much without fevers but is noted to have leukocytosis of up to 23.9 on 4/17 which has had a downward trend.  He is anemic with low albumin.  Chest x-ray from 4/19 showed no significant changes. During his hospital stay, he eventually underwent tracheostomy placement on 5/10 and on 5/11 he underwent sternal wound debridement, B/L fasciocutaneous flap closure, B/L pectoralis major muscle flap reconstruction of sternum  He remains in the ICU, trached and on ventilator.   He is on antibiotic coverage with vancomycin and rifampin.     Subjective:  Remains in the ICU. No new complaints, no fevers, doing about the same.    ROS  Unable to perform ROS: Medical condition     Review of patient's allergies indicates:   Allergen Reactions    Depakote [divalproex]     Divalproex sodium Other (See Comments)    Lithium     Lithium analogues     Quetiapine Other (See Comments)       Past Medical History:   Diagnosis Date    Bipolar disorder,  unspecified     Chronic hepatitis C     Hypertension     Obesity, unspecified     Seizures     Stroke        Past Surgical History:   Procedure Laterality Date    CORONARY STENT PLACEMENT  08/14/2015    DEBRIDEMENT  04/17/2022    LEFT HEART CATHETERIZATION Left 08/13/2015    REPEAT CLOSURE OF STERNAL INCISION N/A 5/11/2022    Procedure: CLOSURE, STERNAL INCISION, REPEAT;  Surgeon: Adolfo Weiss MD;  Location: [facility];  Service: Plastics;  Laterality: N/A;  STERNAL WOUND DEBRIDEMENT AND RECONSTRUCTION // MULTIPLE MUSCLE FLAPS // REQ 1400       Social History     Socioeconomic History    Marital status: Single   Tobacco Use    Smoking status: Current Every Day Smoker     Types: Cigarettes    Smokeless tobacco: Never Used   Substance and Sexual Activity    Alcohol use: Never    Drug use: Not Currently     Types: Cocaine         Scheduled Meds:   amLODIPine  10 mg Oral Daily    apixaban  5 mg Oral BID    atorvastatin  80 mg Oral Daily    benztropine  1 mg Per OG tube BID    doxepin  50 mg Oral QHS    ezetimibe  10 mg Oral QHS    famotidine (PF)  20 mg Intravenous BID    fluticasone propionate  2 spray Each Nostril Daily    glycopyrrolate  1 mg Per NG tube TID    haloperidoL  5 mg Oral BID    levalbuterol  1.25 mg Nebulization Q8H    levETIRAcetam  500 mg Oral BID    lisinopriL  20 mg Oral Daily    metoprolol tartrate  25 mg Per OG tube Daily    olanzapine zydis  10 mg Oral QHS    OXcarbazepine  300 mg Per OG tube BID    polyethylene glycol  17 g Oral Daily    sodium chloride 0.9%  10 mL Intravenous Q12H    white petrolatum   Topical (Top) BID     Continuous Infusions:  PRN Meds:sodium chloride, acetaminophen, albuterol-ipratropium, bisacodyL, dextrose 10%, fentaNYL, fentaNYL, hydrALAZINE, lorazepam, metoprolol, morphine, morphine, nitroGLYCERIN, OLANZapine, ondansetron, oxyCODONE, oxyCODONE, potassium chloride in water, potassium chloride in water, potassium chloride in water,  "promethazine, sodium chloride 0.9%, ziprasidone    Objective:  /66   Pulse 94   Temp 98.6 °F (37 °C)   Resp 16   Ht 5' 10.87" (1.8 m)   Wt 77.2 kg (170 lb 3.1 oz)   SpO2 97%   BMI 23.83 kg/m²     Physical Exam:   Physical Exam  Vitals reviewed.   Constitutional:       General: He is not in acute distress.  HENT:      Head: Normocephalic and atraumatic.   Neck:      Comments: Trach stoma dressed  Cardiovascular:      Rate and Rhythm: Normal rate and regular rhythm.      Heart sounds: Normal heart sounds.   Pulmonary:      Effort: No respiratory distress.      Breath sounds: Normal breath sounds.   Abdominal:      General: Bowel sounds are normal. There is no distension.      Palpations: Abdomen is soft.      Tenderness: There is no abdominal tenderness.      Comments: Peg tube in place   Musculoskeletal:         General: No deformity.      Cervical back: Neck supple.   Skin:     Findings: No erythema or rash.   Neurological:      Mental Status: He is alert.      Comments: Answers simple questions appropriately   Psychiatric:      Comments: Cooperative     Imaging      Lab Review   No results found for this or any previous visit (from the past 24 hour(s)).    Assessment/Plan:  1.  Sepsis syndrome  2.  MRSA sternal wound infection/dehiscence  3.  Leukocytosis  4.  Acute hypoxic respiratory failure  5.  Anemia  6.  Coronary artery disease s/p CABG  7.  Protein calorie malnutrition  8.  Pneumonia -  Proteus/Klebsiella isolates     -Completed course of antibiotics on 6/5, will monitor  -No fevers and no leukocytosis  -Trach accidentally came out on 6/3 and unsuccessful attempts in placing in back in however respiratory status stable  -S/P sternal debridement with flap closure on 5/11 per Dr Weiss  -Continue wound care and drain management per Plastics  -PT/OT as ordered  -Discussed with patient and nursing staff. CM working on LTAC placement    "

## 2022-06-08 NOTE — PT/OT/SLP PROGRESS
Physical Therapy         Treatment        Kendall Duff   MRN: [id number]     PT Received On: 06/08/22  PT Start Time: 1430     PT Stop Time: 1454    PT Total Time (min): 24 min       Billable Minutes:  Gait [id number] and Therapeutic Activity 10  Total Minutes: 24    Treatment Type: Treatment  PT/PTA: PT     PTA Visit Number: 4       General Precautions: Standard, fall, sternal  Orthopedic Precautions: Orthopedic Precautions : N/A   Braces: Braces: N/A    Spiritual, Cultural Beliefs, Restorationism Practices, Values that Affect Care: no    Subjective:  Communicated with NSG prior to session.    Pain/Comfort  Pain Rating 1: 0/10    Objective:  Patient found sitting [facility]    Functional Mobility:    Transfer Training:   Sit to stand:Contact Guard Assistance with No Assistive Device    x10 reps performed; instructed pt to perform at a decreased speed 2/2 pt often demonstrating rushed/impulsive movements, good carryover; CGA throughout  for safety    Gait Training:   Pt ambulated 320 feet with B HHA, Herb x2; occasional modA 2/2 instability; step-through pattern; demonstrated occasional scissoring, able to correct with  VCs; improved tolerance to increased amb distance; distracted and unsteady throughout-poor safety awareness    Additional Treatment:  B LE cone tap x20 reps x2 trials with seated rest break in btw; improved accuracy of tap/coordination as compared to yesterday      Activity Tolerance:  Patient tolerated treatment well    Patient left up in chair with all lines intact and call button in reach.    Assessment:  Kendall Duff is a 56 y.o. male with a medical diagnosis of Acute myocardial infarction of anterolateral wall.    Rehab potential is excellent.    Activity tolerance: Excellent    Discharge recommendations: Discharge Facility/Level of Care Needs: rehabilitation facility     Equipment recommendations: Equipment Needed After Discharge:  (pending)     GOALS:   Multidisciplinary Problems     Physical Therapy Goals         Problem: Physical Therapy    Goal Priority Disciplines Outcome Goal Variances Interventions   Physical Therapy Goal     PT, PT/OT Ongoing, Progressing     Description: Goals to be met by: 22     Patient will increase functional independence with mobility by performin. Supine to sit with Standby Assistance  2. Sit to supine with Standby Assistance  3. Bed to chair transfer with Moderate Assistance using RW  4. Sit to stand with Minimal Assistance using RW  5. Gait x 100 feet with Minimal Assistance using RW                     PLAN:    Patient to be seen 6 x/week  to address the above listed problems via gait training, therapeutic activities, therapeutic exercises, neuromuscular re-education  Plan of Care expires: 22  Plan of Care reviewed with: patient         2022

## 2022-06-08 NOTE — PROGRESS NOTES
PRS  Awake, holding a very good conversation, no complaints  AFVSS  Incisions all cdi  Drains s/s.  I removed 3 drains today, both lateral drains remain  I will follow.

## 2022-06-08 NOTE — PLAN OF CARE
Problem: Adjustment to Illness (Delirium)  Goal: Optimal Coping  Outcome: Ongoing, Progressing     Problem: SLP  Goal: SLP Goal  Description: LTG1: Tolerate speaking valve during all waking hours with no signs/sx respiratory distress/compromise - discontinue  STG: tolerate speaking valve x1 hour with no signs/sx respiratory distress/compromise - discontinue    LTG2: Tolerate least restrictive PO diet with no clinical signs/sx aspiration - progressing  STG2a: Tolerate thin liquids by cup with no clinical signs/sx aspiration - progressing  STG2b: Tolerate puree solids with no clinical signs/sx aspiration. - progressing    LTG3: Complete basic cognitive tasks with supervision  STG3a: Recognize simple problems with min cues  STG3b: Solve routine problems with min cues  STG4c: Complete 3-step sequencing tasks with min cues  Outcome: Ongoing, Progressing

## 2022-06-08 NOTE — PT/OT/SLP EVAL
Speech Language Pathology Department  Cognitive-Communication Evaluation    Patient Name:  Kendall Duff   MRN:  [id number]  Admitting Diagnosis: Acute myocardial infarction of anterolateral wall    Recommendations:     Recommendations:                General Recommendations:  Cognitive-linguistic therapy  Communication strategies:  none     Discharge recommendations:     Discharge Facility/Level of Care Needs: nursing facility, skilled vs home with 24-hour supervision   Barriers to Discharge:  Level of Skilled Assistance Needed and Safety Awareness     History:     Past Medical History:   Diagnosis Date    Bipolar disorder, unspecified     Chronic hepatitis C     Hypertension     Obesity, unspecified     Seizures     Stroke        Past Surgical History:   Procedure Laterality Date    CORONARY STENT PLACEMENT  08/14/2015    DEBRIDEMENT  04/17/2022    LEFT HEART CATHETERIZATION Left 08/13/2015    REPEAT CLOSURE OF STERNAL INCISION N/A 5/11/2022    Procedure: CLOSURE, STERNAL INCISION, REPEAT;  Surgeon: Adolfo Weiss MD;  Location: [facility];  Service: Plastics;  Laterality: N/A;  STERNAL WOUND DEBRIDEMENT AND RECONSTRUCTION // MULTIPLE MUSCLE FLAPS // REQ 1400       Previous level of Function   Education:middle school   Occupation: unemployed, disabled   Lives: with children   Handed: Left   Glasses: no   Hearing Aids: no    Subjective     Patient awake, alert and cooperative.    Patient goals: to go home     Pain/Comfort:  · Pain Rating 1: 0/10    Respiratory Status: room air    Objective:     SPEECH PRODUCTION   Phoneme Production: adequate   Voice Quality: hoarse   Voice Production: phonation breaks   Speech Rate: fast   Loudness: reduced   Respiration: reduced for speech   Resonance: adequate   Prosody: adequate   Speech Intelligibility  Known Context: Greater that 90%  Unknown Context: 75%-90%    AUDITORY COMPREHENSION  Following Directions:   1-Step: 100%   2-Step: 100%  Yes/No  Questions:   Biographical: 100%   Environmental: 100%   Simple: 100%    VERBAL EXPRESSION  Automatic Speech:   Functional needs: WFL  Confrontation Naming   Objects: 100%  Wh- Questions:   Object name: 100%   Object function: 100%    COGNITION  Orientation:   Person: Independent   Place: with cues   Time: to year, not month   Situation: min cues  Attention:   Focused: impaired   Sustained: WFL  Pragmatics:   Eye contact: WFL   Facial expression: WFL  Memory:   Immediate: WFL   Delayed: Impaired   Long Term: WFL  Problem Solving   Functional simple: Impaired  Executive Function:   Impaired    Assessment:     Pt presents with moderate cognitive linguistic deficits negatively impacting safe, independent living and will benefit from continued skilled SLP services.    Goals:   Multidisciplinary Problems     SLP Goals        Problem: SLP    Goal Priority Disciplines Outcome   SLP Goal     SLP Ongoing, Progressing   Description: LTG1: Tolerate speaking valve during all waking hours with no signs/sx respiratory distress/compromise - discontinue  STG: tolerate speaking valve x1 hour with no signs/sx respiratory distress/compromise - discontinue    LTG2: Tolerate least restrictive PO diet with no clinical signs/sx aspiration - progressing  STG2a: Tolerate thin liquids by cup with no clinical signs/sx aspiration - progressing  STG2b: Tolerate puree solids with no clinical signs/sx aspiration. - progressing    LTG3: Complete basic cognitive tasks with supervision  STG3a: Recognize simple problems with min cues  STG3b: Solve routine problems with min cues  STG4c: Complete 3-step sequencing tasks with min cues                   Plan:     · Patient to be seen:  5 x/week   · Plan of Care expires:  06/21/22  · Plan of Care reviewed with:  patient   · SLP Follow-Up:  Yes           Time Tracking:   SLP Treatment Date:   06/08/22  Speech Start Time:  1415  Speech Stop Time:  1440     Speech Total Time (min):  25  min    Billable minutes:  Evaluation of Speech Sound Production with Comprehension and Expression, 25 minutes       06/08/2022

## 2022-06-09 LAB
ALBUMIN SERPL-MCNC: 2.2 GM/DL (ref 3.5–5)
ALBUMIN/GLOB SERPL: 0.6 RATIO (ref 1.1–2)
ALP SERPL-CCNC: 116 UNIT/L (ref 40–150)
ALT SERPL-CCNC: 25 UNIT/L (ref 0–55)
AST SERPL-CCNC: 26 UNIT/L (ref 5–34)
BASOPHILS # BLD AUTO: 0.06 X10(3)/MCL (ref 0–0.2)
BASOPHILS NFR BLD AUTO: 0.8 %
BILIRUBIN DIRECT+TOT PNL SERPL-MCNC: 0.3 MG/DL
BUN SERPL-MCNC: 24.4 MG/DL (ref 8.4–25.7)
CALCIUM SERPL-MCNC: 8.3 MG/DL (ref 8.4–10.2)
CHLORIDE SERPL-SCNC: 107 MMOL/L (ref 98–107)
CO2 SERPL-SCNC: 24 MMOL/L (ref 22–29)
CREAT SERPL-MCNC: 0.81 MG/DL (ref 0.73–1.18)
EOSINOPHIL # BLD AUTO: 0.32 X10(3)/MCL (ref 0–0.9)
EOSINOPHIL NFR BLD AUTO: 4.2 %
ERYTHROCYTE [DISTWIDTH] IN BLOOD BY AUTOMATED COUNT: 17.2 % (ref 11.5–17)
GLOBULIN SER-MCNC: 3.8 GM/DL (ref 2.4–3.5)
GLUCOSE SERPL-MCNC: 172 MG/DL (ref 74–100)
HCT VFR BLD AUTO: 35.4 % (ref 42–52)
HGB BLD-MCNC: 10.5 GM/DL (ref 14–18)
IMM GRANULOCYTES # BLD AUTO: 0.03 X10(3)/MCL (ref 0–0.02)
IMM GRANULOCYTES NFR BLD AUTO: 0.4 % (ref 0–0.43)
LYMPHOCYTES # BLD AUTO: 2.14 X10(3)/MCL (ref 0.6–4.6)
LYMPHOCYTES NFR BLD AUTO: 28.3 %
MCH RBC QN AUTO: 25.6 PG (ref 27–31)
MCHC RBC AUTO-ENTMCNC: 29.7 MG/DL (ref 33–36)
MCV RBC AUTO: 86.3 FL (ref 80–94)
MONOCYTES # BLD AUTO: 0.74 X10(3)/MCL (ref 0.1–1.3)
MONOCYTES NFR BLD AUTO: 9.8 %
NEUTROPHILS # BLD AUTO: 4.3 X10(3)/MCL (ref 2.1–9.2)
NEUTROPHILS NFR BLD AUTO: 56.5 %
NRBC BLD AUTO-RTO: 0 %
PLATELET # BLD AUTO: 321 X10(3)/MCL (ref 130–400)
PMV BLD AUTO: 9.8 FL (ref 9.4–12.4)
POTASSIUM SERPL-SCNC: 3.8 MMOL/L (ref 3.5–5.1)
PROT SERPL-MCNC: 6 GM/DL (ref 6.4–8.3)
RBC # BLD AUTO: 4.1 X10(6)/MCL (ref 4.7–6.1)
SODIUM SERPL-SCNC: 141 MMOL/L (ref 136–145)
WBC # SPEC AUTO: 7.6 X10(3)/MCL (ref 4.5–11.5)

## 2022-06-09 PROCEDURE — 25000003 PHARM REV CODE 250

## 2022-06-09 PROCEDURE — 25000242 PHARM REV CODE 250 ALT 637 W/ HCPCS: Performed by: INTERNAL MEDICINE

## 2022-06-09 PROCEDURE — 20000000 HC ICU ROOM

## 2022-06-09 PROCEDURE — 97164 PT RE-EVAL EST PLAN CARE: CPT

## 2022-06-09 PROCEDURE — 25000003 PHARM REV CODE 250: Performed by: STUDENT IN AN ORGANIZED HEALTH CARE EDUCATION/TRAINING PROGRAM

## 2022-06-09 PROCEDURE — A4216 STERILE WATER/SALINE, 10 ML: HCPCS

## 2022-06-09 PROCEDURE — 25000003 PHARM REV CODE 250: Performed by: HOSPITALIST

## 2022-06-09 PROCEDURE — 25000003 PHARM REV CODE 250: Performed by: INTERNAL MEDICINE

## 2022-06-09 PROCEDURE — 80053 COMPREHEN METABOLIC PANEL: CPT | Performed by: INTERNAL MEDICINE

## 2022-06-09 PROCEDURE — 97535 SELF CARE MNGMENT TRAINING: CPT | Mod: CO

## 2022-06-09 PROCEDURE — 94640 AIRWAY INHALATION TREATMENT: CPT

## 2022-06-09 PROCEDURE — 85025 COMPLETE CBC W/AUTO DIFF WBC: CPT | Performed by: INTERNAL MEDICINE

## 2022-06-09 PROCEDURE — 36415 COLL VENOUS BLD VENIPUNCTURE: CPT | Performed by: INTERNAL MEDICINE

## 2022-06-09 RX ORDER — OXCARBAZEPINE 150 MG/1
150 TABLET, FILM COATED ORAL 2 TIMES DAILY
Status: DISCONTINUED | OUTPATIENT
Start: 2022-06-09 | End: 2022-06-10

## 2022-06-09 RX ADMIN — WHITE PETROLATUM: 1.75 OINTMENT TOPICAL at 08:06

## 2022-06-09 RX ADMIN — BENZTROPINE MESYLATE 1 MG: 1 TABLET ORAL at 08:06

## 2022-06-09 RX ADMIN — LEVETIRACETAM 500 MG: 500 TABLET, FILM COATED ORAL at 08:06

## 2022-06-09 RX ADMIN — GLYCOPYRROLATE 1 MG: 1 TABLET ORAL at 08:06

## 2022-06-09 RX ADMIN — EZETIMIBE 10 MG: 10 TABLET ORAL at 08:06

## 2022-06-09 RX ADMIN — SODIUM CHLORIDE, PRESERVATIVE FREE 10 ML: 5 INJECTION INTRAVENOUS at 08:06

## 2022-06-09 RX ADMIN — AMLODIPINE BESYLATE 10 MG: 5 TABLET ORAL at 08:06

## 2022-06-09 RX ADMIN — APIXABAN 5 MG: 5 TABLET, FILM COATED ORAL at 08:06

## 2022-06-09 RX ADMIN — OLANZAPINE 10 MG: 5 TABLET, ORALLY DISINTEGRATING ORAL at 08:06

## 2022-06-09 RX ADMIN — LISINOPRIL 20 MG: 20 TABLET ORAL at 08:06

## 2022-06-09 RX ADMIN — FLUTICASONE PROPIONATE 100 MCG: 50 SPRAY, METERED NASAL at 08:06

## 2022-06-09 RX ADMIN — METOPROLOL TARTRATE 25 MG: 25 TABLET, FILM COATED ORAL at 08:06

## 2022-06-09 RX ADMIN — ACETAMINOPHEN 650 MG: 325 TABLET ORAL at 12:06

## 2022-06-09 RX ADMIN — FAMOTIDINE 20 MG: 10 INJECTION, SOLUTION INTRAVENOUS at 08:06

## 2022-06-09 RX ADMIN — WHITE PETROLATUM: 1.75 OINTMENT TOPICAL at 09:06

## 2022-06-09 RX ADMIN — OXCARBAZEPINE 300 MG: 300 TABLET, FILM COATED ORAL at 08:06

## 2022-06-09 RX ADMIN — OXCARBAZEPINE 150 MG: 150 TABLET, FILM COATED ORAL at 08:06

## 2022-06-09 RX ADMIN — DOXEPIN HYDROCHLORIDE 50 MG: 50 CAPSULE ORAL at 08:06

## 2022-06-09 RX ADMIN — HALOPERIDOL 5 MG: 5 TABLET ORAL at 08:06

## 2022-06-09 RX ADMIN — ATORVASTATIN CALCIUM 80 MG: 40 TABLET, FILM COATED ORAL at 08:06

## 2022-06-09 RX ADMIN — LEVALBUTEROL HYDROCHLORIDE 1.25 MG: 1.25 SOLUTION, CONCENTRATE RESPIRATORY (INHALATION) at 12:06

## 2022-06-09 RX ADMIN — OXYCODONE HYDROCHLORIDE 5 MG: 5 TABLET ORAL at 01:06

## 2022-06-09 RX ADMIN — POLYETHYLENE GLYCOL 3350 17 G: 17 POWDER, FOR SOLUTION ORAL at 08:06

## 2022-06-09 NOTE — PLAN OF CARE
SNF placement referral sent to pt's [facility] as follows: [facility] (1st choice), [facility] ([facility]nd [facility]), [facility] Awaiting response.

## 2022-06-09 NOTE — PROGRESS NOTES
Infectious Diseases Progress Note  56-year-old male with past medical history of bipolar disorder schizophrenia, .  I have a, is admitted to [facility] on 01/20/2020 with chief complaints of chest pain with work-up revealing significant coronary artery disease requiring surgical intervention.  He was taken for CABG x 4 on 4/6/2022 requiring ICU stay postoperatively downgraded to the floor subsequently but again readmitted to the ICU on 4/10.  He did have issues with wound healing with subsequent dehiscence and suspected infection.  4/16 wound culture positive for MRSA.  CT scan of the thoracic done on 4/16 showed changes of sternotomy from CABG with presternal, retrosternal and anterior mediastinum combination of inflammation, fluid and air locules, bilateral pleural effusion and bilateral basilar consolidation. He had debridement of the sternal wound with placement of wound VAC on 4/17 with cultures from surgery showing MRSA as well.  He has been pretty much without fevers but is noted to have leukocytosis of up to 23.9 on 4/17 which has had a downward trend.  He is anemic with low albumin.  Chest x-ray from 4/19 showed no significant changes. During his hospital stay, he eventually underwent tracheostomy placement on 5/10 and on 5/11 he underwent sternal wound debridement, B/L fasciocutaneous flap closure, B/L pectoralis major muscle flap reconstruction of sternum  He remains in the ICU, de-cannulated and on room air. In no acute distress.     Subjective:  Remains in the ICU. No new complaints, no fevers, doing about the same.    Review of Systems   Constitutional: Positive for malaise/fatigue.   HENT: Negative.    Eyes: Negative.    Respiratory: Negative.    Cardiovascular: Negative.    Gastrointestinal: Negative.    Genitourinary: Negative.    Musculoskeletal: Negative.    Skin: Negative.    Neurological: Positive for weakness.       Review of patient's allergies indicates:    Allergen Reactions    Depakote [divalproex]     Divalproex sodium Other (See Comments)    Lithium     Lithium analogues     Quetiapine Other (See Comments)       Past Medical History:   Diagnosis Date    Bipolar disorder, unspecified     Chronic hepatitis C     Hypertension     Obesity, unspecified     Seizures     Stroke        Past Surgical History:   Procedure Laterality Date    CORONARY STENT PLACEMENT  08/14/2015    DEBRIDEMENT  04/17/2022    LEFT HEART CATHETERIZATION Left 08/13/2015    REPEAT CLOSURE OF STERNAL INCISION N/A 5/11/2022    Procedure: CLOSURE, STERNAL INCISION, REPEAT;  Surgeon: Adolfo Weiss MD;  Location: [facility];  Service: Plastics;  Laterality: N/A;  STERNAL WOUND DEBRIDEMENT AND RECONSTRUCTION // MULTIPLE MUSCLE FLAPS // REQ 1400       Social History     Socioeconomic History    Marital status: Single   Tobacco Use    Smoking status: Current Every Day Smoker     Types: Cigarettes    Smokeless tobacco: Never Used   Substance and Sexual Activity    Alcohol use: Never    Drug use: Not Currently     Types: Cocaine         Scheduled Meds:   amLODIPine  10 mg Oral Daily    apixaban  5 mg Oral BID    atorvastatin  80 mg Oral Daily    benztropine  1 mg Per OG tube BID    doxepin  50 mg Oral QHS    ezetimibe  10 mg Oral QHS    famotidine (PF)  20 mg Intravenous BID    fluticasone propionate  2 spray Each Nostril Daily    glycopyrrolate  1 mg Per NG tube TID    haloperidoL  5 mg Oral BID    levalbuterol  1.25 mg Nebulization Q8H    levETIRAcetam  500 mg Oral BID    lisinopriL  20 mg Oral Daily    metoprolol tartrate  25 mg Per OG tube Daily    olanzapine zydis  10 mg Oral QHS    OXcarbazepine  300 mg Per OG tube BID    polyethylene glycol  17 g Oral Daily    sodium chloride 0.9%  10 mL Intravenous Q12H    white petrolatum   Topical (Top) BID     Continuous Infusions:  PRN Meds:sodium chloride, acetaminophen, albuterol-ipratropium, bisacodyL, dextrose 10%,  "fentaNYL, fentaNYL, hydrALAZINE, lorazepam, metoprolol, morphine, morphine, nitroGLYCERIN, OLANZapine, ondansetron, oxyCODONE, oxyCODONE, potassium chloride in water, potassium chloride in water, potassium chloride in water, promethazine, sodium chloride 0.9%, ziprasidone    Objective:  /73   Pulse 89   Temp 98.2 °F (36.8 °C)   Resp (!) 25   Ht 5' 10.87" (1.8 m)   Wt 77.2 kg (170 lb 3.1 oz)   SpO2 97%   BMI 23.83 kg/m²     Physical Exam:   Physical Exam  Vitals reviewed.   HENT:      Head: Normocephalic.   Cardiovascular:      Rate and Rhythm: Normal rate and regular rhythm.   Pulmonary:      Effort: Pulmonary effort is normal. No respiratory distress.      Breath sounds: Normal breath sounds. No wheezing.   Abdominal:      General: Bowel sounds are normal. There is no distension.      Palpations: Abdomen is soft.      Tenderness: There is no abdominal tenderness.   Musculoskeletal:         General: Normal range of motion.      Cervical back: Normal range of motion.   Skin:     Findings: No rash.   Neurological:      Mental Status: He is alert and oriented to person, place, and time.   Psychiatric:         Mood and Affect: Mood normal.         Behavior: Behavior normal.         Imaging      Lab Review   No results found for this or any previous visit (from the past 24 hour(s)).      Assessment/Plan:  1.  Sepsis syndrome - resolved  2.  MRSA sternal wound infection/dehiscence  3.  Leukocytosis  4.  Acute hypoxic respiratory failure  5.  Anemia  6.  Coronary artery disease s/p CABG  7.  Protein calorie malnutrition  8.  Pneumonia -  Proteus/Klebsiella isolates     -Stable off antibiotics  -Remains afebrile without leukocytosis  -Trach accidentally came out on 6/3 and unsuccessful attempts in placing it back in however respiratory status stable  -S/P sternal debridement with flap closure on 5/11 per Dr Weiss  -Continue wound care and drain management per Plastics  -PT/OT as ordered  -Discussed with patient " and nursing staff. CM continues to work on placement

## 2022-06-09 NOTE — PLAN OF CARE
Spoke with Tia at [facility], notified her of order for Psych consultwill put pt on list to be seen

## 2022-06-09 NOTE — PT/OT/SLP PROGRESS
Occupational Therapy  Treatment    Kendall Duff   MRN: [id number]   Admitting Diagnosis: Acute myocardial infarction of anterolateral wall    OT Date of Treatment: 06/09/22   OT Start Time: 0914  OT Stop Time: 0924  OT Total Time (min): 10 min     Billable Minutes:  Self Care/Home Management 1  Total Minutes: 10       OT/ALEKSANDR: ALEKSANDR     ALEKSANDR Visit Number: 4    General Precautions: Standard, fall, sternal  Orthopedic Precautions: N/A  Braces:           Subjective:  Communicated with RN prior to session.    Objective:  Pt. Agitated however agreeable to performing LB dressing task while donning shorts independently. Pt. Aggressive and agitated post dressing task, declining further ADL tasks. Pt. Left with RN.    Functional Mobility:  Bed Mobility:   Supine to sit: Independent   Sit to supine: Independent   Rolling: Independent   Scooting: Independent    Transfer Training:   Sit to stand:Independent with No Assistive Device .    Patient left up in chair with all lines intact and call button in reach    ASSESSMENT:  Kendall Duff is a 56 y.o. male with a medical diagnosis of Acute myocardial infarction of anterolateral wall . Pt. Overall agitated however able to perform dressing task appropriately.    Activity tolerance: Good    Discharge recommendations: nursing facility, skilled     Equipment recommendations: walker, rolling     GOALS:   Multidisciplinary Problems     Occupational Therapy Goals        Problem: Occupational Therapy    Goal Priority Disciplines Outcome Interventions   Occupational Therapy Goal     OT, PT/OT Ongoing, Progressing    Description: Goals to be met by: 6/21/22     Patient will increase functional independence with ADLs by performing:    Feeding with Moderate Assistance. When appropriate to initiate, or simulated hand to mouth.   UE Dressing with Moderate Assistance.  Grooming while long sitting/EOB/supported with Moderate Assistance.  Sitting at edge of bed x10-15 minutes with Moderate  Assistance.  Toilet transfer to bedside commode with Moderate Assistance.                     Plan:  Patient to be seen 5 x/week to address the above listed problems via self-care/home management, therapeutic activities, therapeutic exercises  Plan of Care expires: 06/30/22  Plan of Care reviewed with: patient         06/09/2022

## 2022-06-09 NOTE — PT/OT/SLP RE-EVAL
Physical Therapy Re-evaluation    Patient Name:  Kendall Duff   MRN:  [id number]    Recommendations:     Discharge Recommendations:  rehabilitation facility, home with home health   Discharge Equipment Recommendations: none   Barriers to discharge: poor safety awareness    Assessment:     Kendall Duff is a 56 y.o. male admitted with a medical diagnosis of Acute myocardial infarction of anterolateral wall.  He presents with the following impairments/functional limitations:  gait instability, impaired balance, decreased safety awareness, impaired functional mobilty.    Patient has made great progress recently. Appears limited by decreased cognition that is effecting his safety awareness. Would benefit from continued skilled services in order to minimize burden of care upon d/c as well as maximize functional independence and reduce the risk of falls or any other traumatic events. Briefly spoke to daughter about d/c disposition. She verbalized some concerns she is noticing about her father's cognition. All questions and concerns were addressed and answered as appropriate.     Rehab Prognosis:  good; patient would benefit from acute skilled PT services to address these deficits and reach maximum level of function.      Recent Surgery: Procedure(s) (LRB):  CLOSURE, STERNAL INCISION, REPEAT (N/A) 29 Days Post-Op    Plan:     During this hospitalization, patient to be seen daily to address the above listed problems via gait training, therapeutic activities, therapeutic exercises  · Plan of Care Expires:  06/21/22   Plan of Care Reviewed with: patient, daughter    Subjective     Communicated with NSG prior to session.  Patient found up in chair with blood pressure cuff, telemetry, Condom Catheter upon PT entry to room, agreeable to evaluation.      Chief Complaint: n/a  Patient comments/goals: to go home  Pain/Comfort:  · Pain Rating 1: 0/10    Patients cultural, spiritual, Mandaen conflicts given the current situation:  no      Objective:     Patient found with: blood pressure cuff, telemetry, Condom Catheter     General Precautions: Standard, fall, sternal   Orthopedic Precautions:N/A   Braces: N/A  Respiratory Status: Room air    Exams:  · Cognitive Exam:  Patient is oriented to Person, Place and Time  · RLE Strength: WFL  · LLE Strength: WFL    Functional Mobility:  · Transfers:     · Sit to Stand:  stand by assistance with no AD  · Gait: 300 feet without use of an AD; step-through pattern; minor LOBs when distracted; VCs provided to focus; poor safety awareness throughout      Patient left up in chair with call button in reach and daughter present.    GOALS:   Multidisciplinary Problems     Physical Therapy Goals        Problem: Physical Therapy    Goal Priority Disciplines Outcome Goal Variances Interventions   Physical Therapy Goal     PT, PT/OT Ongoing, Progressing     Description: Goals to be met by: 22     Patient will increase functional independence with mobility by performin. Supine to sit with independence  2. Sit to supine with independence  3. Bed to chair transfer with independence  4. Sit to stand with independence  5. Gait x 500 feet with independence without demonstration of safety conerns                     History:     Past Medical History:   Diagnosis Date    Bipolar disorder, unspecified     Chronic hepatitis C     Hypertension     Obesity, unspecified     Seizures     Stroke        Past Surgical History:   Procedure Laterality Date    CORONARY STENT PLACEMENT  2015    DEBRIDEMENT  2022    LEFT HEART CATHETERIZATION Left 2015    REPEAT CLOSURE OF STERNAL INCISION N/A 2022    Procedure: CLOSURE, STERNAL INCISION, REPEAT;  Surgeon: Adolfo Weiss MD;  Location: [facility];  Service: Plastics;  Laterality: N/A;  STERNAL WOUND DEBRIDEMENT AND RECONSTRUCTION // MULTIPLE MUSCLE FLAPS // REQ 1400       Time Tracking:     PT Received On: 22  PT Start Time: 1355     PT  Stop Time: 1409  PT Total Time (min): 14 min     Billable Minutes: Re-eval 1      06/09/2022

## 2022-06-09 NOTE — PLAN OF CARE
Problem: Physical Therapy  Goal: Physical Therapy Goal  Description: Goals to be met by: 22       Re-eval on - goals updated    Patient will increase functional independence with mobility by performin. Supine to sit with independence  2. Sit to supine with independence  3. Bed to chair transfer with independence  4. Sit to stand with independence  5. Gait x 500 feet with independence without demonstration of safety conerns    Outcome: Ongoing, Progressing

## 2022-06-09 NOTE — PLAN OF CARE
Spoke with pts nurse stated drains may come out today or tomorrow, spoke with pt dtr Summer regarding snf she requests 3 in [place], spoke with d/c coordinator Ghulam, she states [facility] facilities that accept [facility] ins [facility], Summer would to try [facility] all of those facilities, Ghulam will send referral to those, dtr states if those dont accept may consider taking him home with HH

## 2022-06-09 NOTE — PLAN OF CARE
Spoke with Ghulam, pt will need CT of head and a psych eval  (within 30 days for both) both are required for a Level 2-142 form which is required for any pts with psych hx/dx for snf placement, notified nurse and Dr Gil, Dr Gil will put in orders

## 2022-06-09 NOTE — PROGRESS NOTES
[facility]  HOSPITAL MEDICINE   PROGRESS NOTE      CHIEF COMPLAINT  Hospital follow up    HOSPITAL COURSE  56 y.o. male with a history that includes chronic hepatitis C, hypertension, seizures, CAD s/p stents, initially presented to [facility] on 4/1/2022 with a primary complaint of chest pain. Of note, he was seen at an outlying ED on 3/20/22 and was dx with MI, subsequently transferred to [facility] where he underwent LHC with angioplasty, and deemed appropriate for CABG, which had been scheduled for 3/29/22, but was cancelled. He then presented to ED on 4/1 with c/o CP earlier in the day. He was admitted to cardiology services, started on a heparin gtt. CV Surgery was consulted and the patient underwent CABG x4 on 4/6/22. He was admitted to ICU post CABG, intubated on mechanical ventilation. Psych was consulted during his stay to evaluate for some recent agitation, restlessness, fidgetiness, insomnia, confusion, and sexual inappropriateness, and he was started on PRN haldol. He was extubated to NC 4L on 4/7, remained restless and confused requiring Precedex gtt.  On 4/10/22 he experienced seizure-like activity and confusion for which neuro was consulted. EEG revealed moderate generalized slowing. CT Head was negative for acute intracranial abnormality, gas noted throughout the bilateral subcutaneous soft tissues.MRI on 4/15/22 revealed small linear focus of acute small vessel ischemia in the right frontal white matter. Developed diffuse subcutaneous emphysema per CXR. He remained agitated with delirium.  Unfortunately,he pulled out his left chest tube on 4/16/22 and he had been thrashing in bed, had to be restrained at times.  He developed an unstable sternum with purulence drainage. CT thorax revealed surgical changes with inflammation, fluid and air locules, minimal anterior pericardial effusion, extensive soft tissue emphysema, and bilateral pleural effusions and bilateral  "lower lung lobes collapse consolidations. He has known subcutaneous air, which has been present on previous x-rays.  Cultures positive for MRSA, and he was placed on Vancomycin. He required intubation on 4/16/22. On 4/17/22 he underwent sternal wound debridement with wound vac placement. ID was consulted on 4/22/22 due to sepsis. He was started on rifampin in addition to vancomycin for a planned 6 week course. Respiratory cultures from 4/24/22 returned positive for Proteus and Klebsiella. Remained intubated on mechanical ventilation. GI was consulted on 5/3/22 for PEG placement. Plastic surgery was also following for sternal reconstruction. He underwent tracheostomy and PEG placement on 5/10/22. NIVA done on 5/10/22 noted a RUE DVT. On 5/11/22 he underwent sternal wound debridement, bilateral fasciocutaneous flap advanced closure over sternum and bilateral pectoralis major muscle flap reconstruction of sternum. He was started on full dose Lovenox. He remained on mechanical ventilation until 5/21/22. Tolerating T-piece now on trach collar. He required vasopressors for BP support throughout his stay, which have since been weaned off. He has been weaned off of Precedex. He did have some vomiting on 5/21/22. Lovenox was transitioned to Eliquis on 5/21/22. KUB unrevealing, tube feeds restarted on 5/22/22. He was cleared for downgrade to the floor on 5/22/22 under the hospital medicine service. His trach tube fell of during cough and were ubnable to reinserted. He was stable from respiratory standpoint and close monitoring was continued.     Today  Seen this morning he was walking around the ICU.  Denies having any complaints.  He is ambulating around the salazar without any difficulty.  He still has KELLIE drains remaining in place.        OBJECTIVE/PHYSICAL EXAM  BP (!) 198/100   Pulse 99   Temp 98.4 °F (36.9 °C) (Oral)   Resp 18   Ht 5' 10.87" (1.8 m)   Wt 77.2 kg (170 lb 3.1 oz)   SpO2 97%   BMI 23.83 kg/m²   General: " In no acute distress, afebrile  Chest: Clear to auscultation bilaterally, sternotomy wound healing  Heart: S1, S2, no appreciable murmur  Abdomen: Soft, nontender, BS +, peg tube  MSK: Warm, no lower extremity edema, no clubbing or cyanosis  Neurologic: Alert and oriented x4, moving all extremities with good strength         ASSESSMENT/PLAN  Chronic hypoxic respiratory failure status post tracheostomy on 5/10/22 status post trach fell out post coughing  Oropharyngeal dysphagia s/p PEG placment on 5/10/22-status post started oral diet her SLP  Multivessel CAD s/p CABG x 4 on 04/06/2022.  MRSA sternal wound infection and dehiscence, status post wound vacuum-assisted closure device on 04/17/2022, and bilateral pectoral flaps for sternal wound closure on 05/11/2022.  Metabolic encephalopathy- improving  Acute right upper extremity deep venous thrombosis  Anemia chronic disease  Severe protein calorie malnutrition, hypoalbuminemia  Hypertension  Seizure disorder   Transaminitis, mild - resolved     Hx of  Schizophrenia, chronic hepatitis C, hypertension, CAD s/p stents       ID signed off.  Completed antibiotics.  Plastic surgery following along for drain management.  Two drains still remain in place, pending plans regarding those two for disposition.  Continues to work well with PT OT and speech therapy.   Case management following.  No longer LTAC candidate and unable to go to TCU due to insurance.  Unable to go to SNF due to drains.  Discontinue Haldol, continue olanzapine.  Cut back on oxcarbamazepine to home dose.  At this time plan is to figure out his drains issue than possibly home with home health?    Anticipated discharge and disposition:   __________________________________________________________________________    LABS/MICROBIOLOGY/MEDICATIONS/DIAGNOSTICS    LABS  Recent Labs     06/09/22  0205      K 3.8   CHLORIDE 107   CO2 24   BUN 24.4   CREATININE 0.81   GLUCOSE 172*   CALCIUM 8.3*   ALKPHOS 116    AST 26   ALT 25   ALBUMIN 2.2*     Recent Labs     06/09/22  0205   WBC 7.6   RBC 4.10*   HCT 35.4*   MCV 86.3          MICROBIOLOGY  Microbiology Results (last 7 days)     ** No results found for the last 168 hours. **          MEDICATIONS   amLODIPine  10 mg Oral Daily    apixaban  5 mg Oral BID    atorvastatin  80 mg Oral Daily    benztropine  1 mg Per OG tube BID    doxepin  50 mg Oral QHS    ezetimibe  10 mg Oral QHS    famotidine (PF)  20 mg Intravenous BID    fluticasone propionate  2 spray Each Nostril Daily    glycopyrrolate  1 mg Per NG tube TID    haloperidoL  5 mg Oral BID    levalbuterol  1.25 mg Nebulization Q8H    levETIRAcetam  500 mg Oral BID    lisinopriL  20 mg Oral Daily    metoprolol tartrate  25 mg Per OG tube Daily    olanzapine zydis  10 mg Oral QHS    OXcarbazepine  300 mg Per OG tube BID    polyethylene glycol  17 g Oral Daily    sodium chloride 0.9%  10 mL Intravenous Q12H    white petrolatum   Topical (Top) BID      INFUSIONS      DIAGNOSTIC TESTS  Fl Modified Barium Swallow Speech   Final Result      X-Ray Chest 1 View   Final Result      Slightly improved aeration in the right upper lobe.      No other significant change         Electronically signed by: Chad Jason   Date:    06/05/2022   Time:    07:53      X-Ray Chest AP Portable   Final Result      Tracheostomy cannula is not seen.      Cardiomediastinal silhouette is unchanged as compared with previous exam.      Increase in interstitial and pulmonary vascular markings         Electronically signed by: Chad Jason   Date:    06/03/2022   Time:    11:06      X-Ray Chest 1 View   Final Result      There is effusion of the left lung is noted increased in size. The right lung the demonstrates improved aeration in the interval. Recommend continued follow-up.         Electronically signed by: Rex Denson   Date:    06/02/2022   Time:    06:10      Fl Modified Barium Swallow Speech   Final Result       X-Ray Chest 1 View   Final Result      New bilateral alveolar opacities most pronounced in the perihilar lung zones may be related to edema in the setting of enlarged cardiac silhouette.  Other differential considerations include multifocal pneumonia or ARDS.         Electronically signed by: Sharon Cantu   Date:    05/24/2022   Time:    18:36      X-Ray Abdomen AP 1 View   Final Result         1. Interval removal of NG tube.   2. No convincing evidence of new or worsening intra-abdominal process.         Electronically signed by: Abner Reyes   Date:    05/21/2022   Time:    13:57      X-Ray Chest 1 View   Final Result      Stable exam without significant interval change.         Electronically signed by: Pascale Vences   Date:    05/19/2022   Time:    06:00      X-Ray Chest 1 View   Final Result      Right-sided PICC has been removed.  No other significant change.         Electronically signed by: Harjit Sellers   Date:    05/17/2022   Time:    07:05      X-Ray Chest 1 View   Final Result      Interval insertion of left-sided PICC line.      No other interval change         Electronically signed by: Chad Jason   Date:    05/16/2022   Time:    09:01      X-Ray Chest 1 View   Final Result      LINES/TUBES: Tracheostomy and right PICC with grossly unchanged positioning.      Markedly improved aeration bilaterally with now only minimal basilar opacities.      No pneumothoraces.         Electronically signed by: Jyoti López   Date:    05/14/2022   Time:    17:37      X-Ray Chest 1 View   Final Result      Interval insertion of tracheostomy cannula.      Cardiomegaly.      Increase in interstitial and pulmonary vascular markings indicating some degree of congestion.      Interval removal of nasogastric tube         Electronically signed by: Chad Jason   Date:    05/10/2022   Time:    13:52      X-Ray Chest 1 View   Final Result      Improved left pleural effusion and lower lung zone atelectasis.          Electronically signed by: Javier Soto   Date:    05/04/2022   Time:    08:03      X-Ray Abdomen AP 1 View   Final Result      Optimal placement of the nasogastric tube.         Electronically signed by: Javier Soto   Date:    05/03/2022   Time:    16:28      RADIOLOGY REPORT   Final Result      VAS US Venous Arm Right    (Results Pending)            All diagnosis and differential diagnosis have been reviewed; assessment and plan has been documented. I have personally reviewed the labs and test results that are presently available; I have reviewed the patients medication list. I have reviewed the consulting providers response and recommendations. I have reviewed or attempted to review medical records based upon their availability.  All of the patient's questions have been addressed and answered. Patient's is agreeable to the above stated plan. I will continue to monitor closely and make adjustments to medical management as needed.  This document was created using [employer]*Modal Fluency Direct.  Transcription errors may have been made.  Please contact me if any questions may rise regarding documentation to clarify verbiage.        Lee Gil MD   06/09/2022   Internal Medicine

## 2022-06-10 PROBLEM — F25.0 SCHIZOAFFECTIVE DISORDER, BIPOLAR TYPE: Status: ACTIVE | Noted: 2022-06-10

## 2022-06-10 PROCEDURE — 20000000 HC ICU ROOM

## 2022-06-10 PROCEDURE — 25000003 PHARM REV CODE 250: Performed by: INTERNAL MEDICINE

## 2022-06-10 PROCEDURE — 25000003 PHARM REV CODE 250: Performed by: STUDENT IN AN ORGANIZED HEALTH CARE EDUCATION/TRAINING PROGRAM

## 2022-06-10 PROCEDURE — A4216 STERILE WATER/SALINE, 10 ML: HCPCS

## 2022-06-10 PROCEDURE — 63600175 PHARM REV CODE 636 W HCPCS: Performed by: INTERNAL MEDICINE

## 2022-06-10 PROCEDURE — 25000003 PHARM REV CODE 250

## 2022-06-10 PROCEDURE — 97116 GAIT TRAINING THERAPY: CPT | Mod: CQ

## 2022-06-10 PROCEDURE — 97129 THER IVNTJ 1ST 15 MIN: CPT

## 2022-06-10 RX ORDER — TRAZODONE HYDROCHLORIDE 50 MG/1
100 TABLET ORAL NIGHTLY
Status: DISCONTINUED | OUTPATIENT
Start: 2022-06-10 | End: 2022-06-17 | Stop reason: HOSPADM

## 2022-06-10 RX ORDER — OXCARBAZEPINE 300 MG/1
300 TABLET, FILM COATED ORAL 2 TIMES DAILY
Status: DISCONTINUED | OUTPATIENT
Start: 2022-06-10 | End: 2022-06-17 | Stop reason: HOSPADM

## 2022-06-10 RX ORDER — OLANZAPINE 5 MG/1
10 TABLET ORAL NIGHTLY
Status: DISCONTINUED | OUTPATIENT
Start: 2022-06-10 | End: 2022-06-17 | Stop reason: HOSPADM

## 2022-06-10 RX ORDER — FAMOTIDINE 20 MG/1
20 TABLET, FILM COATED ORAL 2 TIMES DAILY
Status: DISCONTINUED | OUTPATIENT
Start: 2022-06-10 | End: 2022-06-17 | Stop reason: HOSPADM

## 2022-06-10 RX ADMIN — FAMOTIDINE 20 MG: 20 TABLET, FILM COATED ORAL at 09:06

## 2022-06-10 RX ADMIN — TRAZODONE HYDROCHLORIDE 100 MG: 50 TABLET ORAL at 09:06

## 2022-06-10 RX ADMIN — APIXABAN 5 MG: 5 TABLET, FILM COATED ORAL at 11:06

## 2022-06-10 RX ADMIN — OLANZAPINE 10 MG: 5 TABLET, FILM COATED ORAL at 09:06

## 2022-06-10 RX ADMIN — SODIUM CHLORIDE, PRESERVATIVE FREE 10 ML: 5 INJECTION INTRAVENOUS at 11:06

## 2022-06-10 RX ADMIN — LISINOPRIL 20 MG: 20 TABLET ORAL at 11:06

## 2022-06-10 RX ADMIN — DOXEPIN HYDROCHLORIDE 50 MG: 50 CAPSULE ORAL at 09:06

## 2022-06-10 RX ADMIN — LORAZEPAM 2 MG: 2 INJECTION INTRAMUSCULAR; INTRAVENOUS at 05:06

## 2022-06-10 RX ADMIN — BENZTROPINE MESYLATE 1 MG: 1 TABLET ORAL at 11:06

## 2022-06-10 RX ADMIN — BENZTROPINE MESYLATE 1 MG: 1 TABLET ORAL at 09:06

## 2022-06-10 RX ADMIN — OXCARBAZEPINE 300 MG: 300 TABLET, FILM COATED ORAL at 11:06

## 2022-06-10 RX ADMIN — ATORVASTATIN CALCIUM 80 MG: 40 TABLET, FILM COATED ORAL at 11:06

## 2022-06-10 RX ADMIN — LEVETIRACETAM 500 MG: 500 TABLET, FILM COATED ORAL at 11:06

## 2022-06-10 RX ADMIN — SODIUM CHLORIDE, PRESERVATIVE FREE 10 ML: 5 INJECTION INTRAVENOUS at 09:06

## 2022-06-10 RX ADMIN — EZETIMIBE 10 MG: 10 TABLET ORAL at 09:06

## 2022-06-10 RX ADMIN — APIXABAN 5 MG: 5 TABLET, FILM COATED ORAL at 09:06

## 2022-06-10 RX ADMIN — OXCARBAZEPINE 300 MG: 300 TABLET, FILM COATED ORAL at 09:06

## 2022-06-10 RX ADMIN — FLUTICASONE PROPIONATE 100 MCG: 50 SPRAY, METERED NASAL at 11:06

## 2022-06-10 RX ADMIN — METOPROLOL TARTRATE 25 MG: 25 TABLET, FILM COATED ORAL at 11:06

## 2022-06-10 RX ADMIN — AMLODIPINE BESYLATE 10 MG: 5 TABLET ORAL at 11:06

## 2022-06-10 RX ADMIN — LEVETIRACETAM 500 MG: 500 TABLET, FILM COATED ORAL at 09:06

## 2022-06-10 NOTE — PROGRESS NOTES
[facility] - [facility] [address] [facility]  Adult Nutrition  Progress Note    SUMMARY       Recommendations    Recommendation/Intervention: Regular diet/Easy to chew as appropriate.      Assessment and Plan    Nutrition Problem  Inadequate Oral Intake     Related to (etiology):   Current condition             Signs and Symptoms (as evidenced by):   trach     Interventions(treatment strategy):  Collaboration with other providers     Nutrition Diagnosis Status:   Continues    Nutrition Goal Status: progressing towards goal  Communication of RD Recs: reviewed with RN  Goals: Meet >/=75% est energy and protein requirements by f/u    Malnutrition Assessment  Malnutrition Type: acute illness or injury  Weight Loss (Malnutrition): other (see comments) (does not meet criteria)  Energy Intake (Malnutrition): other (see comments) (does not meet criteria)  Subcutaneous Fat (Malnutrition):  (does not meet criteria)  Muscle Mass (Malnutrition): mild depletion  Fluid Accumulation (Malnutrition):  (does not meet criteria)  Hand  Strength, Left (Malnutrition):  (unable to eval)  Hand  Strength, Right (Malnutrition):  (unable to eval)   [place] Region (Muscle Loss): mild depletion  Clavicle Bone Region (Muscle Loss): mild depletion   Edema (Fluid Accumulation): 0-->no edema present   Subcutaneous Fat Loss (Final Summary): well nourished  Muscle Loss Evaluation (Final Summary): well nourished  Fluid Accumulation Evaluation:  (unable to evaluate)    Moderate Weight Loss (Malnutrition):  (unable to evaluate)    Reason for Assessment    Reason For Assessment: RD follow-up  Diagnosis: 1. CAD with 4 vessel CABG 4/6  2. MRSA Sternal wound dehiscence with debridement and wound vac 4/17.  Plastics following  3. Respiratory culture positive Klebsiella and Proteus  4. Malnutrition.  5. Respiratory failure.  Intubated 4/15  Relevant Medical History:    General Information Comments:   5/24/22: Tube feeding continues. Tolerated per RN.  "Noted now off diprivan. Will update tube feeding to provide est kcal needs and eliminate need for protein packets.    5/26/22: Tube feeding continues, tolerated @ goal rate. No kcal from meds.     5/30/22: Tube feeding continues, tolerated @ goal rate per RN.  6/3/22: Tube feeding continues, tolerated per RN. Noted SLP continues to work with pt.   6/7/22: Pt now on regular diet. Very excited about being able to eat. Eating 100% of meals.   6/10/22: Pt eating well. Sleepy this AM. Previously eating 100% of meals.     Nutrition Risk Screen    Nutrition Risk Screen: none    Nutrition/Diet History    Spiritual, Cultural Beliefs, Restorationism Practices, Values that Affect Care: no  Factors Affecting Nutritional Intake: none    Anthropometrics    Temp: 97.7 °F (36.5 °C)  Height Method: Estimated  Height: 5' 10.87" (180 cm)  Height (inches): 70.87 in  Weight Method: Bed Scale  Weight: 77.2 kg (170 lb 3.1 oz)  Weight (lb): 170.2 lb  Ideal Body Weight (IBW), Male: 171.22 lb  % Ideal Body Weight, Male (lb): 112.92 %  BMI (Calculated): 23.8  BMI Grade: 18.5-24.9 - normal    Lab/Procedures/Meds    Pertinent Labs Reviewed: reviewed  Pertinent Labs Comments: 6/9 Glu 172  Pertinent Medications Reviewed: reviewed    Estimated/Assessed Needs    Weight Used For Calorie Calculations: 77.2 kg (170 lb 3.1 oz)  Energy Calorie Requirements (kcal): 2257kcal  Energy Need Method: [place]-St Jeor (1.4 stress factor)  Protein Requirements: 116-175gm  Weight Used For Protein Calculations: 77.2 kg (170 lb 3.1 oz)  Fluid Requirements (mL): 30mL/kg  Estimated Fluid Requirement Method: RDA Method  RDA Method (mL): 2257     Nutrition Prescription Ordered    Current Diet Order: Easy to Chew    Nutrition Risk    Level of Risk/Frequency of Follow-up: moderate     Monitor and Evaluation    Food and Nutrient Intake: energy intake  Food and Nutrient Adminstration: po diet    Nutrition Follow-Up    RD Follow-up?: Yes    "

## 2022-06-10 NOTE — SUBJECTIVE & OBJECTIVE
Patient History               Medical as of 6/10/2022       Past Medical History       Diagnosis Date Comments Source    Bipolar disorder, unspecified -- -- Provider    Chronic hepatitis C -- -- Provider    History of psychiatric hospitalization -- -- Provider    Hx of psychiatric care -- -- Provider    Hypertension -- -- Provider    Bessie -- -- Provider    Obesity, unspecified -- -- Provider    Psychiatric problem -- -- Provider    Schizoaffective disorder, bipolar type -- -- Provider    Seizures -- -- Provider    Stroke -- -- Provider    Substance abuse -- -- Provider    Therapy -- -- Provider              Pertinent Negatives       Diagnosis Date Noted Comments Source    Suicide attempt 06/10/2022 -- Provider                          Surgical as of 6/10/2022       Past Surgical History       Procedure Laterality Date Comments Source    LEFT HEART CATHETERIZATION Left 08/13/2015 -- Provider    DEBRIDEMENT -- 04/17/2022 -- Provider    CORONARY STENT PLACEMENT -- 08/14/2015 -- Provider    REPEAT CLOSURE OF STERNAL INCISION N/A 5/11/2022 Procedure: CLOSURE, STERNAL INCISION, REPEAT;  Surgeon: Adolfo Weiss MD;  Location: [facility];  Service: Plastics;  Laterality: N/A;  STERNAL WOUND DEBRIDEMENT AND RECONSTRUCTION // MULTIPLE MUSCLE FLAPS // REQ 1400 Provider                          Family as of 6/10/2022       Problem Relation Name Age of Onset Comments Source    Cancer Mother -- -- -- Provider    Liver disease Father -- -- -- Provider                  Tobacco Use as of 6/10/2022       Smoking Status Smoking Start Date Smoking Quit Date Packs/Day Years Used    Current Every Day Smoker -- -- -- --      Types Comments Smokeless Tobacco Status Smokeless Tobacco Quit Date Source     Cigarettes -- Never Used -- Provider                  Alcohol Use as of 6/10/2022       Alcohol Use Drinks/Week Alcohol/Week Comments Source    Never   -- -- --                  Drug Use as of 6/10/2022       Drug Use Types Frequency  "  Outpatient Occupational Therapy  DAILY TREATMENT     [facility] Occupational [address]  [address]  [place] [address]  Phone:  [phone]  Fax:  [phone]    Date: 01/15/2018    Patient: Torey Lima  YOB: 1946  MRN: [id number]     Time Calculation  Start time: 1030  Stop time: 1130 Time Calculation (min): 60 minutes     Chief Complaint: Hand Weakness    Visit #: 14    SUBJECTIVE:  \"My hands have not gotten any better since last time\", \"I couldn't tie my shoes\"    OBJECTIVE:  Current objective measures:   Bilateral hands: FDP strength: trace to 2-/5 depending on digits (RH IF/SF 2-/5, LH MF/SF 2-/5).  PROM at DIPs WNL.  No extensor tone present       Therapeutic Exercises (CPT 01496):     Total treatment time spent on Therapeutic Exercises: 30 minutes.      1. Bilateral hands    Therapeutic Exercise Summary:  Composite flexion x 20 reps with minimal DIP flexion.   Ther ex:  RH resistance to gross flexion while blocking at PIP joints to facilitate DIP flexion, most notable at IF/SF, repeated with LH with DIP flexion most notable at SF/MF.  Followed with attempts at achieving intrinsic minus and \"half claw\" position with slight increase in DIP flexion from initial trial.  Updated HEP with good understanding.      Therapeutic Treatments and Modalities:    1. E Stim Attended (CPT 55724), 15 minutes    2. Manual Therapy (CPT 33941), 15 minutes    Therapeutic Treatments and Modalities Summary: Therapeutic Treatments and Modalities Summary: TENS: bilateral volar forearms for stim of FDP/FDS at 25 Hz x 5 minutes, NMES 25 Hz x 10 minutes. Wrists occasionally manually held in place to avoid wrist flexion, active digit flexion combined with stim during last 2 minutes.    Bilateral hand digits 2-5 DIP joint mobilization and passive sustained stretching into flexion.  Able to manually place hands in full composite fist without resistance or tone.     ASSESSMENT:   Response to " Comments Source    Not Currently  Cocaine -- -- Provider                  Sexual Activity as of 6/10/2022       Sexually Active Birth Control Partners Comments Source    Not Asked -- -- -- Provider                  Activities of Daily Living as of 6/10/2022    None               Social Documentation as of 6/10/2022    He denied illicit drug use but is daughter Summer reports that he has used crack cocaine off and on.  Source: Provider               Occupational as of 6/10/2022    None               Socioeconomic as of 6/10/2022       Marital Status Spouse Name Number of Children Years Education Education Level Preferred Language Ethnicity Race Source    Single -- 3 -- -- English Not  or /a Black or , White Provider                  Pertinent History       Question Response Comments    Lives with family [facility], he was living with his daughter Summer for the past year.    Place in Birth Order -- --    Lives in -- --    Number of Siblings -- --    Raised by -- --    Legal Involvement -- --    Childhood Trauma -- --    Criminal History of -- --    Financial Status -- --    Highest Level of Education -- --    Does patient have access to a firearm? -- --     Service -- --    Primary Leisure Activity -- --    Spirituality -- --          Past Medical History:   Diagnosis Date    Bipolar disorder, unspecified     Chronic hepatitis C     History of psychiatric hospitalization     Hx of psychiatric care     Hypertension     Bessie     Obesity, unspecified     Psychiatric problem     Schizoaffective disorder, bipolar type     Seizures     Stroke     Substance abuse     Therapy      Past Surgical History:   Procedure Laterality Date    CORONARY STENT PLACEMENT  08/14/2015    DEBRIDEMENT  04/17/2022    LEFT HEART CATHETERIZATION Left 08/13/2015    REPEAT CLOSURE OF STERNAL INCISION N/A 5/11/2022    Procedure: CLOSURE, STERNAL INCISION, REPEAT;  Surgeon: Adolfo Weiss MD;  Location: [facility]  treatment:  Pt continues to make slow progress with activation of FDP bilateral hands but is responding to NMES, stretching, and joint blocking ther ex when combined with gentle resistance to facilitate an increase in overall contractions.  HEP updated with good understanding.    PLAN/RECOMMENDATIONS:   Plan for treatment: therapy treatment to continue next visit.  Planned interventions for next visit: continue with current treatment, E-stim attended (CPT 71447), manual therapy (CPT 87240), therapeutic activities (CPT 92151) and therapeutic exercise (CPT 80015)       OR;  Service: Plastics;  Laterality: N/A;  STERNAL WOUND DEBRIDEMENT AND RECONSTRUCTION // MULTIPLE MUSCLE FLAPS // REQ 1400     Family History       Problem Relation (Age of Onset)    Cancer Mother    Liver disease Father          Tobacco Use    Smoking status: Current Every Day Smoker     Types: Cigarettes    Smokeless tobacco: Never Used   Substance and Sexual Activity    Alcohol use: Never    Drug use: Not Currently     Types: Cocaine    Sexual activity: Not on file     Review of patient's allergies indicates:   Allergen Reactions    Depakote [divalproex]     Divalproex sodium Other (See Comments)    Lithium     Lithium analogues     Quetiapine Other (See Comments)       No current facility-administered medications on file prior to encounter.     Current Outpatient Medications on File Prior to Encounter   Medication Sig    amLODIPine (NORVASC) 5 MG tablet Take 1 tablet (5 mg total) by mouth once daily.    aspirin 81 MG Chew Chew and swallow 1 tablet (81 mg total) by mouth once daily.    doxepin (SINEQUAN) 25 MG capsule Take 50 mg by mouth nightly.    lisinopriL (PRINIVIL,ZESTRIL) 20 MG tablet Take 20 mg by mouth once daily.    clopidogreL (PLAVIX) 75 mg tablet Take 1 tablet (75 mg total) by mouth once daily.    doxepin (SINEQUAN) 25 MG capsule SMARTSI Capsule(s) By Mouth Every Evening    metoprolol succinate (TOPROL-XL) 25 MG 24 hr tablet Take 25 mg by mouth once daily.    metoprolol tartrate (LOPRESSOR) 50 MG tablet Take 1 tablet (50 mg total) by mouth once daily.    OLANZapine (ZYPREXA) 5 MG tablet Take 5 mg by mouth nightly.    OXcarbazepine (TRILEPTAL) 150 MG Tab Take 150 mg by mouth 2 (two) times daily.    pravastatin (PRAVACHOL) 40 MG tablet Take 1 tablet (40 mg total) by mouth every evening.    traZODone (DESYREL) 100 MG tablet Take 100 mg by mouth nightly.     Psychotherapeutics (From admission, onward)                Start     Stop Route Frequency Ordered    06/10/22 2100  traZODone tablet 100 mg      "    -- Oral Nightly 06/10/22 0905    06/10/22 0908  lorazepam (ATIVAN) injection 1 mg         -- IV Every 2 hours PRN 06/10/22 0908    05/28/22 2100  olanzapine zydis disintegrating tablet 10 mg         -- Oral Nightly 05/28/22 0715    05/01/22 2100  doxepin capsule 50 mg         -- Oral Nightly 04/30/22 1733    04/30/22 1903  ziprasidone injection 20 mg         -- IM Every 8 hours PRN 04/30/22 1804 04/30/22 1808  OLANZapine injection 10 mg         -- IM Every 8 hours PRN 04/30/22 1733          Review of Systems   Constitutional:  Positive for activity change.   HENT: Negative.     Eyes: Negative.    Respiratory:  Positive for shortness of breath.    Cardiovascular:  Positive for chest pain.   Gastrointestinal: Negative.    Endocrine: Negative.    Musculoskeletal:         +weakness   Neurological:  Positive for weakness.   Psychiatric/Behavioral:  Positive for agitation, hallucinations (questionable) and sleep disturbance. Negative for dysphoric mood, self-injury and suicidal ideas.    Strengths and Liabilities: Strength: Patient has positive support network., Liability: Patient is dependent., Liability: Patient is impulsive., Liability: Patient has poor health., Liability: Patient has possible cognitive impairment.    Objective:     Vital Signs (Most Recent):  Temp: 97.7 °F (36.5 °C) (06/10/22 0400)  Pulse: 87 (06/10/22 1148)  Resp: 20 (06/10/22 0400)  BP: (!) 155/93 (06/10/22 1145)  SpO2: 98 % (06/10/22 0400)   Vital Signs (24h Range):  Temp:  [97.7 °F (36.5 °C)-98.2 °F (36.8 °C)] 97.7 °F (36.5 °C)  Pulse:  [85-96] 87  Resp:  [20-22] 20  SpO2:  [98 %-100 %] 98 %  BP: (103-161)/() 155/93     Height: 5' 10.87" (180 cm)  Weight: 77.2 kg (170 lb 3.1 oz)  Body mass index is 23.83 kg/m².      Intake/Output Summary (Last 24 hours) at 6/10/2022 1308  Last data filed at 6/10/2022 0000  Gross per 24 hour   Intake 1501 ml   Output 97 ml   Net 1404 ml       Physical Exam  Vitals and nursing note reviewed. "   Constitutional:       Appearance: He is normal weight.   Neurological:      Mental Status: He is alert.   Psychiatric:         Attention and Perception: Attention normal.         Mood and Affect: Mood and affect normal.         Speech: Speech is delayed.         Behavior: Behavior is cooperative.         Thought Content: Thought content does not include homicidal or suicidal ideation.         Cognition and Memory: Cognition is impaired. Memory is impaired.         Judgment: Judgment is impulsive.      Comments: Mr. Duff says that he does not have AVH, however, his daughter Arabella suggests that he said something that leads her to believe that he may be experiencing them.  None actively noted during exam.     NEUROLOGICAL EXAMINATION:     MENTAL STATUS   Disoriented to person.   Disoriented to place.   Oriented to time.   Registration: recalls 3 of 3 objects. Recall of objects at 5 minutes: 0/3.   Attention: decreased. Concentration: decreased.   Level of consciousness: alert  Unable to perform simple calculations.   Able to name object.   Significant Labs: Last 72 Hours:   Recent Lab Results         06/09/22  0205        Albumin/Globulin Ratio 0.6       Albumin 2.2       Alkaline Phosphatase 116       ALT 25       AST 26       Baso # 0.06       Basophil % 0.8       BILIRUBIN TOTAL 0.3       BUN 24.4       Calcium 8.3       Chloride 107       CO2 24       Creatinine 0.81       eGFR if  >60       eGFR if non African American >60       Eos # 0.32       Eosinophil % 4.2       Globulin, Total 3.8       Glucose 172       Hematocrit 35.4       Hemoglobin 10.5       Immature Grans (Abs) 0.03       Immature Granulocytes 0.4       Lymph # 2.14       LYMPH % 28.3       MCH 25.6       MCHC 29.7       MCV 86.3       Mono # 0.74       Mono % 9.8       MPV 9.8       Neut # 4.3       Neut % 56.5       nRBC 0.0       Platelets 321       Potassium 3.8       PROTEIN TOTAL 6.0       RBC 4.10       RDW 17.2        Sodium 141       WBC 7.6               Significant Imaging: I have reviewed all pertinent imaging results/findings within the past 24 hours.

## 2022-06-10 NOTE — HPI
Mr. Duff reports that he initially came into the hospital because he was feeling really bad.  He ended out having a MI and had a CABG done.  He has numerous medical complications since this hospitalization started on 4/1/22.  Psych consult was ordered by primary care hospital provider as required by nursing home that he may be going to.    Mr. Garay reports that he has been doing well.  He says that he had not been sleeping well the past couple nights.  He says that he has been having racing thoughts and this keeps him up.  He denies depression.  He says that he has not had depression in months.  He denies an elevated mood saying that he has not had this in a while as well.  He denies AVH and delusional thinking.  He denies illicit drug use but his daughter Arabella, who I have spoke with, says that he has a history of crack cocaine use.  Mr. Duff says that he has had schizophrenia since his 18.  Dr. Luz Moy is his psychiatrist.  Arabella says that her dad has lived with her for the last year and he had three psych hospitalizations during that time.  She says that her dad's most recent psych hospitalization was 02/22 due to him acting hyper, being restless, and him hallucinating.  She says that she was sure that his behaviors were due to drug use but she said that there was no illicit drugs in his system at that hospitalization.  She says that she visited with him in the hospital recently and he told her that and ugly monster standing next to her told him that she does not want him to go back home with her.  She says that she is unsure why her dad would says that other than him hallucinating.    Head CT done yesterday shows chronic small vessel ischemic changes, which was present at a previous reading in April/May 2022.

## 2022-06-10 NOTE — PROGRESS NOTES
[facility]  HOSPITAL MEDICINE   PROGRESS NOTE      CHIEF COMPLAINT  Hospital follow up    HOSPITAL COURSE  56 y.o. male with a history that includes chronic hepatitis C, hypertension, seizures, CAD s/p stents, initially presented to [facility] on 4/1/2022 with a primary complaint of chest pain. Of note, he was seen at an outlying ED on 3/20/22 and was dx with MI, subsequently transferred to [facility] where he underwent LHC with angioplasty, and deemed appropriate for CABG, which had been scheduled for 3/29/22, but was cancelled. He then presented to ED on 4/1 with c/o CP earlier in the day. He was admitted to cardiology services, started on a heparin gtt. CV Surgery was consulted and the patient underwent CABG x4 on 4/6/22. He was admitted to ICU post CABG, intubated on mechanical ventilation. Psych was consulted during his stay to evaluate for some recent agitation, restlessness, fidgetiness, insomnia, confusion, and sexual inappropriateness, and he was started on PRN haldol. He was extubated to NC 4L on 4/7, remained restless and confused requiring Precedex gtt.  On 4/10/22 he experienced seizure-like activity and confusion for which neuro was consulted. EEG revealed moderate generalized slowing. CT Head was negative for acute intracranial abnormality, gas noted throughout the bilateral subcutaneous soft tissues.MRI on 4/15/22 revealed small linear focus of acute small vessel ischemia in the right frontal white matter. Developed diffuse subcutaneous emphysema per CXR. He remained agitated with delirium.  Unfortunately,he pulled out his left chest tube on 4/16/22 and he had been thrashing in bed, had to be restrained at times.  He developed an unstable sternum with purulence drainage. CT thorax revealed surgical changes with inflammation, fluid and air locules, minimal anterior pericardial effusion, extensive soft tissue emphysema, and bilateral pleural effusions and bilateral  "lower lung lobes collapse consolidations. He has known subcutaneous air, which has been present on previous x-rays.  Cultures positive for MRSA, and he was placed on Vancomycin. He required intubation on 4/16/22. On 4/17/22 he underwent sternal wound debridement with wound vac placement. ID was consulted on 4/22/22 due to sepsis. He was started on rifampin in addition to vancomycin for a planned 6 week course. Respiratory cultures from 4/24/22 returned positive for Proteus and Klebsiella. Remained intubated on mechanical ventilation. GI was consulted on 5/3/22 for PEG placement. Plastic surgery was also following for sternal reconstruction. He underwent tracheostomy and PEG placement on 5/10/22. NIVA done on 5/10/22 noted a RUE DVT. On 5/11/22 he underwent sternal wound debridement, bilateral fasciocutaneous flap advanced closure over sternum and bilateral pectoralis major muscle flap reconstruction of sternum. He was started on full dose Lovenox. He remained on mechanical ventilation until 5/21/22. Tolerating T-piece now on trach collar. He required vasopressors for BP support throughout his stay, which have since been weaned off. He has been weaned off of Precedex. He did have some vomiting on 5/21/22. Lovenox was transitioned to Eliquis on 5/21/22. KUB unrevealing, tube feeds restarted on 5/22/22. He was cleared for downgrade to the floor on 5/22/22 under the hospital medicine service. His trach tube fell of during cough and were ubnable to reinserted. He was stable from respiratory standpoint and close monitoring was continued.     Today  This morning he was very lethargic and falling asleep quickly.  Apparently he had to be given Ativan because he was having confusion and apparently had a bowel movement in the nightstand drawer.        OBJECTIVE/PHYSICAL EXAM  /69   Pulse 85   Temp 97.7 °F (36.5 °C) (Axillary)   Resp 20   Ht 5' 10.87" (1.8 m)   Wt 77.2 kg (170 lb 3.1 oz)   SpO2 98%   BMI 23.83 " kg/m²   General: In no acute distress, afebrile  Chest: Clear to auscultation bilaterally, sternotomy wound healing  Heart: S1, S2, no appreciable murmur  Abdomen: Soft, nontender, BS +, peg tube  MSK: Warm, no lower extremity edema, no clubbing or cyanosis  Neurologic: Alert and oriented x4, moving all extremities with good strength         ASSESSMENT/PLAN  Chronic hypoxic respiratory failure status post tracheostomy on 5/10/22 status post trach fell out post coughing  Oropharyngeal dysphagia s/p PEG placment on 5/10/22-status post started oral diet her SLP  Multivessel CAD s/p CABG x 4 on 04/06/2022.  MRSA sternal wound infection and dehiscence, status post wound vacuum-assisted closure device on 04/17/2022, and bilateral pectoral flaps for sternal wound closure on 05/11/2022.  Metabolic encephalopathy- improving  Acute right upper extremity deep venous thrombosis  Anemia chronic disease  Severe protein calorie malnutrition, hypoalbuminemia  Hypertension  Seizure disorder   Transaminitis, mild - resolved     Hx of  Schizophrenia, chronic hepatitis C, hypertension, CAD s/p stents       ID signed off.  Completed antibiotics.  Plastic surgery following along for drain management.  Two drains still remain in place, pending plans regarding those two for disposition.  Continues to work well with PT OT and speech therapy.   Case management following.  No longer LTAC candidate and unable to go to TCU due to insurance.  Unable to go to SNF due to drains.  Discontinued Haldol, continue olanzapine.  Start trazodone 100 mg at nighttime.  Psychiatry has also been consulted per Nursing Home request.  Pending their eval for med adjustment.    Anticipated discharge and disposition:   __________________________________________________________________________    LABS/MICROBIOLOGY/MEDICATIONS/DIAGNOSTICS    LABS  Recent Labs     06/09/22  0205      K 3.8   CHLORIDE 107   CO2 24   BUN 24.4   CREATININE 0.81   GLUCOSE 172*    CALCIUM 8.3*   ALKPHOS 116   AST 26   ALT 25   ALBUMIN 2.2*     Recent Labs     06/09/22  0205   WBC 7.6   RBC 4.10*   HCT 35.4*   MCV 86.3          MICROBIOLOGY  Microbiology Results (last 7 days)     ** No results found for the last 168 hours. **          MEDICATIONS   amLODIPine  10 mg Oral Daily    apixaban  5 mg Oral BID    atorvastatin  80 mg Oral Daily    benztropine  1 mg Per OG tube BID    doxepin  50 mg Oral QHS    ezetimibe  10 mg Oral QHS    famotidine (PF)  20 mg Intravenous BID    fluticasone propionate  2 spray Each Nostril Daily    levETIRAcetam  500 mg Oral BID    lisinopriL  20 mg Oral Daily    metoprolol tartrate  25 mg Per OG tube Daily    olanzapine zydis  10 mg Oral QHS    OXcarbazepine  150 mg Per OG tube BID    polyethylene glycol  17 g Oral Daily    sodium chloride 0.9%  10 mL Intravenous Q12H    white petrolatum   Topical (Top) BID      INFUSIONS      DIAGNOSTIC TESTS  CT Head Without Contrast   Final Result      1. No acute intracranial abnormalities.   2. Bilateral mastoid effusions, left greater than right.   3. Interval resolution of previously visualized diffuse subcutaneous emphysema of the imaged maxillofacial soft tissues.         Electronically signed by: Glenn Francis   Date:    06/09/2022   Time:    15:47      Fl Modified Barium Swallow Speech   Final Result      X-Ray Chest 1 View   Final Result      Slightly improved aeration in the right upper lobe.      No other significant change         Electronically signed by: Chad Jason   Date:    06/05/2022   Time:    07:53      X-Ray Chest AP Portable   Final Result      Tracheostomy cannula is not seen.      Cardiomediastinal silhouette is unchanged as compared with previous exam.      Increase in interstitial and pulmonary vascular markings         Electronically signed by: Chad Jason   Date:    06/03/2022   Time:    11:06      X-Ray Chest 1 View   Final Result      There is effusion of the left  lung is noted increased in size. The right lung the demonstrates improved aeration in the interval. Recommend continued follow-up.         Electronically signed by: Rex Denson   Date:    06/02/2022   Time:    06:10      Fl Modified Barium Swallow Speech   Final Result      X-Ray Chest 1 View   Final Result      New bilateral alveolar opacities most pronounced in the perihilar lung zones may be related to edema in the setting of enlarged cardiac silhouette.  Other differential considerations include multifocal pneumonia or ARDS.         Electronically signed by: Sharon Cantu   Date:    05/24/2022   Time:    18:36      X-Ray Abdomen AP 1 View   Final Result         1. Interval removal of NG tube.   2. No convincing evidence of new or worsening intra-abdominal process.         Electronically signed by: Abner Reyes   Date:    05/21/2022   Time:    13:57      X-Ray Chest 1 View   Final Result      Stable exam without significant interval change.         Electronically signed by: Pascale Vences   Date:    05/19/2022   Time:    06:00      X-Ray Chest 1 View   Final Result      Right-sided PICC has been removed.  No other significant change.         Electronically signed by: Harjit Sellers   Date:    05/17/2022   Time:    07:05      X-Ray Chest 1 View   Final Result      Interval insertion of left-sided PICC line.      No other interval change         Electronically signed by: Chad Jason   Date:    05/16/2022   Time:    09:01      X-Ray Chest 1 View   Final Result      LINES/TUBES: Tracheostomy and right PICC with grossly unchanged positioning.      Markedly improved aeration bilaterally with now only minimal basilar opacities.      No pneumothoraces.         Electronically signed by: Jyoti López   Date:    05/14/2022   Time:    17:37      X-Ray Chest 1 View   Final Result      Interval insertion of tracheostomy cannula.      Cardiomegaly.      Increase in interstitial and pulmonary vascular markings  indicating some degree of congestion.      Interval removal of nasogastric tube         Electronically signed by: Chad Jason   Date:    05/10/2022   Time:    13:52      X-Ray Chest 1 View   Final Result      Improved left pleural effusion and lower lung zone atelectasis.         Electronically signed by: Javier Soto   Date:    05/04/2022   Time:    08:03      X-Ray Abdomen AP 1 View   Final Result      Optimal placement of the nasogastric tube.         Electronically signed by: Javier Soto   Date:    05/03/2022   Time:    16:28      RADIOLOGY REPORT   Final Result      VAS US Venous Arm Right    (Results Pending)            All diagnosis and differential diagnosis have been reviewed; assessment and plan has been documented. I have personally reviewed the labs and test results that are presently available; I have reviewed the patients medication list. I have reviewed the consulting providers response and recommendations. I have reviewed or attempted to review medical records based upon their availability.  All of the patient's questions have been addressed and answered. Patient's is agreeable to the above stated plan. I will continue to monitor closely and make adjustments to medical management as needed.  This document was created using M*Modal Fluency Direct.  Transcription errors may have been made.  Please contact me if any questions may rise regarding documentation to clarify verbiage.        Lee Gil MD   06/10/2022   Internal Medicine

## 2022-06-10 NOTE — PT/OT/SLP PROGRESS
Physical Therapy Treatment    Patient Name:  Kendall Duff   MRN:  [id number]    Recommendations:     Discharge Recommendations:  rehabilitation facility, home with home health   Discharge Equipment Recommendations: none   Barriers to discharge: Decreased caregiver support    Assessment:     Kendall Duff is a 56 y.o. male admitted with a medical diagnosis of Acute myocardial infarction of anterolateral wall.  He presents with the following impairments/functional limitations:    .    Rehab Prognosis: Good; patient would benefit from acute skilled PT services to address these deficits and reach maximum level of function.    Recent Surgery: Procedure(s) (LRB):  CLOSURE, STERNAL INCISION, REPEAT (N/A) 30 Days Post-Op    Plan:     During this hospitalization, patient to be seen 6 x/week to address the identified rehab impairments via gait training, therapeutic activities and progress toward the following goals:    · Plan of Care Expires:  06/21/22    Subjective     Chief Complaint: sternal pain during activity  Patient/Family Comments/goals:   Pain/Comfort:  ·        Objective:     Communicated with RN prior to session.  Patient found up in chair with KELLIE drain upon PT entry to room.     General Precautions: Standard, fall, sternal   Orthopedic Precautions:N/A   Braces:    Respiratory Status: Room air     Functional Mobility:  · Bed Mobility:     · Sit to Supine: minimum assistance  · Transfers:     · Sit to Stand:  minimum assistance with hand-held assist  · Gait: Pt amb 60ftx1, 80ftx2 HHA. Seated RB required. Pt demos balance deficits and needs VS for safety awareness.      AM-PAC 6 CLICK MOBILITY          Therapeutic Activities and Exercises:   Pt performed LE therex UIC 15x1.    Patient left HOB elevated with all lines intact, call button in reach and RN present..    GOALS:   Multidisciplinary Problems     Physical Therapy Goals        Problem: Physical Therapy    Goal Priority Disciplines Outcome Goal Variances  Interventions   Physical Therapy Goal     PT, PT/OT Ongoing, Progressing     Description: Goals to be met by: 22     Patient will increase functional independence with mobility by performin. Supine to sit with independence  2. Sit to supine with independence  3. Bed to chair transfer with independence  4. Sit to stand with independence  5. Gait x 500 feet with independence without demonstration of safety conerns                     Time Tracking:     PT Received On: 06/10/22  PT Start Time: 1114     PT Stop Time: 1128  PT Total Time (min): 14 min     Billable Minutes: Gait Training 14    Treatment Type: Treatment  PT/PTA: PTA     PTA Visit Number: 1     06/10/2022

## 2022-06-10 NOTE — CONSULTS
"[facility] [facility]  Psychiatry  Consult Note    Patient Name: Kendall Duff  MRN: [id number]   Code Status: Prior  Admission Date: 4/1/2022  Hospital Length of Stay: 70 days  Attending Physician: Hawk Asif MD  Primary Care Provider: Primary Doctor No    Current Legal Status: N/A    Patient information was obtained from patient, relative(s), ER records and primary team.   Consults  Subjective:     Principal Problem:Acute myocardial infarction of anterolateral wall    Chief Complaint:  "I could not sleep for a couple nights."     HPI: Mr. Duff reports that he initially came into the hospital because he was feeling really bad.  He ended out having a MI and had a CABG done.  He has numerous medical complications since this hospitalization started on 4/1/22.  Psych consult was ordered by primary care hospital provider as required by nursing home that he may be going to.    Mr. Garay reports that he has been doing well.  He says that he had not been sleeping well the past couple nights.  He says that he has been having racing thoughts and this keeps him up.  He denies depression.  He says that he has not had depression in months.  He denies an elevated mood saying that he has not had this in a while as well.  He denies AVH and delusional thinking.  He denies illicit drug use but his daughter Arabella, who I have spoke with, says that he has a history of crack cocaine use.  Mr. Duff says that he has had schizophrenia since his 18.  Dr. Luz Moy is his psychiatrist.  Arabella says that her dad has lived with her for the last year and he had three psych hospitalizations during that time.  She says that her dad's most recent psych hospitalization was 02/22 due to him acting hyper, being restless, and him hallucinating.  She says that she was sure that his behaviors were due to drug use but she said that there was no illicit drugs in his system at that hospitalization.  She says that " she visited with him in the hospital recently and he told her that and ugly monster standing next to her told him that she does not want him to go back home with her.  She says that she is unsure why her dad would says that other than him hallucinating.    Head CT done yesterday shows chronic small vessel ischemic changes, which was present at a previous reading in April/May 2022.        Hospital Course: No notes on file         Patient History               Medical as of 6/10/2022       Past Medical History       Diagnosis Date Comments Source    Bipolar disorder, unspecified -- -- Provider    Chronic hepatitis C -- -- Provider    History of psychiatric hospitalization -- -- Provider    Hx of psychiatric care -- -- Provider    Hypertension -- -- Provider    Bessie -- -- Provider    Obesity, unspecified -- -- Provider    Psychiatric problem -- -- Provider    Schizoaffective disorder, bipolar type -- -- Provider    Seizures -- -- Provider    Stroke -- -- Provider    Substance abuse -- -- Provider    Therapy -- -- Provider              Pertinent Negatives       Diagnosis Date Noted Comments Source    Suicide attempt 06/10/2022 -- Provider                          Surgical as of 6/10/2022       Past Surgical History       Procedure Laterality Date Comments Source    LEFT HEART CATHETERIZATION Left 08/13/2015 -- Provider    DEBRIDEMENT -- 04/17/2022 -- Provider    CORONARY STENT PLACEMENT -- 08/14/2015 -- Provider    REPEAT CLOSURE OF STERNAL INCISION N/A 5/11/2022 Procedure: CLOSURE, STERNAL INCISION, REPEAT;  Surgeon: Adolfo Weiss MD;  Location: [facility];  Service: Plastics;  Laterality: N/A;  STERNAL WOUND DEBRIDEMENT AND RECONSTRUCTION // MULTIPLE MUSCLE FLAPS // REQ 1400 Provider                          Family as of 6/10/2022       Problem Relation Name Age of Onset Comments Source    Cancer Mother -- -- -- Provider    Liver disease Father -- -- -- Provider                  Tobacco Use as of 6/10/2022        Smoking Status Smoking Start Date Smoking Quit Date Packs/Day Years Used    Current Every Day Smoker -- -- -- --      Types Comments Smokeless Tobacco Status Smokeless Tobacco Quit Date Source     Cigarettes -- Never Used -- Provider                  Alcohol Use as of 6/10/2022       Alcohol Use Drinks/Week Alcohol/Week Comments Source    Never   -- -- --                  Drug Use as of 6/10/2022       Drug Use Types Frequency Comments Source    Not Currently  Cocaine -- -- Provider                  Sexual Activity as of 6/10/2022       Sexually Active Birth Control Partners Comments Source    Not Asked -- -- -- Provider                  Activities of Daily Living as of 6/10/2022    None               Social Documentation as of 6/10/2022    He denied illicit drug use but is daughter Summer reports that he has used crack cocaine off and on.  Source: Provider               Occupational as of 6/10/2022    None               Socioeconomic as of 6/10/2022       Marital Status Spouse Name Number of Children Years Education Education Level Preferred Language Ethnicity Race Source    Single -- 3 -- -- English Not  or /a Black or , White Provider                  Pertinent History       Question Response Comments    Lives with family [facility], he was living with his daughter Arabella for the past year.    Place in Birth Order -- --    Lives in -- --    Number of Siblings -- --    Raised by -- --    Legal Involvement -- --    Childhood Trauma -- --    Criminal History of -- --    Financial Status -- --    Highest Level of Education -- --    Does patient have access to a firearm? -- --     Service -- --    Primary Leisure Activity -- --    Spirituality -- --          Past Medical History:   Diagnosis Date    Bipolar disorder, unspecified     Chronic hepatitis C     History of psychiatric hospitalization     Hx of psychiatric care     Hypertension     Bessie     Obesity, unspecified      Psychiatric problem     Schizoaffective disorder, bipolar type     Seizures     Stroke     Substance abuse     Therapy      Past Surgical History:   Procedure Laterality Date    CORONARY STENT PLACEMENT  2015    DEBRIDEMENT  2022    LEFT HEART CATHETERIZATION Left 2015    REPEAT CLOSURE OF STERNAL INCISION N/A 2022    Procedure: CLOSURE, STERNAL INCISION, REPEAT;  Surgeon: Adolfo Weiss MD;  Location: [facility] OR;  Service: Plastics;  Laterality: N/A;  STERNAL WOUND DEBRIDEMENT AND RECONSTRUCTION // MULTIPLE MUSCLE FLAPS // REQ 1400     Family History       Problem Relation (Age of Onset)    Cancer Mother    Liver disease Father          Tobacco Use    Smoking status: Current Every Day Smoker     Types: Cigarettes    Smokeless tobacco: Never Used   Substance and Sexual Activity    Alcohol use: Never    Drug use: Not Currently     Types: Cocaine    Sexual activity: Not on file     Review of patient's allergies indicates:   Allergen Reactions    Depakote [divalproex]     Divalproex sodium Other (See Comments)    Lithium     Lithium analogues     Quetiapine Other (See Comments)       No current facility-administered medications on file prior to encounter.     Current Outpatient Medications on File Prior to Encounter   Medication Sig    amLODIPine (NORVASC) 5 MG tablet Take 1 tablet (5 mg total) by mouth once daily.    aspirin 81 MG Chew Chew and swallow 1 tablet (81 mg total) by mouth once daily.    doxepin (SINEQUAN) 25 MG capsule Take 50 mg by mouth nightly.    lisinopriL (PRINIVIL,ZESTRIL) 20 MG tablet Take 20 mg by mouth once daily.    clopidogreL (PLAVIX) 75 mg tablet Take 1 tablet (75 mg total) by mouth once daily.    doxepin (SINEQUAN) 25 MG capsule SMARTSI Capsule(s) By Mouth Every Evening    metoprolol succinate (TOPROL-XL) 25 MG 24 hr tablet Take 25 mg by mouth once daily.    metoprolol tartrate (LOPRESSOR) 50 MG tablet Take 1 tablet (50 mg total) by  mouth once daily.    OLANZapine (ZYPREXA) 5 MG tablet Take 5 mg by mouth nightly.    OXcarbazepine (TRILEPTAL) 150 MG Tab Take 150 mg by mouth 2 (two) times daily.    pravastatin (PRAVACHOL) 40 MG tablet Take 1 tablet (40 mg total) by mouth every evening.    traZODone (DESYREL) 100 MG tablet Take 100 mg by mouth nightly.     Psychotherapeutics (From admission, onward)                Start     Stop Route Frequency Ordered    06/10/22 2100  traZODone tablet 100 mg         -- Oral Nightly 06/10/22 0905    06/10/22 0908  lorazepam (ATIVAN) injection 1 mg         -- IV Every 2 hours PRN 06/10/22 0908    05/28/22 2100  olanzapine zydis disintegrating tablet 10 mg         -- Oral Nightly 05/28/22 0715    05/01/22 2100  doxepin capsule 50 mg         -- Oral Nightly 04/30/22 1733    04/30/22 1903  ziprasidone injection 20 mg         -- IM Every 8 hours PRN 04/30/22 1804    04/30/22 1808  OLANZapine injection 10 mg         -- IM Every 8 hours PRN 04/30/22 1733          Review of Systems   Constitutional:  Positive for activity change.   HENT: Negative.     Eyes: Negative.    Respiratory:  Positive for shortness of breath.    Cardiovascular:  Positive for chest pain.   Gastrointestinal: Negative.    Endocrine: Negative.    Musculoskeletal:         +weakness   Neurological:  Positive for weakness.   Psychiatric/Behavioral:  Positive for agitation, hallucinations (questionable) and sleep disturbance. Negative for dysphoric mood, self-injury and suicidal ideas.    Strengths and Liabilities: Strength: Patient has positive support network., Liability: Patient is dependent., Liability: Patient is impulsive., Liability: Patient has poor health., Liability: Patient has possible cognitive impairment.    Objective:     Vital Signs (Most Recent):  Temp: 97.7 °F (36.5 °C) (06/10/22 0400)  Pulse: 87 (06/10/22 1148)  Resp: 20 (06/10/22 0400)  BP: (!) 155/93 (06/10/22 1145)  SpO2: 98 % (06/10/22 0400)   Vital Signs (24h Range):  Temp:   "[97.7 °F (36.5 °C)-98.2 °F (36.8 °C)] 97.7 °F (36.5 °C)  Pulse:  [85-96] 87  Resp:  [20-22] 20  SpO2:  [98 %-100 %] 98 %  BP: (103-161)/() 155/93     Height: 5' 10.87" (180 cm)  Weight: 77.2 kg (170 lb 3.1 oz)  Body mass index is 23.83 kg/m².      Intake/Output Summary (Last 24 hours) at 6/10/2022 1308  Last data filed at 6/10/2022 0000  Gross per 24 hour   Intake 1501 ml   Output 97 ml   Net 1404 ml       Physical Exam  Vitals and nursing note reviewed.   Constitutional:       Appearance: He is normal weight.   Neurological:      Mental Status: He is alert.   Psychiatric:         Attention and Perception: Attention normal.         Mood and Affect: Mood and affect normal.         Speech: Speech is delayed.         Behavior: Behavior is cooperative.         Thought Content: Thought content does not include homicidal or suicidal ideation.         Cognition and Memory: Cognition is impaired. Memory is impaired.         Judgment: Judgment is impulsive.      Comments: Mr. Duff says that he does not have AVH, however, his daughter Arabella suggests that he said something that leads her to believe that he may be experiencing them.  None actively noted during exam.     NEUROLOGICAL EXAMINATION:     MENTAL STATUS   Disoriented to person.   Disoriented to place.   Oriented to time.   Registration: recalls 3 of 3 objects. Recall of objects at 5 minutes: 0/3.   Attention: decreased. Concentration: decreased.   Level of consciousness: alert  Unable to perform simple calculations.   Able to name object.   Significant Labs: Last 72 Hours:   Recent Lab Results         06/09/22  0205        Albumin/Globulin Ratio 0.6       Albumin 2.2       Alkaline Phosphatase 116       ALT 25       AST 26       Baso # 0.06       Basophil % 0.8       BILIRUBIN TOTAL 0.3       BUN 24.4       Calcium 8.3       Chloride 107       CO2 24       Creatinine 0.81       eGFR if  >60       eGFR if non African American >60       Eos # " 0.32       Eosinophil % 4.2       Globulin, Total 3.8       Glucose 172       Hematocrit 35.4       Hemoglobin 10.5       Immature Grans (Abs) 0.03       Immature Granulocytes 0.4       Lymph # 2.14       LYMPH % 28.3       MCH 25.6       MCHC 29.7       MCV 86.3       Mono # 0.74       Mono % 9.8       MPV 9.8       Neut # 4.3       Neut % 56.5       nRBC 0.0       Platelets 321       Potassium 3.8       PROTEIN TOTAL 6.0       RBC 4.10       RDW 17.2       Sodium 141       WBC 7.6               Significant Imaging: I have reviewed all pertinent imaging results/findings within the past 24 hours.    Assessment/Plan:     No notes have been filed under this hospital service.  Service: Psychiatry       Total Time:  60 minutes      RAY Carrasco-SHRUTI   Psychiatry  [facility] - [address] [facility]

## 2022-06-10 NOTE — PT/OT/SLP PROGRESS
Speech Language Pathology Department  Progress Note    Patient Name:  Kendall Duff   MRN:  [id number]  Admitting Diagnosis: Acute myocardial infarction of anterolateral wall    Recommendations:                 General Recommendations:  Cognitive-linguistic therapy  Communication strategies:  none    Subjective     Patient alert, oriented x4 and lethargic.    Patient goals: to go home     Pain/Comfort:  · Pain Rating 1: 0/10    Respiratory Status: Room air    Objective:     Problem Solving  Functional simple: min-mod cues to recognize routine problems     Executive Function:  Attention to detail: mod cues      Assessment:     Pt continues to present with cognitive-linguistic deficits negatively impacting safe, independent living.    Goals:   Multidisciplinary Problems     SLP Goals        Problem: SLP    Goal Priority Disciplines Outcome   SLP Goal     SLP Ongoing, Progressing   Description: LTG1: Tolerate speaking valve during all waking hours with no signs/sx respiratory distress/compromise - discontinue  STG: tolerate speaking valve x1 hour with no signs/sx respiratory distress/compromise - discontinue    LTG2: Tolerate least restrictive PO diet with no clinical signs/sx aspiration - progressing  STG2a: Tolerate thin liquids by cup with no clinical signs/sx aspiration - progressing  STG2b: Tolerate puree solids with no clinical signs/sx aspiration. - progressing    LTG3: Complete basic cognitive tasks with supervision  STG3a: Recognize simple problems with min cues  STG3b: Solve routine problems with min cues  STG4c: Complete 3-step sequencing tasks with min cues                   Plan:     Will continue to follow and tx as appropriate.    Discharge recommendations:  nursing facility, skilled   Barriers to Discharge:  Level of Skilled Assistance Needed      Time Tracking:     SLP Treatment Date:   06/10/22  Speech Start Time:  1040  Speech Stop Time:  1050     Speech Total Time (min):  10 min    Billable  minutes:  Cognitive Skills Intervention, 10 minutes       06/10/2022

## 2022-06-10 NOTE — CONSULTS
"[facility] [address] [facility]  Psychiatry  Consult Note    Patient Name: Kendall Duff  MRN: [id number]   Code Status: Prior  Admission Date: 4/1/2022  Hospital Length of Stay: 70 days  Attending Physician: Hawk Asif MD  Primary Care Provider: Primary Doctor No    Current Legal Status: N/A    Patient information was obtained from patient, relative(s), ER records and primary team.   Inpatient consult to Psychiatry  Consult performed by: GARTH Carrasco  Consult ordered by: Lee Gil MD  Assessment/Recommendations: Psychiatry Recommendations:  1.  He is currently on Zyprex Zydis 10 mg at bedtime since 5/28/22.  Recommend dc'ing Zydis and changing to Zyprexa 10 mg at bedtime.  2.  Recommend checking lipids.  If normal, recommend increasing Zyprexa to 15 mg at HS.  3.  Of note is that Mr. Duff does have chronic microvascular ishemic changes and seizure disorder which may contribute to some of his psych symptoms.  4.  This gentleman was pleasant with appropriate behavior with me today.  5.  Re-consult psych if needed.        Subjective:     Principal Problem:Acute myocardial infarction of anterolateral wall    Chief Complaint:  "I could not sleep for a couple nights."     HPI: Mr. Duff reports that he initially came into the hospital because he was feeling really bad.  He ended out having a MI and had a CABG done.  He has numerous medical complications since this hospitalization started on 4/1/22.  Psych consult was ordered by primary care hospital provider as required by nursing home that he may be going to.    Mr. Garay reports that he has been doing well.  He says that he had not been sleeping well the past couple nights.  He says that he has been having racing thoughts and this keeps him up.  He denies depression.  He says that he has not had depression in months.  He denies an elevated mood saying that he has not had this in a while as well.  He denies AVH and delusional " thinking.  He denies illicit drug use but his daughter Arabella, who I have spoke with, says that he has a history of crack cocaine use.  Mr. Duff says that he has had schizophrenia since his 18.  Dr. Luz Moy is his psychiatrist.  Arabella says that her dad has lived with her for the last year and he had three psych hospitalizations during that time.  She says that her dad's most recent psych hospitalization was 02/22 due to him acting hyper, being restless, and him hallucinating.  She says that she was sure that his behaviors were due to drug use but she said that there was no illicit drugs in his system at that hospitalization.  She says that she visited with him in the hospital recently and he told her that and ugly monster standing next to her told him that she does not want him to go back home with her.  She says that she is unsure why her dad would says that other than him hallucinating.    Head CT done yesterday shows chronic small vessel ischemic changes, which was present at a previous reading in April/May 2022.        Hospital Course: No notes on file    No new subjective & objective note has been filed under this hospital service since the last note was generated.    Assessment/Plan:     No notes have been filed under this hospital service.  Service: Psychiatry       Total Time:  60 minutes      GARTH Carrasco   Psychiatry  [facility] - [address] [place] [address]

## 2022-06-11 LAB
ALBUMIN SERPL-MCNC: 2.3 GM/DL (ref 3.5–5)
ALBUMIN/GLOB SERPL: 0.6 RATIO (ref 1.1–2)
ALP SERPL-CCNC: 94 UNIT/L (ref 40–150)
ALT SERPL-CCNC: 20 UNIT/L (ref 0–55)
AST SERPL-CCNC: 21 UNIT/L (ref 5–34)
BASOPHILS # BLD AUTO: 0.06 X10(3)/MCL (ref 0–0.2)
BASOPHILS NFR BLD AUTO: 0.9 %
BILIRUBIN DIRECT+TOT PNL SERPL-MCNC: 0.4 MG/DL
BUN SERPL-MCNC: 11 MG/DL (ref 8.4–25.7)
CALCIUM SERPL-MCNC: 7.9 MG/DL (ref 8.4–10.2)
CHLORIDE SERPL-SCNC: 106 MMOL/L (ref 98–107)
CHOLEST SERPL-MCNC: 81 MG/DL
CHOLEST/HDLC SERPL: 2 {RATIO} (ref 0–5)
CO2 SERPL-SCNC: 23 MMOL/L (ref 22–29)
CREAT SERPL-MCNC: 0.71 MG/DL (ref 0.73–1.18)
EOSINOPHIL # BLD AUTO: 0.21 X10(3)/MCL (ref 0–0.9)
EOSINOPHIL NFR BLD AUTO: 3.1 %
ERYTHROCYTE [DISTWIDTH] IN BLOOD BY AUTOMATED COUNT: 17 % (ref 11.5–17)
GLOBULIN SER-MCNC: 3.6 GM/DL (ref 2.4–3.5)
GLUCOSE SERPL-MCNC: 101 MG/DL (ref 74–100)
HCT VFR BLD AUTO: 33.4 % (ref 42–52)
HDLC SERPL-MCNC: 33 MG/DL (ref 35–60)
HGB BLD-MCNC: 9.9 GM/DL (ref 14–18)
IMM GRANULOCYTES # BLD AUTO: 0.02 X10(3)/MCL (ref 0–0.02)
IMM GRANULOCYTES NFR BLD AUTO: 0.3 % (ref 0–0.43)
LDLC SERPL CALC-MCNC: 35 MG/DL (ref 50–140)
LYMPHOCYTES # BLD AUTO: 2.05 X10(3)/MCL (ref 0.6–4.6)
LYMPHOCYTES NFR BLD AUTO: 29.9 %
MAGNESIUM SERPL-MCNC: 2 MG/DL (ref 1.6–2.6)
MCH RBC QN AUTO: 25.5 PG (ref 27–31)
MCHC RBC AUTO-ENTMCNC: 29.6 MG/DL (ref 33–36)
MCV RBC AUTO: 86.1 FL (ref 80–94)
MONOCYTES # BLD AUTO: 0.56 X10(3)/MCL (ref 0.1–1.3)
MONOCYTES NFR BLD AUTO: 8.2 %
NEUTROPHILS # BLD AUTO: 4 X10(3)/MCL (ref 2.1–9.2)
NEUTROPHILS NFR BLD AUTO: 57.6 %
NRBC BLD AUTO-RTO: 0 %
PHOSPHATE SERPL-MCNC: 3.4 MG/DL (ref 2.3–4.7)
PLATELET # BLD AUTO: 280 X10(3)/MCL (ref 130–400)
PMV BLD AUTO: 9.7 FL (ref 9.4–12.4)
POTASSIUM SERPL-SCNC: 4 MMOL/L (ref 3.5–5.1)
PROT SERPL-MCNC: 5.9 GM/DL (ref 6.4–8.3)
RBC # BLD AUTO: 3.88 X10(6)/MCL (ref 4.7–6.1)
SODIUM SERPL-SCNC: 136 MMOL/L (ref 136–145)
TRIGL SERPL-MCNC: 65 MG/DL (ref 34–140)
VLDLC SERPL CALC-MCNC: 13 MG/DL
WBC # SPEC AUTO: 6.9 X10(3)/MCL (ref 4.5–11.5)

## 2022-06-11 PROCEDURE — 25000003 PHARM REV CODE 250: Performed by: INTERNAL MEDICINE

## 2022-06-11 PROCEDURE — A4216 STERILE WATER/SALINE, 10 ML: HCPCS

## 2022-06-11 PROCEDURE — 80053 COMPREHEN METABOLIC PANEL: CPT | Performed by: INTERNAL MEDICINE

## 2022-06-11 PROCEDURE — 85025 COMPLETE CBC W/AUTO DIFF WBC: CPT | Performed by: INTERNAL MEDICINE

## 2022-06-11 PROCEDURE — 20000000 HC ICU ROOM

## 2022-06-11 PROCEDURE — 84100 ASSAY OF PHOSPHORUS: CPT | Performed by: INTERNAL MEDICINE

## 2022-06-11 PROCEDURE — 83735 ASSAY OF MAGNESIUM: CPT | Performed by: INTERNAL MEDICINE

## 2022-06-11 PROCEDURE — 36415 COLL VENOUS BLD VENIPUNCTURE: CPT | Performed by: INTERNAL MEDICINE

## 2022-06-11 PROCEDURE — 25000003 PHARM REV CODE 250

## 2022-06-11 PROCEDURE — 80061 LIPID PANEL: CPT | Performed by: INTERNAL MEDICINE

## 2022-06-11 PROCEDURE — 25000003 PHARM REV CODE 250: Performed by: STUDENT IN AN ORGANIZED HEALTH CARE EDUCATION/TRAINING PROGRAM

## 2022-06-11 RX ADMIN — OXCARBAZEPINE 300 MG: 300 TABLET, FILM COATED ORAL at 09:06

## 2022-06-11 RX ADMIN — TRAZODONE HYDROCHLORIDE 100 MG: 50 TABLET ORAL at 08:06

## 2022-06-11 RX ADMIN — APIXABAN 5 MG: 5 TABLET, FILM COATED ORAL at 09:06

## 2022-06-11 RX ADMIN — APIXABAN 5 MG: 5 TABLET, FILM COATED ORAL at 08:06

## 2022-06-11 RX ADMIN — OLANZAPINE 10 MG: 5 TABLET, FILM COATED ORAL at 08:06

## 2022-06-11 RX ADMIN — BENZTROPINE MESYLATE 1 MG: 1 TABLET ORAL at 09:06

## 2022-06-11 RX ADMIN — FAMOTIDINE 20 MG: 20 TABLET, FILM COATED ORAL at 09:06

## 2022-06-11 RX ADMIN — EZETIMIBE 10 MG: 10 TABLET ORAL at 08:06

## 2022-06-11 RX ADMIN — POLYETHYLENE GLYCOL 3350 17 G: 17 POWDER, FOR SOLUTION ORAL at 09:06

## 2022-06-11 RX ADMIN — BENZTROPINE MESYLATE 1 MG: 1 TABLET ORAL at 08:06

## 2022-06-11 RX ADMIN — OXCARBAZEPINE 300 MG: 300 TABLET, FILM COATED ORAL at 08:06

## 2022-06-11 RX ADMIN — WHITE PETROLATUM: 1.75 OINTMENT TOPICAL at 09:06

## 2022-06-11 RX ADMIN — DOXEPIN HYDROCHLORIDE 50 MG: 50 CAPSULE ORAL at 08:06

## 2022-06-11 RX ADMIN — LEVETIRACETAM 500 MG: 500 TABLET, FILM COATED ORAL at 09:06

## 2022-06-11 RX ADMIN — ATORVASTATIN CALCIUM 80 MG: 40 TABLET, FILM COATED ORAL at 09:06

## 2022-06-11 RX ADMIN — FAMOTIDINE 20 MG: 20 TABLET, FILM COATED ORAL at 08:06

## 2022-06-11 RX ADMIN — SODIUM CHLORIDE, PRESERVATIVE FREE 10 ML: 5 INJECTION INTRAVENOUS at 09:06

## 2022-06-11 NOTE — PROGRESS NOTES
[facility]  HOSPITAL MEDICINE   PROGRESS NOTE      CHIEF COMPLAINT  Hospital follow up    HOSPITAL COURSE  56 y.o. male with a history that includes chronic hepatitis C, hypertension, seizures, CAD s/p stents, initially presented to [facility] on 4/1/2022 with a primary complaint of chest pain. Of note, he was seen at an outlying ED on 3/20/22 and was dx with MI, subsequently transferred to [facility] where he underwent LHC with angioplasty, and deemed appropriate for CABG, which had been scheduled for 3/29/22, but was cancelled. He then presented to ED on 4/1 with c/o CP earlier in the day. He was admitted to cardiology services, started on a heparin gtt. CV Surgery was consulted and the patient underwent CABG x4 on 4/6/22. He was admitted to ICU post CABG, intubated on mechanical ventilation. Psych was consulted during his stay to evaluate for some recent agitation, restlessness, fidgetiness, insomnia, confusion, and sexual inappropriateness, and he was started on PRN haldol. He was extubated to NC 4L on 4/7, remained restless and confused requiring Precedex gtt.  On 4/10/22 he experienced seizure-like activity and confusion for which neuro was consulted. EEG revealed moderate generalized slowing. CT Head was negative for acute intracranial abnormality, gas noted throughout the bilateral subcutaneous soft tissues.MRI on 4/15/22 revealed small linear focus of acute small vessel ischemia in the right frontal white matter. Developed diffuse subcutaneous emphysema per CXR. He remained agitated with delirium.  Unfortunately,he pulled out his left chest tube on 4/16/22 and he had been thrashing in bed, had to be restrained at times.  He developed an unstable sternum with purulence drainage. CT thorax revealed surgical changes with inflammation, fluid and air locules, minimal anterior pericardial effusion, extensive soft tissue emphysema, and bilateral pleural effusions and bilateral  "lower lung lobes collapse consolidations. He has known subcutaneous air, which has been present on previous x-rays.  Cultures positive for MRSA, and he was placed on Vancomycin. He required intubation on 4/16/22. On 4/17/22 he underwent sternal wound debridement with wound vac placement. ID was consulted on 4/22/22 due to sepsis. He was started on rifampin in addition to vancomycin for a planned 6 week course. Respiratory cultures from 4/24/22 returned positive for Proteus and Klebsiella. Remained intubated on mechanical ventilation. GI was consulted on 5/3/22 for PEG placement. Plastic surgery was also following for sternal reconstruction. He underwent tracheostomy and PEG placement on 5/10/22. NIVA done on 5/10/22 noted a RUE DVT. On 5/11/22 he underwent sternal wound debridement, bilateral fasciocutaneous flap advanced closure over sternum and bilateral pectoralis major muscle flap reconstruction of sternum. He was started on full dose Lovenox. He remained on mechanical ventilation until 5/21/22. Tolerating T-piece now on trach collar. He required vasopressors for BP support throughout his stay, which have since been weaned off. He has been weaned off of Precedex. He did have some vomiting on 5/21/22. Lovenox was transitioned to Eliquis on 5/21/22. KUB unrevealing, tube feeds restarted on 5/22/22. He was cleared for downgrade to the floor on 5/22/22 under the hospital medicine service. His trach tube fell of during cough and were ubnable to reinserted. He was stable from respiratory standpoint and close monitoring was continued.     Today info  Pt seen and examined, NAD, no acute complaints      OBJECTIVE/PHYSICAL EXAM  BP 91/60   Pulse 85   Temp 98.5 °F (36.9 °C) (Oral)   Resp 20   Ht 5' 10.87" (1.8 m)   Wt 77.2 kg (170 lb 3.1 oz)   SpO2 99%   BMI 23.83 kg/m²   General: In no acute distress, afebrile  Chest: Clear to auscultation bilaterally, sternotomy wound healing  Heart: S1, S2, no appreciable " murmur  Abdomen: Soft, nontender, BS +, peg tube  MSK: Warm, no lower extremity edema, no clubbing or cyanosis  Neurologic: Alert and oriented x4, moving all extremities with good strength         ASSESSMENT/PLAN  Chronic hypoxic respiratory failure status post tracheostomy on 5/10/22 status post trach fell out post coughing  Oropharyngeal dysphagia s/p PEG placment on 5/10/22-status post started oral diet her SLP  Multivessel CAD s/p CABG x 4 on 04/06/2022.  MRSA sternal wound infection and dehiscence, status post wound vacuum-assisted closure device on 04/17/2022, and bilateral pectoral flaps for sternal wound closure on 05/11/2022.  Metabolic encephalopathy- improving  Acute right upper extremity deep venous thrombosis  Anemia chronic disease  Severe protein calorie malnutrition, hypoalbuminemia  Hypertension  Seizure disorder   Transaminitis, mild - resolved     Hx of  Schizophrenia, chronic hepatitis C, hypertension, CAD s/p stents     Plan :  ID signed off.  Completed antibiotics.  Plastic surgery following along for drain management.  Two drains still remain in place, pending plans regarding those two for disposition.  Continues to work well with PT OT and speech therapy.   Case management following.  No longer LTAC candidate and unable to go to TCU due to insurance.  Unable to go to SNF due to drains.  Discontinued Haldol, continue olanzapine.  Start trazodone 100 mg at nighttime.  Psychiatry has also been consulted per Nursing Home request.  Pending their eval for med adjustment.       __________________________________________________________________________    LABS/MICROBIOLOGY/MEDICATIONS/DIAGNOSTICS    LABS  Recent Labs     06/11/22  0509      K 4.0   CHLORIDE 106   CO2 23   BUN 11.0   CREATININE 0.71*   GLUCOSE 101*   CALCIUM 7.9*   ALKPHOS 94   AST 21   ALT 20   ALBUMIN 2.3*     Recent Labs     06/09/22  0205   WBC 7.6   RBC 4.10*   HCT 35.4*   MCV 86.3          MICROBIOLOGY  Microbiology  Results (last 7 days)     ** No results found for the last 168 hours. **          MEDICATIONS   amLODIPine  10 mg Oral Daily    apixaban  5 mg Oral BID    atorvastatin  80 mg Oral Daily    benztropine  1 mg Per OG tube BID    doxepin  50 mg Oral QHS    ezetimibe  10 mg Oral QHS    famotidine  20 mg Oral BID    fluticasone propionate  2 spray Each Nostril Daily    levETIRAcetam  500 mg Oral BID    lisinopriL  20 mg Oral Daily    metoprolol tartrate  25 mg Per OG tube Daily    OLANZapine  10 mg Oral QHS    OXcarbazepine  300 mg Per OG tube BID    polyethylene glycol  17 g Oral Daily    sodium chloride 0.9%  10 mL Intravenous Q12H    trazodone  100 mg Oral QHS    white petrolatum   Topical (Top) BID      INFUSIONS      DIAGNOSTIC TESTS  CT Head Without Contrast   Final Result      1. No acute intracranial abnormalities.   2. Bilateral mastoid effusions, left greater than right.   3. Interval resolution of previously visualized diffuse subcutaneous emphysema of the imaged maxillofacial soft tissues.         Electronically signed by: Glenn Francis   Date:    06/09/2022   Time:    15:47      Fl Modified Barium Swallow Speech   Final Result      X-Ray Chest 1 View   Final Result      Slightly improved aeration in the right upper lobe.      No other significant change         Electronically signed by: Chad Jason   Date:    06/05/2022   Time:    07:53      X-Ray Chest AP Portable   Final Result      Tracheostomy cannula is not seen.      Cardiomediastinal silhouette is unchanged as compared with previous exam.      Increase in interstitial and pulmonary vascular markings         Electronically signed by: Chad Jason   Date:    06/03/2022   Time:    11:06      X-Ray Chest 1 View   Final Result      There is effusion of the left lung is noted increased in size. The right lung the demonstrates improved aeration in the interval. Recommend continued follow-up.         Electronically signed by: Rex  Jarett   Date:    06/02/2022   Time:    06:10      Fl Modified Barium Swallow Speech   Final Result      X-Ray Chest 1 View   Final Result      New bilateral alveolar opacities most pronounced in the perihilar lung zones may be related to edema in the setting of enlarged cardiac silhouette.  Other differential considerations include multifocal pneumonia or ARDS.         Electronically signed by: Sharon Cantu   Date:    05/24/2022   Time:    18:36      X-Ray Abdomen AP 1 View   Final Result         1. Interval removal of NG tube.   2. No convincing evidence of new or worsening intra-abdominal process.         Electronically signed by: Abner Reyes   Date:    05/21/2022   Time:    13:57      X-Ray Chest 1 View   Final Result      Stable exam without significant interval change.         Electronically signed by: Pascale Vences   Date:    05/19/2022   Time:    06:00      X-Ray Chest 1 View   Final Result      Right-sided PICC has been removed.  No other significant change.         Electronically signed by: Harjit Sellers   Date:    05/17/2022   Time:    07:05      X-Ray Chest 1 View   Final Result      Interval insertion of left-sided PICC line.      No other interval change         Electronically signed by: Chad Jason   Date:    05/16/2022   Time:    09:01      X-Ray Chest 1 View   Final Result      LINES/TUBES: Tracheostomy and right PICC with grossly unchanged positioning.      Markedly improved aeration bilaterally with now only minimal basilar opacities.      No pneumothoraces.         Electronically signed by: Jyoti López   Date:    05/14/2022   Time:    17:37      X-Ray Chest 1 View   Final Result      Interval insertion of tracheostomy cannula.      Cardiomegaly.      Increase in interstitial and pulmonary vascular markings indicating some degree of congestion.      Interval removal of nasogastric tube         Electronically signed by: Chad Jason   Date:    05/10/2022   Time:    13:52       X-Ray Chest 1 View   Final Result      Improved left pleural effusion and lower lung zone atelectasis.         Electronically signed by: Javier Soto   Date:    05/04/2022   Time:    08:03      X-Ray Abdomen AP 1 View   Final Result      Optimal placement of the nasogastric tube.         Electronically signed by: Javier Soto   Date:    05/03/2022   Time:    16:28      RADIOLOGY REPORT   Final Result      VAS US Venous Arm Right    (Results Pending)            All diagnosis and differential diagnosis have been reviewed; assessment and plan has been documented. I have personally reviewed the labs and test results that are presently available; I have reviewed the patients medication list. I have reviewed the consulting providers response and recommendations. I have reviewed or attempted to review medical records based upon their availability.  All of the patient's questions have been addressed and answered. Patient's is agreeable to the above stated plan. I will continue to monitor closely and make adjustments to medical management as needed.  This document was created using [employer] Fluency Direct.  Transcription errors may have been made.  Please contact me if any questions may rise regarding documentation to clarify verbiage.        Brian Humphries MD   06/11/2022   Internal Medicine

## 2022-06-12 LAB
ANION GAP SERPL CALC-SCNC: 8 MEQ/L
BASOPHILS # BLD AUTO: 0.05 X10(3)/MCL (ref 0–0.2)
BASOPHILS NFR BLD AUTO: 0.7 %
BUN SERPL-MCNC: 10.4 MG/DL (ref 8.4–25.7)
CALCIUM SERPL-MCNC: 8.3 MG/DL (ref 8.4–10.2)
CHLORIDE SERPL-SCNC: 105 MMOL/L (ref 98–107)
CO2 SERPL-SCNC: 25 MMOL/L (ref 22–29)
CREAT SERPL-MCNC: 0.76 MG/DL (ref 0.73–1.18)
CREAT/UREA NIT SERPL: 14
EOSINOPHIL # BLD AUTO: 0.2 X10(3)/MCL (ref 0–0.9)
EOSINOPHIL NFR BLD AUTO: 2.7 %
ERYTHROCYTE [DISTWIDTH] IN BLOOD BY AUTOMATED COUNT: 16.8 % (ref 11.5–17)
GLUCOSE SERPL-MCNC: 106 MG/DL (ref 74–100)
HCT VFR BLD AUTO: 31.5 % (ref 42–52)
HGB BLD-MCNC: 9.6 GM/DL (ref 14–18)
IMM GRANULOCYTES # BLD AUTO: 0.02 X10(3)/MCL (ref 0–0.02)
IMM GRANULOCYTES NFR BLD AUTO: 0.3 % (ref 0–0.43)
LYMPHOCYTES # BLD AUTO: 2.27 X10(3)/MCL (ref 0.6–4.6)
LYMPHOCYTES NFR BLD AUTO: 30.5 %
MCH RBC QN AUTO: 25.5 PG (ref 27–31)
MCHC RBC AUTO-ENTMCNC: 30.5 MG/DL (ref 33–36)
MCV RBC AUTO: 83.8 FL (ref 80–94)
MONOCYTES # BLD AUTO: 0.73 X10(3)/MCL (ref 0.1–1.3)
MONOCYTES NFR BLD AUTO: 9.8 %
NEUTROPHILS # BLD AUTO: 4.2 X10(3)/MCL (ref 2.1–9.2)
NEUTROPHILS NFR BLD AUTO: 56 %
NRBC BLD AUTO-RTO: 0 %
PLATELET # BLD AUTO: 258 X10(3)/MCL (ref 130–400)
PMV BLD AUTO: 10.1 FL (ref 9.4–12.4)
POTASSIUM SERPL-SCNC: 4.1 MMOL/L (ref 3.5–5.1)
RBC # BLD AUTO: 3.76 X10(6)/MCL (ref 4.7–6.1)
SODIUM SERPL-SCNC: 138 MMOL/L (ref 136–145)
WBC # SPEC AUTO: 7.4 X10(3)/MCL (ref 4.5–11.5)

## 2022-06-12 PROCEDURE — 25000003 PHARM REV CODE 250: Performed by: INTERNAL MEDICINE

## 2022-06-12 PROCEDURE — 25000003 PHARM REV CODE 250

## 2022-06-12 PROCEDURE — 63600175 PHARM REV CODE 636 W HCPCS: Performed by: INTERNAL MEDICINE

## 2022-06-12 PROCEDURE — 36415 COLL VENOUS BLD VENIPUNCTURE: CPT | Performed by: INTERNAL MEDICINE

## 2022-06-12 PROCEDURE — 25000003 PHARM REV CODE 250: Performed by: STUDENT IN AN ORGANIZED HEALTH CARE EDUCATION/TRAINING PROGRAM

## 2022-06-12 PROCEDURE — 20000000 HC ICU ROOM

## 2022-06-12 PROCEDURE — A4216 STERILE WATER/SALINE, 10 ML: HCPCS

## 2022-06-12 PROCEDURE — 85025 COMPLETE CBC W/AUTO DIFF WBC: CPT | Performed by: INTERNAL MEDICINE

## 2022-06-12 PROCEDURE — 80048 BASIC METABOLIC PNL TOTAL CA: CPT | Performed by: INTERNAL MEDICINE

## 2022-06-12 PROCEDURE — 97116 GAIT TRAINING THERAPY: CPT | Mod: CQ

## 2022-06-12 RX ADMIN — LEVETIRACETAM 500 MG: 500 TABLET, FILM COATED ORAL at 08:06

## 2022-06-12 RX ADMIN — FAMOTIDINE 20 MG: 20 TABLET, FILM COATED ORAL at 08:06

## 2022-06-12 RX ADMIN — EZETIMIBE 10 MG: 10 TABLET ORAL at 08:06

## 2022-06-12 RX ADMIN — APIXABAN 5 MG: 5 TABLET, FILM COATED ORAL at 08:06

## 2022-06-12 RX ADMIN — SODIUM CHLORIDE, PRESERVATIVE FREE 10 ML: 5 INJECTION INTRAVENOUS at 08:06

## 2022-06-12 RX ADMIN — ACETAMINOPHEN 650 MG: 325 TABLET ORAL at 05:06

## 2022-06-12 RX ADMIN — LORAZEPAM 1 MG: 2 INJECTION INTRAMUSCULAR; INTRAVENOUS at 11:06

## 2022-06-12 RX ADMIN — LISINOPRIL 20 MG: 20 TABLET ORAL at 08:06

## 2022-06-12 RX ADMIN — BENZTROPINE MESYLATE 1 MG: 1 TABLET ORAL at 08:06

## 2022-06-12 RX ADMIN — AMLODIPINE BESYLATE 10 MG: 5 TABLET ORAL at 08:06

## 2022-06-12 RX ADMIN — OXCARBAZEPINE 300 MG: 300 TABLET, FILM COATED ORAL at 08:06

## 2022-06-12 RX ADMIN — TRAZODONE HYDROCHLORIDE 100 MG: 50 TABLET ORAL at 08:06

## 2022-06-12 RX ADMIN — FLUTICASONE PROPIONATE 100 MCG: 50 SPRAY, METERED NASAL at 08:06

## 2022-06-12 RX ADMIN — WHITE PETROLATUM: 1.75 OINTMENT TOPICAL at 08:06

## 2022-06-12 RX ADMIN — ATORVASTATIN CALCIUM 80 MG: 40 TABLET, FILM COATED ORAL at 08:06

## 2022-06-12 RX ADMIN — DOXEPIN HYDROCHLORIDE 50 MG: 50 CAPSULE ORAL at 08:06

## 2022-06-12 RX ADMIN — OLANZAPINE 10 MG: 5 TABLET, FILM COATED ORAL at 08:06

## 2022-06-12 RX ADMIN — METOPROLOL TARTRATE 25 MG: 25 TABLET, FILM COATED ORAL at 08:06

## 2022-06-12 NOTE — PROGRESS NOTES
[facility]  HOSPITAL MEDICINE   PROGRESS NOTE      CHIEF COMPLAINT  Hospital follow up    HOSPITAL COURSE  56 y.o. male with a history that includes chronic hepatitis C, hypertension, seizures, CAD s/p stents, initially presented to [facility] on 4/1/2022 with a primary complaint of chest pain. Of note, he was seen at an outlying ED on 3/20/22 and was dx with MI, subsequently transferred to [facility] where he underwent LHC with angioplasty, and deemed appropriate for CABG, which had been scheduled for 3/29/22, but was cancelled. He then presented to ED on 4/1 with c/o CP earlier in the day. He was admitted to cardiology services, started on a heparin gtt. CV Surgery was consulted and the patient underwent CABG x4 on 4/6/22. He was admitted to ICU post CABG, intubated on mechanical ventilation. Psych was consulted during his stay to evaluate for some recent agitation, restlessness, fidgetiness, insomnia, confusion, and sexual inappropriateness, and he was started on PRN haldol. He was extubated to NC 4L on 4/7, remained restless and confused requiring Precedex gtt.  On 4/10/22 he experienced seizure-like activity and confusion for which neuro was consulted. EEG revealed moderate generalized slowing. CT Head was negative for acute intracranial abnormality, gas noted throughout the bilateral subcutaneous soft tissues.MRI on 4/15/22 revealed small linear focus of acute small vessel ischemia in the right frontal white matter. Developed diffuse subcutaneous emphysema per CXR. He remained agitated with delirium.  Unfortunately,he pulled out his left chest tube on 4/16/22 and he had been thrashing in bed, had to be restrained at times.  He developed an unstable sternum with purulence drainage. CT thorax revealed surgical changes with inflammation, fluid and air locules, minimal anterior pericardial effusion, extensive soft tissue emphysema, and bilateral pleural effusions and bilateral  "lower lung lobes collapse consolidations. He has known subcutaneous air, which has been present on previous x-rays.  Cultures positive for MRSA, and he was placed on Vancomycin. He required intubation on 4/16/22. On 4/17/22 he underwent sternal wound debridement with wound vac placement. ID was consulted on 4/22/22 due to sepsis. He was started on rifampin in addition to vancomycin for a planned 6 week course. Respiratory cultures from 4/24/22 returned positive for Proteus and Klebsiella. Remained intubated on mechanical ventilation. GI was consulted on 5/3/22 for PEG placement. Plastic surgery was also following for sternal reconstruction. He underwent tracheostomy and PEG placement on 5/10/22. NIVA done on 5/10/22 noted a RUE DVT. On 5/11/22 he underwent sternal wound debridement, bilateral fasciocutaneous flap advanced closure over sternum and bilateral pectoralis major muscle flap reconstruction of sternum. He was started on full dose Lovenox. He remained on mechanical ventilation until 5/21/22. Tolerating T-piece now on trach collar. He required vasopressors for BP support throughout his stay, which have since been weaned off. He has been weaned off of Precedex. He did have some vomiting on 5/21/22. Lovenox was transitioned to Eliquis on 5/21/22. KUB unrevealing, tube feeds restarted on 5/22/22. He was cleared for downgrade to the floor on 5/22/22 under the hospital medicine service. His trach tube fell of during cough and were ubnable to reinserted. He was stable from respiratory standpoint and close monitoring was continued.     Today info  Pt seen and examined, no complaints reported no issues reported overnight by the nursing staff blood pressure was fairly stable yesterday this morning was slightly elevated    OBJECTIVE/PHYSICAL EXAM  BP (!) 158/108   Pulse 89   Temp 97.9 °F (36.6 °C) (Oral)   Resp (!) 21   Ht 5' 10.87" (1.8 m)   Wt 77.2 kg (170 lb 3.1 oz)   SpO2 99%   BMI 23.83 kg/m²   General: In " no acute distress, afebrile  Chest: Clear to auscultation bilaterally, sternotomy wound healing  Heart: S1, S2, no appreciable murmur  Abdomen: Soft, nontender, BS +, peg tube  MSK: Warm, no lower extremity edema, no clubbing or cyanosis  Neurologic: Alert and oriented x4,        ASSESSMENT/PLAN  Chronic hypoxic respiratory failure status post tracheostomy on 5/10/22 status post trach fell out post coughing  Oropharyngeal dysphagia s/p PEG placment on 5/10/22-status post started oral diet her SLP  Multivessel CAD s/p CABG x 4 on 04/06/2022.  MRSA sternal wound infection and dehiscence, status post wound vacuum-assisted closure device on 04/17/2022, and bilateral pectoral flaps for sternal wound closure on 05/11/2022.  Metabolic encephalopathy- improving  Acute right upper extremity deep venous thrombosis  Anemia chronic disease  Severe protein calorie malnutrition, hypoalbuminemia  Hypertension  Seizure disorder   Transaminitis, mild - resolved     Hx of  Schizophrenia, chronic hepatitis C, hypertension, CAD s/p stents     Plan :  Patient is stable of the antibiotics id home signed all completed the treatment with antibiotics  Plastic surgery following along for drain management.  Two drains still remain in place, pending plans regarding those two for disposition.  The other 3 drains were removed by Dr. Weiss  Continues to work well with PT OT and speech therapy.   Case management following.  No longer LTAC candidate and unable to go to TCU due to insurance.  Unable to go to SNF due to drains.  Discontinued Haldol, continue olanzapine.  Start trazodone 100 mg at nighttime.  Psychiatry has also been consulted per Nursing Home request.  zydis is discontinued changed to Zyprexa 10 mg at bedtime   Awaiting level 2 from the state  Continue with PT and OT and ST  Awaiting placement   On Eliquis for DVT  Continue with Keppra and other home medications  Blood pressure fairly stable      Prophylaxis:   Eliquis  __________________________________________________________________________    LABS/MICROBIOLOGY/MEDICATIONS/DIAGNOSTICS    LABS  Recent Labs     06/11/22  0509 06/12/22  0135    138   K 4.0 4.1   CHLORIDE 106 105   CO2 23 25   BUN 11.0 10.4   CREATININE 0.71* 0.76   GLUCOSE 101* 106*   CALCIUM 7.9* 8.3*   ALKPHOS 94  --    AST 21  --    ALT 20  --    ALBUMIN 2.3*  --      Recent Labs     06/12/22  0135   WBC 7.4   RBC 3.76*   HCT 31.5*   MCV 83.8          MICROBIOLOGY  Microbiology Results (last 7 days)     ** No results found for the last 168 hours. **          MEDICATIONS   amLODIPine  10 mg Oral Daily    apixaban  5 mg Oral BID    atorvastatin  80 mg Oral Daily    benztropine  1 mg Per OG tube BID    doxepin  50 mg Oral QHS    ezetimibe  10 mg Oral QHS    famotidine  20 mg Oral BID    fluticasone propionate  2 spray Each Nostril Daily    levETIRAcetam  500 mg Oral BID    lisinopriL  20 mg Oral Daily    metoprolol tartrate  25 mg Per OG tube Daily    OLANZapine  10 mg Oral QHS    OXcarbazepine  300 mg Per OG tube BID    polyethylene glycol  17 g Oral Daily    sodium chloride 0.9%  10 mL Intravenous Q12H    trazodone  100 mg Oral QHS    white petrolatum   Topical (Top) BID      INFUSIONS      DIAGNOSTIC TESTS  CT Head Without Contrast   Final Result      1. No acute intracranial abnormalities.   2. Bilateral mastoid effusions, left greater than right.   3. Interval resolution of previously visualized diffuse subcutaneous emphysema of the imaged maxillofacial soft tissues.         Electronically signed by: Glenn Francis   Date:    06/09/2022   Time:    15:47      Fl Modified Barium Swallow Speech   Final Result      X-Ray Chest 1 View   Final Result      Slightly improved aeration in the right upper lobe.      No other significant change         Electronically signed by: Chad Jason   Date:    06/05/2022   Time:    07:53      X-Ray Chest AP Portable   Final Result       Tracheostomy cannula is not seen.      Cardiomediastinal silhouette is unchanged as compared with previous exam.      Increase in interstitial and pulmonary vascular markings         Electronically signed by: Chad Jason   Date:    06/03/2022   Time:    11:06      X-Ray Chest 1 View   Final Result      There is effusion of the left lung is noted increased in size. The right lung the demonstrates improved aeration in the interval. Recommend continued follow-up.         Electronically signed by: Rex Denson   Date:    06/02/2022   Time:    06:10      Fl Modified Barium Swallow Speech   Final Result      X-Ray Chest 1 View   Final Result      New bilateral alveolar opacities most pronounced in the perihilar lung zones may be related to edema in the setting of enlarged cardiac silhouette.  Other differential considerations include multifocal pneumonia or ARDS.         Electronically signed by: Sharon Cantu   Date:    05/24/2022   Time:    18:36      X-Ray Abdomen AP 1 View   Final Result         1. Interval removal of NG tube.   2. No convincing evidence of new or worsening intra-abdominal process.         Electronically signed by: Abner Reyes   Date:    05/21/2022   Time:    13:57      X-Ray Chest 1 View   Final Result      Stable exam without significant interval change.         Electronically signed by: Pascale Vencse   Date:    05/19/2022   Time:    06:00      X-Ray Chest 1 View   Final Result      Right-sided PICC has been removed.  No other significant change.         Electronically signed by: Harjit Sellers   Date:    05/17/2022   Time:    07:05      X-Ray Chest 1 View   Final Result      Interval insertion of left-sided PICC line.      No other interval change         Electronically signed by: Chad Jason   Date:    05/16/2022   Time:    09:01      X-Ray Chest 1 View   Final Result      LINES/TUBES: Tracheostomy and right PICC with grossly unchanged positioning.      Markedly improved  aeration bilaterally with now only minimal basilar opacities.      No pneumothoraces.         Electronically signed by: Jyoti López   Date:    05/14/2022   Time:    17:37      X-Ray Chest 1 View   Final Result      Interval insertion of tracheostomy cannula.      Cardiomegaly.      Increase in interstitial and pulmonary vascular markings indicating some degree of congestion.      Interval removal of nasogastric tube         Electronically signed by: Chad Jason   Date:    05/10/2022   Time:    13:52      X-Ray Chest 1 View   Final Result      Improved left pleural effusion and lower lung zone atelectasis.         Electronically signed by: Javier Soto   Date:    05/04/2022   Time:    08:03      X-Ray Abdomen AP 1 View   Final Result      Optimal placement of the nasogastric tube.         Electronically signed by: Javier Soto   Date:    05/03/2022   Time:    16:28      RADIOLOGY REPORT   Final Result      VAS US Venous Arm Right    (Results Pending)            All diagnosis and differential diagnosis have been reviewed; assessment and plan has been documented. I have personally reviewed the labs and test results that are presently available; I have reviewed the patients medication list. I have reviewed the consulting providers response and recommendations. I have reviewed or attempted to review medical records based upon their availability.  All of the patient's questions have been addressed and answered. Patient's is agreeable to the above stated plan. I will continue to monitor closely and make adjustments to medical management as needed.  This document was created using [employer] Fluency Direct.  Transcription errors may have been made.  Please contact me if any questions may rise regarding documentation to clarify verbiage.        Aziza Hernandez MD   06/12/2022   Internal Medicine

## 2022-06-12 NOTE — PT/OT/SLP PROGRESS
Physical Therapy Treatment    Patient Name:  Kendall Duff   MRN:  [id number]    Recommendations:     Discharge Recommendations:  rehabilitation facility, home with home health   Discharge Equipment Recommendations: none     Subjective     Patient awake, calm and cooperative.       Objective:     Communicated with NSG prior to session.     General Precautions: Standard, fall, sternal   Orthopedic Precautions:N/A   Braces:    Respiratory Status: Room air     Functional Mobility:  · Bed Mobility to EOB: Standby assist  · Transfers: sit to stand standby assist  · Gait: 400 ft. min assist with no assistive device.     Treatment Encounter Note:  Pt greeted and agreed to session. Pt standby assist supine to/from EOB. Pt ambulated 400' and ascended/descended 1 flight of stairs with min assist and NO AD. Pt reminded to call for assistance if needed at the conclusion of treatment.    Patient left up in chair with all lines intact and call button in reach.    GOALS:   Multidisciplinary Problems     Physical Therapy Goals        Problem: Physical Therapy    Goal Priority Disciplines Outcome Goal Variances Interventions   Physical Therapy Goal     PT, PT/OT Ongoing, Progressing     Description: Goals to be met by: 22     Patient will increase functional independence with mobility by performin. Supine to sit with independence  2. Sit to supine with independence  3. Bed to chair transfer with independence  4. Sit to stand with independence  5. Gait x 500 feet with independence without demonstration of safety conerns                     Assessment:     Kendall Duff is a 56 y.o. male admitted with a medical diagnosis of Acute myocardial infarction of anterolateral wall.     Rehab Prognosis: Good; patient would benefit from acute skilled PT services to address these deficits and reach maximum level of function.    Recent Surgery: Procedure(s) (LRB):  CLOSURE, STERNAL INCISION, REPEAT (N/A) 32 Days Post-Op    Plan:     During  this hospitalization, patient to be seen 6 x/week to address the identified rehab impairments via gait training, therapeutic activities and progress toward the following goals:    · Plan of Care Expires:  06/21/22    Billable Minutes     Billable Minutes: Gait Training 23    Treatment Type: Treatment  PT/PTA: PTA     PTA Visit Number: 2     06/12/2022

## 2022-06-13 LAB
FUNGUS SPEC CULT: NORMAL
FUNGUS SPEC CULT: NORMAL

## 2022-06-13 PROCEDURE — 97116 GAIT TRAINING THERAPY: CPT | Mod: CQ

## 2022-06-13 PROCEDURE — 25000003 PHARM REV CODE 250: Performed by: INTERNAL MEDICINE

## 2022-06-13 PROCEDURE — 25000003 PHARM REV CODE 250

## 2022-06-13 PROCEDURE — 99024 PR POST-OP FOLLOW-UP VISIT: ICD-10-PCS | Mod: ,,, | Performed by: SURGERY

## 2022-06-13 PROCEDURE — 94761 N-INVAS EAR/PLS OXIMETRY MLT: CPT

## 2022-06-13 PROCEDURE — 99024 POSTOP FOLLOW-UP VISIT: CPT | Mod: ,,, | Performed by: SURGERY

## 2022-06-13 PROCEDURE — 20000000 HC ICU ROOM

## 2022-06-13 PROCEDURE — 25000003 PHARM REV CODE 250: Performed by: STUDENT IN AN ORGANIZED HEALTH CARE EDUCATION/TRAINING PROGRAM

## 2022-06-13 PROCEDURE — A4216 STERILE WATER/SALINE, 10 ML: HCPCS

## 2022-06-13 PROCEDURE — 97129 THER IVNTJ 1ST 15 MIN: CPT

## 2022-06-13 RX ADMIN — BENZTROPINE MESYLATE 1 MG: 1 TABLET ORAL at 08:06

## 2022-06-13 RX ADMIN — METOPROLOL TARTRATE 25 MG: 25 TABLET, FILM COATED ORAL at 08:06

## 2022-06-13 RX ADMIN — LISINOPRIL 20 MG: 20 TABLET ORAL at 08:06

## 2022-06-13 RX ADMIN — LEVETIRACETAM 500 MG: 500 TABLET, FILM COATED ORAL at 08:06

## 2022-06-13 RX ADMIN — FAMOTIDINE 20 MG: 20 TABLET, FILM COATED ORAL at 08:06

## 2022-06-13 RX ADMIN — ATORVASTATIN CALCIUM 80 MG: 40 TABLET, FILM COATED ORAL at 08:06

## 2022-06-13 RX ADMIN — OXCARBAZEPINE 300 MG: 300 TABLET, FILM COATED ORAL at 08:06

## 2022-06-13 RX ADMIN — EZETIMIBE 10 MG: 10 TABLET ORAL at 08:06

## 2022-06-13 RX ADMIN — WHITE PETROLATUM: 1.75 OINTMENT TOPICAL at 12:06

## 2022-06-13 RX ADMIN — DOXEPIN HYDROCHLORIDE 50 MG: 50 CAPSULE ORAL at 08:06

## 2022-06-13 RX ADMIN — APIXABAN 5 MG: 5 TABLET, FILM COATED ORAL at 08:06

## 2022-06-13 RX ADMIN — SODIUM CHLORIDE, PRESERVATIVE FREE 10 ML: 5 INJECTION INTRAVENOUS at 08:06

## 2022-06-13 RX ADMIN — TRAZODONE HYDROCHLORIDE 100 MG: 50 TABLET ORAL at 08:06

## 2022-06-13 RX ADMIN — AMLODIPINE BESYLATE 10 MG: 5 TABLET ORAL at 08:06

## 2022-06-13 RX ADMIN — OLANZAPINE 10 MG: 5 TABLET, FILM COATED ORAL at 08:06

## 2022-06-13 RX ADMIN — SODIUM CHLORIDE, PRESERVATIVE FREE 10 ML: 5 INJECTION INTRAVENOUS at 12:06

## 2022-06-13 RX ADMIN — WHITE PETROLATUM: 1.75 OINTMENT TOPICAL at 08:06

## 2022-06-13 NOTE — PT/OT/SLP PROGRESS
Physical Therapy  Treatment    Kendall Duff   MRN: [id number]   Admitting Diagnosis: Acute myocardial infarction of anterolateral wall       PT Start Time: 0850     PT Stop Time: 0900    PT Total Time (min): 10 min       Billable Minutes:  Gait Training 10    Treatment Type: Treatment  PT/PTA: PTA     PTA Visit Number: 3       General Precautions: Standard, fall, sternal  Orthopedic Precautions: N/A   Braces:    Respiratory Status: Room air    Spiritual, Cultural Beliefs, Restoration Practices, Values that Affect Care: no    Subjective:  Communicated with NSG prior to session.           Objective:        Functional Mobility:  Bed Mobility:    Supine to/from sit. Independent.    Transfers:   Sit to/from stand. Independent.    Gait:    Pt ambulated ~500ft. Step through gait pattern. Intermittent scissoring noted at times. Pt demos a good gait speed and no overt LOB but some unsteadiness but pt able to self correct LOBs. SBA.          AM-PAC 6 CLICK MOBILITY  How much help from another person does this patient currently need?   1 = Unable, Total/Dependent Assistance  2 = A lot, Maximum/Moderate Assistance  3 = A little, Minimum/Contact Guard/Supervision  4 = None, Modified [place]/Independent         AM-PAC Raw Score CMS G-Code Modifier Level of Impairment Assistance   6 % Total / Unable   7 - 9 CM 80 - 100% Maximal Assist   10 - 14 CL 60 - 80% Moderate Assist   15 - 19 CK 40 - 60% Moderate Assist   20 - 22 CJ 20 - 40% Minimal Assist   23 CI 1-20% SBA / CGA   24 CH 0% Independent/ Mod I     Patient left supine with all lines intact and call button in reach.    Assessment:  Kendall Duff is a 56 y.o. male with a medical diagnosis of Acute myocardial infarction of anterolateral wall and presents with s/p CABG.    Rehab identified problem list/impairments: Rehab identified problem list/impairments: weakness, impaired endurance, gait instability    Rehab potential is excellent.    Activity tolerance:  Excellent    Discharge recommendations:       Barriers to discharge:      Equipment recommendations:       GOALS:   Multidisciplinary Problems     Physical Therapy Goals        Problem: Physical Therapy    Goal Priority Disciplines Outcome Goal Variances Interventions   Physical Therapy Goal     PT, PT/OT Ongoing, Progressing     Description: Goals to be met by: 22     Patient will increase functional independence with mobility by performin. Supine to sit with independence  2. Sit to supine with independence  3. Bed to chair transfer with independence  4. Sit to stand with independence  5. Gait x 500 feet with independence without demonstration of safety conerns                     PLAN:    Patient to be seen 6 x/week  to address the above listed problems via gait training, therapeutic activities, therapeutic exercises  Plan of Care expires: 22  Plan of Care reviewed with: patient, daughter         2022

## 2022-06-13 NOTE — PLAN OF CARE
Problem: Adult Inpatient Plan of Care  Goal: Plan of Care Review  Outcome: Ongoing, Progressing  Goal: Absence of Hospital-Acquired Illness or Injury  Outcome: Ongoing, Progressing  Goal: Optimal Comfort and Wellbeing  Outcome: Ongoing, Progressing  Goal: Readiness for Transition of Care  Outcome: Met     Problem: Fall Injury Risk  Goal: Absence of Fall and Fall-Related Injury  Outcome: Ongoing, Progressing     Problem: Attention and Thought Clarity Impairment (Delirium)  Goal: Improved Attention and Thought Clarity  Outcome: Ongoing, Not Progressing

## 2022-06-13 NOTE — PROGRESS NOTES
[facility]  HOSPITAL MEDICINE   PROGRESS NOTE      CHIEF COMPLAINT  Hospital follow up    HOSPITAL COURSE  56 y.o. male with a history that includes chronic hepatitis C, hypertension, seizures, CAD s/p stents, initially presented to [facility] on 4/1/2022 with a primary complaint of chest pain. Of note, he was seen at an outlying ED on 3/20/22 and was dx with MI, subsequently transferred to [facility] where he underwent LHC with angioplasty, and deemed appropriate for CABG, which had been scheduled for 3/29/22, but was cancelled. He then presented to ED on 4/1 with c/o CP earlier in the day. He was admitted to cardiology services, started on a heparin gtt. CV Surgery was consulted and the patient underwent CABG x4 on 4/6/22. He was admitted to ICU post CABG, intubated on mechanical ventilation. Psych was consulted during his stay to evaluate for some recent agitation, restlessness, fidgetiness, insomnia, confusion, and sexual inappropriateness, and he was started on PRN haldol. He was extubated to NC 4L on 4/7, remained restless and confused requiring Precedex gtt.  On 4/10/22 he experienced seizure-like activity and confusion for which neuro was consulted. EEG revealed moderate generalized slowing. CT Head was negative for acute intracranial abnormality, gas noted throughout the bilateral subcutaneous soft tissues.MRI on 4/15/22 revealed small linear focus of acute small vessel ischemia in the right frontal white matter. Developed diffuse subcutaneous emphysema per CXR. He remained agitated with delirium.  Unfortunately,he pulled out his left chest tube on 4/16/22 and he had been thrashing in bed, had to be restrained at times.  He developed an unstable sternum with purulence drainage. CT thorax revealed surgical changes with inflammation, fluid and air locules, minimal anterior pericardial effusion, extensive soft tissue emphysema, and bilateral pleural effusions and bilateral  "lower lung lobes collapse consolidations. He has known subcutaneous air, which has been present on previous x-rays.  Cultures positive for MRSA, and he was placed on Vancomycin. He required intubation on 4/16/22. On 4/17/22 he underwent sternal wound debridement with wound vac placement. ID was consulted on 4/22/22 due to sepsis. He was started on rifampin in addition to vancomycin for a planned 6 week course. Respiratory cultures from 4/24/22 returned positive for Proteus and Klebsiella. Remained intubated on mechanical ventilation. GI was consulted on 5/3/22 for PEG placement. Plastic surgery was also following for sternal reconstruction. He underwent tracheostomy and PEG placement on 5/10/22. NIVA done on 5/10/22 noted a RUE DVT. On 5/11/22 he underwent sternal wound debridement, bilateral fasciocutaneous flap advanced closure over sternum and bilateral pectoralis major muscle flap reconstruction of sternum. He was started on full dose Lovenox. He remained on mechanical ventilation until 5/21/22. Tolerating T-piece now on trach collar. He required vasopressors for BP support throughout his stay, which have since been weaned off. He has been weaned off of Precedex. He did have some vomiting on 5/21/22. Lovenox was transitioned to Eliquis on 5/21/22. KUB unrevealing, tube feeds restarted on 5/22/22. He was cleared for downgrade to the floor on 5/22/22 under the hospital medicine service. His trach tube fell of during cough and were ubnable to reinserted. He was stable from respiratory standpoint and close monitoring was continued.     Today info  Pt seen and examined, no complaints reported no issues reported overnight by the nursing staff  OBJECTIVE/PHYSICAL EXAM  /87   Pulse 86   Temp 98.2 °F (36.8 °C) (Oral)   Resp 16   Ht 5' 10.87" (1.8 m)   Wt 77.2 kg (170 lb 3.1 oz)   SpO2 97%   BMI 23.83 kg/m²   General: In no acute distress, afebrile  Chest: Clear to auscultation bilaterally, sternotomy wound " healing  Heart: S1, S2, no appreciable murmur  Abdomen: Soft, nontender, BS +, peg tube  MSK: Warm, no lower extremity edema, no clubbing or cyanosis  Neurologic: Alert and oriented x4,        ASSESSMENT/PLAN  Chronic hypoxic respiratory failure status post tracheostomy on 5/10/22 status post trach fell out post coughing  Oropharyngeal dysphagia s/p PEG placment on 5/10/22-status post started oral diet her SLP  Multivessel CAD s/p CABG x 4 on 04/06/2022.  MRSA sternal wound infection and dehiscence, status post wound vacuum-assisted closure device on 04/17/2022, and bilateral pectoral flaps for sternal wound closure on 05/11/2022.  Metabolic encephalopathy- improving  Acute right upper extremity deep venous thrombosis  Anemia chronic disease  Severe protein calorie malnutrition, hypoalbuminemia  Hypertension  Seizure disorder   Transaminitis, mild - resolved     Hx of  Schizophrenia, chronic hepatitis C, hypertension, CAD s/p stents     Plan :  Patient is stable of the antibiotics  completed the treatment with antibiotics id signed off   Plastic surgery following along for drain management.  Two drains still remain in place, pending plans regarding those two for disposition.  The other 3 drains were removed by Dr. Weiss  Continues to work well with PT OT and speech therapy.   Case management following.  No longer LTAC candidate and unable to go to TCU due to insurance.  Unable to go to SNF due to drains.  Discontinued Haldol, continue olanzapine.  Start trazodone 100 mg at nighttime.  Psychiatry has also been consulted per Nursing Home request.  zydis is discontinued changed to Zyprexa 10 mg at bedtime   Awaiting level 2 from the state  Continue with PT and OT and ST  Awaiting placement   On Eliquis for DVT  Continue with Keppra and other home medications  Blood pressure fairly stable    Awaiting level 2 from the state  Awaiting placement  Prophylaxis:   Eliquis  __________________________________________________________________________    LABS/MICROBIOLOGY/MEDICATIONS/DIAGNOSTICS    LABS  Recent Labs     06/11/22  0509 06/12/22  0135    138   K 4.0 4.1   CHLORIDE 106 105   CO2 23 25   BUN 11.0 10.4   CREATININE 0.71* 0.76   GLUCOSE 101* 106*   CALCIUM 7.9* 8.3*   ALKPHOS 94  --    AST 21  --    ALT 20  --    ALBUMIN 2.3*  --      Recent Labs     06/12/22  0135   WBC 7.4   RBC 3.76*   HCT 31.5*   MCV 83.8          MICROBIOLOGY  Microbiology Results (last 7 days)     Procedure Component Value Units Date/Time    Fungal Culture [[id number]]  (Normal) Collected: 05/11/22 1429    Order Status: Completed Specimen: Tissue from Sternum Updated: 06/13/22 1113     Fungal Culture No Fungus Isolated at 4 Weeks    Fungal Culture [[id number]]  (Normal) Collected: 05/11/22 1432    Order Status: Completed Specimen: Bone from Sternum Updated: 06/13/22 1113     Fungal Culture No Fungus Isolated at 4 Weeks          MEDICATIONS   amLODIPine  10 mg Oral Daily    apixaban  5 mg Oral BID    atorvastatin  80 mg Oral Daily    benztropine  1 mg Per OG tube BID    doxepin  50 mg Oral QHS    ezetimibe  10 mg Oral QHS    famotidine  20 mg Oral BID    fluticasone propionate  2 spray Each Nostril Daily    levETIRAcetam  500 mg Oral BID    lisinopriL  20 mg Oral Daily    metoprolol tartrate  25 mg Per OG tube Daily    OLANZapine  10 mg Oral QHS    OXcarbazepine  300 mg Per OG tube BID    polyethylene glycol  17 g Oral Daily    sodium chloride 0.9%  10 mL Intravenous Q12H    trazodone  100 mg Oral QHS    white petrolatum   Topical (Top) BID      INFUSIONS      DIAGNOSTIC TESTS  CT Head Without Contrast   Final Result      1. No acute intracranial abnormalities.   2. Bilateral mastoid effusions, left greater than right.   3. Interval resolution of previously visualized diffuse subcutaneous emphysema of the imaged maxillofacial soft tissues.         Electronically signed  by: Glenn Francis   Date:    06/09/2022   Time:    15:47      Fl Modified Barium Swallow Speech   Final Result      X-Ray Chest 1 View   Final Result      Slightly improved aeration in the right upper lobe.      No other significant change         Electronically signed by: Chad Jason   Date:    06/05/2022   Time:    07:53      X-Ray Chest AP Portable   Final Result      Tracheostomy cannula is not seen.      Cardiomediastinal silhouette is unchanged as compared with previous exam.      Increase in interstitial and pulmonary vascular markings         Electronically signed by: Chad Jason   Date:    06/03/2022   Time:    11:06      X-Ray Chest 1 View   Final Result      There is effusion of the left lung is noted increased in size. The right lung the demonstrates improved aeration in the interval. Recommend continued follow-up.         Electronically signed by: Rex Denson   Date:    06/02/2022   Time:    06:10      Fl Modified Barium Swallow Speech   Final Result      X-Ray Chest 1 View   Final Result      New bilateral alveolar opacities most pronounced in the perihilar lung zones may be related to edema in the setting of enlarged cardiac silhouette.  Other differential considerations include multifocal pneumonia or ARDS.         Electronically signed by: Sharon Cantu   Date:    05/24/2022   Time:    18:36      X-Ray Abdomen AP 1 View   Final Result         1. Interval removal of NG tube.   2. No convincing evidence of new or worsening intra-abdominal process.         Electronically signed by: Abner Reyes   Date:    05/21/2022   Time:    13:57      X-Ray Chest 1 View   Final Result      Stable exam without significant interval change.         Electronically signed by: Pascale Vences   Date:    05/19/2022   Time:    06:00      X-Ray Chest 1 View   Final Result      Right-sided PICC has been removed.  No other significant change.         Electronically signed by: Harjit Sellers   Date:    05/17/2022    Time:    07:05      X-Ray Chest 1 View   Final Result      Interval insertion of left-sided PICC line.      No other interval change         Electronically signed by: Chad Jason   Date:    05/16/2022   Time:    09:01      X-Ray Chest 1 View   Final Result      LINES/TUBES: Tracheostomy and right PICC with grossly unchanged positioning.      Markedly improved aeration bilaterally with now only minimal basilar opacities.      No pneumothoraces.         Electronically signed by: Jyoti López   Date:    05/14/2022   Time:    17:37      X-Ray Chest 1 View   Final Result      Interval insertion of tracheostomy cannula.      Cardiomegaly.      Increase in interstitial and pulmonary vascular markings indicating some degree of congestion.      Interval removal of nasogastric tube         Electronically signed by: Chad Jason   Date:    05/10/2022   Time:    13:52      X-Ray Chest 1 View   Final Result      Improved left pleural effusion and lower lung zone atelectasis.         Electronically signed by: Javier Soto   Date:    05/04/2022   Time:    08:03      X-Ray Abdomen AP 1 View   Final Result      Optimal placement of the nasogastric tube.         Electronically signed by: Javier Soto   Date:    05/03/2022   Time:    16:28      RADIOLOGY REPORT   Final Result      VAS US Venous Arm Right    (Results Pending)            All diagnosis and differential diagnosis have been reviewed; assessment and plan has been documented. I have personally reviewed the labs and test results that are presently available; I have reviewed the patients medication list. I have reviewed the consulting providers response and recommendations. I have reviewed or attempted to review medical records based upon their availability.  All of the patient's questions have been addressed and answered. Patient's is agreeable to the above stated plan. I will continue to monitor closely and make adjustments to medical management as  needed.  This document was created using [employer] Fluency Direct.  Transcription errors may have been made.  Please contact me if any questions may rise regarding documentation to clarify verbiage.        Aziza Hernandez MD   06/13/2022   Internal Medicine

## 2022-06-13 NOTE — PT/OT/SLP PROGRESS
Speech Language Pathology Department  Progress Note    Patient Name:  Kendall Duff   MRN:  [id number]  Admitting Diagnosis: Acute myocardial infarction of anterolateral wall    Recommendations:                 General Recommendations:  Cognitive-linguistic therapy  Communication strategies:  none    Subjective     Patient awake, alert and oriented x4.    Patient goals: to go home     Pain/Comfort:  · Pain Rating 1: 0/10    Respiratory Status: Room air    Objective:     Pt seen to address cognitive-linguistic deficits and required moderate cues to complete simple problem solving tasks.    Assessment:     Pt continues to present with cognitive-linguistic deficits negatively impacting safe, independent living.    Goals:   Multidisciplinary Problems     SLP Goals        Problem: SLP    Goal Priority Disciplines Outcome   SLP Goal     SLP Ongoing, Progressing   Description: LTG1: Tolerate speaking valve during all waking hours with no signs/sx respiratory distress/compromise - discontinue  STG: tolerate speaking valve x1 hour with no signs/sx respiratory distress/compromise - discontinue    LTG2: Tolerate least restrictive PO diet with no clinical signs/sx aspiration - progressing  STG2a: Tolerate thin liquids by cup with no clinical signs/sx aspiration - progressing  STG2b: Tolerate puree solids with no clinical signs/sx aspiration. - progressing    LTG3: Complete basic cognitive tasks with supervision  STG3a: Recognize simple problems with min cues  STG3b: Solve routine problems with min cues  STG4c: Complete 3-step sequencing tasks with min cues                   Plan:     Will continue to follow and tx as appropriate.    Discharge recommendations:  nursing facility, skilled   Barriers to Discharge:  Level of Skilled Assistance Needed      Time Tracking:     SLP Treatment Date:   06/13/22  Speech Start Time:  1450  Speech Stop Time:  1505     Speech Total Time (min):  15 min    Billable minutes:  Cognitive Skills  Intervention, 15 minutes       06/13/2022

## 2022-06-14 PROCEDURE — 97116 GAIT TRAINING THERAPY: CPT

## 2022-06-14 PROCEDURE — 25000003 PHARM REV CODE 250: Performed by: STUDENT IN AN ORGANIZED HEALTH CARE EDUCATION/TRAINING PROGRAM

## 2022-06-14 PROCEDURE — 25000003 PHARM REV CODE 250: Performed by: INTERNAL MEDICINE

## 2022-06-14 PROCEDURE — 20000000 HC ICU ROOM

## 2022-06-14 PROCEDURE — 25000003 PHARM REV CODE 250

## 2022-06-14 PROCEDURE — A4216 STERILE WATER/SALINE, 10 ML: HCPCS

## 2022-06-14 PROCEDURE — 97129 THER IVNTJ 1ST 15 MIN: CPT

## 2022-06-14 PROCEDURE — 63600175 PHARM REV CODE 636 W HCPCS

## 2022-06-14 RX ADMIN — TRAZODONE HYDROCHLORIDE 100 MG: 50 TABLET ORAL at 08:06

## 2022-06-14 RX ADMIN — METOPROLOL TARTRATE 25 MG: 25 TABLET, FILM COATED ORAL at 08:06

## 2022-06-14 RX ADMIN — LEVETIRACETAM 500 MG: 500 TABLET, FILM COATED ORAL at 08:06

## 2022-06-14 RX ADMIN — OLANZAPINE 10 MG: 5 TABLET, FILM COATED ORAL at 08:06

## 2022-06-14 RX ADMIN — FAMOTIDINE 20 MG: 20 TABLET, FILM COATED ORAL at 08:06

## 2022-06-14 RX ADMIN — DOXEPIN HYDROCHLORIDE 50 MG: 50 CAPSULE ORAL at 08:06

## 2022-06-14 RX ADMIN — POLYETHYLENE GLYCOL 3350 17 G: 17 POWDER, FOR SOLUTION ORAL at 08:06

## 2022-06-14 RX ADMIN — OXCARBAZEPINE 300 MG: 300 TABLET, FILM COATED ORAL at 08:06

## 2022-06-14 RX ADMIN — MORPHINE SULFATE 2 MG: 4 INJECTION INTRAVENOUS at 08:06

## 2022-06-14 RX ADMIN — MORPHINE SULFATE 2 MG: 4 INJECTION INTRAVENOUS at 03:06

## 2022-06-14 RX ADMIN — EZETIMIBE 10 MG: 10 TABLET ORAL at 08:06

## 2022-06-14 RX ADMIN — WHITE PETROLATUM: 1.75 OINTMENT TOPICAL at 08:06

## 2022-06-14 RX ADMIN — SODIUM CHLORIDE, PRESERVATIVE FREE 10 ML: 5 INJECTION INTRAVENOUS at 08:06

## 2022-06-14 RX ADMIN — LISINOPRIL 20 MG: 20 TABLET ORAL at 08:06

## 2022-06-14 RX ADMIN — BENZTROPINE MESYLATE 1 MG: 1 TABLET ORAL at 08:06

## 2022-06-14 RX ADMIN — AMLODIPINE BESYLATE 10 MG: 5 TABLET ORAL at 08:06

## 2022-06-14 RX ADMIN — APIXABAN 5 MG: 5 TABLET, FILM COATED ORAL at 08:06

## 2022-06-14 RX ADMIN — ATORVASTATIN CALCIUM 80 MG: 40 TABLET, FILM COATED ORAL at 08:06

## 2022-06-14 RX ADMIN — FLUTICASONE PROPIONATE 100 MCG: 50 SPRAY, METERED NASAL at 08:06

## 2022-06-14 NOTE — PLAN OF CARE
Problem: Physical Therapy  Goal: Physical Therapy Goal  Description: Goals to be met by: 22     Patient will increase functional independence with mobility by performin. Supine to sit with independence  2. Sit to supine with independence  3. Bed to chair transfer with independence  4. Sit to stand with independence  5. Gait x 500 feet with independence without demonstration of safety conerns    Outcome: Met

## 2022-06-14 NOTE — PT/OT/SLP PROGRESS
Occupational Therapy      Patient Name:  Kendall Duff   MRN:  [id number]    Patient not seen today secondary to Other (Comment). Will follow-up 6/15.  Pt. Declining OT session at this time, pt. Stating he performed all his ADLs earlier independently, wanting to just rest at this time. Pt. Educated on OT however still declining.   6/14/2022

## 2022-06-14 NOTE — PROGRESS NOTES
[facility]  HOSPITAL MEDICINE   PROGRESS NOTE      CHIEF COMPLAINT  Hospital follow up    HOSPITAL COURSE  56 y.o. male with a history that includes chronic hepatitis C, hypertension, seizures, CAD s/p stents, initially presented to [facility] on 4/1/2022 with a primary complaint of chest pain. Of note, he was seen at an outlying ED on 3/20/22 and was dx with MI, subsequently transferred to [facility] where he underwent LHC with angioplasty, and deemed appropriate for CABG, which had been scheduled for 3/29/22, but was cancelled. He then presented to ED on 4/1 with c/o CP earlier in the day. He was admitted to cardiology services, started on a heparin gtt. CV Surgery was consulted and the patient underwent CABG x4 on 4/6/22. He was admitted to ICU post CABG, intubated on mechanical ventilation. Psych was consulted during his stay to evaluate for some recent agitation, restlessness, fidgetiness, insomnia, confusion, and sexual inappropriateness, and he was started on PRN haldol. He was extubated to NC 4L on 4/7, remained restless and confused requiring Precedex gtt.  On 4/10/22 he experienced seizure-like activity and confusion for which neuro was consulted. EEG revealed moderate generalized slowing. CT Head was negative for acute intracranial abnormality, gas noted throughout the bilateral subcutaneous soft tissues.MRI on 4/15/22 revealed small linear focus of acute small vessel ischemia in the right frontal white matter. Developed diffuse subcutaneous emphysema per CXR. He remained agitated with delirium.  Unfortunately,he pulled out his left chest tube on 4/16/22 and he had been thrashing in bed, had to be restrained at times.  He developed an unstable sternum with purulence drainage. CT thorax revealed surgical changes with inflammation, fluid and air locules, minimal anterior pericardial effusion, extensive soft tissue emphysema, and bilateral pleural effusions and bilateral  "lower lung lobes collapse consolidations. He has known subcutaneous air, which has been present on previous x-rays.  Cultures positive for MRSA, and he was placed on Vancomycin. He required intubation on 4/16/22. On 4/17/22 he underwent sternal wound debridement with wound vac placement. ID was consulted on 4/22/22 due to sepsis. He was started on rifampin in addition to vancomycin for a planned 6 week course. Respiratory cultures from 4/24/22 returned positive for Proteus and Klebsiella. Remained intubated on mechanical ventilation. GI was consulted on 5/3/22 for PEG placement. Plastic surgery was also following for sternal reconstruction. He underwent tracheostomy and PEG placement on 5/10/22. NIVA done on 5/10/22 noted a RUE DVT. On 5/11/22 he underwent sternal wound debridement, bilateral fasciocutaneous flap advanced closure over sternum and bilateral pectoralis major muscle flap reconstruction of sternum. He was started on full dose Lovenox. He remained on mechanical ventilation until 5/21/22. Tolerating T-piece now on trach collar. He required vasopressors for BP support throughout his stay, which have since been weaned off. He has been weaned off of Precedex. He did have some vomiting on 5/21/22. Lovenox was transitioned to Eliquis on 5/21/22. KUB unrevealing, tube feeds restarted on 5/22/22. He was cleared for downgrade to the floor on 5/22/22 under the hospital medicine service. His trach tube fell of during cough and were ubnable to reinserted. He was stable from respiratory standpoint and close monitoring was continued.     Today info  Pt seen and examined, no complaints patient removed his left drain by himself . all other vitals are fairly stable  OBJECTIVE/PHYSICAL EXAM  BP 96/63 (BP Location: Left arm, Patient Position: Lying)   Pulse 97   Temp 97.9 °F (36.6 °C) (Oral)   Resp (!) 23   Ht 5' 10.87" (1.8 m)   Wt 77.2 kg (170 lb 3.1 oz)   SpO2 98%   BMI 23.83 kg/m²   General: In no acute " distress, afebrile  Chest: Clear to auscultation bilaterally, sternotomy wound healing  Heart: S1, S2, no appreciable murmur  Abdomen: Soft, nontender, BS +, peg tube  MSK: Warm, no lower extremity edema, no clubbing or cyanosis  Neurologic: Alert and oriented x4,        ASSESSMENT/PLAN  Chronic hypoxic respiratory failure status post tracheostomy on 5/10/22 status post trach fell out post coughing  Oropharyngeal dysphagia s/p PEG placment on 5/10/22-status post started oral diet her SLP  Multivessel CAD s/p CABG x 4 on 04/06/2022.  MRSA sternal wound infection and dehiscence, status post wound vacuum-assisted closure device on 04/17/2022, and bilateral pectoral flaps for sternal wound closure on 05/11/2022.  Metabolic encephalopathy- improving  Acute right upper extremity deep venous thrombosis  Anemia chronic disease  Severe protein calorie malnutrition, hypoalbuminemia  Hypertension  Seizure disorder   Transaminitis, mild - resolved     Hx of  Schizophrenia, chronic hepatitis C, hypertension, CAD s/p stents     Plan :  Patient is stable of the antibiotics  completed the treatment with antibiotics id signed off   Plastic surgery following along for drain management.  Patient still has his right drain apparently he removed his left drain  Continue with drain care  Continues to work well with PT OT and speech therapy.   Case management following.  No longer LTAC candidate and unable to go to TCU due to insurance.  Unable to go to SNF due to drains.  Discontinued Haldol, continue olanzapine.  Start trazodone 100 mg at nighttime.  Psychiatry has also been consulted per Nursing Home request.  zydis is discontinued changed to Zyprexa 10 mg at bedtime   Awaiting level 2 from the state  Continue with PT and OT and ST  Awaiting placement   On Eliquis for DVT  Continue with Keppra and other home medications  Blood pressure fairly stable    Awaiting level 2 from the state  Awaiting placement  Prophylaxis:   Eliquis  __________________________________________________________________________    LABS/MICROBIOLOGY/MEDICATIONS/DIAGNOSTICS    LABS  Recent Labs     06/12/22  0135      K 4.1   CHLORIDE 105   CO2 25   BUN 10.4   CREATININE 0.76   GLUCOSE 106*   CALCIUM 8.3*     Recent Labs     06/12/22 0135   WBC 7.4   RBC 3.76*   HCT 31.5*   MCV 83.8          MICROBIOLOGY  Microbiology Results (last 7 days)     Procedure Component Value Units Date/Time    Fungal Culture [[id number]]  (Normal) Collected: 05/11/22 1429    Order Status: Completed Specimen: Tissue from Sternum Updated: 06/13/22 1113     Fungal Culture No Fungus Isolated at 4 Weeks    Fungal Culture [[id number]]  (Normal) Collected: 05/11/22 1432    Order Status: Completed Specimen: Bone from Sternum Updated: 06/13/22 1113     Fungal Culture No Fungus Isolated at 4 Weeks          MEDICATIONS   amLODIPine  10 mg Oral Daily    apixaban  5 mg Oral BID    atorvastatin  80 mg Oral Daily    benztropine  1 mg Per OG tube BID    doxepin  50 mg Oral QHS    ezetimibe  10 mg Oral QHS    famotidine  20 mg Oral BID    fluticasone propionate  2 spray Each Nostril Daily    levETIRAcetam  500 mg Oral BID    lisinopriL  20 mg Oral Daily    metoprolol tartrate  25 mg Per OG tube Daily    OLANZapine  10 mg Oral QHS    OXcarbazepine  300 mg Per OG tube BID    polyethylene glycol  17 g Oral Daily    sodium chloride 0.9%  10 mL Intravenous Q12H    trazodone  100 mg Oral QHS    white petrolatum   Topical (Top) BID      INFUSIONS      DIAGNOSTIC TESTS  CT Head Without Contrast   Final Result      1. No acute intracranial abnormalities.   2. Bilateral mastoid effusions, left greater than right.   3. Interval resolution of previously visualized diffuse subcutaneous emphysema of the imaged maxillofacial soft tissues.         Electronically signed by: Glenn Francis   Date:    06/09/2022   Time:    15:47      Fl Modified Barium Swallow Speech   Final Result      X-Ray  Chest 1 View   Final Result      Slightly improved aeration in the right upper lobe.      No other significant change         Electronically signed by: Chad Jason   Date:    06/05/2022   Time:    07:53      X-Ray Chest AP Portable   Final Result      Tracheostomy cannula is not seen.      Cardiomediastinal silhouette is unchanged as compared with previous exam.      Increase in interstitial and pulmonary vascular markings         Electronically signed by: Chad Jason   Date:    06/03/2022   Time:    11:06      X-Ray Chest 1 View   Final Result      There is effusion of the left lung is noted increased in size. The right lung the demonstrates improved aeration in the interval. Recommend continued follow-up.         Electronically signed by: Rex Denson   Date:    06/02/2022   Time:    06:10      Fl Modified Barium Swallow Speech   Final Result      X-Ray Chest 1 View   Final Result      New bilateral alveolar opacities most pronounced in the perihilar lung zones may be related to edema in the setting of enlarged cardiac silhouette.  Other differential considerations include multifocal pneumonia or ARDS.         Electronically signed by: Sharon Cantu   Date:    05/24/2022   Time:    18:36      X-Ray Abdomen AP 1 View   Final Result         1. Interval removal of NG tube.   2. No convincing evidence of new or worsening intra-abdominal process.         Electronically signed by: Abner Reyes   Date:    05/21/2022   Time:    13:57      X-Ray Chest 1 View   Final Result      Stable exam without significant interval change.         Electronically signed by: Pascale Vences   Date:    05/19/2022   Time:    06:00      X-Ray Chest 1 View   Final Result      Right-sided PICC has been removed.  No other significant change.         Electronically signed by: Harjit Sellers   Date:    05/17/2022   Time:    07:05      X-Ray Chest 1 View   Final Result      Interval insertion of left-sided PICC line.      No other  interval change         Electronically signed by: Chad Jason   Date:    05/16/2022   Time:    09:01      X-Ray Chest 1 View   Final Result      LINES/TUBES: Tracheostomy and right PICC with grossly unchanged positioning.      Markedly improved aeration bilaterally with now only minimal basilar opacities.      No pneumothoraces.         Electronically signed by: Jyoti López   Date:    05/14/2022   Time:    17:37      X-Ray Chest 1 View   Final Result      Interval insertion of tracheostomy cannula.      Cardiomegaly.      Increase in interstitial and pulmonary vascular markings indicating some degree of congestion.      Interval removal of nasogastric tube         Electronically signed by: Chad Jason   Date:    05/10/2022   Time:    13:52      X-Ray Chest 1 View   Final Result      Improved left pleural effusion and lower lung zone atelectasis.         Electronically signed by: Javier Soto   Date:    05/04/2022   Time:    08:03      X-Ray Abdomen AP 1 View   Final Result      Optimal placement of the nasogastric tube.         Electronically signed by: Javier Soto   Date:    05/03/2022   Time:    16:28      RADIOLOGY REPORT   Final Result      VAS US Venous Arm Right    (Results Pending)            All diagnosis and differential diagnosis have been reviewed; assessment and plan has been documented. I have personally reviewed the labs and test results that are presently available; I have reviewed the patients medication list. I have reviewed the consulting providers response and recommendations. I have reviewed or attempted to review medical records based upon their availability.  All of the patient's questions have been addressed and answered. Patient's is agreeable to the above stated plan. I will continue to monitor closely and make adjustments to medical management as needed.  This document was created using M*Modal Fluency Direct.  Transcription errors may have been made.  Please contact me if  any questions may rise regarding documentation to clarify verbiage.        Aziza Hernandez MD   06/14/2022   Internal Medicine

## 2022-06-14 NOTE — PLAN OF CARE
Problem: Adult Inpatient Plan of Care  Goal: Plan of Care Review  Outcome: Ongoing, Progressing  Goal: Patient-Specific Goal (Individualized)  Outcome: Ongoing, Progressing  Goal: Absence of Hospital-Acquired Illness or Injury  Outcome: Ongoing, Progressing  Goal: Optimal Comfort and Wellbeing  Outcome: Ongoing, Progressing     Problem: Communication Impairment (Mechanical Ventilation, Invasive)  Goal: Effective Communication  Outcome: Ongoing, Progressing     Problem: Device-Related Complication Risk (Mechanical Ventilation, Invasive)  Goal: Optimal Device Function  Outcome: Ongoing, Progressing     Problem: Nutrition Impairment (Mechanical Ventilation, Invasive)  Goal: Optimal Nutrition Delivery  Outcome: Ongoing, Progressing     Problem: Skin and Tissue Injury (Mechanical Ventilation, Invasive)  Goal: Absence of Device-Related Skin and Tissue Injury  Outcome: Ongoing, Progressing     Problem: Ventilator-Induced Lung Injury (Mechanical Ventilation, Invasive)  Goal: Absence of Ventilator-Induced Lung Injury  Outcome: Ongoing, Progressing     Problem: Communication Impairment (Artificial Airway)  Goal: Effective Communication  Outcome: Ongoing, Progressing     Problem: Skin and Tissue Injury (Artificial Airway)  Goal: Absence of Device-Related Skin or Tissue Injury  Outcome: Ongoing, Progressing     Problem: Fall Injury Risk  Goal: Absence of Fall and Fall-Related Injury  Outcome: Ongoing, Progressing     Problem: Skin Injury Risk Increased  Goal: Skin Health and Integrity  Outcome: Ongoing, Progressing     Problem: Impaired Wound Healing  Goal: Optimal Wound Healing  Outcome: Ongoing, Progressing     Problem: Adjustment to Illness (Delirium)  Goal: Optimal Coping  Outcome: Ongoing, Progressing     Problem: Altered Behavior (Delirium)  Goal: Improved Behavioral Control  Outcome: Ongoing, Progressing     Problem: Attention and Thought Clarity Impairment (Delirium)  Goal: Improved Attention and Thought  Clarity  Outcome: Ongoing, Progressing     Problem: Sleep Disturbance (Delirium)  Goal: Improved Sleep  Outcome: Ongoing, Progressing

## 2022-06-14 NOTE — PLAN OF CARE
Updated clinicals. Spoke with Tosha at [facility], pt's second choice, letting her know that his first FOC was unable to accept him. She stated that she will review his referral again, and call me back.

## 2022-06-14 NOTE — PT/OT/SLP PROGRESS
Physical Therapy         Treatment        Kendall Duff   MRN: [id number]     PT Received On: 06/14/22  PT Start Time: 1040     PT Stop Time: 1048    PT Total Time (min): 8 min       Billable Minutes:  Gait Training8  Total Minutes: 8    Treatment Type: Treatment  PT/PTA: PT     PTA Visit Number: 4       General Precautions: Standard, fall, sternal  Orthopedic Precautions: Orthopedic Precautions : N/A   Braces: Braces: N/A    Spiritual, Cultural Beliefs, Roman Catholic Practices, Values that Affect Care: no    Subjective:  Communicated with NSG prior to session.    Pain/Comfort  Pain Rating 1: 0/10    Objective:  Patient found resting in bed, with Patient found with: KELLIE drain    Functional Mobility:  Bed Mobility:   Supine to sit: Independent   Sit to supine: Independent    Transfer Training:   Sit to stand:Independent with No Assistive Device     Gait Training:   Patient ambulated 300 feet without use of AD; no overt scissoring noted today; SBA only due to some cognitive deficits which pose slight safety concerns      Activity Tolerance:  Patient tolerated treatment well    Patient left HOB elevated with all lines intact and call button in reach.    Assessment:  Kendall Duff is a 56 y.o. male with a medical diagnosis of Acute myocardial infarction of anterolateral wall.     Patient has made great progress over the course of his stay. Patient is now transferring and mobilizing with independence; would benefit from supervision assist while mobilizing only because of cognitive deficits demonstrated.   Skilled acute PT services will be discontinued at this time. Please re-consult as necessary.     Rehab potential is excellent.    Activity tolerance: Excellent    Discharge recommendations: Discharge Facility/Level of Care Needs:  (home with 24/7 supervision)     Equipment recommendations: Equipment Needed After Discharge: none     GOALS:   Multidisciplinary Problems     Physical Therapy Goals     Not on file           Multidisciplinary Problems (Resolved)        Problem: Physical Therapy    Goal Priority Disciplines Outcome Goal Variances Interventions   Physical Therapy Goal   (Resolved)     PT, PT/OT Met     Description: Goals to be met by: 22     Patient will increase functional independence with mobility by performin. Supine to sit with independence  2. Sit to supine with independence  3. Bed to chair transfer with independence  4. Sit to stand with independence  5. Gait x 500 feet with independence without demonstration of safety conerns                 *All goals have been met      PLAN:    POC to  as of today 22 due to patient's progress. He has met all goals and is not in need of skilled acute PT services at this time.        2022

## 2022-06-14 NOTE — PT/OT/SLP PROGRESS
Speech Language Pathology Department  Dysphagia Therapy    Patient Name:  Kendall Duff   MRN:  [id number]  Admitting Diagnosis: Acute myocardial infarction of anterolateral wall    Recommendations:                 General Recommendations:  Cognitive-linguistic therapy  Communication strategies: none    Subjective     Patient awake, alert and oriented x4.    Patient goals: to go home.      Pain/Comfort:  · Pain Rating 1: 0/10    Respiratory Status: room air    Objective:     Pt seen to address cognitive-linguistic deficits and required minimal to moderate cueing to complete simple problem solving tasks for home/hospital safety, and minimal cueing for 3-step direction following.     Assessment:     Pt continues to present with cognitive-linguistic deficits hindering safe, independent living.     Goals:   Multidisciplinary Problems     SLP Goals        Problem: SLP    Goal Priority Disciplines Outcome   SLP Goal     SLP Ongoing, Progressing   Description: LTG1: Tolerate speaking valve during all waking hours with no signs/sx respiratory distress/compromise - discontinue  STG: tolerate speaking valve x1 hour with no signs/sx respiratory distress/compromise - discontinue    LTG2: Tolerate least restrictive PO diet with no clinical signs/sx aspiration - progressing  STG2a: Tolerate thin liquids by cup with no clinical signs/sx aspiration - progressing  STG2b: Tolerate puree solids with no clinical signs/sx aspiration. - progressing    LTG3: Complete basic cognitive tasks with supervision  STG3a: Recognize simple problems with min cues  STG3b: Solve routine problems with min cues  STG4c: Complete 3-step sequencing tasks with min cues                   Plan:     Will continue to follow and tx as appropriate.    · Patient to be seen:  5 x/week   · Plan of Care expires:  06/21/22  · Plan of Care reviewed with:  patient   · SLP Follow-Up:  Yes      Time Tracking:     SLP Treatment Date:   06/14/22  Speech Start Time:   1014  Speech Stop Time:  1034     Speech Total Time (min):  20 min    Billable minutes:  Cognitive Skills Intervention, 20 min       06/14/2022

## 2022-06-15 LAB — TROPONIN I SERPL-MCNC: 0.15 NG/ML (ref 0–0.04)

## 2022-06-15 PROCEDURE — 25000003 PHARM REV CODE 250: Performed by: INTERNAL MEDICINE

## 2022-06-15 PROCEDURE — 20000000 HC ICU ROOM

## 2022-06-15 PROCEDURE — 25000003 PHARM REV CODE 250: Performed by: STUDENT IN AN ORGANIZED HEALTH CARE EDUCATION/TRAINING PROGRAM

## 2022-06-15 PROCEDURE — A4216 STERILE WATER/SALINE, 10 ML: HCPCS

## 2022-06-15 PROCEDURE — 93005 ELECTROCARDIOGRAM TRACING: CPT

## 2022-06-15 PROCEDURE — 97129 THER IVNTJ 1ST 15 MIN: CPT

## 2022-06-15 PROCEDURE — 36415 COLL VENOUS BLD VENIPUNCTURE: CPT | Performed by: INTERNAL MEDICINE

## 2022-06-15 PROCEDURE — 84484 ASSAY OF TROPONIN QUANT: CPT | Performed by: INTERNAL MEDICINE

## 2022-06-15 PROCEDURE — 25000003 PHARM REV CODE 250

## 2022-06-15 PROCEDURE — 97168 OT RE-EVAL EST PLAN CARE: CPT

## 2022-06-15 RX ORDER — DIPHENHYDRAMINE HCL 25 MG
25 CAPSULE ORAL EVERY 6 HOURS PRN
Status: DISCONTINUED | OUTPATIENT
Start: 2022-06-15 | End: 2022-06-17 | Stop reason: HOSPADM

## 2022-06-15 RX ADMIN — EZETIMIBE 10 MG: 10 TABLET ORAL at 08:06

## 2022-06-15 RX ADMIN — WHITE PETROLATUM: 1.75 OINTMENT TOPICAL at 09:06

## 2022-06-15 RX ADMIN — FLUTICASONE PROPIONATE 100 MCG: 50 SPRAY, METERED NASAL at 08:06

## 2022-06-15 RX ADMIN — WHITE PETROLATUM: 1.75 OINTMENT TOPICAL at 08:06

## 2022-06-15 RX ADMIN — DIPHENHYDRAMINE HYDROCHLORIDE 25 MG: 25 CAPSULE ORAL at 09:06

## 2022-06-15 RX ADMIN — TRAZODONE HYDROCHLORIDE 100 MG: 50 TABLET ORAL at 08:06

## 2022-06-15 RX ADMIN — ACETAMINOPHEN 650 MG: 325 TABLET ORAL at 04:06

## 2022-06-15 RX ADMIN — LEVETIRACETAM 500 MG: 500 TABLET, FILM COATED ORAL at 08:06

## 2022-06-15 RX ADMIN — ACETAMINOPHEN 650 MG: 325 TABLET ORAL at 08:06

## 2022-06-15 RX ADMIN — FAMOTIDINE 20 MG: 20 TABLET, FILM COATED ORAL at 08:06

## 2022-06-15 RX ADMIN — OXYCODONE HYDROCHLORIDE 5 MG: 5 TABLET ORAL at 11:06

## 2022-06-15 RX ADMIN — AMLODIPINE BESYLATE 10 MG: 5 TABLET ORAL at 08:06

## 2022-06-15 RX ADMIN — SODIUM CHLORIDE, PRESERVATIVE FREE 10 ML: 5 INJECTION INTRAVENOUS at 08:06

## 2022-06-15 RX ADMIN — ATORVASTATIN CALCIUM 80 MG: 40 TABLET, FILM COATED ORAL at 08:06

## 2022-06-15 RX ADMIN — OXYCODONE HYDROCHLORIDE 5 MG: 5 TABLET ORAL at 06:06

## 2022-06-15 RX ADMIN — LISINOPRIL 20 MG: 20 TABLET ORAL at 09:06

## 2022-06-15 RX ADMIN — BENZTROPINE MESYLATE 1 MG: 1 TABLET ORAL at 08:06

## 2022-06-15 RX ADMIN — DOXEPIN HYDROCHLORIDE 50 MG: 50 CAPSULE ORAL at 08:06

## 2022-06-15 RX ADMIN — OXCARBAZEPINE 300 MG: 300 TABLET, FILM COATED ORAL at 08:06

## 2022-06-15 RX ADMIN — OLANZAPINE 10 MG: 5 TABLET, FILM COATED ORAL at 08:06

## 2022-06-15 RX ADMIN — APIXABAN 5 MG: 5 TABLET, FILM COATED ORAL at 08:06

## 2022-06-15 RX ADMIN — SODIUM CHLORIDE, PRESERVATIVE FREE 10 ML: 5 INJECTION INTRAVENOUS at 09:06

## 2022-06-15 RX ADMIN — METOPROLOL TARTRATE 25 MG: 25 TABLET, FILM COATED ORAL at 09:06

## 2022-06-15 NOTE — PLAN OF CARE
Spoke with Jorden at [facility] regarding pt referral. She stated that in the therapy notes it had been reported that pt no longer in need of PT or OT and asked whether he still needed SNF at this time. Reached out to TISH who stated that she would reassess and let me know. Will extend updated information to Lilly, once it's available to me.

## 2022-06-15 NOTE — PROGRESS NOTES
[facility] - [facility] [place] [facility]  Adult Nutrition  Progress Note    SUMMARY       Recommendations    Recommendation/Intervention: Regular diet/Easy to chew as appropriate.      Assessment and Plan    Nutrition Problem  Inadequate Oral Intake     Related to (etiology):   Current condition             Signs and Symptoms (as evidenced by):   trach     Interventions(treatment strategy):  Collaboration with other providers     Nutrition Diagnosis Status:   Resolved    Nutrition Goal Status: progressing towards goal  Communication of RD Recs: reviewed with RN  Goals: Meet >/=75% est energy and protein requirements by f/u    Malnutrition Assessment  Malnutrition Type: does not meet criteria  Weight Loss (Malnutrition): other (see comments) (does not meet criteria)  Energy Intake (Malnutrition): other (see comments) (does not meet criteria)  Subcutaneous Fat (Malnutrition):  (does not meet criteria)  Muscle Mass (Malnutrition): mild depletion  Fluid Accumulation (Malnutrition):  (does not meet criteria)  Hand  Strength, Left (Malnutrition):  (unable to eval)  Hand  Strength, Right (Malnutrition):  (unable to eval)   [place] (Muscle Loss): mild depletion  Clavicle Bone Region (Muscle Loss): mild depletion   Edema (Fluid Accumulation): 0-->no edema present     Reason for Assessment    Reason For Assessment: RD follow-up  Diagnosis: 1. CAD with 4 vessel CABG 4/6  2. MRSA Sternal wound dehiscence with debridement and wound vac 4/17.  Plastics following  3. Respiratory culture positive Klebsiella and Proteus  4. Malnutrition.  5. Respiratory failure.  Intubated 4/15  Relevant Medical History:    General Information Comments:   5/24/22: Tube feeding continues. Tolerated per RN. Noted now off diprivan. Will update tube feeding to provide est kcal needs and eliminate need for protein packets.    5/26/22: Tube feeding continues, tolerated @ goal rate. No kcal from meds.     5/30/22: Tube feeding continues,  "tolerated @ goal rate per RN.  6/3/22: Tube feeding continues, tolerated per RN. Noted SLP continues to work with pt.   6/7/22: Pt now on regular diet. Very excited about being able to eat. Eating 100% of meals.   6/10/22: Pt eating well. Sleepy this AM. Previously eating 100% of meals.   6/15/22: Pt continues with good po intake of meals.    Nutrition Risk Screen    Nutrition Risk Screen: none    Nutrition/Diet History    Spiritual, Cultural Beliefs, Sabianist Practices, Values that Affect Care: no  Factors Affecting Nutritional Intake: none    Anthropometrics    Temp: 98.3 °F (36.8 °C)  Height Method: Estimated  Height: 5' 10.87" (180 cm)  Height (inches): 70.87 in  Weight Method: Bed Scale  Weight: 77.2 kg (170 lb 3.1 oz)  Weight (lb): 170.2 lb  Ideal Body Weight (IBW), Male: 171.22 lb  % Ideal Body Weight, Male (lb): 112.92 %  BMI (Calculated): 23.8  BMI Grade: 18.5-24.9 - normal    Lab/Procedures/Meds    Pertinent Labs Reviewed: reviewed  Pertinent Labs Comments: no new  Pertinent Medications Reviewed: reviewed  Pertinent Medications Comments: miralax    Estimated/Assessed Needs    Weight Used For Calorie Calculations: 77.2 kg (170 lb 3.1 oz)  Energy Calorie Requirements (kcal): 2257kcal  Energy Need Method: [place]-St Jeor (1.4 stress factor)  Protein Requirements: 116-175gm  Weight Used For Protein Calculations: 77.2 kg (170 lb 3.1 oz)  Fluid Requirements (mL): 30mL/kg  Estimated Fluid Requirement Method: RDA Method  RDA Method (mL): 2257     Nutrition Prescription Ordered    Current Diet Order: Easy to Chew    Nutrition Risk    Level of Risk/Frequency of Follow-up: low    Monitor and Evaluation    Food and Nutrient Intake: energy intake  Food and Nutrient Adminstration: po diet    Nutrition Follow-Up    RD Follow-up?: Yes    "

## 2022-06-15 NOTE — PLAN OF CARE
Updated clinicals. Spoke with Tohsa at [facility] regarding pt's referral. Stated that Jorden will review pt's most recent notes and call me back.

## 2022-06-15 NOTE — PLAN OF CARE
Spoke with Nicki shaw [facility]  said PT notes  states pt has met goals and has signed off on pt , therefore may not be snf appropriate, messaged magdiel with PT to verify pt ok for home with home health, spoke with pts dtr Summer and updated her, notified her that pt may either be NH placement as long term as that he may not qualify for SNF,or home with home health, she states she is not putting him in a facility as long term, states she will take him home with HH, , notified her that he may be discharged as early as tomorrow  if that is the plan, verbalized understanding

## 2022-06-15 NOTE — PROGRESS NOTES
[facility]  HOSPITAL MEDICINE   PROGRESS NOTE      CHIEF COMPLAINT  Hospital follow up    HOSPITAL COURSE  56 y.o. male with a history that includes chronic hepatitis C, hypertension, seizures, CAD s/p stents, initially presented to [facility] on 4/1/2022 with a primary complaint of chest pain. Of note, he was seen at an outlying ED on 3/20/22 and was dx with MI, subsequently transferred to [facility] where he underwent LHC with angioplasty, and deemed appropriate for CABG, which had been scheduled for 3/29/22, but was cancelled. He then presented to ED on 4/1 with c/o CP earlier in the day. He was admitted to cardiology services, started on a heparin gtt. CV Surgery was consulted and the patient underwent CABG x4 on 4/6/22. He was admitted to ICU post CABG, intubated on mechanical ventilation. Psych was consulted during his stay to evaluate for some recent agitation, restlessness, fidgetiness, insomnia, confusion, and sexual inappropriateness, and he was started on PRN haldol. He was extubated to NC 4L on 4/7, remained restless and confused requiring Precedex gtt.  On 4/10/22 he experienced seizure-like activity and confusion for which neuro was consulted. EEG revealed moderate generalized slowing. CT Head was negative for acute intracranial abnormality, gas noted throughout the bilateral subcutaneous soft tissues.MRI on 4/15/22 revealed small linear focus of acute small vessel ischemia in the right frontal white matter. Developed diffuse subcutaneous emphysema per CXR. He remained agitated with delirium.  Unfortunately,he pulled out his left chest tube on 4/16/22 and he had been thrashing in bed, had to be restrained at times.  He developed an unstable sternum with purulence drainage. CT thorax revealed surgical changes with inflammation, fluid and air locules, minimal anterior pericardial effusion, extensive soft tissue emphysema, and bilateral pleural effusions and bilateral  "lower lung lobes collapse consolidations. He has known subcutaneous air, which has been present on previous x-rays.  Cultures positive for MRSA, and he was placed on Vancomycin. He required intubation on 4/16/22. On 4/17/22 he underwent sternal wound debridement with wound vac placement. ID was consulted on 4/22/22 due to sepsis. He was started on rifampin in addition to vancomycin for a planned 6 week course. Respiratory cultures from 4/24/22 returned positive for Proteus and Klebsiella. Remained intubated on mechanical ventilation. GI was consulted on 5/3/22 for PEG placement. Plastic surgery was also following for sternal reconstruction. He underwent tracheostomy and PEG placement on 5/10/22. NIVA done on 5/10/22 noted a RUE DVT. On 5/11/22 he underwent sternal wound debridement, bilateral fasciocutaneous flap advanced closure over sternum and bilateral pectoralis major muscle flap reconstruction of sternum. He was started on full dose Lovenox. He remained on mechanical ventilation until 5/21/22. Tolerating T-piece now on trach collar. He required vasopressors for BP support throughout his stay, which have since been weaned off. He has been weaned off of Precedex. He did have some vomiting on 5/21/22. Lovenox was transitioned to Eliquis on 5/21/22. KUB unrevealing, tube feeds restarted on 5/22/22. He was cleared for downgrade to the floor on 5/22/22 under the hospital medicine service. His trach tube fell of during cough and were ubnable to reinserted. He was stable from respiratory standpoint and close monitoring was continued.     Today info  Pt seen and examined this morning complained of left-sided chest pain all other vitals were stable     OBJECTIVE/PHYSICAL EXAM  BP 92/68   Pulse 92   Temp 98.7 °F (37.1 °C) (Oral)   Resp 15   Ht 5' 10.87" (1.8 m)   Wt 77.2 kg (170 lb 3.1 oz)   SpO2 95%   BMI 23.83 kg/m²   General: In no acute distress, afebrile  Chest: Clear to auscultation bilaterally, sternotomy " wound healing  Heart: S1, S2, no appreciable murmur  Abdomen: Soft, nontender, BS +, peg tube  MSK: Warm, no lower extremity edema, no clubbing or cyanosis  Neurologic: Alert and oriented x4,        ASSESSMENT/PLAN  Chronic hypoxic respiratory failure status post tracheostomy on 5/10/22 status post trach fell out post coughing  Oropharyngeal dysphagia s/p PEG placment on 5/10/22-status post started oral diet her SLP  Multivessel CAD s/p CABG x 4 on 04/06/2022.  MRSA sternal wound infection and dehiscence, status post wound vacuum-assisted closure device on 04/17/2022, and bilateral pectoral flaps for sternal wound closure on 05/11/2022.  Metabolic encephalopathy- improving  Acute right upper extremity deep venous thrombosis  Anemia chronic disease  Severe protein calorie malnutrition, hypoalbuminemia  Hypertension  Seizure disorder   Transaminitis, mild - resolved     Hx of  Schizophrenia, chronic hepatitis C, hypertension, CAD s/p stents     Plan :  For chest pain we will order 1 set of troponins and EKG  Repeat blood work in a.m.  Patient is stable of the antibiotics  completed the treatment with antibiotics id signed off   Plastic surgery following along for drain management.  Patient still has his right drain apparently he removed his left drain  Continue with drain care  Continues to work well with PT OT and speech therapy.   Case management following.  No longer LTAC candidate and unable to go to TCU due to insurance.  Unable to go to SNF due to drains.  Discontinued Haldol, continue olanzapine.  Start trazodone 100 mg at nighttime.  Psychiatry has also been consulted per Nursing Home request.  zydis is discontinued changed to Zyprexa 10 mg at bedtime  On Eliquis for DVT  Continue with Keppra and other home medications  Blood pressure fairly stable    Awaiting level 2 from the state  Awaiting placement  Prophylaxis:   Eliquis  __________________________________________________________________________    LABS/MICROBIOLOGY/MEDICATIONS/DIAGNOSTICS    LABS  No results for input(s): NA, K, CHLORIDE, CO2, BUN, CREATININE, GLUCOSE, CALCIUM, ALKPHOS, AST, ALT, ALBUMIN in the last 72 hours.  No results for input(s): WBC, RBC, HCT, MCV, PLT in the last 72 hours.    Invalid input(s):     MICROBIOLOGY  Microbiology Results (last 7 days)     Procedure Component Value Units Date/Time    Fungal Culture [[id number]]  (Normal) Collected: 05/11/22 1429    Order Status: Completed Specimen: Tissue from Sternum Updated: 06/13/22 1113     Fungal Culture No Fungus Isolated at 4 Weeks    Fungal Culture [[id number]]  (Normal) Collected: 05/11/22 1432    Order Status: Completed Specimen: Bone from Sternum Updated: 06/13/22 1113     Fungal Culture No Fungus Isolated at 4 Weeks          MEDICATIONS   amLODIPine  10 mg Oral Daily    apixaban  5 mg Oral BID    atorvastatin  80 mg Oral Daily    benztropine  1 mg Per OG tube BID    doxepin  50 mg Oral QHS    ezetimibe  10 mg Oral QHS    famotidine  20 mg Oral BID    fluticasone propionate  2 spray Each Nostril Daily    levETIRAcetam  500 mg Oral BID    lisinopriL  20 mg Oral Daily    metoprolol tartrate  25 mg Per OG tube Daily    OLANZapine  10 mg Oral QHS    OXcarbazepine  300 mg Per OG tube BID    polyethylene glycol  17 g Oral Daily    sodium chloride 0.9%  10 mL Intravenous Q12H    trazodone  100 mg Oral QHS    white petrolatum   Topical (Top) BID      INFUSIONS      DIAGNOSTIC TESTS  CT Head Without Contrast   Final Result      1. No acute intracranial abnormalities.   2. Bilateral mastoid effusions, left greater than right.   3. Interval resolution of previously visualized diffuse subcutaneous emphysema of the imaged maxillofacial soft tissues.         Electronically signed by: Glenn Francis   Date:    06/09/2022   Time:    15:47      Fl Modified Barium Swallow Speech   Final Result       X-Ray Chest 1 View   Final Result      Slightly improved aeration in the right upper lobe.      No other significant change         Electronically signed by: Chad Jason   Date:    06/05/2022   Time:    07:53      X-Ray Chest AP Portable   Final Result      Tracheostomy cannula is not seen.      Cardiomediastinal silhouette is unchanged as compared with previous exam.      Increase in interstitial and pulmonary vascular markings         Electronically signed by: Chad Jason   Date:    06/03/2022   Time:    11:06      X-Ray Chest 1 View   Final Result      There is effusion of the left lung is noted increased in size. The right lung the demonstrates improved aeration in the interval. Recommend continued follow-up.         Electronically signed by: Rex Denson   Date:    06/02/2022   Time:    06:10      Fl Modified Barium Swallow Speech   Final Result      X-Ray Chest 1 View   Final Result      New bilateral alveolar opacities most pronounced in the perihilar lung zones may be related to edema in the setting of enlarged cardiac silhouette.  Other differential considerations include multifocal pneumonia or ARDS.         Electronically signed by: Sharon Cantu   Date:    05/24/2022   Time:    18:36      X-Ray Abdomen AP 1 View   Final Result         1. Interval removal of NG tube.   2. No convincing evidence of new or worsening intra-abdominal process.         Electronically signed by: Abner Reyes   Date:    05/21/2022   Time:    13:57      X-Ray Chest 1 View   Final Result      Stable exam without significant interval change.         Electronically signed by: Pascale Vences   Date:    05/19/2022   Time:    06:00      X-Ray Chest 1 View   Final Result      Right-sided PICC has been removed.  No other significant change.         Electronically signed by: Harjit Sellers   Date:    05/17/2022   Time:    07:05      X-Ray Chest 1 View   Final Result      Interval insertion of left-sided PICC line.       No other interval change         Electronically signed by: Chad Jason   Date:    05/16/2022   Time:    09:01      X-Ray Chest 1 View   Final Result      LINES/TUBES: Tracheostomy and right PICC with grossly unchanged positioning.      Markedly improved aeration bilaterally with now only minimal basilar opacities.      No pneumothoraces.         Electronically signed by: Jyoti López   Date:    05/14/2022   Time:    17:37      X-Ray Chest 1 View   Final Result      Interval insertion of tracheostomy cannula.      Cardiomegaly.      Increase in interstitial and pulmonary vascular markings indicating some degree of congestion.      Interval removal of nasogastric tube         Electronically signed by: Chad Jason   Date:    05/10/2022   Time:    13:52      X-Ray Chest 1 View   Final Result      Improved left pleural effusion and lower lung zone atelectasis.         Electronically signed by: Javier Soto   Date:    05/04/2022   Time:    08:03      X-Ray Abdomen AP 1 View   Final Result      Optimal placement of the nasogastric tube.         Electronically signed by: Javier Soto   Date:    05/03/2022   Time:    16:28      RADIOLOGY REPORT   Final Result      VAS US Venous Arm Right    (Results Pending)            All diagnosis and differential diagnosis have been reviewed; assessment and plan has been documented. I have personally reviewed the labs and test results that are presently available; I have reviewed the patients medication list. I have reviewed the consulting providers response and recommendations. I have reviewed or attempted to review medical records based upon their availability.  All of the patient's questions have been addressed and answered. Patient's is agreeable to the above stated plan. I will continue to monitor closely and make adjustments to medical management as needed.  This document was created using [employer].  Transcription errors may have been made.  Please  contact me if any questions may rise regarding documentation to clarify verbiage.        Aziza Hernandez MD   06/15/2022   Internal Medicine

## 2022-06-15 NOTE — PT/OT/SLP PROGRESS
Speech Language Pathology Department  Dysphagia Therapy     Patient Name:  Kendall Duff   MRN:  [id number]  Admitting Diagnosis: Acute myocardial infarction of anterolateral wall     Recommendations:                  General Recommendations:  Cognitive-linguistic therapy  Communication strategies: none     Subjective      Patient awake, alert and oriented x4.     Patient goals: to go home.       Pain/Comfort:  · Pain Rating 1: 0/10     Respiratory Status: room air     Objective:      Pt seen to address cognitive-linguistic deficits and required minimal cueing to complete delayed memory tasks and min/mod cueing for simple problem solving tasks for home/hospital safety.     Assessment:      Pt continues to present with cognitive-linguistic deficits hindering safe, independent living.      Goals:       Multidisciplinary Problems           SLP Goals                 Problem: SLP      Goal Priority Disciplines Outcome   SLP Goal      SLP Ongoing, Progressing   Description: LTG1: Tolerate speaking valve during all waking hours with no signs/sx respiratory distress/compromise - discontinue  STG: tolerate speaking valve x1 hour with no signs/sx respiratory distress/compromise - discontinue     LTG2: Tolerate least restrictive PO diet with no clinical signs/sx aspiration - progressing  STG2a: Tolerate thin liquids by cup with no clinical signs/sx aspiration - progressing  STG2b: Tolerate puree solids with no clinical signs/sx aspiration. - progressing     LTG3: Complete basic cognitive tasks with supervision  STG3a: Recognize simple problems with min cues  STG3b: Solve routine problems with min cues  STG4c: Complete 3-step sequencing tasks with min cues                        Plan:      Will continue to follow and tx as appropriate.     · Patient to be seen:  5 x/week   · Plan of Care expires:  06/21/22  · Plan of Care reviewed with:  patient   · SLP Follow-Up:  Yes       Time Tracking:      SLP Treatment Date:    06/15/22  Speech Start Time:  0845  Speech Stop Time:  0900     Speech Total Time (min):  15 min     Billable minutes:  Cognitive Skills Intervention, 20 min         06/14/2022

## 2022-06-15 NOTE — PLAN OF CARE
Problem: Occupational Therapy  Goal: Occupational Therapy Goal  Description: Goals to be met by: 6/21/22     Patient will increase functional independence with ADLs by performing:    Feeding with Moderate Assistance. When appropriate to initiate, or simulated hand to mouth.   UE Dressing with Moderate Assistance.  Grooming while long sitting/EOB/supported with Moderate Assistance.  Sitting at edge of bed x10-15 minutes with Moderate Assistance.  Toilet transfer to bedside commode with Moderate Assistance.    Outcome: Met; pt is able to perform BADL's without assist but requires some general spvn for safety/ cognitive issues.

## 2022-06-15 NOTE — PT/OT/SLP RE-EVAL
Occupational Therapy   Re-evaluation and Discharge    Name: Kendall Duff  MRN: [id number]  Admitting Diagnosis:  Acute myocardial infarction of anterolateral wall  Recent Surgery: Procedure(s) (LRB):  CLOSURE, STERNAL INCISION, REPEAT (N/A) 35 Days Post-Op    Recommendations:     Discharge Recommendations: nursing facility, skilled, nursing facility, basic  Discharge Equipment Recommendations:  none  Barriers to discharge:  Decreased caregiver support    Assessment:     Kendall Duff is a 56 y.o. male with a medical diagnosis of Acute myocardial infarction of anterolateral wall.  He presents with significant improvement in ADL's and mobility, able to perform BADL's without assist and ambulate with general spvn d/t some cognitive/safety issues but no physical assist.     Rehab Prognosis:  Pt has met OT goals and no further skilled needs at this time.      Plan:     Patient to be seen 5 x/week to address the above listed problems via self-care/home management, therapeutic activities, therapeutic exercises  · Plan of Care Expires: 06/30/22  · Plan of Care Reviewed with: patient    Subjective     Chief Complaint: none   Patient/Family stated goals: get out of the hospital  Communicated with: nsg prior to session.  Pain/Comfort:  · Pain Rating 1: 0/10    Objective:     Communicated with: PT prior to session.  Patient found supine with: KELLIE drain, blood pressure cuff, peripheral IV, Tracheostomy, PEG Tube, telemetry upon OT entry to room.    General Precautions: Standard, fall, sternal   Orthopedic Precautions:N/A   Braces:    Respiratory Status: Room air    Occupational Performance:    Bed Mobility:    · Patient completed Supine to Sit with modified independence    Functional Mobility/Transfers:  · Patient completed Sit <> Stand Transfer with modified independence  with  no assistive device   · Functional Mobility: spvn/mod I without AD, spvn for general safety d/t cognition/safety awareness    Activities of Daily  Living:  · Doffed and wilbert socks/clothing setup/mod I, able to perform FM tasks appropriately during activities   Cognitive/Visual Perceptual:  Oriented x 3    Physical Exam:  F>F and F>N functional     [facility] 6 Click:  [facility] Total Score: 24    Treatment & Education:eEducation:  ducated on sternal precautions and arms by side for UB dressing/activities, POC, safety, fall precautions.     Patient left supine with call button in reach    GOALS:   Multidisciplinary Problems     Occupational Therapy Goals     Not on file          Multidisciplinary Problems (Resolved)        Problem: Occupational Therapy    Goal Priority Disciplines Outcome Interventions   Occupational Therapy Goal   (Resolved)     OT, PT/OT Met    Description: Goals to be met by: 6/21/22     Patient will increase functional independence with ADLs by performing:    Feeding with Moderate Assistance. When appropriate to initiate, or simulated hand to mouth.   UE Dressing with Moderate Assistance.  Grooming while long sitting/EOB/supported with Moderate Assistance.  Sitting at edge of bed x10-15 minutes with Moderate Assistance.  Toilet transfer to bedside commode with Moderate Assistance.                     History:     Past Medical History:   Diagnosis Date    Bipolar disorder, unspecified     Chronic hepatitis C     History of psychiatric hospitalization     Hx of psychiatric care     Hypertension     Bessie     Obesity, unspecified     Psychiatric problem     Schizoaffective disorder, bipolar type     Seizures     Stroke     Substance abuse     Therapy        Past Surgical History:   Procedure Laterality Date    CORONARY STENT PLACEMENT  08/14/2015    DEBRIDEMENT  04/17/2022    LEFT HEART CATHETERIZATION Left 08/13/2015    REPEAT CLOSURE OF STERNAL INCISION N/A 5/11/2022    Procedure: CLOSURE, STERNAL INCISION, REPEAT;  Surgeon: Adolfo Weiss MD;  Location: [facility];  Service: Plastics;  Laterality: N/A;  STERNAL WOUND DEBRIDEMENT AND  RECONSTRUCTION // MULTIPLE MUSCLE FLAPS // REQ 1400       Time Tracking:     OT Date of Treatment: 06/15/22  OT Start Time: 1120  OT Stop Time: 1128  OT Total Time (min): 8 min    Billable Minutes:Fab 8 min'    6/15/2022

## 2022-06-16 LAB
ANION GAP SERPL CALC-SCNC: 11 MEQ/L
AV INDEX (PROSTH): 0.67
AV MEAN GRADIENT: 5 MMHG
AV PEAK GRADIENT: 9 MMHG
AV VALVE AREA: 1.89 CM2
AV VELOCITY RATIO: 0.71
BASOPHILS # BLD AUTO: 0.09 X10(3)/MCL (ref 0–0.2)
BASOPHILS NFR BLD AUTO: 0.8 %
BSA FOR ECHO PROCEDURE: 1.96 M2
BUN SERPL-MCNC: 7.6 MG/DL (ref 8.4–25.7)
CALCIUM SERPL-MCNC: 8.4 MG/DL (ref 8.4–10.2)
CHLORIDE SERPL-SCNC: 101 MMOL/L (ref 98–107)
CO2 SERPL-SCNC: 23 MMOL/L (ref 22–29)
CREAT SERPL-MCNC: 0.68 MG/DL (ref 0.73–1.18)
CREAT/UREA NIT SERPL: 11
CV ECHO LV RWT: 0.66 CM
DOP CALC AO PEAK VEL: 1.54 M/S
DOP CALC AO VTI: 25.9 CM
DOP CALC LVOT AREA: 2.8 CM2
DOP CALC LVOT DIAMETER: 1.9 CM
DOP CALC LVOT PEAK VEL: 1.09 M/S
DOP CALC LVOT STROKE VOLUME: 49.03 CM3
DOP CALC MV VTI: 33.5 CM
DOP CALCLVOT PEAK VEL VTI: 17.3 CM
E WAVE DECELERATION TIME: 127 MSEC
E/A RATIO: 2.45
E/E' RATIO: 20 M/S
ECHO LV POSTERIOR WALL: 1.65 CM (ref 0.6–1.1)
EJECTION FRACTION: 25 %
EOSINOPHIL # BLD AUTO: 0.14 X10(3)/MCL (ref 0–0.9)
EOSINOPHIL NFR BLD AUTO: 1.3 %
ERYTHROCYTE [DISTWIDTH] IN BLOOD BY AUTOMATED COUNT: 16.6 % (ref 11.5–17)
FRACTIONAL SHORTENING: 11 % (ref 28–44)
GLUCOSE SERPL-MCNC: 122 MG/DL (ref 74–100)
HCT VFR BLD AUTO: 32.1 % (ref 42–52)
HGB BLD-MCNC: 10 GM/DL (ref 14–18)
IMM GRANULOCYTES # BLD AUTO: 0.04 X10(3)/MCL (ref 0–0.02)
IMM GRANULOCYTES NFR BLD AUTO: 0.4 % (ref 0–0.43)
INTERVENTRICULAR SEPTUM: 1.46 CM (ref 0.6–1.1)
LEFT ATRIUM SIZE: 4.5 CM
LEFT ATRIUM VOLUME INDEX MOD: 35.5 ML/M2
LEFT ATRIUM VOLUME MOD: 70 CM3
LEFT INTERNAL DIMENSION IN SYSTOLE: 4.46 CM (ref 2.1–4)
LEFT VENTRICLE DIASTOLIC VOLUME INDEX: 60.91 ML/M2
LEFT VENTRICLE DIASTOLIC VOLUME: 120 ML
LEFT VENTRICLE MASS INDEX: 174 G/M2
LEFT VENTRICLE SYSTOLIC VOLUME INDEX: 45.9 ML/M2
LEFT VENTRICLE SYSTOLIC VOLUME: 90.5 ML
LEFT VENTRICULAR INTERNAL DIMENSION IN DIASTOLE: 5.03 CM (ref 3.5–6)
LEFT VENTRICULAR MASS: 343.46 G
LV LATERAL E/E' RATIO: 18.57 M/S
LV SEPTAL E/E' RATIO: 21.67 M/S
LVOT MG: 2 MMHG
LVOT MV: 0.69 CM/S
LYMPHOCYTES # BLD AUTO: 1.79 X10(3)/MCL (ref 0.6–4.6)
LYMPHOCYTES NFR BLD AUTO: 16.2 %
MCH RBC QN AUTO: 25.5 PG (ref 27–31)
MCHC RBC AUTO-ENTMCNC: 31.2 MG/DL (ref 33–36)
MCV RBC AUTO: 81.9 FL (ref 80–94)
MONOCYTES # BLD AUTO: 1.04 X10(3)/MCL (ref 0.1–1.3)
MONOCYTES NFR BLD AUTO: 9.4 %
MV MEAN GRADIENT: 4 MMHG
MV PEAK A VEL: 0.53 M/S
MV PEAK E VEL: 1.3 M/S
MV PEAK GRADIENT: 9 MMHG
MV STENOSIS PRESSURE HALF TIME: 47 MS
MV VALVE AREA BY CONTINUITY EQUATION: 1.46 CM2
MV VALVE AREA P 1/2 METHOD: 4.68 CM2
NEUTROPHILS # BLD AUTO: 7.9 X10(3)/MCL (ref 2.1–9.2)
NEUTROPHILS NFR BLD AUTO: 71.9 %
NRBC BLD AUTO-RTO: 0 %
PISA MRMAX VEL: 3.93 M/S
PISA TR MAX VEL: 3.05 M/S
PLATELET # BLD AUTO: 241 X10(3)/MCL (ref 130–400)
PMV BLD AUTO: 9.4 FL (ref 9.4–12.4)
POTASSIUM SERPL-SCNC: 4.1 MMOL/L (ref 3.5–5.1)
PV PEAK VELOCITY: 0.91 CM/S
RA PRESSURE: 15 MMHG
RBC # BLD AUTO: 3.92 X10(6)/MCL (ref 4.7–6.1)
RIGHT VENTRICULAR END-DIASTOLIC DIMENSION: 2.67 CM
SODIUM SERPL-SCNC: 135 MMOL/L (ref 136–145)
TDI LATERAL: 0.07 M/S
TDI SEPTAL: 0.06 M/S
TDI: 0.07 M/S
TR MAX PG: 37 MMHG
TRICUSPID ANNULAR PLANE SYSTOLIC EXCURSION: 1.75 CM
TROPONIN I SERPL-MCNC: 0.12 NG/ML (ref 0–0.04)
TROPONIN I SERPL-MCNC: 0.16 NG/ML (ref 0–0.04)
TV REST PULMONARY ARTERY PRESSURE: 52 MMHG
WBC # SPEC AUTO: 11 X10(3)/MCL (ref 4.5–11.5)

## 2022-06-16 PROCEDURE — 85025 COMPLETE CBC W/AUTO DIFF WBC: CPT | Performed by: INTERNAL MEDICINE

## 2022-06-16 PROCEDURE — 93005 ELECTROCARDIOGRAM TRACING: CPT

## 2022-06-16 PROCEDURE — 84484 ASSAY OF TROPONIN QUANT: CPT | Performed by: NURSE PRACTITIONER

## 2022-06-16 PROCEDURE — 99024 PR POST-OP FOLLOW-UP VISIT: ICD-10-PCS | Mod: ,,, | Performed by: SURGERY

## 2022-06-16 PROCEDURE — 25000003 PHARM REV CODE 250: Performed by: INTERNAL MEDICINE

## 2022-06-16 PROCEDURE — 97129 THER IVNTJ 1ST 15 MIN: CPT

## 2022-06-16 PROCEDURE — 36415 COLL VENOUS BLD VENIPUNCTURE: CPT | Performed by: INTERNAL MEDICINE

## 2022-06-16 PROCEDURE — 80048 BASIC METABOLIC PNL TOTAL CA: CPT | Performed by: INTERNAL MEDICINE

## 2022-06-16 PROCEDURE — 84484 ASSAY OF TROPONIN QUANT: CPT | Performed by: INTERNAL MEDICINE

## 2022-06-16 PROCEDURE — 25000003 PHARM REV CODE 250: Performed by: NURSE PRACTITIONER

## 2022-06-16 PROCEDURE — 25000003 PHARM REV CODE 250

## 2022-06-16 PROCEDURE — 25000003 PHARM REV CODE 250: Performed by: STUDENT IN AN ORGANIZED HEALTH CARE EDUCATION/TRAINING PROGRAM

## 2022-06-16 PROCEDURE — A4216 STERILE WATER/SALINE, 10 ML: HCPCS

## 2022-06-16 PROCEDURE — 20000000 HC ICU ROOM

## 2022-06-16 PROCEDURE — 25000242 PHARM REV CODE 250 ALT 637 W/ HCPCS

## 2022-06-16 PROCEDURE — 99024 POSTOP FOLLOW-UP VISIT: CPT | Mod: ,,, | Performed by: SURGERY

## 2022-06-16 RX ORDER — METOPROLOL TARTRATE 50 MG/1
50 TABLET ORAL 2 TIMES DAILY
Status: DISCONTINUED | OUTPATIENT
Start: 2022-06-16 | End: 2022-06-17 | Stop reason: HOSPADM

## 2022-06-16 RX ORDER — METOPROLOL TARTRATE 25 MG/1
25 TABLET, FILM COATED ORAL ONCE
Status: COMPLETED | OUTPATIENT
Start: 2022-06-16 | End: 2022-06-16

## 2022-06-16 RX ORDER — ISOSORBIDE MONONITRATE 30 MG/1
30 TABLET, EXTENDED RELEASE ORAL DAILY
Status: DISCONTINUED | OUTPATIENT
Start: 2022-06-16 | End: 2022-06-17 | Stop reason: HOSPADM

## 2022-06-16 RX ADMIN — EZETIMIBE 10 MG: 10 TABLET ORAL at 08:06

## 2022-06-16 RX ADMIN — FAMOTIDINE 20 MG: 20 TABLET, FILM COATED ORAL at 08:06

## 2022-06-16 RX ADMIN — METOPROLOL TARTRATE 5 MG: 5 INJECTION, SOLUTION INTRAVENOUS at 08:06

## 2022-06-16 RX ADMIN — NITROGLYCERIN 0.4 MG: 0.4 TABLET SUBLINGUAL at 02:06

## 2022-06-16 RX ADMIN — ACETAMINOPHEN 650 MG: 325 TABLET ORAL at 08:06

## 2022-06-16 RX ADMIN — SODIUM CHLORIDE, PRESERVATIVE FREE 10 ML: 5 INJECTION INTRAVENOUS at 10:06

## 2022-06-16 RX ADMIN — WHITE PETROLATUM: 1.75 OINTMENT TOPICAL at 08:06

## 2022-06-16 RX ADMIN — SODIUM CHLORIDE, PRESERVATIVE FREE 10 ML: 5 INJECTION INTRAVENOUS at 09:06

## 2022-06-16 RX ADMIN — LEVETIRACETAM 500 MG: 500 TABLET, FILM COATED ORAL at 08:06

## 2022-06-16 RX ADMIN — DOXEPIN HYDROCHLORIDE 50 MG: 50 CAPSULE ORAL at 08:06

## 2022-06-16 RX ADMIN — BENZTROPINE MESYLATE 1 MG: 1 TABLET ORAL at 08:06

## 2022-06-16 RX ADMIN — ATORVASTATIN CALCIUM 80 MG: 40 TABLET, FILM COATED ORAL at 08:06

## 2022-06-16 RX ADMIN — METOPROLOL TARTRATE 25 MG: 25 TABLET, FILM COATED ORAL at 08:06

## 2022-06-16 RX ADMIN — AMLODIPINE BESYLATE 10 MG: 5 TABLET ORAL at 08:06

## 2022-06-16 RX ADMIN — OXCARBAZEPINE 300 MG: 300 TABLET, FILM COATED ORAL at 08:06

## 2022-06-16 RX ADMIN — LISINOPRIL 20 MG: 20 TABLET ORAL at 08:06

## 2022-06-16 RX ADMIN — NITROGLYCERIN 0.4 MG: 0.4 TABLET SUBLINGUAL at 07:06

## 2022-06-16 RX ADMIN — ISOSORBIDE MONONITRATE 30 MG: 30 TABLET, EXTENDED RELEASE ORAL at 02:06

## 2022-06-16 RX ADMIN — TRAZODONE HYDROCHLORIDE 100 MG: 50 TABLET ORAL at 08:06

## 2022-06-16 RX ADMIN — APIXABAN 5 MG: 5 TABLET, FILM COATED ORAL at 08:06

## 2022-06-16 RX ADMIN — METOPROLOL TARTRATE 25 MG: 25 TABLET, FILM COATED ORAL at 10:06

## 2022-06-16 RX ADMIN — OLANZAPINE 10 MG: 5 TABLET, FILM COATED ORAL at 08:06

## 2022-06-16 RX ADMIN — FLUTICASONE PROPIONATE 100 MCG: 50 SPRAY, METERED NASAL at 08:06

## 2022-06-16 NOTE — SUBJECTIVE & OBJECTIVE
Patient History               Medical as of 6/16/2022       Past Medical History       Diagnosis Date Comments Source    Bipolar disorder, unspecified -- -- Provider    Chronic hepatitis C -- -- Provider    History of psychiatric hospitalization -- -- Provider    Hx of psychiatric care -- -- Provider    Hypertension -- -- Provider    Bessie -- -- Provider    Obesity, unspecified -- -- Provider    Psychiatric problem -- -- Provider    Schizoaffective disorder, bipolar type -- -- Provider    Seizures -- -- Provider    Stroke -- -- Provider    Substance abuse -- -- Provider    Therapy -- -- Provider              Pertinent Negatives       Diagnosis Date Noted Comments Source    Suicide attempt 06/10/2022 -- Provider                          Surgical as of 6/16/2022       Past Surgical History       Procedure Laterality Date Comments Source    LEFT HEART CATHETERIZATION Left 08/13/2015 -- Provider    DEBRIDEMENT -- 04/17/2022 -- Provider    CORONARY STENT PLACEMENT -- 08/14/2015 -- Provider    REPEAT CLOSURE OF STERNAL INCISION N/A 5/11/2022 Procedure: CLOSURE, STERNAL INCISION, REPEAT;  Surgeon: Adolfo Weiss MD;  Location: [facility];  Service: Plastics;  Laterality: N/A;  STERNAL WOUND DEBRIDEMENT AND RECONSTRUCTION // MULTIPLE MUSCLE FLAPS // REQ 1400 Provider                          Family as of 6/16/2022       Problem Relation Name Age of Onset Comments Source    Cancer Mother -- -- -- Provider    Liver disease Father -- -- -- Provider                  Tobacco Use as of 6/16/2022       Smoking Status Smoking Start Date Smoking Quit Date Packs/Day Years Used    Current Every Day Smoker -- -- -- --      Types Comments Smokeless Tobacco Status Smokeless Tobacco Quit Date Source     Cigarettes -- Never Used -- Provider                  Alcohol Use as of 6/16/2022       Alcohol Use Drinks/Week Alcohol/Week Comments Source    Never   -- -- --                  Drug Use as of 6/16/2022       Drug Use Types Frequency  Comments Source    Not Currently  Cocaine -- -- Provider                  Sexual Activity as of 6/16/2022       Sexually Active Birth Control Partners Comments Source    Not Asked -- -- -- Provider                  Activities of Daily Living as of 6/16/2022    None               Social Documentation as of 6/16/2022    He denied illicit drug use but is daughter Summer reports that he has used crack cocaine off and on.  Source: Provider               Occupational as of 6/16/2022    None               Socioeconomic as of 6/16/2022       Marital Status Spouse Name Number of Children Years Education Education Level Preferred Language Ethnicity Race Source    Single -- 3 -- -- English Not  or /a Black or , White Provider                  Pertinent History       Question Response Comments    Lives with family [facility] in [place], he was living with his daughter Summer for the past year.    Place in Birth Order -- --    Lives in -- --    Number of Siblings -- --    Raised by -- --    Legal Involvement -- --    Childhood Trauma -- --    Criminal History of -- --    Financial Status -- --    Highest Level of Education -- --    Does patient have access to a firearm? -- --     Service -- --    Primary Leisure Activity -- --    Spirituality -- --          Past Medical History:   Diagnosis Date    Bipolar disorder, unspecified     Chronic hepatitis C     History of psychiatric hospitalization     Hx of psychiatric care     Hypertension     Bessie     Obesity, unspecified     Psychiatric problem     Schizoaffective disorder, bipolar type     Seizures     Stroke     Substance abuse     Therapy      Past Surgical History:   Procedure Laterality Date    CORONARY STENT PLACEMENT  08/14/2015    DEBRIDEMENT  04/17/2022    LEFT HEART CATHETERIZATION Left 08/13/2015    REPEAT CLOSURE OF STERNAL INCISION N/A 5/11/2022    Procedure: CLOSURE, STERNAL INCISION, REPEAT;  Surgeon: Adolfo Weiss,  MD;  Location: [facility] OR;  Service: Plastics;  Laterality: N/A;  STERNAL WOUND DEBRIDEMENT AND RECONSTRUCTION // MULTIPLE MUSCLE FLAPS // REQ 1400     Family History       Problem Relation (Age of Onset)    Cancer Mother    Liver disease Father          Tobacco Use    Smoking status: Current Every Day Smoker     Types: Cigarettes    Smokeless tobacco: Never Used   Substance and Sexual Activity    Alcohol use: Never    Drug use: Not Currently     Types: Cocaine    Sexual activity: Not on file     Review of patient's allergies indicates:   Allergen Reactions    Depakote [divalproex]     Divalproex sodium Other (See Comments)    Lithium     Lithium analogues     Quetiapine Other (See Comments)       No current facility-administered medications on file prior to encounter.     Current Outpatient Medications on File Prior to Encounter   Medication Sig    amLODIPine (NORVASC) 5 MG tablet Take 1 tablet (5 mg total) by mouth once daily.    aspirin 81 MG Chew Chew and swallow 1 tablet (81 mg total) by mouth once daily.    doxepin (SINEQUAN) 25 MG capsule Take 50 mg by mouth nightly.    lisinopriL (PRINIVIL,ZESTRIL) 20 MG tablet Take 20 mg by mouth once daily.    clopidogreL (PLAVIX) 75 mg tablet Take 1 tablet (75 mg total) by mouth once daily.    doxepin (SINEQUAN) 25 MG capsule SMARTSI Capsule(s) By Mouth Every Evening    metoprolol succinate (TOPROL-XL) 25 MG 24 hr tablet Take 25 mg by mouth once daily.    metoprolol tartrate (LOPRESSOR) 50 MG tablet Take 1 tablet (50 mg total) by mouth once daily.    OLANZapine (ZYPREXA) 5 MG tablet Take 5 mg by mouth nightly.    OXcarbazepine (TRILEPTAL) 150 MG Tab Take 150 mg by mouth 2 (two) times daily.    pravastatin (PRAVACHOL) 40 MG tablet Take 1 tablet (40 mg total) by mouth every evening.    traZODone (DESYREL) 100 MG tablet Take 100 mg by mouth nightly.     Psychotherapeutics (From admission, onward)                Start     Stop Route Frequency Ordered     "06/10/22 2100  traZODone tablet 100 mg         -- Oral Nightly 06/10/22 0905    06/10/22 2100  OLANZapine tablet 10 mg         -- Oral Nightly 06/10/22 1340    06/10/22 0908  lorazepam (ATIVAN) injection 1 mg         -- IV Every 2 hours PRN 06/10/22 0908    05/01/22 2100  doxepin capsule 50 mg         -- Oral Nightly 04/30/22 1733    04/30/22 1903  ziprasidone injection 20 mg         -- IM Every 8 hours PRN 04/30/22 1804    04/30/22 1808  OLANZapine injection 10 mg         -- IM Every 8 hours PRN 04/30/22 1733          Review of Systems  Strengths and Liabilities: {PSY STRENGTHS/LIABILITIES:03126}    Objective:     Vital Signs (Most Recent):  Temp: 99.3 °F (37.4 °C) (06/16/22 0800)  Pulse: 99 (06/16/22 0525)  Resp: 17 (06/16/22 0525)  BP: (!) 143/89 (06/16/22 0730)  SpO2: 95 % (06/16/22 0400)   Vital Signs (24h Range):  Temp:  [98.6 °F (37 °C)-102 °F (38.9 °C)] 99.3 °F (37.4 °C)  Pulse:  [80-99] 99  Resp:  [5-24] 17  SpO2:  [93 %-100 %] 95 %  BP: (116-143)/(61-89) 143/89     Height: 5' 10.87" (180 cm)  Weight: 77.2 kg (170 lb 3.1 oz)  Body mass index is 23.83 kg/m².    No intake or output data in the 24 hours ending 06/16/22 0901    Physical Exam     Significant Labs: {Results:56714}    Significant Imaging: {Imaging Review:90499}  "

## 2022-06-16 NOTE — PLAN OF CARE
Message received from Mariah with PT states pt rec for home with home health, pt had PEG which is not being utilized and no drains per nurse Dimitri, notified Dr Yarbrough pt no longer qualifies for SNF due to having met all goals with PT OT, notified Dr Yarbrough  of conversation with Summer pts dtr that she will take him home with HH and will not seek longterm placement in NH for her father, states he will round on pt

## 2022-06-16 NOTE — PROGRESS NOTES
[facility]  HOSPITAL MEDICINE   PROGRESS NOTE      CHIEF COMPLAINT  Hospital follow up    HOSPITAL COURSE  56 y.o. male with a history that includes chronic hepatitis C, hypertension, seizures, CAD s/p stents, initially presented to [facility] on 4/1/2022 with a primary complaint of chest pain. Of note, he was seen at an outlying ED on 3/20/22 and was dx with MI, subsequently transferred to [facility] where he underwent LHC with angioplasty, and deemed appropriate for CABG, which had been scheduled for 3/29/22, but was cancelled. He then presented to ED on 4/1 with c/o CP earlier in the day. He was admitted to cardiology services, started on a heparin gtt. CV Surgery was consulted and the patient underwent CABG x4 on 4/6/22. He was admitted to ICU post CABG, intubated on mechanical ventilation. Psych was consulted during his stay to evaluate for some recent agitation, restlessness, fidgetiness, insomnia, confusion, and sexual inappropriateness, and he was started on PRN haldol. He was extubated to NC 4L on 4/7, remained restless and confused requiring Precedex gtt.  On 4/10/22 he experienced seizure-like activity and confusion for which neuro was consulted. EEG revealed moderate generalized slowing. CT Head was negative for acute intracranial abnormality, gas noted throughout the bilateral subcutaneous soft tissues.MRI on 4/15/22 revealed small linear focus of acute small vessel ischemia in the right frontal white matter. Developed diffuse subcutaneous emphysema per CXR. He remained agitated with delirium.  Unfortunately,he pulled out his left chest tube on 4/16/22 and he had been thrashing in bed, had to be restrained at times.  He developed an unstable sternum with purulence drainage. CT thorax revealed surgical changes with inflammation, fluid and air locules, minimal anterior pericardial effusion, extensive soft tissue emphysema, and bilateral pleural effusions and bilateral  "lower lung lobes collapse consolidations. He has known subcutaneous air, which has been present on previous x-rays.  Cultures positive for MRSA, and he was placed on Vancomycin. He required intubation on 4/16/22. On 4/17/22 he underwent sternal wound debridement with wound vac placement. ID was consulted on 4/22/22 due to sepsis. He was started on rifampin in addition to vancomycin for a planned 6 week course. Respiratory cultures from 4/24/22 returned positive for Proteus and Klebsiella. Remained intubated on mechanical ventilation. GI was consulted on 5/3/22 for PEG placement. Plastic surgery was also following for sternal reconstruction. He underwent tracheostomy and PEG placement on 5/10/22. NIVA done on 5/10/22 noted a RUE DVT. On 5/11/22 he underwent sternal wound debridement, bilateral fasciocutaneous flap advanced closure over sternum and bilateral pectoralis major muscle flap reconstruction of sternum. He was started on full dose Lovenox. He remained on mechanical ventilation until 5/21/22. Tolerating T-piece now on trach collar. He required vasopressors for BP support throughout his stay, which have since been weaned off. He has been weaned off of Precedex. He did have some vomiting on 5/21/22. Lovenox was transitioned to Eliquis on 5/21/22. KUB unrevealing, tube feeds restarted on 5/22/22. He was cleared for downgrade to the floor on 5/22/22 under the hospital medicine service. His trach tube fell of during cough and were ubnable to reinserted. He was stable from respiratory standpoint and close monitoring was continued.     Today info  Pt seen and examined this morning complained of left-sided chest pain all other vitals were stable     OBJECTIVE/PHYSICAL EXAM  BP (!) 130/96   Pulse 75   Temp 98.6 °F (37 °C)   Resp 17   Ht 5' 10.87" (1.8 m)   Wt 77.2 kg (170 lb 3.1 oz)   SpO2 97%   BMI 23.83 kg/m²   General: In no acute distress, afebrile  Chest: Clear to auscultation bilaterally, sternotomy wound " healing  Heart: S1, S2, no appreciable murmur  Abdomen: Soft, nontender, BS +, peg tube  MSK: Warm, no lower extremity edema, no clubbing or cyanosis  Neurologic: Alert and oriented x4,      ASSESSMENT/PLAN  Chronic hypoxic respiratory failure status post tracheostomy on 5/10/22 status post trach fell out post coughing  Oropharyngeal dysphagia s/p PEG placment on 5/10/22-status post started oral diet her SLP  Multivessel CAD s/p CABG x 4 on 04/06/2022.  MRSA sternal wound infection and dehiscence, status post wound vacuum-assisted closure device on 04/17/2022, and bilateral pectoral flaps for sternal wound closure on 05/11/2022.  Metabolic encephalopathy- improving  Acute right upper extremity deep venous thrombosis  Anemia chronic disease  Severe protein calorie malnutrition, hypoalbuminemia  Hypertension  Seizure disorder   Transaminitis, mild - resolved     Hx of  Schizophrenia, chronic hepatitis C, hypertension, CAD s/p stents     Plan :  Cardiology was involved for chest pain.  EKG unremarkable troponins are mildly elevated and not trending up.  Cardiology ordered echocardiogram.  Repeat blood work in a.m.  Patient is stable of the antibiotics  completed the treatment with antibiotics id signed off   Plastic surgery following along for drain management.  Patient still has his right drain apparently he removed his left drain  Continue with drain care  Continues to work well with PT OT and speech therapy.   Case management following.  No longer LTAC candidate and unable to go to TCU due to insurance.  Unable to go to SNF due to drains.  Discontinued Haldol, continue olanzapine.  Start trazodone 100 mg at nighttime.  Psychiatry has also been consulted per Nursing Home request.  zydis is discontinued changed to Zyprexa 10 mg at bedtime  On Eliquis for DVT  Continue with Keppra and other home medications  Blood pressure fairly stable    Awaiting level 2 from the state  Awaiting placement  Prophylaxis:   Eliquis  __________________________________________________________________________    LABS/MICROBIOLOGY/MEDICATIONS/DIAGNOSTICS    LABS  Recent Labs     06/16/22  0244   *   K 4.1   CHLORIDE 101   CO2 23   BUN 7.6*   CREATININE 0.68*   GLUCOSE 122*   CALCIUM 8.4     Recent Labs     06/16/22  0244   WBC 11.0   RBC 3.92*   HCT 32.1*   MCV 81.9          MICROBIOLOGY  Microbiology Results (last 7 days)     Procedure Component Value Units Date/Time    Fungal Culture [[id number]]  (Normal) Collected: 05/11/22 1429    Order Status: Completed Specimen: Tissue from Sternum Updated: 06/13/22 1113     Fungal Culture No Fungus Isolated at 4 Weeks    Fungal Culture [[id number]]  (Normal) Collected: 05/11/22 1432    Order Status: Completed Specimen: Bone from Sternum Updated: 06/13/22 1113     Fungal Culture No Fungus Isolated at 4 Weeks          MEDICATIONS   amLODIPine  10 mg Oral Daily    apixaban  5 mg Oral BID    atorvastatin  80 mg Oral Daily    benztropine  1 mg Per OG tube BID    doxepin  50 mg Oral QHS    ezetimibe  10 mg Oral QHS    famotidine  20 mg Oral BID    fluticasone propionate  2 spray Each Nostril Daily    isosorbide mononitrate  30 mg Oral Daily    levETIRAcetam  500 mg Oral BID    lisinopriL  20 mg Oral Daily    metoprolol tartrate  50 mg Oral BID    OLANZapine  10 mg Oral QHS    OXcarbazepine  300 mg Per OG tube BID    polyethylene glycol  17 g Oral Daily    sodium chloride 0.9%  10 mL Intravenous Q12H    trazodone  100 mg Oral QHS    white petrolatum   Topical (Top) BID      INFUSIONS      DIAGNOSTIC TESTS  CT Head Without Contrast   Final Result      1. No acute intracranial abnormalities.   2. Bilateral mastoid effusions, left greater than right.   3. Interval resolution of previously visualized diffuse subcutaneous emphysema of the imaged maxillofacial soft tissues.         Electronically signed by: Glenn Francis   Date:    06/09/2022   Time:    15:47      Fl Modified Barium  Swallow Speech   Final Result      X-Ray Chest 1 View   Final Result      Slightly improved aeration in the right upper lobe.      No other significant change         Electronically signed by: Chad Jason   Date:    06/05/2022   Time:    07:53      X-Ray Chest AP Portable   Final Result      Tracheostomy cannula is not seen.      Cardiomediastinal silhouette is unchanged as compared with previous exam.      Increase in interstitial and pulmonary vascular markings         Electronically signed by: Chad Jason   Date:    06/03/2022   Time:    11:06      X-Ray Chest 1 View   Final Result      There is effusion of the left lung is noted increased in size. The right lung the demonstrates improved aeration in the interval. Recommend continued follow-up.         Electronically signed by: Rex Denson   Date:    06/02/2022   Time:    06:10      Fl Modified Barium Swallow Speech   Final Result      X-Ray Chest 1 View   Final Result      New bilateral alveolar opacities most pronounced in the perihilar lung zones may be related to edema in the setting of enlarged cardiac silhouette.  Other differential considerations include multifocal pneumonia or ARDS.         Electronically signed by: Sharon Cantu   Date:    05/24/2022   Time:    18:36      X-Ray Abdomen AP 1 View   Final Result         1. Interval removal of NG tube.   2. No convincing evidence of new or worsening intra-abdominal process.         Electronically signed by: Abner Reyes   Date:    05/21/2022   Time:    13:57      X-Ray Chest 1 View   Final Result      Stable exam without significant interval change.         Electronically signed by: Pascale Vences   Date:    05/19/2022   Time:    06:00      X-Ray Chest 1 View   Final Result      Right-sided PICC has been removed.  No other significant change.         Electronically signed by: Harjit Sellers   Date:    05/17/2022   Time:    07:05      X-Ray Chest 1 View   Final Result      Interval  insertion of left-sided PICC line.      No other interval change         Electronically signed by: Chad Jason   Date:    05/16/2022   Time:    09:01      X-Ray Chest 1 View   Final Result      LINES/TUBES: Tracheostomy and right PICC with grossly unchanged positioning.      Markedly improved aeration bilaterally with now only minimal basilar opacities.      No pneumothoraces.         Electronically signed by: Jyoti López   Date:    05/14/2022   Time:    17:37      X-Ray Chest 1 View   Final Result      Interval insertion of tracheostomy cannula.      Cardiomegaly.      Increase in interstitial and pulmonary vascular markings indicating some degree of congestion.      Interval removal of nasogastric tube         Electronically signed by: Chad Jason   Date:    05/10/2022   Time:    13:52      X-Ray Chest 1 View   Final Result      Improved left pleural effusion and lower lung zone atelectasis.         Electronically signed by: Javier Soto   Date:    05/04/2022   Time:    08:03      X-Ray Abdomen AP 1 View   Final Result      Optimal placement of the nasogastric tube.         Electronically signed by: Javier Soto   Date:    05/03/2022   Time:    16:28      RADIOLOGY REPORT   Final Result      VAS US Venous Arm Right    (Results Pending)            All diagnosis and differential diagnosis have been reviewed; assessment and plan has been documented. I have personally reviewed the labs and test results that are presently available; I have reviewed the patients medication list. I have reviewed the consulting providers response and recommendations. I have reviewed or attempted to review medical records based upon their availability.  All of the patient's questions have been addressed and answered. Patient's is agreeable to the above stated plan. I will continue to monitor closely and make adjustments to medical management as needed.  This document was created using "[employer] Fluency Direct.  Transcription  errors may have been made.  Please contact me if any questions may rise regarding documentation to clarify verbiage.        Jarrett Yarbrough MD   06/16/2022   Internal Medicine

## 2022-06-16 NOTE — PROGRESS NOTES
PRS  He has no complaints today  He removed his last drain  Incisions have all healed very well  A/P:  Successful and completely healed sternal reconstruction.  I have no further recommendations or restrictions for this patient. If I can be of any further assistance, please contact me.  I will sign off.

## 2022-06-16 NOTE — PROGRESS NOTES
[facility] - [facility]  Cardiology  Progress Note    Patient Name: Kendall Duff  MRN: [id number]  Admission Date: 4/1/2022  Hospital Length of Stay: 76 days  Code Status: Prior   Attending Physician: Hawk Asif MD   Primary Care Physician: Primary Doctor No  Expected Discharge Date:   Principal Problem:Acute myocardial infarction of anterolateral wall    Subjective:     Brief HPI/Hospital Course: Mr. Duff is a 55 y/o male who is known to [facility], Dr. Wright. The patient has had a long admission > 3 months at this point. He was initially admitted for CP and underwent a LHC which revealed MVCAD. He was referred to [facility] for CABG Evaluation and on 4.6.22 he underwent a CABG x 4 with results of LIMA to LAD, rSVG to OM1, rSVG to Diag 1 and rSVG to PDA and Ligation of SOPHIA. On 4.17.22 secondary to a suspected infection the patient was taken tot he OR for Sternal Wound Debridement and Wound Vac Placement. He was critically ill and had a PEG Placement and secondary to prolonged illness related to sternal wound infection. Dr. Weiss with Plastic was also consulted for surgical intervention with pectoral flaps. He continued to improved with abx therapy and critical care. On 6.15.22 he had complaints of CP and he had an EKG with Troponin Biomarker Series Drawn. His EKG was unchanged from previous and his biomarker series was flat. [facility] has been consulted for CP.    PMH: Bipolar, HTN, CVA, CAD/Stents/CABG, VHD, Pericarditis, MI, Hepatitis C  PSH: Angiogram, CABG, Debridement of Sternal Wound  Family History: Mother, D, Metastatic Cancer; Father, D, Liver Failure  Social History: Tobacco Use - Current Tobacco Use Daily (1ppd); Denies Illicit Drug and ETOH Use    Previous Diagnostics:  RUE Venous US 5.10.22:  Right jugular vein is normally compressible and demonstrates normal, spontaneous, phasic flow with normal augmentation.   Right subclavian vein is normally compressible and demonstrates normal,  spontaneous, phasic flow with normal augmentation.   Right axillary vein is abnormal. The vessel is occluded.   Right brachial vein is abnormal. The vessel is occluded.   Right basilic vein is abnormal. The vessel is occluded.   Right cephalic vein is normally compressible and demonstrates normal, spontaneous, phasic flow with normal augmentation.   A deep vein thrombosis was identified in the right axillary and brachial veins.  A superficial thrombosis was identified in the right basilic vein.    ECHO 6.24.22:  The left ventricle is normal in size with mild eccentric hypertrophy and moderately decreased systolic function.  The estimated ejection fraction is 38%.  There are segmental left ventricular wall motion abnormalities.  Grade I left ventricular diastolic dysfunction.  Normal right ventricular size with normal right ventricular systolic function.  There is right ventricular hypertrophy.  The MR is mild to mild-moderate ( 1-2+).  Normal central venous pressure (3 mmHg).    LHC 3.21.22:  100% LAD to 30-40% Residual Stenosis post PCI  Large Diagonal System with Severe Stenosis  LCx  With Patent Stent, OM1 Patent, Mid LCx Occluded  RCA Stents ISR 40-50%, Distal 70-80%  EDP 12; EF 35-40% Anterior AK  Recommendations:  CABG Evalaution     Review of Systems   Constitutional: Negative for diaphoresis, fever and malaise/fatigue.   Cardiovascular: Positive for chest pain. Negative for dyspnea on exertion and leg swelling.   Respiratory: Negative for shortness of breath.    All other systems reviewed and are negative.      Objective:     Vital Signs (Most Recent):  Temp: 98.6 °F (37 °C) (06/16/22 1200)  Pulse: 89 (06/16/22 1000)  Resp: 17 (06/16/22 0525)  BP: (!) 130/96 (06/16/22 1115)  SpO2: 96 % (06/16/22 1000) Vital Signs (24h Range):  Temp:  [98.6 °F (37 °C)-102 °F (38.9 °C)] 98.6 °F (37 °C)  Pulse:  [89-99] 89  Resp:  [5-24] 17  SpO2:  [93 %-96 %] 96 %  BP: (117-153)/(61-96) 130/96     Weight: 77.2 kg (170 lb 3.1  oz)  Body mass index is 23.83 kg/m².    SpO2: 96 %  O2 Device (Oxygen Therapy): room air    No intake or output data in the 24 hours ending 06/16/22 1209    Lines/Drains/Airways       Drain  Duration                  Gastrostomy/Enterostomy 05/10/22  Percutaneous endoscopic gastrostomy (PEG) midline feeding 37 days              Peripheral Intravenous Line  Duration                  Peripheral IV - Single Lumen 06/15/22 2130 20 G Anterior;Left Forearm <1 day                    Significant Labs:   Recent Results (from the past 72 hour(s))   Troponin I    Collection Time: 06/15/22  9:48 AM   Result Value Ref Range    Troponin-I 0.155 (H) 0.000 - 0.045 ng/mL   Troponin I    Collection Time: 06/16/22 12:14 AM   Result Value Ref Range    Troponin-I 0.161 (H) 0.000 - 0.045 ng/mL   Basic Metabolic Panel    Collection Time: 06/16/22  2:44 AM   Result Value Ref Range    Sodium Level 135 (L) 136 - 145 mmol/L    Potassium Level 4.1 3.5 - 5.1 mmol/L    Chloride 101 98 - 107 mmol/L    Carbon Dioxide 23 22 - 29 mmol/L    Glucose Level 122 (H) 74 - 100 mg/dL    Blood Urea Nitrogen 7.6 (L) 8.4 - 25.7 mg/dL    Creatinine 0.68 (L) 0.73 - 1.18 mg/dL    BUN/Creatinine Ratio 11     Calcium Level Total 8.4 8.4 - 10.2 mg/dL    Estimated GFR- >60 mls/min/1.73/m2    Estimated GFR-Non  >60 mls/min/1.73/m2    Anion Gap 11.0 mEq/L   CBC with Differential    Collection Time: 06/16/22  2:44 AM   Result Value Ref Range    WBC 11.0 4.5 - 11.5 x10(3)/mcL    RBC 3.92 (L) 4.70 - 6.10 x10(6)/mcL    Hgb 10.0 (L) 14.0 - 18.0 gm/dL    Hct 32.1 (L) 42.0 - 52.0 %    MCV 81.9 80.0 - 94.0 fL    MCH 25.5 (L) 27.0 - 31.0 pg    MCHC 31.2 (L) 33.0 - 36.0 mg/dL    RDW 16.6 11.5 - 17.0 %    Platelet 241 130 - 400 x10(3)/mcL    MPV 9.4 9.4 - 12.4 fL    Neut % 71.9 %    Lymph % 16.2 %    Mono % 9.4 %    Eos % 1.3 %    Basophil % 0.8 %    Lymph # 1.79 0.6 - 4.6 x10(3)/mcL    Neut # 7.9 2.1 - 9.2 x10(3)/mcL    Mono # 1.04 0.1 - 1.3  x10(3)/mcL    Eos # 0.14 0 - 0.9 x10(3)/mcL    Baso # 0.09 0 - 0.2 x10(3)/mcL    IG# 0.04 (H) 0 - 0.0155 x10(3)/mcL    IG% 0.4 0 - 0.43 %    NRBC% 0.0 %   Troponin I    Collection Time: 06/16/22  6:08 AM   Result Value Ref Range    Troponin-I 0.123 (H) 0.000 - 0.045 ng/mL       Telemetry:  SR 80s    Physical Exam  Constitutional:       Appearance: Normal appearance.   HENT:      Head: Normocephalic.      Mouth/Throat:      Mouth: Mucous membranes are moist.   Eyes:      Extraocular Movements: Extraocular movements intact.   Cardiovascular:      Rate and Rhythm: Normal rate and regular rhythm.      Pulses: Normal pulses.      Heart sounds: Normal heart sounds.   Pulmonary:      Effort: Pulmonary effort is normal.      Breath sounds: Normal breath sounds.   Abdominal:      Palpations: Abdomen is soft.      Comments: + PEG   Skin:     General: Skin is warm and dry.      Comments: Midline Sternotomy with Edges well Approximated and No Sign of Bleed/Infection   Neurological:      General: No focal deficit present.      Mental Status: He is alert and oriented to person, place, and time. Mental status is at baseline.   Psychiatric:         Behavior: Behavior normal.         Current Inpatient Medications:    Current Facility-Administered Medications:     acetaminophen tablet 650 mg, 650 mg, Oral, Q4H PRN, Juancarlos Foreman MD, 650 mg at 06/15/22 2051    albuterol-ipratropium 2.5 mg-0.5 mg/3 mL nebulizer solution 3 mL, 3 mL, Nebulization, Q12H PRN, Juancarlos Foreman MD, 3 mL at 05/24/22 1754    amLODIPine tablet 10 mg, 10 mg, Oral, Daily, Negrito Hernandez MD, 10 mg at 06/16/22 0846    apixaban tablet 5 mg, 5 mg, Oral, BID, Supriya Logan MD, 5 mg at 06/16/22 0846    atorvastatin tablet 80 mg, 80 mg, Oral, Daily, Juancarlos Foreman MD, 80 mg at 06/16/22 0846    benztropine tablet 1 mg, 1 mg, Per OG tube, BID, Juancarlos Foreman MD, 1 mg at 06/16/22 0846    bisacodyL suppository 10 mg, 10 mg, Rectal, Daily PRN, Abstracted  MD Kellen, 10 mg at 05/15/22 0000    dextrose 10% bolus 250 mL, 250 mL, Intravenous, Once PRN, Juancarlos Foreman MD    diphenhydrAMINE capsule 25 mg, 25 mg, Oral, Q6H PRN, Hawk Asif MD, 25 mg at 06/15/22 0930    doxepin capsule 50 mg, 50 mg, Oral, QHS, Juancarlos Foreman MD, 50 mg at 06/15/22 2052    ezetimibe tablet 10 mg, 10 mg, Oral, QHS, Juancarlos Foreman MD, 10 mg at 06/15/22 2052    famotidine tablet 20 mg, 20 mg, Oral, BID, Hawk Asif MD, 20 mg at 06/16/22 0846    fluticasone propionate 50 mcg/actuation nasal spray 100 mcg, 2 spray, Each Nostril, Daily, Negrito Hernandez MD, 100 mcg at 06/16/22 0847    hydrALAZINE injection 10 mg, 10 mg, Intravenous, Q2H PRN, Juancarlos Foreman MD, 10 mg at 06/04/22 0625    levETIRAcetam tablet 500 mg, 500 mg, Oral, BID, Isrrael Layne MD, 500 mg at 06/16/22 0846    lisinopriL tablet 20 mg, 20 mg, Oral, Daily, Negrito Hernandez MD, 20 mg at 06/16/22 0800    lorazepam (ATIVAN) injection 1 mg, 1 mg, Intravenous, Q2H PRN, Lee Gil MD, 1 mg at 06/12/22 2358    metoprolol injection 5 mg, 5 mg, Intravenous, Q5 Min PRN, Supriya Logan MD, 5 mg at 05/22/22 1600    metoprolol tartrate (LOPRESSOR) tablet 50 mg, 50 mg, Oral, BID, Jame Woods, CJ    morphine injection 2 mg, 2 mg, Intravenous, Q1H PRN, Juancarlos Foreman MD, 2 mg at 06/14/22 1524    nitroGLYCERIN SL tablet 0.4 mg, 0.4 mg, Sublingual, Q5 Min PRN, Juancarlos Foreman MD, 0.4 mg at 06/16/22 0719    OLANZapine injection 10 mg, 10 mg, Intramuscular, Q8H PRN, Juancarlos Foreman MD    OLANZapine tablet 10 mg, 10 mg, Oral, QHS, Lee Gil MD, 10 mg at 06/15/22 2050    ondansetron injection 4 mg, 4 mg, Intravenous, Q6H PRN, GAVIN Pearson MD, 4 mg at 05/29/22 2022    OXcarbazepine tablet 300 mg, 300 mg, Per OG tube, BID, Lee Gil MD, 300 mg at 06/16/22 0846    oxyCODONE immediate release tablet 10 mg, 10 mg, Oral, Q4H PRN, Juancarlos Foreman MD, 10 mg at 06/07/22 0412    oxyCODONE immediate  release tablet 5 mg, 5 mg, Oral, Q4H PRN, Juancarlos Foreman MD, 5 mg at 06/15/22 1155    polyethylene glycol packet 17 g, 17 g, Oral, Daily, Carroll Hammer Jr., MD, FCCP, 17 g at 06/14/22 0819    promethazine injection 12.5 mg, 12.5 mg, Intramuscular, Q6H PRN, Paloma Mari MD    sodium chloride 0.9% flush 10 mL, 10 mL, Intravenous, PRN, Juancarlos Foreman MD    sodium chloride 0.9% flush 10 mL, 10 mL, Intravenous, Q12H, Juancarlos Foreman MD, 10 mL at 06/16/22 1000    traZODone tablet 100 mg, 100 mg, Oral, QHS, Lee Gil MD, 100 mg at 06/15/22 2052    white petrolatum 41 % ointment, , Topical (Top), BID, Allyson Mendoza DO, Given at 06/16/22 0847    ziprasidone injection 20 mg, 20 mg, Intramuscular, Q8H PRN, Juancarlos Foreman MD, 20 mg at 06/07/22 1437    VTE Risk Mitigation (From admission, onward)           Ordered     apixaban tablet 5 mg  2 times daily         05/21/22 1913     Place sequential compression device  Until discontinued         05/15/22 0947     heparin, porcine (PF) (heparin flush 100 units/mL) 100 unit/mL injection flush         05/11/22 0056                      Assessment:   Chest Pain     - Troponin Series Flat, EKG with RBBB, No Changes or Signs of Acute Ischemia  MVCAD s/p CABG (4.6.22)    - s/p CABG (4.6.22) - LIMA to LAD, rSVG to OM1, rSVG to Diag 1 and rSVG to PDA and Ligation of SOPHIA.  Chronic Hypoxemic Respiratory Failure requiring Prolonged Ventilation s/p Trach and Coughing Out Trach  Oropharyngeal Dysphagia s/p PEG Placement (5.10.22) s/p SLP Clearing for Oral Consumption  MRSA of Sternal Wound Infection with Dehiscence s/p Wound Vac Closure Device    - s/p Bilateral Pectoral Flaps for Sternal Wound Closure (5.11.22)  Metabolic Encephalopathy - Resolved  Acute RUE DVT  Anemia of Chronic Disease  Severe PCM  HTN  Seizure Disorder  Transaminitis - Resolved  Schizophrenia  Chronic Hepatitis C  HTN  HLD  No Hx of GIB    Plan:   Continue BB  Increase Metoprolol Tartrate to 50mg PO  BID  Start Imdur 30mg PO QDay  ECHO Today  Labs and EKG in AM: CBC, CMP and Mg    Jame Woods, ANP  Cardiology  [facility] - [facility]  06/16/2022    Pt. seen and examined by me and plan made under my supervision.

## 2022-06-16 NOTE — PT/OT/SLP PROGRESS
Speech Language Pathology Department  Cognitive Therapy     Patient Name:  Kendall Duff   MRN:  [id number]  Admitting Diagnosis: Acute myocardial infarction of anterolateral wall     Recommendations:                  General Recommendations:  Cognitive-linguistic therapy  Communication strategies: none     Subjective      Patient awake, alert and oriented x4.     Patient goals: to go home.       Pain/Comfort:  · Pain Rating 1: 0/10     Respiratory Status: room air     Objective:      Pt seen to address cognitive-linguistic deficits.  Followed 3-step directions with 100% accuracy.  Completed delayed memory tasks with 80% accuracy and min cueing.     Assessment:      Pt continues to present with cognitive-linguistic deficits hindering safe, independent living.  Pt do d/c home with daughter and home health soon.  Recommend continue speech therapy to address safety in the environment upon return home.     Goals:               Multidisciplinary Problems                         SLP Goals                          Problem: SLP               Goal Priority Disciplines Outcome   SLP Goal      SLP Ongoing, Progressing   Description: LTG1: Tolerate speaking valve during all waking hours with no signs/sx respiratory distress/compromise - discontinue  STG: tolerate speaking valve x1 hour with no signs/sx respiratory distress/compromise - discontinue     LTG2: Tolerate least restrictive PO diet with no clinical signs/sx aspiration - progressing  STG2a: Tolerate thin liquids by cup with no clinical signs/sx aspiration - progressing  STG2b: Tolerate puree solids with no clinical signs/sx aspiration. - progressing     LTG3: Complete basic cognitive tasks with supervision  STG3a: Recognize simple problems with min cues  STG3b: Solve routine problems with min cues  STG4c: Complete 3-step sequencing tasks with min cues                               Plan:      Will continue to follow and tx as appropriate.     · Patient to be seen:  5  x/week   · Plan of Care expires:  06/21/22  · Plan of Care reviewed with:  patient   · SLP Follow-Up:  Yes       Time Tracking:      SLP Treatment Date:   06/16/22  Speech Start Time:  1210  Speech Stop Time:  1230     Speech Total Time (min):  20 min     Billable minutes:  Cognitive Skills Intervention, 20 min         06/16/2022

## 2022-06-16 NOTE — PLAN OF CARE
Let Tosha at [facility] know that CM confirmed pt has been dc'd from PT and OT, is no longer SNF appropriate and is being DC'd home with HH.

## 2022-06-17 VITALS
SYSTOLIC BLOOD PRESSURE: 114 MMHG | TEMPERATURE: 99 F | WEIGHT: 170.19 LBS | HEART RATE: 74 BPM | HEIGHT: 71 IN | OXYGEN SATURATION: 96 % | DIASTOLIC BLOOD PRESSURE: 72 MMHG | BODY MASS INDEX: 23.83 KG/M2 | RESPIRATION RATE: 20 BRPM

## 2022-06-17 LAB
ALBUMIN SERPL-MCNC: 2.3 GM/DL (ref 3.5–5)
ALBUMIN/GLOB SERPL: 0.5 RATIO (ref 1.1–2)
ALP SERPL-CCNC: 82 UNIT/L (ref 40–150)
ALT SERPL-CCNC: 10 UNIT/L (ref 0–55)
AST SERPL-CCNC: 19 UNIT/L (ref 5–34)
BASOPHILS # BLD AUTO: 0.05 X10(3)/MCL (ref 0–0.2)
BASOPHILS NFR BLD AUTO: 0.5 %
BILIRUBIN DIRECT+TOT PNL SERPL-MCNC: 0.5 MG/DL
BUN SERPL-MCNC: 9 MG/DL (ref 8.4–25.7)
CALCIUM SERPL-MCNC: 8.3 MG/DL (ref 8.4–10.2)
CHLORIDE SERPL-SCNC: 104 MMOL/L (ref 98–107)
CO2 SERPL-SCNC: 24 MMOL/L (ref 22–29)
CREAT SERPL-MCNC: 0.73 MG/DL (ref 0.73–1.18)
EOSINOPHIL # BLD AUTO: 0.16 X10(3)/MCL (ref 0–0.9)
EOSINOPHIL NFR BLD AUTO: 1.5 %
ERYTHROCYTE [DISTWIDTH] IN BLOOD BY AUTOMATED COUNT: 16.5 % (ref 11.5–17)
GLOBULIN SER-MCNC: 4.3 GM/DL (ref 2.4–3.5)
GLUCOSE SERPL-MCNC: 105 MG/DL (ref 74–100)
HCT VFR BLD AUTO: 30.5 % (ref 42–52)
HGB BLD-MCNC: 9.5 GM/DL (ref 14–18)
IMM GRANULOCYTES # BLD AUTO: 0.03 X10(3)/MCL (ref 0–0.02)
IMM GRANULOCYTES NFR BLD AUTO: 0.3 % (ref 0–0.43)
LYMPHOCYTES # BLD AUTO: 2.4 X10(3)/MCL (ref 0.6–4.6)
LYMPHOCYTES NFR BLD AUTO: 21.9 %
MAGNESIUM SERPL-MCNC: 1.9 MG/DL (ref 1.6–2.6)
MCH RBC QN AUTO: 25.2 PG (ref 27–31)
MCHC RBC AUTO-ENTMCNC: 31.1 MG/DL (ref 33–36)
MCV RBC AUTO: 80.9 FL (ref 80–94)
MONOCYTES # BLD AUTO: 1.13 X10(3)/MCL (ref 0.1–1.3)
MONOCYTES NFR BLD AUTO: 10.3 %
NEUTROPHILS # BLD AUTO: 7.2 X10(3)/MCL (ref 2.1–9.2)
NEUTROPHILS NFR BLD AUTO: 65.5 %
NRBC BLD AUTO-RTO: 0 %
PLATELET # BLD AUTO: 255 X10(3)/MCL (ref 130–400)
PMV BLD AUTO: 9.5 FL (ref 9.4–12.4)
POTASSIUM SERPL-SCNC: 4 MMOL/L (ref 3.5–5.1)
PROT SERPL-MCNC: 6.6 GM/DL (ref 6.4–8.3)
RBC # BLD AUTO: 3.77 X10(6)/MCL (ref 4.7–6.1)
SODIUM SERPL-SCNC: 134 MMOL/L (ref 136–145)
WBC # SPEC AUTO: 11 X10(3)/MCL (ref 4.5–11.5)

## 2022-06-17 PROCEDURE — 25000003 PHARM REV CODE 250: Performed by: INTERNAL MEDICINE

## 2022-06-17 PROCEDURE — 80053 COMPREHEN METABOLIC PANEL: CPT | Performed by: NURSE PRACTITIONER

## 2022-06-17 PROCEDURE — 25000003 PHARM REV CODE 250: Performed by: STUDENT IN AN ORGANIZED HEALTH CARE EDUCATION/TRAINING PROGRAM

## 2022-06-17 PROCEDURE — 25000003 PHARM REV CODE 250: Performed by: NURSE PRACTITIONER

## 2022-06-17 PROCEDURE — 85025 COMPLETE CBC W/AUTO DIFF WBC: CPT | Performed by: NURSE PRACTITIONER

## 2022-06-17 PROCEDURE — 25000003 PHARM REV CODE 250

## 2022-06-17 PROCEDURE — 36415 COLL VENOUS BLD VENIPUNCTURE: CPT | Performed by: NURSE PRACTITIONER

## 2022-06-17 PROCEDURE — A4216 STERILE WATER/SALINE, 10 ML: HCPCS

## 2022-06-17 PROCEDURE — 93005 ELECTROCARDIOGRAM TRACING: CPT

## 2022-06-17 PROCEDURE — 83735 ASSAY OF MAGNESIUM: CPT | Performed by: NURSE PRACTITIONER

## 2022-06-17 RX ORDER — EZETIMIBE 10 MG/1
10 TABLET ORAL NIGHTLY
Qty: 90 TABLET | Refills: 3 | Status: ON HOLD | OUTPATIENT
Start: 2022-06-17 | End: 2022-10-28

## 2022-06-17 RX ORDER — OXYCODONE HYDROCHLORIDE 5 MG/1
5 TABLET ORAL EVERY 8 HOURS PRN
Qty: 18 TABLET | Refills: 0 | Status: ON HOLD | OUTPATIENT
Start: 2022-06-17 | End: 2022-10-28

## 2022-06-17 RX ORDER — ISOSORBIDE MONONITRATE 30 MG/1
30 TABLET, EXTENDED RELEASE ORAL DAILY
Qty: 30 TABLET | Refills: 11 | Status: ON HOLD | OUTPATIENT
Start: 2022-06-18 | End: 2022-11-04 | Stop reason: HOSPADM

## 2022-06-17 RX ORDER — OLANZAPINE 10 MG/1
10 TABLET ORAL NIGHTLY
Qty: 30 TABLET | Refills: 11 | Status: SHIPPED | OUTPATIENT
Start: 2022-06-17 | End: 2022-06-17 | Stop reason: SDUPTHER

## 2022-06-17 RX ORDER — FAMOTIDINE 20 MG/1
20 TABLET, FILM COATED ORAL 2 TIMES DAILY
Qty: 60 TABLET | Refills: 11 | Status: ON HOLD | OUTPATIENT
Start: 2022-06-17 | End: 2022-10-28

## 2022-06-17 RX ORDER — DOXEPIN HYDROCHLORIDE 50 MG/1
50 CAPSULE ORAL NIGHTLY
Qty: 30 CAPSULE | Refills: 11 | Status: SHIPPED | OUTPATIENT
Start: 2022-06-17 | End: 2022-06-17 | Stop reason: SDUPTHER

## 2022-06-17 RX ORDER — DOXEPIN HYDROCHLORIDE 50 MG/1
50 CAPSULE ORAL NIGHTLY
Qty: 30 CAPSULE | Refills: 11 | Status: ON HOLD | OUTPATIENT
Start: 2022-06-17 | End: 2022-07-26

## 2022-06-17 RX ORDER — ATORVASTATIN CALCIUM 80 MG/1
80 TABLET, FILM COATED ORAL DAILY
Qty: 90 TABLET | Refills: 3 | Status: ON HOLD | OUTPATIENT
Start: 2022-06-17 | End: 2022-10-28

## 2022-06-17 RX ORDER — OXCARBAZEPINE 300 MG/1
300 TABLET, FILM COATED ORAL 2 TIMES DAILY
Qty: 60 TABLET | Refills: 11 | Status: ON HOLD | OUTPATIENT
Start: 2022-06-17 | End: 2022-10-28 | Stop reason: CLARIF

## 2022-06-17 RX ORDER — OLANZAPINE 10 MG/1
10 TABLET ORAL NIGHTLY
Qty: 30 TABLET | Refills: 11 | Status: ON HOLD | OUTPATIENT
Start: 2022-06-17 | End: 2022-10-28

## 2022-06-17 RX ORDER — METOPROLOL TARTRATE 50 MG/1
50 TABLET ORAL 2 TIMES DAILY
Qty: 60 TABLET | Refills: 11 | Status: ON HOLD | OUTPATIENT
Start: 2022-06-17 | End: 2022-10-28

## 2022-06-17 RX ORDER — POLYETHYLENE GLYCOL 3350 17 G/17G
17 POWDER, FOR SOLUTION ORAL DAILY
Qty: 30 PACKET | Refills: 0 | Status: ON HOLD | OUTPATIENT
Start: 2022-06-18 | End: 2022-10-28

## 2022-06-17 RX ORDER — BENZTROPINE MESYLATE 1 MG/1
1 TABLET ORAL 2 TIMES DAILY PRN
Qty: 60 TABLET | Refills: 0 | Status: ON HOLD | OUTPATIENT
Start: 2022-06-17 | End: 2022-10-28 | Stop reason: CLARIF

## 2022-06-17 RX ORDER — LEVETIRACETAM 500 MG/1
500 TABLET ORAL 2 TIMES DAILY
Qty: 60 TABLET | Refills: 11 | Status: ON HOLD | OUTPATIENT
Start: 2022-06-17 | End: 2022-10-28

## 2022-06-17 RX ORDER — TRAZODONE HYDROCHLORIDE 100 MG/1
100 TABLET ORAL NIGHTLY
Qty: 30 TABLET | Refills: 11 | Status: ON HOLD | OUTPATIENT
Start: 2022-06-17 | End: 2023-01-17 | Stop reason: SDUPTHER

## 2022-06-17 RX ADMIN — BENZTROPINE MESYLATE 1 MG: 1 TABLET ORAL at 08:06

## 2022-06-17 RX ADMIN — LEVETIRACETAM 500 MG: 500 TABLET, FILM COATED ORAL at 08:06

## 2022-06-17 RX ADMIN — OXCARBAZEPINE 300 MG: 300 TABLET, FILM COATED ORAL at 08:06

## 2022-06-17 RX ADMIN — APIXABAN 5 MG: 5 TABLET, FILM COATED ORAL at 08:06

## 2022-06-17 RX ADMIN — FAMOTIDINE 20 MG: 20 TABLET, FILM COATED ORAL at 08:06

## 2022-06-17 RX ADMIN — ATORVASTATIN CALCIUM 80 MG: 40 TABLET, FILM COATED ORAL at 05:06

## 2022-06-17 RX ADMIN — FLUTICASONE PROPIONATE 100 MCG: 50 SPRAY, METERED NASAL at 08:06

## 2022-06-17 RX ADMIN — LISINOPRIL 20 MG: 20 TABLET ORAL at 08:06

## 2022-06-17 RX ADMIN — SODIUM CHLORIDE, PRESERVATIVE FREE 10 ML: 5 INJECTION INTRAVENOUS at 08:06

## 2022-06-17 RX ADMIN — AMLODIPINE BESYLATE 10 MG: 5 TABLET ORAL at 08:06

## 2022-06-17 RX ADMIN — ISOSORBIDE MONONITRATE 30 MG: 30 TABLET, EXTENDED RELEASE ORAL at 08:06

## 2022-06-17 RX ADMIN — METOPROLOL TARTRATE 50 MG: 50 TABLET, FILM COATED ORAL at 08:06

## 2022-06-17 RX ADMIN — WHITE PETROLATUM: 1.75 OINTMENT TOPICAL at 08:06

## 2022-06-17 NOTE — PROGRESS NOTES
"[facility] - [facility]  Cardiology  Progress Note    Patient Name: Kendall Duff  MRN: [id number]  Admission Date: 4/1/2022  Hospital Length of Stay: 77 days  Code Status: Prior   Attending Physician: Hawk Asif MD   Primary Care Physician: Primary Doctor No  Expected Discharge Date: 6/17/2022  Principal Problem:Acute myocardial infarction of anterolateral wall    Subjective:     Brief HPI/Hospital Course: Mr. Duff is a 57 y/o male who is known to [facility], Dr. Wright. The patient has had a long admission > 3 months at this point. He was initially admitted for CP and underwent a LHC which revealed MVCAD. He was referred to [facility] for CABG Evaluation and on 4.6.22 he underwent a CABG x 4 with results of LIMA to LAD, rSVG to OM1, rSVG to Diag 1 and rSVG to PDA and Ligation of SOPHIA. On 4.17.22 secondary to a suspected infection the patient was taken tot he OR for Sternal Wound Debridement and Wound Vac Placement. He was critically ill and had a PEG Placement and secondary to prolonged illness related to sternal wound infection. Dr. Weiss with Plastic was also consulted for surgical intervention with pectoral flaps. He continued to improved with abx therapy and critical care. On 6.15.22 he had complaints of CP and he had an EKG with Troponin Biomarker Series Drawn. His EKG was unchanged from previous and his biomarker series was flat. [facility] has been consulted for CP.    6.17.22: NAD. "I am fine." Denies CP, SOB and Palps.     PMH: Bipolar, HTN, CVA, CAD/Stents/CABG, VHD, Pericarditis, MI, Hepatitis C  PSH: Angiogram, CABG, Debridement of Sternal Wound  Family History: Mother, D, Metastatic Cancer; Father, D, Liver Failure  Social History: Tobacco Use - Current Tobacco Use Daily (1ppd); Denies Illicit Drug and ETOH Use    Previous Diagnostics:  ECHO 6.16.22:  The left ventricle is normal in size with concentric hypertrophy and severely decreased systolic function.  The estimated ejection fraction is " 25-30%.  There is severe left ventricular global hypokinesis.  Grade II left ventricular diastolic dysfunction.  Normal right ventricular size with mildly reduced right ventricular systolic function.  The estimated PA systolic pressure is 52 mmHg.  There is moderate pulmonary hypertension.  Elevated central venous pressure (15 mmHg).    RUE Venous US 5.10.22:  Right jugular vein is normally compressible and demonstrates normal, spontaneous, phasic flow with normal augmentation.   Right subclavian vein is normally compressible and demonstrates normal, spontaneous, phasic flow with normal augmentation.   Right axillary vein is abnormal. The vessel is occluded.   Right brachial vein is abnormal. The vessel is occluded.   Right basilic vein is abnormal. The vessel is occluded.   Right cephalic vein is normally compressible and demonstrates normal, spontaneous, phasic flow with normal augmentation.   A deep vein thrombosis was identified in the right axillary and brachial veins.  A superficial thrombosis was identified in the right basilic vein.    ECHO 6.24.22:  The left ventricle is normal in size with mild eccentric hypertrophy and moderately decreased systolic function.  The estimated ejection fraction is 38%.  There are segmental left ventricular wall motion abnormalities.  Grade I left ventricular diastolic dysfunction.  Normal right ventricular size with normal right ventricular systolic function.  There is right ventricular hypertrophy.  The MR is mild to mild-moderate ( 1-2+).  Normal central venous pressure (3 mmHg).    C 3.21.22:  100% LAD to 30-40% Residual Stenosis post PCI  Large Diagonal System with Severe Stenosis  LCx  With Patent Stent, OM1 Patent, Mid LCx Occluded  RCA Stents ISR 40-50%, Distal 70-80%  EDP 12; EF 35-40% Anterior AK  Recommendations:  CABG Evalaution     Review of Systems   Constitutional: Negative for fever and malaise/fatigue.   Cardiovascular: Negative for chest pain and leg  swelling.   Respiratory: Negative for shortness of breath.    All other systems reviewed and are negative.      Objective:     Vital Signs (Most Recent):  Temp: 98.6 °F (37 °C) (06/17/22 0800)  Pulse: 78 (06/17/22 1000)  Resp: 20 (06/17/22 1000)  BP: 98/63 (06/17/22 1000)  SpO2: 97 % (06/17/22 1000) Vital Signs (24h Range):  Temp:  [98.3 °F (36.8 °C)-102 °F (38.9 °C)] 98.6 °F (37 °C)  Pulse:  [75-98] 78  Resp:  [14-22] 20  SpO2:  [95 %-97 %] 97 %  BP: ()/(63-96) 98/63     Weight: 77.2 kg (170 lb 3.1 oz)  Body mass index is 23.83 kg/m².    SpO2: 97 %  O2 Device (Oxygen Therapy): room air    No intake or output data in the 24 hours ending 06/17/22 1106    Lines/Drains/Airways       Drain  Duration                  Gastrostomy/Enterostomy 05/10/22  Percutaneous endoscopic gastrostomy (PEG) midline feeding 38 days              Peripheral Intravenous Line  Duration                  Peripheral IV - Single Lumen 06/15/22 2130 20 G Anterior;Left Forearm 1 day                    Significant Labs:   Recent Results (from the past 72 hour(s))   Troponin I    Collection Time: 06/15/22  9:48 AM   Result Value Ref Range    Troponin-I 0.155 (H) 0.000 - 0.045 ng/mL   Troponin I    Collection Time: 06/16/22 12:14 AM   Result Value Ref Range    Troponin-I 0.161 (H) 0.000 - 0.045 ng/mL   Basic Metabolic Panel    Collection Time: 06/16/22  2:44 AM   Result Value Ref Range    Sodium Level 135 (L) 136 - 145 mmol/L    Potassium Level 4.1 3.5 - 5.1 mmol/L    Chloride 101 98 - 107 mmol/L    Carbon Dioxide 23 22 - 29 mmol/L    Glucose Level 122 (H) 74 - 100 mg/dL    Blood Urea Nitrogen 7.6 (L) 8.4 - 25.7 mg/dL    Creatinine 0.68 (L) 0.73 - 1.18 mg/dL    BUN/Creatinine Ratio 11     Calcium Level Total 8.4 8.4 - 10.2 mg/dL    Estimated GFR- >60 mls/min/1.73/m2    Estimated GFR-Non  >60 mls/min/1.73/m2    Anion Gap 11.0 mEq/L   CBC with Differential    Collection Time: 06/16/22  2:44 AM   Result Value Ref  Range    WBC 11.0 4.5 - 11.5 x10(3)/mcL    RBC 3.92 (L) 4.70 - 6.10 x10(6)/mcL    Hgb 10.0 (L) 14.0 - 18.0 gm/dL    Hct 32.1 (L) 42.0 - 52.0 %    MCV 81.9 80.0 - 94.0 fL    MCH 25.5 (L) 27.0 - 31.0 pg    MCHC 31.2 (L) 33.0 - 36.0 mg/dL    RDW 16.6 11.5 - 17.0 %    Platelet 241 130 - 400 x10(3)/mcL    MPV 9.4 9.4 - 12.4 fL    Neut % 71.9 %    Lymph % 16.2 %    Mono % 9.4 %    Eos % 1.3 %    Basophil % 0.8 %    Lymph # 1.79 0.6 - 4.6 x10(3)/mcL    Neut # 7.9 2.1 - 9.2 x10(3)/mcL    Mono # 1.04 0.1 - 1.3 x10(3)/mcL    Eos # 0.14 0 - 0.9 x10(3)/mcL    Baso # 0.09 0 - 0.2 x10(3)/mcL    IG# 0.04 (H) 0 - 0.0155 x10(3)/mcL    IG% 0.4 0 - 0.43 %    NRBC% 0.0 %   Troponin I    Collection Time: 06/16/22  6:08 AM   Result Value Ref Range    Troponin-I 0.123 (H) 0.000 - 0.045 ng/mL   Echo Saline Bubble? No    Collection Time: 06/16/22  2:20 PM   Result Value Ref Range    BSA 1.96 m2    TDI SEPTAL 0.06 m/s    LV LATERAL E/E' RATIO 18.57 m/s    LV SEPTAL E/E' RATIO 21.67 m/s    Right Atrial Pressure (from IVC) 15 mmHg    EF 25 %    Left Ventricular Outflow Tract Mean Velocity 0.692 cm/s    Left Ventricular Outflow Tract Mean Gradient 2.00 mmHg    TDI LATERAL 0.07 m/s    PV PEAK VELOCITY 0.91 cm/s    LVIDd 5.03 3.5 - 6.0 cm    IVS 1.46 (A) 0.6 - 1.1 cm    Posterior Wall 1.65 (A) 0.6 - 1.1 cm    LVIDs 4.46 (A) 2.1 - 4.0 cm    FS 11 28 - 44 %    LV mass 343.46 g    LA size 4.50 cm    RVDD 2.67 cm    TAPSE 1.75 cm    Left Ventricle Relative Wall Thickness 0.66 cm    AV mean gradient 5 mmHg    AV valve area 1.89 cm2    AV Velocity Ratio 0.71     AV index (prosthetic) 0.67     MV mean gradient 4 mmHg    MV valve area p 1/2 method 4.68 cm2    MV valve area by continuity eq 1.46 cm2    E/A ratio 2.45     Mean e' 0.07 m/s    E wave deceleration time 127 msec    LVOT diameter 1.90 cm    LVOT area 2.8 cm2    LVOT peak ramona 1.09 m/s    LVOT peak VTI 17.30 cm    Ao peak ramona 1.54 m/s    Ao VTI 25.9 cm    Mr max ramona 3.93 m/s    LVOT stroke volume  49.03 cm3    AV peak gradient 9 mmHg    MV peak gradient 9 mmHg    TV rest pulmonary artery pressure 52 mmHg    E/E' ratio 20.00 m/s    MV Peak E Mandeep 1.30 m/s    TR Max Mandeep 3.05 m/s    MV VTI 33.5 cm    MV stenosis pressure 1/2 time 47 ms    MV Peak A Mandeep 0.53 m/s    LV Systolic Volume 90.50 mL    LV Systolic Volume Index 45.9 mL/m2    LV Diastolic Volume 120.00 mL    LV Diastolic Volume Index 60.91 mL/m2    LV Mass Index 174 g/m2    Triscuspid Valve Regurgitation Peak Gradient 37 mmHg    LA Volume Index (Mod) 35.5 mL/m2    LA volume (mod) 70.00 cm3   Magnesium    Collection Time: 06/17/22  1:58 AM   Result Value Ref Range    Magnesium Level 1.90 1.60 - 2.60 mg/dL   Comprehensive Metabolic Panel    Collection Time: 06/17/22  1:58 AM   Result Value Ref Range    Sodium Level 134 (L) 136 - 145 mmol/L    Potassium Level 4.0 3.5 - 5.1 mmol/L    Chloride 104 98 - 107 mmol/L    Carbon Dioxide 24 22 - 29 mmol/L    Glucose Level 105 (H) 74 - 100 mg/dL    Blood Urea Nitrogen 9.0 8.4 - 25.7 mg/dL    Creatinine 0.73 0.73 - 1.18 mg/dL    Calcium Level Total 8.3 (L) 8.4 - 10.2 mg/dL    Protein Total 6.6 6.4 - 8.3 gm/dL    Albumin Level 2.3 (L) 3.5 - 5.0 gm/dL    Globulin 4.3 (H) 2.4 - 3.5 gm/dL    Albumin/Globulin Ratio 0.5 (L) 1.1 - 2.0 ratio    Bilirubin Total 0.5 <=1.5 mg/dL    Alkaline Phosphatase 82 40 - 150 unit/L    Alanine Aminotransferase 10 0 - 55 unit/L    Aspartate Aminotransferase 19 5 - 34 unit/L    Estimated GFR- >60 mls/min/1.73/m2    Estimated GFR-Non  >60 mls/min/1.73/m2   CBC with Differential    Collection Time: 06/17/22  1:58 AM   Result Value Ref Range    WBC 11.0 4.5 - 11.5 x10(3)/mcL    RBC 3.77 (L) 4.70 - 6.10 x10(6)/mcL    Hgb 9.5 (L) 14.0 - 18.0 gm/dL    Hct 30.5 (L) 42.0 - 52.0 %    MCV 80.9 80.0 - 94.0 fL    MCH 25.2 (L) 27.0 - 31.0 pg    MCHC 31.1 (L) 33.0 - 36.0 mg/dL    RDW 16.5 11.5 - 17.0 %    Platelet 255 130 - 400 x10(3)/mcL    MPV 9.5 9.4 - 12.4 fL    Neut %  65.5 %    Lymph % 21.9 %    Mono % 10.3 %    Eos % 1.5 %    Basophil % 0.5 %    Lymph # 2.40 0.6 - 4.6 x10(3)/mcL    Neut # 7.2 2.1 - 9.2 x10(3)/mcL    Mono # 1.13 0.1 - 1.3 x10(3)/mcL    Eos # 0.16 0 - 0.9 x10(3)/mcL    Baso # 0.05 0 - 0.2 x10(3)/mcL    IG# 0.03 (H) 0 - 0.0155 x10(3)/mcL    IG% 0.3 0 - 0.43 %    NRBC% 0.0 %       Telemetry:  SR 80s    Physical Exam  Constitutional:       Appearance: Normal appearance.   HENT:      Head: Normocephalic.      Mouth/Throat:      Mouth: Mucous membranes are moist.   Eyes:      Extraocular Movements: Extraocular movements intact.   Cardiovascular:      Rate and Rhythm: Normal rate and regular rhythm.      Pulses: Normal pulses.      Heart sounds: Normal heart sounds.   Pulmonary:      Effort: Pulmonary effort is normal.      Breath sounds: Normal breath sounds.   Abdominal:      Palpations: Abdomen is soft.      Comments: + PEG   Skin:     General: Skin is warm and dry.      Comments: Midline Sternotomy with Edges well Approximated and No Sign of Bleed/Infection   Neurological:      General: No focal deficit present.      Mental Status: He is alert and oriented to person, place, and time. Mental status is at baseline.   Psychiatric:         Behavior: Behavior normal.         Current Inpatient Medications:    Current Facility-Administered Medications:     acetaminophen tablet 650 mg, 650 mg, Oral, Q4H PRN, Abstracted MD Kellen, 650 mg at 06/16/22 2022    albuterol-ipratropium 2.5 mg-0.5 mg/3 mL nebulizer solution 3 mL, 3 mL, Nebulization, Q12H PRN, Abstracted MD Kellen, 3 mL at 05/24/22 1754    amLODIPine tablet 10 mg, 10 mg, Oral, Daily, Negrito Hernandez MD, 10 mg at 06/17/22 0817    apixaban tablet 5 mg, 5 mg, Oral, BID, Supriya Logan MD, 5 mg at 06/17/22 0818    atorvastatin tablet 80 mg, 80 mg, Oral, Daily, Juancarlos Foreman MD, 80 mg at 06/16/22 0846    benztropine tablet 1 mg, 1 mg, Per OG tube, BID, Abstracted MD Kellen, 1 mg at 06/17/22 0818    bisacodyL  suppository 10 mg, 10 mg, Rectal, Daily PRN, Juancarlos Foreman MD, 10 mg at 05/15/22 0000    dextrose 10% bolus 250 mL, 250 mL, Intravenous, Once PRN, Juancarlos Foreman MD    diphenhydrAMINE capsule 25 mg, 25 mg, Oral, Q6H PRN, Hawk Asif MD, 25 mg at 06/15/22 0930    doxepin capsule 50 mg, 50 mg, Oral, QHS, Juancarlos Foreman MD, 50 mg at 06/16/22 2008    ezetimibe tablet 10 mg, 10 mg, Oral, QHS, Juancarlos Foreman MD, 10 mg at 06/16/22 2009    famotidine tablet 20 mg, 20 mg, Oral, BID, Hawk Asif MD, 20 mg at 06/17/22 0818    fluticasone propionate 50 mcg/actuation nasal spray 100 mcg, 2 spray, Each Nostril, Daily, Negrito Hernandez MD, 100 mcg at 06/17/22 0819    hydrALAZINE injection 10 mg, 10 mg, Intravenous, Q2H PRN, Juancarlos Foreman MD, 10 mg at 06/04/22 0625    isosorbide mononitrate 24 hr tablet 30 mg, 30 mg, Oral, Daily, CJ Jay, 30 mg at 06/17/22 0818    levETIRAcetam tablet 500 mg, 500 mg, Oral, BID, Isrrael Layne MD, 500 mg at 06/17/22 0818    lisinopriL tablet 20 mg, 20 mg, Oral, Daily, Negrito Hernandez MD, 20 mg at 06/17/22 0818    lorazepam (ATIVAN) injection 1 mg, 1 mg, Intravenous, Q2H PRN, Lee Gil MD, 1 mg at 06/12/22 2358    metoprolol injection 5 mg, 5 mg, Intravenous, Q5 Min PRN, Supriya Logan MD, 5 mg at 06/16/22 2022    metoprolol tartrate (LOPRESSOR) tablet 50 mg, 50 mg, Oral, BID, CJ Jay, 50 mg at 06/17/22 0818    morphine injection 2 mg, 2 mg, Intravenous, Q1H PRN, Juancarlos Foreman MD, 2 mg at 06/14/22 1524    nitroGLYCERIN SL tablet 0.4 mg, 0.4 mg, Sublingual, Q5 Min PRN, Abstracted Order, MD, 0.4 mg at 06/16/22 0719    OLANZapine injection 10 mg, 10 mg, Intramuscular, Q8H PRN, Abstracted Order, MD    OLANZapine tablet 10 mg, 10 mg, Oral, QHS, Lee Gil MD, 10 mg at 06/16/22 2009    ondansetron injection 4 mg, 4 mg, Intravenous, Q6H PRN, GAVIN Pearson MD, 4 mg at 05/29/22 2022    OXcarbazepine tablet 300 mg, 300 mg, Per OG  tube, BID, Lee Gil MD, 300 mg at 06/17/22 0818    oxyCODONE immediate release tablet 10 mg, 10 mg, Oral, Q4H PRN, Juancarlos Foreman MD, 10 mg at 06/07/22 0412    oxyCODONE immediate release tablet 5 mg, 5 mg, Oral, Q4H PRN, Abstracted MD Kellen, 5 mg at 06/15/22 1155    polyethylene glycol packet 17 g, 17 g, Oral, Daily, Carroll Hammer Jr., MD, FCCP, 17 g at 06/14/22 0819    promethazine injection 12.5 mg, 12.5 mg, Intramuscular, Q6H PRN, Paloma Mari MD    sodium chloride 0.9% flush 10 mL, 10 mL, Intravenous, PRN, Juancarlos Foreman MD    sodium chloride 0.9% flush 10 mL, 10 mL, Intravenous, Q12H, Abstracted MD Kellen, 10 mL at 06/17/22 0819    traZODone tablet 100 mg, 100 mg, Oral, QHS, Lee Gil MD, 100 mg at 06/16/22 2009    white petrolatum 41 % ointment, , Topical (Top), BID, Allyson Mendoza DO, Given at 06/17/22 0819    ziprasidone injection 20 mg, 20 mg, Intramuscular, Q8H PRN, Juancarlos Foreman MD, 20 mg at 06/07/22 1437    VTE Risk Mitigation (From admission, onward)           Ordered     apixaban tablet 5 mg  2 times daily         05/21/22 1913     Place sequential compression device  Until discontinued         05/15/22 0947     heparin, porcine (PF) (heparin flush 100 units/mL) 100 unit/mL injection flush         05/11/22 0056                      Assessment:   Chest Pain - Resolved     - Troponin Series Flat, EKG with RBBB, No Changes or Signs of Acute Ischemia  MVCAD s/p CABG (4.6.22)    - s/p CABG (4.6.22) - LIMA to LAD, rSVG to OM1, rSVG to Diag 1 and rSVG to PDA and Ligation of SOPHIA.  Chronic Hypoxemic Respiratory Failure requiring Prolonged Ventilation s/p Trach and Coughing Out Trach  Oropharyngeal Dysphagia s/p PEG Placement (5.10.22) s/p SLP Clearing for Oral Consumption  MRSA of Sternal Wound Infection with Dehiscence s/p Wound Vac Closure Device    - s/p Bilateral Pectoral Flaps for Sternal Wound Closure (5.11.22)  Metabolic Encephalopathy - Resolved  Acute RUE DVT  Anemia of Chronic  Disease  Severe PCM  HTN  Seizure Disorder  Transaminitis - Resolved  Schizophrenia  Chronic Hepatitis C  HTN  HLD  No Hx of GIB    Plan:   Continue BB, Statin and Imdur  No ASA secondary to DOAC Use for Thrombus of RUE (DVT)  ECHO Reviewed: ICMO/EF 25-30% with Global Hypokinesis - PT is a POOR Candidate for LifeVest secondary to his Medical Non-Compliance. PT refused LifeVest  CMO likely secondary to recent events of Sepsis (MRSA/Sternal Wound) and Recent Revascularization  OK for D/C Home from Cardiology Standpoint  F/U with [facility] in 1 week (Dr. Wright) - [place]  We will be available as needed    Jame Woods, ANP  Cardiology  [facility] - [address] [place] [address] ICU  06/17/2022      Pt. seen and examined by me and plan made under my supervision.

## 2022-06-17 NOTE — NURSING
Discharge instructions discussed with patient. Updated patient's daughter about discharge. Waiting on case management to align transportation via [employer]. Pharmacy on site will contact us for prescriptions.

## 2022-06-17 NOTE — PLAN OF CARE
Problem: Adult Inpatient Plan of Care  Goal: Plan of Care Review  Outcome: Adequate for Care Transition  Goal: Patient-Specific Goal (Individualized)  Outcome: Adequate for Care Transition  Goal: Absence of Hospital-Acquired Illness or Injury  Outcome: Adequate for Care Transition  Goal: Optimal Comfort and Wellbeing  Outcome: Adequate for Care Transition     Problem: Fall Injury Risk  Goal: Absence of Fall and Fall-Related Injury  Outcome: Adequate for Care Transition     Problem: Impaired Wound Healing  Goal: Optimal Wound Healing  Outcome: Adequate for Care Transition

## 2022-06-17 NOTE — PROGRESS NOTES
Progress Note         Hospital follow up    Subjective:   56 y.o. male status post four-vessel coronary artery bypass graft on 04/06/2022, initial consult on 5/3/22, for peg tube placement.  The patient has developed respiratory failure requiring mechanical ventilation.      Had egd with peg placed on 5/10/22 along with reports of medium sizedHH.     Called backed to remove peg. Seeing speech, cleared for po intake. Has been completely po intake of all meds liquids and solids for at least four weeks per nurse and staff. Has not used peg in weeks as well.    Spoke with our other PA who reported that she spoke with Dr. Correia and ok to remove.     Review of Systems:     Review of Systems    Objective:     Scheduled Meds:   amLODIPine  10 mg Oral Daily    apixaban  5 mg Oral BID    atorvastatin  80 mg Oral Daily    benztropine  1 mg Per OG tube BID    doxepin  50 mg Oral QHS    ezetimibe  10 mg Oral QHS    famotidine  20 mg Oral BID    fluticasone propionate  2 spray Each Nostril Daily    isosorbide mononitrate  30 mg Oral Daily    levETIRAcetam  500 mg Oral BID    lisinopriL  20 mg Oral Daily    metoprolol tartrate  50 mg Oral BID    OLANZapine  10 mg Oral QHS    OXcarbazepine  300 mg Per OG tube BID    polyethylene glycol  17 g Oral Daily    sodium chloride 0.9%  10 mL Intravenous Q12H    trazodone  100 mg Oral QHS    white petrolatum   Topical (Top) BID     Continuous Infusions:  PRN Meds:  acetaminophen, albuterol-ipratropium, bisacodyL, dextrose 10%, diphenhydrAMINE, hydrALAZINE, lorazepam, metoprolol, morphine, nitroGLYCERIN, OLANZapine, ondansetron, oxyCODONE, oxyCODONE, promethazine, sodium chloride 0.9%, ziprasidone      VITAL SIGNS: 24 HR MIN & MAX LAST    Temp  Min: 98.3 °F (36.8 °C)  Max: 102 °F (38.9 °C)  98.6 °F (37 °C)        BP  Min: 98/63  Max: 144/77  98/63     Pulse  Min: 78  Max: 98  78     Resp  Min: 14  Max: 22  20    SpO2  Min: 95 %  Max: 97 %  97 %      No intake or output  data in the 24 hours ending 06/17/22 1213    Physical Exam  Constitutional:       General: He is not in acute distress.  HENT:      Head: Normocephalic.   Eyes:      Extraocular Movements: Extraocular movements intact.   Cardiovascular:      Rate and Rhythm: Normal rate.   Pulmonary:      Effort: No respiratory distress.   Abdominal:      General: There is no distension.      Comments: Original peg in place, removed without any bleeding or pain noted   Psychiatric:         Mood and Affect: Mood normal.           Recent Results (from the past 24 hour(s))   Echo Saline Bubble? No    Collection Time: 06/16/22  2:20 PM   Result Value Ref Range    BSA 1.96 m2    TDI SEPTAL 0.06 m/s    LV LATERAL E/E' RATIO 18.57 m/s    LV SEPTAL E/E' RATIO 21.67 m/s    Right Atrial Pressure (from IVC) 15 mmHg    EF 25 %    Left Ventricular Outflow Tract Mean Velocity 0.692 cm/s    Left Ventricular Outflow Tract Mean Gradient 2.00 mmHg    TDI LATERAL 0.07 m/s    PV PEAK VELOCITY 0.91 cm/s    LVIDd 5.03 3.5 - 6.0 cm    IVS 1.46 (A) 0.6 - 1.1 cm    Posterior Wall 1.65 (A) 0.6 - 1.1 cm    LVIDs 4.46 (A) 2.1 - 4.0 cm    FS 11 28 - 44 %    LV mass 343.46 g    LA size 4.50 cm    RVDD 2.67 cm    TAPSE 1.75 cm    Left Ventricle Relative Wall Thickness 0.66 cm    AV mean gradient 5 mmHg    AV valve area 1.89 cm2    AV Velocity Ratio 0.71     AV index (prosthetic) 0.67     MV mean gradient 4 mmHg    MV valve area p 1/2 method 4.68 cm2    MV valve area by continuity eq 1.46 cm2    E/A ratio 2.45     Mean e' 0.07 m/s    E wave deceleration time 127 msec    LVOT diameter 1.90 cm    LVOT area 2.8 cm2    LVOT peak mandeep 1.09 m/s    LVOT peak VTI 17.30 cm    Ao peak mandeep 1.54 m/s    Ao VTI 25.9 cm    Mr max mandeep 3.93 m/s    LVOT stroke volume 49.03 cm3    AV peak gradient 9 mmHg    MV peak gradient 9 mmHg    TV rest pulmonary artery pressure 52 mmHg    E/E' ratio 20.00 m/s    MV Peak E Mandeep 1.30 m/s    TR Max Mandeep 3.05 m/s    MV VTI 33.5 cm    MV stenosis  pressure 1/2 time 47 ms    MV Peak A Mandeep 0.53 m/s    LV Systolic Volume 90.50 mL    LV Systolic Volume Index 45.9 mL/m2    LV Diastolic Volume 120.00 mL    LV Diastolic Volume Index 60.91 mL/m2    LV Mass Index 174 g/m2    Triscuspid Valve Regurgitation Peak Gradient 37 mmHg    LA Volume Index (Mod) 35.5 mL/m2    LA volume (mod) 70.00 cm3   Magnesium    Collection Time: 06/17/22  1:58 AM   Result Value Ref Range    Magnesium Level 1.90 1.60 - 2.60 mg/dL   Comprehensive Metabolic Panel    Collection Time: 06/17/22  1:58 AM   Result Value Ref Range    Sodium Level 134 (L) 136 - 145 mmol/L    Potassium Level 4.0 3.5 - 5.1 mmol/L    Chloride 104 98 - 107 mmol/L    Carbon Dioxide 24 22 - 29 mmol/L    Glucose Level 105 (H) 74 - 100 mg/dL    Blood Urea Nitrogen 9.0 8.4 - 25.7 mg/dL    Creatinine 0.73 0.73 - 1.18 mg/dL    Calcium Level Total 8.3 (L) 8.4 - 10.2 mg/dL    Protein Total 6.6 6.4 - 8.3 gm/dL    Albumin Level 2.3 (L) 3.5 - 5.0 gm/dL    Globulin 4.3 (H) 2.4 - 3.5 gm/dL    Albumin/Globulin Ratio 0.5 (L) 1.1 - 2.0 ratio    Bilirubin Total 0.5 <=1.5 mg/dL    Alkaline Phosphatase 82 40 - 150 unit/L    Alanine Aminotransferase 10 0 - 55 unit/L    Aspartate Aminotransferase 19 5 - 34 unit/L    Estimated GFR- >60 mls/min/1.73/m2    Estimated GFR-Non  >60 mls/min/1.73/m2   CBC with Differential    Collection Time: 06/17/22  1:58 AM   Result Value Ref Range    WBC 11.0 4.5 - 11.5 x10(3)/mcL    RBC 3.77 (L) 4.70 - 6.10 x10(6)/mcL    Hgb 9.5 (L) 14.0 - 18.0 gm/dL    Hct 30.5 (L) 42.0 - 52.0 %    MCV 80.9 80.0 - 94.0 fL    MCH 25.2 (L) 27.0 - 31.0 pg    MCHC 31.1 (L) 33.0 - 36.0 mg/dL    RDW 16.5 11.5 - 17.0 %    Platelet 255 130 - 400 x10(3)/mcL    MPV 9.5 9.4 - 12.4 fL    Neut % 65.5 %    Lymph % 21.9 %    Mono % 10.3 %    Eos % 1.5 %    Basophil % 0.5 %    Lymph # 2.40 0.6 - 4.6 x10(3)/mcL    Neut # 7.2 2.1 - 9.2 x10(3)/mcL    Mono # 1.13 0.1 - 1.3 x10(3)/mcL    Eos # 0.16 0 - 0.9 x10(3)/mcL     Baso # 0.05 0 - 0.2 x10(3)/mcL    IG# 0.03 (H) 0 - 0.0155 x10(3)/mcL    IG% 0.3 0 - 0.43 %    NRBC% 0.0 %       X-Ray Chest 1 View    Result Date: 6/5/2022  EXAMINATION: XR CHEST 1 VIEW CPT 05130 CLINICAL HISTORY: SOB; COMPARISON: Michelle 3, 2022 FINDINGS: Cardiomediastinal silhouette improved parenchymal changes to be essentially unchanged as compared with the previous exam with slight improved aeration in the right upper lobe     Slightly improved aeration in the right upper lobe. No other significant change Electronically signed by: Chad Jason Date:    06/05/2022 Time:    07:53    X-Ray Chest 1 View    Result Date: 6/2/2022  EXAMINATION: XR CHEST 1 VIEW CLINICAL HISTORY: PICC placement; TECHNIQUE: Single view of the chest COMPARISON: 05/24/2022 FINDINGS: Tracheostomy is midline.  There is effusion of the left lung is noted increased in size.  The right lung the demonstrates improved aeration in the interval.  Recommend continued follow-up.     There is effusion of the left lung is noted increased in size. The right lung the demonstrates improved aeration in the interval. Recommend continued follow-up. Electronically signed by: Rex Denson Date:    06/02/2022 Time:    06:10    X-Ray Chest 1 View    Result Date: 5/24/2022  EXAMINATION: XR CHEST 1 VIEW CLINICAL HISTORY: dyspnea; TECHNIQUE: Single frontal view of the chest was performed. COMPARISON: Chest radiograph 05/19/2022 FINDINGS: LINES AND TUBES: Tracheostomy cannula projects over the trachea with tip at the level of the clavicular heads.  EKG/telemetry leads overlie the chest.  Left upper extremity PICC tip projects over the SVC.  Lines overlie the chest, possible surgical drains. MEDIASTINUM AND JACKSON: Cardiac silhouette is enlarged. There is a left atrial appendage clip. LUNGS: There are progressive, bilateral alveolar opacities most pronounced in the bilateral perihilar lung zones.  Lung volumes are low with associated atelectatic change.  PLEURA:No appreciable pleural effusion. No pneumothorax. BONES: No acute osseous abnormality.     New bilateral alveolar opacities most pronounced in the perihilar lung zones may be related to edema in the setting of enlarged cardiac silhouette.  Other differential considerations include multifocal pneumonia or ARDS. Electronically signed by: Sharon Cantu Date:    05/24/2022 Time:    18:36    X-Ray Chest 1 View    Result Date: 5/19/2022  EXAMINATION: XR CHEST 1 VIEW CLINICAL HISTORY: respiratory failure; TECHNIQUE: AP chest COMPARISON: Chest x-ray dated 05/17/2022 FINDINGS: Tracheostomy and left sided PICC line remain in place.  The heart is stable in size.  There is no new focal airspace consolidation.  No pleural effusion or visible pneumothorax     Stable exam without significant interval change. Electronically signed by: Pascale Vences Date:    05/19/2022 Time:    06:00    X-Ray Abdomen AP 1 View    Result Date: 5/21/2022  EXAMINATION: XR ABDOMEN AP 1 VIEW CLINICAL HISTORY: ; Vomiting; TECHNIQUE: Supine AP view of the abdomen. COMPARISON: 3 May 2022 FINDINGS: Lines/tubes/devices: Enteric tube is no longer visualized.  Multiple other lines/tubes are partially visualized over the imaged region. Detection of air-fluid levels and low-volume pneumoperitoneum is limited due to supine technique. A non-obstructed bowel gas pattern is present. No intra-abdominal mass effect is appreciated. Included lower thoracic cavity and osseous structures are without acute abnormality.     1. Interval removal of NG tube. 2. No convincing evidence of new or worsening intra-abdominal process. Electronically signed by: Abner Reyes Date:    05/21/2022 Time:    13:57    CT Head Without Contrast    Result Date: 6/9/2022  EXAMINATION: CT HEAD WITHOUT CONTRAST CLINICAL HISTORY: per NH request; TECHNIQUE: Multiple axial CT images were obtained from the cranial vertex through the skull base without the administration of intravenous  contrast and reformatted in the coronal and sagittal planes. Total DLP 1231 mGy*cm. Automated exposure control was utilized for this examination as a radiation dose lowering technique. COMPARISON: Limited MRI brain noncontrast 04/15/2022, CT head noncontrast 04/10/2022. FINDINGS: No acute hyperdense intracranial hemorrhage or intra-axial/extra-axial fluid collection. No focally hyperdense intracranial vasculature, mass-effect, or midline shift.  The craniocervical junction is within normal limits. No sulcal effacement or focal loss of gray-white differentiation.  Redemonstrated age-related global cortical involutional changes with associated commensurate ventricular system and subarachnoid space expansion. Nonspecific periventricular and supraventricular white matter hypoattenuation is again noted, most likely to represent chronic microvascular white matter ischemic change. The ventricular system is stable in size and configuration. The basal cisterns are clear. Redemonstrated minimal calcific atherosclerosis of the bilateral carotid siphons and intracranial segment of the right vertebral artery. The imaged paranasal sinuses and tympanic spaces are clear.  Mild opacification of the bilateral mastoid air cells, left greater than right.  Interval resolution of previously visualized diffuse subcutaneous emphysema of the imaged maxillofacial soft tissues.     1. No acute intracranial abnormalities. 2. Bilateral mastoid effusions, left greater than right. 3. Interval resolution of previously visualized diffuse subcutaneous emphysema of the imaged maxillofacial soft tissues. Electronically signed by: Glenn Francis Date:    06/09/2022 Time:    15:47    Fl Modified Barium Swallow Speech    Result Date: 6/6/2022  See Speech Therapist Notes This procedure was auto-finalized.    Fl Modified Barium Swallow Speech    Result Date: 5/30/2022  See Speech Therapist Notes This procedure was auto-finalized.    X-Ray Chest AP  Portable    Result Date: 6/3/2022  EXAMINATION: XR CHEST AP PORTABLE CLINICAL HISTORY: pulled trach out; TECHNIQUE: Single frontal view of the chest was performed. COMPARISON: June 2, 2022 FINDINGS: Cardiomediastinal silhouette to be unchanged as compared with previous exam. Persistent increase in interstitial and pulmonary vascular markings which may indicate some degree of pulmonary vascular congestion and cardiac decompensation. Tracheostomy cannula is not seen     Tracheostomy cannula is not seen. Cardiomediastinal silhouette is unchanged as compared with previous exam. Increase in interstitial and pulmonary vascular markings Electronically signed by: Chad Jason Date:    06/03/2022 Time:    11:06    Echo Saline Bubble? No    Result Date: 6/16/2022  · The left ventricle is normal in size with concentric hypertrophy and severely decreased systolic function. · The estimated ejection fraction is 25-30%. · There is severe left ventricular global hypokinesis. · Grade II left ventricular diastolic dysfunction. · Normal right ventricular size with mildly reduced right ventricular systolic function. · The estimated PA systolic pressure is 52 mmHg. · There is moderate pulmonary hypertension. · Elevated central venous pressure (15 mmHg).        Assessment / Plan:     Assessment & Plan:   56 y.o. male status post four-vessel coronary artery bypass graft on 04/06/2022, initial consult on 5/3/22, for peg tube placement.  The patient has developed respiratory failure requiring mechanical ventilation.      Had egd with peg placed on 5/10/22 along with reports of medium sizedHH.     Called backed to remove peg. Seeing speech, cleared for po intake. Has been completely po intake of all meds liquids and solids for at least four weeks per nurse and staff. Has not used peg in weeks as well.    Removed today at bedside without any bleeding, pain or other issues.     GI available if needed please call.

## 2022-06-17 NOTE — PLAN OF CARE
Dc order noted with HH to follow pt @ Home, messaged Telma  With CM admin, pt is [facility] and will be assigned to Sharron KANG, [facility] [place], nurse  plan is to remove PEG prior to dc

## 2022-06-18 PROCEDURE — G0180 MD CERTIFICATION HHA PATIENT: HCPCS | Mod: ,,, | Performed by: THORACIC SURGERY (CARDIOTHORACIC VASCULAR SURGERY)

## 2022-06-18 PROCEDURE — G0180 PR HOME HEALTH MD CERTIFICATION: ICD-10-PCS | Mod: ,,, | Performed by: THORACIC SURGERY (CARDIOTHORACIC VASCULAR SURGERY)

## 2022-06-20 ENCOUNTER — PATIENT OUTREACH (OUTPATIENT)
Dept: ADMINISTRATIVE | Facility: CLINIC | Age: 57
End: 2022-06-20
Payer: MEDICAID

## 2022-06-20 NOTE — PROGRESS NOTES
C3 nurse spoke with Kendall Duff for a TCC post hospital discharge follow up call. The patient does not have a scheduled HOSFU appointment with PCP within 7 days post hospital discharge date 6/17/22. C3 nurse was unable to schedule HOSFU appointment in [facility].  Patient's daughter, Arabella stated patient's PCP is at [facility] clinic and she will call today to schedule appt.  Patient does have f/u appt with Dr. Wright (Cardio) on 7/6/22 at 1:20pm.

## 2022-07-18 ENCOUNTER — HOSPITAL ENCOUNTER (INPATIENT)
Facility: HOSPITAL | Age: 57
LOS: 8 days | Discharge: PSYCHIATRIC OR SUBSTANCE USE FACILITY | DRG: 281 | End: 2022-07-26
Attending: STUDENT IN AN ORGANIZED HEALTH CARE EDUCATION/TRAINING PROGRAM | Admitting: STUDENT IN AN ORGANIZED HEALTH CARE EDUCATION/TRAINING PROGRAM
Payer: MEDICAID

## 2022-07-18 DIAGNOSIS — I21.09 ACUTE MYOCARDIAL INFARCTION OF ANTEROLATERAL WALL: Primary | ICD-10-CM

## 2022-07-18 DIAGNOSIS — I21.4 NSTEMI (NON-ST ELEVATED MYOCARDIAL INFARCTION): ICD-10-CM

## 2022-07-18 DIAGNOSIS — I50.22 CHRONIC SYSTOLIC CONGESTIVE HEART FAILURE: ICD-10-CM

## 2022-07-18 DIAGNOSIS — F25.0 SCHIZOAFFECTIVE DISORDER, BIPOLAR TYPE: ICD-10-CM

## 2022-07-18 DIAGNOSIS — R07.9 CHEST PAIN: ICD-10-CM

## 2022-07-18 PROBLEM — I82.409 ACUTE DVT (DEEP VENOUS THROMBOSIS): Status: ACTIVE | Noted: 2022-07-18

## 2022-07-18 PROBLEM — R79.89 ELEVATED TROPONIN: Status: ACTIVE | Noted: 2022-07-18

## 2022-07-18 PROBLEM — D50.9 MICROCYTIC ANEMIA: Status: ACTIVE | Noted: 2022-07-18

## 2022-07-18 LAB
ALBUMIN SERPL BCP-MCNC: 3.2 G/DL (ref 3.5–5.2)
ALP SERPL-CCNC: 72 U/L (ref 55–135)
ALT SERPL W/O P-5'-P-CCNC: 17 U/L (ref 10–44)
AMMONIA PLAS-SCNC: 19 UMOL/L (ref 10–50)
ANION GAP SERPL CALC-SCNC: 10 MMOL/L (ref 8–16)
APTT BLDCRRT: 32.8 SEC (ref 21–32)
AST SERPL-CCNC: 24 U/L (ref 10–40)
BASOPHILS # BLD AUTO: 0.06 K/UL (ref 0–0.2)
BASOPHILS NFR BLD: 1 % (ref 0–1.9)
BILIRUB SERPL-MCNC: 1 MG/DL (ref 0.1–1)
BNP SERPL-MCNC: 951 PG/ML (ref 0–99)
BUN SERPL-MCNC: 14 MG/DL (ref 6–20)
CALCIUM SERPL-MCNC: 9.4 MG/DL (ref 8.7–10.5)
CHLORIDE SERPL-SCNC: 106 MMOL/L (ref 95–110)
CK SERPL-CCNC: 362 U/L (ref 20–200)
CO2 SERPL-SCNC: 22 MMOL/L (ref 23–29)
CREAT SERPL-MCNC: 0.7 MG/DL (ref 0.5–1.4)
DIFFERENTIAL METHOD: ABNORMAL
EOSINOPHIL # BLD AUTO: 0.3 K/UL (ref 0–0.5)
EOSINOPHIL NFR BLD: 4.4 % (ref 0–8)
ERYTHROCYTE [DISTWIDTH] IN BLOOD BY AUTOMATED COUNT: 18.3 % (ref 11.5–14.5)
EST. GFR  (AFRICAN AMERICAN): >60 ML/MIN/1.73 M^2
EST. GFR  (NON AFRICAN AMERICAN): >60 ML/MIN/1.73 M^2
GLUCOSE SERPL-MCNC: 86 MG/DL (ref 70–110)
HCT VFR BLD AUTO: 35.3 % (ref 40–54)
HGB BLD-MCNC: 11.3 G/DL (ref 14–18)
IMM GRANULOCYTES # BLD AUTO: 0.03 K/UL (ref 0–0.04)
IMM GRANULOCYTES NFR BLD AUTO: 0.5 % (ref 0–0.5)
INR PPP: 1.1 (ref 0.8–1.2)
LYMPHOCYTES # BLD AUTO: 1.5 K/UL (ref 1–4.8)
LYMPHOCYTES NFR BLD: 24.8 % (ref 18–48)
MCH RBC QN AUTO: 24.1 PG (ref 27–31)
MCHC RBC AUTO-ENTMCNC: 32 G/DL (ref 32–36)
MCV RBC AUTO: 75 FL (ref 82–98)
MONOCYTES # BLD AUTO: 0.5 K/UL (ref 0.3–1)
MONOCYTES NFR BLD: 8.9 % (ref 4–15)
NEUTROPHILS # BLD AUTO: 3.7 K/UL (ref 1.8–7.7)
NEUTROPHILS NFR BLD: 60.4 % (ref 38–73)
NRBC BLD-RTO: 0 /100 WBC
PLATELET # BLD AUTO: 309 K/UL (ref 150–450)
PMV BLD AUTO: 8.9 FL (ref 9.2–12.9)
POTASSIUM SERPL-SCNC: 3.8 MMOL/L (ref 3.5–5.1)
PROT SERPL-MCNC: 7.9 G/DL (ref 6–8.4)
PROTHROMBIN TIME: 12.1 SEC (ref 9–12.5)
RBC # BLD AUTO: 4.68 M/UL (ref 4.6–6.2)
SODIUM SERPL-SCNC: 138 MMOL/L (ref 136–145)
TROPONIN I SERPL DL<=0.01 NG/ML-MCNC: 0.07 NG/ML (ref 0–0.03)
TROPONIN I SERPL DL<=0.01 NG/ML-MCNC: 0.08 NG/ML (ref 0–0.03)
TROPONIN I SERPL DL<=0.01 NG/ML-MCNC: 0.1 NG/ML (ref 0–0.03)
TSH SERPL DL<=0.005 MIU/L-ACNC: 0.41 UIU/ML (ref 0.4–4)
WBC # BLD AUTO: 6.1 K/UL (ref 3.9–12.7)

## 2022-07-18 PROCEDURE — 25000003 PHARM REV CODE 250: Performed by: STUDENT IN AN ORGANIZED HEALTH CARE EDUCATION/TRAINING PROGRAM

## 2022-07-18 PROCEDURE — 84443 ASSAY THYROID STIM HORMONE: CPT | Performed by: NURSE PRACTITIONER

## 2022-07-18 PROCEDURE — 21400001 HC TELEMETRY ROOM

## 2022-07-18 PROCEDURE — 36415 COLL VENOUS BLD VENIPUNCTURE: CPT | Performed by: STUDENT IN AN ORGANIZED HEALTH CARE EDUCATION/TRAINING PROGRAM

## 2022-07-18 PROCEDURE — 82550 ASSAY OF CK (CPK): CPT | Performed by: STUDENT IN AN ORGANIZED HEALTH CARE EDUCATION/TRAINING PROGRAM

## 2022-07-18 PROCEDURE — 80053 COMPREHEN METABOLIC PANEL: CPT | Performed by: STUDENT IN AN ORGANIZED HEALTH CARE EDUCATION/TRAINING PROGRAM

## 2022-07-18 PROCEDURE — 82607 VITAMIN B-12: CPT | Performed by: NURSE PRACTITIONER

## 2022-07-18 PROCEDURE — 99223 1ST HOSP IP/OBS HIGH 75: CPT | Mod: ,,, | Performed by: INTERNAL MEDICINE

## 2022-07-18 PROCEDURE — 25000003 PHARM REV CODE 250: Performed by: FAMILY MEDICINE

## 2022-07-18 PROCEDURE — 82140 ASSAY OF AMMONIA: CPT | Performed by: NURSE PRACTITIONER

## 2022-07-18 PROCEDURE — 36415 COLL VENOUS BLD VENIPUNCTURE: CPT | Performed by: NURSE PRACTITIONER

## 2022-07-18 PROCEDURE — 84484 ASSAY OF TROPONIN QUANT: CPT | Mod: 91 | Performed by: NURSE PRACTITIONER

## 2022-07-18 PROCEDURE — 99222 1ST HOSP IP/OBS MODERATE 55: CPT | Mod: ,,, | Performed by: PSYCHIATRY & NEUROLOGY

## 2022-07-18 PROCEDURE — 93010 ELECTROCARDIOGRAM REPORT: CPT | Mod: ,,, | Performed by: INTERNAL MEDICINE

## 2022-07-18 PROCEDURE — 90833 PR PSYCHOTHERAPY W/PATIENT W/E&M, 30 MIN (ADD ON): ICD-10-PCS | Mod: ,,, | Performed by: PSYCHIATRY & NEUROLOGY

## 2022-07-18 PROCEDURE — 90833 PSYTX W PT W E/M 30 MIN: CPT | Mod: ,,, | Performed by: PSYCHIATRY & NEUROLOGY

## 2022-07-18 PROCEDURE — 99222 PR INITIAL HOSPITAL CARE,LEVL II: ICD-10-PCS | Mod: ,,, | Performed by: PSYCHIATRY & NEUROLOGY

## 2022-07-18 PROCEDURE — 93005 ELECTROCARDIOGRAM TRACING: CPT

## 2022-07-18 PROCEDURE — 93010 EKG 12-LEAD: ICD-10-PCS | Mod: ,,, | Performed by: INTERNAL MEDICINE

## 2022-07-18 PROCEDURE — 84484 ASSAY OF TROPONIN QUANT: CPT | Performed by: STUDENT IN AN ORGANIZED HEALTH CARE EDUCATION/TRAINING PROGRAM

## 2022-07-18 PROCEDURE — 99223 PR INITIAL HOSPITAL CARE,LEVL III: ICD-10-PCS | Mod: ,,, | Performed by: INTERNAL MEDICINE

## 2022-07-18 PROCEDURE — 85025 COMPLETE CBC W/AUTO DIFF WBC: CPT | Performed by: STUDENT IN AN ORGANIZED HEALTH CARE EDUCATION/TRAINING PROGRAM

## 2022-07-18 PROCEDURE — 85610 PROTHROMBIN TIME: CPT | Performed by: STUDENT IN AN ORGANIZED HEALTH CARE EDUCATION/TRAINING PROGRAM

## 2022-07-18 PROCEDURE — 82746 ASSAY OF FOLIC ACID SERUM: CPT | Performed by: NURSE PRACTITIONER

## 2022-07-18 PROCEDURE — 83880 ASSAY OF NATRIURETIC PEPTIDE: CPT | Performed by: STUDENT IN AN ORGANIZED HEALTH CARE EDUCATION/TRAINING PROGRAM

## 2022-07-18 PROCEDURE — 85730 THROMBOPLASTIN TIME PARTIAL: CPT | Performed by: STUDENT IN AN ORGANIZED HEALTH CARE EDUCATION/TRAINING PROGRAM

## 2022-07-18 RX ORDER — ONDANSETRON 2 MG/ML
4 INJECTION INTRAMUSCULAR; INTRAVENOUS EVERY 8 HOURS PRN
Status: DISCONTINUED | OUTPATIENT
Start: 2022-07-18 | End: 2022-07-26 | Stop reason: HOSPADM

## 2022-07-18 RX ORDER — FUROSEMIDE 10 MG/ML
20 INJECTION INTRAMUSCULAR; INTRAVENOUS
Status: DISCONTINUED | OUTPATIENT
Start: 2022-07-18 | End: 2022-07-18

## 2022-07-18 RX ORDER — ATORVASTATIN CALCIUM 40 MG/1
80 TABLET, FILM COATED ORAL DAILY
Status: DISCONTINUED | OUTPATIENT
Start: 2022-07-18 | End: 2022-07-26 | Stop reason: HOSPADM

## 2022-07-18 RX ORDER — OLANZAPINE 5 MG/1
10 TABLET ORAL NIGHTLY
Status: DISCONTINUED | OUTPATIENT
Start: 2022-07-18 | End: 2022-07-18

## 2022-07-18 RX ORDER — LEVETIRACETAM 500 MG/1
500 TABLET ORAL 2 TIMES DAILY
Status: DISCONTINUED | OUTPATIENT
Start: 2022-07-18 | End: 2022-07-26 | Stop reason: HOSPADM

## 2022-07-18 RX ORDER — OLANZAPINE 10 MG/2ML
5 INJECTION, POWDER, FOR SOLUTION INTRAMUSCULAR EVERY 8 HOURS PRN
Status: DISCONTINUED | OUTPATIENT
Start: 2022-07-18 | End: 2022-07-26 | Stop reason: HOSPADM

## 2022-07-18 RX ORDER — IBUPROFEN 200 MG
24 TABLET ORAL
Status: DISCONTINUED | OUTPATIENT
Start: 2022-07-18 | End: 2022-07-26 | Stop reason: HOSPADM

## 2022-07-18 RX ORDER — FUROSEMIDE 10 MG/ML
20 INJECTION INTRAMUSCULAR; INTRAVENOUS DAILY
Status: DISCONTINUED | OUTPATIENT
Start: 2022-07-18 | End: 2022-07-18

## 2022-07-18 RX ORDER — HEPARIN SODIUM,PORCINE/D5W 25000/250
0-40 INTRAVENOUS SOLUTION INTRAVENOUS CONTINUOUS
Status: DISCONTINUED | OUTPATIENT
Start: 2022-07-18 | End: 2022-07-18

## 2022-07-18 RX ORDER — ISOSORBIDE MONONITRATE 30 MG/1
30 TABLET, EXTENDED RELEASE ORAL DAILY
Status: DISCONTINUED | OUTPATIENT
Start: 2022-07-18 | End: 2022-07-26 | Stop reason: HOSPADM

## 2022-07-18 RX ORDER — ARIPIPRAZOLE 5 MG/1
10 TABLET ORAL DAILY
Status: DISCONTINUED | OUTPATIENT
Start: 2022-07-19 | End: 2022-07-26 | Stop reason: HOSPADM

## 2022-07-18 RX ORDER — NALOXONE HCL 0.4 MG/ML
0.02 VIAL (ML) INJECTION
Status: DISCONTINUED | OUTPATIENT
Start: 2022-07-18 | End: 2022-07-26 | Stop reason: HOSPADM

## 2022-07-18 RX ORDER — METOPROLOL TARTRATE 50 MG/1
50 TABLET ORAL 2 TIMES DAILY
Status: DISCONTINUED | OUTPATIENT
Start: 2022-07-18 | End: 2022-07-26 | Stop reason: HOSPADM

## 2022-07-18 RX ORDER — NAPROXEN SODIUM 220 MG/1
81 TABLET, FILM COATED ORAL DAILY
Status: DISCONTINUED | OUTPATIENT
Start: 2022-07-18 | End: 2022-07-26 | Stop reason: HOSPADM

## 2022-07-18 RX ORDER — SODIUM CHLORIDE 0.9 % (FLUSH) 0.9 %
10 SYRINGE (ML) INJECTION EVERY 12 HOURS PRN
Status: DISCONTINUED | OUTPATIENT
Start: 2022-07-18 | End: 2022-07-26 | Stop reason: HOSPADM

## 2022-07-18 RX ORDER — LISINOPRIL 20 MG/1
20 TABLET ORAL DAILY
Status: DISCONTINUED | OUTPATIENT
Start: 2022-07-18 | End: 2022-07-18

## 2022-07-18 RX ORDER — ACETAMINOPHEN 325 MG/1
650 TABLET ORAL EVERY 8 HOURS PRN
Status: DISCONTINUED | OUTPATIENT
Start: 2022-07-18 | End: 2022-07-26 | Stop reason: HOSPADM

## 2022-07-18 RX ORDER — GLUCAGON 1 MG
1 KIT INJECTION
Status: DISCONTINUED | OUTPATIENT
Start: 2022-07-18 | End: 2022-07-26 | Stop reason: HOSPADM

## 2022-07-18 RX ORDER — IBUPROFEN 200 MG
16 TABLET ORAL
Status: DISCONTINUED | OUTPATIENT
Start: 2022-07-18 | End: 2022-07-26 | Stop reason: HOSPADM

## 2022-07-18 RX ADMIN — LEVETIRACETAM 500 MG: 500 TABLET, FILM COATED ORAL at 08:07

## 2022-07-18 RX ADMIN — SODIUM CHLORIDE 500 ML: 0.9 INJECTION, SOLUTION INTRAVENOUS at 11:07

## 2022-07-18 RX ADMIN — LISINOPRIL 20 MG: 20 TABLET ORAL at 08:07

## 2022-07-18 RX ADMIN — ASPIRIN 81 MG CHEWABLE TABLET 81 MG: 81 TABLET CHEWABLE at 08:07

## 2022-07-18 RX ADMIN — ATORVASTATIN CALCIUM 80 MG: 40 TABLET, FILM COATED ORAL at 08:07

## 2022-07-18 RX ADMIN — ISOSORBIDE MONONITRATE 30 MG: 30 TABLET, EXTENDED RELEASE ORAL at 08:07

## 2022-07-18 RX ADMIN — APIXABAN 5 MG: 2.5 TABLET, FILM COATED ORAL at 08:07

## 2022-07-18 RX ADMIN — METOPROLOL TARTRATE 50 MG: 50 TABLET, FILM COATED ORAL at 08:07

## 2022-07-18 NOTE — PLAN OF CARE
Patient admitted to telemetry unit this shift. Patient arrived via air-med.   Patient is CEC'd. Advanced safety precautions in place. Sitter present at bedside; frequent redirection required. Environment arranged per [facility] protocol.   Cardiac monitor in place. Patient running sinus with a right bundle branch block and PVCs.   STAT labwork obtained.   Tele-psych consult in place.   Will continue to monitor and treat.

## 2022-07-18 NOTE — HPI
56-year-old male, PMH of CAD S/P PCI s/p CABG (04/2022 4V), Bipolar, hypertension, CVA, Hepatitis C, who was brought from the mental health unit. Pt has been under a PEC for 8 days for schizophrenia exacerbation and agitation. Pt has been having worsening confusion, dyspnea and weakness. At the behavioral unit, completed work up with troponin and CPK was done which was elevated. Patient has recently had 4V CABG, followed by wound healing issued at the sternotomy site, which reportedly has been healing well following skin grafting.     While in the ED today, ptient denies any chest pain, shortness of breath. Further work up demonstrated, HS troponin 105.2 (normal <60), BnP 2575, , CKMB 8, D-dimer 1.84. EKG demonstrated accelerated idoventicular rhythm, extreme right superior axis deviation, anterioseptal infarct, RBBB, consider also periinfarct block. He was given plavix in the ED (patient is on Eliquis for unclear reason). Referring facility requesting transfer for cardiology & interventional cardiology assistance.  Cardiology, Dr.. Brunson agreeable to consult with  admitting.      The above from H&P   UNABLE TO OBTAIN HISTOTRY HE IS CONFUSED. REVIEW OF MONITOR AND NOTES NOP ARRYTHMIAS OR CHEST PAIN.

## 2022-07-18 NOTE — PROVIDER TRANSFER
Outside Transfer Acceptance Note / Regional Referral Center    Referring facility: [facility]   Referring provider: ROMAN THOMPSON KALYAN K SPINDEL, STEPHEN M. NOTINSYSTEM, PROVIDER  Accepting facility[facility]  Accepting provider: GAYLE MARSHALL NEELAY B.  Admitting provider: Dr. Gil   Reason for transfer: Higher Level of Care   Transfer diagnosis: Elevated Troponin  Transfer specialty requested: Cardiothoracic Surgery  Cardiothoracic Surgery  Cardiology  Transfer specialty notified: yes  Transfer level: NUMBER 1-5: 2  Bed type requested: Med tele  Isolation status: No active isolations   Admission class or status: IP- Inpatient  IP- Inpatient      Narrative     In summary this is a 56-year-old male, PMH of CAD S/P PCI s/p CABG (04/2022 4V), Bipolar, hypertension, CVA, Hepatitis C, who was brought from the mental health unit. Pt has been under a PEC for 8 days for schizophrenia exacerbation and agitation. Pt has been having worsening confusion, dyspnea and weakness. At the behavioral unit, completed work up with troponin and CPK was done which was elevated. Patient has recently had 4V CABG, followed by wound healing issued at the sternotomy site, which reportedly has been healing well following skin grafting.    While in the ED today, ptient denies any chest pain, shortness of breath. Further work up demonstrated, HS troponin 105.2 (normal <60), BnP 2575, , CKMB 8, D-dimer 1.84. EKG demonstrated accelerated idoventicular rhythm, extreme right superior axis deviation, anterioseptal infarct, RBBB, consider also periinfarct block. He was given plavix in the ED (patient is on Eliquis for unclear reason). Referring facility requesting transfer for cardiology & interventional cardiology assistance.   Cardiology, Dr.. Brunson agreeable to consult with  admitting.     Objective     Vitals:   HD stable, A febrile, on room air.     Recent Labs: All pertinent labs within the past 24 hours have been reviewed.  Bilirubin: No results for input(s): BILIDIR, BILIRUBINTOT, BILITOT in the last 720 hours.  Blood Culture: No results for input(s): LABBLOO in the last 48 hours.  BMP: No results for input(s): GLU, NA, K, CL, CO2, BUN, CREATININE, CALCIUM, MG in the last 48 hours.  CBC: No results for input(s): WBC, HGB, HCT, PLT in the last 48 hours.  CMP: No results for input(s): NA, K, CL, CO2, GLU, BUN, CREATININE, CALCIUM, PROT, ALBUMIN, BILITOT, ALKPHOS, AST, ALT, ANIONGAP, EGFRNONAA in the last 48 hours.    Invalid input(s): ESTGFAFRICA  Cardiac Markers: No results for input(s): CKMB, MYOGLOBIN, BNP, TROPISTAT in the last 48 hours.  Coagulation: No results for input(s): PT, INR, APTT in the last 48 hours.    Allergies:   Review of patient's allergies indicates:   Allergen Reactions    Depakote [divalproex]     Divalproex sodium Other (See Comments)    Lithium     Lithium analogues     Quetiapine Other (See Comments)      NPO: No      Anticoagulation:   Anticoagulants     None           Instructions      [facility]  Admit to Hospital Medicine  Upon patient arrival to floor, please contact Hospital Medicine on call.

## 2022-07-18 NOTE — SUBJECTIVE & OBJECTIVE
Past Medical History:   Diagnosis Date    Bipolar disorder, unspecified     Chronic hepatitis C     History of psychiatric hospitalization     Hx of psychiatric care     Hypertension     Bessie     Obesity, unspecified     Psychiatric problem     Schizoaffective disorder, bipolar type     Seizures     Stroke     Substance abuse     Therapy        Past Surgical History:   Procedure Laterality Date    CORONARY STENT PLACEMENT  2015    DEBRIDEMENT  2022    LEFT HEART CATHETERIZATION Left 2015    REPEAT CLOSURE OF STERNAL INCISION N/A 2022    Procedure: CLOSURE, STERNAL INCISION, REPEAT;  Surgeon: Adolfo Weiss MD;  Location: [facility];  Service: Plastics;  Laterality: N/A;  STERNAL WOUND DEBRIDEMENT AND RECONSTRUCTION // MULTIPLE MUSCLE FLAPS // REQ 1400       Review of patient's allergies indicates:   Allergen Reactions    Depakote [divalproex]     Divalproex sodium Other (See Comments)    Lithium     Lithium analogues     Quetiapine Other (See Comments)       No current facility-administered medications on file prior to encounter.     Current Outpatient Medications on File Prior to Encounter   Medication Sig    amLODIPine (NORVASC) 5 MG tablet Take 1 tablet (5 mg total) by mouth once daily.    [] apixaban (ELIQUIS) 5 mg Tab Take 1 tablet (5 mg total) by mouth 2 (two) times daily.    atorvastatin (LIPITOR) 80 MG tablet Take 1 tablet (80 mg total) by mouth once daily.    benztropine (COGENTIN) 1 MG tablet Take 1 tablet (1 mg total) by mouth 2 (two) times daily as needed (spasms).    doxepin (SINEQUAN) 50 MG capsule Take 1 capsule (50 mg total) by mouth every evening.    ezetimibe (ZETIA) 10 mg tablet Take 1 tablet (10 mg total) by mouth every evening.    famotidine (PEPCID) 20 MG tablet Take 1 tablet (20 mg total) by mouth 2 (two) times daily.    isosorbide mononitrate (IMDUR) 30 MG 24 hr tablet Take 1 tablet (30 mg total) by mouth once daily.    levETIRAcetam (KEPPRA) 500 MG Tab Take 1  tablet (500 mg total) by mouth 2 (two) times daily.    lisinopriL (PRINIVIL,ZESTRIL) 20 MG tablet Take 20 mg by mouth once daily.    metoprolol tartrate (LOPRESSOR) 50 MG tablet Take 1 tablet (50 mg total) by mouth 2 (two) times daily.    OLANZapine (ZYPREXA) 10 MG tablet Take 1 tablet (10 mg total) by mouth every evening.    OXcarbazepine (TRILEPTAL) 300 MG Tab 1 tablet (300 mg total) by Per OG tube route 2 (two) times daily.    oxyCODONE (ROXICODONE) 5 MG immediate release tablet Take 1 tablet (5 mg total) by mouth every 8 (eight) hours as needed for Pain (scale 6-10).    polyethylene glycol (GLYCOLAX) 17 gram PwPk Take 17 g by mouth once daily.    traZODone (DESYREL) 100 MG tablet Take 1 tablet (100 mg total) by mouth every evening.    [DISCONTINUED] aspirin 81 MG Chew Chew and swallow 1 tablet (81 mg total) by mouth once daily.    [DISCONTINUED] clopidogreL (PLAVIX) 75 mg tablet Take 1 tablet (75 mg total) by mouth once daily.    [DISCONTINUED] metoprolol succinate (TOPROL-XL) 25 MG 24 hr tablet Take 25 mg by mouth once daily.    [DISCONTINUED] pravastatin (PRAVACHOL) 40 MG tablet Take 1 tablet (40 mg total) by mouth every evening.     Family History       Problem Relation (Age of Onset)    Cancer Mother    Liver disease Father          Tobacco Use    Smoking status: Current Every Day Smoker     Types: Cigarettes    Smokeless tobacco: Never Used   Substance and Sexual Activity    Alcohol use: Never    Drug use: Not Currently     Types: Cocaine    Sexual activity: Not on file     Review of Systems   Unable to perform ROS: Psychiatric disorder   Objective:     Vital Signs (Most Recent):  Temp: 98 °F (36.7 °C) (07/18/22 0740)  Pulse: 86 (07/18/22 0829)  Resp: 20 (07/18/22 0740)  BP: (!) 136/107 (07/18/22 0829)  SpO2: 98 % (07/18/22 0740)   Vital Signs (24h Range):  Temp:  [98 °F (36.7 °C)] 98 °F (36.7 °C)  Pulse:  [80-86] 86  Resp:  [18-20] 20  SpO2:  [98 %-100 %] 98 %  BP: (136-163)/() 136/107     Weight: 66  kg (145 lb 8.1 oz)  Body mass index is 20.88 kg/m².    SpO2: 98 %       No intake or output data in the 24 hours ending 07/18/22 1018    Lines/Drains/Airways       None                   Physical Exam  Vitals and nursing note reviewed.   Constitutional:       General: He is not in acute distress.     Appearance: He is well-developed. He is not diaphoretic.   HENT:      Head: Normocephalic and atraumatic.   Eyes:      General:         Right eye: No discharge.         Left eye: No discharge.      Pupils: Pupils are equal, round, and reactive to light.   Neck:      Thyroid: No thyromegaly.      Vascular: No JVD.   Cardiovascular:      Rate and Rhythm: Normal rate and regular rhythm.      Pulses: Normal pulses and intact distal pulses.      Heart sounds: Normal heart sounds. No murmur heard.    No friction rub. No gallop.   Pulmonary:      Effort: Pulmonary effort is normal. No respiratory distress.      Breath sounds: Normal breath sounds. No wheezing or rales.   Chest:      Chest wall: No tenderness.   Abdominal:      General: Bowel sounds are normal. There is no distension.      Palpations: Abdomen is soft.      Tenderness: There is no abdominal tenderness.   Musculoskeletal:         General: Normal range of motion.      Cervical back: Neck supple.      Right lower leg: No edema.      Left lower leg: No edema.   Skin:     General: Skin is warm and dry.      Findings: No erythema or rash.   Neurological:      Mental Status: He is alert.      Cranial Nerves: No cranial nerve deficit.      Comments: HE IS AWAKE NOT RESPONDING WELL TO VERBAL STIMULI MOVING ALL EXTREMITIES.        Significant Labs:   Recent Lab Results         07/18/22  0403   07/18/22  0402        Albumin   3.2       Alkaline Phosphatase   72       ALT   17       Anion Gap   10       aPTT   32.8  Comment: aPTT therapeutic range = 39-69 seconds       AST   24       Baso # 0.06         Basophil % 1.0         BILIRUBIN TOTAL   1.0  Comment: For infants and  newborns, interpretation of results should be based  on gestational age, weight and in agreement with clinical  observations.    Premature Infant recommended reference ranges:  Up to 24 hours.............<8.0 mg/dL  Up to 48 hours............<12.0 mg/dL  3-5 days..................<15.0 mg/dL  6-29 days.................<15.0 mg/dL           Comment: Values of less than 100 pg/ml are consistent with non-CHF populations.         BUN   14       Calcium   9.4       Chloride   106       CO2   22       CPK   362       Creatinine   0.7       Differential Method Automated         eGFR if    >60       eGFR if non    >60  Comment: Calculation used to obtain the estimated glomerular filtration  rate (eGFR) is the CKD-EPI equation.          Eos # 0.3         Eosinophil % 4.4         Glucose   86       Gran # (ANC) 3.7         Gran % 60.4         Hematocrit 35.3         Hemoglobin 11.3         Immature Grans (Abs) 0.03  Comment: Mild elevation in immature granulocytes is non specific and   can be seen in a variety of conditions including stress response,   acute inflammation, trauma and pregnancy. Correlation with other   laboratory and clinical findings is essential.           Immature Granulocytes 0.5         INR   1.1  Comment: Coumadin Therapy:  2.0 - 3.0 for INR for all indicators except mechanical heart valves  and antiphospholipid syndromes which should use 2.5 - 3.5.         Lymph # 1.5         Lymph % 24.8         MCH 24.1         MCHC 32.0         MCV 75         Mono # 0.5         Mono % 8.9         MPV 8.9         nRBC 0         Platelets 309         Potassium   3.8       PROTEIN TOTAL   7.9       Protime   12.1       RBC 4.68         RDW 18.3         Sodium   138       Troponin I   0.102  Comment: The reference interval for Troponin I represents the 99th percentile   cutoff   for our facility and is consistent with 3rd generation assay   performance.         WBC 6.10                  EKG SINUS RHYTHM OLAD ASMI RBBB  Significant Imaging: X-Ray: CXR: X-Ray Chest 1 View (CXR):   Results for orders placed or performed during the hospital encounter of 07/18/22   X-Ray Chest 1 View    Narrative    EXAMINATION:  XR CHEST 1 VIEW    CLINICAL HISTORY:  Chronic systolic (congestive) heart failureCHF;    COMPARISON:  June 5, 2022, as well as studies dating back to February 23, 2020    FINDINGS:  EKG leads overlie the chest which is lordotic in position.  Mildly low lung volumes.  The lungs are clear. The cardiac silhouette size is enlarged.  The trachea is midline and the mediastinal width is normal. Negative for focal infiltrate, effusion or pneumothorax. Pulmonary vasculature is normal. Negative for osseous abnormalities. Ectatic and tortuous aorta.  Degenerative changes of the spine and both shoulder girdles.      Impression    1.  Negative for acute process involving the chest, considering there are mildly low lung volumes.    2.  Cardiac silhouette size enlargement.  Other stable findings as noted above.      Electronically signed by: Roberth Denney MD  Date:    07/18/2022  Time:    10:09

## 2022-07-18 NOTE — HPI
56-year-old male, PMH of CAD S/P PCI s/p CABG (04/2022 4V), Bipolar, hypertension, CVA, Hepatitis C, who was brought from the mental health unit. Pt has been under a PEC for 8 days for schizophrenia exacerbation and agitation. Pt has been having worsening confusion, dyspnea and weakness. At the behavioral unit, completed work up with troponin and CPK was done which was elevated. Patient has recently had 4V CABG, followed by wound healing issued at the sternotomy site, which reportedly has been healing well following skin grafting.     While in the ED today, ptient denies any chest pain, shortness of breath. Further work up demonstrated, HS troponin 105.2 (normal <60), BnP 2575, , CKMB 8, D-dimer 1.84. EKG demonstrated accelerated idoventicular rhythm, extreme right superior axis deviation, anterioseptal infarct, RBBB, consider also periinfarct block. He was given plavix in the ED (patient is on Eliquis for unclear reason). Referring facility requesting transfer for cardiology & interventional cardiology assistance.  Cardiology, Dr.. Brunson agreeable to consult with [facility] admitting.

## 2022-07-18 NOTE — PROGRESS NOTES
Patient has repeatedly attempted to expose himself to, and masturbate in front of, the female medical staff. Patient responds at times, briefly, to attempts at redirection.

## 2022-07-18 NOTE — CONSULTS
[facility] - Telemetry ([facility])  Cardiology  Consult Note    Patient Name: Kendall Duff  MRN: [id number]  Admission Date: 7/18/2022  Hospital Length of Stay: 0 days  Code Status: Full Code   Attending Provider: Bobby Israel MD   Consulting Provider: Oziel Ng MD  Primary Care Physician: Primary Doctor No  Principal Problem:Coronary artery disease    Patient information was obtained from past medical records and ER records.     Inpatient consult to Cardiology  Consult performed by: Lili Ng MD  Consult ordered by: Jarrod Tomlinson NP  Reason for consult: ELEVATED TROPONIN        Subjective:     Chief Complaint:  ELEVATED TROPONIN      HPI:    56-year-old male, PMH of CAD S/P PCI s/p CABG (04/2022 4V), Bipolar, hypertension, CVA, Hepatitis C, who was brought from the mental health unit. Pt has been under a PEC for 8 days for schizophrenia exacerbation and agitation. Pt has been having worsening confusion, dyspnea and weakness. At the behavioral unit, completed work up with troponin and CPK was done which was elevated. Patient has recently had 4V CABG, followed by wound healing issued at the sternotomy site, which reportedly has been healing well following skin grafting.     While in the ED today, ptient denies any chest pain, shortness of breath. Further work up demonstrated, HS troponin 105.2 (normal <60), BnP 2575, , CKMB 8, D-dimer 1.84. EKG demonstrated accelerated idoventicular rhythm, extreme right superior axis deviation, anterioseptal infarct, RBBB, consider also periinfarct block. He was given plavix in the ED (patient is on Eliquis for unclear reason). Referring facility requesting transfer for cardiology & interventional cardiology assistance.  Cardiology, Dr.. Brunson agreeable to consult with  admitting.      The above from H&P   UNABLE TO OBTAIN HISTOTRY HE IS CONFUSED. REVIEW OF MONITOR AND NOTES NOP ARRYTHMIAS OR CHEST PAIN.      Past Medical History:   Diagnosis Date    Bipolar disorder,  unspecified     Chronic hepatitis C     History of psychiatric hospitalization     Hx of psychiatric care     Hypertension     Bessie     Obesity, unspecified     Psychiatric problem     Schizoaffective disorder, bipolar type     Seizures     Stroke     Substance abuse     Therapy        Past Surgical History:   Procedure Laterality Date    CORONARY STENT PLACEMENT  2015    DEBRIDEMENT  2022    LEFT HEART CATHETERIZATION Left 2015    REPEAT CLOSURE OF STERNAL INCISION N/A 2022    Procedure: CLOSURE, STERNAL INCISION, REPEAT;  Surgeon: Adolfo Weiss MD;  Location: [facility] OR;  Service: Plastics;  Laterality: N/A;  STERNAL WOUND DEBRIDEMENT AND RECONSTRUCTION // MULTIPLE MUSCLE FLAPS // REQ 1400       Review of patient's allergies indicates:   Allergen Reactions    Depakote [divalproex]     Divalproex sodium Other (See Comments)    Lithium     Lithium analogues     Quetiapine Other (See Comments)       No current facility-administered medications on file prior to encounter.     Current Outpatient Medications on File Prior to Encounter   Medication Sig    amLODIPine (NORVASC) 5 MG tablet Take 1 tablet (5 mg total) by mouth once daily.    [] apixaban (ELIQUIS) 5 mg Tab Take 1 tablet (5 mg total) by mouth 2 (two) times daily.    atorvastatin (LIPITOR) 80 MG tablet Take 1 tablet (80 mg total) by mouth once daily.    benztropine (COGENTIN) 1 MG tablet Take 1 tablet (1 mg total) by mouth 2 (two) times daily as needed (spasms).    doxepin (SINEQUAN) 50 MG capsule Take 1 capsule (50 mg total) by mouth every evening.    ezetimibe (ZETIA) 10 mg tablet Take 1 tablet (10 mg total) by mouth every evening.    famotidine (PEPCID) 20 MG tablet Take 1 tablet (20 mg total) by mouth 2 (two) times daily.    isosorbide mononitrate (IMDUR) 30 MG 24 hr tablet Take 1 tablet (30 mg total) by mouth once daily.    levETIRAcetam (KEPPRA) 500 MG Tab Take 1 tablet (500 mg total) by mouth 2  (two) times daily.    lisinopriL (PRINIVIL,ZESTRIL) 20 MG tablet Take 20 mg by mouth once daily.    metoprolol tartrate (LOPRESSOR) 50 MG tablet Take 1 tablet (50 mg total) by mouth 2 (two) times daily.    OLANZapine (ZYPREXA) 10 MG tablet Take 1 tablet (10 mg total) by mouth every evening.    OXcarbazepine (TRILEPTAL) 300 MG Tab 1 tablet (300 mg total) by Per OG tube route 2 (two) times daily.    oxyCODONE (ROXICODONE) 5 MG immediate release tablet Take 1 tablet (5 mg total) by mouth every 8 (eight) hours as needed for Pain (scale 6-10).    polyethylene glycol (GLYCOLAX) 17 gram PwPk Take 17 g by mouth once daily.    traZODone (DESYREL) 100 MG tablet Take 1 tablet (100 mg total) by mouth every evening.    [DISCONTINUED] aspirin 81 MG Chew Chew and swallow 1 tablet (81 mg total) by mouth once daily.    [DISCONTINUED] clopidogreL (PLAVIX) 75 mg tablet Take 1 tablet (75 mg total) by mouth once daily.    [DISCONTINUED] metoprolol succinate (TOPROL-XL) 25 MG 24 hr tablet Take 25 mg by mouth once daily.    [DISCONTINUED] pravastatin (PRAVACHOL) 40 MG tablet Take 1 tablet (40 mg total) by mouth every evening.     Family History       Problem Relation (Age of Onset)    Cancer Mother    Liver disease Father          Tobacco Use    Smoking status: Current Every Day Smoker     Types: Cigarettes    Smokeless tobacco: Never Used   Substance and Sexual Activity    Alcohol use: Never    Drug use: Not Currently     Types: Cocaine    Sexual activity: Not on file     Review of Systems   Unable to perform ROS: Psychiatric disorder   Objective:     Vital Signs (Most Recent):  Temp: 98 °F (36.7 °C) (07/18/22 0740)  Pulse: 86 (07/18/22 0829)  Resp: 20 (07/18/22 0740)  BP: (!) 136/107 (07/18/22 0829)  SpO2: 98 % (07/18/22 0740)   Vital Signs (24h Range):  Temp:  [98 °F (36.7 °C)] 98 °F (36.7 °C)  Pulse:  [80-86] 86  Resp:  [18-20] 20  SpO2:  [98 %-100 %] 98 %  BP: (136-163)/() 136/107     Weight: 66 kg (145 lb 8.1  oz)  Body mass index is 20.88 kg/m².    SpO2: 98 %       No intake or output data in the 24 hours ending 07/18/22 1018    Lines/Drains/Airways       None                   Physical Exam  Vitals and nursing note reviewed.   Constitutional:       General: He is not in acute distress.     Appearance: He is well-developed. He is not diaphoretic.   HENT:      Head: Normocephalic and atraumatic.   Eyes:      General:         Right eye: No discharge.         Left eye: No discharge.      Pupils: Pupils are equal, round, and reactive to light.   Neck:      Thyroid: No thyromegaly.      Vascular: No JVD.   Cardiovascular:      Rate and Rhythm: Normal rate and regular rhythm.      Pulses: Normal pulses and intact distal pulses.      Heart sounds: Normal heart sounds. No murmur heard.    No friction rub. No gallop.   Pulmonary:      Effort: Pulmonary effort is normal. No respiratory distress.      Breath sounds: Normal breath sounds. No wheezing or rales.   Chest:      Chest wall: No tenderness.   Abdominal:      General: Bowel sounds are normal. There is no distension.      Palpations: Abdomen is soft.      Tenderness: There is no abdominal tenderness.   Musculoskeletal:         General: Normal range of motion.      Cervical back: Neck supple.      Right lower leg: No edema.      Left lower leg: No edema.   Skin:     General: Skin is warm and dry.      Findings: No erythema or rash.   Neurological:      Mental Status: He is alert.      Cranial Nerves: No cranial nerve deficit.      Comments: HE IS AWAKE NOT RESPONDING WELL TO VERBAL STIMULI MOVING ALL EXTREMITIES.        Significant Labs:   Recent Lab Results         07/18/22  0403   07/18/22  0402        Albumin   3.2       Alkaline Phosphatase   72       ALT   17       Anion Gap   10       aPTT   32.8  Comment: aPTT therapeutic range = 39-69 seconds       AST   24       Baso # 0.06         Basophil % 1.0         BILIRUBIN TOTAL   1.0  Comment: For infants and newborns,  interpretation of results should be based  on gestational age, weight and in agreement with clinical  observations.    Premature Infant recommended reference ranges:  Up to 24 hours.............<8.0 mg/dL  Up to 48 hours............<12.0 mg/dL  3-5 days..................<15.0 mg/dL  6-29 days.................<15.0 mg/dL           Comment: Values of less than 100 pg/ml are consistent with non-CHF populations.         BUN   14       Calcium   9.4       Chloride   106       CO2   22       CPK   362       Creatinine   0.7       Differential Method Automated         eGFR if    >60       eGFR if non    >60  Comment: Calculation used to obtain the estimated glomerular filtration  rate (eGFR) is the CKD-EPI equation.          Eos # 0.3         Eosinophil % 4.4         Glucose   86       Gran # (ANC) 3.7         Gran % 60.4         Hematocrit 35.3         Hemoglobin 11.3         Immature Grans (Abs) 0.03  Comment: Mild elevation in immature granulocytes is non specific and   can be seen in a variety of conditions including stress response,   acute inflammation, trauma and pregnancy. Correlation with other   laboratory and clinical findings is essential.           Immature Granulocytes 0.5         INR   1.1  Comment: Coumadin Therapy:  2.0 - 3.0 for INR for all indicators except mechanical heart valves  and antiphospholipid syndromes which should use 2.5 - 3.5.         Lymph # 1.5         Lymph % 24.8         MCH 24.1         MCHC 32.0         MCV 75         Mono # 0.5         Mono % 8.9         MPV 8.9         nRBC 0         Platelets 309         Potassium   3.8       PROTEIN TOTAL   7.9       Protime   12.1       RBC 4.68         RDW 18.3         Sodium   138       Troponin I   0.102  Comment: The reference interval for Troponin I represents the 99th percentile   cutoff   for our facility and is consistent with 3rd generation assay   performance.         WBC 6.10                 EKG  SINUS RHYTHM OLAD ASMI RBBB  Significant Imaging: X-Ray: CXR: X-Ray Chest 1 View (CXR):   Results for orders placed or performed during the hospital encounter of 07/18/22   X-Ray Chest 1 View    Narrative    EXAMINATION:  XR CHEST 1 VIEW    CLINICAL HISTORY:  Chronic systolic (congestive) heart failureCHF;    COMPARISON:  June 5, 2022, as well as studies dating back to February 23, 2020    FINDINGS:  EKG leads overlie the chest which is lordotic in position.  Mildly low lung volumes.  The lungs are clear. The cardiac silhouette size is enlarged.  The trachea is midline and the mediastinal width is normal. Negative for focal infiltrate, effusion or pneumothorax. Pulmonary vasculature is normal. Negative for osseous abnormalities. Ectatic and tortuous aorta.  Degenerative changes of the spine and both shoulder girdles.      Impression    1.  Negative for acute process involving the chest, considering there are mildly low lung volumes.    2.  Cardiac silhouette size enlargement.  Other stable findings as noted above.      Electronically signed by: Roberth Denney MD  Date:    07/18/2022  Time:    10:09     Assessment and Plan:     * Coronary artery disease  S/P CABG PRESENTS WITH CHF AND ELEVATED TROPONIN. NO NEW EKG CHANGES HAS A CARDIOMYOPATHY. WILL CONTINUE CURRENT MEDICAL 5THERAPY MAKE SURE HE IS EUVOLEMIC MAY CONSIDER RELOOK ANGIO PENDING CLINICAL SITUATION.    Chronic systolic congestive heart failure  Patient is identified as having Combined Systolic and Diastolic heart failure that is Chronic. CHF is currently controlled. Latest ECHO performed and demonstrates- Results for orders placed during the hospital encounter of 04/01/22    Echo Saline Bubble? No    Interpretation Summary  · The left ventricle is normal in size with concentric hypertrophy and severely decreased systolic function.  · The estimated ejection fraction is 25-30%.  · There is severe left ventricular global hypokinesis.  · Grade II left  ventricular diastolic dysfunction.  · Normal right ventricular size with mildly reduced right ventricular systolic function.  · The estimated PA systolic pressure is 52 mmHg.  · There is moderate pulmonary hypertension.  · Elevated central venous pressure (15 mmHg).  . Continue Beta Blocker and ACE/ARB and monitor clinical status closely. Monitor on telemetry. Patient is off CHF pathway.  Monitor strict Is&Os and daily weights.  Place on fluid restriction of 1.5 L. Continue to stress to patient importance of self efficacy and  on diet for CHF. Last BNP reviewed- and noted below   Recent Labs   Lab 07/18/22  0403   *   .      Acute DVT (deep venous thrombosis)  ON ANTICOAGULATION    Elevated troponin  SECONDARY TO CHF WILL DIURESE MAXIMIZE MEDICAL THERAPY ? RELOOK ANGIOO    Bilateral carotid artery stenosis  ON MEDICAL THERAPY.    NSTEMI (non-ST elevated myocardial infarction)  SEE CAD    Bipolar 1 disorder  PER HOSPITAL TEAM        VTE Risk Mitigation (From admission, onward)         Ordered     apixaban tablet 5 mg  2 times daily         07/18/22 0452     IP VTE HIGH RISK PATIENT  Once         07/18/22 0337     Place sequential compression device  Until discontinued         07/18/22 0337              PATIENT PRESENTS WITH CHF FLAT TROPONIN CURVE FROM DEMAND ISCHEMIA AND CHF NEEDS DIURESIS CONTINUE MEDICAL THERAPY STATINS AND ANTICOAGULATION FOR DVT.the patient does not have a nstemi  DOUBT RELOOK ANGIO HIS CXR SHOWS NO CHF  Thank you for your consult. I will follow-up with patient. Please contact us if you have any additional questions.    Oziel Ng MD  Cardiology   [facility] - Telemetry ([facility])

## 2022-07-18 NOTE — ASSESSMENT & PLAN NOTE
Patient is identified as having Combined Systolic and Diastolic heart failure that is Chronic. CHF is currently controlled. Latest ECHO performed and demonstrates- Results for orders placed during the hospital encounter of 04/01/22    Echo Saline Bubble? No    Interpretation Summary  · The left ventricle is normal in size with concentric hypertrophy and severely decreased systolic function.  · The estimated ejection fraction is 25-30%.  · There is severe left ventricular global hypokinesis.  · Grade II left ventricular diastolic dysfunction.  · Normal right ventricular size with mildly reduced right ventricular systolic function.  · The estimated PA systolic pressure is 52 mmHg.  · There is moderate pulmonary hypertension.  · Elevated central venous pressure (15 mmHg).  . Continue Beta Blocker and ACE/ARB and monitor clinical status closely. Monitor on telemetry. Patient is off CHF pathway.  Monitor strict Is&Os and daily weights.  Place on fluid restriction of 1.5 L. Continue to stress to patient importance of self efficacy and  on diet for CHF. Last BNP reviewed- and noted below   Recent Labs   Lab 07/18/22  0403   *   .

## 2022-07-18 NOTE — CONSULTS
[facility]  Psychiatry  Telepsychiatry Consult Note          Patient agreeable to consultation via telepsychiatry.    Tele-Consultation from Psychiatry started: 7/18/2022 at 3:35 PM  The chief complaint leading to psychiatric consultation is: AMS  This consultation was requested by Michelle Gil MD, the primary team attending physician.  The location of the consulting psychiatrist is [place], [place].  The patient location is  [facility] TELEMETRY   Also present with the patient at the time of the consultation: nurse/tech     Patient Identification:   Kendall Duff is a 56 y.o. male.    Patient information was obtained from patient, past medical records, and primary team.    Inpatient consult to Telemedicine - Psych  Consult performed by: Malik Coughlin MD  Consult ordered by: Michelle Gil MD        Consult Start Time: 07/18/2022 15:35 CDT  Consult End Time: 07/18/2022 17:05 CDT      HISTORY    Per Initial History from Primary Team:  56-year-old male, PMH of CAD S/P PCI s/p CABG (04/2022 4V), Bipolar, hypertension, CVA, Hepatitis C, who was brought from the mental health unit. Pt has been under a PEC for 8 days for schizophrenia exacerbation and agitation. Pt has been having worsening confusion, dyspnea and weakness. At the behavioral unit, completed work up with troponin and CPK was done which was elevated. Patient has recently had 4V CABG, followed by wound healing issued at the sternotomy site, which reportedly has been healing well following skin grafting.  While in the ED today, patient denies any chest pain, shortness of breath. Further work up demonstrated, HS troponin 105.2 (normal <60), BnP 2575, , CKMB 8, D-dimer 1.84. EKG demonstrated accelerated idoventicular rhythm, extreme right superior axis deviation, anterioseptal infarct, RBBB, consider also periinfarct block. He was given plavix in the ED (patient is on Eliquis for unclear reason). Referring facility requesting transfer for  cardiology & interventional cardiology assistance.  Cardiology, Dr.. Brunson agreeable to consult with  admitting.   Interval History from Primary Team on 07/18/2022:  The patient was seen and examined at bedside. No acute events overnight. The patient remains confused. He has a history of schizophrenia. Awaiting tele-psych consult. Troponins are elevated. Continue to trend troponin. Cardiology consult pending.       Chief Complaint / Reason for Psychiatry Consult: Altered Mental Status       HPI:   Kendall Duff is a 56 y.o. male with a past medical history as noted above/below, and a past psychiatric history of Schizoaffective Disorder, Bipolar Type and Polysubstance Abuse, currently being treated by his inpatient primary team for a principle problem of CAD.  Psychiatry was originally consulted as noted above.  The patient was seen and examined.  The chart was reviewed.  On examination today, the patient was non-verbal and non-responsive to verbal or written commands.  He would make intermittent eye contact when speaking to the patient.  When asked if the patient was experiencing any medical / physical complaints, he appeared to shake his head NO.  Despite multiple other attempts, the patient was unable / unwilling to participate in the comprehensive psychiatric assessment.  Given the limitations of the telepsychiatry assessment, I had the nurse at the bedside move the patient's extremities to assess for catatonic s/s.  Despite the patient displaying mutism, negativism, and stupor on exam, he did not exhibit any waxy flexibility, catalepsy, or posturing per the bedside nurse.  Per discussion with the primary team NP, the patient has appeared this way throughout the day.  See collateral info below.  See A/P below.        Collateral:    I spoke to the patient's daughter (Arabella Bradley) at [phone].  She states that she thinks the patient has been sober from opiate and cocaine abuse since around January 2022.   She states that for the past 6-7 months, she has noticed some memory / cognitive deficits in the patient, and she stated that she thinks he was given a dementia diagnosis in February 2022.  She states that he has a long history of Schizoaffective Disorder, Bipolar Type, and she added that she feels like he did best on a prior unknown dose of Abilify (as opposed to his current Zyprexa).  She states that he had a CABG done in early April 2022 at [facility] [place], where he had a difficult hospitalization course that was complicated by being on a ventilator for a month, having an infected incision, and struggling with hypoactive delirium.  When explaining what the patient looked like on examination today, she voiced that his current presentation sounded similar to his hypoactive delirium while at [facility].  She states that at baseline, he is verbal and oriented but does struggle with memory and at times repeats himself and doesn't realize it.        Psychiatric Review Of Systems - Currently, the patient is endorsing and/or denying the following:  Attempted but unable to assess due to patient's AMS / hypoactive delirium       PSYCHOTHERAPY ADD-ON [id number]   30 (16-37*) minutes    Time: 18 minutes  Participants: Met with patient    Therapeutic Intervention Type: behavior modifying psychotherapy, supportive psychotherapy  Why chosen therapy is appropriate versus another modality: relevant to diagnosis, patient responds to this modality, evidence based practice    Target symptoms: disorientation, confusion, mood, and psychosis   Primary focus: improving orientation, confusion, mood, psychosis, and behavior   Psychotherapeutic techniques: supportive and psychodynamic techniques; psycho-education; re-orientation; behavioral modification; problem solving techniques and managing life/medical stressors    Outcome monitoring methods: self-report, observation    Patient's response to intervention:  The patient's response to  intervention is guarded / reluctant / limited.    Progress toward goals:  The patient's progress toward goals is limited.        ROS (limited validity due to patient's AMS / hypoactive delirium):  General ROS: negative for - chills, fatigue, fever or night sweats  Ophthalmic ROS: negative for - blurry vision, double vision or eye pain  ENT ROS: negative for - sinus pain, headaches, sore throat or visual changes  Allergy and Immunology ROS: negative for - hives, itchy/watery eyes or nasal congestion  Hematological and Lymphatic ROS: negative for - bleeding problems, bruising, jaundice or pallor  Endocrine ROS: negative for - galactorrhea, hot flashes, mood swings, palpitations or temperature intolerance  Respiratory ROS: negative for - cough, hemoptysis, shortness of breath, tachypnea or wheezing  Cardiovascular ROS: negative for - chest pain, dyspnea on exertion, loss of consciousness, palpitations, rapid heart rate or shortness of breath  Gastrointestinal ROS: negative for - appetite loss, nausea, abdominal pain, blood in stools, change in bowel habits, constipation or diarrhea  Genito-Urinary ROS: negative for - incontinence, nocturia or pelvic pain  Musculoskeletal ROS: negative for - joint stiffness, joint swelling, joint pain or muscle pain   Neurological ROS: negative for - dizziness, numbness/tingling or seizures; positive for behavioral changes, confusion, & memory loss  Dermatological ROS: negative for dry skin, hair changes, pruritus or rash  Psychiatric ROS: see detailed psychiatric ROS above in history section       Past Psychiatric History:  Previous Medication Trials: Cogentin, Seroquel, Lithium, Depakote, Trileptal, Doxepin, Trazodone, and other unknown psychiatric medications   Previous Psychiatric Hospitalizations: yes, multiple   Hx of Depression: yes  Hx of Anxiety: yes  Hx of Bessie: yes  Hx of Psychosis: yes   Attempted but unable to assess further due to patient's AMS / hypoactive delirium      Social History:  Attempted but unable to assess due to patient's AMS / hypoactive delirium     Family Psychiatric History:   Attempted but unable to assess due to patient's AMS / hypoactive delirium     Substance Abuse History:  Hx of opiate and cocaine abuse / dependence; daughter states that she believes that he has been sober since around January 2022  Attempted but unable to assess further due to patient's AMS / hypoactive delirium      Legal History:  Attempted but unable to assess due to patient's AMS / hypoactive delirium     Psychosocial Stressors: health.   Functioning Relationships: good support system in daughter.   Strengths AND Liabilities:  Strength: Patient has positive support network., Liability: Patient has poor health., Liability: Patient has possible cognitive impairment.      PAST MEDICAL & SURGICAL HISTORY   Past Medical History:   Diagnosis Date    Bipolar disorder, unspecified     Chronic hepatitis C     History of psychiatric hospitalization     Hx of psychiatric care     Hypertension     Bessie     Obesity, unspecified     Psychiatric problem     Schizoaffective disorder, bipolar type     Seizures     Stroke     Substance abuse     Therapy      Past Surgical History:   Procedure Laterality Date    CORONARY STENT PLACEMENT  08/14/2015    DEBRIDEMENT  04/17/2022    LEFT HEART CATHETERIZATION Left 08/13/2015    REPEAT CLOSURE OF STERNAL INCISION N/A 5/11/2022    Procedure: CLOSURE, STERNAL INCISION, REPEAT;  Surgeon: Adolfo Weiss MD;  Location: [facility];  Service: Plastics;  Laterality: N/A;  STERNAL WOUND DEBRIDEMENT AND RECONSTRUCTION // MULTIPLE MUSCLE FLAPS // REQ 1400       NEUROLOGIC HISTORY  Seizures: yes  Head trauma: Attempted but unable to assess due to patient's AMS / hypoactive delirium   CVA: yes       FAMILY HISTORY   Family History   Problem Relation Age of Onset    Cancer Mother     Liver disease Father        ALLERGIES   Review of patient's allergies  indicates:   Allergen Reactions    Depakote [divalproex]     Divalproex sodium Other (See Comments)    Lithium     Lithium analogues     Quetiapine Other (See Comments)       CURRENT MEDICATION REGIMEN   Home Meds:   Prior to Admission medications    Medication Sig Start Date End Date Taking? Authorizing Provider   amLODIPine (NORVASC) 5 MG tablet Take 1 tablet (5 mg total) by mouth once daily. 3/26/22 3/26/23  Laisha May, MARINA   apixaban (ELIQUIS) 5 mg Tab Take 1 tablet (5 mg total) by mouth 2 (two) times daily. 6/17/22 7/17/22  Jarrett Yarbrough MD   atorvastatin (LIPITOR) 80 MG tablet Take 1 tablet (80 mg total) by mouth once daily. 6/17/22 6/17/23  Jarrett Yarbrough MD   benztropine (COGENTIN) 1 MG tablet Take 1 tablet (1 mg total) by mouth 2 (two) times daily as needed (spasms). 6/17/22 7/17/22  Jarrett Yarbrough MD   doxepin (SINEQUAN) 50 MG capsule Take 1 capsule (50 mg total) by mouth every evening. 6/17/22 6/17/23  Jarrett Yarbrough MD   ezetimibe (ZETIA) 10 mg tablet Take 1 tablet (10 mg total) by mouth every evening. 6/17/22 6/17/23  Jarrett Yarbrough MD   famotidine (PEPCID) 20 MG tablet Take 1 tablet (20 mg total) by mouth 2 (two) times daily. 6/17/22 6/17/23  Jarrett Yarbrough MD   isosorbide mononitrate (IMDUR) 30 MG 24 hr tablet Take 1 tablet (30 mg total) by mouth once daily. 6/18/22 6/18/23  Jarrett Yarbrough MD   levETIRAcetam (KEPPRA) 500 MG Tab Take 1 tablet (500 mg total) by mouth 2 (two) times daily. 6/17/22 6/17/23  Jarrett Yarbrough MD   lisinopriL (PRINIVIL,ZESTRIL) 20 MG tablet Take 20 mg by mouth once daily. 2/16/22   Historical Provider   metoprolol tartrate (LOPRESSOR) 50 MG tablet Take 1 tablet (50 mg total) by mouth 2 (two) times daily. 6/17/22 6/17/23  Jarrett Yarbrough MD   OLANZapine (ZYPREXA) 10 MG tablet Take 1 tablet (10 mg total) by mouth every evening. 6/17/22 6/17/23  Jarrett Yarbrough MD   OXcarbazepine (TRILEPTAL) 300 MG Tab 1 tablet (300 mg total) by Per OG tube route 2 (two) times daily. 6/17/22 6/17/23  Jarrett Yarbrough MD    oxyCODONE (ROXICODONE) 5 MG immediate release tablet Take 1 tablet (5 mg total) by mouth every 8 (eight) hours as needed for Pain (scale 6-10). 6/17/22   Jarrett Yarbrough MD   polyethylene glycol (GLYCOLAX) 17 gram PwPk Take 17 g by mouth once daily. 6/18/22   Jarrett Yarbrough MD   traZODone (DESYREL) 100 MG tablet Take 1 tablet (100 mg total) by mouth every evening. 6/17/22 6/17/23  Jarrett Yarbrough MD   aspirin 81 MG Chew Chew and swallow 1 tablet (81 mg total) by mouth once daily. 3/26/22 6/17/22  Laisha May NP   clopidogreL (PLAVIX) 75 mg tablet Take 1 tablet (75 mg total) by mouth once daily. 3/25/22 6/17/22  Laisha May NP   metoprolol succinate (TOPROL-XL) 25 MG 24 hr tablet Take 25 mg by mouth once daily. 3/27/22 6/17/22  Historical Provider   pravastatin (PRAVACHOL) 40 MG tablet Take 1 tablet (40 mg total) by mouth every evening. 3/25/22 6/17/22  Laisha May NP       OTC Meds: Attempted but unable to assess due to patient's AMS / hypoactive delirium     Scheduled Meds:    apixaban  5 mg Oral BID    aspirin  81 mg Oral Daily    atorvastatin  80 mg Oral Daily    isosorbide mononitrate  30 mg Oral Daily    levETIRAcetam  500 mg Oral BID    metoprolol tartrate  50 mg Oral BID    OLANZapine  10 mg Oral QHS      PRN Meds: acetaminophen, glucagon (human recombinant), glucose, glucose, naloxone, ondansetron, sodium chloride 0.9%   Psychotherapeutics (From admission, onward)            Start     Stop Route Frequency Ordered    07/18/22 2100  OLANZapine tablet 10 mg         -- Oral Nightly 07/18/22 1119          LABORATORY DATA   Recent Results (from the past 72 hour(s))   Comprehensive metabolic panel    Collection Time: 07/18/22  4:02 AM   Result Value Ref Range    Sodium 138 136 - 145 mmol/L    Potassium 3.8 3.5 - 5.1 mmol/L    Chloride 106 95 - 110 mmol/L    CO2 22 (L) 23 - 29 mmol/L    Glucose 86 70 - 110 mg/dL    BUN 14 6 - 20 mg/dL    Creatinine 0.7 0.5 - 1.4 mg/dL    Calcium 9.4 8.7 - 10.5  mg/dL    Total Protein 7.9 6.0 - 8.4 g/dL    Albumin 3.2 (L) 3.5 - 5.2 g/dL    Total Bilirubin 1.0 0.1 - 1.0 mg/dL    Alkaline Phosphatase 72 55 - 135 U/L    AST 24 10 - 40 U/L    ALT 17 10 - 44 U/L    Anion Gap 10 8 - 16 mmol/L    eGFR if African American >60 >60 mL/min/1.73 m^2    eGFR if non African American >60 >60 mL/min/1.73 m^2   Troponin I    Collection Time: 07/18/22  4:02 AM   Result Value Ref Range    Troponin I 0.102 (H) 0.000 - 0.026 ng/mL   Protime-INR    Collection Time: 07/18/22  4:02 AM   Result Value Ref Range    Prothrombin Time 12.1 9.0 - 12.5 sec    INR 1.1 0.8 - 1.2   APTT    Collection Time: 07/18/22  4:02 AM   Result Value Ref Range    aPTT 32.8 (H) 21.0 - 32.0 sec   CK    Collection Time: 07/18/22  4:02 AM   Result Value Ref Range     (H) 20 - 200 U/L   CBC auto differential    Collection Time: 07/18/22  4:03 AM   Result Value Ref Range    WBC 6.10 3.90 - 12.70 K/uL    RBC 4.68 4.60 - 6.20 M/uL    Hemoglobin 11.3 (L) 14.0 - 18.0 g/dL    Hematocrit 35.3 (L) 40.0 - 54.0 %    MCV 75 (L) 82 - 98 fL    MCH 24.1 (L) 27.0 - 31.0 pg    MCHC 32.0 32.0 - 36.0 g/dL    RDW 18.3 (H) 11.5 - 14.5 %    Platelets 309 150 - 450 K/uL    MPV 8.9 (L) 9.2 - 12.9 fL    Immature Granulocytes 0.5 0.0 - 0.5 %    Gran # (ANC) 3.7 1.8 - 7.7 K/uL    Immature Grans (Abs) 0.03 0.00 - 0.04 K/uL    Lymph # 1.5 1.0 - 4.8 K/uL    Mono # 0.5 0.3 - 1.0 K/uL    Eos # 0.3 0.0 - 0.5 K/uL    Baso # 0.06 0.00 - 0.20 K/uL    nRBC 0 0 /100 WBC    Gran % 60.4 38.0 - 73.0 %    Lymph % 24.8 18.0 - 48.0 %    Mono % 8.9 4.0 - 15.0 %    Eosinophil % 4.4 0.0 - 8.0 %    Basophil % 1.0 0.0 - 1.9 %    Differential Method Automated    BNP    Collection Time: 07/18/22  4:03 AM   Result Value Ref Range     (H) 0 - 99 pg/mL   Troponin I    Collection Time: 07/18/22 10:49 AM   Result Value Ref Range    Troponin I 0.081 (H) 0.000 - 0.026 ng/mL   Troponin I    Collection Time: 07/18/22  2:19 PM   Result Value Ref Range    Troponin I  0.069 (H) 0.000 - 0.026 ng/mL      No results found for: PHENYTOIN, PHENOBARB, VALPROATE, CBMZ      EXAMINATION    VITALS   Vitals:    07/18/22 1130 07/18/22 1326 07/18/22 1337 07/18/22 1534   BP:   107/76 108/78   BP Location:       Patient Position:    Lying   Pulse: 72 74 77 73   Resp:    20   Temp:    98.2 °F (36.8 °C)   TempSrc:    Oral   SpO2:    (!) 91%   Weight:       Height:            CONSTITUTIONAL  General Appearance: NAD, unremarkable, age appropriate, normal weight, lying in bed, calm    MUSCULOSKELETAL  Muscle Strength and Tone: Attempted but unable to assess due to patient's AMS / hypoactive delirium   Abnormal Involuntary Movements: none observed   Gait and Station: Attempted but unable to assess due to patient's AMS / hypoactive delirium     PSYCHIATRIC   Behavior/Cooperation:  reluctant to participate, uncooperative, psychomotor retardation, eye contact minimal  Speech:  mute / non-verbal   Language: Attempted but unable to assess due to patient's AMS / hypoactive delirium   Mood: Attempted but unable to assess due to patient's AMS / hypoactive delirium   Affect:  Blunted   Associations: Attempted but unable to assess due to patient's AMS / hypoactive delirium   Thought Process: Attempted but unable to assess due to patient's AMS / hypoactive delirium   Thought Content: Attempted but unable to assess due to patient's AMS / hypoactive delirium   Sensorium:  Awake  Alert and Oriented: Attempted but unable to assess due to patient's AMS / hypoactive delirium   Memory: Attempted but unable to assess due to patient's AMS / hypoactive delirium   Attention/concentration: Limited / Impaired.    Similarities: Attempted but unable to assess due to patient's AMS / hypoactive delirium   Abstract reasoning: Attempted but unable to assess due to patient's AMS / hypoactive delirium   Fund of Knowledge: Attempted but unable to assess due to patient's AMS / hypoactive delirium   Insight: Impaired / Limited    Judgment: Impaired / Limited     CAM ICU Delirium Assessment - Attempted but unable to assess due to patient's AMS / hypoactive delirium     Is the patient aware of the biomedical complications associated with substance abuse and mental illness?  Attempted but unable to assess due to patient's AMS / hypoactive delirium         MEDICAL DECISION MAKING    ASSESSMENT        Acute Encephalopathy   Hypoactive Delirium   Schizoaffective Disorder, Bipolar Type   Hx of Polysubstance Abuse (opiates and cocaine)   Hx of Dementia        RECOMMENDATIONS       - Continue PEC/CEC at this time due to patient being gravely disabled 2/2 mental illness.    - If patient continues to appear with acute encephalopathy / hypoactive delirium, would consider changing his legal status to a non-contested admission while further investigating the etiology of the patient's AMS.    - Discontinue Zyprexa given that this medication can be sedating, and patient is already appearing with hypoactive delirium.    - Begin Abilify 10 mg PO daily for psychosis / mood (patient's daughter voiced that patient did well with this medication previously) (discussed risks/benefits/alt vs no treatment with patient and his daughter).    - My physical examination for catatonia was limited due to the nature of telemedicine.  If the primary treatment team is able to perform a physical examination that appears consistent with catatonia, then it would be appropriate to given an Ativan challenge to assess for improvement in catatonia s/s (discussed risks/benefits/alt vs no treatment with patient and his daughter).     Implement the below DELIRIUM BEHAVIOR MANAGEMENT:  - Minimize use of restraints; if physical restraints necessary, please utilize medical/chemical prns for agitation (can use Zyprexa 5 mg PO/IM q6 hours PRN).  - Keep shades open and room lit during day and room dim at night in order to promote healthy circadian rhythms.  - Encourage family at bedside.  -  Keep whiteboard in patient's room current with the date and name of the members of patient's team for easy patient self re-orientation.  - Avoid benzodiazepines (unless treating catatonia), antihistamines, anticholinergics, hypnotics, and minimize opiates while controlling for pain as these medications may exacerbate delirium.     - Psychotherapy was performed with patient as noted above with a focus on improving orientation, confusion, mood, psychosis, and behavior.     - Patient's most recent labs, imaging, and EKG were reviewed today; order and f/u TSH, B12, Folate, and Ammonia for further investigation of AMS; continue to monitor EKG for improvement in QTc prolongation     - Consider Neurology Consult for assistance with etiology of AMS      - Continue suicide / violence precautions and continue to monitor the patient with a sitter while on a PEC / CEC.       - Thank you for this consult        Total time spent with patient and/or managing/coordinating patient's care today (excluding the time spent on psychotherapy): 72 minutes   Time spent on psychotherapy today (as noted above): 18 minutes   Total time for encounter today including psychotherapy: 90 minutes      More than 50% of the time was spent counseling/coordinating care.     Consulting clinician was informed of the encounter and consult note.     Consultation ended: 7/18/2022 at 5:05 PM       STAFF:  Malik Coughlin MD  [facility]   7/18/2022

## 2022-07-18 NOTE — SUBJECTIVE & OBJECTIVE
Past Medical History:   Diagnosis Date    Bipolar disorder, unspecified     Chronic hepatitis C     History of psychiatric hospitalization     Hx of psychiatric care     Hypertension     Bessie     Obesity, unspecified     Psychiatric problem     Schizoaffective disorder, bipolar type     Seizures     Stroke     Substance abuse     Therapy        Past Surgical History:   Procedure Laterality Date    CORONARY STENT PLACEMENT  2015    DEBRIDEMENT  2022    LEFT HEART CATHETERIZATION Left 2015    REPEAT CLOSURE OF STERNAL INCISION N/A 2022    Procedure: CLOSURE, STERNAL INCISION, REPEAT;  Surgeon: Adolfo Weiss MD;  Location: [facility];  Service: Plastics;  Laterality: N/A;  STERNAL WOUND DEBRIDEMENT AND RECONSTRUCTION // MULTIPLE MUSCLE FLAPS // REQ 1400       Review of patient's allergies indicates:   Allergen Reactions    Depakote [divalproex]     Divalproex sodium Other (See Comments)    Lithium     Lithium analogues     Quetiapine Other (See Comments)       No current facility-administered medications on file prior to encounter.     Current Outpatient Medications on File Prior to Encounter   Medication Sig    amLODIPine (NORVASC) 5 MG tablet Take 1 tablet (5 mg total) by mouth once daily.    [] apixaban (ELIQUIS) 5 mg Tab Take 1 tablet (5 mg total) by mouth 2 (two) times daily.    atorvastatin (LIPITOR) 80 MG tablet Take 1 tablet (80 mg total) by mouth once daily.    benztropine (COGENTIN) 1 MG tablet Take 1 tablet (1 mg total) by mouth 2 (two) times daily as needed (spasms).    doxepin (SINEQUAN) 50 MG capsule Take 1 capsule (50 mg total) by mouth every evening.    ezetimibe (ZETIA) 10 mg tablet Take 1 tablet (10 mg total) by mouth every evening.    famotidine (PEPCID) 20 MG tablet Take 1 tablet (20 mg total) by mouth 2 (two) times daily.    isosorbide mononitrate (IMDUR) 30 MG 24 hr tablet Take 1 tablet (30 mg total) by mouth once daily.    levETIRAcetam (KEPPRA) 500 MG Tab Take 1  tablet (500 mg total) by mouth 2 (two) times daily.    lisinopriL (PRINIVIL,ZESTRIL) 20 MG tablet Take 20 mg by mouth once daily.    metoprolol tartrate (LOPRESSOR) 50 MG tablet Take 1 tablet (50 mg total) by mouth 2 (two) times daily.    OLANZapine (ZYPREXA) 10 MG tablet Take 1 tablet (10 mg total) by mouth every evening.    OXcarbazepine (TRILEPTAL) 300 MG Tab 1 tablet (300 mg total) by Per OG tube route 2 (two) times daily.    oxyCODONE (ROXICODONE) 5 MG immediate release tablet Take 1 tablet (5 mg total) by mouth every 8 (eight) hours as needed for Pain (scale 6-10).    polyethylene glycol (GLYCOLAX) 17 gram PwPk Take 17 g by mouth once daily.    traZODone (DESYREL) 100 MG tablet Take 1 tablet (100 mg total) by mouth every evening.    [DISCONTINUED] aspirin 81 MG Chew Chew and swallow 1 tablet (81 mg total) by mouth once daily.    [DISCONTINUED] clopidogreL (PLAVIX) 75 mg tablet Take 1 tablet (75 mg total) by mouth once daily.    [DISCONTINUED] metoprolol succinate (TOPROL-XL) 25 MG 24 hr tablet Take 25 mg by mouth once daily.    [DISCONTINUED] pravastatin (PRAVACHOL) 40 MG tablet Take 1 tablet (40 mg total) by mouth every evening.     Family History       Problem Relation (Age of Onset)    Cancer Mother    Liver disease Father          Tobacco Use    Smoking status: Current Every Day Smoker     Types: Cigarettes    Smokeless tobacco: Never Used   Substance and Sexual Activity    Alcohol use: Never    Drug use: Not Currently     Types: Cocaine    Sexual activity: Not on file     Review of Systems   Unable to perform ROS: Psychiatric disorder   Objective:     Vital Signs (Most Recent):    Vital Signs (24h Range):  Pulse:  [80] 80  SpO2:  [100 %] 100 %  BP: (137)/(93) 137/93        There is no height or weight on file to calculate BMI.    Physical Exam  Constitutional:       General: He is not in acute distress.     Appearance: Normal appearance. He is not ill-appearing.   HENT:      Head: Normocephalic and  atraumatic.   Cardiovascular:      Rate and Rhythm: Normal rate and regular rhythm.   Pulmonary:      Effort: Pulmonary effort is normal.      Breath sounds: Normal breath sounds.   Abdominal:      General: Abdomen is flat. Bowel sounds are normal. There is no distension.      Palpations: Abdomen is soft.      Tenderness: There is no abdominal tenderness. There is no guarding.   Musculoskeletal:         General: Normal range of motion.      Right lower leg: No edema.      Left lower leg: No edema.   Skin:     General: Skin is warm and dry.      Comments: Sternotomy scar, stitch underneath left breast   Neurological:      General: No focal deficit present.      Mental Status: He is alert. He is disoriented.           Significant Labs: All pertinent labs within the past 24 hours have been reviewed.    Significant Imaging: I have reviewed all pertinent imaging results/findings within the past 24 hours.

## 2022-07-18 NOTE — ASSESSMENT & PLAN NOTE
Patient is identified as having Combined Systolic and Diastolic heart failure that is Chronic. CHF is currently controlled. Latest ECHO performed and demonstrates- Results for orders placed during the hospital encounter of 04/01/22    Echo Saline Bubble? No    Interpretation Summary  · The left ventricle is normal in size with concentric hypertrophy and severely decreased systolic function.  · The estimated ejection fraction is 25-30%.  · There is severe left ventricular global hypokinesis.  · Grade II left ventricular diastolic dysfunction.  · Normal right ventricular size with mildly reduced right ventricular systolic function.  · The estimated PA systolic pressure is 52 mmHg.  · There is moderate pulmonary hypertension.  · Elevated central venous pressure (15 mmHg).  . Continue Beta Blocker and ACE/ARB and monitor clinical status closely. Monitor on telemetry. Patient is off CHF pathway.  Monitor strict Is&Os and daily weights.  Place on fluid restriction of 2 L. Continue to stress to patient importance of self efficacy and  on diet for CHF. Last BNP reviewed- and noted below No results for input(s): BNP, BNPTRIAGEBLO in the last 168 hours..

## 2022-07-18 NOTE — CARE UPDATE
The patient was seen and examined at bedside. No acute events overnight. The patient remains confused. He has a history of schizophrenia. Awaiting tele-psych consult. Troponins are elevated. Continue to trend troponin. Cardiology consult pending.

## 2022-07-18 NOTE — PLAN OF CARE
Bedside shift report completed with oncoming staff about plan of care:  Safety measures intact per patient needs and current fall score and accompanied assessment findings  Care plan reviewed with patient/ family, no new questions at this time regarding plan of care.   Will continue to monitor per hourly rounding and unit practice/ policy.

## 2022-07-18 NOTE — ASSESSMENT & PLAN NOTE
S/p recent CABG  Continue home meds- Eliquis, aspirin, statin    4.6.22 he underwent a CABG x 4 with results of LIMA to LAD, rSVG to OM1, rSVG to Diag 1 and rSVG to PDA and Ligation of SOPHIA    Reported chest pain, admit for observation  Trend troponin

## 2022-07-18 NOTE — H&P
[facility]  History & Physical    Patient Name: Kendall Duff  MRN: [id number]  Patient Class: IP- Inpatient  Admission Date: 7/18/2022  Attending Physician: Michelle Gil MD  Primary Care Provider: Primary Doctor No         Patient information was obtained from patient, past medical records and ER records.     Subjective:     Principal Problem:Coronary artery disease    Chief Complaint: No chief complaint on file.       HPI: 56-year-old male, PMH of CAD S/P PCI s/p CABG (04/2022 4V), Bipolar, hypertension, CVA, Hepatitis C, who was brought from the mental health unit. Pt has been under a PEC for 8 days for schizophrenia exacerbation and agitation. Pt has been having worsening confusion, dyspnea and weakness. At the behavioral unit, completed work up with troponin and CPK was done which was elevated. Patient has recently had 4V CABG, followed by wound healing issued at the sternotomy site, which reportedly has been healing well following skin grafting.     While in the ED today, ptient denies any chest pain, shortness of breath. Further work up demonstrated, HS troponin 105.2 (normal <60), BnP 2575, , CKMB 8, D-dimer 1.84. EKG demonstrated accelerated idoventicular rhythm, extreme right superior axis deviation, anterioseptal infarct, RBBB, consider also periinfarct block. He was given plavix in the ED (patient is on Eliquis for unclear reason). Referring facility requesting transfer for cardiology & interventional cardiology assistance.  Cardiology, Dr.. Brunson agreeable to consult with  admitting.       Past Medical History:   Diagnosis Date    Bipolar disorder, unspecified     Chronic hepatitis C     History of psychiatric hospitalization     Hx of psychiatric care     Hypertension     Bessie     Obesity, unspecified     Psychiatric problem     Schizoaffective disorder, bipolar type     Seizures     Stroke     Substance abuse     Therapy        Past  Surgical History:   Procedure Laterality Date    CORONARY STENT PLACEMENT  2015    DEBRIDEMENT  2022    LEFT HEART CATHETERIZATION Left 2015    REPEAT CLOSURE OF STERNAL INCISION N/A 2022    Procedure: CLOSURE, STERNAL INCISION, REPEAT;  Surgeon: Adolfo Weiss MD;  Location: [facility];  Service: Plastics;  Laterality: N/A;  STERNAL WOUND DEBRIDEMENT AND RECONSTRUCTION // MULTIPLE MUSCLE FLAPS // REQ 1400       Review of patient's allergies indicates:   Allergen Reactions    Depakote [divalproex]     Divalproex sodium Other (See Comments)    Lithium     Lithium analogues     Quetiapine Other (See Comments)       No current facility-administered medications on file prior to encounter.     Current Outpatient Medications on File Prior to Encounter   Medication Sig    amLODIPine (NORVASC) 5 MG tablet Take 1 tablet (5 mg total) by mouth once daily.    [] apixaban (ELIQUIS) 5 mg Tab Take 1 tablet (5 mg total) by mouth 2 (two) times daily.    atorvastatin (LIPITOR) 80 MG tablet Take 1 tablet (80 mg total) by mouth once daily.    benztropine (COGENTIN) 1 MG tablet Take 1 tablet (1 mg total) by mouth 2 (two) times daily as needed (spasms).    doxepin (SINEQUAN) 50 MG capsule Take 1 capsule (50 mg total) by mouth every evening.    ezetimibe (ZETIA) 10 mg tablet Take 1 tablet (10 mg total) by mouth every evening.    famotidine (PEPCID) 20 MG tablet Take 1 tablet (20 mg total) by mouth 2 (two) times daily.    isosorbide mononitrate (IMDUR) 30 MG 24 hr tablet Take 1 tablet (30 mg total) by mouth once daily.    levETIRAcetam (KEPPRA) 500 MG Tab Take 1 tablet (500 mg total) by mouth 2 (two) times daily.    lisinopriL (PRINIVIL,ZESTRIL) 20 MG tablet Take 20 mg by mouth once daily.    metoprolol tartrate (LOPRESSOR) 50 MG tablet Take 1 tablet (50 mg total) by mouth 2 (two) times daily.    OLANZapine (ZYPREXA) 10 MG tablet Take 1 tablet (10 mg total) by mouth every evening.     OXcarbazepine (TRILEPTAL) 300 MG Tab 1 tablet (300 mg total) by Per OG tube route 2 (two) times daily.    oxyCODONE (ROXICODONE) 5 MG immediate release tablet Take 1 tablet (5 mg total) by mouth every 8 (eight) hours as needed for Pain (scale 6-10).    polyethylene glycol (GLYCOLAX) 17 gram PwPk Take 17 g by mouth once daily.    traZODone (DESYREL) 100 MG tablet Take 1 tablet (100 mg total) by mouth every evening.    [DISCONTINUED] aspirin 81 MG Chew Chew and swallow 1 tablet (81 mg total) by mouth once daily.    [DISCONTINUED] clopidogreL (PLAVIX) 75 mg tablet Take 1 tablet (75 mg total) by mouth once daily.    [DISCONTINUED] metoprolol succinate (TOPROL-XL) 25 MG 24 hr tablet Take 25 mg by mouth once daily.    [DISCONTINUED] pravastatin (PRAVACHOL) 40 MG tablet Take 1 tablet (40 mg total) by mouth every evening.     Family History       Problem Relation (Age of Onset)    Cancer Mother    Liver disease Father          Tobacco Use    Smoking status: Current Every Day Smoker     Types: Cigarettes    Smokeless tobacco: Never Used   Substance and Sexual Activity    Alcohol use: Never    Drug use: Not Currently     Types: Cocaine    Sexual activity: Not on file     Review of Systems   Unable to perform ROS: Psychiatric disorder   Objective:     Vital Signs (Most Recent):    Vital Signs (24h Range):  Pulse:  [80] 80  SpO2:  [100 %] 100 %  BP: (137)/(93) 137/93        There is no height or weight on file to calculate BMI.    Physical Exam  Constitutional:       General: He is not in acute distress.     Appearance: Normal appearance. He is not ill-appearing.   HENT:      Head: Normocephalic and atraumatic.   Cardiovascular:      Rate and Rhythm: Normal rate and regular rhythm.   Pulmonary:      Effort: Pulmonary effort is normal.      Breath sounds: Normal breath sounds.   Abdominal:      General: Abdomen is flat. Bowel sounds are normal. There is no distension.      Palpations: Abdomen is soft.       Tenderness: There is no abdominal tenderness. There is no guarding.   Musculoskeletal:         General: Normal range of motion.      Right lower leg: No edema.      Left lower leg: No edema.   Skin:     General: Skin is warm and dry.      Comments: Sternotomy scar, stitch underneath left breast   Neurological:      General: No focal deficit present.      Mental Status: He is alert. He is disoriented.           Significant Labs: All pertinent labs within the past 24 hours have been reviewed.    Significant Imaging: I have reviewed all pertinent imaging results/findings within the past 24 hours.    Assessment/Plan:     * Coronary artery disease  S/p recent CABG  Continue home meds- Eliquis, aspirin, statin    4.6.22 he underwent a CABG x 4 with results of LIMA to LAD, rSVG to OM1, rSVG to Diag 1 and rSVG to PDA and Ligation of SOPHIA    Reported chest pain, admit for observation  Trend troponin      Chronic systolic congestive heart failure  Patient is identified as having Combined Systolic and Diastolic heart failure that is Chronic. CHF is currently controlled. Latest ECHO performed and demonstrates- Results for orders placed during the hospital encounter of 04/01/22    Echo Saline Bubble? No    Interpretation Summary  · The left ventricle is normal in size with concentric hypertrophy and severely decreased systolic function.  · The estimated ejection fraction is 25-30%.  · There is severe left ventricular global hypokinesis.  · Grade II left ventricular diastolic dysfunction.  · Normal right ventricular size with mildly reduced right ventricular systolic function.  · The estimated PA systolic pressure is 52 mmHg.  · There is moderate pulmonary hypertension.  · Elevated central venous pressure (15 mmHg).  . Continue Beta Blocker and ACE/ARB and monitor clinical status closely. Monitor on telemetry. Patient is off CHF pathway.  Monitor strict Is&Os and daily weights.  Place on fluid restriction of 2 L. Continue to  stress to patient importance of self efficacy and  on diet for CHF. Last BNP reviewed- and noted below No results for input(s): BNP, BNPTRIAGEBLO in the last 168 hours..      Acute DVT (deep venous thrombosis)  On Eliquis      Elevated troponin  Recent CABGx4  Trend troponin      Microcytic anemia  Hb stable      Schizoaffective disorder, bipolar type  Restart home meds as tolerated  Consult tele-psych    Bipolar 1 disorder        VTE Risk Mitigation (From admission, onward)         Ordered     apixaban tablet 5 mg  2 times daily         07/18/22 0452     IP VTE HIGH RISK PATIENT  Once         07/18/22 0337     Place sequential compression device  Until discontinued         07/18/22 0337                   Michelle Gil MD  Department of Hospital Medicine   [facility] - Telemetry ([facility])

## 2022-07-18 NOTE — ASSESSMENT & PLAN NOTE
S/P CABG PRESENTS WITH CHF AND ELEVATED TROPONIN. NO NEW EKG CHANGES HAS A CARDIOMYOPATHY. WILL CONTINUE CURRENT MEDICAL 5THERAPY MAKE SURE HE IS EUVOLEMIC MAY CONSIDER RELOOK ANGIO PENDING CLINICAL SITUATION.

## 2022-07-19 PROBLEM — R41.82 ALTERED MENTAL STATUS: Status: ACTIVE | Noted: 2022-07-19

## 2022-07-19 LAB
ALBUMIN SERPL BCP-MCNC: 3.4 G/DL (ref 3.5–5.2)
ALP SERPL-CCNC: 76 U/L (ref 55–135)
ALT SERPL W/O P-5'-P-CCNC: 17 U/L (ref 10–44)
ANION GAP SERPL CALC-SCNC: 8 MMOL/L (ref 8–16)
AST SERPL-CCNC: 21 U/L (ref 10–40)
BASOPHILS # BLD AUTO: 0.06 K/UL (ref 0–0.2)
BASOPHILS NFR BLD: 0.7 % (ref 0–1.9)
BILIRUB SERPL-MCNC: 0.5 MG/DL (ref 0.1–1)
BUN SERPL-MCNC: 20 MG/DL (ref 6–20)
CALCIUM SERPL-MCNC: 9.3 MG/DL (ref 8.7–10.5)
CHLORIDE SERPL-SCNC: 107 MMOL/L (ref 95–110)
CO2 SERPL-SCNC: 21 MMOL/L (ref 23–29)
CREAT SERPL-MCNC: 0.7 MG/DL (ref 0.5–1.4)
DIFFERENTIAL METHOD: ABNORMAL
EOSINOPHIL # BLD AUTO: 0.2 K/UL (ref 0–0.5)
EOSINOPHIL NFR BLD: 2.5 % (ref 0–8)
ERYTHROCYTE [DISTWIDTH] IN BLOOD BY AUTOMATED COUNT: 18.3 % (ref 11.5–14.5)
EST. GFR  (AFRICAN AMERICAN): >60 ML/MIN/1.73 M^2
EST. GFR  (NON AFRICAN AMERICAN): >60 ML/MIN/1.73 M^2
GLUCOSE SERPL-MCNC: 110 MG/DL (ref 70–110)
HCT VFR BLD AUTO: 38.5 % (ref 40–54)
HGB BLD-MCNC: 12.1 G/DL (ref 14–18)
IMM GRANULOCYTES # BLD AUTO: 0.02 K/UL (ref 0–0.04)
IMM GRANULOCYTES NFR BLD AUTO: 0.2 % (ref 0–0.5)
LYMPHOCYTES # BLD AUTO: 2.1 K/UL (ref 1–4.8)
LYMPHOCYTES NFR BLD: 25.6 % (ref 18–48)
MCH RBC QN AUTO: 23.9 PG (ref 27–31)
MCHC RBC AUTO-ENTMCNC: 31.4 G/DL (ref 32–36)
MCV RBC AUTO: 76 FL (ref 82–98)
MONOCYTES # BLD AUTO: 0.8 K/UL (ref 0.3–1)
MONOCYTES NFR BLD: 9.2 % (ref 4–15)
NEUTROPHILS # BLD AUTO: 5 K/UL (ref 1.8–7.7)
NEUTROPHILS NFR BLD: 61.8 % (ref 38–73)
NRBC BLD-RTO: 0 /100 WBC
PLATELET # BLD AUTO: 378 K/UL (ref 150–450)
PMV BLD AUTO: 9.1 FL (ref 9.2–12.9)
POTASSIUM SERPL-SCNC: 4.1 MMOL/L (ref 3.5–5.1)
PROCALCITONIN SERPL IA-MCNC: <0.02 NG/ML
PROT SERPL-MCNC: 7.9 G/DL (ref 6–8.4)
RBC # BLD AUTO: 5.06 M/UL (ref 4.6–6.2)
SODIUM SERPL-SCNC: 136 MMOL/L (ref 136–145)
TROPONIN I SERPL DL<=0.01 NG/ML-MCNC: 0.07 NG/ML (ref 0–0.03)
TROPONIN I SERPL DL<=0.01 NG/ML-MCNC: 0.09 NG/ML (ref 0–0.03)
WBC # BLD AUTO: 8.16 K/UL (ref 3.9–12.7)

## 2022-07-19 PROCEDURE — 95816 PR EEG,W/AWAKE & DROWSY RECORD: ICD-10-PCS | Mod: 26,,, | Performed by: PSYCHIATRY & NEUROLOGY

## 2022-07-19 PROCEDURE — 25000003 PHARM REV CODE 250: Performed by: STUDENT IN AN ORGANIZED HEALTH CARE EDUCATION/TRAINING PROGRAM

## 2022-07-19 PROCEDURE — 84145 PROCALCITONIN (PCT): CPT | Performed by: NURSE PRACTITIONER

## 2022-07-19 PROCEDURE — 99232 PR SUBSEQUENT HOSPITAL CARE,LEVL II: ICD-10-PCS | Mod: ,,, | Performed by: INTERNAL MEDICINE

## 2022-07-19 PROCEDURE — 36415 COLL VENOUS BLD VENIPUNCTURE: CPT | Performed by: NURSE PRACTITIONER

## 2022-07-19 PROCEDURE — 21400001 HC TELEMETRY ROOM

## 2022-07-19 PROCEDURE — 85025 COMPLETE CBC W/AUTO DIFF WBC: CPT | Performed by: NURSE PRACTITIONER

## 2022-07-19 PROCEDURE — 80053 COMPREHEN METABOLIC PANEL: CPT | Performed by: NURSE PRACTITIONER

## 2022-07-19 PROCEDURE — 99232 SBSQ HOSP IP/OBS MODERATE 35: CPT | Mod: ,,, | Performed by: INTERNAL MEDICINE

## 2022-07-19 PROCEDURE — 95816 EEG AWAKE AND DROWSY: CPT

## 2022-07-19 PROCEDURE — S0166 INJ OLANZAPINE 2.5MG: HCPCS | Performed by: NURSE PRACTITIONER

## 2022-07-19 PROCEDURE — 84484 ASSAY OF TROPONIN QUANT: CPT | Performed by: NURSE PRACTITIONER

## 2022-07-19 PROCEDURE — 95816 EEG AWAKE AND DROWSY: CPT | Mod: 26,,, | Performed by: PSYCHIATRY & NEUROLOGY

## 2022-07-19 PROCEDURE — 25000003 PHARM REV CODE 250: Performed by: NURSE PRACTITIONER

## 2022-07-19 RX ORDER — POLYETHYLENE GLYCOL 3350 17 G/17G
17 POWDER, FOR SOLUTION ORAL DAILY
Status: DISCONTINUED | OUTPATIENT
Start: 2022-07-19 | End: 2022-07-26 | Stop reason: HOSPADM

## 2022-07-19 RX ORDER — LACTULOSE 10 G/15ML
20 SOLUTION ORAL EVERY 6 HOURS PRN
Status: DISCONTINUED | OUTPATIENT
Start: 2022-07-19 | End: 2022-07-26 | Stop reason: HOSPADM

## 2022-07-19 RX ADMIN — LEVETIRACETAM 500 MG: 500 TABLET, FILM COATED ORAL at 09:07

## 2022-07-19 RX ADMIN — METOPROLOL TARTRATE 50 MG: 50 TABLET, FILM COATED ORAL at 09:07

## 2022-07-19 RX ADMIN — ATORVASTATIN CALCIUM 80 MG: 40 TABLET, FILM COATED ORAL at 09:07

## 2022-07-19 RX ADMIN — METOPROLOL TARTRATE 50 MG: 50 TABLET, FILM COATED ORAL at 11:07

## 2022-07-19 RX ADMIN — APIXABAN 5 MG: 2.5 TABLET, FILM COATED ORAL at 09:07

## 2022-07-19 RX ADMIN — OLANZAPINE 5 MG: 10 INJECTION, POWDER, LYOPHILIZED, FOR SOLUTION INTRAMUSCULAR at 10:07

## 2022-07-19 RX ADMIN — POLYETHYLENE GLYCOL 3350 17 G: 17 POWDER, FOR SOLUTION ORAL at 11:07

## 2022-07-19 RX ADMIN — ARIPIPRAZOLE 10 MG: 5 TABLET ORAL at 09:07

## 2022-07-19 RX ADMIN — ISOSORBIDE MONONITRATE 30 MG: 30 TABLET, EXTENDED RELEASE ORAL at 09:07

## 2022-07-19 RX ADMIN — OLANZAPINE 5 MG: 10 INJECTION, POWDER, LYOPHILIZED, FOR SOLUTION INTRAMUSCULAR at 01:07

## 2022-07-19 RX ADMIN — ASPIRIN 81 MG CHEWABLE TABLET 81 MG: 81 TABLET CHEWABLE at 09:07

## 2022-07-19 NOTE — HOSPITAL COURSE
"7/19/22 No acute events overnight. No change in mental status. The patient remains altered. The patient is alert and will follow with his gaze. He does not follow commands and has been non-verbal since admission, which is not his baseline. Psychiatry recs noted and ordered. CT head was negative for acute findings. Neurology was consulted and recommended an MRI brain W WO and EEG. The patient has been relatively calm since admission will rescind the CEC. 7/20/22 Yesterday afternoon the patient began communicating with staff. He was oriented x 3. Neurology consult was cancelled d/t patient returning to baseline mental status prior to admission. MRI brain was negative. Discussed the POC with the patients family. They wish for the patient to return a mental health facility "to get his meds straight". Case management is attempting to find an accepting facility. Per Cardiology will treat with medical management. 7/21/22 No acute events overnight. MRI brain was negative. The patient is awaiting inpatient psych placement. He is medically clear for placement.     7/22: BNP: 1069, Trop: 0.076, initiated on Lasix. Vitals stable, continues to be confused at times, redirectable, able to speak. Has delusions. Non-violent. Pending placement. Vitals stable.     7/23: Multiple referrals sent out seeking placement, referral today sent to [facility] for psych placement. Repeat labs in AM, patient stable.   7/24 - Patient remains calm and appropriate. Placement pending facility acceptance. No events. Sitter at bedside.  7/25 - Patient stable, calm, appropriate. Watching TV. Await placement. Sitter at bedside. No events  7/26 - Patient remains calm and appropriate. Await placement  Patient accepted for Psych Placement - [facility]. Patient stable for a safe discharge to address his underlying psychiatric illness.  "

## 2022-07-19 NOTE — PLAN OF CARE
[facility] - Telemetry (Hospital)  Initial Discharge Assessment       Primary Care Provider: Primary Doctor No    Admission Diagnosis: NSTEMI (non-ST elevated myocardial infarction) [I21.4]    Admission Date: 7/18/2022  Expected Discharge Date:     Discharge Barriers Identified: Mental illness    Payor: MEDICAID / Plan: [place] [employer] / Product Type: Managed Medicaid /     Extended Emergency Contact Information  Primary Emergency Contact: Arabella Bradley  Mobile Phone: [phone]  Relation: Daughter    Discharge Plan A: [id number] - [place] - [address]  [address]  [place] [address]  Phone: [phone] Fax: [phone]    [facility] - [place], [place] - [address]  [address]  [facility] [address]  Phone: [phone] Fax: [phone]      Initial Assessment (most recent)     Adult Discharge Assessment - 07/19/22 1015        Discharge Assessment    Assessment Type Discharge Planning Assessment     Confirmed/corrected address, phone number and insurance Yes     Confirmed Demographics Correct on Facesheet     Source of Information family     Communicated TRACIE with patient/caregiver Date not available/Unable to determine     Reason For Admission Elevated Troponin     Lives With sibling(s)     Facility [facility]: [facility] [place] [facility]     Do you expect to return to your current living situation? Yes     Do you have help at home or someone to help you manage your care at home? Yes     Who are your caregiver(s) and their phone number(s)? Arabella Bradley,      Current cognitive status: Unable to Assess     Walking or Climbing Stairs Difficulty none     Dressing/Bathing Difficulty none     Equipment Currently Used at Home none     Readmission within 30 days? No     Patient currently being followed by  outpatient case management? No     Do you currently have service(s) that help you manage your care at home? No     Do you take prescription medications? Yes     Do you have prescription coverage? Yes     Coverage Medicaid     Do you have any problems affording any of your prescribed medications? No     Is the patient taking medications as prescribed? yes     Who is going to help you get home at discharge? Will need transportation - uses medicaid     How do you get to doctors appointments? health plan transportation     Are you on dialysis? No     Do you take coumadin? No     Discharge Plan A Psychiatric hospital     DME Needed Upon Discharge  none     Discharge Plan discussed with: Sibling     Name(s) and Number(s) sister Noonan     Discharge Barriers Identified Mental illness        Relationship/Environment    Name(s) of Who Lives With Patient Arabella Bradley, sister               Met with patient.  He is nonverbal.  Call daughterArabella on the phone.  Patient lives with her but had been transferred to [facility] due to his aggressive behavior.  Sister is currently pregnant.  She can be his help at home but is currently working.  Patient uses medicaid transportation for doctor's appointments.  He is usually independent with ADL's and uses no equipment.  He was being seen by [facility] before admit to [facility].  Patient has no primary care physician.  Discussed with sister importance of patient getting established with a PCP.    Current discharge plan is to transfer to an inpatient psychiatric facility as recommended by psychiatry.       Updated white board with 's name and number. Transitional Care Folder, Discharge Planning Begins on Admission pamphlet, [facility], Advance Directive information given to patient along with the contact information given.Instructed patient or family to call with any questions or  concerns.

## 2022-07-19 NOTE — PROGRESS NOTES
[facility] Medicine  Progress Note    Patient Name: Kendall Duff  MRN: [id number]  Patient Class: IP- Inpatient   Admission Date: 7/18/2022  Length of Stay: 1 days  Attending Physician: Bobby Israel MD  Primary Care Provider: Primary Doctor No        Subjective:     Principal Problem:Coronary artery disease        HPI:  56-year-old male, PMH of CAD S/P PCI s/p CABG (04/2022 4V), Bipolar, hypertension, CVA, Hepatitis C, who was brought from the mental health unit. Pt has been under a PEC for 8 days for schizophrenia exacerbation and agitation. Pt has been having worsening confusion, dyspnea and weakness. At the behavioral unit, completed work up with troponin and CPK was done which was elevated. Patient has recently had 4V CABG, followed by wound healing issued at the sternotomy site, which reportedly has been healing well following skin grafting.     While in the ED today, ptient denies any chest pain, shortness of breath. Further work up demonstrated, HS troponin 105.2 (normal <60), BnP 2575, , CKMB 8, D-dimer 1.84. EKG demonstrated accelerated idoventicular rhythm, extreme right superior axis deviation, anterioseptal infarct, RBBB, consider also periinfarct block. He was given plavix in the ED (patient is on Eliquis for unclear reason). Referring facility requesting transfer for cardiology & interventional cardiology assistance.  Cardiology, Dr.. Brunson agreeable to consult with  admitting.       Overview/Hospital Course:  7/19/22 No acute events overnight. No change in mental status. The patient remains altered. The patient is alert and will follow with his gaze. He does not follow commands and has been non-verbal since admission, which is not his baseline. Psychiatry recs noted and ordered. CT head was negative for acute findings. Neurology was consulted and recommended an MRI brain W WO and EEG. The patient has been relatively calm since admission will rescind the CEC.        Interval History: No acute events overnight. No change in mental status. The patient remains altered. The patient is alert and will follow with his gaze. He does not follow commands and has been non-verbal since admission, which is not his baseline. Psychiatry recs noted and ordered. CT head was negative for acute findings. Neurology was consulted and recommended an MRI brain W WO and EEG. The patient has been relatively calm since admission will rescind the CEC.     Review of Systems   Unable to perform ROS: Psychiatric disorder   Objective:     Vital Signs (Most Recent):  Temp: 97.9 °F (36.6 °C) (07/19/22 1121)  Pulse: 77 (07/19/22 1153)  Resp: 19 (07/19/22 1121)  BP: (!) 123/57 (07/19/22 1121)  SpO2: 97 % (07/19/22 1121)   Vital Signs (24h Range):  Temp:  [97.9 °F (36.6 °C)-98.8 °F (37.1 °C)] 97.9 °F (36.6 °C)  Pulse:  [44-88] 77  Resp:  [18-20] 19  SpO2:  [91 %-98 %] 97 %  BP: (108-156)/() 123/57     Weight: 63.8 kg (140 lb 10.5 oz)  Body mass index is 20.18 kg/m².    Intake/Output Summary (Last 24 hours) at 7/19/2022 1529  Last data filed at 7/19/2022 0800  Gross per 24 hour   Intake 120 ml   Output --   Net 120 ml      Physical Exam  Constitutional:       General: He is not in acute distress.     Appearance: Normal appearance. He is not ill-appearing.   HENT:      Head: Normocephalic and atraumatic.   Cardiovascular:      Rate and Rhythm: Normal rate and regular rhythm.   Pulmonary:      Effort: Pulmonary effort is normal.      Breath sounds: Normal breath sounds.   Abdominal:      General: Abdomen is flat. Bowel sounds are normal. There is no distension.      Palpations: Abdomen is soft.      Tenderness: There is no abdominal tenderness. There is no guarding.   Musculoskeletal:         General: Normal range of motion.      Right lower leg: No edema.      Left lower leg: No edema.   Skin:     General: Skin is warm and dry.      Comments: Sternotomy scar, stitch underneath left breast    Neurological:      General: No focal deficit present.      Mental Status: He is alert. He is disoriented.      Comments: Non-verbal       Significant Labs: All pertinent labs within the past 24 hours have been reviewed.    Significant Imaging:           Assessment/Plan:      * Coronary artery disease  S/p recent CABG  Continue home meds- Eliquis, aspirin, statin    4.6.22 he underwent a CABG x 4 with results of LIMA to LAD, rSVG to OM1, rSVG to Diag 1 and rSVG to PDA and Ligation of SOPHIA    Reported chest pain, admit for observation  Trend troponin      Altered mental status  7/19/22 No change in mental status. The patient remains altered. The patient is alert and will follow with his gaze. He does not follow commands and has been non-verbal since admission, which is not his baseline. Psychiatry recs noted and ordered. CT head was negative for acute findings. Neurology was consulted and recommended an MRI brain W WO and EEG. The patient has been relatively calm since admission will rescind the CEC.       Chronic systolic congestive heart failure  Patient is identified as having Combined Systolic and Diastolic heart failure that is Chronic. CHF is currently controlled. Latest ECHO performed and demonstrates- Results for orders placed during the hospital encounter of 04/01/22    Echo Saline Bubble? No    Interpretation Summary  · The left ventricle is normal in size with concentric hypertrophy and severely decreased systolic function.  · The estimated ejection fraction is 25-30%.  · There is severe left ventricular global hypokinesis.  · Grade II left ventricular diastolic dysfunction.  · Normal right ventricular size with mildly reduced right ventricular systolic function.  · The estimated PA systolic pressure is 52 mmHg.  · There is moderate pulmonary hypertension.  · Elevated central venous pressure (15 mmHg).  . Continue Beta Blocker and ACE/ARB and monitor clinical status closely. Monitor on telemetry. Patient  is off CHF pathway.  Monitor strict Is&Os and daily weights.  Place on fluid restriction of 2 L. Continue to stress to patient importance of self efficacy and  on diet for CHF. Last BNP reviewed- and noted below   Recent Labs   Lab 07/18/22  0403   *   .      Acute DVT (deep venous thrombosis)  On Eliquis      Elevated troponin  Recent CABGx4  Trend troponin      Microcytic anemia  Hb stable      Schizoaffective disorder, bipolar type  Restart home meds as tolerated  Consult tele-psych    Bilateral carotid artery stenosis        NSTEMI (non-ST elevated myocardial infarction)        Bipolar 1 disorder  Tele-psych consulted         VTE Risk Mitigation (From admission, onward)         Ordered     apixaban tablet 5 mg  2 times daily         07/18/22 0452     IP VTE HIGH RISK PATIENT  Once         07/18/22 0337     Place sequential compression device  Until discontinued         07/18/22 0337                Discharge Planning   TRACIE:      Code Status: Full Code   Is the patient medically ready for discharge?:     Reason for patient still in hospital (select all that apply): Patient trending condition, Laboratory test, Imaging, Consult recommendations and Pending disposition  Discharge Plan A: Psychiatric [facility]                  Jarrod Tomlinson NP  Department of Hospital Medicine   [facility] - Telemetry ([facility])

## 2022-07-19 NOTE — ASSESSMENT & PLAN NOTE
7/19/22 No change in mental status. The patient remains altered. The patient is alert and will follow with his gaze. He does not follow commands and has been non-verbal since admission, which is not his baseline. Psychiatry recs noted and ordered. CT head was negative for acute findings. Neurology was consulted and recommended an MRI brain W WO and EEG. The patient has been relatively calm since admission will rescind the CEC.

## 2022-07-19 NOTE — PLAN OF CARE
Problem: Adult Inpatient Plan of Care  Goal: Plan of Care Review  Outcome: Ongoing, Progressing  Goal: Patient-Specific Goal (Individualized)  Outcome: Ongoing, Progressing  Goal: Absence of Hospital-Acquired Illness or Injury  Outcome: Ongoing, Progressing  Goal: Optimal Comfort and Wellbeing  Outcome: Ongoing, Progressing  Goal: Readiness for Transition of Care  Outcome: Ongoing, Progressing     Problem: Adjustment to Illness (Delirium)  Goal: Optimal Coping  Outcome: Ongoing, Progressing     Problem: Altered Behavior (Delirium)  Goal: Improved Behavioral Control  Outcome: Ongoing, Progressing     Problem: Attention and Thought Clarity Impairment (Delirium)  Goal: Improved Attention and Thought Clarity  Outcome: Ongoing, Progressing     Problem: Sleep Disturbance (Delirium)  Goal: Improved Sleep  Outcome: Ongoing, Progressing     Problem: Behavior Management  Goal: Effective Behavior Management  Outcome: Ongoing, Progressing     Problem: Fall Injury Risk  Goal: Absence of Fall and Fall-Related Injury  Outcome: Ongoing, Progressing     Problem: Skin Injury Risk Increased  Goal: Skin Health and Integrity  Outcome: Ongoing, Progressing

## 2022-07-19 NOTE — SUBJECTIVE & OBJECTIVE
Interval History: No acute events overnight. No change in mental status. The patient remains altered. The patient is alert and will follow with his gaze. He does not follow commands and has been non-verbal since admission, which is not his baseline. Psychiatry recs noted and ordered. CT head was negative for acute findings. Neurology was consulted and recommended an MRI brain W WO and EEG. The patient has been relatively calm since admission will rescind the CEC.     Review of Systems   Unable to perform ROS: Psychiatric disorder   Objective:     Vital Signs (Most Recent):  Temp: 97.9 °F (36.6 °C) (07/19/22 1121)  Pulse: 77 (07/19/22 1153)  Resp: 19 (07/19/22 1121)  BP: (!) 123/57 (07/19/22 1121)  SpO2: 97 % (07/19/22 1121)   Vital Signs (24h Range):  Temp:  [97.9 °F (36.6 °C)-98.8 °F (37.1 °C)] 97.9 °F (36.6 °C)  Pulse:  [44-88] 77  Resp:  [18-20] 19  SpO2:  [91 %-98 %] 97 %  BP: (108-156)/() 123/57     Weight: 63.8 kg (140 lb 10.5 oz)  Body mass index is 20.18 kg/m².    Intake/Output Summary (Last 24 hours) at 7/19/2022 1529  Last data filed at 7/19/2022 0800  Gross per 24 hour   Intake 120 ml   Output --   Net 120 ml      Physical Exam  Constitutional:       General: He is not in acute distress.     Appearance: Normal appearance. He is not ill-appearing.   HENT:      Head: Normocephalic and atraumatic.   Cardiovascular:      Rate and Rhythm: Normal rate and regular rhythm.   Pulmonary:      Effort: Pulmonary effort is normal.      Breath sounds: Normal breath sounds.   Abdominal:      General: Abdomen is flat. Bowel sounds are normal. There is no distension.      Palpations: Abdomen is soft.      Tenderness: There is no abdominal tenderness. There is no guarding.   Musculoskeletal:         General: Normal range of motion.      Right lower leg: No edema.      Left lower leg: No edema.   Skin:     General: Skin is warm and dry.      Comments: Sternotomy scar, stitch underneath left breast   Neurological:       General: No focal deficit present.      Mental Status: He is alert. He is disoriented.      Comments: Non-verbal       Significant Labs: All pertinent labs within the past 24 hours have been reviewed.    Significant Imaging:

## 2022-07-19 NOTE — PLAN OF CARE
Plan of Care: RN Shift Summary & Bedside Handover Report  07/19/2022 5:53 PM    Pt admitted on 7/18/2022 for Coronary artery disease under Bobby Israel MD service   Code Status Full Code    Past Medical/ Surgical Hx Past Medical History:   Diagnosis Date    Bipolar disorder, unspecified     Chronic hepatitis C     History of psychiatric hospitalization     Hx of psychiatric care     Hypertension     Bessie     Obesity, unspecified     Psychiatric problem     Schizoaffective disorder, bipolar type     Seizures     Stroke     Substance abuse     Therapy       Past Surgical History:   Procedure Laterality Date    CORONARY STENT PLACEMENT  08/14/2015    DEBRIDEMENT  04/17/2022    LEFT HEART CATHETERIZATION Left 08/13/2015    REPEAT CLOSURE OF STERNAL INCISION N/A 5/11/2022    Procedure: CLOSURE, STERNAL INCISION, REPEAT;  Surgeon: Adolfo Weiss MD;  Location: [facility] OR;  Service: Plastics;  Laterality: N/A;  STERNAL WOUND DEBRIDEMENT AND RECONSTRUCTION // MULTIPLE MUSCLE FLAPS // REQ 1400       HEENT HEENT WDL: WDL   Cognitive/ Neuro Cognitive/Neuro/Behavioral WDL: WDL except, all  Level of Consciousness (AVPU): alert  Orientation: other (see comments) (WAQAR, patient doesnt verbalize)         Resp Respiratory WDL: WDL except, breath sounds  All Lung Fields Breath Sounds: diminished        O2 Device (Oxygen Therapy): room air   Cardiac Cardiac WDL: WDL  Lead Monitored: Lead II  Rhythm: conduction defect, normal sinus rhythm  Frequency/Ectopy: PVCs  Cardiac/Telemetry Box Number: [id number]   Peripheral/ Neurovascular Peripheral Neurovascular WDL: WDL   VTE core VTE Required Core Measure: Pharmacological prophylaxis initiated/maintained   GI GI WDL: WDL except     Last Bowel Movement: 07/17/22   Diet Diet Cardiac    Genitourinary WDL: WDL except, voiding ability/characteristics  Voiding Characteristics: incontinence      Skin Skin WDL: WDL  Skin Integrity: incision   Morales Score Morales Score: 19   Musculoskeletal  Musculoskeletal  WDL: WDL except, mobility  General Mobility: generalized weakness   Safety Safety WDL: WDL  Safety Factors: bed in low position, wheels locked, call light in reach, upper side rails raised x 2, ID band on      Fall Risk Score Fall Risk Score: 16   LDA(s) Lines/Drains/Airways       Peripheral Intravenous Line  Duration                  Peripheral IV - Single Lumen 07/18/22 1106 18 G Left Antecubital 1 day                   Consults Consults (From admission, onward)          Status Ordering Provider     Inpatient consult to Cardiology  Once        Provider:  Lili Ng MD    Completed KAYLAN JOHNSON     Inpatient consult to Telemedicine - Psych  Once        Provider:  Honorio Gaffney DO    Completed GREGORIO CULP            Shift events  Went for imaging, enhanced safety measures intact    Plan of Care for RN report  Continue care as ordered, safety measure ordered and provided    Discharge Planning Discharge Plan A: Psychiatric hospital    Patient/ sitter updated on plan of care, all questions answered up to now regarding plan of care, will continue to monitor per hourly rounding & unit practice/ policy    Note: Other exception details noted in flowsheet if not present in summary

## 2022-07-20 LAB
ALBUMIN SERPL BCP-MCNC: 3.2 G/DL (ref 3.5–5.2)
ALP SERPL-CCNC: 80 U/L (ref 55–135)
ALT SERPL W/O P-5'-P-CCNC: 15 U/L (ref 10–44)
ANION GAP SERPL CALC-SCNC: 11 MMOL/L (ref 8–16)
AST SERPL-CCNC: 24 U/L (ref 10–40)
BASOPHILS # BLD AUTO: 0.05 K/UL (ref 0–0.2)
BASOPHILS NFR BLD: 0.7 % (ref 0–1.9)
BILIRUB SERPL-MCNC: 0.3 MG/DL (ref 0.1–1)
BUN SERPL-MCNC: 23 MG/DL (ref 6–20)
CALCIUM SERPL-MCNC: 9.4 MG/DL (ref 8.7–10.5)
CHLORIDE SERPL-SCNC: 109 MMOL/L (ref 95–110)
CO2 SERPL-SCNC: 20 MMOL/L (ref 23–29)
CREAT SERPL-MCNC: 0.7 MG/DL (ref 0.5–1.4)
DIFFERENTIAL METHOD: ABNORMAL
EOSINOPHIL # BLD AUTO: 0.3 K/UL (ref 0–0.5)
EOSINOPHIL NFR BLD: 3.9 % (ref 0–8)
ERYTHROCYTE [DISTWIDTH] IN BLOOD BY AUTOMATED COUNT: 18.5 % (ref 11.5–14.5)
EST. GFR  (AFRICAN AMERICAN): >60 ML/MIN/1.73 M^2
EST. GFR  (NON AFRICAN AMERICAN): >60 ML/MIN/1.73 M^2
FOLATE SERPL-MCNC: 16.7 NG/ML (ref 4–24)
GLUCOSE SERPL-MCNC: 104 MG/DL (ref 70–110)
HCT VFR BLD AUTO: 36 % (ref 40–54)
HGB BLD-MCNC: 11.3 G/DL (ref 14–18)
IMM GRANULOCYTES # BLD AUTO: 0.02 K/UL (ref 0–0.04)
IMM GRANULOCYTES NFR BLD AUTO: 0.3 % (ref 0–0.5)
LYMPHOCYTES # BLD AUTO: 2.1 K/UL (ref 1–4.8)
LYMPHOCYTES NFR BLD: 30.2 % (ref 18–48)
MCH RBC QN AUTO: 24.2 PG (ref 27–31)
MCHC RBC AUTO-ENTMCNC: 31.4 G/DL (ref 32–36)
MCV RBC AUTO: 77 FL (ref 82–98)
MONOCYTES # BLD AUTO: 0.7 K/UL (ref 0.3–1)
MONOCYTES NFR BLD: 9.7 % (ref 4–15)
NEUTROPHILS # BLD AUTO: 3.8 K/UL (ref 1.8–7.7)
NEUTROPHILS NFR BLD: 55.2 % (ref 38–73)
NRBC BLD-RTO: 0 /100 WBC
PLATELET # BLD AUTO: 325 K/UL (ref 150–450)
PMV BLD AUTO: 9.3 FL (ref 9.2–12.9)
POTASSIUM SERPL-SCNC: 4.1 MMOL/L (ref 3.5–5.1)
PROT SERPL-MCNC: 7.4 G/DL (ref 6–8.4)
RBC # BLD AUTO: 4.66 M/UL (ref 4.6–6.2)
SODIUM SERPL-SCNC: 140 MMOL/L (ref 136–145)
VIT B12 SERPL-MCNC: 858 PG/ML (ref 210–950)
WBC # BLD AUTO: 6.91 K/UL (ref 3.9–12.7)

## 2022-07-20 PROCEDURE — 25000003 PHARM REV CODE 250: Performed by: STUDENT IN AN ORGANIZED HEALTH CARE EDUCATION/TRAINING PROGRAM

## 2022-07-20 PROCEDURE — 99232 SBSQ HOSP IP/OBS MODERATE 35: CPT | Mod: ,,, | Performed by: INTERNAL MEDICINE

## 2022-07-20 PROCEDURE — 99232 PR SUBSEQUENT HOSPITAL CARE,LEVL II: ICD-10-PCS | Mod: ,,, | Performed by: INTERNAL MEDICINE

## 2022-07-20 PROCEDURE — 85025 COMPLETE CBC W/AUTO DIFF WBC: CPT | Performed by: NURSE PRACTITIONER

## 2022-07-20 PROCEDURE — 36415 COLL VENOUS BLD VENIPUNCTURE: CPT | Performed by: NURSE PRACTITIONER

## 2022-07-20 PROCEDURE — A9585 GADOBUTROL INJECTION: HCPCS | Performed by: FAMILY MEDICINE

## 2022-07-20 PROCEDURE — 25500020 PHARM REV CODE 255: Performed by: FAMILY MEDICINE

## 2022-07-20 PROCEDURE — S0166 INJ OLANZAPINE 2.5MG: HCPCS | Performed by: NURSE PRACTITIONER

## 2022-07-20 PROCEDURE — 25000003 PHARM REV CODE 250: Performed by: NURSE PRACTITIONER

## 2022-07-20 PROCEDURE — 80053 COMPREHEN METABOLIC PANEL: CPT | Performed by: NURSE PRACTITIONER

## 2022-07-20 PROCEDURE — 21400001 HC TELEMETRY ROOM

## 2022-07-20 PROCEDURE — 63600175 PHARM REV CODE 636 W HCPCS: Performed by: FAMILY MEDICINE

## 2022-07-20 RX ORDER — LORAZEPAM 2 MG/ML
2 INJECTION INTRAMUSCULAR ONCE
Status: COMPLETED | OUTPATIENT
Start: 2022-07-20 | End: 2022-07-20

## 2022-07-20 RX ORDER — MIDAZOLAM HYDROCHLORIDE 1 MG/ML
2 INJECTION INTRAMUSCULAR; INTRAVENOUS ONCE
Status: DISCONTINUED | OUTPATIENT
Start: 2022-07-20 | End: 2022-07-20

## 2022-07-20 RX ORDER — LOSARTAN POTASSIUM 25 MG/1
25 TABLET ORAL DAILY
Status: DISCONTINUED | OUTPATIENT
Start: 2022-07-21 | End: 2022-07-26 | Stop reason: HOSPADM

## 2022-07-20 RX ORDER — GADOBUTROL 604.72 MG/ML
10 INJECTION INTRAVENOUS
Status: COMPLETED | OUTPATIENT
Start: 2022-07-20 | End: 2022-07-20

## 2022-07-20 RX ADMIN — ARIPIPRAZOLE 10 MG: 5 TABLET ORAL at 08:07

## 2022-07-20 RX ADMIN — ASPIRIN 81 MG CHEWABLE TABLET 81 MG: 81 TABLET CHEWABLE at 08:07

## 2022-07-20 RX ADMIN — APIXABAN 5 MG: 2.5 TABLET, FILM COATED ORAL at 08:07

## 2022-07-20 RX ADMIN — ATORVASTATIN CALCIUM 80 MG: 40 TABLET, FILM COATED ORAL at 08:07

## 2022-07-20 RX ADMIN — LEVETIRACETAM 500 MG: 500 TABLET, FILM COATED ORAL at 08:07

## 2022-07-20 RX ADMIN — LORAZEPAM 2 MG: 2 INJECTION INTRAMUSCULAR; INTRAVENOUS at 09:07

## 2022-07-20 RX ADMIN — METOPROLOL TARTRATE 50 MG: 50 TABLET, FILM COATED ORAL at 08:07

## 2022-07-20 RX ADMIN — ISOSORBIDE MONONITRATE 30 MG: 30 TABLET, EXTENDED RELEASE ORAL at 08:07

## 2022-07-20 RX ADMIN — ACETAMINOPHEN 650 MG: 325 TABLET ORAL at 09:07

## 2022-07-20 RX ADMIN — OLANZAPINE 5 MG: 10 INJECTION, POWDER, LYOPHILIZED, FOR SOLUTION INTRAMUSCULAR at 02:07

## 2022-07-20 RX ADMIN — LEVETIRACETAM 500 MG: 500 TABLET, FILM COATED ORAL at 09:07

## 2022-07-20 RX ADMIN — GADOBUTROL 6 ML: 604.72 INJECTION INTRAVENOUS at 09:07

## 2022-07-20 RX ADMIN — METOPROLOL TARTRATE 50 MG: 50 TABLET, FILM COATED ORAL at 09:07

## 2022-07-20 RX ADMIN — APIXABAN 5 MG: 2.5 TABLET, FILM COATED ORAL at 09:07

## 2022-07-20 NOTE — NURSING
- Pt. Removed IV around 0500.  - pt. Refused a new IV and heart monitor.  - Pt. Also started cussing at staff after being educated on the need for IV access and heart monitor.  - Notified charge nurse and on-call provider.

## 2022-07-20 NOTE — ASSESSMENT & PLAN NOTE
Patient is identified as having Combined Systolic and Diastolic heart failure that is Chronic. CHF is currently controlled. Latest ECHO performed and demonstrates- Results for orders placed during the hospital encounter of 04/01/22    Echo Saline Bubble? No    Interpretation Summary  · The left ventricle is normal in size with concentric hypertrophy and severely decreased systolic function.  · The estimated ejection fraction is 25-30%.  · There is severe left ventricular global hypokinesis.  · Grade II left ventricular diastolic dysfunction.  · Normal right ventricular size with mildly reduced right ventricular systolic function.  · The estimated PA systolic pressure is 52 mmHg.  · There is moderate pulmonary hypertension.  · Elevated central venous pressure (15 mmHg).  . Continue Beta Blocker and ACE/ARB and monitor clinical status closely. Monitor on telemetry. Patient is off CHF pathway.  Monitor strict Is&Os and daily weights.  Place on fluid restriction of 2 L. Continue to stress to patient importance of self efficacy and  on diet for CHF. Last BNP reviewed- and noted below   Recent Labs   Lab 07/18/22  0403   *   .

## 2022-07-20 NOTE — ASSESSMENT & PLAN NOTE
SECONDARY TO CHF WILL DIURESE MAXIMIZE MEDICAL THERAPY ? PAULINA ANGIOO      7/20/2022  DISCUSSED WITH QUINTIN TORRES AT BEDSIDE HE IS NON COMPLIANT WITH SMOKING DIET COUNSELING DONE. THEY WILL TRY TO HELP .WILL CONTINUE CURRENT EMDS.

## 2022-07-20 NOTE — ASSESSMENT & PLAN NOTE
S/p recent CABG  Continue home meds- Eliquis, aspirin, statin    4.6.22 he underwent a CABG x 4 with results of LIMA to LAD, rSVG to OM1, rSVG to Diag 1 and rSVG to PDA and Ligation of SOPHIA    Reported chest pain, admit for observation  Trend troponin  7/20/22  Per Cardiology will treat with medical management.

## 2022-07-20 NOTE — ASSESSMENT & PLAN NOTE
Patient is identified as having Combined Systolic and Diastolic heart failure that is Chronic. CHF is currently controlled. Latest ECHO performed and demonstrates- Results for orders placed during the hospital encounter of 04/01/22    Echo Saline Bubble? No    Interpretation Summary  · The left ventricle is normal in size with concentric hypertrophy and severely decreased systolic function.  · The estimated ejection fraction is 25-30%.  · There is severe left ventricular global hypokinesis.  · Grade II left ventricular diastolic dysfunction.  · Normal right ventricular size with mildly reduced right ventricular systolic function.  · The estimated PA systolic pressure is 52 mmHg.  · There is moderate pulmonary hypertension.  · Elevated central venous pressure (15 mmHg).  . Continue Beta Blocker and ACE/ARB and monitor clinical status closely. Monitor on telemetry. Patient is off CHF pathway.  Monitor strict Is&Os and daily weights.  Place on fluid restriction of 1.5 L. Continue to stress to patient importance of self efficacy and  on diet for CHF. Last BNP reviewed- and noted below   Recent Labs   Lab 07/18/22  0403   *   .  7/19/2022 will suggest gentle diuresis     7/20/22 RESOLVED LYING FLAT.

## 2022-07-20 NOTE — PLAN OF CARE
Per [facility], CM handles voluntary placement to inpatient psychiatric hospitals.    Faxed packet to [facility] @ [phone] with following information on cover sheet:    Patient was PEC/CEC'd at [facility] in [facility].  He was transferred to [facility] for cardiology issues that are now resolved.  He was non-verbal until yesterday afternoon and appears back at baseline.  Per NP, Pyschiatrist recommended resending the PEC/CEC and re-instate if needed when medically stable.  Patient is medically stable and is willing to return to inpatient psychiatric facility.      Family would like patient to return to that facility, but I was told they have no beds.  Family requested [facility] as next choice.    Attached please find patient's clinicals.      Please call me with any questions.       07/20/22 1344   Post-Acute Status   Post-Acute Authorization Placement   Post-Acute Placement Status Referrals Sent  (Psychiatric facility; inpatient)   Discharge Plan   Discharge Plan A Psychiatric hospital

## 2022-07-20 NOTE — SUBJECTIVE & OBJECTIVE
Interval History: unchanged clinically    Ros unobtainable due to non verbal patient  Objective:     Vital Signs (Most Recent):  Temp: 97.9 °F (36.6 °C) (07/19/22 1121)  Pulse: 80 (07/19/22 1700)  Resp: 19 (07/19/22 1623)  BP: 126/84 (07/19/22 1623)  SpO2: 98 % (07/19/22 1623) Vital Signs (24h Range):  Temp:  [97.9 °F (36.6 °C)-98.8 °F (37.1 °C)] 97.9 °F (36.6 °C)  Pulse:  [44-87] 80  Resp:  [18-19] 19  SpO2:  [96 %-98 %] 98 %  BP: (114-139)/(57-96) 126/84     Weight: 63.8 kg (140 lb 10.5 oz)  Body mass index is 20.18 kg/m².     SpO2: 98 %  O2 Device (Oxygen Therapy): room air      Intake/Output Summary (Last 24 hours) at 7/19/2022 2005  Last data filed at 7/19/2022 0800  Gross per 24 hour   Intake 120 ml   Output --   Net 120 ml       Lines/Drains/Airways       Peripheral Intravenous Line  Duration                  Peripheral IV - Single Lumen 07/18/22 1106 18 G Left Antecubital 1 day                    Physical Exam  Vitals and nursing note reviewed.   Constitutional:       General: He is not in acute distress.     Appearance: He is well-developed. He is not diaphoretic.   HENT:      Head: Normocephalic and atraumatic.   Eyes:      General:         Right eye: No discharge.         Left eye: No discharge.      Pupils: Pupils are equal, round, and reactive to light.   Neck:      Thyroid: No thyromegaly.      Vascular: No JVD.   Cardiovascular:      Rate and Rhythm: Normal rate and regular rhythm.      Pulses: Normal pulses and intact distal pulses.      Heart sounds: Normal heart sounds. No murmur heard.    No friction rub. No gallop.   Pulmonary:      Effort: Pulmonary effort is normal. No respiratory distress.      Breath sounds: Normal breath sounds. No wheezing or rales.      Comments: Scar cabg well healed.   Chest:      Chest wall: No tenderness.   Abdominal:      General: Bowel sounds are normal. There is no distension.      Palpations: Abdomen is soft.      Tenderness: There is no abdominal tenderness.    Musculoskeletal:         General: Normal range of motion.      Cervical back: Neck supple.      Right lower leg: No edema.      Left lower leg: No edema.   Skin:     General: Skin is warm and dry.      Findings: No erythema or rash.   Neurological:      General: No focal deficit present.      Mental Status: He is alert.      Cranial Nerves: No cranial nerve deficit.       Significant Labs:   Recent Lab Results         07/19/22  1518   07/19/22  1149   07/19/22  0602        Procalcitonin   <0.02  Comment: A concentration < 0.25 ng/mL represents a low risk of bacterial   infection.  Procalcitonin may not be accurate among patients with localized   infection, recent trauma or major surgery, immunosuppressed state,   invasive fungal infection, renal dysfunction. Decisions regarding   initiation or continuation of antibiotic therapy should not be based   solely on procalcitonin levels.           Albumin     3.4       Alkaline Phosphatase     76       ALT     17       Anion Gap     8       AST     21       Baso #     0.06       Basophil %     0.7       BILIRUBIN TOTAL     0.5  Comment: For infants and newborns, interpretation of results should be based  on gestational age, weight and in agreement with clinical  observations.    Premature Infant recommended reference ranges:  Up to 24 hours.............<8.0 mg/dL  Up to 48 hours............<12.0 mg/dL  3-5 days..................<15.0 mg/dL  6-29 days.................<15.0 mg/dL         BUN     20       Calcium     9.3       Chloride     107       CO2     21       Creatinine     0.7       Differential Method     Automated       eGFR if      >60       eGFR if non      >60  Comment: Calculation used to obtain the estimated glomerular filtration  rate (eGFR) is the CKD-EPI equation.          Eos #     0.2       Eosinophil %     2.5       Glucose     110       Gran # (ANC)     5.0       Gran %     61.8       Hematocrit     38.5       Hemoglobin      12.1       Immature Grans (Abs)     0.02  Comment: Mild elevation in immature granulocytes is non specific and   can be seen in a variety of conditions including stress response,   acute inflammation, trauma and pregnancy. Correlation with other   laboratory and clinical findings is essential.         Immature Granulocytes     0.2       Lymph #     2.1       Lymph %     25.6       MCH     23.9       MCHC     31.4       MCV     76       Mono #     0.8       Mono %     9.2       MPV     9.1       nRBC     0       Platelets     378       Potassium     4.1       PROTEIN TOTAL     7.9       RBC     5.06       RDW     18.3       Sodium     136       Troponin I 0.074  Comment: The reference interval for Troponin I represents the 99th percentile   cutoff   for our facility and is consistent with 3rd generation assay   performance.     0.087  Comment: The reference interval for Troponin I represents the 99th percentile   cutoff   for our facility and is consistent with 3rd generation assay   performance.           WBC     8.16               Significant Imaging: X-Ray: CXR: X-Ray Chest 1 View (CXR):   Results for orders placed or performed during the hospital encounter of 07/18/22   X-Ray Chest 1 View    Narrative    EXAMINATION:  XR CHEST 1 VIEW    CLINICAL HISTORY:  Chronic systolic (congestive) heart failureCHF;    COMPARISON:  June 5, 2022, as well as studies dating back to February 23, 2020    FINDINGS:  EKG leads overlie the chest which is lordotic in position.  Mildly low lung volumes.  The lungs are clear. The cardiac silhouette size is enlarged.  The trachea is midline and the mediastinal width is normal. Negative for focal infiltrate, effusion or pneumothorax. Pulmonary vasculature is normal. Negative for osseous abnormalities. Ectatic and tortuous aorta.  Degenerative changes of the spine and both shoulder girdles.      Impression    1.  Negative for acute process involving the chest, considering there are  mildly low lung volumes.    2.  Cardiac silhouette size enlargement.  Other stable findings as noted above.      Electronically signed by: Roberth Denney MD  Date:    07/18/2022  Time:    10:09

## 2022-07-20 NOTE — ASSESSMENT & PLAN NOTE
S/P CABG PRESENTS WITH CHF AND ELEVATED TROPONIN. NO NEW EKG CHANGES HAS A CARDIOMYOPATHY. WILL CONTINUE CURRENT MEDICAL 5THERAPY MAKE SURE HE IS EUVOLEMIC MAY CONSIDER RELOOK ANGIO PENDING CLINICAL SITUATION.    WILL AMNAGE CONSERVATIVELY AND DISCUSSED COMPLIANCE,.

## 2022-07-20 NOTE — SUBJECTIVE & OBJECTIVE
Interval History: AWAKE ALERT NO COMPLAINTS.    Review of Systems   Constitutional: Negative for malaise/fatigue.   Eyes:  Negative for blurred vision.   Cardiovascular:  Negative for chest pain, claudication, cyanosis, dyspnea on exertion, irregular heartbeat, leg swelling, near-syncope, orthopnea, palpitations and paroxysmal nocturnal dyspnea.   Respiratory:  Negative for cough, hemoptysis and shortness of breath.    Hematologic/Lymphatic: Negative for bleeding problem. Does not bruise/bleed easily.   Skin:  Negative for dry skin and itching.   Musculoskeletal:  Negative for falls, muscle weakness and myalgias.   Gastrointestinal:  Negative for abdominal pain, diarrhea, heartburn, hematemesis, hematochezia and melena.   Genitourinary:  Negative for flank pain and hematuria.   Neurological:  Negative for dizziness, focal weakness, headaches, light-headedness, numbness, paresthesias, seizures and weakness.   Psychiatric/Behavioral:  Negative for altered mental status and memory loss. The patient is not nervous/anxious.    Allergic/Immunologic: Negative for hives.   Objective:     Vital Signs (Most Recent):  Temp: 98.1 °F (36.7 °C) (07/20/22 1532)  Pulse: 73 (07/20/22 1532)  Resp: 18 (07/20/22 1532)  BP: 111/78 (07/20/22 1532)  SpO2: 98 % (07/20/22 1532) Vital Signs (24h Range):  Temp:  [96.6 °F (35.9 °C)-98.3 °F (36.8 °C)] 98.1 °F (36.7 °C)  Pulse:  [58-83] 73  Resp:  [17-18] 18  SpO2:  [96 %-100 %] 98 %  BP: (106-143)/(55-98) 111/78     Weight: 66.8 kg (147 lb 4.3 oz)  Body mass index is 21.13 kg/m².     SpO2: 98 %  O2 Device (Oxygen Therapy): room air      Intake/Output Summary (Last 24 hours) at 7/20/2022 1650  Last data filed at 7/20/2022 0800  Gross per 24 hour   Intake --   Output 200 ml   Net -200 ml       Lines/Drains/Airways       Peripheral Intravenous Line  Duration                  Peripheral IV - Single Lumen 07/20/22 0930 22 G Anterior;Distal;Left Antecubital <1 day                    Physical  Exam  Vitals and nursing note reviewed.   Constitutional:       General: He is not in acute distress.     Appearance: He is well-developed. He is not diaphoretic.   HENT:      Head: Normocephalic and atraumatic.   Eyes:      General:         Right eye: No discharge.         Left eye: No discharge.      Pupils: Pupils are equal, round, and reactive to light.   Neck:      Thyroid: No thyromegaly.      Vascular: No JVD.   Cardiovascular:      Rate and Rhythm: Normal rate and regular rhythm.      Pulses: Normal pulses and intact distal pulses.      Heart sounds: Normal heart sounds. No murmur heard.    No friction rub. No gallop.   Pulmonary:      Effort: Pulmonary effort is normal. No respiratory distress.      Breath sounds: Normal breath sounds. No wheezing or rales.      Comments: SCAR CABG WELL HEALED.   Chest:      Chest wall: No tenderness.   Abdominal:      General: Bowel sounds are normal. There is no distension.      Palpations: Abdomen is soft.      Tenderness: There is no abdominal tenderness.   Musculoskeletal:         General: Normal range of motion.      Cervical back: Neck supple.      Right lower leg: No edema.      Left lower leg: No edema.   Skin:     General: Skin is warm and dry.      Findings: No erythema or rash.   Neurological:      Mental Status: He is alert and oriented to person, place, and time.      Cranial Nerves: No cranial nerve deficit.   Psychiatric:         Behavior: Behavior normal.         Judgment: Judgment normal.       Significant Labs:   Recent Lab Results         07/20/22  0425        Albumin 3.2       Alkaline Phosphatase 80       ALT 15       Anion Gap 11       AST 24       Baso # 0.05       Basophil % 0.7       BILIRUBIN TOTAL 0.3  Comment: For infants and newborns, interpretation of results should be based  on gestational age, weight and in agreement with clinical  observations.    Premature Infant recommended reference ranges:  Up to 24 hours.............<8.0 mg/dL  Up to  48 hours............<12.0 mg/dL  3-5 days..................<15.0 mg/dL  6-29 days.................<15.0 mg/dL         BUN 23       Calcium 9.4       Chloride 109       CO2 20       Creatinine 0.7       Differential Method Automated       eGFR if  >60       eGFR if non  >60  Comment: Calculation used to obtain the estimated glomerular filtration  rate (eGFR) is the CKD-EPI equation.          Eos # 0.3       Eosinophil % 3.9       Glucose 104       Gran # (ANC) 3.8       Gran % 55.2       Hematocrit 36.0       Hemoglobin 11.3       Immature Grans (Abs) 0.02  Comment: Mild elevation in immature granulocytes is non specific and   can be seen in a variety of conditions including stress response,   acute inflammation, trauma and pregnancy. Correlation with other   laboratory and clinical findings is essential.         Immature Granulocytes 0.3       Lymph # 2.1       Lymph % 30.2       MCH 24.2       MCHC 31.4       MCV 77       Mono # 0.7       Mono % 9.7       MPV 9.3       nRBC 0       Platelets 325       Potassium 4.1       PROTEIN TOTAL 7.4       RBC 4.66       RDW 18.5       Sodium 140       WBC 6.91               Significant Imaging: X-Ray: CXR: X-Ray Chest 1 View (CXR):   Results for orders placed or performed during the hospital encounter of 07/18/22   X-Ray Chest 1 View    Narrative    EXAMINATION:  XR CHEST 1 VIEW    CLINICAL HISTORY:  Chronic systolic (congestive) heart failureCHF;    COMPARISON:  June 5, 2022, as well as studies dating back to February 23, 2020    FINDINGS:  EKG leads overlie the chest which is lordotic in position.  Mildly low lung volumes.  The lungs are clear. The cardiac silhouette size is enlarged.  The trachea is midline and the mediastinal width is normal. Negative for focal infiltrate, effusion or pneumothorax. Pulmonary vasculature is normal. Negative for osseous abnormalities. Ectatic and tortuous aorta.  Degenerative changes of the spine and both  shoulder girdles.      Impression    1.  Negative for acute process involving the chest, considering there are mildly low lung volumes.    2.  Cardiac silhouette size enlargement.  Other stable findings as noted above.      Electronically signed by: Roberth Denney MD  Date:    07/18/2022  Time:    10:09

## 2022-07-20 NOTE — HOSPITAL COURSE
7/19/2022 still agitation and confusion.   7/20/2022 AWAKE ALERT ORIENTED CONVERSATIONAL FAMILY MEMBERS AT BEDSIDE NO ANGINA OR SHORTNESS OF BREATH.

## 2022-07-20 NOTE — PLAN OF CARE
Problem: Adult Inpatient Plan of Care  Goal: Plan of Care Review  Outcome: Ongoing, Progressing  Goal: Patient-Specific Goal (Individualized)  Outcome: Ongoing, Progressing  Goal: Absence of Hospital-Acquired Illness or Injury  Outcome: Ongoing, Progressing  Goal: Optimal Comfort and Wellbeing  Outcome: Ongoing, Progressing  Goal: Readiness for Transition of Care  Outcome: Ongoing, Progressing     Problem: Adjustment to Illness (Delirium)  Goal: Optimal Coping  Outcome: Ongoing, Progressing     Problem: Altered Behavior (Delirium)  Goal: Improved Behavioral Control  Outcome: Ongoing, Progressing     Problem: Attention and Thought Clarity Impairment (Delirium)  Goal: Improved Attention and Thought Clarity  Outcome: Ongoing, Progressing     Problem: Sleep Disturbance (Delirium)  Goal: Improved Sleep  Outcome: Ongoing, Progressing     Problem: Fall Injury Risk  Goal: Absence of Fall and Fall-Related Injury  Outcome: Ongoing, Progressing     Problem: Skin Injury Risk Increased  Goal: Skin Health and Integrity  Outcome: Ongoing, Progressing

## 2022-07-20 NOTE — PROGRESS NOTES
[facility] Medicine  Progress Note    Patient Name: Kendall Duff  MRN: [id number]  Patient Class: IP- Inpatient   Admission Date: 7/18/2022  Length of Stay: 2 days  Attending Physician: Bobby Israel MD  Primary Care Provider: Primary Doctor No        Subjective:     Principal Problem:Coronary artery disease        HPI:  56-year-old male, PMH of CAD S/P PCI s/p CABG (04/2022 4V), Bipolar, hypertension, CVA, Hepatitis C, who was brought from the mental health unit. Pt has been under a PEC for 8 days for schizophrenia exacerbation and agitation. Pt has been having worsening confusion, dyspnea and weakness. At the behavioral unit, completed work up with troponin and CPK was done which was elevated. Patient has recently had 4V CABG, followed by wound healing issued at the sternotomy site, which reportedly has been healing well following skin grafting.     While in the ED today, ptient denies any chest pain, shortness of breath. Further work up demonstrated, HS troponin 105.2 (normal <60), BnP 2575, , CKMB 8, D-dimer 1.84. EKG demonstrated accelerated idoventicular rhythm, extreme right superior axis deviation, anterioseptal infarct, RBBB, consider also periinfarct block. He was given plavix in the ED (patient is on Eliquis for unclear reason). Referring facility requesting transfer for cardiology & interventional cardiology assistance.  Cardiology, Dr.. Brunson agreeable to consult with  admitting.       Overview/Hospital Course:  7/19/22 No acute events overnight. No change in mental status. The patient remains altered. The patient is alert and will follow with his gaze. He does not follow commands and has been non-verbal since admission, which is not his baseline. Psychiatry recs noted and ordered. CT head was negative for acute findings. Neurology was consulted and recommended an MRI brain W WO and EEG. The patient has been relatively calm since admission will rescind the CEC. 7/20/22  "Yesterday afternoon the patient began communicating with staff. He was oriented x 3. Neurology consult was cancelled d/t patient returning to baseline mental status prior to admission. MRI brain was negative. Discussed the POC with the patients family. They wish for the patient to return a mental health facility "to get his meds straight". Case management is attempting to find an accepting facility. Per Cardiology will treat with medical management.       Interval History: Yesterday afternoon the patient began communicating with staff. He was oriented x 3. Neurology consult was cancelled d/t patient returning to baseline mental status prior to admission. MRI brain was negative. Discussed the POC with the patients family. They wish for the patient to return a mental health facility "to get his meds straight". Case management is attempting to find an accepting facility. Per Cardiology will treat with medical management.     Review of Systems   Unable to perform ROS: Psychiatric disorder   Objective:     Vital Signs (Most Recent):  Temp: 98.1 °F (36.7 °C) (07/20/22 1532)  Pulse: 73 (07/20/22 1532)  Resp: 18 (07/20/22 1532)  BP: 111/78 (07/20/22 1532)  SpO2: 98 % (07/20/22 1532) Vital Signs (24h Range):  Temp:  [96.6 °F (35.9 °C)-98.3 °F (36.8 °C)] 98.1 °F (36.7 °C)  Pulse:  [58-83] 73  Resp:  [17-19] 18  SpO2:  [96 %-100 %] 98 %  BP: (106-143)/(55-98) 111/78     Weight: 66.8 kg (147 lb 4.3 oz)  Body mass index is 21.13 kg/m².    Intake/Output Summary (Last 24 hours) at 7/20/2022 1621  Last data filed at 7/20/2022 0800  Gross per 24 hour   Intake --   Output 200 ml   Net -200 ml      Physical Exam  Vitals and nursing note reviewed.   Constitutional:       General: He is not in acute distress.     Appearance: Normal appearance. He is well-developed. He is not ill-appearing.   HENT:      Head: Normocephalic and atraumatic.   Eyes:      Conjunctiva/sclera: Conjunctivae normal.      Pupils: Pupils are equal, round, and " reactive to light.   Cardiovascular:      Rate and Rhythm: Normal rate and regular rhythm.      Heart sounds: Normal heart sounds. No murmur heard.  Pulmonary:      Effort: Pulmonary effort is normal. No respiratory distress.      Breath sounds: Normal breath sounds.   Abdominal:      General: Abdomen is flat. Bowel sounds are normal. There is no distension.      Palpations: Abdomen is soft.      Tenderness: There is no abdominal tenderness. There is no guarding.   Musculoskeletal:         General: No tenderness or deformity. Normal range of motion.      Cervical back: Normal range of motion and neck supple.      Right lower leg: No edema.      Left lower leg: No edema.   Skin:     General: Skin is warm and dry.      Findings: No erythema or rash.      Comments: Sternotomy scar, stitch underneath left breast   Neurological:      General: No focal deficit present.      Mental Status: He is alert. He is disoriented.      Comments: Non-verbal   Psychiatric:         Behavior: Behavior normal.       Significant Labs: All pertinent labs within the past 24 hours have been reviewed.    Significant Imaging:           Assessment/Plan:      * Coronary artery disease  S/p recent CABG  Continue home meds- Eliquis, aspirin, statin    4.6.22 he underwent a CABG x 4 with results of LIMA to LAD, rSVG to OM1, rSVG to Diag 1 and rSVG to PDA and Ligation of SOPHIA    Reported chest pain, admit for observation  Trend troponin  7/20/22  Per Cardiology will treat with medical management.     Altered mental status  7/19/22 No change in mental status. The patient remains altered. The patient is alert and will follow with his gaze. He does not follow commands and has been non-verbal since admission, which is not his baseline. Psychiatry recs noted and ordered. CT head was negative for acute findings. Neurology was consulted and recommended an MRI brain W WO and EEG. The patient has been relatively calm since admission will rescind the CEC.  "  7/20/22 Yesterday afternoon the patient began communicating with staff. He was oriented x 3. Neurology consult was cancelled d/t patient returning to baseline mental status prior to admission. MRI brain was negative. Discussed the POC with the patients family. They wish for the patient to return a mental health facility "to get his meds straight". Case management is attempting to find an accepting facility.     Chronic systolic congestive heart failure  Patient is identified as having Combined Systolic and Diastolic heart failure that is Chronic. CHF is currently controlled. Latest ECHO performed and demonstrates- Results for orders placed during the hospital encounter of 04/01/22    Echo Saline Bubble? No    Interpretation Summary  · The left ventricle is normal in size with concentric hypertrophy and severely decreased systolic function.  · The estimated ejection fraction is 25-30%.  · There is severe left ventricular global hypokinesis.  · Grade II left ventricular diastolic dysfunction.  · Normal right ventricular size with mildly reduced right ventricular systolic function.  · The estimated PA systolic pressure is 52 mmHg.  · There is moderate pulmonary hypertension.  · Elevated central venous pressure (15 mmHg).  . Continue Beta Blocker and ACE/ARB and monitor clinical status closely. Monitor on telemetry. Patient is off CHF pathway.  Monitor strict Is&Os and daily weights.  Place on fluid restriction of 2 L. Continue to stress to patient importance of self efficacy and  on diet for CHF. Last BNP reviewed- and noted below   Recent Labs   Lab 07/18/22  0403   *   .      Acute DVT (deep venous thrombosis)  On Eliquis      Elevated troponin  Recent CABGx4  Trend troponin      Microcytic anemia  Hb stable      Schizoaffective disorder, bipolar type  Restart home meds as tolerated  Consult tele-psych  7/20/22 Plan for discharge back to mental health facility.     Bilateral carotid artery " stenosis        NSTEMI (non-ST elevated myocardial infarction)  Medical management     Bipolar 1 disorder  Tele-psych consulted         VTE Risk Mitigation (From admission, onward)         Ordered     apixaban tablet 5 mg  2 times daily         07/18/22 0452     IP VTE HIGH RISK PATIENT  Once         07/18/22 0337     Place sequential compression device  Until discontinued         07/18/22 0337                Discharge Planning   TRACIE:      Code Status: Full Code   Is the patient medically ready for discharge?:     Reason for patient still in hospital (select all that apply): Patient trending condition, Consult recommendations and Pending disposition  Discharge Plan A: Psychiatric hospital                  Jarrod Tomlinson NP  Department of Hospital Medicine   [facility] - Telemetry ([facility])

## 2022-07-20 NOTE — ASSESSMENT & PLAN NOTE
"7/19/22 No change in mental status. The patient remains altered. The patient is alert and will follow with his gaze. He does not follow commands and has been non-verbal since admission, which is not his baseline. Psychiatry recs noted and ordered. CT head was negative for acute findings. Neurology was consulted and recommended an MRI brain W WO and EEG. The patient has been relatively calm since admission will rescind the CEC.   7/20/22 Yesterday afternoon the patient began communicating with staff. He was oriented x 3. Neurology consult was cancelled d/t patient returning to baseline mental status prior to admission. MRI brain was negative. Discussed the POC with the patients family. They wish for the patient to return a mental health facility "to get his meds straight". Case management is attempting to find an accepting facility.   "

## 2022-07-20 NOTE — PROGRESS NOTES
[facility] ([facility])  Cardiology  Progress Note    Patient Name: Kendall Duff  MRN: [id number]  Admission Date: 7/18/2022  Hospital Length of Stay: 1 days  Code Status: Full Code   Attending Physician: Bobby Irsael MD   Primary Care Physician: Primary Doctor No  Expected Discharge Date:   Principal Problem:Coronary artery disease    Subjective:     Hospital Course:   7/19/2022 still agitation and confusion.       Interval History: unchanged clinically    Ros unobtainable due to non verbal patient  Objective:     Vital Signs (Most Recent):  Temp: 97.9 °F (36.6 °C) (07/19/22 1121)  Pulse: 80 (07/19/22 1700)  Resp: 19 (07/19/22 1623)  BP: 126/84 (07/19/22 1623)  SpO2: 98 % (07/19/22 1623) Vital Signs (24h Range):  Temp:  [97.9 °F (36.6 °C)-98.8 °F (37.1 °C)] 97.9 °F (36.6 °C)  Pulse:  [44-87] 80  Resp:  [18-19] 19  SpO2:  [96 %-98 %] 98 %  BP: (114-139)/(57-96) 126/84     Weight: 63.8 kg (140 lb 10.5 oz)  Body mass index is 20.18 kg/m².     SpO2: 98 %  O2 Device (Oxygen Therapy): room air      Intake/Output Summary (Last 24 hours) at 7/19/2022 2005  Last data filed at 7/19/2022 0800  Gross per 24 hour   Intake 120 ml   Output --   Net 120 ml       Lines/Drains/Airways       Peripheral Intravenous Line  Duration                  Peripheral IV - Single Lumen 07/18/22 1106 18 G Left Antecubital 1 day                    Physical Exam  Vitals and nursing note reviewed.   Constitutional:       General: He is not in acute distress.     Appearance: He is well-developed. He is not diaphoretic.   HENT:      Head: Normocephalic and atraumatic.   Eyes:      General:         Right eye: No discharge.         Left eye: No discharge.      Pupils: Pupils are equal, round, and reactive to light.   Neck:      Thyroid: No thyromegaly.      Vascular: No JVD.   Cardiovascular:      Rate and Rhythm: Normal rate and regular rhythm.      Pulses: Normal pulses and intact distal pulses.      Heart sounds: Normal heart sounds. No murmur heard.     No friction rub. No gallop.   Pulmonary:      Effort: Pulmonary effort is normal. No respiratory distress.      Breath sounds: Normal breath sounds. No wheezing or rales.      Comments: Scar cabg well healed.   Chest:      Chest wall: No tenderness.   Abdominal:      General: Bowel sounds are normal. There is no distension.      Palpations: Abdomen is soft.      Tenderness: There is no abdominal tenderness.   Musculoskeletal:         General: Normal range of motion.      Cervical back: Neck supple.      Right lower leg: No edema.      Left lower leg: No edema.   Skin:     General: Skin is warm and dry.      Findings: No erythema or rash.   Neurological:      General: No focal deficit present.      Mental Status: He is alert.      Cranial Nerves: No cranial nerve deficit.       Significant Labs:   Recent Lab Results         07/19/22  1518   07/19/22  1149   07/19/22  0602        Procalcitonin   <0.02  Comment: A concentration < 0.25 ng/mL represents a low risk of bacterial   infection.  Procalcitonin may not be accurate among patients with localized   infection, recent trauma or major surgery, immunosuppressed state,   invasive fungal infection, renal dysfunction. Decisions regarding   initiation or continuation of antibiotic therapy should not be based   solely on procalcitonin levels.           Albumin     3.4       Alkaline Phosphatase     76       ALT     17       Anion Gap     8       AST     21       Baso #     0.06       Basophil %     0.7       BILIRUBIN TOTAL     0.5  Comment: For infants and newborns, interpretation of results should be based  on gestational age, weight and in agreement with clinical  observations.    Premature Infant recommended reference ranges:  Up to 24 hours.............<8.0 mg/dL  Up to 48 hours............<12.0 mg/dL  3-5 days..................<15.0 mg/dL  6-29 days.................<15.0 mg/dL         BUN     20       Calcium     9.3       Chloride     107       CO2     21        Creatinine     0.7       Differential Method     Automated       eGFR if      >60       eGFR if non      >60  Comment: Calculation used to obtain the estimated glomerular filtration  rate (eGFR) is the CKD-EPI equation.          Eos #     0.2       Eosinophil %     2.5       Glucose     110       Gran # (ANC)     5.0       Gran %     61.8       Hematocrit     38.5       Hemoglobin     12.1       Immature Grans (Abs)     0.02  Comment: Mild elevation in immature granulocytes is non specific and   can be seen in a variety of conditions including stress response,   acute inflammation, trauma and pregnancy. Correlation with other   laboratory and clinical findings is essential.         Immature Granulocytes     0.2       Lymph #     2.1       Lymph %     25.6       MCH     23.9       MCHC     31.4       MCV     76       Mono #     0.8       Mono %     9.2       MPV     9.1       nRBC     0       Platelets     378       Potassium     4.1       PROTEIN TOTAL     7.9       RBC     5.06       RDW     18.3       Sodium     136       Troponin I 0.074  Comment: The reference interval for Troponin I represents the 99th percentile   cutoff   for our facility and is consistent with 3rd generation assay   performance.     0.087  Comment: The reference interval for Troponin I represents the 99th percentile   cutoff   for our facility and is consistent with 3rd generation assay   performance.           WBC     8.16               Significant Imaging: X-Ray: CXR: X-Ray Chest 1 View (CXR):   Results for orders placed or performed during the hospital encounter of 07/18/22   X-Ray Chest 1 View    Narrative    EXAMINATION:  XR CHEST 1 VIEW    CLINICAL HISTORY:  Chronic systolic (congestive) heart failureCHF;    COMPARISON:  June 5, 2022, as well as studies dating back to February 23, 2020    FINDINGS:  EKG leads overlie the chest which is lordotic in position.  Mildly low lung volumes.  The lungs are clear.  The cardiac silhouette size is enlarged.  The trachea is midline and the mediastinal width is normal. Negative for focal infiltrate, effusion or pneumothorax. Pulmonary vasculature is normal. Negative for osseous abnormalities. Ectatic and tortuous aorta.  Degenerative changes of the spine and both shoulder girdles.      Impression    1.  Negative for acute process involving the chest, considering there are mildly low lung volumes.    2.  Cardiac silhouette size enlargement.  Other stable findings as noted above.      Electronically signed by: Roberth Denney MD  Date:    07/18/2022  Time:    10:09     Assessment and Plan:     Brief HPI: patient presents with confusion elevated bnp elavted troponin due to demand has no change in mental status he deserves conservative therapy and negative volume balance bydiuresis.     * Coronary artery disease  S/P CABG PRESENTS WITH CHF AND ELEVATED TROPONIN. NO NEW EKG CHANGES HAS A CARDIOMYOPATHY. WILL CONTINUE CURRENT MEDICAL 5THERAPY MAKE SURE HE IS EUVOLEMIC MAY CONSIDER RELOOK ANGIO PENDING CLINICAL SITUATION.    Chronic systolic congestive heart failure  Patient is identified as having Combined Systolic and Diastolic heart failure that is Chronic. CHF is currently controlled. Latest ECHO performed and demonstrates- Results for orders placed during the hospital encounter of 04/01/22    Echo Saline Bubble? No    Interpretation Summary  · The left ventricle is normal in size with concentric hypertrophy and severely decreased systolic function.  · The estimated ejection fraction is 25-30%.  · There is severe left ventricular global hypokinesis.  · Grade II left ventricular diastolic dysfunction.  · Normal right ventricular size with mildly reduced right ventricular systolic function.  · The estimated PA systolic pressure is 52 mmHg.  · There is moderate pulmonary hypertension.  · Elevated central venous pressure (15 mmHg).  . Continue Beta Blocker and ACE/ARB and monitor clinical  status closely. Monitor on telemetry. Patient is off CHF pathway.  Monitor strict Is&Os and daily weights.  Place on fluid restriction of 1.5 L. Continue to stress to patient importance of self efficacy and  on diet for CHF. Last BNP reviewed- and noted below   Recent Labs   Lab 07/18/22  0403   *   .  7/19/2022 will suggest gentle diuresis     Acute DVT (deep venous thrombosis)  ON ANTICOAGULATION    Elevated troponin  SECONDARY TO CHF WILL DIURESE MAXIMIZE MEDICAL THERAPY ? RELOOK ANGIOO    Bilateral carotid artery stenosis  ON MEDICAL THERAPY.    NSTEMI (non-ST elevated myocardial infarction)  SEE CAD    Bipolar 1 disorder  PER HOSPITAL TEAM        VTE Risk Mitigation (From admission, onward)         Ordered     apixaban tablet 5 mg  2 times daily         07/18/22 0452     IP VTE HIGH RISK PATIENT  Once         07/18/22 0337     Place sequential compression device  Until discontinued         07/18/22 0337                Oziel Ng MD  Cardiology  [facility] - Tele[facility] ([facility])

## 2022-07-20 NOTE — SUBJECTIVE & OBJECTIVE
"Interval History: Yesterday afternoon the patient began communicating with staff. He was oriented x 3. Neurology consult was cancelled d/t patient returning to baseline mental status prior to admission. MRI brain was negative. Discussed the POC with the patients family. They wish for the patient to return a mental health facility "to get his meds straight". Case management is attempting to find an accepting facility. Per Cardiology will treat with medical management.     Review of Systems   Unable to perform ROS: Psychiatric disorder   Objective:     Vital Signs (Most Recent):  Temp: 98.1 °F (36.7 °C) (07/20/22 1532)  Pulse: 73 (07/20/22 1532)  Resp: 18 (07/20/22 1532)  BP: 111/78 (07/20/22 1532)  SpO2: 98 % (07/20/22 1532) Vital Signs (24h Range):  Temp:  [96.6 °F (35.9 °C)-98.3 °F (36.8 °C)] 98.1 °F (36.7 °C)  Pulse:  [58-83] 73  Resp:  [17-19] 18  SpO2:  [96 %-100 %] 98 %  BP: (106-143)/(55-98) 111/78     Weight: 66.8 kg (147 lb 4.3 oz)  Body mass index is 21.13 kg/m².    Intake/Output Summary (Last 24 hours) at 7/20/2022 1621  Last data filed at 7/20/2022 0800  Gross per 24 hour   Intake --   Output 200 ml   Net -200 ml      Physical Exam  Vitals and nursing note reviewed.   Constitutional:       General: He is not in acute distress.     Appearance: Normal appearance. He is well-developed. He is not ill-appearing.   HENT:      Head: Normocephalic and atraumatic.   Eyes:      Conjunctiva/sclera: Conjunctivae normal.      Pupils: Pupils are equal, round, and reactive to light.   Cardiovascular:      Rate and Rhythm: Normal rate and regular rhythm.      Heart sounds: Normal heart sounds. No murmur heard.  Pulmonary:      Effort: Pulmonary effort is normal. No respiratory distress.      Breath sounds: Normal breath sounds.   Abdominal:      General: Abdomen is flat. Bowel sounds are normal. There is no distension.      Palpations: Abdomen is soft.      Tenderness: There is no abdominal tenderness. There is no " guarding.   Musculoskeletal:         General: No tenderness or deformity. Normal range of motion.      Cervical back: Normal range of motion and neck supple.      Right lower leg: No edema.      Left lower leg: No edema.   Skin:     General: Skin is warm and dry.      Findings: No erythema or rash.      Comments: Sternotomy scar, stitch underneath left breast   Neurological:      General: No focal deficit present.      Mental Status: He is alert. He is disoriented.      Comments: Non-verbal   Psychiatric:         Behavior: Behavior normal.       Significant Labs: All pertinent labs within the past 24 hours have been reviewed.    Significant Imaging:

## 2022-07-20 NOTE — PROGRESS NOTES
[facility] Telemetry ([facility])  Cardiology  Progress Note    Patient Name: Kendall Duff  MRN: [id number]  Admission Date: 7/18/2022  Hospital Length of Stay: 2 days  Code Status: Full Code   Attending Physician: Bobby Israel MD   Primary Care Physician: Primary Doctor No  Expected Discharge Date:   Principal Problem:Coronary artery disease    Subjective:     Hospital Course:   7/19/2022 still agitation and confusion.   7/20/2022 AWAKE ALERT ORIENTED CONVERSATIONAL FAMILY MEMBERS AT BEDSIDE NO ANGINA OR SHORTNESS OF BREATH.      Interval History: AWAKE ALERT NO COMPLAINTS.    Review of Systems   Constitutional: Negative for malaise/fatigue.   Eyes:  Negative for blurred vision.   Cardiovascular:  Negative for chest pain, claudication, cyanosis, dyspnea on exertion, irregular heartbeat, leg swelling, near-syncope, orthopnea, palpitations and paroxysmal nocturnal dyspnea.   Respiratory:  Negative for cough, hemoptysis and shortness of breath.    Hematologic/Lymphatic: Negative for bleeding problem. Does not bruise/bleed easily.   Skin:  Negative for dry skin and itching.   Musculoskeletal:  Negative for falls, muscle weakness and myalgias.   Gastrointestinal:  Negative for abdominal pain, diarrhea, heartburn, hematemesis, hematochezia and melena.   Genitourinary:  Negative for flank pain and hematuria.   Neurological:  Negative for dizziness, focal weakness, headaches, light-headedness, numbness, paresthesias, seizures and weakness.   Psychiatric/Behavioral:  Negative for altered mental status and memory loss. The patient is not nervous/anxious.    Allergic/Immunologic: Negative for hives.   Objective:     Vital Signs (Most Recent):  Temp: 98.1 °F (36.7 °C) (07/20/22 1532)  Pulse: 73 (07/20/22 1532)  Resp: 18 (07/20/22 1532)  BP: 111/78 (07/20/22 1532)  SpO2: 98 % (07/20/22 1532) Vital Signs (24h Range):  Temp:  [96.6 °F (35.9 °C)-98.3 °F (36.8 °C)] 98.1 °F (36.7 °C)  Pulse:  [58-83] 73  Resp:  [17-18] 18  SpO2:  [96 %-100 %]  98 %  BP: (106-143)/(55-98) 111/78     Weight: 66.8 kg (147 lb 4.3 oz)  Body mass index is 21.13 kg/m².     SpO2: 98 %  O2 Device (Oxygen Therapy): room air      Intake/Output Summary (Last 24 hours) at 7/20/2022 1650  Last data filed at 7/20/2022 0800  Gross per 24 hour   Intake --   Output 200 ml   Net -200 ml       Lines/Drains/Airways       Peripheral Intravenous Line  Duration                  Peripheral IV - Single Lumen 07/20/22 0930 22 G Anterior;Distal;Left Antecubital <1 day                    Physical Exam  Vitals and nursing note reviewed.   Constitutional:       General: He is not in acute distress.     Appearance: He is well-developed. He is not diaphoretic.   HENT:      Head: Normocephalic and atraumatic.   Eyes:      General:         Right eye: No discharge.         Left eye: No discharge.      Pupils: Pupils are equal, round, and reactive to light.   Neck:      Thyroid: No thyromegaly.      Vascular: No JVD.   Cardiovascular:      Rate and Rhythm: Normal rate and regular rhythm.      Pulses: Normal pulses and intact distal pulses.      Heart sounds: Normal heart sounds. No murmur heard.    No friction rub. No gallop.   Pulmonary:      Effort: Pulmonary effort is normal. No respiratory distress.      Breath sounds: Normal breath sounds. No wheezing or rales.      Comments: SCAR CABG WELL HEALED.   Chest:      Chest wall: No tenderness.   Abdominal:      General: Bowel sounds are normal. There is no distension.      Palpations: Abdomen is soft.      Tenderness: There is no abdominal tenderness.   Musculoskeletal:         General: Normal range of motion.      Cervical back: Neck supple.      Right lower leg: No edema.      Left lower leg: No edema.   Skin:     General: Skin is warm and dry.      Findings: No erythema or rash.   Neurological:      Mental Status: He is alert and oriented to person, place, and time.      Cranial Nerves: No cranial nerve deficit.   Psychiatric:         Behavior: Behavior  normal.         Judgment: Judgment normal.       Significant Labs:   Recent Lab Results         07/20/22  0425        Albumin 3.2       Alkaline Phosphatase 80       ALT 15       Anion Gap 11       AST 24       Baso # 0.05       Basophil % 0.7       BILIRUBIN TOTAL 0.3  Comment: For infants and newborns, interpretation of results should be based  on gestational age, weight and in agreement with clinical  observations.    Premature Infant recommended reference ranges:  Up to 24 hours.............<8.0 mg/dL  Up to 48 hours............<12.0 mg/dL  3-5 days..................<15.0 mg/dL  6-29 days.................<15.0 mg/dL         BUN 23       Calcium 9.4       Chloride 109       CO2 20       Creatinine 0.7       Differential Method Automated       eGFR if  >60       eGFR if non  >60  Comment: Calculation used to obtain the estimated glomerular filtration  rate (eGFR) is the CKD-EPI equation.          Eos # 0.3       Eosinophil % 3.9       Glucose 104       Gran # (ANC) 3.8       Gran % 55.2       Hematocrit 36.0       Hemoglobin 11.3       Immature Grans (Abs) 0.02  Comment: Mild elevation in immature granulocytes is non specific and   can be seen in a variety of conditions including stress response,   acute inflammation, trauma and pregnancy. Correlation with other   laboratory and clinical findings is essential.         Immature Granulocytes 0.3       Lymph # 2.1       Lymph % 30.2       MCH 24.2       MCHC 31.4       MCV 77       Mono # 0.7       Mono % 9.7       MPV 9.3       nRBC 0       Platelets 325       Potassium 4.1       PROTEIN TOTAL 7.4       RBC 4.66       RDW 18.5       Sodium 140       WBC 6.91               Significant Imaging: X-Ray: CXR: X-Ray Chest 1 View (CXR):   Results for orders placed or performed during the hospital encounter of 07/18/22   X-Ray Chest 1 View    Narrative    EXAMINATION:  XR CHEST 1 VIEW    CLINICAL HISTORY:  Chronic systolic (congestive) heart  failureCHF;    COMPARISON:  June 5, 2022, as well as studies dating back to February 23, 2020    FINDINGS:  EKG leads overlie the chest which is lordotic in position.  Mildly low lung volumes.  The lungs are clear. The cardiac silhouette size is enlarged.  The trachea is midline and the mediastinal width is normal. Negative for focal infiltrate, effusion or pneumothorax. Pulmonary vasculature is normal. Negative for osseous abnormalities. Ectatic and tortuous aorta.  Degenerative changes of the spine and both shoulder girdles.      Impression    1.  Negative for acute process involving the chest, considering there are mildly low lung volumes.    2.  Cardiac silhouette size enlargement.  Other stable findings as noted above.      Electronically signed by: Roberth Denney MD  Date:    07/18/2022  Time:    10:09     Assessment and Plan:     Brief HPI: A 55 YO MALE WITH CAD S/P CABG REDO STERNOTOMY FOR WOUND DEHISCENCE PRESNTS WITH MILD VOLUME OVERLOAD ELEVATED TROPONIN. HE AHD MENTAL; STATUS CHANGES THAT RESOLVED TODAY HIS MEDICAL THERAPY OPTIMIZED HE IS NON COMPLINAT WITH SMOKING DIET MEDS AT TIMES DISCUSSED ALL THESE ISSUES WITH FAMILY WILL CONTINUE MEDICAL THERAPY./    * Coronary artery disease  S/P CABG PRESENTS WITH CHF AND ELEVATED TROPONIN. NO NEW EKG CHANGES HAS A CARDIOMYOPATHY. WILL CONTINUE CURRENT MEDICAL 5THERAPY MAKE SURE HE IS EUVOLEMIC MAY CONSIDER RELOOK ANGIO PENDING CLINICAL SITUATION.    WILL AMNAGE CONSERVATIVELY AND DISCUSSED COMPLIANCE,.    Chronic systolic congestive heart failure  Patient is identified as having Combined Systolic and Diastolic heart failure that is Chronic. CHF is currently controlled. Latest ECHO performed and demonstrates- Results for orders placed during the hospital encounter of 04/01/22    Echo Saline Bubble? No    Interpretation Summary  · The left ventricle is normal in size with concentric hypertrophy and severely decreased systolic function.  · The estimated ejection  fraction is 25-30%.  · There is severe left ventricular global hypokinesis.  · Grade II left ventricular diastolic dysfunction.  · Normal right ventricular size with mildly reduced right ventricular systolic function.  · The estimated PA systolic pressure is 52 mmHg.  · There is moderate pulmonary hypertension.  · Elevated central venous pressure (15 mmHg).  . Continue Beta Blocker and ACE/ARB and monitor clinical status closely. Monitor on telemetry. Patient is off CHF pathway.  Monitor strict Is&Os and daily weights.  Place on fluid restriction of 1.5 L. Continue to stress to patient importance of self efficacy and  on diet for CHF. Last BNP reviewed- and noted below   Recent Labs   Lab 07/18/22  0403   *   .  7/19/2022 will suggest gentle diuresis     7/20/22 RESOLVED LYING FLAT.    Acute DVT (deep venous thrombosis)  ON ANTICOAGULATION    Elevated troponin  SECONDARY TO CHF WILL DIURESE MAXIMIZE MEDICAL THERAPY ? RELOOK ANGIOO      7/20/2022  DISCUSSED WITH QUINTIN TORRES AT BEDSIDE HE IS NON COMPLIANT WITH SMOKING DIET COUNSELING DONE. THEY WILL TRY TO HELP .WILL CONTINUE CURRENT EMDS.    Bilateral carotid artery stenosis  ON MEDICAL THERAPY.    NSTEMI (non-ST elevated myocardial infarction)  SEE CAD    Bipolar 1 disorder  PER HOSPITAL TEAM        VTE Risk Mitigation (From admission, onward)         Ordered     apixaban tablet 5 mg  2 times daily         07/18/22 0452     IP VTE HIGH RISK PATIENT  Once         07/18/22 0337     Place sequential compression device  Until discontinued         07/18/22 0337                Oziel Ng MD  Cardiology  [facility] Tele[facility] ([facility])

## 2022-07-20 NOTE — ASSESSMENT & PLAN NOTE
Restart home meds as tolerated  Consult tele-psych  7/20/22 Plan for discharge back to mental health facility.

## 2022-07-21 LAB
ALBUMIN SERPL BCP-MCNC: 3 G/DL (ref 3.5–5.2)
ALP SERPL-CCNC: 76 U/L (ref 55–135)
ALT SERPL W/O P-5'-P-CCNC: 18 U/L (ref 10–44)
ANION GAP SERPL CALC-SCNC: 9 MMOL/L (ref 8–16)
AST SERPL-CCNC: 25 U/L (ref 10–40)
BASOPHILS # BLD AUTO: 0.07 K/UL (ref 0–0.2)
BASOPHILS NFR BLD: 1.2 % (ref 0–1.9)
BILIRUB SERPL-MCNC: 0.3 MG/DL (ref 0.1–1)
BUN SERPL-MCNC: 25 MG/DL (ref 6–20)
CALCIUM SERPL-MCNC: 8.9 MG/DL (ref 8.7–10.5)
CHLORIDE SERPL-SCNC: 105 MMOL/L (ref 95–110)
CO2 SERPL-SCNC: 23 MMOL/L (ref 23–29)
CREAT SERPL-MCNC: 0.8 MG/DL (ref 0.5–1.4)
DIFFERENTIAL METHOD: ABNORMAL
EOSINOPHIL # BLD AUTO: 0.3 K/UL (ref 0–0.5)
EOSINOPHIL NFR BLD: 4.5 % (ref 0–8)
ERYTHROCYTE [DISTWIDTH] IN BLOOD BY AUTOMATED COUNT: 18.4 % (ref 11.5–14.5)
EST. GFR  (AFRICAN AMERICAN): >60 ML/MIN/1.73 M^2
EST. GFR  (NON AFRICAN AMERICAN): >60 ML/MIN/1.73 M^2
GLUCOSE SERPL-MCNC: 127 MG/DL (ref 70–110)
HCT VFR BLD AUTO: 35.2 % (ref 40–54)
HGB BLD-MCNC: 10.7 G/DL (ref 14–18)
IMM GRANULOCYTES # BLD AUTO: 0.01 K/UL (ref 0–0.04)
IMM GRANULOCYTES NFR BLD AUTO: 0.2 % (ref 0–0.5)
LYMPHOCYTES # BLD AUTO: 2.1 K/UL (ref 1–4.8)
LYMPHOCYTES NFR BLD: 35.4 % (ref 18–48)
MCH RBC QN AUTO: 24.1 PG (ref 27–31)
MCHC RBC AUTO-ENTMCNC: 30.4 G/DL (ref 32–36)
MCV RBC AUTO: 79 FL (ref 82–98)
MONOCYTES # BLD AUTO: 0.6 K/UL (ref 0.3–1)
MONOCYTES NFR BLD: 10.1 % (ref 4–15)
NEUTROPHILS # BLD AUTO: 2.9 K/UL (ref 1.8–7.7)
NEUTROPHILS NFR BLD: 48.6 % (ref 38–73)
NRBC BLD-RTO: 0 /100 WBC
PLATELET # BLD AUTO: 302 K/UL (ref 150–450)
PMV BLD AUTO: 9.4 FL (ref 9.2–12.9)
POTASSIUM SERPL-SCNC: 4.4 MMOL/L (ref 3.5–5.1)
PROT SERPL-MCNC: 6.8 G/DL (ref 6–8.4)
RBC # BLD AUTO: 4.44 M/UL (ref 4.6–6.2)
SODIUM SERPL-SCNC: 137 MMOL/L (ref 136–145)
WBC # BLD AUTO: 6.02 K/UL (ref 3.9–12.7)

## 2022-07-21 PROCEDURE — 25000003 PHARM REV CODE 250: Performed by: INTERNAL MEDICINE

## 2022-07-21 PROCEDURE — 85025 COMPLETE CBC W/AUTO DIFF WBC: CPT | Performed by: NURSE PRACTITIONER

## 2022-07-21 PROCEDURE — 25000003 PHARM REV CODE 250: Performed by: STUDENT IN AN ORGANIZED HEALTH CARE EDUCATION/TRAINING PROGRAM

## 2022-07-21 PROCEDURE — 25000003 PHARM REV CODE 250: Performed by: NURSE PRACTITIONER

## 2022-07-21 PROCEDURE — 63600175 PHARM REV CODE 636 W HCPCS

## 2022-07-21 PROCEDURE — 21400001 HC TELEMETRY ROOM

## 2022-07-21 PROCEDURE — 36415 COLL VENOUS BLD VENIPUNCTURE: CPT | Performed by: NURSE PRACTITIONER

## 2022-07-21 PROCEDURE — 25000003 PHARM REV CODE 250: Performed by: FAMILY MEDICINE

## 2022-07-21 PROCEDURE — 80053 COMPREHEN METABOLIC PANEL: CPT | Performed by: NURSE PRACTITIONER

## 2022-07-21 RX ORDER — ALPRAZOLAM 0.25 MG/1
0.25 TABLET ORAL EVERY 6 HOURS PRN
Status: DISCONTINUED | OUTPATIENT
Start: 2022-07-21 | End: 2022-07-26 | Stop reason: HOSPADM

## 2022-07-21 RX ORDER — LORAZEPAM 2 MG/ML
2 INJECTION INTRAMUSCULAR ONCE
Status: COMPLETED | OUTPATIENT
Start: 2022-07-21 | End: 2022-07-21

## 2022-07-21 RX ORDER — LORAZEPAM 2 MG/ML
INJECTION INTRAMUSCULAR
Status: COMPLETED
Start: 2022-07-21 | End: 2022-07-21

## 2022-07-21 RX ORDER — MUPIROCIN 20 MG/G
OINTMENT TOPICAL 2 TIMES DAILY
Status: DISCONTINUED | OUTPATIENT
Start: 2022-07-21 | End: 2022-07-26 | Stop reason: HOSPADM

## 2022-07-21 RX ADMIN — LORAZEPAM 2 MG: 2 INJECTION INTRAMUSCULAR at 12:07

## 2022-07-21 RX ADMIN — APIXABAN 5 MG: 2.5 TABLET, FILM COATED ORAL at 08:07

## 2022-07-21 RX ADMIN — METOPROLOL TARTRATE 50 MG: 50 TABLET, FILM COATED ORAL at 08:07

## 2022-07-21 RX ADMIN — APIXABAN 5 MG: 2.5 TABLET, FILM COATED ORAL at 09:07

## 2022-07-21 RX ADMIN — LORAZEPAM 2 MG: 2 INJECTION INTRAMUSCULAR; INTRAVENOUS at 12:07

## 2022-07-21 RX ADMIN — ATORVASTATIN CALCIUM 80 MG: 40 TABLET, FILM COATED ORAL at 09:07

## 2022-07-21 RX ADMIN — LOSARTAN POTASSIUM 25 MG: 25 TABLET, FILM COATED ORAL at 09:07

## 2022-07-21 RX ADMIN — METOPROLOL TARTRATE 50 MG: 50 TABLET, FILM COATED ORAL at 09:07

## 2022-07-21 RX ADMIN — MUPIROCIN: 20 OINTMENT TOPICAL at 08:07

## 2022-07-21 RX ADMIN — ISOSORBIDE MONONITRATE 30 MG: 30 TABLET, EXTENDED RELEASE ORAL at 09:07

## 2022-07-21 RX ADMIN — ASPIRIN 81 MG CHEWABLE TABLET 81 MG: 81 TABLET CHEWABLE at 09:07

## 2022-07-21 RX ADMIN — LEVETIRACETAM 500 MG: 500 TABLET, FILM COATED ORAL at 08:07

## 2022-07-21 RX ADMIN — ARIPIPRAZOLE 10 MG: 5 TABLET ORAL at 09:07

## 2022-07-21 RX ADMIN — POLYETHYLENE GLYCOL 3350 17 G: 17 POWDER, FOR SOLUTION ORAL at 09:07

## 2022-07-21 RX ADMIN — ALPRAZOLAM 0.25 MG: 0.25 TABLET ORAL at 07:07

## 2022-07-21 RX ADMIN — LEVETIRACETAM 500 MG: 500 TABLET, FILM COATED ORAL at 09:07

## 2022-07-21 NOTE — SUBJECTIVE & OBJECTIVE
Interval History: No acute events overnight. MRI brain was negative. The patient is awaiting inpatient psych placement. He is medically clear for placement.     Review of Systems   Unable to perform ROS: Psychiatric disorder   Objective:     Vital Signs (Most Recent):  Temp: 98.1 °F (36.7 °C) (07/21/22 1602)  Pulse: 70 (07/21/22 1602)  Resp: 17 (07/21/22 1602)  BP: (!) 97/56 (07/21/22 1602)  SpO2: 97 % (07/21/22 1602)   Vital Signs (24h Range):  Temp:  [97.6 °F (36.4 °C)-98.1 °F (36.7 °C)] 98.1 °F (36.7 °C)  Pulse:  [44-80] 70  Resp:  [17-18] 17  SpO2:  [97 %-99 %] 97 %  BP: ()/(56-89) 97/56     Weight: 65.9 kg (145 lb 4.5 oz)  Body mass index is 20.85 kg/m².    Intake/Output Summary (Last 24 hours) at 7/21/2022 1656  Last data filed at 7/21/2022 1400  Gross per 24 hour   Intake 420 ml   Output 425 ml   Net -5 ml      Physical Exam  Vitals and nursing note reviewed.   Constitutional:       General: He is not in acute distress.     Appearance: Normal appearance. He is well-developed. He is not ill-appearing.   HENT:      Head: Normocephalic and atraumatic.   Eyes:      Conjunctiva/sclera: Conjunctivae normal.      Pupils: Pupils are equal, round, and reactive to light.   Cardiovascular:      Rate and Rhythm: Normal rate and regular rhythm.      Heart sounds: Normal heart sounds. No murmur heard.  Pulmonary:      Effort: Pulmonary effort is normal. No respiratory distress.      Breath sounds: Normal breath sounds.   Abdominal:      General: Abdomen is flat. Bowel sounds are normal. There is no distension.      Palpations: Abdomen is soft.      Tenderness: There is no abdominal tenderness. There is no guarding.   Musculoskeletal:         General: No tenderness or deformity. Normal range of motion.      Cervical back: Normal range of motion and neck supple.      Right lower leg: No edema.      Left lower leg: No edema.   Skin:     General: Skin is warm and dry.      Findings: No erythema or rash.      Comments:  Sternotomy scar, stitch underneath left breast   Neurological:      General: No focal deficit present.      Mental Status: He is alert. He is disoriented.      Comments: Non-verbal   Psychiatric:         Behavior: Behavior normal.       Significant Labs: All pertinent labs within the past 24 hours have been reviewed.    Significant Imaging:

## 2022-07-21 NOTE — ASSESSMENT & PLAN NOTE
"7/19/22 No change in mental status. The patient remains altered. The patient is alert and will follow with his gaze. He does not follow commands and has been non-verbal since admission, which is not his baseline. Psychiatry recs noted and ordered. CT head was negative for acute findings. Neurology was consulted and recommended an MRI brain W WO and EEG. The patient has been relatively calm since admission will rescind the CEC.   7/20/22 Yesterday afternoon the patient began communicating with staff. He was oriented x 3. Neurology consult was cancelled d/t patient returning to baseline mental status prior to admission. MRI brain was negative. Discussed the POC with the patients family. They wish for the patient to return a mental health facility "to get his meds straight". Case management is attempting to find an accepting facility.   - Resolved, the patient is back to his baseline mental status. MRI brain was negative  "

## 2022-07-21 NOTE — PLAN OF CARE
A232/A232 MAY Duff is a 56 y.o.male admitted on 7/18/2022 for Coronary artery disease   Code Status: Full Code MRN: [id number]   Review of patient's allergies indicates:   Allergen Reactions    Depakote [divalproex]     Divalproex sodium Other (See Comments)    Lithium     Lithium analogues     Quetiapine Other (See Comments)     Past Medical History:   Diagnosis Date    Bipolar disorder, unspecified     Chronic hepatitis C     History of psychiatric hospitalization     Hx of psychiatric care     Hypertension     Bessie     Obesity, unspecified     Psychiatric problem     Schizoaffective disorder, bipolar type     Seizures     Stroke     Substance abuse     Therapy       PRN meds    acetaminophen, 650 mg, Q8H PRN  glucagon (human recombinant), 1 mg, PRN  glucose, 16 g, PRN  glucose, 24 g, PRN  lactulose, 20 g, Q6H PRN  naloxone, 0.02 mg, PRN  OLANZapine, 5 mg, Q8H PRN  ondansetron, 4 mg, Q8H PRN  sodium chloride 0.9%, 10 mL, Q12H PRN        Problem: Altered Behavior (Delirium)  Goal: Improved Behavioral Control  Outcome: Ongoing, Progressing     Problem: Fall Injury Risk  Goal: Absence of Fall and Fall-Related Injury  Outcome: Ongoing, Progressing     Problem: Behavior Management  Goal: Effective Behavior Management  Outcome: Ongoing, Progressing        Orientation: disoriented to, time, place, situation     O2 Device (Oxygen Therapy): room air  Lead Monitored: Lead II Rhythm: normal sinus rhythm (Pt refused heart monitor) Frequency/Ectopy: PVCs  Cardiac/Telemetry Box Number: [id number]  VTE Required Core Measure: Patient refused interventions Last Bowel Movement: 07/17/22  Diet Cardiac  Voiding Characteristics: incontinence  Morales Score: 19  Fall Risk Score: 20  Accucheck []   Freq?      Lines/Drains/Airways       Peripheral Intravenous Line  Duration                  Peripheral IV - Single Lumen 07/20/22 0930 22 G Anterior;Distal;Left Antecubital 1 day

## 2022-07-21 NOTE — PLAN OF CARE
Pt AAOx3 .POC reviewed with pt. Pt verbalized understanding   Pt remains free of injuries and falls; fall precaution in place   Refused tele monitoring   IV saline locked; intact  C/O of pain 8/10. Meds administered   Current place: sitter at bedside; cM send placement request to [facility] facility   Bed low, side rails up x2, non slip socks in use, call light in reach   Reminded to call for assistance  Hourly rounding complete will continue to monitor

## 2022-07-21 NOTE — PLAN OF CARE
"Did not hear from [facility] central referral.  Spoke to Aleida at [facility].  She states that the referral was only sent to [facility] in [place] and physician declined patient due to medical issues.  (EF 15%)  Asked why they did not send to the [place] location.  Also requested to see if any facility within the [facility] can accept him.  Aleida will send referrals and call CM back.    Referrals to 20 facilities within 50 miles of patient's home sent via [facility].       07/21/22 1731   Post-Acute Status   Post-Acute Authorization Placement   Post-Acute Placement Status Referrals Sent   Discharge Plan   Discharge Plan A [facility]       5:30pm [facility] in [place], [place] declined.  "Too Medical".    Aleida [phone] returned call states physician from [facility] review case.  States patient's troponin and Bnp is too high.  Will not accept.  "

## 2022-07-21 NOTE — PROGRESS NOTES
[facility] Medicine  Progress Note    Patient Name: Kendall Duff  MRN: [id number]  Patient Class: IP- Inpatient   Admission Date: 7/18/2022  Length of Stay: 3 days  Attending Physician: Bobby Israel MD  Primary Care Provider: Primary Doctor No        Subjective:     Principal Problem:Coronary artery disease        HPI:  56-year-old male, PMH of CAD S/P PCI s/p CABG (04/2022 4V), Bipolar, hypertension, CVA, Hepatitis C, who was brought from the mental health unit. Pt has been under a PEC for 8 days for schizophrenia exacerbation and agitation. Pt has been having worsening confusion, dyspnea and weakness. At the behavioral unit, completed work up with troponin and CPK was done which was elevated. Patient has recently had 4V CABG, followed by wound healing issued at the sternotomy site, which reportedly has been healing well following skin grafting.     While in the ED today, ptient denies any chest pain, shortness of breath. Further work up demonstrated, HS troponin 105.2 (normal <60), BnP 2575, , CKMB 8, D-dimer 1.84. EKG demonstrated accelerated idoventicular rhythm, extreme right superior axis deviation, anterioseptal infarct, RBBB, consider also periinfarct block. He was given plavix in the ED (patient is on Eliquis for unclear reason). Referring facility requesting transfer for cardiology & interventional cardiology assistance.  Cardiology, Dr.. Brunson agreeable to consult with  admitting.       Overview/Hospital Course:  7/19/22 No acute events overnight. No change in mental status. The patient remains altered. The patient is alert and will follow with his gaze. He does not follow commands and has been non-verbal since admission, which is not his baseline. Psychiatry recs noted and ordered. CT head was negative for acute findings. Neurology was consulted and recommended an MRI brain W WO and EEG. The patient has been relatively calm since admission will rescind the CEC. 7/20/22  "Yesterday afternoon the patient began communicating with staff. He was oriented x 3. Neurology consult was cancelled d/t patient returning to baseline mental status prior to admission. MRI brain was negative. Discussed the POC with the patients family. They wish for the patient to return a mental health facility "to get his meds straight". Case management is attempting to find an accepting facility. Per Cardiology will treat with medical management. 7/21/22 No acute events overnight. MRI brain was negative. The patient is awaiting inpatient psych placement. He is medically clear for placement.       Interval History: No acute events overnight. MRI brain was negative. The patient is awaiting inpatient psych placement. He is medically clear for placement.     Review of Systems   Unable to perform ROS: Psychiatric disorder   Objective:     Vital Signs (Most Recent):  Temp: 98.1 °F (36.7 °C) (07/21/22 1602)  Pulse: 70 (07/21/22 1602)  Resp: 17 (07/21/22 1602)  BP: (!) 97/56 (07/21/22 1602)  SpO2: 97 % (07/21/22 1602)   Vital Signs (24h Range):  Temp:  [97.6 °F (36.4 °C)-98.1 °F (36.7 °C)] 98.1 °F (36.7 °C)  Pulse:  [44-80] 70  Resp:  [17-18] 17  SpO2:  [97 %-99 %] 97 %  BP: ()/(56-89) 97/56     Weight: 65.9 kg (145 lb 4.5 oz)  Body mass index is 20.85 kg/m².    Intake/Output Summary (Last 24 hours) at 7/21/2022 1656  Last data filed at 7/21/2022 1400  Gross per 24 hour   Intake 420 ml   Output 425 ml   Net -5 ml      Physical Exam  Vitals and nursing note reviewed.   Constitutional:       General: He is not in acute distress.     Appearance: Normal appearance. He is well-developed. He is not ill-appearing.   HENT:      Head: Normocephalic and atraumatic.   Eyes:      Conjunctiva/sclera: Conjunctivae normal.      Pupils: Pupils are equal, round, and reactive to light.   Cardiovascular:      Rate and Rhythm: Normal rate and regular rhythm.      Heart sounds: Normal heart sounds. No murmur heard.  Pulmonary:      " Effort: Pulmonary effort is normal. No respiratory distress.      Breath sounds: Normal breath sounds.   Abdominal:      General: Abdomen is flat. Bowel sounds are normal. There is no distension.      Palpations: Abdomen is soft.      Tenderness: There is no abdominal tenderness. There is no guarding.   Musculoskeletal:         General: No tenderness or deformity. Normal range of motion.      Cervical back: Normal range of motion and neck supple.      Right lower leg: No edema.      Left lower leg: No edema.   Skin:     General: Skin is warm and dry.      Findings: No erythema or rash.      Comments: Sternotomy scar, stitch underneath left breast   Neurological:      General: No focal deficit present.      Mental Status: He is alert. He is disoriented.      Comments: Non-verbal   Psychiatric:         Behavior: Behavior normal.       Significant Labs: All pertinent labs within the past 24 hours have been reviewed.    Significant Imaging:           Assessment/Plan:      * Coronary artery disease  S/p recent CABG  Continue home meds- Eliquis, aspirin, statin    4.6.22 he underwent a CABG x 4 with results of LIMA to LAD, rSVG to OM1, rSVG to Diag 1 and rSVG to PDA and Ligation of SOPHIA    Reported chest pain, admit for observation  Trend troponin  7/20/22  Per Cardiology will treat with medical management.   7/21/22 The patient is awaiting inpatient psych placement. He is medically clear for placement.     Altered mental status  7/19/22 No change in mental status. The patient remains altered. The patient is alert and will follow with his gaze. He does not follow commands and has been non-verbal since admission, which is not his baseline. Psychiatry recs noted and ordered. CT head was negative for acute findings. Neurology was consulted and recommended an MRI brain W WO and EEG. The patient has been relatively calm since admission will rescind the CEC.   7/20/22 Yesterday afternoon the patient began communicating with  "staff. He was oriented x 3. Neurology consult was cancelled d/t patient returning to baseline mental status prior to admission. MRI brain was negative. Discussed the POC with the patients family. They wish for the patient to return a mental health facility "to get his meds straight". Case management is attempting to find an accepting facility.   - Resolved, the patient is back to his baseline mental status. MRI brain was negative    Chronic systolic congestive heart failure  Patient is identified as having Combined Systolic and Diastolic heart failure that is Chronic. CHF is currently controlled. Latest ECHO performed and demonstrates- Results for orders placed during the hospital encounter of 04/01/22    Echo Saline Bubble? No    Interpretation Summary  · The left ventricle is normal in size with concentric hypertrophy and severely decreased systolic function.  · The estimated ejection fraction is 25-30%.  · There is severe left ventricular global hypokinesis.  · Grade II left ventricular diastolic dysfunction.  · Normal right ventricular size with mildly reduced right ventricular systolic function.  · The estimated PA systolic pressure is 52 mmHg.  · There is moderate pulmonary hypertension.  · Elevated central venous pressure (15 mmHg).  . Continue Beta Blocker and ACE/ARB and monitor clinical status closely. Monitor on telemetry. Patient is off CHF pathway.  Monitor strict Is&Os and daily weights.  Place on fluid restriction of 2 L. Continue to stress to patient importance of self efficacy and  on diet for CHF. Last BNP reviewed- and noted below   Recent Labs   Lab 07/18/22  0403   *   .      Acute DVT (deep venous thrombosis)  On Eliquis      Elevated troponin  Recent CABGx4  Trend troponin      Microcytic anemia  Hb stable      Schizoaffective disorder, bipolar type  Restart home meds as tolerated  Consult tele-psych  7/20/22 Plan for discharge back to mental health facility.   7/21/22 The " patient is awaiting inpatient psych placement. He is medically clear for placement.     Bilateral carotid artery stenosis        NSTEMI (non-ST elevated myocardial infarction)  Medical management per Cardiology    Bipolar 1 disorder  Tele-psych consulted         VTE Risk Mitigation (From admission, onward)         Ordered     apixaban tablet 5 mg  2 times daily         07/18/22 0452     IP VTE HIGH RISK PATIENT  Once         07/18/22 0337     Place sequential compression device  Until discontinued         07/18/22 0337                Discharge Planning   TRACIE:      Code Status: Full Code   Is the patient medically ready for discharge?:     Reason for patient still in hospital (select all that apply): Patient trending condition and Pending disposition  Discharge Plan A: [facility]                  Jarrod Tomlinson NP  Department of Hospital Medicine   [facility] - Telemetry ([facility])

## 2022-07-21 NOTE — ASSESSMENT & PLAN NOTE
Restart home meds as tolerated  Consult tele-psych  7/20/22 Plan for discharge back to mental health facility.   7/21/22 The patient is awaiting inpatient psych placement. He is medically clear for placement.

## 2022-07-21 NOTE — ASSESSMENT & PLAN NOTE
S/p recent CABG  Continue home meds- Eliquis, aspirin, statin    4.6.22 he underwent a CABG x 4 with results of LIMA to LAD, rSVG to OM1, rSVG to Diag 1 and rSVG to PDA and Ligation of SOPHIA    Reported chest pain, admit for observation  Trend troponin  7/20/22  Per Cardiology will treat with medical management.   7/21/22 The patient is awaiting inpatient psych placement. He is medically clear for placement.

## 2022-07-22 LAB
BNP SERPL-MCNC: 1069 PG/ML (ref 0–99)
SARS-COV-2 RDRP RESP QL NAA+PROBE: NEGATIVE
TROPONIN I SERPL DL<=0.01 NG/ML-MCNC: 0.08 NG/ML (ref 0–0.03)

## 2022-07-22 PROCEDURE — 27000207 HC ISOLATION

## 2022-07-22 PROCEDURE — 21400001 HC TELEMETRY ROOM

## 2022-07-22 PROCEDURE — 25000003 PHARM REV CODE 250: Performed by: NURSE PRACTITIONER

## 2022-07-22 PROCEDURE — 36415 COLL VENOUS BLD VENIPUNCTURE: CPT | Performed by: NURSE PRACTITIONER

## 2022-07-22 PROCEDURE — 25000003 PHARM REV CODE 250: Performed by: FAMILY MEDICINE

## 2022-07-22 PROCEDURE — S0166 INJ OLANZAPINE 2.5MG: HCPCS | Performed by: NURSE PRACTITIONER

## 2022-07-22 PROCEDURE — U0002 COVID-19 LAB TEST NON-CDC: HCPCS | Performed by: NURSE PRACTITIONER

## 2022-07-22 PROCEDURE — 84484 ASSAY OF TROPONIN QUANT: CPT | Performed by: NURSE PRACTITIONER

## 2022-07-22 PROCEDURE — 83880 ASSAY OF NATRIURETIC PEPTIDE: CPT | Performed by: NURSE PRACTITIONER

## 2022-07-22 PROCEDURE — 25000003 PHARM REV CODE 250: Performed by: STUDENT IN AN ORGANIZED HEALTH CARE EDUCATION/TRAINING PROGRAM

## 2022-07-22 PROCEDURE — 25000003 PHARM REV CODE 250: Performed by: INTERNAL MEDICINE

## 2022-07-22 RX ORDER — FUROSEMIDE 20 MG/1
20 TABLET ORAL DAILY
Status: DISCONTINUED | OUTPATIENT
Start: 2022-07-22 | End: 2022-07-26 | Stop reason: HOSPADM

## 2022-07-22 RX ADMIN — LEVETIRACETAM 500 MG: 500 TABLET, FILM COATED ORAL at 10:07

## 2022-07-22 RX ADMIN — ARIPIPRAZOLE 10 MG: 5 TABLET ORAL at 10:07

## 2022-07-22 RX ADMIN — ACETAMINOPHEN 650 MG: 325 TABLET ORAL at 10:07

## 2022-07-22 RX ADMIN — OLANZAPINE 5 MG: 10 INJECTION, POWDER, LYOPHILIZED, FOR SOLUTION INTRAMUSCULAR at 02:07

## 2022-07-22 RX ADMIN — APIXABAN 5 MG: 2.5 TABLET, FILM COATED ORAL at 08:07

## 2022-07-22 RX ADMIN — POLYETHYLENE GLYCOL 3350 17 G: 17 POWDER, FOR SOLUTION ORAL at 10:07

## 2022-07-22 RX ADMIN — ALPRAZOLAM 0.25 MG: 0.25 TABLET ORAL at 10:07

## 2022-07-22 RX ADMIN — APIXABAN 5 MG: 2.5 TABLET, FILM COATED ORAL at 10:07

## 2022-07-22 RX ADMIN — ATORVASTATIN CALCIUM 80 MG: 40 TABLET, FILM COATED ORAL at 10:07

## 2022-07-22 RX ADMIN — METOPROLOL TARTRATE 50 MG: 50 TABLET, FILM COATED ORAL at 08:07

## 2022-07-22 RX ADMIN — MUPIROCIN: 20 OINTMENT TOPICAL at 10:07

## 2022-07-22 RX ADMIN — LOSARTAN POTASSIUM 25 MG: 25 TABLET, FILM COATED ORAL at 10:07

## 2022-07-22 RX ADMIN — FUROSEMIDE 20 MG: 20 TABLET ORAL at 02:07

## 2022-07-22 RX ADMIN — ALPRAZOLAM 0.25 MG: 0.25 TABLET ORAL at 11:07

## 2022-07-22 RX ADMIN — MUPIROCIN: 20 OINTMENT TOPICAL at 08:07

## 2022-07-22 RX ADMIN — ASPIRIN 81 MG CHEWABLE TABLET 81 MG: 81 TABLET CHEWABLE at 10:07

## 2022-07-22 RX ADMIN — LEVETIRACETAM 500 MG: 500 TABLET, FILM COATED ORAL at 08:07

## 2022-07-22 NOTE — PROGRESS NOTES
[facility] Medicine  Progress Note    Patient Name: Kendall Duff  MRN: [id number]  Patient Class: IP- Inpatient   Admission Date: 7/18/2022  Length of Stay: 4 days  Attending Physician: Bobby Israel MD  Primary Care Provider: Primary Doctor No        Subjective:     Principal Problem:Coronary artery disease        HPI:  56-year-old male, PMH of CAD S/P PCI s/p CABG (04/2022 4V), Bipolar, hypertension, CVA, Hepatitis C, who was brought from the mental health unit. Pt has been under a PEC for 8 days for schizophrenia exacerbation and agitation. Pt has been having worsening confusion, dyspnea and weakness. At the behavioral unit, completed work up with troponin and CPK was done which was elevated. Patient has recently had 4V CABG, followed by wound healing issued at the sternotomy site, which reportedly has been healing well following skin grafting.     While in the ED today, ptient denies any chest pain, shortness of breath. Further work up demonstrated, HS troponin 105.2 (normal <60), BnP 2575, , CKMB 8, D-dimer 1.84. EKG demonstrated accelerated idoventicular rhythm, extreme right superior axis deviation, anterioseptal infarct, RBBB, consider also periinfarct block. He was given plavix in the ED (patient is on Eliquis for unclear reason). Referring facility requesting transfer for cardiology & interventional cardiology assistance.  Cardiology, Dr.. Brunson agreeable to consult with  admitting.       Overview/Hospital Course:  7/19/22 No acute events overnight. No change in mental status. The patient remains altered. The patient is alert and will follow with his gaze. He does not follow commands and has been non-verbal since admission, which is not his baseline. Psychiatry recs noted and ordered. CT head was negative for acute findings. Neurology was consulted and recommended an MRI brain W WO and EEG. The patient has been relatively calm since admission will rescind the CEC. 7/20/22  "Yesterday afternoon the patient began communicating with staff. He was oriented x 3. Neurology consult was cancelled d/t patient returning to baseline mental status prior to admission. MRI brain was negative. Discussed the POC with the patients family. They wish for the patient to return a mental health facility "to get his meds straight". Case management is attempting to find an accepting facility. Per Cardiology will treat with medical management. 7/21/22 No acute events overnight. MRI brain was negative. The patient is awaiting inpatient psych placement. He is medically clear for placement.     7/22: BNP: 1069, Trop: 0.076, initiated on Lasix. Vitals stable, continues to be confused at times, redirectable, able to speak. Has delusions. Non-violent. Pending placement. Vitals stable.       Interval History: No acute events, start lasix. Awaiting placement    Review of Systems   Reason unable to perform ROS: Limited due to confusion.   Constitutional:  Positive for fatigue.   HENT: Negative.     Respiratory: Negative.     Cardiovascular: Negative.    Gastrointestinal: Negative.    Musculoskeletal:  Positive for back pain.   Hematological: Negative.    Psychiatric/Behavioral:  The patient is nervous/anxious.    Objective:     Vital Signs (Most Recent):  Temp: 97.9 °F (36.6 °C) (07/22/22 0814)  Pulse: 65 (07/22/22 0814)  Resp: 18 (07/22/22 0814)  BP: 109/63 (07/22/22 1004)  SpO2: 100 % (07/22/22 0814) Vital Signs (24h Range):  Temp:  [97.9 °F (36.6 °C)-98.9 °F (37.2 °C)] 97.9 °F (36.6 °C)  Pulse:  [62-74] 65  Resp:  [17-18] 18  SpO2:  [97 %-100 %] 100 %  BP: ()/(53-72) 109/63     Weight: 68.8 kg (151 lb 10.8 oz)  Body mass index is 21.76 kg/m².    Intake/Output Summary (Last 24 hours) at 7/22/2022 1338  Last data filed at 7/22/2022 1000  Gross per 24 hour   Intake 420 ml   Output 300 ml   Net 120 ml      Physical Exam  Vitals and nursing note reviewed.   Constitutional:       General: He is not in acute " distress.     Appearance: He is underweight. He is not ill-appearing or diaphoretic.   HENT:      Head: Normocephalic and atraumatic.      Right Ear: Hearing and external ear normal.      Left Ear: Hearing and external ear normal.      Nose: Nose normal. No mucosal edema or rhinorrhea.      Mouth/Throat:      Pharynx: Uvula midline.   Eyes:      General:         Right eye: No discharge.         Left eye: No discharge.      Conjunctiva/sclera: Conjunctivae normal.      Right eye: No chemosis.     Left eye: No chemosis.     Pupils: Pupils are equal, round, and reactive to light.   Neck:      Thyroid: No thyroid mass or thyromegaly.      Trachea: Trachea normal.   Cardiovascular:      Rate and Rhythm: Normal rate and regular rhythm.      Pulses:           Dorsalis pedis pulses are 2+ on the right side and 2+ on the left side.      Heart sounds: Normal heart sounds. No murmur heard.  Pulmonary:      Effort: Pulmonary effort is normal. No respiratory distress.      Breath sounds: Examination of the right-lower field reveals decreased breath sounds. Examination of the left-lower field reveals decreased breath sounds. Decreased breath sounds present. No wheezing.   Chest:   Breasts:      Right: No supraclavicular adenopathy.      Left: No supraclavicular adenopathy.     Abdominal:      General: Bowel sounds are increased. There is no distension.      Palpations: Abdomen is soft.      Tenderness: There is no abdominal tenderness.   Musculoskeletal:         General: Normal range of motion.      Cervical back: Normal range of motion and neck supple.   Lymphadenopathy:      Cervical: No cervical adenopathy.      Upper Body:      Right upper body: No supraclavicular adenopathy.      Left upper body: No supraclavicular adenopathy.   Skin:     General: Skin is warm and dry.      Capillary Refill: Capillary refill takes less than 2 seconds.      Findings: No rash.   Neurological:      Mental Status: He is alert. Mental status is  at baseline. He is confused.   Psychiatric:         Behavior: Behavior is hyperactive.         Thought Content: Thought content is delusional.         Cognition and Memory: He exhibits impaired recent memory and impaired remote memory.         Judgment: Judgment is impulsive.       Significant Labs: All pertinent labs within the past 24 hours have been reviewed.  CBC:   Recent Labs   Lab 07/21/22  0514   WBC 6.02   HGB 10.7*   HCT 35.2*        CMP:   Recent Labs   Lab 07/21/22  0514      K 4.4      CO2 23   *   BUN 25*   CREATININE 0.8   CALCIUM 8.9   PROT 6.8   ALBUMIN 3.0*   BILITOT 0.3   ALKPHOS 76   AST 25   ALT 18   ANIONGAP 9   EGFRNONAA >60       Significant Imaging: I have reviewed all pertinent imaging results/findings within the past 24 hours.      Assessment/Plan:      * Coronary artery disease  S/p recent CABG  Continue home meds- Eliquis, aspirin, statin    4.6.22 he underwent a CABG x 4 with results of LIMA to LAD, rSVG to OM1, rSVG to Diag 1 and rSVG to PDA and Ligation of SOPHIA    Reported chest pain, admit for observation  Trend troponin  -- Per Cardiology will treat with medical management.   --The patient is awaiting inpatient psych placement. He is medically clear for placement.       Altered mental status  7/19/22 No change in mental status. The patient remains altered. The patient is alert and will follow with his gaze. He does not follow commands and has been non-verbal since admission, which is not his baseline. Psychiatry recs noted and ordered. CT head was negative for acute findings. Neurology was consulted and recommended an MRI brain W WO and EEG. The patient has been relatively calm since admission will rescind the CEC.   7/20/22 Yesterday afternoon the patient began communicating with staff. He was oriented x 3. Neurology consult was cancelled d/t patient returning to baseline mental status prior to admission. MRI brain was negative. Discussed the POC with the  "patients family. They wish for the patient to return a mental health facility "to get his meds straight". Case management is attempting to find an accepting facility.   - Resolved, the patient is back to his baseline mental status. MRI brain was negative    Chronic systolic congestive heart failure  Patient is identified as having Combined Systolic and Diastolic heart failure that is Chronic. CHF is currently controlled. Latest ECHO performed and demonstrates- Results for orders placed during the hospital encounter of 04/01/22    Echo Saline Bubble? No    Interpretation Summary  · The left ventricle is normal in size with concentric hypertrophy and severely decreased systolic function.  · The estimated ejection fraction is 25-30%.  · There is severe left ventricular global hypokinesis.  · Grade II left ventricular diastolic dysfunction.  · Normal right ventricular size with mildly reduced right ventricular systolic function.  · The estimated PA systolic pressure is 52 mmHg.  · There is moderate pulmonary hypertension.  · Elevated central venous pressure (15 mmHg).  . Continue Beta Blocker and ACE/ARB and monitor clinical status closely. Monitor on telemetry. Patient is off CHF pathway.  Monitor strict Is&Os and daily weights.  Place on fluid restriction of 2 L. Continue to stress to patient importance of self efficacy and  on diet for CHF. Last BNP reviewed- and noted below   Recent Labs   Lab 07/22/22  0918   BNP 1,069*   .  --Initiate Lasix    Acute DVT (deep venous thrombosis)  On Eliquis      Elevated troponin  Recent CABGx4  Trend troponin      Microcytic anemia  Hb stable      Schizoaffective disorder, bipolar type  Restart home meds as tolerated  Consult tele-psych  --Plan for discharge back to mental health facility.   --The patient is awaiting inpatient psych placement. He is medically clear for placement.     Bilateral carotid artery stenosis        NSTEMI (non-ST elevated myocardial " infarction)  Medical management per Cardiology    Bipolar 1 disorder  Tele-psych consulted         VTE Risk Mitigation (From admission, onward)         Ordered     apixaban tablet 5 mg  2 times daily         07/18/22 0452     IP VTE HIGH RISK PATIENT  Once         07/18/22 0337     Place sequential compression device  Until discontinued         07/18/22 0337                Discharge Planning   TRACIE:      Code Status: Full Code   Is the patient medically ready for discharge?:     Reason for patient still in hospital (select all that apply): Patient trending condition  Discharge Plan A: Psychiatric hospital                  Antonina Arroyo NP  Department of Hospital Medicine   [facility] - Telemetry ([facility])

## 2022-07-22 NOTE — NURSING
Linens changed.  Discussed discharge planning.  Discussed risk related to smoking.  Pt continues to voice desire to smoke after discharge.  Will continue to provide education related to diet and life style.

## 2022-07-22 NOTE — PLAN OF CARE
Bedside shift report completed with oncoming staff about plan of care  Safety measures intact per patient needs and current fall score and accompanied assessment findings  Care plan reviewed with patient, no new questions at this time regarding plan of care.   Will continue to monitor per hourly rounding and unit practice/ policy.

## 2022-07-22 NOTE — PLAN OF CARE
Scanned PEC into [facility]. [facility] to arrange psych placement not pt is PEC'd.

## 2022-07-22 NOTE — ASSESSMENT & PLAN NOTE
Restart home meds as tolerated  Consult tele-psych  --Plan for discharge back to mental health facility.   --The patient is awaiting inpatient psych placement. He is medically clear for placement.    No

## 2022-07-22 NOTE — ASSESSMENT & PLAN NOTE
S/p recent CABG  Continue home meds- Eliquis, aspirin, statin    4.6.22 he underwent a CABG x 4 with results of LIMA to LAD, rSVG to OM1, rSVG to Diag 1 and rSVG to PDA and Ligation of SOPHIA    Reported chest pain, admit for observation  Trend troponin  -- Per Cardiology will treat with medical management.   --The patient is awaiting inpatient psych placement. He is medically clear for placement.

## 2022-07-22 NOTE — NURSING
"Pt sitting up in chair at bedside.  Resp even, non labored.  States "not hurting anywhere"  Medication effective.  Provided snack per Cardiac diet.  Pt with rambling speech and requires frequent redirection to maintain safety precautions.  Will continue to monitor.  "

## 2022-07-22 NOTE — ASSESSMENT & PLAN NOTE
Patient is identified as having Combined Systolic and Diastolic heart failure that is Chronic. CHF is currently controlled. Latest ECHO performed and demonstrates- Results for orders placed during the hospital encounter of 04/01/22    Echo Saline Bubble? No    Interpretation Summary  · The left ventricle is normal in size with concentric hypertrophy and severely decreased systolic function.  · The estimated ejection fraction is 25-30%.  · There is severe left ventricular global hypokinesis.  · Grade II left ventricular diastolic dysfunction.  · Normal right ventricular size with mildly reduced right ventricular systolic function.  · The estimated PA systolic pressure is 52 mmHg.  · There is moderate pulmonary hypertension.  · Elevated central venous pressure (15 mmHg).  . Continue Beta Blocker and ACE/ARB and monitor clinical status closely. Monitor on telemetry. Patient is off CHF pathway.  Monitor strict Is&Os and daily weights.  Place on fluid restriction of 2 L. Continue to stress to patient importance of self efficacy and  on diet for CHF. Last BNP reviewed- and noted below   Recent Labs   Lab 07/22/22  0918   BNP 1,069*   .  --Initiate Lasix

## 2022-07-22 NOTE — PLAN OF CARE
Pt AAOx3 .POC reviewed with pt. Pt verbalized understanding   Pt remains free of injuries and falls; fall precaution in place   Refused tele monitoring   IV saline locked; intact  No C/O of pain  Current place: sitter at bedside; [facility]  Bed low, side rails up x2, non slip socks in use, call light in reach   Reminded to call for assistance  Hourly rounding complete will continue to monitor

## 2022-07-22 NOTE — SUBJECTIVE & OBJECTIVE
Interval History: No acute events, start lasix. Awaiting placement    Review of Systems   Reason unable to perform ROS: Limited due to confusion.   Constitutional:  Positive for fatigue.   HENT: Negative.     Respiratory: Negative.     Cardiovascular: Negative.    Gastrointestinal: Negative.    Musculoskeletal:  Positive for back pain.   Hematological: Negative.    Psychiatric/Behavioral:  The patient is nervous/anxious.    Objective:     Vital Signs (Most Recent):  Temp: 97.9 °F (36.6 °C) (07/22/22 0814)  Pulse: 65 (07/22/22 0814)  Resp: 18 (07/22/22 0814)  BP: 109/63 (07/22/22 1004)  SpO2: 100 % (07/22/22 0814) Vital Signs (24h Range):  Temp:  [97.9 °F (36.6 °C)-98.9 °F (37.2 °C)] 97.9 °F (36.6 °C)  Pulse:  [62-74] 65  Resp:  [17-18] 18  SpO2:  [97 %-100 %] 100 %  BP: ()/(53-72) 109/63     Weight: 68.8 kg (151 lb 10.8 oz)  Body mass index is 21.76 kg/m².    Intake/Output Summary (Last 24 hours) at 7/22/2022 1338  Last data filed at 7/22/2022 1000  Gross per 24 hour   Intake 420 ml   Output 300 ml   Net 120 ml      Physical Exam  Vitals and nursing note reviewed.   Constitutional:       General: He is not in acute distress.     Appearance: He is underweight. He is not ill-appearing or diaphoretic.   HENT:      Head: Normocephalic and atraumatic.      Right Ear: Hearing and external ear normal.      Left Ear: Hearing and external ear normal.      Nose: Nose normal. No mucosal edema or rhinorrhea.      Mouth/Throat:      Pharynx: Uvula midline.   Eyes:      General:         Right eye: No discharge.         Left eye: No discharge.      Conjunctiva/sclera: Conjunctivae normal.      Right eye: No chemosis.     Left eye: No chemosis.     Pupils: Pupils are equal, round, and reactive to light.   Neck:      Thyroid: No thyroid mass or thyromegaly.      Trachea: Trachea normal.   Cardiovascular:      Rate and Rhythm: Normal rate and regular rhythm.      Pulses:           Dorsalis pedis pulses are 2+ on the right side  and 2+ on the left side.      Heart sounds: Normal heart sounds. No murmur heard.  Pulmonary:      Effort: Pulmonary effort is normal. No respiratory distress.      Breath sounds: Examination of the right-lower field reveals decreased breath sounds. Examination of the left-lower field reveals decreased breath sounds. Decreased breath sounds present. No wheezing.   Chest:   Breasts:      Right: No supraclavicular adenopathy.      Left: No supraclavicular adenopathy.     Abdominal:      General: Bowel sounds are increased. There is no distension.      Palpations: Abdomen is soft.      Tenderness: There is no abdominal tenderness.   Musculoskeletal:         General: Normal range of motion.      Cervical back: Normal range of motion and neck supple.   Lymphadenopathy:      Cervical: No cervical adenopathy.      Upper Body:      Right upper body: No supraclavicular adenopathy.      Left upper body: No supraclavicular adenopathy.   Skin:     General: Skin is warm and dry.      Capillary Refill: Capillary refill takes less than 2 seconds.      Findings: No rash.   Neurological:      Mental Status: He is alert. Mental status is at baseline. He is confused.   Psychiatric:         Behavior: Behavior is hyperactive.         Thought Content: Thought content is delusional.         Cognition and Memory: He exhibits impaired recent memory and impaired remote memory.         Judgment: Judgment is impulsive.       Significant Labs: All pertinent labs within the past 24 hours have been reviewed.  CBC:   Recent Labs   Lab 07/21/22  0514   WBC 6.02   HGB 10.7*   HCT 35.2*        CMP:   Recent Labs   Lab 07/21/22  0514      K 4.4      CO2 23   *   BUN 25*   CREATININE 0.8   CALCIUM 8.9   PROT 6.8   ALBUMIN 3.0*   BILITOT 0.3   ALKPHOS 76   AST 25   ALT 18   ANIONGAP 9   EGFRNONAA >60       Significant Imaging: I have reviewed all pertinent imaging results/findings within the past 24 hours.

## 2022-07-22 NOTE — PLAN OF CARE
Sent multiple referrals via email for [facility] psych placement. Weekend CM staff copied on all referrals sent.   Referrals sent to the following facilities:     Name:    Phone:  [facility] [phone]   [facility] [phone]   [facility] [phone]   [facility] [phone]   [facility] [phone]   [facility] [phone]   [facility] [phone]   [facility] [phone]   [facility] [phone]

## 2022-07-23 PROCEDURE — 25000003 PHARM REV CODE 250: Performed by: INTERNAL MEDICINE

## 2022-07-23 PROCEDURE — 25000003 PHARM REV CODE 250: Performed by: NURSE PRACTITIONER

## 2022-07-23 PROCEDURE — 25000003 PHARM REV CODE 250: Performed by: STUDENT IN AN ORGANIZED HEALTH CARE EDUCATION/TRAINING PROGRAM

## 2022-07-23 PROCEDURE — 21400001 HC TELEMETRY ROOM

## 2022-07-23 PROCEDURE — 25000003 PHARM REV CODE 250: Performed by: FAMILY MEDICINE

## 2022-07-23 RX ADMIN — LEVETIRACETAM 500 MG: 500 TABLET, FILM COATED ORAL at 08:07

## 2022-07-23 RX ADMIN — APIXABAN 5 MG: 2.5 TABLET, FILM COATED ORAL at 08:07

## 2022-07-23 RX ADMIN — METOPROLOL TARTRATE 50 MG: 50 TABLET, FILM COATED ORAL at 08:07

## 2022-07-23 RX ADMIN — FUROSEMIDE 20 MG: 20 TABLET ORAL at 08:07

## 2022-07-23 RX ADMIN — ISOSORBIDE MONONITRATE 30 MG: 30 TABLET, EXTENDED RELEASE ORAL at 08:07

## 2022-07-23 RX ADMIN — LOSARTAN POTASSIUM 25 MG: 25 TABLET, FILM COATED ORAL at 08:07

## 2022-07-23 RX ADMIN — ATORVASTATIN CALCIUM 80 MG: 40 TABLET, FILM COATED ORAL at 08:07

## 2022-07-23 RX ADMIN — ARIPIPRAZOLE 10 MG: 5 TABLET ORAL at 08:07

## 2022-07-23 RX ADMIN — POLYETHYLENE GLYCOL 3350 17 G: 17 POWDER, FOR SOLUTION ORAL at 08:07

## 2022-07-23 RX ADMIN — ALPRAZOLAM 0.25 MG: 0.25 TABLET ORAL at 05:07

## 2022-07-23 RX ADMIN — ACETAMINOPHEN 650 MG: 325 TABLET ORAL at 08:07

## 2022-07-23 RX ADMIN — MUPIROCIN: 20 OINTMENT TOPICAL at 08:07

## 2022-07-23 RX ADMIN — ASPIRIN 81 MG CHEWABLE TABLET 81 MG: 81 TABLET CHEWABLE at 08:07

## 2022-07-23 RX ADMIN — MUPIROCIN: 20 OINTMENT TOPICAL at 09:07

## 2022-07-23 NOTE — ASSESSMENT & PLAN NOTE
S/p recent CABG  Continue home meds- Eliquis, aspirin, statin    4.6.22 he underwent a CABG x 4 with results of LIMA to LAD, rSVG to OM1, rSVG to Diag 1 and rSVG to PDA and Ligation of SOPHIA    Reported chest pain, admit for observation  Trend troponin- flat  -- Per Cardiology will treat with medical management.   --The patient is awaiting inpatient psych placement. He is medically clear for placement.

## 2022-07-23 NOTE — ASSESSMENT & PLAN NOTE
Patient is identified as having Combined Systolic and Diastolic heart failure that is Chronic. CHF is currently controlled. Latest ECHO performed and demonstrates- Results for orders placed during the hospital encounter of 04/01/22    Echo Saline Bubble? No    Interpretation Summary  · The left ventricle is normal in size with concentric hypertrophy and severely decreased systolic function.  · The estimated ejection fraction is 25-30%.  · There is severe left ventricular global hypokinesis.  · Grade II left ventricular diastolic dysfunction.  · Normal right ventricular size with mildly reduced right ventricular systolic function.  · The estimated PA systolic pressure is 52 mmHg.  · There is moderate pulmonary hypertension.  · Elevated central venous pressure (15 mmHg).  . Continue Beta Blocker and ACE/ARB and monitor clinical status closely. Monitor on telemetry. Patient is off CHF pathway.  Monitor strict Is&Os and daily weights.  Place on fluid restriction of 2 L. Continue to stress to patient importance of self efficacy and  on diet for CHF. Last BNP reviewed- and noted below   Recent Labs   Lab 07/22/22  0918   BNP 1,069*   .  --Continue Lasix

## 2022-07-23 NOTE — PROGRESS NOTES
[facility] Medicine  Progress Note    Patient Name: Kendall Duff  MRN: [id number]  Patient Class: IP- Inpatient   Admission Date: 7/18/2022  Length of Stay: 5 days  Attending Physician: Harjit Ruiz MD  Primary Care Provider: Primary Doctor No        Subjective:     Principal Problem:Coronary artery disease        HPI:  56-year-old male, PMH of CAD S/P PCI s/p CABG (04/2022 4V), Bipolar, hypertension, CVA, Hepatitis C, who was brought from the mental health unit. Pt has been under a PEC for 8 days for schizophrenia exacerbation and agitation. Pt has been having worsening confusion, dyspnea and weakness. At the behavioral unit, completed work up with troponin and CPK was done which was elevated. Patient has recently had 4V CABG, followed by wound healing issued at the sternotomy site, which reportedly has been healing well following skin grafting.     While in the ED today, ptient denies any chest pain, shortness of breath. Further work up demonstrated, HS troponin 105.2 (normal <60), BnP 2575, , CKMB 8, D-dimer 1.84. EKG demonstrated accelerated idoventicular rhythm, extreme right superior axis deviation, anterioseptal infarct, RBBB, consider also periinfarct block. He was given plavix in the ED (patient is on Eliquis for unclear reason). Referring facility requesting transfer for cardiology & interventional cardiology assistance.  Cardiology, Dr.. Brunson agreeable to consult with  admitting.       Overview/Hospital Course:  7/19/22 No acute events overnight. No change in mental status. The patient remains altered. The patient is alert and will follow with his gaze. He does not follow commands and has been non-verbal since admission, which is not his baseline. Psychiatry recs noted and ordered. CT head was negative for acute findings. Neurology was consulted and recommended an MRI brain W WO and EEG. The patient has been relatively calm since admission will rescind the CEC.  "7/20/22 Yesterday afternoon the patient began communicating with staff. He was oriented x 3. Neurology consult was cancelled d/t patient returning to baseline mental status prior to admission. MRI brain was negative. Discussed the POC with the patients family. They wish for the patient to return a mental health facility "to get his meds straight". Case management is attempting to find an accepting facility. Per Cardiology will treat with medical management. 7/21/22 No acute events overnight. MRI brain was negative. The patient is awaiting inpatient psych placement. He is medically clear for placement.     7/22: BNP: 1069, Trop: 0.076, initiated on Lasix. Vitals stable, continues to be confused at times, redirectable, able to speak. Has delusions. Non-violent. Pending placement. Vitals stable.     7/23: Multiple referrals sent out seeking placement, referral today sent to [facility] for psych placement. Repeat labs in AM, patient stable.       Interval History: No acute events, pending placement.     Review of Systems   Reason unable to perform ROS: Limited due to confusion.   Constitutional:  Positive for fatigue.   HENT: Negative.     Respiratory: Negative.     Cardiovascular: Negative.    Gastrointestinal: Negative.    Musculoskeletal:  Positive for back pain.   Hematological: Negative.    Psychiatric/Behavioral:  The patient is nervous/anxious.    Objective:     Vital Signs (Most Recent):  Temp: 97.8 °F (36.6 °C) (07/23/22 1523)  Pulse: 78 (07/23/22 1523)  Resp: 17 (07/23/22 1523)  BP: 120/82 (07/23/22 1523)  SpO2: 97 % (07/23/22 1523) Vital Signs (24h Range):  Temp:  [97.4 °F (36.3 °C)-98.2 °F (36.8 °C)] 97.8 °F (36.6 °C)  Pulse:  [75-93] 78  Resp:  [16-18] 17  SpO2:  [97 %-100 %] 97 %  BP: (104-149)/(77-93) 120/82     Weight: 68.8 kg (151 lb 10.8 oz)  Body mass index is 21.76 kg/m².  No intake or output data in the 24 hours ending 07/23/22 1627   Physical Exam  Vitals and nursing note reviewed.   Constitutional: "       General: He is not in acute distress.     Appearance: He is underweight. He is not ill-appearing or diaphoretic.   HENT:      Head: Normocephalic and atraumatic.      Right Ear: Hearing and external ear normal.      Left Ear: Hearing and external ear normal.      Nose: Nose normal. No mucosal edema or rhinorrhea.      Mouth/Throat:      Pharynx: Uvula midline.   Eyes:      General:         Right eye: No discharge.         Left eye: No discharge.      Conjunctiva/sclera: Conjunctivae normal.      Right eye: No chemosis.     Left eye: No chemosis.     Pupils: Pupils are equal, round, and reactive to light.   Neck:      Thyroid: No thyroid mass or thyromegaly.      Trachea: Trachea normal.   Cardiovascular:      Rate and Rhythm: Normal rate and regular rhythm.      Pulses:           Dorsalis pedis pulses are 2+ on the right side and 2+ on the left side.      Heart sounds: Normal heart sounds. No murmur heard.  Pulmonary:      Effort: Pulmonary effort is normal. No respiratory distress.      Breath sounds: Examination of the right-lower field reveals decreased breath sounds. Examination of the left-lower field reveals decreased breath sounds. Decreased breath sounds present. No wheezing.   Chest:   Breasts:      Right: No supraclavicular adenopathy.      Left: No supraclavicular adenopathy.     Abdominal:      General: Bowel sounds are increased. There is no distension.      Palpations: Abdomen is soft.      Tenderness: There is no abdominal tenderness.   Musculoskeletal:         General: Normal range of motion.      Cervical back: Normal range of motion and neck supple.   Lymphadenopathy:      Cervical: No cervical adenopathy.      Upper Body:      Right upper body: No supraclavicular adenopathy.      Left upper body: No supraclavicular adenopathy.   Skin:     General: Skin is warm and dry.      Capillary Refill: Capillary refill takes less than 2 seconds.      Findings: No rash.   Neurological:      Mental  "Status: He is alert. Mental status is at baseline. He is confused.   Psychiatric:         Behavior: Behavior is not hyperactive.         Thought Content: Thought content is not delusional.         Cognition and Memory: He exhibits impaired recent memory. He does not exhibit impaired remote memory.         Judgment: Judgment is impulsive.      Comments: Improved today, not having delusions       Significant Labs: All pertinent labs within the past 24 hours have been reviewed.    Significant Imaging: I have reviewed all pertinent imaging results/findings within the past 24 hours.      Assessment/Plan:      * Coronary artery disease  S/p recent CABG  Continue home meds- Eliquis, aspirin, statin    4.6.22 he underwent a CABG x 4 with results of LIMA to LAD, rSVG to OM1, rSVG to Diag 1 and rSVG to PDA and Ligation of SOPHIA    Reported chest pain, admit for observation  Trend troponin- flat  -- Per Cardiology will treat with medical management.   --The patient is awaiting inpatient psych placement. He is medically clear for placement.       Altered mental status  7/19/22 No change in mental status. The patient remains altered. The patient is alert and will follow with his gaze. He does not follow commands and has been non-verbal since admission, which is not his baseline. Psychiatry recs noted and ordered. CT head was negative for acute findings. Neurology was consulted and recommended an MRI brain W WO and EEG. The patient has been relatively calm since admission will rescind the CEC.   7/20/22 Yesterday afternoon the patient began communicating with staff. He was oriented x 3. Neurology consult was cancelled d/t patient returning to baseline mental status prior to admission. MRI brain was negative. Discussed the POC with the patients family. They wish for the patient to return a mental health facility "to get his meds straight". Case management is attempting to find an accepting facility.   - Resolved, the patient is back " to his baseline mental status. MRI brain was negative    Chronic systolic congestive heart failure  Patient is identified as having Combined Systolic and Diastolic heart failure that is Chronic. CHF is currently controlled. Latest ECHO performed and demonstrates- Results for orders placed during the hospital encounter of 04/01/22    Echo Saline Bubble? No    Interpretation Summary  · The left ventricle is normal in size with concentric hypertrophy and severely decreased systolic function.  · The estimated ejection fraction is 25-30%.  · There is severe left ventricular global hypokinesis.  · Grade II left ventricular diastolic dysfunction.  · Normal right ventricular size with mildly reduced right ventricular systolic function.  · The estimated PA systolic pressure is 52 mmHg.  · There is moderate pulmonary hypertension.  · Elevated central venous pressure (15 mmHg).  . Continue Beta Blocker and ACE/ARB and monitor clinical status closely. Monitor on telemetry. Patient is off CHF pathway.  Monitor strict Is&Os and daily weights.  Place on fluid restriction of 2 L. Continue to stress to patient importance of self efficacy and  on diet for CHF. Last BNP reviewed- and noted below   Recent Labs   Lab 07/22/22  0918   BNP 1,069*   .  --Continue Lasix    Acute DVT (deep venous thrombosis)  On Eliquis      Elevated troponin  Recent CABGx4  Trend troponin- flat      Microcytic anemia  Hb stable      Schizoaffective disorder, bipolar type  Restart home meds as tolerated  Consult tele-psych  --Plan for discharge back to mental health facility.   --The patient is awaiting inpatient psych placement. He is medically clear for placement.     Bilateral carotid artery stenosis        NSTEMI (non-ST elevated myocardial infarction)  Medical management per Cardiology    Bipolar 1 disorder  Tele-psych consulted         VTE Risk Mitigation (From admission, onward)         Ordered     apixaban tablet 5 mg  2 times daily          07/18/22 0452     IP VTE HIGH RISK PATIENT  Once         07/18/22 0337     Place sequential compression device  Until discontinued         07/18/22 0337                Discharge Planning   TRACIE:      Code Status: Full Code   Is the patient medically ready for discharge?:     Reason for patient still in hospital (select all that apply): Patient trending condition and Pending disposition  Discharge Plan A: [facility]                  Antonina Arroyo NP  Department of Hospital Medicine   [facility])

## 2022-07-23 NOTE — ASSESSMENT & PLAN NOTE
Restart home meds as tolerated  Consult tele-psych  --Plan for discharge back to mental health facility.   --The patient is awaiting inpatient psych placement. He is medically clear for placement.

## 2022-07-23 NOTE — SUBJECTIVE & OBJECTIVE
Interval History: No acute events, pending placement.     Review of Systems   Reason unable to perform ROS: Limited due to confusion.   Constitutional:  Positive for fatigue.   HENT: Negative.     Respiratory: Negative.     Cardiovascular: Negative.    Gastrointestinal: Negative.    Musculoskeletal:  Positive for back pain.   Hematological: Negative.    Psychiatric/Behavioral:  The patient is nervous/anxious.    Objective:     Vital Signs (Most Recent):  Temp: 97.8 °F (36.6 °C) (07/23/22 1523)  Pulse: 78 (07/23/22 1523)  Resp: 17 (07/23/22 1523)  BP: 120/82 (07/23/22 1523)  SpO2: 97 % (07/23/22 1523) Vital Signs (24h Range):  Temp:  [97.4 °F (36.3 °C)-98.2 °F (36.8 °C)] 97.8 °F (36.6 °C)  Pulse:  [75-93] 78  Resp:  [16-18] 17  SpO2:  [97 %-100 %] 97 %  BP: (104-149)/(77-93) 120/82     Weight: 68.8 kg (151 lb 10.8 oz)  Body mass index is 21.76 kg/m².  No intake or output data in the 24 hours ending 07/23/22 1627   Physical Exam  Vitals and nursing note reviewed.   Constitutional:       General: He is not in acute distress.     Appearance: He is underweight. He is not ill-appearing or diaphoretic.   HENT:      Head: Normocephalic and atraumatic.      Right Ear: Hearing and external ear normal.      Left Ear: Hearing and external ear normal.      Nose: Nose normal. No mucosal edema or rhinorrhea.      Mouth/Throat:      Pharynx: Uvula midline.   Eyes:      General:         Right eye: No discharge.         Left eye: No discharge.      Conjunctiva/sclera: Conjunctivae normal.      Right eye: No chemosis.     Left eye: No chemosis.     Pupils: Pupils are equal, round, and reactive to light.   Neck:      Thyroid: No thyroid mass or thyromegaly.      Trachea: Trachea normal.   Cardiovascular:      Rate and Rhythm: Normal rate and regular rhythm.      Pulses:           Dorsalis pedis pulses are 2+ on the right side and 2+ on the left side.      Heart sounds: Normal heart sounds. No murmur heard.  Pulmonary:      Effort:  Pulmonary effort is normal. No respiratory distress.      Breath sounds: Examination of the right-lower field reveals decreased breath sounds. Examination of the left-lower field reveals decreased breath sounds. Decreased breath sounds present. No wheezing.   Chest:   Breasts:      Right: No supraclavicular adenopathy.      Left: No supraclavicular adenopathy.     Abdominal:      General: Bowel sounds are increased. There is no distension.      Palpations: Abdomen is soft.      Tenderness: There is no abdominal tenderness.   Musculoskeletal:         General: Normal range of motion.      Cervical back: Normal range of motion and neck supple.   Lymphadenopathy:      Cervical: No cervical adenopathy.      Upper Body:      Right upper body: No supraclavicular adenopathy.      Left upper body: No supraclavicular adenopathy.   Skin:     General: Skin is warm and dry.      Capillary Refill: Capillary refill takes less than 2 seconds.      Findings: No rash.   Neurological:      Mental Status: He is alert. Mental status is at baseline. He is confused.   Psychiatric:         Behavior: Behavior is not hyperactive.         Thought Content: Thought content is not delusional.         Cognition and Memory: He exhibits impaired recent memory. He does not exhibit impaired remote memory.         Judgment: Judgment is impulsive.      Comments: Improved today, not having delusions       Significant Labs: All pertinent labs within the past 24 hours have been reviewed.    Significant Imaging: I have reviewed all pertinent imaging results/findings within the past 24 hours.

## 2022-07-23 NOTE — CONSULTS
David sent LTAC referral to [facility] in [place] per MD request. DAVID spoke with Isamar who confirms receipt of referral. She will review and follow up with DAVID.

## 2022-07-24 PROBLEM — R41.82 ALTERED MENTAL STATUS: Status: RESOLVED | Noted: 2022-07-19 | Resolved: 2022-07-24

## 2022-07-24 LAB
BASOPHILS # BLD AUTO: 0.05 K/UL (ref 0–0.2)
BASOPHILS NFR BLD: 0.9 % (ref 0–1.9)
DIFFERENTIAL METHOD: ABNORMAL
EOSINOPHIL # BLD AUTO: 0.2 K/UL (ref 0–0.5)
EOSINOPHIL NFR BLD: 4.2 % (ref 0–8)
ERYTHROCYTE [DISTWIDTH] IN BLOOD BY AUTOMATED COUNT: 18.6 % (ref 11.5–14.5)
HCT VFR BLD AUTO: 35.2 % (ref 40–54)
HGB BLD-MCNC: 10.5 G/DL (ref 14–18)
IMM GRANULOCYTES # BLD AUTO: 0 K/UL (ref 0–0.04)
IMM GRANULOCYTES NFR BLD AUTO: 0 % (ref 0–0.5)
LYMPHOCYTES # BLD AUTO: 1.8 K/UL (ref 1–4.8)
LYMPHOCYTES NFR BLD: 32.3 % (ref 18–48)
MCH RBC QN AUTO: 23.9 PG (ref 27–31)
MCHC RBC AUTO-ENTMCNC: 29.8 G/DL (ref 32–36)
MCV RBC AUTO: 80 FL (ref 82–98)
MONOCYTES # BLD AUTO: 0.6 K/UL (ref 0.3–1)
MONOCYTES NFR BLD: 10.2 % (ref 4–15)
NEUTROPHILS # BLD AUTO: 3 K/UL (ref 1.8–7.7)
NEUTROPHILS NFR BLD: 52.4 % (ref 38–73)
NRBC BLD-RTO: 0 /100 WBC
PLATELET # BLD AUTO: 267 K/UL (ref 150–450)
PMV BLD AUTO: 10.2 FL (ref 9.2–12.9)
RBC # BLD AUTO: 4.4 M/UL (ref 4.6–6.2)
WBC # BLD AUTO: 5.7 K/UL (ref 3.9–12.7)

## 2022-07-24 PROCEDURE — 25000003 PHARM REV CODE 250: Performed by: NURSE PRACTITIONER

## 2022-07-24 PROCEDURE — 85025 COMPLETE CBC W/AUTO DIFF WBC: CPT | Performed by: INTERNAL MEDICINE

## 2022-07-24 PROCEDURE — 21400001 HC TELEMETRY ROOM

## 2022-07-24 PROCEDURE — 25000003 PHARM REV CODE 250: Performed by: INTERNAL MEDICINE

## 2022-07-24 PROCEDURE — 25000003 PHARM REV CODE 250: Performed by: STUDENT IN AN ORGANIZED HEALTH CARE EDUCATION/TRAINING PROGRAM

## 2022-07-24 PROCEDURE — 25000003 PHARM REV CODE 250: Performed by: FAMILY MEDICINE

## 2022-07-24 PROCEDURE — 36415 COLL VENOUS BLD VENIPUNCTURE: CPT | Performed by: INTERNAL MEDICINE

## 2022-07-24 RX ORDER — IBUPROFEN 400 MG/1
400 TABLET ORAL EVERY 6 HOURS PRN
Status: DISCONTINUED | OUTPATIENT
Start: 2022-07-24 | End: 2022-07-26 | Stop reason: HOSPADM

## 2022-07-24 RX ORDER — TALC
9 POWDER (GRAM) TOPICAL NIGHTLY PRN
Status: DISCONTINUED | OUTPATIENT
Start: 2022-07-24 | End: 2022-07-26 | Stop reason: HOSPADM

## 2022-07-24 RX ADMIN — METOPROLOL TARTRATE 50 MG: 50 TABLET, FILM COATED ORAL at 08:07

## 2022-07-24 RX ADMIN — MELATONIN TAB 3 MG 9 MG: 3 TAB at 08:07

## 2022-07-24 RX ADMIN — LOSARTAN POTASSIUM 25 MG: 25 TABLET, FILM COATED ORAL at 08:07

## 2022-07-24 RX ADMIN — IBUPROFEN 400 MG: 400 TABLET, FILM COATED ORAL at 09:07

## 2022-07-24 RX ADMIN — ALPRAZOLAM 0.25 MG: 0.25 TABLET ORAL at 08:07

## 2022-07-24 RX ADMIN — POLYETHYLENE GLYCOL 3350 17 G: 17 POWDER, FOR SOLUTION ORAL at 08:07

## 2022-07-24 RX ADMIN — LEVETIRACETAM 500 MG: 500 TABLET, FILM COATED ORAL at 08:07

## 2022-07-24 RX ADMIN — MUPIROCIN: 20 OINTMENT TOPICAL at 08:07

## 2022-07-24 RX ADMIN — APIXABAN 5 MG: 2.5 TABLET, FILM COATED ORAL at 08:07

## 2022-07-24 RX ADMIN — ASPIRIN 81 MG CHEWABLE TABLET 81 MG: 81 TABLET CHEWABLE at 08:07

## 2022-07-24 RX ADMIN — ISOSORBIDE MONONITRATE 30 MG: 30 TABLET, EXTENDED RELEASE ORAL at 08:07

## 2022-07-24 RX ADMIN — ACETAMINOPHEN 650 MG: 325 TABLET ORAL at 06:07

## 2022-07-24 RX ADMIN — ARIPIPRAZOLE 10 MG: 5 TABLET ORAL at 08:07

## 2022-07-24 RX ADMIN — ATORVASTATIN CALCIUM 80 MG: 40 TABLET, FILM COATED ORAL at 08:07

## 2022-07-24 RX ADMIN — FUROSEMIDE 20 MG: 20 TABLET ORAL at 08:07

## 2022-07-24 RX ADMIN — MUPIROCIN: 20 OINTMENT TOPICAL at 09:07

## 2022-07-24 RX ADMIN — MELATONIN TAB 3 MG 9 MG: 3 TAB at 01:07

## 2022-07-24 NOTE — SUBJECTIVE & OBJECTIVE
Interval History: Patient remains calm and appropriate. Placement pending facility acceptance. No events. Sitter at bedside.    Review of Systems   Reason unable to perform ROS: Limited due to confusion.   Constitutional:  Positive for fatigue.   HENT: Negative.     Respiratory: Negative.     Cardiovascular: Negative.    Gastrointestinal: Negative.    Musculoskeletal:  Positive for back pain.   Hematological: Negative.    Psychiatric/Behavioral:  The patient is nervous/anxious.    Objective:     Vital Signs (Most Recent):  Temp: 97.9 °F (36.6 °C) (07/24/22 1115)  Pulse: 62 (07/24/22 1115)  Resp: 18 (07/24/22 1115)  BP: 111/79 (07/24/22 1115)  SpO2: 96 % (07/24/22 1115) Vital Signs (24h Range):  Temp:  [97.5 °F (36.4 °C)-97.9 °F (36.6 °C)] 97.9 °F (36.6 °C)  Pulse:  [] 62  Resp:  [17-18] 18  SpO2:  [94 %-99 %] 96 %  BP: (103-120)/(73-90) 111/79     Weight: 64.9 kg (143 lb 1.3 oz)  Body mass index is 20.53 kg/m².    Intake/Output Summary (Last 24 hours) at 7/24/2022 1125  Last data filed at 7/23/2022 1719  Gross per 24 hour   Intake 240 ml   Output --   Net 240 ml      Physical Exam  Vitals and nursing note reviewed.   Constitutional:       General: He is not in acute distress.     Appearance: He is underweight. He is not ill-appearing or diaphoretic.   HENT:      Head: Normocephalic and atraumatic.      Right Ear: Hearing and external ear normal.      Left Ear: Hearing and external ear normal.      Nose: Nose normal. No mucosal edema or rhinorrhea.      Mouth/Throat:      Pharynx: Uvula midline.   Eyes:      General:         Right eye: No discharge.         Left eye: No discharge.      Conjunctiva/sclera: Conjunctivae normal.      Right eye: No chemosis.     Left eye: No chemosis.     Pupils: Pupils are equal, round, and reactive to light.   Neck:      Thyroid: No thyroid mass or thyromegaly.      Trachea: Trachea normal.   Cardiovascular:      Rate and Rhythm: Normal rate. Rhythm irregular.      Pulses:            Dorsalis pedis pulses are 2+ on the right side and 2+ on the left side.      Heart sounds: Murmur heard.   Pulmonary:      Effort: Pulmonary effort is normal. No respiratory distress.      Breath sounds: Examination of the right-lower field reveals decreased breath sounds. Examination of the left-lower field reveals decreased breath sounds. Decreased breath sounds present. No wheezing.   Chest:   Breasts:      Right: No supraclavicular adenopathy.      Left: No supraclavicular adenopathy.     Abdominal:      General: Bowel sounds are increased. There is no distension.      Palpations: Abdomen is soft.      Tenderness: There is no abdominal tenderness.   Musculoskeletal:         General: Normal range of motion.      Cervical back: Normal range of motion and neck supple.   Lymphadenopathy:      Cervical: No cervical adenopathy.      Upper Body:      Right upper body: No supraclavicular adenopathy.      Left upper body: No supraclavicular adenopathy.   Skin:     General: Skin is warm and dry.      Capillary Refill: Capillary refill takes less than 2 seconds.      Findings: No rash.   Neurological:      Mental Status: He is alert. Mental status is at baseline. He is confused.   Psychiatric:         Attention and Perception: He does not perceive auditory or visual hallucinations.         Mood and Affect: Mood is anxious.         Speech: Speech is rapid and pressured.         Behavior: Behavior is not aggressive or hyperactive.         Thought Content: Thought content is not delusional.         Cognition and Memory: He exhibits impaired recent memory. He does not exhibit impaired remote memory.         Judgment: Judgment is impulsive.       Significant Labs: All pertinent labs within the past 24 hours have been reviewed.  BMP  CBC:   Recent Labs   Lab 07/24/22  0627   WBC 5.70   HGB 10.5*   HCT 35.2*                Significant Imaging: I have reviewed all pertinent imaging results/findings within the past 24  hours.

## 2022-07-24 NOTE — ASSESSMENT & PLAN NOTE
S/p recent CABG  Continue home meds- Eliquis, aspirin, statin    4.6.22 he underwent a CABG x 4 with results of LIMA to LAD, rSVG to OM1, rSVG to Diag 1 and rSVG to PDA and Ligation of SOPHIA    Reported chest pain, admit for observation  Trend troponin- flat  -- Per Cardiology will treat with medical management.   --The patient is awaiting inpatient psych placement. He is medically clear for placement.     Stable for Psych Placement - Ruled out for ACS

## 2022-07-24 NOTE — PROGRESS NOTES
[facility]  Progress Note    Patient Name: Kendall Duff  MRN: [id number]  Patient Class: IP- Inpatient   Admission Date: 7/18/2022  Length of Stay: 6 days  Attending Physician: Harjit Ruiz MD  Primary Care Provider: Primary Doctor No        Subjective:     Principal Problem:Coronary artery disease        HPI:  56-year-old male, PMH of CAD S/P PCI s/p CABG (04/2022 4V), Bipolar, hypertension, CVA, Hepatitis C, who was brought from the mental health unit. Pt has been under a PEC for 8 days for schizophrenia exacerbation and agitation. Pt has been having worsening confusion, dyspnea and weakness. At the behavioral unit, completed work up with troponin and CPK was done which was elevated. Patient has recently had 4V CABG, followed by wound healing issued at the sternotomy site, which reportedly has been healing well following skin grafting.     While in the ED today, ptient denies any chest pain, shortness of breath. Further work up demonstrated, HS troponin 105.2 (normal <60), BnP 2575, , CKMB 8, D-dimer 1.84. EKG demonstrated accelerated idoventicular rhythm, extreme right superior axis deviation, anterioseptal infarct, RBBB, consider also periinfarct block. He was given plavix in the ED (patient is on Eliquis for unclear reason). Referring facility requesting transfer for cardiology & interventional cardiology assistance.  Cardiology, Dr.. Brunson agreeable to consult with  admitting.       Overview/Hospital Course:  7/19/22 No acute events overnight. No change in mental status. The patient remains altered. The patient is alert and will follow with his gaze. He does not follow commands and has been non-verbal since admission, which is not his baseline. Psychiatry recs noted and ordered. CT head was negative for acute findings. Neurology was consulted and recommended an MRI brain W WO and EEG. The patient has been relatively calm since admission will rescind the CEC.  "7/20/22 Yesterday afternoon the patient began communicating with staff. He was oriented x 3. Neurology consult was cancelled d/t patient returning to baseline mental status prior to admission. MRI brain was negative. Discussed the POC with the patients family. They wish for the patient to return a mental health facility "to get his meds straight". Case management is attempting to find an accepting facility. Per Cardiology will treat with medical management. 7/21/22 No acute events overnight. MRI brain was negative. The patient is awaiting inpatient psych placement. He is medically clear for placement.     7/22: BNP: 1069, Trop: 0.076, initiated on Lasix. Vitals stable, continues to be confused at times, redirectable, able to speak. Has delusions. Non-violent. Pending placement. Vitals stable.     7/23: Multiple referrals sent out seeking placement, referral today sent to [facility] for psych placement. Repeat labs in AM, patient stable.   7/24 - Patient remains calm and appropriate. Placement pending facility acceptance. No events. Sitter at bedside.      Interval History: Patient remains calm and appropriate. Placement pending facility acceptance. No events. Sitter at bedside.    Review of Systems   Reason unable to perform ROS: Limited due to confusion.   Constitutional:  Positive for fatigue.   HENT: Negative.     Respiratory: Negative.     Cardiovascular: Negative.    Gastrointestinal: Negative.    Musculoskeletal:  Positive for back pain.   Hematological: Negative.    Psychiatric/Behavioral:  The patient is nervous/anxious.    Objective:     Vital Signs (Most Recent):  Temp: 97.9 °F (36.6 °C) (07/24/22 1115)  Pulse: 62 (07/24/22 1115)  Resp: 18 (07/24/22 1115)  BP: 111/79 (07/24/22 1115)  SpO2: 96 % (07/24/22 1115) Vital Signs (24h Range):  Temp:  [97.5 °F (36.4 °C)-97.9 °F (36.6 °C)] 97.9 °F (36.6 °C)  Pulse:  [] 62  Resp:  [17-18] 18  SpO2:  [94 %-99 %] 96 %  BP: (103-120)/(73-90) 111/79     Weight: 64.9 " kg (143 lb 1.3 oz)  Body mass index is 20.53 kg/m².    Intake/Output Summary (Last 24 hours) at 7/24/2022 1125  Last data filed at 7/23/2022 1719  Gross per 24 hour   Intake 240 ml   Output --   Net 240 ml      Physical Exam  Vitals and nursing note reviewed.   Constitutional:       General: He is not in acute distress.     Appearance: He is underweight. He is not ill-appearing or diaphoretic.   HENT:      Head: Normocephalic and atraumatic.      Right Ear: Hearing and external ear normal.      Left Ear: Hearing and external ear normal.      Nose: Nose normal. No mucosal edema or rhinorrhea.      Mouth/Throat:      Pharynx: Uvula midline.   Eyes:      General:         Right eye: No discharge.         Left eye: No discharge.      Conjunctiva/sclera: Conjunctivae normal.      Right eye: No chemosis.     Left eye: No chemosis.     Pupils: Pupils are equal, round, and reactive to light.   Neck:      Thyroid: No thyroid mass or thyromegaly.      Trachea: Trachea normal.   Cardiovascular:      Rate and Rhythm: Normal rate. Rhythm irregular.      Pulses:           Dorsalis pedis pulses are 2+ on the right side and 2+ on the left side.      Heart sounds: Murmur heard.   Pulmonary:      Effort: Pulmonary effort is normal. No respiratory distress.      Breath sounds: Examination of the right-lower field reveals decreased breath sounds. Examination of the left-lower field reveals decreased breath sounds. Decreased breath sounds present. No wheezing.   Chest:   Breasts:      Right: No supraclavicular adenopathy.      Left: No supraclavicular adenopathy.     Abdominal:      General: Bowel sounds are increased. There is no distension.      Palpations: Abdomen is soft.      Tenderness: There is no abdominal tenderness.   Musculoskeletal:         General: Normal range of motion.      Cervical back: Normal range of motion and neck supple.   Lymphadenopathy:      Cervical: No cervical adenopathy.      Upper Body:      Right upper  body: No supraclavicular adenopathy.      Left upper body: No supraclavicular adenopathy.   Skin:     General: Skin is warm and dry.      Capillary Refill: Capillary refill takes less than 2 seconds.      Findings: No rash.   Neurological:      Mental Status: He is alert. Mental status is at baseline. He is confused.   Psychiatric:         Attention and Perception: He does not perceive auditory or visual hallucinations.         Mood and Affect: Mood is anxious.         Speech: Speech is rapid and pressured.         Behavior: Behavior is not aggressive or hyperactive.         Thought Content: Thought content is not delusional.         Cognition and Memory: He exhibits impaired recent memory. He does not exhibit impaired remote memory.         Judgment: Judgment is impulsive.       Significant Labs: All pertinent labs within the past 24 hours have been reviewed.  BMP  CBC:   Recent Labs   Lab 07/24/22  0627   WBC 5.70   HGB 10.5*   HCT 35.2*                Significant Imaging: I have reviewed all pertinent imaging results/findings within the past 24 hours.      Assessment/Plan:      * Coronary artery disease  S/p recent CABG  Continue home meds- Eliquis, aspirin, statin    4.6.22 he underwent a CABG x 4 with results of LIMA to LAD, rSVG to OM1, rSVG to Diag 1 and rSVG to PDA and Ligation of SOPHIA    Reported chest pain, admit for observation  Trend troponin- flat  -- Per Cardiology will treat with medical management.   --The patient is awaiting inpatient psych placement. He is medically clear for placement.     Stable for Psych Placement - Ruled out for ACS      Chronic systolic congestive heart failure  Patient is identified as having Combined Systolic and Diastolic heart failure that is Chronic. CHF is currently controlled. Latest ECHO performed and demonstrates- Results for orders placed during the hospital encounter of 04/01/22    Echo Saline Bubble? No    Interpretation Summary  · The left ventricle is  normal in size with concentric hypertrophy and severely decreased systolic function.  · The estimated ejection fraction is 25-30%.  · There is severe left ventricular global hypokinesis.  · Grade II left ventricular diastolic dysfunction.  · Normal right ventricular size with mildly reduced right ventricular systolic function.  · The estimated PA systolic pressure is 52 mmHg.  · There is moderate pulmonary hypertension.  · Elevated central venous pressure (15 mmHg).  . Continue Beta Blocker and ACE/ARB and monitor clinical status closely. Monitor on telemetry. Patient is off CHF pathway.  Monitor strict Is&Os and daily weights.  Place on fluid restriction of 2 L. Continue to stress to patient importance of self efficacy and  on diet for CHF. Last BNP reviewed- and noted below   Recent Labs   Lab 07/22/22 0918   BNP 1,069*   .  --Continue Lasix  Stable  Medically Cleared for placement    Acute DVT (deep venous thrombosis)  On Eliquis      Elevated troponin  Recent CABGx4  Trend troponin- flat    Ruled out for ACS  Medical Management  Cleared for placement    Microcytic anemia  Hb stable      Schizoaffective disorder, bipolar type  Restart home meds as tolerated  Consult tele-psych  --Plan for discharge back to mental health facility.   --The patient is awaiting inpatient psych placement. He is medically clear for placement.     Bilateral carotid artery stenosis  stable      NSTEMI (non-ST elevated myocardial infarction)  Medical management per Cardiology    Bipolar 1 disorder  Tele-psych consulted     Await placement        VTE Risk Mitigation (From admission, onward)         Ordered     apixaban tablet 5 mg  2 times daily         07/18/22 0452     IP VTE HIGH RISK PATIENT  Once         07/18/22 0337     Place sequential compression device  Until discontinued         07/18/22 0337                Discharge Planning   TRACIE:      Code Status: Full Code   Is the patient medically ready for discharge?:     Reason  for patient still in hospital (select all that apply): Patient trending condition, Consult recommendations and Pending disposition  Discharge Plan A: [facility]                  Harjit Ruiz MD  Department of Hospital Medicine   [facility] ([facility])

## 2022-07-24 NOTE — ASSESSMENT & PLAN NOTE
Recent CABGx4  Trend troponin- flat    Ruled out for ACS  Medical Management  Cleared for placement

## 2022-07-24 NOTE — ASSESSMENT & PLAN NOTE
Patient is identified as having Combined Systolic and Diastolic heart failure that is Chronic. CHF is currently controlled. Latest ECHO performed and demonstrates- Results for orders placed during the hospital encounter of 04/01/22    Echo Saline Bubble? No    Interpretation Summary  · The left ventricle is normal in size with concentric hypertrophy and severely decreased systolic function.  · The estimated ejection fraction is 25-30%.  · There is severe left ventricular global hypokinesis.  · Grade II left ventricular diastolic dysfunction.  · Normal right ventricular size with mildly reduced right ventricular systolic function.  · The estimated PA systolic pressure is 52 mmHg.  · There is moderate pulmonary hypertension.  · Elevated central venous pressure (15 mmHg).  . Continue Beta Blocker and ACE/ARB and monitor clinical status closely. Monitor on telemetry. Patient is off CHF pathway.  Monitor strict Is&Os and daily weights.  Place on fluid restriction of 2 L. Continue to stress to patient importance of self efficacy and  on diet for CHF. Last BNP reviewed- and noted below   Recent Labs   Lab 07/22/22  0918   BNP 1,069*   .  --Continue Lasix  Stable  Medically Cleared for placement

## 2022-07-24 NOTE — NURSING
Arabella Bradley, daughter's phone is turned off. Please contact via this numbers:   [phone]

## 2022-07-24 NOTE — PLAN OF CARE
Aox4. Able to verbalize needs & follow commands. Hyperactive & cooperative throughout shift.   POC reviewed with pt. Interventions implemented as appropriate.    VSS; removed tele-monitor per order.  On room air.    PIV patent. Integrity maintained.  Skin WDI.    No c/o pain.    Continent of b/b. Up to toilet.   Eliquis for VTE.  Ambulates independently. Activity ad erika. Frequent position changes encouraged.  Educated on s/sx of pressure injury;  verbalized understanding.  NADN. Resting quietly in bed.   Free of falls. Hourly rounding complete.   All safety measures remain in place. SR up x2; bed low & locked. Call light w/in reach.   Will continue to monitor throughout shift.  Sitter & [employer] at bedside.

## 2022-07-25 PROCEDURE — 25000003 PHARM REV CODE 250: Performed by: STUDENT IN AN ORGANIZED HEALTH CARE EDUCATION/TRAINING PROGRAM

## 2022-07-25 PROCEDURE — 25000003 PHARM REV CODE 250: Performed by: NURSE PRACTITIONER

## 2022-07-25 PROCEDURE — 25000003 PHARM REV CODE 250: Performed by: INTERNAL MEDICINE

## 2022-07-25 PROCEDURE — 99233 PR SUBSEQUENT HOSPITAL CARE,LEVL III: ICD-10-PCS | Mod: 95,,, | Performed by: STUDENT IN AN ORGANIZED HEALTH CARE EDUCATION/TRAINING PROGRAM

## 2022-07-25 PROCEDURE — 25000003 PHARM REV CODE 250: Performed by: FAMILY MEDICINE

## 2022-07-25 PROCEDURE — 99233 SBSQ HOSP IP/OBS HIGH 50: CPT | Mod: 95,,, | Performed by: STUDENT IN AN ORGANIZED HEALTH CARE EDUCATION/TRAINING PROGRAM

## 2022-07-25 PROCEDURE — 21400001 HC TELEMETRY ROOM

## 2022-07-25 RX ADMIN — ARIPIPRAZOLE 10 MG: 5 TABLET ORAL at 08:07

## 2022-07-25 RX ADMIN — ALPRAZOLAM 0.25 MG: 0.25 TABLET ORAL at 03:07

## 2022-07-25 RX ADMIN — APIXABAN 5 MG: 2.5 TABLET, FILM COATED ORAL at 08:07

## 2022-07-25 RX ADMIN — LEVETIRACETAM 500 MG: 500 TABLET, FILM COATED ORAL at 08:07

## 2022-07-25 RX ADMIN — POLYETHYLENE GLYCOL 3350 17 G: 17 POWDER, FOR SOLUTION ORAL at 08:07

## 2022-07-25 RX ADMIN — ISOSORBIDE MONONITRATE 30 MG: 30 TABLET, EXTENDED RELEASE ORAL at 08:07

## 2022-07-25 RX ADMIN — FUROSEMIDE 20 MG: 20 TABLET ORAL at 08:07

## 2022-07-25 RX ADMIN — IBUPROFEN 400 MG: 400 TABLET, FILM COATED ORAL at 03:07

## 2022-07-25 RX ADMIN — LOSARTAN POTASSIUM 25 MG: 25 TABLET, FILM COATED ORAL at 08:07

## 2022-07-25 RX ADMIN — METOPROLOL TARTRATE 50 MG: 50 TABLET, FILM COATED ORAL at 08:07

## 2022-07-25 RX ADMIN — ATORVASTATIN CALCIUM 80 MG: 40 TABLET, FILM COATED ORAL at 08:07

## 2022-07-25 RX ADMIN — ACETAMINOPHEN 650 MG: 325 TABLET ORAL at 06:07

## 2022-07-25 RX ADMIN — ASPIRIN 81 MG CHEWABLE TABLET 81 MG: 81 TABLET CHEWABLE at 08:07

## 2022-07-25 RX ADMIN — MUPIROCIN: 20 OINTMENT TOPICAL at 08:07

## 2022-07-25 RX ADMIN — MELATONIN TAB 3 MG 9 MG: 3 TAB at 08:07

## 2022-07-25 NOTE — SUBJECTIVE & OBJECTIVE
Interval History: Patient stable, calm, appropriate. Watching TV. Await placement. Sitter at bedside. No events    Review of Systems   Reason unable to perform ROS: Limited due to confusion.   Constitutional:  Positive for fatigue.   HENT: Negative.     Respiratory: Negative.     Cardiovascular: Negative.    Gastrointestinal: Negative.    Musculoskeletal:  Positive for back pain.   Hematological: Negative.    Psychiatric/Behavioral:  The patient is nervous/anxious.    Objective:     Vital Signs (Most Recent):  Temp: 98.2 °F (36.8 °C) (07/25/22 1117)  Pulse: 63 (07/25/22 1117)  Resp: 20 (07/25/22 1117)  BP: 118/87 (07/25/22 1117)  SpO2: 97 % (07/25/22 1117) Vital Signs (24h Range):  Temp:  [97.8 °F (36.6 °C)-98.2 °F (36.8 °C)] 98.2 °F (36.8 °C)  Pulse:  [63-73] 63  Resp:  [18-20] 20  SpO2:  [95 %-100 %] 97 %  BP: (116-145)/(79-94) 118/87     Weight: 64.5 kg (142 lb 3.2 oz)  Body mass index is 20.4 kg/m².    Intake/Output Summary (Last 24 hours) at 7/25/2022 1458  Last data filed at 7/25/2022 1300  Gross per 24 hour   Intake --   Output 2 ml   Net -2 ml      Physical Exam  Vitals and nursing note reviewed.   Constitutional:       General: He is not in acute distress.     Appearance: He is underweight. He is not ill-appearing or diaphoretic.   HENT:      Head: Normocephalic and atraumatic.      Right Ear: Hearing and external ear normal.      Left Ear: Hearing and external ear normal.      Nose: Nose normal. No mucosal edema or rhinorrhea.      Mouth/Throat:      Pharynx: Uvula midline.   Eyes:      General:         Right eye: No discharge.         Left eye: No discharge.      Conjunctiva/sclera: Conjunctivae normal.      Right eye: No chemosis.     Left eye: No chemosis.     Pupils: Pupils are equal, round, and reactive to light.   Neck:      Thyroid: No thyroid mass or thyromegaly.      Trachea: Trachea normal.   Cardiovascular:      Rate and Rhythm: Normal rate. Rhythm irregular.      Pulses:           Dorsalis  pedis pulses are 2+ on the right side and 2+ on the left side.      Heart sounds: Murmur heard.   Pulmonary:      Effort: Pulmonary effort is normal. No respiratory distress.      Breath sounds: Examination of the right-lower field reveals decreased breath sounds. Examination of the left-lower field reveals decreased breath sounds. Decreased breath sounds present. No wheezing.   Chest:   Breasts:      Right: No supraclavicular adenopathy.      Left: No supraclavicular adenopathy.     Abdominal:      General: Bowel sounds are increased. There is no distension.      Palpations: Abdomen is soft.      Tenderness: There is no abdominal tenderness.   Musculoskeletal:         General: Normal range of motion.      Cervical back: Normal range of motion and neck supple.   Lymphadenopathy:      Cervical: No cervical adenopathy.      Upper Body:      Right upper body: No supraclavicular adenopathy.      Left upper body: No supraclavicular adenopathy.   Skin:     General: Skin is warm and dry.      Capillary Refill: Capillary refill takes less than 2 seconds.      Findings: No rash.   Neurological:      Mental Status: He is alert. Mental status is at baseline. He is confused.   Psychiatric:         Attention and Perception: He does not perceive auditory or visual hallucinations.         Mood and Affect: Mood is anxious.         Speech: Speech is rapid and pressured.         Behavior: Behavior normal. Behavior is not aggressive or hyperactive.         Thought Content: Thought content normal. Thought content is not delusional.         Cognition and Memory: He exhibits impaired recent memory. He does not exhibit impaired remote memory.         Judgment: Judgment is impulsive.       Significant Labs: All pertinent labs within the past 24 hours have been reviewed.    CBC:   Recent Labs   Lab 07/24/22  0627   WBC 5.70   HGB 10.5*   HCT 35.2*              Significant Imaging: I have reviewed all pertinent imaging  results/findings within the past 24 hours.

## 2022-07-25 NOTE — PROGRESS NOTES
[facility] Medicine  Progress Note    Patient Name: Kendall Duff  MRN: [id number]  Patient Class: IP- Inpatient   Admission Date: 7/18/2022  Length of Stay: 7 days  Attending Physician: Harjit Ruiz MD  Primary Care Provider: Primary Doctor No        Subjective:     Principal Problem:Coronary artery disease        HPI:  56-year-old male, PMH of CAD S/P PCI s/p CABG (04/2022 4V), Bipolar, hypertension, CVA, Hepatitis C, who was brought from the mental health unit. Pt has been under a PEC for 8 days for schizophrenia exacerbation and agitation. Pt has been having worsening confusion, dyspnea and weakness. At the behavioral unit, completed work up with troponin and CPK was done which was elevated. Patient has recently had 4V CABG, followed by wound healing issued at the sternotomy site, which reportedly has been healing well following skin grafting.     While in the ED today, ptient denies any chest pain, shortness of breath. Further work up demonstrated, HS troponin 105.2 (normal <60), BnP 2575, , CKMB 8, D-dimer 1.84. EKG demonstrated accelerated idoventicular rhythm, extreme right superior axis deviation, anterioseptal infarct, RBBB, consider also periinfarct block. He was given plavix in the ED (patient is on Eliquis for unclear reason). Referring facility requesting transfer for cardiology & interventional cardiology assistance.  Cardiology, Dr.. Brunson agreeable to consult with  admitting.       Overview/Hospital Course:  7/19/22 No acute events overnight. No change in mental status. The patient remains altered. The patient is alert and will follow with his gaze. He does not follow commands and has been non-verbal since admission, which is not his baseline. Psychiatry recs noted and ordered. CT head was negative for acute findings. Neurology was consulted and recommended an MRI brain W WO and EEG. The patient has been relatively calm since admission will rescind the CEC.  "7/20/22 Yesterday afternoon the patient began communicating with staff. He was oriented x 3. Neurology consult was cancelled d/t patient returning to baseline mental status prior to admission. MRI brain was negative. Discussed the POC with the patients family. They wish for the patient to return a mental health facility "to get his meds straight". Case management is attempting to find an accepting facility. Per Cardiology will treat with medical management. 7/21/22 No acute events overnight. MRI brain was negative. The patient is awaiting inpatient psych placement. He is medically clear for placement.     7/22: BNP: 1069, Trop: 0.076, initiated on Lasix. Vitals stable, continues to be confused at times, redirectable, able to speak. Has delusions. Non-violent. Pending placement. Vitals stable.     7/23: Multiple referrals sent out seeking placement, referral today sent to [facility] for psych placement. Repeat labs in AM, patient stable.   7/24 - Patient remains calm and appropriate. Placement pending facility acceptance. No events. Sitter at bedside.  7/25 - Patient stable, calm, appropriate. Watching TV. Await placement. Sitter at bedside. No events      Interval History: Patient stable, calm, appropriate. Watching TV. Await placement. Sitter at bedside. No events    Review of Systems   Reason unable to perform ROS: Limited due to confusion.   Constitutional:  Positive for fatigue.   HENT: Negative.     Respiratory: Negative.     Cardiovascular: Negative.    Gastrointestinal: Negative.    Musculoskeletal:  Positive for back pain.   Hematological: Negative.    Psychiatric/Behavioral:  The patient is nervous/anxious.    Objective:     Vital Signs (Most Recent):  Temp: 98.2 °F (36.8 °C) (07/25/22 1117)  Pulse: 63 (07/25/22 1117)  Resp: 20 (07/25/22 1117)  BP: 118/87 (07/25/22 1117)  SpO2: 97 % (07/25/22 1117) Vital Signs (24h Range):  Temp:  [97.8 °F (36.6 °C)-98.2 °F (36.8 °C)] 98.2 °F (36.8 °C)  Pulse:  [63-73] " 63  Resp:  [18-20] 20  SpO2:  [95 %-100 %] 97 %  BP: (116-145)/(79-94) 118/87     Weight: 64.5 kg (142 lb 3.2 oz)  Body mass index is 20.4 kg/m².    Intake/Output Summary (Last 24 hours) at 7/25/2022 1458  Last data filed at 7/25/2022 1300  Gross per 24 hour   Intake --   Output 2 ml   Net -2 ml      Physical Exam  Vitals and nursing note reviewed.   Constitutional:       General: He is not in acute distress.     Appearance: He is underweight. He is not ill-appearing or diaphoretic.   HENT:      Head: Normocephalic and atraumatic.      Right Ear: Hearing and external ear normal.      Left Ear: Hearing and external ear normal.      Nose: Nose normal. No mucosal edema or rhinorrhea.      Mouth/Throat:      Pharynx: Uvula midline.   Eyes:      General:         Right eye: No discharge.         Left eye: No discharge.      Conjunctiva/sclera: Conjunctivae normal.      Right eye: No chemosis.     Left eye: No chemosis.     Pupils: Pupils are equal, round, and reactive to light.   Neck:      Thyroid: No thyroid mass or thyromegaly.      Trachea: Trachea normal.   Cardiovascular:      Rate and Rhythm: Normal rate. Rhythm irregular.      Pulses:           Dorsalis pedis pulses are 2+ on the right side and 2+ on the left side.      Heart sounds: Murmur heard.   Pulmonary:      Effort: Pulmonary effort is normal. No respiratory distress.      Breath sounds: Examination of the right-lower field reveals decreased breath sounds. Examination of the left-lower field reveals decreased breath sounds. Decreased breath sounds present. No wheezing.   Chest:   Breasts:      Right: No supraclavicular adenopathy.      Left: No supraclavicular adenopathy.     Abdominal:      General: Bowel sounds are increased. There is no distension.      Palpations: Abdomen is soft.      Tenderness: There is no abdominal tenderness.   Musculoskeletal:         General: Normal range of motion.      Cervical back: Normal range of motion and neck supple.    Lymphadenopathy:      Cervical: No cervical adenopathy.      Upper Body:      Right upper body: No supraclavicular adenopathy.      Left upper body: No supraclavicular adenopathy.   Skin:     General: Skin is warm and dry.      Capillary Refill: Capillary refill takes less than 2 seconds.      Findings: No rash.   Neurological:      Mental Status: He is alert. Mental status is at baseline. He is confused.   Psychiatric:         Attention and Perception: He does not perceive auditory or visual hallucinations.         Mood and Affect: Mood is anxious.         Speech: Speech is rapid and pressured.         Behavior: Behavior normal. Behavior is not aggressive or hyperactive.         Thought Content: Thought content normal. Thought content is not delusional.         Cognition and Memory: He exhibits impaired recent memory. He does not exhibit impaired remote memory.         Judgment: Judgment is impulsive.       Significant Labs: All pertinent labs within the past 24 hours have been reviewed.    CBC:   Recent Labs   Lab 07/24/22  0627   WBC 5.70   HGB 10.5*   HCT 35.2*              Significant Imaging: I have reviewed all pertinent imaging results/findings within the past 24 hours.      Assessment/Plan:      * Coronary artery disease  S/p recent CABG  Continue home meds- Eliquis, aspirin, statin    4.6.22 he underwent a CABG x 4 with results of LIMA to LAD, rSVG to OM1, rSVG to Diag 1 and rSVG to PDA and Ligation of SOPHIA    Reported chest pain, admit for observation  Trend troponin- flat  -- Per Cardiology will treat with medical management.   --The patient is awaiting inpatient psych placement. He is medically clear for placement.     Stable for Psych Placement - Ruled out for ACS      Chronic systolic congestive heart failure  Patient is identified as having Combined Systolic and Diastolic heart failure that is Chronic. CHF is currently controlled. Latest ECHO performed and demonstrates- Results for orders  placed during the hospital encounter of 04/01/22    Echo Saline Bubble? No    Interpretation Summary  · The left ventricle is normal in size with concentric hypertrophy and severely decreased systolic function.  · The estimated ejection fraction is 25-30%.  · There is severe left ventricular global hypokinesis.  · Grade II left ventricular diastolic dysfunction.  · Normal right ventricular size with mildly reduced right ventricular systolic function.  · The estimated PA systolic pressure is 52 mmHg.  · There is moderate pulmonary hypertension.  · Elevated central venous pressure (15 mmHg).  . Continue Beta Blocker and ACE/ARB and monitor clinical status closely. Monitor on telemetry. Patient is off CHF pathway.  Monitor strict Is&Os and daily weights.  Place on fluid restriction of 2 L. Continue to stress to patient importance of self efficacy and  on diet for CHF. Last BNP reviewed- and noted below   Recent Labs   Lab 07/22/22  0918   BNP 1,069*   .  --Continue Lasix  Stable  Medically Cleared for placement    Acute DVT (deep venous thrombosis)  On Eliquis      Elevated troponin  Recent CABGx4  Trend troponin- flat    Ruled out for ACS  Medical Management  Cleared for placement    Microcytic anemia  Hb stable      Schizoaffective disorder, bipolar type  Restart home meds as tolerated  Consult tele-psych  --Plan for discharge back to mental health facility.   --The patient is awaiting inpatient psych placement. He is medically clear for placement.     Bilateral carotid artery stenosis  stable      NSTEMI (non-ST elevated myocardial infarction)  Medical management per Cardiology    Bipolar 1 disorder  Tele-psych consulted     Await placement        VTE Risk Mitigation (From admission, onward)         Ordered     apixaban tablet 5 mg  2 times daily         07/18/22 0452     IP VTE HIGH RISK PATIENT  Once         07/18/22 0337     Place sequential compression device  Until discontinued         07/18/22 0337                 Discharge Planning   TRACIE:      Code Status: Full Code   Is the patient medically ready for discharge?:     Reason for patient still in hospital (select all that apply): Patient trending condition, Consult recommendations and Pending disposition  Discharge Plan A: [facility]                  Harjit Ruiz MD  Department of Hospital Medicine   [facility] ([facility])

## 2022-07-25 NOTE — PLAN OF CARE
Ongoing (interventions implemented as appropriate)  Pt AAO x4 but can be un cooperative.  VSS  PEC  Pt able to make needs known.  Pt remained afebrile throughout this shift.   Pt ambulates in room, gait steady   Pt remained free of falls this shift.   Pt denies pain this shift.  Plan of care reviewed. Patient verbalizes understanding.   Pt moving/turing independent. Frequent weight shifting encouraged.  Bed low, side rails up x 2, wheels locked, call light in reach.   Hourly rounding completed.   Will continue to monitor.

## 2022-07-25 NOTE — PLAN OF CARE
Aox4. Able to verbalize needs & follow commands. Hyperactive & cooperative throughout shift.   POC reviewed with pt. Interventions implemented as appropriate.    VSS; not on tele-monitor.  On room air.    PIV patent. Integrity maintained.  Skin WDI.    No c/o pain.    Continent of b/b. Up to toilet.   Eliquis for VTE.  Ambulates independently. Activity ad erika. Frequent position changes encouraged.  Educated on s/sx of pressure injury;  verbalized understanding.  NADN. Resting quietly in bed.   Free of falls. Hourly rounding complete.   All safety measures remain in place. SR up x2; bed low & locked. Call light w/in reach.   Will continue to monitor throughout shift.  Sitter & [employer] at bedside.

## 2022-07-25 NOTE — PLAN OF CARE
Left message for Alicia Shorty with [facility] to see if she can accept the patient.    12:40pm no return phone call.  Sent text message.      1:00pm Alicia is checking.    4:30pm No return call.  Sent message requesting update.    5:00pm Spoke with Gerson Cruz with [facility].  He does not feel patient meets criteria for inpatient.  Will assist with getting patient set up with IOP and return home with his daugther. Also discuss nursing home placement at [facility].

## 2022-07-25 NOTE — CONSULTS
[facility]  Psychiatry  Telepsychiatry Consult Note    Please see previous notes:    Patient agreeable to consultation via telepsychiatry.    Tele-Consultation from Psychiatry started: 7/25/2022 at 1832  The chief complaint leading to psychiatric consultation is: cognitive status and placement  This consultation was requested by Dr. Ruiz, the Emergency Department attending physician.  The location of the consulting psychiatrist is [place].  The patient location is  [facility] TELEMETRY   The patient arrived at the ED at: 7 days ago    Also present with the patient at the time of the consultation: patient    Patient Identification:   Kendall Duff is a 56 y.o. male.    Patient information was obtained from patient.  Patient presented involuntarily to the Emergency Department.    Inpatient consult to Telemedicine - Psych  Consult performed by: Greg Harden MD  Consult ordered by: Harjit Ruiz MD        Consult Start Time: 07/25/2022 18:32 CDT  Consult End Time: 07/25/2022 19:09 CDT        Subjective:     History of Present Illness:  Kendall Duff is a 56 y.o. male with a past psychiatric history of schizoaffective disorder, bipolar type, polysubstance use disorder (cocaine, opioid), and dementia, currently presenting with Coronary artery disease. TelePsychiatry was originally consulted to address the patient's symptoms of cognitive status and placement.    Per Primary MD:  56-year-old male, PMH of CAD S/P PCI s/p CABG (04/2022 4V), Bipolar, hypertension, CVA, Hepatitis C, who was brought from the mental health unit. Pt has been under a PEC for 8 days for schizophrenia exacerbation and agitation. Pt has been having worsening confusion, dyspnea and weakness. At the behavioral unit, completed work up with troponin and CPK was done which was elevated. Patient has recently had 4V CABG, followed by wound healing issued at the sternotomy site, which reportedly has been healing well following skin  grafting.     While in the ED today, ptient denies any chest pain, shortness of breath. Further work up demonstrated, HS troponin 105.2 (normal <60), BnP 2575, , CKMB 8, D-dimer 1.84. EKG demonstrated accelerated idoventicular rhythm, extreme right superior axis deviation, anterioseptal infarct, RBBB, consider also periinfarct block. He was given plavix in the ED (patient is on Eliquis for unclear reason). Referring facility requesting transfer for cardiology & interventional cardiology assistance.  Cardiology, Dr.. Brunson agreeable to consult with [facility] admitting.     Per Telepsych MD (7/18/2022):  Kendall Duff is a 56 y.o. male with a past medical history as noted above/below, and a past psychiatric history of Schizoaffective Disorder, Bipolar Type and Polysubstance Abuse, currently being treated by his inpatient primary team for a principle problem of CAD.  Psychiatry was originally consulted as noted above.  The patient was seen and examined.  The chart was reviewed.  On examination today, the patient was non-verbal and non-responsive to verbal or written commands.  He would make intermittent eye contact when speaking to the patient.  When asked if the patient was experiencing any medical / physical complaints, he appeared to shake his head NO.  Despite multiple other attempts, the patient was unable / unwilling to participate in the comprehensive psychiatric assessment.  Given the limitations of the telepsychiatry assessment, I had the nurse at the bedside move the patient's extremities to assess for catatonic s/s.  Despite the patient displaying mutism, negativism, and stupor on exam, he did not exhibit any waxy flexibility, catalepsy, or posturing per the bedside nurse.  Per discussion with the primary team NP, the patient has appeared this way throughout the day.  See collateral info below.  See A/P below.      Per Telepsych MD (7/25/2022):  Upon initiation of interview, pt was Lying in bed, poor hygiene  "and grooming, dressed in hospital scrubs, in no apparent distress. Inquiry limited secondary to patient's pressured speech (requiring frequent redirection) and tangential thought process. Patient oriented to person, place, year, month, day of the week, and date. That notwithstanding, When asked what brought him into the hospital, he reported, "I had an issue with my heart, doc. I had a CABG earlier this year. Im good to go, now." Patient denied symptoms of bipolar stefan (DIGFAST), perceptual disturbances (AVH), or thoughts, intent, or plan to self-harm or harm others.    Collateral:    Per Dr. Coughlin (7/18/2022):  I spoke to the patient's daughter (Arabella Bradley) at [phone].  She states that she thinks the patient has been sober from opiate and cocaine abuse since around January 2022.  She states that for the past 6-7 months, she has noticed some memory / cognitive deficits in the patient, and she stated that she thinks he was given a dementia diagnosis in February 2022.  She states that he has a long history of Schizoaffective Disorder, Bipolar Type, and she added that she feels like he did best on a prior unknown dose of Abilify (as opposed to his current Zyprexa).  She states that he had a CABG done in early April 2022 at [facility], where he had a difficult hospitalization course that was complicated by being on a ventilator for a month, having an infected incision, and struggling with hypoactive delirium.  When explaining what the patient looked like on examination today, she voiced that his current presentation sounded similar to his hypoactive delirium while at [facility].  She states that at baseline, he is verbal and oriented but does struggle with memory and at times repeats himself and doesn't realize it.    Per chart review, and updated, where appropriate.    Past Psychiatric History:  Previous Medication Trials: Cogentin, Seroquel, Lithium, Depakote, Trileptal, Doxepin, Trazodone, and other " "unknown psychiatric medications   Previous Psychiatric Hospitalizations: yes, multiple   Hx of Depression: yes  Hx of Anxiety: yes  Hx of Bessie: yes  Hx of Psychosis: yes   Attempted but unable to assess further due to patient's AMS / hypoactive delirium      Social History:  Per collateral     Family Psychiatric History:   Per collateral      Substance Abuse History:  Hx of opiate and cocaine abuse / dependence; daughter states that she believes that he has been sober since around January 2022     Legal History:  Per collateral     Psychosocial Stressors: health.   Functioning Relationships: good support system in daughter.   Strengths AND Liabilities:  Strength: Patient has positive support network., Liability: Patient has poor health., Liability: Patient has possible cognitive impairment.    Neurologic History  Seizures: yes  Head trauma: Attempted but unable to assess due to patient's AMS / hypoactive delirium   CVA: yes     Psychiatric Mental Status Exam:  Appearance: older than stated age, dressed in hospital scrubs  Level of Consciousness: awake  Behavior/Cooperation: requiring frequent redirection  Psychomotor: significant   Speech: pressured  Language: english, fluid  Orientation: person, place, day of week, month of year, year  Attention Span/Concentration: required frequent redirection  Memory: Grossly intact  Mood: "great"  Affect: anxious  Thought Process: tangential  Associations: tangential  Thought Content: normal, no suicidality, no homicidality, delusions, or paranoia  Fund of Knowledge: Aware of current events  Insight: poor  Judgment: poor    Past Medical History:   Past Medical History:   Diagnosis Date    Bipolar disorder, unspecified     Chronic hepatitis C     History of psychiatric hospitalization     Hx of psychiatric care     Hypertension     Bessie     Obesity, unspecified     Psychiatric problem     Schizoaffective disorder, bipolar type     Seizures     Stroke     Substance " abuse     Therapy       Laboratory Data:   Labs Reviewed   COMPREHENSIVE METABOLIC PANEL - Abnormal; Notable for the following components:       Result Value    CO2 22 (*)     Albumin 3.2 (*)     All other components within normal limits    Narrative:     Draw baseline aPTT prior to starting the heparin bolus or  infusion   TROPONIN I - Abnormal; Notable for the following components:    Troponin I 0.102 (*)     All other components within normal limits    Narrative:     Draw baseline aPTT prior to starting the heparin bolus or  infusion   APTT - Abnormal; Notable for the following components:    aPTT 32.8 (*)     All other components within normal limits    Narrative:     Draw baseline aPTT prior to starting the heparin bolus or  infusion   CBC W/ AUTO DIFFERENTIAL - Abnormal; Notable for the following components:    Hemoglobin 11.3 (*)     Hematocrit 35.3 (*)     MCV 75 (*)     MCH 24.1 (*)     RDW 18.3 (*)     MPV 8.9 (*)     All other components within normal limits    Narrative:     Draw baseline aPTT prior to starting the heparin bolus or  infusion   B-TYPE NATRIURETIC PEPTIDE - Abnormal; Notable for the following components:     (*)     All other components within normal limits    Narrative:     Draw baseline aPTT prior to starting the heparin bolus or  infusion   CK - Abnormal; Notable for the following components:     (*)     All other components within normal limits    Narrative:     Draw baseline aPTT prior to starting the heparin bolus or  infusion   TROPONIN I - Abnormal; Notable for the following components:    Troponin I 0.081 (*)     All other components within normal limits   TROPONIN I - Abnormal; Notable for the following components:    Troponin I 0.069 (*)     All other components within normal limits   CBC W/ AUTO DIFFERENTIAL - Abnormal; Notable for the following components:    Hemoglobin 12.1 (*)     Hematocrit 38.5 (*)     MCV 76 (*)     MCH 23.9 (*)     MCHC 31.4 (*)     RDW  18.3 (*)     MPV 9.1 (*)     All other components within normal limits   COMPREHENSIVE METABOLIC PANEL - Abnormal; Notable for the following components:    CO2 21 (*)     Albumin 3.4 (*)     All other components within normal limits   TROPONIN I - Abnormal; Notable for the following components:    Troponin I 0.087 (*)     All other components within normal limits    Narrative:     Collection has been rescheduled by DNPConstantin at 07/19/2022 09:34 Reason:   Patient unavailable//per nurse rose pt is  combativr. Stated to   come back once he has administer calm medicine.   TROPONIN I - Abnormal; Notable for the following components:    Troponin I 0.074 (*)     All other components within normal limits   CBC W/ AUTO DIFFERENTIAL - Abnormal; Notable for the following components:    Hemoglobin 11.3 (*)     Hematocrit 36.0 (*)     MCV 77 (*)     MCH 24.2 (*)     MCHC 31.4 (*)     RDW 18.5 (*)     All other components within normal limits   COMPREHENSIVE METABOLIC PANEL - Abnormal; Notable for the following components:    CO2 20 (*)     BUN 23 (*)     Albumin 3.2 (*)     All other components within normal limits   CBC W/ AUTO DIFFERENTIAL - Abnormal; Notable for the following components:    RBC 4.44 (*)     Hemoglobin 10.7 (*)     Hematocrit 35.2 (*)     MCV 79 (*)     MCH 24.1 (*)     MCHC 30.4 (*)     RDW 18.4 (*)     All other components within normal limits   COMPREHENSIVE METABOLIC PANEL - Abnormal; Notable for the following components:    Glucose 127 (*)     BUN 25 (*)     Albumin 3.0 (*)     All other components within normal limits   B-TYPE NATRIURETIC PEPTIDE - Abnormal; Notable for the following components:    BNP 1,069 (*)     All other components within normal limits   TROPONIN I - Abnormal; Notable for the following components:    Troponin I 0.076 (*)     All other components within normal limits   CBC W/ AUTO DIFFERENTIAL - Abnormal; Notable for the following components:    RBC 4.40 (*)     Hemoglobin 10.5 (*)      Hematocrit 35.2 (*)     MCV 80 (*)     MCH 23.9 (*)     MCHC 29.8 (*)     RDW 18.6 (*)     All other components within normal limits   PROTIME-INR    Narrative:     Draw baseline aPTT prior to starting the heparin bolus or  infusion   CK   B-TYPE NATRIURETIC PEPTIDE   FOLATE   VITAMIN B12   TSH   AMMONIA   PROCALCITONIN    Narrative:     Collection has been rescheduled by DNP1 at 07/19/2022 09:34 Reason:   Patient unavailable//per nurse rose pt is  combativr. Stated to   come back once he has administer calm medicine.   SARS-COV-2 RNA AMPLIFICATION, QUAL    Narrative:     Patient being discharged to an extended care facility,test  needed for employer, or test is needed for school?->Yes     Allergies:   Review of patient's allergies indicates:   Allergen Reactions    Depakote [divalproex]     Divalproex sodium Other (See Comments)    Lithium     Lithium analogues     Quetiapine Other (See Comments)     Medications in ER:   Medications   metoprolol tartrate (LOPRESSOR) tablet 50 mg (50 mg Oral Given 7/25/22 0810)   levETIRAcetam tablet 500 mg (500 mg Oral Given 7/25/22 0810)   isosorbide mononitrate 24 hr tablet 30 mg (30 mg Oral Given 7/25/22 0810)   atorvastatin tablet 80 mg (80 mg Oral Given 7/25/22 0810)   aspirin chewable tablet 81 mg (81 mg Oral Given 7/25/22 0810)   sodium chloride 0.9% flush 10 mL (has no administration in time range)   naloxone 0.4 mg/mL injection 0.02 mg (has no administration in time range)   glucose chewable tablet 16 g (has no administration in time range)   glucose chewable tablet 24 g (has no administration in time range)   glucagon (human recombinant) injection 1 mg (has no administration in time range)   ondansetron injection 4 mg (has no administration in time range)   acetaminophen tablet 650 mg (650 mg Oral Given 7/25/22 0610)   apixaban tablet 5 mg (5 mg Oral Given 7/25/22 0810)   ARIPiprazole tablet 10 mg (10 mg Oral Given 7/25/22 0810)   OLANZapine injection 5 mg (5 mg  Intramuscular Given 7/22/22 1447)   polyethylene glycol packet 17 g (17 g Oral Given 7/25/22 0810)   lactulose 20 gram/30 mL solution Soln 20 g (has no administration in time range)   losartan tablet 25 mg (25 mg Oral Given 7/25/22 0810)   mupirocin 2 % ointment ( Nasal Canceled Entry 7/25/22 0900)   ALPRAZolam tablet 0.25 mg (0.25 mg Oral Given 7/25/22 0318)   furosemide tablet 20 mg (20 mg Oral Given 7/25/22 0810)   melatonin tablet 9 mg (9 mg Oral Given 7/24/22 2020)   ibuprofen tablet 400 mg (400 mg Oral Given 7/25/22 0318)   sodium chloride 0.9% bolus 500 mL ( Intravenous Canceled Entry 7/18/22 1215)   lorazepam injection 2 mg (2 mg Intravenous Given 7/20/22 0915)   gadobutroL (GADAVIST) injection 10 mL (6 mLs Intravenous Given 7/20/22 0944)   lorazepam injection 2 mg ( Intravenous Canceled Entry 7/21/22 1345)     Medications at home:   Current Outpatient Medications   Medication Instructions    amLODIPine (NORVASC) 5 mg, Oral, Daily    atorvastatin (LIPITOR) 80 mg, Oral, Daily    benztropine (COGENTIN) 1 mg, Oral, 2 times daily PRN    doxepin (SINEQUAN) 50 mg, Oral, Nightly    ezetimibe (ZETIA) 10 mg, Oral, Nightly    famotidine (PEPCID) 20 mg, Oral, 2 times daily    isosorbide mononitrate (IMDUR) 30 mg, Oral, Daily    levETIRAcetam (KEPPRA) 500 mg, Oral, 2 times daily    lisinopriL (PRINIVIL,ZESTRIL) 20 mg, Oral, Daily    metoprolol tartrate (LOPRESSOR) 50 mg, Oral, 2 times daily    OLANZapine (ZYPREXA) 10 mg, Oral, Nightly    OXcarbazepine (TRILEPTAL) 300 mg, Per OG tube, 2 times daily    oxyCODONE (ROXICODONE) 5 mg, Oral, Every 8 hours PRN    polyethylene glycol (GLYCOLAX) 17 g, Oral, Daily    traZODone (DESYREL) 100 mg, Oral, Nightly     No new subjective & objective note has been filed under this hospital service since the last note was generated.    Assessment - Diagnosis - Goals:     Diagnosis/Impression:  Kendall Duff is a 56 y.o. male with a past psychiatric history of schizoaffective  disorder, bipolar type, polysubstance use disorder (cocaine, opioid), and dementia, currently presenting with Coronary artery disease. TelePsychiatry was originally consulted to address the patient's symptoms of cognitive status and placement. On exam, patient with pressured speech (requiring frequent redirection), anxious affect, and tangential thought process, coupled with poor insight & judgement.    Schizoaffective disorder, bipolar type  Polysubstance use disorder (cocaine, opioid)  H/O dementia    Rec:    Legal Status: Continue PEC-[facility], as patient remains gravely disabled secondary to a major mental illness.     Precautions: 1:1 sitter    Medications: Abilify 10mg PO daily for psychotic features; Zydis/Zyprexa 5mg ODT/IM q8hr PRN for non-redirectable agitation    Monitor: Please obtain daily EKG to monitor QTc.    Disposition: Once medically cleared by Primary MD, recommend seeking placement in an acute psychiatric facility for safety and medical stabilization of current psychiatric symptomatology.    Time with patient: 30 minutes    More than 50% of the time was spent counseling/coordinating care    Consulting clinician was informed of the encounter and consult note.    Consultation ended: 7/25/2022 at 1903    Greg Harden MD  Psychiatry  [facility]

## 2022-07-26 VITALS
HEIGHT: 70 IN | TEMPERATURE: 98 F | BODY MASS INDEX: 20.42 KG/M2 | SYSTOLIC BLOOD PRESSURE: 159 MMHG | HEART RATE: 74 BPM | WEIGHT: 142.63 LBS | OXYGEN SATURATION: 95 % | DIASTOLIC BLOOD PRESSURE: 99 MMHG | RESPIRATION RATE: 18 BRPM

## 2022-07-26 PROBLEM — R79.89 ELEVATED TROPONIN: Status: RESOLVED | Noted: 2022-07-18 | Resolved: 2022-07-26

## 2022-07-26 LAB — SARS-COV-2 RDRP RESP QL NAA+PROBE: NEGATIVE

## 2022-07-26 PROCEDURE — 25000003 PHARM REV CODE 250: Performed by: NURSE PRACTITIONER

## 2022-07-26 PROCEDURE — 25000003 PHARM REV CODE 250: Performed by: STUDENT IN AN ORGANIZED HEALTH CARE EDUCATION/TRAINING PROGRAM

## 2022-07-26 PROCEDURE — 25000003 PHARM REV CODE 250: Performed by: INTERNAL MEDICINE

## 2022-07-26 PROCEDURE — 25000003 PHARM REV CODE 250: Performed by: FAMILY MEDICINE

## 2022-07-26 PROCEDURE — U0002 COVID-19 LAB TEST NON-CDC: HCPCS | Performed by: INTERNAL MEDICINE

## 2022-07-26 RX ORDER — NAPROXEN SODIUM 220 MG/1
81 TABLET, FILM COATED ORAL DAILY
Qty: 360 TABLET | Refills: 0 | Status: ON HOLD | OUTPATIENT
Start: 2022-07-26 | End: 2022-12-25 | Stop reason: SDUPTHER

## 2022-07-26 RX ORDER — ARIPIPRAZOLE 10 MG/1
10 TABLET ORAL DAILY
Qty: 30 TABLET | Refills: 11 | Status: ON HOLD | OUTPATIENT
Start: 2022-07-27 | End: 2022-10-28

## 2022-07-26 RX ORDER — FUROSEMIDE 20 MG/1
20 TABLET ORAL DAILY
Qty: 30 TABLET | Refills: 11 | Status: ON HOLD
Start: 2022-07-27 | End: 2022-11-03 | Stop reason: HOSPADM

## 2022-07-26 RX ADMIN — IBUPROFEN 400 MG: 400 TABLET, FILM COATED ORAL at 12:07

## 2022-07-26 RX ADMIN — APIXABAN 5 MG: 2.5 TABLET, FILM COATED ORAL at 08:07

## 2022-07-26 RX ADMIN — LEVETIRACETAM 500 MG: 500 TABLET, FILM COATED ORAL at 08:07

## 2022-07-26 RX ADMIN — POLYETHYLENE GLYCOL 3350 17 G: 17 POWDER, FOR SOLUTION ORAL at 08:07

## 2022-07-26 RX ADMIN — LOSARTAN POTASSIUM 25 MG: 25 TABLET, FILM COATED ORAL at 08:07

## 2022-07-26 RX ADMIN — METOPROLOL TARTRATE 50 MG: 50 TABLET, FILM COATED ORAL at 08:07

## 2022-07-26 RX ADMIN — ARIPIPRAZOLE 10 MG: 5 TABLET ORAL at 08:07

## 2022-07-26 RX ADMIN — FUROSEMIDE 20 MG: 20 TABLET ORAL at 08:07

## 2022-07-26 RX ADMIN — ISOSORBIDE MONONITRATE 30 MG: 30 TABLET, EXTENDED RELEASE ORAL at 08:07

## 2022-07-26 RX ADMIN — ASPIRIN 81 MG CHEWABLE TABLET 81 MG: 81 TABLET CHEWABLE at 08:07

## 2022-07-26 RX ADMIN — ATORVASTATIN CALCIUM 80 MG: 40 TABLET, FILM COATED ORAL at 08:07

## 2022-07-26 NOTE — SUBJECTIVE & OBJECTIVE
Interval History: Patient remains calm and appropriate. Await placement    Review of Systems   Reason unable to perform ROS: Limited due to confusion.   Constitutional:  Positive for fatigue.   HENT: Negative.     Respiratory: Negative.     Cardiovascular: Negative.    Gastrointestinal: Negative.    Musculoskeletal:  Positive for back pain.   Hematological: Negative.    Psychiatric/Behavioral:  The patient is nervous/anxious.    Objective:     Vital Signs (Most Recent):  Temp: 98 °F (36.7 °C) (07/26/22 1151)  Pulse: 63 (07/26/22 1151)  Resp: 18 (07/26/22 1151)  BP: 112/77 (07/26/22 1151)  SpO2: 98 % (07/26/22 1151) Vital Signs (24h Range):  Temp:  [97.5 °F (36.4 °C)-98.3 °F (36.8 °C)] 98 °F (36.7 °C)  Pulse:  [42-75] 63  Resp:  [18-20] 18  SpO2:  [97 %-100 %] 98 %  BP: (101-139)/(69-93) 112/77     Weight: 64.7 kg (142 lb 10.2 oz)  Body mass index is 20.47 kg/m².    Intake/Output Summary (Last 24 hours) at 7/26/2022 1358  Last data filed at 7/26/2022 1200  Gross per 24 hour   Intake 628 ml   Output --   Net 628 ml      Physical Exam  Vitals and nursing note reviewed.   Constitutional:       General: He is not in acute distress.     Appearance: He is underweight. He is not ill-appearing or diaphoretic.   HENT:      Head: Normocephalic and atraumatic.      Right Ear: Hearing and external ear normal.      Left Ear: Hearing and external ear normal.      Nose: Nose normal. No mucosal edema or rhinorrhea.      Mouth/Throat:      Pharynx: Uvula midline.   Eyes:      General:         Right eye: No discharge.         Left eye: No discharge.      Conjunctiva/sclera: Conjunctivae normal.      Right eye: No chemosis.     Left eye: No chemosis.     Pupils: Pupils are equal, round, and reactive to light.   Neck:      Thyroid: No thyroid mass or thyromegaly.      Trachea: Trachea normal.   Cardiovascular:      Rate and Rhythm: Normal rate. Rhythm irregular.      Pulses:           Dorsalis pedis pulses are 2+ on the right side and  2+ on the left side.      Heart sounds: Murmur heard.   Pulmonary:      Effort: Pulmonary effort is normal. No respiratory distress.      Breath sounds: Examination of the right-lower field reveals decreased breath sounds. Examination of the left-lower field reveals decreased breath sounds. Decreased breath sounds present. No wheezing.   Chest:   Breasts:      Right: No supraclavicular adenopathy.      Left: No supraclavicular adenopathy.     Abdominal:      General: Bowel sounds are increased. There is no distension.      Palpations: Abdomen is soft.      Tenderness: There is no abdominal tenderness.   Musculoskeletal:         General: Normal range of motion.      Cervical back: Normal range of motion and neck supple.   Lymphadenopathy:      Cervical: No cervical adenopathy.      Upper Body:      Right upper body: No supraclavicular adenopathy.      Left upper body: No supraclavicular adenopathy.   Skin:     General: Skin is warm and dry.      Capillary Refill: Capillary refill takes less than 2 seconds.      Findings: No rash.   Neurological:      Mental Status: He is alert. Mental status is at baseline. He is confused.   Psychiatric:         Attention and Perception: He does not perceive auditory or visual hallucinations.         Mood and Affect: Mood is anxious.         Speech: Speech is rapid and pressured.         Behavior: Behavior normal. Behavior is not aggressive or hyperactive.         Thought Content: Thought content normal. Thought content is not delusional.         Cognition and Memory: He exhibits impaired recent memory. He does not exhibit impaired remote memory.         Judgment: Judgment is impulsive.       Significant Labs: All pertinent labs within the past 24 hours have been reviewed.        Significant Imaging: I have reviewed all pertinent imaging results/findings within the past 24 hours.

## 2022-07-26 NOTE — PLAN OF CARE
Patient agreed to voluntary admission to in-patient psychiatric facility at [facility] in [place].  Paperwork completed and sent to Gerson Cruz with [facility].  Transportation ordered.    Notified daughter Arabella Bradley of transfer.

## 2022-07-26 NOTE — PROGRESS NOTES
[facility] Medicine  Progress Note    Patient Name: Kendall Duff  MRN: [id number]  Patient Class: IP- Inpatient   Admission Date: 7/18/2022  Length of Stay: 8 days  Attending Physician: Harjit Ruiz MD  Primary Care Provider: Primary Doctor No        Subjective:     Principal Problem:Coronary artery disease        HPI:  56-year-old male, PMH of CAD S/P PCI s/p CABG (04/2022 4V), Bipolar, hypertension, CVA, Hepatitis C, who was brought from the mental health unit. Pt has been under a PEC for 8 days for schizophrenia exacerbation and agitation. Pt has been having worsening confusion, dyspnea and weakness. At the behavioral unit, completed work up with troponin and CPK was done which was elevated. Patient has recently had 4V CABG, followed by wound healing issued at the sternotomy site, which reportedly has been healing well following skin grafting.     While in the ED today, ptient denies any chest pain, shortness of breath. Further work up demonstrated, HS troponin 105.2 (normal <60), BnP 2575, , CKMB 8, D-dimer 1.84. EKG demonstrated accelerated idoventicular rhythm, extreme right superior axis deviation, anterioseptal infarct, RBBB, consider also periinfarct block. He was given plavix in the ED (patient is on Eliquis for unclear reason). Referring facility requesting transfer for cardiology & interventional cardiology assistance.  Cardiology, Dr.. Brunson agreeable to consult with  admitting.       Overview/Hospital Course:  7/19/22 No acute events overnight. No change in mental status. The patient remains altered. The patient is alert and will follow with his gaze. He does not follow commands and has been non-verbal since admission, which is not his baseline. Psychiatry recs noted and ordered. CT head was negative for acute findings. Neurology was consulted and recommended an MRI brain W WO and EEG. The patient has been relatively calm since admission will rescind the CEC.  "7/20/22 Yesterday afternoon the patient began communicating with staff. He was oriented x 3. Neurology consult was cancelled d/t patient returning to baseline mental status prior to admission. MRI brain was negative. Discussed the POC with the patients family. They wish for the patient to return a mental health facility "to get his meds straight". Case management is attempting to find an accepting facility. Per Cardiology will treat with medical management. 7/21/22 No acute events overnight. MRI brain was negative. The patient is awaiting inpatient psych placement. He is medically clear for placement.     7/22: BNP: 1069, Trop: 0.076, initiated on Lasix. Vitals stable, continues to be confused at times, redirectable, able to speak. Has delusions. Non-violent. Pending placement. Vitals stable.     7/23: Multiple referrals sent out seeking placement, referral today sent to [facility] for psych placement. Repeat labs in AM, patient stable.   7/24 - Patient remains calm and appropriate. Placement pending facility acceptance. No events. Sitter at bedside.  7/25 - Patient stable, calm, appropriate. Watching TV. Await placement. Sitter at bedside. No events  7/26 - Patient remains calm and appropriate. Await placement      Interval History: Patient remains calm and appropriate. Await placement    Review of Systems   Reason unable to perform ROS: Limited due to confusion.   Constitutional:  Positive for fatigue.   HENT: Negative.     Respiratory: Negative.     Cardiovascular: Negative.    Gastrointestinal: Negative.    Musculoskeletal:  Positive for back pain.   Hematological: Negative.    Psychiatric/Behavioral:  The patient is nervous/anxious.    Objective:     Vital Signs (Most Recent):  Temp: 98 °F (36.7 °C) (07/26/22 1151)  Pulse: 63 (07/26/22 1151)  Resp: 18 (07/26/22 1151)  BP: 112/77 (07/26/22 1151)  SpO2: 98 % (07/26/22 1151) Vital Signs (24h Range):  Temp:  [97.5 °F (36.4 °C)-98.3 °F (36.8 °C)] 98 °F (36.7 " °C)  Pulse:  [42-75] 63  Resp:  [18-20] 18  SpO2:  [97 %-100 %] 98 %  BP: (101-139)/(69-93) 112/77     Weight: 64.7 kg (142 lb 10.2 oz)  Body mass index is 20.47 kg/m².    Intake/Output Summary (Last 24 hours) at 7/26/2022 1358  Last data filed at 7/26/2022 1200  Gross per 24 hour   Intake 628 ml   Output --   Net 628 ml      Physical Exam  Vitals and nursing note reviewed.   Constitutional:       General: He is not in acute distress.     Appearance: He is underweight. He is not ill-appearing or diaphoretic.   HENT:      Head: Normocephalic and atraumatic.      Right Ear: Hearing and external ear normal.      Left Ear: Hearing and external ear normal.      Nose: Nose normal. No mucosal edema or rhinorrhea.      Mouth/Throat:      Pharynx: Uvula midline.   Eyes:      General:         Right eye: No discharge.         Left eye: No discharge.      Conjunctiva/sclera: Conjunctivae normal.      Right eye: No chemosis.     Left eye: No chemosis.     Pupils: Pupils are equal, round, and reactive to light.   Neck:      Thyroid: No thyroid mass or thyromegaly.      Trachea: Trachea normal.   Cardiovascular:      Rate and Rhythm: Normal rate. Rhythm irregular.      Pulses:           Dorsalis pedis pulses are 2+ on the right side and 2+ on the left side.      Heart sounds: Murmur heard.   Pulmonary:      Effort: Pulmonary effort is normal. No respiratory distress.      Breath sounds: Examination of the right-lower field reveals decreased breath sounds. Examination of the left-lower field reveals decreased breath sounds. Decreased breath sounds present. No wheezing.   Chest:   Breasts:      Right: No supraclavicular adenopathy.      Left: No supraclavicular adenopathy.     Abdominal:      General: Bowel sounds are increased. There is no distension.      Palpations: Abdomen is soft.      Tenderness: There is no abdominal tenderness.   Musculoskeletal:         General: Normal range of motion.      Cervical back: Normal range of  motion and neck supple.   Lymphadenopathy:      Cervical: No cervical adenopathy.      Upper Body:      Right upper body: No supraclavicular adenopathy.      Left upper body: No supraclavicular adenopathy.   Skin:     General: Skin is warm and dry.      Capillary Refill: Capillary refill takes less than 2 seconds.      Findings: No rash.   Neurological:      Mental Status: He is alert. Mental status is at baseline. He is confused.   Psychiatric:         Attention and Perception: He does not perceive auditory or visual hallucinations.         Mood and Affect: Mood is anxious.         Speech: Speech is rapid and pressured.         Behavior: Behavior normal. Behavior is not aggressive or hyperactive.         Thought Content: Thought content normal. Thought content is not delusional.         Cognition and Memory: He exhibits impaired recent memory. He does not exhibit impaired remote memory.         Judgment: Judgment is impulsive.       Significant Labs: All pertinent labs within the past 24 hours have been reviewed.        Significant Imaging: I have reviewed all pertinent imaging results/findings within the past 24 hours.      Assessment/Plan:      * Coronary artery disease  S/p recent CABG  Continue home meds- Eliquis, aspirin, statin    4.6.22 he underwent a CABG x 4 with results of LIMA to LAD, rSVG to OM1, rSVG to Diag 1 and rSVG to PDA and Ligation of SOPHIA    Reported chest pain, admit for observation  Trend troponin- flat  -- Per Cardiology will treat with medical management.   --The patient is awaiting inpatient psych placement. He is medically clear for placement.     Stable for Psych Placement - Ruled out for ACS      Chronic systolic congestive heart failure  Patient is identified as having Combined Systolic and Diastolic heart failure that is Chronic. CHF is currently controlled. Latest ECHO performed and demonstrates- Results for orders placed during the hospital encounter of 04/01/22    Echo Saline  Bubble? No    Interpretation Summary  · The left ventricle is normal in size with concentric hypertrophy and severely decreased systolic function.  · The estimated ejection fraction is 25-30%.  · There is severe left ventricular global hypokinesis.  · Grade II left ventricular diastolic dysfunction.  · Normal right ventricular size with mildly reduced right ventricular systolic function.  · The estimated PA systolic pressure is 52 mmHg.  · There is moderate pulmonary hypertension.  · Elevated central venous pressure (15 mmHg).  . Continue Beta Blocker and ACE/ARB and monitor clinical status closely. Monitor on telemetry. Patient is off CHF pathway.  Monitor strict Is&Os and daily weights.  Place on fluid restriction of 2 L. Continue to stress to patient importance of self efficacy and  on diet for CHF. Last BNP reviewed- and noted below   Recent Labs   Lab 07/22/22 0918   BNP 1,069*   .  --Continue Lasix  Stable  Medically Cleared for placement    Acute DVT (deep venous thrombosis)  On Eliquis      Elevated troponin  Recent CABGx4  Trend troponin- flat    Ruled out for ACS  Medical Management  Cleared for placement    Microcytic anemia  Hb stable      Schizoaffective disorder, bipolar type  Restart home meds as tolerated  Consult tele-psych  --Plan for discharge back to mental health facility.   --The patient is awaiting inpatient psych placement. He is medically clear for placement.     Bilateral carotid artery stenosis  stable      Bipolar 1 disorder  Tele-psych consulted     Recommendation for Inpatient Care    Await placement        VTE Risk Mitigation (From admission, onward)         Ordered     apixaban tablet 5 mg  2 times daily         07/18/22 0452     IP VTE HIGH RISK PATIENT  Once         07/18/22 0337     Place sequential compression device  Until discontinued         07/18/22 0337                Discharge Planning   TRACIE:      Code Status: Full Code   Is the patient medically ready for  discharge?: Yes    Reason for patient still in hospital (select all that apply): Patient trending condition, Consult recommendations and Pending disposition  Discharge Plan A: [facility]                  Harjit Ruiz MD  Department of Hospital Medicine   [facility] ([facility])

## 2022-07-26 NOTE — PLAN OF CARE
Aox4. Able to verbalize needs & follow commands. Calm & cooperative throughout shift.   POC reviewed with pt. Interventions implemented as appropriate.    VSS; not on tele-monitor.  On room air.    PIV patent. Refused to get new IV. Integrity maintained.  Skin WDI.    C/o headache. Ibuprofen effective.    Continent of b/b. Up to toilet.   Eliquis for VTE.  Ambulates independently. Activity ad erika. Frequent position changes encouraged.  Educated on s/sx of pressure injury;  verbalized understanding.  NADN. Resting quietly in bed.   Free of falls. Hourly rounding complete.   All safety measures remain in place. SR up x2; bed low & locked. Call light w/in reach.   Will continue to monitor throughout shift.  Sitter & [employer] at bedside.

## 2022-07-26 NOTE — DISCHARGE SUMMARY
[facility]  Discharge Summary      Patient Name: Kendall Duff  MRN: [id number]  Patient Class: IP- Inpatient  Admission Date: 7/18/2022  Hospital Length of Stay: 8 days  Discharge Date and Time: No discharge date for patient encounter.  Attending Physician: Harjit Ruiz MD   Discharging Provider: Harjit Ruiz MD  Primary Care Provider: Primary Doctor No      HPI:   56-year-old male, PMH of CAD S/P PCI s/p CABG (04/2022 4V), Bipolar, hypertension, CVA, Hepatitis C, who was brought from the mental health unit. Pt has been under a PEC for 8 days for schizophrenia exacerbation and agitation. Pt has been having worsening confusion, dyspnea and weakness. At the behavioral unit, completed work up with troponin and CPK was done which was elevated. Patient has recently had 4V CABG, followed by wound healing issued at the sternotomy site, which reportedly has been healing well following skin grafting.     While in the ED today, ptient denies any chest pain, shortness of breath. Further work up demonstrated, HS troponin 105.2 (normal <60), BnP 2575, , CKMB 8, D-dimer 1.84. EKG demonstrated accelerated idoventicular rhythm, extreme right superior axis deviation, anterioseptal infarct, RBBB, consider also periinfarct block. He was given plavix in the ED (patient is on Eliquis for unclear reason). Referring facility requesting transfer for cardiology & interventional cardiology assistance.  Cardiology, Dr.. Brunson agreeable to consult with  admitting.       * No surgery found *      Hospital Course:   7/19/22 No acute events overnight. No change in mental status. The patient remains altered. The patient is alert and will follow with his gaze. He does not follow commands and has been non-verbal since admission, which is not his baseline. Psychiatry recs noted and ordered. CT head was negative for acute findings. Neurology was consulted and recommended an MRI brain W WO and EEG.  "The patient has been relatively calm since admission will rescind the CEC. 7/20/22 Yesterday afternoon the patient began communicating with staff. He was oriented x 3. Neurology consult was cancelled d/t patient returning to baseline mental status prior to admission. MRI brain was negative. Discussed the POC with the patients family. They wish for the patient to return a mental health facility "to get his meds straight". Case management is attempting to find an accepting facility. Per Cardiology will treat with medical management. 7/21/22 No acute events overnight. MRI brain was negative. The patient is awaiting inpatient psych placement. He is medically clear for placement.     7/22: BNP: 1069, Trop: 0.076, initiated on Lasix. Vitals stable, continues to be confused at times, redirectable, able to speak. Has delusions. Non-violent. Pending placement. Vitals stable.     7/23: Multiple referrals sent out seeking placement, referral today sent to [facility] for psych placement. Repeat labs in AM, patient stable.   7/24 - Patient remains calm and appropriate. Placement pending facility acceptance. No events. Sitter at bedside.  7/25 - Patient stable, calm, appropriate. Watching TV. Await placement. Sitter at bedside. No events  7/26 - Patient remains calm and appropriate. Await placement  Patient accepted for Psych Placement - [facility]. Patient stable for a safe discharge to address his underlying psychiatric illness.       Goals of Care Treatment Preferences:  Code Status: Full Code      Consults:   Consults (From admission, onward)        Status Ordering Provider     Inpatient consult to Telemedicine - Psych  Once        Provider:  Harvinder Faith MD    Completed KEVIN NAVARRETE     Inpatient consult to Social Work  Once        Provider:  (Not yet assigned)    Completed EVANGELINA BRUNO     Inpatient consult to Cardiology  Once        Provider:  Lili Ng MD    Completed KAYLAN JOHNSON     " Inpatient consult to Telemedicine - Psych  Once        Provider:  DO Robert Perla NEELAY B.          No new Assessment & Plan notes have been filed under this hospital service since the last note was generated.  Service: Hospital Medicine    Final Active Diagnoses:    Diagnosis Date Noted POA    PRINCIPAL PROBLEM:  Coronary artery disease [I25.10] 03/23/2022 Yes    Microcytic anemia [D50.9] 07/18/2022 Yes    Acute DVT (deep venous thrombosis) [I82.409] 07/18/2022 Yes    Chronic systolic congestive heart failure [I50.22] 07/18/2022 Yes    Schizoaffective disorder, bipolar type [F25.0] 06/10/2022 Yes    Bipolar 1 disorder [F31.9] 03/23/2022 Yes      Problems Resolved During this Admission:    Diagnosis Date Noted Date Resolved POA    Altered mental status [R41.82] 07/19/2022 07/24/2022 Yes    Elevated troponin [R77.8] 07/18/2022 07/26/2022 Yes    Bilateral carotid artery stenosis [I65.23]  07/26/2022 Yes    NSTEMI (non-ST elevated myocardial infarction) [I21.4]  07/26/2022 Yes       Discharged Condition: stable    Disposition: Psychiatric Hospital    Follow Up:    Patient Instructions:      Diet Cardiac     Activity as tolerated       Significant Diagnostic Studies: Labs:   BMP: No results for input(s): GLU, NA, K, CL, CO2, BUN, CREATININE, CALCIUM, MG in the last 48 hours., CMP No results for input(s): NA, K, CL, CO2, GLU, BUN, CREATININE, CALCIUM, PROT, ALBUMIN, BILITOT, ALKPHOS, AST, ALT, ANIONGAP, ESTGFRAFRICA, EGFRNONAA in the last 48 hours., CBC No results for input(s): WBC, HGB, HCT, PLT in the last 48 hours., Troponin   Recent Labs   Lab 07/19/22  1518 07/22/22  0918   TROPONINI 0.074* 0.076*    and All labs within the past 24 hours have been reviewed    Pending Diagnostic Studies:     None         Medications:  Reconciled Home Medications:      Medication List      START taking these medications    ARIPiprazole 10 MG Tab  Commonly known as: ABILIFY  Take 1 tablet (10 mg total) by  mouth once daily.  Start taking on: July 27, 2022     furosemide 20 MG tablet  Commonly known as: LASIX  Take 1 tablet (20 mg total) by mouth once daily.  Start taking on: July 27, 2022        CONTINUE taking these medications    amLODIPine 5 MG tablet  Commonly known as: NORVASC  Take 1 tablet (5 mg total) by mouth once daily.     aspirin 81 MG Chew  Chew and swallow 1 tablet (81 mg total) by mouth once daily.     atorvastatin 80 MG tablet  Commonly known as: LIPITOR  Take 1 tablet (80 mg total) by mouth once daily.     benztropine 1 MG tablet  Commonly known as: COGENTIN  Take 1 tablet (1 mg total) by mouth 2 (two) times daily as needed (spasms).     ezetimibe 10 mg tablet  Commonly known as: ZETIA  Take 1 tablet (10 mg total) by mouth every evening.     famotidine 20 MG tablet  Commonly known as: PEPCID  Take 1 tablet (20 mg total) by mouth 2 (two) times daily.     isosorbide mononitrate 30 MG 24 hr tablet  Commonly known as: IMDUR  Take 1 tablet (30 mg total) by mouth once daily.     levETIRAcetam 500 MG Tab  Commonly known as: KEPPRA  Take 1 tablet (500 mg total) by mouth 2 (two) times daily.     lisinopriL 20 MG tablet  Commonly known as: PRINIVIL,ZESTRIL  Take 20 mg by mouth once daily.     metoprolol tartrate 50 MG tablet  Commonly known as: LOPRESSOR  Take 1 tablet (50 mg total) by mouth 2 (two) times daily.     OLANZapine 10 MG tablet  Commonly known as: ZyPREXA  Take 1 tablet (10 mg total) by mouth every evening.     OXcarbazepine 300 MG Tab  Commonly known as: TRILEPTAL  1 tablet (300 mg total) by Per OG tube route 2 (two) times daily.     oxyCODONE 5 MG immediate release tablet  Commonly known as: ROXICODONE  Take 1 tablet (5 mg total) by mouth every 8 (eight) hours as needed for Pain (scale 6-10).     polyethylene glycol 17 gram Pwpk  Commonly known as: GLYCOLAX  Take 17 g by mouth once daily.     traZODone 100 MG tablet  Commonly known as: DESYREL  Take 1 tablet (100 mg total) by mouth every  evening.        STOP taking these medications    apixaban 5 mg Tab  Commonly known as: ELIQUIS     clopidogreL 75 mg tablet  Commonly known as: PLAVIX     doxepin 50 MG capsule  Commonly known as: SINEQUAN     metoprolol succinate 25 MG 24 hr tablet  Commonly known as: TOPROL-XL     pravastatin 40 MG tablet  Commonly known as: PRAVACHOL            Indwelling Lines/Drains at time of discharge:   Lines/Drains/Airways     None                 Time spent on the discharge of patient: 42 minutes         Harjit Ruiz MD  Department of Hospital Medicine  [facility] ([facility])

## 2022-07-27 NOTE — PHYSICIAN QUERY
PT Name: Kendall Duff  MR #: [id number]     DOCUMENTATION CLARIFICATION      CDS/: Rachel Louis RN CDIS             Contact information: [email or web address]      This form is a permanent document in the medical record.     Query Date: July 27, 2022    Dear Provider,  By submitting this query, we are merely seeking further clarification of documentation.  Please utilize your independent clinical judgment when addressing the question(s) below.     The Medical Record contains the following:    Supporting Clinical Findings Location in Medical Record   NSTEMI (non-ST elevated myocardial infarction) Discharge summary    Coronary artery disease  S/p recent CABG  Continue home meds- Eliquis, aspirin, statin     4.6.22 he underwent a CABG x 4 with results of LIMA to LAD, rSVG to OM1, rSVG to Diag 1 and rSVG to PDA and Ligation of SOPHIA     Reported chest pain, admit for observation  Trend troponin- flat  -- Per Cardiology will treat with medical management.   --The patient is awaiting inpatient psych placement. He is medically clear for placement.      Stable for Psych Placement - Ruled out for ACS  note 7/26    Elevated troponin  Recent CABGx4  Trend troponin- flat     Ruled out for ACS  Medical Management  Cleared for placement HM note 7/26          Please clarify if the __NSTEMI__ diagnosis has been:    [  x] Ruled In   [  ] Ruled Out   [  ] Other/Clarification of findings (please specify): _______________    [   ] Clinically undetermined       Please document in your progress notes daily for the duration of treatment, until resolved, and include in your discharge summary.    Form No. [id number]

## 2022-07-27 NOTE — NURSING
AVS reviewed w/ pt again; verbalized understanding.   Questions encouraged/answered.   Informed [facility] of [facility]; stated he did not want that.   PIV removed by day shift nurse.   DUTCH at this time.   Transportation arrived & pt was escorted off unit via wheel chair.

## 2022-07-27 NOTE — PLAN OF CARE
[facility] (Hospital)  Discharge Final Note    Primary Care Provider: Primary Doctor No    Expected Discharge Date: 7/26/2022    Final Discharge Note (most recent)     Final Note - 07/27/22 0905        Final Note    Assessment Type Final Discharge Note     Anticipated Discharge Disposition Psychiatric Hospital                 Important Message from Medicare

## 2022-07-28 NOTE — PROCEDURES
DATE: 7/19/22    EEG NUMBER:  BR     REFERRING PHYSICIAN:  Dr. López      This EEG was performed to assess for evidence of underlying epilepsy.     ELECTROENCEPHALOGRAM REPORT     METHODOLOGY:  Electroencephalographic (EEG) recording is with electrodes placed according to the International 10-20 placement system.  Thirty two (32) channels of digital signal are simultaneously recorded from the scalp and may include EKG, EMG, and/or eye monitors.   Recording band pass was 0.1 to 512 hz.  Digital video recording of the patient is simultaneously recorded with the EEG.  The staff report clinical symptoms and may press an event button when the patient has symptoms of clinical interest to the treating physicians.  EEG and video recording is stored and archived in digital format.  The entire recording is visually reviewed, and the times identified by computer analysis as being spikes or seizures are reviewed again.  Activation procedures which include photic stimulation, hyperventilation and instructing patients to perform simple task are done in selected patients.   Compresses spectral analysis (CSA) is also performed on the activity recorded from each individual channel.  This is displayed as a power display of frequencies from 0 to 30 Hz over time.   The CSA analysis is done and displayed continuously.  This is reviewed for asymmetries in power between homologous areas of the scalp and for presence of changes in power which can be seen when seizures occur.  Sections of suspected abnormalities on the CSA is then compared with the original EEG recording.                [employer] software was also utilized in the review of this study.  This software suite analyzes the EEG recording in multiple domains.  Coherence and rhythmicity is computed to identify EEG sections which may contain organized seizures.  Each channel undergoes analysis to detect presence of spike and sharp waves which have special and morphological  characteristic of epileptic activity.  The routine EEG recording is converted from spacial into frequency domain.  This is then displayed comparing homologous areas to identify areas of significant asymmetry.  Algorithm to identify non-cortically generated artifact is used to separate eye movement, EMG and other artifact from the EEG.     EEG FINDINGS:  The recording was obtained with a number of standard bipolar and referential montages during wakefulness and drowsiness.  In the alert state, the posterior background rhythm was a symmetric, well-modulated 9 to 10 Hz alpha rhythm, which reacted symmetrically to eye opening.  Intermittent photic stimulation evoked symmetric posterior driving responses.  During drowsiness, the background rhythm waxed and waned and there were periods of slowing. There were no focal abnormalities.  There were no interictal epileptiform abnormalities and no clinical or electrographic seizures were recorded.   Excessive beta activity was noted throughout the record which was diffuse.     The EKG channel revealed a sinus rhythm. PVCs were noted      IMPRESSION:  This is a normal EEG during wakefulness and drowsiness.      CLINICAL CORRELATION:  The patient is a 56 -year-old male who is being evaluated for altered mental status.  The patient is currently maintained on Keppra.  This is a normal EEG during wakefulness and drowsiness.  There is no evidence for neither cortical dysfunction nor an epileptic process on this recording.  No seizures were recorded during this study.

## 2022-07-30 DIAGNOSIS — I10 ESSENTIAL HYPERTENSION: ICD-10-CM

## 2022-07-30 DIAGNOSIS — I25.10 CORONARY ARTERY DISEASE INVOLVING NATIVE CORONARY ARTERY OF NATIVE HEART WITHOUT ANGINA PECTORIS: Primary | ICD-10-CM

## 2022-07-30 DIAGNOSIS — I50.42 CHRONIC COMBINED SYSTOLIC AND DIASTOLIC CONGESTIVE HEART FAILURE: ICD-10-CM

## 2022-07-30 DIAGNOSIS — F25.0 SCHIZOAFFECTIVE DISORDER, BIPOLAR TYPE: ICD-10-CM

## 2022-07-30 DIAGNOSIS — F31.9 BIPOLAR 1 DISORDER: ICD-10-CM

## 2022-07-30 DIAGNOSIS — Z86.718 HISTORY OF DVT (DEEP VEIN THROMBOSIS): ICD-10-CM

## 2022-07-30 DIAGNOSIS — Z95.1 HX OF CABG: ICD-10-CM

## 2022-07-30 DIAGNOSIS — E78.49 OTHER HYPERLIPIDEMIA: ICD-10-CM

## 2022-07-30 DIAGNOSIS — Z72.0 TOBACCO ABUSE: ICD-10-CM

## 2022-07-30 RX ORDER — LOSARTAN POTASSIUM 25 MG/1
25 TABLET ORAL DAILY
Qty: 90 TABLET | Refills: 3 | Status: ON HOLD
Start: 2022-07-30 | End: 2022-11-04 | Stop reason: HOSPADM

## 2022-07-30 NOTE — PROGRESS NOTES
Subjective:   Patient ID:  Kendall Duff is a 56 y.o. male who presents for cardiac consult of No chief complaint on file.      The patient location is: [facility]  The chief complaint leading to consultation is: CV eval    Visit type: audiovisual    Face to Face time with patient: 12 min  55 minutes of total time spent on the encounter, which includes face to face time and non-face to face time preparing to see the patient (eg, review of tests), Obtaining and/or reviewing separately obtained history, Documenting clinical information in the electronic or other health record, Independently interpreting results (not separately reported) and communicating results to the patient/family/caregiver, or Care coordination (not separately reported).       Each patient to whom he or she provides medical services by telemedicine is:  (1) informed of the relationship between the physician and patient and the respective role of any other health care provider with respect to management of the patient; and (2) notified that he or she may decline to receive medical services by telemedicine and may withdraw from such care at any time.    Notes:     Consult Requested by : Dr. Arnulfo Sanon  Reason for consult: CHF, CAD    HPI  The patient came in today for cardiac consult of CHF, CAD      7/30/22    Kendall Duff is a 56 y.o. male pt with CAD S/P PCI s/p CABG (04/2022 4V), CHF EF 25%, HTN, HLD, pulm HTN, h/o DVT, Bipolar, h/o CVA, Hepatitis C presents for CV eval post recent hosp stay.     Pt was admitted to [facility] last week - brought from the mental health unit. Pt has been under a PEC for 8 days for schizophrenia exacerbation and agitation. Pt has been having worsening confusion, dyspnea and weakness. At the behavioral unit, completed work up with troponin and CPK was done which was elevated. Patient has recently had 4V CABG, followed by wound healing issued at the sternotomy site, which reportedly has been healing well  following skin grafting.  While in the ED today, ptient denies any chest pain, shortness of breath. Further work up demonstrated, HS troponin 105.2 (normal <60), BnP 2575, , CKMB 8, D-dimer 1.84.    He had cardiac work up and eval by Dr. Ng, no acute issues, stable enzymes and ECG, ready for [facility].     While at [facility] pt has been compliant with meds and diet, feels well breathing is normal, denies leg swelling. Is active and mentally feels well. Discussed to f/u in clinic in 3 months will repeat echo and discuss further workup/tx; pt may need ICD.     Patient feels no chest pain, no sob, no leg swelling, no PND, no palpitation, no dizziness, no syncope, no CNS symptoms.    Patient has fairly good exercise tolerance.    Patient is compliant with medications.    Results for orders placed during the hospital encounter of 04/01/22    Echo Saline Bubble? No    Interpretation Summary  · The left ventricle is normal in size with concentric hypertrophy and severely decreased systolic function.  · The estimated ejection fraction is 25-30%.  · There is severe left ventricular global hypokinesis.  · Grade II left ventricular diastolic dysfunction.  · Normal right ventricular size with mildly reduced right ventricular systolic function.  · The estimated PA systolic pressure is 52 mmHg.  · There is moderate pulmonary hypertension.  · Elevated central venous pressure (15 mmHg).      Past Medical History:   Diagnosis Date    Bipolar disorder, unspecified     Chronic hepatitis C     History of psychiatric hospitalization     Hx of psychiatric care     Hypertension     Bessie     Obesity, unspecified     Psychiatric problem     Schizoaffective disorder, bipolar type     Seizures     Stroke     Substance abuse     Therapy        Past Surgical History:   Procedure Laterality Date    CORONARY STENT PLACEMENT  08/14/2015    DEBRIDEMENT  04/17/2022    LEFT HEART CATHETERIZATION Left 08/13/2015    REPEAT CLOSURE OF  STERNAL INCISION N/A 5/11/2022    Procedure: CLOSURE, STERNAL INCISION, REPEAT;  Surgeon: Adolfo Weiss MD;  Location: [facility] OR;  Service: Plastics;  Laterality: N/A;  STERNAL WOUND DEBRIDEMENT AND RECONSTRUCTION // MULTIPLE MUSCLE FLAPS // REQ 1400       Social History     Tobacco Use    Smoking status: Current Every Day Smoker     Types: Cigarettes    Smokeless tobacco: Never Used   Substance Use Topics    Alcohol use: Never    Drug use: Not Currently     Types: Cocaine       Family History   Problem Relation Age of Onset    Cancer Mother     Liver disease Father        Patient's Medications   New Prescriptions    APIXABAN (ELIQUIS) 5 MG TAB    Take 1 tablet (5 mg total) by mouth 2 (two) times daily.    LOSARTAN (COZAAR) 25 MG TABLET    Take 1 tablet (25 mg total) by mouth once daily.   Previous Medications    AMLODIPINE (NORVASC) 5 MG TABLET    Take 1 tablet (5 mg total) by mouth once daily.    ARIPIPRAZOLE (ABILIFY) 10 MG TAB    Take 1 tablet (10 mg total) by mouth once daily.    ASPIRIN 81 MG CHEW    Chew and swallow 1 tablet (81 mg total) by mouth once daily.    ATORVASTATIN (LIPITOR) 80 MG TABLET    Take 1 tablet (80 mg total) by mouth once daily.    BENZTROPINE (COGENTIN) 1 MG TABLET    Take 1 tablet (1 mg total) by mouth 2 (two) times daily as needed (spasms).    EZETIMIBE (ZETIA) 10 MG TABLET    Take 1 tablet (10 mg total) by mouth every evening.    FAMOTIDINE (PEPCID) 20 MG TABLET    Take 1 tablet (20 mg total) by mouth 2 (two) times daily.    FUROSEMIDE (LASIX) 20 MG TABLET    Take 1 tablet (20 mg total) by mouth once daily.    ISOSORBIDE MONONITRATE (IMDUR) 30 MG 24 HR TABLET    Take 1 tablet (30 mg total) by mouth once daily.    LEVETIRACETAM (KEPPRA) 500 MG TAB    Take 1 tablet (500 mg total) by mouth 2 (two) times daily.    METOPROLOL TARTRATE (LOPRESSOR) 50 MG TABLET    Take 1 tablet (50 mg total) by mouth 2 (two) times daily.    OLANZAPINE (ZYPREXA) 10 MG TABLET    Take 1 tablet (10 mg total)  by mouth every evening.    OXCARBAZEPINE (TRILEPTAL) 300 MG TAB    1 tablet (300 mg total) by Per OG tube route 2 (two) times daily.    OXYCODONE (ROXICODONE) 5 MG IMMEDIATE RELEASE TABLET    Take 1 tablet (5 mg total) by mouth every 8 (eight) hours as needed for Pain (scale 6-10).    POLYETHYLENE GLYCOL (GLYCOLAX) 17 GRAM PWPK    Take 17 g by mouth once daily.    TRAZODONE (DESYREL) 100 MG TABLET    Take 1 tablet (100 mg total) by mouth every evening.   Modified Medications    No medications on file   Discontinued Medications    LISINOPRIL (PRINIVIL,ZESTRIL) 20 MG TABLET    Take 20 mg by mouth once daily.       Review of Systems   Constitutional: Negative.  Negative for malaise/fatigue.   HENT: Negative.    Eyes: Negative.    Respiratory: Negative.    Cardiovascular: Negative.  Negative for chest pain, palpitations and leg swelling.   Gastrointestinal: Negative.    Genitourinary: Negative.    Musculoskeletal: Negative.    Skin: Negative.    Neurological: Negative.    Endo/Heme/Allergies: Negative.    Psychiatric/Behavioral: Negative.  Negative for depression and suicidal ideas. The patient is not nervous/anxious.    All 12 systems otherwise negative.      Wt Readings from Last 3 Encounters:   07/26/22 64.7 kg (142 lb 10.2 oz)   06/16/22 77.2 kg (170 lb 3.1 oz)   05/27/21 76.5 kg (168 lb 8.7 oz)     Temp Readings from Last 3 Encounters:   07/26/22 98.3 °F (36.8 °C) (Oral)   06/17/22 99.3 °F (37.4 °C) (Oral)   03/25/22 98.3 °F (36.8 °C) (Oral)     BP Readings from Last 3 Encounters:   07/26/22 (!) 159/99   07/17/22 (!) 137/93   06/17/22 114/72     Pulse Readings from Last 3 Encounters:   07/26/22 74   07/17/22 80   06/17/22 74       There were no vitals taken for this visit.    Objective:   Physical Exam  Constitutional:       General: He is not in acute distress.     Appearance: He is well-developed. He is not diaphoretic.   HENT:      Head: Normocephalic and atraumatic.      Mouth/Throat:      Pharynx: No  oropharyngeal exudate.   Eyes:      General: No scleral icterus.        Right eye: No discharge.         Left eye: No discharge.   Pulmonary:      Effort: Pulmonary effort is normal. No respiratory distress.   Skin:     Coloration: Skin is not pale.      Findings: No erythema or rash.   Neurological:      Mental Status: He is alert and oriented to person, place, and time.   Psychiatric:         Behavior: Behavior normal.         Thought Content: Thought content normal.         Judgment: Judgment normal.         Lab Results   Component Value Date     07/21/2022    K 4.4 07/21/2022     07/21/2022    CO2 23 07/21/2022    BUN 25 (H) 07/21/2022    CREATININE 0.8 07/21/2022     (H) 07/21/2022    HGBA1C 5.9 01/07/2022    MG 1.90 06/17/2022    AST 25 07/21/2022    ALT 18 07/21/2022    ALBUMIN 3.0 (L) 07/21/2022    PROT 6.8 07/21/2022    BILITOT 0.3 07/21/2022    WBC 5.70 07/24/2022    HGB 10.5 (L) 07/24/2022    HCT 35.2 (L) 07/24/2022    HCT 44.0 04/01/2022    MCV 80 (L) 07/24/2022     07/24/2022    INR 1.1 07/18/2022    TSH 0.415 07/18/2022    CHOL 81 06/11/2022    HDL 33 (L) 06/11/2022    TRIG 65 06/11/2022    BNP 1,069 (H) 07/22/2022     Assessment:      1. Coronary artery disease involving native coronary artery of native heart without angina pectoris    2. Hx of CABG    3. Essential hypertension    4. Other hyperlipidemia    5. Bipolar 1 disorder    6. Schizoaffective disorder, bipolar type    7. Tobacco abuse    8. Chronic combined systolic and diastolic congestive heart failure    9. History of DVT (deep vein thrombosis)        Plan:     1. CAD s/p CABG x 4, 4/2022   - cont asa, statin, BB, imdur  - no angina    2. CHF EF 25-30%  - pt euvolemic  - cont lasix,  Low salt diet  - monitor I/O    3. Recent DVT  - cont Eliquis 5 mg BID  - obtain records of DVT    4. HTN  - titrate meds  - stable    5. HLD  - cont statin    6. Schizoaffective/Bipolar disorder  - cont tx per psych and PCP    7.  Tobacco abuse  - needs cessation     Follow up in clinic in 3 months    Thank you for allowing me to participate in this patient's care. Please do not hesitate to contact me with any questions or concerns. Consult note has been forwarded to the referral physician.     Taz Sanon MD, [facility]  Cardiovascular Disease  [facility], [place]  [phone] (P)

## 2022-08-04 NOTE — PHYSICIAN QUERY
"PT Name: Kendall Duff  MR #: [id number]    DOCUMENTATION CLARIFICATION      CDS/: Rachel Louis RN CDIS             Contact information: [email or web address]    This form is a permanent document in the medical record.     Query Date: August 4, 2022    By submitting this query, we are merely seeking further clarification of documentation. Please utilize your independent clinical judgment when addressing the question(s) below.      The Medical Record contains the following:    Indicators   Supporting Clinical Findings Location in Medical Record   x LOC, AMS, Confusion, disorientation, etc. who was brought from the mental health unit. Pt has been under a PEC for 8 days for schizophrenia exacerbation and agitation. Pt has been having worsening confusion, dyspnea and weakness    Altered mental status  7/19/22 No change in mental status. The patient remains altered. The patient is alert and will follow with his gaze. He does not follow commands and has been non-verbal since admission, which is not his baseline. Psychiatry recs noted and ordered. CT head was negative for acute findings. Neurology was consulted and recommended an MRI brain W WO and EEG. The patient has been relatively calm since admission will rescind the CEC.     7/20/22 Yesterday afternoon the patient began communicating with staff. He was oriented x 3. Neurology consult was cancelled d/t patient returning to baseline mental status prior to admission. MRI brain was negative. Discussed the POC with the patients family. They wish for the patient to return a mental health facility "to get his meds straight". Case management is attempting to find an accepting facility. .       - Resolved, the patient is back to his baseline mental status. MRI brain was negative Psych consult            note 7/19                      HM note 7/20                       note 7/21    Radiology findings     x Acute/Chronic Condition Schizoaffective disorder, bipolar " type  Restart home meds as tolerated  Consult tele-psych      Acute Encephalopathy   Hypoactive Delirium     She states that for the past 6-7 months, she has noticed some memory / cognitive deficits in the patient, and she stated that she thinks he was given a dementia diagnosis in February 2022 H&P           Psych consult       Psych consult     Medication      Treatment      Other          Provider, please specify diagnosis or diagnoses associated with above clinical findings. [place] all that apply:    [  x ] Dementia unspecified    [   ] Delirium due to physiological/general medical conditions (please specify): ___________________   [  x ]  Encephalopathy, unspecified     [   ] Delirium, Unspecified   [   ] Other Encephalopathy (please specify): ____________________   [   ] Other neurological diagnosis (please specify): _________   [  ] Clinically Undetermined         Please document in your progress notes daily for the duration of treatment, until resolved, and include in your discharge summary.    Form No. [id number]

## 2022-08-11 ENCOUNTER — EXTERNAL HOME HEALTH (OUTPATIENT)
Dept: HOME HEALTH SERVICES | Facility: HOSPITAL | Age: 57
End: 2022-08-11
Payer: MEDICAID

## 2022-10-27 ENCOUNTER — HOSPITAL ENCOUNTER (INPATIENT)
Facility: HOSPITAL | Age: 57
LOS: 8 days | Discharge: HOME | DRG: 280 | End: 2022-11-04
Attending: EMERGENCY MEDICINE | Admitting: INTERNAL MEDICINE
Payer: MEDICAID

## 2022-10-27 DIAGNOSIS — R07.9 CHEST PAIN: ICD-10-CM

## 2022-10-27 DIAGNOSIS — R06.02 SOB (SHORTNESS OF BREATH): ICD-10-CM

## 2022-10-27 DIAGNOSIS — R00.0 SINUS TACHYCARDIA: ICD-10-CM

## 2022-10-27 DIAGNOSIS — R05.9 COUGH: ICD-10-CM

## 2022-10-27 DIAGNOSIS — I50.9 CHF EXACERBATION: Primary | ICD-10-CM

## 2022-10-27 LAB
ACANTHOCYTES (OLG): ABNORMAL
ALBUMIN SERPL-MCNC: 3.5 GM/DL (ref 3.5–5)
ALBUMIN/GLOB SERPL: 1.2 RATIO (ref 1.1–2)
ALP SERPL-CCNC: 107 UNIT/L (ref 40–150)
ALT SERPL-CCNC: 23 UNIT/L (ref 0–55)
ANION GAP SERPL CALC-SCNC: 11 MEQ/L
ANISOCYTOSIS BLD QL SMEAR: ABNORMAL
AST SERPL-CCNC: 26 UNIT/L (ref 5–34)
BASOPHILS # BLD AUTO: 0.03 X10(3)/MCL (ref 0–0.2)
BASOPHILS NFR BLD AUTO: 0.4 %
BILIRUBIN DIRECT+TOT PNL SERPL-MCNC: 0.4 MG/DL
BNP BLD-MCNC: 3280.2 PG/ML
BUN SERPL-MCNC: 41.6 MG/DL (ref 8.4–25.7)
BUN SERPL-MCNC: 44.3 MG/DL (ref 8.4–25.7)
CALCIUM SERPL-MCNC: 8.9 MG/DL (ref 8.4–10.2)
CALCIUM SERPL-MCNC: 8.9 MG/DL (ref 8.4–10.2)
CHLORIDE SERPL-SCNC: 106 MMOL/L (ref 98–107)
CHLORIDE SERPL-SCNC: 106 MMOL/L (ref 98–107)
CO2 SERPL-SCNC: 24 MMOL/L (ref 22–29)
CO2 SERPL-SCNC: 25 MMOL/L (ref 22–29)
CREAT SERPL-MCNC: 1.39 MG/DL (ref 0.73–1.18)
CREAT SERPL-MCNC: 1.47 MG/DL (ref 0.73–1.18)
CREAT/UREA NIT SERPL: 30
EOSINOPHIL # BLD AUTO: 0.02 X10(3)/MCL (ref 0–0.9)
EOSINOPHIL NFR BLD AUTO: 0.2 %
ERYTHROCYTE [DISTWIDTH] IN BLOOD BY AUTOMATED COUNT: 18.3 % (ref 11.5–17)
GFR SERPLBLD CREATININE-BSD FMLA CKD-EPI: 55 MLS/MIN/1.73/M2
GFR SERPLBLD CREATININE-BSD FMLA CKD-EPI: 58 MLS/MIN/1.73/M2
GLOBULIN SER-MCNC: 3 GM/DL (ref 2.4–3.5)
GLUCOSE SERPL-MCNC: 112 MG/DL (ref 74–100)
GLUCOSE SERPL-MCNC: 135 MG/DL (ref 74–100)
HCT VFR BLD AUTO: 36 % (ref 42–52)
HGB BLD-MCNC: 11.2 GM/DL (ref 14–18)
HYPOCHROMIA BLD QL SMEAR: ABNORMAL
IMM GRANULOCYTES # BLD AUTO: 0.02 X10(3)/MCL (ref 0–0.04)
IMM GRANULOCYTES NFR BLD AUTO: 0.2 %
LYMPHOCYTES # BLD AUTO: 0.98 X10(3)/MCL (ref 0.6–4.6)
LYMPHOCYTES NFR BLD AUTO: 11.6 %
MCH RBC QN AUTO: 23.3 PG (ref 27–31)
MCHC RBC AUTO-ENTMCNC: 31.1 MG/DL (ref 33–36)
MCV RBC AUTO: 74.8 FL (ref 80–94)
MICROCYTES BLD QL SMEAR: ABNORMAL
MONOCYTES # BLD AUTO: 0.35 X10(3)/MCL (ref 0.1–1.3)
MONOCYTES NFR BLD AUTO: 4.2 %
NEUTROPHILS # BLD AUTO: 7 X10(3)/MCL (ref 2.1–9.2)
NEUTROPHILS NFR BLD AUTO: 83.4 %
NRBC BLD AUTO-RTO: 0 %
PLATELET # BLD AUTO: 262 X10(3)/MCL (ref 130–400)
PLATELET # BLD EST: ADEQUATE 10*3/UL
PMV BLD AUTO: 9 FL (ref 7.4–10.4)
POIKILOCYTOSIS BLD QL SMEAR: ABNORMAL
POTASSIUM SERPL-SCNC: 4.1 MMOL/L (ref 3.5–5.1)
POTASSIUM SERPL-SCNC: 4.5 MMOL/L (ref 3.5–5.1)
PROT SERPL-MCNC: 6.5 GM/DL (ref 6.4–8.3)
RBC # BLD AUTO: 4.81 X10(6)/MCL (ref 4.7–6.1)
RBC MORPH BLD: ABNORMAL
SARS-COV-2 RDRP RESP QL NAA+PROBE: NEGATIVE
SODIUM SERPL-SCNC: 140 MMOL/L (ref 136–145)
SODIUM SERPL-SCNC: 141 MMOL/L (ref 136–145)
TROPONIN I SERPL-MCNC: 0.06 NG/ML (ref 0–0.04)
TROPONIN I SERPL-MCNC: 0.06 NG/ML (ref 0–0.04)
WBC # SPEC AUTO: 8.4 X10(3)/MCL (ref 4.5–11.5)

## 2022-10-27 PROCEDURE — 83880 ASSAY OF NATRIURETIC PEPTIDE: CPT | Performed by: EMERGENCY MEDICINE

## 2022-10-27 PROCEDURE — 84484 ASSAY OF TROPONIN QUANT: CPT | Performed by: EMERGENCY MEDICINE

## 2022-10-27 PROCEDURE — 25000003 PHARM REV CODE 250: Performed by: INTERNAL MEDICINE

## 2022-10-27 PROCEDURE — 84484 ASSAY OF TROPONIN QUANT: CPT | Performed by: INTERNAL MEDICINE

## 2022-10-27 PROCEDURE — 25000003 PHARM REV CODE 250: Performed by: EMERGENCY MEDICINE

## 2022-10-27 PROCEDURE — 80053 COMPREHEN METABOLIC PANEL: CPT | Performed by: EMERGENCY MEDICINE

## 2022-10-27 PROCEDURE — 36415 COLL VENOUS BLD VENIPUNCTURE: CPT | Performed by: EMERGENCY MEDICINE

## 2022-10-27 PROCEDURE — 93005 ELECTROCARDIOGRAM TRACING: CPT

## 2022-10-27 PROCEDURE — 99285 EMERGENCY DEPT VISIT HI MDM: CPT | Mod: 25

## 2022-10-27 PROCEDURE — 93010 EKG 12-LEAD: ICD-10-PCS | Mod: ,,, | Performed by: INTERNAL MEDICINE

## 2022-10-27 PROCEDURE — 21400001 HC TELEMETRY ROOM

## 2022-10-27 PROCEDURE — 93010 ELECTROCARDIOGRAM REPORT: CPT | Mod: ,,, | Performed by: INTERNAL MEDICINE

## 2022-10-27 PROCEDURE — 85025 COMPLETE CBC W/AUTO DIFF WBC: CPT | Performed by: EMERGENCY MEDICINE

## 2022-10-27 PROCEDURE — 63600175 PHARM REV CODE 636 W HCPCS: Performed by: EMERGENCY MEDICINE

## 2022-10-27 PROCEDURE — 63600175 PHARM REV CODE 636 W HCPCS: Performed by: INTERNAL MEDICINE

## 2022-10-27 PROCEDURE — 36415 COLL VENOUS BLD VENIPUNCTURE: CPT | Performed by: INTERNAL MEDICINE

## 2022-10-27 PROCEDURE — 87635 SARS-COV-2 COVID-19 AMP PRB: CPT | Performed by: EMERGENCY MEDICINE

## 2022-10-27 RX ORDER — TRAZODONE HYDROCHLORIDE 100 MG/1
100 TABLET ORAL NIGHTLY
Status: DISCONTINUED | OUTPATIENT
Start: 2022-10-27 | End: 2022-11-04 | Stop reason: HOSPADM

## 2022-10-27 RX ORDER — FUROSEMIDE 10 MG/ML
80 INJECTION INTRAMUSCULAR; INTRAVENOUS
Status: COMPLETED | OUTPATIENT
Start: 2022-10-27 | End: 2022-10-27

## 2022-10-27 RX ORDER — ARIPIPRAZOLE 5 MG/1
10 TABLET ORAL DAILY
Status: DISCONTINUED | OUTPATIENT
Start: 2022-10-28 | End: 2022-11-04 | Stop reason: HOSPADM

## 2022-10-27 RX ORDER — OXCARBAZEPINE 300 MG/1
300 TABLET, FILM COATED ORAL 3 TIMES DAILY
Status: DISCONTINUED | OUTPATIENT
Start: 2022-10-27 | End: 2022-11-04 | Stop reason: HOSPADM

## 2022-10-27 RX ORDER — METOPROLOL TARTRATE 50 MG/1
50 TABLET ORAL 2 TIMES DAILY
Status: DISCONTINUED | OUTPATIENT
Start: 2022-10-27 | End: 2022-10-28

## 2022-10-27 RX ORDER — LEVETIRACETAM 500 MG/1
500 TABLET ORAL 2 TIMES DAILY
Status: DISCONTINUED | OUTPATIENT
Start: 2022-10-27 | End: 2022-11-04 | Stop reason: HOSPADM

## 2022-10-27 RX ORDER — FUROSEMIDE 10 MG/ML
40 INJECTION INTRAMUSCULAR; INTRAVENOUS
Status: DISCONTINUED | OUTPATIENT
Start: 2022-10-27 | End: 2022-10-27

## 2022-10-27 RX ORDER — ATORVASTATIN CALCIUM 40 MG/1
80 TABLET, FILM COATED ORAL DAILY
Status: DISCONTINUED | OUTPATIENT
Start: 2022-10-28 | End: 2022-11-04 | Stop reason: HOSPADM

## 2022-10-27 RX ORDER — POTASSIUM CHLORIDE 1500 MG/1
20 TABLET, EXTENDED RELEASE ORAL DAILY
Status: ON HOLD | COMMUNITY
Start: 2022-10-26 | End: 2022-11-04 | Stop reason: HOSPADM

## 2022-10-27 RX ORDER — OLANZAPINE 5 MG/1
10 TABLET ORAL NIGHTLY
Status: DISCONTINUED | OUTPATIENT
Start: 2022-10-27 | End: 2022-10-27

## 2022-10-27 RX ORDER — NAPROXEN SODIUM 220 MG/1
81 TABLET, FILM COATED ORAL DAILY
Status: DISCONTINUED | OUTPATIENT
Start: 2022-10-28 | End: 2022-11-04 | Stop reason: HOSPADM

## 2022-10-27 RX ORDER — FAMOTIDINE 20 MG/1
20 TABLET, FILM COATED ORAL 2 TIMES DAILY
Status: DISCONTINUED | OUTPATIENT
Start: 2022-10-27 | End: 2022-11-04 | Stop reason: HOSPADM

## 2022-10-27 RX ORDER — ENOXAPARIN SODIUM 100 MG/ML
60 INJECTION SUBCUTANEOUS
Status: DISCONTINUED | OUTPATIENT
Start: 2022-10-27 | End: 2022-10-28

## 2022-10-27 RX ORDER — FUROSEMIDE 10 MG/ML
20 INJECTION INTRAMUSCULAR; INTRAVENOUS
Status: DISCONTINUED | OUTPATIENT
Start: 2022-10-27 | End: 2022-10-28

## 2022-10-27 RX ORDER — OXCARBAZEPINE 300 MG/1
300 TABLET, FILM COATED ORAL
Status: ON HOLD | COMMUNITY
Start: 2022-10-11 | End: 2022-10-28 | Stop reason: CLARIF

## 2022-10-27 RX ORDER — BENZTROPINE MESYLATE 1 MG/1
1 TABLET ORAL 2 TIMES DAILY PRN
Status: DISCONTINUED | OUTPATIENT
Start: 2022-10-27 | End: 2022-11-04 | Stop reason: HOSPADM

## 2022-10-27 RX ADMIN — LEVETIRACETAM 500 MG: 500 TABLET, FILM COATED ORAL at 09:10

## 2022-10-27 RX ADMIN — FUROSEMIDE 20 MG: 10 INJECTION, SOLUTION INTRAMUSCULAR; INTRAVENOUS at 08:10

## 2022-10-27 RX ADMIN — TRAZODONE HYDROCHLORIDE 100 MG: 50 TABLET ORAL at 09:10

## 2022-10-27 RX ADMIN — FUROSEMIDE 80 MG: 10 INJECTION, SOLUTION INTRAMUSCULAR; INTRAVENOUS at 12:10

## 2022-10-27 RX ADMIN — METOPROLOL TARTRATE 50 MG: 50 TABLET, FILM COATED ORAL at 09:10

## 2022-10-27 RX ADMIN — NITROGLYCERIN 1 INCH: 20 OINTMENT TOPICAL at 10:10

## 2022-10-27 RX ADMIN — ENOXAPARIN SODIUM 60 MG: 100 INJECTION SUBCUTANEOUS at 12:10

## 2022-10-27 RX ADMIN — FAMOTIDINE 20 MG: 20 TABLET ORAL at 09:10

## 2022-10-27 RX ADMIN — OXCARBAZEPINE 300 MG: 300 TABLET, FILM COATED ORAL at 09:10

## 2022-10-27 NOTE — H&P
[facility] EMERGENCY DEPARTMENT    Hospital Medicine History & Physical Examination       Patient Name: Kendall Duff  MRN: [id number]  Patient Class: IP- Inpatient   Admission Date: 10/27/2022   Admitting Physician: DARNELL Service   Length of Stay: 0  Attending Physician: Arnold Coy MD  Primary Care Provider: Primary Doctor No  Face-to-Face encounter date: 10/27/2022    Code Status: Prior    Chief Complaint: Wheezing (Daughter states pt was d/c from a psych facility on yesterday and now seems to have body swelling and urinary incontinence, along with wheezing.)          HISTORY OF PRESENT ILLNESS:   Kendall Duff is a 57 y.o. male who  has a past medical history of Bipolar disorder, unspecified, Chronic hepatitis C, History of psychiatric hospitalization, psychiatric care, Hypertension, Bessie, Obesity, unspecified, Psychiatric problem, Schizoaffective disorder, bipolar type, Seizures, Stroke, Substance abuse, and Therapy.. The patient presented to [facility] on 10/27/2022 with a primary complaint of sob,wheezing.  Patient was released from outside facility yesterday, he felt fine, his daughter picked up his medications from pharmacy which he took.  Last night he developed sob, facial swelling wheezing, cough with thick sputum. No f/c.  He also developed substernal chest pain lasting about 1 hour 8 out of 10.  He was able to sleep some but had urinary incontience, urinating on himself throughout the night and was brought to ED this am with above complaints.  Patient admitted for chf exacerbation, he was given lasix and has diuresed 2L and says he feels alittle better. He has had no further chest pain, trop .062, cxr suggestive of fluid overload.    PAST MEDICAL HISTORY:     Past Medical History:   Diagnosis Date    Bipolar disorder, unspecified     Chronic hepatitis C     History of psychiatric hospitalization     Hx of psychiatric care     Hypertension     Bessie     Obesity, unspecified      Psychiatric problem     Schizoaffective disorder, bipolar type     Seizures     Stroke     Substance abuse     Therapy        PAST SURGICAL HISTORY:     Past Surgical History:   Procedure Laterality Date    CORONARY STENT PLACEMENT  08/14/2015    DEBRIDEMENT  04/17/2022    LEFT HEART CATHETERIZATION Left 08/13/2015    REPEAT CLOSURE OF STERNAL INCISION N/A 5/11/2022    Procedure: CLOSURE, STERNAL INCISION, REPEAT;  Surgeon: Adolfo Weiss MD;  Location: [facility] OR;  Service: Plastics;  Laterality: N/A;  STERNAL WOUND DEBRIDEMENT AND RECONSTRUCTION // MULTIPLE MUSCLE FLAPS // REQ 1400       ALLERGIES:   Depakote [divalproex], Divalproex sodium, Lithium, Lithium analogues, and Quetiapine    FAMILY HISTORY:   family history includes Cancer in his mother; Liver disease in his father.    SOCIAL HISTORY:     Social History     Tobacco Use    Smoking status: Every Day     Types: Cigarettes    Smokeless tobacco: Never   Substance Use Topics    Alcohol use: Never        HOME MEDICATIONS:     Prior to Admission medications    Medication Sig Start Date End Date Taking? Authorizing Provider   apixaban (ELIQUIS) 5 mg Tab Take 1 tablet (5 mg total) by mouth 2 (two) times daily. 7/30/22  Yes Taz Sanon MD   aspirin 81 MG Chew Chew and swallow 1 tablet (81 mg total) by mouth once daily. 7/26/22 7/26/23 Yes Harjit Ruiz MD   furosemide (LASIX) 20 MG tablet Take 1 tablet (20 mg total) by mouth once daily. 7/27/22 7/27/23 Yes Harjit Ruiz MD   isosorbide mononitrate (IMDUR) 30 MG 24 hr tablet Take 1 tablet (30 mg total) by mouth once daily. 6/18/22 6/18/23 Yes Jarrett Yarbrough MD   losartan (COZAAR) 25 MG tablet Take 1 tablet (25 mg total) by mouth once daily. 7/30/22 7/30/23 Yes Taz Sanon MD   OXcarbazepine (TRILEPTAL) 300 MG Tab 1 tablet (300 mg total) by Per OG tube route 2 (two) times daily. 6/17/22 6/17/23 Yes Jarrett Yarbrough MD   OXcarbazepine (TRILEPTAL) 300 MG Tab Take 300 mg by mouth. 10/11/22  Yes Historical Provider    potassium chloride (K-TAB) 20 mEq Take 20 mEq by mouth once daily. 10/26/22  Yes Historical Provider   traZODone (DESYREL) 100 MG tablet Take 1 tablet (100 mg total) by mouth every evening. 6/17/22 6/17/23 Yes Jarrett Yarbrough MD   amLODIPine (NORVASC) 5 MG tablet Take 1 tablet (5 mg total) by mouth once daily. 3/26/22 3/26/23  Laisha May, MARINA   ARIPiprazole (ABILIFY) 10 MG Tab Take 1 tablet (10 mg total) by mouth once daily. 7/27/22 7/27/23  Harjit Ruiz MD   atorvastatin (LIPITOR) 80 MG tablet Take 1 tablet (80 mg total) by mouth once daily. 6/17/22 6/17/23  Jarrett Yarbrough MD   benztropine (COGENTIN) 1 MG tablet Take 1 tablet (1 mg total) by mouth 2 (two) times daily as needed (spasms). 6/17/22 7/17/22  Jarrett Yarbrough MD   ezetimibe (ZETIA) 10 mg tablet Take 1 tablet (10 mg total) by mouth every evening. 6/17/22 6/17/23  Jarrett Yarbrough MD   famotidine (PEPCID) 20 MG tablet Take 1 tablet (20 mg total) by mouth 2 (two) times daily. 6/17/22 6/17/23  Jarrett Yarbrough MD   levETIRAcetam (KEPPRA) 500 MG Tab Take 1 tablet (500 mg total) by mouth 2 (two) times daily. 6/17/22 6/17/23  Jarrett Yarbrough MD   metoprolol tartrate (LOPRESSOR) 50 MG tablet Take 1 tablet (50 mg total) by mouth 2 (two) times daily. 6/17/22 6/17/23  Jarrett Yarbrough MD   OLANZapine (ZYPREXA) 10 MG tablet Take 1 tablet (10 mg total) by mouth every evening. 6/17/22 6/17/23  Jarrett Yarbrough MD   oxyCODONE (ROXICODONE) 5 MG immediate release tablet Take 1 tablet (5 mg total) by mouth every 8 (eight) hours as needed for Pain (scale 6-10). 6/17/22   Jarrett Yarbrough MD   polyethylene glycol (GLYCOLAX) 17 gram PwPk Take 17 g by mouth once daily. 6/18/22   Jarrett Yarbrough MD   clopidogreL (PLAVIX) 75 mg tablet Take 1 tablet (75 mg total) by mouth once daily. 3/25/22 6/17/22  Laisha May, MARINA   metoprolol succinate (TOPROL-XL) 25 MG 24 hr tablet Take 25 mg by mouth once daily. 3/27/22 6/17/22  Historical Provider   pravastatin (PRAVACHOL) 40 MG tablet Take 1 tablet (40 mg total)  by mouth every evening. 3/25/22 6/17/22  Laisha May NP       REVIEW OF SYSTEMS:   Except as documented, all other systems reviewed and negative   Review of Systems   All other systems reviewed and are negative.      PHYSICAL EXAM:     VITAL SIGNS: 24 HRS MIN & MAX LAST   Temp  Min: 97.2 °F (36.2 °C)  Max: 97.2 °F (36.2 °C) 97.2 °F (36.2 °C)   BP  Min: 97/74  Max: 155/121 97/74   Pulse  Min: 101  Max: 108  108   Resp  Min: 20  Max: 26 20   SpO2  Min: 97 %  Max: 100 % 97 %       General appearance: Well-developed, well-nourished male in no apparent distress. anxious  HENT: Atraumatic head. Moist mucous membranes of oral cavity.  Eyes: Normal extraocular movements.   Neck: Supple.   Lungs: Clear to auscultation bilaterally. No wheezing present.   Heart: tachycardic . No pedal edema.  Abdomen: Soft, non-distended, non-tender. No rebound tenderness/guarding. Bowel sounds are normal.   Extremities: No cyanosis, clubbing, or edema.  Skin: No Rash.   Neuro: Motor and sensory exams grossly intact. Good tone. Muscle strength 5/5 in all 4 extremities  Psych/mental status:  Responds appropriately to questions.     LABS AND IMAGING:     Recent Labs   Lab 10/27/22  1019   WBC 8.4   RBC 4.81   HGB 11.2*   HCT 36.0*   MCV 74.8*   MCH 23.3*   MCHC 31.1*   RDW 18.3*      MPV 9.0       Recent Labs   Lab 10/27/22  1019      K 4.5   CO2 25   BUN 44.3*   CREATININE 1.47*   CALCIUM 8.9   ALBUMIN 3.5   ALKPHOS 107   ALT 23   AST 26   BILITOT 0.4       Microbiology Results (last 7 days)       ** No results found for the last 168 hours. **             X-Ray Chest AP Portable  Narrative: EXAMINATION:  XR CHEST AP PORTABLE    CLINICAL HISTORY:  Cough, unspecified    TECHNIQUE:  Single view of the chest    COMPARISON:  07/18/2022    FINDINGS:  Mildly prominent perihilar markings.    The cardiomediastinal silhouette is enlarged.    No acute osseous abnormality.  Impression: Mildly prominent perihilar markings may be seen  with volume overload.  Cardiomegaly is again noted.    Electronically signed by: Rex Denson  Date:    10/27/2022  Time:    11:29        __________________________________________________________________________  INPATIENT LIST OF MEDICATIONS     Scheduled Meds:   [START ON 10/28/2022] ARIPiprazole  10 mg Oral Daily    [START ON 10/28/2022] aspirin  81 mg Oral Daily    [START ON 10/28/2022] atorvastatin  80 mg Oral Daily    enoxparin  60 mg Subcutaneous Q12H    famotidine  20 mg Oral BID    furosemide (LASIX) injection  40 mg Intravenous Q12H    levETIRAcetam  500 mg Oral BID    metoprolol tartrate  50 mg Oral BID    OLANZapine  10 mg Oral QHS    OXcarbazepine  300 mg Per OG tube TID    traZODone  100 mg Oral QHS     Continuous Infusions:  PRN Meds:          ASSESSMENT & PLAN:   Chf exacerbation  Chest pain/cad/hx cabg 4/2021  Rob   Htn  Hld  Bipolar    Plan    Decrease Lasix, repeat labs now/am  Trend trop, nitropaste  Resume appropriate home meds    Gi proph: pepcid  Dvt proph: tamica Coy MD   10/27/2022

## 2022-10-27 NOTE — ED PROVIDER NOTES
aEncounter Date: 10/27/2022       History     Chief Complaint   Patient presents with    Wheezing     Daughter states pt was d/c from a psych facility on yesterday and now seems to have body swelling and urinary incontinence, along with wheezing.     Patient is a 58 yo M presenting with abdominal pain and shortness of breath. He is from [place] and his cardiologist is there (Dr. Castaneda). He has a hx of heart surgery, CAD, and CHF. He was discharged from psych facility yesterday. Family reports that he has been swollen and SOB since. He is regularly on 20 mg lasix but they had increased it to 40mg. Uncertain what kind of diet he was getting in facility but he was compliant with medications. He did get blood pressure medications this morning. He's had cough, SOB, leg swelling, abdominal swelling. Has a hx of Hepatitis C but hasn't required paracentesis before. No vomiting or diarrhea. No chest pain currently. He's had some chest discomfort in the past.       Review of patient's allergies indicates:   Allergen Reactions    Depakote [divalproex]     Divalproex sodium Other (See Comments)    Lithium     Lithium analogues     Quetiapine Other (See Comments)     Past Medical History:   Diagnosis Date    Bipolar disorder, unspecified     Chronic hepatitis C     History of psychiatric hospitalization     Hx of psychiatric care     Hypertension     Bessie     Obesity, unspecified     Psychiatric problem     Schizoaffective disorder, bipolar type     Seizures     Stroke     Substance abuse     Therapy      Past Surgical History:   Procedure Laterality Date    CORONARY STENT PLACEMENT  08/14/2015    DEBRIDEMENT  04/17/2022    LEFT HEART CATHETERIZATION Left 08/13/2015    REPEAT CLOSURE OF STERNAL INCISION N/A 5/11/2022    Procedure: CLOSURE, STERNAL INCISION, REPEAT;  Surgeon: Adolfo Weiss MD;  Location: [facility];  Service: Plastics;  Laterality: N/A;  STERNAL WOUND DEBRIDEMENT AND RECONSTRUCTION // MULTIPLE MUSCLE FLAPS //  REQ 1400     Family History   Problem Relation Age of Onset    Cancer Mother     Liver disease Father      Social History     Tobacco Use    Smoking status: Every Day     Types: Cigarettes    Smokeless tobacco: Never   Substance Use Topics    Alcohol use: Never    Drug use: Not Currently     Types: Cocaine     Review of Systems   Constitutional:  Negative for fever.   HENT:  Negative for sore throat.    Respiratory:  Positive for shortness of breath.    Cardiovascular:  Positive for leg swelling. Negative for chest pain.   Gastrointestinal:  Positive for abdominal pain. Negative for nausea.   Genitourinary:  Negative for dysuria.   Musculoskeletal:  Negative for back pain.   Skin:  Negative for rash.   Neurological:  Positive for weakness.   Hematological:  Does not bruise/bleed easily.     Physical Exam     Initial Vitals [10/27/22 0854]   BP Pulse Resp Temp SpO2   (!) 155/121 101 (!) 26 97.2 °F (36.2 °C) 100 %      MAP       --         Physical Exam    Nursing note and vitals reviewed.  Constitutional: He appears well-developed and well-nourished. He is not diaphoretic. No distress.   HENT:   Head: Normocephalic and atraumatic.   Eyes: Conjunctivae are normal.   Neck: Neck supple.   Cardiovascular:  Normal rate, regular rhythm and normal heart sounds.           Pulmonary/Chest: No respiratory distress. He has no wheezes. He has no rhonchi. He has rales.   Abdominal: Bowel sounds are normal. He exhibits distension. There is abdominal tenderness (mild diffuse tenderness). There is no rebound and no guarding.   Musculoskeletal:         General: No edema. Normal range of motion.      Cervical back: Neck supple.     Neurological: He is alert and oriented to person, place, and time.   Skin: Skin is warm and dry.   Psychiatric: He has a normal mood and affect. Thought content normal.       ED Course   ED US FAST    Date/Time: 10/27/2022 9:16 AM  Performed by: Riddhi Murphy MD  Authorized by: Riddhi Murphy MD      Indication:  Dyspnea  The following findings in the peritoneal, pericardial, and pleural spaces were obtained:     Pericardial effusion:  Absent    Hepatorenal free fluid:  Present    Splenorenal free fluid:  Present    Right thoracic free fluid:  Present    Right lung sliding:  Present    Left thoracic free fluid:  Present    Left lung sliding:  Present    Impression: Free fluid consistent with ascites, Small R pleural effusion, trace left pleura effusion, bilateral pulmonary edema.  Labs Reviewed   COMPREHENSIVE METABOLIC PANEL - Abnormal; Notable for the following components:       Result Value    Glucose Level 135 (*)     Blood Urea Nitrogen 44.3 (*)     Creatinine 1.47 (*)     All other components within normal limits   TROPONIN I - Abnormal; Notable for the following components:    Troponin-I 0.062 (*)     All other components within normal limits   B-TYPE NATRIURETIC PEPTIDE - Abnormal; Notable for the following components:    Natriuretic Peptide 3,280.2 (*)     All other components within normal limits   CBC WITH DIFFERENTIAL - Abnormal; Notable for the following components:    Hgb 11.2 (*)     Hct 36.0 (*)     MCV 74.8 (*)     MCH 23.3 (*)     MCHC 31.1 (*)     RDW 18.3 (*)     All other components within normal limits   BLOOD SMEAR MICROSCOPIC EXAM (OLG) - Abnormal; Notable for the following components:    RBC Morph Abnormal (*)     Acanthocytes 1+ (*)     Anisocyte 2+ (*)     Hypochrom 1+ (*)     Microcyte 2+ (*)     Poik 1+ (*)     All other components within normal limits   SARS-COV-2 RNA AMPLIFICATION, QUAL - Normal    Narrative:     The IDNOW COVID-19 assay is a rapid molecular in vitro diagnostic test utilizing an isothermal nucleic acid amplification technology intended for the qualitative detection of nucleic acid from the SARS-CoV-2 viral RNA in direct nasal, nasopharyngeal or throat swabs from individuals who are suspected of COVID-19 by their healthcare provider.   CBC W/ AUTO DIFFERENTIAL     Narrative:     The following orders were created for panel order CBC auto differential.  Procedure                               Abnormality         Status                     ---------                               -----------         ------                     CBC with Differential[[id number]]        Abnormal            Final result                 Please view results for these tests on the individual orders.   URINALYSIS, REFLEX TO URINE CULTURE   DRUG SCREEN, URINE (BEAKER)   TROPONIN I     EKG Readings: (Independently Interpreted)   EKG Interpretation 9:19 AM    Rate: 103  Compared to EKG from 7/18/22  Similar morphology. Depressions improved.  No new elevations to suggest STEMI     ECG Results              EKG 12-lead (In process)  Result time 10/27/22 09:21:03      In process by Interface, Lab In [facility] (10/27/22 09:21:03)                   Narrative:    Test Reason : R07.9,    Vent. Rate : 103 BPM     Atrial Rate : 103 BPM     P-R Int : 168 ms          QRS Dur : 154 ms      QT Int : 376 ms       P-R-T Axes : 247 254 -46 degrees     QTc Int : 492 ms    Unusual P axis, possible ectopic atrial tachycardia  Right bundle branch block  Anteroseptal infarct (cited on or before 17-JUN-2022)  Abnormal ECG  When compared with ECG of 18-JUL-2022 09:49,  Ectopic atrial rhythm has replaced Sinus rhythm  QRS duration has increased  Nonspecific T wave abnormality no longer evident in Lateral leads    Referred By: [email or web address]   SELF           Confirmed By:                                   Imaging Results              X-Ray Chest AP Portable (Final result)  Result time 10/27/22 11:29:49      Final result by Rex Denson MD (10/27/22 11:29:49)                   Impression:      Mildly prominent perihilar markings may be seen with volume overload.  Cardiomegaly is again noted.      Electronically signed by: Rex Denson  Date:    10/27/2022  Time:    11:29               Narrative:    EXAMINATION:  XR CHEST AP  PORTABLE    CLINICAL HISTORY:  Cough, unspecified    TECHNIQUE:  Single view of the chest    COMPARISON:  07/18/2022    FINDINGS:  Mildly prominent perihilar markings.    The cardiomediastinal silhouette is enlarged.    No acute osseous abnormality.                                       Medications   enoxaparin injection 60 mg (60 mg Subcutaneous Given 10/27/22 1234)   furosemide injection 40 mg (has no administration in time range)   nitroGLYCERIN 2% TD oint ointment 1 inch (1 inch Topical (Top) Given 10/27/22 1000)   furosemide injection 80 mg (80 mg Intravenous Given 10/27/22 1230)                 ED Course as of 10/27/22 1526   Thu Oct 27, 2022   1202 Hospitalist paged for admission [GM]      ED Course User Index  [GM] Riddhi Murphy MD                 Clinical Impression:   Final diagnoses:  [R07.9] Chest pain  [R05.9] Cough  [I50.9] CHF exacerbation      ED Disposition Condition    Admit Stable                Riddhi Murphy MD  10/30/22 [id number]

## 2022-10-27 NOTE — Clinical Note
Diagnosis: CHF exacerbation [441481]   Admitting Provider:: TUYET ARTIS [[id number]]   Future Attending Provider: TUYET ARTIS [[id number]]   Reason for IP Medical Treatment  (Clinical interventions that can only be accomplished in the IP setting? ) :: diuresis, cardiology consult, trend troponins   Estimated Length of Stay:: 2 midnights   I certify that Inpatient services for greater than or equal to 2 midnights are medically necessary:: Yes   Plans for Post-Acute care--if anticipated (pick the single best option):: A. No post acute care anticipated at this time

## 2022-10-27 NOTE — ED TRIAGE NOTES
Daughter states pt was d/c from a psych facility on yesterday and now seems to have body swelling and urinary incontinence, along with wheezing. States abd feels tight.

## 2022-10-28 LAB
ANION GAP SERPL CALC-SCNC: 9 MEQ/L
AV INDEX (PROSTH): 0.53
AV MEAN GRADIENT: 3 MMHG
AV PEAK GRADIENT: 5 MMHG
AV VALVE AREA: 2.2 CM2
AV VELOCITY RATIO: 0.69
BSA FOR ECHO PROCEDURE: 1.9 M2
BUN SERPL-MCNC: 38.7 MG/DL (ref 8.4–25.7)
CALCIUM SERPL-MCNC: 8.6 MG/DL (ref 8.4–10.2)
CHLORIDE SERPL-SCNC: 103 MMOL/L (ref 98–107)
CO2 SERPL-SCNC: 24 MMOL/L (ref 22–29)
CREAT SERPL-MCNC: 1.3 MG/DL (ref 0.73–1.18)
CREAT/UREA NIT SERPL: 30
CV ECHO LV RWT: 0.34 CM
DOP CALC AO PEAK VEL: 1.16 M/S
DOP CALC AO VTI: 19.4 CM
DOP CALC LVOT AREA: 4.2 CM2
DOP CALC LVOT DIAMETER: 2.3 CM
DOP CALC LVOT PEAK VEL: 0.8 M/S
DOP CALC LVOT STROKE VOLUME: 42.77 CM3
DOP CALCLVOT PEAK VEL VTI: 10.3 CM
ECHO LV POSTERIOR WALL: 1.07 CM (ref 0.6–1.1)
EJECTION FRACTION: 20 %
FRACTIONAL SHORTENING: 9 % (ref 28–44)
GFR SERPLBLD CREATININE-BSD FMLA CKD-EPI: >60 MLS/MIN/1.73/M2
GLUCOSE SERPL-MCNC: 133 MG/DL (ref 74–100)
INTERVENTRICULAR SEPTUM: 0.78 CM (ref 0.6–1.1)
LEFT ATRIUM SIZE: 5.5 CM
LEFT ATRIUM VOLUME INDEX MOD: 48.8 ML/M2
LEFT ATRIUM VOLUME MOD: 91.8 CM3
LEFT INTERNAL DIMENSION IN SYSTOLE: 5.78 CM (ref 2.1–4)
LEFT VENTRICLE DIASTOLIC VOLUME INDEX: 110.11 ML/M2
LEFT VENTRICLE DIASTOLIC VOLUME: 207 ML
LEFT VENTRICLE MASS INDEX: 132 G/M2
LEFT VENTRICLE SYSTOLIC VOLUME INDEX: 87.8 ML/M2
LEFT VENTRICLE SYSTOLIC VOLUME: 165 ML
LEFT VENTRICULAR INTERNAL DIMENSION IN DIASTOLE: 6.38 CM (ref 3.5–6)
LEFT VENTRICULAR MASS: 248.33 G
LVOT MG: 1 MMHG
LVOT MV: 0.5 CM/S
PISA TR MAX VEL: 2.7 M/S
POTASSIUM SERPL-SCNC: 3.4 MMOL/L (ref 3.5–5.1)
PV PEAK VELOCITY: 0.75 CM/S
RA PRESSURE: 15 MMHG
RIGHT VENTRICULAR END-DIASTOLIC DIMENSION: 3.47 CM
SODIUM SERPL-SCNC: 136 MMOL/L (ref 136–145)
TDI SEPTAL: 0.06 M/S
TR MAX PG: 29 MMHG
TRICUSPID ANNULAR PLANE SYSTOLIC EXCURSION: 1.32 CM
TROPONIN I SERPL-MCNC: 0.06 NG/ML (ref 0–0.04)
TROPONIN I SERPL-MCNC: 0.06 NG/ML (ref 0–0.04)
TROPONIN I SERPL-MCNC: 0.07 NG/ML (ref 0–0.04)
TV REST PULMONARY ARTERY PRESSURE: 44 MMHG

## 2022-10-28 PROCEDURE — 84484 ASSAY OF TROPONIN QUANT: CPT | Performed by: INTERNAL MEDICINE

## 2022-10-28 PROCEDURE — 25000003 PHARM REV CODE 250: Performed by: INTERNAL MEDICINE

## 2022-10-28 PROCEDURE — 25000003 PHARM REV CODE 250: Performed by: NURSE PRACTITIONER

## 2022-10-28 PROCEDURE — 93005 ELECTROCARDIOGRAM TRACING: CPT

## 2022-10-28 PROCEDURE — 63600175 PHARM REV CODE 636 W HCPCS: Performed by: INTERNAL MEDICINE

## 2022-10-28 PROCEDURE — 80048 BASIC METABOLIC PNL TOTAL CA: CPT | Performed by: INTERNAL MEDICINE

## 2022-10-28 PROCEDURE — 63600175 PHARM REV CODE 636 W HCPCS

## 2022-10-28 PROCEDURE — 21400001 HC TELEMETRY ROOM

## 2022-10-28 PROCEDURE — 25000003 PHARM REV CODE 250

## 2022-10-28 PROCEDURE — 93010 EKG 12-LEAD: ICD-10-PCS | Mod: ,,, | Performed by: INTERNAL MEDICINE

## 2022-10-28 PROCEDURE — 93010 ELECTROCARDIOGRAM REPORT: CPT | Mod: ,,, | Performed by: INTERNAL MEDICINE

## 2022-10-28 PROCEDURE — 36415 COLL VENOUS BLD VENIPUNCTURE: CPT | Performed by: INTERNAL MEDICINE

## 2022-10-28 RX ORDER — CARVEDILOL 12.5 MG/1
12.5 TABLET ORAL 2 TIMES DAILY
Status: ON HOLD | COMMUNITY
Start: 2022-10-26 | End: 2022-11-03 | Stop reason: HOSPADM

## 2022-10-28 RX ORDER — BENZTROPINE MESYLATE 1 MG/1
1 TABLET ORAL 2 TIMES DAILY
COMMUNITY

## 2022-10-28 RX ORDER — METOPROLOL TARTRATE 1 MG/ML
5 INJECTION, SOLUTION INTRAVENOUS
Status: DISCONTINUED | OUTPATIENT
Start: 2022-10-28 | End: 2022-11-04 | Stop reason: HOSPADM

## 2022-10-28 RX ORDER — METOPROLOL TARTRATE 1 MG/ML
5 INJECTION, SOLUTION INTRAVENOUS EVERY 5 MIN PRN
Status: DISCONTINUED | OUTPATIENT
Start: 2022-10-28 | End: 2022-11-04 | Stop reason: HOSPADM

## 2022-10-28 RX ORDER — ENOXAPARIN SODIUM 100 MG/ML
1 INJECTION SUBCUTANEOUS
Status: DISCONTINUED | OUTPATIENT
Start: 2022-10-28 | End: 2022-11-01

## 2022-10-28 RX ORDER — OXCARBAZEPINE 300 MG/1
300 TABLET, FILM COATED ORAL 3 TIMES DAILY
Status: ON HOLD | COMMUNITY
End: 2023-01-17 | Stop reason: SDUPTHER

## 2022-10-28 RX ORDER — FUROSEMIDE 10 MG/ML
40 INJECTION INTRAMUSCULAR; INTRAVENOUS
Status: DISCONTINUED | OUTPATIENT
Start: 2022-10-28 | End: 2022-10-29

## 2022-10-28 RX ORDER — CARVEDILOL 12.5 MG/1
12.5 TABLET ORAL 2 TIMES DAILY
Status: DISCONTINUED | OUTPATIENT
Start: 2022-10-28 | End: 2022-10-31

## 2022-10-28 RX ADMIN — TRAZODONE HYDROCHLORIDE 100 MG: 50 TABLET ORAL at 10:10

## 2022-10-28 RX ADMIN — FAMOTIDINE 20 MG: 20 TABLET ORAL at 09:10

## 2022-10-28 RX ADMIN — ARIPIPRAZOLE 10 MG: 5 TABLET ORAL at 09:10

## 2022-10-28 RX ADMIN — POTASSIUM BICARBONATE 50 MEQ: 977.5 TABLET, EFFERVESCENT ORAL at 09:10

## 2022-10-28 RX ADMIN — ATORVASTATIN CALCIUM 80 MG: 40 TABLET, FILM COATED ORAL at 09:10

## 2022-10-28 RX ADMIN — FUROSEMIDE 20 MG: 10 INJECTION, SOLUTION INTRAMUSCULAR; INTRAVENOUS at 09:10

## 2022-10-28 RX ADMIN — FUROSEMIDE 40 MG: 10 INJECTION, SOLUTION INTRAMUSCULAR; INTRAVENOUS at 10:10

## 2022-10-28 RX ADMIN — FAMOTIDINE 20 MG: 20 TABLET ORAL at 10:10

## 2022-10-28 RX ADMIN — ENOXAPARIN SODIUM 70 MG: 80 INJECTION SUBCUTANEOUS at 02:10

## 2022-10-28 RX ADMIN — METOPROLOL TARTRATE 50 MG: 50 TABLET, FILM COATED ORAL at 09:10

## 2022-10-28 RX ADMIN — BENZTROPINE MESYLATE 1 MG: 1 TABLET ORAL at 10:10

## 2022-10-28 RX ADMIN — METOPROLOL TARTRATE 5 MG: 5 INJECTION, SOLUTION INTRAVENOUS at 01:10

## 2022-10-28 RX ADMIN — LEVETIRACETAM 500 MG: 500 TABLET, FILM COATED ORAL at 10:10

## 2022-10-28 RX ADMIN — OXCARBAZEPINE 300 MG: 300 TABLET, FILM COATED ORAL at 02:10

## 2022-10-28 RX ADMIN — ASPIRIN 81 MG CHEWABLE TABLET 81 MG: 81 TABLET CHEWABLE at 09:10

## 2022-10-28 RX ADMIN — OXCARBAZEPINE 300 MG: 300 TABLET, FILM COATED ORAL at 09:10

## 2022-10-28 RX ADMIN — METOPROLOL TARTRATE 5 MG: 5 INJECTION, SOLUTION INTRAVENOUS at 05:10

## 2022-10-28 RX ADMIN — LEVETIRACETAM 500 MG: 500 TABLET, FILM COATED ORAL at 09:10

## 2022-10-28 RX ADMIN — CARVEDILOL 12.5 MG: 12.5 TABLET, FILM COATED ORAL at 10:10

## 2022-10-28 NOTE — CONSULTS
Inpatient consult to Cardiology  Consult performed by: RAY Chilel  Consult ordered by: Chris Stern AGACNP-BC  Reason for consult: CHF exacerbation    [facility] - [facility] Tele[facility]  Cardiology  Consult Note    Patient Name: Kendall Duff  MRN: [id number]  Admission Date: 10/27/2022  Hospital Length of Stay: 1 days  Code Status: Prior   Attending Provider: Lance Alvarez MD   Consulting Provider: RAY Chilel  Primary Care Physician: Primary Doctor No  Principal Problem:CHF exacerbation    Patient information was obtained from patient, past medical records, and ER records.     Subjective:     Chief Complaint:  Reason for consult: CHF exacerbation      HPI:   Mr. Duff is a 57 year old male who is known to [facility], Dr. Wright. He presents to the ED after being discharged from a psych facility with complaints of body swelling, urinary incontinence, & wheezing. He also reports that he had some mild chest pain, but it resolved with diuresis. Significant lab work includes B/Cr 41.6/1.39, BNP 3280.2, & trop 0.064. A CXR was obtained and demonstrates volume overload. It is also reported that the patient went into afib RVR/aflutter. [facility] has been consulted to further manage his CHF exacerbation and heart rate.     PMH: CAD, ICMO, HTN, schizoaffective disorder, hep C, DVT, systolic & diastolic CHF  PSH: CABG  Family History: Mother - CA; Father - liver disease   Social History: Current every day smoker. Reports last use of cocaine (7 months ago). Denies alcohol use.     Previous Cardiac Diagnostics:   CABG x 4 4/22:  LIMA-LAD, SVG-OM1, SVG-D1, SVG-PDA    TTE 6.16.22:  The left ventricle is normal in size w/ concentric hypertrophy and severely decreased systolic function.  The estimated EF is 25-30%.  There is severe left ventricular global hypokinesis.  Grade II left ventricular diastolic dysfunction.  Normal right ventricular size w/ mildly reduced right ventricular systolic  function.  The estimated PA systolic pressure is 52 mmHg.  There is moderate pulmonary HTN.  Elevated CVP (15 mmHg).    RUE NIVA 5.10.22:  A deep vein thrombosis was identified in the right axillary and brachial veins. A superficial thrombosis was identified in the right basilic vein.    TTE 3.21.22:  Ejection fraction is visually estimated at 35%.   Akinetic motion of the apical anteroseptal wall noted in the left ventricle; concurrent w/ MI.  Mild to moderate mitral regurgitation is present.     LHC 3.21.22:  100% LAD to 30-40% residual stenosis post PTCA.  Large diagonal system w/ severe stenosis.  CIRC - patent stent, OM1 patent, mild CIRC occluded  RCA - stents ISR 40-50%, distal 70-80%  EDP 12 EF 35-40% anterior HK    Review of Systems   Respiratory:  Positive for shortness of breath.    Cardiovascular:  Negative for chest pain and palpitations.   Gastrointestinal:  Positive for abdominal distention.     Objective:     Vital Signs (Most Recent):  Temp: 97.9 °F (36.6 °C) (10/28/22 0712)  Pulse: 100 (10/28/22 0712)  Resp: 18 (10/28/22 0712)  BP: 127/89 (10/28/22 0712)  SpO2: 99 % (10/28/22 0712) Vital Signs (24h Range):  Temp:  [97.2 °F (36.2 °C)-98.3 °F (36.8 °C)] 97.9 °F (36.6 °C)  Pulse:  [100-120] 100  Resp:  [18-26] 18  SpO2:  [97 %-100 %] 99 %  BP: ()/() 127/89     Weight: 76.2 kg (167 lb 15.9 oz)  Body mass index is 26.31 kg/m².    SpO2: 99 %  O2 Device (Oxygen Therapy): nasal cannula      Intake/Output Summary (Last 24 hours) at 10/28/2022 0852  Last data filed at 10/27/2022 2113  Gross per 24 hour   Intake --   Output 3500 ml   Net -3500 ml       Lines/Drains/Airways       Peripheral Intravenous Line  Duration                  Peripheral IV - Single Lumen 10/27/22 1100 20 G Right Antecubital <1 day                    Significant Labs:  Recent Results (from the past 72 hour(s))   Comprehensive metabolic panel    Collection Time: 10/27/22 10:19 AM   Result Value Ref Range    Sodium Level 140  136 - 145 mmol/L    Potassium Level 4.5 3.5 - 5.1 mmol/L    Chloride 106 98 - 107 mmol/L    Carbon Dioxide 25 22 - 29 mmol/L    Glucose Level 135 (H) 74 - 100 mg/dL    Blood Urea Nitrogen 44.3 (H) 8.4 - 25.7 mg/dL    Creatinine 1.47 (H) 0.73 - 1.18 mg/dL    Calcium Level Total 8.9 8.4 - 10.2 mg/dL    Protein Total 6.5 6.4 - 8.3 gm/dL    Albumin Level 3.5 3.5 - 5.0 gm/dL    Globulin 3.0 2.4 - 3.5 gm/dL    Albumin/Globulin Ratio 1.2 1.1 - 2.0 ratio    Bilirubin Total 0.4 <=1.5 mg/dL    Alkaline Phosphatase 107 40 - 150 unit/L    Alanine Aminotransferase 23 0 - 55 unit/L    Aspartate Aminotransferase 26 5 - 34 unit/L    eGFR 55 mls/min/1.73/m2   Troponin I    Collection Time: 10/27/22 10:19 AM   Result Value Ref Range    Troponin-I 0.062 (H) 0.000 - 0.045 ng/mL   BNP    Collection Time: 10/27/22 10:19 AM   Result Value Ref Range    Natriuretic Peptide 3,280.2 (H) <=100.0 pg/mL   CBC with Differential    Collection Time: 10/27/22 10:19 AM   Result Value Ref Range    WBC 8.4 4.5 - 11.5 x10(3)/mcL    RBC 4.81 4.70 - 6.10 x10(6)/mcL    Hgb 11.2 (L) 14.0 - 18.0 gm/dL    Hct 36.0 (L) 42.0 - 52.0 %    MCV 74.8 (L) 80.0 - 94.0 fL    MCH 23.3 (L) 27.0 - 31.0 pg    MCHC 31.1 (L) 33.0 - 36.0 mg/dL    RDW 18.3 (H) 11.5 - 17.0 %    Platelet 262 130 - 400 x10(3)/mcL    MPV 9.0 7.4 - 10.4 fL    Neut % 83.4 %    Lymph % 11.6 %    Mono % 4.2 %    Eos % 0.2 %    Basophil % 0.4 %    Lymph # 0.98 0.6 - 4.6 x10(3)/mcL    Neut # 7.0 2.1 - 9.2 x10(3)/mcL    Mono # 0.35 0.1 - 1.3 x10(3)/mcL    Eos # 0.02 0 - 0.9 x10(3)/mcL    Baso # 0.03 0 - 0.2 x10(3)/mcL    IG# 0.02 0 - 0.04 x10(3)/mcL    IG% 0.2 %    NRBC% 0.0 %   COVID-19 Rapid Screening    Collection Time: 10/27/22 10:19 AM   Result Value Ref Range    SARS COV-2 MOLECULAR Negative Negative   Blood Smear Microscopic Exam    Collection Time: 10/27/22 10:19 AM   Result Value Ref Range    RBC Morph Abnormal (A) Normal    Acanthocytes 1+ (A) (none)    Anisocyte 2+ (A) (none)    Hypochrom 1+  (A) (none)    Microcyte 2+ (A) (none)    Poik 1+ (A) (none)    Platelet Est Adequate Normal, Adequate   Troponin I    Collection Time: 10/27/22  5:46 PM   Result Value Ref Range    Troponin-I 0.064 (H) 0.000 - 0.045 ng/mL   Basic Metabolic Panel    Collection Time: 10/27/22  5:47 PM   Result Value Ref Range    Sodium Level 141 136 - 145 mmol/L    Potassium Level 4.1 3.5 - 5.1 mmol/L    Chloride 106 98 - 107 mmol/L    Carbon Dioxide 24 22 - 29 mmol/L    Glucose Level 112 (H) 74 - 100 mg/dL    Blood Urea Nitrogen 41.6 (H) 8.4 - 25.7 mg/dL    Creatinine 1.39 (H) 0.73 - 1.18 mg/dL    BUN/Creatinine Ratio 30     Calcium Level Total 8.9 8.4 - 10.2 mg/dL    Anion Gap 11.0 mEq/L    eGFR 58 mls/min/1.73/m2   Troponin I    Collection Time: 10/28/22  1:01 AM   Result Value Ref Range    Troponin-I 0.067 (H) 0.000 - 0.045 ng/mL   Troponin I    Collection Time: 10/28/22  4:41 AM   Result Value Ref Range    Troponin-I 0.063 (H) 0.000 - 0.045 ng/mL   Basic Metabolic Panel    Collection Time: 10/28/22  4:41 AM   Result Value Ref Range    Sodium Level 136 136 - 145 mmol/L    Potassium Level 3.4 (L) 3.5 - 5.1 mmol/L    Chloride 103 98 - 107 mmol/L    Carbon Dioxide 24 22 - 29 mmol/L    Glucose Level 133 (H) 74 - 100 mg/dL    Blood Urea Nitrogen 38.7 (H) 8.4 - 25.7 mg/dL    Creatinine 1.30 (H) 0.73 - 1.18 mg/dL    BUN/Creatinine Ratio 30     Calcium Level Total 8.6 8.4 - 10.2 mg/dL    Anion Gap 9.0 mEq/L    eGFR >60 mls/min/1.73/m2       Significant Imaging:  Imaging Results              X-Ray Chest AP Portable (Final result)  Result time 10/27/22 11:29:49      Final result by Rex Denson MD (10/27/22 11:29:49)                   Impression:      Mildly prominent perihilar markings may be seen with volume overload.  Cardiomegaly is again noted.      Electronically signed by: Rex Denson  Date:    10/27/2022  Time:    11:29               Narrative:    EXAMINATION:  XR CHEST AP PORTABLE    CLINICAL HISTORY:  Cough,  unspecified    TECHNIQUE:  Single view of the chest    COMPARISON:  07/18/2022    FINDINGS:  Mildly prominent perihilar markings.    The cardiomediastinal silhouette is enlarged.    No acute osseous abnormality.                                      EKG:    Results for orders placed or performed during the hospital encounter of 10/27/22   EKG 12-lead    Narrative    Test Reason : R07.9,    Vent. Rate : 114 BPM     Atrial Rate : 114 BPM     P-R Int : 186 ms          QRS Dur : 166 ms      QT Int : 318 ms       P-R-T Axes : 000 236 050 degrees     QTc Int : 438 ms    Sinus tachycardia with frequent and consecutive Premature ventricular  complexes  Right bundle branch block  Anteroseptal infarct (cited on or before 17-JUN-2022)  Abnormal ECG  When compared with ECG of 27-OCT-2022 09:20,  Sinus rhythm has replaced Ectopic atrial rhythm    Referred By: ROMULO   SELF           Confirmed By:        Telemetry:  Aflutter on tele 90's    Physical Exam  HENT:      Head: Normocephalic.      Nose: Nose normal.      Mouth/Throat:      Mouth: Mucous membranes are moist.   Eyes:      Extraocular Movements: Extraocular movements intact.   Cardiovascular:      Rate and Rhythm: Normal rate. Rhythm irregular.      Pulses: Normal pulses.      Heart sounds: Normal heart sounds.   Pulmonary:      Effort: Pulmonary effort is normal.      Breath sounds: Wheezing present.   Abdominal:      General: There is distension.      Palpations: Abdomen is soft.   Musculoskeletal:         General: Normal range of motion.   Skin:     General: Skin is warm and dry.   Neurological:      Mental Status: He is alert and oriented to person, place, and time.   Psychiatric:         Behavior: Behavior normal.       Home Medications:   No current facility-administered medications on file prior to encounter.     Current Outpatient Medications on File Prior to Encounter   Medication Sig Dispense Refill    apixaban (ELIQUIS) 5 mg Tab Take 1 tablet (5 mg total) by  mouth 2 (two) times daily. 60 tablet 3    aspirin 81 MG Chew Chew and swallow 1 tablet (81 mg total) by mouth once daily. 360 tablet 0    furosemide (LASIX) 20 MG tablet Take 1 tablet (20 mg total) by mouth once daily. 30 tablet 11    isosorbide mononitrate (IMDUR) 30 MG 24 hr tablet Take 1 tablet (30 mg total) by mouth once daily. 30 tablet 11    losartan (COZAAR) 25 MG tablet Take 1 tablet (25 mg total) by mouth once daily. 90 tablet 3    OXcarbazepine (TRILEPTAL) 300 MG Tab 1 tablet (300 mg total) by Per OG tube route 2 (two) times daily. 60 tablet 11    OXcarbazepine (TRILEPTAL) 300 MG Tab Take 300 mg by mouth.      potassium chloride (K-TAB) 20 mEq Take 20 mEq by mouth once daily.      traZODone (DESYREL) 100 MG tablet Take 1 tablet (100 mg total) by mouth every evening. 30 tablet 11    amLODIPine (NORVASC) 5 MG tablet Take 1 tablet (5 mg total) by mouth once daily. 30 tablet 11    ARIPiprazole (ABILIFY) 10 MG Tab Take 1 tablet (10 mg total) by mouth once daily. 30 tablet 11    atorvastatin (LIPITOR) 80 MG tablet Take 1 tablet (80 mg total) by mouth once daily. 90 tablet 3    benztropine (COGENTIN) 1 MG tablet Take 1 tablet (1 mg total) by mouth 2 (two) times daily as needed (spasms). 60 tablet 0    ezetimibe (ZETIA) 10 mg tablet Take 1 tablet (10 mg total) by mouth every evening. 90 tablet 3    famotidine (PEPCID) 20 MG tablet Take 1 tablet (20 mg total) by mouth 2 (two) times daily. 60 tablet 11    levETIRAcetam (KEPPRA) 500 MG Tab Take 1 tablet (500 mg total) by mouth 2 (two) times daily. 60 tablet 11    metoprolol tartrate (LOPRESSOR) 50 MG tablet Take 1 tablet (50 mg total) by mouth 2 (two) times daily. 60 tablet 11    OLANZapine (ZYPREXA) 10 MG tablet Take 1 tablet (10 mg total) by mouth every evening. 30 tablet 11    oxyCODONE (ROXICODONE) 5 MG immediate release tablet Take 1 tablet (5 mg total) by mouth every 8 (eight) hours as needed for Pain (scale 6-10). 18 tablet 0    polyethylene glycol (GLYCOLAX)  17 gram PwPk Take 17 g by mouth once daily. 30 packet 0    [DISCONTINUED] clopidogreL (PLAVIX) 75 mg tablet Take 1 tablet (75 mg total) by mouth once daily. 30 tablet 11    [DISCONTINUED] metoprolol succinate (TOPROL-XL) 25 MG 24 hr tablet Take 25 mg by mouth once daily.      [DISCONTINUED] pravastatin (PRAVACHOL) 40 MG tablet Take 1 tablet (40 mg total) by mouth every evening. 90 tablet 3       Current Inpatient Medications:    Current Facility-Administered Medications:     ARIPiprazole tablet 10 mg, 10 mg, Oral, Daily, Arnold Coy MD    aspirin chewable tablet 81 mg, 81 mg, Oral, Daily, Arnold Coy MD    atorvastatin tablet 80 mg, 80 mg, Oral, Daily, Arnold Coy MD    benztropine tablet 1 mg, 1 mg, Oral, BID PRN, Arnold Coy MD    enoxaparin injection 70 mg, 1 mg/kg, Subcutaneous, Q12H, Lance Alvarez MD    famotidine tablet 20 mg, 20 mg, Oral, BID, Arnold Coy MD, 20 mg at 10/27/22 2110    furosemide injection 20 mg, 20 mg, Intravenous, Q12H, Arnold Coy MD, 20 mg at 10/27/22 2006    levETIRAcetam tablet 500 mg, 500 mg, Oral, BID, Arnold Coy MD, 500 mg at 10/27/22 2110    metoprolol injection 5 mg, 5 mg, Intravenous, Q5 Min PRN, Amelia Bone, AGACNP-BC, 5 mg at 10/28/22 0101    metoprolol injection 5 mg, 5 mg, Intravenous, Q1H PRN, Rashard Mejia NP, 5 mg at 10/28/22 0537    metoprolol tartrate (LOPRESSOR) tablet 50 mg, 50 mg, Oral, BID, Arnold Coy MD, 50 mg at 10/27/22 2110    OXcarbazepine tablet 300 mg, 300 mg, Per OG tube, TID, Arnold Coy MD, 300 mg at 10/27/22 2111    potassium bicarbonate disintegrating tablet 50 mEq, 50 mEq, Oral, Once, Aziza Hernandez MD    traZODone tablet 100 mg, 100 mg, Oral, QHS, Arnold Coy MD, 100 mg at 10/27/22 2111         VTE Risk Mitigation (From admission, onward)           Ordered     enoxaparin injection 70 mg  Every 12 hours (non-standard times)         10/28/22 6686                     Assessment:   Acute on chronic systolic & diastolic CHF    - BNP 3280.2    - Grade 2 DD    - EF 25-30%    - ICMO   New onset aflutter/afib w/ variable block - CVR - no documented history    - CHADSVASC Score 3 Points   NSTEMI - suspect type 2 s/t CHF exacerbation    - troponin 0.062, 0.064, 0.063  CAD    - CABG 4/22: LIMA-LAD, SVG-OM1, SVG-D1, SVG-PDA  ANA - improving   Hx of RUE DVT     - On Eliquis outpatient   Schizoaffective Disorder  Hx of Hep C  Hx of cocaine abuse  Tobacco use  Plan:   Echo pending.   Obtain UDS  It is reported that patient went into afib RVR. After reviewing cardiac strips and EKG, pt was found to be in aflutter. Will continue to monitor for afib/aflutter.   Continue FD lovenox for VTE prophylaxis in the setting of new afib/aflutter.  Due to patient's history of cocaine use, will discontinue metoprolol & resume Coreg 12.5 mg BID.   Continue GDMT for CAD & CHF.   Hold ARB s/t ANA  Continue Lasix 40 mg IVP BID.  Ensure accurate I&O's & daily weights.   Labs in am: CBC, CMP, & Mg.     Thank you for your consult.     Megha Jurado, RAY  Cardiology  [facility] - [facility] Tele[facility]  10/28/2022 8:52 AM

## 2022-10-28 NOTE — PROGRESS NOTES
[facility] Medicine Progress Note        Chief Complaint: Inpatient Follow-up for     HPI:  57-year-old male with past medical history of bipolar, hypertension, schizoaffective disorder, seizures, stroke, substance abuse who was recently discharged from the [facility] presented to ER with complaints shortness of breath wheezing cough and also substernal chest pain lab work done here showed slightly elevated troponin chest x-ray showed pulmonary edema patient has been admitted to hospitalist service for Congestive heart failure exacerbation and cardiology has been consulted    Interval Hx:   Patient seen and examined reports shortness of breath is better than yesterday no other issues patient went into AFib last night    Objective/physical exam:  General: In no acute distress, afebrile  Chest: Clear to auscultation bilaterally  Heart: , +S1, S2, no appreciable murmur  Abdomen: Soft, nontender, BS +  MSK: Warm, no lower extremity edema, no clubbing or cyanosis  Neurologic: Alert and oriented x4,   VITAL SIGNS: 24 HRS MIN & MAX LAST   Temp  Min: 97.3 °F (36.3 °C)  Max: 98.3 °F (36.8 °C) 98.3 °F (36.8 °C)   BP  Min: 97/74  Max: 132/93 120/83   Pulse  Min: 96  Max: 120  96   Resp  Min: 18  Max: 24 18   SpO2  Min: 97 %  Max: 100 % 99 %       Recent Labs   Lab 10/27/22  1019   WBC 8.4   RBC 4.81   HGB 11.2*   HCT 36.0*   MCV 74.8*   MCH 23.3*   MCHC 31.1*   RDW 18.3*      MPV 9.0       Recent Labs   Lab 10/27/22  1019 10/27/22  1747 10/28/22  0441    141 136   K 4.5 4.1 3.4*   CO2 25 24 24   BUN 44.3* 41.6* 38.7*   CREATININE 1.47* 1.39* 1.30*   CALCIUM 8.9 8.9 8.6   ALBUMIN 3.5  --   --    ALKPHOS 107  --   --    ALT 23  --   --    AST 26  --   --    BILITOT 0.4  --   --           Microbiology Results (last 7 days)       ** No results found for the last 168 hours. **             See below for Radiology    Scheduled Med:   ARIPiprazole  10 mg Oral Daily    aspirin   81 mg Oral Daily    atorvastatin  80 mg Oral Daily    enoxparin  1 mg/kg Subcutaneous Q12H    famotidine  20 mg Oral BID    furosemide (LASIX) injection  20 mg Intravenous Q12H    levETIRAcetam  500 mg Oral BID    metoprolol tartrate  50 mg Oral BID    OXcarbazepine  300 mg Per OG tube TID    traZODone  100 mg Oral QHS        Continuous Infusions:       PRN Meds:  benztropine, metoprolol, metoprolol       Assessment/Plan:  Congestive heart failure exacerbation with unknown EF   Chest pain  Non-STEMI unknown type   New onset AFib  Seizure disorder  Acute kidney injury   Essential hypertension  Hyperlipidemia   Bipolar disorder    2D echo ordered results are pending continue with Lasix 40 IV b.i.d. cardiology consulted   Strict input and output and daily weights   Creatinine is slightly better  Potassium is low will be given 50 mg of p.o. potassium  Troponins were elevated on admission now trending down, will await further cardiology's recommendation  Patient has been started on metoprolol as apparently he went into AFib and is on full dose of Lovenox   Other home medications have been resumed  Continue with Keppra, seizure precaution  Repeat blood work in a.m.   VTE prophylaxis:  Lovenox    Patient condition:  Stable/Fair/Guarded/ Serious/ Critical    Anticipated discharge and Disposition:         All diagnosis and differential diagnosis have been reviewed; assessment and plan has been documented; I have personally reviewed the labs and test results that are presently available; I have reviewed the patients medication list; I have reviewed the consulting providers response and recommendations. I have reviewed or attempted to review medical records based upon their availability    All of the patient's questions have been  addressed and answered. Patient's is agreeable to the above stated plan. I will continue to monitor closely and make adjustments to medical management as  needed.  _____________________________________________________________________    Nutrition Status:    Radiology:  X-Ray Chest AP Portable  Narrative: EXAMINATION:  XR CHEST AP PORTABLE    CLINICAL HISTORY:  Cough, unspecified    TECHNIQUE:  Single view of the chest    COMPARISON:  07/18/2022    FINDINGS:  Mildly prominent perihilar markings.    The cardiomediastinal silhouette is enlarged.    No acute osseous abnormality.  Impression: Mildly prominent perihilar markings may be seen with volume overload.  Cardiomegaly is again noted.    Electronically signed by: Rex Denson  Date:    10/27/2022  Time:    11:29      Aziza Hernandez MD   10/28/2022

## 2022-10-29 LAB
ALBUMIN SERPL-MCNC: 3.2 GM/DL (ref 3.5–5)
ALBUMIN/GLOB SERPL: 1 RATIO (ref 1.1–2)
ALP SERPL-CCNC: 92 UNIT/L (ref 40–150)
ALT SERPL-CCNC: 22 UNIT/L (ref 0–55)
AST SERPL-CCNC: 26 UNIT/L (ref 5–34)
BASOPHILS # BLD AUTO: 0.05 X10(3)/MCL (ref 0–0.2)
BASOPHILS NFR BLD AUTO: 0.7 %
BILIRUBIN DIRECT+TOT PNL SERPL-MCNC: 0.6 MG/DL
BUN SERPL-MCNC: 34.7 MG/DL (ref 8.4–25.7)
CALCIUM SERPL-MCNC: 8.9 MG/DL (ref 8.4–10.2)
CHLORIDE SERPL-SCNC: 105 MMOL/L (ref 98–107)
CO2 SERPL-SCNC: 27 MMOL/L (ref 22–29)
CREAT SERPL-MCNC: 1.2 MG/DL (ref 0.73–1.18)
EOSINOPHIL # BLD AUTO: 0.11 X10(3)/MCL (ref 0–0.9)
EOSINOPHIL NFR BLD AUTO: 1.6 %
ERYTHROCYTE [DISTWIDTH] IN BLOOD BY AUTOMATED COUNT: 18.1 % (ref 11.5–17)
GFR SERPLBLD CREATININE-BSD FMLA CKD-EPI: >60 MLS/MIN/1.73/M2
GLOBULIN SER-MCNC: 3.1 GM/DL (ref 2.4–3.5)
GLUCOSE SERPL-MCNC: 117 MG/DL (ref 74–100)
HCT VFR BLD AUTO: 35.4 % (ref 42–52)
HGB BLD-MCNC: 11.2 GM/DL (ref 14–18)
IMM GRANULOCYTES # BLD AUTO: 0.01 X10(3)/MCL (ref 0–0.04)
IMM GRANULOCYTES NFR BLD AUTO: 0.1 %
LYMPHOCYTES # BLD AUTO: 1.44 X10(3)/MCL (ref 0.6–4.6)
LYMPHOCYTES NFR BLD AUTO: 21.1 %
MCH RBC QN AUTO: 23.2 PG (ref 27–31)
MCHC RBC AUTO-ENTMCNC: 31.6 MG/DL (ref 33–36)
MCV RBC AUTO: 73.4 FL (ref 80–94)
MONOCYTES # BLD AUTO: 0.71 X10(3)/MCL (ref 0.1–1.3)
MONOCYTES NFR BLD AUTO: 10.4 %
NEUTROPHILS # BLD AUTO: 4.5 X10(3)/MCL (ref 2.1–9.2)
NEUTROPHILS NFR BLD AUTO: 66.1 %
NRBC BLD AUTO-RTO: 0 %
PLATELET # BLD AUTO: 244 X10(3)/MCL (ref 130–400)
PMV BLD AUTO: 9.7 FL (ref 7.4–10.4)
POTASSIUM SERPL-SCNC: 4.2 MMOL/L (ref 3.5–5.1)
PROT SERPL-MCNC: 6.3 GM/DL (ref 6.4–8.3)
RBC # BLD AUTO: 4.82 X10(6)/MCL (ref 4.7–6.1)
SODIUM SERPL-SCNC: 140 MMOL/L (ref 136–145)
WBC # SPEC AUTO: 6.8 X10(3)/MCL (ref 4.5–11.5)

## 2022-10-29 PROCEDURE — 21400001 HC TELEMETRY ROOM

## 2022-10-29 PROCEDURE — 63600175 PHARM REV CODE 636 W HCPCS

## 2022-10-29 PROCEDURE — 63600175 PHARM REV CODE 636 W HCPCS: Performed by: INTERNAL MEDICINE

## 2022-10-29 PROCEDURE — 80053 COMPREHEN METABOLIC PANEL: CPT | Performed by: INTERNAL MEDICINE

## 2022-10-29 PROCEDURE — 27000221 HC OXYGEN, UP TO 24 HOURS

## 2022-10-29 PROCEDURE — 85025 COMPLETE CBC W/AUTO DIFF WBC: CPT | Performed by: INTERNAL MEDICINE

## 2022-10-29 PROCEDURE — 36415 COLL VENOUS BLD VENIPUNCTURE: CPT | Performed by: INTERNAL MEDICINE

## 2022-10-29 PROCEDURE — 25000003 PHARM REV CODE 250

## 2022-10-29 PROCEDURE — 25000242 PHARM REV CODE 250 ALT 637 W/ HCPCS: Performed by: NURSE PRACTITIONER

## 2022-10-29 PROCEDURE — 63600175 PHARM REV CODE 636 W HCPCS: Performed by: NURSE PRACTITIONER

## 2022-10-29 PROCEDURE — 94640 AIRWAY INHALATION TREATMENT: CPT

## 2022-10-29 PROCEDURE — 25000003 PHARM REV CODE 250: Performed by: INTERNAL MEDICINE

## 2022-10-29 PROCEDURE — 99900035 HC TECH TIME PER 15 MIN (STAT)

## 2022-10-29 RX ORDER — FUROSEMIDE 10 MG/ML
80 INJECTION INTRAMUSCULAR; INTRAVENOUS
Status: DISCONTINUED | OUTPATIENT
Start: 2022-10-29 | End: 2022-10-31

## 2022-10-29 RX ORDER — LEVALBUTEROL 1.25 MG/.5ML
1.25 SOLUTION, CONCENTRATE RESPIRATORY (INHALATION) EVERY 8 HOURS
Status: DISCONTINUED | OUTPATIENT
Start: 2022-10-29 | End: 2022-10-31

## 2022-10-29 RX ADMIN — ENOXAPARIN SODIUM 70 MG: 80 INJECTION SUBCUTANEOUS at 01:10

## 2022-10-29 RX ADMIN — LEVETIRACETAM 500 MG: 500 TABLET, FILM COATED ORAL at 10:10

## 2022-10-29 RX ADMIN — FUROSEMIDE 80 MG: 10 INJECTION, SOLUTION INTRAMUSCULAR; INTRAVENOUS at 08:10

## 2022-10-29 RX ADMIN — FAMOTIDINE 20 MG: 20 TABLET ORAL at 10:10

## 2022-10-29 RX ADMIN — TRAZODONE HYDROCHLORIDE 100 MG: 50 TABLET ORAL at 08:10

## 2022-10-29 RX ADMIN — OXCARBAZEPINE 300 MG: 300 TABLET, FILM COATED ORAL at 10:10

## 2022-10-29 RX ADMIN — BENZTROPINE MESYLATE 1 MG: 1 TABLET ORAL at 08:10

## 2022-10-29 RX ADMIN — OXCARBAZEPINE 300 MG: 300 TABLET, FILM COATED ORAL at 08:10

## 2022-10-29 RX ADMIN — ENOXAPARIN SODIUM 70 MG: 80 INJECTION SUBCUTANEOUS at 08:10

## 2022-10-29 RX ADMIN — ATORVASTATIN CALCIUM 80 MG: 40 TABLET, FILM COATED ORAL at 10:10

## 2022-10-29 RX ADMIN — ASPIRIN 81 MG CHEWABLE TABLET 81 MG: 81 TABLET CHEWABLE at 10:10

## 2022-10-29 RX ADMIN — CARVEDILOL 12.5 MG: 12.5 TABLET, FILM COATED ORAL at 08:10

## 2022-10-29 RX ADMIN — CARVEDILOL 12.5 MG: 12.5 TABLET, FILM COATED ORAL at 10:10

## 2022-10-29 RX ADMIN — FAMOTIDINE 20 MG: 20 TABLET ORAL at 08:10

## 2022-10-29 RX ADMIN — LEVALBUTEROL HYDROCHLORIDE 1.25 MG: 1.25 SOLUTION, CONCENTRATE RESPIRATORY (INHALATION) at 04:10

## 2022-10-29 RX ADMIN — LEVETIRACETAM 500 MG: 500 TABLET, FILM COATED ORAL at 08:10

## 2022-10-29 RX ADMIN — OXCARBAZEPINE 300 MG: 300 TABLET, FILM COATED ORAL at 02:10

## 2022-10-29 RX ADMIN — ARIPIPRAZOLE 10 MG: 5 TABLET ORAL at 10:10

## 2022-10-29 RX ADMIN — FUROSEMIDE 40 MG: 10 INJECTION, SOLUTION INTRAMUSCULAR; INTRAVENOUS at 10:10

## 2022-10-29 NOTE — PROGRESS NOTES
[facility] Medicine Progress Note        Chief Complaint: Inpatient Follow-up for     HPI:  57-year-old male with past medical history of bipolar, hypertension, schizoaffective disorder, seizures, stroke, substance abuse who was recently discharged from the [facility] hospital presented to ER with complaints shortness of breath wheezing cough and also substernal chest pain lab work done here showed slightly elevated troponin chest x-ray showed pulmonary edema patient has been admitted to hospitalist service for Congestive heart failure exacerbation and cardiology has been consulted    Interval Hx:   Patient seen and examined saturating well on room air no other issues reported    Objective/physical exam:  General: In no acute distress, afebrile  Chest: Clear to auscultation bilaterally  Heart: , +S1, S2, no appreciable murmur  Abdomen: Soft, nontender, BS +  MSK: Warm, no lower extremity edema, no clubbing or cyanosis  Neurologic: Alert and oriented x4,   VITAL SIGNS: 24 HRS MIN & MAX LAST   Temp  Min: 97.6 °F (36.4 °C)  Max: 98.3 °F (36.8 °C) 97.9 °F (36.6 °C)   BP  Min: 113/90  Max: 134/91 119/77   Pulse  Min: 71  Max: 120  100   Resp  Min: 18  Max: 19 18   SpO2  Min: 95 %  Max: 100 % 99 %       Recent Labs   Lab 10/27/22  1019 10/29/22  0403   WBC 8.4 6.8   RBC 4.81 4.82   HGB 11.2* 11.2*   HCT 36.0* 35.4*   MCV 74.8* 73.4*   MCH 23.3* 23.2*   MCHC 31.1* 31.6*   RDW 18.3* 18.1*    244   MPV 9.0 9.7       Recent Labs   Lab 10/27/22  1019 10/27/22  1747 10/28/22  0441 10/29/22  0404    141 136 140   K 4.5 4.1 3.4* 4.2   CO2 25 24 24 27   BUN 44.3* 41.6* 38.7* 34.7*   CREATININE 1.47* 1.39* 1.30* 1.20*   CALCIUM 8.9 8.9 8.6 8.9   ALBUMIN 3.5  --   --  3.2*   ALKPHOS 107  --   --  92   ALT 23  --   --  22   AST 26  --   --  26   BILITOT 0.4  --   --  0.6          Microbiology Results (last 7 days)       ** No results found for the last 168 hours. **             See  below for Radiology    Scheduled Med:   ARIPiprazole  10 mg Oral Daily    aspirin  81 mg Oral Daily    atorvastatin  80 mg Oral Daily    carvediloL  12.5 mg Oral BID    enoxparin  1 mg/kg Subcutaneous Q12H    famotidine  20 mg Oral BID    furosemide (LASIX) injection  40 mg Intravenous Q12H    levETIRAcetam  500 mg Oral BID    OXcarbazepine  300 mg Per OG tube TID    traZODone  100 mg Oral QHS        Continuous Infusions:       PRN Meds:  benztropine, metoprolol, metoprolol       Assessment/Plan:  Acute Congestive heart failure exacerbation with EF of 20%   Severe left ventricular global hypokinesis   Moderate MR, TR  Non-STEMI unknown type   New onset atrial flutter   Seizure disorder   Acute kidney injury   Essential hypertension  Hyperlipidemia   Bipolar disorder      Continue with Lasix b.i.d. cardiology consulted and following   Now patient has a EF of 20% will need ischemic workup will await further cardiology's recommendations   Creatinine is trending down this morning it is 1.2   Potassium is back to normal   On Coreg  Other home medications have been resumed   Continue with Keppra, seizure precautions   Repeat blood work in a.m.  Will await further cardiology's recommendations     On full dose of Lovenox     VTE prophylaxis:  Lovenox    Patient condition:  Stable/Fair/Guarded/ Serious/ Critical    Anticipated discharge and Disposition:         All diagnosis and differential diagnosis have been reviewed; assessment and plan has been documented; I have personally reviewed the labs and test results that are presently available; I have reviewed the patients medication list; I have reviewed the consulting providers response and recommendations. I have reviewed or attempted to review medical records based upon their availability    All of the patient's questions have been  addressed and answered. Patient's is agreeable to the above stated plan. I will continue to monitor closely and make adjustments to medical  management as needed.  _____________________________________________________________________    Nutrition Status:    Radiology:  Echo  · The left ventricle is moderately enlarged with mild eccentric   hypertrophy and severely decreased systolic function.  · The estimated ejection fraction is 20%.  · There is severe left ventricular global hypokinesis.  · Grade II left ventricular diastolic dysfunction.  · Normal right ventricular size with moderately reduced right ventricular   systolic function.  · Moderate mitral regurgitation.  · Moderate tricuspid regurgitation.  · There is mild pulmonary hypertension.  · The estimated PA systolic pressure is 44 mmHg.  · Elevated central venous pressure (15 mmHg).  · Severe left atrial enlargement.         Aziza Hernandez MD   10/29/2022

## 2022-10-29 NOTE — PROGRESS NOTES
[facility] - [facility] Medical Telemetry  Cardiology  Progress Note    Patient Name: Kendall Duff  MRN: [id number]  Admission Date: 10/27/2022  Hospital Length of Stay: 2 days  Code Status: Prior   Attending Physician: Lance Alvarez MD   Primary Care Physician: Primary Doctor No  Expected Discharge Date: 10/28/2022  Principal Problem:CHF exacerbation    Subjective:     Brief HPI:   Mr. Duff is a 57 year old male who is known to [facility], Dr. Wright. He presents to the ED after being discharged from a psych facility with complaints of body swelling, urinary incontinence, & wheezing. He also reports that he had some mild chest pain, but it resolved with diuresis. Significant lab work includes B/Cr 41.6/1.39, BNP 3280.2, & trop 0.064. A CXR was obtained and demonstrates volume overload. It is also reported that the patient went into afib RVR/aflutter. [facility] has been consulted to further manage his CHF exacerbation and heart rate.   Hospital Course:     PMH: CAD, ICMO, HTN, schizoaffective disorder, hep C, DVT, systolic & diastolic CHF  PSH: CABG  Family History: Mother - CA; Father - liver disease   Social History: Current every day smoker. Reports last use of cocaine (7 months ago). Denies alcohol use.      Previous Cardiac Diagnostics:   Echo 10.28.22  The left ventricle is moderately enlarged with mild eccentric hypertrophy and severely decreased systolic function.  The estimated ejection fraction is 20%.  There is severe left ventricular global hypokinesis.  Grade II left ventricular diastolic dysfunction.  Normal right ventricular size with moderately reduced right ventricular systolic function.  Moderate mitral regurgitation.  Moderate tricuspid regurgitation.  There is mild pulmonary hypertension.  The estimated PA systolic pressure is 44 mmHg.  Elevated central venous pressure (15 mmHg).  Severe left atrial enlargement      CABG x 4 4/22:  LIMA-LAD, SVG-OM1, SVG-D1, SVG-PDA     TTE 6.16.22:  The left  ventricle is normal in size w/ concentric hypertrophy and severely decreased systolic function.  The estimated EF is 25-30%.  There is severe left ventricular global hypokinesis.  Grade II left ventricular diastolic dysfunction.  Normal right ventricular size w/ mildly reduced right ventricular systolic function.  The estimated PA systolic pressure is 52 mmHg.  There is moderate pulmonary HTN.  Elevated CVP (15 mmHg).     RUE NIVA 5.10.22:  A deep vein thrombosis was identified in the right axillary and brachial veins. A superficial thrombosis was identified in the right basilic vein.     TTE 3.21.22:  Ejection fraction is visually estimated at 35%.   Akinetic motion of the apical anteroseptal wall noted in the left ventricle; concurrent w/ MI.  Mild to moderate mitral regurgitation is present.      LHC 3.21.22:  100% LAD to 30-40% residual stenosis post PTCA.  Large diagonal system w/ severe stenosis.  CIRC - patent stent, OM1 patent, mild CIRC occluded  RCA - stents ISR 40-50%, distal 70-80%  EDP 12 EF 35-40% anterior HK  Review of Systems   Respiratory:  Positive for cough and shortness of breath.      Objective:     Vital Signs (Most Recent):  Temp: 97.9 °F (36.6 °C) (10/29/22 0700)  Pulse: 100 (10/29/22 1002)  Resp: 18 (10/29/22 0700)  BP: 119/77 (10/29/22 1002)  SpO2: 99 % (10/29/22 0903) Vital Signs (24h Range):  Temp:  [97.6 °F (36.4 °C)-98.3 °F (36.8 °C)] 97.9 °F (36.6 °C)  Pulse:  [] 100  Resp:  [18-19] 18  SpO2:  [95 %-100 %] 99 %  BP: (113-134)/(77-92) 119/77     Weight: 76.2 kg (167 lb 15.9 oz)  Body mass index is 26.31 kg/m².    SpO2: 99 %  O2 Device (Oxygen Therapy): nasal cannula      Intake/Output Summary (Last 24 hours) at 10/29/2022 1101  Last data filed at 10/29/2022 0130  Gross per 24 hour   Intake 480 ml   Output 1250 ml   Net -770 ml       Lines/Drains/Airways       Peripheral Intravenous Line  Duration                  Peripheral IV - Single Lumen 10/27/22 1100 20 G Right Antecubital 2  days                    Significant Labs:   Recent Results (from the past 72 hour(s))   Comprehensive metabolic panel    Collection Time: 10/27/22 10:19 AM   Result Value Ref Range    Sodium Level 140 136 - 145 mmol/L    Potassium Level 4.5 3.5 - 5.1 mmol/L    Chloride 106 98 - 107 mmol/L    Carbon Dioxide 25 22 - 29 mmol/L    Glucose Level 135 (H) 74 - 100 mg/dL    Blood Urea Nitrogen 44.3 (H) 8.4 - 25.7 mg/dL    Creatinine 1.47 (H) 0.73 - 1.18 mg/dL    Calcium Level Total 8.9 8.4 - 10.2 mg/dL    Protein Total 6.5 6.4 - 8.3 gm/dL    Albumin Level 3.5 3.5 - 5.0 gm/dL    Globulin 3.0 2.4 - 3.5 gm/dL    Albumin/Globulin Ratio 1.2 1.1 - 2.0 ratio    Bilirubin Total 0.4 <=1.5 mg/dL    Alkaline Phosphatase 107 40 - 150 unit/L    Alanine Aminotransferase 23 0 - 55 unit/L    Aspartate Aminotransferase 26 5 - 34 unit/L    eGFR 55 mls/min/1.73/m2   Troponin I    Collection Time: 10/27/22 10:19 AM   Result Value Ref Range    Troponin-I 0.062 (H) 0.000 - 0.045 ng/mL   BNP    Collection Time: 10/27/22 10:19 AM   Result Value Ref Range    Natriuretic Peptide 3,280.2 (H) <=100.0 pg/mL   CBC with Differential    Collection Time: 10/27/22 10:19 AM   Result Value Ref Range    WBC 8.4 4.5 - 11.5 x10(3)/mcL    RBC 4.81 4.70 - 6.10 x10(6)/mcL    Hgb 11.2 (L) 14.0 - 18.0 gm/dL    Hct 36.0 (L) 42.0 - 52.0 %    MCV 74.8 (L) 80.0 - 94.0 fL    MCH 23.3 (L) 27.0 - 31.0 pg    MCHC 31.1 (L) 33.0 - 36.0 mg/dL    RDW 18.3 (H) 11.5 - 17.0 %    Platelet 262 130 - 400 x10(3)/mcL    MPV 9.0 7.4 - 10.4 fL    Neut % 83.4 %    Lymph % 11.6 %    Mono % 4.2 %    Eos % 0.2 %    Basophil % 0.4 %    Lymph # 0.98 0.6 - 4.6 x10(3)/mcL    Neut # 7.0 2.1 - 9.2 x10(3)/mcL    Mono # 0.35 0.1 - 1.3 x10(3)/mcL    Eos # 0.02 0 - 0.9 x10(3)/mcL    Baso # 0.03 0 - 0.2 x10(3)/mcL    IG# 0.02 0 - 0.04 x10(3)/mcL    IG% 0.2 %    NRBC% 0.0 %   COVID-19 Rapid Screening    Collection Time: 10/27/22 10:19 AM   Result Value Ref Range    SARS COV-2 MOLECULAR Negative Negative    Blood Smear Microscopic Exam    Collection Time: 10/27/22 10:19 AM   Result Value Ref Range    RBC Morph Abnormal (A) Normal    Acanthocytes 1+ (A) (none)    Anisocyte 2+ (A) (none)    Hypochrom 1+ (A) (none)    Microcyte 2+ (A) (none)    Poik 1+ (A) (none)    Platelet Est Adequate Normal, Adequate   Troponin I    Collection Time: 10/27/22  5:46 PM   Result Value Ref Range    Troponin-I 0.064 (H) 0.000 - 0.045 ng/mL   Basic Metabolic Panel    Collection Time: 10/27/22  5:47 PM   Result Value Ref Range    Sodium Level 141 136 - 145 mmol/L    Potassium Level 4.1 3.5 - 5.1 mmol/L    Chloride 106 98 - 107 mmol/L    Carbon Dioxide 24 22 - 29 mmol/L    Glucose Level 112 (H) 74 - 100 mg/dL    Blood Urea Nitrogen 41.6 (H) 8.4 - 25.7 mg/dL    Creatinine 1.39 (H) 0.73 - 1.18 mg/dL    BUN/Creatinine Ratio 30     Calcium Level Total 8.9 8.4 - 10.2 mg/dL    Anion Gap 11.0 mEq/L    eGFR 58 mls/min/1.73/m2   Troponin I    Collection Time: 10/28/22  1:01 AM   Result Value Ref Range    Troponin-I 0.067 (H) 0.000 - 0.045 ng/mL   Troponin I    Collection Time: 10/28/22  4:41 AM   Result Value Ref Range    Troponin-I 0.063 (H) 0.000 - 0.045 ng/mL   Basic Metabolic Panel    Collection Time: 10/28/22  4:41 AM   Result Value Ref Range    Sodium Level 136 136 - 145 mmol/L    Potassium Level 3.4 (L) 3.5 - 5.1 mmol/L    Chloride 103 98 - 107 mmol/L    Carbon Dioxide 24 22 - 29 mmol/L    Glucose Level 133 (H) 74 - 100 mg/dL    Blood Urea Nitrogen 38.7 (H) 8.4 - 25.7 mg/dL    Creatinine 1.30 (H) 0.73 - 1.18 mg/dL    BUN/Creatinine Ratio 30     Calcium Level Total 8.6 8.4 - 10.2 mg/dL    Anion Gap 9.0 mEq/L    eGFR >60 mls/min/1.73/m2   Echo    Collection Time: 10/28/22 10:05 AM   Result Value Ref Range    BSA 1.9 m2    TDI SEPTAL 0.06 m/s    Right Atrial Pressure (from IVC) 15 mmHg    EF 20 %    Left Ventricular Outflow Tract Mean Velocity 0.50 cm/s    Left Ventricular Outflow Tract Mean Gradient 1.00 mmHg    PV PEAK VELOCITY 0.75 cm/s     LVIDd 6.38 (A) 3.5 - 6.0 cm    IVS 0.78 0.6 - 1.1 cm    Posterior Wall 1.07 0.6 - 1.1 cm    LVIDs 5.78 (A) 2.1 - 4.0 cm    FS 9 28 - 44 %    LV mass 248.33 g    LA size 5.50 cm    RVDD 3.47 cm    TAPSE 1.32 cm    Left Ventricle Relative Wall Thickness 0.34 cm    AV mean gradient 3 mmHg    AV valve area 2.20 cm2    AV Velocity Ratio 0.69     AV index (prosthetic) 0.53     LVOT diameter 2.30 cm    LVOT area 4.2 cm2    LVOT peak mandeep 0.80 m/s    LVOT peak VTI 10.30 cm    Ao peak mandeep 1.16 m/s    Ao VTI 19.4 cm    LVOT stroke volume 42.77 cm3    AV peak gradient 5 mmHg    TV rest pulmonary artery pressure 44 mmHg    TR Max Mandeep 2.70 m/s    LV Systolic Volume 165.00 mL    LV Systolic Volume Index 87.8 mL/m2    LV Diastolic Volume 207.00 mL    LV Diastolic Volume Index 110.11 mL/m2    LV Mass Index 132 g/m2    Triscuspid Valve Regurgitation Peak Gradient 29 mmHg    LA Volume Index (Mod) 48.8 mL/m2    LA volume (mod) 91.80 cm3   Troponin I    Collection Time: 10/28/22 10:22 AM   Result Value Ref Range    Troponin-I 0.058 (H) 0.000 - 0.045 ng/mL   CBC with Differential    Collection Time: 10/29/22  4:03 AM   Result Value Ref Range    WBC 6.8 4.5 - 11.5 x10(3)/mcL    RBC 4.82 4.70 - 6.10 x10(6)/mcL    Hgb 11.2 (L) 14.0 - 18.0 gm/dL    Hct 35.4 (L) 42.0 - 52.0 %    MCV 73.4 (L) 80.0 - 94.0 fL    MCH 23.2 (L) 27.0 - 31.0 pg    MCHC 31.6 (L) 33.0 - 36.0 mg/dL    RDW 18.1 (H) 11.5 - 17.0 %    Platelet 244 130 - 400 x10(3)/mcL    MPV 9.7 7.4 - 10.4 fL    Neut % 66.1 %    Lymph % 21.1 %    Mono % 10.4 %    Eos % 1.6 %    Basophil % 0.7 %    Lymph # 1.44 0.6 - 4.6 x10(3)/mcL    Neut # 4.5 2.1 - 9.2 x10(3)/mcL    Mono # 0.71 0.1 - 1.3 x10(3)/mcL    Eos # 0.11 0 - 0.9 x10(3)/mcL    Baso # 0.05 0 - 0.2 x10(3)/mcL    IG# 0.01 0 - 0.04 x10(3)/mcL    IG% 0.1 %    NRBC% 0.0 %   Comprehensive Metabolic Panel    Collection Time: 10/29/22  4:04 AM   Result Value Ref Range    Sodium Level 140 136 - 145 mmol/L    Potassium Level 4.2 3.5 - 5.1  mmol/L    Chloride 105 98 - 107 mmol/L    Carbon Dioxide 27 22 - 29 mmol/L    Glucose Level 117 (H) 74 - 100 mg/dL    Blood Urea Nitrogen 34.7 (H) 8.4 - 25.7 mg/dL    Creatinine 1.20 (H) 0.73 - 1.18 mg/dL    Calcium Level Total 8.9 8.4 - 10.2 mg/dL    Protein Total 6.3 (L) 6.4 - 8.3 gm/dL    Albumin Level 3.2 (L) 3.5 - 5.0 gm/dL    Globulin 3.1 2.4 - 3.5 gm/dL    Albumin/Globulin Ratio 1.0 (L) 1.1 - 2.0 ratio    Bilirubin Total 0.6 <=1.5 mg/dL    Alkaline Phosphatase 92 40 - 150 unit/L    Alanine Aminotransferase 22 0 - 55 unit/L    Aspartate Aminotransferase 26 5 - 34 unit/L    eGFR >60 mls/min/1.73/m2       Telemetry:  Atrial Flutter    Physical Exam  Vitals reviewed.   Constitutional:       Appearance: Normal appearance.   Cardiovascular:      Rate and Rhythm: Tachycardia present. Rhythm irregular.   Pulmonary:      Breath sounds: Wheezing present.   Abdominal:      General: Bowel sounds are normal.      Palpations: Abdomen is soft.   Musculoskeletal:         General: Normal range of motion.   Skin:     General: Skin is warm and dry.      Capillary Refill: Capillary refill takes less than 2 seconds.   Neurological:      Mental Status: He is alert and oriented to person, place, and time.       Current Inpatient Medications:    Current Facility-Administered Medications:     ARIPiprazole tablet 10 mg, 10 mg, Oral, Daily, Arnold Coy MD, 10 mg at 10/29/22 1001    aspirin chewable tablet 81 mg, 81 mg, Oral, Daily, Arnlod Coy MD, 81 mg at 10/29/22 1002    atorvastatin tablet 80 mg, 80 mg, Oral, Daily, Arnold Coy MD, 80 mg at 10/29/22 1002    benztropine tablet 1 mg, 1 mg, Oral, BID PRN, Arnold Coy MD, 1 mg at 10/28/22 2257    carvediloL tablet 12.5 mg, 12.5 mg, Oral, BID, RAY Chilel, 12.5 mg at 10/29/22 1002    enoxaparin injection 70 mg, 1 mg/kg, Subcutaneous, Q12H, Lance Alvarez MD, 70 mg at 10/28/22 1428    famotidine tablet 20 mg, 20 mg, Oral, BID, Arnold Coy MD,  20 mg at 10/29/22 1002    furosemide injection 40 mg, 40 mg, Intravenous, Q12H, Megha MA Vickeugenio, FNP, 40 mg at 10/29/22 1000    levETIRAcetam tablet 500 mg, 500 mg, Oral, BID, Arnold Coy MD, 500 mg at 10/29/22 1002    metoprolol injection 5 mg, 5 mg, Intravenous, Q5 Min PRN, Amelia Bone, AGACNP-BC, 5 mg at 10/28/22 0101    metoprolol injection 5 mg, 5 mg, Intravenous, Q1H PRN, Rashard Mejia NP, 5 mg at 10/28/22 0537    OXcarbazepine tablet 300 mg, 300 mg, Per OG tube, TID, Arnold Coy MD, 300 mg at 10/29/22 1001    traZODone tablet 100 mg, 100 mg, Oral, QHS, Arnold Coy MD, 100 mg at 10/28/22 2257    VTE Risk Mitigation (From admission, onward)           Ordered     enoxaparin injection 70 mg  Every 12 hours (non-standard times)         10/28/22 0050                    Assessment:   Acute on chronic systolic & diastolic CHF    - BNP 3280.2    - Grade 2 DD    - EF 20% (echo 10.28.22)    - ICMO   VHD    -Mod MR and Mod TR  Pulmonary HTN-mild  New onset aflutter/afib w/ variable block - CVR - no documented history    - CHADSVASC Score 3 Points   NSTEMI - suspect type 2 s/t CHF exacerbation    - troponin 0.062, 0.064, 0.063  CAD    - CABG 4/22: LIMA-LAD, SVG-OM1, SVG-D1, SVG-PDA  ANA - improving   Hx of RUE DVT     - On Eliquis outpatient   Schizoaffective Disorder  Hx of Hep C  Hx of cocaine abuse  Tobacco use      Plan:     Continue FD lovenox for VTE prophylaxis in the setting of new afib/aflutter.  Due to patient's history of cocaine use, will discontinue metoprolol & resume Coreg 12.5 mg BID.   Continue GDMT for CAD & CHF.   Hold ARB s/t ANA  Increase Lasix to 80mg IVP BID.  Ensure accurate I&O's & daily weights.   Xopenex nebs q 8 hours for wheezing  Labs in am: CBC, CMP, & Mg.        RAY Sparks  Cardiology  [facility] - [address] [place], [place] [address]etry  10/29/2022

## 2022-10-30 LAB
ANION GAP SERPL CALC-SCNC: 8 MEQ/L
BASOPHILS # BLD AUTO: 0.04 X10(3)/MCL (ref 0–0.2)
BASOPHILS NFR BLD AUTO: 0.6 %
BUN SERPL-MCNC: 32.5 MG/DL (ref 8.4–25.7)
CALCIUM SERPL-MCNC: 8.8 MG/DL (ref 8.4–10.2)
CHLORIDE SERPL-SCNC: 103 MMOL/L (ref 98–107)
CO2 SERPL-SCNC: 29 MMOL/L (ref 22–29)
CREAT SERPL-MCNC: 1.18 MG/DL (ref 0.73–1.18)
CREAT/UREA NIT SERPL: 28
EOSINOPHIL # BLD AUTO: 0.13 X10(3)/MCL (ref 0–0.9)
EOSINOPHIL NFR BLD AUTO: 1.8 %
ERYTHROCYTE [DISTWIDTH] IN BLOOD BY AUTOMATED COUNT: 18 % (ref 11.5–17)
GFR SERPLBLD CREATININE-BSD FMLA CKD-EPI: >60 MLS/MIN/1.73/M2
GLUCOSE SERPL-MCNC: 115 MG/DL (ref 74–100)
HCT VFR BLD AUTO: 33.9 % (ref 42–52)
HGB BLD-MCNC: 10.8 GM/DL (ref 14–18)
IMM GRANULOCYTES # BLD AUTO: 0.04 X10(3)/MCL (ref 0–0.04)
IMM GRANULOCYTES NFR BLD AUTO: 0.6 %
LYMPHOCYTES # BLD AUTO: 1.42 X10(3)/MCL (ref 0.6–4.6)
LYMPHOCYTES NFR BLD AUTO: 20.2 %
MAGNESIUM SERPL-MCNC: 1.9 MG/DL (ref 1.6–2.6)
MCH RBC QN AUTO: 22.9 PG (ref 27–31)
MCHC RBC AUTO-ENTMCNC: 31.9 MG/DL (ref 33–36)
MCV RBC AUTO: 72 FL (ref 80–94)
MONOCYTES # BLD AUTO: 0.82 X10(3)/MCL (ref 0.1–1.3)
MONOCYTES NFR BLD AUTO: 11.6 %
NEUTROPHILS # BLD AUTO: 4.6 X10(3)/MCL (ref 2.1–9.2)
NEUTROPHILS NFR BLD AUTO: 65.2 %
NRBC BLD AUTO-RTO: 0 %
PLATELET # BLD AUTO: 231 X10(3)/MCL (ref 130–400)
PMV BLD AUTO: 9.2 FL (ref 7.4–10.4)
POTASSIUM SERPL-SCNC: 3.4 MMOL/L (ref 3.5–5.1)
RBC # BLD AUTO: 4.71 X10(6)/MCL (ref 4.7–6.1)
SODIUM SERPL-SCNC: 140 MMOL/L (ref 136–145)
WBC # SPEC AUTO: 7 X10(3)/MCL (ref 4.5–11.5)

## 2022-10-30 PROCEDURE — 25000003 PHARM REV CODE 250

## 2022-10-30 PROCEDURE — 80048 BASIC METABOLIC PNL TOTAL CA: CPT | Performed by: INTERNAL MEDICINE

## 2022-10-30 PROCEDURE — 63600175 PHARM REV CODE 636 W HCPCS: Performed by: NURSE PRACTITIONER

## 2022-10-30 PROCEDURE — 63600175 PHARM REV CODE 636 W HCPCS: Performed by: INTERNAL MEDICINE

## 2022-10-30 PROCEDURE — 94640 AIRWAY INHALATION TREATMENT: CPT

## 2022-10-30 PROCEDURE — 36415 COLL VENOUS BLD VENIPUNCTURE: CPT | Performed by: INTERNAL MEDICINE

## 2022-10-30 PROCEDURE — 25000003 PHARM REV CODE 250: Performed by: INTERNAL MEDICINE

## 2022-10-30 PROCEDURE — 25000003 PHARM REV CODE 250: Performed by: NURSE PRACTITIONER

## 2022-10-30 PROCEDURE — 21400001 HC TELEMETRY ROOM

## 2022-10-30 PROCEDURE — 27000221 HC OXYGEN, UP TO 24 HOURS

## 2022-10-30 PROCEDURE — 85025 COMPLETE CBC W/AUTO DIFF WBC: CPT | Performed by: INTERNAL MEDICINE

## 2022-10-30 PROCEDURE — 25000242 PHARM REV CODE 250 ALT 637 W/ HCPCS: Performed by: NURSE PRACTITIONER

## 2022-10-30 PROCEDURE — 83735 ASSAY OF MAGNESIUM: CPT | Performed by: NURSE PRACTITIONER

## 2022-10-30 RX ORDER — POTASSIUM CHLORIDE 20 MEQ/1
40 TABLET, EXTENDED RELEASE ORAL ONCE
Status: COMPLETED | OUTPATIENT
Start: 2022-10-30 | End: 2022-10-30

## 2022-10-30 RX ORDER — MAGNESIUM SULFATE 1 G/100ML
1 INJECTION INTRAVENOUS ONCE
Status: COMPLETED | OUTPATIENT
Start: 2022-10-30 | End: 2022-10-30

## 2022-10-30 RX ADMIN — ATORVASTATIN CALCIUM 80 MG: 40 TABLET, FILM COATED ORAL at 08:10

## 2022-10-30 RX ADMIN — OXCARBAZEPINE 300 MG: 300 TABLET, FILM COATED ORAL at 09:10

## 2022-10-30 RX ADMIN — FAMOTIDINE 20 MG: 20 TABLET ORAL at 09:10

## 2022-10-30 RX ADMIN — ARIPIPRAZOLE 10 MG: 5 TABLET ORAL at 08:10

## 2022-10-30 RX ADMIN — POTASSIUM CHLORIDE 40 MEQ: 1500 TABLET, EXTENDED RELEASE ORAL at 12:10

## 2022-10-30 RX ADMIN — OXCARBAZEPINE 300 MG: 300 TABLET, FILM COATED ORAL at 08:10

## 2022-10-30 RX ADMIN — ASPIRIN 81 MG CHEWABLE TABLET 81 MG: 81 TABLET CHEWABLE at 08:10

## 2022-10-30 RX ADMIN — ENOXAPARIN SODIUM 70 MG: 80 INJECTION SUBCUTANEOUS at 08:10

## 2022-10-30 RX ADMIN — LEVETIRACETAM 500 MG: 500 TABLET, FILM COATED ORAL at 09:10

## 2022-10-30 RX ADMIN — LEVALBUTEROL HYDROCHLORIDE 1.25 MG: 1.25 SOLUTION, CONCENTRATE RESPIRATORY (INHALATION) at 09:10

## 2022-10-30 RX ADMIN — TRAZODONE HYDROCHLORIDE 100 MG: 50 TABLET ORAL at 09:10

## 2022-10-30 RX ADMIN — FUROSEMIDE 80 MG: 10 INJECTION, SOLUTION INTRAMUSCULAR; INTRAVENOUS at 09:10

## 2022-10-30 RX ADMIN — OXCARBAZEPINE 300 MG: 300 TABLET, FILM COATED ORAL at 03:10

## 2022-10-30 RX ADMIN — ENOXAPARIN SODIUM 70 MG: 80 INJECTION SUBCUTANEOUS at 09:10

## 2022-10-30 RX ADMIN — LEVETIRACETAM 500 MG: 500 TABLET, FILM COATED ORAL at 08:10

## 2022-10-30 RX ADMIN — CARVEDILOL 12.5 MG: 12.5 TABLET, FILM COATED ORAL at 08:10

## 2022-10-30 RX ADMIN — FAMOTIDINE 20 MG: 20 TABLET ORAL at 08:10

## 2022-10-30 RX ADMIN — MAGNESIUM SULFATE IN DEXTROSE 1 G: 10 INJECTION, SOLUTION INTRAVENOUS at 12:10

## 2022-10-30 RX ADMIN — LEVALBUTEROL HYDROCHLORIDE 1.25 MG: 1.25 SOLUTION, CONCENTRATE RESPIRATORY (INHALATION) at 12:10

## 2022-10-30 RX ADMIN — FUROSEMIDE 80 MG: 10 INJECTION, SOLUTION INTRAMUSCULAR; INTRAVENOUS at 08:10

## 2022-10-30 RX ADMIN — CARVEDILOL 12.5 MG: 12.5 TABLET, FILM COATED ORAL at 09:10

## 2022-10-30 RX ADMIN — LEVALBUTEROL HYDROCHLORIDE 1.25 MG: 1.25 SOLUTION, CONCENTRATE RESPIRATORY (INHALATION) at 03:10

## 2022-10-30 NOTE — PROGRESS NOTES
[facility] Hospital Medicine Progress Note        Chief Complaint: Inpatient Follow-up for     HPI:  57-year-old male with past medical history of bipolar, hypertension, schizoaffective disorder, seizures, stroke, substance abuse who was recently discharged from the [facility] hospital presented to ER with complaints shortness of breath wheezing cough and also substernal chest pain lab work done here showed slightly elevated troponin chest x-ray showed pulmonary edema patient has been admitted to hospitalist service for Congestive heart failure exacerbation and cardiology has been consulted    Interval Hx:   Patient seen and examined saturating well on room air no other issues reported    Objective/physical exam:  General: In no acute distress, afebrile  Chest: Clear to auscultation bilaterally  Heart: , +S1, S2, no appreciable murmur  Abdomen: Soft, nontender, BS +  MSK: Warm, no lower extremity edema, no clubbing or cyanosis  Neurologic: Alert and oriented x4,   VITAL SIGNS: 24 HRS MIN & MAX LAST   Temp  Min: 97.7 °F (36.5 °C)  Max: 98.2 °F (36.8 °C) 98.2 °F (36.8 °C)   BP  Min: 118/80  Max: 134/101 (!) 134/101   Pulse  Min: 71  Max: 112  100   Resp  Min: 17  Max: 20 18   SpO2  Min: 96 %  Max: 100 % 99 %       Recent Labs   Lab 10/27/22  1019 10/29/22  0403 10/30/22  0627   WBC 8.4 6.8 7.0   RBC 4.81 4.82 4.71   HGB 11.2* 11.2* 10.8*   HCT 36.0* 35.4* 33.9*   MCV 74.8* 73.4* 72.0*   MCH 23.3* 23.2* 22.9*   MCHC 31.1* 31.6* 31.9*   RDW 18.3* 18.1* 18.0*    244 231   MPV 9.0 9.7 9.2       Recent Labs   Lab 10/27/22  1019 10/27/22  1747 10/28/22  0441 10/29/22  0404 10/30/22  0627      < > 136 140 140   K 4.5   < > 3.4* 4.2 3.4*   CO2 25   < > 24 27 29   BUN 44.3*   < > 38.7* 34.7* 32.5*   CREATININE 1.47*   < > 1.30* 1.20* 1.18   CALCIUM 8.9   < > 8.6 8.9 8.8   MG  --   --   --   --  1.90   ALBUMIN 3.5  --   --  3.2*  --    ALKPHOS 107  --   --  92  --    ALT 23  --   --  22   --    AST 26  --   --  26  --    BILITOT 0.4  --   --  0.6  --     < > = values in this interval not displayed.          Microbiology Results (last 7 days)       ** No results found for the last 168 hours. **             See below for Radiology    Scheduled Med:   ARIPiprazole  10 mg Oral Daily    aspirin  81 mg Oral Daily    atorvastatin  80 mg Oral Daily    carvediloL  12.5 mg Oral BID    enoxparin  1 mg/kg Subcutaneous Q12H    famotidine  20 mg Oral BID    furosemide (LASIX) injection  80 mg Intravenous Q12H    levalbuterol  1.25 mg Nebulization Q8H    levETIRAcetam  500 mg Oral BID    magnesium sulfate IVPB  1 g Intravenous Once    OXcarbazepine  300 mg Per OG tube TID    potassium chloride  40 mEq Oral Once    traZODone  100 mg Oral QHS        Continuous Infusions:       PRN Meds:  benztropine, metoprolol, metoprolol       Assessment/Plan:  Acute Congestive heart failure exacerbation with EF of 20%   Ischemic cardiomyopathy  History of coronary artery disease status post CABG in April of this year  Severe left ventricular global hypokinesis   Moderate MR, TR  Non-STEMI unknown type   History of right upper extremity DVT on [facility] outpatient   Schizoaffective disorder   History of hep C   History of cocaine abuse   Tobacco abuse  New onset atrial flutter   Seizure disorder   Acute kidney injury   Essential hypertension  Hyperlipidemia   Bipolar disorder      Continue with Lasix b.i.d. cardiology consulted and following   Now patient has a EF of 20%   Patient now has EF of 20% so will need LifeVest if okay with Cardiology  Creatinine is back to normal, either will need ARB or Entresto  Potassium is low will give 20 mEq of p.o. potassium   Continue with aspirin, statin, Coreg  Continue with other home medications that includes aripiprazole , Keppra, Oxycarbazepine , trazodone at night  Creatinine is trending down this morning it is 1.2   Heart rate is controlled on full dose of Lovenox for atrial flutter    Will  need LifeVest on discharge since EF of 20%   VTE prophylaxis:  Lovenox    Patient condition:  Stable/Fair/Guarded/ Serious/ Critical    Anticipated discharge and Disposition:         All diagnosis and differential diagnosis have been reviewed; assessment and plan has been documented; I have personally reviewed the labs and test results that are presently available; I have reviewed the patients medication list; I have reviewed the consulting providers response and recommendations. I have reviewed or attempted to review medical records based upon their availability    All of the patient's questions have been  addressed and answered. Patient's is agreeable to the above stated plan. I will continue to monitor closely and make adjustments to medical management as needed.  _____________________________________________________________________    Nutrition Status:    Radiology:  Echo  · The left ventricle is moderately enlarged with mild eccentric   hypertrophy and severely decreased systolic function.  · The estimated ejection fraction is 20%.  · There is severe left ventricular global hypokinesis.  · Grade II left ventricular diastolic dysfunction.  · Normal right ventricular size with moderately reduced right ventricular   systolic function.  · Moderate mitral regurgitation.  · Moderate tricuspid regurgitation.  · There is mild pulmonary hypertension.  · The estimated PA systolic pressure is 44 mmHg.  · Elevated central venous pressure (15 mmHg).  · Severe left atrial enlargement.         Aziza Hernandez MD   10/30/2022

## 2022-10-30 NOTE — PROGRESS NOTES
"Inpatient Nutrition Evaluation    Admit Date: 10/27/2022   Total duration of encounter: 3 days    Nutrition Recommendation/Prescription     - continue cardiac diet     Nutrition Assessment     Chart Review    Reason Seen: malnutrition screening tool    Diagnosis:  Acute Congestive heart failure exacerbation with EF of 20%   Ischemic cardiomyopathy  Non-STEMI unknown type  New onset atrial flutter   Seizure disorder   Acute kidney injury   Essential hypertension     Relevant Medical History: history of bipolar, hypertension, schizoaffective disorder, seizures, stroke, substance abuse , CABG in April of this year, Hep C    Nutrition-Related Medications: famotidine, furosemide, magnesium sulfate, potassium chloride    Nutrition-Related Labs:  10/30: K 3.4, BUN 32.5, glu 115    Diet Order: Diet Cardiac Fluid - 800mL; Low Sodium  Oral Supplement Order: none  Appetite/Oral Intake: good/% of meals  Factors Affecting Nutritional Intake: none identified  Food/Druze/Cultural Preferences: none reported  Food Allergies: none reported       Wound(s):   none    Comments    10/30: pt ate 100% of lunch, good appetite    Anthropometrics    Height: 5' 7" (170.2 cm) Height Method: Stated  Last Weight: 70.6 kg (155 lb 10.3 oz) (10/30/22 0500) Weight Method: Bed Scale  BMI (Calculated): 24.4  BMI Classification: normal (BMI 18.5-24.9)        Ideal Body Weight (IBW), Male: 148 lb     % Ideal Body Weight, Male (lb): 113.51 %                          Usual Weight Provided By: patient denies unintentional weight loss    Wt Readings from Last 5 Encounters:   10/30/22 70.6 kg (155 lb 10.3 oz)   07/26/22 64.7 kg (142 lb 10.2 oz)   06/16/22 77.2 kg (170 lb 3.1 oz)   05/27/21 76.5 kg (168 lb 8.7 oz)   03/25/22 73.8 kg (162 lb 11.2 oz)     Weight Change(s) Since Admission:  Admit Weight: 68 kg (150 lb) (10/27/22 0854)  -fluctuating weights noted in EMR, possibly fluid related or scale discrepancy, will monitor    Patient " Education    Not applicable.    Monitoring & Evaluation     Dietitian will monitor food and beverage intake and weight change.  Nutrition Risk/Follow-Up: low (follow-up in 5-7 days)  Patients assigned 'low nutrition risk' status do not qualify for a full nutritional assessment but will be monitored and re-evaluated in a 5-7 day time period. Please consult if re-evaluation needed sooner.

## 2022-10-30 NOTE — PLAN OF CARE
Problem: Adult Inpatient Plan of Care  Goal: Plan of Care Review  Outcome: Ongoing, Progressing  Goal: Patient-Specific Goal (Individualized)  Outcome: Ongoing, Progressing  Goal: Absence of Hospital-Acquired Illness or Injury  Outcome: Ongoing, Progressing  Goal: Optimal Comfort and Wellbeing  Outcome: Ongoing, Progressing  Goal: Readiness for Transition of Care  Outcome: Ongoing, Progressing     Problem: Adjustment to Illness (Delirium)  Goal: Optimal Coping  Outcome: Ongoing, Progressing     Problem: Altered Behavior (Delirium)  Goal: Improved Behavioral Control  Outcome: Ongoing, Progressing     Problem: Attention and Thought Clarity Impairment (Delirium)  Goal: Improved Attention and Thought Clarity  Outcome: Ongoing, Progressing     Problem: Sleep Disturbance (Delirium)  Goal: Improved Sleep  Outcome: Ongoing, Progressing

## 2022-10-31 LAB
ALBUMIN SERPL-MCNC: 3.3 GM/DL (ref 3.5–5)
ALBUMIN/GLOB SERPL: 1.1 RATIO (ref 1.1–2)
ALP SERPL-CCNC: 95 UNIT/L (ref 40–150)
ALT SERPL-CCNC: 21 UNIT/L (ref 0–55)
AMPHET UR QL SCN: NEGATIVE
AST SERPL-CCNC: 26 UNIT/L (ref 5–34)
BARBITURATE SCN PRESENT UR: NEGATIVE
BASOPHILS # BLD AUTO: 0.05 X10(3)/MCL (ref 0–0.2)
BASOPHILS NFR BLD AUTO: 0.7 %
BENZODIAZ UR QL SCN: NEGATIVE
BILIRUBIN DIRECT+TOT PNL SERPL-MCNC: 0.7 MG/DL
BUN SERPL-MCNC: 30.6 MG/DL (ref 8.4–25.7)
CALCIUM SERPL-MCNC: 9 MG/DL (ref 8.4–10.2)
CANNABINOIDS UR QL SCN: NEGATIVE
CHLORIDE SERPL-SCNC: 98 MMOL/L (ref 98–107)
CO2 SERPL-SCNC: 29 MMOL/L (ref 22–29)
COCAINE UR QL SCN: NEGATIVE
CREAT SERPL-MCNC: 1.31 MG/DL (ref 0.73–1.18)
EOSINOPHIL # BLD AUTO: 0.15 X10(3)/MCL (ref 0–0.9)
EOSINOPHIL NFR BLD AUTO: 2.1 %
ERYTHROCYTE [DISTWIDTH] IN BLOOD BY AUTOMATED COUNT: 18.6 % (ref 11.5–17)
FENTANYL UR QL SCN: NEGATIVE
GFR SERPLBLD CREATININE-BSD FMLA CKD-EPI: >60 MLS/MIN/1.73/M2
GLOBULIN SER-MCNC: 3 GM/DL (ref 2.4–3.5)
GLUCOSE SERPL-MCNC: 103 MG/DL (ref 74–100)
HCT VFR BLD AUTO: 37 % (ref 42–52)
HGB BLD-MCNC: 11.7 GM/DL (ref 14–18)
IMM GRANULOCYTES # BLD AUTO: 0.02 X10(3)/MCL (ref 0–0.04)
IMM GRANULOCYTES NFR BLD AUTO: 0.3 %
LYMPHOCYTES # BLD AUTO: 1.35 X10(3)/MCL (ref 0.6–4.6)
LYMPHOCYTES NFR BLD AUTO: 19.1 %
MAGNESIUM SERPL-MCNC: 1.9 MG/DL (ref 1.6–2.6)
MCH RBC QN AUTO: 23.1 PG (ref 27–31)
MCHC RBC AUTO-ENTMCNC: 31.6 MG/DL (ref 33–36)
MCV RBC AUTO: 73.1 FL (ref 80–94)
MDMA UR QL SCN: NEGATIVE
MONOCYTES # BLD AUTO: 0.82 X10(3)/MCL (ref 0.1–1.3)
MONOCYTES NFR BLD AUTO: 11.6 %
NEUTROPHILS # BLD AUTO: 4.7 X10(3)/MCL (ref 2.1–9.2)
NEUTROPHILS NFR BLD AUTO: 66.2 %
NRBC BLD AUTO-RTO: 0 %
OPIATES UR QL SCN: NEGATIVE
PCP UR QL: NEGATIVE
PH UR: 7.5 [PH] (ref 3–11)
PLATELET # BLD AUTO: 230 X10(3)/MCL (ref 130–400)
PMV BLD AUTO: 9.6 FL (ref 7.4–10.4)
POTASSIUM SERPL-SCNC: 3.6 MMOL/L (ref 3.5–5.1)
PROT SERPL-MCNC: 6.3 GM/DL (ref 6.4–8.3)
RBC # BLD AUTO: 5.06 X10(6)/MCL (ref 4.7–6.1)
SODIUM SERPL-SCNC: 141 MMOL/L (ref 136–145)
SPECIFIC GRAVITY, URINE AUTO (.000) (OHS): 1.01 (ref 1–1.03)
TROPONIN I SERPL-MCNC: 0.07 NG/ML (ref 0–0.04)
WBC # SPEC AUTO: 7.1 X10(3)/MCL (ref 4.5–11.5)

## 2022-10-31 PROCEDURE — 25000003 PHARM REV CODE 250

## 2022-10-31 PROCEDURE — 25000242 PHARM REV CODE 250 ALT 637 W/ HCPCS: Performed by: NURSE PRACTITIONER

## 2022-10-31 PROCEDURE — 80053 COMPREHEN METABOLIC PANEL: CPT | Performed by: NURSE PRACTITIONER

## 2022-10-31 PROCEDURE — 93010 ELECTROCARDIOGRAM REPORT: CPT | Mod: ,,, | Performed by: STUDENT IN AN ORGANIZED HEALTH CARE EDUCATION/TRAINING PROGRAM

## 2022-10-31 PROCEDURE — 63600175 PHARM REV CODE 636 W HCPCS

## 2022-10-31 PROCEDURE — 93005 ELECTROCARDIOGRAM TRACING: CPT

## 2022-10-31 PROCEDURE — 93010 EKG 12-LEAD: ICD-10-PCS | Mod: ,,, | Performed by: STUDENT IN AN ORGANIZED HEALTH CARE EDUCATION/TRAINING PROGRAM

## 2022-10-31 PROCEDURE — 21400001 HC TELEMETRY ROOM

## 2022-10-31 PROCEDURE — 80307 DRUG TEST PRSMV CHEM ANLYZR: CPT

## 2022-10-31 PROCEDURE — 63600175 PHARM REV CODE 636 W HCPCS: Performed by: INTERNAL MEDICINE

## 2022-10-31 PROCEDURE — 36415 COLL VENOUS BLD VENIPUNCTURE: CPT | Performed by: NURSE PRACTITIONER

## 2022-10-31 PROCEDURE — 27000221 HC OXYGEN, UP TO 24 HOURS

## 2022-10-31 PROCEDURE — 84484 ASSAY OF TROPONIN QUANT: CPT | Performed by: NURSE PRACTITIONER

## 2022-10-31 PROCEDURE — 83735 ASSAY OF MAGNESIUM: CPT | Performed by: NURSE PRACTITIONER

## 2022-10-31 PROCEDURE — 85025 COMPLETE CBC W/AUTO DIFF WBC: CPT | Performed by: NURSE PRACTITIONER

## 2022-10-31 PROCEDURE — 63600175 PHARM REV CODE 636 W HCPCS: Performed by: NURSE PRACTITIONER

## 2022-10-31 PROCEDURE — 94640 AIRWAY INHALATION TREATMENT: CPT

## 2022-10-31 PROCEDURE — 25000003 PHARM REV CODE 250: Performed by: INTERNAL MEDICINE

## 2022-10-31 PROCEDURE — 94761 N-INVAS EAR/PLS OXIMETRY MLT: CPT

## 2022-10-31 RX ORDER — IPRATROPIUM BROMIDE AND ALBUTEROL SULFATE 2.5; .5 MG/3ML; MG/3ML
3 SOLUTION RESPIRATORY (INHALATION) EVERY 4 HOURS PRN
Status: DISCONTINUED | OUTPATIENT
Start: 2022-10-31 | End: 2022-10-31

## 2022-10-31 RX ORDER — FUROSEMIDE 10 MG/ML
40 INJECTION INTRAMUSCULAR; INTRAVENOUS
Status: DISCONTINUED | OUTPATIENT
Start: 2022-10-31 | End: 2022-11-02

## 2022-10-31 RX ORDER — NITROGLYCERIN 0.4 MG/1
0.4 TABLET SUBLINGUAL EVERY 5 MIN PRN
Status: DISCONTINUED | OUTPATIENT
Start: 2022-10-31 | End: 2022-11-04 | Stop reason: HOSPADM

## 2022-10-31 RX ORDER — CARVEDILOL 12.5 MG/1
25 TABLET ORAL 2 TIMES DAILY
Status: DISCONTINUED | OUTPATIENT
Start: 2022-10-31 | End: 2022-11-04 | Stop reason: HOSPADM

## 2022-10-31 RX ORDER — LEVALBUTEROL INHALATION SOLUTION 1.25 MG/3ML
1.25 SOLUTION RESPIRATORY (INHALATION) EVERY 6 HOURS PRN
Status: DISCONTINUED | OUTPATIENT
Start: 2022-10-31 | End: 2022-11-04 | Stop reason: HOSPADM

## 2022-10-31 RX ADMIN — LEVETIRACETAM 500 MG: 500 TABLET, FILM COATED ORAL at 09:10

## 2022-10-31 RX ADMIN — LEVALBUTEROL HYDROCHLORIDE 1.25 MG: 1.25 SOLUTION, CONCENTRATE RESPIRATORY (INHALATION) at 08:10

## 2022-10-31 RX ADMIN — CARVEDILOL 25 MG: 12.5 TABLET, FILM COATED ORAL at 09:10

## 2022-10-31 RX ADMIN — OXCARBAZEPINE 300 MG: 300 TABLET, FILM COATED ORAL at 09:10

## 2022-10-31 RX ADMIN — IPRATROPIUM BROMIDE AND ALBUTEROL SULFATE 3 ML: 2.5; .5 SOLUTION RESPIRATORY (INHALATION) at 04:10

## 2022-10-31 RX ADMIN — FUROSEMIDE 40 MG: 10 INJECTION INTRAMUSCULAR; INTRAVENOUS at 09:10

## 2022-10-31 RX ADMIN — OXCARBAZEPINE 300 MG: 300 TABLET, FILM COATED ORAL at 03:10

## 2022-10-31 RX ADMIN — LEVALBUTEROL HYDROCHLORIDE 1.25 MG: 1.25 SOLUTION, CONCENTRATE RESPIRATORY (INHALATION) at 12:10

## 2022-10-31 RX ADMIN — ENOXAPARIN SODIUM 70 MG: 80 INJECTION SUBCUTANEOUS at 09:10

## 2022-10-31 RX ADMIN — ATORVASTATIN CALCIUM 80 MG: 40 TABLET, FILM COATED ORAL at 05:10

## 2022-10-31 RX ADMIN — TRAZODONE HYDROCHLORIDE 100 MG: 50 TABLET ORAL at 09:10

## 2022-10-31 RX ADMIN — CARVEDILOL 12.5 MG: 12.5 TABLET, FILM COATED ORAL at 09:10

## 2022-10-31 RX ADMIN — FUROSEMIDE 80 MG: 10 INJECTION, SOLUTION INTRAMUSCULAR; INTRAVENOUS at 08:10

## 2022-10-31 RX ADMIN — ARIPIPRAZOLE 10 MG: 5 TABLET ORAL at 09:10

## 2022-10-31 RX ADMIN — FAMOTIDINE 20 MG: 20 TABLET ORAL at 09:10

## 2022-10-31 RX ADMIN — ASPIRIN 81 MG CHEWABLE TABLET 81 MG: 81 TABLET CHEWABLE at 09:10

## 2022-10-31 NOTE — PROGRESS NOTES
Hospital Medicine  Progress Note    Patient Name: Kendall Duff  MRN: [id number]  Status: IP- Inpatient   Admission Date: 10/27/2022  Length of Stay: 4      CC: hospital follow-up for ADHF       SUBJECTIVE    57-year-old male with a history that includes ICMO and bipolar/schizoaffective disorder, recently discharged from the psych facility, presented to ED with shortness of breath, wheezing, diffuse swelling, and mild chest pain.  Evaluation in ED, noted EKG with A.fib/flutter, slightly elevated troponin.  Chest x-ray with pulmonary edema.  Patient was admitted to hospitalist service and cardiology was consulted.  Patient treated with IV diuresis and rate control.  Started on FD anticoagulation.  Echo noted EF 20% with severe LV hypokinesis, severe left atrial enlargement.    Today: Patient seen and examined at bedside, and chart reviewed.  Oxygenating well on room air.  Renal function bumped slightly today.      MEDICATIONS   Scheduled   ARIPiprazole  10 mg Oral Daily    aspirin  81 mg Oral Daily    atorvastatin  80 mg Oral Daily    carvediloL  25 mg Oral BID    enoxparin  1 mg/kg Subcutaneous Q12H    famotidine  20 mg Oral BID    furosemide (LASIX) injection  40 mg Intravenous Q12H    levETIRAcetam  500 mg Oral BID    OXcarbazepine  300 mg Per OG tube TID    traZODone  100 mg Oral QHS     Continuous Infusions        PHYSICAL EXAM   VITALS: T 98.8 °F (37.1 °C)   BP (!) 120/92   P 109   RR 18   O2 (!) 93 %    GENERAL: Awake and in NAD  LUNGS: CTA anteriorly, but decreased air movement  CVS: Tachycardic  GI/: Soft, NT, bowel sounds positive.  EXTREMITIES: No peripheral edema  NEURO: AAOx3  PSYCH: Cooperative      LABS   CBC  Recent Labs     10/31/22  0409   WBC 7.1   HGB 11.7*   HCT 37.0*         CHEM  Recent Labs     10/31/22  0409      K 3.6   MG 1.90   CO2 29   BUN 30.6*   CREATININE 1.31*   CALCIUM 9.0   BILITOT 0.7   AST 26   ALT 21   ALKPHOS 95   ALBUMIN 3.3*       ASSESSMENT   Acute decompensated  systolic heart failure  New onset A.fib/flutter, CHADS-VASc 3  NSTEMI, suspect type II  h/o CAD s/p CABG in April  ANA  h/o RUE DVT  Schizoaffective disorder  Tobacco use  Seizure d/o    PLAN   Continue IV diuresis, scaled back to 40 BID  Continue to monitor I&Os, daily renal function and electrolytes  Give additional 40 PO KCl today, repeat BMP/Mg in AM  GDMT per cardiology  Carvedilol increased, continue to monitor rate  Can de-escalate Xopenex to as needed, discontinue Albuterol  Continue FD Lovenox, plan for NOAC on discharge      Prophylaxis: AC as above        Cordell Robbins MD  [facility]      Critical Care Time: 32 minutes spent in direct hands on care, review of labs, imaging and medical record, and discussion of diagnosis, treatment, prognosis with patient/family.  Patient remains at high-risk for clinical decompensation  Critical Care diagnosis: ADHF

## 2022-10-31 NOTE — PROGRESS NOTES
[facility]  Cardiology  Progress Note    Patient Name: Kendall Duff  MRN: [id number]  Admission Date: 10/27/2022  Hospital Length of Stay: 4 days  Code Status: Prior   Attending Physician: Lance Alvarez MD   Primary Care Physician: Primary Doctor No  Expected Discharge Date:   Principal Problem:CHF exacerbation    Subjective:     Brief HPI:   Mr. Duff is a 57 year old male who is known to [facility], Dr. Wright. He presents to the ED after being discharged from a psych facility with complaints of body swelling, urinary incontinence, & wheezing. He also reports that he had some mild chest pain, but it resolved with diuresis. Significant lab work includes B/Cr 41.6/1.39, BNP 3280.2, & trop 0.064. A CXR was obtained and demonstrates volume overload. It is also reported that the patient went into afib RVR/aflutter. [facility] has been consulted to further manage his CHF exacerbation and heart rate.     Hospital Course:   10.30.22: -770 fluid balance. Patient is still having c/o SOB.  10.31.22: Voided 2730 mL/ 24 hours. SOB is improving. Denies CP or palps.     PMH: CAD, ICMO, HTN, schizoaffective disorder, hep C, DVT, systolic & diastolic CHF  PSH: CABG  Family History: Mother - CA; Father - liver disease   Social History: Current every day smoker. Reports last use of cocaine (7 months ago). Denies alcohol use.      Previous Cardiac Diagnostics:   Echo 10.28.22  The left ventricle is moderately enlarged with mild eccentric hypertrophy and severely decreased systolic function.  The estimated ejection fraction is 20%.  There is severe left ventricular global hypokinesis.  Grade II left ventricular diastolic dysfunction.  Normal right ventricular size with moderately reduced right ventricular systolic function.  Moderate mitral regurgitation.  Moderate tricuspid regurgitation.  There is mild pulmonary hypertension.  The estimated PA systolic pressure is 44 mmHg.  Elevated central venous  pressure (15 mmHg).  Severe left atrial enlargement    CABG x 4 4/22:  LIMA-LAD, SVG-OM1, SVG-D1, SVG-PDA     TTE 6.16.22:  The left ventricle is normal in size w/ concentric hypertrophy and severely decreased systolic function.  The estimated EF is 25-30%.  There is severe left ventricular global hypokinesis.  Grade II left ventricular diastolic dysfunction.  Normal right ventricular size w/ mildly reduced right ventricular systolic function.  The estimated PA systolic pressure is 52 mmHg.  There is moderate pulmonary HTN.  Elevated CVP (15 mmHg).     RUE NIVA 5.10.22:  A deep vein thrombosis was identified in the right axillary and brachial veins. A superficial thrombosis was identified in the right basilic vein.     TTE 3.21.22:  Ejection fraction is visually estimated at 35%.   Akinetic motion of the apical anteroseptal wall noted in the left ventricle; concurrent w/ MI.  Mild to moderate mitral regurgitation is present.      LHC 3.21.22:  100% LAD to 30-40% residual stenosis post PTCA.  Large diagonal system w/ severe stenosis.  CIRC - patent stent, OM1 patent, mild CIRC occluded  RCA - stents ISR 40-50%, distal 70-80%  EDP 12 EF 35-40% anterior HK  Review of Systems   Cardiovascular:  Negative for chest pain and palpitations.   Respiratory:  Positive for cough and shortness of breath.      Objective:     Vital Signs (Most Recent):  Temp: 97.9 °F (36.6 °C) (10/31/22 1156)  Pulse: 90 (10/31/22 1156)  Resp: 18 (10/31/22 1156)  BP: 123/83 (10/31/22 1156)  SpO2: (!) 93 % (10/31/22 1156) Vital Signs (24h Range):  Temp:  [97.3 °F (36.3 °C)-98.6 °F (37 °C)] 97.9 °F (36.6 °C)  Pulse:  [] 90  Resp:  [17-25] 18  SpO2:  [91 %-100 %] 93 %  BP: (118-132)/(81-97) 123/83     Weight: 70.6 kg (155 lb 10.3 oz)  Body mass index is 24.38 kg/m².    SpO2: (!) 93 %  O2 Device (Oxygen Therapy): nasal cannula      Intake/Output Summary (Last 24 hours) at 10/31/2022 1306  Last data filed at 10/31/2022 0335  Gross per 24 hour    Intake 120 ml   Output 1800 ml   Net -1680 ml         Lines/Drains/Airways       Peripheral Intravenous Line  Duration                  Peripheral IV - Single Lumen 10/31/22 0354 20 G Anterior;Right Upper Arm <1 day                    Significant Labs:   Recent Results (from the past 72 hour(s))   CBC with Differential    Collection Time: 10/29/22  4:03 AM   Result Value Ref Range    WBC 6.8 4.5 - 11.5 x10(3)/mcL    RBC 4.82 4.70 - 6.10 x10(6)/mcL    Hgb 11.2 (L) 14.0 - 18.0 gm/dL    Hct 35.4 (L) 42.0 - 52.0 %    MCV 73.4 (L) 80.0 - 94.0 fL    MCH 23.2 (L) 27.0 - 31.0 pg    MCHC 31.6 (L) 33.0 - 36.0 mg/dL    RDW 18.1 (H) 11.5 - 17.0 %    Platelet 244 130 - 400 x10(3)/mcL    MPV 9.7 7.4 - 10.4 fL    Neut % 66.1 %    Lymph % 21.1 %    Mono % 10.4 %    Eos % 1.6 %    Basophil % 0.7 %    Lymph # 1.44 0.6 - 4.6 x10(3)/mcL    Neut # 4.5 2.1 - 9.2 x10(3)/mcL    Mono # 0.71 0.1 - 1.3 x10(3)/mcL    Eos # 0.11 0 - 0.9 x10(3)/mcL    Baso # 0.05 0 - 0.2 x10(3)/mcL    IG# 0.01 0 - 0.04 x10(3)/mcL    IG% 0.1 %    NRBC% 0.0 %   Comprehensive Metabolic Panel    Collection Time: 10/29/22  4:04 AM   Result Value Ref Range    Sodium Level 140 136 - 145 mmol/L    Potassium Level 4.2 3.5 - 5.1 mmol/L    Chloride 105 98 - 107 mmol/L    Carbon Dioxide 27 22 - 29 mmol/L    Glucose Level 117 (H) 74 - 100 mg/dL    Blood Urea Nitrogen 34.7 (H) 8.4 - 25.7 mg/dL    Creatinine 1.20 (H) 0.73 - 1.18 mg/dL    Calcium Level Total 8.9 8.4 - 10.2 mg/dL    Protein Total 6.3 (L) 6.4 - 8.3 gm/dL    Albumin Level 3.2 (L) 3.5 - 5.0 gm/dL    Globulin 3.1 2.4 - 3.5 gm/dL    Albumin/Globulin Ratio 1.0 (L) 1.1 - 2.0 ratio    Bilirubin Total 0.6 <=1.5 mg/dL    Alkaline Phosphatase 92 40 - 150 unit/L    Alanine Aminotransferase 22 0 - 55 unit/L    Aspartate Aminotransferase 26 5 - 34 unit/L    eGFR >60 mls/min/1.73/m2   Basic Metabolic Panel    Collection Time: 10/30/22  6:27 AM   Result Value Ref Range    Sodium Level 140 136 - 145 mmol/L    Potassium Level  3.4 (L) 3.5 - 5.1 mmol/L    Chloride 103 98 - 107 mmol/L    Carbon Dioxide 29 22 - 29 mmol/L    Glucose Level 115 (H) 74 - 100 mg/dL    Blood Urea Nitrogen 32.5 (H) 8.4 - 25.7 mg/dL    Creatinine 1.18 0.73 - 1.18 mg/dL    BUN/Creatinine Ratio 28     Calcium Level Total 8.8 8.4 - 10.2 mg/dL    Anion Gap 8.0 mEq/L    eGFR >60 mls/min/1.73/m2   Magnesium    Collection Time: 10/30/22  6:27 AM   Result Value Ref Range    Magnesium Level 1.90 1.60 - 2.60 mg/dL   CBC with Differential    Collection Time: 10/30/22  6:27 AM   Result Value Ref Range    WBC 7.0 4.5 - 11.5 x10(3)/mcL    RBC 4.71 4.70 - 6.10 x10(6)/mcL    Hgb 10.8 (L) 14.0 - 18.0 gm/dL    Hct 33.9 (L) 42.0 - 52.0 %    MCV 72.0 (L) 80.0 - 94.0 fL    MCH 22.9 (L) 27.0 - 31.0 pg    MCHC 31.9 (L) 33.0 - 36.0 mg/dL    RDW 18.0 (H) 11.5 - 17.0 %    Platelet 231 130 - 400 x10(3)/mcL    MPV 9.2 7.4 - 10.4 fL    Neut % 65.2 %    Lymph % 20.2 %    Mono % 11.6 %    Eos % 1.8 %    Basophil % 0.6 %    Lymph # 1.42 0.6 - 4.6 x10(3)/mcL    Neut # 4.6 2.1 - 9.2 x10(3)/mcL    Mono # 0.82 0.1 - 1.3 x10(3)/mcL    Eos # 0.13 0 - 0.9 x10(3)/mcL    Baso # 0.04 0 - 0.2 x10(3)/mcL    IG# 0.04 0 - 0.04 x10(3)/mcL    IG% 0.6 %    NRBC% 0.0 %   Magnesium    Collection Time: 10/31/22  4:09 AM   Result Value Ref Range    Magnesium Level 1.90 1.60 - 2.60 mg/dL   Comprehensive Metabolic Panel    Collection Time: 10/31/22  4:09 AM   Result Value Ref Range    Sodium Level 141 136 - 145 mmol/L    Potassium Level 3.6 3.5 - 5.1 mmol/L    Chloride 98 98 - 107 mmol/L    Carbon Dioxide 29 22 - 29 mmol/L    Glucose Level 103 (H) 74 - 100 mg/dL    Blood Urea Nitrogen 30.6 (H) 8.4 - 25.7 mg/dL    Creatinine 1.31 (H) 0.73 - 1.18 mg/dL    Calcium Level Total 9.0 8.4 - 10.2 mg/dL    Protein Total 6.3 (L) 6.4 - 8.3 gm/dL    Albumin Level 3.3 (L) 3.5 - 5.0 gm/dL    Globulin 3.0 2.4 - 3.5 gm/dL    Albumin/Globulin Ratio 1.1 1.1 - 2.0 ratio    Bilirubin Total 0.7 <=1.5 mg/dL    Alkaline Phosphatase 95 40 - 150  unit/L    Alanine Aminotransferase 21 0 - 55 unit/L    Aspartate Aminotransferase 26 5 - 34 unit/L    eGFR >60 mls/min/1.73/m2   CBC with Differential    Collection Time: 10/31/22  4:09 AM   Result Value Ref Range    WBC 7.1 4.5 - 11.5 x10(3)/mcL    RBC 5.06 4.70 - 6.10 x10(6)/mcL    Hgb 11.7 (L) 14.0 - 18.0 gm/dL    Hct 37.0 (L) 42.0 - 52.0 %    MCV 73.1 (L) 80.0 - 94.0 fL    MCH 23.1 (L) 27.0 - 31.0 pg    MCHC 31.6 (L) 33.0 - 36.0 mg/dL    RDW 18.6 (H) 11.5 - 17.0 %    Platelet 230 130 - 400 x10(3)/mcL    MPV 9.6 7.4 - 10.4 fL    Neut % 66.2 %    Lymph % 19.1 %    Mono % 11.6 %    Eos % 2.1 %    Basophil % 0.7 %    Lymph # 1.35 0.6 - 4.6 x10(3)/mcL    Neut # 4.7 2.1 - 9.2 x10(3)/mcL    Mono # 0.82 0.1 - 1.3 x10(3)/mcL    Eos # 0.15 0 - 0.9 x10(3)/mcL    Baso # 0.05 0 - 0.2 x10(3)/mcL    IG# 0.02 0 - 0.04 x10(3)/mcL    IG% 0.3 %    NRBC% 0.0 %   Troponin I    Collection Time: 10/31/22  4:09 AM   Result Value Ref Range    Troponin-I 0.074 (H) 0.000 - 0.045 ng/mL   Drug Screen, Urine    Collection Time: 10/31/22 10:28 AM   Result Value Ref Range    Amphetamines, Urine Negative Negative    Barbituates, Urine Negative Negative    Benzodiazepine, Urine Negative Negative    Cannabinoids, Urine Negative Negative    Cocaine, Urine Negative Negative    Fentanyl, Urine Negative Negative    MDMA, Urine Negative Negative    Opiates, Urine Negative Negative    Phencyclidine, Urine Negative Negative    pH, Urine 7.5 3.0 - 11.0    Specific Gravity, Urine Auto 1.008 1.001 - 1.035       Telemetry:  Atrial Flutter    Physical Exam  Vitals reviewed.   Constitutional:       Appearance: Normal appearance.   Cardiovascular:      Rate and Rhythm: Tachycardia present. Rhythm irregular.   Pulmonary:      Breath sounds: Wheezing present.   Abdominal:      General: Bowel sounds are normal.      Palpations: Abdomen is soft.   Musculoskeletal:         General: Normal range of motion.   Skin:     General: Skin is warm and dry.      Capillary  Refill: Capillary refill takes less than 2 seconds.   Neurological:      Mental Status: He is alert and oriented to person, place, and time.       Current Inpatient Medications:    Current Facility-Administered Medications:     albuterol-ipratropium 2.5 mg-0.5 mg/3 mL nebulizer solution 3 mL, 3 mL, Nebulization, Q4H PRN, Amelia Bone, AGACNP-BC, 3 mL at 10/31/22 0404    ARIPiprazole tablet 10 mg, 10 mg, Oral, Daily, Arnold Coy MD, 10 mg at 10/31/22 0917    aspirin chewable tablet 81 mg, 81 mg, Oral, Daily, Arnold Coy MD, 81 mg at 10/31/22 0917    atorvastatin tablet 80 mg, 80 mg, Oral, Daily, Arnold Coy MD, 80 mg at 10/30/22 0823    benztropine tablet 1 mg, 1 mg, Oral, BID PRN, Arnold Coy MD, 1 mg at 10/29/22 2021    carvediloL tablet 12.5 mg, 12.5 mg, Oral, BID, RAY Chilel, 12.5 mg at 10/31/22 0918    enoxaparin injection 70 mg, 1 mg/kg, Subcutaneous, Q12H, Lance Alvarez MD, 70 mg at 10/31/22 0917    famotidine tablet 20 mg, 20 mg, Oral, BID, Arnold Coy MD, 20 mg at 10/31/22 0917    furosemide injection 40 mg, 40 mg, Intravenous, Q12H, RAY Chilel    levalbuterol nebulizer solution 1.25 mg, 1.25 mg, Nebulization, Q8H, RAY Doe, 1.25 mg at 10/31/22 0837    levETIRAcetam tablet 500 mg, 500 mg, Oral, BID, Arnold Coy MD, 500 mg at 10/31/22 0917    metoprolol injection 5 mg, 5 mg, Intravenous, Q5 Min PRN, ASIA LewisCNP-BC, 5 mg at 10/28/22 0101    metoprolol injection 5 mg, 5 mg, Intravenous, Q1H PRN, Rashard Mejia, NP, 5 mg at 10/28/22 0537    nitroGLYCERIN SL tablet 0.4 mg, 0.4 mg, Sublingual, Q5 Min PRN, Amelia Bone, [facility]-BC    OXcarbazepine tablet 300 mg, 300 mg, Per OG tube, TID, Arnold Coy MD, 300 mg at 10/31/22 0917    traZODone tablet 100 mg, 100 mg, Oral, QHS, Arnold Coy MD, 100 mg at 10/30/22 2100    VTE Risk Mitigation (From admission, onward)           Ordered     enoxaparin injection  70 mg  Every 12 hours (non-standard times)         10/28/22 0050                    Assessment:   Acute on chronic systolic & diastolic CHF    - BNP 3280.2    - Grade 2 DD    - EF 20% (echo 10.28.22)    - ICMO   VHD    -Mod MR and Mod TR  Pulmonary HTN-mild  New onset aflutter/afib w/ variable block - CVR - no documented history    - CHADSVASC Score 3 Points   NSTEMI - suspect type 2 s/t CHF exacerbation    - troponin 0.062, 0.064, 0.063  CAD    - CABG 4/22: LIMA-LAD, SVG-OM1, SVG-D1, SVG-PDA  ANA - improving   Hx of RUE DVT     - On Eliquis outpatient   Schizoaffective Disorder  Hx of Hep C  Hx of cocaine abuse  Tobacco use      Plan:   Continue FD lovenox for VTE prophylaxis in the setting of new afib/aflutter. Will resume eliquis 5 mg BID prior to discharge.   HR remains elevated. Increase to Coreg 25 mg BID.  Continue GDMT for CAD & CHF.   Hold ARB s/t ANA  Renal indices slightly elevated. Decrease to Lasix to 40mg IVP BID.  Discussed lifevest with the patient, and he agrees to using it. Case management consulted.   Ensure accurate I&O's & daily weights.   Xopenex nebs q 8 hours for wheezing  Labs in am: CBC, CMP, & Mg.        RAY Chilel  Cardiology  [facility]  10/31/2022

## 2022-10-31 NOTE — PROGRESS NOTES
10/31/22 1354   Discharge Assessment   Assessment Type Discharge Planning Assessment   Source of Information patient   Communicated TRACIE with patient/caregiver Date not available/Unable to determine   Reason For Admission CHF exacerbation   Lives With child(karo), adult   Do you expect to return to your current living situation? Yes   Do you have help at home or someone to help you manage your care at home? Yes   Who are your caregiver(s) and their phone number(s)? daughter   Prior to hospitilization cognitive status: Alert/Oriented   Current cognitive status: Alert/Oriented   Walking or Climbing Stairs Difficulty none   Dressing/Bathing Difficulty none   Equipment Currently Used at Home none   Readmission within 30 days? No   Patient currently being followed by outpatient case management? No   Do you currently have service(s) that help you manage your care at home? No   Do you take prescription medications? Yes   Do you have prescription coverage? Yes   Do you have any problems affording any of your prescribed medications? No   Is the patient taking medications as prescribed? yes   Who is going to help you get home at discharge? family   How do you get to doctors appointments? family or friend will provide   Are you on dialysis? No   Do you take coumadin? No   Discharge Plan A Home with family   Discharge Plan B Home   DME Needed Upon Discharge  none   Discharge Plan discussed with: Patient   Discharge Barriers Identified None

## 2022-11-01 LAB
ANION GAP SERPL CALC-SCNC: 10 MEQ/L
BUN SERPL-MCNC: 29.7 MG/DL (ref 8.4–25.7)
CALCIUM SERPL-MCNC: 8.9 MG/DL (ref 8.4–10.2)
CHLORIDE SERPL-SCNC: 97 MMOL/L (ref 98–107)
CO2 SERPL-SCNC: 33 MMOL/L (ref 22–29)
CREAT SERPL-MCNC: 1.07 MG/DL (ref 0.73–1.18)
CREAT/UREA NIT SERPL: 28
GFR SERPLBLD CREATININE-BSD FMLA CKD-EPI: >60 MLS/MIN/1.73/M2
GLUCOSE SERPL-MCNC: 115 MG/DL (ref 74–100)
MAGNESIUM SERPL-MCNC: 1.9 MG/DL (ref 1.6–2.6)
POTASSIUM SERPL-SCNC: 3.3 MMOL/L (ref 3.5–5.1)
SODIUM SERPL-SCNC: 140 MMOL/L (ref 136–145)

## 2022-11-01 PROCEDURE — 80048 BASIC METABOLIC PNL TOTAL CA: CPT | Performed by: INTERNAL MEDICINE

## 2022-11-01 PROCEDURE — 99900035 HC TECH TIME PER 15 MIN (STAT)

## 2022-11-01 PROCEDURE — 25000003 PHARM REV CODE 250: Performed by: INTERNAL MEDICINE

## 2022-11-01 PROCEDURE — 25000003 PHARM REV CODE 250

## 2022-11-01 PROCEDURE — 25000242 PHARM REV CODE 250 ALT 637 W/ HCPCS: Performed by: INTERNAL MEDICINE

## 2022-11-01 PROCEDURE — 36415 COLL VENOUS BLD VENIPUNCTURE: CPT | Performed by: INTERNAL MEDICINE

## 2022-11-01 PROCEDURE — 63600175 PHARM REV CODE 636 W HCPCS: Performed by: INTERNAL MEDICINE

## 2022-11-01 PROCEDURE — 83735 ASSAY OF MAGNESIUM: CPT | Performed by: INTERNAL MEDICINE

## 2022-11-01 PROCEDURE — 63600175 PHARM REV CODE 636 W HCPCS

## 2022-11-01 PROCEDURE — 94761 N-INVAS EAR/PLS OXIMETRY MLT: CPT

## 2022-11-01 PROCEDURE — 94640 AIRWAY INHALATION TREATMENT: CPT

## 2022-11-01 PROCEDURE — 21400001 HC TELEMETRY ROOM

## 2022-11-01 RX ORDER — MAGNESIUM SULFATE 1 G/100ML
1 INJECTION INTRAVENOUS ONCE
Status: COMPLETED | OUTPATIENT
Start: 2022-11-01 | End: 2022-11-01

## 2022-11-01 RX ORDER — POTASSIUM CHLORIDE 20 MEQ/1
40 TABLET, EXTENDED RELEASE ORAL 2 TIMES DAILY
Status: COMPLETED | OUTPATIENT
Start: 2022-11-01 | End: 2022-11-01

## 2022-11-01 RX ORDER — LOSARTAN POTASSIUM 25 MG/1
25 TABLET ORAL DAILY
Status: DISCONTINUED | OUTPATIENT
Start: 2022-11-02 | End: 2022-11-02

## 2022-11-01 RX ADMIN — LEVALBUTEROL HYDROCHLORIDE 1.25 MG: 1.25 SOLUTION RESPIRATORY (INHALATION) at 08:11

## 2022-11-01 RX ADMIN — TRAZODONE HYDROCHLORIDE 100 MG: 50 TABLET ORAL at 08:11

## 2022-11-01 RX ADMIN — FAMOTIDINE 20 MG: 20 TABLET ORAL at 09:11

## 2022-11-01 RX ADMIN — MAGNESIUM SULFATE IN DEXTROSE 1 G: 10 INJECTION, SOLUTION INTRAVENOUS at 04:11

## 2022-11-01 RX ADMIN — ARIPIPRAZOLE 10 MG: 5 TABLET ORAL at 09:11

## 2022-11-01 RX ADMIN — OXCARBAZEPINE 300 MG: 300 TABLET, FILM COATED ORAL at 02:11

## 2022-11-01 RX ADMIN — POTASSIUM CHLORIDE 40 MEQ: 1500 TABLET, EXTENDED RELEASE ORAL at 11:11

## 2022-11-01 RX ADMIN — POTASSIUM CHLORIDE 40 MEQ: 1500 TABLET, EXTENDED RELEASE ORAL at 08:11

## 2022-11-01 RX ADMIN — ENOXAPARIN SODIUM 70 MG: 80 INJECTION SUBCUTANEOUS at 09:11

## 2022-11-01 RX ADMIN — FUROSEMIDE 40 MG: 10 INJECTION INTRAMUSCULAR; INTRAVENOUS at 09:11

## 2022-11-01 RX ADMIN — ASPIRIN 81 MG CHEWABLE TABLET 81 MG: 81 TABLET CHEWABLE at 09:11

## 2022-11-01 RX ADMIN — LEVETIRACETAM 500 MG: 500 TABLET, FILM COATED ORAL at 09:11

## 2022-11-01 RX ADMIN — CARVEDILOL 25 MG: 12.5 TABLET, FILM COATED ORAL at 09:11

## 2022-11-01 RX ADMIN — OXCARBAZEPINE 300 MG: 300 TABLET, FILM COATED ORAL at 08:11

## 2022-11-01 RX ADMIN — FAMOTIDINE 20 MG: 20 TABLET ORAL at 08:11

## 2022-11-01 RX ADMIN — LEVETIRACETAM 500 MG: 500 TABLET, FILM COATED ORAL at 08:11

## 2022-11-01 RX ADMIN — APIXABAN 5 MG: 5 TABLET, FILM COATED ORAL at 08:11

## 2022-11-01 RX ADMIN — OXCARBAZEPINE 300 MG: 300 TABLET, FILM COATED ORAL at 09:11

## 2022-11-01 RX ADMIN — FUROSEMIDE 40 MG: 10 INJECTION INTRAMUSCULAR; INTRAVENOUS at 08:11

## 2022-11-01 RX ADMIN — CARVEDILOL 25 MG: 12.5 TABLET, FILM COATED ORAL at 08:11

## 2022-11-01 RX ADMIN — ATORVASTATIN CALCIUM 80 MG: 40 TABLET, FILM COATED ORAL at 09:11

## 2022-11-01 NOTE — CARE UPDATE
[id number] Rec call from Arleth with [facility] reporting they want to fit pt today. Arleth said pt will be dc today. She will contact pt's nurse to see when family will be there to teach pt and family

## 2022-11-01 NOTE — PROGRESS NOTES
[facility] - [facility] Tele[facility]  Cardiology  Progress Note    Patient Name: Kendall Duff  MRN: [id number]  Admission Date: 10/27/2022  Hospital Length of Stay: 5 days  Code Status: Prior   Attending Physician: Lance Alvarez MD   Primary Care Physician: Primary Doctor No  Expected Discharge Date:   Principal Problem:CHF exacerbation    Subjective:     Brief HPI:   Mr. Duff is a 57 year old male who is known to [facility], Dr. Wright. He presents to the ED after being discharged from a psych facility with complaints of body swelling, urinary incontinence, & wheezing. He also reports that he had some mild chest pain, but it resolved with diuresis. Significant lab work includes B/Cr 41.6/1.39, BNP 3280.2, & trop 0.064. A CXR was obtained and demonstrates volume overload. It is also reported that the patient went into afib RVR/aflutter. [facility] has been consulted to further manage his CHF exacerbation and heart rate.     Hospital Course:   10.30.22: -770 fluid balance. Patient is still having c/o SOB.  10.31.22: Voided 2730 mL/ 24 hours. SOB is improving. Denies CP or palps.   11.1.22: NAD. Denies CP, SOB, or palps. HR more controlled today. Aflutter in the 90's.     PMH: CAD, ICMO, HTN, schizoaffective disorder, hep C, DVT, systolic & diastolic CHF  PSH: CABG  Family History: Mother - CA; Father - liver disease   Social History: Current every day smoker. Reports last use of cocaine (7 months ago). Denies alcohol use.      Previous Cardiac Diagnostics:   Echo 10.28.22  The left ventricle is moderately enlarged with mild eccentric hypertrophy and severely decreased systolic function.  The estimated ejection fraction is 20%.  There is severe left ventricular global hypokinesis.  Grade II left ventricular diastolic dysfunction.  Normal right ventricular size with moderately reduced right ventricular systolic function.  Moderate mitral regurgitation.  Moderate tricuspid regurgitation.  There is mild pulmonary  hypertension.  The estimated PA systolic pressure is 44 mmHg.  Elevated central venous pressure (15 mmHg).  Severe left atrial enlargement    CABG x 4 4/22:  LIMA-LAD, SVG-OM1, SVG-D1, SVG-PDA     TTE 6.16.22:  The left ventricle is normal in size w/ concentric hypertrophy and severely decreased systolic function.  The estimated EF is 25-30%.  There is severe left ventricular global hypokinesis.  Grade II left ventricular diastolic dysfunction.  Normal right ventricular size w/ mildly reduced right ventricular systolic function.  The estimated PA systolic pressure is 52 mmHg.  There is moderate pulmonary HTN.  Elevated CVP (15 mmHg).     RUE NIVA 5.10.22:  A deep vein thrombosis was identified in the right axillary and brachial veins. A superficial thrombosis was identified in the right basilic vein.     TTE 3.21.22:  Ejection fraction is visually estimated at 35%.   Akinetic motion of the apical anteroseptal wall noted in the left ventricle; concurrent w/ MI.  Mild to moderate mitral regurgitation is present.      LHC 3.21.22:  100% LAD to 30-40% residual stenosis post PTCA.  Large diagonal system w/ severe stenosis.  CIRC - patent stent, OM1 patent, mild CIRC occluded  RCA - stents ISR 40-50%, distal 70-80%  EDP 12 EF 35-40% anterior HK  Review of Systems   Cardiovascular:  Negative for chest pain and palpitations.   Respiratory:  Positive for cough. Negative for shortness of breath.      Objective:     Vital Signs (Most Recent):  Temp: 97.5 °F (36.4 °C) (11/01/22 1103)  Pulse: (!) 111 (11/01/22 1103)  Resp: 18 (11/01/22 0855)  BP: (!) 136/97 (11/01/22 1103)  SpO2: 98 % (11/01/22 1103) Vital Signs (24h Range):  Temp:  [97.4 °F (36.3 °C)-98.8 °F (37.1 °C)] 97.5 °F (36.4 °C)  Pulse:  [100-112] 111  Resp:  [18] 18  SpO2:  [93 %-98 %] 98 %  BP: (113-136)/(80-97) 136/97     Weight: 68 kg (149 lb 14.4 oz)  Body mass index is 23.48 kg/m².    SpO2: 98 %  O2 Device (Oxygen Therapy): room air    No intake or output data in  the 24 hours ending 11/01/22 1513      Lines/Drains/Airways       Peripheral Intravenous Line  Duration                  Peripheral IV - Single Lumen 10/31/22 0354 20 G Anterior;Right Upper Arm 1 day                    Significant Labs:   Recent Results (from the past 72 hour(s))   Basic Metabolic Panel    Collection Time: 10/30/22  6:27 AM   Result Value Ref Range    Sodium Level 140 136 - 145 mmol/L    Potassium Level 3.4 (L) 3.5 - 5.1 mmol/L    Chloride 103 98 - 107 mmol/L    Carbon Dioxide 29 22 - 29 mmol/L    Glucose Level 115 (H) 74 - 100 mg/dL    Blood Urea Nitrogen 32.5 (H) 8.4 - 25.7 mg/dL    Creatinine 1.18 0.73 - 1.18 mg/dL    BUN/Creatinine Ratio 28     Calcium Level Total 8.8 8.4 - 10.2 mg/dL    Anion Gap 8.0 mEq/L    eGFR >60 mls/min/1.73/m2   Magnesium    Collection Time: 10/30/22  6:27 AM   Result Value Ref Range    Magnesium Level 1.90 1.60 - 2.60 mg/dL   CBC with Differential    Collection Time: 10/30/22  6:27 AM   Result Value Ref Range    WBC 7.0 4.5 - 11.5 x10(3)/mcL    RBC 4.71 4.70 - 6.10 x10(6)/mcL    Hgb 10.8 (L) 14.0 - 18.0 gm/dL    Hct 33.9 (L) 42.0 - 52.0 %    MCV 72.0 (L) 80.0 - 94.0 fL    MCH 22.9 (L) 27.0 - 31.0 pg    MCHC 31.9 (L) 33.0 - 36.0 mg/dL    RDW 18.0 (H) 11.5 - 17.0 %    Platelet 231 130 - 400 x10(3)/mcL    MPV 9.2 7.4 - 10.4 fL    Neut % 65.2 %    Lymph % 20.2 %    Mono % 11.6 %    Eos % 1.8 %    Basophil % 0.6 %    Lymph # 1.42 0.6 - 4.6 x10(3)/mcL    Neut # 4.6 2.1 - 9.2 x10(3)/mcL    Mono # 0.82 0.1 - 1.3 x10(3)/mcL    Eos # 0.13 0 - 0.9 x10(3)/mcL    Baso # 0.04 0 - 0.2 x10(3)/mcL    IG# 0.04 0 - 0.04 x10(3)/mcL    IG% 0.6 %    NRBC% 0.0 %   Magnesium    Collection Time: 10/31/22  4:09 AM   Result Value Ref Range    Magnesium Level 1.90 1.60 - 2.60 mg/dL   Comprehensive Metabolic Panel    Collection Time: 10/31/22  4:09 AM   Result Value Ref Range    Sodium Level 141 136 - 145 mmol/L    Potassium Level 3.6 3.5 - 5.1 mmol/L    Chloride 98 98 - 107 mmol/L    Carbon  Dioxide 29 22 - 29 mmol/L    Glucose Level 103 (H) 74 - 100 mg/dL    Blood Urea Nitrogen 30.6 (H) 8.4 - 25.7 mg/dL    Creatinine 1.31 (H) 0.73 - 1.18 mg/dL    Calcium Level Total 9.0 8.4 - 10.2 mg/dL    Protein Total 6.3 (L) 6.4 - 8.3 gm/dL    Albumin Level 3.3 (L) 3.5 - 5.0 gm/dL    Globulin 3.0 2.4 - 3.5 gm/dL    Albumin/Globulin Ratio 1.1 1.1 - 2.0 ratio    Bilirubin Total 0.7 <=1.5 mg/dL    Alkaline Phosphatase 95 40 - 150 unit/L    Alanine Aminotransferase 21 0 - 55 unit/L    Aspartate Aminotransferase 26 5 - 34 unit/L    eGFR >60 mls/min/1.73/m2   CBC with Differential    Collection Time: 10/31/22  4:09 AM   Result Value Ref Range    WBC 7.1 4.5 - 11.5 x10(3)/mcL    RBC 5.06 4.70 - 6.10 x10(6)/mcL    Hgb 11.7 (L) 14.0 - 18.0 gm/dL    Hct 37.0 (L) 42.0 - 52.0 %    MCV 73.1 (L) 80.0 - 94.0 fL    MCH 23.1 (L) 27.0 - 31.0 pg    MCHC 31.6 (L) 33.0 - 36.0 mg/dL    RDW 18.6 (H) 11.5 - 17.0 %    Platelet 230 130 - 400 x10(3)/mcL    MPV 9.6 7.4 - 10.4 fL    Neut % 66.2 %    Lymph % 19.1 %    Mono % 11.6 %    Eos % 2.1 %    Basophil % 0.7 %    Lymph # 1.35 0.6 - 4.6 x10(3)/mcL    Neut # 4.7 2.1 - 9.2 x10(3)/mcL    Mono # 0.82 0.1 - 1.3 x10(3)/mcL    Eos # 0.15 0 - 0.9 x10(3)/mcL    Baso # 0.05 0 - 0.2 x10(3)/mcL    IG# 0.02 0 - 0.04 x10(3)/mcL    IG% 0.3 %    NRBC% 0.0 %   Troponin I    Collection Time: 10/31/22  4:09 AM   Result Value Ref Range    Troponin-I 0.074 (H) 0.000 - 0.045 ng/mL   Drug Screen, Urine    Collection Time: 10/31/22 10:28 AM   Result Value Ref Range    Amphetamines, Urine Negative Negative    Barbituates, Urine Negative Negative    Benzodiazepine, Urine Negative Negative    Cannabinoids, Urine Negative Negative    Cocaine, Urine Negative Negative    Fentanyl, Urine Negative Negative    MDMA, Urine Negative Negative    Opiates, Urine Negative Negative    Phencyclidine, Urine Negative Negative    pH, Urine 7.5 3.0 - 11.0    Specific Gravity, Urine Auto 1.008 1.001 - 1.035   Basic Metabolic Panel     Collection Time: 11/01/22  6:17 AM   Result Value Ref Range    Sodium Level 140 136 - 145 mmol/L    Potassium Level 3.3 (L) 3.5 - 5.1 mmol/L    Chloride 97 (L) 98 - 107 mmol/L    Carbon Dioxide 33 (H) 22 - 29 mmol/L    Glucose Level 115 (H) 74 - 100 mg/dL    Blood Urea Nitrogen 29.7 (H) 8.4 - 25.7 mg/dL    Creatinine 1.07 0.73 - 1.18 mg/dL    BUN/Creatinine Ratio 28     Calcium Level Total 8.9 8.4 - 10.2 mg/dL    Anion Gap 10.0 mEq/L    eGFR >60 mls/min/1.73/m2   Magnesium    Collection Time: 11/01/22  6:17 AM   Result Value Ref Range    Magnesium Level 1.90 1.60 - 2.60 mg/dL       Telemetry:  Atrial Flutter    Physical Exam  Vitals reviewed.   Constitutional:       Appearance: Normal appearance.   HENT:      Head: Normocephalic.      Nose: Nose normal.      Mouth/Throat:      Mouth: Mucous membranes are moist.   Eyes:      Extraocular Movements: Extraocular movements intact.   Cardiovascular:      Rate and Rhythm: Normal rate. Rhythm irregular.   Pulmonary:      Effort: Pulmonary effort is normal.      Breath sounds: Wheezing present.   Abdominal:      General: Bowel sounds are normal.      Palpations: Abdomen is soft.   Musculoskeletal:         General: Normal range of motion.   Skin:     General: Skin is warm and dry.      Capillary Refill: Capillary refill takes less than 2 seconds.   Neurological:      General: No focal deficit present.      Mental Status: He is alert and oriented to person, place, and time.   Psychiatric:         Behavior: Behavior normal.       Current Inpatient Medications:    Current Facility-Administered Medications:     apixaban tablet 5 mg, 5 mg, Oral, BID, Megha Jurado, RAY    ARIPiprazole tablet 10 mg, 10 mg, Oral, Daily, Arnold Coy MD, 10 mg at 11/01/22 0901    aspirin chewable tablet 81 mg, 81 mg, Oral, Daily, Arnold Coy MD, 81 mg at 11/01/22 0901    atorvastatin tablet 80 mg, 80 mg, Oral, Daily, Arnold Coy MD, 80 mg at 11/01/22 0901    benztropine tablet 1  mg, 1 mg, Oral, BID PRN, Arnold Coy MD, 1 mg at 10/29/22 2021    carvediloL tablet 25 mg, 25 mg, Oral, BID, RAY Chilel, 25 mg at 11/01/22 0901    famotidine tablet 20 mg, 20 mg, Oral, BID, Arnold Coy MD, 20 mg at 11/01/22 0901    furosemide injection 40 mg, 40 mg, Intravenous, Q12H, WILLY ChilelP, 40 mg at 11/01/22 0901    levalbuterol nebulizer solution 1.25 mg, 1.25 mg, Nebulization, Q6H PRN, Cordell Robbins MD, 1.25 mg at 11/01/22 0855    levETIRAcetam tablet 500 mg, 500 mg, Oral, BID, Arnold Coy MD, 500 mg at 11/01/22 0901    magnesium sulfate in dextrose IVPB (premix) 1 g, 1 g, Intravenous, Once, Lance Alvarez MD    metoprolol injection 5 mg, 5 mg, Intravenous, Q5 Min PRN, MAXWELL Lewis-BC, 5 mg at 10/28/22 0101    metoprolol injection 5 mg, 5 mg, Intravenous, Q1H PRN, Rashard Mejia NP, 5 mg at 10/28/22 0537    nitroGLYCERIN SL tablet 0.4 mg, 0.4 mg, Sublingual, Q5 Min PRN, CATRACHO LewisP-BC    OXcarbazepine tablet 300 mg, 300 mg, Per OG tube, TID, Arnold Coy MD, 300 mg at 11/01/22 1447    potassium chloride SA CR tablet 40 mEq, 40 mEq, Oral, BID, Cordell Robbins MD, 40 mEq at 11/01/22 1139    traZODone tablet 100 mg, 100 mg, Oral, QHS, Arnold Coy MD, 100 mg at 10/31/22 2128    VTE Risk Mitigation (From admission, onward)           Ordered     apixaban tablet 5 mg  2 times daily         11/01/22 1512                    Assessment:   Acute on chronic systolic & diastolic CHF - improving     - BNP 3280.2    - Grade 2 DD    - EF 20% (echo 10.28.22)    - ICMO   VHD    -Mod MR and Mod TR  Pulmonary HTN-mild  New onset aflutter/afib w/ variable block - CVR - no documented history    - CHADSVASC Score 3 Points   NSTEMI - suspect type 2 s/t CHF exacerbation    - troponin 0.062, 0.064, 0.063  CAD    - CABG 4/22: LIMA-LAD, SVG-OM1, SVG-D1, SVG-PDA  ANA - improving   Hx of RUE DVT     - On Eliquis outpatient   Schizoaffective  Disorder  Hx of Hep C  Hx of cocaine abuse  Tobacco use      Plan:   Discontinue FD lovenox & resume Eliquis 5 mg BID for stroke prevention in the setting of afib/aflutter & elevated CHADSVASC.   HR improved. Continue Coreg 25 mg BID.  Continue GDMT for CAD & CHF.   Renal indices improved. Will resume losartan 25 mg daily.   Continue Lasix 40mg IVP BID.  Lifevest ordered.  Replete K & Mg.   Ensure accurate I&O's & daily weights.   Xopenex nebs q 8 hours for wheezing  If outpatient compliance is demonstrated, consider EP consult for a flutter ablation.   Labs in am: CBC, CMP, & Mg.       RAY Chilel  Cardiology  [facility] - [address] [place] [address]  11/01/2022

## 2022-11-02 LAB
ANION GAP SERPL CALC-SCNC: 13 MEQ/L
BUN SERPL-MCNC: 29.4 MG/DL (ref 8.4–25.7)
CALCIUM SERPL-MCNC: 9 MG/DL (ref 8.4–10.2)
CHLORIDE SERPL-SCNC: 99 MMOL/L (ref 98–107)
CO2 SERPL-SCNC: 25 MMOL/L (ref 22–29)
CREAT SERPL-MCNC: 1.26 MG/DL (ref 0.73–1.18)
CREAT/UREA NIT SERPL: 23
GFR SERPLBLD CREATININE-BSD FMLA CKD-EPI: >60 MLS/MIN/1.73/M2
GLUCOSE SERPL-MCNC: 122 MG/DL (ref 74–100)
MAGNESIUM SERPL-MCNC: 2.3 MG/DL (ref 1.6–2.6)
POTASSIUM SERPL-SCNC: 5.6 MMOL/L (ref 3.5–5.1)
SODIUM SERPL-SCNC: 137 MMOL/L (ref 136–145)

## 2022-11-02 PROCEDURE — 80048 BASIC METABOLIC PNL TOTAL CA: CPT | Performed by: INTERNAL MEDICINE

## 2022-11-02 PROCEDURE — 25000003 PHARM REV CODE 250: Performed by: INTERNAL MEDICINE

## 2022-11-02 PROCEDURE — 99900035 HC TECH TIME PER 15 MIN (STAT)

## 2022-11-02 PROCEDURE — 21400001 HC TELEMETRY ROOM

## 2022-11-02 PROCEDURE — 63600175 PHARM REV CODE 636 W HCPCS: Performed by: NURSE PRACTITIONER

## 2022-11-02 PROCEDURE — 25000003 PHARM REV CODE 250: Performed by: NURSE PRACTITIONER

## 2022-11-02 PROCEDURE — 83735 ASSAY OF MAGNESIUM: CPT | Performed by: INTERNAL MEDICINE

## 2022-11-02 PROCEDURE — 63600175 PHARM REV CODE 636 W HCPCS

## 2022-11-02 PROCEDURE — 94761 N-INVAS EAR/PLS OXIMETRY MLT: CPT

## 2022-11-02 PROCEDURE — 36415 COLL VENOUS BLD VENIPUNCTURE: CPT | Performed by: INTERNAL MEDICINE

## 2022-11-02 PROCEDURE — 25000003 PHARM REV CODE 250

## 2022-11-02 RX ORDER — DIGOXIN 0.25 MG/ML
500 INJECTION INTRAMUSCULAR; INTRAVENOUS ONCE
Status: COMPLETED | OUTPATIENT
Start: 2022-11-02 | End: 2022-11-02

## 2022-11-02 RX ORDER — DIGOXIN 0.25 MG/ML
250 INJECTION INTRAMUSCULAR; INTRAVENOUS EVERY 6 HOURS
Status: COMPLETED | OUTPATIENT
Start: 2022-11-02 | End: 2022-11-03

## 2022-11-02 RX ORDER — FUROSEMIDE 40 MG/1
40 TABLET ORAL DAILY
Status: DISCONTINUED | OUTPATIENT
Start: 2022-11-03 | End: 2022-11-04 | Stop reason: HOSPADM

## 2022-11-02 RX ADMIN — OXCARBAZEPINE 300 MG: 300 TABLET, FILM COATED ORAL at 09:11

## 2022-11-02 RX ADMIN — FAMOTIDINE 20 MG: 20 TABLET ORAL at 10:11

## 2022-11-02 RX ADMIN — DIGOXIN 250 MCG: 0.25 INJECTION INTRAMUSCULAR; INTRAVENOUS at 06:11

## 2022-11-02 RX ADMIN — FAMOTIDINE 20 MG: 20 TABLET ORAL at 09:11

## 2022-11-02 RX ADMIN — APIXABAN 5 MG: 5 TABLET, FILM COATED ORAL at 10:11

## 2022-11-02 RX ADMIN — CARVEDILOL 25 MG: 12.5 TABLET, FILM COATED ORAL at 09:11

## 2022-11-02 RX ADMIN — LEVETIRACETAM 500 MG: 500 TABLET, FILM COATED ORAL at 09:11

## 2022-11-02 RX ADMIN — SODIUM ZIRCONIUM CYCLOSILICATE 5 G: 5 POWDER, FOR SUSPENSION ORAL at 09:11

## 2022-11-02 RX ADMIN — APIXABAN 5 MG: 5 TABLET, FILM COATED ORAL at 09:11

## 2022-11-02 RX ADMIN — LEVETIRACETAM 500 MG: 500 TABLET, FILM COATED ORAL at 10:11

## 2022-11-02 RX ADMIN — FUROSEMIDE 40 MG: 10 INJECTION INTRAMUSCULAR; INTRAVENOUS at 09:11

## 2022-11-02 RX ADMIN — DIGOXIN 500 MCG: 0.25 INJECTION INTRAMUSCULAR; INTRAVENOUS at 10:11

## 2022-11-02 RX ADMIN — ATORVASTATIN CALCIUM 80 MG: 40 TABLET, FILM COATED ORAL at 09:11

## 2022-11-02 RX ADMIN — ARIPIPRAZOLE 10 MG: 5 TABLET ORAL at 09:11

## 2022-11-02 RX ADMIN — OXCARBAZEPINE 300 MG: 300 TABLET, FILM COATED ORAL at 10:11

## 2022-11-02 RX ADMIN — CARVEDILOL 25 MG: 12.5 TABLET, FILM COATED ORAL at 10:11

## 2022-11-02 RX ADMIN — ASPIRIN 81 MG CHEWABLE TABLET 81 MG: 81 TABLET CHEWABLE at 09:11

## 2022-11-02 RX ADMIN — OXCARBAZEPINE 300 MG: 300 TABLET, FILM COATED ORAL at 04:11

## 2022-11-02 RX ADMIN — TRAZODONE HYDROCHLORIDE 100 MG: 50 TABLET ORAL at 10:11

## 2022-11-02 NOTE — PROGRESS NOTES
[facility] - [facility]  Cardiology  Progress Note    Patient Name: Kendall Duff  MRN: [id number]  Admission Date: 10/27/2022  Hospital Length of Stay: 6 days  Code Status: Prior   Attending Physician: Lance Alvarez MD   Primary Care Physician: Primary Doctor No  Expected Discharge Date:   Principal Problem:CHF exacerbation    Subjective:     Brief HPI:   Mr. Duff is a 57 year old male who is known to [facility], Dr. Wright. He presents to the ED after being discharged from a psych facility with complaints of body swelling, urinary incontinence, & wheezing. He also reports that he had some mild chest pain, but it resolved with diuresis. Significant lab work includes B/Cr 41.6/1.39, BNP 3280.2, & trop 0.064. A CXR was obtained and demonstrates volume overload. It is also reported that the patient went into afib RVR/aflutter. [facility] has been consulted to further manage his CHF exacerbation and heart rate.     Hospital Course:   10.30.22: -770 fluid balance. Patient is still having c/o SOB.  10.31.22: Voided 2730 mL/ 24 hours. SOB is improving. Denies CP or palps.   11.1.22: NAD. Denies CP, SOB, or palps. HR more controlled today. Aflutter in the 90's.   11.2.22: Patient laying completed flat in bed. Denies SOB/CP/palpitations. Currently Afib, mildly tachycardic. Hyperkalemic today. Mg 2.30    PMH: CAD, ICMO, HTN, schizoaffective disorder, hep C, DVT, systolic & diastolic CHF  PSH: CABG  Family History: Mother - CA; Father - liver disease   Social History: Current every day smoker. Reports last use of cocaine (7 months ago). Denies alcohol use.      Previous Cardiac Diagnostics:   Echo 10.28.22  The left ventricle is moderately enlarged with mild eccentric hypertrophy and severely decreased systolic function.  The estimated ejection fraction is 20%.  There is severe left ventricular global hypokinesis.  Grade II left ventricular diastolic dysfunction.  Normal right ventricular size with  moderately reduced right ventricular systolic function.  Moderate mitral regurgitation.  Moderate tricuspid regurgitation.  There is mild pulmonary hypertension.  The estimated PA systolic pressure is 44 mmHg.  Elevated central venous pressure (15 mmHg).  Severe left atrial enlargement    CABG x 4 4/22:  LIMA-LAD, SVG-OM1, SVG-D1, SVG-PDA     TTE 6.16.22:  The left ventricle is normal in size w/ concentric hypertrophy and severely decreased systolic function.  The estimated EF is 25-30%.  There is severe left ventricular global hypokinesis.  Grade II left ventricular diastolic dysfunction.  Normal right ventricular size w/ mildly reduced right ventricular systolic function.  The estimated PA systolic pressure is 52 mmHg.  There is moderate pulmonary HTN.  Elevated CVP (15 mmHg).     RUE NIVA 5.10.22:  A deep vein thrombosis was identified in the right axillary and brachial veins. A superficial thrombosis was identified in the right basilic vein.     TTE 3.21.22:  Ejection fraction is visually estimated at 35%.   Akinetic motion of the apical anteroseptal wall noted in the left ventricle; concurrent w/ MI.  Mild to moderate mitral regurgitation is present.      LHC 3.21.22:  100% LAD to 30-40% residual stenosis post PTCA.  Large diagonal system w/ severe stenosis.  CIRC - patent stent, OM1 patent, mild CIRC occluded  RCA - stents ISR 40-50%, distal 70-80%  EDP 12 EF 35-40% anterior HK  Review of Systems   Cardiovascular:  Negative for chest pain and palpitations.   Respiratory:  Positive for cough. Negative for shortness of breath.      Objective:     Vital Signs (Most Recent):  Temp: 97.3 °F (36.3 °C) (11/02/22 0026)  Pulse: 101 (11/02/22 0026)  Resp: 18 (11/01/22 0855)  BP: 116/78 (11/02/22 0026)  SpO2: 100 % (11/02/22 0026) Vital Signs (24h Range):  Temp:  [97.3 °F (36.3 °C)-97.5 °F (36.4 °C)] 97.3 °F (36.3 °C)  Pulse:  [101-111] 101  Resp:  [18] 18  SpO2:  [97 %-100 %] 100 %  BP: (109-136)/(78-97) 116/78      Weight: 68 kg (149 lb 14.4 oz)  Body mass index is 23.48 kg/m².    SpO2: 100 %  O2 Device (Oxygen Therapy): room air      Intake/Output Summary (Last 24 hours) at 11/2/2022 0745  Last data filed at 11/2/2022 0027  Gross per 24 hour   Intake --   Output 551 ml   Net -551 ml         Lines/Drains/Airways       Peripheral Intravenous Line  Duration                  Peripheral IV - Single Lumen 10/31/22 0354 20 G Anterior;Right Upper Arm 2 days                    Significant Labs:   Recent Results (from the past 24 hour(s))   Magnesium    Collection Time: 11/02/22  4:05 AM   Result Value Ref Range    Magnesium Level 2.30 1.60 - 2.60 mg/dL   Basic Metabolic Panel    Collection Time: 11/02/22  4:05 AM   Result Value Ref Range    Sodium Level 137 136 - 145 mmol/L    Potassium Level 5.6 (H) 3.5 - 5.1 mmol/L    Chloride 99 98 - 107 mmol/L    Carbon Dioxide 25 22 - 29 mmol/L    Glucose Level 122 (H) 74 - 100 mg/dL    Blood Urea Nitrogen 29.4 (H) 8.4 - 25.7 mg/dL    Creatinine 1.26 (H) 0.73 - 1.18 mg/dL    BUN/Creatinine Ratio 23     Calcium Level Total 9.0 8.4 - 10.2 mg/dL    Anion Gap 13.0 mEq/L    eGFR >60 mls/min/1.73/m2       Telemetry:  Atrial Flutter    Physical Exam  Vitals reviewed.   Constitutional:       Appearance: Normal appearance.   HENT:      Head: Normocephalic.      Nose: Nose normal.      Mouth/Throat:      Mouth: Mucous membranes are moist.   Eyes:      Extraocular Movements: Extraocular movements intact.   Cardiovascular:      Rate and Rhythm: Normal rate. Rhythm irregular.   Pulmonary:      Effort: Pulmonary effort is normal.      Breath sounds: Wheezing present.   Abdominal:      General: Bowel sounds are normal.      Palpations: Abdomen is soft.   Musculoskeletal:         General: Normal range of motion.   Skin:     General: Skin is warm and dry.      Capillary Refill: Capillary refill takes less than 2 seconds.   Neurological:      General: No focal deficit present.      Mental Status: He is alert  and oriented to person, place, and time.   Psychiatric:         Behavior: Behavior normal.       Current Inpatient Medications:    Current Facility-Administered Medications:     apixaban tablet 5 mg, 5 mg, Oral, BID, RAY Chilel, 5 mg at 11/01/22 2050    ARIPiprazole tablet 10 mg, 10 mg, Oral, Daily, Arnold Coy MD, 10 mg at 11/01/22 0901    aspirin chewable tablet 81 mg, 81 mg, Oral, Daily, Arnold Coy MD, 81 mg at 11/01/22 0901    atorvastatin tablet 80 mg, 80 mg, Oral, Daily, Arnold Coy MD, 80 mg at 11/01/22 0901    benztropine tablet 1 mg, 1 mg, Oral, BID PRN, Arnold Coy MD, 1 mg at 10/29/22 2021    carvediloL tablet 25 mg, 25 mg, Oral, BID, RAY Chilel, 25 mg at 11/01/22 2042    famotidine tablet 20 mg, 20 mg, Oral, BID, Arnold Coy MD, 20 mg at 11/01/22 2042    furosemide injection 40 mg, 40 mg, Intravenous, Q12H, RAY hCilel, 40 mg at 11/01/22 2042    levalbuterol nebulizer solution 1.25 mg, 1.25 mg, Nebulization, Q6H PRN, Cordell Robbins MD, 1.25 mg at 11/01/22 0855    levETIRAcetam tablet 500 mg, 500 mg, Oral, BID, Arnold Coy MD, 500 mg at 11/01/22 2041    losartan tablet 25 mg, 25 mg, Oral, Daily, RAY Chilel    metoprolol injection 5 mg, 5 mg, Intravenous, Q5 Min PRN, MAXWELL Lewis-BC, 5 mg at 10/28/22 0101    metoprolol injection 5 mg, 5 mg, Intravenous, Q1H PRN, Rashard Mejia NP, 5 mg at 10/28/22 0537    nitroGLYCERIN SL tablet 0.4 mg, 0.4 mg, Sublingual, Q5 Min PRN, MAXWELL Lewis-BC    OXcarbazepine tablet 300 mg, 300 mg, Per OG tube, TID, Arnold Coy MD, 300 mg at 11/01/22 2042    traZODone tablet 100 mg, 100 mg, Oral, QHS, Arnold Coy MD, 100 mg at 11/01/22 2042    VTE Risk Mitigation (From admission, onward)           Ordered     apixaban tablet 5 mg  2 times daily         11/01/22 1512                    Assessment:   Acute on chronic systolic & diastolic CHF - improving      - BNP 3280.2    - Grade 2 DD    - EF 20% (echo 10.28.22)  ICMO   --life vest ordered  VHD    -Mod MR and Mod TR  Pulmonary HTN-mild  New onset aflutter/afib w/ variable block - CVR - no documented history    - CHADSVASC Score 3 Points   NSTEMI - suspect type 2 s/t CHF exacerbation    - troponin 0.062, 0.064, 0.063  Native CAD    - CABG 4/22: LIMA-LAD, SVG-OM1, SVG-D1, SVG-PDA  ANA -  --creatinine bumped  Hx of RUE DVT     - On Eliquis outpatient   Schizoaffective Disorder  Hx of Hep C  Hx of cocaine abuse  Tobacco use      Plan:   Symptomatically improving. DC IV lasix. Start Lasix 40 mg po daily. Continue coreg 25 mg bid. DC losartan due to hyperkalemia. Give dose of lokelma now. Start Hydralazine 25 mg bid in am if bp allows.   Ensure accurate I&O's & daily weights.   Currently A-fib, mildly tachycardic. Continue coreg 25 mg bid. Add digoxin load to attain better HR control. Continue Eliquis 5 mg BID for stroke prevention in the setting of afib/aflutter & elevated CHADSVASC.   Xopenex nebs q 8 hours for wheezing  If outpatient compliance is demonstrated, consider EP consult for a flutter ablation.   Labs in am: CBC, CMP, & Mg.       RAY Patel  Cardiology  [facility] - [address] [place] [address]  11/02/2022

## 2022-11-02 NOTE — PROGRESS NOTES
Hospital Medicine  Progress Note    Patient Name: Kendall Duff  MRN: [id number]  Status: IP- Inpatient   Admission Date: 10/27/2022  Length of Stay: 6      CC: hospital follow-up for ADHF       SUBJECTIVE    57-year-old male with a history that includes ICMO and bipolar/schizoaffective disorder, recently discharged from the psych facility, presented to ED with shortness of breath, wheezing, diffuse swelling, and mild chest pain.  Evaluation in ED, noted EKG with A.fib/flutter, slightly elevated troponin.  Chest x-ray with pulmonary edema.  Patient was admitted to hospitalist service and cardiology was consulted.  Patient treated with IV diuresis and rate control.  Started on FD anticoagulation.  Echo noted EF 20% with severe LV hypokinesis, severe left atrial enlargement.    Today: Patient seen and examined at bedside, and chart reviewed.  Remains stable on room air, transitioned to oral Lasix.  However remains in mild RVR.      MEDICATIONS   Scheduled   apixaban  5 mg Oral BID    ARIPiprazole  10 mg Oral Daily    aspirin  81 mg Oral Daily    atorvastatin  80 mg Oral Daily    carvediloL  25 mg Oral BID    digoxin  250 mcg Intravenous Q6H    famotidine  20 mg Oral BID    [START ON 11/3/2022] furosemide  40 mg Oral Daily    levETIRAcetam  500 mg Oral BID    OXcarbazepine  300 mg Per OG tube TID    traZODone  100 mg Oral QHS     Continuous Infusions  None      PHYSICAL EXAM   VITALS: T 97.8 °F (36.6 °C)   /80   P 97   RR 17   O2 99 %    GENERAL: Awake and in NAD  LUNGS: CTA anteriorly, but decreased air movement  CVS: Tachycardic  GI/: Soft, NT, bowel sounds positive.  EXTREMITIES: No peripheral edema  NEURO: AAOx3  PSYCH: Cooperative      LABS   CBC  Recent Labs     10/31/22  0409   WBC 7.1   HGB 11.7*   HCT 37.0*           CHEM  Recent Labs     10/31/22  0409 11/01/22  0617 11/02/22  0405      < > 137   K 3.6   < > 5.6*   MG 1.90   < > 2.30   CO2 29   < > 25   BUN 30.6*   < > 29.4*   CREATININE  1.31*   < > 1.26*   CALCIUM 9.0   < > 9.0   BILITOT 0.7  --   --    AST 26  --   --    ALT 21  --   --    ALKPHOS 95  --   --    ALBUMIN 3.3*  --   --     < > = values in this interval not displayed.         ASSESSMENT   Acute decompensated systolic heart failure  New onset A.fib/flutter, CHADS-VASc 3  NSTEMI, suspect type II  h/o CAD s/p CABG in April  ANA  h/o RUE DVT  Schizoaffective disorder  Tobacco use  Seizure d/o    PLAN   Transitioned to oral Lasix; GDMT  Cardiology adjusting rate control further, loading with digoxin  Will continue Carvedilol, Eliquis  CM working on LifeVest for discharge.        Prophylaxis: Mary Robbins MD  [facility] Medicine

## 2022-11-02 NOTE — PROGRESS NOTES
Hospital Medicine  Progress Note    Patient Name: Kendall Duff  MRN: [id number]  Status: IP- Inpatient   Admission Date: 10/27/2022  Length of Stay: 5      CC: hospital follow-up for ADHF       SUBJECTIVE    57-year-old male with a history that includes ICMO and bipolar/schizoaffective disorder, recently discharged from the psych facility, presented to ED with shortness of breath, wheezing, diffuse swelling, and mild chest pain.  Evaluation in ED, noted EKG with A.fib/flutter, slightly elevated troponin.  Chest x-ray with pulmonary edema.  Patient was admitted to hospitalist service and cardiology was consulted.  Patient treated with IV diuresis and rate control.  Started on FD anticoagulation.  Echo noted EF 20% with severe LV hypokinesis, severe left atrial enlargement.    Today: Patient seen and examined at bedside, and chart reviewed.  Renal function has trended back down, remains stable on room air.      MEDICATIONS   Scheduled   apixaban  5 mg Oral BID    ARIPiprazole  10 mg Oral Daily    aspirin  81 mg Oral Daily    atorvastatin  80 mg Oral Daily    carvediloL  25 mg Oral BID    famotidine  20 mg Oral BID    furosemide (LASIX) injection  40 mg Intravenous Q12H    levETIRAcetam  500 mg Oral BID    [START ON 11/2/2022] losartan  25 mg Oral Daily    OXcarbazepine  300 mg Per OG tube TID    traZODone  100 mg Oral QHS     Continuous Infusions  None      PHYSICAL EXAM   VITALS: T 97.5 °F (36.4 °C)   /80   P 103   RR 18   O2 99 %    GENERAL: Awake and in NAD  LUNGS: CTA anteriorly, but decreased air movement  CVS: Tachycardic  GI/: Soft, NT, bowel sounds positive.  EXTREMITIES: No peripheral edema  NEURO: AAOx3  PSYCH: Cooperative      LABS   CBC  Recent Labs     10/31/22  0409   WBC 7.1   HGB 11.7*   HCT 37.0*           CHEM  Recent Labs     10/31/22  0409 11/01/22  0617    140   K 3.6 3.3*   MG 1.90 1.90   CO2 29 33*   BUN 30.6* 29.7*   CREATININE 1.31* 1.07   CALCIUM 9.0 8.9   BILITOT 0.7  --     AST 26  --    ALT 21  --    ALKPHOS 95  --    ALBUMIN 3.3*  --          ASSESSMENT   Acute decompensated systolic heart failure  New onset A.fib/flutter, CHADS-VASc 3  NSTEMI, suspect type II  h/o CAD s/p CABG in April  ANA  h/o RUE DVT  Schizoaffective disorder  Tobacco use  Seizure d/o    PLAN   Continue diuresis, can possibly transition to oral by tomorrow  Continue to monitor I&Os, daily renal function and electrolytes  Will replete electrolytes as indicated  GDMT per cardiology  Rates under fair control with Carvedilol increased, continue to monitor rate  Can de-escalate Xopenex to as needed, discontinue Albuterol  Transitioned to NOAC  CM working on LifeVest for discharge.      Prophylaxis: AC as above        Cordell Robbins MD  Hospital Medicine      Critical Care Time: 31 minutes spent in direct hands on care, review of labs, imaging and medical record, and discussion of diagnosis, treatment, prognosis with patient/family.  Patient remains at high-risk for clinical decompensation  Critical Care diagnosis: ADHF

## 2022-11-03 LAB
ANION GAP SERPL CALC-SCNC: 9 MEQ/L
BASOPHILS # BLD AUTO: 0.05 X10(3)/MCL (ref 0–0.2)
BASOPHILS NFR BLD AUTO: 0.7 %
BUN SERPL-MCNC: 22.1 MG/DL (ref 8.4–25.7)
CALCIUM SERPL-MCNC: 9 MG/DL (ref 8.4–10.2)
CHLORIDE SERPL-SCNC: 99 MMOL/L (ref 98–107)
CO2 SERPL-SCNC: 32 MMOL/L (ref 22–29)
CREAT SERPL-MCNC: 1.07 MG/DL (ref 0.73–1.18)
CREAT/UREA NIT SERPL: 21
EOSINOPHIL # BLD AUTO: 0.11 X10(3)/MCL (ref 0–0.9)
EOSINOPHIL NFR BLD AUTO: 1.5 %
ERYTHROCYTE [DISTWIDTH] IN BLOOD BY AUTOMATED COUNT: 18.8 % (ref 11.5–17)
GFR SERPLBLD CREATININE-BSD FMLA CKD-EPI: >60 MLS/MIN/1.73/M2
GLUCOSE SERPL-MCNC: 211 MG/DL (ref 74–100)
HCT VFR BLD AUTO: 40.5 % (ref 42–52)
HGB BLD-MCNC: 12.6 GM/DL (ref 14–18)
IMM GRANULOCYTES # BLD AUTO: 0.03 X10(3)/MCL (ref 0–0.04)
IMM GRANULOCYTES NFR BLD AUTO: 0.4 %
LYMPHOCYTES # BLD AUTO: 1.05 X10(3)/MCL (ref 0.6–4.6)
LYMPHOCYTES NFR BLD AUTO: 14.2 %
MCH RBC QN AUTO: 23.2 PG (ref 27–31)
MCHC RBC AUTO-ENTMCNC: 31.1 MG/DL (ref 33–36)
MCV RBC AUTO: 74.6 FL (ref 80–94)
MONOCYTES # BLD AUTO: 0.53 X10(3)/MCL (ref 0.1–1.3)
MONOCYTES NFR BLD AUTO: 7.2 %
NEUTROPHILS # BLD AUTO: 5.6 X10(3)/MCL (ref 2.1–9.2)
NEUTROPHILS NFR BLD AUTO: 76 %
NRBC BLD AUTO-RTO: 0 %
PLATELET # BLD AUTO: 222 X10(3)/MCL (ref 130–400)
PMV BLD AUTO: 9.4 FL (ref 7.4–10.4)
POTASSIUM SERPL-SCNC: 4.2 MMOL/L (ref 3.5–5.1)
RBC # BLD AUTO: 5.43 X10(6)/MCL (ref 4.7–6.1)
SODIUM SERPL-SCNC: 140 MMOL/L (ref 136–145)
WBC # SPEC AUTO: 7.4 X10(3)/MCL (ref 4.5–11.5)

## 2022-11-03 PROCEDURE — 85025 COMPLETE CBC W/AUTO DIFF WBC: CPT | Performed by: INTERNAL MEDICINE

## 2022-11-03 PROCEDURE — 25000003 PHARM REV CODE 250: Performed by: NURSE PRACTITIONER

## 2022-11-03 PROCEDURE — 25000003 PHARM REV CODE 250

## 2022-11-03 PROCEDURE — 36415 COLL VENOUS BLD VENIPUNCTURE: CPT | Performed by: INTERNAL MEDICINE

## 2022-11-03 PROCEDURE — 25000003 PHARM REV CODE 250: Performed by: INTERNAL MEDICINE

## 2022-11-03 PROCEDURE — 21400001 HC TELEMETRY ROOM

## 2022-11-03 PROCEDURE — 80048 BASIC METABOLIC PNL TOTAL CA: CPT | Performed by: INTERNAL MEDICINE

## 2022-11-03 PROCEDURE — 63600175 PHARM REV CODE 636 W HCPCS: Performed by: NURSE PRACTITIONER

## 2022-11-03 RX ORDER — FUROSEMIDE 40 MG/1
40 TABLET ORAL DAILY
Qty: 30 TABLET | Refills: 11 | Status: ON HOLD | OUTPATIENT
Start: 2022-11-04 | End: 2022-12-25 | Stop reason: SDUPTHER

## 2022-11-03 RX ORDER — DIGOXIN 125 MCG
0.12 TABLET ORAL DAILY
Status: DISCONTINUED | OUTPATIENT
Start: 2022-11-03 | End: 2022-11-04 | Stop reason: HOSPADM

## 2022-11-03 RX ORDER — DIGOXIN 125 MCG
0.12 TABLET ORAL DAILY
Qty: 30 TABLET | Refills: 11 | Status: ON HOLD | OUTPATIENT
Start: 2022-11-03 | End: 2022-11-07 | Stop reason: HOSPADM

## 2022-11-03 RX ORDER — CARVEDILOL 25 MG/1
25 TABLET ORAL 2 TIMES DAILY
Qty: 60 TABLET | Refills: 11 | Status: ON HOLD | OUTPATIENT
Start: 2022-11-03 | End: 2022-11-07 | Stop reason: HOSPADM

## 2022-11-03 RX ADMIN — OXCARBAZEPINE 300 MG: 300 TABLET, FILM COATED ORAL at 02:11

## 2022-11-03 RX ADMIN — OXCARBAZEPINE 300 MG: 300 TABLET, FILM COATED ORAL at 08:11

## 2022-11-03 RX ADMIN — LEVETIRACETAM 500 MG: 500 TABLET, FILM COATED ORAL at 08:11

## 2022-11-03 RX ADMIN — FAMOTIDINE 20 MG: 20 TABLET ORAL at 09:11

## 2022-11-03 RX ADMIN — OXCARBAZEPINE 300 MG: 300 TABLET, FILM COATED ORAL at 09:11

## 2022-11-03 RX ADMIN — ARIPIPRAZOLE 10 MG: 5 TABLET ORAL at 09:11

## 2022-11-03 RX ADMIN — CARVEDILOL 25 MG: 12.5 TABLET, FILM COATED ORAL at 08:11

## 2022-11-03 RX ADMIN — DIGOXIN 250 MCG: 0.25 INJECTION INTRAMUSCULAR; INTRAVENOUS at 02:11

## 2022-11-03 RX ADMIN — CARVEDILOL 25 MG: 12.5 TABLET, FILM COATED ORAL at 09:11

## 2022-11-03 RX ADMIN — FUROSEMIDE 40 MG: 40 TABLET ORAL at 09:11

## 2022-11-03 RX ADMIN — FAMOTIDINE 20 MG: 20 TABLET ORAL at 08:11

## 2022-11-03 RX ADMIN — ATORVASTATIN CALCIUM 80 MG: 40 TABLET, FILM COATED ORAL at 09:11

## 2022-11-03 RX ADMIN — LEVETIRACETAM 500 MG: 500 TABLET, FILM COATED ORAL at 09:11

## 2022-11-03 RX ADMIN — APIXABAN 5 MG: 5 TABLET, FILM COATED ORAL at 09:11

## 2022-11-03 RX ADMIN — APIXABAN 5 MG: 5 TABLET, FILM COATED ORAL at 08:11

## 2022-11-03 RX ADMIN — DIGOXIN 0.12 MG: 125 TABLET ORAL at 02:11

## 2022-11-03 RX ADMIN — TRAZODONE HYDROCHLORIDE 100 MG: 50 TABLET ORAL at 08:11

## 2022-11-03 RX ADMIN — ASPIRIN 81 MG CHEWABLE TABLET 81 MG: 81 TABLET CHEWABLE at 09:11

## 2022-11-03 NOTE — PROGRESS NOTES
[facility]  Cardiology  Progress Note    Patient Name: Kendall Duff  MRN: [id number]  Admission Date: 10/27/2022  Hospital Length of Stay: 7 days  Code Status: Prior   Attending Physician: Lance Alvarez MD   Primary Care Physician: Primary Doctor No  Expected Discharge Date:   Principal Problem:CHF exacerbation    Subjective:     Brief HPI:   Mr. Duff is a 57 year old male who is known to [facility], Dr. Wright. He presents to the ED after being discharged from a psych facility with complaints of body swelling, urinary incontinence, & wheezing. He also reports that he had some mild chest pain, but it resolved with diuresis. Significant lab work includes B/Cr 41.6/1.39, BNP 3280.2, & trop 0.064. A CXR was obtained and demonstrates volume overload. It is also reported that the patient went into afib RVR/aflutter. [facility] has been consulted to further manage his CHF exacerbation and heart rate.     Hospital Course:   10.30.22: -770 fluid balance. Patient is still having c/o SOB.  10.31.22: Voided 2730 mL/ 24 hours. SOB is improving. Denies CP or palps.   11.1.22: NAD. Denies CP, SOB, or palps. HR more controlled today. Aflutter in the 90's.   11.2.22: Patient laying completed flat in bed. Denies SOB/CP/palpitations. Currently Afib, mildly tachycardic. Hyperkalemic today. Mg 2.30  11.3.22: Patient awake in bed. NAD, He has just been fitted with lifevest. K= has normalized as well as renal indices.     PMH: CAD, ICMO, HTN, schizoaffective disorder, hep C, DVT, systolic & diastolic CHF  PSH: CABG  Family History: Mother - CA; Father - liver disease   Social History: Current every day smoker. Reports last use of cocaine (7 months ago). Denies alcohol use.      Previous Cardiac Diagnostics:   Echo 10.28.22  The left ventricle is moderately enlarged with mild eccentric hypertrophy and severely decreased systolic function.  The estimated ejection fraction is 20%.  There is severe left  ventricular global hypokinesis.  Grade II left ventricular diastolic dysfunction.  Normal right ventricular size with moderately reduced right ventricular systolic function.  Moderate mitral regurgitation.  Moderate tricuspid regurgitation.  There is mild pulmonary hypertension.  The estimated PA systolic pressure is 44 mmHg.  Elevated central venous pressure (15 mmHg).  Severe left atrial enlargement    CABG x 4 4/22:  LIMA-LAD, SVG-OM1, SVG-D1, SVG-PDA     TTE 6.16.22:  The left ventricle is normal in size w/ concentric hypertrophy and severely decreased systolic function.  The estimated EF is 25-30%.  There is severe left ventricular global hypokinesis.  Grade II left ventricular diastolic dysfunction.  Normal right ventricular size w/ mildly reduced right ventricular systolic function.  The estimated PA systolic pressure is 52 mmHg.  There is moderate pulmonary HTN.  Elevated CVP (15 mmHg).     RUE NIVA 5.10.22:  A deep vein thrombosis was identified in the right axillary and brachial veins. A superficial thrombosis was identified in the right basilic vein.     TTE 3.21.22:  Ejection fraction is visually estimated at 35%.   Akinetic motion of the apical anteroseptal wall noted in the left ventricle; concurrent w/ MI.  Mild to moderate mitral regurgitation is present.      LHC 3.21.22:  100% LAD to 30-40% residual stenosis post PTCA.  Large diagonal system w/ severe stenosis.  CIRC - patent stent, OM1 patent, mild CIRC occluded  RCA - stents ISR 40-50%, distal 70-80%  EDP 12 EF 35-40% anterior HK    Review of Systems   Cardiovascular:  Negative for chest pain and palpitations.   Respiratory:  Positive for cough. Negative for shortness of breath.         BILLY   Musculoskeletal: Negative.      Objective:     Vital Signs (Most Recent):  Temp: 98.2 °F (36.8 °C) (11/03/22 1136)  Pulse: 78 (11/03/22 1136)  Resp: 18 (11/03/22 1136)  BP: 94/61 (11/03/22 1136)  SpO2: 95 % (11/03/22 1136) Vital Signs (24h Range):  Temp:   [97.5 °F (36.4 °C)-98.4 °F (36.9 °C)] 98.2 °F (36.8 °C)  Pulse:  [65-97] 78  Resp:  [18] 18  SpO2:  [93 %-100 %] 95 %  BP: ()/(55-92) 94/61     Weight: 68.8 kg (151 lb 9.6 oz)  Body mass index is 23.74 kg/m².    SpO2: 95 %  O2 Device (Oxygen Therapy): room air      Intake/Output Summary (Last 24 hours) at 11/3/2022 1347  Last data filed at 11/2/2022 1900  Gross per 24 hour   Intake 360 ml   Output 480 ml   Net -120 ml         Lines/Drains/Airways       Peripheral Intravenous Line  Duration                  Peripheral IV - Single Lumen 10/31/22 0354 20 G Anterior;Right Upper Arm 3 days                    Significant Labs:   Recent Results (from the past 24 hour(s))   Basic Metabolic Panel    Collection Time: 11/03/22 12:05 PM   Result Value Ref Range    Sodium Level 140 136 - 145 mmol/L    Potassium Level 4.2 3.5 - 5.1 mmol/L    Chloride 99 98 - 107 mmol/L    Carbon Dioxide 32 (H) 22 - 29 mmol/L    Glucose Level 211 (H) 74 - 100 mg/dL    Blood Urea Nitrogen 22.1 8.4 - 25.7 mg/dL    Creatinine 1.07 0.73 - 1.18 mg/dL    BUN/Creatinine Ratio 21     Calcium Level Total 9.0 8.4 - 10.2 mg/dL    Anion Gap 9.0 mEq/L    eGFR >60 mls/min/1.73/m2   CBC with Differential    Collection Time: 11/03/22 12:05 PM   Result Value Ref Range    WBC 7.4 4.5 - 11.5 x10(3)/mcL    RBC 5.43 4.70 - 6.10 x10(6)/mcL    Hgb 12.6 (L) 14.0 - 18.0 gm/dL    Hct 40.5 (L) 42.0 - 52.0 %    MCV 74.6 (L) 80.0 - 94.0 fL    MCH 23.2 (L) 27.0 - 31.0 pg    MCHC 31.1 (L) 33.0 - 36.0 mg/dL    RDW 18.8 (H) 11.5 - 17.0 %    Platelet 222 130 - 400 x10(3)/mcL    MPV 9.4 7.4 - 10.4 fL    Neut % 76.0 %    Lymph % 14.2 %    Mono % 7.2 %    Eos % 1.5 %    Basophil % 0.7 %    Lymph # 1.05 0.6 - 4.6 x10(3)/mcL    Neut # 5.6 2.1 - 9.2 x10(3)/mcL    Mono # 0.53 0.1 - 1.3 x10(3)/mcL    Eos # 0.11 0 - 0.9 x10(3)/mcL    Baso # 0.05 0 - 0.2 x10(3)/mcL    IG# 0.03 0 - 0.04 x10(3)/mcL    IG% 0.4 %    NRBC% 0.0 %       Telemetry:  Atrial Flutter    Physical Exam  Vitals  reviewed.   Constitutional:       Appearance: Normal appearance.   HENT:      Head: Normocephalic.      Nose: Nose normal.      Mouth/Throat:      Mouth: Mucous membranes are moist.   Eyes:      Extraocular Movements: Extraocular movements intact.   Cardiovascular:      Rate and Rhythm: Normal rate. Rhythm irregular.   Pulmonary:      Effort: Pulmonary effort is normal.      Comments: Diminished breath sounds  Abdominal:      General: Bowel sounds are normal.      Palpations: Abdomen is soft.   Musculoskeletal:         General: Normal range of motion.   Skin:     General: Skin is warm and dry.      Capillary Refill: Capillary refill takes less than 2 seconds.   Neurological:      General: No focal deficit present.      Mental Status: He is alert and oriented to person, place, and time.   Psychiatric:         Behavior: Behavior normal.       Current Inpatient Medications:    Current Facility-Administered Medications:     apixaban tablet 5 mg, 5 mg, Oral, BID, RAY Chilel, 5 mg at 11/03/22 0947    ARIPiprazole tablet 10 mg, 10 mg, Oral, Daily, Arnold Coy MD, 10 mg at 11/03/22 0944    aspirin chewable tablet 81 mg, 81 mg, Oral, Daily, Arnold Coy MD, 81 mg at 11/03/22 0944    atorvastatin tablet 80 mg, 80 mg, Oral, Daily, Arnold Coy MD, 80 mg at 11/03/22 0944    benztropine tablet 1 mg, 1 mg, Oral, BID PRN, Arnold Coy MD, 1 mg at 10/29/22 2021    carvediloL tablet 25 mg, 25 mg, Oral, BID, RAY Chilel, 25 mg at 11/03/22 0947    famotidine tablet 20 mg, 20 mg, Oral, BID, Arnold Coy MD, 20 mg at 11/03/22 0947    furosemide tablet 40 mg, 40 mg, Oral, Daily, RAY Patel, 40 mg at 11/03/22 0947    levalbuterol nebulizer solution 1.25 mg, 1.25 mg, Nebulization, Q6H PRN, Cordell Robbins MD, 1.25 mg at 11/01/22 0855    levETIRAcetam tablet 500 mg, 500 mg, Oral, BID, Arnold Coy MD, 500 mg at 11/03/22 0947    metoprolol injection 5 mg, 5 mg, Intravenous, Q5  Min PRN, CATRACHO LewisP-BC, 5 mg at 10/28/22 0101    metoprolol injection 5 mg, 5 mg, Intravenous, Q1H PRN, Rashard Mejia NP, 5 mg at 10/28/22 0537    nitroGLYCERIN SL tablet 0.4 mg, 0.4 mg, Sublingual, Q5 Min PRN, Amelia Bone AGACNP-BC    OXcarbazepine tablet 300 mg, 300 mg, Per OG tube, TID, Arnold Coy MD, 300 mg at 11/03/22 0947    traZODone tablet 100 mg, 100 mg, Oral, QHS, Arnold Coy MD, 100 mg at 11/02/22 2210    VTE Risk Mitigation (From admission, onward)           Ordered     apixaban tablet 5 mg  2 times daily         11/01/22 1512                    Assessment:   Acute on chronic systolic & diastolic CHF - improving     - BNP 3280.2    - Grade 2 DD    - EF 20% (echo 10.28.22)  ICMO   --life vest fitted  VHD    -Mod MR and Mod TR  Pulmonary HTN-mild  New onset aflutter/afib w/ variable block - CVR - no documented history    - CHADSVASC Score 3 Points   NSTEMI - suspect type 2 s/t CHF exacerbation    - troponin 0.062, 0.064, 0.063  Native CAD    - CABG 4/22: LIMA-LAD, SVG-OM1, SVG-D1, SVG-PDA  ANA -  --creatinine bumped  Hx of RUE DVT     - On Eliquis outpatient   Schizoaffective Disorder  Hx of Hep C  Hx of cocaine abuse  Tobacco use    Plan:   Symptomatically improving. Continue Lasix 40 mg po daily and coreg 25 mg bid. NO ACE/ARB/ARNI due to hyperkalemia. Unable to add hydralazine due to marginal blood pressures.  Ensure accurate I&O's & daily weights.   Currently A-fib, now CVR post IV digoxin load. Continue coreg 25 mg bid. Add digoxin 0.125 mg daily.   Continue Eliquis 5 mg BID for stroke prevention in the setting of afib/aflutter & elevated CHADSVASC.   Xopenex nebs q 8 hours for wheezing  If outpatient compliance is demonstrated, consider EP consult for a flutter ablation.     OK to discharge home once ok with primary. [facility] signing off.     RAY Patel  Cardiology  [facility]  11/03/2022

## 2022-11-03 NOTE — PROGRESS NOTES
[facility] Hospital Medicine Progress Note        Chief Complaint: Inpatient Follow-up for     HPI:  57-year-old male with past medical history of bipolar, hypertension, schizoaffective disorder, seizures, stroke, substance abuse who was recently discharged from the [facility] hospital presented to ER with complaints shortness of breath wheezing cough and also substernal chest pain lab work done here showed slightly elevated troponin chest x-ray showed pulmonary edema patient has been admitted to hospitalist service for Congestive heart failure exacerbation and cardiology has been consulted    Interval Hx:   Patient seen and examined saturating well on room air no other issues reported    Objective/physical exam:  General: In no acute distress, afebrile  Chest: Clear to auscultation bilaterally  Heart: , +S1, S2, no appreciable murmur  Abdomen: Soft, nontender, BS +  MSK: Warm, no lower extremity edema, no clubbing or cyanosis  Neurologic: Alert and oriented x4,   VITAL SIGNS: 24 HRS MIN & MAX LAST   Temp  Min: 97.5 °F (36.4 °C)  Max: 98.4 °F (36.9 °C) 98.2 °F (36.8 °C)   BP  Min: 90/55  Max: 127/92 94/61   Pulse  Min: 65  Max: 97  78   Resp  Min: 18  Max: 18 18   SpO2  Min: 93 %  Max: 100 % 95 %       Recent Labs   Lab 10/29/22  0403 10/30/22  0627 10/31/22  0409   WBC 6.8 7.0 7.1   RBC 4.82 4.71 5.06   HGB 11.2* 10.8* 11.7*   HCT 35.4* 33.9* 37.0*   MCV 73.4* 72.0* 73.1*   MCH 23.2* 22.9* 23.1*   MCHC 31.6* 31.9* 31.6*   RDW 18.1* 18.0* 18.6*    231 230   MPV 9.7 9.2 9.6       Recent Labs   Lab 10/29/22  0404 10/30/22  0627 10/31/22  0409 11/01/22  0617 11/02/22  0405      < > 141 140 137   K 4.2   < > 3.6 3.3* 5.6*   CO2 27   < > 29 33* 25   BUN 34.7*   < > 30.6* 29.7* 29.4*   CREATININE 1.20*   < > 1.31* 1.07 1.26*   CALCIUM 8.9   < > 9.0 8.9 9.0   MG  --    < > 1.90 1.90 2.30   ALBUMIN 3.2*  --  3.3*  --   --    ALKPHOS 92  --  95  --   --    ALT 22  --  21  --   --    AST  26  --  26  --   --    BILITOT 0.6  --  0.7  --   --     < > = values in this interval not displayed.          Microbiology Results (last 7 days)       ** No results found for the last 168 hours. **             See below for Radiology    Scheduled Med:   apixaban  5 mg Oral BID    ARIPiprazole  10 mg Oral Daily    aspirin  81 mg Oral Daily    atorvastatin  80 mg Oral Daily    carvediloL  25 mg Oral BID    famotidine  20 mg Oral BID    furosemide  40 mg Oral Daily    levETIRAcetam  500 mg Oral BID    OXcarbazepine  300 mg Per OG tube TID    traZODone  100 mg Oral QHS        Continuous Infusions:       PRN Meds:  benztropine, levalbuterol, metoprolol, metoprolol, nitroGLYCERIN       Assessment/Plan:  Acute Congestive heart failure exacerbation with EF of 20%   Ischemic cardiomyopathy  History of coronary artery disease status post CABG in April of this year  Severe left ventricular global hypokinesis   Moderate MR, TR  Non-STEMI unknown type   History of right upper extremity DVT on Eliquis outpatient   Schizoaffective disorder   History of hep C   History of cocaine abuse   Tobacco abuse  New onset atrial flutter   Seizure disorder   Acute kidney injury   Essential hypertension  Hyperlipidemia   Bipolar disorder    Continue with Lasix daily and Coreg unable to add hydralazine because of borderline blood pressure   Continue with Eliquis 5 b.i.d. for stroke prevention   Continue with Coreg 25 b.i.d. and digoxin has been added today   Will see how patient's heart rate does with digoxin and Coreg if stays stable then we might discharge him home in a.m.   LifeVest has been fitted   VTE prophylaxis:  Lovenox    Patient condition:  Stable/Fair/Guarded/ Serious/ Critical    Anticipated discharge and Disposition:         All diagnosis and differential diagnosis have been reviewed; assessment and plan has been documented; I have personally reviewed the labs and test results that are presently available; I have reviewed the  patients medication list; I have reviewed the consulting providers response and recommendations. I have reviewed or attempted to review medical records based upon their availability    All of the patient's questions have been  addressed and answered. Patient's is agreeable to the above stated plan. I will continue to monitor closely and make adjustments to medical management as needed.  _____________________________________________________________________    Nutrition Status:    Radiology:  ED US FAST  Riddhi Murphy MD     10/30/2022 12:52 PM  ED US FAST    Date/Time: 10/27/2022 9:16 AM  Performed by: Riddhi Murphy MD  Authorized by: Riddhi Murphy MD     Indication:  Dyspnea  The following findings in the peritoneal, pericardial, and pleural spaces   were obtained:     Pericardial effusion:  Absent    Hepatorenal free fluid:  Present    Splenorenal free fluid:  Present    Right thoracic free fluid:  Present    Right lung sliding:  Present    Left thoracic free fluid:  Present    Left lung sliding:  Present    Impression: Free fluid consistent with ascites, Small R pleural   effusion, trace left pleura effusion, bilateral pulmonary edema.      Aziza Hernandez MD   11/03/2022

## 2022-11-04 ENCOUNTER — HOSPITAL ENCOUNTER (INPATIENT)
Facility: HOSPITAL | Age: 57
LOS: 3 days | Discharge: HOME HEALTH | DRG: 281 | End: 2022-11-07
Attending: EMERGENCY MEDICINE | Admitting: INTERNAL MEDICINE
Payer: MEDICAID

## 2022-11-04 VITALS
BODY MASS INDEX: 24.94 KG/M2 | RESPIRATION RATE: 17 BRPM | TEMPERATURE: 98 F | OXYGEN SATURATION: 100 % | SYSTOLIC BLOOD PRESSURE: 121 MMHG | HEIGHT: 67 IN | DIASTOLIC BLOOD PRESSURE: 79 MMHG | WEIGHT: 158.88 LBS | HEART RATE: 107 BPM

## 2022-11-04 DIAGNOSIS — I48.92 ATRIAL FLUTTER: ICD-10-CM

## 2022-11-04 DIAGNOSIS — Z45.02 DEFIBRILLATOR DISCHARGE: ICD-10-CM

## 2022-11-04 DIAGNOSIS — I10 ESSENTIAL HYPERTENSION: ICD-10-CM

## 2022-11-04 DIAGNOSIS — I50.9 CONGESTIVE HEART FAILURE, UNSPECIFIED HF CHRONICITY, UNSPECIFIED HEART FAILURE TYPE: Primary | ICD-10-CM

## 2022-11-04 DIAGNOSIS — F25.0 SCHIZOAFFECTIVE DISORDER, BIPOLAR TYPE: ICD-10-CM

## 2022-11-04 DIAGNOSIS — I25.10 CORONARY ARTERY DISEASE INVOLVING NATIVE CORONARY ARTERY OF NATIVE HEART WITHOUT ANGINA PECTORIS: ICD-10-CM

## 2022-11-04 DIAGNOSIS — R07.9 CHEST PAIN: ICD-10-CM

## 2022-11-04 LAB
ALBUMIN SERPL-MCNC: 3.4 GM/DL (ref 3.5–5)
ALBUMIN/GLOB SERPL: 1.1 RATIO (ref 1.1–2)
ALP SERPL-CCNC: 118 UNIT/L (ref 40–150)
ALT SERPL-CCNC: 36 UNIT/L (ref 0–55)
ANION GAP SERPL CALC-SCNC: 9 MEQ/L
APTT PPP: 30.9 SECONDS (ref 23.2–33.7)
AST SERPL-CCNC: 50 UNIT/L (ref 5–34)
BASOPHILS # BLD AUTO: 0.05 X10(3)/MCL (ref 0–0.2)
BASOPHILS NFR BLD AUTO: 0.6 %
BILIRUBIN DIRECT+TOT PNL SERPL-MCNC: 0.4 MG/DL
BNP BLD-MCNC: 1880.4 PG/ML
BUN SERPL-MCNC: 16.8 MG/DL (ref 8.4–25.7)
BUN SERPL-MCNC: 19.2 MG/DL (ref 8.4–25.7)
CALCIUM SERPL-MCNC: 8.7 MG/DL (ref 8.4–10.2)
CALCIUM SERPL-MCNC: 8.7 MG/DL (ref 8.4–10.2)
CHLORIDE SERPL-SCNC: 102 MMOL/L (ref 98–107)
CHLORIDE SERPL-SCNC: 103 MMOL/L (ref 98–107)
CO2 SERPL-SCNC: 26 MMOL/L (ref 22–29)
CO2 SERPL-SCNC: 28 MMOL/L (ref 22–29)
CREAT SERPL-MCNC: 1.03 MG/DL (ref 0.73–1.18)
CREAT SERPL-MCNC: 1.15 MG/DL (ref 0.73–1.18)
CREAT/UREA NIT SERPL: 16
EOSINOPHIL # BLD AUTO: 0.16 X10(3)/MCL (ref 0–0.9)
EOSINOPHIL NFR BLD AUTO: 1.9 %
ERYTHROCYTE [DISTWIDTH] IN BLOOD BY AUTOMATED COUNT: 17.7 % (ref 11.5–17)
ETHANOL SERPL-MCNC: <10 MG/DL
GFR SERPLBLD CREATININE-BSD FMLA CKD-EPI: >60 MLS/MIN/1.73/M2
GFR SERPLBLD CREATININE-BSD FMLA CKD-EPI: >60 MLS/MIN/1.73/M2
GLOBULIN SER-MCNC: 3.2 GM/DL (ref 2.4–3.5)
GLUCOSE SERPL-MCNC: 155 MG/DL (ref 74–100)
GLUCOSE SERPL-MCNC: 190 MG/DL (ref 74–100)
HCT VFR BLD AUTO: 34.6 % (ref 42–52)
HGB BLD-MCNC: 11 GM/DL (ref 14–18)
IMM GRANULOCYTES # BLD AUTO: 0.03 X10(3)/MCL (ref 0–0.04)
IMM GRANULOCYTES NFR BLD AUTO: 0.3 %
INR BLD: 1.27 (ref 0–1.3)
LYMPHOCYTES # BLD AUTO: 1.46 X10(3)/MCL (ref 0.6–4.6)
LYMPHOCYTES NFR BLD AUTO: 17 %
MAGNESIUM SERPL-MCNC: 2.2 MG/DL (ref 1.6–2.6)
MCH RBC QN AUTO: 23.3 PG (ref 27–31)
MCHC RBC AUTO-ENTMCNC: 31.8 MG/DL (ref 33–36)
MCV RBC AUTO: 73.3 FL (ref 80–94)
MONOCYTES # BLD AUTO: 1.1 X10(3)/MCL (ref 0.1–1.3)
MONOCYTES NFR BLD AUTO: 12.8 %
NEUTROPHILS # BLD AUTO: 5.8 X10(3)/MCL (ref 2.1–9.2)
NEUTROPHILS NFR BLD AUTO: 67.4 %
NRBC BLD AUTO-RTO: 0 %
PLATELET # BLD AUTO: 204 X10(3)/MCL (ref 130–400)
PMV BLD AUTO: 9.7 FL (ref 7.4–10.4)
POTASSIUM SERPL-SCNC: 4.2 MMOL/L (ref 3.5–5.1)
POTASSIUM SERPL-SCNC: 4.5 MMOL/L (ref 3.5–5.1)
PROT SERPL-MCNC: 6.6 GM/DL (ref 6.4–8.3)
PROTHROMBIN TIME: 15.8 SECONDS (ref 12.5–14.5)
RBC # BLD AUTO: 4.72 X10(6)/MCL (ref 4.7–6.1)
SARS-COV-2 RDRP RESP QL NAA+PROBE: NEGATIVE
SODIUM SERPL-SCNC: 138 MMOL/L (ref 136–145)
SODIUM SERPL-SCNC: 139 MMOL/L (ref 136–145)
TROPONIN I SERPL-MCNC: 0.04 NG/ML (ref 0–0.04)
WBC # SPEC AUTO: 8.6 X10(3)/MCL (ref 4.5–11.5)

## 2022-11-04 PROCEDURE — 93005 ELECTROCARDIOGRAM TRACING: CPT

## 2022-11-04 PROCEDURE — 85610 PROTHROMBIN TIME: CPT | Performed by: EMERGENCY MEDICINE

## 2022-11-04 PROCEDURE — 83880 ASSAY OF NATRIURETIC PEPTIDE: CPT | Performed by: EMERGENCY MEDICINE

## 2022-11-04 PROCEDURE — 85025 COMPLETE CBC W/AUTO DIFF WBC: CPT | Performed by: EMERGENCY MEDICINE

## 2022-11-04 PROCEDURE — 25000003 PHARM REV CODE 250: Performed by: INTERNAL MEDICINE

## 2022-11-04 PROCEDURE — 83735 ASSAY OF MAGNESIUM: CPT | Performed by: EMERGENCY MEDICINE

## 2022-11-04 PROCEDURE — 80048 BASIC METABOLIC PNL TOTAL CA: CPT | Performed by: INTERNAL MEDICINE

## 2022-11-04 PROCEDURE — 80053 COMPREHEN METABOLIC PANEL: CPT | Performed by: EMERGENCY MEDICINE

## 2022-11-04 PROCEDURE — 25000003 PHARM REV CODE 250

## 2022-11-04 PROCEDURE — 93010 ELECTROCARDIOGRAM REPORT: CPT | Mod: ,,, | Performed by: INTERNAL MEDICINE

## 2022-11-04 PROCEDURE — 84484 ASSAY OF TROPONIN QUANT: CPT | Performed by: EMERGENCY MEDICINE

## 2022-11-04 PROCEDURE — 25000003 PHARM REV CODE 250: Performed by: NURSE PRACTITIONER

## 2022-11-04 PROCEDURE — 99285 EMERGENCY DEPT VISIT HI MDM: CPT | Mod: 25

## 2022-11-04 PROCEDURE — 80162 ASSAY OF DIGOXIN TOTAL: CPT | Performed by: EMERGENCY MEDICINE

## 2022-11-04 PROCEDURE — 85730 THROMBOPLASTIN TIME PARTIAL: CPT | Performed by: EMERGENCY MEDICINE

## 2022-11-04 PROCEDURE — 11000001 HC ACUTE MED/SURG PRIVATE ROOM

## 2022-11-04 PROCEDURE — 96374 THER/PROPH/DIAG INJ IV PUSH: CPT

## 2022-11-04 PROCEDURE — 36415 COLL VENOUS BLD VENIPUNCTURE: CPT | Performed by: INTERNAL MEDICINE

## 2022-11-04 PROCEDURE — 93010 EKG 12-LEAD: ICD-10-PCS | Mod: ,,, | Performed by: INTERNAL MEDICINE

## 2022-11-04 PROCEDURE — 80307 DRUG TEST PRSMV CHEM ANLYZR: CPT | Performed by: EMERGENCY MEDICINE

## 2022-11-04 PROCEDURE — 87635 SARS-COV-2 COVID-19 AMP PRB: CPT | Performed by: EMERGENCY MEDICINE

## 2022-11-04 PROCEDURE — 82077 ASSAY SPEC XCP UR&BREATH IA: CPT | Performed by: EMERGENCY MEDICINE

## 2022-11-04 RX ORDER — FAMOTIDINE 20 MG/1
20 TABLET, FILM COATED ORAL 2 TIMES DAILY
Qty: 60 TABLET | Refills: 11 | Status: ON HOLD | OUTPATIENT
Start: 2022-11-04 | End: 2023-01-17 | Stop reason: SDUPTHER

## 2022-11-04 RX ORDER — ATORVASTATIN CALCIUM 80 MG/1
80 TABLET, FILM COATED ORAL DAILY
Qty: 90 TABLET | Refills: 3 | Status: ON HOLD | OUTPATIENT
Start: 2022-11-04 | End: 2022-12-25 | Stop reason: SDUPTHER

## 2022-11-04 RX ORDER — FUROSEMIDE 10 MG/ML
40 INJECTION INTRAMUSCULAR; INTRAVENOUS
Status: COMPLETED | OUTPATIENT
Start: 2022-11-04 | End: 2022-11-05

## 2022-11-04 RX ORDER — ARIPIPRAZOLE 10 MG/1
10 TABLET ORAL DAILY
Qty: 30 TABLET | Refills: 11 | Status: ON HOLD | OUTPATIENT
Start: 2022-11-04 | End: 2023-01-17 | Stop reason: SDUPTHER

## 2022-11-04 RX ORDER — BENZTROPINE MESYLATE 1 MG/1
1 TABLET ORAL 2 TIMES DAILY PRN
Qty: 60 TABLET | Refills: 0 | Status: ON HOLD | OUTPATIENT
Start: 2022-11-04 | End: 2022-11-07 | Stop reason: HOSPADM

## 2022-11-04 RX ORDER — LEVETIRACETAM 500 MG/1
500 TABLET ORAL 2 TIMES DAILY
Qty: 60 TABLET | Refills: 11 | Status: ON HOLD | OUTPATIENT
Start: 2022-11-04 | End: 2022-12-25 | Stop reason: SDUPTHER

## 2022-11-04 RX ADMIN — ASPIRIN 81 MG CHEWABLE TABLET 81 MG: 81 TABLET CHEWABLE at 08:11

## 2022-11-04 RX ADMIN — LEVETIRACETAM 500 MG: 500 TABLET, FILM COATED ORAL at 08:11

## 2022-11-04 RX ADMIN — APIXABAN 5 MG: 5 TABLET, FILM COATED ORAL at 08:11

## 2022-11-04 RX ADMIN — ATORVASTATIN CALCIUM 80 MG: 40 TABLET, FILM COATED ORAL at 08:11

## 2022-11-04 RX ADMIN — ARIPIPRAZOLE 10 MG: 5 TABLET ORAL at 08:11

## 2022-11-04 RX ADMIN — DIGOXIN 0.12 MG: 125 TABLET ORAL at 08:11

## 2022-11-04 RX ADMIN — FAMOTIDINE 20 MG: 20 TABLET ORAL at 08:11

## 2022-11-04 RX ADMIN — OXCARBAZEPINE 300 MG: 300 TABLET, FILM COATED ORAL at 08:11

## 2022-11-04 RX ADMIN — FUROSEMIDE 40 MG: 40 TABLET ORAL at 08:11

## 2022-11-04 RX ADMIN — CARVEDILOL 25 MG: 12.5 TABLET, FILM COATED ORAL at 08:11

## 2022-11-04 NOTE — DISCHARGE SUMMARY
[facility] Hospital Medicine Discharge Summary    Admit Date: 10/27/2022  Discharge Date and Time: 11/4/20228:00 AM  Admitting Physician: DARNELL Team  Discharging Physician: Aziza Hernandez MD.  Primary Care Physician: Primary Doctor No  Consults: Cardiology    Discharge Diagnoses:  Acute Congestive heart failure exacerbation with EF of 20%   Ischemic cardiomyopathy  History of coronary artery disease status post CABG in April of this year  Severe left ventricular global hypokinesis   Moderate MR, TR  Non-STEMI unknown type   History of right upper extremity DVT on Eliquis outpatient   Schizoaffective disorder   History of hep C   History of cocaine abuse   Tobacco abuse  New onset atrial flutter   Seizure disorder   Acute kidney injury   Essential hypertension  Hyperlipidemia   Bipolar disorder    Hospital Course:    57-year-old male with past medical history of bipolar, hypertension, schizoaffective disorder, seizures, stroke, substance abuse who was recently discharged from the [facility] hospital presented to ER with complaints shortness of breath wheezing cough and also substernal chest pain lab work done here showed slightly elevated troponin chest x-ray showed pulmonary edema patient has been admitted to hospitalist service for Congestive heart failure exacerbation and cardiology has been consulted patient was started on Lasix cardiology was consulted 2D echo showed EF of 20%.  Patient was also on Eliquis and that was continued cardiology was following.  Patient was started on Coreg and digoxin because of AFib heart rate stayed controlled LifeVest was arranged and patient was discharged home in stable condition.  Patient was working with PT and OT  Pt was seen and examined on the day of discharge  Vitals:  VITAL SIGNS: 24 HRS MIN & MAX LAST   Temp  Min: 97.6 °F (36.4 °C)  Max: 98.4 °F (36.9 °C) 97.6 °F (36.4 °C)   BP  Min: 94/61  Max: 142/77 115/84   Pulse  Min: 78  Max: 109  109   Resp   Min: 17  Max: 19 17   SpO2  Min: 95 %  Max: 99 % 96 %       Physical Exam:  General: In no acute distress, afebrile  Chest: Clear to auscultation bilaterally  Heart: , +S1, S2, no appreciable murmur  Abdomen: Soft, nontender, BS +  MSK: Warm, no lower extremity edema, no clubbing or cyanosis  Neurologic: Alert and oriented x4,     Procedures Performed: No admission procedures for hospital encounter.     Significant Diagnostic Studies: See Full reports for all details    Recent Labs   Lab 10/30/22  0627 10/31/22  0409 11/03/22  1205   WBC 7.0 7.1 7.4   RBC 4.71 5.06 5.43   HGB 10.8* 11.7* 12.6*   HCT 33.9* 37.0* 40.5*   MCV 72.0* 73.1* 74.6*   MCH 22.9* 23.1* 23.2*   MCHC 31.9* 31.6* 31.1*   RDW 18.0* 18.6* 18.8*    230 222   MPV 9.2 9.6 9.4       Recent Labs   Lab 10/29/22  0404 10/30/22  0627 10/31/22  0409 11/01/22  0617 11/02/22  0405 11/03/22  1205 11/04/22  0450      < > 141 140 137 140 138   K 4.2   < > 3.6 3.3* 5.6* 4.2 4.5   CO2 27   < > 29 33* 25 32* 26   BUN 34.7*   < > 30.6* 29.7* 29.4* 22.1 16.8   CREATININE 1.20*   < > 1.31* 1.07 1.26* 1.07 1.03   CALCIUM 8.9   < > 9.0 8.9 9.0 9.0 8.7   MG  --    < > 1.90 1.90 2.30  --   --    ALBUMIN 3.2*  --  3.3*  --   --   --   --    ALKPHOS 92  --  95  --   --   --   --    ALT 22  --  21  --   --   --   --    AST 26  --  26  --   --   --   --    BILITOT 0.6  --  0.7  --   --   --   --     < > = values in this interval not displayed.        Microbiology Results (last 7 days)       ** No results found for the last 168 hours. **             ED US FAST  Riddhi Murphy MD     10/30/2022 12:52 PM  ED US FAST    Date/Time: 10/27/2022 9:16 AM  Performed by: Riddhi Murphy MD  Authorized by: Ridhdi Murphy MD     Indication:  Dyspnea  The following findings in the peritoneal, pericardial, and pleural spaces   were obtained:     Pericardial effusion:  Absent    Hepatorenal free fluid:  Present    Splenorenal free fluid:  Present    Right thoracic free fluid:   Present    Right lung sliding:  Present    Left thoracic free fluid:  Present    Left lung sliding:  Present    Impression: Free fluid consistent with ascites, Small R pleural   effusion, trace left pleura effusion, bilateral pulmonary edema.         Medication List        START taking these medications      digoxin 125 mcg tablet  Commonly known as: LANOXIN  Take 1 tablet (0.125 mg total) by mouth once daily.            CHANGE how you take these medications      carvediloL 25 MG tablet  Commonly known as: COREG  Take 1 tablet (25 mg total) by mouth 2 (two) times daily.  What changed:   medication strength  how much to take     furosemide 40 MG tablet  Commonly known as: LASIX  Take 1 tablet (40 mg total) by mouth once daily.  What changed:   medication strength  how much to take     traZODone 100 MG tablet  Commonly known as: DESYREL  Take 1 tablet (100 mg total) by mouth every evening.  What changed: how much to take            CONTINUE taking these medications      apixaban 5 mg Tab  Commonly known as: ELIQUIS  Take 1 tablet (5 mg total) by mouth 2 (two) times daily.     ARIPiprazole 10 MG Tab  Commonly known as: ABILIFY  Take 1 tablet (10 mg total) by mouth once daily.     aspirin 81 MG Chew  Chew and swallow 1 tablet (81 mg total) by mouth once daily.     atorvastatin 80 MG tablet  Commonly known as: LIPITOR  Take 1 tablet (80 mg total) by mouth once daily.     * benztropine 1 MG tablet  Commonly known as: COGENTIN  Take 1 tablet (1 mg total) by mouth 2 (two) times daily as needed (spasms).     * benztropine 1 MG tablet  Commonly known as: COGENTIN     famotidine 20 MG tablet  Commonly known as: PEPCID  Take 1 tablet (20 mg total) by mouth 2 (two) times daily.     levETIRAcetam 500 MG Tab  Commonly known as: KEPPRA  Take 1 tablet (500 mg total) by mouth 2 (two) times daily.     OXcarbazepine 300 MG Tab  Commonly known as: TRILEPTAL           * This list has 2 medication(s) that are the same as other  medications prescribed for you. Read the directions carefully, and ask your doctor or other care provider to review them with you.                STOP taking these medications      isosorbide mononitrate 30 MG 24 hr tablet  Commonly known as: IMDUR     losartan 25 MG tablet  Commonly known as: COZAAR     potassium chloride 20 mEq  Commonly known as: K-TAB               Where to Get Your Medications        These medications were sent to [id number] [place]  [address] [place] [address] [address] [address]      Phone: [phone]   ARIPiprazole 10 MG Tab  atorvastatin 80 MG tablet  benztropine 1 MG tablet  carvediloL 25 MG tablet  digoxin 125 mcg tablet  famotidine 20 MG tablet  furosemide 40 MG tablet  levETIRAcetam 500 MG Tab          Explained in detail to the patient about the discharge plan, medications, and follow-up visits. Pt understands and agrees with the treatment plan  [facility]   Discharged Condition: stable  Diet-   Dietary Orders (From admission, onward)       Start     Ordered    10/27/22 1253  Diet Cardiac Fluid - 800mL; Low Sodium  Diet effective now        Question Answer Comment   Fluid restriction: Fluid - 800mL    Diet Modifier: Low Sodium        10/27/22 1252                   Medications Per [place] med rec  Activities as tolerated   Follow-up Information       LIDA BROTHERS MD Follow up.    Specialty: Cardiology  Why: 5 days post discharge  Contact information:  [address] [facility] [address]  [place] [address] [phone]               Primary Doctor No Follow up in 1 week(s).                           For further questions contact hospitalist office    Discharge time 33 minutes    For worsening symptoms, chest pain, shortness of breath, increased abdominal pain, high grade fever, stroke or stroke like symptoms, immediately go to the nearest Emergency Room or call 911 as soon as  possible.      Aziza Ham M.D, on 11/4/2022. at 8:00 AM.

## 2022-11-05 LAB
ALBUMIN SERPL-MCNC: 3.3 GM/DL (ref 3.5–5)
ALBUMIN/GLOB SERPL: 1.1 RATIO (ref 1.1–2)
ALP SERPL-CCNC: 117 UNIT/L (ref 40–150)
ALT SERPL-CCNC: 43 UNIT/L (ref 0–55)
AMPHET UR QL SCN: NEGATIVE
AST SERPL-CCNC: 61 UNIT/L (ref 5–34)
BARBITURATE SCN PRESENT UR: NEGATIVE
BASOPHILS # BLD AUTO: 0.03 X10(3)/MCL (ref 0–0.2)
BASOPHILS NFR BLD AUTO: 0.4 %
BENZODIAZ UR QL SCN: NEGATIVE
BILIRUBIN DIRECT+TOT PNL SERPL-MCNC: 0.4 MG/DL
BUN SERPL-MCNC: 20.9 MG/DL (ref 8.4–25.7)
CALCIUM SERPL-MCNC: 8.9 MG/DL (ref 8.4–10.2)
CANNABINOIDS UR QL SCN: NEGATIVE
CHLORIDE SERPL-SCNC: 104 MMOL/L (ref 98–107)
CO2 SERPL-SCNC: 25 MMOL/L (ref 22–29)
COCAINE UR QL SCN: NEGATIVE
CREAT SERPL-MCNC: 1.13 MG/DL (ref 0.73–1.18)
DIGOXIN SERPL-MCNC: 0.7 NG/ML (ref 0.8–2)
EOSINOPHIL # BLD AUTO: 0.13 X10(3)/MCL (ref 0–0.9)
EOSINOPHIL NFR BLD AUTO: 1.7 %
ERYTHROCYTE [DISTWIDTH] IN BLOOD BY AUTOMATED COUNT: 18 % (ref 11.5–17)
FENTANYL UR QL SCN: NEGATIVE
GFR SERPLBLD CREATININE-BSD FMLA CKD-EPI: >60 MLS/MIN/1.73/M2
GLOBULIN SER-MCNC: 2.9 GM/DL (ref 2.4–3.5)
GLUCOSE SERPL-MCNC: 116 MG/DL (ref 74–100)
HCT VFR BLD AUTO: 36 % (ref 42–52)
HGB BLD-MCNC: 11.3 GM/DL (ref 14–18)
IMM GRANULOCYTES # BLD AUTO: 0.03 X10(3)/MCL (ref 0–0.04)
IMM GRANULOCYTES NFR BLD AUTO: 0.4 %
LYMPHOCYTES # BLD AUTO: 1.46 X10(3)/MCL (ref 0.6–4.6)
LYMPHOCYTES NFR BLD AUTO: 19.5 %
MAGNESIUM SERPL-MCNC: 2.1 MG/DL (ref 1.6–2.6)
MCH RBC QN AUTO: 22.9 PG (ref 27–31)
MCHC RBC AUTO-ENTMCNC: 31.4 MG/DL (ref 33–36)
MCV RBC AUTO: 73 FL (ref 80–94)
MDMA UR QL SCN: NEGATIVE
MONOCYTES # BLD AUTO: 1 X10(3)/MCL (ref 0.1–1.3)
MONOCYTES NFR BLD AUTO: 13.3 %
NEUTROPHILS # BLD AUTO: 4.9 X10(3)/MCL (ref 2.1–9.2)
NEUTROPHILS NFR BLD AUTO: 64.7 %
NRBC BLD AUTO-RTO: 0 %
OPIATES UR QL SCN: NEGATIVE
PCP UR QL: NEGATIVE
PH UR: 6 [PH] (ref 3–11)
PHOSPHATE SERPL-MCNC: 3.6 MG/DL (ref 2.3–4.7)
PLATELET # BLD AUTO: 161 X10(3)/MCL (ref 130–400)
PMV BLD AUTO: 9.6 FL (ref 7.4–10.4)
POTASSIUM SERPL-SCNC: 4.5 MMOL/L (ref 3.5–5.1)
PROT SERPL-MCNC: 6.2 GM/DL (ref 6.4–8.3)
RBC # BLD AUTO: 4.93 X10(6)/MCL (ref 4.7–6.1)
SODIUM SERPL-SCNC: 137 MMOL/L (ref 136–145)
SPECIFIC GRAVITY, URINE AUTO (.000) (OHS): 1.02 (ref 1–1.03)
TROPONIN I SERPL-MCNC: 0.06 NG/ML (ref 0–0.04)
TROPONIN I SERPL-MCNC: 0.06 NG/ML (ref 0–0.04)
TROPONIN I SERPL-MCNC: 0.08 NG/ML (ref 0–0.04)
WBC # SPEC AUTO: 7.5 X10(3)/MCL (ref 4.5–11.5)

## 2022-11-05 PROCEDURE — 93010 ELECTROCARDIOGRAM REPORT: CPT | Mod: 77,,, | Performed by: INTERNAL MEDICINE

## 2022-11-05 PROCEDURE — 93010 ELECTROCARDIOGRAM REPORT: CPT | Mod: ,,, | Performed by: INTERNAL MEDICINE

## 2022-11-05 PROCEDURE — 36415 COLL VENOUS BLD VENIPUNCTURE: CPT | Performed by: INTERNAL MEDICINE

## 2022-11-05 PROCEDURE — 84484 ASSAY OF TROPONIN QUANT: CPT | Performed by: INTERNAL MEDICINE

## 2022-11-05 PROCEDURE — 63600175 PHARM REV CODE 636 W HCPCS: Performed by: INTERNAL MEDICINE

## 2022-11-05 PROCEDURE — 63600175 PHARM REV CODE 636 W HCPCS: Performed by: EMERGENCY MEDICINE

## 2022-11-05 PROCEDURE — 83735 ASSAY OF MAGNESIUM: CPT | Performed by: NURSE PRACTITIONER

## 2022-11-05 PROCEDURE — 93005 ELECTROCARDIOGRAM TRACING: CPT

## 2022-11-05 PROCEDURE — 25000003 PHARM REV CODE 250: Performed by: INTERNAL MEDICINE

## 2022-11-05 PROCEDURE — 96372 THER/PROPH/DIAG INJ SC/IM: CPT | Performed by: EMERGENCY MEDICINE

## 2022-11-05 PROCEDURE — 93010 EKG 12-LEAD: ICD-10-PCS | Mod: 77,,, | Performed by: INTERNAL MEDICINE

## 2022-11-05 PROCEDURE — 80053 COMPREHEN METABOLIC PANEL: CPT | Performed by: NURSE PRACTITIONER

## 2022-11-05 PROCEDURE — 93010 EKG 12-LEAD: ICD-10-PCS | Mod: ,,, | Performed by: INTERNAL MEDICINE

## 2022-11-05 PROCEDURE — 85025 COMPLETE CBC W/AUTO DIFF WBC: CPT | Performed by: NURSE PRACTITIONER

## 2022-11-05 PROCEDURE — 21400001 HC TELEMETRY ROOM

## 2022-11-05 PROCEDURE — 11000001 HC ACUTE MED/SURG PRIVATE ROOM

## 2022-11-05 PROCEDURE — 84100 ASSAY OF PHOSPHORUS: CPT | Performed by: NURSE PRACTITIONER

## 2022-11-05 RX ORDER — NAPROXEN SODIUM 220 MG/1
81 TABLET, FILM COATED ORAL DAILY
Status: DISCONTINUED | OUTPATIENT
Start: 2022-11-05 | End: 2022-11-07 | Stop reason: HOSPADM

## 2022-11-05 RX ORDER — ALPRAZOLAM 0.25 MG/1
0.25 TABLET ORAL NIGHTLY PRN
Status: DISCONTINUED | OUTPATIENT
Start: 2022-11-05 | End: 2022-11-07 | Stop reason: HOSPADM

## 2022-11-05 RX ORDER — PROCHLORPERAZINE EDISYLATE 5 MG/ML
5 INJECTION INTRAMUSCULAR; INTRAVENOUS EVERY 6 HOURS PRN
Status: DISCONTINUED | OUTPATIENT
Start: 2022-11-05 | End: 2022-11-07 | Stop reason: HOSPADM

## 2022-11-05 RX ORDER — ISOSORBIDE MONONITRATE 30 MG/1
30 TABLET, EXTENDED RELEASE ORAL DAILY
Status: DISCONTINUED | OUTPATIENT
Start: 2022-11-06 | End: 2022-11-07 | Stop reason: HOSPADM

## 2022-11-05 RX ORDER — LEVETIRACETAM 500 MG/1
500 TABLET ORAL 2 TIMES DAILY
Status: DISCONTINUED | OUTPATIENT
Start: 2022-11-05 | End: 2022-11-07 | Stop reason: HOSPADM

## 2022-11-05 RX ORDER — OXCARBAZEPINE 300 MG/1
300 TABLET, FILM COATED ORAL 3 TIMES DAILY
Status: DISCONTINUED | OUTPATIENT
Start: 2022-11-05 | End: 2022-11-07 | Stop reason: HOSPADM

## 2022-11-05 RX ORDER — NITROGLYCERIN 0.4 MG/1
0.4 TABLET SUBLINGUAL EVERY 5 MIN PRN
Status: DISCONTINUED | OUTPATIENT
Start: 2022-11-05 | End: 2022-11-07 | Stop reason: HOSPADM

## 2022-11-05 RX ORDER — ATORVASTATIN CALCIUM 40 MG/1
80 TABLET, FILM COATED ORAL DAILY
Status: DISCONTINUED | OUTPATIENT
Start: 2022-11-05 | End: 2022-11-07 | Stop reason: HOSPADM

## 2022-11-05 RX ORDER — POLYETHYLENE GLYCOL 3350 17 G/17G
17 POWDER, FOR SOLUTION ORAL 2 TIMES DAILY PRN
Status: DISCONTINUED | OUTPATIENT
Start: 2022-11-05 | End: 2022-11-07 | Stop reason: HOSPADM

## 2022-11-05 RX ORDER — ACETAMINOPHEN 500 MG
1000 TABLET ORAL EVERY 6 HOURS PRN
Status: DISCONTINUED | OUTPATIENT
Start: 2022-11-05 | End: 2022-11-07 | Stop reason: HOSPADM

## 2022-11-05 RX ORDER — ONDANSETRON 2 MG/ML
4 INJECTION INTRAMUSCULAR; INTRAVENOUS EVERY 4 HOURS PRN
Status: DISCONTINUED | OUTPATIENT
Start: 2022-11-05 | End: 2022-11-07 | Stop reason: HOSPADM

## 2022-11-05 RX ORDER — SIMETHICONE 80 MG
1 TABLET,CHEWABLE ORAL 4 TIMES DAILY PRN
Status: DISCONTINUED | OUTPATIENT
Start: 2022-11-05 | End: 2022-11-07 | Stop reason: HOSPADM

## 2022-11-05 RX ORDER — MAG HYDROX/ALUMINUM HYD/SIMETH 200-200-20
30 SUSPENSION, ORAL (FINAL DOSE FORM) ORAL 4 TIMES DAILY PRN
Status: DISCONTINUED | OUTPATIENT
Start: 2022-11-05 | End: 2022-11-07 | Stop reason: HOSPADM

## 2022-11-05 RX ORDER — AMOXICILLIN 250 MG
1 CAPSULE ORAL 2 TIMES DAILY PRN
Status: DISCONTINUED | OUTPATIENT
Start: 2022-11-05 | End: 2022-11-07 | Stop reason: HOSPADM

## 2022-11-05 RX ORDER — BENZTROPINE MESYLATE 1 MG/1
1 TABLET ORAL 2 TIMES DAILY
Status: DISCONTINUED | OUTPATIENT
Start: 2022-11-05 | End: 2022-11-07 | Stop reason: HOSPADM

## 2022-11-05 RX ORDER — FUROSEMIDE 40 MG/1
40 TABLET ORAL DAILY
Status: DISCONTINUED | OUTPATIENT
Start: 2022-11-05 | End: 2022-11-07 | Stop reason: HOSPADM

## 2022-11-05 RX ORDER — DIGOXIN 125 MCG
0.12 TABLET ORAL DAILY
Status: DISCONTINUED | OUTPATIENT
Start: 2022-11-05 | End: 2022-11-06

## 2022-11-05 RX ORDER — TRAZODONE HYDROCHLORIDE 100 MG/1
100 TABLET ORAL NIGHTLY
Status: DISCONTINUED | OUTPATIENT
Start: 2022-11-05 | End: 2022-11-07 | Stop reason: HOSPADM

## 2022-11-05 RX ORDER — ARIPIPRAZOLE 5 MG/1
10 TABLET ORAL DAILY
Status: DISCONTINUED | OUTPATIENT
Start: 2022-11-05 | End: 2022-11-07 | Stop reason: HOSPADM

## 2022-11-05 RX ORDER — MORPHINE SULFATE 4 MG/ML
2 INJECTION, SOLUTION INTRAMUSCULAR; INTRAVENOUS EVERY 4 HOURS PRN
Status: DISCONTINUED | OUTPATIENT
Start: 2022-11-05 | End: 2022-11-07 | Stop reason: HOSPADM

## 2022-11-05 RX ORDER — TALC
6 POWDER (GRAM) TOPICAL NIGHTLY PRN
Status: DISCONTINUED | OUTPATIENT
Start: 2022-11-05 | End: 2022-11-07 | Stop reason: HOSPADM

## 2022-11-05 RX ORDER — BENZTROPINE MESYLATE 1 MG/1
1 TABLET ORAL 2 TIMES DAILY PRN
Status: DISCONTINUED | OUTPATIENT
Start: 2022-11-05 | End: 2022-11-07 | Stop reason: HOSPADM

## 2022-11-05 RX ORDER — CARVEDILOL 12.5 MG/1
25 TABLET ORAL 2 TIMES DAILY
Status: DISCONTINUED | OUTPATIENT
Start: 2022-11-05 | End: 2022-11-06

## 2022-11-05 RX ORDER — SODIUM CHLORIDE 0.9 % (FLUSH) 0.9 %
10 SYRINGE (ML) INJECTION
Status: DISCONTINUED | OUTPATIENT
Start: 2022-11-05 | End: 2022-11-07 | Stop reason: HOSPADM

## 2022-11-05 RX ORDER — ACETAMINOPHEN 325 MG/1
650 TABLET ORAL EVERY 4 HOURS PRN
Status: DISCONTINUED | OUTPATIENT
Start: 2022-11-05 | End: 2022-11-07 | Stop reason: HOSPADM

## 2022-11-05 RX ORDER — HALOPERIDOL 5 MG/ML
5 INJECTION INTRAMUSCULAR
Status: COMPLETED | OUTPATIENT
Start: 2022-11-05 | End: 2022-11-05

## 2022-11-05 RX ORDER — LEVALBUTEROL 1.25 MG/.5ML
1.25 SOLUTION, CONCENTRATE RESPIRATORY (INHALATION) EVERY 6 HOURS SCHEDULED
Status: DISCONTINUED | OUTPATIENT
Start: 2022-11-06 | End: 2022-11-07 | Stop reason: HOSPADM

## 2022-11-05 RX ORDER — CLOPIDOGREL BISULFATE 75 MG/1
75 TABLET ORAL DAILY
Status: DISCONTINUED | OUTPATIENT
Start: 2022-11-06 | End: 2022-11-07

## 2022-11-05 RX ORDER — FAMOTIDINE 20 MG/1
20 TABLET, FILM COATED ORAL 2 TIMES DAILY
Status: DISCONTINUED | OUTPATIENT
Start: 2022-11-05 | End: 2022-11-07 | Stop reason: HOSPADM

## 2022-11-05 RX ADMIN — TRAZODONE HYDROCHLORIDE 100 MG: 100 TABLET ORAL at 09:11

## 2022-11-05 RX ADMIN — AMIODARONE HYDROCHLORIDE 150 MG: 1.5 INJECTION, SOLUTION INTRAVENOUS at 05:11

## 2022-11-05 RX ADMIN — FAMOTIDINE 20 MG: 20 TABLET, FILM COATED ORAL at 09:11

## 2022-11-05 RX ADMIN — APIXABAN 5 MG: 5 TABLET, FILM COATED ORAL at 08:11

## 2022-11-05 RX ADMIN — ALPRAZOLAM 0.25 MG: 0.25 TABLET ORAL at 09:11

## 2022-11-05 RX ADMIN — OXCARBAZEPINE 300 MG: 300 TABLET, FILM COATED ORAL at 08:11

## 2022-11-05 RX ADMIN — BENZTROPINE MESYLATE 1 MG: 1 TABLET ORAL at 09:11

## 2022-11-05 RX ADMIN — LEVETIRACETAM 500 MG: 500 TABLET, FILM COATED ORAL at 09:11

## 2022-11-05 RX ADMIN — LEVETIRACETAM 500 MG: 500 TABLET, FILM COATED ORAL at 08:11

## 2022-11-05 RX ADMIN — FUROSEMIDE 40 MG: 10 INJECTION, SOLUTION INTRAMUSCULAR; INTRAVENOUS at 12:11

## 2022-11-05 RX ADMIN — ARIPIPRAZOLE 10 MG: 5 TABLET ORAL at 08:11

## 2022-11-05 RX ADMIN — CARVEDILOL 25 MG: 12.5 TABLET, FILM COATED ORAL at 08:11

## 2022-11-05 RX ADMIN — AMIODARONE HYDROCHLORIDE 0.5 MG/MIN: 1.8 INJECTION, SOLUTION INTRAVENOUS at 09:11

## 2022-11-05 RX ADMIN — FUROSEMIDE 40 MG: 40 TABLET ORAL at 08:11

## 2022-11-05 RX ADMIN — AMIODARONE HYDROCHLORIDE 1 MG/MIN: 1.8 INJECTION, SOLUTION INTRAVENOUS at 05:11

## 2022-11-05 RX ADMIN — MORPHINE SULFATE 2 MG: 4 INJECTION INTRAVENOUS at 04:11

## 2022-11-05 RX ADMIN — AMIODARONE HYDROCHLORIDE 0.5 MG/MIN: 1.8 INJECTION, SOLUTION INTRAVENOUS at 02:11

## 2022-11-05 RX ADMIN — BENZTROPINE MESYLATE 1 MG: 1 TABLET ORAL at 08:11

## 2022-11-05 RX ADMIN — APIXABAN 5 MG: 5 TABLET, FILM COATED ORAL at 09:11

## 2022-11-05 RX ADMIN — OXCARBAZEPINE 300 MG: 300 TABLET, FILM COATED ORAL at 02:11

## 2022-11-05 RX ADMIN — FAMOTIDINE 20 MG: 20 TABLET, FILM COATED ORAL at 08:11

## 2022-11-05 RX ADMIN — ATORVASTATIN CALCIUM 80 MG: 40 TABLET, FILM COATED ORAL at 08:11

## 2022-11-05 RX ADMIN — OXCARBAZEPINE 300 MG: 300 TABLET, FILM COATED ORAL at 09:11

## 2022-11-05 RX ADMIN — ASPIRIN 81 MG CHEWABLE TABLET 81 MG: 81 TABLET CHEWABLE at 08:11

## 2022-11-05 RX ADMIN — HALOPERIDOL LACTATE 5 MG: 5 INJECTION, SOLUTION INTRAMUSCULAR at 01:11

## 2022-11-05 NOTE — H&P
[facility]  Hospital Medicine History & Physical Examination       Patient Name: Kendall Duff  MRN: [id number]  Patient Class: IP- Inpatient   Admission Date: 11/05/2022   Admitting Service: Hospital Medicine   Length of Stay: 0  Attending Physician: Dr. Mann  Primary Care Provider: Primary Doctor No  Face-to-Face encounter date: 11/05/2022  Code Status: Full  Chief Complaint: AICD Problem (Pt d/c'd today from floor for CHF, d/c'd w/ LifeVest, pt states he was taking out his trash and LifeVest shocked him, denies palpitations, SOB, or CP prior to defib. Pt has LifeVest still on. )    Source of Information: Patient. Medical Records      HISTORY OF PRESENT ILLNESS:   Kendall Duff is a 57 y.o. male with a PMHx of ICMO with LifeVest in place, HFrEF 20%, RUE DVT on Eliquis, HTN, Bipolar Disorder, Schizoaffective Disorder, Chronic Hep C, Seizures, CAD s/p CABG, and new onset Afib/Flutter who presented to [facility] on 11/4/2022 after being shocked by his LifeVEst around 1500 on 11/4/22.  He denied fever, SOB, cough, wheezing, abdominal pain, N/V/D.    Of note, patient was discharged on 11/4/22 from our service, where he was admitted with CHF exacerbation and new onset afib/flutter.     Initial ED VS include /63, HR 77, RR 15, SpO2 100%, temperature 99.7° F. Labs notable for hemoglobin 11, hematocrit 34.6, BNP 18 80.4, digoxin level 0.7.  UDS unremarkable.  CXR revealed cardiomegaly without acute cardiopulmonary process.  He then c/o CP. Initial troponin was 0.041 and repeat was 0.058. Cardiology Services were consulted.  He was admitted to hospital medicine services for further workup and management of care.    REVIEW OF SYSTEMS:   Except as documented, all other systems reviewed and negative     PAST MEDICAL HISTORY:   Multivessel CAD s/p CABG x4, 04/06/2022  MRSA sternal wound infection and dehiscence, status post wound vacuum-assisted closure device, 04/17/2022, and bilateral pectoral  flaps reconstruction for sternal wound closure, 05/11/2022  Hx of RUE DVT, on Eliquis  Bipolar Disorder and Schizoaffective Disorder  Chronic hepatitis C  HTN  Seizure Disorder  CAD s/p stents   ICMO - LifeVest  HFrEF 20%   Grade 2 DD  VHD  Hx of Cocaine abuse  Afib/Flutter    PAST SURGICAL HISTORY:   CABG x4   Coronary stents   Sternal reconstruction  Hx of Tracheostomy, Peg tube placement    FAMILY HISTORY:   Reviewed and negative    SOCIAL HISTORY:   Denied alcohol, or illicit drug use. Smokes cigarettes. Last cocaine use x7 months ago.     ALLERGIES:   Depakote [divalproex], Divalproex sodium, Lithium, Lithium analogues, and Quetiapine    HOME MEDICATIONS:     Prior to Admission medications    Medication Sig Start Date End Date Taking? Authorizing Provider   apixaban (ELIQUIS) 5 mg Tab Take 1 tablet (5 mg total) by mouth 2 (two) times daily. 7/30/22  Yes Taz Sanon MD   ARIPiprazole (ABILIFY) 10 MG Tab Take 1 tablet (10 mg total) by mouth once daily. 11/4/22 11/4/23 Yes Aziza Hernandez MD   aspirin 81 MG Chew Chew and swallow 1 tablet (81 mg total) by mouth once daily. 7/26/22 7/26/23 Yes Harjit Ruiz MD   atorvastatin (LIPITOR) 80 MG tablet Take 1 tablet (80 mg total) by mouth once daily. 11/4/22 11/4/23 Yes Aziza Hernandez MD   carvediloL (COREG) 25 MG tablet Take 1 tablet (25 mg total) by mouth 2 (two) times daily. 11/3/22 11/3/23 Yes RAY Patel   digoxin (LANOXIN) 125 mcg tablet Take 1 tablet (0.125 mg total) by mouth once daily. 11/3/22 11/3/23 Yes RAY Patel   famotidine (PEPCID) 20 MG tablet Take 1 tablet (20 mg total) by mouth 2 (two) times daily. 11/4/22 11/4/23 Yes Aziza Hernandez MD   furosemide (LASIX) 40 MG tablet Take 1 tablet (40 mg total) by mouth once daily. 11/4/22 11/4/23 Yes RAY Patel   levETIRAcetam (KEPPRA) 500 MG Tab Take 1 tablet (500 mg total) by mouth 2 (two) times daily. 11/4/22 11/4/23 Yes Aziza Hernandez MD   OXcarbazepine  (TRILEPTAL) 300 MG Tab Take 300 mg by mouth 3 (three) times daily.   Yes Historical Provider   traZODone (DESYREL) 100 MG tablet Take 1 tablet (100 mg total) by mouth every evening. 6/17/22 6/17/23 Yes Jarrett Yarbrough MD   benztropine (COGENTIN) 1 MG tablet Take 1 mg by mouth 2 (two) times daily.    Historical Provider   benztropine (COGENTIN) 1 MG tablet Take 1 tablet (1 mg total) by mouth 2 (two) times daily as needed (spasms). 11/4/22 12/4/22  Aziza Hernandez MD   clopidogreL (PLAVIX) 75 mg tablet Take 1 tablet (75 mg total) by mouth once daily. 3/25/22 6/17/22  Laisha May, MARINA   metoprolol succinate (TOPROL-XL) 25 MG 24 hr tablet Take 25 mg by mouth once daily. 3/27/22 6/17/22  Historical Provider   pravastatin (PRAVACHOL) 40 MG tablet Take 1 tablet (40 mg total) by mouth every evening. 3/25/22 6/17/22  Laisha May, MARINA       __________________________________________________________________________  INPATIENT LIST OF MEDICATIONS     Current Facility-Administered Medications:     acetaminophen tablet 1,000 mg, 1,000 mg, Oral, Q6H PRN, Belinda Berumen MD    acetaminophen tablet 650 mg, 650 mg, Oral, Q4H PRN, Belinda Berumen MD    aluminum-magnesium hydroxide-simethicone 200-200-20 mg/5 mL suspension 30 mL, 30 mL, Oral, QID PRN, Belinda Berumen MD    amiodarone 360 mg/200 mL (1.8 mg/mL) infusion, 1 mg/min, Intravenous, Continuous, Belinda Berumen MD, Last Rate: 33.3 mL/hr at 11/05/22 0517, 1 mg/min at 11/05/22 0517    amiodarone 360 mg/200 mL (1.8 mg/mL) infusion, 0.5 mg/min, Intravenous, Continuous, Belinda Berumen MD    apixaban tablet 5 mg, 5 mg, Oral, BID, Belinda Berumen MD, 5 mg at 11/05/22 0827    ARIPiprazole tablet 10 mg, 10 mg, Oral, Daily, Belinda Berumen MD, 10 mg at 11/05/22 0827    aspirin chewable tablet 81 mg, 81 mg, Oral, Daily, Belinda Bermuen MD, 81 mg at 11/05/22 0827    atorvastatin tablet 80 mg, 80 mg, Oral, Daily, Belinda Berumen MD, 80 mg at 11/05/22 0827    benztropine tablet 1 mg, 1 mg, Oral, BID, Ali M  MD Ata, 1 mg at 11/05/22 0827    benztropine tablet 1 mg, 1 mg, Oral, BID PRN, Belinda Berumen MD    carvediloL tablet 25 mg, 25 mg, Oral, BID, Belinda Berumen MD, 25 mg at 11/05/22 0827    digoxin tablet 0.125 mg, 0.125 mg, Oral, Daily, Yvonne Younger, [facility]    famotidine tablet 20 mg, 20 mg, Oral, BID, Belinda Berumen MD, 20 mg at 11/05/22 0827    furosemide tablet 40 mg, 40 mg, Oral, Daily, Belinda Berumen MD, 40 mg at 11/05/22 0826    levETIRAcetam tablet 500 mg, 500 mg, Oral, BID, Belinda Berumen MD, 500 mg at 11/05/22 0827    melatonin tablet 6 mg, 6 mg, Oral, Nightly PRN, Belinda Berumen MD    morphine injection 2 mg, 2 mg, Intravenous, Q4H PRN, Belinda Berumen MD, 2 mg at 11/05/22 0448    nitroGLYCERIN SL tablet 0.4 mg, 0.4 mg, Sublingual, Q5 Min PRN, Belinda Berumen MD    ondansetron injection 4 mg, 4 mg, Intravenous, Q4H PRN, Belinda Berumen MD    OXcarbazepine tablet 300 mg, 300 mg, Oral, TID, Belinda Berumen MD, 300 mg at 11/05/22 0827    polyethylene glycol packet 17 g, 17 g, Oral, BID PRN, Belinda Berumen MD    prochlorperazine injection Soln 5 mg, 5 mg, Intravenous, Q6H PRN, Belinda Berumen MD    senna-docusate 8.6-50 mg per tablet 1 tablet, 1 tablet, Oral, BID PRN, Belinda Berumen MD    simethicone chewable tablet 80 mg, 1 tablet, Oral, QID PRN, Belinda Berumen MD    sodium chloride 0.9% flush 10 mL, 10 mL, Intravenous, PRN, Belinda Berumen MD    traZODone tablet 100 mg, 100 mg, Oral, QHS, Belinda Berumen MD    Scheduled Meds:   apixaban  5 mg Oral BID    ARIPiprazole  10 mg Oral Daily    aspirin  81 mg Oral Daily    atorvastatin  80 mg Oral Daily    benztropine  1 mg Oral BID    carvediloL  25 mg Oral BID    digoxin  0.125 mg Oral Daily    famotidine  20 mg Oral BID    furosemide  40 mg Oral Daily    levETIRAcetam  500 mg Oral BID    OXcarbazepine  300 mg Oral TID    traZODone  100 mg Oral QHS     Continuous Infusions:   amiodarone in dextrose 5% 1 mg/min (11/05/22 0517)    amiodarone in dextrose 5%       PRN Meds:.acetaminophen, acetaminophen,  aluminum-magnesium hydroxide-simethicone, benztropine, melatonin, morphine, nitroGLYCERIN, ondansetron, polyethylene glycol, prochlorperazine, senna-docusate 8.6-50 mg, simethicone, sodium chloride 0.9%    PHYSICAL EXAM:     VITAL SIGNS: 24 HRS MIN & MAX LAST   Temp  Min: 97.2 °F (36.2 °C)  Max: 99.7 °F (37.6 °C) 97.2 °F (36.2 °C)   BP  Min: 101/63  Max: 138/103 (!) 125/91     Pulse  Min: 58  Max: 107  66   Resp  Min: 13  Max: 26 20   SpO2  Min: 95 %  Max: 100 % 100 %       General appearance: Well-developed male in no apparent distress.  HENT: Atraumatic head. Moist mucous membranes of oral cavity.  Eyes: Normal extraocular movements.   Neck: Supple.   Lungs: Clear to auscultation bilaterally.   Heart: Regular rate and rhythm. S1 and S2 present. No pedal edema.  Abdomen: Soft, non-distended, non-tender.  Extremities: No cyanosis, clubbing, or edema.  Skin: No Rash.   Neuro: Motor and sensory exams grossly intact.   Psych/mental status: Appropriate mood and affect. Responds appropriately to questions.     LABS AND IMAGING:     Recent Labs   Lab 11/03/22  1205 11/04/22 2051 11/05/22 0331   WBC 7.4 8.6 7.5   RBC 5.43 4.72 4.93   HGB 12.6* 11.0* 11.3*   HCT 40.5* 34.6* 36.0*   MCV 74.6* 73.3* 73.0*   MCH 23.2* 23.3* 22.9*   MCHC 31.1* 31.8* 31.4*   RDW 18.8* 17.7* 18.0*    204 161   MPV 9.4 9.7 9.6       Recent Labs   Lab 10/31/22  0409 11/01/22  0617 11/02/22  0405 11/03/22  1205 11/04/22  0450 11/04/22 2051 11/05/22  0331      < > 137   < > 138 139 137   K 3.6   < > 5.6*   < > 4.5 4.2 4.5   CO2 29   < > 25   < > 26 28 25   BUN 30.6*   < > 29.4*   < > 16.8 19.2 20.9   CREATININE 1.31*   < > 1.26*   < > 1.03 1.15 1.13   CALCIUM 9.0   < > 9.0   < > 8.7 8.7 8.9   MG 1.90   < > 2.30  --   --  2.20 2.10   ALBUMIN 3.3*  --   --   --   --  3.4* 3.3*   ALKPHOS 95  --   --   --   --  118 117   ALT 21  --   --   --   --  36 43   AST 26  --   --   --   --  50* 61*   BILITOT 0.7  --   --   --   --  0.4 0.4    < >  = values in this interval not displayed.       Microbiology Results (last 7 days)       ** No results found for the last 168 hours. **             X-Ray Chest 1 View  Narrative: EXAMINATION:  XR CHEST 1 VIEW    CLINICAL HISTORY:  Encounter for adjustment and management of automatic implantable cardiac defibrillator    TECHNIQUE:  Single frontal view of the chest was performed.    COMPARISON:  10/27/2022    FINDINGS:  The heart size enlarged the pulmonary vasculature is normal.    Coarse interstitial markings lungs with bibasilar atelectatic changes.  No evidence for effusion.    External defibrillator device is identified.  Impression: Cardiomegaly without acute cardiopulmonary process.    Electronically signed by: Kendall Witt MD  Date:    11/04/2022  Time:    21:49        ASSESSMENT & PLAN:     Life Vest Discharge/Shock   Chronic Combined Systolic/Diastolic HF - EF 20%    - Grade 2 DD  ICMO - With Life Vest  Essential Hypertension  PAF/PAFL with RVR  Elevated Troponin, Likely NSTEMI, Type II  CAD s/p CABG (April 2022)  History of RUE DVT on Eliquis  Schizoaffective Disorder/Bipolar DIsorder  Tobacco Abuse  Hx of Hep C, cocaine Abuse, VHD, Pulmonary HTN    Plan:  Cardiology consulted, appreciate assistance and recommendations  Trend Troponin x3  Amiodarone gtt per Cardiology  Cardiac Monitoring  Resume appropriate home medications  Labs in AM      VTE Prophylaxis: Eliquis    Discharge Planning and Disposition: TBD    I, Gabriela Hopper, NP have reviewed and discussed the case with Dr. Mann.  Please see the attending MD's addendum for further assessment and plan.    Gabriela Hopper, AGACNP-BC  11/05/2022    _______________________________________________________________________________  MD Addendum:  IDr. Mann assumed care of this patient today at around 11:00 a.m.  For the patient encounter, I performed the substantive portion of the visit, I reviewed the NP/PA documentation, treatment plan, and medical  decision making.  I had face to face time with this patient     A. History:  57-year-old male with significant history of CAD, ischemic cardiomyopathy with ejection fraction-20%, HTN, schizoaffective disorder, hep C, DVT, systolic and diastolic dysfunction.  Patient had a recent hospital admission and was discharged on 11/04.  He was then admitted for new onset atrial fibrillation/flutter along with CHF exacerbation.  Patient presented back to the ED after being shocked by his LifeVest around 1500 on 11/04.  Patient was hemodynamically stable in the ED. lab significant for elevated BNP.  UDS negative.  Chest x-ray negative.  Troponin slightly elevated.  Cardiology services consulted the patient was initiated on amiodarone drip.  Hospitalist team consulted for admission.    B. Physical exam:  As above    C. Medical decision making:    LifeVest discharge/shock-atrial flutter RVR  Combined systolic/diastolic heart failure with ejection fraction-20%-compensated   Ischemic cardiomyopathy status post LifeVest placement   History of CAD   Valvular heart disease  Medical noncompliance   Pulmonary hypertension   Mildly elevated troponins-possible NSTEMI type 2   History of right upper extremity DVT  Schizoaffective disorder   Chronic hep C   History of polysubstance abuse   Chronic tobacco use   History of seizure disorder  Prophylaxis    Patient was initiated on amiodarone GTT by Cardiology  Now NSR   Follow Cardiology recommendations   Asymptomatic since admit   No chest pain, palpitation  Heart failure appears compensated   Continue Eliquis for thromboembolic prophylaxis   Continue Coreg, digoxin in addition to amiodarone   Also resumed other home meds-aripiprazole, aspirin, statin, benztropine, Pepcid   Continue p.o. Lasix  Continue Keppra, Trileptal, trazodone   DVT prophylaxis-on Eliquis    Critical care time-45 minutes  Critical care diagnosis-atrial flutter with RVR requiring amiodarone GTT   Critical care  interventions: Hands-on evaluation, review of labs/radiographs/records and discussions with patient     All diagnosis and differential diagnosis have been reviewed; assessment and plan has been documented; I have personally reviewed the labs and test results that are presently available; I have reviewed the patients medication list; I have reviewed the consulting providers response and recommendations. I have reviewed or attempted to review medical records based upon their availability.    All of the patient and family questions have been addressed and answered. Patient's is agreeable to the above stated plan. I will continue to monitor closely and make adjustments to medical management as needed.      11/05/2022

## 2022-11-05 NOTE — ED PROVIDER NOTES
Encounter Date: 11/4/2022    SCRIBE #1 NOTE: I, Faraz Harrison, am scribing for, and in the presence of,  Dr. Wynn. I have scribed the following portions of the note - the EKG reading. Other sections scribed: HPI, ROS, Physical Exam, MDM, Attending.     History     Chief Complaint   Patient presents with    AICD Problem     Pt d/c'd today from floor for CHF, d/c'd w/ LifeVest, pt states he was taking out his trash and LifeVest shocked him, denies palpitations, SOB, or CP prior to defib. Pt has LifeVest still on.      58 y/o AAM with history of HTN, seizures, CVA, hepatitis C, CAD s/p stent placement, bipolar and schizoaffective disorder presents to ED after his LifeVest shocked him while taking the trash out around [id number].  Pt says he was having chest pain when the vest shocked him.  This is the first time it has shocked him.  He says his pain has resolved now.  Pt was discharged this morning on digoxin after being admitted for CHF.  He is on thinners.  Pt's cardiologist is Dr. Wright.    The history is provided by the patient.   Chest Pain  Illness onset: about 5-6 hours ago. The chest pain is resolved. The chest pain is currently at 0/10. Pertinent negatives for primary symptoms include no fever, no shortness of breath, no cough, no wheezing, no abdominal pain, no nausea, no vomiting and no dizziness.   Pertinent negatives for associated symptoms include no weakness. Risk factors include male gender.   His past medical history is significant for CAD, CHF, hypertension and strokes.   Review of patient's allergies indicates:   Allergen Reactions    Depakote [divalproex]     Divalproex sodium Other (See Comments)    Lithium     Lithium analogues     Quetiapine Other (See Comments)     Past Medical History:   Diagnosis Date    Bipolar disorder, unspecified     Chronic hepatitis C     History of psychiatric hospitalization     Hx of psychiatric care     Hypertension     Bessie     Obesity, unspecified      Psychiatric problem     Schizoaffective disorder, bipolar type     Seizures     Stroke     Substance abuse     Therapy      Past Surgical History:   Procedure Laterality Date    CORONARY STENT PLACEMENT  08/14/2015    DEBRIDEMENT  04/17/2022    LEFT HEART CATHETERIZATION Left 08/13/2015    REPEAT CLOSURE OF STERNAL INCISION N/A 5/11/2022    Procedure: CLOSURE, STERNAL INCISION, REPEAT;  Surgeon: Adolfo Weiss MD;  Location: [facility] OR;  Service: Plastics;  Laterality: N/A;  STERNAL WOUND DEBRIDEMENT AND RECONSTRUCTION // MULTIPLE MUSCLE FLAPS // REQ 1400     Family History   Problem Relation Age of Onset    Cancer Mother     Liver disease Father      Social History     Tobacco Use    Smoking status: Every Day     Types: Cigarettes    Smokeless tobacco: Never   Substance Use Topics    Alcohol use: Never    Drug use: Not Currently     Types: Cocaine     Review of Systems   Constitutional:  Negative for chills and fever.   HENT:  Negative for congestion and ear pain.    Eyes:  Negative for discharge.   Respiratory:  Negative for cough, shortness of breath and wheezing.    Cardiovascular:  Positive for chest pain. Negative for leg swelling.   Gastrointestinal:  Negative for abdominal pain, constipation, diarrhea, nausea and vomiting.   Genitourinary:  Negative for dysuria, flank pain and frequency.   Musculoskeletal:  Negative for back pain and joint swelling.   Skin:  Negative for rash.   Neurological:  Negative for dizziness, weakness and headaches.   Psychiatric/Behavioral:  Negative for agitation, confusion and hallucinations.      Physical Exam     Initial Vitals [11/04/22 2025]   BP Pulse Resp Temp SpO2   101/63 77 15 99.7 °F (37.6 °C) 100 %      MAP       --         Physical Exam    Nursing note and vitals reviewed.  Constitutional: He appears well-developed. No distress.   Blue dye on shirt from LifeVest discharge   HENT:   Head: Normocephalic and atraumatic.   Mouth/Throat: Oropharynx is clear and moist.   Eyes:  Conjunctivae and EOM are normal. Pupils are equal, round, and reactive to light.   Neck: Neck supple.   Cardiovascular:  Normal rate and regular rhythm.           No murmur heard.  Pulmonary/Chest: Breath sounds normal. No respiratory distress. He exhibits no tenderness.   Abdominal: Abdomen is soft. Bowel sounds are normal. He exhibits no distension. There is no abdominal tenderness.   Musculoskeletal:         General: Normal range of motion.      Cervical back: Neck supple.      Lumbar back: Normal. No tenderness. Normal range of motion.     Neurological: He is alert and oriented to person, place, and time. He has normal strength. No cranial nerve deficit or sensory deficit.   Psychiatric: He has a normal mood and affect. Judgment normal.       ED Course   Procedures  Labs Reviewed   COMPREHENSIVE METABOLIC PANEL - Abnormal; Notable for the following components:       Result Value    Glucose Level 155 (*)     Albumin Level 3.4 (*)     Aspartate Aminotransferase 50 (*)     All other components within normal limits   B-TYPE NATRIURETIC PEPTIDE - Abnormal; Notable for the following components:    Natriuretic Peptide 1,880.4 (*)     All other components within normal limits   PROTIME-INR - Abnormal; Notable for the following components:    PT 15.8 (*)     All other components within normal limits   CBC WITH DIFFERENTIAL - Abnormal; Notable for the following components:    Hgb 11.0 (*)     Hct 34.6 (*)     MCV 73.3 (*)     MCH 23.3 (*)     MCHC 31.8 (*)     RDW 17.7 (*)     All other components within normal limits   DIGOXIN LEVEL - Abnormal; Notable for the following components:    Digoxin Level 0.70 (*)     All other components within normal limits   TROPONIN I - Normal   SARS-COV-2 RNA AMPLIFICATION, QUAL - Normal    Narrative:     The IDNOW COVID-19 assay is a rapid molecular in vitro diagnostic test utilizing an isothermal nucleic acid amplification technology intended for the qualitative detection of nucleic acid  from the SARS-CoV-2 viral RNA in direct nasal, nasopharyngeal or throat swabs from individuals who are suspected of COVID-19 by their healthcare provider.   ALCOHOL,MEDICAL (ETHANOL) - Normal   MAGNESIUM - Normal   APTT - Normal   CBC W/ AUTO DIFFERENTIAL    Narrative:     The following orders were created for panel order CBC auto differential.  Procedure                               Abnormality         Status                     ---------                               -----------         ------                     CBC with Differential[[id number]]        Abnormal            Final result                 Please view results for these tests on the individual orders.   DRUG SCREEN, URINE (BEAKER)   CBC W/ AUTO DIFFERENTIAL    Narrative:     The following orders were created for panel order CBC Auto Differential.  Procedure                               Abnormality         Status                     ---------                               -----------         ------                     CBC with Differential[[id number]]                                                         Please view results for these tests on the individual orders.   COMPREHENSIVE METABOLIC PANEL   MAGNESIUM   PHOSPHORUS   CBC WITH DIFFERENTIAL     EKG Readings: (Independently Interpreted)   Initial Reading: No STEMI. Rhythm: Normal Sinus Rhythm. Heart Rate: 76. Ectopy: No Ectopy. Conduction: RBBB. ST Segments: Normal ST Segments. T Waves Flipped: V1, V2, V3 and V4. Axis: Normal.   2021     Imaging Results              X-Ray Chest 1 View (Final result)  Result time 11/04/22 21:49:36      Final result by Kendall Witt MD (11/04/22 21:49:36)                   Impression:      Cardiomegaly without acute cardiopulmonary process.      Electronically signed by: Kendall Witt MD  Date:    11/04/2022  Time:    21:49               Narrative:    EXAMINATION:  XR CHEST 1 VIEW    CLINICAL HISTORY:  Encounter for adjustment and management of automatic implantable  cardiac defibrillator    TECHNIQUE:  Single frontal view of the chest was performed.    COMPARISON:  10/27/2022    FINDINGS:  The heart size enlarged the pulmonary vasculature is normal.    Coarse interstitial markings lungs with bibasilar atelectatic changes.  No evidence for effusion.    External defibrillator device is identified.                                       Medications   digoxin tablet 0.125 mg (0.125 mg Oral Not Given 11/5/22 0215)   furosemide injection 40 mg (40 mg Intravenous Given 11/5/22 0011)   haloperidol lactate injection 5 mg (5 mg Intramuscular Given 11/5/22 0122)     Medical Decision Making:   Clinical Tests:   Lab Tests: Ordered and Reviewed  Radiological Study: Ordered and Reviewed  Medical Tests: Ordered and Reviewed        Scribe Attestation:   Scribe #1: I performed the above scribed service and the documentation accurately describes the services I performed. I attest to the accuracy of the note.    Attending Attestation:           Physician Attestation for Scribe:  Physician Attestation Statement for Scribe #1: I, reviewed documentation, as scribed by Faraz Harrison in my presence, and it is both accurate and complete.           ED Course as of 11/05/22 0218   Fri Nov 04, 2022   2201 Pt says feeling well [CY]   Sat Nov 05, 2022   0005 Pt sitting up in bed, says his chest is hurting and he wants to be admitted [CY]   0101 Cardiology says to admit to hospital medicine.  Will consult on pt and interrogate LifeVest tomorrow.  Continue digoxin [CY]      ED Course User Index  [CY] Faraz Harrison                 Clinical Impression:   Final diagnoses:  [R07.9] Chest pain  [Z45.02] Defibrillator discharge        ED Disposition Condition    Admit                 Aleks Wynn MD  11/05/22 0218

## 2022-11-06 LAB
ALBUMIN SERPL-MCNC: 3.5 GM/DL (ref 3.5–5)
ALBUMIN/GLOB SERPL: 1.2 RATIO (ref 1.1–2)
ALP SERPL-CCNC: 108 UNIT/L (ref 40–150)
ALT SERPL-CCNC: 37 UNIT/L (ref 0–55)
AMMONIA PLAS-MSCNC: 27.3 UMOL/L (ref 18–72)
AST SERPL-CCNC: 39 UNIT/L (ref 5–34)
BASOPHILS # BLD AUTO: 0.04 X10(3)/MCL (ref 0–0.2)
BASOPHILS NFR BLD AUTO: 0.7 %
BILIRUBIN DIRECT+TOT PNL SERPL-MCNC: 0.4 MG/DL
BUN SERPL-MCNC: 36.6 MG/DL (ref 8.4–25.7)
CALCIUM SERPL-MCNC: 9 MG/DL (ref 8.4–10.2)
CHLORIDE SERPL-SCNC: 101 MMOL/L (ref 98–107)
CO2 SERPL-SCNC: 26 MMOL/L (ref 22–29)
CREAT SERPL-MCNC: 1.58 MG/DL (ref 0.73–1.18)
EOSINOPHIL # BLD AUTO: 0.06 X10(3)/MCL (ref 0–0.9)
EOSINOPHIL NFR BLD AUTO: 1.1 %
ERYTHROCYTE [DISTWIDTH] IN BLOOD BY AUTOMATED COUNT: 18.3 % (ref 11.5–17)
GFR SERPLBLD CREATININE-BSD FMLA CKD-EPI: 50 MLS/MIN/1.73/M2
GLOBULIN SER-MCNC: 3 GM/DL (ref 2.4–3.5)
GLUCOSE SERPL-MCNC: 105 MG/DL (ref 74–100)
HCT VFR BLD AUTO: 35.5 % (ref 42–52)
HGB BLD-MCNC: 11.2 GM/DL (ref 14–18)
IMM GRANULOCYTES # BLD AUTO: 0.02 X10(3)/MCL (ref 0–0.04)
IMM GRANULOCYTES NFR BLD AUTO: 0.4 %
LYMPHOCYTES # BLD AUTO: 1.37 X10(3)/MCL (ref 0.6–4.6)
LYMPHOCYTES NFR BLD AUTO: 24.5 %
MCH RBC QN AUTO: 22.8 PG (ref 27–31)
MCHC RBC AUTO-ENTMCNC: 31.5 MG/DL (ref 33–36)
MCV RBC AUTO: 72.3 FL (ref 80–94)
MONOCYTES # BLD AUTO: 0.64 X10(3)/MCL (ref 0.1–1.3)
MONOCYTES NFR BLD AUTO: 11.4 %
NEUTROPHILS # BLD AUTO: 3.5 X10(3)/MCL (ref 2.1–9.2)
NEUTROPHILS NFR BLD AUTO: 61.9 %
NRBC BLD AUTO-RTO: 0 %
PLATELET # BLD AUTO: 169 X10(3)/MCL (ref 130–400)
PMV BLD AUTO: 10.3 FL (ref 7.4–10.4)
POTASSIUM SERPL-SCNC: 4.7 MMOL/L (ref 3.5–5.1)
PROT SERPL-MCNC: 6.5 GM/DL (ref 6.4–8.3)
RBC # BLD AUTO: 4.91 X10(6)/MCL (ref 4.7–6.1)
SODIUM SERPL-SCNC: 136 MMOL/L (ref 136–145)
WBC # SPEC AUTO: 5.6 X10(3)/MCL (ref 4.5–11.5)

## 2022-11-06 PROCEDURE — 80053 COMPREHEN METABOLIC PANEL: CPT | Performed by: INTERNAL MEDICINE

## 2022-11-06 PROCEDURE — 36415 COLL VENOUS BLD VENIPUNCTURE: CPT | Performed by: INTERNAL MEDICINE

## 2022-11-06 PROCEDURE — 21400001 HC TELEMETRY ROOM

## 2022-11-06 PROCEDURE — 25000242 PHARM REV CODE 250 ALT 637 W/ HCPCS: Performed by: INTERNAL MEDICINE

## 2022-11-06 PROCEDURE — 25000003 PHARM REV CODE 250: Performed by: NURSE PRACTITIONER

## 2022-11-06 PROCEDURE — 82140 ASSAY OF AMMONIA: CPT | Performed by: INTERNAL MEDICINE

## 2022-11-06 PROCEDURE — 25000003 PHARM REV CODE 250: Performed by: INTERNAL MEDICINE

## 2022-11-06 PROCEDURE — 94761 N-INVAS EAR/PLS OXIMETRY MLT: CPT

## 2022-11-06 PROCEDURE — 99900031 HC PATIENT EDUCATION (STAT)

## 2022-11-06 PROCEDURE — 11000001 HC ACUTE MED/SURG PRIVATE ROOM

## 2022-11-06 PROCEDURE — 27000221 HC OXYGEN, UP TO 24 HOURS

## 2022-11-06 PROCEDURE — 85025 COMPLETE CBC W/AUTO DIFF WBC: CPT | Performed by: INTERNAL MEDICINE

## 2022-11-06 PROCEDURE — 63600175 PHARM REV CODE 636 W HCPCS: Performed by: INTERNAL MEDICINE

## 2022-11-06 PROCEDURE — 94640 AIRWAY INHALATION TREATMENT: CPT

## 2022-11-06 RX ORDER — CARVEDILOL 12.5 MG/1
12.5 TABLET ORAL 2 TIMES DAILY
Status: DISCONTINUED | OUTPATIENT
Start: 2022-11-06 | End: 2022-11-07 | Stop reason: HOSPADM

## 2022-11-06 RX ORDER — AMIODARONE HYDROCHLORIDE 200 MG/1
200 TABLET ORAL DAILY
Status: DISCONTINUED | OUTPATIENT
Start: 2022-11-06 | End: 2022-11-07 | Stop reason: HOSPADM

## 2022-11-06 RX ADMIN — Medication 6 MG: at 09:11

## 2022-11-06 RX ADMIN — AMIODARONE HYDROCHLORIDE 0.5 MG/MIN: 1.8 INJECTION, SOLUTION INTRAVENOUS at 07:11

## 2022-11-06 RX ADMIN — LEVALBUTEROL HYDROCHLORIDE 1.25 MG: 1.25 SOLUTION, CONCENTRATE RESPIRATORY (INHALATION) at 05:11

## 2022-11-06 RX ADMIN — FAMOTIDINE 20 MG: 20 TABLET, FILM COATED ORAL at 09:11

## 2022-11-06 RX ADMIN — LEVETIRACETAM 500 MG: 500 TABLET, FILM COATED ORAL at 09:11

## 2022-11-06 RX ADMIN — LEVALBUTEROL HYDROCHLORIDE 1.25 MG: 1.25 SOLUTION, CONCENTRATE RESPIRATORY (INHALATION) at 01:11

## 2022-11-06 RX ADMIN — BENZTROPINE MESYLATE 1 MG: 1 TABLET ORAL at 09:11

## 2022-11-06 RX ADMIN — AMIODARONE HYDROCHLORIDE 200 MG: 200 TABLET ORAL at 11:11

## 2022-11-06 RX ADMIN — CLOPIDOGREL BISULFATE 75 MG: 75 TABLET ORAL at 09:11

## 2022-11-06 RX ADMIN — OXCARBAZEPINE 300 MG: 300 TABLET, FILM COATED ORAL at 03:11

## 2022-11-06 RX ADMIN — ASPIRIN 81 MG CHEWABLE TABLET 81 MG: 81 TABLET CHEWABLE at 09:11

## 2022-11-06 RX ADMIN — APIXABAN 5 MG: 5 TABLET, FILM COATED ORAL at 09:11

## 2022-11-06 RX ADMIN — ARIPIPRAZOLE 10 MG: 5 TABLET ORAL at 09:11

## 2022-11-06 RX ADMIN — OXCARBAZEPINE 300 MG: 300 TABLET, FILM COATED ORAL at 09:11

## 2022-11-06 RX ADMIN — ALPRAZOLAM 0.25 MG: 0.25 TABLET ORAL at 09:11

## 2022-11-06 RX ADMIN — TRAZODONE HYDROCHLORIDE 100 MG: 100 TABLET ORAL at 09:11

## 2022-11-06 RX ADMIN — LEVALBUTEROL HYDROCHLORIDE 1.25 MG: 1.25 SOLUTION, CONCENTRATE RESPIRATORY (INHALATION) at 08:11

## 2022-11-06 RX ADMIN — ATORVASTATIN CALCIUM 80 MG: 40 TABLET, FILM COATED ORAL at 09:11

## 2022-11-06 NOTE — PROGRESS NOTES
[facility] - 3rd Floor Medical Telemetry  Cardiology  Progress Note    Patient Name: Kendall Duff  MRN: [id number]  Admission Date: 11/4/2022  Hospital Length of Stay: 1 days  Code Status: Full Code   Attending Physician: Soila Mann MD   Primary Care Physician: Primary Doctor No  Expected Discharge Date:   Principal Problem:<principal problem not specified>    Subjective:   Consultation Reason: Lifevest Discharge     HPI:   Mr. Duff is a 57 year old male, known to Dr. Wright, who presented to the hospital status post lifevest discharge while taking trash out. This occurred the afternoon of 11.4.22. Recent admission for heart failure. Hemodynamically, the patient is stable. Electrolytes are also stable. Troponin values dating back to 10.27.22 noted to be ~ -.05 to 0.07, essentially flat. Drug Tox is negative. Echocardiogram on 10.28.22 revealed EF 20% with Grade II Diastolic dysfunction and moderate MR. [facility] is consulted to evaluate the patient given his lifevest discharge.  Life Vest Interrogation revealed Inappropriate shock due to fast rate, likely AFL RVR.    Hospital Course:   11.6.22: NAD Noted. Sinus Bradycardia in the 50's. BP Stable. Doing well.     PMH: CAD, ICMO, HTN, schizoaffective disorder, hep C, DVT, systolic & diastolic CHF, Lifevest  PSH: CABG, LHC  Family History: Mother - CA; Father - liver disease   Social History: Current every day smoker. Former Cocaine Use. Denies alcohol use.      Previous Cardiac Diagnostics:   Echocardiogram (10.28.22):  The left ventricle is moderately enlarged with mild eccentric hypertrophy and severely decreased systolic function.  The estimated ejection fraction is 20%.  There is severe left ventricular global hypokinesis.  Grade II left ventricular diastolic dysfunction.  Normal right ventricular size with moderately reduced right ventricular systolic function.  Moderate mitral regurgitation.  Moderate tricuspid regurgitation.  There is mild pulmonary  hypertension.  The estimated PA systolic pressure is 44 mmHg.  Elevated central venous pressure (15 mmHg).  Severe left atrial enlargement.     CABG x 4 (4/22):  LIMA-LAD, SVG-OM1, SVG-D1, SVG-PDA     Echocardiogram (6.16.22):  The left ventricle is normal in size w/ concentric hypertrophy and severely decreased systolic function.  The estimated EF is 25-30%.  There is severe left ventricular global hypokinesis.  Grade II left ventricular diastolic dysfunction.  Normal right ventricular size w/ mildly reduced right ventricular systolic function.  The estimated PA systolic pressure is 52 mmHg.  There is moderate pulmonary HTN.  Elevated CVP (15 mmHg).     RUE NIVA (5.10.22):  A deep vein thrombosis was identified in the right axillary and brachial veins. A superficial thrombosis was identified in the right basilic vein.     LHC (3.21.22):  100% LAD to 30-40% residual stenosis post PTCA.  Large diagonal system w/ severe stenosis.  CIRC - patent stent, OM1 patent, mild CIRC occluded  RCA - stents ISR 40-50%, distal 70-80%  EDP 12 EF 35-40% anterior HK     Review of Systems   Cardiovascular:  Negative for chest pain, leg swelling and orthopnea.   Respiratory:  Negative for shortness of breath.      Objective:     Vital Signs (Most Recent):  Temp: 97.7 °F (36.5 °C) (11/06/22 0900)  Pulse: (!) 51 (11/06/22 1135)  Resp: 18 (11/06/22 1135)  BP: (!) 130/91 (11/06/22 1135)  SpO2: 100 % (11/06/22 1135)   Vital Signs (24h Range):  Temp:  [97.4 °F (36.3 °C)-98.3 °F (36.8 °C)] 97.7 °F (36.5 °C)  Pulse:  [51-60] 51  Resp:  [18-22] 18  SpO2:  [100 %] 100 %  BP: (102-145)/(76-92) 130/91     Weight: 73.5 kg (162 lb 0.6 oz)  Body mass index is 25.38 kg/m².    SpO2: 100 %  O2 Device (Oxygen Therapy): nasal cannula      Intake/Output Summary (Last 24 hours) at 11/6/2022 1156  Last data filed at 11/5/2022 1512  Gross per 24 hour   Intake 240 ml   Output 100 ml   Net 140 ml       Lines/Drains/Airways       Peripheral Intravenous Line   Duration                  Peripheral IV - Single Lumen 11/06/22 0000 20 G Left;Posterior Forearm <1 day                    Significant Labs:   Recent Results (from the past 72 hour(s))   Basic Metabolic Panel    Collection Time: 11/04/22  4:50 AM   Result Value Ref Range    Sodium Level 138 136 - 145 mmol/L    Potassium Level 4.5 3.5 - 5.1 mmol/L    Chloride 103 98 - 107 mmol/L    Carbon Dioxide 26 22 - 29 mmol/L    Glucose Level 190 (H) 74 - 100 mg/dL    Blood Urea Nitrogen 16.8 8.4 - 25.7 mg/dL    Creatinine 1.03 0.73 - 1.18 mg/dL    BUN/Creatinine Ratio 16     Calcium Level Total 8.7 8.4 - 10.2 mg/dL    Anion Gap 9.0 mEq/L    eGFR >60 mls/min/1.73/m2   Comprehensive metabolic panel    Collection Time: 11/04/22  8:51 PM   Result Value Ref Range    Sodium Level 139 136 - 145 mmol/L    Potassium Level 4.2 3.5 - 5.1 mmol/L    Chloride 102 98 - 107 mmol/L    Carbon Dioxide 28 22 - 29 mmol/L    Glucose Level 155 (H) 74 - 100 mg/dL    Blood Urea Nitrogen 19.2 8.4 - 25.7 mg/dL    Creatinine 1.15 0.73 - 1.18 mg/dL    Calcium Level Total 8.7 8.4 - 10.2 mg/dL    Protein Total 6.6 6.4 - 8.3 gm/dL    Albumin Level 3.4 (L) 3.5 - 5.0 gm/dL    Globulin 3.2 2.4 - 3.5 gm/dL    Albumin/Globulin Ratio 1.1 1.1 - 2.0 ratio    Bilirubin Total 0.4 <=1.5 mg/dL    Alkaline Phosphatase 118 40 - 150 unit/L    Alanine Aminotransferase 36 0 - 55 unit/L    Aspartate Aminotransferase 50 (H) 5 - 34 unit/L    eGFR >60 mls/min/1.73/m2   Brain natriuretic peptide    Collection Time: 11/04/22  8:51 PM   Result Value Ref Range    Natriuretic Peptide 1,880.4 (H) <=100.0 pg/mL   Troponin I    Collection Time: 11/04/22  8:51 PM   Result Value Ref Range    Troponin-I 0.041 0.000 - 0.045 ng/mL   Protime-INR    Collection Time: 11/04/22  8:51 PM   Result Value Ref Range    PT 15.8 (H) 12.5 - 14.5 seconds    INR 1.27 0.00 - 1.30   CBC with Differential    Collection Time: 11/04/22  8:51 PM   Result Value Ref Range    WBC 8.6 4.5 - 11.5 x10(3)/mcL    RBC  4.72 4.70 - 6.10 x10(6)/mcL    Hgb 11.0 (L) 14.0 - 18.0 gm/dL    Hct 34.6 (L) 42.0 - 52.0 %    MCV 73.3 (L) 80.0 - 94.0 fL    MCH 23.3 (L) 27.0 - 31.0 pg    MCHC 31.8 (L) 33.0 - 36.0 mg/dL    RDW 17.7 (H) 11.5 - 17.0 %    Platelet 204 130 - 400 x10(3)/mcL    MPV 9.7 7.4 - 10.4 fL    Neut % 67.4 %    Lymph % 17.0 %    Mono % 12.8 %    Eos % 1.9 %    Basophil % 0.6 %    Lymph # 1.46 0.6 - 4.6 x10(3)/mcL    Neut # 5.8 2.1 - 9.2 x10(3)/mcL    Mono # 1.10 0.1 - 1.3 x10(3)/mcL    Eos # 0.16 0 - 0.9 x10(3)/mcL    Baso # 0.05 0 - 0.2 x10(3)/mcL    IG# 0.03 0 - 0.04 x10(3)/mcL    IG% 0.3 %    NRBC% 0.0 %   Ethanol    Collection Time: 11/04/22  8:51 PM   Result Value Ref Range    Ethanol Level <10.0 <=10.0 mg/dL   Magnesium    Collection Time: 11/04/22  8:51 PM   Result Value Ref Range    Magnesium Level 2.20 1.60 - 2.60 mg/dL   APTT    Collection Time: 11/04/22  8:51 PM   Result Value Ref Range    PTT 30.9 23.2 - 33.7 seconds   Digoxin Level    Collection Time: 11/04/22  8:51 PM   Result Value Ref Range    Digoxin Level 0.70 (L) 0.80 - 2.00 ng/mL   COVID-19 Rapid Screening    Collection Time: 11/04/22  9:33 PM   Result Value Ref Range    SARS COV-2 MOLECULAR Negative Negative   Drug Screen, Urine    Collection Time: 11/04/22 11:51 PM   Result Value Ref Range    Amphetamines, Urine Negative Negative    Barbituates, Urine Negative Negative    Benzodiazepine, Urine Negative Negative    Cannabinoids, Urine Negative Negative    Cocaine, Urine Negative Negative    Fentanyl, Urine Negative Negative    MDMA, Urine Negative Negative    Opiates, Urine Negative Negative    Phencyclidine, Urine Negative Negative    pH, Urine 6.0 3.0 - 11.0    Specific Gravity, Urine Auto 1.025 1.001 - 1.035   Comprehensive Metabolic Panel    Collection Time: 11/05/22  3:31 AM   Result Value Ref Range    Sodium Level 137 136 - 145 mmol/L    Potassium Level 4.5 3.5 - 5.1 mmol/L    Chloride 104 98 - 107 mmol/L    Carbon Dioxide 25 22 - 29 mmol/L    Glucose  Level 116 (H) 74 - 100 mg/dL    Blood Urea Nitrogen 20.9 8.4 - 25.7 mg/dL    Creatinine 1.13 0.73 - 1.18 mg/dL    Calcium Level Total 8.9 8.4 - 10.2 mg/dL    Protein Total 6.2 (L) 6.4 - 8.3 gm/dL    Albumin Level 3.3 (L) 3.5 - 5.0 gm/dL    Globulin 2.9 2.4 - 3.5 gm/dL    Albumin/Globulin Ratio 1.1 1.1 - 2.0 ratio    Bilirubin Total 0.4 <=1.5 mg/dL    Alkaline Phosphatase 117 40 - 150 unit/L    Alanine Aminotransferase 43 0 - 55 unit/L    Aspartate Aminotransferase 61 (H) 5 - 34 unit/L    eGFR >60 mls/min/1.73/m2   Magnesium    Collection Time: 11/05/22  3:31 AM   Result Value Ref Range    Magnesium Level 2.10 1.60 - 2.60 mg/dL   Phosphorus    Collection Time: 11/05/22  3:31 AM   Result Value Ref Range    Phosphorus Level 3.6 2.3 - 4.7 mg/dL   CBC with Differential    Collection Time: 11/05/22  3:31 AM   Result Value Ref Range    WBC 7.5 4.5 - 11.5 x10(3)/mcL    RBC 4.93 4.70 - 6.10 x10(6)/mcL    Hgb 11.3 (L) 14.0 - 18.0 gm/dL    Hct 36.0 (L) 42.0 - 52.0 %    MCV 73.0 (L) 80.0 - 94.0 fL    MCH 22.9 (L) 27.0 - 31.0 pg    MCHC 31.4 (L) 33.0 - 36.0 mg/dL    RDW 18.0 (H) 11.5 - 17.0 %    Platelet 161 130 - 400 x10(3)/mcL    MPV 9.6 7.4 - 10.4 fL    Neut % 64.7 %    Lymph % 19.5 %    Mono % 13.3 %    Eos % 1.7 %    Basophil % 0.4 %    Lymph # 1.46 0.6 - 4.6 x10(3)/mcL    Neut # 4.9 2.1 - 9.2 x10(3)/mcL    Mono # 1.00 0.1 - 1.3 x10(3)/mcL    Eos # 0.13 0 - 0.9 x10(3)/mcL    Baso # 0.03 0 - 0.2 x10(3)/mcL    IG# 0.03 0 - 0.04 x10(3)/mcL    IG% 0.4 %    NRBC% 0.0 %   Troponin I    Collection Time: 11/05/22  3:31 AM   Result Value Ref Range    Troponin-I 0.058 (H) 0.000 - 0.045 ng/mL   Troponin I    Collection Time: 11/05/22 10:38 AM   Result Value Ref Range    Troponin-I 0.055 (H) 0.000 - 0.045 ng/mL   Troponin I    Collection Time: 11/05/22  5:05 PM   Result Value Ref Range    Troponin-I 0.083 (H) 0.000 - 0.045 ng/mL   Comprehensive Metabolic Panel    Collection Time: 11/06/22  4:09 AM   Result Value Ref Range    Sodium  Level 136 136 - 145 mmol/L    Potassium Level 4.7 3.5 - 5.1 mmol/L    Chloride 101 98 - 107 mmol/L    Carbon Dioxide 26 22 - 29 mmol/L    Glucose Level 105 (H) 74 - 100 mg/dL    Blood Urea Nitrogen 36.6 (H) 8.4 - 25.7 mg/dL    Creatinine 1.58 (H) 0.73 - 1.18 mg/dL    Calcium Level Total 9.0 8.4 - 10.2 mg/dL    Protein Total 6.5 6.4 - 8.3 gm/dL    Albumin Level 3.5 3.5 - 5.0 gm/dL    Globulin 3.0 2.4 - 3.5 gm/dL    Albumin/Globulin Ratio 1.2 1.1 - 2.0 ratio    Bilirubin Total 0.4 <=1.5 mg/dL    Alkaline Phosphatase 108 40 - 150 unit/L    Alanine Aminotransferase 37 0 - 55 unit/L    Aspartate Aminotransferase 39 (H) 5 - 34 unit/L    eGFR 50 mls/min/1.73/m2   CBC with Differential    Collection Time: 11/06/22  4:09 AM   Result Value Ref Range    WBC 5.6 4.5 - 11.5 x10(3)/mcL    RBC 4.91 4.70 - 6.10 x10(6)/mcL    Hgb 11.2 (L) 14.0 - 18.0 gm/dL    Hct 35.5 (L) 42.0 - 52.0 %    MCV 72.3 (L) 80.0 - 94.0 fL    MCH 22.8 (L) 27.0 - 31.0 pg    MCHC 31.5 (L) 33.0 - 36.0 mg/dL    RDW 18.3 (H) 11.5 - 17.0 %    Platelet 169 130 - 400 x10(3)/mcL    MPV 10.3 7.4 - 10.4 fL    Neut % 61.9 %    Lymph % 24.5 %    Mono % 11.4 %    Eos % 1.1 %    Basophil % 0.7 %    Lymph # 1.37 0.6 - 4.6 x10(3)/mcL    Neut # 3.5 2.1 - 9.2 x10(3)/mcL    Mono # 0.64 0.1 - 1.3 x10(3)/mcL    Eos # 0.06 0 - 0.9 x10(3)/mcL    Baso # 0.04 0 - 0.2 x10(3)/mcL    IG# 0.02 0 - 0.04 x10(3)/mcL    IG% 0.4 %    NRBC% 0.0 %   Ammonia    Collection Time: 11/06/22  4:09 AM   Result Value Ref Range    Ammonia Level 27.3 18.0 - 72.0 umol/L       Telemetry:  Sinus Bradycardia HR 50's    Physical Exam  Vitals reviewed.   Constitutional:       Appearance: Normal appearance.   HENT:      Head: Normocephalic.      Mouth/Throat:      Mouth: Mucous membranes are moist.      Pharynx: Oropharynx is clear.   Cardiovascular:      Rate and Rhythm: Regular rhythm. Bradycardia present.      Pulses: Normal pulses.      Heart sounds: Normal heart sounds. No murmur heard.  Pulmonary:       Effort: Pulmonary effort is normal. No respiratory distress.      Breath sounds: Normal breath sounds.   Abdominal:      General: There is no distension.      Palpations: Abdomen is soft.      Tenderness: There is no abdominal tenderness.   Musculoskeletal:      Cervical back: Neck supple.      Right lower leg: No edema.      Left lower leg: No edema.   Skin:     General: Skin is warm and dry.      Capillary Refill: Capillary refill takes less than 2 seconds.   Neurological:      General: No focal deficit present.      Mental Status: He is alert and oriented to person, place, and time. Mental status is at baseline.       Current Inpatient Medications:    Current Facility-Administered Medications:     acetaminophen tablet 1,000 mg, 1,000 mg, Oral, Q6H PRN, Belinda Berumen MD    acetaminophen tablet 650 mg, 650 mg, Oral, Q4H PRN, Belinda Berumen MD    ALPRAZolam tablet 0.25 mg, 0.25 mg, Oral, Nightly PRN, Soila Mann MD, 0.25 mg at 11/05/22 2110    aluminum-magnesium hydroxide-simethicone 200-200-20 mg/5 mL suspension 30 mL, 30 mL, Oral, QID PRN, Belinda Berumen MD    amiodarone tablet 200 mg, 200 mg, Oral, Daily, Raymond Carney, FNP, 200 mg at 11/06/22 1133    apixaban tablet 5 mg, 5 mg, Oral, BID, Belinda Berumen MD, 5 mg at 11/06/22 0939    ARIPiprazole tablet 10 mg, 10 mg, Oral, Daily, Belinda Berumen MD, 10 mg at 11/06/22 0939    aspirin chewable tablet 81 mg, 81 mg, Oral, Daily, Belinda Berumen MD, 81 mg at 11/06/22 0939    atorvastatin tablet 80 mg, 80 mg, Oral, Daily, Belinda Berumen MD, 80 mg at 11/06/22 0942    benztropine tablet 1 mg, 1 mg, Oral, BID, Belinda Berumen MD, 1 mg at 11/06/22 0939    benztropine tablet 1 mg, 1 mg, Oral, BID PRN, Belinda Berumen MD    carvediloL tablet 12.5 mg, 12.5 mg, Oral, BID, Soila Mann MD    clopidogreL tablet 75 mg, 75 mg, Oral, Daily, Soila Mann MD, 75 mg at 11/06/22 0939    famotidine tablet 20 mg, 20 mg, Oral, BID, Belinda Berumen MD, 20 mg at 11/06/22 0939    furosemide tablet 40 mg, 40  mg, Oral, Daily, Belinda Berumen MD, 40 mg at 11/05/22 0826    isosorbide mononitrate 24 hr tablet 30 mg, 30 mg, Oral, Daily, Soila Mann MD    levalbuterol nebulizer solution 1.25 mg, 1.25 mg, Nebulization, 4 times per day, Soila Mann MD    levETIRAcetam tablet 500 mg, 500 mg, Oral, BID, Belinda Berumen MD, 500 mg at 11/06/22 0939    melatonin tablet 6 mg, 6 mg, Oral, Nightly PRN, Belinda Berumen MD    morphine injection 2 mg, 2 mg, Intravenous, Q4H PRN, Belinda Berumen MD, 2 mg at 11/05/22 0448    nitroGLYCERIN SL tablet 0.4 mg, 0.4 mg, Sublingual, Q5 Min PRN, Belinda Berumen MD    ondansetron injection 4 mg, 4 mg, Intravenous, Q4H PRN, Belinda Berumen MD    OXcarbazepine tablet 300 mg, 300 mg, Oral, TID, Belinda Berumen MD, 300 mg at 11/06/22 0939    polyethylene glycol packet 17 g, 17 g, Oral, BID PRN, Belinda Berumen MD    prochlorperazine injection Soln 5 mg, 5 mg, Intravenous, Q6H PRN, Belinda Berumen MD    senna-docusate 8.6-50 mg per tablet 1 tablet, 1 tablet, Oral, BID PRN, Belinda Berumen MD    simethicone chewable tablet 80 mg, 1 tablet, Oral, QID PRN, Belinda Berumen MD    sodium chloride 0.9% flush 10 mL, 10 mL, Intravenous, PRN, Belinda Berumen MD    traZODone tablet 100 mg, 100 mg, Oral, QHS, Belinda Berumen MD, 100 mg at 11/05/22 2110    VTE Risk Mitigation (From admission, onward)           Ordered     apixaban tablet 5 mg  2 times daily         11/05/22 0428                  Assessment:   Life Vest Discharge/Shock (11.4.22 Afternoon)- Shocked AFL RVR Based on Fast Heart Rate Parameters  Chronic Combined Systolic/Diastolic HF    - Grade 2 DD    - EF 20% (echo 10.28.22)  ICMO     - With Life Vest (Now in Place)  VHD    -Moderate MR and Moderate TR  Hypertension  Pulmonary Hypertension  PAF/PAFL (Recent New Diagnosis)- Now Sinus Bradycardia    - [id number]: 5 (LVSD/HTN/TE/NSTEMI)    - On Eliquis  Abnormal Troponin    - Low Level Elevation/Flat Trend with Recent NSTEMI Type II in the Setting of Decompensated HF    - No  Angina/SOB  CAD/CABG (April 2022)    - LIMA-LAD, SVG-OM1, SVG-D1, SVG-PDA  History of RUE DVT (5.10.22)    - A deep vein thrombosis was identified in the right axillary and brachial veins. A superficial thrombosis was identified in the right basilic vein.  Schizoaffective Disorder  Hx of Hep C  Hx of cocaine Abuse (Drug Toxicology is Negative)  Nicotine Dependence (Tobacco use)    Plan:   Discontinue IV Amiodarone. Start Amiodarone 200 Mg PO Daily  Continue Coreg 12.5 Mg PO BID. Add Hold Parameters.  STOP Digoxin given Current Bradycardia  Continue Eliquis Re: DVT Treatment/AFL Stroke Risk Reduction. On ASA/Statin (CAD)  Continue Oral Lasix 40 Mg Daily  Likely discharge Home Tomorrow. Progressing well.    RAY Henry  Cardiology  [facility] - 3rd Floor Medical Telemetry  11/06/2022

## 2022-11-06 NOTE — PROGRESS NOTES
[facility] Hospital Medicine Progress Note        Chief Complaint: Inpatient Follow-up    HPI:   57-year-old male with significant history of CAD, ischemic cardiomyopathy with ejection fraction-20%, HTN, schizoaffective disorder, hep C, DVT, systolic and diastolic dysfunction.  Patient had a recent hospital admission and was discharged on 11/04.  He was then admitted for new onset atrial fibrillation/flutter along with CHF exacerbation.  Patient presented back to the ED after being shocked by his LifeVest around 1500 on 11/04.  Patient was hemodynamically stable in the ED. lab significant for elevated BNP.  UDS negative.  Chest x-ray negative.  Troponin slightly elevated.  Cardiology services consulted the patient was initiated on amiodarone drip.  Hospitalist team consulted for admission.  NSR on amiodarone drip.  GDMT continued.  Heart failure compensated, on p.o. Lasix.    Interval Hx:   Patient seen at bedside.  Complaining of occasional shortness of breath even though saturations are stable on room air.  No other new complaints.  Awake, alert and seems oriented. bradycardic, otherwise hemodynamics are stable.    Objective/physical exam:  General: In no acute distress, afebrile  Chest: Clear to auscultation bilaterally  Heart: S1, S2, no appreciable murmur  Abdomen: Soft, nontender, BS +  MSK: Warm, no lower extremity edema, no clubbing or cyanosis  Neurologic: Alert and oriented x4, moving all extremities with good strength     VITAL SIGNS: 24 HRS MIN & MAX LAST   Temp  Min: 97.4 °F (36.3 °C)  Max: 98.3 °F (36.8 °C) 97.8 °F (36.6 °C)   BP  Min: 102/76  Max: 145/92 (!) 120/91     Pulse  Min: 51  Max: 60  (!) 51     Resp  Min: 19  Max: 22 19   SpO2  Min: 100 %  Max: 100 % 100 %       Recent Labs   Lab 11/06/22  0409   WBC 5.6   RBC 4.91   HGB 11.2*   HCT 35.5*   MCV 72.3*   MCH 22.8*   MCHC 31.5*   RDW 18.3*      MPV 10.3         Recent Labs   Lab 11/05/22  0335  11/06/22  0409    136   K 4.5 4.7   CO2 25 26   BUN 20.9 36.6*   CREATININE 1.13 1.58*   CALCIUM 8.9 9.0   MG 2.10  --    ALBUMIN 3.3* 3.5   ALKPHOS 117 108   ALT 43 37   AST 61* 39*   BILITOT 0.4 0.4          Microbiology Results (last 7 days)       ** No results found for the last 168 hours. **             Scheduled Med:   apixaban  5 mg Oral BID    ARIPiprazole  10 mg Oral Daily    aspirin  81 mg Oral Daily    atorvastatin  80 mg Oral Daily    benztropine  1 mg Oral BID    carvediloL  12.5 mg Oral BID    clopidogreL  75 mg Oral Daily    digoxin  0.125 mg Oral Daily    famotidine  20 mg Oral BID    furosemide  40 mg Oral Daily    isosorbide mononitrate  30 mg Oral Daily    levalbuterol  1.25 mg Nebulization 4 times per day    levETIRAcetam  500 mg Oral BID    OXcarbazepine  300 mg Oral TID    traZODone  100 mg Oral QHS          Assessment/Plan:    LifeVest discharge/shock-atrial flutter RVR  Combined systolic/diastolic heart failure with ejection fraction-20%-compensated   Ischemic cardiomyopathy status post LifeVest placement   Intermittent bradycardia  History of CAD   Valvular heart disease  Medical noncompliance   Pulmonary hypertension   Mildly elevated troponins-possible NSTEMI type 2   History of right upper extremity DVT  Schizoaffective disorder   Chronic hep C   History of polysubstance abuse   Chronic tobacco use   History of seizure disorder  Prophylaxis      IV amiodarone and switched to p.o. amiodarone by Cardiology  Now NSR   Bradycardic today  Coreg decreased to 12.5 mg b.i.d.  digoxin discontinued   Heart failure appears compensated   Continue Eliquis for thromboembolic prophylaxis   Also resumed other home meds-aripiprazole, aspirin, statin, benztropine, Pepcid   Continue p.o. Lasix  Continue Keppra, Trileptal, trazodone   Chest x-ray repeated given intermittent shortness of breath even though sats are stable   Chest x-ray is not impressive -only  mild pulmonary vascular congestion  DVT  prophylaxis-on Eliquis    Plan for DC tomorrow if he remains symptomatically, hemodynamically stable      Soila Mann MD   11/06/2022

## 2022-11-06 NOTE — NURSING
Overnight pt ripped out IV's, removed tele leads & O2 probes multiple times, tried removing lifevest as well. When assessing orientation and LOC, pt AAOx4; relayed this to day shift nurse

## 2022-11-06 NOTE — NURSING
Read MD note and it stated to c/s EP, spoke w/ day shift nurse who had pt yesterday, said she will clarify w/ MD today to see what they would like to do since no order in computer

## 2022-11-07 ENCOUNTER — PATIENT OUTREACH (OUTPATIENT)
Dept: ADMINISTRATIVE | Facility: CLINIC | Age: 57
End: 2022-11-07
Payer: MEDICAID

## 2022-11-07 VITALS
OXYGEN SATURATION: 98 % | HEART RATE: 51 BPM | BODY MASS INDEX: 25.44 KG/M2 | HEIGHT: 67 IN | WEIGHT: 162.06 LBS | DIASTOLIC BLOOD PRESSURE: 85 MMHG | TEMPERATURE: 98 F | RESPIRATION RATE: 18 BRPM | SYSTOLIC BLOOD PRESSURE: 123 MMHG

## 2022-11-07 PROBLEM — I50.9 CHF (CONGESTIVE HEART FAILURE): Status: ACTIVE | Noted: 2022-11-07

## 2022-11-07 LAB
ALBUMIN SERPL-MCNC: 3.5 GM/DL (ref 3.5–5)
ALBUMIN/GLOB SERPL: 1.1 RATIO (ref 1.1–2)
ALP SERPL-CCNC: 105 UNIT/L (ref 40–150)
ALT SERPL-CCNC: 34 UNIT/L (ref 0–55)
AST SERPL-CCNC: 34 UNIT/L (ref 5–34)
BASOPHILS # BLD AUTO: 0.05 X10(3)/MCL (ref 0–0.2)
BASOPHILS NFR BLD AUTO: 0.9 %
BILIRUBIN DIRECT+TOT PNL SERPL-MCNC: 0.7 MG/DL
BUN SERPL-MCNC: 37.5 MG/DL (ref 8.4–25.7)
CALCIUM SERPL-MCNC: 8.9 MG/DL (ref 8.4–10.2)
CHLORIDE SERPL-SCNC: 98 MMOL/L (ref 98–107)
CO2 SERPL-SCNC: 31 MMOL/L (ref 22–29)
CREAT SERPL-MCNC: 1.2 MG/DL (ref 0.73–1.18)
EOSINOPHIL # BLD AUTO: 0.06 X10(3)/MCL (ref 0–0.9)
EOSINOPHIL NFR BLD AUTO: 1 %
ERYTHROCYTE [DISTWIDTH] IN BLOOD BY AUTOMATED COUNT: 18 % (ref 11.5–17)
GFR SERPLBLD CREATININE-BSD FMLA CKD-EPI: >60 MLS/MIN/1.73/M2
GLOBULIN SER-MCNC: 3.2 GM/DL (ref 2.4–3.5)
GLUCOSE SERPL-MCNC: 107 MG/DL (ref 74–100)
HCT VFR BLD AUTO: 35.6 % (ref 42–52)
HGB BLD-MCNC: 11.3 GM/DL (ref 14–18)
IMM GRANULOCYTES # BLD AUTO: 0.01 X10(3)/MCL (ref 0–0.04)
IMM GRANULOCYTES NFR BLD AUTO: 0.2 %
LYMPHOCYTES # BLD AUTO: 0.87 X10(3)/MCL (ref 0.6–4.6)
LYMPHOCYTES NFR BLD AUTO: 14.9 %
MCH RBC QN AUTO: 23 PG (ref 27–31)
MCHC RBC AUTO-ENTMCNC: 31.7 MG/DL (ref 33–36)
MCV RBC AUTO: 72.4 FL (ref 80–94)
MONOCYTES # BLD AUTO: 0.47 X10(3)/MCL (ref 0.1–1.3)
MONOCYTES NFR BLD AUTO: 8.1 %
NEUTROPHILS # BLD AUTO: 4.4 X10(3)/MCL (ref 2.1–9.2)
NEUTROPHILS NFR BLD AUTO: 74.9 %
NRBC BLD AUTO-RTO: 0 %
PLATELET # BLD AUTO: 160 X10(3)/MCL (ref 130–400)
PMV BLD AUTO: 10.3 FL (ref 7.4–10.4)
POTASSIUM SERPL-SCNC: 4.7 MMOL/L (ref 3.5–5.1)
PROT SERPL-MCNC: 6.7 GM/DL (ref 6.4–8.3)
RBC # BLD AUTO: 4.92 X10(6)/MCL (ref 4.7–6.1)
SODIUM SERPL-SCNC: 138 MMOL/L (ref 136–145)
WBC # SPEC AUTO: 5.8 X10(3)/MCL (ref 4.5–11.5)

## 2022-11-07 PROCEDURE — 36415 COLL VENOUS BLD VENIPUNCTURE: CPT | Performed by: INTERNAL MEDICINE

## 2022-11-07 PROCEDURE — 94640 AIRWAY INHALATION TREATMENT: CPT

## 2022-11-07 PROCEDURE — 25000003 PHARM REV CODE 250: Performed by: NURSE PRACTITIONER

## 2022-11-07 PROCEDURE — 25000242 PHARM REV CODE 250 ALT 637 W/ HCPCS: Performed by: INTERNAL MEDICINE

## 2022-11-07 PROCEDURE — 25000003 PHARM REV CODE 250: Performed by: INTERNAL MEDICINE

## 2022-11-07 PROCEDURE — 85025 COMPLETE CBC W/AUTO DIFF WBC: CPT | Performed by: INTERNAL MEDICINE

## 2022-11-07 PROCEDURE — 80053 COMPREHEN METABOLIC PANEL: CPT | Performed by: INTERNAL MEDICINE

## 2022-11-07 RX ORDER — CLOPIDOGREL BISULFATE 75 MG/1
75 TABLET ORAL DAILY
Qty: 30 TABLET | Refills: 11 | OUTPATIENT
Start: 2022-11-07 | End: 2022-11-11

## 2022-11-07 RX ORDER — CARVEDILOL 12.5 MG/1
12.5 TABLET ORAL 2 TIMES DAILY
Qty: 60 TABLET | Refills: 0 | OUTPATIENT
Start: 2022-11-07 | End: 2022-11-11

## 2022-11-07 RX ORDER — AMIODARONE HYDROCHLORIDE 200 MG/1
200 TABLET ORAL DAILY
Qty: 30 TABLET | Refills: 0 | Status: ON HOLD | OUTPATIENT
Start: 2022-11-07 | End: 2022-12-25

## 2022-11-07 RX ORDER — ISOSORBIDE MONONITRATE 30 MG/1
30 TABLET, EXTENDED RELEASE ORAL DAILY
Qty: 30 TABLET | Refills: 11 | Status: ON HOLD
Start: 2022-11-07 | End: 2022-12-25 | Stop reason: SDUPTHER

## 2022-11-07 RX ADMIN — CLOPIDOGREL BISULFATE 75 MG: 75 TABLET ORAL at 08:11

## 2022-11-07 RX ADMIN — CARVEDILOL 12.5 MG: 12.5 TABLET, FILM COATED ORAL at 08:11

## 2022-11-07 RX ADMIN — APIXABAN 5 MG: 5 TABLET, FILM COATED ORAL at 08:11

## 2022-11-07 RX ADMIN — OXCARBAZEPINE 300 MG: 300 TABLET, FILM COATED ORAL at 08:11

## 2022-11-07 RX ADMIN — ATORVASTATIN CALCIUM 80 MG: 40 TABLET, FILM COATED ORAL at 08:11

## 2022-11-07 RX ADMIN — LEVALBUTEROL HYDROCHLORIDE 1.25 MG: 1.25 SOLUTION, CONCENTRATE RESPIRATORY (INHALATION) at 07:11

## 2022-11-07 RX ADMIN — FAMOTIDINE 20 MG: 20 TABLET, FILM COATED ORAL at 08:11

## 2022-11-07 RX ADMIN — ISOSORBIDE MONONITRATE 30 MG: 30 TABLET, EXTENDED RELEASE ORAL at 08:11

## 2022-11-07 RX ADMIN — LEVETIRACETAM 500 MG: 500 TABLET, FILM COATED ORAL at 08:11

## 2022-11-07 RX ADMIN — BENZTROPINE MESYLATE 1 MG: 1 TABLET ORAL at 08:11

## 2022-11-07 RX ADMIN — ARIPIPRAZOLE 10 MG: 5 TABLET ORAL at 08:11

## 2022-11-07 RX ADMIN — FUROSEMIDE 40 MG: 40 TABLET ORAL at 08:11

## 2022-11-07 RX ADMIN — ASPIRIN 81 MG CHEWABLE TABLET 81 MG: 81 TABLET CHEWABLE at 08:11

## 2022-11-07 RX ADMIN — AMIODARONE HYDROCHLORIDE 200 MG: 200 TABLET ORAL at 08:11

## 2022-11-07 NOTE — NURSING
For the past 2 nights, pt has been impulsive, agitated, restless. Constantly removing tele leads, oxygen probes, nasal cannula, IV's, and lifevest. AAOx4, pt being extremely uncooperative.

## 2022-11-07 NOTE — PLAN OF CARE
Initial dc asssement done with pt and I spoke to his dgt Arabella Kirk by phone. Facesheet info is correct, Pt lives with his dgt Summer. Pt states he wants to return to his dgt's home. Pt is walking halls and in room indep without any dme. Pt is wearing his Lifevest currently.  Pt states he has no other DME at home.  He states he is indep in adl's at home. He states his dgt drives him to apts and picks up his Rx at [facility] in hospitals. Pt is current with [facility] . Referral with dc orders sent via [facility]. Pt states he needs transp home and that the home is unlocked.  I spoke to Arabella and she states she does not feel pt is safe to come home d/t his multiple hospitalizations and she would like pt to go to NH. She states she is in the process of working on NH placement and has a  but can not recall the name of the facility in [place]. She states they are waiting on the [facility]. She states she will call the  this am and call me back. Arabella also stated she was not educated on the Lifevest and would like some infor. I called and spoket to [place] with [employer] and she stated she will call Arabella.

## 2022-11-07 NOTE — NURSING
"Pt called me into room to demand "Give me a razor, I have to shave because I dont care but I'm leaving today". Told pt I will check on that.  Spoke w/ another nurse and we both do not think it is safe to give pt a razor to shave his face.  "

## 2022-11-07 NOTE — PROGRESS NOTES
C3 nurse attempted to contact Kendall Duff   for a TCC post hospital discharge follow up call. No answer at phone number listed.

## 2022-11-07 NOTE — DISCHARGE SUMMARY
[facility] Medicine Discharge Summary    Admit Date: 11/4/2022  Discharge Date and Time: 11/7/20227:07 AM  Admitting Physician: DARNELL Team  Discharging Physician: Soila Mann MD.  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  Patient had a recent hospital admission and was discharged on 11/04.  He was then admitted for new onset atrial fibrillation/flutter along with CHF exacerbation.  Patient presented back to the ED after being shocked by his LifeVest around 1500 on 11/04.  Patient was hemodynamically stable in the ED. lab significant for elevated BNP.  UDS negative.  Chest x-ray negative.  Troponin slightly elevated.  Cardiology services consulted the patient was initiated on amiodarone drip.  Hospitalist team consulted for admission.  NSR on amiodarone drip.  GDMT continued.  Heart failure compensated, on p.o. Lasix.  IV amiodarone later transitioned to p.o. amiodarone by Cardiology.  NSR.  Bradycardia noted on 11/06 and therefore Coreg decreased to 12.5 mg b.i.d..  Digoxin discontinued.  Heart failure appeared compensated.  Eliquis continued for thromboembolic prophylaxis paid other home meds also continued as appropriate.  Chest x-ray  repeated given intermittent shortness of breath.  Sats are stable.  Chest x-ray is not impressive except for mild pulmonary vascular congestion.  Patient re-evaluated 11/7.  Remains in NSR.  No more arrhythmia, LifeVest shocks paid cleared by Cardiology for discharge.  Daughter considering nursing home placement.  But the patient adamantly refused.  He wanted to go home.  Given that he was symptomatically better and was cleared by Cardiology and was hemodynamically stable it was decided to discharge him home.  Discharge medications per med rec.  Follow-up with PCP, Cardiology.  Treatment plans discussed with the patient and he voiced understanding to everything explained  Vitals:  VITAL SIGNS: 24 HRS MIN & MAX LAST   Temp  Min: 97.5 °F (36.4 °C)  Max: 98.2 °F (36.8 °C) 98.1 °F (36.7 °C)   BP  Min: 111/73  Max: 143/97 138/86   Pulse  Min: 51  Max: 58  (!) 58     Resp  Min: 18  Max: 22 18   SpO2  Min: 97 %  Max: 100 % 97 %       Physical Exam:  General appearance:  No acute distress  HENT: Atraumatic   Lungs: Clear to auscultation bilaterally.   Heart: RRR,No edema  Abdomen: Soft, non tender   Extremities: warm  Neuro:  Awake, alert, oriented x4  Psych/mental status: Appropriate mood and affect.      Procedures Performed: No admission procedures for hospital encounter.     Significant Diagnostic Studies: See Full reports for all details    Recent Labs   Lab 11/04/22 2051 11/05/22 0331 11/06/22  0409   WBC 8.6 7.5 5.6   RBC 4.72 4.93 4.91   HGB 11.0* 11.3* 11.2*   HCT 34.6* 36.0* 35.5*   MCV 73.3* 73.0* 72.3*   MCH 23.3* 22.9* 22.8*   MCHC 31.8* 31.4* 31.5*   RDW 17.7* 18.0* 18.3*    161 169   MPV 9.7 9.6 10.3       Recent Labs   Lab 11/02/22  0405 11/03/22  1205 11/04/22 2051 11/05/22  0331 11/06/22  0409      < > 139 137 136   K 5.6*   < > 4.2 4.5 4.7   CO2 25   < > 28 25 26   BUN 29.4*   < > 19.2 20.9 36.6*   CREATININE 1.26*   < > 1.15 1.13 1.58*   CALCIUM 9.0   < > 8.7 8.9 9.0   MG 2.30  --  2.20  2.10  --    ALBUMIN  --   --  3.4* 3.3* 3.5   ALKPHOS  --   --  118 117 108   ALT  --   --  36 43 37   AST  --   --  50* 61* 39*   BILITOT  --   --  0.4 0.4 0.4    < > = values in this interval not displayed.        Microbiology Results (last 7 days)       ** No results found for the last 168 hours. **             X-Ray Chest 1 View  Narrative: EXAMINATION:  XR CHEST 1 VIEW    CLINICAL HISTORY:  sob;    TECHNIQUE:  One    COMPARISON:  November 4, 2022.    FINDINGS:  Cardiopericardial silhouette silhouette enlarged appearance is similar.  Improved lungs aeration and congestive changes which are now minimal.  No focally dense consolidation or significant fluid accumulation within the pleural spaces.  Overlying devices are in similar location.  No pneumothorax  Impression: Improvement and minimal residual congestive changes.    Electronically signed by: Javier Soto  Date:    11/06/2022  Time:    11:31         Medication List        START taking these medications      amiodarone 200 MG Tab  Commonly known as: PACERONE  Take 1 tablet (200 mg total) by mouth once daily.            CHANGE how you take these medications      benztropine 1 MG tablet  Commonly known as: COGENTIN  What changed: Another medication with the same name was removed. Continue taking this medication, and follow the directions you see here.     carvediloL 12.5 MG tablet  Commonly known as: COREG  Take 1 tablet (12.5 mg total) by mouth 2 (two) times daily.  What changed:   medication strength  how much to take            CONTINUE taking these medications      apixaban 5 mg Tab  Commonly known as: ELIQUIS  Take 1 tablet (5 mg total) by mouth 2 (two) times daily.     ARIPiprazole 10 MG Tab  Commonly known as: ABILIFY  Take 1 tablet (10 mg total) by mouth once daily.     aspirin 81 MG Chew  Chew and swallow 1 tablet (81 mg total) by mouth once daily.     atorvastatin 80 MG tablet  Commonly known as: LIPITOR  Take 1 tablet (80 mg total) by mouth once  daily.     clopidogreL 75 mg tablet  Commonly known as: PLAVIX  Take 1 tablet (75 mg total) by mouth once daily.     famotidine 20 MG tablet  Commonly known as: PEPCID  Take 1 tablet (20 mg total) by mouth 2 (two) times daily.     furosemide 40 MG tablet  Commonly known as: LASIX  Take 1 tablet (40 mg total) by mouth once daily.     levETIRAcetam 500 MG Tab  Commonly known as: KEPPRA  Take 1 tablet (500 mg total) by mouth 2 (two) times daily.     OXcarbazepine 300 MG Tab  Commonly known as: TRILEPTAL     traZODone 100 MG tablet  Commonly known as: DESYREL  Take 1 tablet (100 mg total) by mouth every evening.            STOP taking these medications      digoxin 125 mcg tablet  Commonly known as: LANOXIN               Where to Get Your Medications        These medications were sent to [id number] - [place] [facility] [address] [facility]  [place] [address]      Phone: [phone]   amiodarone 200 MG Tab  carvediloL 12.5 MG tablet  clopidogreL 75 mg tablet          Explained in detail to the patient about the discharge plan, medications, and follow-up visits. Pt understands and agrees with the treatment plan  Discharge Disposition: Home or Self Care   Discharged Condition: stable  Diet-   Dietary Orders (From admission, onward)       Start     Ordered    11/05/22 0430  Diet heart healthy  (Diet/Nutrition [facility])  Diet effective now         11/05/22 0429                   Medications Per [facility] med rec  Activities as tolerated   Follow-up Information       Primary Doctor No Follow up in 1 week(s).               Primary Doctor No Follow up.               [facility] in 1 week Follow up.                           For further questions contact hospitalist office    Discharge time 33 minutes    For worsening symptoms, chest pain, shortness of breath, increased abdominal pain, high grade fever, stroke or stroke like symptoms, immediately go to the nearest Emergency Room or call  911 as soon as possible.      Soila Downing M.D, on 11/7/2022. at 7:07 AM.

## 2022-11-07 NOTE — PROGRESS NOTES
[facility] - 3rd Floor Medical Telemetry  Cardiology  Progress Note    Patient Name: Kendall Duff  MRN: [id number]  Admission Date: 11/4/2022  Hospital Length of Stay: 2 days  Code Status: Full Code   Attending Physician: Soila Mann MD   Primary Care Physician: Primary Doctor No  Expected Discharge Date: 11/7/2022  Principal Problem:CHF (congestive heart failure)    Subjective:   Consultation Reason: Lifevest Discharge     HPI:   Mr. Duff is a 57 year old male, known to Dr. Wright, who presented to the hospital status post lifevest discharge while taking trash out. This occurred the afternoon of 11.4.22. Recent admission for heart failure. Hemodynamically, the patient is stable. Electrolytes are also stable. Troponin values dating back to 10.27.22 noted to be ~ -.05 to 0.07, essentially flat. Drug Tox is negative. Echocardiogram on 10.28.22 revealed EF 20% with Grade II Diastolic dysfunction and moderate MR. [facility] is consulted to evaluate the patient given his lifevest discharge.  Life Vest Interrogation revealed Inappropriate shock due to fast rate, likely AFL RVR.    Hospital Course:   11.6.22: NAD Noted. Sinus Bradycardia in the 50's. BP Stable. Doing well.  11.7.22: NAD Noted. Patient comfortable. Sinus Bradycardia on Tele. Life Vest in Place.     PMH: CAD, ICMO, HTN, schizoaffective disorder, hep C, DVT, systolic & diastolic CHF, Lifevest  PSH: CABG, LHC  Family History: Mother - CA; Father - liver disease   Social History: Current every day smoker. Former Cocaine Use. Denies alcohol use.      Previous Cardiac Diagnostics:   Echocardiogram (10.28.22):  The left ventricle is moderately enlarged with mild eccentric hypertrophy and severely decreased systolic function.  The estimated ejection fraction is 20%.  There is severe left ventricular global hypokinesis.  Grade II left ventricular diastolic dysfunction.  Normal right ventricular size with moderately reduced right ventricular systolic  function.  Moderate mitral regurgitation.  Moderate tricuspid regurgitation.  There is mild pulmonary hypertension.  The estimated PA systolic pressure is 44 mmHg.  Elevated central venous pressure (15 mmHg).  Severe left atrial enlargement.     CABG x 4 (4/22):  LIMA-LAD, SVG-OM1, SVG-D1, SVG-PDA     Echocardiogram (6.16.22):  The left ventricle is normal in size w/ concentric hypertrophy and severely decreased systolic function.  The estimated EF is 25-30%.  There is severe left ventricular global hypokinesis.  Grade II left ventricular diastolic dysfunction.  Normal right ventricular size w/ mildly reduced right ventricular systolic function.  The estimated PA systolic pressure is 52 mmHg.  There is moderate pulmonary HTN.  Elevated CVP (15 mmHg).     RUE NIVA (5.10.22):  A deep vein thrombosis was identified in the right axillary and brachial veins. A superficial thrombosis was identified in the right basilic vein.     LHC (3.21.22):  100% LAD to 30-40% residual stenosis post PTCA.  Large diagonal system w/ severe stenosis.  CIRC - patent stent, OM1 patent, mild CIRC occluded  RCA - stents ISR 40-50%, distal 70-80%  EDP 12 EF 35-40% anterior HK     Review of Systems   Cardiovascular:  Negative for chest pain and orthopnea.   Respiratory:  Negative for shortness of breath.      Objective:     Vital Signs (Most Recent):  Temp: 98.1 °F (36.7 °C) (11/07/22 0817)  Pulse: (!) 55 (11/07/22 0817)  Resp: (!) 22 (11/07/22 0717)  BP: (!) 148/81 (11/07/22 0850)  SpO2: 98 % (11/07/22 0817)   Vital Signs (24h Range):  Temp:  [97.5 °F (36.4 °C)-98.2 °F (36.8 °C)] 98.1 °F (36.7 °C)  Pulse:  [51-59] 55  Resp:  [18-22] 22  SpO2:  [92 %-100 %] 98 %  BP: (111-148)/(73-99) 148/81     Weight: 73.5 kg (162 lb 0.6 oz)  Body mass index is 25.38 kg/m².    SpO2: 98 %  O2 Device (Oxygen Therapy): (S) room air (refusing nc)      Intake/Output Summary (Last 24 hours) at 11/7/2022 1024  Last data filed at 11/7/2022 0000  Gross per 24 hour    Intake 480 ml   Output 550 ml   Net -70 ml       Lines/Drains/Airways       Peripheral Intravenous Line  Duration                  Peripheral IV - Single Lumen 11/06/22 0000 20 G Left;Posterior Forearm 1 day                  Significant Labs:   Recent Results (from the past 72 hour(s))   Comprehensive metabolic panel    Collection Time: 11/04/22  8:51 PM   Result Value Ref Range    Sodium Level 139 136 - 145 mmol/L    Potassium Level 4.2 3.5 - 5.1 mmol/L    Chloride 102 98 - 107 mmol/L    Carbon Dioxide 28 22 - 29 mmol/L    Glucose Level 155 (H) 74 - 100 mg/dL    Blood Urea Nitrogen 19.2 8.4 - 25.7 mg/dL    Creatinine 1.15 0.73 - 1.18 mg/dL    Calcium Level Total 8.7 8.4 - 10.2 mg/dL    Protein Total 6.6 6.4 - 8.3 gm/dL    Albumin Level 3.4 (L) 3.5 - 5.0 gm/dL    Globulin 3.2 2.4 - 3.5 gm/dL    Albumin/Globulin Ratio 1.1 1.1 - 2.0 ratio    Bilirubin Total 0.4 <=1.5 mg/dL    Alkaline Phosphatase 118 40 - 150 unit/L    Alanine Aminotransferase 36 0 - 55 unit/L    Aspartate Aminotransferase 50 (H) 5 - 34 unit/L    eGFR >60 mls/min/1.73/m2   Brain natriuretic peptide    Collection Time: 11/04/22  8:51 PM   Result Value Ref Range    Natriuretic Peptide 1,880.4 (H) <=100.0 pg/mL   Troponin I    Collection Time: 11/04/22  8:51 PM   Result Value Ref Range    Troponin-I 0.041 0.000 - 0.045 ng/mL   Protime-INR    Collection Time: 11/04/22  8:51 PM   Result Value Ref Range    PT 15.8 (H) 12.5 - 14.5 seconds    INR 1.27 0.00 - 1.30   CBC with Differential    Collection Time: 11/04/22  8:51 PM   Result Value Ref Range    WBC 8.6 4.5 - 11.5 x10(3)/mcL    RBC 4.72 4.70 - 6.10 x10(6)/mcL    Hgb 11.0 (L) 14.0 - 18.0 gm/dL    Hct 34.6 (L) 42.0 - 52.0 %    MCV 73.3 (L) 80.0 - 94.0 fL    MCH 23.3 (L) 27.0 - 31.0 pg    MCHC 31.8 (L) 33.0 - 36.0 mg/dL    RDW 17.7 (H) 11.5 - 17.0 %    Platelet 204 130 - 400 x10(3)/mcL    MPV 9.7 7.4 - 10.4 fL    Neut % 67.4 %    Lymph % 17.0 %    Mono % 12.8 %    Eos % 1.9 %    Basophil % 0.6 %    Lymph  # 1.46 0.6 - 4.6 x10(3)/mcL    Neut # 5.8 2.1 - 9.2 x10(3)/mcL    Mono # 1.10 0.1 - 1.3 x10(3)/mcL    Eos # 0.16 0 - 0.9 x10(3)/mcL    Baso # 0.05 0 - 0.2 x10(3)/mcL    IG# 0.03 0 - 0.04 x10(3)/mcL    IG% 0.3 %    NRBC% 0.0 %   Ethanol    Collection Time: 11/04/22  8:51 PM   Result Value Ref Range    Ethanol Level <10.0 <=10.0 mg/dL   Magnesium    Collection Time: 11/04/22  8:51 PM   Result Value Ref Range    Magnesium Level 2.20 1.60 - 2.60 mg/dL   APTT    Collection Time: 11/04/22  8:51 PM   Result Value Ref Range    PTT 30.9 23.2 - 33.7 seconds   Digoxin Level    Collection Time: 11/04/22  8:51 PM   Result Value Ref Range    Digoxin Level 0.70 (L) 0.80 - 2.00 ng/mL   COVID-19 Rapid Screening    Collection Time: 11/04/22  9:33 PM   Result Value Ref Range    SARS COV-2 MOLECULAR Negative Negative   Drug Screen, Urine    Collection Time: 11/04/22 11:51 PM   Result Value Ref Range    Amphetamines, Urine Negative Negative    Barbituates, Urine Negative Negative    Benzodiazepine, Urine Negative Negative    Cannabinoids, Urine Negative Negative    Cocaine, Urine Negative Negative    Fentanyl, Urine Negative Negative    MDMA, Urine Negative Negative    Opiates, Urine Negative Negative    Phencyclidine, Urine Negative Negative    pH, Urine 6.0 3.0 - 11.0    Specific Gravity, Urine Auto 1.025 1.001 - 1.035   Comprehensive Metabolic Panel    Collection Time: 11/05/22  3:31 AM   Result Value Ref Range    Sodium Level 137 136 - 145 mmol/L    Potassium Level 4.5 3.5 - 5.1 mmol/L    Chloride 104 98 - 107 mmol/L    Carbon Dioxide 25 22 - 29 mmol/L    Glucose Level 116 (H) 74 - 100 mg/dL    Blood Urea Nitrogen 20.9 8.4 - 25.7 mg/dL    Creatinine 1.13 0.73 - 1.18 mg/dL    Calcium Level Total 8.9 8.4 - 10.2 mg/dL    Protein Total 6.2 (L) 6.4 - 8.3 gm/dL    Albumin Level 3.3 (L) 3.5 - 5.0 gm/dL    Globulin 2.9 2.4 - 3.5 gm/dL    Albumin/Globulin Ratio 1.1 1.1 - 2.0 ratio    Bilirubin Total 0.4 <=1.5 mg/dL    Alkaline Phosphatase  117 40 - 150 unit/L    Alanine Aminotransferase 43 0 - 55 unit/L    Aspartate Aminotransferase 61 (H) 5 - 34 unit/L    eGFR >60 mls/min/1.73/m2   Magnesium    Collection Time: 11/05/22  3:31 AM   Result Value Ref Range    Magnesium Level 2.10 1.60 - 2.60 mg/dL   Phosphorus    Collection Time: 11/05/22  3:31 AM   Result Value Ref Range    Phosphorus Level 3.6 2.3 - 4.7 mg/dL   CBC with Differential    Collection Time: 11/05/22  3:31 AM   Result Value Ref Range    WBC 7.5 4.5 - 11.5 x10(3)/mcL    RBC 4.93 4.70 - 6.10 x10(6)/mcL    Hgb 11.3 (L) 14.0 - 18.0 gm/dL    Hct 36.0 (L) 42.0 - 52.0 %    MCV 73.0 (L) 80.0 - 94.0 fL    MCH 22.9 (L) 27.0 - 31.0 pg    MCHC 31.4 (L) 33.0 - 36.0 mg/dL    RDW 18.0 (H) 11.5 - 17.0 %    Platelet 161 130 - 400 x10(3)/mcL    MPV 9.6 7.4 - 10.4 fL    Neut % 64.7 %    Lymph % 19.5 %    Mono % 13.3 %    Eos % 1.7 %    Basophil % 0.4 %    Lymph # 1.46 0.6 - 4.6 x10(3)/mcL    Neut # 4.9 2.1 - 9.2 x10(3)/mcL    Mono # 1.00 0.1 - 1.3 x10(3)/mcL    Eos # 0.13 0 - 0.9 x10(3)/mcL    Baso # 0.03 0 - 0.2 x10(3)/mcL    IG# 0.03 0 - 0.04 x10(3)/mcL    IG% 0.4 %    NRBC% 0.0 %   Troponin I    Collection Time: 11/05/22  3:31 AM   Result Value Ref Range    Troponin-I 0.058 (H) 0.000 - 0.045 ng/mL   Troponin I    Collection Time: 11/05/22 10:38 AM   Result Value Ref Range    Troponin-I 0.055 (H) 0.000 - 0.045 ng/mL   Troponin I    Collection Time: 11/05/22  5:05 PM   Result Value Ref Range    Troponin-I 0.083 (H) 0.000 - 0.045 ng/mL   Comprehensive Metabolic Panel    Collection Time: 11/06/22  4:09 AM   Result Value Ref Range    Sodium Level 136 136 - 145 mmol/L    Potassium Level 4.7 3.5 - 5.1 mmol/L    Chloride 101 98 - 107 mmol/L    Carbon Dioxide 26 22 - 29 mmol/L    Glucose Level 105 (H) 74 - 100 mg/dL    Blood Urea Nitrogen 36.6 (H) 8.4 - 25.7 mg/dL    Creatinine 1.58 (H) 0.73 - 1.18 mg/dL    Calcium Level Total 9.0 8.4 - 10.2 mg/dL    Protein Total 6.5 6.4 - 8.3 gm/dL    Albumin Level 3.5 3.5 - 5.0  gm/dL    Globulin 3.0 2.4 - 3.5 gm/dL    Albumin/Globulin Ratio 1.2 1.1 - 2.0 ratio    Bilirubin Total 0.4 <=1.5 mg/dL    Alkaline Phosphatase 108 40 - 150 unit/L    Alanine Aminotransferase 37 0 - 55 unit/L    Aspartate Aminotransferase 39 (H) 5 - 34 unit/L    eGFR 50 mls/min/1.73/m2   CBC with Differential    Collection Time: 11/06/22  4:09 AM   Result Value Ref Range    WBC 5.6 4.5 - 11.5 x10(3)/mcL    RBC 4.91 4.70 - 6.10 x10(6)/mcL    Hgb 11.2 (L) 14.0 - 18.0 gm/dL    Hct 35.5 (L) 42.0 - 52.0 %    MCV 72.3 (L) 80.0 - 94.0 fL    MCH 22.8 (L) 27.0 - 31.0 pg    MCHC 31.5 (L) 33.0 - 36.0 mg/dL    RDW 18.3 (H) 11.5 - 17.0 %    Platelet 169 130 - 400 x10(3)/mcL    MPV 10.3 7.4 - 10.4 fL    Neut % 61.9 %    Lymph % 24.5 %    Mono % 11.4 %    Eos % 1.1 %    Basophil % 0.7 %    Lymph # 1.37 0.6 - 4.6 x10(3)/mcL    Neut # 3.5 2.1 - 9.2 x10(3)/mcL    Mono # 0.64 0.1 - 1.3 x10(3)/mcL    Eos # 0.06 0 - 0.9 x10(3)/mcL    Baso # 0.04 0 - 0.2 x10(3)/mcL    IG# 0.02 0 - 0.04 x10(3)/mcL    IG% 0.4 %    NRBC% 0.0 %   Ammonia    Collection Time: 11/06/22  4:09 AM   Result Value Ref Range    Ammonia Level 27.3 18.0 - 72.0 umol/L   Comprehensive Metabolic Panel    Collection Time: 11/07/22  5:38 AM   Result Value Ref Range    Sodium Level 138 136 - 145 mmol/L    Potassium Level 4.7 3.5 - 5.1 mmol/L    Chloride 98 98 - 107 mmol/L    Carbon Dioxide 31 (H) 22 - 29 mmol/L    Glucose Level 107 (H) 74 - 100 mg/dL    Blood Urea Nitrogen 37.5 (H) 8.4 - 25.7 mg/dL    Creatinine 1.20 (H) 0.73 - 1.18 mg/dL    Calcium Level Total 8.9 8.4 - 10.2 mg/dL    Protein Total 6.7 6.4 - 8.3 gm/dL    Albumin Level 3.5 3.5 - 5.0 gm/dL    Globulin 3.2 2.4 - 3.5 gm/dL    Albumin/Globulin Ratio 1.1 1.1 - 2.0 ratio    Bilirubin Total 0.7 <=1.5 mg/dL    Alkaline Phosphatase 105 40 - 150 unit/L    Alanine Aminotransferase 34 0 - 55 unit/L    Aspartate Aminotransferase 34 5 - 34 unit/L    eGFR >60 mls/min/1.73/m2   CBC with Differential    Collection Time:  11/07/22  5:38 AM   Result Value Ref Range    WBC 5.8 4.5 - 11.5 x10(3)/mcL    RBC 4.92 4.70 - 6.10 x10(6)/mcL    Hgb 11.3 (L) 14.0 - 18.0 gm/dL    Hct 35.6 (L) 42.0 - 52.0 %    MCV 72.4 (L) 80.0 - 94.0 fL    MCH 23.0 (L) 27.0 - 31.0 pg    MCHC 31.7 (L) 33.0 - 36.0 mg/dL    RDW 18.0 (H) 11.5 - 17.0 %    Platelet 160 130 - 400 x10(3)/mcL    MPV 10.3 7.4 - 10.4 fL    Neut % 74.9 %    Lymph % 14.9 %    Mono % 8.1 %    Eos % 1.0 %    Basophil % 0.9 %    Lymph # 0.87 0.6 - 4.6 x10(3)/mcL    Neut # 4.4 2.1 - 9.2 x10(3)/mcL    Mono # 0.47 0.1 - 1.3 x10(3)/mcL    Eos # 0.06 0 - 0.9 x10(3)/mcL    Baso # 0.05 0 - 0.2 x10(3)/mcL    IG# 0.01 0 - 0.04 x10(3)/mcL    IG% 0.2 %    NRBC% 0.0 %       Telemetry:  Sinus Bradycardia HR 50's, High 40's when asleep    Physical Exam  Vitals reviewed.   Constitutional:       Appearance: Normal appearance.   HENT:      Head: Normocephalic.      Mouth/Throat:      Mouth: Mucous membranes are moist.      Pharynx: Oropharynx is clear.   Cardiovascular:      Rate and Rhythm: Regular rhythm. Bradycardia present.      Pulses: Normal pulses.      Heart sounds: Normal heart sounds. No murmur heard.  Pulmonary:      Effort: Pulmonary effort is normal. No respiratory distress.      Breath sounds: Normal breath sounds.   Abdominal:      General: There is no distension.      Palpations: Abdomen is soft.      Tenderness: There is no abdominal tenderness.   Musculoskeletal:      Cervical back: Neck supple.      Right lower leg: No edema.      Left lower leg: No edema.   Skin:     General: Skin is warm and dry.      Capillary Refill: Capillary refill takes less than 2 seconds.   Neurological:      General: No focal deficit present.      Mental Status: He is alert and oriented to person, place, and time. Mental status is at baseline.       Current Inpatient Medications:    Current Facility-Administered Medications:     acetaminophen tablet 1,000 mg, 1,000 mg, Oral, Q6H PRN, Belinda Berumen MD    acetaminophen  tablet 650 mg, 650 mg, Oral, Q4H PRN, Belinda Berumen MD    ALPRAZolam tablet 0.25 mg, 0.25 mg, Oral, Nightly PRN, Soila Mann MD, 0.25 mg at 11/06/22 2135    aluminum-magnesium hydroxide-simethicone 200-200-20 mg/5 mL suspension 30 mL, 30 mL, Oral, QID PRN, Belinda Berumen MD    amiodarone tablet 200 mg, 200 mg, Oral, Daily, RAY Henry, 200 mg at 11/07/22 0851    apixaban tablet 5 mg, 5 mg, Oral, BID, Belinda Berumen MD, 5 mg at 11/07/22 0850    ARIPiprazole tablet 10 mg, 10 mg, Oral, Daily, Belinda Berumen MD, 10 mg at 11/07/22 0850    aspirin chewable tablet 81 mg, 81 mg, Oral, Daily, Belinda Berumen MD, 81 mg at 11/07/22 0850    atorvastatin tablet 80 mg, 80 mg, Oral, Daily, Belinda Berumen MD, 80 mg at 11/07/22 0850    benztropine tablet 1 mg, 1 mg, Oral, BID, Belinda Berumen MD, 1 mg at 11/07/22 0851    benztropine tablet 1 mg, 1 mg, Oral, BID PRN, Belinda Berumen MD    carvediloL tablet 12.5 mg, 12.5 mg, Oral, BID, WILLY HenryP, 12.5 mg at 11/07/22 0850    clopidogreL tablet 75 mg, 75 mg, Oral, Daily, Soila Mann MD, 75 mg at 11/07/22 0851    famotidine tablet 20 mg, 20 mg, Oral, BID, Belinda Berumen MD, 20 mg at 11/07/22 0851    furosemide tablet 40 mg, 40 mg, Oral, Daily, Belinda Berumen MD, 40 mg at 11/07/22 0850    isosorbide mononitrate 24 hr tablet 30 mg, 30 mg, Oral, Daily, Soila Mann MD, 30 mg at 11/07/22 0850    levalbuterol nebulizer solution 1.25 mg, 1.25 mg, Nebulization, 4 times per day, Soila Mann MD, 1.25 mg at 11/07/22 0717    levETIRAcetam tablet 500 mg, 500 mg, Oral, BID, Belinda Berumen MD, 500 mg at 11/07/22 0851    melatonin tablet 6 mg, 6 mg, Oral, Nightly PRN, Belinda Berumen MD, 6 mg at 11/06/22 2134    morphine injection 2 mg, 2 mg, Intravenous, Q4H PRN, Belinda Berumen MD, 2 mg at 11/05/22 0448    nitroGLYCERIN SL tablet 0.4 mg, 0.4 mg, Sublingual, Q5 Min PRN, Belinda Berumen MD    ondansetron injection 4 mg, 4 mg, Intravenous, Q4H PRN, Belinda Berumen MD    OXcarbazepine tablet 300 mg, 300 mg, Oral,  TID, Belinda Berumen MD, 300 mg at 11/07/22 0851    polyethylene glycol packet 17 g, 17 g, Oral, BID PRN, Belinda Berumen MD    prochlorperazine injection Soln 5 mg, 5 mg, Intravenous, Q6H PRN, Belinda Berumen MD    senna-docusate 8.6-50 mg per tablet 1 tablet, 1 tablet, Oral, BID PRN, Belinda Berumen MD    simethicone chewable tablet 80 mg, 1 tablet, Oral, QID PRN, Belinda Berumen MD    sodium chloride 0.9% flush 10 mL, 10 mL, Intravenous, PRN, Belinda Berumen MD    traZODone tablet 100 mg, 100 mg, Oral, QHS, Belinda Berumen MD, 100 mg at 11/06/22 2135    VTE Risk Mitigation (From admission, onward)           Ordered     apixaban tablet 5 mg  2 times daily         11/05/22 0428                  Assessment:   PAF/PAFL (Recent New Diagnosis)- Now Sinus Bradycardia with HR in the 50's/High 40's (Asymptomatic/Feeling Fine)    - [id number]: 5 (LVSD/HTN/TE/NSTEMI)    - On Eliquis  Life Vest Discharge/Shock (11.4.22 Afternoon)- Shocked AFL RVR Based on Fast Heart Rate Parameters  Chronic Combined Systolic/Diastolic HF (Compensated)    - Grade 2 DD    - EF 20% (echo 10.28.22)  ICMO     - With Life Vest (Now in Place)  VHD    -Moderate MR and Moderate TR  Hypertension  Pulmonary Hypertension  Abnormal Troponin    - Low Level Elevation/Flat Trend with Recent NSTEMI Type II in the Setting of Decompensated HF    - No Angina/SOB  CAD/CABG (April 2022)    - LIMA-LAD, SVG-OM1, SVG-D1, SVG-PDA  History of RUE DVT (5.10.22)    - A deep vein thrombosis was identified in the right axillary and brachial veins. A superficial thrombosis was identified in the right basilic vein.  Schizoaffective Disorder  Hx of Hep C  Hx of cocaine Abuse (Drug Toxicology is Negative)  Nicotine Dependence (Tobacco use)    Plan:   Continue Amiodarone 200 Mg PO Daily & Coreg 12.5 Mg PO BID (Digoxin Discontinued)- Per Dr. Velez (EP)  Continue Eliquis Re: DVT Treatment/AFL Stroke Risk Reduction. On ASA/Statin (CAD)  Discontinue Plavix to Avoid Triple Therapy  Continue Oral  Lasix 40 Mg Daily  Discharge Planning as per Primary Team. Follow up with [facility]. Will sign off. Please call if needed.     RAY Henry  Cardiology  [facility] - 3rd Floor Medical Telemetry  11/07/2022

## 2022-11-07 NOTE — NURSING
"At shift change, I noticed seroquel was a med he had taken before. Went into pt's room and asked him about this med and other allergies. Pt stated "I had seizures". Told him okay, thanks for letting me know.    During pt's med pass, told would update allergy to this med so that way other healthcare providers would be aware. Pt all of a sudden started screaming at me saying "Do not give me that f**srini med!" Explained and educated to him multiple times that we are definitely not giving this med to him, that it is listed as an allergy which means that these meds are ones we do not give and make sure of not to. Again, pt screaming "I do not take that medication I have seizures. Dont you give me f**srini depakote, lithium, or seroquel". Reassured pt, that in no way was he receiving these medications. Pt now threatening me, increasingly hostile, verbally abusive, demanding, racist and demeaning. Pt states, "You dumb little white girl", "Shut up little white girl", "Im going to beat the f**k out of you", "Now give me my f**srini medicine" while sitting up in bed now clenching his fist. Placed meds on bedside table for pt to take, backed up and visualized pt taking his meds. Made sure all safety factors were in place and left room. Notified charge nurse and another RN on floor. RN spoke to pt that he cannot speak to us that way.     After this incident, myself and another nurse went into room together (for safety). Pt "allowed" us to take vitals & constantly place lifevest back on, but refusing everything else.     Spoke with charge nurse and house supervisor, said if this happens again to call security. House Supervisor made me aware that pt's threatening nurses is actually a felony and if I wanted to report I could.  "

## 2022-11-11 ENCOUNTER — HOSPITAL ENCOUNTER (OUTPATIENT)
Facility: HOSPITAL | Age: 57
Discharge: HOME | End: 2022-11-11
Attending: INTERNAL MEDICINE | Admitting: INTERNAL MEDICINE
Payer: MEDICAID

## 2022-11-11 VITALS
SYSTOLIC BLOOD PRESSURE: 142 MMHG | WEIGHT: 160 LBS | BODY MASS INDEX: 25.11 KG/M2 | DIASTOLIC BLOOD PRESSURE: 98 MMHG | RESPIRATION RATE: 22 BRPM | HEART RATE: 82 BPM | OXYGEN SATURATION: 99 % | TEMPERATURE: 98 F | HEIGHT: 67 IN

## 2022-11-11 DIAGNOSIS — I48.92 ATRIAL FIBRILLATION AND FLUTTER: Primary | ICD-10-CM

## 2022-11-11 DIAGNOSIS — Z45.02 DEFIBRILLATOR DISCHARGE: ICD-10-CM

## 2022-11-11 DIAGNOSIS — I48.91 ATRIAL FIBRILLATION AND FLUTTER: Primary | ICD-10-CM

## 2022-11-11 DIAGNOSIS — I25.10 CORONARY ARTERY DISEASE, UNSPECIFIED VESSEL OR LESION TYPE, UNSPECIFIED WHETHER ANGINA PRESENT, UNSPECIFIED WHETHER NATIVE OR TRANSPLANTED HEART: ICD-10-CM

## 2022-11-11 DIAGNOSIS — Z95.810 USES LIFEVEST DEFIBRILLATOR: ICD-10-CM

## 2022-11-11 LAB
ALBUMIN SERPL-MCNC: 3.4 GM/DL (ref 3.5–5)
ALBUMIN/GLOB SERPL: 0.9 RATIO (ref 1.1–2)
ALP SERPL-CCNC: 160 UNIT/L (ref 40–150)
ALT SERPL-CCNC: 25 UNIT/L (ref 0–55)
AST SERPL-CCNC: 37 UNIT/L (ref 5–34)
BASOPHILS # BLD AUTO: 0.07 X10(3)/MCL (ref 0–0.2)
BASOPHILS NFR BLD AUTO: 0.6 %
BILIRUBIN DIRECT+TOT PNL SERPL-MCNC: 0.8 MG/DL
BUN SERPL-MCNC: 22.5 MG/DL (ref 8.4–25.7)
CALCIUM SERPL-MCNC: 8.8 MG/DL (ref 8.4–10.2)
CHLORIDE SERPL-SCNC: 106 MMOL/L (ref 98–107)
CO2 SERPL-SCNC: 27 MMOL/L (ref 22–29)
CREAT SERPL-MCNC: 1.18 MG/DL (ref 0.73–1.18)
EOSINOPHIL # BLD AUTO: 0.11 X10(3)/MCL (ref 0–0.9)
EOSINOPHIL NFR BLD AUTO: 1 %
ERYTHROCYTE [DISTWIDTH] IN BLOOD BY AUTOMATED COUNT: 18.3 % (ref 11.5–17)
GFR SERPLBLD CREATININE-BSD FMLA CKD-EPI: >60 MLS/MIN/1.73/M2
GLOBULIN SER-MCNC: 3.6 GM/DL (ref 2.4–3.5)
GLUCOSE SERPL-MCNC: 84 MG/DL (ref 74–100)
HCT VFR BLD AUTO: 35.8 % (ref 42–52)
HGB BLD-MCNC: 11.3 GM/DL (ref 14–18)
IMM GRANULOCYTES # BLD AUTO: 0.05 X10(3)/MCL (ref 0–0.04)
IMM GRANULOCYTES NFR BLD AUTO: 0.5 %
LYMPHOCYTES # BLD AUTO: 1.45 X10(3)/MCL (ref 0.6–4.6)
LYMPHOCYTES NFR BLD AUTO: 13.1 %
MCH RBC QN AUTO: 22.9 PG (ref 27–31)
MCHC RBC AUTO-ENTMCNC: 31.6 MG/DL (ref 33–36)
MCV RBC AUTO: 72.6 FL (ref 80–94)
MONOCYTES # BLD AUTO: 1 X10(3)/MCL (ref 0.1–1.3)
MONOCYTES NFR BLD AUTO: 9 %
NEUTROPHILS # BLD AUTO: 8.4 X10(3)/MCL (ref 2.1–9.2)
NEUTROPHILS NFR BLD AUTO: 75.8 %
NRBC BLD AUTO-RTO: 0 %
PLATELET # BLD AUTO: 271 X10(3)/MCL (ref 130–400)
PMV BLD AUTO: 9.4 FL (ref 7.4–10.4)
POTASSIUM SERPL-SCNC: 4.2 MMOL/L (ref 3.5–5.1)
PROT SERPL-MCNC: 7 GM/DL (ref 6.4–8.3)
RBC # BLD AUTO: 4.93 X10(6)/MCL (ref 4.7–6.1)
SODIUM SERPL-SCNC: 138 MMOL/L (ref 136–145)
TROPONIN I SERPL-MCNC: 0.04 NG/ML (ref 0–0.04)
WBC # SPEC AUTO: 11.1 X10(3)/MCL (ref 4.5–11.5)

## 2022-11-11 PROCEDURE — 93010 EKG 12-LEAD: ICD-10-PCS | Mod: ,,, | Performed by: INTERNAL MEDICINE

## 2022-11-11 PROCEDURE — 93005 ELECTROCARDIOGRAM TRACING: CPT

## 2022-11-11 PROCEDURE — 85025 COMPLETE CBC W/AUTO DIFF WBC: CPT | Performed by: STUDENT IN AN ORGANIZED HEALTH CARE EDUCATION/TRAINING PROGRAM

## 2022-11-11 PROCEDURE — 96361 HYDRATE IV INFUSION ADD-ON: CPT

## 2022-11-11 PROCEDURE — 80053 COMPREHEN METABOLIC PANEL: CPT | Performed by: STUDENT IN AN ORGANIZED HEALTH CARE EDUCATION/TRAINING PROGRAM

## 2022-11-11 PROCEDURE — 25000003 PHARM REV CODE 250: Performed by: EMERGENCY MEDICINE

## 2022-11-11 PROCEDURE — 99285 EMERGENCY DEPT VISIT HI MDM: CPT | Mod: 25

## 2022-11-11 PROCEDURE — 84484 ASSAY OF TROPONIN QUANT: CPT | Performed by: STUDENT IN AN ORGANIZED HEALTH CARE EDUCATION/TRAINING PROGRAM

## 2022-11-11 PROCEDURE — 93010 ELECTROCARDIOGRAM REPORT: CPT | Mod: ,,, | Performed by: INTERNAL MEDICINE

## 2022-11-11 PROCEDURE — 96360 HYDRATION IV INFUSION INIT: CPT

## 2022-11-11 PROCEDURE — G0378 HOSPITAL OBSERVATION PER HR: HCPCS

## 2022-11-11 RX ORDER — SODIUM CHLORIDE 9 MG/ML
100 INJECTION, SOLUTION INTRAVENOUS CONTINUOUS
Status: DISCONTINUED | OUTPATIENT
Start: 2022-11-11 | End: 2022-11-11 | Stop reason: HOSPADM

## 2022-11-11 RX ORDER — CARVEDILOL 12.5 MG/1
12.5 TABLET ORAL 2 TIMES DAILY
Status: DISCONTINUED | OUTPATIENT
Start: 2022-11-11 | End: 2022-11-11

## 2022-11-11 RX ORDER — HYDROCODONE BITARTRATE AND ACETAMINOPHEN 5; 325 MG/1; MG/1
1 TABLET ORAL EVERY 4 HOURS PRN
Status: DISCONTINUED | OUTPATIENT
Start: 2022-11-11 | End: 2022-11-11 | Stop reason: HOSPADM

## 2022-11-11 RX ORDER — PROCHLORPERAZINE EDISYLATE 5 MG/ML
5 INJECTION INTRAMUSCULAR; INTRAVENOUS EVERY 6 HOURS PRN
Status: DISCONTINUED | OUTPATIENT
Start: 2022-11-11 | End: 2022-11-11 | Stop reason: HOSPADM

## 2022-11-11 RX ORDER — NAPROXEN SODIUM 220 MG/1
81 TABLET, FILM COATED ORAL DAILY
Status: DISCONTINUED | OUTPATIENT
Start: 2022-11-12 | End: 2022-11-11 | Stop reason: HOSPADM

## 2022-11-11 RX ORDER — SODIUM CHLORIDE 0.9 % (FLUSH) 0.9 %
3 SYRINGE (ML) INJECTION EVERY 8 HOURS PRN
Status: DISCONTINUED | OUTPATIENT
Start: 2022-11-11 | End: 2022-11-11 | Stop reason: HOSPADM

## 2022-11-11 RX ORDER — ISOSORBIDE MONONITRATE 30 MG/1
30 TABLET, EXTENDED RELEASE ORAL DAILY
Status: DISCONTINUED | OUTPATIENT
Start: 2022-11-12 | End: 2022-11-11 | Stop reason: HOSPADM

## 2022-11-11 RX ORDER — ATORVASTATIN CALCIUM 40 MG/1
80 TABLET, FILM COATED ORAL DAILY
Status: DISCONTINUED | OUTPATIENT
Start: 2022-11-12 | End: 2022-11-11 | Stop reason: HOSPADM

## 2022-11-11 RX ORDER — METOPROLOL TARTRATE 75 MG/1
75 TABLET, FILM COATED ORAL 2 TIMES DAILY
Qty: 60 TABLET | Refills: 11 | Status: ON HOLD | OUTPATIENT
Start: 2022-11-11 | End: 2022-12-25 | Stop reason: SDUPTHER

## 2022-11-11 RX ORDER — AMIODARONE HYDROCHLORIDE 200 MG/1
200 TABLET ORAL DAILY
Status: DISCONTINUED | OUTPATIENT
Start: 2022-11-12 | End: 2022-11-11 | Stop reason: HOSPADM

## 2022-11-11 RX ORDER — FAMOTIDINE 20 MG/1
20 TABLET, FILM COATED ORAL 2 TIMES DAILY
Status: DISCONTINUED | OUTPATIENT
Start: 2022-11-11 | End: 2022-11-11 | Stop reason: HOSPADM

## 2022-11-11 RX ORDER — ACETAMINOPHEN 325 MG/1
650 TABLET ORAL EVERY 8 HOURS PRN
Status: DISCONTINUED | OUTPATIENT
Start: 2022-11-11 | End: 2022-11-11 | Stop reason: HOSPADM

## 2022-11-11 RX ORDER — LEVETIRACETAM 500 MG/1
500 TABLET ORAL 2 TIMES DAILY
Status: DISCONTINUED | OUTPATIENT
Start: 2022-11-11 | End: 2022-11-11 | Stop reason: HOSPADM

## 2022-11-11 RX ORDER — ONDANSETRON 2 MG/ML
4 INJECTION INTRAMUSCULAR; INTRAVENOUS EVERY 8 HOURS PRN
Status: DISCONTINUED | OUTPATIENT
Start: 2022-11-11 | End: 2022-11-11 | Stop reason: HOSPADM

## 2022-11-11 RX ORDER — FUROSEMIDE 40 MG/1
40 TABLET ORAL DAILY
Status: DISCONTINUED | OUTPATIENT
Start: 2022-11-12 | End: 2022-11-11 | Stop reason: HOSPADM

## 2022-11-11 RX ADMIN — SODIUM CHLORIDE 100 ML/HR: 9 INJECTION, SOLUTION INTRAVENOUS at 05:11

## 2022-11-11 NOTE — ED PROVIDER NOTES
"Encounter Date: 11/11/2022       History     Chief Complaint   Patient presents with    Medical Problem     Pt states that his lifepak shocked him twice this morning; denies any symptoms     56 yo male who is s/p CABG in April, 2022 currently wearing a Life Vest, presents via ambulance after his life vest shocked him twice this morning. Pt was working in his yard this morning around 0900, when he experienced the first shock, and shortly after the second.  Pt states he felt like "I was going to die" right before he felt the shocks, he is asymptomatic currently in the ED.   Per EMS, vitals were stable on scene and remained such on route as well.   Pt's cardiologist is Dr. Wright in [place].     The history is provided by the patient and the EMS personnel. History limited by: poor historian.   Review of patient's allergies indicates:   Allergen Reactions    Depakote [divalproex]     Divalproex sodium Other (See Comments)    Lithium     Lithium analogues     Quetiapine Other (See Comments)     Pt states had seizures     Past Medical History:   Diagnosis Date    Bipolar disorder, unspecified     Chronic hepatitis C     History of psychiatric hospitalization     Hx of psychiatric care     Hypertension     Bessie     Obesity, unspecified     Psychiatric problem     Schizoaffective disorder, bipolar type     Seizures     Stroke     Substance abuse     Therapy      Past Surgical History:   Procedure Laterality Date    CORONARY STENT PLACEMENT  08/14/2015    DEBRIDEMENT  04/17/2022    LEFT HEART CATHETERIZATION Left 08/13/2015    REPEAT CLOSURE OF STERNAL INCISION N/A 5/11/2022    Procedure: CLOSURE, STERNAL INCISION, REPEAT;  Surgeon: Adolfo Weiss MD;  Location: [facility];  Service: Plastics;  Laterality: N/A;  STERNAL WOUND DEBRIDEMENT AND RECONSTRUCTION // MULTIPLE MUSCLE FLAPS // REQ 1400     Family History   Problem Relation Age of Onset    Cancer Mother     Liver disease Father      Social History     Tobacco Use    " Smoking status: Every Day     Types: Cigarettes    Smokeless tobacco: Never   Substance Use Topics    Alcohol use: Never    Drug use: Not Currently     Types: Cocaine     Review of Systems   HENT: Negative.     Eyes: Negative.    Respiratory:  Negative for shortness of breath.    Cardiovascular:  Negative for chest pain.   Gastrointestinal:  Negative for abdominal pain, diarrhea, nausea and vomiting.   Endocrine: Negative.    Genitourinary:  Negative for difficulty urinating.   Musculoskeletal:  Negative for back pain and joint swelling.   Skin: Negative.    Allergic/Immunologic: Negative.    Neurological:  Negative for dizziness, seizures, weakness and light-headedness.   Hematological: Negative.    Psychiatric/Behavioral: Negative.       Physical Exam     Initial Vitals [11/11/22 1055]   BP Pulse Resp Temp SpO2   116/83 78 18 97.9 °F (36.6 °C) 96 %      MAP       --         Physical Exam    Nursing note and vitals reviewed.  Constitutional: He is not diaphoretic. No distress.   HENT:   Head: Atraumatic.   Eyes: Conjunctivae and EOM are normal. Pupils are equal, round, and reactive to light.   Neck:   Normal range of motion.  Cardiovascular:  Normal rate and regular rhythm.           Pulmonary/Chest: Breath sounds normal.   Life Vest in place with evidence of green dye   Abdominal: Abdomen is soft. Bowel sounds are normal. He exhibits no distension. There is no abdominal tenderness.   Musculoskeletal:         General: Normal range of motion.      Cervical back: Normal range of motion.     Neurological: He is alert and oriented to person, place, and time.   Skin: Skin is warm and dry.   Psychiatric: He has a normal mood and affect. Thought content normal.       ED Course   Procedures  Labs Reviewed   COMPREHENSIVE METABOLIC PANEL - Abnormal; Notable for the following components:       Result Value    Albumin Level 3.4 (*)     Globulin 3.6 (*)     Albumin/Globulin Ratio 0.9 (*)     Alkaline Phosphatase 160 (*)      Aspartate Aminotransferase 37 (*)     All other components within normal limits   CBC WITH DIFFERENTIAL - Abnormal; Notable for the following components:    Hgb 11.3 (*)     Hct 35.8 (*)     MCV 72.6 (*)     MCH 22.9 (*)     MCHC 31.6 (*)     RDW 18.3 (*)     IG# 0.05 (*)     All other components within normal limits   TROPONIN I - Normal   CBC W/ AUTO DIFFERENTIAL    Narrative:     The following orders were created for panel order CBC auto differential.  Procedure                               Abnormality         Status                     ---------                               -----------         ------                     CBC with Differential[[id number]]        Abnormal            Final result                 Please view results for these tests on the individual orders.     EKG Readings: (Independently Interpreted)   Initial Reading: No STEMI. Rhythm: Normal Sinus Rhythm. Heart Rate: 74. Conduction: RBBB. ST Segments: Normal ST Segments.   Other EKG Interpretations: Repeat EKG at 1437  Unchanged from previous from earlier today. NSR with rate of 74, RBBB.    ECG Results              EKG 12-lead (Final result)  Result time 11/11/22 18:04:14      Final result by Interface, Lab In [facility] (11/11/22 18:04:14)                   Narrative:    Test Reason : Z95.810,    Vent. Rate : 074 BPM     Atrial Rate : 074 BPM     P-R Int : 170 ms          QRS Dur : 132 ms      QT Int : 430 ms       P-R-T Axes : 088 204 041 degrees     QTc Int : 477 ms    Normal sinus rhythm  Right bundle branch block  LPFB   Bifascicular block     Abnormal ECG    Confirmed by Niya SOMERS, Phuc ([id number]) on 11/11/2022 6:04:08 PM    Referred By: ROMULO   SELF           Confirmed By:Phuc Núñez MD                                  Imaging Results              X-Ray Chest AP Portable (Final result)  Result time 11/11/22 12:11:47      Final result by Tio Palacios MD (11/11/22 12:11:47)                   Impression:      No acute pulmonary process  identified.      Electronically signed by: Tio Palacios  Date:    11/11/2022  Time:    12:11               Narrative:    EXAMINATION:  XR CHEST AP PORTABLE    CLINICAL HISTORY:  lifevest discharge;    TECHNIQUE:  Frontal view(s) of the chest.    COMPARISON:  Radiography 11/06/2022    FINDINGS:  Overlying life vest.  Mild enlarged of the cardiac silhouette.  The lungs are well-inflated.  No consolidation identified.  No significant pleural effusion or discernible pneumothorax.                                       Medications - No data to display    Medical Decision Making:   ED Management:  CBC, CMP  Troponin, EKG  CXR    Will contact [facility] for further recommendations.           Attending Attestation:   Physician Attestation Statement for Resident:  As the supervising MD   Physician Attestation Statement: I have personally seen and examined this patient.   I agree with the above history.  -: Patient presents complaining of life vest shocking him twice this morning.  Tells the resident 1 story tells me a different story tells the resident he was feeling perfectly fine and it shocked him twice he tells me all of a sudden he thought like he was going to die in the LifeVest went off shocking him twice.  He says now he feels fine.  He denies any preceding symptoms except that sense of impending doom thought he was going to die or the words he use.  He denies any unusual exertion said he was just walk to the road to take the trash out.  Denies any change in medications.  Says that he had a surgery on his heart this past April.   As the supervising MD I agree with the above PE.   -: Very pleasant gentleman awake oriented he is not a good historian normocephalic atraumatic pupils are round do react to light extraocular motions are intact he has normal TMs normal nose normal oropharynx is neck is soft supple his heart is regular rate and rhythm normal rhythm on the monitor life vest in place there is green bluish green dye  present from the life pack discharge.  Belly is benign good bowel sounds neurological seems completely appropriate  It is obvious in talking to him.  Despite his speech parents demeanor that he probably has some underlying mental illness in the old records seems to confirm this.   As the supervising MD I agree with the above treatment, course, plan, and disposition.   -: We would to check and see if the information on the life pack is reviewable.  Can we get an interpretation of the dysrhythmia.  Consultation has been placed with Cardiology.  Medical decision-making the resident and I discussed this extensively.  I have had a substance of input into the medical decision-making regarding this patient intravenous access is obtained and the monitor is ordered to be continuous.  We have observed the patient closely.  We are keeping the patient NPO in case they need to do an invasive procedure such as pacemaker defibrillator insertion.  Patient voices understanding regarding this.  We have also talked about electrolyte abnormalities causing this and are myocardial infarction while his troponin is at the high end of normal.  That this maybe from having the myocardium cardioverted.    Rashaun tells us that they do not have any machines available to interrogate this life vest at this time.    Two EKGs were done the 1st was done at 11:11 a.m. sinus rhythm with left atrial enlargement right bundle-branch block and septal infarct notching in leads V3 small Q-waves in the septal leads V1 through V3.  No acute STEMI 2nd EKG is done at 2:37 p.m. as the patient complained of chest pain it is a normal sinus rhythm with a right bundle-branch block.  And the same Q-waves in the septal leads.  No change on this EKG the pain did not last as long as it took to run the EKG nurse reports.    [facility] is being reconsulted because we will not have an interrogation of the life vest apparently in a timely manner    Spoke with the mid-level they asked  me to admit the patient for observation.  Orders are written for same.   I have reviewed and agree with the residents interpretation of the following: lab data, x-rays and EKG.  I have reviewed the following: old records at this facility.              ED Course as of 11/11/22 2026 Fri Nov 11, 2022   1312 Still awaiting on [facility] consultation [LL]   1312 CBC, CMP, CXR wnl [LL]   1316 Spoke with [facility], recommended to contact [employer] who can come out to the ED and interrogate the life vest. Will call back [facility] once that resulted [LL]   1446 Notified by RN that pt was complaining of chest pain. Repeat EKG done, unchanged from previous. VSS.  [LL]   1601 The mid-level for [facility] called back I told them about the delay in getting the [facility] interpretation done.  She gave me permission to admit the patient for observation. [DM]   1638 Live Vest rep is interrogating at this time [LL]   2026 I am told that after I wrote orders to admit the patient.  The people to interrogate the life vest showed up they interrogated cardiology saw the patient they made adjustments to the vest and a discharge the patient home as opposed to admitting him. [DM]      ED Course User Index  [DM] Anthony Rod MD  [LL] Marybeth Haney MD                 Clinical Impression:   Final diagnoses:  [Z95.810] Uses LifeVest defibrillator  [Z45.02] Defibrillator discharge  [I25.10] Coronary artery disease, unspecified vessel or lesion type, unspecified whether angina present, unspecified whether native or transplanted heart        ED Disposition Condition    Observation Stable                Anthony Rod MD  11/11/22 1604       Anthony Rod MD  11/11/22 2026

## 2022-11-11 NOTE — Clinical Note
Diagnosis: Defibrillator discharge [216087]   Future Attending Provider: PHYLLIS ESPARZA [[id number]]   Requested Bed Type: Standard [1]   Admitting Provider:: PHYLLIS ESPARZA [[id number]]   Bed request comments: TELE   Special Needs:: No Special Needs [1]

## 2022-11-11 NOTE — DISCHARGE SUMMARY
[facility]  Discharge Note  Short Stay    * No surgery found *    OUTCOME: Patient tolerated treatment/procedure well without complication and is now ready for discharge.    DISPOSITION: Home or Self Care    FINAL DIAGNOSIS:  PAF/Defibrillation D/C - A.Tach    FOLLOWUP: In clinic    DISCHARGE INSTRUCTIONS:   Discharge Procedure Orders   Activity as tolerated       TIME SPENT ON DISCHARGE: 32 minutes

## 2022-11-11 NOTE — DISCHARGE SUMMARY
"[facility] - Emergency Dept  Cardiology  Discharge Summary      Patient Name: Kendall Duff  MRN: [id number]  Admission Date: 11/11/2022  Hospital Length of Stay: 0 days  Discharge Date and Time:  11/11/2022 5:36 PM  Attending Physician: Real Grey MD  Discharging Provider: RAY Chilel  Primary Care Physician: Primary Doctor No    HPI:   Mr. Duff is a 57 year old male who is known to [facility], Dr. Wright. He presents to the ED via EMS after his lifevest shocked him twice this morning while working in his yard. He reports at the time that he felt like "I was going to die" right before the shocks. Upon arrival to the ED, he offered no symptoms. He denies any current CP, SOB, palps, or N/V. His labwork is relatively insignificant. An EKG was obtained and demonstrated a NSR. [facility] has admitted the patient for further observation.     Hospital course:   11.11.22: NAD. Denies CP, SOB, or palps. SR on tele.     * No surgery found *           Final Active Diagnoses:    Diagnosis Date Noted POA    PRINCIPAL PROBLEM:  Atrial fibrillation and flutter [I48.91, I48.92] 11/11/2022 Yes      Problems Resolved During this Admission:    Diagnosis Date Noted Date Resolved POA    Defibrillator discharge [Z45.02] 11/11/2022 11/11/2022 Not Applicable       Discharged Condition: stable    Review of Systems   Cardiovascular:  Negative for chest pain and palpitations.   Respiratory:  Negative for shortness of breath.      Physical Exam  HENT:      Head: Normocephalic.      Nose: Nose normal.      Mouth/Throat:      Mouth: Mucous membranes are moist.   Eyes:      Extraocular Movements: Extraocular movements intact.   Cardiovascular:      Rate and Rhythm: Normal rate and regular rhythm.      Pulses: Normal pulses.      Heart sounds: Normal heart sounds.   Pulmonary:      Effort: Pulmonary effort is normal.      Breath sounds: Normal breath sounds.   Abdominal:      Palpations: Abdomen is soft.   Musculoskeletal:         " General: Normal range of motion.      Comments: Life vest in place   Skin:     General: Skin is warm.   Neurological:      Mental Status: He is alert and oriented to person, place, and time.   Psychiatric:         Behavior: Behavior normal.       Disposition: Home or Self Care    Follow Up:   Follow-up Information       LIDA BROTHERS MD Follow up on 11/15/2022.    Specialty: Cardiology  Why: 09:50 AM F/U with Dr. Brothers  Contact information:  [facility] [address] [address]  [phone]                           Patient Instructions:      Activity as tolerated     Medications:  Reconciled Home Medications:      Medication List        START taking these medications      metoprolol tartrate 75 mg Tab  Take 75 mg by mouth 2 (two) times daily.            CONTINUE taking these medications      amiodarone 200 MG Tab  Commonly known as: PACERONE  Take 1 tablet (200 mg total) by mouth once daily.     apixaban 5 mg Tab  Commonly known as: ELIQUIS  Take 1 tablet (5 mg total) by mouth 2 (two) times daily.     ARIPiprazole 10 MG Tab  Commonly known as: ABILIFY  Take 1 tablet (10 mg total) by mouth once daily.     aspirin 81 MG Chew  Chew and swallow 1 tablet (81 mg total) by mouth once daily.     atorvastatin 80 MG tablet  Commonly known as: LIPITOR  Take 1 tablet (80 mg total) by mouth once daily.     benztropine 1 MG tablet  Commonly known as: COGENTIN  Take 1 mg by mouth 2 (two) times daily.     famotidine 20 MG tablet  Commonly known as: PEPCID  Take 1 tablet (20 mg total) by mouth 2 (two) times daily.     furosemide 40 MG tablet  Commonly known as: LASIX  Take 1 tablet (40 mg total) by mouth once daily.     isosorbide mononitrate 30 MG 24 hr tablet  Commonly known as: IMDUR  Take 1 tablet (30 mg total) by mouth once daily.     levETIRAcetam 500 MG Tab  Commonly known as: KEPPRA  Take 1 tablet (500 mg total) by mouth 2 (two) times daily.     OXcarbazepine 300 MG Tab  Commonly known as: TRILEPTAL  Take  300 mg by mouth 3 (three) times daily.     traZODone 100 MG tablet  Commonly known as: DESYREL  Take 1 tablet (100 mg total) by mouth every evening.            STOP taking these medications      carvediloL 12.5 MG tablet  Commonly known as: COREG     clopidogreL 75 mg tablet  Commonly known as: PLAVIX              Impression:  Life Vest Discharge/Shock (11.11.22) - Atrial tachycardia on interrogation - now SR  Chronic Combined Systolic/Diastolic HF    - Grade 2 DD    - EF 20% (echo 10.28.22)  ICMO     - With Life Vest (Now in Place)  VHD    -Moderate MR and Moderate TR  Hypertension  Pulmonary Hypertension  PAF/PAFL (Recent New Diagnosis)- Now Sinus Rhythm    - [id number]: 5 (LVSD/HTN/TE/NSTEMI)    - On Eliquis  CAD/CABG (April 2022)    - LIMA-LAD, SVG-OM1, SVG-D1, SVG-PDA  History of RUE DVT (5.10.22)    - A deep vein thrombosis was identified in the right axillary and brachial veins. A superficial thrombosis was identified in the right basilic vein.  Schizoaffective Disorder  Hx of Hep C  Hx of cocaine Abuse   Nicotine Dependence (Tobacco use)    Plan:   Life vest interrogation completed demonstrating atrial tachycardia. New life vest provided.   Discontinue Coreg & start metoprolol tartrate 75 mg BID.   Resume GDMT for CAD & CHF. (ASA, Statin, & BB)  Resume Eliquis for CVA prophylaxis in the setting of PAF/PAFL.  Keep f/u with Dr. Wright on 11.15.22.     Time spent on the discharge of patient: >30 minutes    RAY Chilel  Cardiology  [facility] - Emergency Dept

## 2022-11-11 NOTE — H&P
"[facility] Dept  Cardiology  History and Physical     Patient Name: Kendall Duff  MRN: [id number]  Admission Date: 11/11/2022  Code Status: Full Code   Attending Provider: Real Grey MD   Primary Care Physician: Primary Doctor No  Principal Problem:<principal problem not specified>    Patient information was obtained from patient, past medical records, and ER records.     Subjective:     Chief Complaint:  Lifevest Shock     HPI:   Mr. Duff is a 57 year old male who is known to C[facility], Dr. Wright. He presents to the ED via EMS after his lifevest shocked him twice this morning while working in his yard. He reports at the time that he felt like "I was going to die" right before the shocks. Upon arrival to the ED, he offered no symptoms. He denies any current CP, SOB, palps, or N/V. His labwork is relatively insignificant. An EKG was obtained and demonstrated a NSR. [facility] has admitted the patient for further observation.     PMH: CAD, ICMO, HTN, schizoaffective disorder, hep C, DVT, systolic & diastolic CHF, Lifevest  PSH: CABG, LHC  Family History: Mother - CA; Father - liver disease   Social History: Current every day smoker. Former Cocaine Use. Denies alcohol use.      Previous Cardiac Diagnostics:   Echocardiogram (10.28.22):  The left ventricle is moderately enlarged with mild eccentric hypertrophy and severely decreased systolic function.  The estimated ejection fraction is 20%.  There is severe left ventricular global hypokinesis.  Grade II left ventricular diastolic dysfunction.  Normal right ventricular size with moderately reduced right ventricular systolic function.  Moderate mitral regurgitation.  Moderate tricuspid regurgitation.  There is mild pulmonary hypertension.  The estimated PA systolic pressure is 44 mmHg.  Elevated central venous pressure (15 mmHg).  Severe left atrial enlargement.     CABG x 4 (4/22):  LIMA-LAD, SVG-OM1, SVG-D1, SVG-PDA     Echocardiogram (6.16.22):  The left " ventricle is normal in size w/ concentric hypertrophy and severely decreased systolic function.  The estimated EF is 25-30%.  There is severe left ventricular global hypokinesis.  Grade II left ventricular diastolic dysfunction.  Normal right ventricular size w/ mildly reduced right ventricular systolic function.  The estimated PA systolic pressure is 52 mmHg.  There is moderate pulmonary HTN.  Elevated CVP (15 mmHg).     RUE NIVA (5.10.22):  A deep vein thrombosis was identified in the right axillary and brachial veins. A superficial thrombosis was identified in the right basilic vein.     LHC (3.21.22):  100% LAD to 30-40% residual stenosis post PTCA.  Large diagonal system w/ severe stenosis.  CIRC - patent stent, OM1 patent, mild CIRC occluded  RCA - stents ISR 40-50%, distal 70-80%  EDP 12 EF 35-40% anterior HK       Review of patient's allergies indicates:   Allergen Reactions    Depakote [divalproex]     Divalproex sodium Other (See Comments)    Lithium     Lithium analogues     Quetiapine Other (See Comments)     Pt states had seizures       No current facility-administered medications on file prior to encounter.     Current Outpatient Medications on File Prior to Encounter   Medication Sig    amiodarone (PACERONE) 200 MG Tab Take 1 tablet (200 mg total) by mouth once daily.    apixaban (ELIQUIS) 5 mg Tab Take 1 tablet (5 mg total) by mouth 2 (two) times daily.    ARIPiprazole (ABILIFY) 10 MG Tab Take 1 tablet (10 mg total) by mouth once daily.    aspirin 81 MG Chew Chew and swallow 1 tablet (81 mg total) by mouth once daily.    atorvastatin (LIPITOR) 80 MG tablet Take 1 tablet (80 mg total) by mouth once daily.    benztropine (COGENTIN) 1 MG tablet Take 1 mg by mouth 2 (two) times daily.    carvediloL (COREG) 12.5 MG tablet Take 1 tablet (12.5 mg total) by mouth 2 (two) times daily.    clopidogreL (PLAVIX) 75 mg tablet Take 1 tablet (75 mg total) by mouth once daily.    famotidine (PEPCID) 20 MG tablet Take  1 tablet (20 mg total) by mouth 2 (two) times daily.    furosemide (LASIX) 40 MG tablet Take 1 tablet (40 mg total) by mouth once daily.    isosorbide mononitrate (IMDUR) 30 MG 24 hr tablet Take 1 tablet (30 mg total) by mouth once daily.    levETIRAcetam (KEPPRA) 500 MG Tab Take 1 tablet (500 mg total) by mouth 2 (two) times daily.    OXcarbazepine (TRILEPTAL) 300 MG Tab Take 300 mg by mouth 3 (three) times daily.    traZODone (DESYREL) 100 MG tablet Take 1 tablet (100 mg total) by mouth every evening.    [DISCONTINUED] metoprolol succinate (TOPROL-XL) 25 MG 24 hr tablet Take 25 mg by mouth once daily.    [DISCONTINUED] pravastatin (PRAVACHOL) 40 MG tablet Take 1 tablet (40 mg total) by mouth every evening.     Family History       Problem Relation (Age of Onset)    Cancer Mother    Liver disease Father          Tobacco Use    Smoking status: Every Day     Types: Cigarettes    Smokeless tobacco: Never   Substance and Sexual Activity    Alcohol use: Never    Drug use: Not Currently     Types: Cocaine    Sexual activity: Not on file     Review of Systems   Cardiovascular:  Negative for chest pain and palpitations.   Respiratory:  Negative for shortness of breath.    Objective:     Vital Signs (Most Recent):  Temp: 97.9 °F (36.6 °C) (11/11/22 1055)  Pulse: 82 (11/11/22 1400)  Resp: (!) 22 (11/11/22 1400)  BP: (!) 142/98 (11/11/22 1400)  SpO2: 99 % (11/11/22 1400)   Vital Signs (24h Range):  Temp:  [97.9 °F (36.6 °C)] 97.9 °F (36.6 °C)  Pulse:  [77-82] 82  Resp:  [18-23] 22  SpO2:  [96 %-99 %] 99 %  BP: (116-142)/(83-98) 142/98     Weight: 72.6 kg (160 lb)  Body mass index is 25.06 kg/m².    SpO2: 99 %  O2 Device (Oxygen Therapy): room air    No intake or output data in the 24 hours ending 11/11/22 1612    Lines/Drains/Airways       Peripheral Intravenous Line  Duration                  Peripheral IV - Single Lumen 11/11/22 1120 20 G Left;Posterior Hand <1 day                    Physical Exam  HENT:      Head:  Normocephalic.      Nose: Nose normal.      Mouth/Throat:      Mouth: Mucous membranes are moist.   Eyes:      Extraocular Movements: Extraocular movements intact.   Cardiovascular:      Rate and Rhythm: Normal rate and regular rhythm.      Pulses: Normal pulses.      Heart sounds: Normal heart sounds.   Pulmonary:      Effort: Pulmonary effort is normal.      Breath sounds: Normal breath sounds.   Abdominal:      Palpations: Abdomen is soft.   Musculoskeletal:         General: Normal range of motion.      Comments: Life vest in place w/ dye   Skin:     General: Skin is warm and dry.   Neurological:      Mental Status: He is alert and oriented to person, place, and time.   Psychiatric:         Behavior: Behavior normal.       Significant Labs: BMP:   Recent Labs   Lab 11/11/22  1137      K 4.2   CO2 27   BUN 22.5   CREATININE 1.18   CALCIUM 8.8   , CMP   Recent Labs   Lab 11/11/22  1137      K 4.2   CO2 27   BUN 22.5   CREATININE 1.18   CALCIUM 8.8   ALBUMIN 3.4*   BILITOT 0.8   ALKPHOS 160*   AST 37*   ALT 25   , CBC   Recent Labs   Lab 11/11/22  1137   WBC 11.1   HGB 11.3*   HCT 35.8*      , Troponin   Recent Labs   Lab 11/11/22  1137   TROPONINI 0.041   , and All pertinent lab results from the last 24 hours have been reviewed.    Significant Imaging:   EKG:    Assessment and Plan:   Assessment:  Life Vest Discharge/Shock (11.11.22) - Atrial tachycardia on interrogation - now SR  Chronic Combined Systolic/Diastolic HF    - Grade 2 DD    - EF 20% (echo 10.28.22)  ICMO     - With Life Vest (Now in Place)  VHD    -Moderate MR and Moderate TR  Hypertension  Pulmonary Hypertension  PAF/PAFL (Recent New Diagnosis)- Now Sinus Rhythm    - [id number]: 5 (LVSD/HTN/TE/NSTEMI)    - On Eliquis  CAD/CABG (April 2022)    - LIMA-LAD, SVG-OM1, SVG-D1, SVG-PDA  History of RUE DVT (5.10.22)    - A deep vein thrombosis was identified in the right axillary and brachial veins. A superficial thrombosis was identified  in the right basilic vein.  Schizoaffective Disorder  Hx of Hep C  Hx of cocaine Abuse   Nicotine Dependence (Tobacco use)      Plan:  Interrogate life vest.  Resume GDMT for CAD & CHF. (ASA, Statin, & BB)  Resume Eliquis for CVA prophylaxis in the setting of PAF/PAFL.  Will resume the rest of his home meds once med rec is complete.     VTE Risk Mitigation (From admission, onward)           Ordered     IP VTE HIGH RISK PATIENT  Once         11/11/22 1601     Place sequential compression device  Until discontinued         11/11/22 1601                    RAY Chilel  Cardiology  [facility]  Emergency Dept  11/11/2022 4:12 PM     Interrogation showed atrial tachycardia.   Change Beta Blocker to metoprolol 75 BID.   Continue other meds.   Pt. Will be seen as opt. In 4 days. If tolerate Metoprolol will increase the dosage.   D/C home.

## 2022-11-17 NOTE — PHYSICIAN QUERY
PT Name: Kendall Duff  MR #: [id number]     Documentation Clarification      CDS/: Luz Burger RN              Contact information:[email or web address]    This form is a permanent document in the medical record.     Query Date: November 17, 2022    By submitting this query, we are merely seeking further clarification of documentation. Please utilize your independent clinical judgment when addressing the question(s) below.    The Medical Record reflects the following:    Supporting Clinical Findings Location in Medical Record   Abnormal Troponin    - Low Level Elevation/Flat Trend with Recent NSTEMI Type II in the Setting of Decompensated HF  CAD/CABG (April 2022)    - LIMA-LAD, SVG-OM1, SVG-D1, SVG-PDA Cardiology note 11-5                                                                                Provider, please provide the time frame of the recent MI diagnosis or diagnoses associated with above clinical findings.    [  x ] Type 2 MI less than 4 weeks   [   ] Type 2 MI greater than 4 weeks    [   ] Other (please specify): ____________   [  ] Clinically undetermined

## 2022-11-24 PROCEDURE — 96376 TX/PRO/DX INJ SAME DRUG ADON: CPT

## 2022-11-24 PROCEDURE — 99285 EMERGENCY DEPT VISIT HI MDM: CPT | Mod: 25

## 2022-11-24 PROCEDURE — 96374 THER/PROPH/DIAG INJ IV PUSH: CPT

## 2022-11-25 ENCOUNTER — HOSPITAL ENCOUNTER (INPATIENT)
Facility: HOSPITAL | Age: 57
LOS: 4 days | Discharge: HOME HEALTH | DRG: 280 | End: 2022-11-29
Attending: STUDENT IN AN ORGANIZED HEALTH CARE EDUCATION/TRAINING PROGRAM | Admitting: INTERNAL MEDICINE
Payer: MEDICAID

## 2022-11-25 DIAGNOSIS — R06.00 DYSPNEA, UNSPECIFIED TYPE: ICD-10-CM

## 2022-11-25 DIAGNOSIS — R07.9 CHEST PAIN: ICD-10-CM

## 2022-11-25 DIAGNOSIS — Z95.810 USES LIFEVEST DEFIBRILLATOR: ICD-10-CM

## 2022-11-25 DIAGNOSIS — R06.02 SOB (SHORTNESS OF BREATH): ICD-10-CM

## 2022-11-25 DIAGNOSIS — I50.9 CONGESTIVE HEART FAILURE, UNSPECIFIED HF CHRONICITY, UNSPECIFIED HEART FAILURE TYPE: Primary | ICD-10-CM

## 2022-11-25 DIAGNOSIS — R79.89 ELEVATED TROPONIN: ICD-10-CM

## 2022-11-25 LAB
ALBUMIN SERPL-MCNC: 3.1 GM/DL (ref 3.5–5)
ALBUMIN/GLOB SERPL: 1 RATIO (ref 1.1–2)
ALP SERPL-CCNC: 118 UNIT/L (ref 40–150)
ALT SERPL-CCNC: 20 UNIT/L (ref 0–55)
AMPHET UR QL SCN: NEGATIVE
APTT PPP: 32.5 SECONDS (ref 23.2–33.7)
AST SERPL-CCNC: 32 UNIT/L (ref 5–34)
BARBITURATE SCN PRESENT UR: NEGATIVE
BASOPHILS # BLD AUTO: 0.05 X10(3)/MCL (ref 0–0.2)
BASOPHILS NFR BLD AUTO: 0.8 %
BENZODIAZ UR QL SCN: NEGATIVE
BILIRUBIN DIRECT+TOT PNL SERPL-MCNC: 0.5 MG/DL
BNP BLD-MCNC: 2532.7 PG/ML
BUN SERPL-MCNC: 25.9 MG/DL (ref 8.4–25.7)
CALCIUM SERPL-MCNC: 8.6 MG/DL (ref 8.4–10.2)
CANNABINOIDS UR QL SCN: NEGATIVE
CHLORIDE SERPL-SCNC: 106 MMOL/L (ref 98–107)
CO2 SERPL-SCNC: 26 MMOL/L (ref 22–29)
COCAINE UR QL SCN: NEGATIVE
CREAT SERPL-MCNC: 1.42 MG/DL (ref 0.73–1.18)
EOSINOPHIL # BLD AUTO: 0.16 X10(3)/MCL (ref 0–0.9)
EOSINOPHIL NFR BLD AUTO: 2.6 %
ERYTHROCYTE [DISTWIDTH] IN BLOOD BY AUTOMATED COUNT: 18.4 % (ref 11.5–17)
FENTANYL UR QL SCN: NEGATIVE
FLUAV AG UPPER RESP QL IA.RAPID: NOT DETECTED
FLUBV AG UPPER RESP QL IA.RAPID: NOT DETECTED
GFR SERPLBLD CREATININE-BSD FMLA CKD-EPI: 57 MLS/MIN/1.73/M2
GLOBULIN SER-MCNC: 3.1 GM/DL (ref 2.4–3.5)
GLUCOSE SERPL-MCNC: 180 MG/DL (ref 74–100)
HCT VFR BLD AUTO: 33.2 % (ref 42–52)
HGB BLD-MCNC: 10.2 GM/DL (ref 14–18)
IMM GRANULOCYTES # BLD AUTO: 0.01 X10(3)/MCL (ref 0–0.04)
IMM GRANULOCYTES NFR BLD AUTO: 0.2 %
INR BLD: 1.45 (ref 0–1.3)
LYMPHOCYTES # BLD AUTO: 1.36 X10(3)/MCL (ref 0.6–4.6)
LYMPHOCYTES NFR BLD AUTO: 22.3 %
MCH RBC QN AUTO: 22.5 PG (ref 27–31)
MCHC RBC AUTO-ENTMCNC: 30.7 MG/DL (ref 33–36)
MCV RBC AUTO: 73.1 FL (ref 80–94)
MDMA UR QL SCN: NEGATIVE
MONOCYTES # BLD AUTO: 0.82 X10(3)/MCL (ref 0.1–1.3)
MONOCYTES NFR BLD AUTO: 13.5 %
NEUTROPHILS # BLD AUTO: 3.7 X10(3)/MCL (ref 2.1–9.2)
NEUTROPHILS NFR BLD AUTO: 60.6 %
NRBC BLD AUTO-RTO: 0 %
OPIATES UR QL SCN: NEGATIVE
PCP UR QL: NEGATIVE
PH UR: 7 [PH] (ref 3–11)
PLATELET # BLD AUTO: 269 X10(3)/MCL (ref 130–400)
PMV BLD AUTO: 9.2 FL (ref 7.4–10.4)
POTASSIUM SERPL-SCNC: 4 MMOL/L (ref 3.5–5.1)
PROT SERPL-MCNC: 6.2 GM/DL (ref 6.4–8.3)
PROTHROMBIN TIME: 17.5 SECONDS (ref 12.5–14.5)
RBC # BLD AUTO: 4.54 X10(6)/MCL (ref 4.7–6.1)
SARS-COV-2 RNA RESP QL NAA+PROBE: NOT DETECTED
SODIUM SERPL-SCNC: 142 MMOL/L (ref 136–145)
SPECIFIC GRAVITY, URINE AUTO (.000) (OHS): 1.01 (ref 1–1.03)
TROPONIN I SERPL-MCNC: 0.04 NG/ML (ref 0–0.04)
TROPONIN I SERPL-MCNC: 0.05 NG/ML (ref 0–0.04)
TROPONIN I SERPL-MCNC: 0.08 NG/ML (ref 0–0.04)
WBC # SPEC AUTO: 6.1 X10(3)/MCL (ref 4.5–11.5)

## 2022-11-25 PROCEDURE — 25000003 PHARM REV CODE 250: Performed by: NURSE PRACTITIONER

## 2022-11-25 PROCEDURE — 63600175 PHARM REV CODE 636 W HCPCS

## 2022-11-25 PROCEDURE — 84484 ASSAY OF TROPONIN QUANT: CPT

## 2022-11-25 PROCEDURE — 21400001 HC TELEMETRY ROOM

## 2022-11-25 PROCEDURE — 85730 THROMBOPLASTIN TIME PARTIAL: CPT | Performed by: STUDENT IN AN ORGANIZED HEALTH CARE EDUCATION/TRAINING PROGRAM

## 2022-11-25 PROCEDURE — 25000003 PHARM REV CODE 250

## 2022-11-25 PROCEDURE — 85610 PROTHROMBIN TIME: CPT | Performed by: STUDENT IN AN ORGANIZED HEALTH CARE EDUCATION/TRAINING PROGRAM

## 2022-11-25 PROCEDURE — 83880 ASSAY OF NATRIURETIC PEPTIDE: CPT | Performed by: STUDENT IN AN ORGANIZED HEALTH CARE EDUCATION/TRAINING PROGRAM

## 2022-11-25 PROCEDURE — 93010 ELECTROCARDIOGRAM REPORT: CPT | Mod: ,,, | Performed by: INTERNAL MEDICINE

## 2022-11-25 PROCEDURE — 93010 EKG 12-LEAD: ICD-10-PCS | Mod: ,,, | Performed by: INTERNAL MEDICINE

## 2022-11-25 PROCEDURE — 93005 ELECTROCARDIOGRAM TRACING: CPT

## 2022-11-25 PROCEDURE — 25000003 PHARM REV CODE 250: Performed by: STUDENT IN AN ORGANIZED HEALTH CARE EDUCATION/TRAINING PROGRAM

## 2022-11-25 PROCEDURE — G0378 HOSPITAL OBSERVATION PER HR: HCPCS

## 2022-11-25 PROCEDURE — 0240U COVID/FLU A&B PCR: CPT | Performed by: STUDENT IN AN ORGANIZED HEALTH CARE EDUCATION/TRAINING PROGRAM

## 2022-11-25 PROCEDURE — 63600175 PHARM REV CODE 636 W HCPCS: Performed by: STUDENT IN AN ORGANIZED HEALTH CARE EDUCATION/TRAINING PROGRAM

## 2022-11-25 PROCEDURE — 80053 COMPREHEN METABOLIC PANEL: CPT | Performed by: STUDENT IN AN ORGANIZED HEALTH CARE EDUCATION/TRAINING PROGRAM

## 2022-11-25 PROCEDURE — 84484 ASSAY OF TROPONIN QUANT: CPT | Performed by: STUDENT IN AN ORGANIZED HEALTH CARE EDUCATION/TRAINING PROGRAM

## 2022-11-25 PROCEDURE — 80307 DRUG TEST PRSMV CHEM ANLYZR: CPT | Performed by: STUDENT IN AN ORGANIZED HEALTH CARE EDUCATION/TRAINING PROGRAM

## 2022-11-25 PROCEDURE — 85025 COMPLETE CBC W/AUTO DIFF WBC: CPT | Performed by: STUDENT IN AN ORGANIZED HEALTH CARE EDUCATION/TRAINING PROGRAM

## 2022-11-25 RX ORDER — METOPROLOL TARTRATE 25 MG/1
75 TABLET, FILM COATED ORAL 2 TIMES DAILY
Status: DISCONTINUED | OUTPATIENT
Start: 2022-11-25 | End: 2022-11-29 | Stop reason: HOSPADM

## 2022-11-25 RX ORDER — IBUPROFEN 200 MG
16 TABLET ORAL
Status: DISCONTINUED | OUTPATIENT
Start: 2022-11-25 | End: 2022-11-29 | Stop reason: HOSPADM

## 2022-11-25 RX ORDER — LEVETIRACETAM 500 MG/1
500 TABLET ORAL 2 TIMES DAILY
Status: DISCONTINUED | OUTPATIENT
Start: 2022-11-25 | End: 2022-11-29 | Stop reason: HOSPADM

## 2022-11-25 RX ORDER — IBUPROFEN 200 MG
24 TABLET ORAL
Status: DISCONTINUED | OUTPATIENT
Start: 2022-11-25 | End: 2022-11-29 | Stop reason: HOSPADM

## 2022-11-25 RX ORDER — AMIODARONE HYDROCHLORIDE 200 MG/1
200 TABLET ORAL DAILY
Status: DISCONTINUED | OUTPATIENT
Start: 2022-11-25 | End: 2022-11-29 | Stop reason: HOSPADM

## 2022-11-25 RX ORDER — MAG HYDROX/ALUMINUM HYD/SIMETH 200-200-20
30 SUSPENSION, ORAL (FINAL DOSE FORM) ORAL 4 TIMES DAILY PRN
Status: DISCONTINUED | OUTPATIENT
Start: 2022-11-25 | End: 2022-11-29 | Stop reason: HOSPADM

## 2022-11-25 RX ORDER — TRAZODONE HYDROCHLORIDE 100 MG/1
100 TABLET ORAL NIGHTLY
Status: DISCONTINUED | OUTPATIENT
Start: 2022-11-25 | End: 2022-11-29 | Stop reason: HOSPADM

## 2022-11-25 RX ORDER — ACETAMINOPHEN 325 MG/1
650 TABLET ORAL EVERY 4 HOURS PRN
Status: DISCONTINUED | OUTPATIENT
Start: 2022-11-25 | End: 2022-11-29 | Stop reason: HOSPADM

## 2022-11-25 RX ORDER — ARIPIPRAZOLE 5 MG/1
10 TABLET ORAL DAILY
Status: DISCONTINUED | OUTPATIENT
Start: 2022-11-25 | End: 2022-11-29 | Stop reason: HOSPADM

## 2022-11-25 RX ORDER — ATORVASTATIN CALCIUM 40 MG/1
80 TABLET, FILM COATED ORAL DAILY
Status: DISCONTINUED | OUTPATIENT
Start: 2022-11-25 | End: 2022-11-29 | Stop reason: HOSPADM

## 2022-11-25 RX ORDER — NALOXONE HCL 0.4 MG/ML
0.02 VIAL (ML) INJECTION
Status: DISCONTINUED | OUTPATIENT
Start: 2022-11-25 | End: 2022-11-29 | Stop reason: HOSPADM

## 2022-11-25 RX ORDER — GLUCAGON 1 MG
1 KIT INJECTION
Status: DISCONTINUED | OUTPATIENT
Start: 2022-11-25 | End: 2022-11-29 | Stop reason: HOSPADM

## 2022-11-25 RX ORDER — HYDROCODONE BITARTRATE AND ACETAMINOPHEN 5; 325 MG/1; MG/1
1 TABLET ORAL EVERY 6 HOURS PRN
Status: DISCONTINUED | OUTPATIENT
Start: 2022-11-25 | End: 2022-11-29 | Stop reason: HOSPADM

## 2022-11-25 RX ORDER — ISOSORBIDE MONONITRATE 30 MG/1
30 TABLET, EXTENDED RELEASE ORAL DAILY
Status: DISCONTINUED | OUTPATIENT
Start: 2022-11-25 | End: 2022-11-29 | Stop reason: HOSPADM

## 2022-11-25 RX ORDER — ONDANSETRON 2 MG/ML
4 INJECTION INTRAMUSCULAR; INTRAVENOUS EVERY 6 HOURS PRN
Status: DISCONTINUED | OUTPATIENT
Start: 2022-11-25 | End: 2022-11-29 | Stop reason: HOSPADM

## 2022-11-25 RX ORDER — OXCARBAZEPINE 300 MG/1
300 TABLET, FILM COATED ORAL 3 TIMES DAILY
Status: DISCONTINUED | OUTPATIENT
Start: 2022-11-25 | End: 2022-11-29 | Stop reason: HOSPADM

## 2022-11-25 RX ORDER — FAMOTIDINE 20 MG/1
20 TABLET, FILM COATED ORAL 2 TIMES DAILY
Status: DISCONTINUED | OUTPATIENT
Start: 2022-11-25 | End: 2022-11-29

## 2022-11-25 RX ORDER — NAPROXEN SODIUM 220 MG/1
81 TABLET, FILM COATED ORAL DAILY
Status: DISCONTINUED | OUTPATIENT
Start: 2022-11-25 | End: 2022-11-29 | Stop reason: HOSPADM

## 2022-11-25 RX ORDER — ASPIRIN 325 MG
325 TABLET, DELAYED RELEASE (ENTERIC COATED) ORAL
Status: COMPLETED | OUTPATIENT
Start: 2022-11-25 | End: 2022-11-25

## 2022-11-25 RX ORDER — FUROSEMIDE 10 MG/ML
80 INJECTION INTRAMUSCULAR; INTRAVENOUS
Status: COMPLETED | OUTPATIENT
Start: 2022-11-25 | End: 2022-11-25

## 2022-11-25 RX ORDER — FUROSEMIDE 10 MG/ML
40 INJECTION INTRAMUSCULAR; INTRAVENOUS
Status: DISCONTINUED | OUTPATIENT
Start: 2022-11-25 | End: 2022-11-27

## 2022-11-25 RX ORDER — TALC
6 POWDER (GRAM) TOPICAL NIGHTLY PRN
Status: DISCONTINUED | OUTPATIENT
Start: 2022-11-25 | End: 2022-11-29 | Stop reason: HOSPADM

## 2022-11-25 RX ORDER — SODIUM CHLORIDE 0.9 % (FLUSH) 0.9 %
10 SYRINGE (ML) INJECTION EVERY 12 HOURS PRN
Status: DISCONTINUED | OUTPATIENT
Start: 2022-11-25 | End: 2022-11-29 | Stop reason: HOSPADM

## 2022-11-25 RX ORDER — BENZTROPINE MESYLATE 1 MG/1
1 TABLET ORAL 2 TIMES DAILY
Status: DISCONTINUED | OUTPATIENT
Start: 2022-11-25 | End: 2022-11-29 | Stop reason: HOSPADM

## 2022-11-25 RX ADMIN — APIXABAN 5 MG: 5 TABLET, FILM COATED ORAL at 09:11

## 2022-11-25 RX ADMIN — ISOSORBIDE MONONITRATE 30 MG: 30 TABLET, EXTENDED RELEASE ORAL at 10:11

## 2022-11-25 RX ADMIN — ASPIRIN 325 MG: 325 TABLET, COATED ORAL at 04:11

## 2022-11-25 RX ADMIN — AMIODARONE HYDROCHLORIDE 200 MG: 200 TABLET ORAL at 10:11

## 2022-11-25 RX ADMIN — APIXABAN 5 MG: 5 TABLET, FILM COATED ORAL at 10:11

## 2022-11-25 RX ADMIN — OXCARBAZEPINE 300 MG: 300 TABLET, FILM COATED ORAL at 03:11

## 2022-11-25 RX ADMIN — ATORVASTATIN CALCIUM 80 MG: 40 TABLET, FILM COATED ORAL at 10:11

## 2022-11-25 RX ADMIN — FUROSEMIDE 80 MG: 10 INJECTION, SOLUTION INTRAMUSCULAR; INTRAVENOUS at 02:11

## 2022-11-25 RX ADMIN — FUROSEMIDE 40 MG: 10 INJECTION, SOLUTION INTRAMUSCULAR; INTRAVENOUS at 01:11

## 2022-11-25 RX ADMIN — METOPROLOL TARTRATE 75 MG: 25 TABLET, FILM COATED ORAL at 09:11

## 2022-11-25 RX ADMIN — BENZTROPINE MESYLATE 1 MG: 1 TABLET ORAL at 09:11

## 2022-11-25 RX ADMIN — ASPIRIN 81 MG CHEWABLE TABLET 81 MG: 81 TABLET CHEWABLE at 10:11

## 2022-11-25 RX ADMIN — LEVETIRACETAM 500 MG: 500 TABLET, FILM COATED ORAL at 09:11

## 2022-11-25 RX ADMIN — OXCARBAZEPINE 300 MG: 300 TABLET, FILM COATED ORAL at 09:11

## 2022-11-25 RX ADMIN — TRAZODONE HYDROCHLORIDE 100 MG: 100 TABLET ORAL at 09:11

## 2022-11-25 RX ADMIN — FAMOTIDINE 20 MG: 20 TABLET, FILM COATED ORAL at 09:11

## 2022-11-25 RX ADMIN — METOPROLOL TARTRATE 75 MG: 25 TABLET, FILM COATED ORAL at 10:11

## 2022-11-25 NOTE — ED PROVIDER NOTES
Encounter Date: 11/24/2022    SCRIBE #1 NOTE: I, Salbador Reyna, am scribing for, and in the presence of,  Carroll Gil MD. I have scribed the following portions of the note - Other sections scribed: HPI, ROS, PE.     History     Chief Complaint   Patient presents with    Shortness of Breath     Aasi presents w/ pt c/o sob all day worse w/ exertion, has life vest but states left batteries at families house     57 year old male presents to ED via EMS c/o SOB onset yesterday. Pt reports SOB has been ongoing all day yesterday and today. Pt denies leg swelling, nausea, vomiting, fever and breathing treatments. Pt states that his life vest is not functioning likely due dead batteries in his current one. Pt states that he does not take diuretics.  States seen in ED last week.   Cardiologist: Anthony Wright MD    The history is provided by the patient. No  was used.   Shortness of Breath  This is a new problem. The problem occurs continuously.The current episode started yesterday. The problem has not changed since onset.Pertinent negatives include no fever, no sore throat, no chest pain, no abdominal pain and no rash. It is unknown what precipitated the problem. He has tried nothing for the symptoms.   Review of patient's allergies indicates:   Allergen Reactions    Depakote [divalproex]     Divalproex sodium Other (See Comments)    Lithium     Lithium analogues     Quetiapine Other (See Comments)     Pt states had seizures     Past Medical History:   Diagnosis Date    Bipolar disorder, unspecified     Chronic hepatitis C     History of psychiatric hospitalization     Hx of psychiatric care     Hypertension     Bessie     Obesity, unspecified     Psychiatric problem     Schizoaffective disorder, bipolar type     Seizures     Stroke     Substance abuse     Therapy      Past Surgical History:   Procedure Laterality Date    CORONARY STENT PLACEMENT  08/14/2015    DEBRIDEMENT  04/17/2022    LEFT HEART  CATHETERIZATION Left 08/13/2015    REPEAT CLOSURE OF STERNAL INCISION N/A 5/11/2022    Procedure: CLOSURE, STERNAL INCISION, REPEAT;  Surgeon: Adolfo Weiss MD;  [facility]: [facility] OR;  Service: Plastics;  Laterality: N/A;  STERNAL WOUND DEBRIDEMENT AND RECONSTRUCTION // MULTIPLE MUSCLE FLAPS // REQ 1400     Family History   Problem Relation Age of Onset    Cancer Mother     Liver disease Father      Social History     Tobacco Use    Smoking status: Every Day     Types: Cigarettes    Smokeless tobacco: Never   Substance Use Topics    Alcohol use: Never    Drug use: Not Currently     Types: Cocaine     Review of Systems   Constitutional:  Negative for fever.   HENT:  Negative for sore throat.    Eyes:  Negative for visual disturbance.   Respiratory:  Positive for shortness of breath.    Cardiovascular:  Negative for chest pain.   Gastrointestinal:  Negative for abdominal pain.   Genitourinary:  Negative for dysuria.   Musculoskeletal:  Negative for joint swelling.   Skin:  Negative for rash.   Neurological:  Negative for weakness.   Psychiatric/Behavioral:  Negative for confusion.    All other systems reviewed and are negative.    Physical Exam     Initial Vitals [11/24/22 2358]   BP Pulse Resp Temp SpO2   (!) 170/100 80 18 97.7 °F (36.5 °C) 97 %      MAP       --         Physical Exam    Nursing note and vitals reviewed.  Constitutional: He appears well-developed and well-nourished. He is not diaphoretic. No distress.   HENT:   Head: Normocephalic and atraumatic.   Eyes: Conjunctivae and EOM are normal. Pupils are equal, round, and reactive to light.   Neck:   Normal range of motion.  Cardiovascular:  Normal rate, regular rhythm, normal heart sounds and intact distal pulses.           No murmur heard.  Pulmonary/Chest: Breath sounds normal. No respiratory distress. He has no wheezes. He has no rales.   Lifevest to chest wall.    Abdominal: Abdomen is soft. He exhibits no distension. There is no abdominal tenderness.    Musculoskeletal:         General: No tenderness or edema. Normal range of motion.      Cervical back: Normal range of motion.     Neurological: He is alert and oriented to person, place, and time. No cranial nerve deficit.   Skin: Skin is warm and dry. Capillary refill takes less than 2 seconds. No rash noted. No erythema.   Psychiatric: He has a normal mood and affect.       ED Course   Procedures  Labs Reviewed   COMPREHENSIVE METABOLIC PANEL - Abnormal; Notable for the following components:       Result Value    Glucose Level 180 (*)     Blood Urea Nitrogen 25.9 (*)     Creatinine 1.42 (*)     Protein Total 6.2 (*)     Albumin Level 3.1 (*)     Albumin/Globulin Ratio 1.0 (*)     All other components within normal limits   B-TYPE NATRIURETIC PEPTIDE - Abnormal; Notable for the following components:    Natriuretic Peptide 2,532.7 (*)     All other components within normal limits   TROPONIN I - Abnormal; Notable for the following components:    Troponin-I 0.053 (*)     All other components within normal limits   PROTIME-INR - Abnormal; Notable for the following components:    PT 17.5 (*)     INR 1.45 (*)     All other components within normal limits   CBC WITH DIFFERENTIAL - Abnormal; Notable for the following components:    RBC 4.54 (*)     Hgb 10.2 (*)     Hct 33.2 (*)     MCV 73.1 (*)     MCH 22.5 (*)     MCHC 30.7 (*)     RDW 18.4 (*)     All other components within normal limits   COVID/FLU A&B PCR - Normal    Narrative:     The Xpert Xpress SARS-CoV-2/FLU/RSV plus is a rapid, multiplexed real-time PCR test intended for the simultaneous qualitative detection and differentiation of SARS-CoV-2, Influenza A, Influenza B, and respiratory syncytial virus (RSV) viral RNA in either nasopharyngeal swab or nasal swab specimens.         APTT - Normal   DRUG SCREEN, URINE (BEAKER) - Normal    Narrative:     Cut off concentrations:    Amphetamines - 1000 ng/ml  Barbiturates - 200 ng/ml  Benzodiazepine - 200  ng/ml  Cannabinoids (THC) - 50 ng/ml  Cocaine - 300 ng/ml  Fentanyl - 1.0 ng/ml  MDMA - 500 ng/ml  Opiates - 300 ng/ml   Phencyclidine (PCP) - 25 ng/ml    Specimen submitted for drug analysis and tested for pH and specific gravity in order to evaluate sample integrity. Suspect tampering if specific gravity is <1.003 and/or pH is not within the range of 4.5 - 8.0  False negatives may result form substances such as bleach added to urine.  False positives may result for the presence of a substance with similar chemical structure to the drug or its metabolite.    This test provides only a PRELIMINARY analytical test result. A more specific alternate chemical method must be used in order to obtain a confirmed analytical result. Gas chromatography/mass spectrometry (GC/MS) is the preferred confirmatory method. Other chemical confirmation methods are available. Clinical consideration and professional judgement should be applied to any drug of abuse test result, particularly when preliminary positive results are used.    Positive results will be confirmed only at the physicians request. Unconfirmed screening results are to be used only for medical purposes (treatment).        CBC W/ AUTO DIFFERENTIAL    Narrative:     The following orders were created for panel order CBC auto differential.  Procedure                               Abnormality         Status                     ---------                               -----------         ------                     CBC with Differential[[id number]]        Abnormal            Final result                 Please view results for these tests on the individual orders.   TROPONIN I     EKG Readings: (Independently Interpreted)   Initial Reading: No STEMI. Rhythm: Normal Sinus Rhythm. Heart Rate: 70. Conduction: RBBB.   Time: 0036     Imaging Results              X-Ray Chest AP Portable (In process)                      Medications   furosemide injection 80 mg (80 mg Intravenous Given  11/25/22 0206)   aspirin EC tablet 325 mg (325 mg Oral Given 11/25/22 0400)     Medical Decision Making:   Differential Diagnosis:   CHF exacerbation, pneumonia, COVID,  Clinical Tests:   Lab Tests: Ordered and Reviewed  Radiological Study: Ordered and Reviewed  ED Management:  Patient is a 57-year-old male with past medical history of congestive heart failure with diminished EF presents to emergency department for shortness of breath, malfunction of life vest likely due to dead battery.  Patient presented week ago with life vest reportedly defibrillated the patient.  Patient given dose of Lasix with improvement in shortness of breath.  Discussed with cardiology recommends placing an observation , admit to Medicine.  All results discussed with patient.  Answered all questions time.  Verbalized understanding agreed to plan.        Scribe Attestation:   Scribe #1: I performed the above scribed service and the documentation accurately describes the services I performed. I attest to the accuracy of the note.    Attending Attestation:           Physician Attestation for Scribe:  Physician Attestation Statement for Scribe #1: I, Anthony Wright MD, reviewed documentation, as scribed by Salbador Reyna in my presence, and it is both accurate and complete.           ED Course as of 11/25/22 0514   Fri Nov 25, 2022   0344 Spoke with Jayden.  Recommends admission to medicine due to ANA, hx of psych.  Consult cardiology.  [RP]      ED Course User Index  [RP] Carroll Gil MD                 Clinical Impression:   Final diagnoses:  [R06.02] SOB (shortness of breath)  [I50.9] Congestive heart failure, unspecified HF chronicity, unspecified heart failure type (Primary)  [R06.00] Dyspnea, unspecified type  [Z95.810] Uses LifeVest defibrillator  [R77.8] Elevated troponin        ED Disposition Condition    Observation Stable                Carroll Gil MD  11/25/22 0382

## 2022-11-25 NOTE — ED NOTES
Assumed care of pt at this time. Pt resting in stretcher w/equal unlabored respirations. GCS 15, AOX4. No apparent distress. No new complaints at this time. Updated pt on plan of care. Side rails raised, call bell within reach, bed locked and in lowest position. Will continue to monitor.

## 2022-11-25 NOTE — H&P
[facility] Hospital Medicine History & Physical Examination       Patient Name: Kendall Duff  MRN: [id number]  Patient Class: OP- Observation   Admission Date: 11/25/2022   Admitting Physician: DARNELL Service   Length of Stay: 0  Attending Physician: Dr. Silviano Lyn  Primary Care Provider: Dr Sherley Cali  Face-to-Face encounter date: 11/25/2022  Code Status:Full code  Chief Complaint: Shortness of Breath (Aasi presents w/ pt c/o sob all day worse w/ exertion, has life vest but states left batteries at families house)        Patient information was obtained from patient, patient's family, past medical records and ER records.     HISTORY OF PRESENT ILLNESS:   Kendall Duff is a 57 y.o. male who  PMH includes bipolar disorder, chronic hepatitis-C, anxiety, depression, HTN, schizoaffective disorder, seizures, CVA, history of substance abuse, CHF, cardiomyopathy with EF 15%; presented to ED at  [facility] on 11/25/2022 with a primary complaint of SOB with associated BILLY and weakness. Pt reports he wear a life vest and he left the batteries at family members house. PT lives with his daughter and was visiting for the holiday and his batteries at his other daughters house who is currently not home. PT reports he has been having shortness of breath which has worsened over the past few days. PT reports his SOB worsens on exertion. He denies any shock of his life vest. Labs done significant for  HH 10.2/33.2, INE 1.45, PT 17.5, BUN/Creat 25.9/1.42, glucose 180, BNP 2532.7, trop  0.059, 0.076, 0.044 respectively; indices unremarkable; UDS negative. Flu and COVID negative.   Patient was given diuretics in the ED. cardiology Services were consulted per the ED and will see in consultation.  Patient is admitted to hospital medicine services for further management.    PAST MEDICAL HISTORY:   Bipolar disorder, chronic hepatitis-C, anxiety, depression, seizures, history of CVA, CHF,  PAF, HTN, cardiomyopathy,  history of substance abuse     PAST SURGICAL HISTORY:     Past Surgical History:   Procedure Laterality Date    CORONARY STENT PLACEMENT  08/14/2015    DEBRIDEMENT  04/17/2022    LEFT HEART CATHETERIZATION Left 08/13/2015    REPEAT CLOSURE OF STERNAL INCISION N/A 5/11/2022    Procedure: CLOSURE, STERNAL INCISION, REPEAT;  Surgeon: Adolfo Wiess MD;  Location: [facility] OR;  Service: Plastics;  Laterality: N/A;  STERNAL WOUND DEBRIDEMENT AND RECONSTRUCTION // MULTIPLE MUSCLE FLAPS // REQ 1400       ALLERGIES:   Depakote [divalproex], Divalproex sodium, Lithium, Lithium analogues, and Quetiapine    FAMILY HISTORY:   Reviewed and negative    SOCIAL HISTORY:   Smokes 1/2-1 pack of cigarettes a day   Denies any illicit drug use   Denies any alcohol use   Lives with daughter     HOME MEDICATIONS:   As documented  Prior to Admission medications    Medication Sig Start Date End Date Taking? Authorizing Provider   amiodarone (PACERONE) 200 MG Tab Take 1 tablet (200 mg total) by mouth once daily. 11/7/22 12/7/22  Soila Mann MD   apixaban (ELIQUIS) 5 mg Tab Take 1 tablet (5 mg total) by mouth 2 (two) times daily. 7/30/22   Taz Sanon MD   ARIPiprazole (ABILIFY) 10 MG Tab Take 1 tablet (10 mg total) by mouth once daily. 11/4/22 11/4/23  Aziza Hernandez MD   aspirin 81 MG Chew Chew and swallow 1 tablet (81 mg total) by mouth once daily. 7/26/22 7/26/23  Harjit Ruiz MD   atorvastatin (LIPITOR) 80 MG tablet Take 1 tablet (80 mg total) by mouth once daily. 11/4/22 11/4/23  Aziza Hernandez MD   benztropine (COGENTIN) 1 MG tablet Take 1 mg by mouth 2 (two) times daily.    Historical Provider   famotidine (PEPCID) 20 MG tablet Take 1 tablet (20 mg total) by mouth 2 (two) times daily. 11/4/22 11/4/23  Aziza Hernandez MD   furosemide (LASIX) 40 MG tablet Take 1 tablet (40 mg total) by mouth once daily. 11/4/22 11/4/23  RAY Patel   isosorbide mononitrate (IMDUR) 30 MG 24 hr tablet Take 1 tablet (30 mg total) by  mouth once daily. 11/7/22 11/7/23  Soila Mann MD   levETIRAcetam (KEPPRA) 500 MG Tab Take 1 tablet (500 mg total) by mouth 2 (two) times daily. 11/4/22 11/4/23  Aziza Hernandez MD   metoprolol tartrate 75 mg Tab Take 75 mg by mouth 2 (two) times daily. 11/11/22 11/11/23  CJ Jay   OXcarbazepine (TRILEPTAL) 300 MG Tab Take 300 mg by mouth 3 (three) times daily.    Historical Provider   traZODone (DESYREL) 100 MG tablet Take 1 tablet (100 mg total) by mouth every evening. 6/17/22 6/17/23  Jarrett Yarbrough MD   metoprolol succinate (TOPROL-XL) 25 MG 24 hr tablet Take 25 mg by mouth once daily. 3/27/22 6/17/22  Historical Provider   pravastatin (PRAVACHOL) 40 MG tablet Take 1 tablet (40 mg total) by mouth every evening. 3/25/22 6/17/22  Laisha May NP       REVIEW OF SYSTEMS:   Except as documented, all other systems reviewed and negative     PHYSICAL EXAM:     VITAL SIGNS: 24 HRS MIN & MAX LAST   Temp  Min: 97.7 °F (36.5 °C)  Max: 97.7 °F (36.5 °C) 97.7 °F (36.5 °C)   BP  Min: 125/82  Max: 170/100 125/82   Pulse  Min: 66  Max: 80  67   Resp  Min: 18  Max: 19 19   SpO2  Min: 97 %  Max: 100 % 100 %       General appearance:  Ill-appearing looks older than stated age, fatigued; nontoxic, no family at bedside  HENT: Atraumatic head. Moist mucous membranes of oral cavity.  Eyes: PERRL  Neck: Supple.   Lungs: Clear to auscultation bilaterally. No wheezing present.   Heart: Regular rate and rhythm.Systolic murmur, cap refill brisk, life vest in place  Abdomen: Soft, non-distended, non-tender. No rebound tenderness/guarding. Bowel sounds are normal.   Extremities: SMILEY, generalized weakness  Skin: warm and dry  Neuro: AAO x 3, no focal deficits  Psych/mental status: Appropriate mood and affect. Responds appropriately to questions.     LABS AND IMAGING:     Recent Labs   Lab 11/25/22  0034   WBC 6.1   RBC 4.54*   HGB 10.2*   HCT 33.2*   MCV 73.1*   MCH 22.5*   MCHC 30.7*   RDW 18.4*      MPV 9.2        Recent Labs   Lab 11/25/22  0034      K 4.0   CO2 26   BUN 25.9*   CREATININE 1.42*   CALCIUM 8.6   ALBUMIN 3.1*   ALKPHOS 118   ALT 20   AST 32   BILITOT 0.5       Microbiology Results (last 7 days)       ** No results found for the last 168 hours. **             X-Ray Chest AP Portable  Narrative: EXAMINATION:  XR CHEST AP PORTABLE    CLINICAL HISTORY:  lifevest discharge;    TECHNIQUE:  Frontal view(s) of the chest.    COMPARISON:  Radiography 11/06/2022    FINDINGS:  Overlying life vest.  Mild enlarged of the cardiac silhouette.  The lungs are well-inflated.  No consolidation identified.  No significant pleural effusion or discernible pneumothorax.  Impression: No acute pulmonary process identified.    Electronically signed by: Tio Palacios  Date:    11/11/2022  Time:    12:11        ASSESSMENT & PLAN:   ASSESSMENT:  Acute CHF exacerbation   ANA   Elevated troponin- trending down  PAF- currently sinus  Elevated BNP- secondary to CHF exacerbation  HTN- essential  Anemia- chronic disease  Exertional dyspnea with shortness of breath   Tobacco abuse- cigarettes  Weakness    PLAN:  Consult Cardiology Services appreciate assistance and recommendations   Accurate I&O   Continue with diuresis   Trend out troponin levels   Resume home medication as deemed necessary   Fall precautions   Repeat lab work in a.m.       VTE Prophylaxis: Resume home Eliquis for DVT prophylaxis and will be advised to be as mobile as possible     Patient condition:  Stable  __________________________________________________________________________  INPATIENT LIST OF MEDICATIONS     Scheduled Meds:   amiodarone  200 mg Oral Daily    apixaban  5 mg Oral BID    aspirin  81 mg Oral Daily    atorvastatin  80 mg Oral Daily    isosorbide mononitrate  30 mg Oral Daily    metoprolol tartrate  75 mg Oral BID     Continuous Infusions:  PRN Meds:.acetaminophen, aluminum-magnesium hydroxide-simethicone, dextrose 10%, dextrose 10%, glucagon  (human recombinant), glucose, glucose, HYDROcodone-acetaminophen, melatonin, naloxone, ondansetron, sodium chloride 0.9%      ILina FNP have reviewed and discussed the case with Dr. Silviano Lyn. Please see the following addendum for further assessment and plan from there attending MD.  RAY Olguin   11/25/2022    Dr Lyn- chart reviewed and patient examined.  57-year-old male with history of CAD/ischemic cardiomyopathy/hypertension/schizoaffective disorder/hepatitis-C/DVT/systolic and diastolic heart failure on LifeVest with no batteries/PAF on sinus rhythm at this moment on Eliquis.  Patient presents to [facility] with complaints of shortness of breath attempts around 24 hours.  He denies chest pains, nausea/vomiting, fever or palpitations.  Patient has a recent discharge on November 11 from hospital.    Patient with history of systolic dysfunction with 20% ejection fraction and grade 2 diastolic dysfunction with valvular heart disease with moderate mitral regurg as well as tricuspid regurg.  Patient is well known to [facility].  BNP 2532/troponin 0.0 53, creatinine 1.42/electrocardiogram normal sinus rhythm with right bundle branch block.  Chest x-ray shows LifeVest otherwise clear.    Patient has been started on Lasix 40 mg IV push b.i.d..    Patient appears to be on ANA so all his nephrotoxic medications were placed on hold.    His physical examination is unremarkable.  He has S1-S2 no JVD and no edema to lower extremities.    Patient refers feeling better already after some diuresis.  We will continue to hold nephrotoxic medications and repeat blood work for a.m..  Appreciate assistance by [facility]    Assessment-acute on chronic combined systolic/diastolic heart failure/ischemic cardiomyopathy history of/PAF in normal sinus rhythm/ANA/hep C/history of substance abuse/tobacco abuse/live at/schizoaffective disorder/hypertensive vascular  disease/hyperlipidemia/CAD.    CC time 35 minutes   Cc diagnosis acute on chronic combined systolic and diastolic heart failure requiring IV Lasix      Discharge Planning and Disposition: No mobility needs. Ambulating well. Good social support system.   Anticipated discharge    All diagnosis and differential diagnosis have been reviewed; assessment and plan has been documented; I have personally reviewed the labs and test results that are presently available; I have reviewed the patients medication list; I have reviewed the consulting providers response and recommendations. I have reviewed or attempted to review medical records based upon their availability.    All of the patient and family questions have been addressed and answered. Patient's is agreeable to the above stated plan. I will continue to monitor closely and make adjustments to medical management as needed.

## 2022-11-25 NOTE — CONSULTS
Inpatient consult to Cardiology  Consult performed by: RAY Chilel  Consult ordered by: Carroll Gil MD  Reason for consult: CHF exacerbation    [facility] - Emergency Dept  Cardiology  Consult Note    Patient Name: Kendall Duff  MRN: [id number]  Admission Date: 11/25/2022  Hospital Length of Stay: 0 days  Code Status: Prior   Attending Provider: Belinda Berumen MD   Consulting Provider: RAY Chilel  Primary Care Physician: Primary Doctor No  Principal Problem:<principal problem not specified>    Patient information was obtained from patient, past medical records, and ER records.     Subjective:     Chief Complaint:  Reason for consult: CHF     HPI:   Mr. Duff is a 57 year old male who is known to [facility], Dr. Wright. He presents to the ER via EMS with complaints of SOB x 1 day. He denies CP, N/V, fever, or palpitations. He also reports that his Lifevest batteries are dead. Significant labs include BNP 2532, trop 0.053, B/Cr 25.9/1.42. An EKG was obtained and demonstrated and showed normal sinus rhythm w/ RBBB unchanged from previous EKG. Of note, he was discharged on November 11 after his lifevest shocked him. [facility] has been consulted to further manage his CHF exacerbation.     PMH: CAD, ICMO, HTN, schizoaffective disorder, hep C, DVT, systolic & diastolic CHF, Lifevest  PSH: CABG, LHC  Family History: Mother - CA; Father - liver disease   Social History: Current every day smoker. Former Cocaine Use. Denies alcohol use.      Previous Cardiac Diagnostics:   Echocardiogram (10.28.22):  The left ventricle is moderately enlarged with mild eccentric hypertrophy and severely decreased systolic function.  The estimated ejection fraction is 20%.  There is severe left ventricular global hypokinesis.  Grade II left ventricular diastolic dysfunction.  Normal right ventricular size with moderately reduced right ventricular systolic function.  Moderate mitral regurgitation.  Moderate tricuspid  regurgitation.  There is mild pulmonary hypertension.  The estimated PA systolic pressure is 44 mmHg.  Elevated central venous pressure (15 mmHg).  Severe left atrial enlargement.     CABG x 4 (4/22):  LIMA-LAD, SVG-OM1, SVG-D1, SVG-PDA     Echocardiogram (6.16.22):  The left ventricle is normal in size w/ concentric hypertrophy and severely decreased systolic function.  The estimated EF is 25-30%.  There is severe left ventricular global hypokinesis.  Grade II left ventricular diastolic dysfunction.  Normal right ventricular size w/ mildly reduced right ventricular systolic function.  The estimated PA systolic pressure is 52 mmHg.  There is moderate pulmonary HTN.  Elevated CVP (15 mmHg).     RUE NIVA (5.10.22):  A deep vein thrombosis was identified in the right axillary and brachial veins. A superficial thrombosis was identified in the right basilic vein.     LHC (3.21.22):  100% LAD to 30-40% residual stenosis post PTCA.  Large diagonal system w/ severe stenosis.  CIRC - patent stent, OM1 patent, mild CIRC occluded  RCA - stents ISR 40-50%, distal 70-80%  EDP 12 EF 35-40% anterior HK     Review of patient's allergies indicates:   Allergen Reactions    Depakote [divalproex]     Divalproex sodium Other (See Comments)    Lithium     Lithium analogues     Quetiapine Other (See Comments)     Pt states had seizures     Review of Systems   Respiratory:  Positive for shortness of breath.    Cardiovascular:  Negative for chest pain and palpitations.     Objective:     Vital Signs (Most Recent):  Temp: 97.7 °F (36.5 °C) (11/24/22 2358)  Pulse: 69 (11/25/22 0631)  Resp: 18 (11/24/22 2358)  BP: 133/85 (11/25/22 0631)  SpO2: 97 % (11/25/22 0631) Vital Signs (24h Range):  Temp:  [97.7 °F (36.5 °C)] 97.7 °F (36.5 °C)  Pulse:  [66-80] 69  Resp:  [18] 18  SpO2:  [97 %-98 %] 97 %  BP: (133-170)/() 133/85        There is no height or weight on file to calculate BMI.    SpO2: 97 %  O2 Device (Oxygen Therapy): room  air      Intake/Output Summary (Last 24 hours) at 11/25/2022 0928  Last data filed at 11/25/2022 0846  Gross per 24 hour   Intake --   Output 620 ml   Net -620 ml       Lines/Drains/Airways       Peripheral Intravenous Line  Duration                  Peripheral IV - Single Lumen 11/25/22 0130 20 G Anterior;Proximal;Right Forearm <1 day                    Significant Labs:  Recent Results (from the past 72 hour(s))   Comprehensive metabolic panel    Collection Time: 11/25/22 12:34 AM   Result Value Ref Range    Sodium Level 142 136 - 145 mmol/L    Potassium Level 4.0 3.5 - 5.1 mmol/L    Chloride 106 98 - 107 mmol/L    Carbon Dioxide 26 22 - 29 mmol/L    Glucose Level 180 (H) 74 - 100 mg/dL    Blood Urea Nitrogen 25.9 (H) 8.4 - 25.7 mg/dL    Creatinine 1.42 (H) 0.73 - 1.18 mg/dL    Calcium Level Total 8.6 8.4 - 10.2 mg/dL    Protein Total 6.2 (L) 6.4 - 8.3 gm/dL    Albumin Level 3.1 (L) 3.5 - 5.0 gm/dL    Globulin 3.1 2.4 - 3.5 gm/dL    Albumin/Globulin Ratio 1.0 (L) 1.1 - 2.0 ratio    Bilirubin Total 0.5 <=1.5 mg/dL    Alkaline Phosphatase 118 40 - 150 unit/L    Alanine Aminotransferase 20 0 - 55 unit/L    Aspartate Aminotransferase 32 5 - 34 unit/L    eGFR 57 mls/min/1.73/m2   Brain natriuretic peptide    Collection Time: 11/25/22 12:34 AM   Result Value Ref Range    Natriuretic Peptide 2,532.7 (H) <=100.0 pg/mL   COVID/FLU A&B PCR    Collection Time: 11/25/22 12:34 AM   Result Value Ref Range    Influenza A PCR Not Detected Not Detected    Influenza B PCR Not Detected Not Detected    SARS-CoV-2 PCR Not Detected Not Detected   Troponin I    Collection Time: 11/25/22 12:34 AM   Result Value Ref Range    Troponin-I 0.053 (H) 0.000 - 0.045 ng/mL   APTT    Collection Time: 11/25/22 12:34 AM   Result Value Ref Range    PTT 32.5 23.2 - 33.7 seconds   Protime-INR    Collection Time: 11/25/22 12:34 AM   Result Value Ref Range    PT 17.5 (H) 12.5 - 14.5 seconds    INR 1.45 (H) 0.00 - 1.30   CBC with Differential     Collection Time: 11/25/22 12:34 AM   Result Value Ref Range    WBC 6.1 4.5 - 11.5 x10(3)/mcL    RBC 4.54 (L) 4.70 - 6.10 x10(6)/mcL    Hgb 10.2 (L) 14.0 - 18.0 gm/dL    Hct 33.2 (L) 42.0 - 52.0 %    MCV 73.1 (L) 80.0 - 94.0 fL    MCH 22.5 (L) 27.0 - 31.0 pg    MCHC 30.7 (L) 33.0 - 36.0 mg/dL    RDW 18.4 (H) 11.5 - 17.0 %    Platelet 269 130 - 400 x10(3)/mcL    MPV 9.2 7.4 - 10.4 fL    Neut % 60.6 %    Lymph % 22.3 %    Mono % 13.5 %    Eos % 2.6 %    Basophil % 0.8 %    Lymph # 1.36 0.6 - 4.6 x10(3)/mcL    Neut # 3.7 2.1 - 9.2 x10(3)/mcL    Mono # 0.82 0.1 - 1.3 x10(3)/mcL    Eos # 0.16 0 - 0.9 x10(3)/mcL    Baso # 0.05 0 - 0.2 x10(3)/mcL    IG# 0.01 0 - 0.04 x10(3)/mcL    IG% 0.2 %    NRBC% 0.0 %   Drug Screen, Urine    Collection Time: 11/25/22  3:23 AM   Result Value Ref Range    Amphetamines, Urine Negative Negative    Barbituates, Urine Negative Negative    Benzodiazepine, Urine Negative Negative    Cannabinoids, Urine Negative Negative    Cocaine, Urine Negative Negative    Fentanyl, Urine Negative Negative    MDMA, Urine Negative Negative    Opiates, Urine Negative Negative    Phencyclidine, Urine Negative Negative    pH, Urine 7.0 3.0 - 11.0    Specific Gravity, Urine Auto 1.015 1.001 - 1.035   Troponin I    Collection Time: 11/25/22  6:31 AM   Result Value Ref Range    Troponin-I 0.076 (H) 0.000 - 0.045 ng/mL       Significant Imaging:  Imaging Results              X-Ray Chest AP Portable (In process)                     EKG:       Telemetry:  SR 70's    Physical Exam  HENT:      Head: Normocephalic.      Nose: Nose normal.      Mouth/Throat:      Mouth: Mucous membranes are moist.   Eyes:      Extraocular Movements: Extraocular movements intact.   Cardiovascular:      Rate and Rhythm: Normal rate and regular rhythm.      Pulses: Normal pulses.      Heart sounds: Normal heart sounds.   Pulmonary:      Effort: Pulmonary effort is normal.      Breath sounds: Normal breath sounds.   Abdominal:      Palpations:  Abdomen is soft.   Musculoskeletal:         General: Normal range of motion.   Neurological:      Mental Status: He is alert and oriented to person, place, and time.   Psychiatric:         Behavior: Behavior normal.       Home Medications:   No current facility-administered medications on file prior to encounter.     Current Outpatient Medications on File Prior to Encounter   Medication Sig Dispense Refill    amiodarone (PACERONE) 200 MG Tab Take 1 tablet (200 mg total) by mouth once daily. 30 tablet 0    apixaban (ELIQUIS) 5 mg Tab Take 1 tablet (5 mg total) by mouth 2 (two) times daily. 60 tablet 3    ARIPiprazole (ABILIFY) 10 MG Tab Take 1 tablet (10 mg total) by mouth once daily. 30 tablet 11    aspirin 81 MG Chew Chew and swallow 1 tablet (81 mg total) by mouth once daily. 360 tablet 0    atorvastatin (LIPITOR) 80 MG tablet Take 1 tablet (80 mg total) by mouth once daily. 90 tablet 3    benztropine (COGENTIN) 1 MG tablet Take 1 mg by mouth 2 (two) times daily.      famotidine (PEPCID) 20 MG tablet Take 1 tablet (20 mg total) by mouth 2 (two) times daily. 60 tablet 11    furosemide (LASIX) 40 MG tablet Take 1 tablet (40 mg total) by mouth once daily. 30 tablet 11    isosorbide mononitrate (IMDUR) 30 MG 24 hr tablet Take 1 tablet (30 mg total) by mouth once daily. 30 tablet 11    levETIRAcetam (KEPPRA) 500 MG Tab Take 1 tablet (500 mg total) by mouth 2 (two) times daily. 60 tablet 11    metoprolol tartrate 75 mg Tab Take 75 mg by mouth 2 (two) times daily. 60 tablet 11    OXcarbazepine (TRILEPTAL) 300 MG Tab Take 300 mg by mouth 3 (three) times daily.      traZODone (DESYREL) 100 MG tablet Take 1 tablet (100 mg total) by mouth every evening. 30 tablet 11    [DISCONTINUED] metoprolol succinate (TOPROL-XL) 25 MG 24 hr tablet Take 25 mg by mouth once daily.      [DISCONTINUED] pravastatin (PRAVACHOL) 40 MG tablet Take 1 tablet (40 mg total) by mouth every evening. 90 tablet 3       Current Inpatient Medications:  No  current facility-administered medications for this encounter.    Current Outpatient Medications:     amiodarone (PACERONE) 200 MG Tab, Take 1 tablet (200 mg total) by mouth once daily., Disp: 30 tablet, Rfl: 0    apixaban (ELIQUIS) 5 mg Tab, Take 1 tablet (5 mg total) by mouth 2 (two) times daily., Disp: 60 tablet, Rfl: 3    ARIPiprazole (ABILIFY) 10 MG Tab, Take 1 tablet (10 mg total) by mouth once daily., Disp: 30 tablet, Rfl: 11    aspirin 81 MG Chew, Chew and swallow 1 tablet (81 mg total) by mouth once daily., Disp: 360 tablet, Rfl: 0    atorvastatin (LIPITOR) 80 MG tablet, Take 1 tablet (80 mg total) by mouth once daily., Disp: 90 tablet, Rfl: 3    benztropine (COGENTIN) 1 MG tablet, Take 1 mg by mouth 2 (two) times daily., Disp: , Rfl:     famotidine (PEPCID) 20 MG tablet, Take 1 tablet (20 mg total) by mouth 2 (two) times daily., Disp: 60 tablet, Rfl: 11    furosemide (LASIX) 40 MG tablet, Take 1 tablet (40 mg total) by mouth once daily., Disp: 30 tablet, Rfl: 11    isosorbide mononitrate (IMDUR) 30 MG 24 hr tablet, Take 1 tablet (30 mg total) by mouth once daily., Disp: 30 tablet, Rfl: 11    levETIRAcetam (KEPPRA) 500 MG Tab, Take 1 tablet (500 mg total) by mouth 2 (two) times daily., Disp: 60 tablet, Rfl: 11    metoprolol tartrate 75 mg Tab, Take 75 mg by mouth 2 (two) times daily., Disp: 60 tablet, Rfl: 11    OXcarbazepine (TRILEPTAL) 300 MG Tab, Take 300 mg by mouth 3 (three) times daily., Disp: , Rfl:     traZODone (DESYREL) 100 MG tablet, Take 1 tablet (100 mg total) by mouth every evening., Disp: 30 tablet, Rfl: 11         VTE Risk Mitigation (From admission, onward)      None            Assessment:   NSTEMI - suspect type 2 in the setting of CHF exacerbation     - denies current CP    - trop 0.053, 0.076 (no peak reached yet)  Acute on Chronic Combined Systolic/Diastolic HF    - Grade 2 DD    - EF 20% (echo 10.28.22)  ANA  Chronic anemia  ICMO     - With Life Vest - dead battery   VHD    -Moderate MR  and Moderate TR  Hypertension  Pulmonary Hypertension  PAF/PAFL (Recent New Diagnosis)- Now Sinus Rhythm    - [id number]: 5 (LVSD/HTN/TE/NSTEMI)    - On Eliquis  CAD/CABG (April 2022)    - LIMA-LAD, SVG-OM1, SVG-D1, SVG-PDA  History of RUE DVT (5.10.22)    - A deep vein thrombosis was identified in the right axillary and brachial veins. A superficial thrombosis was identified in the right basilic vein.  Schizoaffective Disorder  Hx of Hep C  Hx of cocaine Abuse   Nicotine Dependence (Tobacco use)      Plan:   Trend troponins x 3.  Start Lasix 40 mg IVP BID. Monitor Cr closely.   Resume home ASA, statin, & BB.  Hold ACE/ARB/ARNI in the setting of ANA.  Pt reports lifevest is dead. Contacted [employer], who will send out rep.   Ensure accurate I&O's and daily weights.  Keep K > 4 and Mg > 2.   Labs in am: CBC, CMP, & Mg.    Thank you for your consult.     RAY Chilel  Cardiology  [facility] - Emergency Dept  11/25/2022 9:28 AM

## 2022-11-25 NOTE — ED NOTES
"Pt reports he left [employer] life vest battery at home. [employer] representative called out to ED. Rep "Will" at bedside, # [phone]. Pt provided new monitor, 2 batteries, modem, and loaner . Per Will pt can take these home with him upon discharge but must be returned to [employer] at time of life vest return. Pt verbalizes understanding. Will relay to daughter.   "

## 2022-11-25 NOTE — Clinical Note
Diagnosis: Congestive heart failure, unspecified HF chronicity, unspecified heart failure type [[id number]]   Future Attending Provider: ANABELLA BROWN [[id number]]   Admitting Provider:: ANABELLA BROWN [[id number]]

## 2022-11-25 NOTE — ED NOTES
Pt sleeping quietly in bed. Respirations equal and non-labored. Skin warm and dry. Pt sinus rhythm on monitor. Call bell within reach. Room near unit station. Adequate lighting provide.

## 2022-11-25 NOTE — PROGRESS NOTES
57-year-old male with history of coronary artery disease status post PCI CABG, ischemic cardiomyopathy with a EF of 20% currently with a LifeVest on however it is not functional, hypertension, VHD, cocaine abuse, schizo affective disorder/bipolar, history of CVA and hepatitis C.  Last angiogram of records from March 2022 showing multivessel coronary artery disease he underwent coronary artery bypass grafting x4 with a left internal mammary to LAD, vein graft to the OM1, vein graft to diagonal 1 and vein graft to the PDA with ligation of the left atrial appendage by Dr. Carney.  Presents back to the ER this evening with complaints of shortness of breath started yesterday.  Is also stating that his LifeVest batteries are dead.  EKG done upon arrival showed normal sinus rhythm underlying right bundle branch block unchanged from previous EKG.  Lab work showed a BNP of 2532 and a troponin of 0.053.    He was discharged on November 11 after his LifeVest that shocked him.    Will need to be admitted for observation    Cycle cardiac biomarkers  Telemetry monitoring  Agree with IV Lasix  Resume lisinopril and propanolol  A.m. electrolyte  UDS

## 2022-11-25 NOTE — LETTER
57-year-old male admitted on 11/25/2022 to our facility with heart failure.  Initially, the patient was hypertensive with a blood pressure of 170/100.  Elevated BNP of greater than 2500..  There was also an LifeVest malfunction.  The patient also had troponinemia which was deemed to be NSTEMI type II related to heart failure.  Recommendations were for LifeVest battery exchange, continuation of IV diuretics.  The patient continues to be significantly tachypneic with a respiratory rate in the mid 20s.  Yesterday, the patient was oxygenating 89% on room air.  Presently, it is at 91 to 94%.  However, in the setting of persistent tachypnea, need for IV diuretics, telemetry monitoring, the patient is appropriate for an inpatient level of care.  I reached out to Dr. Ervin on 11/28/2022 at 3:40 PM central time who agreed to inpatient level of care    MD SUSAN  , Physician Advisor

## 2022-11-26 LAB
ALBUMIN SERPL-MCNC: 3.5 GM/DL (ref 3.5–5)
ALBUMIN/GLOB SERPL: 1.1 RATIO (ref 1.1–2)
ALP SERPL-CCNC: 113 UNIT/L (ref 40–150)
ALT SERPL-CCNC: 22 UNIT/L (ref 0–55)
AST SERPL-CCNC: 36 UNIT/L (ref 5–34)
BASOPHILS # BLD AUTO: 0.06 X10(3)/MCL (ref 0–0.2)
BASOPHILS NFR BLD AUTO: 0.8 %
BILIRUBIN DIRECT+TOT PNL SERPL-MCNC: 0.8 MG/DL
BNP BLD-MCNC: 2514.1 PG/ML
BUN SERPL-MCNC: 27.9 MG/DL (ref 8.4–25.7)
CALCIUM SERPL-MCNC: 9.2 MG/DL (ref 8.4–10.2)
CHLORIDE SERPL-SCNC: 101 MMOL/L (ref 98–107)
CO2 SERPL-SCNC: 26 MMOL/L (ref 22–29)
CREAT SERPL-MCNC: 1.13 MG/DL (ref 0.73–1.18)
EOSINOPHIL # BLD AUTO: 0.03 X10(3)/MCL (ref 0–0.9)
EOSINOPHIL NFR BLD AUTO: 0.4 %
ERYTHROCYTE [DISTWIDTH] IN BLOOD BY AUTOMATED COUNT: 18.8 % (ref 11.5–17)
GFR SERPLBLD CREATININE-BSD FMLA CKD-EPI: >60 MLS/MIN/1.73/M2
GLOBULIN SER-MCNC: 3.3 GM/DL (ref 2.4–3.5)
GLUCOSE SERPL-MCNC: 154 MG/DL (ref 74–100)
HCT VFR BLD AUTO: 38.2 % (ref 42–52)
HGB BLD-MCNC: 11.5 GM/DL (ref 14–18)
IMM GRANULOCYTES # BLD AUTO: 0.02 X10(3)/MCL (ref 0–0.04)
IMM GRANULOCYTES NFR BLD AUTO: 0.3 %
LYMPHOCYTES # BLD AUTO: 1.33 X10(3)/MCL (ref 0.6–4.6)
LYMPHOCYTES NFR BLD AUTO: 18.1 %
MAGNESIUM SERPL-MCNC: 2 MG/DL (ref 1.6–2.6)
MCH RBC QN AUTO: 22.2 PG (ref 27–31)
MCHC RBC AUTO-ENTMCNC: 30.1 MG/DL (ref 33–36)
MCV RBC AUTO: 73.9 FL (ref 80–94)
MONOCYTES # BLD AUTO: 0.67 X10(3)/MCL (ref 0.1–1.3)
MONOCYTES NFR BLD AUTO: 9.1 %
NEUTROPHILS # BLD AUTO: 5.2 X10(3)/MCL (ref 2.1–9.2)
NEUTROPHILS NFR BLD AUTO: 71.3 %
NRBC BLD AUTO-RTO: 0 %
PLATELET # BLD AUTO: 271 X10(3)/MCL (ref 130–400)
PMV BLD AUTO: 9.6 FL (ref 7.4–10.4)
POTASSIUM SERPL-SCNC: 4.3 MMOL/L (ref 3.5–5.1)
PROT SERPL-MCNC: 6.8 GM/DL (ref 6.4–8.3)
RBC # BLD AUTO: 5.17 X10(6)/MCL (ref 4.7–6.1)
SODIUM SERPL-SCNC: 139 MMOL/L (ref 136–145)
WBC # SPEC AUTO: 7.3 X10(3)/MCL (ref 4.5–11.5)

## 2022-11-26 PROCEDURE — 83880 ASSAY OF NATRIURETIC PEPTIDE: CPT | Performed by: NURSE PRACTITIONER

## 2022-11-26 PROCEDURE — G0378 HOSPITAL OBSERVATION PER HR: HCPCS

## 2022-11-26 PROCEDURE — 85025 COMPLETE CBC W/AUTO DIFF WBC: CPT | Performed by: NURSE PRACTITIONER

## 2022-11-26 PROCEDURE — 36415 COLL VENOUS BLD VENIPUNCTURE: CPT | Performed by: NURSE PRACTITIONER

## 2022-11-26 PROCEDURE — 25000003 PHARM REV CODE 250

## 2022-11-26 PROCEDURE — 63600175 PHARM REV CODE 636 W HCPCS

## 2022-11-26 PROCEDURE — 21400001 HC TELEMETRY ROOM

## 2022-11-26 PROCEDURE — 63600175 PHARM REV CODE 636 W HCPCS: Performed by: NURSE PRACTITIONER

## 2022-11-26 PROCEDURE — 25000003 PHARM REV CODE 250: Performed by: NURSE PRACTITIONER

## 2022-11-26 PROCEDURE — 80053 COMPREHEN METABOLIC PANEL: CPT | Performed by: NURSE PRACTITIONER

## 2022-11-26 PROCEDURE — 83735 ASSAY OF MAGNESIUM: CPT

## 2022-11-26 RX ADMIN — OXCARBAZEPINE 300 MG: 300 TABLET, FILM COATED ORAL at 09:11

## 2022-11-26 RX ADMIN — LEVETIRACETAM 500 MG: 500 TABLET, FILM COATED ORAL at 09:11

## 2022-11-26 RX ADMIN — TRAZODONE HYDROCHLORIDE 100 MG: 100 TABLET ORAL at 09:11

## 2022-11-26 RX ADMIN — FUROSEMIDE 40 MG: 10 INJECTION, SOLUTION INTRAMUSCULAR; INTRAVENOUS at 12:11

## 2022-11-26 RX ADMIN — ISOSORBIDE MONONITRATE 30 MG: 30 TABLET, EXTENDED RELEASE ORAL at 09:11

## 2022-11-26 RX ADMIN — FUROSEMIDE 40 MG: 10 INJECTION, SOLUTION INTRAMUSCULAR; INTRAVENOUS at 01:11

## 2022-11-26 RX ADMIN — FAMOTIDINE 20 MG: 20 TABLET, FILM COATED ORAL at 09:11

## 2022-11-26 RX ADMIN — ONDANSETRON 4 MG: 2 INJECTION INTRAMUSCULAR; INTRAVENOUS at 06:11

## 2022-11-26 RX ADMIN — ATORVASTATIN CALCIUM 80 MG: 40 TABLET, FILM COATED ORAL at 09:11

## 2022-11-26 RX ADMIN — APIXABAN 5 MG: 5 TABLET, FILM COATED ORAL at 09:11

## 2022-11-26 RX ADMIN — AMIODARONE HYDROCHLORIDE 200 MG: 200 TABLET ORAL at 09:11

## 2022-11-26 RX ADMIN — METOPROLOL TARTRATE 75 MG: 25 TABLET, FILM COATED ORAL at 09:11

## 2022-11-26 RX ADMIN — ASPIRIN 81 MG CHEWABLE TABLET 81 MG: 81 TABLET CHEWABLE at 09:11

## 2022-11-26 RX ADMIN — OXCARBAZEPINE 300 MG: 300 TABLET, FILM COATED ORAL at 03:11

## 2022-11-26 RX ADMIN — BENZTROPINE MESYLATE 1 MG: 1 TABLET ORAL at 09:11

## 2022-11-26 RX ADMIN — ARIPIPRAZOLE 10 MG: 5 TABLET ORAL at 09:11

## 2022-11-26 NOTE — PROGRESS NOTES
[facility] - Emergency Dept  Cardiology  Progress Note    Patient Name: Kendall Duff  MRN: [id number]  Admission Date: 11/25/2022  Hospital Length of Stay: 0 days  Code Status: Full Code   Attending Provider: Belinda Berumen MD   Consulting Provider: RAY Chilel  Primary Care Physician: Primary Doctor No  Principal Problem:CHF exacerbation    Patient information was obtained from patient, past medical records, and ER records.     Subjective:     Chief Complaint:  Reason for consult: CHF     HPI:   Mr. Duff is a 57 year old male who is known to [facility], Dr. Wright. He presents to the ER via EMS with complaints of SOB x 1 day. He denies CP, N/V, fever, or palpitations. He also reports that his Lifevest batteries are dead. Significant labs include BNP 2532, trop 0.053, B/Cr 25.9/1.42. An EKG was obtained and demonstrated and showed normal sinus rhythm w/ RBBB unchanged from previous EKG. Of note, he was discharged on November 11 after his lifevest shocked him. [facility] has been consulted to further manage his CHF exacerbation.     11.26.22: NAD. Denies CP, SOB, or palps. Inaccurate I&O's. Voided 1980 mL/24 hrs.    PMH: CAD, ICMO, HTN, schizoaffective disorder, hep C, DVT, systolic & diastolic CHF, Lifevest  PSH: CABG, LHC  Family History: Mother - CA; Father - liver disease   Social History: Current every day smoker. Former Cocaine Use. Denies alcohol use.      Previous Cardiac Diagnostics:   Echocardiogram (10.28.22):  The left ventricle is moderately enlarged with mild eccentric hypertrophy and severely decreased systolic function.  The estimated ejection fraction is 20%.  There is severe left ventricular global hypokinesis.  Grade II left ventricular diastolic dysfunction.  Normal right ventricular size with moderately reduced right ventricular systolic function.  Moderate mitral regurgitation.  Moderate tricuspid regurgitation.  There is mild pulmonary hypertension.  The estimated PA systolic pressure  is 44 mmHg.  Elevated central venous pressure (15 mmHg).  Severe left atrial enlargement.     CABG x 4 (4/22):  LIMA-LAD, SVG-OM1, SVG-D1, SVG-PDA     Echocardiogram (6.16.22):  The left ventricle is normal in size w/ concentric hypertrophy and severely decreased systolic function.  The estimated EF is 25-30%.  There is severe left ventricular global hypokinesis.  Grade II left ventricular diastolic dysfunction.  Normal right ventricular size w/ mildly reduced right ventricular systolic function.  The estimated PA systolic pressure is 52 mmHg.  There is moderate pulmonary HTN.  Elevated CVP (15 mmHg).     RUE NIVA (5.10.22):  A deep vein thrombosis was identified in the right axillary and brachial veins. A superficial thrombosis was identified in the right basilic vein.     LHC (3.21.22):  100% LAD to 30-40% residual stenosis post PTCA.  Large diagonal system w/ severe stenosis.  CIRC - patent stent, OM1 patent, mild CIRC occluded  RCA - stents ISR 40-50%, distal 70-80%  EDP 12 EF 35-40% anterior HK     Review of patient's allergies indicates:   Allergen Reactions    Depakote [divalproex]     Divalproex sodium Other (See Comments)    Lithium     Lithium analogues     Quetiapine Other (See Comments)     Pt states had seizures     Review of Systems   Respiratory:  Positive for shortness of breath.    Cardiovascular:  Negative for chest pain and palpitations.     Objective:     Vital Signs (Most Recent):  Temp: 97.6 °F (36.4 °C) (11/26/22 0737)  Pulse: 67 (11/26/22 0916)  Resp: 20 (11/26/22 0737)  BP: (!) 126/90 (11/26/22 0916)  SpO2: (!) 91 % (11/26/22 0737) Vital Signs (24h Range):  Temp:  [97.4 °F (36.3 °C)-97.6 °F (36.4 °C)] 97.6 °F (36.4 °C)  Pulse:  [56-68] 67  Resp:  [16-20] 20  SpO2:  [91 %-100 %] 91 %  BP: (112-134)/(80-99) 126/90     Weight: 73.1 kg (161 lb 1.6 oz)  Body mass index is 25.23 kg/m².    SpO2: (!) 91 %  O2 Device (Oxygen Therapy): room air      Intake/Output Summary (Last 24 hours) at 11/26/2022  1144  Last data filed at 11/26/2022 0636  Gross per 24 hour   Intake --   Output 1360 ml   Net -1360 ml         Lines/Drains/Airways       Peripheral Intravenous Line  Duration                  Peripheral IV - Single Lumen 11/25/22 0130 20 G Anterior;Proximal;Right Forearm 1 day                    Significant Labs:  Recent Results (from the past 72 hour(s))   Comprehensive metabolic panel    Collection Time: 11/25/22 12:34 AM   Result Value Ref Range    Sodium Level 142 136 - 145 mmol/L    Potassium Level 4.0 3.5 - 5.1 mmol/L    Chloride 106 98 - 107 mmol/L    Carbon Dioxide 26 22 - 29 mmol/L    Glucose Level 180 (H) 74 - 100 mg/dL    Blood Urea Nitrogen 25.9 (H) 8.4 - 25.7 mg/dL    Creatinine 1.42 (H) 0.73 - 1.18 mg/dL    Calcium Level Total 8.6 8.4 - 10.2 mg/dL    Protein Total 6.2 (L) 6.4 - 8.3 gm/dL    Albumin Level 3.1 (L) 3.5 - 5.0 gm/dL    Globulin 3.1 2.4 - 3.5 gm/dL    Albumin/Globulin Ratio 1.0 (L) 1.1 - 2.0 ratio    Bilirubin Total 0.5 <=1.5 mg/dL    Alkaline Phosphatase 118 40 - 150 unit/L    Alanine Aminotransferase 20 0 - 55 unit/L    Aspartate Aminotransferase 32 5 - 34 unit/L    eGFR 57 mls/min/1.73/m2   Brain natriuretic peptide    Collection Time: 11/25/22 12:34 AM   Result Value Ref Range    Natriuretic Peptide 2,532.7 (H) <=100.0 pg/mL   COVID/FLU A&B PCR    Collection Time: 11/25/22 12:34 AM   Result Value Ref Range    Influenza A PCR Not Detected Not Detected    Influenza B PCR Not Detected Not Detected    SARS-CoV-2 PCR Not Detected Not Detected   Troponin I    Collection Time: 11/25/22 12:34 AM   Result Value Ref Range    Troponin-I 0.053 (H) 0.000 - 0.045 ng/mL   APTT    Collection Time: 11/25/22 12:34 AM   Result Value Ref Range    PTT 32.5 23.2 - 33.7 seconds   Protime-INR    Collection Time: 11/25/22 12:34 AM   Result Value Ref Range    PT 17.5 (H) 12.5 - 14.5 seconds    INR 1.45 (H) 0.00 - 1.30   CBC with Differential    Collection Time: 11/25/22 12:34 AM   Result Value Ref Range    WBC  6.1 4.5 - 11.5 x10(3)/mcL    RBC 4.54 (L) 4.70 - 6.10 x10(6)/mcL    Hgb 10.2 (L) 14.0 - 18.0 gm/dL    Hct 33.2 (L) 42.0 - 52.0 %    MCV 73.1 (L) 80.0 - 94.0 fL    MCH 22.5 (L) 27.0 - 31.0 pg    MCHC 30.7 (L) 33.0 - 36.0 mg/dL    RDW 18.4 (H) 11.5 - 17.0 %    Platelet 269 130 - 400 x10(3)/mcL    MPV 9.2 7.4 - 10.4 fL    Neut % 60.6 %    Lymph % 22.3 %    Mono % 13.5 %    Eos % 2.6 %    Basophil % 0.8 %    Lymph # 1.36 0.6 - 4.6 x10(3)/mcL    Neut # 3.7 2.1 - 9.2 x10(3)/mcL    Mono # 0.82 0.1 - 1.3 x10(3)/mcL    Eos # 0.16 0 - 0.9 x10(3)/mcL    Baso # 0.05 0 - 0.2 x10(3)/mcL    IG# 0.01 0 - 0.04 x10(3)/mcL    IG% 0.2 %    NRBC% 0.0 %   Drug Screen, Urine    Collection Time: 11/25/22  3:23 AM   Result Value Ref Range    Amphetamines, Urine Negative Negative    Barbituates, Urine Negative Negative    Benzodiazepine, Urine Negative Negative    Cannabinoids, Urine Negative Negative    Cocaine, Urine Negative Negative    Fentanyl, Urine Negative Negative    MDMA, Urine Negative Negative    Opiates, Urine Negative Negative    Phencyclidine, Urine Negative Negative    pH, Urine 7.0 3.0 - 11.0    Specific Gravity, Urine Auto 1.015 1.001 - 1.035   Troponin I    Collection Time: 11/25/22  6:31 AM   Result Value Ref Range    Troponin-I 0.076 (H) 0.000 - 0.045 ng/mL   Troponin I    Collection Time: 11/25/22 12:04 PM   Result Value Ref Range    Troponin-I 0.044 0.000 - 0.045 ng/mL   Comprehensive Metabolic Panel (CMP)    Collection Time: 11/26/22  6:00 AM   Result Value Ref Range    Sodium Level 139 136 - 145 mmol/L    Potassium Level 4.3 3.5 - 5.1 mmol/L    Chloride 101 98 - 107 mmol/L    Carbon Dioxide 26 22 - 29 mmol/L    Glucose Level 154 (H) 74 - 100 mg/dL    Blood Urea Nitrogen 27.9 (H) 8.4 - 25.7 mg/dL    Creatinine 1.13 0.73 - 1.18 mg/dL    Calcium Level Total 9.2 8.4 - 10.2 mg/dL    Protein Total 6.8 6.4 - 8.3 gm/dL    Albumin Level 3.5 3.5 - 5.0 gm/dL    Globulin 3.3 2.4 - 3.5 gm/dL    Albumin/Globulin Ratio 1.1 1.1 -  2.0 ratio    Bilirubin Total 0.8 <=1.5 mg/dL    Alkaline Phosphatase 113 40 - 150 unit/L    Alanine Aminotransferase 22 0 - 55 unit/L    Aspartate Aminotransferase 36 (H) 5 - 34 unit/L    eGFR >60 mls/min/1.73/m2   Brain natriuretic peptide    Collection Time: 11/26/22  6:00 AM   Result Value Ref Range    Natriuretic Peptide 2,514.1 (H) <=100.0 pg/mL   Magnesium    Collection Time: 11/26/22  6:00 AM   Result Value Ref Range    Magnesium Level 2.00 1.60 - 2.60 mg/dL   CBC with Differential    Collection Time: 11/26/22  6:00 AM   Result Value Ref Range    WBC 7.3 4.5 - 11.5 x10(3)/mcL    RBC 5.17 4.70 - 6.10 x10(6)/mcL    Hgb 11.5 (L) 14.0 - 18.0 gm/dL    Hct 38.2 (L) 42.0 - 52.0 %    MCV 73.9 (L) 80.0 - 94.0 fL    MCH 22.2 (L) 27.0 - 31.0 pg    MCHC 30.1 (L) 33.0 - 36.0 mg/dL    RDW 18.8 (H) 11.5 - 17.0 %    Platelet 271 130 - 400 x10(3)/mcL    MPV 9.6 7.4 - 10.4 fL    Neut % 71.3 %    Lymph % 18.1 %    Mono % 9.1 %    Eos % 0.4 %    Basophil % 0.8 %    Lymph # 1.33 0.6 - 4.6 x10(3)/mcL    Neut # 5.2 2.1 - 9.2 x10(3)/mcL    Mono # 0.67 0.1 - 1.3 x10(3)/mcL    Eos # 0.03 0 - 0.9 x10(3)/mcL    Baso # 0.06 0 - 0.2 x10(3)/mcL    IG# 0.02 0 - 0.04 x10(3)/mcL    IG% 0.3 %    NRBC% 0.0 %       Significant Imaging:  Imaging Results              X-Ray Chest AP Portable (Final result)  Result time 11/25/22 12:29:47      Final result by Sharon Cantu MD (11/25/22 12:29:47)                   Impression:      Enlarged cardiac silhouette without overt edema.      Electronically signed by: Sharon Cantu  Date:    11/25/2022  Time:    12:29               Narrative:    EXAMINATION:  XR CHEST AP PORTABLE    CLINICAL HISTORY:  Shortness of breath    TECHNIQUE:  Single frontal view of the chest was performed.    COMPARISON:  None    FINDINGS:  LINES AND TUBES: External defibrillator vest in place.    MEDIASTINUM AND JACKSON: Cardiac silhouette is enlarged.    LUNGS: No lobar consolidation. No edema.    PLEURA:No pleural effusion.  No pneumothorax.    BONES: No acute osseous abnormality.                                      EKG:       Telemetry:  SR 70's    Physical Exam  HENT:      Head: Normocephalic.      Nose: Nose normal.      Mouth/Throat:      Mouth: Mucous membranes are moist.   Eyes:      Extraocular Movements: Extraocular movements intact.   Cardiovascular:      Rate and Rhythm: Normal rate and regular rhythm.      Pulses: Normal pulses.      Heart sounds: Normal heart sounds.   Pulmonary:      Effort: Pulmonary effort is normal.      Breath sounds: Normal breath sounds.   Abdominal:      Palpations: Abdomen is soft.   Musculoskeletal:         General: Normal range of motion.   Neurological:      Mental Status: He is alert and oriented to person, place, and time.   Psychiatric:         Behavior: Behavior normal.       Home Medications:   No current facility-administered medications on file prior to encounter.     Current Outpatient Medications on File Prior to Encounter   Medication Sig Dispense Refill    amiodarone (PACERONE) 200 MG Tab Take 1 tablet (200 mg total) by mouth once daily. 30 tablet 0    apixaban (ELIQUIS) 5 mg Tab Take 1 tablet (5 mg total) by mouth 2 (two) times daily. 60 tablet 3    ARIPiprazole (ABILIFY) 10 MG Tab Take 1 tablet (10 mg total) by mouth once daily. 30 tablet 11    aspirin 81 MG Chew Chew and swallow 1 tablet (81 mg total) by mouth once daily. 360 tablet 0    atorvastatin (LIPITOR) 80 MG tablet Take 1 tablet (80 mg total) by mouth once daily. 90 tablet 3    benztropine (COGENTIN) 1 MG tablet Take 1 mg by mouth 2 (two) times daily.      famotidine (PEPCID) 20 MG tablet Take 1 tablet (20 mg total) by mouth 2 (two) times daily. 60 tablet 11    furosemide (LASIX) 40 MG tablet Take 1 tablet (40 mg total) by mouth once daily. 30 tablet 11    isosorbide mononitrate (IMDUR) 30 MG 24 hr tablet Take 1 tablet (30 mg total) by mouth once daily. 30 tablet 11    levETIRAcetam (KEPPRA) 500 MG Tab Take 1 tablet (500 mg  total) by mouth 2 (two) times daily. 60 tablet 11    metoprolol tartrate 75 mg Tab Take 75 mg by mouth 2 (two) times daily. 60 tablet 11    OXcarbazepine (TRILEPTAL) 300 MG Tab Take 300 mg by mouth 3 (three) times daily.      traZODone (DESYREL) 100 MG tablet Take 1 tablet (100 mg total) by mouth every evening. 30 tablet 11    [DISCONTINUED] metoprolol succinate (TOPROL-XL) 25 MG 24 hr tablet Take 25 mg by mouth once daily.      [DISCONTINUED] pravastatin (PRAVACHOL) 40 MG tablet Take 1 tablet (40 mg total) by mouth every evening. 90 tablet 3       Current Inpatient Medications:    Current Facility-Administered Medications:     acetaminophen tablet 650 mg, 650 mg, Oral, Q4H PRN, RAY Olguin    aluminum-magnesium hydroxide-simethicone 200-200-20 mg/5 mL suspension 30 mL, 30 mL, Oral, QID PRN, RAY Olguin    amiodarone tablet 200 mg, 200 mg, Oral, Daily, RAY Chilel, 200 mg at 11/26/22 0916    apixaban tablet 5 mg, 5 mg, Oral, BID, RAY Chilel, 5 mg at 11/26/22 0915    ARIPiprazole tablet 10 mg, 10 mg, Oral, Daily, WILLY OlguinP, 10 mg at 11/26/22 0916    aspirin chewable tablet 81 mg, 81 mg, Oral, Daily, RAY Chilel, 81 mg at 11/26/22 0915    atorvastatin tablet 80 mg, 80 mg, Oral, Daily, WILLY ChilelP, 80 mg at 11/26/22 0915    benztropine tablet 1 mg, 1 mg, Oral, BID, RAY Olguin, 1 mg at 11/26/22 0916    dextrose 10% bolus 125 mL, 12.5 g, Intravenous, PRN, WILLY OlguinP    dextrose 10% bolus 250 mL, 25 g, Intravenous, PRN, WILLY OlguinP    famotidine tablet 20 mg, 20 mg, Oral, BID, RAY Olguin, 20 mg at 11/26/22 0915    furosemide injection 40 mg, 40 mg, Intravenous, Q12H, Megha Jurado, WILLYP, 40 mg at 11/26/22 0033    glucagon (human recombinant) injection 1 mg, 1 mg, Intramuscular, PRN, RAY Olguin    glucose chewable tablet 16 g, 16 g, Oral, PRN, Lina REYNA  [place], FNP    glucose chewable tablet 24 g, 24 g, Oral, PRN, Lina Bahena, FNP    HYDROcodone-acetaminophen 5-325 mg per tablet 1 tablet, 1 tablet, Oral, Q6H PRN, RAY Olguin    isosorbide mononitrate 24 hr tablet 30 mg, 30 mg, Oral, Daily, WILYL ChilelP, 30 mg at 11/26/22 0915    levETIRAcetam tablet 500 mg, 500 mg, Oral, BID, Lina Bahena FNP, 500 mg at 11/26/22 0916    melatonin tablet 6 mg, 6 mg, Oral, Nightly PRN, RAY Olguin    metoprolol tartrate (LOPRESSOR) tablet 75 mg, 75 mg, Oral, BID, Megha Jurado, FNP, 75 mg at 11/26/22 0916    naloxone 0.4 mg/mL injection 0.02 mg, 0.02 mg, Intravenous, PRN, WILLY OlguinP    ondansetron injection 4 mg, 4 mg, Intravenous, Q6H PRN, WILLY OlguinP, 4 mg at 11/26/22 0643    OXcarbazepine tablet 300 mg, 300 mg, Oral, TID, Lina Bahena FNP, 300 mg at 11/26/22 0916    sodium chloride 0.9% flush 10 mL, 10 mL, Intravenous, Q12H PRN, Lina Bahena FNROBERTO    traZODone tablet 100 mg, 100 mg, Oral, QHS, WILLY OlguinP, 100 mg at 11/25/22 2107         VTE Risk Mitigation (From admission, onward)           Ordered     apixaban tablet 5 mg  2 times daily         11/25/22 0957                    Assessment:   NSTEMI - suspect type 2 in the setting of CHF exacerbation     - denies current CP    - trop 0.053, 0.076, 0.044  Acute on Chronic Combined Systolic/Diastolic HF    - Grade 2 DD    - EF 20% (echo 10.28.22)  ANA - resolved   Chronic anemia - stable  ICMO     - With Life Vest - battery replaced  VHD    -Moderate MR and Moderate TR  Hypertension  Pulmonary Hypertension  PAF/PAFL (Recent New Diagnosis)- Now Sinus Rhythm    - [id number]: 5 (LVSD/HTN/TE/NSTEMI)    - On Eliquis  CAD/CABG (April 2022)    - LIMA-LAD, SVG-OM1, SVG-D1, SVG-PDA  History of RUE DVT (5.10.22)    - A deep vein thrombosis was identified in the right axillary and brachial veins. A superficial  thrombosis was identified in the right basilic vein.  Schizoaffective Disorder  Hx of Hep C  Hx of cocaine Abuse   Nicotine Dependence (Tobacco use)      Plan:   Continue Lasix 40 mg IVP BID. Monitor Cr closely.   Resume home ASA, statin, & BB.  Cr improved. Will add ACE/ARB/ARNI soon.   Zoll exchanged lifevest battery yesterday.   Ensure accurate I&O's and daily weights.  Keep K > 4 and Mg > 2.   Labs in am: CBC, CMP, & Mg.    RAY Chilel  Cardiology  [facility] - Emergency Dept  11/26/2022 9:28 AM

## 2022-11-26 NOTE — PROGRESS NOTES
[facility] Hospital Medicine Progress Note        Chief Complaint: Inpatient Follow-up for     HPI: 57 y.o. male who  PMH includes bipolar disorder, chronic hepatitis-C, anxiety, depression, HTN, schizoaffective disorder, seizures, CVA, history of substance abuse, CHF, cardiomyopathy with EF 15%; presented to ED at  [facility] on 11/25/2022 with a primary complaint of SOB with associated BILLY and weakness. Pt reports he wear a life vest and he left the batteries at family members house. PT lives with his daughter and was visiting for the holiday and his batteries at his other daughters house who is currently not home. PT reports he has been having shortness of breath which has worsened over the past few days. PT reports his SOB worsens on exertion. He denies any shock of his life vest. Labs done significant for  HH 10.2/33.2, INE 1.45, PT 17.5, BUN/Creat 25.9/1.42, glucose 180, BNP 2532.7, trop  0.059, 0.076, 0.044 respectively; indices unremarkable; UDS negative. Flu and COVID negative.   Patient was given diuretics in the ED. cardiology Services were consulted per the ED and will see in consultation.  Patient is admitted to hospital medicine services for further management.    Interval Hx:   Patient seen and examined saturating well on room air reports shortness of breath is better    Objective/physical exam:  General: In no acute distress, afebrile  Chest: Clear to auscultation bilaterally  Heart: RRR, +S1, S2, no appreciable murmur  Abdomen: Soft, nontender, BS +  MSK: Warm, no lower extremity edema, no clubbing or cyanosis  Neurologic: Alert and oriented x4,   VITAL SIGNS: 24 HRS MIN & MAX LAST   Temp  Min: 97.4 °F (36.3 °C)  Max: 97.6 °F (36.4 °C) 97.5 °F (36.4 °C)   BP  Min: 114/66  Max: 134/89 114/66   Pulse  Min: 52  Max: 67  (!) 52     Resp  Min: 16  Max: 20 20   SpO2  Min: 91 %  Max: 100 % 97 %       Recent Labs   Lab 11/25/22  0034 11/26/22  0600   WBC 6.1 7.3   RBC 4.54* 5.17    HGB 10.2* 11.5*   HCT 33.2* 38.2*   MCV 73.1* 73.9*   MCH 22.5* 22.2*   MCHC 30.7* 30.1*   RDW 18.4* 18.8*    271   MPV 9.2 9.6       Recent Labs   Lab 11/25/22  0034 11/26/22  0600    139   K 4.0 4.3   CO2 26 26   BUN 25.9* 27.9*   CREATININE 1.42* 1.13   CALCIUM 8.6 9.2   MG  --  2.00   ALBUMIN 3.1* 3.5   ALKPHOS 118 113   ALT 20 22   AST 32 36*   BILITOT 0.5 0.8          Microbiology Results (last 7 days)       ** No results found for the last 168 hours. **             See below for Radiology    Scheduled Med:   amiodarone  200 mg Oral Daily    apixaban  5 mg Oral BID    ARIPiprazole  10 mg Oral Daily    aspirin  81 mg Oral Daily    atorvastatin  80 mg Oral Daily    benztropine  1 mg Oral BID    famotidine  20 mg Oral BID    furosemide (LASIX) injection  40 mg Intravenous Q12H    isosorbide mononitrate  30 mg Oral Daily    levETIRAcetam  500 mg Oral BID    metoprolol tartrate  75 mg Oral BID    OXcarbazepine  300 mg Oral TID    traZODone  100 mg Oral QHS        Continuous Infusions:       PRN Meds:  acetaminophen, aluminum-magnesium hydroxide-simethicone, dextrose 10%, dextrose 10%, glucagon (human recombinant), glucose, glucose, HYDROcodone-acetaminophen, melatonin, naloxone, ondansetron, sodium chloride 0.9%       Assessment/Plan:  Acute on chronic combined systolic and diastolic heart failure with EF of 20% has a life vest   Acute kidney injury   Non-STEMI most likely type 2   Ischemic cardiomyopathy has a life vest   Valvular heart disease with moderate MR and moderate TR   Essential hypertension  Paroxysmal AFib   History of coronary artery disease status post CABG  Schizoaffective disorder   History of hep C   History of cocaine abuse  Tobacco use      Continue with Lasix b.i.d.   Strict input and output daily weights   Will order 1 more set of troponins   Creatinine is back to normal   Continue with other home medications that includes amiodarone, metoprolol, Eliquis, aripiprazole, aspirin,  statin, isosorbide, Keppra, oxcarbazepine and trazodone    Prophylaxis: Eliquis        VTE prophylaxis:     Patient condition:  Stable/Fair/Guarded/ Serious/ Critical    Anticipated discharge and Disposition:         All diagnosis and differential diagnosis have been reviewed; assessment and plan has been documented; I have personally reviewed the labs and test results that are presently available; I have reviewed the patients medication list; I have reviewed the consulting providers response and recommendations. I have reviewed or attempted to review medical records based upon their availability    All of the patient's questions have been  addressed and answered. Patient's is agreeable to the above stated plan. I will continue to monitor closely and make adjustments to medical management as needed.  _____________________________________________________________________    Nutrition Status:    Radiology:  X-Ray Chest AP Portable  Narrative: EXAMINATION:  XR CHEST AP PORTABLE    CLINICAL HISTORY:  Shortness of breath    TECHNIQUE:  Single frontal view of the chest was performed.    COMPARISON:  None    FINDINGS:  LINES AND TUBES: External defibrillator vest in place.    MEDIASTINUM AND JACKSON: Cardiac silhouette is enlarged.    LUNGS: No lobar consolidation. No edema.    PLEURA:No pleural effusion. No pneumothorax.    BONES: No acute osseous abnormality.  Impression: Enlarged cardiac silhouette without overt edema.    Electronically signed by: Sharon Cantu  Date:    11/25/2022  Time:    12:29      Aziza Hernandez MD   11/26/2022

## 2022-11-27 LAB
ANION GAP SERPL CALC-SCNC: 12 MEQ/L
BUN SERPL-MCNC: 41.3 MG/DL (ref 8.4–25.7)
CALCIUM SERPL-MCNC: 9.1 MG/DL (ref 8.4–10.2)
CHLORIDE SERPL-SCNC: 100 MMOL/L (ref 98–107)
CO2 SERPL-SCNC: 24 MMOL/L (ref 22–29)
CREAT SERPL-MCNC: 1.74 MG/DL (ref 0.73–1.18)
CREAT/UREA NIT SERPL: 24
GFR SERPLBLD CREATININE-BSD FMLA CKD-EPI: 45 MLS/MIN/1.73/M2
GLUCOSE SERPL-MCNC: 99 MG/DL (ref 74–100)
POTASSIUM SERPL-SCNC: 4.5 MMOL/L (ref 3.5–5.1)
SODIUM SERPL-SCNC: 136 MMOL/L (ref 136–145)

## 2022-11-27 PROCEDURE — 36415 COLL VENOUS BLD VENIPUNCTURE: CPT | Performed by: INTERNAL MEDICINE

## 2022-11-27 PROCEDURE — 25000003 PHARM REV CODE 250: Performed by: NURSE PRACTITIONER

## 2022-11-27 PROCEDURE — 21400001 HC TELEMETRY ROOM

## 2022-11-27 PROCEDURE — 25000003 PHARM REV CODE 250

## 2022-11-27 PROCEDURE — 80048 BASIC METABOLIC PNL TOTAL CA: CPT | Performed by: INTERNAL MEDICINE

## 2022-11-27 PROCEDURE — 25000003 PHARM REV CODE 250: Performed by: INTERNAL MEDICINE

## 2022-11-27 PROCEDURE — 63600175 PHARM REV CODE 636 W HCPCS

## 2022-11-27 PROCEDURE — G0378 HOSPITAL OBSERVATION PER HR: HCPCS

## 2022-11-27 RX ORDER — FUROSEMIDE 40 MG/1
40 TABLET ORAL DAILY
Status: DISCONTINUED | OUTPATIENT
Start: 2022-11-27 | End: 2022-11-29 | Stop reason: HOSPADM

## 2022-11-27 RX ADMIN — METOPROLOL TARTRATE 75 MG: 25 TABLET, FILM COATED ORAL at 08:11

## 2022-11-27 RX ADMIN — OXCARBAZEPINE 300 MG: 300 TABLET, FILM COATED ORAL at 08:11

## 2022-11-27 RX ADMIN — AMIODARONE HYDROCHLORIDE 200 MG: 200 TABLET ORAL at 08:11

## 2022-11-27 RX ADMIN — FAMOTIDINE 20 MG: 20 TABLET, FILM COATED ORAL at 08:11

## 2022-11-27 RX ADMIN — SACUBITRIL AND VALSARTAN 1 TABLET: 24; 26 TABLET, FILM COATED ORAL at 08:11

## 2022-11-27 RX ADMIN — LEVETIRACETAM 500 MG: 500 TABLET, FILM COATED ORAL at 08:11

## 2022-11-27 RX ADMIN — ATORVASTATIN CALCIUM 80 MG: 40 TABLET, FILM COATED ORAL at 08:11

## 2022-11-27 RX ADMIN — ARIPIPRAZOLE 10 MG: 5 TABLET ORAL at 08:11

## 2022-11-27 RX ADMIN — SACUBITRIL AND VALSARTAN 1 TABLET: 24; 26 TABLET, FILM COATED ORAL at 12:11

## 2022-11-27 RX ADMIN — APIXABAN 5 MG: 5 TABLET, FILM COATED ORAL at 08:11

## 2022-11-27 RX ADMIN — ASPIRIN 81 MG CHEWABLE TABLET 81 MG: 81 TABLET CHEWABLE at 08:11

## 2022-11-27 RX ADMIN — TRAZODONE HYDROCHLORIDE 100 MG: 100 TABLET ORAL at 08:11

## 2022-11-27 RX ADMIN — OXCARBAZEPINE 300 MG: 300 TABLET, FILM COATED ORAL at 03:11

## 2022-11-27 RX ADMIN — BENZTROPINE MESYLATE 1 MG: 1 TABLET ORAL at 08:11

## 2022-11-27 RX ADMIN — FUROSEMIDE 40 MG: 10 INJECTION, SOLUTION INTRAMUSCULAR; INTRAVENOUS at 01:11

## 2022-11-27 RX ADMIN — ISOSORBIDE MONONITRATE 30 MG: 30 TABLET, EXTENDED RELEASE ORAL at 08:11

## 2022-11-27 RX ADMIN — FUROSEMIDE 40 MG: 40 TABLET ORAL at 01:11

## 2022-11-27 NOTE — PLAN OF CARE
Problem: Adult Inpatient Plan of Care  Goal: Plan of Care Review  Outcome: Ongoing, Progressing  Flowsheets (Taken 11/26/2022 1800)  Plan of Care Reviewed With: patient  Goal: Patient-Specific Goal (Individualized)  Outcome: Ongoing, Progressing  Goal: Absence of Hospital-Acquired Illness or Injury  Outcome: Ongoing, Progressing  Intervention: Prevent Skin Injury  Flowsheets (Taken 11/26/2022 1800)  Skin Protection:   adhesive use limited   tubing/devices free from skin contact   transparent dressing maintained  Intervention: Prevent and Manage VTE (Venous Thromboembolism) Risk  Flowsheets (Taken 11/26/2022 1800)  VTE Prevention/Management:   ambulation promoted   bleeding risk assessed   bleeding precations maintained  Range of Motion: active ROM (range of motion) encouraged  Goal: Optimal Comfort and Wellbeing  Outcome: Ongoing, Progressing  Intervention: Monitor Pain and Promote Comfort  Flowsheets (Taken 11/26/2022 1800)  Pain Management Interventions:   relaxation techniques promoted   quiet environment facilitated   pillow support provided   position adjusted  Goal: Readiness for Transition of Care  Outcome: Ongoing, Progressing     Problem: Fall Injury Risk  Goal: Absence of Fall and Fall-Related Injury  Outcome: Ongoing, Progressing  Intervention: Identify and Manage Contributors  Flowsheets (Taken 11/26/2022 1800)  Self-Care Promotion:   independence encouraged   safe use of adaptive equipment encouraged   BADL personal objects within reach  Medication Review/Management: medications reviewed

## 2022-11-27 NOTE — PROGRESS NOTES
[facility] Hospital Medicine Progress Note        Chief Complaint: Inpatient Follow-up for     HPI: 57 y.o. male who  PMH includes bipolar disorder, chronic hepatitis-C, anxiety, depression, HTN, schizoaffective disorder, seizures, CVA, history of substance abuse, CHF, cardiomyopathy with EF 15%; presented to ED at  [facility] on 11/25/2022 with a primary complaint of SOB with associated BILLY and weakness. Pt reports he wear a life vest and he left the batteries at family members house. PT lives with his daughter and was visiting for the holiday and his batteries at his other daughters house who is currently not home. PT reports he has been having shortness of breath which has worsened over the past few days. PT reports his SOB worsens on exertion. He denies any shock of his life vest. Labs done significant for  HH 10.2/33.2, INE 1.45, PT 17.5, BUN/Creat 25.9/1.42, glucose 180, BNP 2532.7, trop  0.059, 0.076, 0.044 respectively; indices unremarkable; UDS negative. Flu and COVID negative.   Patient was given diuretics in the ED. cardiology Services were consulted per the ED and will see in consultation.  Patient is admitted to hospital medicine services for further management.    Interval Hx:   Patient seen and examined saturating well on room air reports shortness of breath is better    Objective/physical exam:  General: In no acute distress, afebrile  Chest: Clear to auscultation bilaterally  Heart: RRR, +S1, S2, no appreciable murmur  Abdomen: Soft, nontender, BS +  MSK: Warm, no lower extremity edema, no clubbing or cyanosis  Neurologic: Alert and oriented x4,   VITAL SIGNS: 24 HRS MIN & MAX LAST   Temp  Min: 97.6 °F (36.4 °C)  Max: 98.3 °F (36.8 °C) 98 °F (36.7 °C)   BP  Min: 93/69  Max: 126/100 112/84   Pulse  Min: 52  Max: 65  (!) 52     Resp  Min: 18  Max: 22 18   SpO2  Min: 89 %  Max: 99 % (!) 94 %       Recent Labs   Lab 11/25/22  0034 11/26/22  0600   WBC 6.1 7.3   RBC 4.54* 5.17    HGB 10.2* 11.5*   HCT 33.2* 38.2*   MCV 73.1* 73.9*   MCH 22.5* 22.2*   MCHC 30.7* 30.1*   RDW 18.4* 18.8*    271   MPV 9.2 9.6       Recent Labs   Lab 11/25/22  0034 11/26/22  0600    139   K 4.0 4.3   CO2 26 26   BUN 25.9* 27.9*   CREATININE 1.42* 1.13   CALCIUM 8.6 9.2   MG  --  2.00   ALBUMIN 3.1* 3.5   ALKPHOS 118 113   ALT 20 22   AST 32 36*   BILITOT 0.5 0.8          Microbiology Results (last 7 days)       ** No results found for the last 168 hours. **             See below for Radiology    Scheduled Med:   amiodarone  200 mg Oral Daily    apixaban  5 mg Oral BID    ARIPiprazole  10 mg Oral Daily    aspirin  81 mg Oral Daily    atorvastatin  80 mg Oral Daily    benztropine  1 mg Oral BID    famotidine  20 mg Oral BID    furosemide (LASIX) injection  40 mg Intravenous Q12H    isosorbide mononitrate  30 mg Oral Daily    levETIRAcetam  500 mg Oral BID    metoprolol tartrate  75 mg Oral BID    OXcarbazepine  300 mg Oral TID    sacubitriL-valsartan  1 tablet Oral BID    traZODone  100 mg Oral QHS        Continuous Infusions:       PRN Meds:  acetaminophen, aluminum-magnesium hydroxide-simethicone, dextrose 10%, dextrose 10%, glucagon (human recombinant), glucose, glucose, HYDROcodone-acetaminophen, melatonin, naloxone, ondansetron, sodium chloride 0.9%       Assessment/Plan:  Acute on chronic combined systolic and diastolic heart failure with EF of 20% has a life vest   Acute kidney injury   Non-STEMI most likely type 2   Ischemic cardiomyopathy has a life vest   Valvular heart disease with moderate MR and moderate TR   Essential hypertension  Paroxysmal AFib   History of coronary artery disease status post CABG  Schizoaffective disorder   History of hep C   History of cocaine abuse  Tobacco use    Will switch to p.o. Lasix today   Continue with strict input and output daily weights   If patient is symptomatically stays stable then we will discharge him home in a.m.  Continue with aspirin statin  and beta blocker  Skylar exchange LifeVest battery   Patient has been started on Entresto will need to check if insurance will pay will check with case management in a.m.  Continue with other home medications that includes amiodarone, metoprolol, Eliquis, aripiprazole, aspirin, statin, isosorbide, Keppra, oxcarbazepine and trazodone    Prophylaxis: Eliquis        VTE prophylaxis:     Patient condition:  Stable/Fair/Guarded/ Serious/ Critical    Anticipated discharge and Disposition:         All diagnosis and differential diagnosis have been reviewed; assessment and plan has been documented; I have personally reviewed the labs and test results that are presently available; I have reviewed the patients medication list; I have reviewed the consulting providers response and recommendations. I have reviewed or attempted to review medical records based upon their availability    All of the patient's questions have been  addressed and answered. Patient's is agreeable to the above stated plan. I will continue to monitor closely and make adjustments to medical management as needed.  _____________________________________________________________________    Nutrition Status:    Radiology:  X-Ray Chest AP Portable  Narrative: EXAMINATION:  XR CHEST AP PORTABLE    CLINICAL HISTORY:  Shortness of breath    TECHNIQUE:  Single frontal view of the chest was performed.    COMPARISON:  None    FINDINGS:  LINES AND TUBES: External defibrillator vest in place.    MEDIASTINUM AND JACKSON: Cardiac silhouette is enlarged.    LUNGS: No lobar consolidation. No edema.    PLEURA:No pleural effusion. No pneumothorax.    BONES: No acute osseous abnormality.  Impression: Enlarged cardiac silhouette without overt edema.    Electronically signed by: Sharon Cnatu  Date:    11/25/2022  Time:    12:29      Aziza Hernandez MD   11/27/2022

## 2022-11-27 NOTE — PROGRESS NOTES
[facility] - Emergency Dept  Cardiology  Progress Note    Patient Name: Kendall Duff  MRN: [id number]  Admission Date: 11/25/2022  Hospital Length of Stay: 0 days  Code Status: Full Code   Attending Provider: Belinda Berumen MD   Consulting Provider: RAY Chilel  Primary Care Physician: Primary Doctor No  Principal Problem:CHF exacerbation    Patient information was obtained from patient, past medical records, and ER records.     Subjective:     Chief Complaint:  Reason for consult: CHF     HPI:   Mr. Duff is a 57 year old male who is known to [facility], Dr. Wright. He presents to the ER via EMS with complaints of SOB x 1 day. He denies CP, N/V, fever, or palpitations. He also reports that his Lifevest batteries are dead. Significant labs include BNP 2532, trop 0.053, B/Cr 25.9/1.42. An EKG was obtained and demonstrated and showed normal sinus rhythm w/ RBBB unchanged from previous EKG. Of note, he was discharged on November 11 after his lifevest shocked him. [facility] has been consulted to further manage his CHF exacerbation.     11.26.22: NAD. Denies CP, SOB, or palps. Inaccurate I&O's. Voided 1980 mL/24 hrs.  11.27.22: NAD. Denies CP, SOB, or palps. Inaccurate I&O's.     PMH: CAD, ICMO, HTN, schizoaffective disorder, hep C, DVT, systolic & diastolic CHF, Lifevest  PSH: CABG, LHC  Family History: Mother - CA; Father - liver disease   Social History: Current every day smoker. Former Cocaine Use. Denies alcohol use.      Previous Cardiac Diagnostics:   Echocardiogram (10.28.22):  The left ventricle is moderately enlarged with mild eccentric hypertrophy and severely decreased systolic function.  The estimated ejection fraction is 20%.  There is severe left ventricular global hypokinesis.  Grade II left ventricular diastolic dysfunction.  Normal right ventricular size with moderately reduced right ventricular systolic function.  Moderate mitral regurgitation.  Moderate tricuspid regurgitation.  There is mild  pulmonary hypertension.  The estimated PA systolic pressure is 44 mmHg.  Elevated central venous pressure (15 mmHg).  Severe left atrial enlargement.     CABG x 4 (4/22):  LIMA-LAD, SVG-OM1, SVG-D1, SVG-PDA     Echocardiogram (6.16.22):  The left ventricle is normal in size w/ concentric hypertrophy and severely decreased systolic function.  The estimated EF is 25-30%.  There is severe left ventricular global hypokinesis.  Grade II left ventricular diastolic dysfunction.  Normal right ventricular size w/ mildly reduced right ventricular systolic function.  The estimated PA systolic pressure is 52 mmHg.  There is moderate pulmonary HTN.  Elevated CVP (15 mmHg).     RUE NIVA (5.10.22):  A deep vein thrombosis was identified in the right axillary and brachial veins. A superficial thrombosis was identified in the right basilic vein.     LHC (3.21.22):  100% LAD to 30-40% residual stenosis post PTCA.  Large diagonal system w/ severe stenosis.  CIRC - patent stent, OM1 patent, mild CIRC occluded  RCA - stents ISR 40-50%, distal 70-80%  EDP 12 EF 35-40% anterior HK     Review of patient's allergies indicates:   Allergen Reactions    Depakote [divalproex]     Divalproex sodium Other (See Comments)    Lithium     Lithium analogues     Quetiapine Other (See Comments)     Pt states had seizures     Review of Systems   Respiratory:  Negative for shortness of breath.    Cardiovascular:  Negative for chest pain and palpitations.     Objective:     Vital Signs (Most Recent):  Temp: 97.6 °F (36.4 °C) (11/27/22 0731)  Pulse: 60 (11/27/22 0850)  Resp: (!) 22 (11/27/22 0731)  BP: 125/86 (11/27/22 0850)  SpO2: 97 % (11/27/22 0847) Vital Signs (24h Range):  Temp:  [97.5 °F (36.4 °C)-98.3 °F (36.8 °C)] 97.6 °F (36.4 °C)  Pulse:  [52-65] 60  Resp:  [18-22] 22  SpO2:  [89 %-99 %] 97 %  BP: ()/() 125/86     Weight: 74.3 kg (163 lb 12.8 oz)  Body mass index is 25.66 kg/m².    SpO2: 97 %  O2 Device (Oxygen Therapy): room  air      Intake/Output Summary (Last 24 hours) at 11/27/2022 1014  Last data filed at 11/26/2022 1759  Gross per 24 hour   Intake 960 ml   Output --   Net 960 ml         Lines/Drains/Airways       Peripheral Intravenous Line  Duration                  Peripheral IV - Single Lumen 11/26/22 1354 20 G Anterior;Distal;Left Upper Arm <1 day                    Significant Labs:  Recent Results (from the past 72 hour(s))   Comprehensive metabolic panel    Collection Time: 11/25/22 12:34 AM   Result Value Ref Range    Sodium Level 142 136 - 145 mmol/L    Potassium Level 4.0 3.5 - 5.1 mmol/L    Chloride 106 98 - 107 mmol/L    Carbon Dioxide 26 22 - 29 mmol/L    Glucose Level 180 (H) 74 - 100 mg/dL    Blood Urea Nitrogen 25.9 (H) 8.4 - 25.7 mg/dL    Creatinine 1.42 (H) 0.73 - 1.18 mg/dL    Calcium Level Total 8.6 8.4 - 10.2 mg/dL    Protein Total 6.2 (L) 6.4 - 8.3 gm/dL    Albumin Level 3.1 (L) 3.5 - 5.0 gm/dL    Globulin 3.1 2.4 - 3.5 gm/dL    Albumin/Globulin Ratio 1.0 (L) 1.1 - 2.0 ratio    Bilirubin Total 0.5 <=1.5 mg/dL    Alkaline Phosphatase 118 40 - 150 unit/L    Alanine Aminotransferase 20 0 - 55 unit/L    Aspartate Aminotransferase 32 5 - 34 unit/L    eGFR 57 mls/min/1.73/m2   Brain natriuretic peptide    Collection Time: 11/25/22 12:34 AM   Result Value Ref Range    Natriuretic Peptide 2,532.7 (H) <=100.0 pg/mL   COVID/FLU A&B PCR    Collection Time: 11/25/22 12:34 AM   Result Value Ref Range    Influenza A PCR Not Detected Not Detected    Influenza B PCR Not Detected Not Detected    SARS-CoV-2 PCR Not Detected Not Detected   Troponin I    Collection Time: 11/25/22 12:34 AM   Result Value Ref Range    Troponin-I 0.053 (H) 0.000 - 0.045 ng/mL   APTT    Collection Time: 11/25/22 12:34 AM   Result Value Ref Range    PTT 32.5 23.2 - 33.7 seconds   Protime-INR    Collection Time: 11/25/22 12:34 AM   Result Value Ref Range    PT 17.5 (H) 12.5 - 14.5 seconds    INR 1.45 (H) 0.00 - 1.30   CBC with Differential     Collection Time: 11/25/22 12:34 AM   Result Value Ref Range    WBC 6.1 4.5 - 11.5 x10(3)/mcL    RBC 4.54 (L) 4.70 - 6.10 x10(6)/mcL    Hgb 10.2 (L) 14.0 - 18.0 gm/dL    Hct 33.2 (L) 42.0 - 52.0 %    MCV 73.1 (L) 80.0 - 94.0 fL    MCH 22.5 (L) 27.0 - 31.0 pg    MCHC 30.7 (L) 33.0 - 36.0 mg/dL    RDW 18.4 (H) 11.5 - 17.0 %    Platelet 269 130 - 400 x10(3)/mcL    MPV 9.2 7.4 - 10.4 fL    Neut % 60.6 %    Lymph % 22.3 %    Mono % 13.5 %    Eos % 2.6 %    Basophil % 0.8 %    Lymph # 1.36 0.6 - 4.6 x10(3)/mcL    Neut # 3.7 2.1 - 9.2 x10(3)/mcL    Mono # 0.82 0.1 - 1.3 x10(3)/mcL    Eos # 0.16 0 - 0.9 x10(3)/mcL    Baso # 0.05 0 - 0.2 x10(3)/mcL    IG# 0.01 0 - 0.04 x10(3)/mcL    IG% 0.2 %    NRBC% 0.0 %   Drug Screen, Urine    Collection Time: 11/25/22  3:23 AM   Result Value Ref Range    Amphetamines, Urine Negative Negative    Barbituates, Urine Negative Negative    Benzodiazepine, Urine Negative Negative    Cannabinoids, Urine Negative Negative    Cocaine, Urine Negative Negative    Fentanyl, Urine Negative Negative    MDMA, Urine Negative Negative    Opiates, Urine Negative Negative    Phencyclidine, Urine Negative Negative    pH, Urine 7.0 3.0 - 11.0    Specific Gravity, Urine Auto 1.015 1.001 - 1.035   Troponin I    Collection Time: 11/25/22  6:31 AM   Result Value Ref Range    Troponin-I 0.076 (H) 0.000 - 0.045 ng/mL   Troponin I    Collection Time: 11/25/22 12:04 PM   Result Value Ref Range    Troponin-I 0.044 0.000 - 0.045 ng/mL   Comprehensive Metabolic Panel (CMP)    Collection Time: 11/26/22  6:00 AM   Result Value Ref Range    Sodium Level 139 136 - 145 mmol/L    Potassium Level 4.3 3.5 - 5.1 mmol/L    Chloride 101 98 - 107 mmol/L    Carbon Dioxide 26 22 - 29 mmol/L    Glucose Level 154 (H) 74 - 100 mg/dL    Blood Urea Nitrogen 27.9 (H) 8.4 - 25.7 mg/dL    Creatinine 1.13 0.73 - 1.18 mg/dL    Calcium Level Total 9.2 8.4 - 10.2 mg/dL    Protein Total 6.8 6.4 - 8.3 gm/dL    Albumin Level 3.5 3.5 - 5.0 gm/dL     Globulin 3.3 2.4 - 3.5 gm/dL    Albumin/Globulin Ratio 1.1 1.1 - 2.0 ratio    Bilirubin Total 0.8 <=1.5 mg/dL    Alkaline Phosphatase 113 40 - 150 unit/L    Alanine Aminotransferase 22 0 - 55 unit/L    Aspartate Aminotransferase 36 (H) 5 - 34 unit/L    eGFR >60 mls/min/1.73/m2   Brain natriuretic peptide    Collection Time: 11/26/22  6:00 AM   Result Value Ref Range    Natriuretic Peptide 2,514.1 (H) <=100.0 pg/mL   Magnesium    Collection Time: 11/26/22  6:00 AM   Result Value Ref Range    Magnesium Level 2.00 1.60 - 2.60 mg/dL   CBC with Differential    Collection Time: 11/26/22  6:00 AM   Result Value Ref Range    WBC 7.3 4.5 - 11.5 x10(3)/mcL    RBC 5.17 4.70 - 6.10 x10(6)/mcL    Hgb 11.5 (L) 14.0 - 18.0 gm/dL    Hct 38.2 (L) 42.0 - 52.0 %    MCV 73.9 (L) 80.0 - 94.0 fL    MCH 22.2 (L) 27.0 - 31.0 pg    MCHC 30.1 (L) 33.0 - 36.0 mg/dL    RDW 18.8 (H) 11.5 - 17.0 %    Platelet 271 130 - 400 x10(3)/mcL    MPV 9.6 7.4 - 10.4 fL    Neut % 71.3 %    Lymph % 18.1 %    Mono % 9.1 %    Eos % 0.4 %    Basophil % 0.8 %    Lymph # 1.33 0.6 - 4.6 x10(3)/mcL    Neut # 5.2 2.1 - 9.2 x10(3)/mcL    Mono # 0.67 0.1 - 1.3 x10(3)/mcL    Eos # 0.03 0 - 0.9 x10(3)/mcL    Baso # 0.06 0 - 0.2 x10(3)/mcL    IG# 0.02 0 - 0.04 x10(3)/mcL    IG% 0.3 %    NRBC% 0.0 %       Significant Imaging:  Imaging Results              X-Ray Chest AP Portable (Final result)  Result time 11/25/22 12:29:47      Final result by Sharon Cantu MD (11/25/22 12:29:47)                   Impression:      Enlarged cardiac silhouette without overt edema.      Electronically signed by: Sharon Cantu  Date:    11/25/2022  Time:    12:29               Narrative:    EXAMINATION:  XR CHEST AP PORTABLE    CLINICAL HISTORY:  Shortness of breath    TECHNIQUE:  Single frontal view of the chest was performed.    COMPARISON:  None    FINDINGS:  LINES AND TUBES: External defibrillator vest in place.    MEDIASTINUM AND JACKSON: Cardiac silhouette is enlarged.    LUNGS:  No lobar consolidation. No edema.    PLEURA:No pleural effusion. No pneumothorax.    BONES: No acute osseous abnormality.                                      EKG:       Telemetry:  SR 70's    Physical Exam  HENT:      Head: Normocephalic.      Nose: Nose normal.      Mouth/Throat:      Mouth: Mucous membranes are moist.   Eyes:      Extraocular Movements: Extraocular movements intact.   Cardiovascular:      Rate and Rhythm: Normal rate and regular rhythm.      Pulses: Normal pulses.      Heart sounds: Normal heart sounds.   Pulmonary:      Effort: Pulmonary effort is normal.      Breath sounds: Normal breath sounds.   Abdominal:      Palpations: Abdomen is soft.   Musculoskeletal:         General: Normal range of motion.   Skin:     General: Skin is warm and dry.   Neurological:      Mental Status: He is alert and oriented to person, place, and time.   Psychiatric:         Behavior: Behavior normal.       Home Medications:   No current facility-administered medications on file prior to encounter.     Current Outpatient Medications on File Prior to Encounter   Medication Sig Dispense Refill    amiodarone (PACERONE) 200 MG Tab Take 1 tablet (200 mg total) by mouth once daily. 30 tablet 0    apixaban (ELIQUIS) 5 mg Tab Take 1 tablet (5 mg total) by mouth 2 (two) times daily. 60 tablet 3    ARIPiprazole (ABILIFY) 10 MG Tab Take 1 tablet (10 mg total) by mouth once daily. 30 tablet 11    aspirin 81 MG Chew Chew and swallow 1 tablet (81 mg total) by mouth once daily. 360 tablet 0    atorvastatin (LIPITOR) 80 MG tablet Take 1 tablet (80 mg total) by mouth once daily. 90 tablet 3    benztropine (COGENTIN) 1 MG tablet Take 1 mg by mouth 2 (two) times daily.      famotidine (PEPCID) 20 MG tablet Take 1 tablet (20 mg total) by mouth 2 (two) times daily. 60 tablet 11    furosemide (LASIX) 40 MG tablet Take 1 tablet (40 mg total) by mouth once daily. 30 tablet 11    isosorbide mononitrate (IMDUR) 30 MG 24 hr tablet Take 1  tablet (30 mg total) by mouth once daily. 30 tablet 11    levETIRAcetam (KEPPRA) 500 MG Tab Take 1 tablet (500 mg total) by mouth 2 (two) times daily. 60 tablet 11    metoprolol tartrate 75 mg Tab Take 75 mg by mouth 2 (two) times daily. 60 tablet 11    OXcarbazepine (TRILEPTAL) 300 MG Tab Take 300 mg by mouth 3 (three) times daily.      traZODone (DESYREL) 100 MG tablet Take 1 tablet (100 mg total) by mouth every evening. 30 tablet 11    [DISCONTINUED] metoprolol succinate (TOPROL-XL) 25 MG 24 hr tablet Take 25 mg by mouth once daily.      [DISCONTINUED] pravastatin (PRAVACHOL) 40 MG tablet Take 1 tablet (40 mg total) by mouth every evening. 90 tablet 3       Current Inpatient Medications:    Current Facility-Administered Medications:     acetaminophen tablet 650 mg, 650 mg, Oral, Q4H PRN, RAY Olguin    aluminum-magnesium hydroxide-simethicone 200-200-20 mg/5 mL suspension 30 mL, 30 mL, Oral, QID PRN, RAY Olguin    amiodarone tablet 200 mg, 200 mg, Oral, Daily, RAY Chilel, 200 mg at 11/27/22 0850    apixaban tablet 5 mg, 5 mg, Oral, BID, RAY Chilel, 5 mg at 11/27/22 0851    ARIPiprazole tablet 10 mg, 10 mg, Oral, Daily, RAY Olguin, 10 mg at 11/27/22 0850    aspirin chewable tablet 81 mg, 81 mg, Oral, Daily, RAY Chilel, 81 mg at 11/27/22 0850    atorvastatin tablet 80 mg, 80 mg, Oral, Daily, RAY Chilel, 80 mg at 11/27/22 0850    benztropine tablet 1 mg, 1 mg, Oral, BID, RAY Olguin, 1 mg at 11/27/22 0850    dextrose 10% bolus 125 mL, 12.5 g, Intravenous, PRN, WILLY OlguinP    dextrose 10% bolus 250 mL, 25 g, Intravenous, PRN, RAY Olguin    famotidine tablet 20 mg, 20 mg, Oral, BID, Lina REYNA Josef, RAY, 20 mg at 11/27/22 0850    furosemide injection 40 mg, 40 mg, Intravenous, Q12H, Megha Jurado, WILLYP, 40 mg at 11/27/22 0113    glucagon (human recombinant) injection 1  mg, 1 mg, Intramuscular, PRN, WILLY OlguinP    glucose chewable tablet 16 g, 16 g, Oral, PRN, Lina Bahena FNP    glucose chewable tablet 24 g, 24 g, Oral, PRN, Lina Bahena, FNP    HYDROcodone-acetaminophen 5-325 mg per tablet 1 tablet, 1 tablet, Oral, Q6H PRN, Lina Bahena FNP    isosorbide mononitrate 24 hr tablet 30 mg, 30 mg, Oral, Daily, WILLY ChilelP, 30 mg at 11/27/22 0850    levETIRAcetam tablet 500 mg, 500 mg, Oral, BID, WILLY OlguinP, 500 mg at 11/27/22 0850    melatonin tablet 6 mg, 6 mg, Oral, Nightly PRN, Lina Bahena FNP    metoprolol tartrate (LOPRESSOR) tablet 75 mg, 75 mg, Oral, BID, RAY Chilel, 75 mg at 11/27/22 0850    naloxone 0.4 mg/mL injection 0.02 mg, 0.02 mg, Intravenous, PRN, Lina Bahena FNP    ondansetron injection 4 mg, 4 mg, Intravenous, Q6H PRN, WILLY OlguinP, 4 mg at 11/26/22 0643    OXcarbazepine tablet 300 mg, 300 mg, Oral, TID, WILLY OlguinP, 300 mg at 11/27/22 0850    sacubitriL-valsartan 24-26 mg per tablet 1 tablet, 1 tablet, Oral, BID, WILLY ChilelP    sodium chloride 0.9% flush 10 mL, 10 mL, Intravenous, Q12H PRN, WILLY OlguinP    traZODone tablet 100 mg, 100 mg, Oral, QHS, WILLY OlguinP, 100 mg at 11/26/22 2118         VTE Risk Mitigation (From admission, onward)           Ordered     apixaban tablet 5 mg  2 times daily         11/25/22 0957                    Assessment:   NSTEMI - suspect type 2 in the setting of CHF exacerbation     - denies current CP    - trop 0.053, 0.076, 0.044  Acute on Chronic Combined Systolic/Diastolic HF    - Grade 2 DD    - EF 20% (echo 10.28.22)  ANA - resolved   Chronic anemia - stable  ICMO     - With Life Vest - battery replaced  VHD    -Moderate MR and Moderate TR  Hypertension  Pulmonary Hypertension  PAF/PAFL (Recent New Diagnosis)- Now Sinus Rhythm    - [id number]: 5  (LVSD/HTN/TE/NSTEMI)    - On Eliquis  CAD/CABG (April 2022)    - LIMA-LAD, SVG-OM1, SVG-D1, SVG-PDA  History of RUE DVT (5.10.22)    - A deep vein thrombosis was identified in the right axillary and brachial veins. A superficial thrombosis was identified in the right basilic vein.  Schizoaffective Disorder  Hx of Hep C  Hx of cocaine Abuse   Nicotine Dependence (Tobacco use)      Plan:   Recommend transitioning to lasix 40 mg oral daily.  Start low dose entresto 24/26 mg BID.   Continue home ASA, statin, & BB.  Zoll exchanged lifevest battery.  Ensure accurate I&O's and daily weights.  Keep K > 4 and Mg > 2.   No further recommendations from cardiac standpoint.  Will be available as needed.  F/U with [facility] in 1 week with repeat BMP in 5 days.     Megha Jurado, RAY  Cardiology  [facility] - Emergency Dept  11/27/2022 9:28 AM

## 2022-11-28 LAB
ANION GAP SERPL CALC-SCNC: 11 MEQ/L
BASOPHILS # BLD AUTO: 0.09 X10(3)/MCL (ref 0–0.2)
BASOPHILS NFR BLD AUTO: 1.4 %
BUN SERPL-MCNC: 40.4 MG/DL (ref 8.4–25.7)
CALCIUM SERPL-MCNC: 8.8 MG/DL (ref 8.4–10.2)
CHLORIDE SERPL-SCNC: 100 MMOL/L (ref 98–107)
CO2 SERPL-SCNC: 26 MMOL/L (ref 22–29)
CREAT SERPL-MCNC: 1.52 MG/DL (ref 0.73–1.18)
CREAT/UREA NIT SERPL: 27
EOSINOPHIL # BLD AUTO: 0.26 X10(3)/MCL (ref 0–0.9)
EOSINOPHIL NFR BLD AUTO: 4 %
ERYTHROCYTE [DISTWIDTH] IN BLOOD BY AUTOMATED COUNT: 18.4 % (ref 11.5–17)
GFR SERPLBLD CREATININE-BSD FMLA CKD-EPI: 52 MLS/MIN/1.73/M2
GLUCOSE SERPL-MCNC: 100 MG/DL (ref 74–100)
HCT VFR BLD AUTO: 35.3 % (ref 42–52)
HGB BLD-MCNC: 11 GM/DL (ref 14–18)
IMM GRANULOCYTES # BLD AUTO: 0.02 X10(3)/MCL (ref 0–0.04)
IMM GRANULOCYTES NFR BLD AUTO: 0.3 %
LYMPHOCYTES # BLD AUTO: 1.31 X10(3)/MCL (ref 0.6–4.6)
LYMPHOCYTES NFR BLD AUTO: 20.1 %
MCH RBC QN AUTO: 22.4 PG (ref 27–31)
MCHC RBC AUTO-ENTMCNC: 31.2 MG/DL (ref 33–36)
MCV RBC AUTO: 71.9 FL (ref 80–94)
MONOCYTES # BLD AUTO: 0.67 X10(3)/MCL (ref 0.1–1.3)
MONOCYTES NFR BLD AUTO: 10.3 %
NEUTROPHILS # BLD AUTO: 4.2 X10(3)/MCL (ref 2.1–9.2)
NEUTROPHILS NFR BLD AUTO: 63.9 %
NRBC BLD AUTO-RTO: 0 %
PLATELET # BLD AUTO: 270 X10(3)/MCL (ref 130–400)
PMV BLD AUTO: 9.7 FL (ref 7.4–10.4)
POTASSIUM SERPL-SCNC: 3.8 MMOL/L (ref 3.5–5.1)
RBC # BLD AUTO: 4.91 X10(6)/MCL (ref 4.7–6.1)
SODIUM SERPL-SCNC: 137 MMOL/L (ref 136–145)
WBC # SPEC AUTO: 6.5 X10(3)/MCL (ref 4.5–11.5)

## 2022-11-28 PROCEDURE — 25000003 PHARM REV CODE 250

## 2022-11-28 PROCEDURE — 21400001 HC TELEMETRY ROOM

## 2022-11-28 PROCEDURE — 25000003 PHARM REV CODE 250: Performed by: NURSE PRACTITIONER

## 2022-11-28 PROCEDURE — 80048 BASIC METABOLIC PNL TOTAL CA: CPT | Performed by: INTERNAL MEDICINE

## 2022-11-28 PROCEDURE — 25000003 PHARM REV CODE 250: Performed by: INTERNAL MEDICINE

## 2022-11-28 PROCEDURE — 85025 COMPLETE CBC W/AUTO DIFF WBC: CPT | Performed by: INTERNAL MEDICINE

## 2022-11-28 PROCEDURE — 36415 COLL VENOUS BLD VENIPUNCTURE: CPT | Performed by: INTERNAL MEDICINE

## 2022-11-28 RX ADMIN — SACUBITRIL AND VALSARTAN 1 TABLET: 24; 26 TABLET, FILM COATED ORAL at 08:11

## 2022-11-28 RX ADMIN — OXCARBAZEPINE 300 MG: 300 TABLET, FILM COATED ORAL at 08:11

## 2022-11-28 RX ADMIN — APIXABAN 5 MG: 5 TABLET, FILM COATED ORAL at 08:11

## 2022-11-28 RX ADMIN — HYDROCODONE BITARTRATE AND ACETAMINOPHEN 1 TABLET: 5; 325 TABLET ORAL at 08:11

## 2022-11-28 RX ADMIN — BENZTROPINE MESYLATE 1 MG: 1 TABLET ORAL at 08:11

## 2022-11-28 RX ADMIN — ATORVASTATIN CALCIUM 80 MG: 40 TABLET, FILM COATED ORAL at 08:11

## 2022-11-28 RX ADMIN — FAMOTIDINE 20 MG: 20 TABLET, FILM COATED ORAL at 08:11

## 2022-11-28 RX ADMIN — TRAZODONE HYDROCHLORIDE 100 MG: 100 TABLET ORAL at 08:11

## 2022-11-28 RX ADMIN — ASPIRIN 81 MG CHEWABLE TABLET 81 MG: 81 TABLET CHEWABLE at 08:11

## 2022-11-28 RX ADMIN — OXCARBAZEPINE 300 MG: 300 TABLET, FILM COATED ORAL at 01:11

## 2022-11-28 RX ADMIN — AMIODARONE HYDROCHLORIDE 200 MG: 200 TABLET ORAL at 08:11

## 2022-11-28 RX ADMIN — ISOSORBIDE MONONITRATE 30 MG: 30 TABLET, EXTENDED RELEASE ORAL at 08:11

## 2022-11-28 RX ADMIN — METOPROLOL TARTRATE 75 MG: 25 TABLET, FILM COATED ORAL at 08:11

## 2022-11-28 RX ADMIN — Medication 6 MG: at 08:11

## 2022-11-28 RX ADMIN — ARIPIPRAZOLE 10 MG: 5 TABLET ORAL at 08:11

## 2022-11-28 RX ADMIN — FUROSEMIDE 40 MG: 40 TABLET ORAL at 08:11

## 2022-11-28 RX ADMIN — LEVETIRACETAM 500 MG: 500 TABLET, FILM COATED ORAL at 08:11

## 2022-11-28 NOTE — PLAN OF CARE
Problem: Adult Inpatient Plan of Care  Goal: Plan of Care Review  Outcome: Ongoing, Progressing  Flowsheets (Taken 11/27/2022 1920)  Plan of Care Reviewed With: patient  Goal: Patient-Specific Goal (Individualized)  Outcome: Ongoing, Progressing  Goal: Absence of Hospital-Acquired Illness or Injury  Outcome: Ongoing, Progressing  Intervention: Prevent and Manage VTE (Venous Thromboembolism) Risk  Flowsheets (Taken 11/27/2022 1920)  Range of Motion: active ROM (range of motion) encouraged  Goal: Optimal Comfort and Wellbeing  Outcome: Ongoing, Progressing  Intervention: Monitor Pain and Promote Comfort  Flowsheets (Taken 11/27/2022 1920)  Pain Management Interventions:   relaxation techniques promoted   quiet environment facilitated   pillow support provided   position adjusted   care clustered  Goal: Readiness for Transition of Care  Outcome: Ongoing, Progressing     Problem: Fall Injury Risk  Goal: Absence of Fall and Fall-Related Injury  Outcome: Ongoing, Progressing  Intervention: Identify and Manage Contributors  Flowsheets (Taken 11/27/2022 1920)  Self-Care Promotion:   independence encouraged   safe use of adaptive equipment encouraged   BADL personal objects within reach  Medication Review/Management: medications reviewed

## 2022-11-28 NOTE — PROGRESS NOTES
[facility] Hospital Medicine Progress Note        Chief Complaint: Inpatient Follow-up for     HPI: 57 y.o. male who  PMH includes bipolar disorder, chronic hepatitis-C, anxiety, depression, HTN, schizoaffective disorder, seizures, CVA, history of substance abuse, CHF, cardiomyopathy with EF 15%; presented to ED at  [facility] on 11/25/2022 with a primary complaint of SOB with associated BILLY and weakness. Pt reports he wear a life vest and he left the batteries at family members house. PT lives with his daughter and was visiting for the holiday and his batteries at his other daughters house who is currently not home. PT reports he has been having shortness of breath which has worsened over the past few days. PT reports his SOB worsens on exertion. He denies any shock of his life vest. Labs done significant for  HH 10.2/33.2, INE 1.45, PT 17.5, BUN/Creat 25.9/1.42, glucose 180, BNP 2532.7, trop  0.059, 0.076, 0.044 respectively; indices unremarkable; UDS negative. Flu and COVID negative.   Patient was given diuretics in the ED. cardiology Services were consulted per the ED and will see in consultation.  Patient is admitted to hospital medicine services for further management.    Interval Hx:   Patient seen and examined saturating well on room air reports shortness of breath is better no other issues    Objective/physical exam:  General: In no acute distress, afebrile  Chest: Clear to auscultation bilaterally  Heart: RRR, +S1, S2, no appreciable murmur  Abdomen: Soft, nontender, BS +  MSK: Warm, no lower extremity edema, no clubbing or cyanosis  Neurologic: Alert and oriented x4,   VITAL SIGNS: 24 HRS MIN & MAX LAST   Temp  Min: 97.3 °F (36.3 °C)  Max: 98 °F (36.7 °C) 97.3 °F (36.3 °C)   BP  Min: 108/74  Max: 132/83 132/83   Pulse  Min: 50  Max: 62  (!) 55     Resp  Min: 18  Max: 24 18   SpO2  Min: 93 %  Max: 99 % 97 %       Recent Labs   Lab 11/25/22  0034 11/26/22  0600 11/28/22  0421   WBC  6.1 7.3 6.5   RBC 4.54* 5.17 4.91   HGB 10.2* 11.5* 11.0*   HCT 33.2* 38.2* 35.3*   MCV 73.1* 73.9* 71.9*   MCH 22.5* 22.2* 22.4*   MCHC 30.7* 30.1* 31.2*   RDW 18.4* 18.8* 18.4*    271 270   MPV 9.2 9.6 9.7       Recent Labs   Lab 11/25/22  0034 11/26/22  0600 11/27/22  1234 11/28/22  0421    139 136 137   K 4.0 4.3 4.5 3.8   CO2 26 26 24 26   BUN 25.9* 27.9* 41.3* 40.4*   CREATININE 1.42* 1.13 1.74* 1.52*   CALCIUM 8.6 9.2 9.1 8.8   MG  --  2.00  --   --    ALBUMIN 3.1* 3.5  --   --    ALKPHOS 118 113  --   --    ALT 20 22  --   --    AST 32 36*  --   --    BILITOT 0.5 0.8  --   --           Microbiology Results (last 7 days)       ** No results found for the last 168 hours. **             See below for Radiology    Scheduled Med:   amiodarone  200 mg Oral Daily    apixaban  5 mg Oral BID    ARIPiprazole  10 mg Oral Daily    aspirin  81 mg Oral Daily    atorvastatin  80 mg Oral Daily    benztropine  1 mg Oral BID    famotidine  20 mg Oral BID    furosemide  40 mg Oral Daily    isosorbide mononitrate  30 mg Oral Daily    levETIRAcetam  500 mg Oral BID    metoprolol tartrate  75 mg Oral BID    OXcarbazepine  300 mg Oral TID    sacubitriL-valsartan  1 tablet Oral BID    traZODone  100 mg Oral QHS        Continuous Infusions:       PRN Meds:  acetaminophen, aluminum-magnesium hydroxide-simethicone, dextrose 10%, dextrose 10%, glucagon (human recombinant), glucose, glucose, HYDROcodone-acetaminophen, melatonin, naloxone, ondansetron, sodium chloride 0.9%       Assessment/Plan:  Acute on chronic combined systolic and diastolic heart failure with EF of 20% has a life vest   Acute kidney injury   Non-STEMI most likely type 2   Ischemic cardiomyopathy has a life vest   Valvular heart disease with moderate MR and moderate TR   Essential hypertension  Paroxysmal AFib   History of coronary artery disease status post CABG  Schizoaffective disorder   History of hep C   History of cocaine abuse  Tobacco  use      Creatinine is improving continue with same dose of Lasix  If creatinine is back to normal then potential discharge in a.m.  Will switch to p.o. Lasix today   Continue with aspirin statin and beta blocker  Zoll exchange LifeVest battery   Patient has been started on Entresto will need to check if insurance will pay will check with case management in a.m.  Continue with other home medications that includes amiodarone, metoprolol, Eliquis, aripiprazole, aspirin, statin, isosorbide, Keppra, oxcarbazepine and trazodone    Prophylaxis: Eliquis        VTE prophylaxis:     Patient condition:  Stable/Fair/Guarded/ Serious/ Critical    Anticipated discharge and Disposition:         All diagnosis and differential diagnosis have been reviewed; assessment and plan has been documented; I have personally reviewed the labs and test results that are presently available; I have reviewed the patients medication list; I have reviewed the consulting providers response and recommendations. I have reviewed or attempted to review medical records based upon their availability    All of the patient's questions have been  addressed and answered. Patient's is agreeable to the above stated plan. I will continue to monitor closely and make adjustments to medical management as needed.  _____________________________________________________________________    Nutrition Status:    Radiology:  X-Ray Chest AP Portable  Narrative: EXAMINATION:  XR CHEST AP PORTABLE    CLINICAL HISTORY:  Shortness of breath    TECHNIQUE:  Single frontal view of the chest was performed.    COMPARISON:  None    FINDINGS:  LINES AND TUBES: External defibrillator vest in place.    MEDIASTINUM AND JACKSON: Cardiac silhouette is enlarged.    LUNGS: No lobar consolidation. No edema.    PLEURA:No pleural effusion. No pneumothorax.    BONES: No acute osseous abnormality.  Impression: Enlarged cardiac silhouette without overt edema.    Electronically signed by: Sharon  Pepe  Date:    11/25/2022  Time:    12:29      Aziza Hernandez MD   11/28/2022

## 2022-11-28 NOTE — PROGRESS NOTES
"Inpatient Nutrition Evaluation    Admit Date: 2022   Total duration of encounter: 3 days    Nutrition Recommendation/Prescription     -Continue diet as ordered and tolerated.     Nutrition Assessment     Chart Review    Reason Seen: continuous nutrition monitoring    Diagnosis:  Acute on chronic combined systolic and diastolic heart failure  ANA  Non-STEMI   Ischemic cardiomyopathy       Relevant Medical History: Valvular heart disease with moderate MR and moderate TR, HTN, Afib, hx CAD s/p CABG, Schizoaffective disorder, Hx Hep C, Hx cocaine abuse, Tobacco use     Nutrition-Related Medications: famotidine, furosemide. PRN: ondansetron    Nutrition-Related Labs:  22 BUN 40.7, Crea 1.52, GFR 52    Diet Order: Diet Cardiac Low Sodium  Oral Supplement Order: none  Appetite/Oral Intake: good/% of meals  Factors Affecting Nutritional Intake: none identified  Food/Sabianist/Cultural Preferences: none reported  Food Allergies: none reported       Wound(s):   none documented    Comments    22 Good appetite. No unintentional wt loss or GI complaints.     Anthropometrics    Height: 5' 7" (170.2 cm) Height Method: Stated  Last Weight: 74.3 kg (163 lb 12.8 oz) (22 0515) Weight Method: Standard Scale  BMI (Calculated): 25.6  BMI Classification: overweight (BMI 25-29.9)        Ideal Body Weight (IBW), Male: 148 lb     % Ideal Body Weight, Male (lb): 108.11 %                 Usual Body Weight (UBW), k kg  % Usual Body Weight: 99.27     Usual Weight Provided By: patient    Wt Readings from Last 5 Encounters:   22 74.3 kg (163 lb 12.8 oz)   22 72.6 kg (160 lb)   22 73.5 kg (162 lb 0.6 oz)   22 72.1 kg (158 lb 14.4 oz)   22 64.7 kg (142 lb 10.2 oz)     Weight Change(s) Since Admission:  Admit Weight: 72.6 kg (160 lb) (22 1454)  22 75.1kg bed wt today.     Patient Education    Not applicable.    Monitoring & Evaluation     Dietitian will monitor food and " beverage intake.  Nutrition Risk/Follow-Up: low (follow-up in 5-7 days)  Patients assigned 'low nutrition risk' status do not qualify for a full nutritional assessment but will be monitored and re-evaluated in a 5-7 day time period. Please consult if re-evaluation needed sooner.

## 2022-11-29 VITALS
RESPIRATION RATE: 18 BRPM | HEART RATE: 53 BPM | OXYGEN SATURATION: 98 % | BODY MASS INDEX: 25.71 KG/M2 | SYSTOLIC BLOOD PRESSURE: 111 MMHG | DIASTOLIC BLOOD PRESSURE: 67 MMHG | WEIGHT: 163.81 LBS | HEIGHT: 67 IN | TEMPERATURE: 97 F

## 2022-11-29 PROBLEM — I50.9 CHF EXACERBATION: Status: RESOLVED | Noted: 2022-10-27 | Resolved: 2022-11-29

## 2022-11-29 LAB
ANION GAP SERPL CALC-SCNC: 10 MEQ/L
BUN SERPL-MCNC: 38.9 MG/DL (ref 8.4–25.7)
CALCIUM SERPL-MCNC: 8.3 MG/DL (ref 8.4–10.2)
CHLORIDE SERPL-SCNC: 101 MMOL/L (ref 98–107)
CO2 SERPL-SCNC: 26 MMOL/L (ref 22–29)
CREAT SERPL-MCNC: 1.26 MG/DL (ref 0.73–1.18)
CREAT/UREA NIT SERPL: 31
GFR SERPLBLD CREATININE-BSD FMLA CKD-EPI: >60 MLS/MIN/1.73/M2
GLUCOSE SERPL-MCNC: 123 MG/DL (ref 74–100)
POTASSIUM SERPL-SCNC: 4.4 MMOL/L (ref 3.5–5.1)
SODIUM SERPL-SCNC: 137 MMOL/L (ref 136–145)

## 2022-11-29 PROCEDURE — 25000003 PHARM REV CODE 250: Performed by: INTERNAL MEDICINE

## 2022-11-29 PROCEDURE — 94761 N-INVAS EAR/PLS OXIMETRY MLT: CPT

## 2022-11-29 PROCEDURE — 36415 COLL VENOUS BLD VENIPUNCTURE: CPT | Performed by: INTERNAL MEDICINE

## 2022-11-29 PROCEDURE — 80048 BASIC METABOLIC PNL TOTAL CA: CPT | Performed by: INTERNAL MEDICINE

## 2022-11-29 PROCEDURE — 25000003 PHARM REV CODE 250

## 2022-11-29 PROCEDURE — 25000003 PHARM REV CODE 250: Performed by: NURSE PRACTITIONER

## 2022-11-29 RX ADMIN — LEVETIRACETAM 500 MG: 500 TABLET, FILM COATED ORAL at 08:11

## 2022-11-29 RX ADMIN — OXCARBAZEPINE 300 MG: 300 TABLET, FILM COATED ORAL at 08:11

## 2022-11-29 RX ADMIN — AMIODARONE HYDROCHLORIDE 200 MG: 200 TABLET ORAL at 08:11

## 2022-11-29 RX ADMIN — APIXABAN 5 MG: 5 TABLET, FILM COATED ORAL at 08:11

## 2022-11-29 RX ADMIN — ARIPIPRAZOLE 10 MG: 5 TABLET ORAL at 08:11

## 2022-11-29 RX ADMIN — FAMOTIDINE 20 MG: 20 TABLET, FILM COATED ORAL at 08:11

## 2022-11-29 RX ADMIN — ASPIRIN 81 MG CHEWABLE TABLET 81 MG: 81 TABLET CHEWABLE at 08:11

## 2022-11-29 RX ADMIN — FUROSEMIDE 40 MG: 40 TABLET ORAL at 08:11

## 2022-11-29 RX ADMIN — SACUBITRIL AND VALSARTAN 1 TABLET: 24; 26 TABLET, FILM COATED ORAL at 08:11

## 2022-11-29 RX ADMIN — BENZTROPINE MESYLATE 1 MG: 1 TABLET ORAL at 08:11

## 2022-11-29 RX ADMIN — ISOSORBIDE MONONITRATE 30 MG: 30 TABLET, EXTENDED RELEASE ORAL at 08:11

## 2022-11-29 RX ADMIN — METOPROLOL TARTRATE 75 MG: 25 TABLET, FILM COATED ORAL at 08:11

## 2022-11-29 RX ADMIN — ATORVASTATIN CALCIUM 80 MG: 40 TABLET, FILM COATED ORAL at 08:11

## 2022-11-29 NOTE — PLAN OF CARE
11/29/22 1157   Discharge Assessment   Assessment Type Discharge Planning Assessment   Source of Information patient   Does patient/caregiver understand observation status   (inpatient)   Communicated TRACIE with patient/caregiver Yes   Reason For Admission CHF   Lives With child(karo), adult   Facility Arrived From: home   Do you expect to return to your current living situation? Yes   Do you have help at home or someone to help you manage your care at home? No   Prior to hospitilization cognitive status: Alert/Oriented   Current cognitive status: Alert/Oriented   Walking or Climbing Stairs Difficulty none   Dressing/Bathing Difficulty none   Equipment Currently Used at Home none   Readmission within 30 days? No   Patient currently being followed by outpatient case management? No   Do you currently have service(s) that help you manage your care at home? No   Do you take prescription medications? Yes   Do you have prescription coverage? Yes   Coverage medicaid   Do you have any problems affording any of your prescribed medications? No   Discharge Plan A Home with family   Discharge Plan B Home with family   DME Needed Upon Discharge  none   Pt is ambulatory, indep with ADL's.  Will discharge home to his [facility] apartments [address]

## 2022-11-30 ENCOUNTER — PATIENT OUTREACH (OUTPATIENT)
Dept: ADMINISTRATIVE | Facility: CLINIC | Age: 57
End: 2022-11-30
Payer: MEDICAID

## 2022-12-02 NOTE — PROGRESS NOTES
C3 nurse attempted to contact Kendall Duff for a TCC post hospital discharge follow up call. No answer. No voicemail available. The patient has a scheduled HOSFU appointment with Anthony Wright MD, cardiologist on 12/27/22 @ 9:50.

## 2022-12-05 NOTE — PROGRESS NOTES
C3 nurse spoke with Kendall Duff's daughter, Luan for a TCC post hospital discharge follow up call. The patient has a scheduled [facility] appointment with Kendall Wright MD, cardiologist on 12/9/22 @ 10:30.  Appt was moved up from the 27th.        Patient's daughter also reports patient is taking amlodipine 5mg once daily, olanzapine 10mg once every evening and clopidogrel 75mg daily.  These medications were unable to be reconciled as they are not on patient's medication list.

## 2022-12-07 ENCOUNTER — LAB REQUISITION (OUTPATIENT)
Dept: LAB | Facility: HOSPITAL | Age: 57
End: 2022-12-07
Payer: MEDICAID

## 2022-12-07 DIAGNOSIS — I50.42 CHRONIC COMBINED SYSTOLIC (CONGESTIVE) AND DIASTOLIC (CONGESTIVE) HEART FAILURE: ICD-10-CM

## 2022-12-07 DIAGNOSIS — I11.0 HYPERTENSIVE HEART DISEASE WITH HEART FAILURE: ICD-10-CM

## 2022-12-07 LAB
ANION GAP SERPL CALC-SCNC: 10 MEQ/L
BNP BLD-MCNC: 2157.9 PG/ML
BUN SERPL-MCNC: 23.4 MG/DL (ref 8.4–25.7)
CALCIUM SERPL-MCNC: 8.9 MG/DL (ref 8.4–10.2)
CHLORIDE SERPL-SCNC: 104 MMOL/L (ref 98–107)
CHOLEST SERPL-MCNC: 95 MG/DL
CHOLEST/HDLC SERPL: 3 {RATIO} (ref 0–5)
CO2 SERPL-SCNC: 24 MMOL/L (ref 22–29)
CREAT SERPL-MCNC: 1.1 MG/DL (ref 0.73–1.18)
CREAT/UREA NIT SERPL: 21
GFR SERPLBLD CREATININE-BSD FMLA CKD-EPI: >60 MLS/MIN/1.73/M2
GLUCOSE SERPL-MCNC: 120 MG/DL (ref 74–100)
HDLC SERPL-MCNC: 37 MG/DL (ref 35–60)
LDLC SERPL CALC-MCNC: 49 MG/DL (ref 50–140)
POTASSIUM SERPL-SCNC: 4.5 MMOL/L (ref 3.5–5.1)
SODIUM SERPL-SCNC: 138 MMOL/L (ref 136–145)
TRIGL SERPL-MCNC: 46 MG/DL (ref 34–140)
VLDLC SERPL CALC-MCNC: 9 MG/DL

## 2022-12-07 PROCEDURE — 83880 ASSAY OF NATRIURETIC PEPTIDE: CPT | Performed by: NURSE PRACTITIONER

## 2022-12-07 PROCEDURE — 80048 BASIC METABOLIC PNL TOTAL CA: CPT | Performed by: NURSE PRACTITIONER

## 2022-12-07 PROCEDURE — 80061 LIPID PANEL: CPT | Performed by: NURSE PRACTITIONER

## 2022-12-10 ENCOUNTER — HOSPITAL ENCOUNTER (EMERGENCY)
Facility: HOSPITAL | Age: 57
Discharge: HOME | End: 2022-12-10
Attending: EMERGENCY MEDICINE
Payer: MEDICAID

## 2022-12-10 VITALS
WEIGHT: 176 LBS | HEART RATE: 68 BPM | TEMPERATURE: 98 F | RESPIRATION RATE: 21 BRPM | OXYGEN SATURATION: 96 % | DIASTOLIC BLOOD PRESSURE: 94 MMHG | SYSTOLIC BLOOD PRESSURE: 128 MMHG | HEIGHT: 67 IN | BODY MASS INDEX: 27.62 KG/M2

## 2022-12-10 DIAGNOSIS — Z95.810 USES LIFEVEST DEFIBRILLATOR: ICD-10-CM

## 2022-12-10 DIAGNOSIS — R06.02 SOB (SHORTNESS OF BREATH): Primary | ICD-10-CM

## 2022-12-10 DIAGNOSIS — R06.02 SOB (SHORTNESS OF BREATH): ICD-10-CM

## 2022-12-10 LAB
FLUAV AG UPPER RESP QL IA.RAPID: NOT DETECTED
FLUBV AG UPPER RESP QL IA.RAPID: NOT DETECTED
SARS-COV-2 RNA RESP QL NAA+PROBE: NOT DETECTED

## 2022-12-10 PROCEDURE — 0240U COVID/FLU A&B PCR: CPT | Performed by: EMERGENCY MEDICINE

## 2022-12-10 PROCEDURE — 93005 ELECTROCARDIOGRAM TRACING: CPT

## 2022-12-10 PROCEDURE — 99284 EMERGENCY DEPT VISIT MOD MDM: CPT

## 2022-12-10 PROCEDURE — 93010 ELECTROCARDIOGRAM REPORT: CPT | Mod: ,,, | Performed by: INTERNAL MEDICINE

## 2022-12-10 PROCEDURE — 93010 EKG 12-LEAD: ICD-10-PCS | Mod: ,,, | Performed by: INTERNAL MEDICINE

## 2022-12-10 NOTE — ED PROVIDER NOTES
Encounter Date: 12/10/2022    SCRIBE #1 NOTE: I, Dona Goss, am scribing for, and in the presence of,  Aleks Wynn MD. I have scribed the following portions of the note - Other sections scribed: HPI, ROS, PE.     History     Chief Complaint   Patient presents with    Shortness of Breath     Woke up with sudden onset SOB. Bilateral wheezing. +CHF. EMS gave DuoNeb and Solumedrol 125mg IM in route. Room air sat 93%. Up to 100% with neb. Denies chest pain, fever. Wears LIFEVEST     57 year old male with a hx of CHF presents to the ED via EMS for shortness of breath onset today. Per EMS, the pt woke up with sudden onset of SOB. EMS gave DuoNeb and Solumedrol en route. They report O2 of 93 upon arrival; notes O2 of 100 with DuoNeb. Pt reports his LIFEVEST was making a noise and would not turn on; notes he charged it today. Pt denies any SOB during exam. No other complaints.  His livevest was checked and appears functional and he was advised to f/u with his cardiologist in the AM.    The history is provided by the patient and the EMS personnel. No  was used.   Shortness of Breath  This is a new problem. The current episode started 1 to 2 hours ago. Pertinent negatives include no fever, no headaches, no ear pain, no cough, no wheezing, no chest pain, no vomiting, no abdominal pain, no rash and no leg swelling. He has tried nothing for the symptoms. The treatment provided no relief. He has had Prior ED visits.   Review of patient's allergies indicates:   Allergen Reactions    Depakote [divalproex]     Divalproex sodium Other (See Comments)    Lithium     Lithium analogues     Quetiapine Other (See Comments)     Pt states had seizures     Past Medical History:   Diagnosis Date    Bipolar disorder, unspecified     Chronic hepatitis C     History of psychiatric hospitalization     Hx of psychiatric care     Hypertension     Bessie     Obesity, unspecified     Psychiatric problem     Schizoaffective  disorder, bipolar type     Seizures     Stroke     Substance abuse     Therapy      Past Surgical History:   Procedure Laterality Date    CORONARY STENT PLACEMENT  08/14/2015    DEBRIDEMENT  04/17/2022    LEFT HEART CATHETERIZATION Left 08/13/2015    REPEAT CLOSURE OF STERNAL INCISION N/A 5/11/2022    Procedure: CLOSURE, STERNAL INCISION, REPEAT;  Surgeon: dAolfo Weiss MD;  [facility]: [facility] OR;  Service: Plastics;  Laterality: N/A;  STERNAL WOUND DEBRIDEMENT AND RECONSTRUCTION // MULTIPLE MUSCLE FLAPS // REQ 1400     Family History   Problem Relation Age of Onset    Cancer Mother     Liver disease Father      Social History     Tobacco Use    Smoking status: Every Day     Types: Cigarettes    Smokeless tobacco: Never   Substance Use Topics    Alcohol use: Never    Drug use: Not Currently     Types: Cocaine     Review of Systems   Constitutional:  Negative for chills and fever.   HENT:  Negative for congestion and ear pain.    Eyes:  Negative for discharge.   Respiratory:  Positive for shortness of breath. Negative for cough and wheezing.    Cardiovascular:  Negative for chest pain and leg swelling.   Gastrointestinal:  Negative for abdominal pain, constipation, diarrhea, nausea and vomiting.   Genitourinary:  Negative for dysuria, flank pain and frequency.   Musculoskeletal:  Negative for back pain and joint swelling.   Skin:  Negative for rash.   Neurological:  Negative for dizziness, weakness and headaches.   Psychiatric/Behavioral:  Negative for agitation, confusion and hallucinations.      Physical Exam     Initial Vitals [12/10/22 0243]   BP Pulse Resp Temp SpO2   (!) 135/94 78 18 97.7 °F (36.5 °C) 100 %      MAP       --         Physical Exam    Nursing note and vitals reviewed.  Constitutional: He appears well-developed and well-nourished. No distress.   Normal exam.   HENT:   Head: Normocephalic and atraumatic.   Eyes: Conjunctivae and EOM are normal. Pupils are equal, round, and reactive to light. Right eye  exhibits no discharge. Left eye exhibits no discharge. No scleral icterus.   Neck: No tracheal deviation present.   Cardiovascular:  Normal rate, regular rhythm, normal heart sounds and intact distal pulses.           No murmur heard.  Pulmonary/Chest: Breath sounds normal. No stridor. No respiratory distress. He has no wheezes. He has no rales.   Abdominal: Abdomen is soft. He exhibits no distension. There is no abdominal tenderness. There is no rebound and no guarding.   Musculoskeletal:         General: No tenderness or edema. Normal range of motion.     Neurological: He is alert and oriented to person, place, and time. He has normal strength and normal reflexes. No cranial nerve deficit.   Skin: Skin is warm and dry. No rash noted. No erythema. No pallor.   Psychiatric: He has a normal mood and affect. His behavior is normal. Judgment and thought content normal.       ED Course   Procedures  Labs Reviewed   COVID/FLU A&B PCR - Normal    Narrative:     The Xpert Xpress SARS-CoV-2/FLU/RSV plus is a rapid, multiplexed real-time PCR test intended for the simultaneous qualitative detection and differentiation of SARS-CoV-2, Influenza A, Influenza B, and respiratory syncytial virus (RSV) viral RNA in either nasopharyngeal swab or nasal swab specimens.           EKG Readings: (Independently Interpreted)   Rhythm: Normal Sinus Rhythm. Heart Rate: 74. Clinical Impression: with RBBB   EKG performed at 0239.     ECG Results              EKG 12-lead (Final result)  Result time 12/10/22 19:55:40      Final result by Interface, Lab [facility] (12/10/22 19:55:40)                   Narrative:    Test Reason : R06.02,R06.02,    Vent. Rate : 074 BPM     Atrial Rate : 074 BPM     P-R Int : 190 ms          QRS Dur : 144 ms      QT Int : 426 ms       P-R-T Axes : 055 214 027 degrees     QTc Int : 472 ms    Normal sinus rhythm  Right bundle branch block  Abnormal ECG  When compared with ECG of 30-NOV-2022 01:06,  No significant  change was found  Confirmed by Phuc Núñez MD ([id number]) on 12/10/2022 7:55:27 PM    Referred By: [id number]   SELF           Confirmed By:Phuc Núñez MD                                  Imaging Results    None          Medications - No data to display           Scribe Attestation:   Scribe #1: I performed the above scribed service and the documentation accurately describes the services I performed. I attest to the accuracy of the note.    Attending Attestation:           Physician Attestation for Scribe:  Physician Attestation Statement for Scribe #1: I, Aleks Wynn MD, reviewed documentation, as scribed by Dona Goss in my presence, and it is both accurate and complete.           ED Course as of 12/12/22 0514   Sat Dec 10, 2022   0400 LIFEVEST was turned off during exam. Nurse replugged the device and turned it on. The device had a full battery and was working.  [ED]      ED Course User Index  [ED] Dona Goss                 Clinical Impression:   Final diagnoses:  [R06.02] SOB (shortness of breath)  [Z95.810] Uses LifeVest defibrillator        ED Disposition Condition    Discharge Stable          ED Prescriptions    None       Follow-up Information       Follow up With Specialties Details Why Contact Info    PCP  Call in 1 day  follow up with PCP ni 1-2 days    CALL YOUR CARDIOLOGIST TODAY FOR APPT             Aleks Wynn MD  12/12/22 0514

## 2022-12-10 NOTE — ED NOTES
Pt to ED w/ c/o Lifevest not being on, pt is wearing Lifevest and it is turned on w/ fully charged battery, educated pt on checking Lifevest battery. Verbalized understanding.

## 2022-12-12 NOTE — DISCHARGE SUMMARY
Hospital Medicine  Discharge Summary    Patient Name: Kendall Duff  MRN: [id number]  Admit Date: 11/25/2022  Discharge Date: 11/29/2022   Status: IP- Inpatient   Length of Stay: 4      PHYSICIANS   Admitting Physician: Ravindra Woods MD  Discharging Physician: Cordell Robbins MD.  Primary Care Physician: Primary Doctor No  Consults: Cardiology      DISCHARGE DIAGNOSES   Chronic combined systolic and diastolic heart failure with acute decompensation, s/p prior LifeVest application  Acute kidney injury   Non-STEMI, suspected type 2   Valvular heart disease with moderate MR and moderate TR   Essential hypertension  Paroxysmal AFib   History of coronary artery disease status post CABG  Schizoaffective disorder   Tobacco use      PROCEDURES   None      HOSPITAL COURSE     57 y.o. male who  with a history that includes former substance abuse, and cardiomyopathy with an EF 15%, presented to ED at  [facility] on 11/25 with SOB/BILLY and generalized weakness.  He reported he wears a life vest but left the batteries at family members house.  He lives with his daughter and was visiting for the holiday and his batteries at his other daughters house who is currently not home.  He reported he has been having shortness of breath which has worsened over the past few days, worsens on exertion. Evaluation in ED noted BUN/Creat 25.9/1.42, BNP 2532.7, trop  0.059, 0.076, 0.044 respectively. UDS negative. Flu and COVID negative.   CXR without overt pulmonary edema.  Patient was admitted to [facility], and Cardiology was consulted.  He was started IV diuresis, and was improving, however creatinine was bumped. Lasix was scaled back, transitioned to oral.  Renal function improved.  Patient will need to obtain all parts of LifeVest and advised to continue wearing. CM assisting in discharge disposition.  Patient otherwise stable for discharge home.      STATUS  Improved    DISPOSITION  Discharge to home health    DIET  Cardiac    ACTIVITY  As  tolerated      FOLLOW-UP      LIDA BROTHERS MD Follow up in 6 day(s).    Specialty: Cardiology  Why: BMP labs 12/5 @ 9:10 needs to fast  F/u Dr. Brothers 12/27 @ 9:50  Contact information:  [facility] [address]  [place] [address]  [phone]                 DISCHARGE MEDICATION RECONCILIATION     PRESCRIBE     sacubitriL-valsartan 24-26 mg per tablet  Commonly known as: ENTRESTO  Take 1 tablet by mouth 2 (two) times daily.       CONTINUE      amiodarone 200 MG Tab  Commonly known as: PACERONE  Take 1 tablet (200 mg total) by mouth once daily.     apixaban 5 mg Tab  Commonly known as: ELIQUIS  Take 1 tablet (5 mg total) by mouth 2 (two) times daily.     ARIPiprazole 10 MG Tab  Commonly known as: ABILIFY  Take 1 tablet (10 mg total) by mouth once daily.     aspirin 81 MG Chew  Chew and swallow 1 tablet (81 mg total) by mouth once daily.     atorvastatin 80 MG tablet  Commonly known as: LIPITOR  Take 1 tablet (80 mg total) by mouth once daily.     benztropine 1 MG tablet  Commonly known as: COGENTIN     famotidine 20 MG tablet  Commonly known as: PEPCID  Take 1 tablet (20 mg total) by mouth 2 (two) times daily.     furosemide 40 MG tablet  Commonly known as: LASIX  Take 1 tablet (40 mg total) by mouth once daily.     isosorbide mononitrate 30 MG 24 hr tablet  Commonly known as: IMDUR  Take 1 tablet (30 mg total) by mouth once daily.     levETIRAcetam 500 MG Tab  Commonly known as: KEPPRA  Take 1 tablet (500 mg total) by mouth 2 (two) times daily.     metoprolol tartrate 75 mg Tab  Take 75 mg by mouth 2 (two) times daily.     OXcarbazepine 300 MG Tab  Commonly known as: TRILEPTAL     traZODone 100 MG tablet  Commonly known as: DESYREL  Take 1 tablet (100 mg total) by mouth every evening.         These medications were sent to   [id number] [address]   [place] [address]      Phone: [phone]   sacubitriL-valsartan 24-26 mg per tablet           PHYSICAL EXAM   VITALS: T 97.4 °F  (36.3 °C)   /67   P (!) 53   RR 18   O2 98 %    GENERAL: awake and in no acute distress  LUNGS: Respirations non-labored  CVS: Normal rate  ABD: Soft, non-tender  EXTREMITIES: Radial pulse 2+  NEURO: AAOx3  PSYCHIATRIC: Cooperative          Discharge time: 33 minutes     Cordell Robbins MD  [facility]       DIAGNOSITCS   X-Ray Chest AP Portable  Result Date: 11/25/2022  EXAMINATION: XR CHEST AP PORTABLE CLINICAL HISTORY: Shortness of breath TECHNIQUE: Single frontal view of the chest was performed. COMPARISON: None FINDINGS: LINES AND TUBES: External defibrillator vest in place. MEDIASTINUM AND JACKSON: Cardiac silhouette is enlarged. LUNGS: No lobar consolidation. No edema. PLEURA:No pleural effusion. No pneumothorax. BONES: No acute osseous abnormality.   Impression:  Enlarged cardiac silhouette without overt edema.   Electronically signed by: Sharon Cantu Date:    11/25/2022 Time:    12:29

## 2022-12-18 ENCOUNTER — HOSPITAL ENCOUNTER (INPATIENT)
Facility: HOSPITAL | Age: 57
LOS: 1 days | Discharge: LEFT BEFORE DISCHARGE | DRG: 280 | End: 2022-12-18
Attending: STUDENT IN AN ORGANIZED HEALTH CARE EDUCATION/TRAINING PROGRAM | Admitting: STUDENT IN AN ORGANIZED HEALTH CARE EDUCATION/TRAINING PROGRAM
Payer: MEDICAID

## 2022-12-18 VITALS
WEIGHT: 184.94 LBS | DIASTOLIC BLOOD PRESSURE: 98 MMHG | HEART RATE: 57 BPM | TEMPERATURE: 98 F | BODY MASS INDEX: 27.39 KG/M2 | HEIGHT: 69 IN | OXYGEN SATURATION: 97 % | RESPIRATION RATE: 20 BRPM | SYSTOLIC BLOOD PRESSURE: 134 MMHG

## 2022-12-18 DIAGNOSIS — I50.9 ACUTE ON CHRONIC CONGESTIVE HEART FAILURE, UNSPECIFIED HEART FAILURE TYPE: ICD-10-CM

## 2022-12-18 DIAGNOSIS — N17.9 AKI (ACUTE KIDNEY INJURY): ICD-10-CM

## 2022-12-18 DIAGNOSIS — R06.02 SOB (SHORTNESS OF BREATH): ICD-10-CM

## 2022-12-18 DIAGNOSIS — I21.4 NSTEMI (NON-ST ELEVATED MYOCARDIAL INFARCTION): ICD-10-CM

## 2022-12-18 DIAGNOSIS — R07.9 CHEST PAIN, UNSPECIFIED TYPE: Primary | ICD-10-CM

## 2022-12-18 DIAGNOSIS — I50.9 CHF (CONGESTIVE HEART FAILURE): ICD-10-CM

## 2022-12-18 LAB
ALBUMIN SERPL-MCNC: 3.6 G/DL (ref 3.5–5)
ALBUMIN/GLOB SERPL: 1.1 RATIO (ref 1.1–2)
ALP SERPL-CCNC: 146 UNIT/L (ref 40–150)
ALT SERPL-CCNC: 24 UNIT/L (ref 0–55)
AST SERPL-CCNC: 35 UNIT/L (ref 5–34)
BASOPHILS # BLD AUTO: 0.05 X10(3)/MCL (ref 0–0.2)
BASOPHILS NFR BLD AUTO: 0.6 %
BILIRUBIN DIRECT+TOT PNL SERPL-MCNC: 0.6 MG/DL
BNP BLD-MCNC: 3135.6 PG/ML
BUN SERPL-MCNC: 50.4 MG/DL (ref 8.4–25.7)
CALCIUM SERPL-MCNC: 9 MG/DL (ref 8.4–10.2)
CHLORIDE SERPL-SCNC: 105 MMOL/L (ref 98–107)
CO2 SERPL-SCNC: 22 MMOL/L (ref 22–29)
CREAT SERPL-MCNC: 1.69 MG/DL (ref 0.73–1.18)
EOSINOPHIL # BLD AUTO: 0.02 X10(3)/MCL (ref 0–0.9)
EOSINOPHIL NFR BLD AUTO: 0.3 %
ERYTHROCYTE [DISTWIDTH] IN BLOOD BY AUTOMATED COUNT: 20 % (ref 11.6–14.4)
GFR SERPLBLD CREATININE-BSD FMLA CKD-EPI: 46 MLS/MIN/1.73/M2
GLOBULIN SER-MCNC: 3.3 GM/DL (ref 2.4–3.5)
GLUCOSE SERPL-MCNC: 142 MG/DL (ref 74–100)
HCT VFR BLD AUTO: 35.7 % (ref 42–52)
HGB BLD-MCNC: 10.8 GM/DL (ref 14–18)
IMM GRANULOCYTES # BLD AUTO: 0.03 X10(3)/MCL (ref 0–0.04)
IMM GRANULOCYTES NFR BLD AUTO: 0.4 %
LYMPHOCYTES # BLD AUTO: 0.72 X10(3)/MCL (ref 0.6–4.6)
LYMPHOCYTES NFR BLD AUTO: 9.3 %
MCH RBC QN AUTO: 22.2 PG
MCHC RBC AUTO-ENTMCNC: 30.3 MG/DL (ref 33–36)
MCV RBC AUTO: 73.5 FL (ref 80–94)
MONOCYTES # BLD AUTO: 0.69 X10(3)/MCL (ref 0.1–1.3)
MONOCYTES NFR BLD AUTO: 8.9 %
NEUTROPHILS # BLD AUTO: 6.22 X10(3)/MCL (ref 2.1–9.2)
NEUTROPHILS NFR BLD AUTO: 80.5 %
NRBC BLD AUTO-RTO: 0 % (ref 0–1)
PLATELET # BLD AUTO: 278 X10(3)/MCL (ref 140–371)
PMV BLD AUTO: 10 FL (ref 9.4–12.4)
POTASSIUM SERPL-SCNC: 3.8 MMOL/L (ref 3.5–5.1)
PROT SERPL-MCNC: 6.9 GM/DL (ref 6.4–8.3)
RBC # BLD AUTO: 4.86 X10(6)/MCL (ref 4.7–6.1)
SODIUM SERPL-SCNC: 139 MMOL/L (ref 136–145)
TROPONIN I SERPL-MCNC: 0.11 NG/ML (ref 0–0.04)
TROPONIN I SERPL-MCNC: 0.12 NG/ML (ref 0–0.04)
TROPONIN I SERPL-MCNC: 0.12 NG/ML (ref 0–0.04)
WBC # SPEC AUTO: 7.7 X10(3)/MCL (ref 4.5–11.5)

## 2022-12-18 PROCEDURE — 93005 ELECTROCARDIOGRAM TRACING: CPT

## 2022-12-18 PROCEDURE — 84484 ASSAY OF TROPONIN QUANT: CPT | Performed by: PHYSICIAN ASSISTANT

## 2022-12-18 PROCEDURE — 94761 N-INVAS EAR/PLS OXIMETRY MLT: CPT

## 2022-12-18 PROCEDURE — 11000001 HC ACUTE MED/SURG PRIVATE ROOM

## 2022-12-18 PROCEDURE — 25000242 PHARM REV CODE 250 ALT 637 W/ HCPCS: Performed by: STUDENT IN AN ORGANIZED HEALTH CARE EDUCATION/TRAINING PROGRAM

## 2022-12-18 PROCEDURE — 63600175 PHARM REV CODE 636 W HCPCS: Performed by: STUDENT IN AN ORGANIZED HEALTH CARE EDUCATION/TRAINING PROGRAM

## 2022-12-18 PROCEDURE — 85025 COMPLETE CBC W/AUTO DIFF WBC: CPT | Performed by: NURSE PRACTITIONER

## 2022-12-18 PROCEDURE — 93010 ELECTROCARDIOGRAM REPORT: CPT | Mod: ,,, | Performed by: STUDENT IN AN ORGANIZED HEALTH CARE EDUCATION/TRAINING PROGRAM

## 2022-12-18 PROCEDURE — 99285 EMERGENCY DEPT VISIT HI MDM: CPT | Mod: 25

## 2022-12-18 PROCEDURE — 84484 ASSAY OF TROPONIN QUANT: CPT | Performed by: NURSE PRACTITIONER

## 2022-12-18 PROCEDURE — 63600175 PHARM REV CODE 636 W HCPCS: Performed by: PHYSICIAN ASSISTANT

## 2022-12-18 PROCEDURE — 25000003 PHARM REV CODE 250: Performed by: STUDENT IN AN ORGANIZED HEALTH CARE EDUCATION/TRAINING PROGRAM

## 2022-12-18 PROCEDURE — 83880 ASSAY OF NATRIURETIC PEPTIDE: CPT | Performed by: NURSE PRACTITIONER

## 2022-12-18 PROCEDURE — 25000003 PHARM REV CODE 250: Performed by: PHYSICIAN ASSISTANT

## 2022-12-18 PROCEDURE — 80053 COMPREHEN METABOLIC PANEL: CPT | Performed by: NURSE PRACTITIONER

## 2022-12-18 PROCEDURE — 84484 ASSAY OF TROPONIN QUANT: CPT | Performed by: STUDENT IN AN ORGANIZED HEALTH CARE EDUCATION/TRAINING PROGRAM

## 2022-12-18 PROCEDURE — 94640 AIRWAY INHALATION TREATMENT: CPT

## 2022-12-18 PROCEDURE — 25000003 PHARM REV CODE 250: Performed by: NURSE PRACTITIONER

## 2022-12-18 PROCEDURE — 99900031 HC PATIENT EDUCATION (STAT)

## 2022-12-18 PROCEDURE — 93010 EKG 12-LEAD: ICD-10-PCS | Mod: ,,, | Performed by: STUDENT IN AN ORGANIZED HEALTH CARE EDUCATION/TRAINING PROGRAM

## 2022-12-18 PROCEDURE — 96374 THER/PROPH/DIAG INJ IV PUSH: CPT

## 2022-12-18 RX ORDER — ACETAMINOPHEN 325 MG/1
650 TABLET ORAL EVERY 8 HOURS PRN
Status: DISCONTINUED | OUTPATIENT
Start: 2022-12-18 | End: 2022-12-19 | Stop reason: HOSPADM

## 2022-12-18 RX ORDER — ONDANSETRON 2 MG/ML
4 INJECTION INTRAMUSCULAR; INTRAVENOUS EVERY 4 HOURS PRN
Status: DISCONTINUED | OUTPATIENT
Start: 2022-12-18 | End: 2022-12-19 | Stop reason: HOSPADM

## 2022-12-18 RX ORDER — ENOXAPARIN SODIUM 100 MG/ML
40 INJECTION SUBCUTANEOUS EVERY 24 HOURS
Status: DISCONTINUED | OUTPATIENT
Start: 2022-12-18 | End: 2022-12-19 | Stop reason: HOSPADM

## 2022-12-18 RX ORDER — SIMETHICONE 80 MG
1 TABLET,CHEWABLE ORAL 3 TIMES DAILY PRN
Status: DISCONTINUED | OUTPATIENT
Start: 2022-12-18 | End: 2022-12-19 | Stop reason: HOSPADM

## 2022-12-18 RX ORDER — SODIUM CHLORIDE 0.9 % (FLUSH) 0.9 %
10 SYRINGE (ML) INJECTION EVERY 12 HOURS PRN
Status: DISCONTINUED | OUTPATIENT
Start: 2022-12-18 | End: 2022-12-19 | Stop reason: HOSPADM

## 2022-12-18 RX ORDER — NAPROXEN SODIUM 220 MG/1
81 TABLET, FILM COATED ORAL DAILY
Status: DISCONTINUED | OUTPATIENT
Start: 2022-12-19 | End: 2022-12-19 | Stop reason: HOSPADM

## 2022-12-18 RX ORDER — LEVETIRACETAM 500 MG/1
500 TABLET ORAL 2 TIMES DAILY
Status: DISCONTINUED | OUTPATIENT
Start: 2022-12-18 | End: 2022-12-19 | Stop reason: HOSPADM

## 2022-12-18 RX ORDER — FUROSEMIDE 10 MG/ML
40 INJECTION INTRAMUSCULAR; INTRAVENOUS
Status: COMPLETED | OUTPATIENT
Start: 2022-12-18 | End: 2022-12-18

## 2022-12-18 RX ORDER — GLUCAGON 1 MG
1 KIT INJECTION
Status: DISCONTINUED | OUTPATIENT
Start: 2022-12-18 | End: 2022-12-19 | Stop reason: HOSPADM

## 2022-12-18 RX ORDER — IPRATROPIUM BROMIDE AND ALBUTEROL SULFATE 2.5; .5 MG/3ML; MG/3ML
3 SOLUTION RESPIRATORY (INHALATION)
Status: COMPLETED | OUTPATIENT
Start: 2022-12-18 | End: 2022-12-18

## 2022-12-18 RX ORDER — ARIPIPRAZOLE 5 MG/1
10 TABLET ORAL DAILY
Status: DISCONTINUED | OUTPATIENT
Start: 2022-12-19 | End: 2022-12-19 | Stop reason: HOSPADM

## 2022-12-18 RX ORDER — IBUPROFEN 200 MG
16 TABLET ORAL
Status: DISCONTINUED | OUTPATIENT
Start: 2022-12-18 | End: 2022-12-19 | Stop reason: HOSPADM

## 2022-12-18 RX ORDER — ATORVASTATIN CALCIUM 40 MG/1
80 TABLET, FILM COATED ORAL DAILY
Status: DISCONTINUED | OUTPATIENT
Start: 2022-12-19 | End: 2022-12-19 | Stop reason: HOSPADM

## 2022-12-18 RX ORDER — SODIUM CHLORIDE 9 MG/ML
INJECTION, SOLUTION INTRAVENOUS CONTINUOUS
Status: DISCONTINUED | OUTPATIENT
Start: 2022-12-18 | End: 2022-12-18

## 2022-12-18 RX ORDER — IBUPROFEN 200 MG
24 TABLET ORAL
Status: DISCONTINUED | OUTPATIENT
Start: 2022-12-18 | End: 2022-12-19 | Stop reason: HOSPADM

## 2022-12-18 RX ORDER — AMIODARONE HYDROCHLORIDE 200 MG/1
200 TABLET ORAL DAILY
Status: DISCONTINUED | OUTPATIENT
Start: 2022-12-19 | End: 2022-12-19 | Stop reason: HOSPADM

## 2022-12-18 RX ORDER — ACETAMINOPHEN 325 MG/1
650 TABLET ORAL EVERY 4 HOURS PRN
Status: DISCONTINUED | OUTPATIENT
Start: 2022-12-18 | End: 2022-12-19 | Stop reason: HOSPADM

## 2022-12-18 RX ORDER — ASPIRIN 325 MG
325 TABLET, DELAYED RELEASE (ENTERIC COATED) ORAL
Status: COMPLETED | OUTPATIENT
Start: 2022-12-18 | End: 2022-12-18

## 2022-12-18 RX ORDER — ISOSORBIDE MONONITRATE 30 MG/1
30 TABLET, EXTENDED RELEASE ORAL DAILY
Status: DISCONTINUED | OUTPATIENT
Start: 2022-12-19 | End: 2022-12-19 | Stop reason: HOSPADM

## 2022-12-18 RX ADMIN — LEVETIRACETAM 500 MG: 500 TABLET, FILM COATED ORAL at 08:12

## 2022-12-18 RX ADMIN — FUROSEMIDE 40 MG: 10 INJECTION, SOLUTION INTRAMUSCULAR; INTRAVENOUS at 01:12

## 2022-12-18 RX ADMIN — IPRATROPIUM BROMIDE AND ALBUTEROL SULFATE 3 ML: .5; 3 SOLUTION RESPIRATORY (INHALATION) at 12:12

## 2022-12-18 RX ADMIN — ENOXAPARIN SODIUM 40 MG: 40 INJECTION SUBCUTANEOUS at 05:12

## 2022-12-18 RX ADMIN — SIMETHICONE 80 MG: 80 TABLET, CHEWABLE ORAL at 08:12

## 2022-12-18 RX ADMIN — ACETAMINOPHEN 650 MG: 325 TABLET, FILM COATED ORAL at 09:12

## 2022-12-18 RX ADMIN — ASPIRIN 325 MG: 325 TABLET, COATED ORAL at 01:12

## 2022-12-18 NOTE — ED PROVIDER NOTES
Encounter Date: 12/18/2022    SCRIBE #1 NOTE: I, Faye Grullon, am scribing for, and in the presence of,  Jarrod Dean MD. I have scribed the following portions of the note - Other sections scribed: HPI, ROS, PE.     History     Chief Complaint   Patient presents with    Shortness of Breath     Pt reports SOB while at [employer] this morning. No distress at this time. LVAD pt.      57 year old male with a history of hepatitis C, bipolar disorder, and a cardiac history presents to the ED, via EMS, complaining of shortness of breath.  Pt reports he was wheezing and became weak at [employer] this morning.  He reports chest pain that he describes as pressure at 8/10.  Pt says that usually breathing treatments help him.  He said he was nauseous yesterday.  He denies any new swelling.    The history is provided by the patient. No  was used.   Shortness of Breath  This is a new problem. The problem occurs continuously.The current episode started 1 to 2 hours ago. Associated symptoms include chest pain. Pertinent negatives include no fever, no sore throat, no neck pain, no wheezing, no vomiting, no abdominal pain, no rash and no leg swelling. Associated medical issues include heart failure.   Review of patient's allergies indicates:   Allergen Reactions    Depakote [divalproex]     Divalproex sodium Other (See Comments)    Lithium     Lithium analogues     Quetiapine Other (See Comments)     Pt states had seizures     Past Medical History:   Diagnosis Date    Bipolar disorder, unspecified     Chronic hepatitis C     History of psychiatric hospitalization     Hx of psychiatric care     Hypertension     Bessie     Obesity, unspecified     Psychiatric problem     Schizoaffective disorder, bipolar type     Seizures     Stroke     Substance abuse     Therapy      Past Surgical History:   Procedure Laterality Date    CORONARY STENT PLACEMENT  08/14/2015    DEBRIDEMENT  04/17/2022    LEFT HEART CATHETERIZATION  Left 08/13/2015    REPEAT CLOSURE OF STERNAL INCISION N/A 5/11/2022    Procedure: CLOSURE, STERNAL INCISION, REPEAT;  Surgeon: Adolfo Weiss MD;  Location: [facility] OR;  Service: Plastics;  Laterality: N/A;  STERNAL WOUND DEBRIDEMENT AND RECONSTRUCTION // MULTIPLE MUSCLE FLAPS // REQ 1400     Family History   Problem Relation Age of Onset    Cancer Mother     Liver disease Father      Social History     Tobacco Use    Smoking status: Every Day     Types: Cigarettes    Smokeless tobacco: Never   Substance Use Topics    Alcohol use: Never    Drug use: Not Currently     Types: Cocaine     Review of Systems   Constitutional:  Negative for chills and fever.   HENT:  Negative for drooling and sore throat.    Eyes:  Negative for pain and redness.   Respiratory:  Positive for shortness of breath. Negative for wheezing and stridor.    Cardiovascular:  Positive for chest pain. Negative for palpitations and leg swelling.   Gastrointestinal:  Negative for abdominal pain, nausea and vomiting.   Genitourinary:  Negative for dysuria and hematuria.   Musculoskeletal:  Negative for back pain, neck pain and neck stiffness.   Skin:  Negative for rash and wound.   Neurological:  Negative for weakness and numbness.   Hematological:  Does not bruise/bleed easily.     Physical Exam     Initial Vitals [12/18/22 1005]   BP Pulse Resp Temp SpO2   112/80 (!) 52 16 98.2 °F (36.8 °C) (!) 94 %      MAP       --         Physical Exam    Nursing note and vitals reviewed.  Constitutional: He appears well-developed. He is not diaphoretic. No distress.   HENT:   Head: Normocephalic and atraumatic.   Nose: Nose normal.   Mouth/Throat: Oropharynx is clear and moist.   Eyes: Conjunctivae and EOM are normal. Pupils are equal, round, and reactive to light.   Neck: Neck supple.   Normal range of motion.  Cardiovascular:  Normal rate and regular rhythm.           No murmur heard.  Life Vest in place   Pulmonary/Chest: No respiratory distress. He has wheezes in  the right upper field, the right middle field, the right lower field, the left upper field, the left middle field and the left lower field. He has no rales.   Abdominal: Abdomen is soft. He exhibits no distension. There is no abdominal tenderness.   Musculoskeletal:         General: No edema. Normal range of motion.      Cervical back: Normal range of motion and neck supple.     Neurological: He is alert and oriented to person, place, and time. He has normal strength. No cranial nerve deficit.   Skin: Skin is warm. Capillary refill takes less than 2 seconds. No rash noted.   Psychiatric: He has a normal mood and affect. His behavior is normal.       ED Course   Critical Care    Date/Time: 12/21/2022 1:57 PM  Performed by: Jarrod Dean MD  Authorized by: Allyson Mendoza DO   Direct patient critical care time: 35 minutes  Total critical care time (exclusive of procedural time) : 35 minutes  Critical care time was exclusive of separately billable procedures and treating other patients.  Critical care was necessary to treat or prevent imminent or life-threatening deterioration of the following conditions: cardiac failure and respiratory failure.  Critical care was time spent personally by me on the following activities: blood draw for specimens, development of treatment plan with patient or surrogate, discussions with consultants, evaluation of patient's response to treatment, examination of patient, obtaining history from patient or surrogate, ordering and performing treatments and interventions, ordering and review of laboratory studies, ordering and review of radiographic studies, pulse oximetry, re-evaluation of patient's condition and review of old charts.      Labs Reviewed   COMPREHENSIVE METABOLIC PANEL - Abnormal; Notable for the following components:       Result Value    Glucose Level 142 (*)     Blood Urea Nitrogen 50.4 (*)     Creatinine 1.69 (*)     Aspartate Aminotransferase 35 (*)     All other  components within normal limits   TROPONIN I - Abnormal; Notable for the following components:    Troponin-I 0.116 (*)     All other components within normal limits   B-TYPE NATRIURETIC PEPTIDE - Abnormal; Notable for the following components:    Natriuretic Peptide 3,135.6 (*)     All other components within normal limits   CBC WITH DIFFERENTIAL - Abnormal; Notable for the following components:    Hgb 10.8 (*)     Hct 35.7 (*)     MCV 73.5 (*)     MCHC 30.3 (*)     RDW 20.0 (*)     All other components within normal limits   TROPONIN I - Abnormal; Notable for the following components:    Troponin-I 0.119 (*)     All other components within normal limits   TROPONIN I - Abnormal; Notable for the following components:    Troponin-I 0.112 (*)     All other components within normal limits   CBC W/ AUTO DIFFERENTIAL    Narrative:     The following orders were created for panel order CBC Auto Differential.  Procedure                               Abnormality         Status                     ---------                               -----------         ------                     CBC with Differential[[id number]]        Abnormal            Final result                 Please view results for these tests on the individual orders.        ECG Results              EKG 12-lead (Final result)  Result time 12/19/22 16:09:49      Final result by Interface, Lab In [facility] (12/19/22 16:09:49)                   Narrative:    Test Reason : R06.02,    Vent. Rate : 048 BPM     Atrial Rate : 048 BPM     P-R Int : 188 ms          QRS Dur : 150 ms      QT Int : 504 ms       P-R-T Axes : 038 -37 086 degrees     QTc Int : 450 ms    Sinus bradycardia  Possible Left atrial enlargement  Left axis deviation  Right bundle branch block  Abnormal ECG  Confirmed by Kendall Williamson MD ([id number]) on 12/19/2022 4:09:41 PM    Referred By: [email or web address]   SELF           Confirmed By:Kendall Williamson MD                                  Imaging Results               X-Ray Chest PA And Lateral (Final result)  Result time 12/18/22 11:03:08      Final result by Pascale Vences MD (12/18/22 11:03:08)                   Impression:      Stable cardiomegaly with mild congestive changes.      Electronically signed by: Pascale Vences  Date:    12/18/2022  Time:    11:03               Narrative:    EXAMINATION:  XR CHEST PA AND LATERAL    CLINICAL HISTORY:  Shortness of breath    TECHNIQUE:  PA and lateral chest radiographs    COMPARISON:  Chest x-ray dated 11/30/2022    FINDINGS:  There is stable cardiomegaly.  There are prominent interstitial markings without focal consolidation.  There is no pleural effusion or pneumothorax.                                    X-Rays:   Independently Interpreted Readings:   Other Readings:  CXR: Vascular congestion, no PTX  Medications   aspirin EC tablet 325 mg (325 mg Oral Given 12/18/22 1305)   albuterol-ipratropium 2.5 mg-0.5 mg/3 mL nebulizer solution 3 mL (3 mLs Nebulization Given 12/18/22 1238)   furosemide injection 40 mg (40 mg Intravenous Given 12/18/22 1345)     Medical Decision Making:   Independently Interpreted Test(s):   I have ordered and independently interpreted X-rays - see prior notes.  I have ordered and independently interpreted EKG Reading(s) - see prior notes  Clinical Tests:   Lab Tests: Ordered and Reviewed  Radiological Study: Ordered and Reviewed  Medical Tests: Ordered and Reviewed  ED Management:  DDX:  ACS, arrhythmia, CHF exacerbation, COPD, electrolyte abnormalities, kidney injury    MDM: Kendall Duff is a 57 y.o. male with above past medical history who presents to the ED for shortness of breath and chest pain. Vital signs reviewed.  Patient is mildly tachypneic and does have wheezing scattered throughout on exam.  Inhaled bronchodilators and steroids have been ordered.  EKG with no STEMI criteria.  Chest x-ray is pending.  Labs including troponin are pending.  LifeVest has been assessed and has adequate charge  currently.  Patient agrees with plan of care and all questions answered at bedside.    Update:  Chest x-ray reviewed.  Labs been reviewed.  Troponin mildly elevated.  Patient reports some mild improvement in his symptoms.  Will proceed with diuresis.  Cardiology has been consulted recommends admission and they will evaluate in consult.  Patient agrees with plan for admission and all questions answered at bedside.  Hospital Medicine has been consulted and has accepted the patient for admission.    Dispo: Admit    Data Reviewed/Counseling: I have personally reviewed the patient's vital signs, nursing notes, and other relevant tests, information, and imaging. I had a detailed discussion regarding the historical points, exam findings, and any diagnostic results supporting the discharge diagnosis.     Portions of this note were dictated using voice recognition software. Although it was reviewed for accuracy, some inherent voice recognition errors may have occurred and be present in this document.    Other:   I have discussed this case with another health care provider.           ED Course as of 12/21/22 0290   Sun Dec 18, 2022   1221 [facility]IS consulted. []   1247 EKG 12-lead  EKG independently interpreted by me.  EKG: SB @ 48, no STEMI, RBBB, LAD, QTc 450 []      ED Course User Index  [MC] Jarrod Dean MD                   Clinical Impression:   Final diagnoses:  [R06.02] SOB (shortness of breath)  [R07.9] Chest pain, unspecified type (Primary)  [I21.4] NSTEMI (non-ST elevated myocardial infarction)  [I50.9] Acute on chronic congestive heart failure, unspecified heart failure type  [N17.9] ANA (acute kidney injury)        ED Disposition Condition    Admit Stable                Jarrod Dean MD  12/21/22 [id number]

## 2022-12-18 NOTE — FIRST PROVIDER EVALUATION
"Medical screening examination initiated.  I have conducted a focused provider triage encounter, findings are as follows:    Brief history of present illness:  56y/o M presents to the ED with c/o SOB after walking to [employer]. Denies any chest pain. He does  wear a life vest.     Vitals:    12/18/22 1005   BP: 112/80   Pulse: (!) 52   Resp: 16   Temp: 98.2 °F (36.8 °C)   SpO2: (!) 94%   Weight: 83.9 kg (185 lb)   Height: 5' 9" (1.753 m)       Pertinent physical exam:  AAA x 3    Brief workup plan:  Labs/Imaging    Preliminary workup initiated; this workup will be continued and followed by the physician or advanced practice provider that is assigned to the patient when roomed.  "

## 2022-12-18 NOTE — H&P
[facility] Hospital Medicine History & Physical Examination       Patient Name: Kendall Dfuf  MRN: [id number]  Patient Class: IP- Inpatient   Admission Date: 12/18/2022   Admitting Physician: Allyson Mendoza MD  Length of Stay: 0  Attending Physician: Allyson Mendoza MD  Primary Care Provider: Primary Doctor No  Face-to-Face encounter date: 12/18/2022  Code Status: Full Code  Chief Complaint: Shortness of Breath (Pt reports SOB while at [facility] this morning. No distress at this time. LVAD pt. )        Patient information was obtained from patient, patient's family, past medical records and ER records.     HISTORY OF PRESENT ILLNESS:   Kendall Duff is a 57 y.o. Black or  male with a past medical history of hypertension, hyperlipidemia, coronary disease status post stent, anticoagulated with Eliquis, congestive heart failure, seizure disorder, CVA, bipolar disorder, chronic hepatitis-C, substance abuse. The patient presented to [facility] on 12/18/2022 with a primary complaint of shortness of breath which began this morning (12/18/2022) while at [facility].  Patient reports dyspnea is mostly present with exertion and associated with swelling in the lower extremities, cough with white sputum production, 1 episode of vomiting yesterday.  He denies complaints of chest pain, fever, chills, abdominal pain, nausea and diarrhea. He smokes 1 pack of cigarettes a day.  He states he has been compliant with his Lasix.  Patient's cardiologist is Dr. Wright with [facility].    Upon presentation to the ED, patient afebrile, heart rate 52, blood pressure 112/88, respiratory rate 16 and SpO2 94% room air.  Labs notable for stable microcytic anemia, BUN/creatinine 50.4/1.69 (23.4/1.10 on 12/07/2022), BNP 3136 and troponin 0.116.  EKG sinus bradycardia, right bundle branch block and septal infarct cited on prior study.  Chest x-ray with stable cardiomegaly and mild congestive changes.  In the ED patient  received DuoNebs, full-dose aspirin and 40 mg of IV Lasix.  Cardiology consulted.  Patient admitted to hospital medicine services for further medical management.    PAST MEDICAL HISTORY:   Hypertension  Hyperlipidemia  Coronary disease status post stent  Congestive heart failure  Seizure disorder  CVA   Bipolar disorder   Chronic hepatitis-C   Substance abuse    PAST SURGICAL HISTORY:     Past Surgical History:   Procedure Laterality Date    CORONARY STENT PLACEMENT  08/14/2015    DEBRIDEMENT  04/17/2022    LEFT HEART CATHETERIZATION Left 08/13/2015    REPEAT CLOSURE OF STERNAL INCISION N/A 5/11/2022    Procedure: CLOSURE, STERNAL INCISION, REPEAT;  Surgeon: Adolfo Weiss MD;  Location: [facility] OR;  Service: Plastics;  Laterality: N/A;  STERNAL WOUND DEBRIDEMENT AND RECONSTRUCTION // MULTIPLE MUSCLE FLAPS // REQ 1400       ALLERGIES:   Depakote [divalproex], Divalproex sodium, Lithium, Lithium analogues, and Quetiapine    FAMILY HISTORY:   Reviewed and negative    SOCIAL HISTORY:   Tobacco - Current Smoker; 1 PPD  Alcohol - Denies  Illicit Drugs - Denies    HOME MEDICATIONS:     Prior to Admission medications    Medication Sig Start Date End Date Taking? Authorizing Provider   amiodarone (PACERONE) 200 MG Tab Take 1 tablet (200 mg total) by mouth once daily. 11/7/22 12/7/22  Soila Mann MD   apixaban (ELIQUIS) 5 mg Tab Take 1 tablet (5 mg total) by mouth 2 (two) times daily. 7/30/22   Taz Sanon MD   ARIPiprazole (ABILIFY) 10 MG Tab Take 1 tablet (10 mg total) by mouth once daily. 11/4/22 11/4/23  Aziza Hernandez MD   aspirin 81 MG Chew Chew and swallow 1 tablet (81 mg total) by mouth once daily. 7/26/22 7/26/23  Harjit Ruiz MD   atorvastatin (LIPITOR) 80 MG tablet Take 1 tablet (80 mg total) by mouth once daily. 11/4/22 11/4/23  Aziza Hernandez MD   benztropine (COGENTIN) 1 MG tablet Take 1 mg by mouth 2 (two) times daily.    Historical Provider   famotidine (PEPCID) 20 MG tablet Take 1 tablet (20 mg  total) by mouth 2 (two) times daily. 11/4/22 11/4/23  Aziza Hernandez MD   furosemide (LASIX) 40 MG tablet Take 1 tablet (40 mg total) by mouth once daily. 11/4/22 11/4/23  RAY Patel   isosorbide mononitrate (IMDUR) 30 MG 24 hr tablet Take 1 tablet (30 mg total) by mouth once daily. 11/7/22 11/7/23  Soila Mann MD   levETIRAcetam (KEPPRA) 500 MG Tab Take 1 tablet (500 mg total) by mouth 2 (two) times daily. 11/4/22 11/4/23  Aziza Hernandez MD   metoprolol tartrate 75 mg Tab Take 75 mg by mouth 2 (two) times daily.  Patient not taking: Reported on 12/5/2022 11/11/22 11/11/23  CJ Jay   OXcarbazepine (TRILEPTAL) 300 MG Tab Take 300 mg by mouth 3 (three) times daily.    Historical Provider   sacubitriL-valsartan (ENTRESTO) 24-26 mg per tablet Take 1 tablet by mouth 2 (two) times daily.  Patient not taking: Reported on 12/5/2022 11/29/22 12/29/22  Cordell Robbins MD   traZODone (DESYREL) 100 MG tablet Take 1 tablet (100 mg total) by mouth every evening. 6/17/22 6/17/23  Jarrett Yarbrough MD   metoprolol succinate (TOPROL-XL) 25 MG 24 hr tablet Take 25 mg by mouth once daily. 3/27/22 6/17/22  Historical Provider   pravastatin (PRAVACHOL) 40 MG tablet Take 1 tablet (40 mg total) by mouth every evening. 3/25/22 6/17/22  Laisha May NP       REVIEW OF SYSTEMS:   Except as documented, all other systems reviewed and negative     PHYSICAL EXAM:     VITAL SIGNS: 24 HRS MIN & MAX LAST   Temp  Min: 98.2 °F (36.8 °C)  Max: 98.2 °F (36.8 °C) 98.2 °F (36.8 °C)   BP  Min: 110/76  Max: 112/84 112/84   Pulse  Min: 48  Max: 52  (!) 52     Resp  Min: 16  Max: 22 18   SpO2  Min: 94 %  Max: 100 % (!) 94 %         General appearance: Well-developed, well-nourished male in no apparent distress.  No family at bedside.  HEENT: Atraumatic head. Moist mucous membranes of oral cavity.  Lungs:  Wheezing to auscultation bilaterally at bases.   Heart:  Bradycardic rate and rhythm.  Edema lower extremities.  Abdomen:  Soft, non-distended.   Extremities: No cyanosis, clubbing. No deformities.  Skin: No Rash. Warm and dry.  Neuro: Awake, alert and oriented. Motor and sensory exams grossly intact.  Psych/mental status: Appropriate mood and affect. Cooperative. Responds appropriately to questions.       LABS AND IMAGING:     Recent Labs   Lab 12/18/22  1102   WBC 7.7   RBC 4.86   HGB 10.8*   HCT 35.7*   MCV 73.5*   MCH 22.2   MCHC 30.3*   RDW 20.0*      MPV 10.0       Recent Labs   Lab 12/18/22  1102      K 3.8   CO2 22   BUN 50.4*   CREATININE 1.69*   CALCIUM 9.0   ALBUMIN 3.6   ALKPHOS 146   ALT 24   AST 35*   BILITOT 0.6       Microbiology Results (last 7 days)       ** No results found for the last 168 hours. **             X-Ray Chest PA And Lateral  Narrative: EXAMINATION:  XR CHEST PA AND LATERAL    CLINICAL HISTORY:  Shortness of breath    TECHNIQUE:  PA and lateral chest radiographs    COMPARISON:  Chest x-ray dated 11/30/2022    FINDINGS:  There is stable cardiomegaly.  There are prominent interstitial markings without focal consolidation.  There is no pleural effusion or pneumothorax.  Impression: Stable cardiomegaly with mild congestive changes.    Electronically signed by: Pascale Vences  Date:    12/18/2022  Time:    11:03        ASSESSMENT & PLAN:   Assessment:  NSTEMI   Sinus bradycardia   Acute congestive heart failure exacerbation  Microcytic anemia, stable   Acute kidney injury   Essential hypertension   Hyperlipidemia  Coronary disease status post stents   Anticoagulated with Eliquis  Seizure disorder  CVA   Bipolar disorder   Chronic hepatitis-C  Tobacco use    Plan:  - Cardiology consulted.  Appreciate recommendations  - Trend troponin  - Echo ordered looking for cardiac wall abnormalities and ejection fraction  - Daily weights and strict I&Os  - Telemetry monitoring   - Resume appropriate home medications   - Labs in AM       VTE Prophylaxis: will be placed on Lovenox for DVT prophylaxis and will  be advised to be as mobile as possible and sit in a chair as tolerated      __________________________________________________________________________  INPATIENT LIST OF MEDICATIONS     Current Facility-Administered Medications:     0.9%  NaCl infusion, , Intravenous, Continuous, Mey Woods PA-C    acetaminophen tablet 650 mg, 650 mg, Oral, Q8H PRN, BOB Bullard-SHRUTI    acetaminophen tablet 650 mg, 650 mg, Oral, Q4H PRN, BOB Bullard-SHRUTI    dextrose 10% bolus 125 mL 125 mL, 12.5 g, Intravenous, PRN, BOB Bullard-SHRUTI    dextrose 10% bolus 250 mL 250 mL, 25 g, Intravenous, PRN, Mey Woods PA-C    enoxaparin injection 40 mg, 40 mg, Subcutaneous, Daily, Mey Woods PA-C    glucagon (human recombinant) injection 1 mg, 1 mg, Intramuscular, PRN, Mey Woods PA-C    glucose chewable tablet 16 g, 16 g, Oral, PRN, BOB Bullard-SHRUTI    glucose chewable tablet 24 g, 24 g, Oral, PRN, Mey Woods PA-C    ondansetron injection 4 mg, 4 mg, Intravenous, Q4H PRN, Mey Woods PA-C    sodium chloride 0.9% flush 10 mL, 10 mL, Intravenous, Q12H PRN, Mey Woods PA-C    Current Outpatient Medications:     amiodarone (PACERONE) 200 MG Tab, Take 1 tablet (200 mg total) by mouth once daily., Disp: 30 tablet, Rfl: 0    apixaban (ELIQUIS) 5 mg Tab, Take 1 tablet (5 mg total) by mouth 2 (two) times daily., Disp: 60 tablet, Rfl: 3    ARIPiprazole (ABILIFY) 10 MG Tab, Take 1 tablet (10 mg total) by mouth once daily., Disp: 30 tablet, Rfl: 11    aspirin 81 MG Chew, Chew and swallow 1 tablet (81 mg total) by mouth once daily., Disp: 360 tablet, Rfl: 0    atorvastatin (LIPITOR) 80 MG tablet, Take 1 tablet (80 mg total) by mouth once daily., Disp: 90 tablet, Rfl: 3    benztropine (COGENTIN) 1 MG tablet, Take 1 mg by mouth 2 (two) times daily., Disp: , Rfl:     famotidine (PEPCID) 20 MG tablet, Take 1 tablet (20 mg total) by mouth 2 (two) times daily., Disp: 60 tablet, Rfl: 11     furosemide (LASIX) 40 MG tablet, Take 1 tablet (40 mg total) by mouth once daily., Disp: 30 tablet, Rfl: 11    isosorbide mononitrate (IMDUR) 30 MG 24 hr tablet, Take 1 tablet (30 mg total) by mouth once daily., Disp: 30 tablet, Rfl: 11    levETIRAcetam (KEPPRA) 500 MG Tab, Take 1 tablet (500 mg total) by mouth 2 (two) times daily., Disp: 60 tablet, Rfl: 11    metoprolol tartrate 75 mg Tab, Take 75 mg by mouth 2 (two) times daily. (Patient not taking: Reported on 12/5/2022), Disp: 60 tablet, Rfl: 11    OXcarbazepine (TRILEPTAL) 300 MG Tab, Take 300 mg by mouth 3 (three) times daily., Disp: , Rfl:     sacubitriL-valsartan (ENTRESTO) 24-26 mg per tablet, Take 1 tablet by mouth 2 (two) times daily. (Patient not taking: Reported on 12/5/2022), Disp: 60 tablet, Rfl: 0    traZODone (DESYREL) 100 MG tablet, Take 1 tablet (100 mg total) by mouth every evening., Disp: 30 tablet, Rfl: 11      Scheduled Meds:   enoxaparin  40 mg Subcutaneous Daily     Continuous Infusions:   sodium chloride 0.9%       PRN Meds:.acetaminophen, acetaminophen, dextrose 10%, dextrose 10%, glucagon (human recombinant), glucose, glucose, ondansetron, sodium chloride 0.9%      Discharge Planning and Disposition: Anticipated discharge to be determined.    IMey PA, have reviewed and discussed the case with Dr. Allyson Mendoza MD.    Please see the following addendum for further assessment and plan from there attending MD.    Mey Woods PA-C  12/18/2022    ________________________________________________________________________________    MD Addendum:  Dr. Allyson CORADO MD assumed care of this patient today at ---am/pm  For the patient encounter, I performed the substantive portion of the visit, I reviewed the NP/PA documentation, treatment plan, and medical decision making.  I had face to face time with this patient     A. History:    B. Physical exam:    C. Medical decision making:      All diagnosis and differential diagnosis  have been reviewed; assessment and plan has been documented; I have personally reviewed the labs and test results that are presently available; I have reviewed the patients medication list; I have reviewed the consulting providers response and recommendations. I have reviewed or attempted to review medical records based upon their availability.    All of the patient and family questions have been addressed and answered. Patient's is agreeable to the above stated plan. I will continue to monitor closely and make adjustments to medical management as needed.    Allyson Mendoza MD  12/18/2022

## 2022-12-19 LAB
AV INDEX (PROSTH): 0.99
AV MEAN GRADIENT: 1 MMHG
AV PEAK GRADIENT: 3 MMHG
AV VALVE AREA: 3.1 CM2
AV VELOCITY RATIO: 1
BSA FOR ECHO PROCEDURE: 2.02 M2
CV ECHO LV RWT: 0.32 CM
DOP CALC AO PEAK VEL: 0.83 M/S
DOP CALC AO VTI: 17.7 CM
DOP CALC LVOT AREA: 3.1 CM2
DOP CALC LVOT DIAMETER: 2 CM
DOP CALC LVOT PEAK VEL: 0.83 M/S
DOP CALC LVOT STROKE VOLUME: 54.95 CM3
DOP CALC MV VTI: 38 CM
DOP CALCLVOT PEAK VEL VTI: 17.5 CM
E WAVE DECELERATION TIME: 190 MSEC
E/A RATIO: 3.74
E/E' RATIO: 18.36 M/S
ECHO LV POSTERIOR WALL: 0.92 CM (ref 0.6–1.1)
EJECTION FRACTION: 25 %
FRACTIONAL SHORTENING: 16 % (ref 28–44)
INTERVENTRICULAR SEPTUM: 1.37 CM (ref 0.6–1.1)
LEFT ATRIUM SIZE: 4.9 CM
LEFT ATRIUM VOLUME INDEX MOD: 26.2 ML/M2
LEFT ATRIUM VOLUME MOD: 52.3 CM3
LEFT INTERNAL DIMENSION IN SYSTOLE: 4.76 CM (ref 2.1–4)
LEFT VENTRICLE DIASTOLIC VOLUME INDEX: 79.5 ML/M2
LEFT VENTRICLE DIASTOLIC VOLUME: 159 ML
LEFT VENTRICLE MASS INDEX: 135 G/M2
LEFT VENTRICLE SYSTOLIC VOLUME INDEX: 52.5 ML/M2
LEFT VENTRICLE SYSTOLIC VOLUME: 105 ML
LEFT VENTRICULAR INTERNAL DIMENSION IN DIASTOLE: 5.69 CM (ref 3.5–6)
LEFT VENTRICULAR MASS: 270.13 G
LV LATERAL E/E' RATIO: 14.43 M/S
LV SEPTAL E/E' RATIO: 25.25 M/S
LVOT MG: 1 MMHG
LVOT MV: 0.5 CM/S
MV MEAN GRADIENT: 1 MMHG
MV PEAK A VEL: 0.27 M/S
MV PEAK E VEL: 1.01 M/S
MV PEAK GRADIENT: 5 MMHG
MV STENOSIS PRESSURE HALF TIME: 73 MS
MV VALVE AREA BY CONTINUITY EQUATION: 1.45 CM2
MV VALVE AREA P 1/2 METHOD: 3.01 CM2
PISA MRMAX VEL: 4 M/S
PISA TR MAX VEL: 2.14 M/S
PV PEAK VELOCITY: 0.79 CM/S
RA PRESSURE: 15 MMHG
RA WIDTH: 5.14 CM
TDI LATERAL: 0.07 M/S
TDI SEPTAL: 0.04 M/S
TDI: 0.06 M/S
TR MAX PG: 18 MMHG
TRICUSPID ANNULAR PLANE SYSTOLIC EXCURSION: 1.27 CM
TV REST PULMONARY ARTERY PRESSURE: 33 MMHG

## 2022-12-21 ENCOUNTER — HOSPITAL ENCOUNTER (INPATIENT)
Facility: HOSPITAL | Age: 57
LOS: 4 days | Discharge: HOME | DRG: 280 | End: 2022-12-25
Attending: EMERGENCY MEDICINE | Admitting: INTERNAL MEDICINE
Payer: MEDICAID

## 2022-12-21 ENCOUNTER — NURSE TRIAGE (OUTPATIENT)
Dept: ADMINISTRATIVE | Facility: CLINIC | Age: 57
End: 2022-12-21
Payer: MEDICAID

## 2022-12-21 DIAGNOSIS — I50.9 ACUTE CONGESTIVE HEART FAILURE, UNSPECIFIED HEART FAILURE TYPE: ICD-10-CM

## 2022-12-21 DIAGNOSIS — R06.00 DYSPNEA, UNSPECIFIED TYPE: ICD-10-CM

## 2022-12-21 DIAGNOSIS — R07.9 CHEST PAIN: ICD-10-CM

## 2022-12-21 DIAGNOSIS — R07.9 CHEST PAIN, UNSPECIFIED TYPE: Primary | ICD-10-CM

## 2022-12-21 DIAGNOSIS — R07.9 ACUTE CHEST PAIN: ICD-10-CM

## 2022-12-21 LAB
ALBUMIN SERPL-MCNC: 3.3 G/DL (ref 3.5–5)
ALBUMIN/GLOB SERPL: 1 RATIO (ref 1.1–2)
ALP SERPL-CCNC: 137 UNIT/L (ref 40–150)
ALT SERPL-CCNC: 21 UNIT/L (ref 0–55)
AMPHET UR QL SCN: NEGATIVE
AST SERPL-CCNC: 29 UNIT/L (ref 5–34)
BARBITURATE SCN PRESENT UR: NEGATIVE
BASOPHILS # BLD AUTO: 0.04 X10(3)/MCL (ref 0–0.2)
BASOPHILS NFR BLD AUTO: 0.7 %
BENZODIAZ UR QL SCN: NEGATIVE
BILIRUBIN DIRECT+TOT PNL SERPL-MCNC: 0.5 MG/DL
BNP BLD-MCNC: 3118.4 PG/ML
BUN SERPL-MCNC: 44.5 MG/DL (ref 8.4–25.7)
CALCIUM SERPL-MCNC: 8.8 MG/DL (ref 8.4–10.2)
CANNABINOIDS UR QL SCN: NEGATIVE
CHLORIDE SERPL-SCNC: 106 MMOL/L (ref 98–107)
CO2 SERPL-SCNC: 25 MMOL/L (ref 22–29)
COCAINE UR QL SCN: NEGATIVE
CREAT SERPL-MCNC: 1.4 MG/DL (ref 0.73–1.18)
EOSINOPHIL # BLD AUTO: 0.1 X10(3)/MCL (ref 0–0.9)
EOSINOPHIL NFR BLD AUTO: 1.7 %
ERYTHROCYTE [DISTWIDTH] IN BLOOD BY AUTOMATED COUNT: 19.6 % (ref 11.6–14.4)
FENTANYL UR QL SCN: NEGATIVE
GFR SERPLBLD CREATININE-BSD FMLA CKD-EPI: 58 MLS/MIN/1.73/M2
GLOBULIN SER-MCNC: 3.3 GM/DL (ref 2.4–3.5)
GLUCOSE SERPL-MCNC: 156 MG/DL (ref 74–100)
HCT VFR BLD AUTO: 32 % (ref 42–52)
HGB BLD-MCNC: 9.7 GM/DL (ref 14–18)
IMM GRANULOCYTES # BLD AUTO: 0.02 X10(3)/MCL (ref 0–0.04)
IMM GRANULOCYTES NFR BLD AUTO: 0.3 %
LITHIUM SERPL-MCNC: <0.1 MMOL/L (ref 0.5–1.2)
LYMPHOCYTES # BLD AUTO: 0.7 X10(3)/MCL (ref 0.6–4.6)
LYMPHOCYTES NFR BLD AUTO: 12 %
MAGNESIUM SERPL-MCNC: 2.1 MG/DL (ref 1.6–2.6)
MCH RBC QN AUTO: 22.4 PG
MCHC RBC AUTO-ENTMCNC: 30.3 MG/DL (ref 33–36)
MCV RBC AUTO: 73.7 FL (ref 80–94)
MDMA UR QL SCN: NEGATIVE
MONOCYTES # BLD AUTO: 0.61 X10(3)/MCL (ref 0.1–1.3)
MONOCYTES NFR BLD AUTO: 10.5 %
NEUTROPHILS # BLD AUTO: 4.36 X10(3)/MCL (ref 2.1–9.2)
NEUTROPHILS NFR BLD AUTO: 74.8 %
NRBC BLD AUTO-RTO: 0 % (ref 0–1)
OPIATES UR QL SCN: NEGATIVE
PCP UR QL: NEGATIVE
PH UR: 5 [PH] (ref 3–11)
PLATELET # BLD AUTO: 206 X10(3)/MCL (ref 140–371)
PMV BLD AUTO: 10 FL (ref 9.4–12.4)
POTASSIUM SERPL-SCNC: 3.6 MMOL/L (ref 3.5–5.1)
PROT SERPL-MCNC: 6.6 GM/DL (ref 6.4–8.3)
RBC # BLD AUTO: 4.34 X10(6)/MCL (ref 4.7–6.1)
SODIUM SERPL-SCNC: 140 MMOL/L (ref 136–145)
TROPONIN I SERPL-MCNC: 0.08 NG/ML (ref 0–0.04)
TROPONIN I SERPL-MCNC: 0.09 NG/ML (ref 0–0.04)
WBC # SPEC AUTO: 5.8 X10(3)/MCL (ref 4.5–11.5)

## 2022-12-21 PROCEDURE — 63600175 PHARM REV CODE 636 W HCPCS: Performed by: EMERGENCY MEDICINE

## 2022-12-21 PROCEDURE — 25000003 PHARM REV CODE 250: Performed by: EMERGENCY MEDICINE

## 2022-12-21 PROCEDURE — 94640 AIRWAY INHALATION TREATMENT: CPT

## 2022-12-21 PROCEDURE — 84484 ASSAY OF TROPONIN QUANT: CPT | Performed by: EMERGENCY MEDICINE

## 2022-12-21 PROCEDURE — 21400001 HC TELEMETRY ROOM

## 2022-12-21 PROCEDURE — 25000242 PHARM REV CODE 250 ALT 637 W/ HCPCS: Performed by: EMERGENCY MEDICINE

## 2022-12-21 PROCEDURE — 80178 ASSAY OF LITHIUM: CPT | Performed by: EMERGENCY MEDICINE

## 2022-12-21 PROCEDURE — 93005 ELECTROCARDIOGRAM TRACING: CPT

## 2022-12-21 PROCEDURE — 25000003 PHARM REV CODE 250: Performed by: INTERNAL MEDICINE

## 2022-12-21 PROCEDURE — 93010 ELECTROCARDIOGRAM REPORT: CPT | Mod: ,,, | Performed by: STUDENT IN AN ORGANIZED HEALTH CARE EDUCATION/TRAINING PROGRAM

## 2022-12-21 PROCEDURE — 80307 DRUG TEST PRSMV CHEM ANLYZR: CPT | Performed by: INTERNAL MEDICINE

## 2022-12-21 PROCEDURE — 85025 COMPLETE CBC W/AUTO DIFF WBC: CPT | Performed by: EMERGENCY MEDICINE

## 2022-12-21 PROCEDURE — 93010 EKG 12-LEAD: ICD-10-PCS | Mod: ,,, | Performed by: STUDENT IN AN ORGANIZED HEALTH CARE EDUCATION/TRAINING PROGRAM

## 2022-12-21 PROCEDURE — G0378 HOSPITAL OBSERVATION PER HR: HCPCS

## 2022-12-21 PROCEDURE — 83735 ASSAY OF MAGNESIUM: CPT | Performed by: EMERGENCY MEDICINE

## 2022-12-21 PROCEDURE — 83880 ASSAY OF NATRIURETIC PEPTIDE: CPT | Performed by: EMERGENCY MEDICINE

## 2022-12-21 PROCEDURE — 96374 THER/PROPH/DIAG INJ IV PUSH: CPT

## 2022-12-21 PROCEDURE — 84484 ASSAY OF TROPONIN QUANT: CPT | Performed by: NURSE PRACTITIONER

## 2022-12-21 PROCEDURE — 80053 COMPREHEN METABOLIC PANEL: CPT | Performed by: EMERGENCY MEDICINE

## 2022-12-21 PROCEDURE — 99285 EMERGENCY DEPT VISIT HI MDM: CPT | Mod: 25

## 2022-12-21 RX ORDER — IBUPROFEN 200 MG
16 TABLET ORAL
Status: DISCONTINUED | OUTPATIENT
Start: 2022-12-21 | End: 2022-12-25 | Stop reason: HOSPADM

## 2022-12-21 RX ORDER — FUROSEMIDE 10 MG/ML
80 INJECTION INTRAMUSCULAR; INTRAVENOUS
Status: COMPLETED | OUTPATIENT
Start: 2022-12-21 | End: 2022-12-21

## 2022-12-21 RX ORDER — METOPROLOL TARTRATE 25 MG/1
75 TABLET, FILM COATED ORAL 2 TIMES DAILY
Status: DISCONTINUED | OUTPATIENT
Start: 2022-12-21 | End: 2022-12-25 | Stop reason: HOSPADM

## 2022-12-21 RX ORDER — HYDROCODONE BITARTRATE AND ACETAMINOPHEN 5; 325 MG/1; MG/1
1 TABLET ORAL EVERY 6 HOURS PRN
Status: DISCONTINUED | OUTPATIENT
Start: 2022-12-21 | End: 2022-12-25 | Stop reason: HOSPADM

## 2022-12-21 RX ORDER — AMIODARONE HYDROCHLORIDE 200 MG/1
200 TABLET ORAL DAILY
Status: DISCONTINUED | OUTPATIENT
Start: 2022-12-22 | End: 2022-12-25 | Stop reason: HOSPADM

## 2022-12-21 RX ORDER — NAPROXEN SODIUM 220 MG/1
81 TABLET, FILM COATED ORAL DAILY
Status: DISCONTINUED | OUTPATIENT
Start: 2022-12-22 | End: 2022-12-25 | Stop reason: HOSPADM

## 2022-12-21 RX ORDER — ONDANSETRON 2 MG/ML
4 INJECTION INTRAMUSCULAR; INTRAVENOUS EVERY 6 HOURS PRN
Status: DISCONTINUED | OUTPATIENT
Start: 2022-12-21 | End: 2022-12-25 | Stop reason: HOSPADM

## 2022-12-21 RX ORDER — ACETAMINOPHEN 325 MG/1
650 TABLET ORAL EVERY 4 HOURS PRN
Status: DISCONTINUED | OUTPATIENT
Start: 2022-12-21 | End: 2022-12-25 | Stop reason: HOSPADM

## 2022-12-21 RX ORDER — ARIPIPRAZOLE 5 MG/1
10 TABLET ORAL DAILY
Status: DISCONTINUED | OUTPATIENT
Start: 2022-12-22 | End: 2022-12-25 | Stop reason: HOSPADM

## 2022-12-21 RX ORDER — NALOXONE HCL 0.4 MG/ML
0.02 VIAL (ML) INJECTION
Status: DISCONTINUED | OUTPATIENT
Start: 2022-12-21 | End: 2022-12-25 | Stop reason: HOSPADM

## 2022-12-21 RX ORDER — FUROSEMIDE 10 MG/ML
40 INJECTION INTRAMUSCULAR; INTRAVENOUS
Status: DISCONTINUED | OUTPATIENT
Start: 2022-12-22 | End: 2022-12-21

## 2022-12-21 RX ORDER — NAPROXEN SODIUM 220 MG/1
162 TABLET, FILM COATED ORAL
Status: COMPLETED | OUTPATIENT
Start: 2022-12-21 | End: 2022-12-21

## 2022-12-21 RX ORDER — IBUPROFEN 200 MG
24 TABLET ORAL
Status: DISCONTINUED | OUTPATIENT
Start: 2022-12-21 | End: 2022-12-25 | Stop reason: HOSPADM

## 2022-12-21 RX ORDER — TRAZODONE HYDROCHLORIDE 100 MG/1
100 TABLET ORAL NIGHTLY
Status: DISCONTINUED | OUTPATIENT
Start: 2022-12-21 | End: 2022-12-25 | Stop reason: HOSPADM

## 2022-12-21 RX ORDER — ATORVASTATIN CALCIUM 40 MG/1
80 TABLET, FILM COATED ORAL DAILY
Status: DISCONTINUED | OUTPATIENT
Start: 2022-12-22 | End: 2022-12-25 | Stop reason: HOSPADM

## 2022-12-21 RX ORDER — IPRATROPIUM BROMIDE AND ALBUTEROL SULFATE 2.5; .5 MG/3ML; MG/3ML
3 SOLUTION RESPIRATORY (INHALATION)
Status: COMPLETED | OUTPATIENT
Start: 2022-12-21 | End: 2022-12-21

## 2022-12-21 RX ORDER — ISOSORBIDE MONONITRATE 30 MG/1
30 TABLET, EXTENDED RELEASE ORAL DAILY
Status: DISCONTINUED | OUTPATIENT
Start: 2022-12-22 | End: 2022-12-25 | Stop reason: HOSPADM

## 2022-12-21 RX ORDER — FUROSEMIDE 10 MG/ML
40 INJECTION INTRAMUSCULAR; INTRAVENOUS DAILY
Status: DISCONTINUED | OUTPATIENT
Start: 2022-12-22 | End: 2022-12-24

## 2022-12-21 RX ORDER — FAMOTIDINE 20 MG/1
20 TABLET, FILM COATED ORAL 2 TIMES DAILY
Status: DISCONTINUED | OUTPATIENT
Start: 2022-12-21 | End: 2022-12-25 | Stop reason: HOSPADM

## 2022-12-21 RX ORDER — BENZTROPINE MESYLATE 1 MG/1
1 TABLET ORAL 2 TIMES DAILY
Status: DISCONTINUED | OUTPATIENT
Start: 2022-12-21 | End: 2022-12-25 | Stop reason: HOSPADM

## 2022-12-21 RX ORDER — GLUCAGON 1 MG
1 KIT INJECTION
Status: DISCONTINUED | OUTPATIENT
Start: 2022-12-21 | End: 2022-12-25 | Stop reason: HOSPADM

## 2022-12-21 RX ORDER — OXCARBAZEPINE 300 MG/1
300 TABLET, FILM COATED ORAL 3 TIMES DAILY
Status: DISCONTINUED | OUTPATIENT
Start: 2022-12-21 | End: 2022-12-25 | Stop reason: HOSPADM

## 2022-12-21 RX ORDER — ENOXAPARIN SODIUM 100 MG/ML
40 INJECTION SUBCUTANEOUS EVERY 24 HOURS
Status: DISCONTINUED | OUTPATIENT
Start: 2022-12-21 | End: 2022-12-21

## 2022-12-21 RX ORDER — TALC
6 POWDER (GRAM) TOPICAL NIGHTLY PRN
Status: DISCONTINUED | OUTPATIENT
Start: 2022-12-21 | End: 2022-12-25 | Stop reason: HOSPADM

## 2022-12-21 RX ORDER — SODIUM CHLORIDE 0.9 % (FLUSH) 0.9 %
10 SYRINGE (ML) INJECTION EVERY 12 HOURS PRN
Status: DISCONTINUED | OUTPATIENT
Start: 2022-12-21 | End: 2022-12-25 | Stop reason: HOSPADM

## 2022-12-21 RX ORDER — LEVETIRACETAM 500 MG/1
500 TABLET ORAL 2 TIMES DAILY
Status: DISCONTINUED | OUTPATIENT
Start: 2022-12-21 | End: 2022-12-25 | Stop reason: HOSPADM

## 2022-12-21 RX ORDER — MAG HYDROX/ALUMINUM HYD/SIMETH 200-200-20
30 SUSPENSION, ORAL (FINAL DOSE FORM) ORAL 4 TIMES DAILY PRN
Status: DISCONTINUED | OUTPATIENT
Start: 2022-12-21 | End: 2022-12-25 | Stop reason: HOSPADM

## 2022-12-21 RX ADMIN — BENZTROPINE MESYLATE 1 MG: 1 TABLET ORAL at 09:12

## 2022-12-21 RX ADMIN — FAMOTIDINE 20 MG: 20 TABLET, FILM COATED ORAL at 09:12

## 2022-12-21 RX ADMIN — IPRATROPIUM BROMIDE AND ALBUTEROL SULFATE 3 ML: .5; 3 SOLUTION RESPIRATORY (INHALATION) at 12:12

## 2022-12-21 RX ADMIN — Medication 162 MG: at 12:12

## 2022-12-21 RX ADMIN — LEVETIRACETAM 500 MG: 500 TABLET, FILM COATED ORAL at 09:12

## 2022-12-21 RX ADMIN — OXCARBAZEPINE 300 MG: 300 TABLET, FILM COATED ORAL at 09:12

## 2022-12-21 RX ADMIN — METOPROLOL TARTRATE 75 MG: 25 TABLET, FILM COATED ORAL at 09:12

## 2022-12-21 RX ADMIN — FUROSEMIDE 80 MG: 10 INJECTION, SOLUTION INTRAVENOUS at 02:12

## 2022-12-21 RX ADMIN — SACUBITRIL AND VALSARTAN 1 TABLET: 24; 26 TABLET, FILM COATED ORAL at 09:12

## 2022-12-21 RX ADMIN — TRAZODONE HYDROCHLORIDE 100 MG: 100 TABLET ORAL at 09:12

## 2022-12-21 NOTE — ED PROVIDER NOTES
"Encounter Date: 12/21/2022    SCRIBE #1 NOTE: I, Jennifer Reed, am scribing for, and in the presence of,  Arian Summers MD. I have scribed the following portions of the note - Other sections scribed: HPI, ROS, Physical exam.     History     Chief Complaint   Patient presents with    Fatigue     EMS reports pt. SOB and lethargy starting today, cbg 177. Had a CABG 4/2022 and now wears a lifevest.        This is a 57 y.o. male, with a PMHx of CAD, CHF, mental illness, and HTN, who presents with complaint of shortness of breath with onset today. Patient was brought in by EMS. According to EMS, patient complained of shortness of breath and lethargy. Patient states that the shortness of breath has been going on for "a long time," but the progression is worsening. He adds that he has a nonproductive cough. Patient denies any fever, nausea, vomiting, or chest pain. Patient states that he is compliant with all his medications.  According to medical records, patient left AMA at 1 AM from [facility]. Per medical records, patient was seen here 3 days ago and also left AMA. Patient is on Eliquis. Patient had an Echo done 2 days ago, and his EF was 20-25%.      The history is provided by the patient and medical records.   Shortness of Breath  This is a recurrent problem. The problem occurs frequently.The current episode started 1 to 2 hours ago. The problem has not changed since onset.Associated symptoms include cough (Nonproductive). Pertinent negatives include no fever, no rhinorrhea, no neck pain, no chest pain, no vomiting and no rash.   Review of patient's allergies indicates:   Allergen Reactions    Depakote [divalproex]     Divalproex sodium Other (See Comments)    Lithium     Lithium analogues     Quetiapine Other (See Comments)     Pt states had seizures     Past Medical History:   Diagnosis Date    Bipolar disorder, unspecified     Chronic hepatitis C     History of psychiatric hospitalization     Hx of " psychiatric care     Hypertension     Bessie     Obesity, unspecified     Psychiatric problem     Schizoaffective disorder, bipolar type     Seizures     Stroke     Substance abuse     Therapy      Past Surgical History:   Procedure Laterality Date    CORONARY STENT PLACEMENT  08/14/2015    DEBRIDEMENT  04/17/2022    LEFT HEART CATHETERIZATION Left 08/13/2015    REPEAT CLOSURE OF STERNAL INCISION N/A 5/11/2022    Procedure: CLOSURE, STERNAL INCISION, REPEAT;  Surgeon: Adolfo Weiss MD;  Location: [facility] OR;  Service: Plastics;  Laterality: N/A;  STERNAL WOUND DEBRIDEMENT AND RECONSTRUCTION // MULTIPLE MUSCLE FLAPS // REQ 1400     Family History   Problem Relation Age of Onset    Cancer Mother     Liver disease Father      Social History     Tobacco Use    Smoking status: Every Day     Types: Cigarettes    Smokeless tobacco: Never   Substance Use Topics    Alcohol use: Never    Drug use: Not Currently     Types: Cocaine     Review of Systems   Constitutional:  Positive for fatigue. Negative for fever.   HENT:  Negative for congestion and rhinorrhea.    Respiratory:  Positive for cough (Nonproductive) and shortness of breath. Negative for chest tightness.    Cardiovascular:  Negative for chest pain.   Gastrointestinal:  Negative for nausea and vomiting.   Musculoskeletal:  Negative for myalgias and neck pain.   Skin:  Negative for rash.   Neurological:  Negative for syncope.   All other systems reviewed and are negative.    Physical Exam     Initial Vitals [12/21/22 1142]   BP Pulse Resp Temp SpO2   114/86 98 18 97.5 °F (36.4 °C) 99 %      MAP       --         Physical Exam    Nursing note and vitals reviewed.  Constitutional: He appears well-developed and well-nourished. No distress.   HENT:   Head: Normocephalic and atraumatic.   Eyes: Conjunctivae are normal.   Cardiovascular:  Normal rate.           Patient wearing a life vest.   Pulmonary/Chest: No respiratory distress. He has no rhonchi.   Crackles and expiratory  wheezing.   Abdominal: Abdomen is soft. There is no abdominal tenderness. There is no rebound and no guarding.   Musculoskeletal:         General: Normal range of motion.     Neurological: He is alert and oriented to person, place, and time. He has normal strength.   Skin: Skin is warm and dry.   Psychiatric: He has a normal mood and affect.       ED Course   Procedures  Labs Reviewed   COMPREHENSIVE METABOLIC PANEL - Abnormal; Notable for the following components:       Result Value    Glucose Level 156 (*)     Blood Urea Nitrogen 44.5 (*)     Creatinine 1.40 (*)     Albumin Level 3.3 (*)     Albumin/Globulin Ratio 1.0 (*)     All other components within normal limits   TROPONIN I - Abnormal; Notable for the following components:    Troponin-I 0.092 (*)     All other components within normal limits   B-TYPE NATRIURETIC PEPTIDE - Abnormal; Notable for the following components:    Natriuretic Peptide 3,118.4 (*)     All other components within normal limits   CBC WITH DIFFERENTIAL - Abnormal; Notable for the following components:    RBC 4.34 (*)     Hgb 9.7 (*)     Hct 32.0 (*)     MCV 73.7 (*)     MCHC 30.3 (*)     RDW 19.6 (*)     All other components within normal limits   MAGNESIUM - Normal   CBC W/ AUTO DIFFERENTIAL    Narrative:     The following orders were created for panel order CBC Auto Differential.  Procedure                               Abnormality         Status                     ---------                               -----------         ------                     CBC with Differential[[id number]]        Abnormal            Final result                 Please view results for these tests on the individual orders.   LITHIUM LEVEL          Imaging Results              X-Ray Chest 1 View (Final result)  Result time 12/21/22 12:41:09      Final result by Javier Soto MD (12/21/22 12:41:09)                   Impression:      Congestive failure.      Electronically signed by: Javier  Soto  Date:    12/21/2022  Time:    12:41               Narrative:    EXAMINATION:  XR CHEST 1 VIEW    CLINICAL HISTORY:  Chest pain, unspecified    TECHNIQUE:  One view    COMPARISON:  October 20, 2022.    FINDINGS:  Cardiopericardial silhouette enlarged appearance is similar.  Lungs congestive heart failure ground-glass and reticulonodular opacities persist.  No focally dense consolidation or significant fluid within the pleural spaces.  Chest is partially obscured by overlying devices.                                       Medications   albuterol-ipratropium 2.5 mg-0.5 mg/3 mL nebulizer solution 3 mL (3 mLs Nebulization Given 12/21/22 1228)   aspirin chewable tablet 162 mg (162 mg Oral Given 12/21/22 1209)   furosemide injection 80 mg (80 mg Intravenous Given 12/21/22 1438)     Medical Decision Making:   History:   Old Medical Records: I decided to obtain old medical records.  Initial Assessment:   Known history of CHF.  He is wearing his LifeVest.  He reports compliance with medications and he brought them all with him.  He has diffuse rales with some expiratory wheezing.  Chest x-ray and labs are consistent with volume overload.  Eighty of IV Lasix have been given as well as aspirin.  I will readmit the patient.  Discussed with the hospitalist they will admit.  He has been calm and cooperative during my examination  Independently Interpreted Test(s):   I have ordered and independently interpreted X-rays - see prior notes.  I have ordered and independently interpreted EKG Reading(s) - see prior notes  Clinical Tests:   Lab Tests: Ordered and Reviewed  Radiological Study: Ordered and Reviewed  Medical Tests: Ordered and Reviewed        Scribe Attestation:   Scribe #1: I performed the above scribed service and the documentation accurately describes the services I performed. I attest to the accuracy of the note.    Attending Attestation:           Physician Attestation for Scribe:  Physician Attestation  Statement for Scribe #1: I, Arian Summers MD, reviewed documentation, as scribed by Jennifer Reed in my presence, and it is both accurate and complete.                        Clinical Impression:   Final diagnoses:  [R07.9] Acute chest pain  [I50.9] Acute congestive heart failure, unspecified heart failure type  [R06.00] Dyspnea, unspecified type        ED Disposition Condition    Observation Stable                Arian Summers MD  12/21/22 [id number]

## 2022-12-21 NOTE — H&P
[facility] Hospital Medicine History & Physical Examination       Patient Name: Kendall Duff  MRN: [id number]  Patient Class: OP- Observation   Admission Date: 12/21/2022   Admitting Physician: DARNELL Service   Length of Stay: 0  Attending Physician: Dr. Zion Howell  Primary Care Provider: Primary Doctor No  Face-to-Face encounter date: 12/21/2022  Code Status:Full code  Chief Complaint: Fatigue (EMS reports pt. SOB and lethargy starting today, cbg 177. Had a CABG 4/2022 and now wears a lifevest. )        Patient information was obtained from patient, patient's family, past medical records and ER records.     HISTORY OF PRESENT ILLNESS:   Kendall Duff is a 57 y.o. male who PMH includes bipolar disorder, schizophrenia, chronic hepatitis-C, HTN, HLD, seizures, CVA, history of substance abuse, CMO with life vest; CAD s/p CABG; presents to the ED at [facility] on 12/21/2022 with complaints of shortness of breath, weakness, fatigue, cough and difficulty breathing . Pt presented to the ED a [facility] on 12/20/2022 but left AMA. He now presents to the ED here with reports of worsening SOB and difficulty breathing. He denies any discharges from his life vest. No reports of congestion fever, N/V/D, abdominal pain or any urinary symptoms. Labs significant for H&H 9.7/32.0, BUN 44.5, creatinine 1.40; glucose 156, BNP 3 118.4; troponin 0.092; other indices unremarkable.  Chest x-ray demonstrated congestive failure.  Initial vital signs /86 pulse 98 respirations 18 temperature 97.5° F O2 saturation 99% on room air.  Patient received DuoNeb treatment, aspirin, and 80 mg of Lasix IV push in the ED. patient is admitted to hospital medicine services for further management.    PAST MEDICAL HISTORY:   Hypertension  Hyperlipidemia  CAD  Congestive heart failure- EF 20-25%  Seizure disorder  CVA   Bipolar disorder/schizophrenia  Chronic hepatitis-C   Substance abuse    PAST SURGICAL HISTORY:   Left heart  catheterization   Angiogram   Surgical debridement of sternal wound   CABG   Endoscopy    ALLERGIES:   Depakote [divalproex], Divalproex sodium, Lithium, Lithium analogues, and Quetiapine    FAMILY HISTORY:   Mother- kidney disease,   Father- liver disease, ETOH abuse,     SOCIAL HISTORY:   Smokes 1 pack of cigarettes a day  Denies any recent illicit drug use  Denies any tobacco use  Lives with family- daughter    HOME MEDICATIONS:   As documented  Prior to Admission medications    Medication Sig Start Date End Date Taking? Authorizing Provider   amiodarone (PACERONE) 200 MG Tab Take 1 tablet (200 mg total) by mouth once daily. 22  Soila Mann MD   apixaban (ELIQUIS) 5 mg Tab Take 1 tablet (5 mg total) by mouth 2 (two) times daily. 22   Taz Sanon MD   ARIPiprazole (ABILIFY) 10 MG Tab Take 1 tablet (10 mg total) by mouth once daily. 22  Aziza Hernandez MD   aspirin 81 MG Chew Chew and swallow 1 tablet (81 mg total) by mouth once daily. 22  Harjit Ruiz MD   atorvastatin (LIPITOR) 80 MG tablet Take 1 tablet (80 mg total) by mouth once daily. 22  Aziza Hernandez MD   benztropine (COGENTIN) 1 MG tablet Take 1 mg by mouth 2 (two) times daily.    Historical Provider   famotidine (PEPCID) 20 MG tablet Take 1 tablet (20 mg total) by mouth 2 (two) times daily. 22  Aziza Hernandez MD   furosemide (LASIX) 40 MG tablet Take 1 tablet (40 mg total) by mouth once daily. 22  RAY Patel   HYDROcodone-acetaminophen (NORCO) 5-325 mg per tablet Take 1 tablet by mouth every 6 (six) hours as needed for Pain. 22  Josh Sinclair MD   isosorbide mononitrate (IMDUR) 30 MG 24 hr tablet Take 1 tablet (30 mg total) by mouth once daily. 22  Soila Mann MD   levETIRAcetam (KEPPRA) 500 MG Tab Take 1 tablet (500 mg total) by mouth 2 (two) times daily. 22  Aziza Hernandez MD    metoprolol tartrate 75 mg Tab Take 75 mg by mouth 2 (two) times daily.  Patient not taking: Reported on 12/5/2022 11/11/22 11/11/23  CJ Jay   OXcarbazepine (TRILEPTAL) 300 MG Tab Take 300 mg by mouth 3 (three) times daily.    Historical Provider   sacubitriL-valsartan (ENTRESTO) 24-26 mg per tablet Take 1 tablet by mouth 2 (two) times daily.  Patient not taking: Reported on 12/5/2022 11/29/22 12/29/22  Cordell Robbins MD   traZODone (DESYREL) 100 MG tablet Take 1 tablet (100 mg total) by mouth every evening. 6/17/22 6/17/23  Jarrett Yarbrough MD   metoprolol succinate (TOPROL-XL) 25 MG 24 hr tablet Take 25 mg by mouth once daily. 3/27/22 6/17/22  Historical Provider   pravastatin (PRAVACHOL) 40 MG tablet Take 1 tablet (40 mg total) by mouth every evening. 3/25/22 6/17/22  Laisha May NP       REVIEW OF SYSTEMS:   Except as documented, all other systems reviewed and negative     PHYSICAL EXAM:     VITAL SIGNS: 24 HRS MIN & MAX LAST   Temp  Min: 97.5 °F (36.4 °C)  Max: 97.7 °F (36.5 °C) 97.7 °F (36.5 °C)   BP  Min: 114/86  Max: 127/93 (!) 127/93     Pulse  Min: 50  Max: 98  (!) 51     Resp  Min: 18  Max: 20 18   SpO2  Min: 96 %  Max: 99 % 96 %       General appearance:  Chronically ill-appearing looks older than stated age; nontoxic, fatigued; now respiratory distress, no family at the bedside   HENT: Atraumatic head. Moist mucous membranes of oral cavity, poor dentition  Eyes: PERRL  Neck: Supple.   Lungs: diminished bilaterally, mildly labored, symmetrical expansion.   Heart: bradycardic rate , regular rhythm. Systolic murmur; life vest in place, edema BLE  Abdomen: Soft, non-distended, non-tender. No rebound tenderness/guarding. Bowel sounds are normal.   Extremities: SMILEY; generalized weakness  Skin: warm and dry  Neuro: AAOx3, no focal deficits  Psych/mental status: Appropriate mood and affect. Responds appropriately to questions.     LABS AND IMAGING:     Recent Labs   Lab 12/18/22  1107  12/20/22  0026 12/21/22  1215   WBC 7.7 6.4 5.8   RBC 4.86 4.31* 4.34*   HGB 10.8* 9.8* 9.7*   HCT 35.7* 31.3* 32.0*   MCV 73.5* 72.6* 73.7*   MCH 22.2 22.7 22.4   MCHC 30.3* 31.3* 30.3*   RDW 20.0* 19.3* 19.6*    219 206   MPV 10.0 10.1 10.0       Recent Labs   Lab 12/18/22  1102 12/20/22  0026 12/21/22  1214    139 140   K 3.8 3.4* 3.6   CO2 22 24 25   BUN 50.4* 50.4* 44.5*   CREATININE 1.69* 1.64* 1.40*   CALCIUM 9.0 8.4 8.8   MG  --  2.00 2.10   ALBUMIN 3.6 3.2* 3.3*   ALKPHOS 146 126 137   ALT 24 21 21   AST 35* 31 29   BILITOT 0.6 0.5 0.5       Microbiology Results (last 7 days)       ** No results found for the last 168 hours. **             X-Ray Chest 1 View  Narrative: EXAMINATION:  XR CHEST 1 VIEW    CLINICAL HISTORY:  Chest pain, unspecified    TECHNIQUE:  One view    COMPARISON:  October 20, 2022.    FINDINGS:  Cardiopericardial silhouette enlarged appearance is similar.  Lungs congestive heart failure ground-glass and reticulonodular opacities persist.  No focally dense consolidation or significant fluid within the pleural spaces.  Chest is partially obscured by overlying devices.  Impression: Congestive failure.    Electronically signed by: Javier Soto  Date:    12/21/2022  Time:    12:41        ASSESSMENT & PLAN:   ASSESSMENT:  Acute CHF exacerbation-with elevated BNP   Chest pain- resolving  NSTEMI type II- with elevated troponin  ANA- with worsening renal indicis  Exertional dyspnea with SOB  CMO-status post LifeVest in place , EF 25%  HTN- essential  CAD- s/p stent and CABG  HLD  Weakness  Medical non-compliance  Nicotine dependence- cigarettes    Hx:  Bipolar 1 disorder, schizophrenia, seizures, CVA, hepatitis-C, substance abuse    PLAN:  Daily weights   Accurate I&O   Telemetry monitoring   Resume home medication as deemed necessary   Trend out troponin levels   Repeat lab work in a.m.   Lasix 40 mg IVP BID  Nicotine patch if desires    VTE Prophylaxis: Home Eliquis or DVT  prophylaxis and will be advised to be as mobile as possible and sit in a chair as tolerated    Patient condition:  Stable  __________________________________________________________________________  INPATIENT LIST OF MEDICATIONS     Scheduled Meds:   enoxaparin  40 mg Subcutaneous Daily     Continuous Infusions:  PRN Meds:.acetaminophen, aluminum-magnesium hydroxide-simethicone, dextrose 10%, dextrose 10%, glucagon (human recombinant), glucose, glucose, HYDROcodone-acetaminophen, melatonin, naloxone, ondansetron, sodium chloride 0.9%      ILina FNP have reviewed and discussed the case with Dr. Zion Howell.  Please see the following addendum for further assessment and plan from there attending RAY Marmolejo   12/21/2022

## 2022-12-21 NOTE — TELEPHONE ENCOUNTER
"Daughter states Face is swollen, heavy breathing, "trying catch breath", states pt is saying he feels tired. Daughter states this has been going on since Thanksgiving, hospital keeps sending him back. Per care advice, advised to call  now. Verbalizes understanding.   Reason for Disposition   SEVERE difficulty breathing (e.g., struggling for each breath, speaks in single words, pulse > 120)    Protocols used: Breathing Difficulty-A-OH    "

## 2022-12-21 NOTE — ED NOTES
Pt urinated on himself.  Pt cleaned up, bed linens changed.  Pt educated on the use of a urinal. Urinal at bedside.

## 2022-12-22 LAB
ALBUMIN SERPL-MCNC: 3.2 G/DL (ref 3.5–5)
ALBUMIN/GLOB SERPL: 1.1 RATIO (ref 1.1–2)
ALP SERPL-CCNC: 109 UNIT/L (ref 40–150)
ALT SERPL-CCNC: 20 UNIT/L (ref 0–55)
AST SERPL-CCNC: 28 UNIT/L (ref 5–34)
BASOPHILS # BLD AUTO: 0.03 X10(3)/MCL (ref 0–0.2)
BASOPHILS NFR BLD AUTO: 0.6 %
BILIRUBIN DIRECT+TOT PNL SERPL-MCNC: 0.6 MG/DL
BUN SERPL-MCNC: 42.3 MG/DL (ref 8.4–25.7)
CALCIUM SERPL-MCNC: 8.6 MG/DL (ref 8.4–10.2)
CHLORIDE SERPL-SCNC: 104 MMOL/L (ref 98–107)
CO2 SERPL-SCNC: 23 MMOL/L (ref 22–29)
CREAT SERPL-MCNC: 1.5 MG/DL (ref 0.73–1.18)
EOSINOPHIL # BLD AUTO: 0.1 X10(3)/MCL (ref 0–0.9)
EOSINOPHIL NFR BLD AUTO: 1.9 %
ERYTHROCYTE [DISTWIDTH] IN BLOOD BY AUTOMATED COUNT: 19.5 % (ref 11.6–14.4)
GFR SERPLBLD CREATININE-BSD FMLA CKD-EPI: 53 MLS/MIN/1.73/M2
GLOBULIN SER-MCNC: 2.9 GM/DL (ref 2.4–3.5)
GLUCOSE SERPL-MCNC: 186 MG/DL (ref 74–100)
HCT VFR BLD AUTO: 31 % (ref 42–52)
HGB BLD-MCNC: 9.5 GM/DL (ref 14–18)
IMM GRANULOCYTES # BLD AUTO: 0.01 X10(3)/MCL (ref 0–0.04)
IMM GRANULOCYTES NFR BLD AUTO: 0.2 %
LYMPHOCYTES # BLD AUTO: 0.69 X10(3)/MCL (ref 0.6–4.6)
LYMPHOCYTES NFR BLD AUTO: 13.3 %
MAGNESIUM SERPL-MCNC: 2 MG/DL (ref 1.6–2.6)
MCH RBC QN AUTO: 22.3 PG
MCHC RBC AUTO-ENTMCNC: 30.6 MG/DL (ref 33–36)
MCV RBC AUTO: 72.8 FL (ref 80–94)
MONOCYTES # BLD AUTO: 0.53 X10(3)/MCL (ref 0.1–1.3)
MONOCYTES NFR BLD AUTO: 10.2 %
NEUTROPHILS # BLD AUTO: 3.84 X10(3)/MCL (ref 2.1–9.2)
NEUTROPHILS NFR BLD AUTO: 73.8 %
NRBC BLD AUTO-RTO: 0 % (ref 0–1)
PLATELET # BLD AUTO: 222 X10(3)/MCL (ref 140–371)
PMV BLD AUTO: 10.4 FL (ref 9.4–12.4)
POTASSIUM SERPL-SCNC: 3.8 MMOL/L (ref 3.5–5.1)
PROT SERPL-MCNC: 6.1 GM/DL (ref 6.4–8.3)
RBC # BLD AUTO: 4.26 X10(6)/MCL (ref 4.7–6.1)
SODIUM SERPL-SCNC: 139 MMOL/L (ref 136–145)
WBC # SPEC AUTO: 5.2 X10(3)/MCL (ref 4.5–11.5)

## 2022-12-22 PROCEDURE — 85025 COMPLETE CBC W/AUTO DIFF WBC: CPT | Performed by: NURSE PRACTITIONER

## 2022-12-22 PROCEDURE — 63600175 PHARM REV CODE 636 W HCPCS: Performed by: INTERNAL MEDICINE

## 2022-12-22 PROCEDURE — 93010 EKG 12-LEAD: ICD-10-PCS | Mod: ,,, | Performed by: INTERNAL MEDICINE

## 2022-12-22 PROCEDURE — 25000003 PHARM REV CODE 250: Performed by: INTERNAL MEDICINE

## 2022-12-22 PROCEDURE — 93010 ELECTROCARDIOGRAM REPORT: CPT | Mod: ,,, | Performed by: INTERNAL MEDICINE

## 2022-12-22 PROCEDURE — 25000003 PHARM REV CODE 250: Performed by: NURSE PRACTITIONER

## 2022-12-22 PROCEDURE — 21400001 HC TELEMETRY ROOM

## 2022-12-22 PROCEDURE — 80053 COMPREHEN METABOLIC PANEL: CPT | Performed by: NURSE PRACTITIONER

## 2022-12-22 PROCEDURE — 83735 ASSAY OF MAGNESIUM: CPT | Performed by: INTERNAL MEDICINE

## 2022-12-22 PROCEDURE — 93005 ELECTROCARDIOGRAM TRACING: CPT

## 2022-12-22 PROCEDURE — 36415 COLL VENOUS BLD VENIPUNCTURE: CPT | Performed by: NURSE PRACTITIONER

## 2022-12-22 RX ADMIN — LEVETIRACETAM 500 MG: 500 TABLET, FILM COATED ORAL at 09:12

## 2022-12-22 RX ADMIN — FAMOTIDINE 20 MG: 20 TABLET, FILM COATED ORAL at 09:12

## 2022-12-22 RX ADMIN — OXCARBAZEPINE 300 MG: 300 TABLET, FILM COATED ORAL at 09:12

## 2022-12-22 RX ADMIN — SACUBITRIL AND VALSARTAN 1 TABLET: 24; 26 TABLET, FILM COATED ORAL at 09:12

## 2022-12-22 RX ADMIN — METOPROLOL TARTRATE 75 MG: 25 TABLET, FILM COATED ORAL at 09:12

## 2022-12-22 RX ADMIN — ISOSORBIDE MONONITRATE 30 MG: 30 TABLET, EXTENDED RELEASE ORAL at 09:12

## 2022-12-22 RX ADMIN — HYDROCODONE BITARTRATE AND ACETAMINOPHEN 1 TABLET: 5; 325 TABLET ORAL at 05:12

## 2022-12-22 RX ADMIN — ARIPIPRAZOLE 10 MG: 5 TABLET ORAL at 09:12

## 2022-12-22 RX ADMIN — ATORVASTATIN CALCIUM 80 MG: 40 TABLET, FILM COATED ORAL at 09:12

## 2022-12-22 RX ADMIN — OXCARBAZEPINE 300 MG: 300 TABLET, FILM COATED ORAL at 04:12

## 2022-12-22 RX ADMIN — AMIODARONE HYDROCHLORIDE 200 MG: 200 TABLET ORAL at 09:12

## 2022-12-22 RX ADMIN — BENZTROPINE MESYLATE 1 MG: 1 TABLET ORAL at 09:12

## 2022-12-22 RX ADMIN — FUROSEMIDE 40 MG: 10 INJECTION, SOLUTION INTRAMUSCULAR; INTRAVENOUS at 09:12

## 2022-12-22 RX ADMIN — APIXABAN 5 MG: 5 TABLET, FILM COATED ORAL at 09:12

## 2022-12-22 RX ADMIN — HYDROCODONE BITARTRATE AND ACETAMINOPHEN 1 TABLET: 5; 325 TABLET ORAL at 03:12

## 2022-12-22 RX ADMIN — TRAZODONE HYDROCHLORIDE 100 MG: 100 TABLET ORAL at 09:12

## 2022-12-22 RX ADMIN — ASPIRIN 81 MG CHEWABLE TABLET 81 MG: 81 TABLET CHEWABLE at 09:12

## 2022-12-22 NOTE — PROGRESS NOTES
"Inpatient Nutrition Evaluation    Admit Date: 12/21/2022   Total duration of encounter: 1 day    Nutrition Recommendation/Prescription     -Continue diet as ordered and tolerated.     Nutrition Assessment     Chart Review    Reason Seen: continuous nutrition monitoring    Diagnosis:  Acute CHF exacerbation  Chest Pain  NSTEMI type II  ANA  Exertional dyspnea with SOB     Relevant Medical History: HTN, HLD, CAD s/p CABG, CMO with LifeVest-EF 20-25%, bipolar disorder, schizophrenia, chronic hepatitis-C, seizures, CVA, history of substance abuse    Nutrition-Related Medications: famotidine, furosemide    Nutrition-Related Labs:  12/22/22 BUN 42.3, Crea 1.5, GFR 53, Gluc 186, Total pro 6.1, Alb 3.2    Diet Order: Diet Cardiac  Oral Supplement Order: none  Appetite/Oral Intake: good/% of meals  Factors Affecting Nutritional Intake: none identified  Food/Congregation/Cultural Preferences: none reported  Food Allergies: none reported       Wound(s):   none documented    Comments    12/22/22 Pt reports good appetite, ate ~75% breakfast. Denies any GI complaints or unintentional wt loss.     Anthropometrics    Height: 5' 7" (170.2 cm) Height Method: Stated  Last Weight: 83.1 kg (183 lb 3.2 oz) (12/22/22 0600) Weight Method: Standard Scale  BMI (Calculated): 28.7  BMI Classification: overweight (BMI 25-29.9)        Ideal Body Weight (IBW), Male: 148 lb     % Ideal Body Weight, Male (lb): 120.66 %                          Usual Weight Provided By: patient denies unintentional weight loss    Wt Readings from Last 5 Encounters:   12/22/22 83.1 kg (183 lb 3.2 oz)   12/19/22 81.6 kg (180 lb)   12/18/22 83.9 kg (184 lb 15.5 oz)   12/10/22 79.8 kg (176 lb)   11/30/22 90.7 kg (200 lb)     Weight Change(s) Since Admission:  Admit Weight: 81.6 kg (180 lb) (12/21/22 1142)  12/22/22 83.1kg    Patient Education    Not applicable.    Monitoring & Evaluation     Dietitian will monitor food and beverage intake.  Nutrition Risk/Follow-Up: " low (follow-up in 5-7 days)  Patients assigned 'low nutrition risk' status do not qualify for a full nutritional assessment but will be monitored and re-evaluated in a 5-7 day time period. Please consult if re-evaluation needed sooner.

## 2022-12-22 NOTE — PROGRESS NOTES
[facility] Hospital Medicine Progress Note        Chief Complaint: Inpatient Follow-up for CHF    HPI:    57-year-old male with known past medical history of hypertension, hyperlipidemia, coronary artery disease status post CABG, CMO with LifeVest-EF 20-25%, bipolar disorder, schizophrenia, chronic hepatitis-C, seizures, CVA, history of substance abuse in the past admitted 12/21 again with complaint of worsening shortness of breath, fatigue, dry cough.  Noted that patient was admitted to our facility on 12/18 and he left AMA, then on 12/19 he again went to [facility] and left AMA from there, went back again on 12/20 to [facility] with similar complaints and again left AMA.  Today he presented to our hospital, this time he stating that he is not feeling good and he will stated he is better.  Reportedly lives at home with his oldest daughter.  Denies chest pain, visual disturbance.  Main complaint is worsening shortness of breath.     Initial vital signs /86 pulse 98 respirations 18 temperature 97.5° F O2 saturation 99% on room air. . Labs significant for H&H 9.7/32.0, BUN 44.5, creatinine 1.40; glucose 156, BNP 3 118.4; troponin 0.092; other indices unremarkable.  Chest x-ray demonstrated congestive failure.  Patient received DuoNeb treatment, aspirin, and 80 mg of Lasix IV push in the ED. patient is admitted to hospital medicine services for further management of CHF, NSTEMI II.       Interval Hx:   Hds overnight. Doing well on room air. Was comfortably resting, denies nay worsening of SOB or chest pain today. Labs showing stable Hb and platelets, elevated Bun & Scr, downward trending trop. Lithium was wnl, UDS was negative for illicite drugs.    echo from 12/18/2022 showed EF of 20-25%, grade 3 diastolic dysfunction, right ventricular enlargement with mildly reduced right ventricular systolic function.  Mild right atrial enlargement, moderate mitral regurg, severe tricuspid  regurg.          Objective/physical exam:  Vitals:    12/22/22 0403 12/22/22 0500 12/22/22 0600 12/22/22 0749   BP: 122/87   98/63   BP Location:       Patient Position:       Pulse: (!) 50   (!) 52   Resp: 18      Temp: 97.4 °F (36.3 °C)   97.5 °F (36.4 °C)   TempSrc: Oral   Oral   SpO2: 99%   (!) 91%   Weight:  83.1 kg (183 lb 3.2 oz) 83.1 kg (183 lb 3.2 oz)    Height:         General: In no acute distress, afebrile  Respiratory: Clear to auscultation bilaterally  Cardiovascular: S1, S2, no appreciable murmur  Abdomen: Soft, nontender, BS +  MSK: Warm, no lower extremity edema, no clubbing or cyanosis  Neurologic: Alert and oriented x4, moving all extremities with good strength     Lab Results   Component Value Date     12/22/2022    K 3.8 12/22/2022     07/21/2022    CO2 23 12/22/2022    BUN 42.3 (H) 12/22/2022    CREATININE 1.50 (H) 12/22/2022    CALCIUM 8.6 12/22/2022    ANIONGAP 9 07/21/2022    ESTGFRAFRICA >60 07/21/2022    EGFRNONAA >60 07/21/2022      Lab Results   Component Value Date    ALT 20 12/22/2022    AST 28 12/22/2022    ALKPHOS 109 12/22/2022    BILITOT 0.6 12/22/2022      Lab Results   Component Value Date    WBC 5.2 12/22/2022    HGB 9.5 (L) 12/22/2022    HCT 31.0 (L) 12/22/2022    MCV 72.8 (L) 12/22/2022     12/22/2022           Medications:   amiodarone  200 mg Oral Daily    apixaban  5 mg Oral BID    ARIPiprazole  10 mg Oral Daily    aspirin  81 mg Oral Daily    atorvastatin  80 mg Oral Daily    benztropine  1 mg Oral BID    famotidine  20 mg Oral BID    furosemide (LASIX) injection  40 mg Intravenous Daily    isosorbide mononitrate  30 mg Oral Daily    levETIRAcetam  500 mg Oral BID    metoprolol tartrate  75 mg Oral BID    OXcarbazepine  300 mg Oral TID    sacubitriL-valsartan  1 tablet Oral BID    traZODone  100 mg Oral QHS      acetaminophen, aluminum-magnesium hydroxide-simethicone, dextrose 10%, dextrose 10%, glucagon (human recombinant), glucose, glucose,  HYDROcodone-acetaminophen, melatonin, naloxone, ondansetron, sodium chloride 0.9%     Assessment/Plan:      Acute on chronic combined systolic and diastolic heart failure, has lifevest  20-25%  Acute hypoxemic respiratory failure - improving   Chest pain- resolved  NSTEMI type II- improving  ANA, likely prerenal improving     Hx: PAF on amio & Eliquis, pulmonary HTN, RUE DVT 5/10/22, HTN- essential, CAD- s/p stent and CABG 03/2022, VHD, ACD, HLD, Bipolar 1 disorder, schizophrenia, seizures, CVA, hepatitis-C, substance abuse, tobacco use    Plan:  - Continue diuresis with lasix 20 IV BID. Monitor electrolytes, urine output, renal function. continue tele. Recent Tte reviewed. Will consider cardiology eval if necessary. Liefevest in place. Needs Cardiology follow u.   - GDMT with Aspirin, statin, Entresto 24-26, Metoprolol, imdur.  - Encourage IS use, pulm toileting. Will get PT eval tomorrow. Doing well on room air now. Repeat CXR in am  - other home meds were reviewed and renewed. Continue AEDs.       Eliquis  Pepcid  labs    Critical care diagnosis: CHF exacerbation needing IV diuresis  Critical care time: 50 minutes            Negrito Hernandez MD

## 2022-12-22 NOTE — NURSING
Nurses Note -- 4 Eyes      12/22/2022   11:21 AM      Skin assessed during: Admit      [x] No Pressure Injuries Present    [x]Prevention Measures Documented      [] Yes- Altered Skin Integrity Present or Discovered   [] LDA Added if Not in Epic (Describe Wound)   [] New Altered Skin Integrity was Present on Admit and Documented in LDA   [] Wound Image Taken    Wound Care Consulted? No    Attending Nurse:  Nicki Correa RN     Second RN/Staff Member:  Jennifer Okeefe LPN

## 2022-12-23 LAB
ALBUMIN SERPL-MCNC: 3.2 G/DL (ref 3.5–5)
ALBUMIN/GLOB SERPL: 1 RATIO (ref 1.1–2)
ALP SERPL-CCNC: 114 UNIT/L (ref 40–150)
ALT SERPL-CCNC: 20 UNIT/L (ref 0–55)
AST SERPL-CCNC: 27 UNIT/L (ref 5–34)
BASOPHILS # BLD AUTO: 0.06 X10(3)/MCL (ref 0–0.2)
BASOPHILS NFR BLD AUTO: 0.8 %
BILIRUBIN DIRECT+TOT PNL SERPL-MCNC: 0.8 MG/DL
BUN SERPL-MCNC: 33 MG/DL (ref 8.4–25.7)
CALCIUM SERPL-MCNC: 8.7 MG/DL (ref 8.4–10.2)
CHLORIDE SERPL-SCNC: 102 MMOL/L (ref 98–107)
CO2 SERPL-SCNC: 28 MMOL/L (ref 22–29)
CREAT SERPL-MCNC: 1.25 MG/DL (ref 0.73–1.18)
EOSINOPHIL # BLD AUTO: 0.19 X10(3)/MCL (ref 0–0.9)
EOSINOPHIL NFR BLD AUTO: 2.7 %
ERYTHROCYTE [DISTWIDTH] IN BLOOD BY AUTOMATED COUNT: 19.6 % (ref 11.6–14.4)
GFR SERPLBLD CREATININE-BSD FMLA CKD-EPI: >60 MLS/MIN/1.73/M2
GLOBULIN SER-MCNC: 3.3 GM/DL (ref 2.4–3.5)
GLUCOSE SERPL-MCNC: 109 MG/DL (ref 74–100)
HCT VFR BLD AUTO: 35 % (ref 42–52)
HEMOCCULT SP2 STL QL: NEGATIVE
HGB BLD-MCNC: 10.6 GM/DL (ref 14–18)
IMM GRANULOCYTES # BLD AUTO: 0.03 X10(3)/MCL (ref 0–0.04)
IMM GRANULOCYTES NFR BLD AUTO: 0.4 %
LDH SERPL-CCNC: 248 U/L (ref 125–220)
LYMPHOCYTES # BLD AUTO: 1.31 X10(3)/MCL (ref 0.6–4.6)
LYMPHOCYTES NFR BLD AUTO: 18.3 %
MAGNESIUM SERPL-MCNC: 2 MG/DL (ref 1.6–2.6)
MCH RBC QN AUTO: 22 PG
MCHC RBC AUTO-ENTMCNC: 30.3 MG/DL (ref 33–36)
MCV RBC AUTO: 72.8 FL (ref 80–94)
MONOCYTES # BLD AUTO: 0.75 X10(3)/MCL (ref 0.1–1.3)
MONOCYTES NFR BLD AUTO: 10.5 %
NEUTROPHILS # BLD AUTO: 4.82 X10(3)/MCL (ref 2.1–9.2)
NEUTROPHILS NFR BLD AUTO: 67.3 %
NRBC BLD AUTO-RTO: 0 % (ref 0–1)
PLATELET # BLD AUTO: 265 X10(3)/MCL (ref 140–371)
PMV BLD AUTO: 9.9 FL (ref 9.4–12.4)
POTASSIUM SERPL-SCNC: 3.5 MMOL/L (ref 3.5–5.1)
PROT SERPL-MCNC: 6.5 GM/DL (ref 6.4–8.3)
RBC # BLD AUTO: 4.81 X10(6)/MCL (ref 4.7–6.1)
SODIUM SERPL-SCNC: 138 MMOL/L (ref 136–145)
WBC # SPEC AUTO: 7.2 X10(3)/MCL (ref 4.5–11.5)

## 2022-12-23 PROCEDURE — 63600175 PHARM REV CODE 636 W HCPCS: Performed by: INTERNAL MEDICINE

## 2022-12-23 PROCEDURE — 82272 OCCULT BLD FECES 1-3 TESTS: CPT | Performed by: INTERNAL MEDICINE

## 2022-12-23 PROCEDURE — 83550 IRON BINDING TEST: CPT | Performed by: INTERNAL MEDICINE

## 2022-12-23 PROCEDURE — 85025 COMPLETE CBC W/AUTO DIFF WBC: CPT | Performed by: INTERNAL MEDICINE

## 2022-12-23 PROCEDURE — 36415 COLL VENOUS BLD VENIPUNCTURE: CPT | Performed by: INTERNAL MEDICINE

## 2022-12-23 PROCEDURE — 83735 ASSAY OF MAGNESIUM: CPT | Performed by: INTERNAL MEDICINE

## 2022-12-23 PROCEDURE — 97165 OT EVAL LOW COMPLEX 30 MIN: CPT

## 2022-12-23 PROCEDURE — 25000003 PHARM REV CODE 250: Performed by: INTERNAL MEDICINE

## 2022-12-23 PROCEDURE — 21400001 HC TELEMETRY ROOM

## 2022-12-23 PROCEDURE — 97161 PT EVAL LOW COMPLEX 20 MIN: CPT

## 2022-12-23 PROCEDURE — 82728 ASSAY OF FERRITIN: CPT | Performed by: INTERNAL MEDICINE

## 2022-12-23 PROCEDURE — 83615 LACTATE (LD) (LDH) ENZYME: CPT | Performed by: INTERNAL MEDICINE

## 2022-12-23 PROCEDURE — 80053 COMPREHEN METABOLIC PANEL: CPT | Performed by: INTERNAL MEDICINE

## 2022-12-23 PROCEDURE — 11000001 HC ACUTE MED/SURG PRIVATE ROOM

## 2022-12-23 PROCEDURE — 82607 VITAMIN B-12: CPT | Performed by: INTERNAL MEDICINE

## 2022-12-23 RX ADMIN — LEVETIRACETAM 500 MG: 500 TABLET, FILM COATED ORAL at 10:12

## 2022-12-23 RX ADMIN — AMIODARONE HYDROCHLORIDE 200 MG: 200 TABLET ORAL at 10:12

## 2022-12-23 RX ADMIN — TRAZODONE HYDROCHLORIDE 100 MG: 100 TABLET ORAL at 09:12

## 2022-12-23 RX ADMIN — ATORVASTATIN CALCIUM 80 MG: 40 TABLET, FILM COATED ORAL at 10:12

## 2022-12-23 RX ADMIN — SACUBITRIL AND VALSARTAN 1 TABLET: 24; 26 TABLET, FILM COATED ORAL at 10:12

## 2022-12-23 RX ADMIN — APIXABAN 5 MG: 5 TABLET, FILM COATED ORAL at 10:12

## 2022-12-23 RX ADMIN — OXCARBAZEPINE 300 MG: 300 TABLET, FILM COATED ORAL at 09:12

## 2022-12-23 RX ADMIN — FAMOTIDINE 20 MG: 20 TABLET, FILM COATED ORAL at 09:12

## 2022-12-23 RX ADMIN — BENZTROPINE MESYLATE 1 MG: 1 TABLET ORAL at 09:12

## 2022-12-23 RX ADMIN — METOPROLOL TARTRATE 75 MG: 25 TABLET, FILM COATED ORAL at 10:12

## 2022-12-23 RX ADMIN — ASPIRIN 81 MG CHEWABLE TABLET 81 MG: 81 TABLET CHEWABLE at 10:12

## 2022-12-23 RX ADMIN — ISOSORBIDE MONONITRATE 30 MG: 30 TABLET, EXTENDED RELEASE ORAL at 10:12

## 2022-12-23 RX ADMIN — SACUBITRIL AND VALSARTAN 1 TABLET: 24; 26 TABLET, FILM COATED ORAL at 09:12

## 2022-12-23 RX ADMIN — APIXABAN 5 MG: 5 TABLET, FILM COATED ORAL at 09:12

## 2022-12-23 RX ADMIN — BENZTROPINE MESYLATE 1 MG: 1 TABLET ORAL at 10:12

## 2022-12-23 RX ADMIN — ARIPIPRAZOLE 10 MG: 5 TABLET ORAL at 10:12

## 2022-12-23 RX ADMIN — FAMOTIDINE 20 MG: 20 TABLET, FILM COATED ORAL at 10:12

## 2022-12-23 RX ADMIN — FUROSEMIDE 40 MG: 10 INJECTION, SOLUTION INTRAMUSCULAR; INTRAVENOUS at 10:12

## 2022-12-23 RX ADMIN — OXCARBAZEPINE 300 MG: 300 TABLET, FILM COATED ORAL at 10:12

## 2022-12-23 RX ADMIN — OXCARBAZEPINE 300 MG: 300 TABLET, FILM COATED ORAL at 03:12

## 2022-12-23 RX ADMIN — LEVETIRACETAM 500 MG: 500 TABLET, FILM COATED ORAL at 09:12

## 2022-12-23 NOTE — PT/OT/SLP EVAL
Physical Therapy Evaluation and Discharge Note    Patient Name:  Kendall Duff   MRN:  [id number]    Recommendations:     Discharge Recommendations:    Discharge Equipment Recommendations: none   Barriers to discharge: None    Assessment:     Kendall Duff is a 57 y.o. male admitted with a medical diagnosis of Acute chest pain. .  At this time, patient is functioning at their prior level of function and does not require further acute PT services.     Recent Surgery: * No surgery found *      Plan:     During this hospitalization, patient does not require further acute PT services.  Please re-consult if situation changes.      Subjective     Chief Complaint: none  Patient/Family Comments/goals: none  Pain/Comfort:       Patients cultural, spiritual, Hinduism conflicts given the current situation: no    Living Environment:  Pt lives w/ family, SL home, no steps  Prior to admission, patients level of function was independent.  Equipment used at home:  .  DME owned (not currently used): none.  Upon discharge, patient will have assistance from family.    Objective:     Communicated with RN prior to session.  Patient found HOB elevated with peripheral IV upon PT entry to room.    General Precautions: Standard,      Orthopedic Precautions:    Braces: N/A  Respiratory Status: Room air    Exams:  Cognitive Exam:  Patient is oriented to Person, Place, Time, and Situation    Functional Mobility:  Bed Mobility:     Supine to Sit: independence  Sit to Supine: independence  Transfers:     Sit to Stand:  independence with no AD  Gait: pt demo'd a steady step through gt pattern, no AD.     AM-PAC 6 CLICK MOBILITY  Total Score:24         AM-PAC 6 CLICK MOBILITY  Total Score:24     Patient left HOB elevated with all lines intact, call button in reach, and RN notified.    GOALS:   Multidisciplinary Problems       Physical Therapy Goals       Not on file                    History:     Past Medical History:   Diagnosis Date    Bipolar  disorder, unspecified     Chronic hepatitis C     History of psychiatric hospitalization     Hx of psychiatric care     Hypertension     Bessie     Obesity, unspecified     Psychiatric problem     Schizoaffective disorder, bipolar type     Seizures     Stroke     Substance abuse     Therapy        Past Surgical History:   Procedure Laterality Date    CORONARY STENT PLACEMENT  08/14/2015    DEBRIDEMENT  04/17/2022    LEFT HEART CATHETERIZATION Left 08/13/2015    REPEAT CLOSURE OF STERNAL INCISION N/A 5/11/2022    Procedure: CLOSURE, STERNAL INCISION, REPEAT;  Surgeon: Adolfo Weiss MD;  Location: [facility];  Service: Plastics;  Laterality: N/A;  STERNAL WOUND DEBRIDEMENT AND RECONSTRUCTION // MULTIPLE MUSCLE FLAPS // REQ 1400       Time Tracking:     PT Received On: 12/23/22  PT Start Time: 1015     PT Stop Time: 1025  PT Total Time (min): 10 min     Billable Minutes: Evaluation , low complexity      12/23/2022

## 2022-12-23 NOTE — PT/OT/SLP EVAL
Occupational Therapy   Evaluation and Discharge Note    Name: Kendall Duff  MRN: [id number]  Admitting Diagnosis: Acute chest pain, CHF exacerbation, NSTEMI  Hx: bipolar, schizophrenia, HEP C   Recent Surgery: * No surgery found *      Recommendations:     Discharge Recommendations: home  Discharge Equipment Recommendations: none  Barriers to discharge:  Other (Comment) (Acuity of illness)    Assessment:     Kendall Duff is a 57 y.o. male with a medical diagnosis of Acute chest pain, CHF exacerbation, NSTEMI  Hx: bipolar, schizophrenia, HEP C.  At this time, patient is functioning at their prior level of function and does not require further acute OT services- pt in agreement.     Plan:     During this hospitalization, patient does not require further acute OT services.  Please re-consult if situation changes.    Plan of Care Reviewed with: patient    Subjective     Chief Complaint: None   Patient/Family Comments/goals: To go home     Occupational Profile:  Living Environment: Pt lives in a [facility] c his daughter. Reported he has a walk in shower.   Previous level of function: IND c ADLs and mobility   Equipment Used at home: none  Assistance upon Discharge: Pt reported his daughter will be able to assist at d/c.     Pain/Comfort:  Pain Rating 1: 0/10    Patients cultural, spiritual, Gnosticist conflicts given the current situation: no    Objective:     Communicated with: RN prior to session.  Patient found supine with  (LifeVest) upon OT entry to room.    General Precautions: Standard,    Orthopedic Precautions: N/A  Braces: N/A  Respiratory Status: Room air     Occupational Performance:    Bed Mobility:    Patient completed Scooting/Bridging with independence  Patient completed Supine to Sit with independence  Patient completed Sit to Supine with independence    Functional Mobility/Transfers:  Patient completed Sit <> Stand Transfer with independence  with  no assistive device   Patient completed Toilet Transfer Step  Transfer technique with independence with  no AD  Functional Mobility: Pt able to ambulated INDly without AD.     Activities of Daily Living:  Grooming: independence    Lower Body Dressing: independence Doff/don socks     Cognitive/Visual Perceptual:  Cognitive/Psychosocial Skills:     -       Mood/Affect/Coping skills/emotional control: Appropriate to situation and Cooperative    Physical Exam:  Upper Extremity Strength:    -       Right Upper Extremity: WFL  -       Left Upper Extremity: WFL      Treatment & Education:    Patient left supine with all lines intact and call button in reach    GOALS:   Multidisciplinary Problems       Occupational Therapy Goals       Not on file                    History:     Past Medical History:   Diagnosis Date    Bipolar disorder, unspecified     Chronic hepatitis C     History of psychiatric hospitalization     Hx of psychiatric care     Hypertension     Bessie     Obesity, unspecified     Psychiatric problem     Schizoaffective disorder, bipolar type     Seizures     Stroke     Substance abuse     Therapy          Past Surgical History:   Procedure Laterality Date    CORONARY STENT PLACEMENT  08/14/2015    DEBRIDEMENT  04/17/2022    LEFT HEART CATHETERIZATION Left 08/13/2015    REPEAT CLOSURE OF STERNAL INCISION N/A 5/11/2022    Procedure: CLOSURE, STERNAL INCISION, REPEAT;  Surgeon: Adolfo Weiss MD;  Location: [facility];  Service: Plastics;  Laterality: N/A;  STERNAL WOUND DEBRIDEMENT AND RECONSTRUCTION // MULTIPLE MUSCLE FLAPS // REQ 1400       Time Tracking:     OT Date of Treatment: 12/23/22  OT Start Time: 1014  OT Stop Time: 1022  OT Total Time (min): 8 min    Billable Minutes:Evaluation Low complexity    12/23/2022

## 2022-12-23 NOTE — CONSULTS
Inpatient consult to Cardiology  Consult performed by: RAY Chilel  Consult ordered by: Negrito Hernandez MD  Reason for consult: Bradycardia    [facility]  Cardiology  Consult Note    Patient Name: Kendall Duff  MRN: [id number]  Admission Date: 12/21/2022  Hospital Length of Stay: 1 days  Code Status: Full Code   Attending Provider: Negrito Hernandez MD   Consulting Provider: RAY Chilel  Primary Care Physician: Primary Doctor No  Principal Problem:Acute chest pain    Patient information was obtained from patient, past medical records, and ER records.     Subjective:     Chief Complaint:  Reason for consult: bradycardia      HPI:   Mr. Duff is a 57 year old male who is known to [facility], Dr. Wright. He presents to the ER with complaints of worsening SOB, fatigue, & dry cough. Of note, he went to the hospital on 12.18, 12.9, & 12.20 and left AMA on all 3 occasions. Significant labs on arrival include H&H 9.7/32, B/Cr 44.5/1.40, BNP 3118.4, & trop 0.119. A CXR was obtained and demonstrated congestive failure. He responded well to diuretics. On 12.23.22, he was found to be bradycardic on tele, which is the reason [facility] was consulted.     Previous Cardiac Diagnostics:   TTE (12.18.22):  Eccentric hypertrophy and severely decreased systolic function. The estimated ejection fraction is 20-25%.  Grade III left ventricular diastolic dysfunction.  Mild right ventricular enlargement with mildly reduced right ventricular systolic function.  Mild right atrial enlargement.  Moderate mitral regurgitation.  Severe tricuspid regurgitation.  The estimated PA systolic pressure is 33 mmHg.    Echocardiogram (10.28.22):  The left ventricle is moderately enlarged with mild eccentric hypertrophy and severely decreased systolic function.  The estimated ejection fraction is 20%.  There is severe left ventricular global hypokinesis.  Grade II left ventricular diastolic dysfunction.  Normal  right ventricular size with moderately reduced right ventricular systolic function.  Moderate mitral regurgitation.  Moderate tricuspid regurgitation.  There is mild pulmonary hypertension.  The estimated PA systolic pressure is 44 mmHg.  Elevated central venous pressure (15 mmHg).  Severe left atrial enlargement.     CABG x 4 (4/22):  LIMA-LAD, SVG-OM1, SVG-D1, SVG-PDA     Echocardiogram (6.16.22):  The left ventricle is normal in size w/ concentric hypertrophy and severely decreased systolic function.  The estimated EF is 25-30%.  There is severe left ventricular global hypokinesis.  Grade II left ventricular diastolic dysfunction.  Normal right ventricular size w/ mildly reduced right ventricular systolic function.  The estimated PA systolic pressure is 52 mmHg.  There is moderate pulmonary HTN.  Elevated CVP (15 mmHg).     RUE NIVA (5.10.22):  A deep vein thrombosis was identified in the right axillary and brachial veins. A superficial thrombosis was identified in the right basilic vein.     LHC (3.21.22):  100% LAD to 30-40% residual stenosis post PTCA.  Large diagonal system w/ severe stenosis.  CIRC - patent stent, OM1 patent, mild CIRC occluded  RCA - stents ISR 40-50%, distal 70-80%  EDP 12 EF 35-40% anterior HK     Review of patient's allergies indicates:   Allergen Reactions    Depakote [divalproex]     Divalproex sodium Other (See Comments)    Lithium     Lithium analogues     Quetiapine Other (See Comments)     Pt states had seizures       Review of Systems   Respiratory:  Positive for cough and shortness of breath.    Cardiovascular:  Negative for chest pain and palpitations.     Objective:     Vital Signs (Most Recent):  Temp: 97.8 °F (36.6 °C) (12/23/22 1124)  Pulse: (!) 46 (12/23/22 1124)  Resp: 14 (12/23/22 1124)  BP: 132/60 (12/23/22 1124)  SpO2: 98 % (12/23/22 1124)   Vital Signs (24h Range):  Temp:  [97.4 °F (36.3 °C)-97.8 °F (36.6 °C)] 97.8 °F (36.6 °C)  Pulse:  [46-57] 46  Resp:  [14-23]  14  SpO2:  [91 %-99 %] 98 %  BP: (102-132)/(60-94) 132/60     Weight: 80.9 kg (178 lb 5.6 oz)  Body mass index is 27.93 kg/m².    SpO2: 98 %         Intake/Output Summary (Last 24 hours) at 12/23/2022 1315  Last data filed at 12/23/2022 0629  Gross per 24 hour   Intake 600 ml   Output 925 ml   Net -325 ml       Lines/Drains/Airways       None                   Significant Labs:  Recent Results (from the past 72 hour(s))   Comprehensive Metabolic Panel    Collection Time: 12/21/22 12:14 PM   Result Value Ref Range    Sodium Level 140 136 - 145 mmol/L    Potassium Level 3.6 3.5 - 5.1 mmol/L    Chloride 106 98 - 107 mmol/L    Carbon Dioxide 25 22 - 29 mmol/L    Glucose Level 156 (H) 74 - 100 mg/dL    Blood Urea Nitrogen 44.5 (H) 8.4 - 25.7 mg/dL    Creatinine 1.40 (H) 0.73 - 1.18 mg/dL    Calcium Level Total 8.8 8.4 - 10.2 mg/dL    Protein Total 6.6 6.4 - 8.3 gm/dL    Albumin Level 3.3 (L) 3.5 - 5.0 g/dL    Globulin 3.3 2.4 - 3.5 gm/dL    Albumin/Globulin Ratio 1.0 (L) 1.1 - 2.0 ratio    Bilirubin Total 0.5 <=1.5 mg/dL    Alkaline Phosphatase 137 40 - 150 unit/L    Alanine Aminotransferase 21 0 - 55 unit/L    Aspartate Aminotransferase 29 5 - 34 unit/L    eGFR 58 mls/min/1.73/m2   Magnesium    Collection Time: 12/21/22 12:14 PM   Result Value Ref Range    Magnesium Level 2.10 1.60 - 2.60 mg/dL   Troponin I    Collection Time: 12/21/22 12:14 PM   Result Value Ref Range    Troponin-I 0.092 (H) 0.000 - 0.045 ng/mL   BNP    Collection Time: 12/21/22 12:15 PM   Result Value Ref Range    Natriuretic Peptide 3,118.4 (H) <=100.0 pg/mL   CBC with Differential    Collection Time: 12/21/22 12:15 PM   Result Value Ref Range    WBC 5.8 4.5 - 11.5 x10(3)/mcL    RBC 4.34 (L) 4.70 - 6.10 x10(6)/mcL    Hgb 9.7 (L) 14.0 - 18.0 gm/dL    Hct 32.0 (L) 42.0 - 52.0 %    MCV 73.7 (L) 80.0 - 94.0 fL    MCH 22.4 pg    MCHC 30.3 (L) 33.0 - 36.0 mg/dL    RDW 19.6 (H) 11.6 - 14.4 %    Platelet 206 140 - 371 x10(3)/mcL    MPV 10.0 9.4 - 12.4 fL     Neut % 74.8 %    Lymph % 12.0 %    Mono % 10.5 %    Eos % 1.7 %    Basophil % 0.7 %    Lymph # 0.70 0.6 - 4.6 x10(3)/mcL    Neut # 4.36 2.1 - 9.2 x10(3)/mcL    Mono # 0.61 0.1 - 1.3 x10(3)/mcL    Eos # 0.10 0 - 0.9 x10(3)/mcL    Baso # 0.04 0 - 0.2 x10(3)/mcL    IG# 0.02 0 - 0.04 x10(3)/mcL    IG% 0.3 %    NRBC% 0.0 0 - 1 %   Lithium Level    Collection Time: 12/21/22  3:42 PM   Result Value Ref Range    Lithium Level <0.10 (L) 0.50 - 1.20 mmol/L   Drug Screen, Urine    Collection Time: 12/21/22  4:34 PM   Result Value Ref Range    Amphetamines, Urine Negative Negative    Barbituates, Urine Negative Negative    Benzodiazepine, Urine Negative Negative    Cannabinoids, Urine Negative Negative    Cocaine, Urine Negative Negative    Fentanyl, Urine Negative Negative    MDMA, Urine Negative Negative    Opiates, Urine Negative Negative    Phencyclidine, Urine Negative Negative    pH, Urine 5.0 3.0 - 11.0   Troponin I    Collection Time: 12/21/22  5:38 PM   Result Value Ref Range    Troponin-I 0.078 (H) 0.000 - 0.045 ng/mL   Comprehensive Metabolic Panel (CMP)    Collection Time: 12/22/22  3:40 AM   Result Value Ref Range    Sodium Level 139 136 - 145 mmol/L    Potassium Level 3.8 3.5 - 5.1 mmol/L    Chloride 104 98 - 107 mmol/L    Carbon Dioxide 23 22 - 29 mmol/L    Glucose Level 186 (H) 74 - 100 mg/dL    Blood Urea Nitrogen 42.3 (H) 8.4 - 25.7 mg/dL    Creatinine 1.50 (H) 0.73 - 1.18 mg/dL    Calcium Level Total 8.6 8.4 - 10.2 mg/dL    Protein Total 6.1 (L) 6.4 - 8.3 gm/dL    Albumin Level 3.2 (L) 3.5 - 5.0 g/dL    Globulin 2.9 2.4 - 3.5 gm/dL    Albumin/Globulin Ratio 1.1 1.1 - 2.0 ratio    Bilirubin Total 0.6 <=1.5 mg/dL    Alkaline Phosphatase 109 40 - 150 unit/L    Alanine Aminotransferase 20 0 - 55 unit/L    Aspartate Aminotransferase 28 5 - 34 unit/L    eGFR 53 mls/min/1.73/m2   Magnesium    Collection Time: 12/22/22  3:40 AM   Result Value Ref Range    Magnesium Level 2.00 1.60 - 2.60 mg/dL   CBC with Differential     Collection Time: 12/22/22  3:40 AM   Result Value Ref Range    WBC 5.2 4.5 - 11.5 x10(3)/mcL    RBC 4.26 (L) 4.70 - 6.10 x10(6)/mcL    Hgb 9.5 (L) 14.0 - 18.0 gm/dL    Hct 31.0 (L) 42.0 - 52.0 %    MCV 72.8 (L) 80.0 - 94.0 fL    MCH 22.3 pg    MCHC 30.6 (L) 33.0 - 36.0 mg/dL    RDW 19.5 (H) 11.6 - 14.4 %    Platelet 222 140 - 371 x10(3)/mcL    MPV 10.4 9.4 - 12.4 fL    Neut % 73.8 %    Lymph % 13.3 %    Mono % 10.2 %    Eos % 1.9 %    Basophil % 0.6 %    Lymph # 0.69 0.6 - 4.6 x10(3)/mcL    Neut # 3.84 2.1 - 9.2 x10(3)/mcL    Mono # 0.53 0.1 - 1.3 x10(3)/mcL    Eos # 0.10 0 - 0.9 x10(3)/mcL    Baso # 0.03 0 - 0.2 x10(3)/mcL    IG# 0.01 0 - 0.04 x10(3)/mcL    IG% 0.2 %    NRBC% 0.0 0 - 1 %   Comprehensive Metabolic Panel    Collection Time: 12/23/22  4:17 AM   Result Value Ref Range    Sodium Level 138 136 - 145 mmol/L    Potassium Level 3.5 3.5 - 5.1 mmol/L    Chloride 102 98 - 107 mmol/L    Carbon Dioxide 28 22 - 29 mmol/L    Glucose Level 109 (H) 74 - 100 mg/dL    Blood Urea Nitrogen 33.0 (H) 8.4 - 25.7 mg/dL    Creatinine 1.25 (H) 0.73 - 1.18 mg/dL    Calcium Level Total 8.7 8.4 - 10.2 mg/dL    Protein Total 6.5 6.4 - 8.3 gm/dL    Albumin Level 3.2 (L) 3.5 - 5.0 g/dL    Globulin 3.3 2.4 - 3.5 gm/dL    Albumin/Globulin Ratio 1.0 (L) 1.1 - 2.0 ratio    Bilirubin Total 0.8 <=1.5 mg/dL    Alkaline Phosphatase 114 40 - 150 unit/L    Alanine Aminotransferase 20 0 - 55 unit/L    Aspartate Aminotransferase 27 5 - 34 unit/L    eGFR >60 mls/min/1.73/m2   CBC with Differential    Collection Time: 12/23/22  4:17 AM   Result Value Ref Range    WBC 7.2 4.5 - 11.5 x10(3)/mcL    RBC 4.81 4.70 - 6.10 x10(6)/mcL    Hgb 10.6 (L) 14.0 - 18.0 gm/dL    Hct 35.0 (L) 42.0 - 52.0 %    MCV 72.8 (L) 80.0 - 94.0 fL    MCH 22.0 pg    MCHC 30.3 (L) 33.0 - 36.0 mg/dL    RDW 19.6 (H) 11.6 - 14.4 %    Platelet 265 140 - 371 x10(3)/mcL    MPV 9.9 9.4 - 12.4 fL    Neut % 67.3 %    Lymph % 18.3 %    Mono % 10.5 %    Eos % 2.7 %    Basophil % 0.8  %    Lymph # 1.31 0.6 - 4.6 x10(3)/mcL    Neut # 4.82 2.1 - 9.2 x10(3)/mcL    Mono # 0.75 0.1 - 1.3 x10(3)/mcL    Eos # 0.19 0 - 0.9 x10(3)/mcL    Baso # 0.06 0 - 0.2 x10(3)/mcL    IG# 0.03 0 - 0.04 x10(3)/mcL    IG% 0.4 %    NRBC% 0.0 0 - 1 %   Magnesium    Collection Time: 12/23/22  4:17 AM   Result Value Ref Range    Magnesium Level 2.00 1.60 - 2.60 mg/dL   Lactate Dehydrogenase    Collection Time: 12/23/22  4:17 AM   Result Value Ref Range    Lactate Dehydrogenase 248 (H) 125 - 220 U/L       Significant Imaging:  Imaging Results              X-Ray Chest 1 View (Final result)  Result time 12/21/22 12:41:09      Final result by Javier Soto MD (12/21/22 12:41:09)                   Impression:      Congestive failure.      Electronically signed by: Javier Soto  Date:    12/21/2022  Time:    12:41               Narrative:    EXAMINATION:  XR CHEST 1 VIEW    CLINICAL HISTORY:  Chest pain, unspecified    TECHNIQUE:  One view    COMPARISON:  October 20, 2022.    FINDINGS:  Cardiopericardial silhouette enlarged appearance is similar.  Lungs congestive heart failure ground-glass and reticulonodular opacities persist.  No focally dense consolidation or significant fluid within the pleural spaces.  Chest is partially obscured by overlying devices.                                      EKG:    Results for orders placed or performed during the hospital encounter of 12/21/22   EKG 12-lead    Narrative    Test Reason : R07.9,    Vent. Rate : 048 BPM     Atrial Rate : 048 BPM     P-R Int : 178 ms          QRS Dur : 144 ms      QT Int : 504 ms       P-R-T Axes : 057 145 035 degrees     QTc Int : 450 ms    Poor data quality  Sinus bradycardia  Right bundle branch block  Septal infarct (cited on or before 19-DEC-2022)  Abnormal ECG  When compared with ECG of 22-DEC-2022 03:44,  Nonspecific T wave abnormality now evident in Inferior leads  Confirmed by Agustin Alberto MD, Shaq GIRALDO ([id number]) on 12/22/2022 12:40:47 PM    Referred By:  [email or web address]   SELF           Confirmed By:Shaq Alberto       Telemetry:  SB 40-50's    Physical Exam  HENT:      Head: Normocephalic.      Nose: Nose normal.      Mouth/Throat:      Mouth: Mucous membranes are moist.   Eyes:      Extraocular Movements: Extraocular movements intact.   Cardiovascular:      Rate and Rhythm: Regular rhythm. Bradycardia present.      Pulses: Normal pulses.      Heart sounds: Murmur heard.   Pulmonary:      Effort: Pulmonary effort is normal.      Breath sounds: Normal breath sounds.   Abdominal:      Palpations: Abdomen is soft.   Musculoskeletal:         General: Normal range of motion.   Neurological:      Mental Status: He is alert and oriented to person, place, and time.   Psychiatric:         Behavior: Behavior normal.       Home Medications:   No current facility-administered medications on file prior to encounter.     Current Outpatient Medications on File Prior to Encounter   Medication Sig Dispense Refill    amiodarone (PACERONE) 200 MG Tab Take 1 tablet (200 mg total) by mouth once daily. 30 tablet 0    apixaban (ELIQUIS) 5 mg Tab Take 1 tablet (5 mg total) by mouth 2 (two) times daily. 60 tablet 3    ARIPiprazole (ABILIFY) 10 MG Tab Take 1 tablet (10 mg total) by mouth once daily. 30 tablet 11    aspirin 81 MG Chew Chew and swallow 1 tablet (81 mg total) by mouth once daily. 360 tablet 0    atorvastatin (LIPITOR) 80 MG tablet Take 1 tablet (80 mg total) by mouth once daily. 90 tablet 3    benztropine (COGENTIN) 1 MG tablet Take 1 mg by mouth 2 (two) times daily.      famotidine (PEPCID) 20 MG tablet Take 1 tablet (20 mg total) by mouth 2 (two) times daily. 60 tablet 11    furosemide (LASIX) 40 MG tablet Take 1 tablet (40 mg total) by mouth once daily. 30 tablet 11    [] HYDROcodone-acetaminophen (NORCO) 5-325 mg per tablet Take 1 tablet by mouth every 6 (six) hours as needed for Pain. 8 tablet 0    isosorbide mononitrate (IMDUR) 30 MG 24 hr tablet Take 1  tablet (30 mg total) by mouth once daily. 30 tablet 11    levETIRAcetam (KEPPRA) 500 MG Tab Take 1 tablet (500 mg total) by mouth 2 (two) times daily. 60 tablet 11    metoprolol tartrate 75 mg Tab Take 75 mg by mouth 2 (two) times daily. (Patient not taking: Reported on 12/5/2022) 60 tablet 11    OXcarbazepine (TRILEPTAL) 300 MG Tab Take 300 mg by mouth 3 (three) times daily.      sacubitriL-valsartan (ENTRESTO) 24-26 mg per tablet Take 1 tablet by mouth 2 (two) times daily. (Patient not taking: Reported on 12/5/2022) 60 tablet 0    traZODone (DESYREL) 100 MG tablet Take 1 tablet (100 mg total) by mouth every evening. 30 tablet 11    [DISCONTINUED] metoprolol succinate (TOPROL-XL) 25 MG 24 hr tablet Take 25 mg by mouth once daily.      [DISCONTINUED] pravastatin (PRAVACHOL) 40 MG tablet Take 1 tablet (40 mg total) by mouth every evening. 90 tablet 3       Current Inpatient Medications:    Current Facility-Administered Medications:     acetaminophen tablet 650 mg, 650 mg, Oral, Q4H PRN, RAY Olguin    aluminum-magnesium hydroxide-simethicone 200-200-20 mg/5 mL suspension 30 mL, 30 mL, Oral, QID PRN, RAY Olguin    amiodarone tablet 200 mg, 200 mg, Oral, Daily, Zion Howell MD, 200 mg at 12/23/22 1005    apixaban tablet 5 mg, 5 mg, Oral, BID, Zion Howell MD, 5 mg at 12/23/22 1005    ARIPiprazole tablet 10 mg, 10 mg, Oral, Daily, Zion Howell MD, 10 mg at 12/23/22 1004    aspirin chewable tablet 81 mg, 81 mg, Oral, Daily, Zion Howell MD, 81 mg at 12/23/22 1004    atorvastatin tablet 80 mg, 80 mg, Oral, Daily, Zion Howell MD, 80 mg at 12/23/22 1004    benztropine tablet 1 mg, 1 mg, Oral, BID, Zion Howell MD, 1 mg at 12/23/22 1005    dextrose 10% bolus 125 mL 125 mL, 12.5 g, Intravenous, PRN, Lina Bahena, RAY    dextrose 10% bolus 250 mL 250 mL, 25 g, Intravenous, PRN, Lina Bahena, RAY    famotidine tablet 20 mg, 20 mg, Oral, BID, Zion Howell MD, 20 mg at 12/23/22 1005     furosemide injection 40 mg, 40 mg, Intravenous, Daily, Zion Howell MD, 40 mg at 12/23/22 1005    glucagon (human recombinant) injection 1 mg, 1 mg, Intramuscular, PRN, RAY Olguin    glucose chewable tablet 16 g, 16 g, Oral, PRN, RAY Olguin    glucose chewable tablet 24 g, 24 g, Oral, PRN, RAY Olguin    HYDROcodone-acetaminophen 5-325 mg per tablet 1 tablet, 1 tablet, Oral, Q6H PRN, RAY Olguin, 1 tablet at 12/22/22 1756    isosorbide mononitrate 24 hr tablet 30 mg, 30 mg, Oral, Daily, Zion Howell MD, 30 mg at 12/23/22 1004    levETIRAcetam tablet 500 mg, 500 mg, Oral, BID, Zion Howell MD, 500 mg at 12/23/22 1005    melatonin tablet 6 mg, 6 mg, Oral, Nightly PRN, RAY Olguin    metoprolol tartrate (LOPRESSOR) tablet 75 mg, 75 mg, Oral, BID, Zion Howell MD, 75 mg at 12/23/22 1004    naloxone 0.4 mg/mL injection 0.02 mg, 0.02 mg, Intravenous, PRN, RAY Olguin    ondansetron injection 4 mg, 4 mg, Intravenous, Q6H PRN, RAY Olguin    OXcarbazepine tablet 300 mg, 300 mg, Oral, TID, Zion Howell MD, 300 mg at 12/23/22 1005    sacubitriL-valsartan 24-26 mg per tablet 1 tablet, 1 tablet, Oral, BID, Zion Howell MD, 1 tablet at 12/23/22 1005    sodium chloride 0.9% flush 10 mL, 10 mL, Intravenous, Q12H PRN, RAY Olguin    traZODone tablet 100 mg, 100 mg, Oral, QHS, Zion Howell MD, 100 mg at 12/22/22 2140         VTE Risk Mitigation (From admission, onward)           Ordered     apixaban tablet 5 mg  2 times daily         12/21/22 1831     IP VTE HIGH RISK PATIENT  Once         12/21/22 1529     Place sequential compression device  Until discontinued         12/21/22 1529                    Assessment:   Acute on Chronic Combined Systolic/Diastolic HF    - Grade 3 DD    - EF 20-25% (echo 12.18.22)  PAF/PAFL (Recent New Diagnosis)- Now Sinus [place]    - [id number]: 5 (LVSD/HTN/TE/NSTEMI)    - On  Eliquis  NSTEMI - suspect type 2 in the setting of CHF exacerbation     - denies current CP    - trop 0.119, 0.112, 0.082, 0.078  ANA - improving   Chronic anemia - stable  ICMO     - With Life Vest   VHD    -Moderate MR and Severe TR  Hypertension  Pulmonary Hypertension  CAD/CABG (April 2022)    - LIMA-LAD, SVG-OM1, SVG-D1, SVG-PDA  History of RUE DVT (5.10.22)    - A deep vein thrombosis was identified in the right axillary and brachial veins. A superficial thrombosis was identified in the right basilic vein.  Schizoaffective Disorder  Hx of Hep C  Hx of cocaine Abuse   Nicotine Dependence (Tobacco use)       Plan:   Tele showing SB w/ frequent PAC's. Continue to monitor on tele.   Continue metoprolol & amiodarone for now.  Continue Lasix 40 IVP daily. Plans for transition to oral lasix tomorrow.   Continue ASA & statin.   Will plan for MCT x 2 weeks at discharge.   F/U with Dr. Wright in 1 week.   Will f/u tomorrow.     Thank you for your consult.     Megha Jurado, RAY  Cardiology  [facility] - Observation Unit  12/23/2022 1:15 PM

## 2022-12-23 NOTE — NURSING
Nurses Note -- 4 Eyes      12/23/2022   5:31 PM      Skin assessed during: Transfer      [x] No Pressure Injuries Present    []Prevention Measures Documented      [] Yes- Altered Skin Integrity Present or Discovered   [] LDA Added if Not in Epic (Describe Wound)   [] New Altered Skin Integrity was Present on Admit and Documented in LDA   [] Wound Image Taken    Wound Care Consulted? No    Attending Nurse:  Nancy Murphy RN     Second RN/Staff Member:  Ros Betancourt

## 2022-12-23 NOTE — PROGRESS NOTES
[facility] Hospital Medicine Progress Note        Chief Complaint: Inpatient Follow-up for CHF    HPI:    57-year-old male with known past medical history of hypertension, hyperlipidemia, coronary artery disease status post CABG, CMO with LifeVest-EF 20-25%, bipolar disorder, schizophrenia, chronic hepatitis-C, seizures, CVA, history of substance abuse in the past admitted 12/21 again with complaint of worsening shortness of breath, fatigue, dry cough.  Noted that patient was admitted to our facility on 12/18 and he left AMA, then on 12/19 he again went to [facility] and left AMA from there, went back again on 12/20 to [facility] with similar complaints and again left AMA.  Today he presented to our hospital, this time he stating that he is not feeling good and he will stated he is better.  Reportedly lives at home with his oldest daughter.  Denies chest pain, visual disturbance.  Main complaint is worsening shortness of breath.     Initial vital signs /86 pulse 98 respirations 18 temperature 97.5° F O2 saturation 99% on room air. . Labs significant for H&H 9.7/32.0, BUN 44.5, creatinine 1.40; glucose 156, BNP 3 118.4; troponin 0.092; other indices unremarkable.  Chest x-ray demonstrated congestive failure.  Patient received DuoNeb treatment, aspirin, and 80 mg of Lasix IV push in the ED. patient is admitted to hospital medicine services for further management of CHF, NSTEMI II.     echo from 12/18/2022 showed EF of 20-25%, grade 3 diastolic dysfunction, right ventricular enlargement with mildly reduced right ventricular systolic function.  Mild right atrial enlargement, moderate mitral regurg, severe tricuspid regurg.  Lasix was continued and urine output, electrolytes were monitored closely    Interval Hx:   No new events overnight.  Borderline bradycardic.  Doing well on 2 L nasal cannula oxygen but he was saturating well on room air when seen at bedside.  Denies any worsening shortness  of breath.  Responding well with diuresis.  Morning labs showed improving renal function.        Objective/physical exam:  Vitals:    12/23/22 0316 12/23/22 0500 12/23/22 0558 12/23/22 0730   BP: 124/71   122/82   Pulse: (!) 53   (!) 56   Resp:    18   Temp:       TempSrc:       SpO2: 95%   99%   Weight:  80.9 kg (178 lb 5.6 oz) 80.9 kg (178 lb 5.6 oz)    Height:         General: In no acute distress, afebrile  Respiratory: Clear to auscultation bilaterally  Cardiovascular: S1, S2, no appreciable murmur  Abdomen: Soft, nontender, BS +  MSK: Warm, no lower extremity edema, no clubbing or cyanosis  Neurologic: Alert and oriented x4, moving all extremities with good strength     Lab Results   Component Value Date     12/23/2022    K 3.5 12/23/2022     07/21/2022    CO2 28 12/23/2022    BUN 33.0 (H) 12/23/2022    CREATININE 1.25 (H) 12/23/2022    CALCIUM 8.7 12/23/2022    ANIONGAP 9 07/21/2022    ESTGFRAFRICA >60 07/21/2022    EGFRNONAA >60 07/21/2022      Lab Results   Component Value Date    ALT 20 12/23/2022    AST 27 12/23/2022    ALKPHOS 114 12/23/2022    BILITOT 0.8 12/23/2022      Lab Results   Component Value Date    WBC 7.2 12/23/2022    HGB 10.6 (L) 12/23/2022    HCT 35.0 (L) 12/23/2022    MCV 72.8 (L) 12/23/2022     12/23/2022           Medications:   amiodarone  200 mg Oral Daily    apixaban  5 mg Oral BID    ARIPiprazole  10 mg Oral Daily    aspirin  81 mg Oral Daily    atorvastatin  80 mg Oral Daily    benztropine  1 mg Oral BID    famotidine  20 mg Oral BID    furosemide (LASIX) injection  40 mg Intravenous Daily    isosorbide mononitrate  30 mg Oral Daily    levETIRAcetam  500 mg Oral BID    metoprolol tartrate  75 mg Oral BID    OXcarbazepine  300 mg Oral TID    sacubitriL-valsartan  1 tablet Oral BID    traZODone  100 mg Oral QHS      acetaminophen, aluminum-magnesium hydroxide-simethicone, dextrose 10%, dextrose 10%, glucagon (human recombinant), glucose, glucose,  HYDROcodone-acetaminophen, melatonin, naloxone, ondansetron, sodium chloride 0.9%     Assessment/Plan:      Acute on chronic combined systolic and diastolic heart failure, has lifevest  20-25%  Acute hypoxemic respiratory failure - improving   Chest pain- resolved  NSTEMI type II- improving  ANA, likely prerenal improving     Hx: PAF on amio & Eliquis, pulmonary HTN, RUE DVT 5/10/22, HTN- essential, CAD- s/p stent and CABG 03/2022, VHD, ACD, HLD, Bipolar 1 disorder, schizophrenia, seizures, CVA, hepatitis-C, substance abuse, tobacco use    Plan:  - Continue diuresis with IV Lasix.  Will switch to p.o. tomorrow.  Monitor electrolytes, urine output, renal function. continue tele. Recent Tte reviewed. Will consider cardiology eval if necessary. Liefevest in place. Needs Cardiology follow u.   - GDMT with Aspirin, statin, Entresto 24-26, Metoprolol, imdur.  - Encourage IS use, pulm toileting. Will get PT eval tomorrow. Doing well on room air now.   - other home meds were reviewed and renewed. Continue AEDs.   -PT eval today      Eliquis  Pepcid  labs    Critical care diagnosis: CHF exacerbation needing IV diuresis  Critical care time: 50 minutes            Negrito Hernandez MD

## 2022-12-24 LAB
ALBUMIN SERPL-MCNC: 2.8 G/DL (ref 3.5–5)
ALBUMIN/GLOB SERPL: 1 RATIO (ref 1.1–2)
ALP SERPL-CCNC: 95 UNIT/L (ref 40–150)
ALT SERPL-CCNC: 17 UNIT/L (ref 0–55)
AST SERPL-CCNC: 24 UNIT/L (ref 5–34)
BASOPHILS # BLD AUTO: 0.05 X10(3)/MCL (ref 0–0.2)
BASOPHILS NFR BLD AUTO: 0.7 %
BILIRUBIN DIRECT+TOT PNL SERPL-MCNC: 0.9 MG/DL
BUN SERPL-MCNC: 20.8 MG/DL (ref 8.4–25.7)
CALCIUM SERPL-MCNC: 8.3 MG/DL (ref 8.4–10.2)
CHLORIDE SERPL-SCNC: 101 MMOL/L (ref 98–107)
CO2 SERPL-SCNC: 28 MMOL/L (ref 22–29)
COLOR STL: NORMAL
CONSISTENCY STL: NORMAL
CREAT SERPL-MCNC: 1 MG/DL (ref 0.73–1.18)
EOSINOPHIL # BLD AUTO: 0.21 X10(3)/MCL (ref 0–0.9)
EOSINOPHIL NFR BLD AUTO: 2.8 %
ERYTHROCYTE [DISTWIDTH] IN BLOOD BY AUTOMATED COUNT: 19.5 % (ref 11.6–14.4)
FERRITIN SERPL-MCNC: 40.72 NG/ML (ref 21.81–274.66)
FOLATE SERPL-MCNC: 10.4 NG/ML (ref 7–31.4)
GFR SERPLBLD CREATININE-BSD FMLA CKD-EPI: >60 MLS/MIN/1.73/M2
GLOBULIN SER-MCNC: 2.8 GM/DL (ref 2.4–3.5)
GLUCOSE SERPL-MCNC: 148 MG/DL (ref 74–100)
HCT VFR BLD AUTO: 32.6 % (ref 42–52)
HEMOCCULT SP1 STL QL: NEGATIVE
HGB BLD-MCNC: 10.3 GM/DL (ref 14–18)
IMM GRANULOCYTES # BLD AUTO: 0.03 X10(3)/MCL (ref 0–0.04)
IMM GRANULOCYTES NFR BLD AUTO: 0.4 %
IRON SATN MFR SERPL: 9 % (ref 20–50)
IRON SERPL-MCNC: 31 UG/DL (ref 65–175)
LYMPHOCYTES # BLD AUTO: 1.33 X10(3)/MCL (ref 0.6–4.6)
LYMPHOCYTES NFR BLD AUTO: 17.7 %
MAGNESIUM SERPL-MCNC: 1.9 MG/DL (ref 1.6–2.6)
MCH RBC QN AUTO: 22.4 PG
MCHC RBC AUTO-ENTMCNC: 31.6 MG/DL (ref 33–36)
MCV RBC AUTO: 70.9 FL (ref 80–94)
MONOCYTES # BLD AUTO: 0.75 X10(3)/MCL (ref 0.1–1.3)
MONOCYTES NFR BLD AUTO: 10 %
NEUTROPHILS # BLD AUTO: 5.15 X10(3)/MCL (ref 2.1–9.2)
NEUTROPHILS NFR BLD AUTO: 68.4 %
NRBC BLD AUTO-RTO: 0 % (ref 0–1)
PLATELET # BLD AUTO: 262 X10(3)/MCL (ref 140–371)
PMV BLD AUTO: 9.6 FL (ref 9.4–12.4)
POTASSIUM SERPL-SCNC: 3.6 MMOL/L (ref 3.5–5.1)
PROT SERPL-MCNC: 5.6 GM/DL (ref 6.4–8.3)
RBC # BLD AUTO: 4.6 X10(6)/MCL (ref 4.7–6.1)
SODIUM SERPL-SCNC: 138 MMOL/L (ref 136–145)
TIBC SERPL-MCNC: 328 UG/DL (ref 69–240)
TIBC SERPL-MCNC: 359 UG/DL (ref 250–450)
TRANSFERRIN SERPL-MCNC: 320 MG/DL (ref 174–364)
VIT B12 SERPL-MCNC: 933 PG/ML (ref 213–816)
WBC # SPEC AUTO: 7.5 X10(3)/MCL (ref 4.5–11.5)

## 2022-12-24 PROCEDURE — 94761 N-INVAS EAR/PLS OXIMETRY MLT: CPT

## 2022-12-24 PROCEDURE — 85025 COMPLETE CBC W/AUTO DIFF WBC: CPT | Performed by: INTERNAL MEDICINE

## 2022-12-24 PROCEDURE — 21400001 HC TELEMETRY ROOM

## 2022-12-24 PROCEDURE — 36415 COLL VENOUS BLD VENIPUNCTURE: CPT | Performed by: INTERNAL MEDICINE

## 2022-12-24 PROCEDURE — 83735 ASSAY OF MAGNESIUM: CPT | Performed by: INTERNAL MEDICINE

## 2022-12-24 PROCEDURE — 25000003 PHARM REV CODE 250: Performed by: INTERNAL MEDICINE

## 2022-12-24 PROCEDURE — 63600175 PHARM REV CODE 636 W HCPCS: Performed by: INTERNAL MEDICINE

## 2022-12-24 PROCEDURE — 25000242 PHARM REV CODE 250 ALT 637 W/ HCPCS: Performed by: STUDENT IN AN ORGANIZED HEALTH CARE EDUCATION/TRAINING PROGRAM

## 2022-12-24 PROCEDURE — 94640 AIRWAY INHALATION TREATMENT: CPT

## 2022-12-24 PROCEDURE — 80053 COMPREHEN METABOLIC PANEL: CPT | Performed by: INTERNAL MEDICINE

## 2022-12-24 PROCEDURE — 99900035 HC TECH TIME PER 15 MIN (STAT)

## 2022-12-24 PROCEDURE — 82746 ASSAY OF FOLIC ACID SERUM: CPT | Performed by: INTERNAL MEDICINE

## 2022-12-24 RX ORDER — FUROSEMIDE 40 MG/1
40 TABLET ORAL DAILY
Status: DISCONTINUED | OUTPATIENT
Start: 2022-12-24 | End: 2022-12-25 | Stop reason: HOSPADM

## 2022-12-24 RX ORDER — IPRATROPIUM BROMIDE AND ALBUTEROL SULFATE 2.5; .5 MG/3ML; MG/3ML
3 SOLUTION RESPIRATORY (INHALATION) EVERY 6 HOURS
Status: DISCONTINUED | OUTPATIENT
Start: 2022-12-24 | End: 2022-12-25 | Stop reason: HOSPADM

## 2022-12-24 RX ADMIN — SACUBITRIL AND VALSARTAN 1 TABLET: 24; 26 TABLET, FILM COATED ORAL at 08:12

## 2022-12-24 RX ADMIN — ATORVASTATIN CALCIUM 80 MG: 40 TABLET, FILM COATED ORAL at 08:12

## 2022-12-24 RX ADMIN — IPRATROPIUM BROMIDE AND ALBUTEROL SULFATE 3 ML: .5; 3 SOLUTION RESPIRATORY (INHALATION) at 12:12

## 2022-12-24 RX ADMIN — ISOSORBIDE MONONITRATE 30 MG: 30 TABLET, EXTENDED RELEASE ORAL at 08:12

## 2022-12-24 RX ADMIN — LEVETIRACETAM 500 MG: 500 TABLET, FILM COATED ORAL at 08:12

## 2022-12-24 RX ADMIN — BENZTROPINE MESYLATE 1 MG: 1 TABLET ORAL at 08:12

## 2022-12-24 RX ADMIN — ASPIRIN 81 MG CHEWABLE TABLET 81 MG: 81 TABLET CHEWABLE at 08:12

## 2022-12-24 RX ADMIN — OXCARBAZEPINE 300 MG: 300 TABLET, FILM COATED ORAL at 03:12

## 2022-12-24 RX ADMIN — FAMOTIDINE 20 MG: 20 TABLET, FILM COATED ORAL at 08:12

## 2022-12-24 RX ADMIN — FUROSEMIDE 40 MG: 10 INJECTION, SOLUTION INTRAMUSCULAR; INTRAVENOUS at 08:12

## 2022-12-24 RX ADMIN — OXCARBAZEPINE 300 MG: 300 TABLET, FILM COATED ORAL at 08:12

## 2022-12-24 RX ADMIN — ARIPIPRAZOLE 10 MG: 5 TABLET ORAL at 08:12

## 2022-12-24 RX ADMIN — TRAZODONE HYDROCHLORIDE 100 MG: 100 TABLET ORAL at 08:12

## 2022-12-24 RX ADMIN — IPRATROPIUM BROMIDE AND ALBUTEROL SULFATE 3 ML: .5; 3 SOLUTION RESPIRATORY (INHALATION) at 07:12

## 2022-12-24 RX ADMIN — APIXABAN 5 MG: 5 TABLET, FILM COATED ORAL at 08:12

## 2022-12-24 RX ADMIN — METOPROLOL TARTRATE 75 MG: 25 TABLET, FILM COATED ORAL at 08:12

## 2022-12-24 RX ADMIN — AMIODARONE HYDROCHLORIDE 200 MG: 200 TABLET ORAL at 08:12

## 2022-12-24 NOTE — NURSING
Observed [employer] rep performing teaching to pt on importance of wearing life vest, having extra batteries and bringing life vest . Pt demonstrated correct application and removal of life vest. Pt also verbalized understanding in addition to return demonstration.

## 2022-12-24 NOTE — PLAN OF CARE
Problem: Violence Risk or Actual  Goal: Anger and Impulse Control  Outcome: Ongoing, Progressing     Problem: Adult Inpatient Plan of Care  Goal: Plan of Care Review  Outcome: Ongoing, Progressing  Goal: Patient-Specific Goal (Individualized)  Outcome: Ongoing, Progressing  Goal: Absence of Hospital-Acquired Illness or Injury  Outcome: Ongoing, Progressing  Goal: Optimal Comfort and Wellbeing  Outcome: Ongoing, Progressing  Goal: Readiness for Transition of Care  Outcome: Ongoing, Progressing     Problem: Adjustment to Illness (Delirium)  Goal: Optimal Coping  Outcome: Ongoing, Progressing     Problem: Altered Behavior (Delirium)  Goal: Improved Behavioral Control  Outcome: Ongoing, Progressing     Problem: Attention and Thought Clarity Impairment (Delirium)  Goal: Improved Attention and Thought Clarity  Outcome: Ongoing, Progressing     Problem: Sleep Disturbance (Delirium)  Goal: Improved Sleep  Outcome: Ongoing, Progressing

## 2022-12-24 NOTE — PROGRESS NOTES
[facility]  [facility]  Consult Note    Patient Name: Kendall Duff  MRN: [id number]  Admission Date: 12/21/2022  Hospital Length of Stay: 2 days  Code Status: Full Code   Attending Provider: Negrito Hernandez MD   Consulting Provider: CJ Alvarez  Primary Care Physician: Primary Doctor No  Principal Problem:Acute chest pain    Patient information was obtained from patient, past medical records, and ER records.     Subjective:     Chief Complaint:  Reason for consult: bradycardia      HPI:   Mr. Duff is a 57 year old male who is known to [facility], Dr. Wright. He presents to the ER with complaints of worsening SOB, fatigue, & dry cough. Of note, he went to the hospital on 12.18, 12.9, & 12.20 and left AMA on all 3 occasions. Significant labs on arrival include H&H 9.7/32, B/Cr 44.5/1.40, BNP 3118.4, & trop 0.119. A CXR was obtained and demonstrated congestive failure. He responded well to diuretics. On 12.23.22, he was found to be bradycardic on tele, which is the reason [facility] was consulted.     12.24.22:  Patient still with intermittent bradycardia.  I&O (-640).      Previous Cardiac Diagnostics:   TTE (12.18.22):  Eccentric hypertrophy and severely decreased systolic function. The estimated ejection fraction is 20-25%.  Grade III left ventricular diastolic dysfunction.  Mild right ventricular enlargement with mildly reduced right ventricular systolic function.  Mild right atrial enlargement.  Moderate mitral regurgitation.  Severe tricuspid regurgitation.  The estimated PA systolic pressure is 33 mmHg.    Echocardiogram (10.28.22):  The left ventricle is moderately enlarged with mild eccentric hypertrophy and severely decreased systolic function.  The estimated ejection fraction is 20%.  There is severe left ventricular global hypokinesis.  Grade II left ventricular diastolic dysfunction.  Normal right ventricular size with moderately reduced right ventricular systolic  function.  Moderate mitral regurgitation.  Moderate tricuspid regurgitation.  There is mild pulmonary hypertension.  The estimated PA systolic pressure is 44 mmHg.  Elevated central venous pressure (15 mmHg).  Severe left atrial enlargement.     CABG x 4 (4/22):  LIMA-LAD, SVG-OM1, SVG-D1, SVG-PDA     Echocardiogram (6.16.22):  The left ventricle is normal in size w/ concentric hypertrophy and severely decreased systolic function.  The estimated EF is 25-30%.  There is severe left ventricular global hypokinesis.  Grade II left ventricular diastolic dysfunction.  Normal right ventricular size w/ mildly reduced right ventricular systolic function.  The estimated PA systolic pressure is 52 mmHg.  There is moderate pulmonary HTN.  Elevated CVP (15 mmHg).     RUE NIVA (5.10.22):  A deep vein thrombosis was identified in the right axillary and brachial veins. A superficial thrombosis was identified in the right basilic vein.     LHC (3.21.22):  100% LAD to 30-40% residual stenosis post PTCA.  Large diagonal system w/ severe stenosis.  CIRC - patent stent, OM1 patent, mild CIRC occluded  RCA - stents ISR 40-50%, distal 70-80%  EDP 12 EF 35-40% anterior HK     Review of patient's allergies indicates:   Allergen Reactions    Depakote [divalproex]     Divalproex sodium Other (See Comments)    Lithium     Lithium analogues     Quetiapine Other (See Comments)     Pt states had seizures       Review of Systems   Respiratory:  Positive for cough and shortness of breath.    Cardiovascular:  Negative for chest pain and palpitations.     Objective:     Vital Signs (Most Recent):  Temp: 97.7 °F (36.5 °C) (12/24/22 0804)  Pulse: 69 (12/24/22 0835)  Resp: 18 (12/24/22 0421)  BP: 101/63 (12/24/22 0836)  SpO2: 97 % (12/24/22 0804)   Vital Signs (24h Range):  Temp:  [97.5 °F (36.4 °C)-98.6 °F (37 °C)] 97.7 °F (36.5 °C)  Pulse:  [46-69] 69  Resp:  [14-20] 18  SpO2:  [97 %-100 %] 97 %  BP: ()/(56-97) 101/63     Weight: 80.9 kg (178 lb  5.6 oz)  Body mass index is 27.93 kg/m².    SpO2: 97 %         Intake/Output Summary (Last 24 hours) at 12/24/2022 0845  Last data filed at 12/23/2022 1319  Gross per 24 hour   Intake 560 ml   Output 1200 ml   Net -640 ml         Lines/Drains/Airways       None                   Significant Labs:  Recent Results (from the past 72 hour(s))   Comprehensive Metabolic Panel    Collection Time: 12/21/22 12:14 PM   Result Value Ref Range    Sodium Level 140 136 - 145 mmol/L    Potassium Level 3.6 3.5 - 5.1 mmol/L    Chloride 106 98 - 107 mmol/L    Carbon Dioxide 25 22 - 29 mmol/L    Glucose Level 156 (H) 74 - 100 mg/dL    Blood Urea Nitrogen 44.5 (H) 8.4 - 25.7 mg/dL    Creatinine 1.40 (H) 0.73 - 1.18 mg/dL    Calcium Level Total 8.8 8.4 - 10.2 mg/dL    Protein Total 6.6 6.4 - 8.3 gm/dL    Albumin Level 3.3 (L) 3.5 - 5.0 g/dL    Globulin 3.3 2.4 - 3.5 gm/dL    Albumin/Globulin Ratio 1.0 (L) 1.1 - 2.0 ratio    Bilirubin Total 0.5 <=1.5 mg/dL    Alkaline Phosphatase 137 40 - 150 unit/L    Alanine Aminotransferase 21 0 - 55 unit/L    Aspartate Aminotransferase 29 5 - 34 unit/L    eGFR 58 mls/min/1.73/m2   Magnesium    Collection Time: 12/21/22 12:14 PM   Result Value Ref Range    Magnesium Level 2.10 1.60 - 2.60 mg/dL   Troponin I    Collection Time: 12/21/22 12:14 PM   Result Value Ref Range    Troponin-I 0.092 (H) 0.000 - 0.045 ng/mL   BNP    Collection Time: 12/21/22 12:15 PM   Result Value Ref Range    Natriuretic Peptide 3,118.4 (H) <=100.0 pg/mL   CBC with Differential    Collection Time: 12/21/22 12:15 PM   Result Value Ref Range    WBC 5.8 4.5 - 11.5 x10(3)/mcL    RBC 4.34 (L) 4.70 - 6.10 x10(6)/mcL    Hgb 9.7 (L) 14.0 - 18.0 gm/dL    Hct 32.0 (L) 42.0 - 52.0 %    MCV 73.7 (L) 80.0 - 94.0 fL    MCH 22.4 pg    MCHC 30.3 (L) 33.0 - 36.0 mg/dL    RDW 19.6 (H) 11.6 - 14.4 %    Platelet 206 140 - 371 x10(3)/mcL    MPV 10.0 9.4 - 12.4 fL    Neut % 74.8 %    Lymph % 12.0 %    Mono % 10.5 %    Eos % 1.7 %    Basophil % 0.7 %     Lymph # 0.70 0.6 - 4.6 x10(3)/mcL    Neut # 4.36 2.1 - 9.2 x10(3)/mcL    Mono # 0.61 0.1 - 1.3 x10(3)/mcL    Eos # 0.10 0 - 0.9 x10(3)/mcL    Baso # 0.04 0 - 0.2 x10(3)/mcL    IG# 0.02 0 - 0.04 x10(3)/mcL    IG% 0.3 %    NRBC% 0.0 0 - 1 %   Lithium Level    Collection Time: 12/21/22  3:42 PM   Result Value Ref Range    Lithium Level <0.10 (L) 0.50 - 1.20 mmol/L   Drug Screen, Urine    Collection Time: 12/21/22  4:34 PM   Result Value Ref Range    Amphetamines, Urine Negative Negative    Barbituates, Urine Negative Negative    Benzodiazepine, Urine Negative Negative    Cannabinoids, Urine Negative Negative    Cocaine, Urine Negative Negative    Fentanyl, Urine Negative Negative    MDMA, Urine Negative Negative    Opiates, Urine Negative Negative    Phencyclidine, Urine Negative Negative    pH, Urine 5.0 3.0 - 11.0   Troponin I    Collection Time: 12/21/22  5:38 PM   Result Value Ref Range    Troponin-I 0.078 (H) 0.000 - 0.045 ng/mL   Comprehensive Metabolic Panel (CMP)    Collection Time: 12/22/22  3:40 AM   Result Value Ref Range    Sodium Level 139 136 - 145 mmol/L    Potassium Level 3.8 3.5 - 5.1 mmol/L    Chloride 104 98 - 107 mmol/L    Carbon Dioxide 23 22 - 29 mmol/L    Glucose Level 186 (H) 74 - 100 mg/dL    Blood Urea Nitrogen 42.3 (H) 8.4 - 25.7 mg/dL    Creatinine 1.50 (H) 0.73 - 1.18 mg/dL    Calcium Level Total 8.6 8.4 - 10.2 mg/dL    Protein Total 6.1 (L) 6.4 - 8.3 gm/dL    Albumin Level 3.2 (L) 3.5 - 5.0 g/dL    Globulin 2.9 2.4 - 3.5 gm/dL    Albumin/Globulin Ratio 1.1 1.1 - 2.0 ratio    Bilirubin Total 0.6 <=1.5 mg/dL    Alkaline Phosphatase 109 40 - 150 unit/L    Alanine Aminotransferase 20 0 - 55 unit/L    Aspartate Aminotransferase 28 5 - 34 unit/L    eGFR 53 mls/min/1.73/m2   Magnesium    Collection Time: 12/22/22  3:40 AM   Result Value Ref Range    Magnesium Level 2.00 1.60 - 2.60 mg/dL   CBC with Differential    Collection Time: 12/22/22  3:40 AM   Result Value Ref Range    WBC 5.2 4.5 -  11.5 x10(3)/mcL    RBC 4.26 (L) 4.70 - 6.10 x10(6)/mcL    Hgb 9.5 (L) 14.0 - 18.0 gm/dL    Hct 31.0 (L) 42.0 - 52.0 %    MCV 72.8 (L) 80.0 - 94.0 fL    MCH 22.3 pg    MCHC 30.6 (L) 33.0 - 36.0 mg/dL    RDW 19.5 (H) 11.6 - 14.4 %    Platelet 222 140 - 371 x10(3)/mcL    MPV 10.4 9.4 - 12.4 fL    Neut % 73.8 %    Lymph % 13.3 %    Mono % 10.2 %    Eos % 1.9 %    Basophil % 0.6 %    Lymph # 0.69 0.6 - 4.6 x10(3)/mcL    Neut # 3.84 2.1 - 9.2 x10(3)/mcL    Mono # 0.53 0.1 - 1.3 x10(3)/mcL    Eos # 0.10 0 - 0.9 x10(3)/mcL    Baso # 0.03 0 - 0.2 x10(3)/mcL    IG# 0.01 0 - 0.04 x10(3)/mcL    IG% 0.2 %    NRBC% 0.0 0 - 1 %   Comprehensive Metabolic Panel    Collection Time: 12/23/22  4:17 AM   Result Value Ref Range    Sodium Level 138 136 - 145 mmol/L    Potassium Level 3.5 3.5 - 5.1 mmol/L    Chloride 102 98 - 107 mmol/L    Carbon Dioxide 28 22 - 29 mmol/L    Glucose Level 109 (H) 74 - 100 mg/dL    Blood Urea Nitrogen 33.0 (H) 8.4 - 25.7 mg/dL    Creatinine 1.25 (H) 0.73 - 1.18 mg/dL    Calcium Level Total 8.7 8.4 - 10.2 mg/dL    Protein Total 6.5 6.4 - 8.3 gm/dL    Albumin Level 3.2 (L) 3.5 - 5.0 g/dL    Globulin 3.3 2.4 - 3.5 gm/dL    Albumin/Globulin Ratio 1.0 (L) 1.1 - 2.0 ratio    Bilirubin Total 0.8 <=1.5 mg/dL    Alkaline Phosphatase 114 40 - 150 unit/L    Alanine Aminotransferase 20 0 - 55 unit/L    Aspartate Aminotransferase 27 5 - 34 unit/L    eGFR >60 mls/min/1.73/m2   CBC with Differential    Collection Time: 12/23/22  4:17 AM   Result Value Ref Range    WBC 7.2 4.5 - 11.5 x10(3)/mcL    RBC 4.81 4.70 - 6.10 x10(6)/mcL    Hgb 10.6 (L) 14.0 - 18.0 gm/dL    Hct 35.0 (L) 42.0 - 52.0 %    MCV 72.8 (L) 80.0 - 94.0 fL    MCH 22.0 pg    MCHC 30.3 (L) 33.0 - 36.0 mg/dL    RDW 19.6 (H) 11.6 - 14.4 %    Platelet 265 140 - 371 x10(3)/mcL    MPV 9.9 9.4 - 12.4 fL    Neut % 67.3 %    Lymph % 18.3 %    Mono % 10.5 %    Eos % 2.7 %    Basophil % 0.8 %    Lymph # 1.31 0.6 - 4.6 x10(3)/mcL    Neut # 4.82 2.1 - 9.2 x10(3)/mcL     Mono # 0.75 0.1 - 1.3 x10(3)/mcL    Eos # 0.19 0 - 0.9 x10(3)/mcL    Baso # 0.06 0 - 0.2 x10(3)/mcL    IG# 0.03 0 - 0.04 x10(3)/mcL    IG% 0.4 %    NRBC% 0.0 0 - 1 %   Magnesium    Collection Time: 12/23/22  4:17 AM   Result Value Ref Range    Magnesium Level 2.00 1.60 - 2.60 mg/dL   Lactate Dehydrogenase    Collection Time: 12/23/22  4:17 AM   Result Value Ref Range    Lactate Dehydrogenase 248 (H) 125 - 220 U/L   Iron and TIBC    Collection Time: 12/23/22  4:17 AM   Result Value Ref Range    Iron Binding Capacity Unsaturated 328 (H) 69 - 240 ug/dL    Iron Level 31 (L) 65 - 175 ug/dL    Transferrin 320 174 - 364 mg/dL    Iron Binding Capacity Total 359 250 - 450 ug/dL    Iron Saturation 9 (L) 20 - 50 %   Ferritin    Collection Time: 12/23/22  4:17 AM   Result Value Ref Range    Ferritin Level 40.72 21.81 - 274.66 ng/mL   Vitamin B12    Collection Time: 12/23/22  4:17 AM   Result Value Ref Range    Vitamin B12 Level 933 (H) 213 - 816 pg/mL   Occult Blood, Stool 2nd Specimen    Collection Time: 12/23/22 11:15 AM   Result Value Ref Range    Occult Blood Stool 2 Negative Negative, N/A   Folate    Collection Time: 12/24/22  3:53 AM   Result Value Ref Range    Folate Level 10.40 7.0 - 31.4 ng/mL   Comprehensive Metabolic Panel    Collection Time: 12/24/22  3:53 AM   Result Value Ref Range    Sodium Level 138 136 - 145 mmol/L    Potassium Level 3.6 3.5 - 5.1 mmol/L    Chloride 101 98 - 107 mmol/L    Carbon Dioxide 28 22 - 29 mmol/L    Glucose Level 148 (H) 74 - 100 mg/dL    Blood Urea Nitrogen 20.8 8.4 - 25.7 mg/dL    Creatinine 1.00 0.73 - 1.18 mg/dL    Calcium Level Total 8.3 (L) 8.4 - 10.2 mg/dL    Protein Total 5.6 (L) 6.4 - 8.3 gm/dL    Albumin Level 2.8 (L) 3.5 - 5.0 g/dL    Globulin 2.8 2.4 - 3.5 gm/dL    Albumin/Globulin Ratio 1.0 (L) 1.1 - 2.0 ratio    Bilirubin Total 0.9 <=1.5 mg/dL    Alkaline Phosphatase 95 40 - 150 unit/L    Alanine Aminotransferase 17 0 - 55 unit/L    Aspartate Aminotransferase 24 5 - 34  unit/L    eGFR >60 mls/min/1.73/m2   CBC with Differential    Collection Time: 12/24/22  3:53 AM   Result Value Ref Range    WBC 7.5 4.5 - 11.5 x10(3)/mcL    RBC 4.60 (L) 4.70 - 6.10 x10(6)/mcL    Hgb 10.3 (L) 14.0 - 18.0 gm/dL    Hct 32.6 (L) 42.0 - 52.0 %    MCV 70.9 (L) 80.0 - 94.0 fL    MCH 22.4 pg    MCHC 31.6 (L) 33.0 - 36.0 mg/dL    RDW 19.5 (H) 11.6 - 14.4 %    Platelet 262 140 - 371 x10(3)/mcL    MPV 9.6 9.4 - 12.4 fL    Neut % 68.4 %    Lymph % 17.7 %    Mono % 10.0 %    Eos % 2.8 %    Basophil % 0.7 %    Lymph # 1.33 0.6 - 4.6 x10(3)/mcL    Neut # 5.15 2.1 - 9.2 x10(3)/mcL    Mono # 0.75 0.1 - 1.3 x10(3)/mcL    Eos # 0.21 0 - 0.9 x10(3)/mcL    Baso # 0.05 0 - 0.2 x10(3)/mcL    IG# 0.03 0 - 0.04 x10(3)/mcL    IG% 0.4 %    NRBC% 0.0 0 - 1 %   Magnesium    Collection Time: 12/24/22  3:53 AM   Result Value Ref Range    Magnesium Level 1.90 1.60 - 2.60 mg/dL       Significant Imaging:  Imaging Results              X-Ray Chest 1 View (Final result)  Result time 12/21/22 12:41:09      Final result by Javier Soto MD (12/21/22 12:41:09)                   Impression:      Congestive failure.      Electronically signed by: Javier Soto  Date:    12/21/2022  Time:    12:41               Narrative:    EXAMINATION:  XR CHEST 1 VIEW    CLINICAL HISTORY:  Chest pain, unspecified    TECHNIQUE:  One view    COMPARISON:  October 20, 2022.    FINDINGS:  Cardiopericardial silhouette enlarged appearance is similar.  Lungs congestive heart failure ground-glass and reticulonodular opacities persist.  No focally dense consolidation or significant fluid within the pleural spaces.  Chest is partially obscured by overlying devices.                                      EKG:    Results for orders placed or performed during the hospital encounter of 12/21/22   EKG 12-lead    Narrative    Test Reason : R07.9,    Vent. Rate : 048 BPM     Atrial Rate : 048 BPM     P-R Int : 178 ms          QRS Dur : 144 ms      QT Int : 504 ms        P-R-T Axes : 057 145 035 degrees     QTc Int : 450 ms    Poor data quality  Sinus bradycardia  Right bundle branch block  Septal infarct (cited on or before 19-DEC-2022)  Abnormal ECG  When compared with ECG of 22-DEC-2022 03:44,  Nonspecific T wave abnormality now evident in Inferior leads  Confirmed by Shaq Borja MD ([id number]) on 12/22/2022 12:40:47 PM    Referred By: [email or web address]   SELF           Confirmed By:Shaq Alberto       Telemetry:  SB 50s-60s    Physical Exam  HENT:      Head: Normocephalic.      Nose: Nose normal.      Mouth/Throat:      Mouth: Mucous membranes are moist.   Eyes:      Extraocular Movements: Extraocular movements intact.   Cardiovascular:      Rate and Rhythm: Regular rhythm. Bradycardia present.      Pulses: Normal pulses.      Heart sounds: Murmur heard.   Pulmonary:      Effort: Pulmonary effort is normal.      Breath sounds: Normal breath sounds.   Abdominal:      Palpations: Abdomen is soft.   Musculoskeletal:         General: Normal range of motion.   Neurological:      Mental Status: He is alert and oriented to person, place, and time.   Psychiatric:         Behavior: Behavior normal.       Home Medications:   No current facility-administered medications on file prior to encounter.     Current Outpatient Medications on File Prior to Encounter   Medication Sig Dispense Refill    amiodarone (PACERONE) 200 MG Tab Take 1 tablet (200 mg total) by mouth once daily. 30 tablet 0    apixaban (ELIQUIS) 5 mg Tab Take 1 tablet (5 mg total) by mouth 2 (two) times daily. 60 tablet 3    ARIPiprazole (ABILIFY) 10 MG Tab Take 1 tablet (10 mg total) by mouth once daily. 30 tablet 11    aspirin 81 MG Chew Chew and swallow 1 tablet (81 mg total) by mouth once daily. 360 tablet 0    atorvastatin (LIPITOR) 80 MG tablet Take 1 tablet (80 mg total) by mouth once daily. 90 tablet 3    benztropine (COGENTIN) 1 MG tablet Take 1 mg by mouth 2 (two) times daily.      famotidine (PEPCID) 20  MG tablet Take 1 tablet (20 mg total) by mouth 2 (two) times daily. 60 tablet 11    furosemide (LASIX) 40 MG tablet Take 1 tablet (40 mg total) by mouth once daily. 30 tablet 11    isosorbide mononitrate (IMDUR) 30 MG 24 hr tablet Take 1 tablet (30 mg total) by mouth once daily. 30 tablet 11    levETIRAcetam (KEPPRA) 500 MG Tab Take 1 tablet (500 mg total) by mouth 2 (two) times daily. 60 tablet 11    metoprolol tartrate 75 mg Tab Take 75 mg by mouth 2 (two) times daily. (Patient not taking: Reported on 12/5/2022) 60 tablet 11    OXcarbazepine (TRILEPTAL) 300 MG Tab Take 300 mg by mouth 3 (three) times daily.      sacubitriL-valsartan (ENTRESTO) 24-26 mg per tablet Take 1 tablet by mouth 2 (two) times daily. (Patient not taking: Reported on 12/5/2022) 60 tablet 0    traZODone (DESYREL) 100 MG tablet Take 1 tablet (100 mg total) by mouth every evening. 30 tablet 11    [DISCONTINUED] metoprolol succinate (TOPROL-XL) 25 MG 24 hr tablet Take 25 mg by mouth once daily.      [DISCONTINUED] pravastatin (PRAVACHOL) 40 MG tablet Take 1 tablet (40 mg total) by mouth every evening. 90 tablet 3       Current Inpatient Medications:    Current Facility-Administered Medications:     acetaminophen tablet 650 mg, 650 mg, Oral, Q4H PRN, RAY Olguin    aluminum-magnesium hydroxide-simethicone 200-200-20 mg/5 mL suspension 30 mL, 30 mL, Oral, QID PRN, RAY Olguin    amiodarone tablet 200 mg, 200 mg, Oral, Daily, Zion Howell MD, 200 mg at 12/24/22 0836    apixaban tablet 5 mg, 5 mg, Oral, BID, Zion Howell MD, 5 mg at 12/24/22 0835    ARIPiprazole tablet 10 mg, 10 mg, Oral, Daily, Zion Howell MD, 10 mg at 12/24/22 0835    aspirin chewable tablet 81 mg, 81 mg, Oral, Daily, Zion Howell MD, 81 mg at 12/24/22 0836    atorvastatin tablet 80 mg, 80 mg, Oral, Daily, Zion Howell MD, 80 mg at 12/24/22 0835    benztropine tablet 1 mg, 1 mg, Oral, BID, Zion Howell MD, 1 mg at 12/24/22 0836    dextrose 10% bolus 125 mL  125 mL, 12.5 g, Intravenous, PRN, RAY Olguin    dextrose 10% bolus 250 mL 250 mL, 25 g, Intravenous, PRN, RAY Olguin    famotidine tablet 20 mg, 20 mg, Oral, BID, Zion Howell MD, 20 mg at 12/24/22 0835    furosemide injection 40 mg, 40 mg, Intravenous, Daily, Zion Howell MD, 40 mg at 12/24/22 0835    glucagon (human recombinant) injection 1 mg, 1 mg, Intramuscular, PRN, RAY Olguin    glucose chewable tablet 16 g, 16 g, Oral, PRN, RAY Olguin    glucose chewable tablet 24 g, 24 g, Oral, PRN, RAY Olguin    HYDROcodone-acetaminophen 5-325 mg per tablet 1 tablet, 1 tablet, Oral, Q6H PRN, RAY Olguin, 1 tablet at 12/22/22 1756    isosorbide mononitrate 24 hr tablet 30 mg, 30 mg, Oral, Daily, Zion Howell MD, 30 mg at 12/24/22 0835    levETIRAcetam tablet 500 mg, 500 mg, Oral, BID, Zion Howell MD, 500 mg at 12/24/22 0835    melatonin tablet 6 mg, 6 mg, Oral, Nightly PRN, RAY Olguin    metoprolol tartrate (LOPRESSOR) tablet 75 mg, 75 mg, Oral, BID, Zion Howell MD, 75 mg at 12/24/22 0835    naloxone 0.4 mg/mL injection 0.02 mg, 0.02 mg, Intravenous, PRN, RAY Olguin    ondansetron injection 4 mg, 4 mg, Intravenous, Q6H PRN, RAY Olguin    OXcarbazepine tablet 300 mg, 300 mg, Oral, TID, Zion Howell MD, 300 mg at 12/24/22 0835    sacubitriL-valsartan 24-26 mg per tablet 1 tablet, 1 tablet, Oral, BID, Zion Howell MD, 1 tablet at 12/24/22 0836    sodium chloride 0.9% flush 10 mL, 10 mL, Intravenous, Q12H PRN, Lina Bahena, RAY    traZODone tablet 100 mg, 100 mg, Oral, QHS, Zion Howell MD, 100 mg at 12/23/22 2142         VTE Risk Mitigation (From admission, onward)           Ordered     apixaban tablet 5 mg  2 times daily         12/21/22 1831     IP VTE HIGH RISK PATIENT  Once         12/21/22 1529                    Assessment:   Acute on Chronic Combined Systolic/Diastolic HF    -  Grade 3 DD    - EF 20-25% (echo 12.18.22)  PAF/PAFL (Recent New Diagnosis)- Now Sinus [place]    - [id number]: 5 (LVSD/HTN/TE/NSTEMI)    - On Eliquis  NSTEMI - suspect type 2 in the setting of CHF exacerbation     - denies current CP    - trop 0.119, 0.112, 0.082, 0.078  ANA - improving   Chronic anemia - stable  ICMO     - With Life Vest   VHD    -Moderate MR and Severe TR  Hypertension  Pulmonary Hypertension  CAD/CABG (April 2022)    - LIMA-LAD, SVG-OM1, SVG-D1, SVG-PDA  History of RUE DVT (5.10.22)    - A deep vein thrombosis was identified in the right axillary and brachial veins. A superficial thrombosis was identified in the right basilic vein.  Schizoaffective Disorder  Hx of Hep C  Hx of cocaine Abuse   Nicotine Dependence (Tobacco use)       Plan:   Continue to monitor on telemetry  Continue Metoprolol & Amiodarone for now  Switch to oral Lasix 40mg in the AM    Continue ASA & statin.   Will plan for MCT x 2 weeks at discharge.   F/U with Dr. Wright in 1 week.   Labs in AM: BMP, Mg      Daphney Khanna, CJ  Cardiology  [facility]  12/24/2022 1:15 PM     .

## 2022-12-25 VITALS
OXYGEN SATURATION: 99 % | TEMPERATURE: 98 F | SYSTOLIC BLOOD PRESSURE: 139 MMHG | RESPIRATION RATE: 18 BRPM | WEIGHT: 181.19 LBS | HEIGHT: 67 IN | BODY MASS INDEX: 28.44 KG/M2 | HEART RATE: 74 BPM | DIASTOLIC BLOOD PRESSURE: 92 MMHG

## 2022-12-25 LAB
ANION GAP SERPL CALC-SCNC: 10 MEQ/L
BUN SERPL-MCNC: 16.8 MG/DL (ref 8.4–25.7)
CALCIUM SERPL-MCNC: 8.4 MG/DL (ref 8.4–10.2)
CHLORIDE SERPL-SCNC: 100 MMOL/L (ref 98–107)
CO2 SERPL-SCNC: 28 MMOL/L (ref 22–29)
CREAT SERPL-MCNC: 1.01 MG/DL (ref 0.73–1.18)
CREAT/UREA NIT SERPL: 17
GFR SERPLBLD CREATININE-BSD FMLA CKD-EPI: >60 MLS/MIN/1.73/M2
GLUCOSE SERPL-MCNC: 110 MG/DL (ref 74–100)
MAGNESIUM SERPL-MCNC: 1.8 MG/DL (ref 1.6–2.6)
POTASSIUM SERPL-SCNC: 3.7 MMOL/L (ref 3.5–5.1)
SODIUM SERPL-SCNC: 138 MMOL/L (ref 136–145)

## 2022-12-25 PROCEDURE — 94640 AIRWAY INHALATION TREATMENT: CPT

## 2022-12-25 PROCEDURE — 83735 ASSAY OF MAGNESIUM: CPT | Performed by: NURSE PRACTITIONER

## 2022-12-25 PROCEDURE — 25000242 PHARM REV CODE 250 ALT 637 W/ HCPCS: Performed by: STUDENT IN AN ORGANIZED HEALTH CARE EDUCATION/TRAINING PROGRAM

## 2022-12-25 PROCEDURE — 80048 BASIC METABOLIC PNL TOTAL CA: CPT | Performed by: NURSE PRACTITIONER

## 2022-12-25 PROCEDURE — 25000003 PHARM REV CODE 250: Performed by: INTERNAL MEDICINE

## 2022-12-25 PROCEDURE — 25000003 PHARM REV CODE 250: Performed by: NURSE PRACTITIONER

## 2022-12-25 PROCEDURE — 36415 COLL VENOUS BLD VENIPUNCTURE: CPT | Performed by: NURSE PRACTITIONER

## 2022-12-25 PROCEDURE — 94761 N-INVAS EAR/PLS OXIMETRY MLT: CPT

## 2022-12-25 RX ORDER — ISOSORBIDE MONONITRATE 30 MG/1
30 TABLET, EXTENDED RELEASE ORAL DAILY
Qty: 30 TABLET | Refills: 11 | Status: SHIPPED | OUTPATIENT
Start: 2022-12-25 | End: 2023-12-25

## 2022-12-25 RX ORDER — FUROSEMIDE 40 MG/1
40 TABLET ORAL DAILY
Qty: 30 TABLET | Refills: 11 | Status: ON HOLD | OUTPATIENT
Start: 2022-12-25 | End: 2023-01-09 | Stop reason: SDUPTHER

## 2022-12-25 RX ORDER — METOPROLOL TARTRATE 75 MG/1
75 TABLET, FILM COATED ORAL 2 TIMES DAILY
Qty: 60 TABLET | Refills: 11 | Status: ON HOLD | OUTPATIENT
Start: 2022-12-25 | End: 2022-12-31 | Stop reason: HOSPADM

## 2022-12-25 RX ORDER — NAPROXEN SODIUM 220 MG/1
81 TABLET, FILM COATED ORAL DAILY
Qty: 360 TABLET | Refills: 0 | Status: SHIPPED | OUTPATIENT
Start: 2022-12-25 | End: 2023-12-25

## 2022-12-25 RX ORDER — LEVETIRACETAM 500 MG/1
500 TABLET ORAL 2 TIMES DAILY
Qty: 60 TABLET | Refills: 11 | Status: ON HOLD | OUTPATIENT
Start: 2022-12-25 | End: 2023-01-17 | Stop reason: SDUPTHER

## 2022-12-25 RX ORDER — AMIODARONE HYDROCHLORIDE 200 MG/1
200 TABLET ORAL DAILY
Qty: 30 TABLET | Refills: 6 | Status: ON HOLD | OUTPATIENT
Start: 2022-12-25 | End: 2023-01-14 | Stop reason: HOSPADM

## 2022-12-25 RX ORDER — ATORVASTATIN CALCIUM 80 MG/1
80 TABLET, FILM COATED ORAL DAILY
Qty: 90 TABLET | Refills: 3 | Status: ON HOLD | OUTPATIENT
Start: 2022-12-25 | End: 2023-01-17 | Stop reason: SDUPTHER

## 2022-12-25 RX ADMIN — APIXABAN 5 MG: 5 TABLET, FILM COATED ORAL at 08:12

## 2022-12-25 RX ADMIN — LEVETIRACETAM 500 MG: 500 TABLET, FILM COATED ORAL at 08:12

## 2022-12-25 RX ADMIN — ATORVASTATIN CALCIUM 80 MG: 40 TABLET, FILM COATED ORAL at 08:12

## 2022-12-25 RX ADMIN — FUROSEMIDE 40 MG: 40 TABLET ORAL at 08:12

## 2022-12-25 RX ADMIN — ARIPIPRAZOLE 10 MG: 5 TABLET ORAL at 08:12

## 2022-12-25 RX ADMIN — AMIODARONE HYDROCHLORIDE 200 MG: 200 TABLET ORAL at 08:12

## 2022-12-25 RX ADMIN — OXCARBAZEPINE 300 MG: 300 TABLET, FILM COATED ORAL at 08:12

## 2022-12-25 RX ADMIN — BENZTROPINE MESYLATE 1 MG: 1 TABLET ORAL at 08:12

## 2022-12-25 RX ADMIN — IPRATROPIUM BROMIDE AND ALBUTEROL SULFATE 3 ML: .5; 3 SOLUTION RESPIRATORY (INHALATION) at 12:12

## 2022-12-25 RX ADMIN — SACUBITRIL AND VALSARTAN 1 TABLET: 24; 26 TABLET, FILM COATED ORAL at 08:12

## 2022-12-25 RX ADMIN — FAMOTIDINE 20 MG: 20 TABLET, FILM COATED ORAL at 08:12

## 2022-12-25 RX ADMIN — METOPROLOL TARTRATE 75 MG: 25 TABLET, FILM COATED ORAL at 08:12

## 2022-12-25 RX ADMIN — ISOSORBIDE MONONITRATE 30 MG: 30 TABLET, EXTENDED RELEASE ORAL at 08:12

## 2022-12-25 RX ADMIN — ASPIRIN 81 MG CHEWABLE TABLET 81 MG: 81 TABLET CHEWABLE at 08:12

## 2022-12-25 RX ADMIN — IPRATROPIUM BROMIDE AND ALBUTEROL SULFATE 3 ML: .5; 3 SOLUTION RESPIRATORY (INHALATION) at 08:12

## 2022-12-25 NOTE — NURSING
Pt discharged to home. Discharge education provided with patient, follow up appointments put in put. Pt left with hard scripts. IV/Telemetry discontinued. Pt left via wheelchair with hospital transport to be taken home by cab.

## 2022-12-25 NOTE — PROGRESS NOTES
[facility] Hospital Medicine Progress Note        Chief Complaint: Inpatient Follow-up for CHF    HPI:   Mr Duff is a 57-year-old gentleman with PMH of hypertension, hyperlipidemia, coronary artery disease status post CABG, CMO with LifeVest-EF 20-25%, bipolar disorder, schizophrenia, chronic hepatitis-C, seizures, CVA, history of substance abuse in the past admitted 12/21 again with complaint of worsening shortness of breath, fatigue, dry cough.  Noted that patient was admitted to our facility on 12/18 and he left AMA, then on 12/19 he again went to [facility] and left AMA from there, went back again on 12/20 to [facility] with similar complaints and again left AMA.  Today he presented to our hospital, this time he stating that he is not feeling good and he will stated he is better.  Reportedly lives at home with his oldest daughter.  Denies chest pain, visual disturbance.  Main complaint is worsening shortness of breath. Initial VS were /86 pulse 98 respirations 18 temperature 97.5° F O2 saturation 99% on room air. Labs significant for H&H 9.7/32.0, BUN 44.5, creatinine 1.40; glucose 156, BNP 3 118.4; troponin 0.092; other indices unremarkable.  Chest x-ray demonstrated B/L interstitial infiltrates, bihilar congestion, cardiomegaly.  Patient received DuoNebs, aspirin, and 80 mg of Lasix IV push in the ED. Echo 12/18/2022 showed EF of 20-25%, grade 3 diastolic dysfunction, right ventricular enlargement with mildly reduced right ventricular systolic function.  Mild right atrial enlargement, moderate mitral regurg, severe tricuspid regurg.  Lasix was continued and urine output, electrolytes were monitored closely.  CIS consulted for bradycardia; recommended continuing diuresis.    Interval Hx:   Today, Mr. Duff was seen at bedside.  He reported improvement in his shortness of breath as well as cough and denied having any new complaints.  Noted to have LifeVest on.  Bilateral expiratory  wheezing audible, will order DuoNebs q.6 hours.  Will plan for possible DC tomorrow.    Objective/physical exam:  Vitals:    12/24/22 0836 12/24/22 1138 12/24/22 1226 12/24/22 1524   BP: 101/63 103/67  121/83   Pulse:  (!) 52 (!) 56 (!) 54   Resp:   15 (!) 22   Temp:  97.8 °F (36.6 °C)  97.7 °F (36.5 °C)   TempSrc:  Oral  Oral   SpO2:   96% 96%   Weight:       Height:         General: alert male lying comfortably in bed, in no acute distress  HENT: oral and oropharyngeal mucosa moist, pink, with no erythema or exudates, no ear pain or discharge  Neck: normal neck movement, no lymph nodes or swellings, no JVD or Carotid bruit  Respiratory: clear breathing sounds bilaterally with expiratory wheezing; no crackles, rales or rhonchi   Cardiovascular: clear S1 and S2, no murmurs, rubs or gallops  Peripheral Vascular: no lesions, ulcers or erosions, normal peripheral pulses and no pedal edema  Gastrointestinal: soft, non-tender, non-distended abdomen, no guarding, rigidity or rebound tenderness, normal bowel sounds  Integumentary: normal skin color, no rashes or lesions  Neuro: AAO x 3; motor strength 5/5 in B/L UEs & LEs; sensation intact to gross and fine touch B/L; CN II-XII grossly intact    Lab Results   Component Value Date     12/24/2022    K 3.6 12/24/2022     07/21/2022    CO2 28 12/24/2022    BUN 20.8 12/24/2022    CREATININE 1.00 12/24/2022    CALCIUM 8.3 (L) 12/24/2022    ANIONGAP 9 07/21/2022    ESTGFRAFRICA >60 07/21/2022    EGFRNONAA >60 07/21/2022      Lab Results   Component Value Date    ALT 17 12/24/2022    AST 24 12/24/2022    ALKPHOS 95 12/24/2022    BILITOT 0.9 12/24/2022      Lab Results   Component Value Date    WBC 7.5 12/24/2022    HGB 10.3 (L) 12/24/2022    HCT 32.6 (L) 12/24/2022    MCV 70.9 (L) 12/24/2022     12/24/2022      Medications:   albuterol-ipratropium  3 mL Nebulization Q6H    amiodarone  200 mg Oral Daily    apixaban  5 mg Oral BID    ARIPiprazole  10 mg Oral Daily     aspirin  81 mg Oral Daily    atorvastatin  80 mg Oral Daily    benztropine  1 mg Oral BID    famotidine  20 mg Oral BID    furosemide  40 mg Oral Daily    isosorbide mononitrate  30 mg Oral Daily    levETIRAcetam  500 mg Oral BID    metoprolol tartrate  75 mg Oral BID    OXcarbazepine  300 mg Oral TID    sacubitriL-valsartan  1 tablet Oral BID    traZODone  100 mg Oral QHS      acetaminophen, aluminum-magnesium hydroxide-simethicone, dextrose 10%, dextrose 10%, glucagon (human recombinant), glucose, glucose, HYDROcodone-acetaminophen, melatonin, naloxone, ondansetron, sodium chloride 0.9%     Assessment/Plan:  Pulmonary Congestion 2/2 Acute CHF Exacerbation  Acute Hypoxemic Respiratory Failure 2/2 Above  HFrEF  HFpEF  NSTEMI type II 2/2 CHF Exaceration  ANA 2/2 Cardiorenal Syndrome - Resolved  PAF on amio & Eliquis  Pulmonary HTN  RUE DVT 5/10/22  HTN- essential  CAD- s/p stent and CABG 03/2022  VHD  ACD  HLD  Bipolar disorder  Schizophrenia  Seizures  CVA  Hepatitis C    Plan:  - Continue diuresis with PO Lasix 40 mg daily  - Monitor electrolytes, urine output, renal function. continue tele  - Lifefevest in place  - Cardiology following; following recs  - Starting DuoNebs q.6 hours for bilateral wheezing  - GDMT with Aspirin 81 mg, Atorvastatin 80 mg, Entresto 24-26, Metoprolol 75 mg b.i.d., Imdur 30 mg daily  - PT consulted; will follow recommendations  - Will continue monitoring patient's symptoms    Critical care diagnosis: CHF exacerbation needing IV diuresis  Critical care time: 50 minutes    Christian Marie MD

## 2022-12-25 NOTE — DISCHARGE SUMMARY
[facility] Hospital Medicine Discharge Summary    Admit Date: 12/21/2022  Discharge Date and Time: 12/25/20223:31 PM  Admitting Physician:  Team  Discharging Physician: Christian Marie MD.  Primary Care Physician: Primary Doctor No  Consults: Cardiology    Discharge Diagnoses:  Pulmonary Congestion 2/2 Acute CHF Exacerbation  Acute Hypoxemic Respiratory Failure 2/2 Above  HFrEF  HFpEF  NSTEMI type II 2/2 CHF Exaceration  ANA 2/2 Cardiorenal Syndrome - Resolved  PAF on amio & Eliquis  Pulmonary HTN  RUE DVT 5/10/22  HTN- essential  CAD- s/p stent and CABG 03/2022    Hospital Course:   Mr Duff is a 57-year-old gentleman with PMH of hypertension, hyperlipidemia, coronary artery disease status post CABG, CMO with LifeVest-EF 20-25%, bipolar disorder, schizophrenia, chronic hepatitis-C, seizures, CVA, history of substance abuse in the past admitted 12/21 again with complaint of worsening shortness of breath, fatigue, dry cough.  Noted that patient was admitted to our facility on 12/18 and he left AMA, then on 12/19 he again went to [facility] and left AMA from there, went back again on 12/20 to [facility] with similar complaints and again left AMA.  Today he presented to our hospital, this time he stating that he is not feeling good and he will stated he is better.  Reportedly lives at home with his oldest daughter.  Denies chest pain, visual disturbance.  Main complaint is worsening shortness of breath. Initial VS were /86 pulse 98 respirations 18 temperature 97.5° F O2 saturation 99% on room air. Labs significant for H&H 9.7/32.0, BUN 44.5, creatinine 1.40; glucose 156, BNP 3 118.4; troponin 0.092; other indices unremarkable.  Chest x-ray demonstrated B/L interstitial infiltrates, bihilar congestion, cardiomegaly.  Patient received DuoNebs, aspirin, and 80 mg of Lasix IV push in the ED. Echo 12/18/2022 showed EF of 20-25%, grade 3 diastolic dysfunction, right ventricular enlargement with  mildly reduced right ventricular systolic function.  Mild right atrial enlargement, moderate mitral regurg, severe tricuspid regurg.  Lasix was continued and urine output, electrolytes were monitored closely.  CIS consulted for bradycardia; recommended continuing diuresis. Placed on DuoNebs q6hrs for mild expiratory B/L wheezing.    Today, Mr. Duff was seen at bedside. He reported feeling well & denied having any new complaints. Noted to have significant improvement in breathing. Wheezing resolved with DuoNebs overnight. Will plan for possible DC today.     Vitals:  VITAL SIGNS: 24 HRS MIN & MAX LAST   Temp  Min: 98.2 °F (36.8 °C)  Max: 98.3 °F (36.8 °C) 98.3 °F (36.8 °C)   BP  Min: 101/71  Max: 139/92 (!) 139/92     Pulse  Min: 58  Max: 74  74   Resp  Min: 18  Max: 20 18   SpO2  Min: 95 %  Max: 99 % 99 %       Physical Exam:  General: alert male lying comfortably in bed, in no acute distress  HENT: oral and oropharyngeal mucosa moist, pink, with no erythema or exudates, no ear pain or discharge  Neck: normal neck movement, no lymph nodes or swellings, no JVD or Carotid bruit  Respiratory: clear breathing sounds bilaterally with expiratory wheezing; no crackles, rales or rhonchi   Cardiovascular: clear S1 and S2, no murmurs, rubs or gallops  Peripheral Vascular: no lesions, ulcers or erosions, normal peripheral pulses and no pedal edema  Gastrointestinal: soft, non-tender, non-distended abdomen, no guarding, rigidity or rebound tenderness, normal bowel sounds  Integumentary: normal skin color, no rashes or lesions  Neuro: AAO x 3; motor strength 5/5 in B/L UEs & LEs; sensation intact to gross and fine touch B/L; CN II-XII grossly intact    Procedures Performed: No admission procedures for hospital encounter.     Significant Diagnostic Studies: See Full reports for all details    Recent Labs   Lab 12/22/22  0340 12/23/22  0417 12/24/22  0353   WBC 5.2 7.2 7.5   RBC 4.26* 4.81 4.60*   HGB 9.5* 10.6* 10.3*   HCT  31.0* 35.0* 32.6*   MCV 72.8* 72.8* 70.9*   MCH 22.3 22.0 22.4   MCHC 30.6* 30.3* 31.6*   RDW 19.5* 19.6* 19.5*    265 262   MPV 10.4 9.9 9.6       Recent Labs   Lab 12/22/22  0340 12/23/22  0417 12/24/22  0353 12/25/22  0345    138 138 138   K 3.8 3.5 3.6 3.7   CO2 23 28 28 28   BUN 42.3* 33.0* 20.8 16.8   CREATININE 1.50* 1.25* 1.00 1.01   CALCIUM 8.6 8.7 8.3* 8.4   MG 2.00 2.00 1.90 1.80   ALBUMIN 3.2* 3.2* 2.8*  --    ALKPHOS 109 114 95  --    ALT 20 20 17  --    AST 28 27 24  --    BILITOT 0.6 0.8 0.9  --         Microbiology Results (last 7 days)       ** No results found for the last 168 hours. **             X-Ray Chest 1 View  Narrative: EXAMINATION:  XR CHEST 1 VIEW    CLINICAL HISTORY:  Chest pain, unspecified    TECHNIQUE:  One view    COMPARISON:  October 20, 2022.    FINDINGS:  Cardiopericardial silhouette enlarged appearance is similar.  Lungs congestive heart failure ground-glass and reticulonodular opacities persist.  No focally dense consolidation or significant fluid within the pleural spaces.  Chest is partially obscured by overlying devices.  Impression: Congestive failure.    Electronically signed by: Javier Soto  Date:    12/21/2022  Time:    12:41         Medication List        CONTINUE taking these medications      amiodarone 200 MG Tab  Commonly known as: PACERONE  Take 1 tablet (200 mg total) by mouth once daily.     apixaban 5 mg Tab  Commonly known as: ELIQUIS  Take 1 tablet (5 mg total) by mouth 2 (two) times daily.     ARIPiprazole 10 MG Tab  Commonly known as: ABILIFY  Take 1 tablet (10 mg total) by mouth once daily.     aspirin 81 MG Chew  Chew and swallow 1 tablet (81 mg total) by mouth once daily.     atorvastatin 80 MG tablet  Commonly known as: LIPITOR  Take 1 tablet (80 mg total) by mouth once daily.     benztropine 1 MG tablet  Commonly known as: COGENTIN     famotidine 20 MG tablet  Commonly known as: PEPCID  Take 1 tablet (20 mg total) by mouth 2 (two) times  daily.     furosemide 40 MG tablet  Commonly known as: LASIX  Take 1 tablet (40 mg total) by mouth once daily.     isosorbide mononitrate 30 MG 24 hr tablet  Commonly known as: IMDUR  Take 1 tablet (30 mg total) by mouth once daily.     levETIRAcetam 500 MG Tab  Commonly known as: KEPPRA  Take 1 tablet (500 mg total) by mouth 2 (two) times daily.     metoprolol tartrate 75 mg Tab  Take 75 mg by mouth 2 (two) times daily.     OXcarbazepine 300 MG Tab  Commonly known as: TRILEPTAL     sacubitriL-valsartan 24-26 mg per tablet  Commonly known as: ENTRESTO  Take 1 tablet by mouth 2 (two) times daily.     traZODone 100 MG tablet  Commonly known as: DESYREL  Take 1 tablet (100 mg total) by mouth every evening.            STOP taking these medications      HYDROcodone-acetaminophen 5-325 mg per tablet  Commonly known as: NORCO               Where to Get Your Medications        You can get these medications from any pharmacy    Bring a paper prescription for each of these medications  amiodarone 200 MG Tab  apixaban 5 mg Tab  aspirin 81 MG Chew  atorvastatin 80 MG tablet  furosemide 40 MG tablet  isosorbide mononitrate 30 MG 24 hr tablet  levETIRAcetam 500 MG Tab  metoprolol tartrate 75 mg Tab  sacubitriL-valsartan 24-26 mg per tablet          Explained in detail to the patient about the discharge plan, medications, and follow-up visits. Pt understands and agrees with the treatment plan  Discharge Disposition: Home or Self Care   Discharged Condition: stable    Medications Per DC med rec  Activities as tolerated   Follow-up Information       LIDA BROTHERS MD Follow up.    Specialty: Cardiology  Why: needs MCT x 2 weeks  hospital f/u in 1 week  The hospital will call you with the date and time of your follow up appointment.  Contact information:  [facility] [address]  [place] [id number] [phone]                           For further questions contact hospitalist office    Discharge time 33 minutes    For  worsening symptoms, chest pain, shortness of breath, increased abdominal pain, high grade fever, stroke or stroke like symptoms, immediately go to the nearest Emergency Room or call 911 as soon as possible.      Christian Sutton M.D, on 12/25/2022. at 3:31 PM.

## 2022-12-27 PROCEDURE — 96375 TX/PRO/DX INJ NEW DRUG ADDON: CPT

## 2022-12-27 PROCEDURE — 99285 EMERGENCY DEPT VISIT HI MDM: CPT | Mod: 25

## 2022-12-27 PROCEDURE — 51702 INSERT TEMP BLADDER CATH: CPT

## 2022-12-27 PROCEDURE — 82962 GLUCOSE BLOOD TEST: CPT

## 2022-12-27 PROCEDURE — 96374 THER/PROPH/DIAG INJ IV PUSH: CPT

## 2022-12-28 ENCOUNTER — HOSPITAL ENCOUNTER (INPATIENT)
Facility: HOSPITAL | Age: 57
LOS: 3 days | Discharge: HOME HEALTH | DRG: 291 | End: 2022-12-31
Attending: EMERGENCY MEDICINE | Admitting: INTERNAL MEDICINE
Payer: MEDICAID

## 2022-12-28 DIAGNOSIS — R07.9 CHEST PAIN: ICD-10-CM

## 2022-12-28 DIAGNOSIS — R06.89 HYPERCARBIA: ICD-10-CM

## 2022-12-28 DIAGNOSIS — R79.89 ELEVATED TROPONIN: ICD-10-CM

## 2022-12-28 DIAGNOSIS — N17.9 AKI (ACUTE KIDNEY INJURY): ICD-10-CM

## 2022-12-28 DIAGNOSIS — I50.9 CONGESTIVE HEART FAILURE, UNSPECIFIED HF CHRONICITY, UNSPECIFIED HEART FAILURE TYPE: ICD-10-CM

## 2022-12-28 DIAGNOSIS — Z95.1 HX OF CABG: ICD-10-CM

## 2022-12-28 DIAGNOSIS — J44.1 COPD WITH ACUTE EXACERBATION: Primary | ICD-10-CM

## 2022-12-28 LAB
ACANTHOCYTES (OLG): SLIGHT
ADDITIONAL FINDINGS (OHS): ABNORMAL
ALBUMIN SERPL-MCNC: 3.1 G/DL (ref 3.5–5)
ALBUMIN/GLOB SERPL: 0.9 RATIO (ref 1.1–2)
ALP SERPL-CCNC: 118 UNIT/L (ref 40–150)
ALT SERPL-CCNC: 19 UNIT/L (ref 0–55)
AMPHET UR QL SCN: NEGATIVE
ANION GAP SERPL CALC-SCNC: 11 MEQ/L
ANISOCYTOSIS BLD QL SMEAR: ABNORMAL
APPEARANCE UR: CLEAR
APTT PPP: 36.3 SECONDS (ref 23.4–33.9)
AST SERPL-CCNC: 31 UNIT/L (ref 5–34)
BACTERIA #/AREA URNS AUTO: NORMAL /HPF
BARBITURATE SCN PRESENT UR: NEGATIVE
BASOPHILS # BLD AUTO: 0.03 X10(3)/MCL (ref 0–0.2)
BASOPHILS NFR BLD AUTO: 0.6 %
BENZODIAZ UR QL SCN: NEGATIVE
BILIRUB UR QL STRIP.AUTO: NEGATIVE MG/DL
BILIRUBIN DIRECT+TOT PNL SERPL-MCNC: 0.4 MG/DL
BNP BLD-MCNC: 2065.2 PG/ML
BUN SERPL-MCNC: 37.2 MG/DL (ref 8.4–25.7)
BUN SERPL-MCNC: 37.7 MG/DL (ref 8.4–25.7)
CALCIUM SERPL-MCNC: 8.4 MG/DL (ref 8.4–10.2)
CALCIUM SERPL-MCNC: 8.7 MG/DL (ref 8.4–10.2)
CANNABINOIDS UR QL SCN: NEGATIVE
CHLORIDE SERPL-SCNC: 100 MMOL/L (ref 98–107)
CHLORIDE SERPL-SCNC: 102 MMOL/L (ref 98–107)
CK SERPL-CCNC: 419 U/L (ref 30–200)
CO2 SERPL-SCNC: 27 MMOL/L (ref 22–29)
CO2 SERPL-SCNC: 31 MMOL/L (ref 22–29)
COCAINE UR QL SCN: NEGATIVE
COLOR UR AUTO: YELLOW
CREAT SERPL-MCNC: 1.65 MG/DL (ref 0.73–1.18)
CREAT SERPL-MCNC: 1.79 MG/DL (ref 0.73–1.18)
CREAT/UREA NIT SERPL: 23
ELLIPTOCYTOSIS (OHS): ABNORMAL
EOSINOPHIL # BLD AUTO: 0.12 X10(3)/MCL (ref 0–0.9)
EOSINOPHIL NFR BLD AUTO: 2.3 %
ERYTHROCYTE [DISTWIDTH] IN BLOOD BY AUTOMATED COUNT: 19.6 % (ref 11.6–14.4)
ETHANOL SERPL-MCNC: 11 MG/DL
FLUAV AG UPPER RESP QL IA.RAPID: NOT DETECTED
FLUBV AG UPPER RESP QL IA.RAPID: NOT DETECTED
GFR SERPLBLD CREATININE-BSD FMLA CKD-EPI: 43 MLS/MIN/1.73/M2
GFR SERPLBLD CREATININE-BSD FMLA CKD-EPI: 48 MLS/MIN/1.73/M2
GLOBULIN SER-MCNC: 3.5 GM/DL (ref 2.4–3.5)
GLUCOSE SERPL-MCNC: 186 MG/DL (ref 74–100)
GLUCOSE SERPL-MCNC: 313 MG/DL (ref 74–100)
GLUCOSE UR QL STRIP.AUTO: NEGATIVE MG/DL
HCO3 UR-SCNC: 31.6 MMOL/L (ref 24–28)
HCO3 UR-SCNC: 32.5 MMOL/L (ref 24–28)
HCT VFR BLD AUTO: 32.3 % (ref 42–52)
HGB BLD-MCNC: 9.8 GM/DL (ref 14–18)
HYPOCHROMIA BLD QL SMEAR: ABNORMAL
IMM GRANULOCYTES # BLD AUTO: 0.01 X10(3)/MCL (ref 0–0.04)
IMM GRANULOCYTES NFR BLD AUTO: 0.2 %
INR BLD: 1.54 (ref 2–3)
KETONES UR QL STRIP.AUTO: ABNORMAL MG/DL
LEUKOCYTE ESTERASE UR QL STRIP.AUTO: NEGATIVE UNIT/L
LYMPHOCYTES # BLD AUTO: 0.96 X10(3)/MCL (ref 0.6–4.6)
LYMPHOCYTES NFR BLD AUTO: 18.3 %
MACROCYTES BLD QL SMEAR: ABNORMAL
MAGNESIUM SERPL-MCNC: 2.1 MG/DL (ref 1.6–2.6)
MCH RBC QN AUTO: 22.5 PG
MCHC RBC AUTO-ENTMCNC: 30.3 MG/DL (ref 33–36)
MCV RBC AUTO: 74.3 FL (ref 80–94)
MDMA UR QL SCN: NEGATIVE
MICROCYTES BLD QL SMEAR: ABNORMAL
MONOCYTES # BLD AUTO: 0.58 X10(3)/MCL (ref 0.1–1.3)
MONOCYTES NFR BLD AUTO: 11 %
NEUTROPHILS # BLD AUTO: 3.55 X10(3)/MCL (ref 2.1–9.2)
NEUTROPHILS NFR BLD AUTO: 67.6 %
NITRITE UR QL STRIP.AUTO: NEGATIVE
NRBC BLD AUTO-RTO: 0 % (ref 0–1)
OPIATES UR QL SCN: NEGATIVE
PCO2 BLDA: 51.3 MMHG (ref 35–45)
PCO2 BLDA: 63.8 MMHG (ref 35–45)
PCP UR QL: NEGATIVE
PH SMN: 7.32 [PH] (ref 7.35–7.45)
PH SMN: 7.4 [PH] (ref 7.35–7.45)
PH UR STRIP.AUTO: 5.5 [PH]
PH UR: 5.5 [PH] (ref 3–11)
PLATELET # BLD AUTO: 208 X10(3)/MCL (ref 140–371)
PLATELET # BLD EST: ADEQUATE 10*3/UL
PMV BLD AUTO: 10.1 FL (ref 9.4–12.4)
PO2 BLDA: 44 MMHG (ref 80–100)
PO2 BLDA: 78 MMHG (ref 80–100)
POC BE: 6 MMOL/L
POC BE: 7 MMOL/L
POC SATURATED O2: 79 % (ref 95–100)
POC SATURATED O2: 94 % (ref 95–100)
POC TCO2: 33 MMOL/L (ref 23–27)
POC TCO2: 34 MMOL/L (ref 23–27)
POCT GLUCOSE: 116 MG/DL (ref 70–110)
POIKILOCYTOSIS BLD QL SMEAR: ABNORMAL
POTASSIUM SERPL-SCNC: 4.3 MMOL/L (ref 3.5–5.1)
POTASSIUM SERPL-SCNC: 4.5 MMOL/L (ref 3.5–5.1)
PROT SERPL-MCNC: 6.6 GM/DL (ref 6.4–8.3)
PROT UR QL STRIP.AUTO: 100 MG/DL
PROTHROMBIN TIME: 18.1 SECONDS (ref 11.7–14.5)
RBC # BLD AUTO: 4.35 X10(6)/MCL (ref 4.7–6.1)
RBC #/AREA URNS AUTO: NORMAL /HPF
RBC MORPH BLD: ABNORMAL
RBC UR QL AUTO: NEGATIVE UNIT/L
RSV A 5' UTR RNA NPH QL NAA+PROBE: NOT DETECTED
SAMPLE: ABNORMAL
SAMPLE: ABNORMAL
SARS-COV-2 RNA RESP QL NAA+PROBE: NOT DETECTED
SCHISTOCYTE (OLG): ABNORMAL
SODIUM SERPL-SCNC: 138 MMOL/L (ref 136–145)
SODIUM SERPL-SCNC: 140 MMOL/L (ref 136–145)
SP GR UR STRIP.AUTO: 1.02
SPECIFIC GRAVITY, URINE AUTO (.000) (OHS): 1.02 (ref 1–1.03)
SQUAMOUS #/AREA URNS AUTO: NORMAL /HPF
TROPONIN I SERPL-MCNC: 0.05 NG/ML (ref 0–0.04)
TROPONIN I SERPL-MCNC: 0.06 NG/ML (ref 0–0.04)
TROPONIN I SERPL-MCNC: 0.07 NG/ML (ref 0–0.04)
TROPONIN I SERPL-MCNC: 0.1 NG/ML (ref 0–0.04)
UROBILINOGEN UR STRIP-ACNC: 1 MG/DL
WBC # SPEC AUTO: 5.3 X10(3)/MCL (ref 4.5–11.5)
WBC #/AREA URNS AUTO: NORMAL /HPF

## 2022-12-28 PROCEDURE — 93010 EKG 12-LEAD: ICD-10-PCS | Mod: ,,, | Performed by: INTERNAL MEDICINE

## 2022-12-28 PROCEDURE — 94761 N-INVAS EAR/PLS OXIMETRY MLT: CPT

## 2022-12-28 PROCEDURE — 63600175 PHARM REV CODE 636 W HCPCS: Performed by: INTERNAL MEDICINE

## 2022-12-28 PROCEDURE — 25000003 PHARM REV CODE 250: Performed by: INTERNAL MEDICINE

## 2022-12-28 PROCEDURE — 93005 ELECTROCARDIOGRAM TRACING: CPT

## 2022-12-28 PROCEDURE — 84484 ASSAY OF TROPONIN QUANT: CPT | Performed by: EMERGENCY MEDICINE

## 2022-12-28 PROCEDURE — 0241U COVID/RSV/FLU A&B PCR: CPT | Performed by: EMERGENCY MEDICINE

## 2022-12-28 PROCEDURE — 85610 PROTHROMBIN TIME: CPT | Performed by: EMERGENCY MEDICINE

## 2022-12-28 PROCEDURE — 82077 ASSAY SPEC XCP UR&BREATH IA: CPT | Performed by: EMERGENCY MEDICINE

## 2022-12-28 PROCEDURE — 85730 THROMBOPLASTIN TIME PARTIAL: CPT | Performed by: EMERGENCY MEDICINE

## 2022-12-28 PROCEDURE — 63600175 PHARM REV CODE 636 W HCPCS: Performed by: EMERGENCY MEDICINE

## 2022-12-28 PROCEDURE — 82803 BLOOD GASES ANY COMBINATION: CPT

## 2022-12-28 PROCEDURE — 27000221 HC OXYGEN, UP TO 24 HOURS

## 2022-12-28 PROCEDURE — 94640 AIRWAY INHALATION TREATMENT: CPT

## 2022-12-28 PROCEDURE — 99900035 HC TECH TIME PER 15 MIN (STAT)

## 2022-12-28 PROCEDURE — 81003 URINALYSIS AUTO W/O SCOPE: CPT | Performed by: EMERGENCY MEDICINE

## 2022-12-28 PROCEDURE — 21400001 HC TELEMETRY ROOM

## 2022-12-28 PROCEDURE — 83735 ASSAY OF MAGNESIUM: CPT | Performed by: EMERGENCY MEDICINE

## 2022-12-28 PROCEDURE — 93010 ELECTROCARDIOGRAM REPORT: CPT | Mod: ,,, | Performed by: INTERNAL MEDICINE

## 2022-12-28 PROCEDURE — 36600 WITHDRAWAL OF ARTERIAL BLOOD: CPT

## 2022-12-28 PROCEDURE — 25000242 PHARM REV CODE 250 ALT 637 W/ HCPCS: Performed by: EMERGENCY MEDICINE

## 2022-12-28 PROCEDURE — 80053 COMPREHEN METABOLIC PANEL: CPT | Performed by: EMERGENCY MEDICINE

## 2022-12-28 PROCEDURE — 85025 COMPLETE CBC W/AUTO DIFF WBC: CPT | Performed by: EMERGENCY MEDICINE

## 2022-12-28 PROCEDURE — 27000190 HC CPAP FULL FACE MASK W/VALVE

## 2022-12-28 PROCEDURE — 36415 COLL VENOUS BLD VENIPUNCTURE: CPT | Performed by: EMERGENCY MEDICINE

## 2022-12-28 PROCEDURE — 82550 ASSAY OF CK (CPK): CPT | Performed by: EMERGENCY MEDICINE

## 2022-12-28 PROCEDURE — 94660 CPAP INITIATION&MGMT: CPT

## 2022-12-28 PROCEDURE — 80307 DRUG TEST PRSMV CHEM ANLYZR: CPT | Performed by: EMERGENCY MEDICINE

## 2022-12-28 PROCEDURE — 11000001 HC ACUTE MED/SURG PRIVATE ROOM

## 2022-12-28 PROCEDURE — 83880 ASSAY OF NATRIURETIC PEPTIDE: CPT | Performed by: EMERGENCY MEDICINE

## 2022-12-28 RX ORDER — TRAZODONE HYDROCHLORIDE 100 MG/1
100 TABLET ORAL NIGHTLY
Status: DISCONTINUED | OUTPATIENT
Start: 2022-12-28 | End: 2022-12-31 | Stop reason: HOSPADM

## 2022-12-28 RX ORDER — METOPROLOL TARTRATE 25 MG/1
75 TABLET, FILM COATED ORAL 2 TIMES DAILY
Status: DISCONTINUED | OUTPATIENT
Start: 2022-12-28 | End: 2022-12-30

## 2022-12-28 RX ORDER — FUROSEMIDE 10 MG/ML
40 INJECTION INTRAMUSCULAR; INTRAVENOUS 2 TIMES DAILY
Status: DISCONTINUED | OUTPATIENT
Start: 2022-12-28 | End: 2022-12-30

## 2022-12-28 RX ORDER — LEVETIRACETAM 500 MG/1
500 TABLET ORAL 2 TIMES DAILY
Status: DISCONTINUED | OUTPATIENT
Start: 2022-12-28 | End: 2022-12-31 | Stop reason: HOSPADM

## 2022-12-28 RX ORDER — OXCARBAZEPINE 300 MG/1
300 TABLET, FILM COATED ORAL 3 TIMES DAILY
Status: DISCONTINUED | OUTPATIENT
Start: 2022-12-28 | End: 2022-12-31 | Stop reason: HOSPADM

## 2022-12-28 RX ORDER — IPRATROPIUM BROMIDE AND ALBUTEROL SULFATE 2.5; .5 MG/3ML; MG/3ML
3 SOLUTION RESPIRATORY (INHALATION)
Status: COMPLETED | OUTPATIENT
Start: 2022-12-28 | End: 2022-12-28

## 2022-12-28 RX ORDER — METHYLPREDNISOLONE SOD SUCC 125 MG
125 VIAL (EA) INJECTION
Status: COMPLETED | OUTPATIENT
Start: 2022-12-28 | End: 2022-12-28

## 2022-12-28 RX ORDER — BENZTROPINE MESYLATE 1 MG/1
1 TABLET ORAL 2 TIMES DAILY
Status: DISCONTINUED | OUTPATIENT
Start: 2022-12-28 | End: 2022-12-31 | Stop reason: HOSPADM

## 2022-12-28 RX ORDER — NAPROXEN SODIUM 220 MG/1
81 TABLET, FILM COATED ORAL DAILY
Status: DISCONTINUED | OUTPATIENT
Start: 2022-12-28 | End: 2022-12-31 | Stop reason: HOSPADM

## 2022-12-28 RX ORDER — AMIODARONE HYDROCHLORIDE 200 MG/1
200 TABLET ORAL DAILY
Status: DISCONTINUED | OUTPATIENT
Start: 2022-12-28 | End: 2022-12-31 | Stop reason: HOSPADM

## 2022-12-28 RX ORDER — NALOXONE HCL 0.4 MG/ML
2 VIAL (ML) INJECTION
Status: COMPLETED | OUTPATIENT
Start: 2022-12-28 | End: 2022-12-28

## 2022-12-28 RX ORDER — FUROSEMIDE 10 MG/ML
40 INJECTION INTRAMUSCULAR; INTRAVENOUS
Status: COMPLETED | OUTPATIENT
Start: 2022-12-28 | End: 2022-12-28

## 2022-12-28 RX ORDER — FAMOTIDINE 20 MG/1
20 TABLET, FILM COATED ORAL 2 TIMES DAILY
Status: DISCONTINUED | OUTPATIENT
Start: 2022-12-28 | End: 2022-12-31 | Stop reason: HOSPADM

## 2022-12-28 RX ORDER — ARIPIPRAZOLE 5 MG/1
10 TABLET ORAL DAILY
Status: DISCONTINUED | OUTPATIENT
Start: 2022-12-28 | End: 2022-12-31 | Stop reason: HOSPADM

## 2022-12-28 RX ORDER — ISOSORBIDE MONONITRATE 30 MG/1
30 TABLET, EXTENDED RELEASE ORAL DAILY
Status: DISCONTINUED | OUTPATIENT
Start: 2022-12-28 | End: 2022-12-31 | Stop reason: HOSPADM

## 2022-12-28 RX ORDER — ATORVASTATIN CALCIUM 40 MG/1
80 TABLET, FILM COATED ORAL DAILY
Status: DISCONTINUED | OUTPATIENT
Start: 2022-12-28 | End: 2022-12-31 | Stop reason: HOSPADM

## 2022-12-28 RX ORDER — ARIPIPRAZOLE 10 MG/1
10 TABLET ORAL DAILY
Status: DISCONTINUED | OUTPATIENT
Start: 2022-12-28 | End: 2022-12-28

## 2022-12-28 RX ADMIN — LEVETIRACETAM 500 MG: 500 TABLET, FILM COATED ORAL at 09:12

## 2022-12-28 RX ADMIN — ARIPIPRAZOLE 10 MG: 5 TABLET ORAL at 09:12

## 2022-12-28 RX ADMIN — ASPIRIN 81 MG CHEWABLE TABLET 81 MG: 81 TABLET CHEWABLE at 09:12

## 2022-12-28 RX ADMIN — APIXABAN 5 MG: 5 TABLET, FILM COATED ORAL at 09:12

## 2022-12-28 RX ADMIN — FUROSEMIDE 40 MG: 10 INJECTION, SOLUTION INTRAMUSCULAR; INTRAVENOUS at 03:12

## 2022-12-28 RX ADMIN — BENZTROPINE MESYLATE 1 MG: 1 TABLET ORAL at 08:12

## 2022-12-28 RX ADMIN — METOPROLOL TARTRATE 75 MG: 25 TABLET, FILM COATED ORAL at 09:12

## 2022-12-28 RX ADMIN — ATORVASTATIN CALCIUM 80 MG: 40 TABLET, FILM COATED ORAL at 09:12

## 2022-12-28 RX ADMIN — METHYLPREDNISOLONE SODIUM SUCCINATE 125 MG: 125 INJECTION, POWDER, FOR SOLUTION INTRAMUSCULAR; INTRAVENOUS at 02:12

## 2022-12-28 RX ADMIN — FAMOTIDINE 20 MG: 20 TABLET ORAL at 08:12

## 2022-12-28 RX ADMIN — FUROSEMIDE 40 MG: 10 INJECTION, SOLUTION INTRAMUSCULAR; INTRAVENOUS at 08:12

## 2022-12-28 RX ADMIN — OXCARBAZEPINE 300 MG: 300 TABLET, FILM COATED ORAL at 09:12

## 2022-12-28 RX ADMIN — NALOXONE HYDROCHLORIDE 2 MG: 0.4 INJECTION, SOLUTION INTRAMUSCULAR; INTRAVENOUS; SUBCUTANEOUS at 12:12

## 2022-12-28 RX ADMIN — OXCARBAZEPINE 300 MG: 300 TABLET, FILM COATED ORAL at 08:12

## 2022-12-28 RX ADMIN — TRAZODONE HYDROCHLORIDE 100 MG: 100 TABLET ORAL at 08:12

## 2022-12-28 RX ADMIN — ISOSORBIDE MONONITRATE 30 MG: 30 TABLET, EXTENDED RELEASE ORAL at 09:12

## 2022-12-28 RX ADMIN — IPRATROPIUM BROMIDE AND ALBUTEROL SULFATE 3 ML: 2.5; .5 SOLUTION RESPIRATORY (INHALATION) at 02:12

## 2022-12-28 RX ADMIN — OXCARBAZEPINE 300 MG: 300 TABLET, FILM COATED ORAL at 03:12

## 2022-12-28 RX ADMIN — FUROSEMIDE 40 MG: 10 INJECTION, SOLUTION INTRAMUSCULAR; INTRAVENOUS at 09:12

## 2022-12-28 RX ADMIN — BENZTROPINE MESYLATE 1 MG: 1 TABLET ORAL at 09:12

## 2022-12-28 RX ADMIN — AMIODARONE HYDROCHLORIDE 200 MG: 200 TABLET ORAL at 09:12

## 2022-12-28 RX ADMIN — FAMOTIDINE 20 MG: 20 TABLET ORAL at 09:12

## 2022-12-28 RX ADMIN — LEVETIRACETAM 500 MG: 500 TABLET, FILM COATED ORAL at 08:12

## 2022-12-28 RX ADMIN — APIXABAN 5 MG: 5 TABLET, FILM COATED ORAL at 08:12

## 2022-12-28 NOTE — Clinical Note
Diagnosis: COPD with acute exacerbation [822444]   Admitting Provider:: ANABELLA BROWN [[id number]]   Future Attending Provider: ANABELLA BROWN [[id number]]   Reason for IP Medical Treatment  (Clinical interventions that can only be accomplished in the IP setting? ) :: BIPAP, diuresis, trend troponins   Estimated Length of Stay:: 2 midnights   I certify that Inpatient services for greater than or equal to 2 midnights are medically necessary:: Yes   Plans for Post-Acute care--if anticipated (pick the single best option):: C. Discharge home with home health services

## 2022-12-28 NOTE — ED PROVIDER NOTES
"Encounter Date: 12/27/2022       History     Chief Complaint   Patient presents with    Arm Swelling    Fatigue     Kimberley is a 58 yo patient presenting with shortness of breath and R arm swelling per EMS. When I asked the patient why they are here, he responded "My f**srini face" and stated it was swollen. He then immediately went back to sleep. I do not appreciate any asymmetrical swelling of the arms nor any focal swelling of the face.     Review of records shows PMH of hypertension, hyperlipidemia, coronary artery disease status post CABG, CMO with LifeVest-EF 20-25%, bipolar disorder, schizophrenia, chronic hepatitis-C, seizures, CVA, history of substance abuse in the past. He was admitted 12/21 with complaint of worsening shortness of breath, fatigue, dry cough.  Patient was discharged from [facility] 3 days ago. Previously he had been admitted to [facility] on 12/18 and he left AMA, then on 12/19 he again went to [facility] and left AMA from there, went back again on 12/20 to [facility] with similar complaints and again left AMA.  He then presented back to [facility] ED with shortness of breath. Patient received DuoNebs, aspirin, and 80 mg of Lasix IV push in the ED. Echo 12/18/2022 showed EF of 20-25%, grade 3 diastolic dysfunction, right ventricular enlargement with mildly reduced right ventricular systolic function.  Mild right atrial enlargement, moderate mitral regurg, severe tricuspid regurg. Patient re-routed from [facility] ED due to boarding of patients at that facility by EMS triage nurse Jayde Shelley.         Review of patient's allergies indicates:   Allergen Reactions    Depakote [divalproex]     Divalproex sodium Other (See Comments)    Lithium     Lithium analogues     Quetiapine Other (See Comments)     Pt states had seizures     Past Medical History:   Diagnosis Date    Bipolar disorder, unspecified     Chronic hepatitis C     History of psychiatric hospitalization     Hx of psychiatric care     Hypertension     Bessie  "    Obesity, unspecified     Psychiatric problem     Schizoaffective disorder, bipolar type     Seizures     Stroke     Substance abuse     Therapy      Past Surgical History:   Procedure Laterality Date    CORONARY STENT PLACEMENT  08/14/2015    DEBRIDEMENT  04/17/2022    LEFT HEART CATHETERIZATION Left 08/13/2015    REPEAT CLOSURE OF STERNAL INCISION N/A 5/11/2022    Procedure: CLOSURE, STERNAL INCISION, REPEAT;  Surgeon: Adolfo Weiss MD;  Location: [facility] OR;  Service: Plastics;  Laterality: N/A;  STERNAL WOUND DEBRIDEMENT AND RECONSTRUCTION // MULTIPLE MUSCLE FLAPS // REQ 1400     Family History   Problem Relation Age of Onset    Cancer Mother     Liver disease Father      Social History     Tobacco Use    Smoking status: Every Day     Types: Cigarettes    Smokeless tobacco: Never   Substance Use Topics    Alcohol use: Never    Drug use: Not Currently     Types: Cocaine     Review of Systems   Unable to perform ROS: Mental status change   HENT:  Positive for facial swelling.      Physical Exam     Initial Vitals [12/27/22 2359]   BP Pulse Resp Temp SpO2   (!) 148/108 (!) 50 18 98.2 °F (36.8 °C) (!) 91 %      MAP       --         Physical Exam    Nursing note and vitals reviewed.  Constitutional: He appears well-developed and well-nourished. He is not diaphoretic. No distress.   HENT:   Head: Normocephalic and atraumatic.   Eyes: Conjunctivae are normal.   Pupils are 3mm equal and bilateral   Neck: Neck supple.   Cardiovascular:  Normal rate, regular rhythm and normal heart sounds.           Pulmonary/Chest: No respiratory distress. He has no wheezes. He has rhonchi (mild bilaterally).   Abdominal: Abdomen is soft. Bowel sounds are normal. He exhibits no distension. There is no abdominal tenderness. There is no rebound and no guarding.   Musculoskeletal:         General: No edema. Normal range of motion.      Cervical back: Neck supple.     Neurological:   Very sleepy but awakens with stimuli. No facial asymmetry.  No asymmetric weakness noted. He is difficult to understand   Skin: Skin is warm and dry.   Psychiatric: He has a normal mood and affect. Thought content normal.       ED Course   Time spent in critical care (in addition to E/M time mentioned above) spent on the evaluation and treatment of severe organ dysfunction, review of pertinent labs and imaging studies, discussions with consulting providers and discussions with patient/family: 50 min  Procedures      Labs Reviewed   COMPREHENSIVE METABOLIC PANEL - Abnormal; Notable for the following components:       Result Value    Carbon Dioxide 31 (*)     Glucose Level 186 (*)     Blood Urea Nitrogen 37.2 (*)     Creatinine 1.79 (*)     Albumin Level 3.1 (*)     Albumin/Globulin Ratio 0.9 (*)     All other components within normal limits   TROPONIN I - Abnormal; Notable for the following components:    Troponin-I 0.101 (*)     All other components within normal limits   B-TYPE NATRIURETIC PEPTIDE - Abnormal; Notable for the following components:    Natriuretic Peptide 2,065.2 (*)     All other components within normal limits   PROTIME-INR - Abnormal; Notable for the following components:    PT 18.1 (*)     INR 1.54 (*)     All other components within normal limits   APTT - Abnormal; Notable for the following components:    PTT 36.3 (*)     All other components within normal limits   CBC WITH DIFFERENTIAL - Abnormal; Notable for the following components:    RBC 4.35 (*)     Hgb 9.8 (*)     Hct 32.3 (*)     MCV 74.3 (*)     MCHC 30.3 (*)     RDW 19.6 (*)     All other components within normal limits   BLOOD SMEAR MICROSCOPIC EXAM (OLG) - Abnormal; Notable for the following components:    RBC Morph Abnormal (*)     Acanthocytes Slight (*)     Anisocyte 2+ (*)     Elliptocytosis 1+ (*)     Hypochrom 1+ (*)     Macrocyte 1+ (*)     Microcyte 2+ (*)     Poik 2+ (*)     Schistocyte 1+ (*)     All other components within normal limits   ALCOHOL,MEDICAL (ETHANOL) - Abnormal;  Notable for the following components:    Ethanol Level 11.0 (*)     All other components within normal limits   CK - Abnormal; Notable for the following components:    Creatine Kinase 419 (*)     All other components within normal limits   URINALYSIS, REFLEX TO URINE CULTURE - Abnormal; Notable for the following components:    Protein,  (*)     Ketones, UA Trace (*)     All other components within normal limits   POCT GLUCOSE - Abnormal; Notable for the following components:    POCT Glucose 116 (*)     All other components within normal limits   ISTAT PROCEDURE - Abnormal; Notable for the following components:    POC PH 7.315 (*)     POC PCO2 63.8 (*)     POC PO2 78 (*)     POC HCO3 32.5 (*)     POC SATURATED O2 94 (*)     POC TCO2 34 (*)     All other components within normal limits   COVID/RSV/FLU A&B PCR - Normal    Narrative:     The Xpert Xpress SARS-CoV-2/FLU/RSV plus is a rapid, multiplexed real-time PCR test intended for the simultaneous qualitative detection and differentiation of SARS-CoV-2, Influenza A, Influenza B, and respiratory syncytial virus (RSV) viral RNA in either nasopharyngeal swab or nasal swab specimens.         MAGNESIUM - Normal   DRUG SCREEN, URINE (BEAKER) - Normal    Narrative:     Cut off concentrations:    Amphetamines - 1000 ng/ml  Barbiturates - 200 ng/ml  Benzodiazepine - 200 ng/ml  Cannabinoids (THC) - 50 ng/ml  Cocaine - 300 ng/ml  Fentanyl - 1.0 ng/ml  MDMA - 500 ng/ml  Opiates - 300 ng/ml   Phencyclidine (PCP) - 25 ng/ml    Specimen submitted for drug analysis and tested for pH and specific gravity in order to evaluate sample integrity. Suspect tampering if specific gravity is <1.003 and/or pH is not within the range of 4.5 - 8.0  False negatives may result form substances such as bleach added to urine.  False positives may result for the presence of a substance with similar chemical structure to the drug or its metabolite.    This test provides only a PRELIMINARY  analytical test result. A more specific alternate chemical method must be used in order to obtain a confirmed analytical result. Gas chromatography/mass spectrometry (GC/MS) is the preferred confirmatory method. Other chemical confirmation methods are available. Clinical consideration and professional judgement should be applied to any drug of abuse test result, particularly when preliminary positive results are used.    Positive results will be confirmed only at the physicians request. Unconfirmed screening results are to be used only for medical purposes (treatment).        URINALYSIS, MICROSCOPIC - Normal   CBC W/ AUTO DIFFERENTIAL    Narrative:     The following orders were created for panel order CBC auto differential.  Procedure                               Abnormality         Status                     ---------                               -----------         ------                     CBC with Differential[[id number]]        Abnormal            Final result                 Please view results for these tests on the individual orders.     EKG Readings: (Independently Interpreted)   EKG Interpretation 12:22 AM    Rate: 49  Rhythm: Sinus bradycardia  QRS: WNL  Qtc: WNL  RBBB, when compared to prior EKG on 12/22, similar morphology       Imaging Results              CT Head Without Contrast (Preliminary result)  Result time 12/28/22 03:38:01      Preliminary result by Agus Cardoza Jr., MD (12/28/22 03:38:01)                   Narrative:    START OF REPORT:  TECHNIQUE: CT OF THE HEAD WAS PERFORMED WITHOUT INTRAVENOUS CONTRAST WITH AXIAL AS WELL AS CORONAL AND SAGITTAL IMAGES.    COMPARISON: NONE.    DOSAGE INFORMATION: AUTOMATED EXPOSURE CONTROL WAS UTILIZED.    CLINICAL HISTORY: MENTAL STATUS CHANGE, UNKNOWN CAUSE.    Findings:  Hemorrhage: No acute intracranial hemorrhage is seen.  CSF spaces: The ventricles, sulci and basal cisterns all appear somewhat prominent suggesting an element of global  cerebral atrophy.  Brain parenchyma: There is preservation of the grey white junction throughout. No acute infarct is identified.  Cerebellum: Unremarkable.  Vascular: Unremarkable venous sinuses.  Sella and skull base: The sella appears to be within normal limits for age.  Cerebellopontine angles: Within normal limits.  Herniation: None.  Intracranial calcifications: Incidental note is made of bilateral choroid plexus calcification. Incidental note is made of some pineal region calcification.  Calvarium: No acute linear or depressed skull fracture is seen.    Maxillofacial Structures:  Paranasal sinuses: The visualized paranasal sinuses appear clear with no mucoperiosteal thickening or air fluid levels identified.  Orbits: The orbits appear unremarkable.  Zygomatic arches: The zygomatic arches are intact and unremarkable.  Temporal bones and mastoids: The temporal bones and mastoids appear unremarkable.  TMJ: The mandibular condyles appear normally placed with respect to the mandibular fossa.  Nasal Bones: No displaced nasal bone fracture is seen.      Impression:  1. No acute intracranial process identified. Details and other findings as noted above.                          Preliminary result by Interface, Rad Results In (12/28/22 03:38:01)                   Narrative:    START OF REPORT:  TECHNIQUE: CT OF THE HEAD WAS PERFORMED WITHOUT INTRAVENOUS CONTRAST WITH AXIAL AS WELL AS CORONAL AND SAGITTAL IMAGES.    COMPARISON: NONE.    DOSAGE INFORMATION: AUTOMATED EXPOSURE CONTROL WAS UTILIZED.    CLINICAL HISTORY: MENTAL STATUS CHANGE, UNKNOWN CAUSE.    Findings:  Hemorrhage: No acute intracranial hemorrhage is seen.  CSF spaces: The ventricles, sulci and basal cisterns all appear somewhat prominent suggesting an element of global cerebral atrophy.  Brain parenchyma: There is preservation of the grey white junction throughout. No acute infarct is identified.  Cerebellum: Unremarkable.  Vascular: Unremarkable venous  sinuses.  Sella and skull base: The sella appears to be within normal limits for age.  Cerebellopontine angles: Within normal limits.  Herniation: None.  Intracranial calcifications: Incidental note is made of bilateral choroid plexus calcification. Incidental note is made of some pineal region calcification.  Calvarium: No acute linear or depressed skull fracture is seen.    Maxillofacial Structures:  Paranasal sinuses: The visualized paranasal sinuses appear clear with no mucoperiosteal thickening or air fluid levels identified.  Orbits: The orbits appear unremarkable.  Zygomatic arches: The zygomatic arches are intact and unremarkable.  Temporal bones and mastoids: The temporal bones and mastoids appear unremarkable.  TMJ: The mandibular condyles appear normally placed with respect to the mandibular fossa.  Nasal Bones: No displaced nasal bone fracture is seen.      Impression:  1. No acute intracranial process identified. Details and other findings as noted above.                                         X-Ray Chest AP Portable (Preliminary result)  Result time 12/28/22 01:54:38      ED Interpretation by Riddhi Murphy MD (12/28/22 01:54:38, [facility] - Emergency Dept, Emergency Medicine)    Cardiomegaly with interstitial markings                                     Medications   naloxone 0.4 mg/mL injection 2 mg (2 mg Intravenous Given 12/28/22 0044)   methylPREDNISolone sodium succinate injection 125 mg (125 mg Intravenous Given 12/28/22 0242)   albuterol-ipratropium 2.5 mg-0.5 mg/3 mL nebulizer solution 3 mL (3 mLs Nebulization Given 12/28/22 0246)     Medical Decision Making:   Pulmonary Congestion 2/2 Acute CHF Exacerbation  Acute Hypoxemic Respiratory Failure 2/2 Above  HFrEF  HFpEF  NSTEMI type II 2/2 CHF Exaceration  ANA 2/2 Cardiorenal Syndrome - Resolved  PAF on amio & Eliquis  Pulmonary HTN  RUE DVT 5/10/22  HTN- essential  CAD- s/p stent and CABG 03/2022           ED Course as of  12/28/22 0327   Wed Dec 28, 2022   0151 BNP is improved from previous 7 days ago. Troponin is slightly elevated from previous troponin. White count is stable. Creatinine is elevated from previous of 0.8. Has a hx of DVT of the upper extremity diagnosed in May. We do not have vascular US available [GM]   0211 ON re-evaluation patient is still very sleepy but awakens to verbal and physical stimuli though he quickly goes back to sleep. No change with narcan previously. He is maintaining his airway at the moment. CT head added. CBG WNL [GM]   0223 Begum placed to obtain urine. Patient became more awake. Now he states his daughter called the ambulance because she though that he was swollen all over. He states he has been compliant with his medications. He denies any shortness of breath or chest pain. No abdominal pain, nausea, vomiting, diarrhea, or other complaints.     He has no pitting edema of his extremities and I cannot appreciate facial swelling or intraoral swelling. ABG shows respiratory acidosis with mild hypoxia and elevated CO2 which may be contributing to his somnolence. Will treat for COPD exacerbation.  [GM]   0300 Plan to admit for CHF, ANA, hypercapnia.  [GM]   0314 Patient more alert since on BIPAP and much more polite and cooperative now. He is agreeable to admission [GM]   0322 Message left with hospitalist []      ED Course User Index  [GM] Riddhi Murphy MD                   Clinical Impression:   Final diagnoses:  [R07.9] Chest pain  [J44.1] COPD with acute exacerbation (Primary)  [R06.89] Hypercarbia  [N17.9] ANA (acute kidney injury)  [R77.8] Elevated troponin        ED Disposition Condition    Admit Stable                Riddhi Murphy MD  01/01/23 8517

## 2022-12-28 NOTE — PROGRESS NOTES
[facility] EMERGENCY DEPARTMENT  Hospital Medicine Progress Note      Patient Name: Kendall Duff  MRN: [id number]  Admission Date: 12/28/2022   Length of Stay: 0  Attending Physician: Chadd De Jesus MD  Primary Care Provider: Primary Doctor No  Face-to-Face encounter date: 12/28/2022    Code Status: Prior        Chief Complaint:   Arm Swelling and Fatigue        HPI:   No notes on file     Overview/Hospital Course:  No notes on file       Interval Hx:   The pt says he feels better compared to last night. Less dyspnea and facial swelling. Denies cp. No n/v or abd pain.    Review of Systems   All other systems reviewed and are negative.    Objective/physical exam:  General: In no acute distress, afebrile  Chest: Clear to auscultation bilaterally, wearing bipap  Heart: RRR, +S1, S2, no appreciable murmur  Abdomen: mild distended, Soft, nontender, BS +  MSK: Warm, no lower extremity edema, no clubbing or cyanosis  Neurologic: Alert and oriented x4, Cranial nerve II-XII intact, Strength 5/5 in all 4 extremities    VITAL SIGNS: 24 HRS MIN & MAX LAST   Temp  Min: 98.2 °F (36.8 °C)  Max: 98.2 °F (36.8 °C) 98.2 °F (36.8 °C)   BP  Min: 100/69  Max: 148/108 (!) 147/100     Pulse  Min: 48  Max: 63  (!) 57     Resp  Min: 15  Max: 24 19   SpO2  Min: 91 %  Max: 100 % 100 %       Recent Labs   Lab 12/23/22  0417 12/24/22  0353 12/28/22  0026   WBC 7.2 7.5 5.3   RBC 4.81 4.60* 4.35*   HGB 10.6* 10.3* 9.8*   HCT 35.0* 32.6* 32.3*   MCV 72.8* 70.9* 74.3*   MCH 22.0 22.4 22.5   MCHC 30.3* 31.6* 30.3*   RDW 19.6* 19.5* 19.6*    262 208   MPV 9.9 9.6 10.1       Recent Labs   Lab 12/23/22  0417 12/24/22  0353 12/25/22  0345 12/28/22  0026 12/28/22  0213 12/28/22  0656 12/28/22  0816    138 138 140  --   --  138   K 3.5 3.6 3.7 4.3  --   --  4.5   CO2 28 28 28 31*  --   --  27   BUN 33.0* 20.8 16.8 37.2*  --   --  37.7*   CREATININE 1.25* 1.00 1.01 1.79*  --   --  1.65*   CALCIUM 8.7 8.3*  8.4 8.4  --   --  8.7   PH  --   --   --   --  7.315* 7.398  --    MG 2.00 1.90 1.80 2.10  --   --   --    ALBUMIN 3.2* 2.8*  --  3.1*  --   --   --    ALKPHOS 114 95  --  118  --   --   --    ALT 20 17  --  19  --   --   --    AST 27 24  --  31  --   --   --    BILITOT 0.8 0.9  --  0.4  --   --   --         Microbiology Results (last 7 days)       ** No results found for the last 168 hours. **             Radiology:  X-Ray Chest AP Portable  Narrative: EXAMINATION:  XR CHEST AP PORTABLE    CLINICAL HISTORY:  Chest Pain;    TECHNIQUE:  Single view of the chest    COMPARISON:  12/21/2022    FINDINGS:  No focal opacification, pleural effusion, or pneumothorax.    The cardiomediastinal silhouette remains enlarged.    No acute osseous abnormality.  Impression: No acute cardiopulmonary process.    Electronically signed by: Rex Denson  Date:    12/28/2022  Time:    06:36  CT Head Without Contrast  Narrative: EXAMINATION:  CT HEAD WITHOUT CONTRAST    CLINICAL HISTORY:  Mental status change, unknown cause;    TECHNIQUE:  Low dose axial images were obtained through the head.  Coronal and sagittal reformations were also performed. Contrast was not administered.    Automatic exposure control was utilized to reduce the patient's radiation dose.    DLP= 929    COMPARISON:  MR dated 07/20/2022    FINDINGS:  Hemorrhage: No acute intracranial hemorrhage is seen.    CSF spaces: The ventricles, sulci and basal cisterns all appear somewhat prominent suggesting an element of global cerebral atrophy.    Brain parenchyma: There is preservation of the grey white junction throughout. No acute infarct is identified.    Cerebellum: Unremarkable.    Vascular: Unremarkable venous sinuses.    Sella and skull base: The sella appears to be within normal limits for age.    Cerebellopontine angles: Within normal limits.    Herniation: None.    Intracranial calcifications: Incidental note is made of bilateral choroid plexus calcification.  Incidental note is made of some pineal region calcification.    Calvarium: No acute linear or depressed skull fracture is seen.    Maxillofacial Structures:    Paranasal sinuses: The visualized paranasal sinuses appear clear with no mucoperiosteal thickening or air fluid levels identified.    Orbits: The orbits appear unremarkable.    Zygomatic arches: The zygomatic arches are intact and unremarkable.    Temporal bones and mastoids: The temporal bones and mastoids appear unremarkable.    TMJ: The mandibular condyles appear normally placed with respect to the mandibular fossa.    Nasal Bones: No displaced nasal bone fracture is seen.  Impression: Impression:    1. No acute intracranial process identified. Details and other findings as noted above.    Electronically signed by: Rex Denson  Date:    12/28/2022  Time:    06:23      Scheduled Med:   amiodarone  200 mg Oral Daily    apixaban  5 mg Oral BID    ARIPiprazole  10 mg Oral Daily    aspirin  81 mg Oral Daily    atorvastatin  80 mg Oral Daily    benztropine  1 mg Oral BID    famotidine  20 mg Oral BID    furosemide  40 mg Intravenous BID    isosorbide mononitrate  30 mg Oral Daily    levETIRAcetam  500 mg Oral BID    metoprolol tartrate  75 mg Oral BID    OXcarbazepine  300 mg Oral TID    traZODone  100 mg Oral QHS        Continuous Infusions:       PRN Meds:       Nutrition Status:      Assessment/Plan:    Acute decompensated systolic heart failure  ANA on CKD II  Essential hypertension  Paroxysmal AFib   h/o coronary artery disease  Schizoaffective disorder   Tobacco use    Plan  Continue diuresis with IV Lasix BID  Monitor CR  Monitor I&Os  Place pt on NC 2L          All diagnosis and differential diagnosis have been reviewed; assessment and plan has been documented; I have personally reviewed the labs and test results that are presently available; I have reviewed the patients medication list; I have reviewed the consulting providers response and  recommendations. I have reviewed or attempted to review medical records based upon their availability      _____________________________________________________________________            Chadd De Jesus MD   12/28/2022

## 2022-12-28 NOTE — H&P
TeleMedicine   History & Physical Examination     Patient: Kendall Duff  MRN: [id number]  STATUS: IP- Inpatient   DOS: 12/28/2022   PCP: Primary Doctor No      CC: face swelling       HISTORY OF PRESENT ILLNESS   57 y.o. male with a history that includes cardiomyopathy with an EF20-25%, and frequent ED visits and hospitalizations, presented to back to ED with SOB.  Patient recently hospitalized at [facility], discharged on 12/25, for CHF exacerbation.  Patient reports he was brought to ED by daughter because swelling in his face.  Workup has noted evidence of volume overload.  ABG noted mild respiratory acidosis, pH 7.31, pCO2 63.  Patient placed on BiPAP at 10/5, 40% FiO2.      REVIEW OF SYSTEMS   Except as documented, all other systems reviewed and negative       PAST MEDICAL HISTORY      Hypertension    Hyperlipidemia    CAD    CHF    CVA    VHD    PAF    Schizoaffective disorder, bipolar type    Seizure disorder    Chronic hepatitis C    Substance abuse       PAST SURGICAL HISTORY      Coronary stent placement 08/14/2015    Debridement 04/17/2022    Left heart catheterization 08/13/2015    Repeat closure of sternal incision 5/11/2022    Sternal wound debridement and reconstruction // multiple muscle flaps       FAMILY HISTORY   Reviewed, negative in relation to patient's current condition.      SOCIAL HISTORY   Current smoker, prior history of drug abuse.  Lives with daughter, but performs ADLs independently       ALLERGIES   Depakote [divalproex], Divalproex sodium, Lithium, Lithium analogues, and Quetiapine      HOME MEDICATIONS      amiodarone (PACERONE) 200 mg, Oral, Daily    apixaban (ELIQUIS) 5 mg, Oral, 2 times daily    ARIPiprazole (ABILIFY) 10 mg, Oral, Daily    aspirin 81 MG Chew 1 tablet (81 mg total) by mouth once daily.    atorvastatin (LIPITOR) 80 mg, Oral, Daily    benztropine (COGENTIN) 1 mg, Oral, 2 times daily    famotidine (PEPCID) 20 mg, Oral, 2 times daily    furosemide (LASIX) 40 mg, Oral, Daily     isosorbide mononitrate (IMDUR) 30 mg, Oral, Daily    levETIRAcetam (KEPPRA) 500 mg, Oral, 2 times daily    metoprolol tartrate 75 mg, Oral, 2 times daily    OXcarbazepine (TRILEPTAL) 300 mg, Oral, 3 times daily    sacubitriL-valsartan (ENTRESTO) 24-26 mg per tablet 1 tablet, Oral, 2 times daily    traZODone (DESYREL) 100 mg, Oral, Nightly       PHYSICAL EXAM   VITALS: T 98.2 °F (36.8 °C)   /74   P (!) 51   RR 18   O2 95 %    GENERAL: Awake and in NAD, currently on BiPAP  HEENT: NC/AT, EOMI, PERRL.  NECK: Supple,  No JVD  LUNGS: Course bilaterally, mild wheezing  CVS: RRR, S1S2 positive  GI/: Soft, NT/ND, bowel sounds positive.  EXTREMITIES: Peripheral pulses 2+, trace peripheral edema  DERM: Warm, dry.  No rashes or lesions noted.  NEURO: AAOx3, no focal neurologic deficit  PSYCH: Cooperative, appropriate mood and affect       DIAGNOSTICS     Recent Labs     12/28/22  0026   WBC 5.3   RBC 4.35*   HGB 9.8*   HCT 32.3*   MCV 74.3*   MCH 22.5   MCHC 30.3*   RDW 19.6*        Recent Labs     12/28/22  0045   INR 1.54*        Recent Labs     12/28/22  0026      K 4.3   CHLORIDE 102   CO2 31*   BUN 37.2*   CREATININE 1.79*   GLUCOSE 186*   CALCIUM 8.4   MG 2.10   ALBUMIN 3.1*   GLOBULIN 3.5   ALKPHOS 118   ALT 19   AST 31   BILITOT 0.4     Recent Labs     12/28/22  0026   BNP 2,065.2*   *   TROPONINI 0.101*        CT Head Without Contrast  Findings:  Hemorrhage: No acute intracranial hemorrhage is seen.  CSF spaces: The ventricles, sulci and basal cisterns all appear somewhat prominent suggesting an element of global cerebral atrophy.  Brain parenchyma: There is preservation of the grey white junction throughout. No acute infarct is identified.  Cerebellum: Unremarkable.  Vascular: Unremarkable venous sinuses.  Sella and skull base: The sella appears to be within normal limits for age.  Cerebellopontine angles: Within normal limits.  Herniation: None.  Intracranial calcifications: Incidental  note is made of bilateral choroid plexus calcification. Incidental note is made of some pineal region calcification.  Calvarium: No acute linear or depressed skull fracture is seen.  Maxillofacial Structures:  Paranasal sinuses: The visualized paranasal sinuses appear clear with no mucoperiosteal thickening or air fluid levels identified.  Orbits: The orbits appear unremarkable.  Zygomatic arches: The zygomatic arches are intact and unremarkable.  Temporal bones and mastoids: The temporal bones and mastoids appear unremarkable.  TMJ: The mandibular condyles appear normally placed with respect to the mandibular fossa.  Nasal Bones: No displaced nasal bone fracture is seen.  Impression:  1. No acute intracranial process identified. Details and other findings as noted above.      ASSESSMENT   Acute decompensated systolic heart failure  ANA on CKD II  Essential hypertension  Paroxysmal AFib   h/o coronary artery disease\  Schizoaffective disorder   Tobacco use    PLAN:   Repeat ABG now, if compensated, can discontinue BiPAP  Continue diuresis with IV Lasix BID  Monitor I&Os, renal function and electrolytes  Home meds reviewed and resumed as appropriate      Prophylaxis: home Eliquis  Code Status: Full      Cordell Robbins MD  [facility]  12/28/2022       This encounter was performed using an audio and visual, HIPPA-complaint, Telemedicine web platform, with patient and provider in separate locations, both within the [facility].  Consent was obtained, and patient was seen with the assistance of nurse at bedside.  If the patient has been admitted under observation status, it is at my discretion and under our care with hospital medicine services. [TWA]     Critical Care Time: 57 minutes spent in direct hands on care, review of labs, imaging and medical record, and discussion of diagnosis, treatment, prognosis with patient/family.  Patient remains at high-risk for clinical decompensation  Critical Care  diagnosis: ADHF

## 2022-12-29 LAB
MAGNESIUM SERPL-MCNC: 2.1 MG/DL (ref 1.6–2.6)
TROPONIN I SERPL-MCNC: 0.07 NG/ML (ref 0–0.04)
TROPONIN I SERPL-MCNC: 0.07 NG/ML (ref 0–0.04)
TROPONIN I SERPL-MCNC: 0.08 NG/ML (ref 0–0.04)
TROPONIN I SERPL-MCNC: 0.08 NG/ML (ref 0–0.04)

## 2022-12-29 PROCEDURE — 25000003 PHARM REV CODE 250: Performed by: INTERNAL MEDICINE

## 2022-12-29 PROCEDURE — 63600175 PHARM REV CODE 636 W HCPCS: Performed by: INTERNAL MEDICINE

## 2022-12-29 PROCEDURE — 36415 COLL VENOUS BLD VENIPUNCTURE: CPT | Performed by: EMERGENCY MEDICINE

## 2022-12-29 PROCEDURE — 36415 COLL VENOUS BLD VENIPUNCTURE: CPT | Performed by: INTERNAL MEDICINE

## 2022-12-29 PROCEDURE — 83735 ASSAY OF MAGNESIUM: CPT | Performed by: INTERNAL MEDICINE

## 2022-12-29 PROCEDURE — 84484 ASSAY OF TROPONIN QUANT: CPT | Performed by: EMERGENCY MEDICINE

## 2022-12-29 PROCEDURE — 11000001 HC ACUTE MED/SURG PRIVATE ROOM

## 2022-12-29 PROCEDURE — 21400001 HC TELEMETRY ROOM

## 2022-12-29 PROCEDURE — 27000221 HC OXYGEN, UP TO 24 HOURS

## 2022-12-29 RX ADMIN — OXCARBAZEPINE 300 MG: 300 TABLET, FILM COATED ORAL at 03:12

## 2022-12-29 RX ADMIN — FUROSEMIDE 40 MG: 10 INJECTION, SOLUTION INTRAMUSCULAR; INTRAVENOUS at 09:12

## 2022-12-29 RX ADMIN — LEVETIRACETAM 500 MG: 500 TABLET, FILM COATED ORAL at 09:12

## 2022-12-29 RX ADMIN — FAMOTIDINE 20 MG: 20 TABLET ORAL at 09:12

## 2022-12-29 RX ADMIN — FUROSEMIDE 40 MG: 10 INJECTION, SOLUTION INTRAMUSCULAR; INTRAVENOUS at 06:12

## 2022-12-29 RX ADMIN — TRAZODONE HYDROCHLORIDE 100 MG: 100 TABLET ORAL at 09:12

## 2022-12-29 RX ADMIN — ISOSORBIDE MONONITRATE 30 MG: 30 TABLET, EXTENDED RELEASE ORAL at 09:12

## 2022-12-29 RX ADMIN — METOPROLOL TARTRATE 75 MG: 25 TABLET, FILM COATED ORAL at 09:12

## 2022-12-29 RX ADMIN — OXCARBAZEPINE 300 MG: 300 TABLET, FILM COATED ORAL at 09:12

## 2022-12-29 RX ADMIN — ARIPIPRAZOLE 10 MG: 5 TABLET ORAL at 09:12

## 2022-12-29 RX ADMIN — BENZTROPINE MESYLATE 1 MG: 1 TABLET ORAL at 09:12

## 2022-12-29 RX ADMIN — APIXABAN 5 MG: 5 TABLET, FILM COATED ORAL at 09:12

## 2022-12-29 RX ADMIN — ASPIRIN 81 MG CHEWABLE TABLET 81 MG: 81 TABLET CHEWABLE at 09:12

## 2022-12-29 RX ADMIN — ATORVASTATIN CALCIUM 80 MG: 40 TABLET, FILM COATED ORAL at 09:12

## 2022-12-29 RX ADMIN — AMIODARONE HYDROCHLORIDE 200 MG: 200 TABLET ORAL at 09:12

## 2022-12-29 NOTE — NURSING
Nurses Note -- 4 Eyes      12/28/2022   6:12 PM      Skin assessed during: Admit      [] No Pressure Injuries Present    []Prevention Measures Documented      [x] Yes- Altered Skin Integrity Present or Discovered   [x] LDA Added if Not in Epic (Describe Wound)   [] New Altered Skin Integrity was Present on Admit and Documented in LDA   [] Wound Image Taken    Wound Care Consulted? YES    Attending Nurse:  Jennifer Okeefe LPN     Second RN/Staff Member:  Edie Singh RN

## 2022-12-29 NOTE — PLAN OF CARE
12/29/22 0923   Discharge Assessment   Assessment Type Discharge Planning Assessment   Confirmed/corrected address, phone number and insurance Yes   Source of Information patient;family   People in Home child(karo), adult   Do you expect to return to your current living situation? Yes   Do you have help at home or someone to help you manage your care at home? Yes   Who are your caregiver(s) and their phone number(s)? Daughter   Current cognitive status: Alert/Oriented   Equipment Currently Used at Home   ([employer])   Do you currently have service(s) that help you manage your care at home? No   Do you take prescription medications? Yes   Do you have prescription coverage? Yes   Do you have any problems affording any of your prescribed medications? No   Who is going to help you get home at discharge? Daughter   How do you get to doctors appointments? family or friend will provide   Are you on dialysis? No   Do you take coumadin? No   Discharge Plan A Home   Discharge Plan B Home Health   Discharge Plan discussed with: Patient;Adult children

## 2022-12-29 NOTE — CONSULTS
Inpatient consult to Cardiology  Consult performed by: JALEN Tubbs  Consult ordered by: Jael Calloway MD  Reason for consult: NSTEMI and HF      [facility] - 6th Floor Medical Telemetry  Cardiology  Consult Note    Patient Name: Kendall Duff  MRN: [id number]  Admission Date: 12/28/2022  Hospital Length of Stay: 1 days  Code Status: Prior   Attending Provider: Jael Calloway MD   Consulting Provider: JALEN Tubbs  Primary Care Physician: Primary Doctor No  Principal Problem:<principal problem not specified>    Patient information was obtained from patient, past medical records, and ER records.     Subjective:     Chief Complaint:  Consulted for NSTEMI and HF     HPI:   Mr. Duff is a 57 year old male who is known to [facility], Dr. Wright.  He presented to the ER on 12/28/2022 with complaints of shortness of breath, right arm swelling.  When asked by ER physician why he is here he responded his face is swollen.  He then went immediately back to sleep.  He has presented to the ER and hospital multiple times and leaves AMA multiple times.  BNP was 2065.  Troponin max was 0.101 and trended down. [facility] has been consulted for NSTEMI and heart failure.     Previous Cardiac Diagnostics:   TTE (12.18.22):  Eccentric hypertrophy and severely decreased systolic function. The estimated ejection fraction is 20-25%.  Grade III left ventricular diastolic dysfunction.  Mild right ventricular enlargement with mildly reduced right ventricular systolic function.  Mild right atrial enlargement.  Moderate mitral regurgitation.  Severe tricuspid regurgitation.  The estimated PA systolic pressure is 33 mmHg.     Echocardiogram (10.28.22):  The left ventricle is moderately enlarged with mild eccentric hypertrophy and severely decreased systolic function.  The estimated ejection fraction is 20%.  There is severe left ventricular global hypokinesis.  Grade II left ventricular diastolic dysfunction.  Normal  right ventricular size with moderately reduced right ventricular systolic function.  Moderate mitral regurgitation.  Moderate tricuspid regurgitation.  There is mild pulmonary hypertension.  The estimated PA systolic pressure is 44 mmHg.  Elevated central venous pressure (15 mmHg).  Severe left atrial enlargement.     CABG x 4 (4/22):  LIMA-LAD, SVG-OM1, SVG-D1, SVG-PDA     Echocardiogram (6.16.22):  The left ventricle is normal in size w/ concentric hypertrophy and severely decreased systolic function.  The estimated EF is 25-30%.  There is severe left ventricular global hypokinesis.  Grade II left ventricular diastolic dysfunction.  Normal right ventricular size w/ mildly reduced right ventricular systolic function.  The estimated PA systolic pressure is 52 mmHg.  There is moderate pulmonary HTN.  Elevated CVP (15 mmHg).     RUE NIVA (5.10.22):  A deep vein thrombosis was identified in the right axillary and brachial veins. A superficial thrombosis was identified in the right basilic vein.     LHC (3.21.22):  100% LAD to 30-40% residual stenosis post PTCA.  Large diagonal system w/ severe stenosis.  CIRC - patent stent, OM1 patent, mild CIRC occluded  RCA - stents ISR 40-50%, distal 70-80%  EDP 12 EF 35-40% anterior HK       Review of patient's allergies indicates:   Allergen Reactions    Depakote [divalproex]     Divalproex sodium Other (See Comments)    Lithium     Lithium analogues     Quetiapine Other (See Comments)     Pt states had seizures       No current facility-administered medications on file prior to encounter.     Current Outpatient Medications on File Prior to Encounter   Medication Sig    amiodarone (PACERONE) 200 MG Tab Take 1 tablet (200 mg total) by mouth once daily.    apixaban (ELIQUIS) 5 mg Tab Take 1 tablet (5 mg total) by mouth 2 (two) times daily.    ARIPiprazole (ABILIFY) 10 MG Tab Take 1 tablet (10 mg total) by mouth once daily.    aspirin 81 MG Chew Chew and swallow 1 tablet (81 mg  total) by mouth once daily.    atorvastatin (LIPITOR) 80 MG tablet Take 1 tablet (80 mg total) by mouth once daily.    benztropine (COGENTIN) 1 MG tablet Take 1 mg by mouth 2 (two) times daily.    famotidine (PEPCID) 20 MG tablet Take 1 tablet (20 mg total) by mouth 2 (two) times daily.    furosemide (LASIX) 40 MG tablet Take 1 tablet (40 mg total) by mouth once daily.    isosorbide mononitrate (IMDUR) 30 MG 24 hr tablet Take 1 tablet (30 mg total) by mouth once daily.    levETIRAcetam (KEPPRA) 500 MG Tab Take 1 tablet (500 mg total) by mouth 2 (two) times daily.    metoprolol tartrate 75 mg Tab Take 75 mg by mouth 2 (two) times daily.    OXcarbazepine (TRILEPTAL) 300 MG Tab Take 300 mg by mouth 3 (three) times daily.    sacubitriL-valsartan (ENTRESTO) 24-26 mg per tablet Take 1 tablet by mouth 2 (two) times daily.    traZODone (DESYREL) 100 MG tablet Take 1 tablet (100 mg total) by mouth every evening.    [DISCONTINUED] metoprolol succinate (TOPROL-XL) 25 MG 24 hr tablet Take 25 mg by mouth once daily.    [DISCONTINUED] pravastatin (PRAVACHOL) 40 MG tablet Take 1 tablet (40 mg total) by mouth every evening.       Review of Systems:  Review of Systems    Objective:     Vital Signs (Most Recent):  Temp: 97.7 °F (36.5 °C) (12/29/22 0801)  Pulse: 67 (12/29/22 0801)  Resp: 18 (12/29/22 0801)  BP: (!) 149/99 (12/29/22 0937)  SpO2: 99 % (12/29/22 0801)   Vital Signs (24h Range):  Temp:  [97.2 °F (36.2 °C)-97.9 °F (36.6 °C)] 97.7 °F (36.5 °C)  Pulse:  [53-67] 67  Resp:  [18-20] 18  SpO2:  [92 %-99 %] 99 %  BP: (104-149)/(68-99) 149/99     Weight: 89.6 kg (197 lb 8.5 oz)  Body mass index is 30.94 kg/m².    SpO2: 99 %         Intake/Output Summary (Last 24 hours) at 12/29/2022 1137  Last data filed at 12/29/2022 0348  Gross per 24 hour   Intake 600 ml   Output 3075 ml   Net -2475 ml       Lines/Drains/Airways       Drain  Duration                  Urethral Catheter 12/28/22 0230 Silicone 16 Fr. 1 day              Peripheral  Intravenous Line  Duration                  Peripheral IV - Single Lumen 12/28/22 0033 18 G Anterior;Distal;Left Upper Arm 1 day         Peripheral IV - Single Lumen 12/28/22 0034 18 G Anterior;Distal;Right Upper Arm 1 day                      Significant Labs: CMP   Recent Labs   Lab 12/28/22  0026 12/28/22  0816    138   K 4.3 4.5   CO2 31* 27   BUN 37.2* 37.7*   CREATININE 1.79* 1.65*   CALCIUM 8.4 8.7   ALBUMIN 3.1*  --    BILITOT 0.4  --    ALKPHOS 118  --    AST 31  --    ALT 19  --     and CBC   Recent Labs   Lab 12/28/22  0026   WBC 5.3   HGB 9.8*   HCT 32.3*            Significant Imaging:   Imaging Results              CT Head Without Contrast (Final result)  Result time 12/28/22 06:23:52      Final result by Rex Denson MD (12/28/22 06:23:52)                   Impression:    Impression:    1. No acute intracranial process identified. Details and other findings as noted above.      Electronically signed by: Rex Denson  Date:    12/28/2022  Time:    06:23               Narrative:    EXAMINATION:  CT HEAD WITHOUT CONTRAST    CLINICAL HISTORY:  Mental status change, unknown cause;    TECHNIQUE:  Low dose axial images were obtained through the head.  Coronal and sagittal reformations were also performed. Contrast was not administered.    Automatic exposure control was utilized to reduce the patient's radiation dose.    DLP= 929    COMPARISON:  MR dated 07/20/2022    FINDINGS:  Hemorrhage: No acute intracranial hemorrhage is seen.    CSF spaces: The ventricles, sulci and basal cisterns all appear somewhat prominent suggesting an element of global cerebral atrophy.    Brain parenchyma: There is preservation of the grey white junction throughout. No acute infarct is identified.    Cerebellum: Unremarkable.    Vascular: Unremarkable venous sinuses.    Sella and skull base: The sella appears to be within normal limits for age.    Cerebellopontine angles: Within normal limits.    Herniation:  None.    Intracranial calcifications: Incidental note is made of bilateral choroid plexus calcification. Incidental note is made of some pineal region calcification.    Calvarium: No acute linear or depressed skull fracture is seen.    Maxillofacial Structures:    Paranasal sinuses: The visualized paranasal sinuses appear clear with no mucoperiosteal thickening or air fluid levels identified.    Orbits: The orbits appear unremarkable.    Zygomatic arches: The zygomatic arches are intact and unremarkable.    Temporal bones and mastoids: The temporal bones and mastoids appear unremarkable.    TMJ: The mandibular condyles appear normally placed with respect to the mandibular fossa.    Nasal Bones: No displaced nasal bone fracture is seen.                        Preliminary result by Rex Denson MD (12/28/22 03:38:01)                   Narrative:    START OF REPORT:  TECHNIQUE: CT OF THE HEAD WAS PERFORMED WITHOUT INTRAVENOUS CONTRAST WITH AXIAL AS WELL AS CORONAL AND SAGITTAL IMAGES.    COMPARISON: NONE.    DOSAGE INFORMATION: AUTOMATED EXPOSURE CONTROL WAS UTILIZED.    CLINICAL HISTORY: MENTAL STATUS CHANGE, UNKNOWN CAUSE.    Findings:  Hemorrhage: No acute intracranial hemorrhage is seen.  CSF spaces: The ventricles, sulci and basal cisterns all appear somewhat prominent suggesting an element of global cerebral atrophy.  Brain parenchyma: There is preservation of the grey white junction throughout. No acute infarct is identified.  Cerebellum: Unremarkable.  Vascular: Unremarkable venous sinuses.  Sella and skull base: The sella appears to be within normal limits for age.  Cerebellopontine angles: Within normal limits.  Herniation: None.  Intracranial calcifications: Incidental note is made of bilateral choroid plexus calcification. Incidental note is made of some pineal region calcification.  Calvarium: No acute linear or depressed skull fracture is seen.    Maxillofacial Structures:  Paranasal sinuses: The  visualized paranasal sinuses appear clear with no mucoperiosteal thickening or air fluid levels identified.  Orbits: The orbits appear unremarkable.  Zygomatic arches: The zygomatic arches are intact and unremarkable.  Temporal bones and mastoids: The temporal bones and mastoids appear unremarkable.  TMJ: The mandibular condyles appear normally placed with respect to the mandibular fossa.  Nasal Bones: No displaced nasal bone fracture is seen.      Impression:  1. No acute intracranial process identified. Details and other findings as noted above.                          Preliminary result by Agus Cardoza Jr., MD (12/28/22 03:38:01)                   Narrative:    START OF REPORT:  TECHNIQUE: CT OF THE HEAD WAS PERFORMED WITHOUT INTRAVENOUS CONTRAST WITH AXIAL AS WELL AS CORONAL AND SAGITTAL IMAGES.    COMPARISON: NONE.    DOSAGE INFORMATION: AUTOMATED EXPOSURE CONTROL WAS UTILIZED.    CLINICAL HISTORY: MENTAL STATUS CHANGE, UNKNOWN CAUSE.    Findings:  Hemorrhage: No acute intracranial hemorrhage is seen.  CSF spaces: The ventricles, sulci and basal cisterns all appear somewhat prominent suggesting an element of global cerebral atrophy.  Brain parenchyma: There is preservation of the grey white junction throughout. No acute infarct is identified.  Cerebellum: Unremarkable.  Vascular: Unremarkable venous sinuses.  Sella and skull base: The sella appears to be within normal limits for age.  Cerebellopontine angles: Within normal limits.  Herniation: None.  Intracranial calcifications: Incidental note is made of bilateral choroid plexus calcification. Incidental note is made of some pineal region calcification.  Calvarium: No acute linear or depressed skull fracture is seen.    Maxillofacial Structures:  Paranasal sinuses: The visualized paranasal sinuses appear clear with no mucoperiosteal thickening or air fluid levels identified.  Orbits: The orbits appear unremarkable.  Zygomatic arches: The zygomatic  arches are intact and unremarkable.  Temporal bones and mastoids: The temporal bones and mastoids appear unremarkable.  TMJ: The mandibular condyles appear normally placed with respect to the mandibular fossa.  Nasal Bones: No displaced nasal bone fracture is seen.      Impression:  1. No acute intracranial process identified. Details and other findings as noted above.                                         X-Ray Chest AP Portable (Final result)  Result time 12/28/22 06:36:51      Final result by Rex Denson MD (12/28/22 06:36:51)                   Impression:      No acute cardiopulmonary process.      Electronically signed by: Rex Denson  Date:    12/28/2022  Time:    06:36               Narrative:    EXAMINATION:  XR CHEST AP PORTABLE    CLINICAL HISTORY:  Chest Pain;    TECHNIQUE:  Single view of the chest    COMPARISON:  12/21/2022    FINDINGS:  No focal opacification, pleural effusion, or pneumothorax.    The cardiomediastinal silhouette remains enlarged.    No acute osseous abnormality.                        ED Interpretation by Riddhi Murphy MD (12/28/22 01:54:38, [facility] - Emergency Dept, Emergency Medicine)    Cardiomegaly with interstitial markings                                      EKG:    Results for orders placed or performed during the hospital encounter of 12/28/22   EKG 12-lead    Narrative    Test Reason : R07.9,    Vent. Rate : 049 BPM     Atrial Rate : 049 BPM     P-R Int : 192 ms          QRS Dur : 144 ms      QT Int : 510 ms       P-R-T Axes : 079 -80 -01 degrees     QTc Int : 460 ms    Sinus bradycardia  Left axis deviation  Right bundle branch block  Anteroseptal infarct (cited on or before 19-DEC-2022)  Abnormal ECG  When compared with ECG of 22-DEC-2022 03:44,  Questionable change in The axis  Questionable change in initial forces of Anterior-septal leads  Confirmed by Costa Stratton MD ([id number]) on 12/28/2022 8:30:19 AM    Referred By: ROMULO   SELF            Confirmed By:Costa Stratton MD       Telemetry:  SR    Physical Exam:  Physical Exam  Constitutional:       Appearance: Normal appearance.   HENT:      Head: Normocephalic.   Eyes:      Extraocular Movements: Extraocular movements intact.      Conjunctiva/sclera: Conjunctivae normal.   Cardiovascular:      Rate and Rhythm: Normal rate and regular rhythm.      Pulses: Normal pulses.      Heart sounds: Normal heart sounds.   Pulmonary:      Effort: Pulmonary effort is normal.      Comments: Bilateral crackles  Abdominal:      Palpations: Abdomen is soft.   Musculoskeletal:         General: Normal range of motion.      Cervical back: Neck supple.   Skin:     General: Skin is warm and dry.   Neurological:      Mental Status: He is alert and oriented to person, place, and time. Mental status is at baseline.   Psychiatric:         Mood and Affect: Mood normal.         Behavior: Behavior normal.       Home Medications:   No current facility-administered medications on file prior to encounter.     Current Outpatient Medications on File Prior to Encounter   Medication Sig Dispense Refill    amiodarone (PACERONE) 200 MG Tab Take 1 tablet (200 mg total) by mouth once daily. 30 tablet 6    apixaban (ELIQUIS) 5 mg Tab Take 1 tablet (5 mg total) by mouth 2 (two) times daily. 60 tablet 3    ARIPiprazole (ABILIFY) 10 MG Tab Take 1 tablet (10 mg total) by mouth once daily. 30 tablet 11    aspirin 81 MG Chew Chew and swallow 1 tablet (81 mg total) by mouth once daily. 360 tablet 0    atorvastatin (LIPITOR) 80 MG tablet Take 1 tablet (80 mg total) by mouth once daily. 90 tablet 3    benztropine (COGENTIN) 1 MG tablet Take 1 mg by mouth 2 (two) times daily.      famotidine (PEPCID) 20 MG tablet Take 1 tablet (20 mg total) by mouth 2 (two) times daily. 60 tablet 11    furosemide (LASIX) 40 MG tablet Take 1 tablet (40 mg total) by mouth once daily. 30 tablet 11    isosorbide mononitrate (IMDUR) 30 MG 24 hr tablet Take 1 tablet (30 mg  total) by mouth once daily. 30 tablet 11    levETIRAcetam (KEPPRA) 500 MG Tab Take 1 tablet (500 mg total) by mouth 2 (two) times daily. 60 tablet 11    metoprolol tartrate 75 mg Tab Take 75 mg by mouth 2 (two) times daily. 60 tablet 11    OXcarbazepine (TRILEPTAL) 300 MG Tab Take 300 mg by mouth 3 (three) times daily.      sacubitriL-valsartan (ENTRESTO) 24-26 mg per tablet Take 1 tablet by mouth 2 (two) times daily. 60 tablet 0    traZODone (DESYREL) 100 MG tablet Take 1 tablet (100 mg total) by mouth every evening. 30 tablet 11    [DISCONTINUED] metoprolol succinate (TOPROL-XL) 25 MG 24 hr tablet Take 25 mg by mouth once daily.      [DISCONTINUED] pravastatin (PRAVACHOL) 40 MG tablet Take 1 tablet (40 mg total) by mouth every evening. 90 tablet 3       Current Inpatient Medications:    Current Facility-Administered Medications:     amiodarone tablet 200 mg, 200 mg, Oral, Daily, Cordell Robbins MD, 200 mg at 12/29/22 0904    apixaban tablet 5 mg, 5 mg, Oral, BID, Cordell Robbins MD, 5 mg at 12/29/22 0911    ARIPiprazole tablet 10 mg, 10 mg, Oral, Daily, Belinda Berumen MD, 10 mg at 12/29/22 0904    aspirin chewable tablet 81 mg, 81 mg, Oral, Daily, Cordell Robbins MD, 81 mg at 12/29/22 0904    atorvastatin tablet 80 mg, 80 mg, Oral, Daily, Cordell Robbins MD, 80 mg at 12/29/22 0903    benztropine tablet 1 mg, 1 mg, Oral, BID, Cordell Robbins MD, 1 mg at 12/29/22 0904    famotidine tablet 20 mg, 20 mg, Oral, BID, Cordell Robbins MD, 20 mg at 12/29/22 0911    furosemide injection 40 mg, 40 mg, Intravenous, BID, Cordell Robbins MD, 40 mg at 12/29/22 0937    isosorbide mononitrate 24 hr tablet 30 mg, 30 mg, Oral, Daily, Cordell Robbins MD, 30 mg at 12/29/22 0904    levETIRAcetam tablet 500 mg, 500 mg, Oral, BID, Cordell Robbisn MD, 500 mg at 12/29/22 0903    metoprolol tartrate (LOPRESSOR) tablet 75 mg, 75 mg, Oral, BID, Cordell Robbins MD, 75 mg at 12/29/22 0903    OXcarbazepine tablet 300 mg, 300 mg, Oral, TID, Cordell Robbins,  MD, 300 mg at 12/29/22 0904    traZODone tablet 100 mg, 100 mg, Oral, QHS, Cordell Robbins MD, 100 mg at 12/28/22 2009           Assessment:     IMPRESSION:  Acute on Chronic Combined Systolic/Diastolic HF    - Grade 3 DD    - EF 20-25% (echo 12.18.22)  Elevated troponin without clinical significance    - denies current CP  PAF/PAFL (Recent New Diagnosis)- Now [place]/SR    - [id number]: 5 (LVSD/HTN/TE/NSTEMI)    - On Eliquis  ANA   Chronic anemia - stable  ICMO     - With Life Vest   VHD    -Moderate MR and Severe TR  Hypertension  Pulmonary Hypertension  CAD/CABG (April 2022)    - LIMA-LAD, SVG-OM1, SVG-D1, SVG-PDA  History of RUE DVT (5.10.22)    - A deep vein thrombosis was identified in the right axillary and brachial veins. A superficial thrombosis was identified in the right basilic vein.  Schizoaffective Disorder  Hx of Hep C  Hx of cocaine Abuse   Nicotine Dependence (Tobacco use)    PLAN:     PLAN:  Continue home medications   Continue GDMT for CAD/heart failure  Continue IV Lasix   Strict I&O/daily weights   Low-sodium diet  Continue LifeVest    Thank you for your consult.     MAXWELL Tubbs-BC  Cardiology  [facility] - 6th Floor Medical Telemetry  12/29/2022 11:37 AM

## 2022-12-29 NOTE — PLAN OF CARE
Pt is alert and orient x 4 at the time of assessment. Pt denies any pain at this time. Pt is receiving lasix via iv push. There is no edema noted. Pt is on oxygen at this time. Pt states that he would like to speak with case management about daughter refusing to give him his food stamp and bank card. Will continue to monitor   0 = understands/communicates without difficulty

## 2022-12-29 NOTE — PROGRESS NOTES
"Inpatient Nutrition Evaluation    Admit Date: 12/28/2022   Total duration of encounter: 1 day    Nutrition Recommendation/Prescription     Continue Cardiac diet as tolerated    Nutrition Assessment     Chart Review    Reason Seen: continuous nutrition monitoring    Malnutrition Screening Tool Results   Have you recently lost weight without trying?: No  Have you been eating poorly because of a decreased appetite?: No   MST Score: 0     Diagnosis:  Acute decompensated systolic heart failure  ANA on CKD II  Essential hypertension  Paroxysmal AFib   h/o coronary artery disease\  Schizoaffective disorder   Tobacco use    Relevant Medical History: HTN, Hyperlipidemia, CAD, CHF, CVA, Schizoaffective disorder, Hep C, substance abuse    Nutrition-Related Medications: amiodarone, famotidine, furosemide    Nutrition-Related Labs:  12/28: H/H-9.8/32.3, Bun-37.7, Crea-1.65, Gluc-313    Diet Order: Diet Cardiac  Oral Supplement Order: none  Appetite/Oral Intake: good/% of meals  Factors Affecting Nutritional Intake: none identified  Food/Synagogue/Cultural Preferences: none reported  Food Allergies: no known food allergies    Skin Integrity: other (see comments) (wound to sacral area(picture in chart)  Wound(s):   Perineum Skin tear-partial thickness    Comments    12/29: Pt stated he is eating great. Enjoying the meals. Nsg noted swelling is improving.     Anthropometrics    Height: 5' 7" (170.2 cm) Height Method: Stated  Last Weight: 89.6 kg (197 lb 8.5 oz) (12/28/22 1754) Weight Method: Bed Scale  BMI (Calculated): 30.9  BMI Classification: obese grade I (BMI 30-34.9)        Ideal Body Weight (IBW), Male: 148 lb     % Ideal Body Weight, Male (lb): 133.47 %                          Usual Weight Provided By: EMR weight history    Wt Readings from Last 3 Encounters:   12/28/22 1754 89.6 kg (197 lb 8.5 oz)   12/27/22 2359 81.6 kg (180 lb)   12/25/22 0600 82.2 kg (181 lb 3.5 oz)   12/23/22 0558 80.9 kg (178 lb 5.6 oz) "   12/23/22 0500 80.9 kg (178 lb 5.6 oz)   12/22/22 0600 83.1 kg (183 lb 3.2 oz)   12/22/22 0500 83.1 kg (183 lb 3.2 oz)   12/21/22 1937 81 kg (178 lb 9.2 oz)   12/21/22 1142 81.6 kg (180 lb)   12/19/22 2336 81.6 kg (180 lb)      Weight Change(s) Since Admission:  Admit Weight: 81.6 kg (180 lb) (12/27/22 2359)  12/28: 89.6kg; expect fluid loss with diuretic    Patient Education    Not applicable.    Monitoring & Evaluation     Dietitian will monitor food and beverage intake.  Nutrition Risk/Follow-Up: low (follow-up in 5-7 days)  Patients assigned 'low nutrition risk' status do not qualify for a full nutritional assessment but will be monitored and re-evaluated in a 5-7 day time period. Please consult if re-evaluation needed sooner.

## 2022-12-30 LAB
ALBUMIN SERPL-MCNC: 3.4 G/DL (ref 3.5–5)
ALBUMIN/GLOB SERPL: 1 RATIO (ref 1.1–2)
ALP SERPL-CCNC: 91 UNIT/L (ref 40–150)
ALT SERPL-CCNC: 19 UNIT/L (ref 0–55)
AST SERPL-CCNC: 36 UNIT/L (ref 5–34)
BILIRUBIN DIRECT+TOT PNL SERPL-MCNC: 0.8 MG/DL
BUN SERPL-MCNC: 36 MG/DL (ref 8.4–25.7)
CALCIUM SERPL-MCNC: 8.8 MG/DL (ref 8.4–10.2)
CHLORIDE SERPL-SCNC: 100 MMOL/L (ref 98–107)
CO2 SERPL-SCNC: 25 MMOL/L (ref 22–29)
CREAT SERPL-MCNC: 1.34 MG/DL (ref 0.73–1.18)
GFR SERPLBLD CREATININE-BSD FMLA CKD-EPI: >60 MLS/MIN/1.73/M2
GLOBULIN SER-MCNC: 3.3 GM/DL (ref 2.4–3.5)
GLUCOSE SERPL-MCNC: 108 MG/DL (ref 74–100)
POTASSIUM SERPL-SCNC: 4.4 MMOL/L (ref 3.5–5.1)
PROT SERPL-MCNC: 6.7 GM/DL (ref 6.4–8.3)
SODIUM SERPL-SCNC: 137 MMOL/L (ref 136–145)
TROPONIN I SERPL-MCNC: 0.07 NG/ML (ref 0–0.04)

## 2022-12-30 PROCEDURE — 25000003 PHARM REV CODE 250: Performed by: NURSE PRACTITIONER

## 2022-12-30 PROCEDURE — 25000003 PHARM REV CODE 250: Performed by: INTERNAL MEDICINE

## 2022-12-30 PROCEDURE — 63600175 PHARM REV CODE 636 W HCPCS: Performed by: INTERNAL MEDICINE

## 2022-12-30 PROCEDURE — 36415 COLL VENOUS BLD VENIPUNCTURE: CPT | Performed by: EMERGENCY MEDICINE

## 2022-12-30 PROCEDURE — 27000221 HC OXYGEN, UP TO 24 HOURS

## 2022-12-30 PROCEDURE — 21400001 HC TELEMETRY ROOM

## 2022-12-30 PROCEDURE — 11000001 HC ACUTE MED/SURG PRIVATE ROOM

## 2022-12-30 PROCEDURE — 84484 ASSAY OF TROPONIN QUANT: CPT | Performed by: EMERGENCY MEDICINE

## 2022-12-30 PROCEDURE — 80053 COMPREHEN METABOLIC PANEL: CPT | Performed by: NURSE PRACTITIONER

## 2022-12-30 RX ORDER — FUROSEMIDE 40 MG/1
40 TABLET ORAL ONCE
Status: COMPLETED | OUTPATIENT
Start: 2022-12-30 | End: 2022-12-30

## 2022-12-30 RX ORDER — FUROSEMIDE 40 MG/1
40 TABLET ORAL DAILY
Status: DISCONTINUED | OUTPATIENT
Start: 2022-12-31 | End: 2022-12-31 | Stop reason: HOSPADM

## 2022-12-30 RX ORDER — LANOLIN ALCOHOL/MO/W.PET/CERES
1 CREAM (GRAM) TOPICAL DAILY
Status: DISCONTINUED | OUTPATIENT
Start: 2022-12-31 | End: 2022-12-31 | Stop reason: HOSPADM

## 2022-12-30 RX ORDER — METOPROLOL TARTRATE 50 MG/1
50 TABLET ORAL 2 TIMES DAILY
Status: DISCONTINUED | OUTPATIENT
Start: 2022-12-30 | End: 2022-12-31 | Stop reason: HOSPADM

## 2022-12-30 RX ADMIN — ATORVASTATIN CALCIUM 80 MG: 40 TABLET, FILM COATED ORAL at 08:12

## 2022-12-30 RX ADMIN — ARIPIPRAZOLE 10 MG: 5 TABLET ORAL at 08:12

## 2022-12-30 RX ADMIN — APIXABAN 5 MG: 5 TABLET, FILM COATED ORAL at 08:12

## 2022-12-30 RX ADMIN — LEVETIRACETAM 500 MG: 500 TABLET, FILM COATED ORAL at 08:12

## 2022-12-30 RX ADMIN — SODIUM CHLORIDE 125 MG: 9 INJECTION, SOLUTION INTRAVENOUS at 02:12

## 2022-12-30 RX ADMIN — METOPROLOL TARTRATE 75 MG: 25 TABLET, FILM COATED ORAL at 08:12

## 2022-12-30 RX ADMIN — OXCARBAZEPINE 300 MG: 300 TABLET, FILM COATED ORAL at 08:12

## 2022-12-30 RX ADMIN — FAMOTIDINE 20 MG: 20 TABLET ORAL at 09:12

## 2022-12-30 RX ADMIN — BENZTROPINE MESYLATE 1 MG: 1 TABLET ORAL at 09:12

## 2022-12-30 RX ADMIN — OXCARBAZEPINE 300 MG: 300 TABLET, FILM COATED ORAL at 09:12

## 2022-12-30 RX ADMIN — TRAZODONE HYDROCHLORIDE 100 MG: 100 TABLET ORAL at 09:12

## 2022-12-30 RX ADMIN — OXCARBAZEPINE 300 MG: 300 TABLET, FILM COATED ORAL at 02:12

## 2022-12-30 RX ADMIN — FUROSEMIDE 40 MG: 40 TABLET ORAL at 07:12

## 2022-12-30 RX ADMIN — BENZTROPINE MESYLATE 1 MG: 1 TABLET ORAL at 08:12

## 2022-12-30 RX ADMIN — ISOSORBIDE MONONITRATE 30 MG: 30 TABLET, EXTENDED RELEASE ORAL at 08:12

## 2022-12-30 RX ADMIN — FUROSEMIDE 40 MG: 10 INJECTION, SOLUTION INTRAMUSCULAR; INTRAVENOUS at 08:12

## 2022-12-30 RX ADMIN — FAMOTIDINE 20 MG: 20 TABLET ORAL at 08:12

## 2022-12-30 RX ADMIN — LEVETIRACETAM 500 MG: 500 TABLET, FILM COATED ORAL at 09:12

## 2022-12-30 RX ADMIN — APIXABAN 5 MG: 5 TABLET, FILM COATED ORAL at 09:12

## 2022-12-30 RX ADMIN — AMIODARONE HYDROCHLORIDE 200 MG: 200 TABLET ORAL at 08:12

## 2022-12-30 RX ADMIN — ASPIRIN 81 MG CHEWABLE TABLET 81 MG: 81 TABLET CHEWABLE at 08:12

## 2022-12-30 NOTE — PROGRESS NOTES
[facility] - 6th Floor Medical Telemetry  Cardiology  Progress Note    Patient Name: Kendall Duff  MRN: [id number]  Admission Date: 12/28/2022  Hospital Length of Stay: 2 days  Code Status: Prior   Attending Physician: Jael Callowya MD   Primary Care Physician: No primary care provider on file.  Expected Discharge Date:   Principal Problem:<principal problem not specified>    Subjective:     Brief HPI:   Mr. Duff is a 57 year old male who is known to [facility], Dr. Wright.  He presented to the ER on 12/28/2022 with complaints of shortness of breath, right arm swelling.  When asked by ER physician why he is here he responded his face is swollen.  He then went immediately back to sleep.  He has presented to the ER and hospital multiple times and leaves AMA multiple times.  BNP was 2065.  Troponin max was 0.101 and trended down. [facility] has been consulted for NSTEMI and heart failure.    Hospital Course:   12.30.22: NAD noted. VSS. SR on tele. I&O not accurate, no weight from today. Denies CP/Palps, improved SOB and conversational dyspnea.     Previous Cardiac Diagnostics:   TTE (12.18.22):  Eccentric hypertrophy and severely decreased systolic function. The estimated ejection fraction is 20-25%.  Grade III left ventricular diastolic dysfunction.  Mild right ventricular enlargement with mildly reduced right ventricular systolic function.  Mild right atrial enlargement.  Moderate mitral regurgitation.  Severe tricuspid regurgitation.  The estimated PA systolic pressure is 33 mmHg.     Echocardiogram (10.28.22):  The left ventricle is moderately enlarged with mild eccentric hypertrophy and severely decreased systolic function.  The estimated ejection fraction is 20%.  There is severe left ventricular global hypokinesis.  Grade II left ventricular diastolic dysfunction.  Normal right ventricular size with moderately reduced right ventricular systolic function.  Moderate mitral regurgitation.  Moderate tricuspid  regurgitation.  There is mild pulmonary hypertension.  The estimated PA systolic pressure is 44 mmHg.  Elevated central venous pressure (15 mmHg).  Severe left atrial enlargement.     CABG x 4 (4/22):  LIMA-LAD, SVG-OM1, SVG-D1, SVG-PDA     Echocardiogram (6.16.22):  The left ventricle is normal in size w/ concentric hypertrophy and severely decreased systolic function.  The estimated EF is 25-30%.  There is severe left ventricular global hypokinesis.  Grade II left ventricular diastolic dysfunction.  Normal right ventricular size w/ mildly reduced right ventricular systolic function.  The estimated PA systolic pressure is 52 mmHg.  There is moderate pulmonary HTN.  Elevated CVP (15 mmHg).     RUE NIVA (5.10.22):  A deep vein thrombosis was identified in the right axillary and brachial veins. A superficial thrombosis was identified in the right basilic vein.     LHC (3.21.22):  100% LAD to 30-40% residual stenosis post PTCA.  Large diagonal system w/ severe stenosis.  CIRC - patent stent, OM1 patent, mild CIRC occluded  RCA - stents ISR 40-50%, distal 70-80%  EDP 12 EF 35-40% anterior HK         Review of Systems   Constitutional: Negative for chills and fever.   Cardiovascular:  Negative for chest pain and palpitations.   Respiratory:  Positive for shortness of breath.    Psychiatric/Behavioral:  Negative for altered mental status.          Objective:     Vital Signs (Most Recent):  Temp: 97.7 °F (36.5 °C) (12/30/22 0700)  Pulse: (!) 54 (12/30/22 0700)  Resp: 18 (12/30/22 0431)  BP: 122/87 (12/30/22 0700)  SpO2: 98 % (12/30/22 0700)   Vital Signs (24h Range):  Temp:  [97.5 °F (36.4 °C)-98 °F (36.7 °C)] 97.7 °F (36.5 °C)  Pulse:  [51-62] 54  Resp:  [17-18] 18  SpO2:  [92 %-100 %] 98 %  BP: (122-159)/(87-99) 122/87     Weight: 89.6 kg (197 lb 8.5 oz)  Body mass index is 30.94 kg/m².    SpO2: 98 %         Intake/Output Summary (Last 24 hours) at 12/30/2022 0915  Last data filed at 12/29/2022 1814  Gross per 24 hour    Intake 1144 ml   Output 2053 ml   Net -909 ml       Lines/Drains/Airways       Drain  Duration                  Urethral Catheter 12/28/22 0230 Silicone 16 Fr. 2 days              Peripheral Intravenous Line  Duration                  Peripheral IV - Single Lumen 12/28/22 0033 18 G Anterior;Distal;Left Upper Arm 2 days         Peripheral IV - Single Lumen 12/28/22 0034 18 G Anterior;Distal;Right Upper Arm 2 days                    Significant Labs: CMP No results for input(s): NA, K, CL, CO2, GLU, BUN, CREATININE, CALCIUM, PROT, ALBUMIN, BILITOT, ALKPHOS, AST, ALT, ANIONGAP, ESTGFRAFRICA, EGFRNONAA in the last 48 hours. and CBC No results for input(s): WBC, HGB, HCT, PLT in the last 48 hours.    Telemetry:  SR/SB    Physical Exam:  Physical Exam  Constitutional:       Appearance: Normal appearance.   HENT:      Head: Normocephalic.   Eyes:      Extraocular Movements: Extraocular movements intact.      Conjunctiva/sclera: Conjunctivae normal.   Cardiovascular:      Rate and Rhythm: Normal rate and regular rhythm.      Pulses: Normal pulses.      Heart sounds: Normal heart sounds.   Pulmonary:      Effort: Pulmonary effort is normal.      Comments: Bilateral crackles  Abdominal:      Palpations: Abdomen is soft.   Musculoskeletal:         General: Normal range of motion.      Cervical back: Neck supple.   Skin:     General: Skin is warm and dry.   Neurological:      Mental Status: He is alert and oriented to person, place, and time. Mental status is at baseline.   Psychiatric:         Mood and Affect: Mood normal.         Behavior: Behavior normal.       Current Inpatient Medications:    Current Facility-Administered Medications:     amiodarone tablet 200 mg, 200 mg, Oral, Daily, Cordell Robbins MD, 200 mg at 12/30/22 0856    apixaban tablet 5 mg, 5 mg, Oral, BID, Cordell Robbins MD, 5 mg at 12/30/22 0856    ARIPiprazole tablet 10 mg, 10 mg, Oral, Daily, Belinda Berumen MD, 10 mg at 12/30/22 0856    aspirin chewable  tablet 81 mg, 81 mg, Oral, Daily, Cordell Robbins MD, 81 mg at 12/30/22 0856    atorvastatin tablet 80 mg, 80 mg, Oral, Daily, Cordell Robbins MD, 80 mg at 12/30/22 0856    benztropine tablet 1 mg, 1 mg, Oral, BID, Cordell Robbins MD, 1 mg at 12/30/22 0856    famotidine tablet 20 mg, 20 mg, Oral, BID, Cordell Robbins MD, 20 mg at 12/30/22 0856    furosemide injection 40 mg, 40 mg, Intravenous, BID, Cordell Robbins MD, 40 mg at 12/30/22 0856    isosorbide mononitrate 24 hr tablet 30 mg, 30 mg, Oral, Daily, Cordell Robbins MD, 30 mg at 12/30/22 0856    levETIRAcetam tablet 500 mg, 500 mg, Oral, BID, Cordell Robbins MD, 500 mg at 12/30/22 0856    metoprolol tartrate (LOPRESSOR) tablet 75 mg, 75 mg, Oral, BID, Cordell Robbins MD, 75 mg at 12/30/22 0856    OXcarbazepine tablet 300 mg, 300 mg, Oral, TID, Cordell Robbins MD, 300 mg at 12/30/22 0856    traZODone tablet 100 mg, 100 mg, Oral, QHS, Cordell Robbins MD, 100 mg at 12/29/22 2114        Assessment:     IMPRESSION:  Acute on Chronic Combined Systolic/Diastolic HF    - Grade 3 DD    - EF 20-25% (echo 12.18.22)  Elevated troponin without clinical significance    - denies current CP  PAF/PAFL (Recent New Diagnosis)- Now Sinus Rancho/SR    - [id number]: 5 (LVSD/HTN/TE/NSTEMI)    - On Eliquis  ANA   Chronic anemia - stable  ICMO     - With Life Vest   VHD    -Moderate MR and Severe TR  Hypertension  Pulmonary Hypertension  CAD/CABG (April 2022)    - LIMA-LAD, SVG-OM1, SVG-D1, SVG-PDA  History of RUE DVT (5.10.22)    - A deep vein thrombosis was identified in the right axillary and brachial veins. A superficial thrombosis was identified in the right basilic vein.  Schizoaffective Disorder  Hx of Hep C  Hx of cocaine Abuse   Nicotine Dependence (Tobacco use)      Plan:     PLAN:  Continue home medications   Continue GDMT for CAD/heart failure  Continue IV Lasix for today. Start PO Lasix in AM  Strict I&O/daily weights   Low-sodium diet  Continue LifeVest    Chris Stern,  AGACNP-BC  Cardiology  [facility] - 6th Floor Medical Telemetry  12/30/2022

## 2022-12-30 NOTE — PLAN OF CARE
Pt is alert and orient x 4 at the time of assessment. Pt denies any pain. Pt does continue to take nasal cannula out of nares. Pt still has audible wheezing. Will continue to monitor

## 2022-12-30 NOTE — PROGRESS NOTES
[facility]  Hospital Medicine Progress Note        Chief Complaint: Inpatient Follow-up for SOB    HPI: 57 y.o. male with a history that includes cardiomyopathy with an EF20-25%, and frequent ED visits and hospitalizations, presented to back to ED with SOB.  Patient recently hospitalized at [facility], discharged on 12/25, for CHF exacerbation.  Patient reports he was brought to ED by daughter because swelling in his face.  Workup has noted evidence of volume overload.  ABG noted mild respiratory acidosis, pH 7.31, pCO2 63.  Patient placed on BiPAP at 10/5, 40% FiO2.    Cardiology recs appreciated, IV Lasix in house.    Interval Hx:     Pt denies any symptoms of headache, chest pain, dyspnea, abdominal pain, bowel or bladder issues, very happy about the Begum removal. UO-900cc. Bradycardic.      Objective/physical exam:  General: In no acute distress, afebrile  Chest: Crackles  Heart: RRR, +S1, S2, no appreciable murmur  Abdomen: Soft, nontender, BS +  MSK: Warm, no lower extremity edema, no clubbing or cyanosis  Neurologic: Alert and oriented x4, Cranial nerve II-XII intact, Strength 5/5 in all 4 extremities        VITAL SIGNS: 24 HRS MIN & MAX LAST   Temp  Min: 97 °F (36.1 °C)  Max: 98 °F (36.7 °C) 97 °F (36.1 °C)   BP  Min: 122/87  Max: 159/99 (!) 131/91     Pulse  Min: 50  Max: 62  (!) 50     Resp  Min: 17  Max: 18 18   SpO2  Min: 92 %  Max: 100 % 97 %         Recent Labs   Lab 12/24/22  0353 12/28/22  0026   WBC 7.5 5.3   RBC 4.60* 4.35*   HGB 10.3* 9.8*   HCT 32.6* 32.3*   MCV 70.9* 74.3*   MCH 22.4 22.5   MCHC 31.6* 30.3*   RDW 19.5* 19.6*    208   MPV 9.6 10.1       Recent Labs   Lab 12/24/22  0353 12/25/22  0345 12/28/22  0026 12/28/22  0213 12/28/22  0656 12/28/22  0816 12/29/22  0207 12/30/22  0802    138 140  --   --  138  --  137   K 3.6 3.7 4.3  --   --  4.5  --  4.4   CO2 28 28 31*  --   --  27  --  25   BUN 20.8 16.8 37.2*  --   --  37.7*  --  36.0*   CREATININE  1.00 1.01 1.79*  --   --  1.65*  --  1.34*   CALCIUM 8.3* 8.4 8.4  --   --  8.7  --  8.8   PH  --   --   --  7.315* 7.398  --   --   --    MG 1.90 1.80 2.10  --   --   --  2.10  --    ALBUMIN 2.8*  --  3.1*  --   --   --   --  3.4*   ALKPHOS 95  --  118  --   --   --   --  91   ALT 17  --  19  --   --   --   --  19   AST 24  --  31  --   --   --   --  36*   BILITOT 0.9  --  0.4  --   --   --   --  0.8          Microbiology Results (last 7 days)       ** No results found for the last 168 hours. **             See below for Radiology    Scheduled Med:   amiodarone  200 mg Oral Daily    apixaban  5 mg Oral BID    ARIPiprazole  10 mg Oral Daily    aspirin  81 mg Oral Daily    atorvastatin  80 mg Oral Daily    benztropine  1 mg Oral BID    famotidine  20 mg Oral BID    furosemide  40 mg Intravenous BID    [START ON 12/31/2022] furosemide  40 mg Oral Daily    isosorbide mononitrate  30 mg Oral Daily    levETIRAcetam  500 mg Oral BID    metoprolol tartrate  75 mg Oral BID    OXcarbazepine  300 mg Oral TID    traZODone  100 mg Oral QHS        Continuous Infusions:       PRN Meds:         Assessment/Plan:  #Acute Hypoxic Resp Failure  #Acute on Chronic Combined Systolic/Diastolic HF  #NSTEMI 2  ANA on CKD II  Anemia, ERIC  Chronic  Essential hypertension  Paroxysmal AFib   h/o coronary artery disease CABG (April 2022)  VHD  History of RUE DVT (5.10.22)  Chr Anemia  Schizoaffective disorder   Tobacco use,Hx of Hep C,Hx of cocaine Abuse         Plan:  -Wean O2  -Continue diuresis with IV Lasix BID, Cardiology Recs-Cont GDMT for CAD/HF, PO lasix AM  -Monitor I&Os, renal function and electrolytes  -Monitor on tele  -Cont Lifevest  -Avoid Nephrotoxins, Lasix to be changed to PO, Cr might get better.  -Iron Supplementation  -Home meds reviewed and resumed as appropriate  -Decrease the strength of beta blocker if Rancho and Symptomatic  -Smoking cessation counseling      Critical care Time Spent>35 min       VTE prophylaxis:  Apixaban    Patient condition:  Critical    Anticipated discharge and Disposition:         All diagnosis and differential diagnosis have been reviewed; assessment and plan has been documented; I have personally reviewed the labs and test results that are presently available; I have reviewed the patients medication list; I have reviewed the consulting providers response and recommendations. I have reviewed or attempted to review medical records based upon their availability    All of the patient's questions have been  addressed and answered. Patient's is agreeable to the above stated plan. I will continue to monitor closely and make adjustments to medical management as needed.  _____________________________________________________________________    Nutrition Status:    Radiology:  X-Ray Chest AP Portable  Narrative: EXAMINATION:  XR CHEST AP PORTABLE    CLINICAL HISTORY:  Chest Pain;    TECHNIQUE:  Single view of the chest    COMPARISON:  12/21/2022    FINDINGS:  No focal opacification, pleural effusion, or pneumothorax.    The cardiomediastinal silhouette remains enlarged.    No acute osseous abnormality.  Impression: No acute cardiopulmonary process.    Electronically signed by: Rex Denson  Date:    12/28/2022  Time:    06:36  CT Head Without Contrast  Narrative: EXAMINATION:  CT HEAD WITHOUT CONTRAST    CLINICAL HISTORY:  Mental status change, unknown cause;    TECHNIQUE:  Low dose axial images were obtained through the head.  Coronal and sagittal reformations were also performed. Contrast was not administered.    Automatic exposure control was utilized to reduce the patient's radiation dose.    DLP= 929    COMPARISON:  MR dated 07/20/2022    FINDINGS:  Hemorrhage: No acute intracranial hemorrhage is seen.    CSF spaces: The ventricles, sulci and basal cisterns all appear somewhat prominent suggesting an element of global cerebral atrophy.    Brain parenchyma: There is preservation of the grey white junction  throughout. No acute infarct is identified.    Cerebellum: Unremarkable.    Vascular: Unremarkable venous sinuses.    Sella and skull base: The sella appears to be within normal limits for age.    Cerebellopontine angles: Within normal limits.    Herniation: None.    Intracranial calcifications: Incidental note is made of bilateral choroid plexus calcification. Incidental note is made of some pineal region calcification.    Calvarium: No acute linear or depressed skull fracture is seen.    Maxillofacial Structures:    Paranasal sinuses: The visualized paranasal sinuses appear clear with no mucoperiosteal thickening or air fluid levels identified.    Orbits: The orbits appear unremarkable.    Zygomatic arches: The zygomatic arches are intact and unremarkable.    Temporal bones and mastoids: The temporal bones and mastoids appear unremarkable.    TMJ: The mandibular condyles appear normally placed with respect to the mandibular fossa.    Nasal Bones: No displaced nasal bone fracture is seen.  Impression: Impression:    1. No acute intracranial process identified. Details and other findings as noted above.    Electronically signed by: Rex Denson  Date:    12/28/2022  Time:    06:23      Jale Calloway MD   12/30/2022
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
## 2022-12-31 VITALS
BODY MASS INDEX: 29.1 KG/M2 | HEART RATE: 64 BPM | RESPIRATION RATE: 18 BRPM | DIASTOLIC BLOOD PRESSURE: 92 MMHG | TEMPERATURE: 98 F | WEIGHT: 185.44 LBS | HEIGHT: 67 IN | OXYGEN SATURATION: 95 % | SYSTOLIC BLOOD PRESSURE: 145 MMHG

## 2022-12-31 LAB
ANION GAP SERPL CALC-SCNC: 12 MEQ/L
BASOPHILS # BLD AUTO: 0.05 X10(3)/MCL (ref 0–0.2)
BASOPHILS NFR BLD AUTO: 0.8 %
BUN SERPL-MCNC: 36.6 MG/DL (ref 8.4–25.7)
CALCIUM SERPL-MCNC: 9 MG/DL (ref 8.4–10.2)
CHLORIDE SERPL-SCNC: 101 MMOL/L (ref 98–107)
CO2 SERPL-SCNC: 26 MMOL/L (ref 22–29)
CREAT SERPL-MCNC: 1.32 MG/DL (ref 0.73–1.18)
CREAT/UREA NIT SERPL: 28
EOSINOPHIL # BLD AUTO: 0.07 X10(3)/MCL (ref 0–0.9)
EOSINOPHIL NFR BLD AUTO: 1.2 %
ERYTHROCYTE [DISTWIDTH] IN BLOOD BY AUTOMATED COUNT: 19.7 % (ref 11.6–14.4)
GFR SERPLBLD CREATININE-BSD FMLA CKD-EPI: >60 MLS/MIN/1.73/M2
GLUCOSE SERPL-MCNC: 105 MG/DL (ref 74–100)
HCT VFR BLD AUTO: 34 % (ref 42–52)
HGB BLD-MCNC: 10.5 GM/DL (ref 14–18)
IMM GRANULOCYTES # BLD AUTO: 0.02 X10(3)/MCL (ref 0–0.04)
IMM GRANULOCYTES NFR BLD AUTO: 0.3 %
LYMPHOCYTES # BLD AUTO: 1.03 X10(3)/MCL (ref 0.6–4.6)
LYMPHOCYTES NFR BLD AUTO: 17.2 %
MCH RBC QN AUTO: 22 PG
MCHC RBC AUTO-ENTMCNC: 30.9 MG/DL (ref 33–36)
MCV RBC AUTO: 71.1 FL (ref 80–94)
MONOCYTES # BLD AUTO: 0.68 X10(3)/MCL (ref 0.1–1.3)
MONOCYTES NFR BLD AUTO: 11.4 %
NEUTROPHILS # BLD AUTO: 4.13 X10(3)/MCL (ref 2.1–9.2)
NEUTROPHILS NFR BLD AUTO: 69.1 %
NRBC BLD AUTO-RTO: 0 % (ref 0–1)
PLATELET # BLD AUTO: 168 X10(3)/MCL (ref 140–371)
PMV BLD AUTO: 10.2 FL (ref 9.4–12.4)
POTASSIUM SERPL-SCNC: 3.9 MMOL/L (ref 3.5–5.1)
RBC # BLD AUTO: 4.78 X10(6)/MCL (ref 4.7–6.1)
SODIUM SERPL-SCNC: 139 MMOL/L (ref 136–145)
TROPONIN I SERPL-MCNC: 0.08 NG/ML (ref 0–0.04)
TROPONIN I SERPL-MCNC: 0.08 NG/ML (ref 0–0.04)
WBC # SPEC AUTO: 6 X10(3)/MCL (ref 4.5–11.5)

## 2022-12-31 PROCEDURE — 85025 COMPLETE CBC W/AUTO DIFF WBC: CPT | Performed by: INTERNAL MEDICINE

## 2022-12-31 PROCEDURE — 36415 COLL VENOUS BLD VENIPUNCTURE: CPT | Performed by: EMERGENCY MEDICINE

## 2022-12-31 PROCEDURE — 25000003 PHARM REV CODE 250: Performed by: NURSE PRACTITIONER

## 2022-12-31 PROCEDURE — 84484 ASSAY OF TROPONIN QUANT: CPT | Performed by: EMERGENCY MEDICINE

## 2022-12-31 PROCEDURE — 25000003 PHARM REV CODE 250: Performed by: INTERNAL MEDICINE

## 2022-12-31 PROCEDURE — 36415 COLL VENOUS BLD VENIPUNCTURE: CPT | Performed by: INTERNAL MEDICINE

## 2022-12-31 PROCEDURE — 80048 BASIC METABOLIC PNL TOTAL CA: CPT | Performed by: INTERNAL MEDICINE

## 2022-12-31 RX ORDER — METOPROLOL TARTRATE 50 MG/1
50 TABLET ORAL 2 TIMES DAILY
Qty: 60 TABLET | Refills: 0 | Status: SHIPPED | OUTPATIENT
Start: 2022-12-31 | End: 2023-01-09

## 2022-12-31 RX ORDER — FERROUS SULFATE 325(65) MG
325 TABLET, DELAYED RELEASE (ENTERIC COATED) ORAL DAILY
Qty: 30 TABLET | Refills: 0 | Status: SHIPPED | OUTPATIENT
Start: 2022-12-31 | End: 2023-01-30

## 2022-12-31 RX ADMIN — AMIODARONE HYDROCHLORIDE 200 MG: 200 TABLET ORAL at 10:12

## 2022-12-31 RX ADMIN — LEVETIRACETAM 500 MG: 500 TABLET, FILM COATED ORAL at 10:12

## 2022-12-31 RX ADMIN — ISOSORBIDE MONONITRATE 30 MG: 30 TABLET, EXTENDED RELEASE ORAL at 10:12

## 2022-12-31 RX ADMIN — OXCARBAZEPINE 300 MG: 300 TABLET, FILM COATED ORAL at 10:12

## 2022-12-31 RX ADMIN — APIXABAN 5 MG: 5 TABLET, FILM COATED ORAL at 10:12

## 2022-12-31 RX ADMIN — ASPIRIN 81 MG CHEWABLE TABLET 81 MG: 81 TABLET CHEWABLE at 10:12

## 2022-12-31 RX ADMIN — ATORVASTATIN CALCIUM 80 MG: 40 TABLET, FILM COATED ORAL at 10:12

## 2022-12-31 RX ADMIN — ARIPIPRAZOLE 10 MG: 5 TABLET ORAL at 10:12

## 2022-12-31 RX ADMIN — FUROSEMIDE 40 MG: 40 TABLET ORAL at 10:12

## 2022-12-31 RX ADMIN — METOPROLOL TARTRATE 50 MG: 50 TABLET, FILM COATED ORAL at 10:12

## 2022-12-31 RX ADMIN — FERROUS SULFATE TAB 325 MG (65 MG ELEMENTAL FE) 1 EACH: 325 (65 FE) TAB at 10:12

## 2022-12-31 RX ADMIN — FAMOTIDINE 20 MG: 20 TABLET ORAL at 10:12

## 2022-12-31 RX ADMIN — BENZTROPINE MESYLATE 1 MG: 1 TABLET ORAL at 10:12

## 2022-12-31 NOTE — PROGRESS NOTES
"[facility] - 6th Floor Medical Telemetry  Cardiology  Progress Note    Patient Name: Kendall Duff  MRN: [id number]  Admission Date: 12/28/2022  Hospital Length of Stay: 3 days  Code Status: Prior   Attending Physician: Jael Calloway MD   Primary Care Physician: Emilee Saunders NP  Expected Discharge Date: 12/31/2022  Principal Problem:Chronic combined systolic and diastolic congestive heart failure    Subjective:     Brief HPI:   Mr. Duff is a 57 year old male who is known to [facility], Dr. Wright.  He presented to the ER on 12/28/2022 with complaints of shortness of breath, right arm swelling.  When asked by ER physician why he is here he responded his face is swollen.  He then went immediately back to sleep.  He has presented to the ER and hospital multiple times and leaves AMA multiple times.  BNP was 2065.  Troponin max was 0.101 and trended down. [facility] has been consulted for NSTEMI and heart failure.    Hospital Course:   12.30.22: NAD noted. VSS. SR on tele. I&O not accurate, no weight from today. Denies CP/Palps, improved SOB and conversational dyspnea.  12.31.22: NAD. VSS. "I am ready to go home." Denies CP, SOB and Palps.      Previous Cardiac Diagnostics:   TTE (12.18.22):  Eccentric hypertrophy and severely decreased systolic function. The estimated ejection fraction is 20-25%.  Grade III left ventricular diastolic dysfunction.  Mild right ventricular enlargement with mildly reduced right ventricular systolic function.  Mild right atrial enlargement.  Moderate mitral regurgitation.  Severe tricuspid regurgitation.  The estimated PA systolic pressure is 33 mmHg.     Echocardiogram (10.28.22):  The left ventricle is moderately enlarged with mild eccentric hypertrophy and severely decreased systolic function.  The estimated ejection fraction is 20%.  There is severe left ventricular global hypokinesis.  Grade II left ventricular diastolic dysfunction.  Normal right ventricular size with " moderately reduced right ventricular systolic function.  Moderate mitral regurgitation.  Moderate tricuspid regurgitation.  There is mild pulmonary hypertension.  The estimated PA systolic pressure is 44 mmHg.  Elevated central venous pressure (15 mmHg).  Severe left atrial enlargement.     CABG x 4 (4/22):  LIMA-LAD, SVG-OM1, SVG-D1, SVG-PDA     Echocardiogram (6.16.22):  The left ventricle is normal in size w/ concentric hypertrophy and severely decreased systolic function.  The estimated EF is 25-30%.  There is severe left ventricular global hypokinesis.  Grade II left ventricular diastolic dysfunction.  Normal right ventricular size w/ mildly reduced right ventricular systolic function.  The estimated PA systolic pressure is 52 mmHg.  There is moderate pulmonary HTN.  Elevated CVP (15 mmHg).     RUE NIVA (5.10.22):  A deep vein thrombosis was identified in the right axillary and brachial veins. A superficial thrombosis was identified in the right basilic vein.     LHC (3.21.22):  100% LAD to 30-40% residual stenosis post PTCA.  Large diagonal system w/ severe stenosis.  CIRC - patent stent, OM1 patent, mild CIRC occluded  RCA - stents ISR 40-50%, distal 70-80%  EDP 12 EF 35-40% anterior HK     Review of Systems   Constitutional: Negative for chills, fever and malaise/fatigue.   Cardiovascular:  Negative for chest pain, dyspnea on exertion and palpitations.   Respiratory:  Negative for shortness of breath.    All other systems reviewed and are negative.    Objective:     Vital Signs (Most Recent):  Temp: 97.9 °F (36.6 °C) (12/31/22 1022)  Pulse: 64 (12/31/22 1022)  Resp: 18 (12/31/22 1022)  BP: (!) 145/92 (12/31/22 1022)  SpO2: 95 % (12/31/22 1022)   Vital Signs (24h Range):  Temp:  [97 °F (36.1 °C)-97.9 °F (36.6 °C)] 97.9 °F (36.6 °C)  Pulse:  [50-64] 64  Resp:  [18] 18  SpO2:  [87 %-97 %] 95 %  BP: (126-145)/(80-92) 145/92     Weight: 84.1 kg (185 lb 6.5 oz)  Body mass index is 29.04 kg/m².    SpO2: 95 %          Intake/Output Summary (Last 24 hours) at 12/31/2022 1037  Last data filed at 12/31/2022 0500  Gross per 24 hour   Intake 1620 ml   Output --   Net 1620 ml       Lines/Drains/Airways       None                 Significant Labs: CMP   Recent Labs   Lab 12/30/22  0802 12/31/22  0249    139   K 4.4 3.9   CO2 25 26   BUN 36.0* 36.6*   CREATININE 1.34* 1.32*   CALCIUM 8.8 9.0   ALBUMIN 3.4*  --    BILITOT 0.8  --    ALKPHOS 91  --    AST 36*  --    ALT 19  --       and CBC   Recent Labs   Lab 12/31/22  0249   WBC 6.0   HGB 10.5*   HCT 34.0*        Telemetry:  SR/SB    Physical Exam  Constitutional:       Appearance: Normal appearance.   HENT:      Head: Normocephalic.   Eyes:      Extraocular Movements: Extraocular movements intact.      Conjunctiva/sclera: Conjunctivae normal.   Cardiovascular:      Rate and Rhythm: Normal rate and regular rhythm.      Pulses: Normal pulses.      Heart sounds: Normal heart sounds.   Pulmonary:      Effort: Pulmonary effort is normal. No respiratory distress.      Breath sounds: Normal breath sounds.   Abdominal:      Palpations: Abdomen is soft.   Musculoskeletal:         General: Normal range of motion.      Cervical back: Neck supple.   Skin:     General: Skin is warm and dry.   Neurological:      Mental Status: He is alert and oriented to person, place, and time. Mental status is at baseline.   Psychiatric:         Mood and Affect: Mood normal.         Behavior: Behavior normal.     Current Inpatient Medications:    Current Facility-Administered Medications:     amiodarone tablet 200 mg, 200 mg, Oral, Daily, Cordell Robbins MD, 200 mg at 12/31/22 1001    apixaban tablet 5 mg, 5 mg, Oral, BID, Cordell Robbins MD, 5 mg at 12/31/22 1001    ARIPiprazole tablet 10 mg, 10 mg, Oral, Daily, Belinda Berumen MD, 10 mg at 12/31/22 1001    aspirin chewable tablet 81 mg, 81 mg, Oral, Daily, Cordell Robbins MD, 81 mg at 12/31/22 1001    atorvastatin tablet 80 mg, 80 mg, Oral, Daily,  Cordell Robbins MD, 80 mg at 12/31/22 1001    benztropine tablet 1 mg, 1 mg, Oral, BID, Cordell Robbins MD, 1 mg at 12/31/22 1001    famotidine tablet 20 mg, 20 mg, Oral, BID, Cordell Robbins MD, 20 mg at 12/31/22 1001    ferric gluconate (FERRLECIT) 125 mg in sodium chloride 0.9% 100 mL IVPB, 125 mg, Intravenous, Once, Jael Calloway MD    ferrous sulfate tablet 1 each, 1 tablet, Oral, Daily, Jael Calloway MD, 1 each at 12/31/22 1001    furosemide tablet 40 mg, 40 mg, Oral, Daily, Chris Stern [facility], 40 mg at 12/31/22 1001    isosorbide mononitrate 24 hr tablet 30 mg, 30 mg, Oral, Daily, Cordell Robbins MD, 30 mg at 12/31/22 1001    levETIRAcetam tablet 500 mg, 500 mg, Oral, BID, Cordell Robbins MD, 500 mg at 12/31/22 1001    metoprolol tartrate (LOPRESSOR) tablet 50 mg, 50 mg, Oral, BID, Jael Calloway MD, 50 mg at 12/31/22 1001    OXcarbazepine tablet 300 mg, 300 mg, Oral, TID, Cordell Robbins MD, 300 mg at 12/31/22 1001    traZODone tablet 100 mg, 100 mg, Oral, QHS, Cordell Robbins MD, 100 mg at 12/30/22 2117    Assessment:   Acute on Chronic Combined Systolic/Diastolic HF    - Grade III DD    - EF 20-25% (ECHO 12.18.22)  Elevated Troponin without Clinical Significance  PAF/PAFL (Recent New Diagnosis)- Now Sinus [place]o/SR    - [id number]: 5 (LVSD/HTN/TE/NSTEMI)    - On Eliquis  ANA   Chronic Anemia - Stable  ICMO     - With Life Vest   VHD    - Moderate MR and Severe TR  Hypertension  Pulmonary Hypertension  CAD/CABG (April 2022)    - LIMA-LAD, SVG-OM1, SVG-D1, SVG-PDA  History of RUE DVT (5.10.22)    - A deep vein thrombosis was identified in the right axillary and brachial veins. A superficial thrombosis was identified in the right basilic vein.  Schizoaffective Disorder  Hx of Hep C  Hx of Cocaine Abuse   Nicotine Dependence (Tobacco Use)    Plan:   Continue Medications   Continue GDMT for CAD/Heart Failure  Ok to Transition to Oral Lasix and D/C Home per Primary Team  F/U with [facility] in 1-2 Weeks with   Adriana  We will be available as needed    Jame Woods, ANP  Cardiology  [facility] - 6th Floor Medical Telemetry  12/31/2022

## 2022-12-31 NOTE — PLAN OF CARE
12/31/22 0940   Final Note   Assessment Type Final Discharge Note   Anticipated Discharge Disposition Home-Health   Hospital Resources/Appts/Education Provided Post-Acute resouces added to AVS   Post-Acute Status   Post-Acute Authorization Home Health   Home Health Status Referrals Sent  (Assigned to [facility] per [facility] insurance)   Discharge Delays None known at this time

## 2022-12-31 NOTE — PLAN OF CARE
Problem: Violence Risk or Actual  Goal: Anger and Impulse Control  Outcome: Ongoing, Progressing     Problem: Adult Inpatient Plan of Care  Goal: Plan of Care Review  Outcome: Ongoing, Progressing  Flowsheets (Taken 12/30/2022 2317)  Plan of Care Reviewed With: patient  Goal: Patient-Specific Goal (Individualized)  Outcome: Ongoing, Progressing  Goal: Absence of Hospital-Acquired Illness or Injury  Outcome: Ongoing, Progressing  Goal: Optimal Comfort and Wellbeing  Outcome: Ongoing, Progressing  Goal: Readiness for Transition of Care  Outcome: Ongoing, Progressing     Problem: Adjustment to Illness (Delirium)  Goal: Optimal Coping  Outcome: Ongoing, Progressing     Problem: Altered Behavior (Delirium)  Goal: Improved Behavioral Control  Outcome: Ongoing, Progressing     Problem: Attention and Thought Clarity Impairment (Delirium)  Goal: Improved Attention and Thought Clarity  Outcome: Ongoing, Progressing     Problem: Sleep Disturbance (Delirium)  Goal: Improved Sleep  Outcome: Ongoing, Progressing  Intervention: Promote Sleep  Flowsheets (Taken 12/30/2022 2317)  Sleep/Rest Enhancement: noise level reduced     Problem: Infection  Goal: Absence of Infection Signs and Symptoms  Outcome: Ongoing, Progressing     Problem: Impaired Wound Healing  Goal: Optimal Wound Healing  Outcome: Ongoing, Progressing  Intervention: Promote Wound Healing  Flowsheets (Taken 12/30/2022 2317)  Sleep/Rest Enhancement: noise level reduced  Activity Management: Ambulated in [place] -      Problem: Fluid Imbalance (Heart Failure)  Goal: Fluid Balance  Outcome: Ongoing, Progressing     Problem: Respiratory Compromise (Heart Failure)  Goal: Effective Oxygenation and Ventilation  Outcome: Ongoing, Progressing  Intervention: Promote Airway Secretion Clearance  Flowsheets (Taken 12/30/2022 2317)  Cough And Deep Breathing: done independently per patient

## 2023-01-01 NOTE — DISCHARGE SUMMARY
[facility] Hospital Medicine Discharge Summary    Admit Date: 12/28/2022  Discharge Date and Time: 12/31/20228:21 PM  Admitting Physician:  Team  Discharging Physician: Jael Calloway MD.  Primary Care Physician: Emilee Saunders NP  Consults: Cardiology    Discharge Diagnoses:  Acute on Chronic Combined Systolic/Diastolic HF    - Grade III DD    - EF 20-25% (ECHO 12.18.22)  Elevated Troponin without Clinical Significance  PAF/PAFL (Recent New Diagnosis)- Now Sinus [place]o/SR    - [id number]: 5 (LVSD/HTN/TE/NSTEMI)    - On Eliquis  ANA   Chronic Anemia - Stable  ICMO     - With Life Vest   VHD    - Moderate MR and Severe TR  Hypertension  Pulmonary Hypertension  CAD/CABG (April 2022)    - LIMA-LAD, SVG-OM1, SVG-D1, SVG-PDA  History of RUE DVT (5.10.22)    - A deep vein thrombosis was identified in the right axillary and brachial veins. A superficial thrombosis was identified in the right basilic vein.  Schizoaffective Disorder  Hx of Hep C  Hx of Cocaine Abuse   Nicotine Dependence (Tobacco Use)    Hospital Course:   57 y.o. male with a history that includes cardiomyopathy with an EF20-25%, and frequent ED visits and hospitalizations, presented to back to ED with SOB.  Patient recently hospitalized at [facility], discharged on 12/25, for CHF exacerbation.  Patient reports he was brought to ED by daughter because swelling in his face.  Workup has noted evidence of volume overload.  ABG noted mild respiratory acidosis, pH 7.31, pCO2 63.  Patient placed on BiPAP at 10/5, 40% FiO2.     Cardiology recs appreciated, IV Lasix in house.Cont GDMT for CAD/HF.Cont Lifevest.  Decreased the strength of beta blocker for Bradycardia .Smoking cessation counseling was performed.F/U with [facility] in 1-2 Weeks with Dr. Wright      Pt was seen and examined on the day of discharge  Vitals:  VITAL SIGNS: 24 HRS MIN & MAX LAST   Temp  Min: 97.8 °F (36.6 °C)  Max: 97.9 °F (36.6 °C) 97.9 °F (36.6 °C)   BP  Min:  128/86  Max: 145/92 (!) 145/92     Pulse  Min: 58  Max: 64  64   Resp  Min: 18  Max: 18 18   SpO2  Min: 87 %  Max: 95 % 95 %       Physical Exam:  General: In no acute distress, afebrile  Chest: Crackles  Heart: RRR, +S1, S2, no appreciable murmur  Abdomen: Soft, nontender, BS +  MSK: Warm, no lower extremity edema, no clubbing or cyanosis  Neurologic: Alert and oriented x4, Cranial nerve II-XII intact, Strength 5/5 in all 4 extremities    Procedures Performed: No admission procedures for hospital encounter.     Significant Diagnostic Studies: See Full reports for all details    Recent Labs   Lab 12/28/22  0026 12/31/22  0249   WBC 5.3 6.0   RBC 4.35* 4.78   HGB 9.8* 10.5*   HCT 32.3* 34.0*   MCV 74.3* 71.1*   MCH 22.5 22.0   MCHC 30.3* 30.9*   RDW 19.6* 19.7*    168   MPV 10.1 10.2       Recent Labs   Lab 12/25/22  0345 12/28/22  0026 12/28/22  0213 12/28/22  0656 12/28/22  0816 12/29/22  0207 12/30/22  0802 12/31/22  0249    140  --   --  138  --  137 139   K 3.7 4.3  --   --  4.5  --  4.4 3.9   CO2 28 31*  --   --  27  --  25 26   BUN 16.8 37.2*  --   --  37.7*  --  36.0* 36.6*   CREATININE 1.01 1.79*  --   --  1.65*  --  1.34* 1.32*   CALCIUM 8.4 8.4  --   --  8.7  --  8.8 9.0   PH  --   --  7.315* 7.398  --   --   --   --    MG 1.80 2.10  --   --   --  2.10  --   --    ALBUMIN  --  3.1*  --   --   --   --  3.4*  --    ALKPHOS  --  118  --   --   --   --  91  --    ALT  --  19  --   --   --   --  19  --    AST  --  31  --   --   --   --  36*  --    BILITOT  --  0.4  --   --   --   --  0.8  --         Microbiology Results (last 7 days)       ** No results found for the last 168 hours. **             X-Ray Chest AP Portable  Narrative: EXAMINATION:  XR CHEST AP PORTABLE    CLINICAL HISTORY:  Chest Pain;    TECHNIQUE:  Single view of the chest    COMPARISON:  12/21/2022    FINDINGS:  No focal opacification, pleural effusion, or pneumothorax.    The cardiomediastinal silhouette remains enlarged.    No  acute osseous abnormality.  Impression: No acute cardiopulmonary process.    Electronically signed by: Rex Denson  Date:    12/28/2022  Time:    06:36  CT Head Without Contrast  Narrative: EXAMINATION:  CT HEAD WITHOUT CONTRAST    CLINICAL HISTORY:  Mental status change, unknown cause;    TECHNIQUE:  Low dose axial images were obtained through the head.  Coronal and sagittal reformations were also performed. Contrast was not administered.    Automatic exposure control was utilized to reduce the patient's radiation dose.    DLP= 929    COMPARISON:  MR dated 07/20/2022    FINDINGS:  Hemorrhage: No acute intracranial hemorrhage is seen.    CSF spaces: The ventricles, sulci and basal cisterns all appear somewhat prominent suggesting an element of global cerebral atrophy.    Brain parenchyma: There is preservation of the grey white junction throughout. No acute infarct is identified.    Cerebellum: Unremarkable.    Vascular: Unremarkable venous sinuses.    Sella and skull base: The sella appears to be within normal limits for age.    Cerebellopontine angles: Within normal limits.    Herniation: None.    Intracranial calcifications: Incidental note is made of bilateral choroid plexus calcification. Incidental note is made of some pineal region calcification.    Calvarium: No acute linear or depressed skull fracture is seen.    Maxillofacial Structures:    Paranasal sinuses: The visualized paranasal sinuses appear clear with no mucoperiosteal thickening or air fluid levels identified.    Orbits: The orbits appear unremarkable.    Zygomatic arches: The zygomatic arches are intact and unremarkable.    Temporal bones and mastoids: The temporal bones and mastoids appear unremarkable.    TMJ: The mandibular condyles appear normally placed with respect to the mandibular fossa.    Nasal Bones: No displaced nasal bone fracture is seen.  Impression: Impression:    1. No acute intracranial process identified. Details and other  findings as noted above.    Electronically signed by: Rex Denson  Date:    12/28/2022  Time:    06:23         Medication List        START taking these medications      ferrous sulfate 325 (65 FE) MG EC tablet  Take 1 tablet (325 mg total) by mouth once daily.     metoprolol tartrate 50 MG tablet  Commonly known as: LOPRESSOR  Take 1 tablet (50 mg total) by mouth 2 (two) times daily.  Replaces: metoprolol tartrate 75 mg Tab            CONTINUE taking these medications      amiodarone 200 MG Tab  Commonly known as: PACERONE  Take 1 tablet (200 mg total) by mouth once daily.     apixaban 5 mg Tab  Commonly known as: ELIQUIS  Take 1 tablet (5 mg total) by mouth 2 (two) times daily.     ARIPiprazole 10 MG Tab  Commonly known as: ABILIFY  Take 1 tablet (10 mg total) by mouth once daily.     aspirin 81 MG Chew  Chew and swallow 1 tablet (81 mg total) by mouth once daily.     atorvastatin 80 MG tablet  Commonly known as: LIPITOR  Take 1 tablet (80 mg total) by mouth once daily.     benztropine 1 MG tablet  Commonly known as: COGENTIN     famotidine 20 MG tablet  Commonly known as: PEPCID  Take 1 tablet (20 mg total) by mouth 2 (two) times daily.     furosemide 40 MG tablet  Commonly known as: LASIX  Take 1 tablet (40 mg total) by mouth once daily.     isosorbide mononitrate 30 MG 24 hr tablet  Commonly known as: IMDUR  Take 1 tablet (30 mg total) by mouth once daily.     levETIRAcetam 500 MG Tab  Commonly known as: KEPPRA  Take 1 tablet (500 mg total) by mouth 2 (two) times daily.     OXcarbazepine 300 MG Tab  Commonly known as: TRILEPTAL     sacubitriL-valsartan 24-26 mg per tablet  Commonly known as: ENTRESTO  Take 1 tablet by mouth 2 (two) times daily.     traZODone 100 MG tablet  Commonly known as: DESYREL  Take 1 tablet (100 mg total) by mouth every evening.            STOP taking these medications      metoprolol tartrate 75 mg Tab  Replaced by: metoprolol tartrate 50 MG tablet               Where to Get Your  Medications        These medications were sent to [id number] - [place], [place] - [address] AT [facility]  [address], [place] [address]      Phone: [phone]   ferrous sulfate 325 (65 FE) MG EC tablet  metoprolol tartrate 50 MG tablet          Explained in detail to the patient about the discharge plan, medications, and follow-up visits. Pt understands and agrees with the treatment plan  Discharge Disposition: Home or Self Care   Discharged Condition: stable  Diet-    Medications Per DC med rec  Activities as tolerated   Contact information for follow-up providers       LIDA BROTHERS MD Follow up.    Specialty: Cardiology  Why: We will call you with your appointment date and time.  Contact information:  [facility] [address]  [place] [id number] [phone]               Emilee Callais-Rusich, NP. Go on 1/5/2023.    Specialty: Family Medicine  Why: @ 8:45am  Contact information:  [address] [place] [address]  [phone]                       Contact information for after-discharge care       Home Medical Care       [facility] .    Service: Home Health Services  Contact information:  [address] [address]  [phone]                                 For further questions contact hospitalist office    Discharge time 33 minutes    For worsening symptoms, chest pain, shortness of breath, increased abdominal pain, high grade fever, stroke or stroke like symptoms, immediately go to the nearest Emergency Room or call 911 as soon as possible.      Jael Chambers M.D, on 12/31/2022. at 8:21 PM.

## 2023-01-03 ENCOUNTER — HOSPITAL ENCOUNTER (INPATIENT)
Facility: HOSPITAL | Age: 58
LOS: 6 days | Discharge: HOME HEALTH | DRG: 280 | End: 2023-01-09
Attending: EMERGENCY MEDICINE | Admitting: INTERNAL MEDICINE
Payer: MEDICAID

## 2023-01-03 DIAGNOSIS — R07.9 CHEST PAIN: ICD-10-CM

## 2023-01-03 DIAGNOSIS — Z13.9 RISK AND FUNCTIONAL ASSESSMENT: ICD-10-CM

## 2023-01-03 DIAGNOSIS — R10.9 ABDOMINAL PAIN: ICD-10-CM

## 2023-01-03 DIAGNOSIS — R00.0 TACHYCARDIA: ICD-10-CM

## 2023-01-03 DIAGNOSIS — I50.9 CHF (CONGESTIVE HEART FAILURE): ICD-10-CM

## 2023-01-03 DIAGNOSIS — R55 SYNCOPE: ICD-10-CM

## 2023-01-03 DIAGNOSIS — R06.00 DYSPNEA: ICD-10-CM

## 2023-01-03 DIAGNOSIS — I49.9 ABNORMAL HEART RHYTHM: ICD-10-CM

## 2023-01-03 LAB
ALBUMIN SERPL-MCNC: 3.7 G/DL (ref 3.5–5)
ALBUMIN/GLOB SERPL: 1 RATIO (ref 1.1–2)
ALP SERPL-CCNC: 125 UNIT/L (ref 40–150)
ALT SERPL-CCNC: 20 UNIT/L (ref 0–55)
AST SERPL-CCNC: 35 UNIT/L (ref 5–34)
BASOPHILS # BLD AUTO: 0.03 X10(3)/MCL (ref 0–0.2)
BASOPHILS NFR BLD AUTO: 0.4 %
BILIRUBIN DIRECT+TOT PNL SERPL-MCNC: 1.1 MG/DL
BNP BLD-MCNC: 3361.6 PG/ML
BUN SERPL-MCNC: 42.3 MG/DL (ref 8.4–25.7)
CALCIUM SERPL-MCNC: 9 MG/DL (ref 8.4–10.2)
CHLORIDE SERPL-SCNC: 103 MMOL/L (ref 98–107)
CO2 SERPL-SCNC: 27 MMOL/L (ref 22–29)
CREAT SERPL-MCNC: 1.94 MG/DL (ref 0.73–1.18)
EOSINOPHIL # BLD AUTO: 0.02 X10(3)/MCL (ref 0–0.9)
EOSINOPHIL NFR BLD AUTO: 0.3 %
ERYTHROCYTE [DISTWIDTH] IN BLOOD BY AUTOMATED COUNT: 20.5 % (ref 11.6–14.4)
FLUAV AG UPPER RESP QL IA.RAPID: NOT DETECTED
FLUBV AG UPPER RESP QL IA.RAPID: NOT DETECTED
GFR SERPLBLD CREATININE-BSD FMLA CKD-EPI: 39 MLS/MIN/1.73/M2
GLOBULIN SER-MCNC: 3.8 GM/DL (ref 2.4–3.5)
GLUCOSE SERPL-MCNC: 118 MG/DL (ref 74–100)
HCT VFR BLD AUTO: 34.2 % (ref 42–52)
HGB BLD-MCNC: 10.6 GM/DL (ref 14–18)
IMM GRANULOCYTES # BLD AUTO: 0.02 X10(3)/MCL (ref 0–0.04)
IMM GRANULOCYTES NFR BLD AUTO: 0.3 %
LYMPHOCYTES # BLD AUTO: 0.56 X10(3)/MCL (ref 0.6–4.6)
LYMPHOCYTES NFR BLD AUTO: 7.9 %
MCH RBC QN AUTO: 22.5 PG
MCHC RBC AUTO-ENTMCNC: 31 MG/DL (ref 33–36)
MCV RBC AUTO: 72.6 FL (ref 80–94)
MONOCYTES # BLD AUTO: 0.33 X10(3)/MCL (ref 0.1–1.3)
MONOCYTES NFR BLD AUTO: 4.7 %
NEUTROPHILS # BLD AUTO: 6.12 X10(3)/MCL (ref 2.1–9.2)
NEUTROPHILS NFR BLD AUTO: 86.4 %
NRBC BLD AUTO-RTO: 0 % (ref 0–1)
PLATELET # BLD AUTO: 117 X10(3)/MCL (ref 140–371)
PMV BLD AUTO: ABNORMAL FL
POTASSIUM SERPL-SCNC: 4.4 MMOL/L (ref 3.5–5.1)
PROT SERPL-MCNC: 7.5 GM/DL (ref 6.4–8.3)
RBC # BLD AUTO: 4.71 X10(6)/MCL (ref 4.7–6.1)
SARS-COV-2 RNA RESP QL NAA+PROBE: NOT DETECTED
SODIUM SERPL-SCNC: 139 MMOL/L (ref 136–145)
TROPONIN I SERPL-MCNC: 0.07 NG/ML (ref 0–0.04)
TROPONIN I SERPL-MCNC: 0.09 NG/ML (ref 0–0.04)
WBC # SPEC AUTO: 7.1 X10(3)/MCL (ref 4.5–11.5)

## 2023-01-03 PROCEDURE — 0240U COVID/FLU A&B PCR: CPT | Performed by: EMERGENCY MEDICINE

## 2023-01-03 PROCEDURE — 96365 THER/PROPH/DIAG IV INF INIT: CPT

## 2023-01-03 PROCEDURE — 99291 CRITICAL CARE FIRST HOUR: CPT | Mod: 25

## 2023-01-03 PROCEDURE — 63600175 PHARM REV CODE 636 W HCPCS: Performed by: EMERGENCY MEDICINE

## 2023-01-03 PROCEDURE — 93010 EKG 12-LEAD: ICD-10-PCS | Mod: ,,, | Performed by: INTERNAL MEDICINE

## 2023-01-03 PROCEDURE — 25000003 PHARM REV CODE 250: Performed by: NURSE PRACTITIONER

## 2023-01-03 PROCEDURE — 25000242 PHARM REV CODE 250 ALT 637 W/ HCPCS: Performed by: EMERGENCY MEDICINE

## 2023-01-03 PROCEDURE — 93005 ELECTROCARDIOGRAM TRACING: CPT

## 2023-01-03 PROCEDURE — 85025 COMPLETE CBC W/AUTO DIFF WBC: CPT | Performed by: EMERGENCY MEDICINE

## 2023-01-03 PROCEDURE — 84484 ASSAY OF TROPONIN QUANT: CPT | Performed by: EMERGENCY MEDICINE

## 2023-01-03 PROCEDURE — 27000221 HC OXYGEN, UP TO 24 HOURS

## 2023-01-03 PROCEDURE — 94761 N-INVAS EAR/PLS OXIMETRY MLT: CPT

## 2023-01-03 PROCEDURE — 99900031 HC PATIENT EDUCATION (STAT)

## 2023-01-03 PROCEDURE — 96375 TX/PRO/DX INJ NEW DRUG ADDON: CPT

## 2023-01-03 PROCEDURE — 84484 ASSAY OF TROPONIN QUANT: CPT | Performed by: NURSE PRACTITIONER

## 2023-01-03 PROCEDURE — 83880 ASSAY OF NATRIURETIC PEPTIDE: CPT | Performed by: EMERGENCY MEDICINE

## 2023-01-03 PROCEDURE — 96366 THER/PROPH/DIAG IV INF ADDON: CPT

## 2023-01-03 PROCEDURE — 80053 COMPREHEN METABOLIC PANEL: CPT | Performed by: EMERGENCY MEDICINE

## 2023-01-03 PROCEDURE — 11000001 HC ACUTE MED/SURG PRIVATE ROOM

## 2023-01-03 PROCEDURE — 93010 ELECTROCARDIOGRAM REPORT: CPT | Mod: ,,, | Performed by: INTERNAL MEDICINE

## 2023-01-03 PROCEDURE — 94644 CONT INHLJ TX 1ST HOUR: CPT

## 2023-01-03 RX ORDER — NALOXONE HCL 0.4 MG/ML
0.02 VIAL (ML) INJECTION
Status: DISCONTINUED | OUTPATIENT
Start: 2023-01-03 | End: 2023-01-09 | Stop reason: HOSPADM

## 2023-01-03 RX ORDER — FUROSEMIDE 10 MG/ML
40 INJECTION INTRAMUSCULAR; INTRAVENOUS
Status: DISCONTINUED | OUTPATIENT
Start: 2023-01-04 | End: 2023-01-03

## 2023-01-03 RX ORDER — ARIPIPRAZOLE 5 MG/1
10 TABLET ORAL DAILY
Status: DISCONTINUED | OUTPATIENT
Start: 2023-01-04 | End: 2023-01-09 | Stop reason: HOSPADM

## 2023-01-03 RX ORDER — ATORVASTATIN CALCIUM 40 MG/1
80 TABLET, FILM COATED ORAL DAILY
Status: DISCONTINUED | OUTPATIENT
Start: 2023-01-04 | End: 2023-01-09 | Stop reason: HOSPADM

## 2023-01-03 RX ORDER — BENZTROPINE MESYLATE 1 MG/1
1 TABLET ORAL 2 TIMES DAILY
Status: DISCONTINUED | OUTPATIENT
Start: 2023-01-03 | End: 2023-01-09 | Stop reason: HOSPADM

## 2023-01-03 RX ORDER — OXCARBAZEPINE 300 MG/1
300 TABLET, FILM COATED ORAL 3 TIMES DAILY
Status: DISCONTINUED | OUTPATIENT
Start: 2023-01-03 | End: 2023-01-09 | Stop reason: HOSPADM

## 2023-01-03 RX ORDER — LANOLIN ALCOHOL/MO/W.PET/CERES
1 CREAM (GRAM) TOPICAL DAILY
Status: DISCONTINUED | OUTPATIENT
Start: 2023-01-04 | End: 2023-01-09 | Stop reason: HOSPADM

## 2023-01-03 RX ORDER — ONDANSETRON 2 MG/ML
4 INJECTION INTRAMUSCULAR; INTRAVENOUS EVERY 6 HOURS PRN
Status: DISCONTINUED | OUTPATIENT
Start: 2023-01-03 | End: 2023-01-09 | Stop reason: HOSPADM

## 2023-01-03 RX ORDER — ISOSORBIDE MONONITRATE 30 MG/1
30 TABLET, EXTENDED RELEASE ORAL DAILY
Status: DISCONTINUED | OUTPATIENT
Start: 2023-01-04 | End: 2023-01-09 | Stop reason: HOSPADM

## 2023-01-03 RX ORDER — FAMOTIDINE 20 MG/1
20 TABLET, FILM COATED ORAL 2 TIMES DAILY
Status: DISCONTINUED | OUTPATIENT
Start: 2023-01-03 | End: 2023-01-09 | Stop reason: HOSPADM

## 2023-01-03 RX ORDER — ACETAMINOPHEN 325 MG/1
650 TABLET ORAL EVERY 4 HOURS PRN
Status: DISCONTINUED | OUTPATIENT
Start: 2023-01-03 | End: 2023-01-09 | Stop reason: HOSPADM

## 2023-01-03 RX ORDER — LEVETIRACETAM 500 MG/1
500 TABLET ORAL 2 TIMES DAILY
Status: DISCONTINUED | OUTPATIENT
Start: 2023-01-03 | End: 2023-01-09 | Stop reason: HOSPADM

## 2023-01-03 RX ORDER — SODIUM CHLORIDE 0.9 % (FLUSH) 0.9 %
10 SYRINGE (ML) INJECTION EVERY 12 HOURS PRN
Status: DISCONTINUED | OUTPATIENT
Start: 2023-01-03 | End: 2023-01-09 | Stop reason: HOSPADM

## 2023-01-03 RX ORDER — AMIODARONE HYDROCHLORIDE 200 MG/1
200 TABLET ORAL DAILY
Status: DISCONTINUED | OUTPATIENT
Start: 2023-01-04 | End: 2023-01-09 | Stop reason: HOSPADM

## 2023-01-03 RX ORDER — METOPROLOL TARTRATE 50 MG/1
50 TABLET ORAL 2 TIMES DAILY
Status: DISCONTINUED | OUTPATIENT
Start: 2023-01-03 | End: 2023-01-03

## 2023-01-03 RX ORDER — FUROSEMIDE 10 MG/ML
40 INJECTION INTRAMUSCULAR; INTRAVENOUS
Status: COMPLETED | OUTPATIENT
Start: 2023-01-03 | End: 2023-01-03

## 2023-01-03 RX ORDER — IPRATROPIUM BROMIDE 0.5 MG/2.5ML
0.5 SOLUTION RESPIRATORY (INHALATION) ONCE
Status: COMPLETED | OUTPATIENT
Start: 2023-01-03 | End: 2023-01-03

## 2023-01-03 RX ORDER — ENOXAPARIN SODIUM 100 MG/ML
30 INJECTION SUBCUTANEOUS EVERY 24 HOURS
Status: DISCONTINUED | OUTPATIENT
Start: 2023-01-03 | End: 2023-01-03

## 2023-01-03 RX ORDER — MAGNESIUM SULFATE HEPTAHYDRATE 40 MG/ML
2 INJECTION, SOLUTION INTRAVENOUS
Status: COMPLETED | OUTPATIENT
Start: 2023-01-03 | End: 2023-01-03

## 2023-01-03 RX ORDER — IBUPROFEN 200 MG
16 TABLET ORAL
Status: DISCONTINUED | OUTPATIENT
Start: 2023-01-03 | End: 2023-01-09 | Stop reason: HOSPADM

## 2023-01-03 RX ORDER — TALC
6 POWDER (GRAM) TOPICAL NIGHTLY PRN
Status: DISCONTINUED | OUTPATIENT
Start: 2023-01-03 | End: 2023-01-09 | Stop reason: HOSPADM

## 2023-01-03 RX ORDER — METHYLPREDNISOLONE SOD SUCC 125 MG
125 VIAL (EA) INJECTION
Status: COMPLETED | OUTPATIENT
Start: 2023-01-03 | End: 2023-01-03

## 2023-01-03 RX ORDER — FUROSEMIDE 10 MG/ML
40 INJECTION INTRAMUSCULAR; INTRAVENOUS DAILY
Status: DISCONTINUED | OUTPATIENT
Start: 2023-01-04 | End: 2023-01-05

## 2023-01-03 RX ORDER — SIMETHICONE 80 MG
1 TABLET,CHEWABLE ORAL 4 TIMES DAILY PRN
Status: DISCONTINUED | OUTPATIENT
Start: 2023-01-03 | End: 2023-01-09 | Stop reason: HOSPADM

## 2023-01-03 RX ORDER — TRAZODONE HYDROCHLORIDE 100 MG/1
200 TABLET ORAL NIGHTLY
Status: DISCONTINUED | OUTPATIENT
Start: 2023-01-03 | End: 2023-01-09 | Stop reason: HOSPADM

## 2023-01-03 RX ORDER — IBUPROFEN 200 MG
24 TABLET ORAL
Status: DISCONTINUED | OUTPATIENT
Start: 2023-01-03 | End: 2023-01-09 | Stop reason: HOSPADM

## 2023-01-03 RX ORDER — ALBUTEROL SULFATE 0.83 MG/ML
10 SOLUTION RESPIRATORY (INHALATION)
Status: COMPLETED | OUTPATIENT
Start: 2023-01-03 | End: 2023-01-03

## 2023-01-03 RX ORDER — METOPROLOL TARTRATE 25 MG/1
12.5 TABLET ORAL 2 TIMES DAILY
Status: DISCONTINUED | OUTPATIENT
Start: 2023-01-04 | End: 2023-01-09 | Stop reason: HOSPADM

## 2023-01-03 RX ORDER — GLUCAGON 1 MG
1 KIT INJECTION
Status: DISCONTINUED | OUTPATIENT
Start: 2023-01-03 | End: 2023-01-09 | Stop reason: HOSPADM

## 2023-01-03 RX ORDER — NAPROXEN SODIUM 220 MG/1
81 TABLET, FILM COATED ORAL DAILY
Status: DISCONTINUED | OUTPATIENT
Start: 2023-01-04 | End: 2023-01-09 | Stop reason: HOSPADM

## 2023-01-03 RX ADMIN — ALBUTEROL SULFATE 10 MG: 2.5 SOLUTION RESPIRATORY (INHALATION) at 11:01

## 2023-01-03 RX ADMIN — FUROSEMIDE 40 MG: 10 INJECTION, SOLUTION INTRAMUSCULAR; INTRAVENOUS at 02:01

## 2023-01-03 RX ADMIN — Medication 6 MG: at 09:01

## 2023-01-03 RX ADMIN — METHYLPREDNISOLONE SODIUM SUCCINATE 125 MG: 125 INJECTION, POWDER, FOR SOLUTION INTRAMUSCULAR; INTRAVENOUS at 11:01

## 2023-01-03 RX ADMIN — OXCARBAZEPINE 300 MG: 300 TABLET, FILM COATED ORAL at 09:01

## 2023-01-03 RX ADMIN — LEVETIRACETAM 500 MG: 500 TABLET, FILM COATED ORAL at 09:01

## 2023-01-03 RX ADMIN — BENZTROPINE MESYLATE 1 MG: 1 TABLET ORAL at 09:01

## 2023-01-03 RX ADMIN — TRAZODONE HYDROCHLORIDE 200 MG: 100 TABLET ORAL at 09:01

## 2023-01-03 RX ADMIN — APIXABAN 5 MG: 5 TABLET, FILM COATED ORAL at 09:01

## 2023-01-03 RX ADMIN — MAGNESIUM SULFATE HEPTAHYDRATE 2 G: 40 INJECTION, SOLUTION INTRAVENOUS at 11:01

## 2023-01-03 RX ADMIN — FAMOTIDINE 20 MG: 20 TABLET, FILM COATED ORAL at 09:01

## 2023-01-03 RX ADMIN — IPRATROPIUM BROMIDE 0.5 MG: 0.5 SOLUTION RESPIRATORY (INHALATION) at 12:01

## 2023-01-03 NOTE — ED PROVIDER NOTES
Encounter Date: 1/3/2023       History     Chief Complaint   Patient presents with    Chest Pain     Pt c/o chest pain with SOB that started this AM.  Pt receiving duo neb on arrival.  88% on RA initially after neb 94 on RA. Pt has life vest on.      Patient with a history of severe COPD and CHF who states that he does not have a doctor is not taking his medicines as directed recent admission to the hospital patient presents with 1 day history of shortness of breath that started this morning states he is wheezing.  No fevers no chills patient with very slight chest pain that he describes as dull and achy.    The history is provided by the patient.   Review of patient's allergies indicates:   Allergen Reactions    Depakote [divalproex]     Divalproex sodium Other (See Comments)    Lithium     Lithium analogues     Quetiapine Other (See Comments)     Pt states had seizures     Past Medical History:   Diagnosis Date    Bipolar disorder, unspecified     Chronic hepatitis C     History of psychiatric hospitalization     Hx of psychiatric care     Hypertension     Bessie     Obesity, unspecified     Psychiatric problem     Schizoaffective disorder, bipolar type     Seizures     Stroke     Substance abuse     Therapy      Past Surgical History:   Procedure Laterality Date    CORONARY STENT PLACEMENT  08/14/2015    DEBRIDEMENT  04/17/2022    LEFT HEART CATHETERIZATION Left 08/13/2015    REPEAT CLOSURE OF STERNAL INCISION N/A 5/11/2022    Procedure: CLOSURE, STERNAL INCISION, REPEAT;  Surgeon: Adolfo Weiss MD;  Location: [facility];  Service: Plastics;  Laterality: N/A;  STERNAL WOUND DEBRIDEMENT AND RECONSTRUCTION // MULTIPLE MUSCLE FLAPS // REQ 1400     Family History   Problem Relation Age of Onset    Cancer Mother     Liver disease Father      Social History     Tobacco Use    Smoking status: Every Day     Types: Cigarettes    Smokeless tobacco: Never   Substance Use Topics    Alcohol use: Never    Drug use: Not Currently      Types: Cocaine     Review of Systems   Constitutional:  Negative for fever.   HENT:  Negative for sore throat.    Respiratory:  Positive for cough and wheezing. Negative for shortness of breath.    Cardiovascular:  Positive for chest pain.   Gastrointestinal:  Negative for nausea.   Genitourinary:  Negative for dysuria.   Musculoskeletal:  Negative for back pain.   Skin:  Negative for rash.   Neurological:  Negative for weakness.   Hematological:  Does not bruise/bleed easily.     Physical Exam     Initial Vitals [01/03/23 1112]   BP Pulse Resp Temp SpO2   (!) 120/90 (!) 42 14 98.1 °F (36.7 °C) (!) 94 %      MAP       --         Physical Exam    Nursing note and vitals reviewed.  Constitutional: He appears well-developed and well-nourished. He is diaphoretic. He appears distressed.   HENT:   Head: Normocephalic and atraumatic.   Eyes: EOM are normal. Pupils are equal, round, and reactive to light.   Neck:   Normal range of motion.  Cardiovascular:  Normal rate, regular rhythm, normal heart sounds and intact distal pulses.           Pulmonary/Chest: He is in respiratory distress. He has wheezes. He has rhonchi. He has rales.   Abdominal: Abdomen is soft. Bowel sounds are normal.   Musculoskeletal:         General: Normal range of motion.      Cervical back: Normal range of motion.     Neurological: He is alert and oriented to person, place, and time. He has normal strength. GCS score is 15. GCS eye subscore is 4. GCS verbal subscore is 5. GCS motor subscore is 6.   Skin: Skin is warm. Capillary refill takes less than 2 seconds.   Psychiatric: He has a normal mood and affect. Judgment normal.       ED Course   Critical Care    Date/Time: 1/3/2023 2:34 PM  Performed by: Jamel Saldana III, MD  Authorized by: Jamel Saldana III, MD   Direct patient critical care time: 45 minutes  Documentation critical care time: 5 minutes  Consulting other physicians critical care time: 5 minutes  Total critical care time  (exclusive of procedural time) : 55 minutes  Critical care was necessary to treat or prevent imminent or life-threatening deterioration of the following conditions: circulatory failure and respiratory failure.  Critical care was time spent personally by me on the following activities: discussions with primary provider, discussions with consultants, examination of patient, re-evaluation of patient's condition, review of old charts, ordering and review of laboratory studies and ordering and performing treatments and interventions.      Labs Reviewed   COMPREHENSIVE METABOLIC PANEL - Abnormal; Notable for the following components:       Result Value    Glucose Level 118 (*)     Blood Urea Nitrogen 42.3 (*)     Creatinine 1.94 (*)     Globulin 3.8 (*)     Albumin/Globulin Ratio 1.0 (*)     Aspartate Aminotransferase 35 (*)     All other components within normal limits   B-TYPE NATRIURETIC PEPTIDE - Abnormal; Notable for the following components:    Natriuretic Peptide 3,361.6 (*)     All other components within normal limits   TROPONIN I - Abnormal; Notable for the following components:    Troponin-I 0.088 (*)     All other components within normal limits   CBC WITH DIFFERENTIAL - Abnormal; Notable for the following components:    Hgb 10.6 (*)     Hct 34.2 (*)     MCV 72.6 (*)     MCHC 31.0 (*)     RDW 20.5 (*)     Platelet 117 (*)     Lymph # 0.56 (*)     All other components within normal limits   COVID/FLU A&B PCR - Normal    Narrative:     The Xpert Xpress SARS-CoV-2/FLU/RSV plus is a rapid, multiplexed real-time PCR test intended for the simultaneous qualitative detection and differentiation of SARS-CoV-2, Influenza A, Influenza B, and respiratory syncytial virus (RSV) viral RNA in either nasopharyngeal swab or nasal swab specimens.         CBC W/ AUTO DIFFERENTIAL    Narrative:     The following orders were created for panel order CBC auto differential.  Procedure                               Abnormality          Status                     ---------                               -----------         ------                     CBC with Differential[[id number]]        Abnormal            Final result                 Please view results for these tests on the individual orders.     EKG Readings: (Independently Interpreted)   Initial Reading: No STEMI. Previous EKG: Compared with most recent EKG Rhythm: Sinus Bradycardia. Heart Rate: 43. Ectopy: No Ectopy. Conduction: Normal. ST Segments: Non-Specific ST Segment Depression. Clinical Impression: with RBBB     Imaging Results              X-Ray Chest 1 View (Final result)  Result time 01/03/23 12:51:46      Final result by Javier Soto MD (01/03/23 12:51:46)                   Impression:      No acute findings identified.      Electronically signed by: Javier Soto  Date:    01/03/2023  Time:    12:51               Narrative:    EXAMINATION:  XR CHEST 1 VIEW    CLINICAL HISTORY:  Dyspnea, unspecified    TECHNIQUE:  One view    COMPARISON:  December 28, 2022.    FINDINGS:  Cardiopericardial silhouette enlarged appearance is similar.  Mediastinum and the lungs are partially obscured by overlying devices.  Lungs hypoventilatory changes without convincing pulmonary edema or dense consolidation.  No significant fluid within the pleural spaces.                                       Medications   furosemide injection 40 mg (has no administration in time range)   methylPREDNISolone sodium succinate injection 125 mg (125 mg Intravenous Given 1/3/23 1155)   albuterol nebulizer solution 10 mg (10 mg Nebulization Given 1/3/23 1130)   ipratropium 0.02 % nebulizer solution 0.5 mg (0.5 mg Nebulization Given 1/3/23 1243)   magnesium sulfate 2g in water 50mL IVPB (premix) (0 g Intravenous Stopped 1/3/23 1357)     Medical Decision Making:   Clinical Tests:   Lab Tests: Ordered and Reviewed  The following lab test(s) were unremarkable: CBC, CMP and Urinalysis  Radiological Study: Ordered and  Reviewed  ED Management:  Patient presented in extremis differential diagnosis with CHF versus anemia versus COPD versus pneumonia use versus acute coronary syndrome.  The CHF was evaluated with a chest x-ray which showed pulmonary interstitial infiltrates and with an elevated BNP and with a borderline troponin patient given IV Lasix the COPD was evaluated treated with hour long neb magnesium Solu-Medrol.  No anemia as verified by chemistry electrolytes without any significant abnormalities will admit to hospital medicine                        Clinical Impression:   Final diagnoses:  [R10.9] Abdominal pain  [R06.00] Dyspnea  [I50.9] CHF (congestive heart failure)        ED Disposition Condition    Admit Stable                Jamel Saldana III, MD  01/03/23 1268

## 2023-01-03 NOTE — ED PROVIDER NOTES
Encounter Date: 1/3/2023    SCRIBE #1 NOTE: I, Faye Grullon, am scribing for, and in the presence of,  Jamel Saldana III, MD. I have scribed the following portions of the note - Other sections scribed: HPI, ROS, PE.     History     Chief Complaint   Patient presents with    Chest Pain     Pt c/o chest pain with SOB that started this AM.  Pt receiving duo neb on arrival.  88% on RA initially after neb 94 on RA. Pt has life vest on.      57 year old male with a history of COPD, HTN, and bipolar disorder presents to ED, via EMS, complaining of chest.  Pt reports chest pain and SOB that began about one hour before EMS was called.  EMS reports O2 sats were around 89-90 upon arrival; they administered a duoneb treatment.  Pt denies any N/V or fever.  Pt is a smoker.    The history is provided by the patient and the EMS personnel. No  was used.   Chest Pain  The current episode started just prior to arrival. Chest pain occurs frequently. The chest pain is unchanged. The pain is associated with breathing. Primary symptoms include shortness of breath. Pertinent negatives for primary symptoms include no fever, no fatigue, no wheezing, no abdominal pain, no nausea, no vomiting and no dizziness.   His past medical history is significant for COPD.   Review of patient's allergies indicates:   Allergen Reactions    Depakote [divalproex]     Divalproex sodium Other (See Comments)    Lithium     Lithium analogues     Quetiapine Other (See Comments)     Pt states had seizures     Past Medical History:   Diagnosis Date    Bipolar disorder, unspecified     Chronic hepatitis C     History of psychiatric hospitalization     Hx of psychiatric care     Hypertension     Bessie     Obesity, unspecified     Psychiatric problem     Schizoaffective disorder, bipolar type     Seizures     Stroke     Substance abuse     Therapy      Past Surgical History:   Procedure Laterality Date    CORONARY STENT PLACEMENT   08/14/2015    DEBRIDEMENT  04/17/2022    LEFT HEART CATHETERIZATION Left 08/13/2015    REPEAT CLOSURE OF STERNAL INCISION N/A 5/11/2022    Procedure: CLOSURE, STERNAL INCISION, REPEAT;  Surgeon: Adolfo Weiss MD;  Location: [facility] OR;  Service: Plastics;  Laterality: N/A;  STERNAL WOUND DEBRIDEMENT AND RECONSTRUCTION // MULTIPLE MUSCLE FLAPS // REQ 1400     Family History   Problem Relation Age of Onset    Cancer Mother     Liver disease Father      Social History     Tobacco Use    Smoking status: Every Day     Types: Cigarettes    Smokeless tobacco: Never   Substance Use Topics    Alcohol use: Never    Drug use: Not Currently     Types: Cocaine     Review of Systems   Constitutional:  Negative for fatigue, fever and unexpected weight change.   HENT:  Negative for congestion and rhinorrhea.    Eyes:  Negative for pain.   Respiratory:  Positive for shortness of breath. Negative for chest tightness and wheezing.    Cardiovascular:  Positive for chest pain.   Gastrointestinal:  Negative for abdominal pain, constipation, diarrhea, nausea and vomiting.   Genitourinary:  Negative for dysuria.   Musculoskeletal:  Negative for back pain and neck pain.   Skin:  Negative for rash.   Allergic/Immunologic: Negative for environmental allergies, food allergies and immunocompromised state.   Neurological:  Negative for dizziness and speech difficulty.   Hematological:  Does not bruise/bleed easily.   Psychiatric/Behavioral:  Negative for sleep disturbance and suicidal ideas.      Physical Exam     Initial Vitals [01/03/23 1112]   BP Pulse Resp Temp SpO2   (!) 120/90 (!) 42 14 98.1 °F (36.7 °C) (!) 94 %      MAP       --         Physical Exam    Nursing note and vitals reviewed.  Constitutional: He appears distressed (mildly).   HENT:   Head: Normocephalic and atraumatic.   Tobacco on breath   Neck: Trachea normal.   Cardiovascular:  Regular rhythm.   Bradycardia present.         No murmur heard.  Pulmonary/Chest: Breath sounds  "normal. He is in respiratory distress (minor).   Rales and crackles bilaterally   Abdominal: Abdomen is soft. Bowel sounds are normal. He exhibits no distension. There is no abdominal tenderness.   Musculoskeletal:         General: Normal range of motion.      Lumbar back: Normal.      Right lower leg: No edema.      Left lower leg: No edema.     Neurological: He is alert and oriented to person, place, and time. He has normal strength. No cranial nerve deficit.   Skin: Skin is warm and dry. No rash noted.   Multiple sticker marks on abdomen from multiple recent admissions   Psychiatric: He has a normal mood and affect. Judgment normal.       ED Course   Procedures  Labs Reviewed - No data to display       Imaging Results    None          Medications - No data to display  Medical Decision Making:   Clinical Tests:   Lab Tests: Ordered and Reviewed  Radiological Study: Reviewed and Ordered  Medical Tests: Ordered and Reviewed  Additional MDM:     Heart Failure Score:   COPD = Yes     Scribe Attestation:   Scribe #1: I performed the above scribed service and the documentation accurately describes the services I performed. I attest to the accuracy of the note.    Attending Attestation:           Physician Attestation for Scribe:  Physician Attestation Statement for Scribe #1: I, Jamel Saldana III, MD, reviewed documentation, as scribed by Faye Grullon in my presence, and it is both accurate and complete.                        Clinical Impression:    ***Please document a Clinical Impression and click the "Refresh" button to refresh your note and automatically pull in before signing.***         "

## 2023-01-03 NOTE — LETTER
Patient: Kendall Duff  YOB: 1965  Date: 1/3/2023 Time: 6:25 PM  Location: [facility] Emergency Dept    Leaving the Hospital Against Medical Advice    Chart #:[id number]    This will certify that I, the undersigned,    ______________________________________________________________________    A patient in the above named medical center, having requested discharge and removal from the medical [place] against the advice of my attending physician(s), hereby release [facility], its physicians, officers and employees, severally and individually, from any and all liability of any nature whatsoever for any injury or harm or complication of any kind that may result directly or indirectly, by reason of my terminating my stay as a patient at [facility] and my departure from [facility], and hereby waive any and all rights of action I may now have or later acquire as a result of my voluntary departure from [facility] and the termination of my stay as a patient therein.    This release is made with the full knowledge of the danger that may result from the action which I am taking.      Date:_______________________                         ___________________________                                                                                    Patient/Legal Representative    Witness:        ____________________________                          ___________________________  Nurse                                                                        Physician

## 2023-01-03 NOTE — H&P
[facility] Hospital Medicine History & Physical Examination       Patient Name: Kendall Duff  MRN: [id number]  Patient Class: IP- Inpatient   Admission Date: 1/3/2023   Admitting Physician: DARNELL Service   Length of Stay: 0  Attending Physician: Dr. Jael Calloway  Primary Care Provider: Emilee Saunders NP  Face-to-Face encounter date: 01/03/2023  Code Status: Full code   Chief Complaint: Chest Pain (Pt c/o chest pain with SOB that started this AM.  Pt receiving duo neb on arrival.  88% on RA initially after neb 94 on RA. Pt has life vest on. )        Patient information was obtained from patient, patient's family, past medical records and ER records.     HISTORY OF PRESENT ILLNESS:   Kendall Duff is a 57 y.o. male who has a PMH includes bipolar disorder, schizophrenia, chronic hepatitis-C, HTN, HLD, seizures, CVA, history of substance abuse, CMO with life vest; CAD s/p CABG; presents to the ED at [facility] on 1/3/2023 with complaints  of syncope at home prior to arrival with associated  shortness of breath, weakness, fatigue. Pt was recently admitted our services 12/21/2022 and d/c on 12/25/2022 for SOB, fluid overload,  cough and difficulty breathing .He now presents to the ED here with reports of worsening SOB and difficulty breathing and syncope at home. He denies any discharges from his life vest, but reports the device discharged a few days ago which he was seen in the ED at [facility] and d/c home. He denies any discharges of shock since then. He reports he continues to smoke cigarettes, he denies any illicit drug use.  He reports compliance with his medication.  Lab work done significant for H&H 10.6/34.2, BUN 42.3, creatinine 1.94; glucose 118, BNP 3 361.6, troponin 0.088; other indices unremarkable.  Chest x-ray demonstrated no acute findings.  Influenza a swab influenza B swab both negative SARs CoV 2 PCR not detected.  Initial vital signs /90 pulse 42  respirations 16 temperature 98.1° F O2 saturation 94% on room air.  Patient was placed on supplemental oxygen therapy at 2 L per nasal cannula as his O2 saturation dropped to 90%.  Patient did receive 80 mg Lasix IV push, 125 mg of Solu-Medrol, 2 g magnesium sulfate, an hour long nebulizer treatment in the ED which provided some relief.  Patient is admitted to hospital medicine services for further management.    PAST MEDICAL HISTORY:   Hypertension  Hyperlipidemia  CAD  Congestive heart failure- EF 20-25%  Seizure disorder  CVA   Bipolar disorder/schizophrenia  Chronic hepatitis-C   Substance abuse    PAST SURGICAL HISTORY:   Left heart catheterization   Angiogram   Surgical debridement of sternal wound   CABG   Endoscopy    ALLERGIES:   Depakote [divalproex], Divalproex sodium, Lithium, Lithium analogues, and Quetiapine    FAMILY HISTORY:   Mother- kidney disease,   Father- liver disease, ETOH abuse,     SOCIAL HISTORY:   Smokes cigarettes  Denies any recent illicit drug use-patient reports he smoked crack cocaine in the past however he has been clean for several years  Denies any alcohol use  Lives with family    HOME MEDICATIONS:   As documented  Prior to Admission medications    Medication Sig Start Date End Date Taking? Authorizing Provider   amiodarone (PACERONE) 200 MG Tab Take 1 tablet (200 mg total) by mouth once daily. 22  Christian Marie MD   apixaban (ELIQUIS) 5 mg Tab Take 1 tablet (5 mg total) by mouth 2 (two) times daily. 22   Christian Marie MD   ARIPiprazole (ABILIFY) 10 MG Tab Take 1 tablet (10 mg total) by mouth once daily. 22  Aziza Hernandez MD   aspirin 81 MG Chew Chew and swallow 1 tablet (81 mg total) by mouth once daily. 22  Christian Marie MD   atorvastatin (LIPITOR) 80 MG tablet Take 1 tablet (80 mg total) by mouth once daily. 22  Christian Marie MD   benztropine (COGENTIN) 1 MG tablet Take 1 mg by mouth 2 (two)  times daily.    Historical Provider   famotidine (PEPCID) 20 MG tablet Take 1 tablet (20 mg total) by mouth 2 (two) times daily. 11/4/22 11/4/23  Aziza Hernandez MD   ferrous sulfate 325 (65 FE) MG EC tablet Take 1 tablet (325 mg total) by mouth once daily. 12/31/22 1/30/23  Jael Calloway MD   furosemide (LASIX) 40 MG tablet Take 1 tablet (40 mg total) by mouth once daily. 12/25/22 12/25/23  Christian Marie MD   isosorbide mononitrate (IMDUR) 30 MG 24 hr tablet Take 1 tablet (30 mg total) by mouth once daily. 12/25/22 12/25/23  Christian Marie MD   levETIRAcetam (KEPPRA) 500 MG Tab Take 1 tablet (500 mg total) by mouth 2 (two) times daily. 12/25/22 12/25/23  Christian Marie MD   metoprolol tartrate (LOPRESSOR) 50 MG tablet Take 1 tablet (50 mg total) by mouth 2 (two) times daily. 12/31/22 1/30/23  Jael Calloway MD   OXcarbazepine (TRILEPTAL) 300 MG Tab Take 300 mg by mouth 3 (three) times daily.    Historical Provider   sacubitriL-valsartan (ENTRESTO) 24-26 mg per tablet Take 1 tablet by mouth 2 (two) times daily. 12/25/22 1/24/23  Christian Marie MD   traZODone (DESYREL) 100 MG tablet Take 1 tablet (100 mg total) by mouth every evening. 6/17/22 6/17/23  Jarrett Yarbrough MD   metoprolol succinate (TOPROL-XL) 25 MG 24 hr tablet Take 25 mg by mouth once daily. 3/27/22 6/17/22  Historical Provider   pravastatin (PRAVACHOL) 40 MG tablet Take 1 tablet (40 mg total) by mouth every evening. 3/25/22 6/17/22  Laisha May NP       REVIEW OF SYSTEMS:   Except as documented, all other systems reviewed and negative     PHYSICAL EXAM:     VITAL SIGNS: 24 HRS MIN & MAX LAST   Temp  Min: 98.1 °F (36.7 °C)  Max: 98.1 °F (36.7 °C) 98.1 °F (36.7 °C)   BP  Min: 120/90  Max: 157/91 (!) 157/91     Pulse  Min: 42  Max: 52  (!) 52     Resp  Min: 10  Max: 26 16   SpO2  Min: 85 %  Max: 94 % (!) 93 %       General appearance:  Chronically ill-appearing looks older than stated age; nontoxic, fatigued; no family at the bedside   HENT:  Atraumatic head. Moist mucous membranes of oral cavity, poor dentition, facial swelling with eyelid edema  Eyes: PERRL  Neck: Supple.   Lungs: diminished bilaterally, mildly labored, symmetrical expansion.   Heart: bradycardic rate , regular rhythm. Systolic murmur; life vest in place, trace edema BLE  Abdomen: Soft, non-distended, non-tender. No rebound tenderness/guarding. Bowel sounds are normal.   Extremities: SMILEY; generalized weakness  Skin: warm and dry  Neuro: AAOx3, no focal deficits  Psych/mental status: Appropriate mood and affect. Responds appropriately to questions.      LABS AND IMAGING:     Recent Labs   Lab 12/28/22  0026 12/31/22  0249 01/03/23  1140   WBC 5.3 6.0 7.1   RBC 4.35* 4.78 4.71   HGB 9.8* 10.5* 10.6*   HCT 32.3* 34.0* 34.2*   MCV 74.3* 71.1* 72.6*   MCH 22.5 22.0 22.5   MCHC 30.3* 30.9* 31.0*   RDW 19.6* 19.7* 20.5*    168 117*   MPV 10.1 10.2  --        Recent Labs   Lab 12/28/22  0026 12/28/22  0213 12/28/22  0656 12/28/22  0816 12/29/22  0207 12/30/22  0802 12/31/22  0249 01/03/23  1140     --   --    < >  --  137 139 139   K 4.3  --   --    < >  --  4.4 3.9 4.4   CO2 31*  --   --    < >  --  25 26 27   BUN 37.2*  --   --    < >  --  36.0* 36.6* 42.3*   CREATININE 1.79*  --   --    < >  --  1.34* 1.32* 1.94*   CALCIUM 8.4  --   --    < >  --  8.8 9.0 9.0   PH  --  7.315* 7.398  --   --   --   --   --    MG 2.10  --   --   --  2.10  --   --   --    ALBUMIN 3.1*  --   --   --   --  3.4*  --  3.7   ALKPHOS 118  --   --   --   --  91  --  125   ALT 19  --   --   --   --  19  --  20   AST 31  --   --   --   --  36*  --  35*   BILITOT 0.4  --   --   --   --  0.8  --  1.1    < > = values in this interval not displayed.       Microbiology Results (last 7 days)       ** No results found for the last 168 hours. **             X-Ray Chest 1 View  Narrative: EXAMINATION:  XR CHEST 1 VIEW    CLINICAL HISTORY:  Dyspnea, unspecified    TECHNIQUE:  One view    COMPARISON:  December 28,  2022.    FINDINGS:  Cardiopericardial silhouette enlarged appearance is similar.  Mediastinum and the lungs are partially obscured by overlying devices.  Lungs hypoventilatory changes without convincing pulmonary edema or dense consolidation.  No significant fluid within the pleural spaces.  Impression: No acute findings identified.    Electronically signed by: Javier Soto  Date:    01/03/2023  Time:    12:51        ASSESSMENT & PLAN:   ASSESSMENT:  Acute syncope with collapse- + LOC  Acute on chronic combined systolic and diastolic heart failure with elevated BNP   Chest pain- resolved  NSTEMI type II- with elevated troponin- when trended, lower than in the last 3 days  ANA- worsening, creat 1.32 on 12/31/2022  Exertional dyspnea with SOB  CMO-status post LifeVest in place , EF 20-25% 12/18/2022  Valvular heart disease- moderate MR, sever TR  HTN- essential  CAD- s/p stent and CABG  HLD  Weakness  Medical non-compliance  Nicotine dependence- cigarettes    Hx:  Bipolar 1 disorder, schizophrenia, seizures, CVA, hepatitis-C, substance abuse    PLAN:  Consult cardiology Services appreciate assistance and recommendations  Consult renal services appreciate assistance and recommendations  Continue with diuresing  Will need to monitor renal function closely with diuresis as patient creatinine has bumped  Accurate I&O  Daily weights   Resume home medication as deemed necessary   Maintain Life Vest  Lasix 40 mg IVP BID, Pt received 80 mg today in the ED  Nicotine patch if desires  Renal US  Repeat labs in the am  Fall precautions  PT/OT eval and treat    VTE Prophylaxis: Lovenox  SCD for DVT prophylaxis and will be advised to be as mobile as possible     Patient condition:  Stable    __________________________________________________________________________  INPATIENT LIST OF MEDICATIONS     Scheduled Meds:see mar   enoxaparin  30 mg Subcutaneous Daily     Continuous Infusions:see mar  PRN Meds:.acetaminophen, dextrose 10%,  dextrose 10%, glucagon (human recombinant), glucose, glucose, melatonin, naloxone, ondansetron, simethicone, sodium chloride 0.9%      I, RAY Sullivan have reviewed and discussed the case with Dr. Jael Calloway. Please see the following addendum for further assessment and plan from there attending MD.  RAY Olguin   01/03/2023    ________________________________________________________________________________    MD Addendum:  IDr. ---assumed care of this patient today at ---am/pm  For the patient encounter, I performed the substantive portion of the visit, I reviewed the NP/PA documentation, treatment plan, and medical decision making.  I had face to face time with this patient     A. History:Kendall Duff is a 57 y.o. male who has a PMH includes bipolar disorder, schizophrenia, chronic hepatitis-C, HTN, HLD, seizures, CVA, history of substance abuse, CMO with life vest; CAD s/p CABG; presents to the ED at [facility] on 1/3/2023 with complaints  of syncope at home prior to arrival with associated  shortness of breath, weakness, fatigue. Pt was recently admitted our services 12/21/2022 and d/c on 12/25/2022 for SOB, fluid overload,  cough and difficulty breathing .He now presents to the ED here with reports of worsening SOB and difficulty breathing and syncope at home. He denies any discharges from his life vest, but reports the device discharged a few days ago which he was seen in the ED at [facility] and d/c home. He denies any discharges of shock since then. He reports he continues to smoke cigarettes, he denies any illicit drug use.  He reports compliance with his medication    B. Physical exam:  General: In no acute distress, afebrile  Chest: Clear to auscultation bilaterally  Heart: RRR, +S1, S2, no appreciable murmur  Abdomen: Soft, nontender, BS +  MSK: Warm, no lower extremity edema, no clubbing or cyanosis  Neurologic: Alert and oriented x4, Cranial nerve II-XII  intact, Strength 5/5 in all 4 extremities     C. Medical decision making:    Patient reported to me that he came to the ED today because his sugars were high.  Blood sugars were 110s.On chart review patient appears to have had questionable syncope?  He told me that he also is having cough, discomfort in right side of his chest denies any chest pain and shortness of breath.  Denies any oliguria, fever, chills, requesting 2 cans of sprite.    Troponin was elevated.  Cardiology was consulted in the ED. EKG reviewed shows bradycardia.  Echo shows decreased EF, diastolic dysfunction. Chest x-ray was reviewed did not show any pulmonary edema or any infectious process.  Received IV Lasix in the ED     Assessment:  Acute syncope with collapse- + LOC  Chest Pain-resolved  NSTEMI type II vs I  ANA    Chr:  CMO-status post LifeVest in place , EF 20-25% 12/18/2022  Valvular heart disease- moderate MR, sever TR  HTN- essential  CAD- s/p stent and CABG  HLD  Weakness  Medical non-compliance  Nicotine dependence- cigarettes  Bipolar 1 disorder, schizophrenia, seizures, CVA, hepatitis-C, substance abuse        -Received IV lasix today, Should plan to transition to oral Lasix tomorrow as there is no evidence for fluid overload  -cardiology was consulted in the ED we will appreciate their recommendations  -trend troponin, considering sublingual nitroglycerin p.r.n. if he complains of chest pain  -Continue home medications as appropriate for his heart failure and CAD but decrease strength beta-blockers and will appreciate Cardiac recommendations given questionable syncope in the setting of bradycardia   -Chart reviewed for carotid Doppler and echocardiogram   -Get orthostatics  -Fall precautions  -Follow-up electrolytes   -Continue the LifeVest  -Cardiac diet  -Avoid nephrotoxins, Get urine studies, follow-up creatinine.    -counseling on smoking cessation and medication compliance were given, time spent:3-10 minutes        Discharge  Planning and Disposition: No mobility needs. Ambulating well. Good social support system.   Anticipated discharge    All diagnosis and differential diagnosis have been reviewed; assessment and plan has been documented; I have personally reviewed the labs and test results that are presently available; I have reviewed the patients medication list; I have reviewed the consulting providers response and recommendations. I have reviewed or attempted to review medical records based upon their availability.    All of the patient and family questions have been addressed and answered. Patient's is agreeable to the above stated plan. I will continue to monitor closely and make adjustments to medical management as needed.

## 2023-01-03 NOTE — Clinical Note
Date: 1/3/2023  Patient: Kendall Duff  Admitted: 1/3/2023 11:27 AM  Attending Provider: Jael Calloway MD    Kendall Duff or his authorized caregiver has made the decision for the patient to leave the emergency department against the advice of his atte nding physician. He or his authorized caregiver has been informed and understands the inherent risks, including worsening condition and death.  He or his authorized caregiver has decided to accept the responsibility for this decision. Kendall Duff and  all necessary parties have been advised that he may return for further evaluation or treatment. His condition at time of discharge was guarded.  Kendall Duff had current vital signs as follows:  BP (!) 146/96   Pulse 61   Temp 98.1 °F (36.7 °C)    Resp (!) 24

## 2023-01-04 LAB
ALBUMIN SERPL-MCNC: 3.3 G/DL (ref 3.5–5)
ALBUMIN/GLOB SERPL: 1.1 RATIO (ref 1.1–2)
ALP SERPL-CCNC: 101 UNIT/L (ref 40–150)
ALT SERPL-CCNC: 18 UNIT/L (ref 0–55)
AST SERPL-CCNC: 30 UNIT/L (ref 5–34)
BASOPHILS # BLD AUTO: 0.01 X10(3)/MCL (ref 0–0.2)
BASOPHILS NFR BLD AUTO: 0.1 %
BILIRUBIN DIRECT+TOT PNL SERPL-MCNC: 0.8 MG/DL
BUN SERPL-MCNC: 43.3 MG/DL (ref 8.4–25.7)
CALCIUM SERPL-MCNC: 8.7 MG/DL (ref 8.4–10.2)
CHLORIDE SERPL-SCNC: 102 MMOL/L (ref 98–107)
CO2 SERPL-SCNC: 26 MMOL/L (ref 22–29)
CREAT SERPL-MCNC: 1.7 MG/DL (ref 0.73–1.18)
EOSINOPHIL # BLD AUTO: 0 X10(3)/MCL (ref 0–0.9)
EOSINOPHIL NFR BLD AUTO: 0 %
ERYTHROCYTE [DISTWIDTH] IN BLOOD BY AUTOMATED COUNT: 20.4 % (ref 11.6–14.4)
GFR SERPLBLD CREATININE-BSD FMLA CKD-EPI: 46 MLS/MIN/1.73/M2
GLOBULIN SER-MCNC: 3.1 GM/DL (ref 2.4–3.5)
GLUCOSE SERPL-MCNC: 138 MG/DL (ref 74–100)
HCT VFR BLD AUTO: 31.4 % (ref 42–52)
HGB BLD-MCNC: 9.8 GM/DL (ref 14–18)
IMM GRANULOCYTES # BLD AUTO: 0.03 X10(3)/MCL (ref 0–0.04)
IMM GRANULOCYTES NFR BLD AUTO: 0.4 %
LYMPHOCYTES # BLD AUTO: 0.56 X10(3)/MCL (ref 0.6–4.6)
LYMPHOCYTES NFR BLD AUTO: 8.4 %
MCH RBC QN AUTO: 22.1 PG
MCHC RBC AUTO-ENTMCNC: 31.2 MG/DL (ref 33–36)
MCV RBC AUTO: 70.9 FL (ref 80–94)
MONOCYTES # BLD AUTO: 0.59 X10(3)/MCL (ref 0.1–1.3)
MONOCYTES NFR BLD AUTO: 8.8 %
NEUTROPHILS # BLD AUTO: 5.51 X10(3)/MCL (ref 2.1–9.2)
NEUTROPHILS NFR BLD AUTO: 82.3 %
NRBC BLD AUTO-RTO: 0 % (ref 0–1)
PLATELET # BLD AUTO: 138 X10(3)/MCL (ref 140–371)
PMV BLD AUTO: 10.6 FL (ref 9.4–12.4)
POTASSIUM SERPL-SCNC: 3.9 MMOL/L (ref 3.5–5.1)
PROT SERPL-MCNC: 6.4 GM/DL (ref 6.4–8.3)
RBC # BLD AUTO: 4.43 X10(6)/MCL (ref 4.7–6.1)
SODIUM SERPL-SCNC: 139 MMOL/L (ref 136–145)
TROPONIN I SERPL-MCNC: 0.06 NG/ML (ref 0–0.04)
TSH SERPL-ACNC: 20.75 UIU/ML (ref 0.35–4.94)
WBC # SPEC AUTO: 6.7 X10(3)/MCL (ref 4.5–11.5)

## 2023-01-04 PROCEDURE — 84484 ASSAY OF TROPONIN QUANT: CPT | Performed by: NURSE PRACTITIONER

## 2023-01-04 PROCEDURE — 63600175 PHARM REV CODE 636 W HCPCS: Performed by: INTERNAL MEDICINE

## 2023-01-04 PROCEDURE — 11000001 HC ACUTE MED/SURG PRIVATE ROOM

## 2023-01-04 PROCEDURE — 25000003 PHARM REV CODE 250: Performed by: INTERNAL MEDICINE

## 2023-01-04 PROCEDURE — 80053 COMPREHEN METABOLIC PANEL: CPT | Performed by: NURSE PRACTITIONER

## 2023-01-04 PROCEDURE — 84443 ASSAY THYROID STIM HORMONE: CPT | Performed by: INTERNAL MEDICINE

## 2023-01-04 PROCEDURE — 25000003 PHARM REV CODE 250: Performed by: NURSE PRACTITIONER

## 2023-01-04 PROCEDURE — 85025 COMPLETE CBC W/AUTO DIFF WBC: CPT | Performed by: NURSE PRACTITIONER

## 2023-01-04 PROCEDURE — 21400001 HC TELEMETRY ROOM

## 2023-01-04 RX ORDER — MAG HYDROX/ALUMINUM HYD/SIMETH 200-200-20
30 SUSPENSION, ORAL (FINAL DOSE FORM) ORAL ONCE
Status: COMPLETED | OUTPATIENT
Start: 2023-01-04 | End: 2023-01-04

## 2023-01-04 RX ORDER — LIDOCAINE HYDROCHLORIDE 20 MG/ML
15 SOLUTION OROPHARYNGEAL ONCE
Status: COMPLETED | OUTPATIENT
Start: 2023-01-04 | End: 2023-01-04

## 2023-01-04 RX ADMIN — OXCARBAZEPINE 300 MG: 300 TABLET, FILM COATED ORAL at 09:01

## 2023-01-04 RX ADMIN — ASPIRIN 81 MG CHEWABLE TABLET 81 MG: 81 TABLET CHEWABLE at 09:01

## 2023-01-04 RX ADMIN — ATORVASTATIN CALCIUM 80 MG: 40 TABLET, FILM COATED ORAL at 09:01

## 2023-01-04 RX ADMIN — ARIPIPRAZOLE 10 MG: 5 TABLET ORAL at 09:01

## 2023-01-04 RX ADMIN — APIXABAN 5 MG: 5 TABLET, FILM COATED ORAL at 09:01

## 2023-01-04 RX ADMIN — APIXABAN 5 MG: 5 TABLET, FILM COATED ORAL at 08:01

## 2023-01-04 RX ADMIN — FUROSEMIDE 40 MG: 10 INJECTION, SOLUTION INTRAMUSCULAR; INTRAVENOUS at 09:01

## 2023-01-04 RX ADMIN — OXCARBAZEPINE 300 MG: 300 TABLET, FILM COATED ORAL at 03:01

## 2023-01-04 RX ADMIN — METOPROLOL TARTRATE 12.5 MG: 25 TABLET, FILM COATED ORAL at 08:01

## 2023-01-04 RX ADMIN — FERROUS SULFATE TAB 325 MG (65 MG ELEMENTAL FE) 1 EACH: 325 (65 FE) TAB at 09:01

## 2023-01-04 RX ADMIN — ISOSORBIDE MONONITRATE 30 MG: 30 TABLET, EXTENDED RELEASE ORAL at 09:01

## 2023-01-04 RX ADMIN — LEVETIRACETAM 500 MG: 500 TABLET, FILM COATED ORAL at 09:01

## 2023-01-04 RX ADMIN — ALUMINUM HYDROXIDE, MAGNESIUM HYDROXIDE, AND SIMETHICONE 30 ML: 200; 200; 20 SUSPENSION ORAL at 03:01

## 2023-01-04 RX ADMIN — AMIODARONE HYDROCHLORIDE 200 MG: 200 TABLET ORAL at 09:01

## 2023-01-04 RX ADMIN — LIDOCAINE HYDROCHLORIDE 15 ML: 20 SOLUTION ORAL at 03:01

## 2023-01-04 RX ADMIN — FAMOTIDINE 20 MG: 20 TABLET, FILM COATED ORAL at 08:01

## 2023-01-04 RX ADMIN — FAMOTIDINE 20 MG: 20 TABLET, FILM COATED ORAL at 09:01

## 2023-01-04 RX ADMIN — BENZTROPINE MESYLATE 1 MG: 1 TABLET ORAL at 08:01

## 2023-01-04 RX ADMIN — Medication 6 MG: at 08:01

## 2023-01-04 RX ADMIN — TRAZODONE HYDROCHLORIDE 200 MG: 100 TABLET ORAL at 08:01

## 2023-01-04 RX ADMIN — BENZTROPINE MESYLATE 1 MG: 1 TABLET ORAL at 09:01

## 2023-01-04 RX ADMIN — LEVETIRACETAM 500 MG: 500 TABLET, FILM COATED ORAL at 08:01

## 2023-01-04 RX ADMIN — OXCARBAZEPINE 300 MG: 300 TABLET, FILM COATED ORAL at 08:01

## 2023-01-04 NOTE — PROGRESS NOTES
[facility]  Hospital Medicine Progress Note        Chief Complaint: SOB    HPI:   Mr. Duff is a 57 year old male who is known to [facility], Dr. Wright. He presents to the ER with complains of syncope at home with associated sympotms of SOB, weakness, & fatigue. He also reports swelling to his face. Of note, he has presented to the ER multiple times with similar symptoms. He denies CP, palpitations, or discharges from his lifevest. Significant labs include Cr 1.94, BNP 3361.6, & trop 0.088. [facility] has been consulted to further manage the patient's CHF exacerbation.     Interval Hx:       Patient denies any fever, chills, chest pain, shortness of breath, palpitations    Reports multiple concerns in terms of his living situation.      Objective/physical exam:  General: In no acute distress, afebrile  Chest: Clear to auscultation bilaterally  Heart: RRR, +S1, S2, no appreciable murmur  Abdomen: Soft, nontender, BS +  MSK: Warm, no lower extremity edema, no clubbing or cyanosis  Neurologic: Alert and oriented x4, Cranial nerve II-XII intact, Strength 5/5 in all 4 extremities    VITAL SIGNS: 24 HRS MIN & MAX LAST   No data recorded 98.1 °F (36.7 °C)   BP  Min: 112/83  Max: 153/99 (!) 131/93     Pulse  Min: 59  Max: 66  62   Resp  Min: 9  Max: 27 14   SpO2  Min: 92 %  Max: 98 % 97 %       Recent Labs   Lab 12/31/22  0249 01/03/23  1140 01/04/23  0434   WBC 6.0 7.1 6.7   RBC 4.78 4.71 4.43*   HGB 10.5* 10.6* 9.8*   HCT 34.0* 34.2* 31.4*   MCV 71.1* 72.6* 70.9*   MCH 22.0 22.5 22.1   MCHC 30.9* 31.0* 31.2*   RDW 19.7* 20.5* 20.4*    117* 138*   MPV 10.2  --  10.6       Recent Labs   Lab 12/29/22  0207 12/30/22  0802 12/30/22  0802 12/31/22  0249 01/03/23  1140 01/04/23  0434   NA  --  137   < > 139 139 139   K  --  4.4   < > 3.9 4.4 3.9   CO2  --  25   < > 26 27 26   BUN  --  36.0*   < > 36.6* 42.3* 43.3*   CREATININE  --  1.34*   < > 1.32* 1.94* 1.70*   CALCIUM  --  8.8   < > 9.0 9.0 8.7   MG  2.10  --   --   --   --   --    ALBUMIN  --  3.4*  --   --  3.7 3.3*   ALKPHOS  --  91  --   --  125 101   ALT  --  19  --   --  20 18   AST  --  36*  --   --  35* 30   BILITOT  --  0.8  --   --  1.1 0.8    < > = values in this interval not displayed.          Microbiology Results (last 7 days)       ** No results found for the last 168 hours. **             See below for Radiology    Scheduled Med:   amiodarone  200 mg Oral Daily    apixaban  5 mg Oral BID    ARIPiprazole  10 mg Oral Daily    aspirin  81 mg Oral Daily    atorvastatin  80 mg Oral Daily    benztropine  1 mg Oral BID    famotidine  20 mg Oral BID    ferrous sulfate  1 tablet Oral Daily    furosemide (LASIX) injection  40 mg Intravenous Daily    isosorbide mononitrate  30 mg Oral Daily    levETIRAcetam  500 mg Oral BID    metoprolol tartrate  12.5 mg Oral BID    OXcarbazepine  300 mg Oral TID    traZODone  200 mg Oral QHS        Continuous Infusions:       PRN Meds:  acetaminophen, dextrose 10%, dextrose 10%, glucagon (human recombinant), glucose, glucose, melatonin, naloxone, ondansetron, simethicone, sodium chloride 0.9%       Assessment/Plan:  Syncope   Bradycardia, Sinus  Acute Hypoxic Respiratory Failure  Acute on Chronic Combined Systolic/Diastolic HF  NSTEMI - suspect type 2   ANA   Suspect Ascites 2/2 possible Cardiac Cirrhosis  Abnormal USG Abd-Ascites,Thick GallBladr,Suspect Cirrhosis?    Chronic :  PAF/ Flutter (recent diagnosis)  Anemia of Chronic Disease  ICMO   VHD  Hypertension  Pulmonary Hypertension  CAD/CABG (April 2022)  History of RUE DVT (5.10.22)  Schizoaffective Disorder  Hx of Hep C  Hx of Cocaine Abuse   Nicotine Dependence (Tobacco Use)  Medical noncopliance      Plan  -Cardiology was consulted in the ED  -Wean O2  -Diuresis per Cardiology-recs con 40ivlasix daily for now  -Strict Is and Os, Cardiac Diet  -Continue to monitor on Tele   -Check TSH  -Beta blockers with hold parameters, currently decreased the  strength  -Orthostatics  -Fall precautions  -Replete Electrolytes, Keep K>4, Mg>2.  -Hold Ace/Arb/Arni in the setting of ANA. , f/u BMP AM ,No Stones, Urine Studies pending to calculate FENA/FEUr.   -Needs Outpatient f/u to evaluate for Varices?  -Cont other home medications as appropriate.  -PT,OT  -Living Situation should be assessed in terms of Safety based off of my interview.     Critical care Time Spent>35 min     VTE prophylaxis: eliquis    Patient condition:  Critical    Anticipated discharge and Disposition:     Needs oupatient follow up with GI    All diagnosis and differential diagnosis have been reviewed; assessment and plan has been documented; I have personally reviewed the labs and test results that are presently available; I have reviewed the patients medication list; I have reviewed the consulting providers response and recommendations. I have reviewed or attempted to review medical records based upon their availability    All of the patient's questions have been  addressed and answered. Patient's is agreeable to the above stated plan. I will continue to monitor closely and make adjustments to medical management as needed.  _____________________________________________________________________    Nutrition Status:    Radiology:  US Retroperitoneal Complete  Narrative: EXAMINATION:  US RETROPERITONEAL COMPLETE    CLINICAL HISTORY:  elevated BUN/Creat;    TECHNIQUE:  Ultrasound of the kidneys and urinary bladder was performed including color flow and grayscale evaluation of the kidneys.    COMPARISON:  No relevant prior available for comparison.    FINDINGS:  RIGHT KIDNEY: The right kidney measures 10.5 cm.  No hydronephrosis. Incidental 1.5 cm cyst. No follow-up imaging is recommended as incidental lesions are likely benign.   No appreciable shadowing renal calculus.    LEFT KIDNEY: The left kidney measures 11.4 cm. No hydronephrosis.Incidental 1.7 cm renal cyst. No follow-up imaging is recommended as  incidental lesions are likely benign.   No appreciable shadowing renal calculus.    BLADDER: Bladder is collapsed, limiting evaluation.    OTHER: Free intraperitoneal fluid in the upper abdomen.  Irregular surface contour of the liver.  Thickened appearance of the gallbladder wall.  Impression: Normal sized, nonobstructed kidneys.    Free intraperitoneal fluid    Nodular surface contour of the liver.  Correlate for cirrhosis.  Thickened appearance of the gallbladder wall is nonspecific in the setting of free fluid and suspected liver disease.    Electronically signed by: Sharon Cantu  Date:    01/03/2023  Time:    18:51  X-Ray Chest 1 View  Narrative: EXAMINATION:  XR CHEST 1 VIEW    CLINICAL HISTORY:  Dyspnea, unspecified    TECHNIQUE:  One view    COMPARISON:  December 28, 2022.    FINDINGS:  Cardiopericardial silhouette enlarged appearance is similar.  Mediastinum and the lungs are partially obscured by overlying devices.  Lungs hypoventilatory changes without convincing pulmonary edema or dense consolidation.  No significant fluid within the pleural spaces.  Impression: No acute findings identified.    Electronically signed by: Javier Soto  Date:    01/03/2023  Time:    12:51      Jael Calloway MD   01/04/2023

## 2023-01-04 NOTE — PHYSICIAN QUERY
PT Name: Kendall Duff  MR #: [id number]     DOCUMENTATION CLARIFICATION      CDS/: Petar Russo Jr, RN CCDS               Contact information:[email or web address]  This form is a permanent document in the medical record.    Query Date: January 4, 2023    By submitting this query, we are merely seeking further clarification of documentation to reflect the severity of illness of your patient. Please utilize your independent clinical judgment when addressing the question(s) below.     The Medical Record contains the following:   Indicators   Supporting Clinical Findings Location in Medical Record   x Chest Pain, Angina Shortness of Breath  This is a new problem. The problem occurs continuously.The current episode started 1 to 2 hours ago. Associated symptoms include chest pain 12/18 ED Provider Note   x Coronary Artery Disease Patient presents with shortness of breaths and has noted history of extensive cardiac disease including congestive heart failure with LifeVest, coronary artery disease status post stent placement anticoagulated with Eliquis.     12/18 H&P   x EKG EKG sinus bradycardia, right bundle branch block and septal infarct cited on prior study   12/18 H&P   x Troponin 0.116-->0.119-->0.112 12/18 Echo     x Echo Results Summary    ? Eccentric hypertrophy and severely decreased systolic function. The estimated ejection fraction is 20-25%.  ? Grade III left ventricular diastolic dysfunction.  ? Mild right ventricular enlargement with mildly reduced right ventricular systolic function.  ? Mild right atrial enlargement.  ? Moderate mitral regurgitation.  ? Severe tricuspid regurgitation.  ? The estimated PA systolic pressure is 33 mmHg.      12/18 Echo    Angiography     x Documentation of acute cardiac condition NSTEMI (non-ST elevated myocardial infarction)      Assessment:  NSTEMI   Sinus bradycardia   Acute congestive heart failure exacerbation   12/18 ED Provider Note  (Final Diagnoses)        12/18 H&P   x Medication/Treatment In the ED patient received DuoNebs, full-dose aspirin and 40 mg of IV Lasix.  Cardiology consulted   12/18 H&P   x Other Maintain life vest.   12/18 H&P        Provider, please specify the           NSTEMI         Type    [   ] NSTEMI,Type unspecified    [  x ] NSTEMI/Myocardial Infarction Type 2 due to Acute congestive heart failure exacerbation   [   ] NSTEMI/Myocardial Infarction Type 2 due to Sinus Bradycardia    [   ] NSTEMI/Myocardial Infarction Type 2 due to (please specify): __________   [   ] Other NSTEMI Type (please specify): _______________   [   ] Clinically Undetermined           Please document in your progress notes daily for the duration of treatment until resolved, and include in your discharge summary.  Form No. [id number]

## 2023-01-04 NOTE — CONSULTS
Inpatient consult to Cardiology  Consult performed by: RAY Chilel  Consult ordered by: Jael Calloway MD  Reason for consult: CHF Exacerbation    [facility] - Emergency Dept  Cardiology  Consult Note    Patient Name: Kendall Duff  MRN: [id number]  Admission Date: 1/3/2023  Hospital Length of Stay: 1 days  Code Status: Full Code   Attending Provider: Jael Calloway MD   Consulting Provider: RAY Chilel  Primary Care Physician: Primary Doctor No  Principal Problem:Acute chest pain    Patient information was obtained from patient, past medical records, and ER records.     Subjective:     Chief Complaint:  Reason for consult: CP & SOB, CHF     HPI:   Mr. Duff is a 57 year old male who is known to [facility], Dr. Wright. He presents to the ER with complains of syncope at home with associated sympotms of SOB, weakness, & fatigue. He also reports swelling to his face. Of note, he has presented to the ER multiple times with similar symptoms. He denies CP, palpitations, or discharges from his lifevest. Significant labs include Cr 1.94, BNP 3361.6, & trop 0.088. [facility] has been consulted to further manage the patient's CHF exacerbation.      PMH: CAD, ICMO, HTN, schizoaffective disorder, hep C, DVT, systolic & diastolic CHF, Lifevest  PSH: CABG, LHC  Family History: Mother - CA; Father - liver disease   Social History: Current every day smoker. Former Cocaine Use. Denies alcohol use.     Previous Cardiac Diagnostics:   TTE (12.18.22):  Eccentric hypertrophy and severely decreased systolic function. The estimated ejection fraction is 20-25%.  Grade III left ventricular diastolic dysfunction.  Mild right ventricular enlargement with mildly reduced right ventricular systolic function.  Mild right atrial enlargement.  Moderate mitral regurgitation.  Severe tricuspid regurgitation.  The estimated PA systolic pressure is 33 mmHg.     Echocardiogram (10.28.22):  The left ventricle is moderately enlarged  with mild eccentric hypertrophy and severely decreased systolic function.  The estimated ejection fraction is 20%.  There is severe left ventricular global hypokinesis.  Grade II left ventricular diastolic dysfunction.  Normal right ventricular size with moderately reduced right ventricular systolic function.  Moderate mitral regurgitation.  Moderate tricuspid regurgitation.  There is mild pulmonary hypertension.  The estimated PA systolic pressure is 44 mmHg.  Elevated central venous pressure (15 mmHg).  Severe left atrial enlargement.     CABG x 4 (4/22):  LIMA-LAD, SVG-OM1, SVG-D1, SVG-PDA     Echocardiogram (6.16.22):  The left ventricle is normal in size w/ concentric hypertrophy and severely decreased systolic function.  The estimated EF is 25-30%.  There is severe left ventricular global hypokinesis.  Grade II left ventricular diastolic dysfunction.  Normal right ventricular size w/ mildly reduced right ventricular systolic function.  The estimated PA systolic pressure is 52 mmHg.  There is moderate pulmonary HTN.  Elevated CVP (15 mmHg).     RUE NIVA (5.10.22):  A deep vein thrombosis was identified in the right axillary and brachial veins. A superficial thrombosis was identified in the right basilic vein.     LHC (3.21.22):  100% LAD to 30-40% residual stenosis post PTCA.  Large diagonal system w/ severe stenosis.  CIRC - patent stent, OM1 patent, mild CIRC occluded  RCA - stents ISR 40-50%, distal 70-80%  EDP 12 EF 35-40% anterior HK     Past Medical History:   Diagnosis Date    Bipolar disorder, unspecified     Chronic hepatitis C     History of psychiatric hospitalization     Hx of psychiatric care     Hypertension     Bessie     Obesity, unspecified     Psychiatric problem     Schizoaffective disorder, bipolar type     Seizures     Stroke     Substance abuse     Therapy        Past Surgical History:   Procedure Laterality Date    CORONARY STENT PLACEMENT  08/14/2015    DEBRIDEMENT  04/17/2022    LEFT  HEART CATHETERIZATION Left 08/13/2015    REPEAT CLOSURE OF STERNAL INCISION N/A 5/11/2022    Procedure: CLOSURE, STERNAL INCISION, REPEAT;  Surgeon: Adolfo Weiss MD;  Location: [facility] OR;  Service: Plastics;  Laterality: N/A;  STERNAL WOUND DEBRIDEMENT AND RECONSTRUCTION // MULTIPLE MUSCLE FLAPS // REQ 1400       Review of patient's allergies indicates:   Allergen Reactions    Depakote [divalproex]     Divalproex sodium Other (See Comments)    Lithium     Lithium analogues     Quetiapine Other (See Comments)     Pt states had seizures       No current facility-administered medications on file prior to encounter.     Current Outpatient Medications on File Prior to Encounter   Medication Sig    amiodarone (PACERONE) 200 MG Tab Take 1 tablet (200 mg total) by mouth once daily.    apixaban (ELIQUIS) 5 mg Tab Take 1 tablet (5 mg total) by mouth 2 (two) times daily.    ARIPiprazole (ABILIFY) 10 MG Tab Take 1 tablet (10 mg total) by mouth once daily.    aspirin 81 MG Chew Chew and swallow 1 tablet (81 mg total) by mouth once daily.    atorvastatin (LIPITOR) 80 MG tablet Take 1 tablet (80 mg total) by mouth once daily.    benztropine (COGENTIN) 1 MG tablet Take 1 mg by mouth 2 (two) times daily.    famotidine (PEPCID) 20 MG tablet Take 1 tablet (20 mg total) by mouth 2 (two) times daily.    ferrous sulfate 325 (65 FE) MG EC tablet Take 1 tablet (325 mg total) by mouth once daily.    furosemide (LASIX) 40 MG tablet Take 1 tablet (40 mg total) by mouth once daily.    isosorbide mononitrate (IMDUR) 30 MG 24 hr tablet Take 1 tablet (30 mg total) by mouth once daily.    levETIRAcetam (KEPPRA) 500 MG Tab Take 1 tablet (500 mg total) by mouth 2 (two) times daily.    metoprolol tartrate (LOPRESSOR) 50 MG tablet Take 1 tablet (50 mg total) by mouth 2 (two) times daily.    OXcarbazepine (TRILEPTAL) 300 MG Tab Take 300 mg by mouth 3 (three) times daily.    sacubitriL-valsartan (ENTRESTO) 24-26 mg per tablet Take 1 tablet by mouth 2  (two) times daily.    traZODone (DESYREL) 100 MG tablet Take 1 tablet (100 mg total) by mouth every evening.    [DISCONTINUED] metoprolol succinate (TOPROL-XL) 25 MG 24 hr tablet Take 25 mg by mouth once daily.    [DISCONTINUED] pravastatin (PRAVACHOL) 40 MG tablet Take 1 tablet (40 mg total) by mouth every evening.     Family History       Problem Relation (Age of Onset)    Cancer Mother    Liver disease Father          Tobacco Use    Smoking status: Every Day     Types: Cigarettes    Smokeless tobacco: Never   Substance and Sexual Activity    Alcohol use: Never    Drug use: Not Currently     Types: Cocaine    Sexual activity: Not on file       Review of Systems   Constitutional:         Facial swelling   Respiratory:  Positive for shortness of breath.    Cardiovascular:  Negative for chest pain and palpitations.     Objective:     Vital Signs (Most Recent):  Temp: 98.1 °F (36.7 °C) (01/03/23 1112)  Pulse: 66 (01/04/23 0730)  Resp: 16 (01/04/23 0730)  BP: (!) 136/97 (01/04/23 0730)  SpO2: 96 % (01/04/23 0730)   Vital Signs (24h Range):  Temp:  [98.1 °F (36.7 °C)] 98.1 °F (36.7 °C)  Pulse:  [42-66] 66  Resp:  [9-26] 16  SpO2:  [85 %-98 %] 96 %  BP: (112-157)/(77-99) 136/97        There is no height or weight on file to calculate BMI.    SpO2: 96 %         Intake/Output Summary (Last 24 hours) at 1/4/2023 0902  Last data filed at 1/3/2023 1800  Gross per 24 hour   Intake 50 ml   Output 700 ml   Net -650 ml       Lines/Drains/Airways       Peripheral Intravenous Line  Duration                  Peripheral IV - Single Lumen 01/03/23 1435 20 G Anterior;Right Forearm <1 day                    Significant Labs:  Recent Results (from the past 72 hour(s))   COVID/FLU A&B PCR    Collection Time: 01/03/23 11:38 AM   Result Value Ref Range    Influenza A PCR Not Detected Not Detected    Influenza B PCR Not Detected Not Detected    SARS-CoV-2 PCR Not Detected Not Detected   Comprehensive metabolic panel    Collection Time:  01/03/23 11:40 AM   Result Value Ref Range    Sodium Level 139 136 - 145 mmol/L    Potassium Level 4.4 3.5 - 5.1 mmol/L    Chloride 103 98 - 107 mmol/L    Carbon Dioxide 27 22 - 29 mmol/L    Glucose Level 118 (H) 74 - 100 mg/dL    Blood Urea Nitrogen 42.3 (H) 8.4 - 25.7 mg/dL    Creatinine 1.94 (H) 0.73 - 1.18 mg/dL    Calcium Level Total 9.0 8.4 - 10.2 mg/dL    Protein Total 7.5 6.4 - 8.3 gm/dL    Albumin Level 3.7 3.5 - 5.0 g/dL    Globulin 3.8 (H) 2.4 - 3.5 gm/dL    Albumin/Globulin Ratio 1.0 (L) 1.1 - 2.0 ratio    Bilirubin Total 1.1 <=1.5 mg/dL    Alkaline Phosphatase 125 40 - 150 unit/L    Alanine Aminotransferase 20 0 - 55 unit/L    Aspartate Aminotransferase 35 (H) 5 - 34 unit/L    eGFR 39 mls/min/1.73/m2   Brain natriuretic peptide    Collection Time: 01/03/23 11:40 AM   Result Value Ref Range    Natriuretic Peptide 3,361.6 (H) <=100.0 pg/mL   Troponin I    Collection Time: 01/03/23 11:40 AM   Result Value Ref Range    Troponin-I 0.088 (H) 0.000 - 0.045 ng/mL   CBC with Differential    Collection Time: 01/03/23 11:40 AM   Result Value Ref Range    WBC 7.1 4.5 - 11.5 x10(3)/mcL    RBC 4.71 4.70 - 6.10 x10(6)/mcL    Hgb 10.6 (L) 14.0 - 18.0 gm/dL    Hct 34.2 (L) 42.0 - 52.0 %    MCV 72.6 (L) 80.0 - 94.0 fL    MCH 22.5 pg    MCHC 31.0 (L) 33.0 - 36.0 mg/dL    RDW 20.5 (H) 11.6 - 14.4 %    Platelet 117 (L) 140 - 371 x10(3)/mcL    MPV      Neut % 86.4 %    Lymph % 7.9 %    Mono % 4.7 %    Eos % 0.3 %    Basophil % 0.4 %    Lymph # 0.56 (L) 0.6 - 4.6 x10(3)/mcL    Neut # 6.12 2.1 - 9.2 x10(3)/mcL    Mono # 0.33 0.1 - 1.3 x10(3)/mcL    Eos # 0.02 0 - 0.9 x10(3)/mcL    Baso # 0.03 0 - 0.2 x10(3)/mcL    IG# 0.02 0 - 0.04 x10(3)/mcL    IG% 0.3 %    NRBC% 0.0 0 - 1 %   Troponin I    Collection Time: 01/03/23  6:04 PM   Result Value Ref Range    Troponin-I 0.069 (H) 0.000 - 0.045 ng/mL   Troponin I    Collection Time: 01/04/23 12:01 AM   Result Value Ref Range    Troponin-I 0.059 (H) 0.000 - 0.045 ng/mL   Comprehensive  Metabolic Panel (CMP)    Collection Time: 01/04/23  4:34 AM   Result Value Ref Range    Sodium Level 139 136 - 145 mmol/L    Potassium Level 3.9 3.5 - 5.1 mmol/L    Chloride 102 98 - 107 mmol/L    Carbon Dioxide 26 22 - 29 mmol/L    Glucose Level 138 (H) 74 - 100 mg/dL    Blood Urea Nitrogen 43.3 (H) 8.4 - 25.7 mg/dL    Creatinine 1.70 (H) 0.73 - 1.18 mg/dL    Calcium Level Total 8.7 8.4 - 10.2 mg/dL    Protein Total 6.4 6.4 - 8.3 gm/dL    Albumin Level 3.3 (L) 3.5 - 5.0 g/dL    Globulin 3.1 2.4 - 3.5 gm/dL    Albumin/Globulin Ratio 1.1 1.1 - 2.0 ratio    Bilirubin Total 0.8 <=1.5 mg/dL    Alkaline Phosphatase 101 40 - 150 unit/L    Alanine Aminotransferase 18 0 - 55 unit/L    Aspartate Aminotransferase 30 5 - 34 unit/L    eGFR 46 mls/min/1.73/m2   CBC with Differential    Collection Time: 01/04/23  4:34 AM   Result Value Ref Range    WBC 6.7 4.5 - 11.5 x10(3)/mcL    RBC 4.43 (L) 4.70 - 6.10 x10(6)/mcL    Hgb 9.8 (L) 14.0 - 18.0 gm/dL    Hct 31.4 (L) 42.0 - 52.0 %    MCV 70.9 (L) 80.0 - 94.0 fL    MCH 22.1 pg    MCHC 31.2 (L) 33.0 - 36.0 mg/dL    RDW 20.4 (H) 11.6 - 14.4 %    Platelet 138 (L) 140 - 371 x10(3)/mcL    MPV 10.6 9.4 - 12.4 fL    Neut % 82.3 %    Lymph % 8.4 %    Mono % 8.8 %    Eos % 0.0 %    Basophil % 0.1 %    Lymph # 0.56 (L) 0.6 - 4.6 x10(3)/mcL    Neut # 5.51 2.1 - 9.2 x10(3)/mcL    Mono # 0.59 0.1 - 1.3 x10(3)/mcL    Eos # 0.00 0 - 0.9 x10(3)/mcL    Baso # 0.01 0 - 0.2 x10(3)/mcL    IG# 0.03 0 - 0.04 x10(3)/mcL    IG% 0.4 %    NRBC% 0.0 0 - 1 %       Significant Imaging:  Imaging Results              US Retroperitoneal Complete (Final result)  Result time 01/03/23 18:51:48      Final result by Sharon Cantu MD (01/03/23 18:51:48)                   Impression:      Normal sized, nonobstructed kidneys.    Free intraperitoneal fluid    Nodular surface contour of the liver.  Correlate for cirrhosis.  Thickened appearance of the gallbladder wall is nonspecific in the setting of free fluid and  suspected liver disease.      Electronically signed by: Sharon Cantu  Date:    01/03/2023  Time:    18:51               Narrative:    EXAMINATION:  US RETROPERITONEAL COMPLETE    CLINICAL HISTORY:  elevated BUN/Creat;    TECHNIQUE:  Ultrasound of the kidneys and urinary bladder was performed including color flow and grayscale evaluation of the kidneys.    COMPARISON:  No relevant prior available for comparison.    FINDINGS:  RIGHT KIDNEY: The right kidney measures 10.5 cm.  No hydronephrosis. Incidental 1.5 cm cyst. No follow-up imaging is recommended as incidental lesions are likely benign.   No appreciable shadowing renal calculus.    LEFT KIDNEY: The left kidney measures 11.4 cm. No hydronephrosis.Incidental 1.7 cm renal cyst. No follow-up imaging is recommended as incidental lesions are likely benign.   No appreciable shadowing renal calculus.    BLADDER: Bladder is collapsed, limiting evaluation.    OTHER: Free intraperitoneal fluid in the upper abdomen.  Irregular surface contour of the liver.  Thickened appearance of the gallbladder wall.                                       X-Ray Chest 1 View (Final result)  Result time 01/03/23 12:51:46      Final result by Javier Soto MD (01/03/23 12:51:46)                   Impression:      No acute findings identified.      Electronically signed by: Javier Soto  Date:    01/03/2023  Time:    12:51               Narrative:    EXAMINATION:  XR CHEST 1 VIEW    CLINICAL HISTORY:  Dyspnea, unspecified    TECHNIQUE:  One view    COMPARISON:  December 28, 2022.    FINDINGS:  Cardiopericardial silhouette enlarged appearance is similar.  Mediastinum and the lungs are partially obscured by overlying devices.  Lungs hypoventilatory changes without convincing pulmonary edema or dense consolidation.  No significant fluid within the pleural spaces.                                      EKG:        Telemetry:  SR 60's    Physical Exam  Constitutional:       Comments: Swollen  face and chest   HENT:      Head: Normocephalic.      Mouth/Throat:      Mouth: Mucous membranes are dry.   Eyes:      Extraocular Movements: Extraocular movements intact.   Cardiovascular:      Rate and Rhythm: Normal rate and regular rhythm.      Pulses: Normal pulses.      Heart sounds: Normal heart sounds.   Pulmonary:      Comments: Bibasilar crackles  Abdominal:      Palpations: Abdomen is soft.   Skin:     General: Skin is warm and dry.   Neurological:      Mental Status: He is alert and oriented to person, place, and time.   Psychiatric:         Behavior: Behavior normal.       Home Medications:   No current facility-administered medications on file prior to encounter.     Current Outpatient Medications on File Prior to Encounter   Medication Sig Dispense Refill    amiodarone (PACERONE) 200 MG Tab Take 1 tablet (200 mg total) by mouth once daily. 30 tablet 6    apixaban (ELIQUIS) 5 mg Tab Take 1 tablet (5 mg total) by mouth 2 (two) times daily. 60 tablet 3    ARIPiprazole (ABILIFY) 10 MG Tab Take 1 tablet (10 mg total) by mouth once daily. 30 tablet 11    aspirin 81 MG Chew Chew and swallow 1 tablet (81 mg total) by mouth once daily. 360 tablet 0    atorvastatin (LIPITOR) 80 MG tablet Take 1 tablet (80 mg total) by mouth once daily. 90 tablet 3    benztropine (COGENTIN) 1 MG tablet Take 1 mg by mouth 2 (two) times daily.      famotidine (PEPCID) 20 MG tablet Take 1 tablet (20 mg total) by mouth 2 (two) times daily. 60 tablet 11    ferrous sulfate 325 (65 FE) MG EC tablet Take 1 tablet (325 mg total) by mouth once daily. 30 tablet 0    furosemide (LASIX) 40 MG tablet Take 1 tablet (40 mg total) by mouth once daily. 30 tablet 11    isosorbide mononitrate (IMDUR) 30 MG 24 hr tablet Take 1 tablet (30 mg total) by mouth once daily. 30 tablet 11    levETIRAcetam (KEPPRA) 500 MG Tab Take 1 tablet (500 mg total) by mouth 2 (two) times daily. 60 tablet 11    metoprolol tartrate (LOPRESSOR) 50 MG tablet Take 1 tablet  (50 mg total) by mouth 2 (two) times daily. 60 tablet 0    OXcarbazepine (TRILEPTAL) 300 MG Tab Take 300 mg by mouth 3 (three) times daily.      sacubitriL-valsartan (ENTRESTO) 24-26 mg per tablet Take 1 tablet by mouth 2 (two) times daily. 60 tablet 0    traZODone (DESYREL) 100 MG tablet Take 1 tablet (100 mg total) by mouth every evening. 30 tablet 11    [DISCONTINUED] metoprolol succinate (TOPROL-XL) 25 MG 24 hr tablet Take 25 mg by mouth once daily.      [DISCONTINUED] pravastatin (PRAVACHOL) 40 MG tablet Take 1 tablet (40 mg total) by mouth every evening. 90 tablet 3       Current Inpatient Medications:    Current Facility-Administered Medications:     acetaminophen tablet 650 mg, 650 mg, Oral, Q4H PRN, WILLY OlguinP    amiodarone tablet 200 mg, 200 mg, Oral, Daily, RAY Olguin    apixaban tablet 5 mg, 5 mg, Oral, BID, RAY Olguin, 5 mg at 01/03/23 2128    ARIPiprazole tablet 10 mg, 10 mg, Oral, Daily, RAY Olguin    aspirin chewable tablet 81 mg, 81 mg, Oral, Daily, RAY Olguin    atorvastatin tablet 80 mg, 80 mg, Oral, Daily, WILLY OlguinP    benztropine tablet 1 mg, 1 mg, Oral, BID, RAY Olguin, 1 mg at 01/03/23 2128    dextrose 10% bolus 125 mL 125 mL, 12.5 g, Intravenous, PRN, WILLY OlguinP    dextrose 10% bolus 250 mL 250 mL, 25 g, Intravenous, PRN, WILLY OlguinP    famotidine tablet 20 mg, 20 mg, Oral, BID, WILLY OlguinP, 20 mg at 01/03/23 2129    ferrous sulfate tablet 1 each, 1 tablet, Oral, Daily, WILLY lOguinP    furosemide injection 40 mg, 40 mg, Intravenous, Daily, Jael Calloway MD    glucagon (human recombinant) injection 1 mg, 1 mg, Intramuscular, PRN, WILLY OlguinP    glucose chewable tablet 16 g, 16 g, Oral, PRN, Lina Bahena, RAY    glucose chewable tablet 24 g, 24 g, Oral, PRN, Lina Bahena, RAY    isosorbide  mononitrate 24 hr tablet 30 mg, 30 mg, Oral, Daily, Lian MARIBELL Josef, WILLYP    levETIRAcetam tablet 500 mg, 500 mg, Oral, BID, Lina MARIBELL WILLY BahenaP, 500 mg at 01/03/23 2128    melatonin tablet 6 mg, 6 mg, Oral, Nightly PRN, Lina Bahena FNP, 6 mg at 01/03/23 2129    metoprolol tartrate (LOPRESSOR) split tablet 12.5 mg, 12.5 mg, Oral, BID, Jael Calloway MD    naloxone 0.4 mg/mL injection 0.02 mg, 0.02 mg, Intravenous, PRN, WILLY OlguinP    ondansetron injection 4 mg, 4 mg, Intravenous, Q6H PRN, RAY Olguin    OXcarbazepine tablet 300 mg, 300 mg, Oral, TID, Lina Bahena FNP, 300 mg at 01/03/23 2128    simethicone chewable tablet 80 mg, 1 tablet, Oral, QID PRN, RAY Olguin    sodium chloride 0.9% flush 10 mL, 10 mL, Intravenous, Q12H PRN, RAY Olguin    traZODone tablet 200 mg, 200 mg, Oral, QHS, WILLY OlguinP, 200 mg at 01/03/23 2129    Current Outpatient Medications:     amiodarone (PACERONE) 200 MG Tab, Take 1 tablet (200 mg total) by mouth once daily., Disp: 30 tablet, Rfl: 6    apixaban (ELIQUIS) 5 mg Tab, Take 1 tablet (5 mg total) by mouth 2 (two) times daily., Disp: 60 tablet, Rfl: 3    ARIPiprazole (ABILIFY) 10 MG Tab, Take 1 tablet (10 mg total) by mouth once daily., Disp: 30 tablet, Rfl: 11    aspirin 81 MG Chew, Chew and swallow 1 tablet (81 mg total) by mouth once daily., Disp: 360 tablet, Rfl: 0    atorvastatin (LIPITOR) 80 MG tablet, Take 1 tablet (80 mg total) by mouth once daily., Disp: 90 tablet, Rfl: 3    benztropine (COGENTIN) 1 MG tablet, Take 1 mg by mouth 2 (two) times daily., Disp: , Rfl:     famotidine (PEPCID) 20 MG tablet, Take 1 tablet (20 mg total) by mouth 2 (two) times daily., Disp: 60 tablet, Rfl: 11    ferrous sulfate 325 (65 FE) MG EC tablet, Take 1 tablet (325 mg total) by mouth once daily., Disp: 30 tablet, Rfl: 0    furosemide (LASIX) 40 MG tablet, Take 1 tablet (40 mg total) by  mouth once daily., Disp: 30 tablet, Rfl: 11    isosorbide mononitrate (IMDUR) 30 MG 24 hr tablet, Take 1 tablet (30 mg total) by mouth once daily., Disp: 30 tablet, Rfl: 11    levETIRAcetam (KEPPRA) 500 MG Tab, Take 1 tablet (500 mg total) by mouth 2 (two) times daily., Disp: 60 tablet, Rfl: 11    metoprolol tartrate (LOPRESSOR) 50 MG tablet, Take 1 tablet (50 mg total) by mouth 2 (two) times daily., Disp: 60 tablet, Rfl: 0    OXcarbazepine (TRILEPTAL) 300 MG Tab, Take 300 mg by mouth 3 (three) times daily., Disp: , Rfl:     sacubitriL-valsartan (ENTRESTO) 24-26 mg per tablet, Take 1 tablet by mouth 2 (two) times daily., Disp: 60 tablet, Rfl: 0    traZODone (DESYREL) 100 MG tablet, Take 1 tablet (100 mg total) by mouth every evening., Disp: 30 tablet, Rfl: 11         VTE Risk Mitigation (From admission, onward)           Ordered     apixaban tablet 5 mg  2 times daily         01/03/23 1801     IP VTE HIGH RISK PATIENT  Once         01/03/23 1522     Place sequential compression device  Until discontinued         01/03/23 1522                    Assessment:   Syncope w/ collapse  Acute on Chronic Combined Systolic/Diastolic HF    - Grade III DD    - EF 20-25% (ECHO 12.18.22)  NSTEMI - suspect type 2 in the setting of CHF exacerbation   PAF/PAFL (Recent New Diagnosis)- Now [place]/SR    - [id number]: 5 (LVSD/HTN/TE/NSTEMI)    - On Eliquis  ANA   Chronic Anemia - Stable  ICMO     - With Life Vest   VHD    - Moderate MR and Severe TR  Hypertension  Pulmonary Hypertension  CAD/CABG (April 2022)    - LIMA-LAD, SVG-OM1, SVG-D1, SVG-PDA  History of RUE DVT (5.10.22)    - DVT in right axillary and brachial veins. A superficial thrombosis was identified in the right basilic vein.  Schizoaffective Disorder  Hx of Hep C  Hx of Cocaine Abuse   Nicotine Dependence (Tobacco Use)  Medical noncopliance      Plan:   Continue lasix 40 mg IVP daily.   Monitor for bradycardia on tele.   Continue ASA, statin, & BB (with hold  parameters).  Continue Eliquis for CVA prophylaxis in the setting of PAF.   Hold Ace/Arb/Arni in the setting of ANA.   Due to the patient's recurrent hospital admission, NH placement or SNF could possibly be considered.     Thank you for your consult.     RAY Chilel  Cardiology  [facility] - Emergency Dept  01/04/2023 9:02 AM

## 2023-01-05 LAB
ALBUMIN SERPL-MCNC: 3.5 G/DL (ref 3.5–5)
ALBUMIN/GLOB SERPL: 1.1 RATIO (ref 1.1–2)
ALP SERPL-CCNC: 98 UNIT/L (ref 40–150)
ALT SERPL-CCNC: 19 UNIT/L (ref 0–55)
AST SERPL-CCNC: 31 UNIT/L (ref 5–34)
BASOPHILS # BLD AUTO: 0.04 X10(3)/MCL (ref 0–0.2)
BASOPHILS NFR BLD AUTO: 0.4 %
BILIRUBIN DIRECT+TOT PNL SERPL-MCNC: 0.8 MG/DL
BUN SERPL-MCNC: 36.6 MG/DL (ref 8.4–25.7)
CALCIUM SERPL-MCNC: 8.8 MG/DL (ref 8.4–10.2)
CHLORIDE SERPL-SCNC: 104 MMOL/L (ref 98–107)
CO2 SERPL-SCNC: 26 MMOL/L (ref 22–29)
CREAT SERPL-MCNC: 1.3 MG/DL (ref 0.73–1.18)
CREAT UR-MCNC: 59.4 MG/DL (ref 63–166)
EOSINOPHIL # BLD AUTO: 0.05 X10(3)/MCL (ref 0–0.9)
EOSINOPHIL NFR BLD AUTO: 0.5 %
ERYTHROCYTE [DISTWIDTH] IN BLOOD BY AUTOMATED COUNT: 20.2 % (ref 11.6–14.4)
GFR SERPLBLD CREATININE-BSD FMLA CKD-EPI: 63 MLS/MIN/1.73/M2
GLOBULIN SER-MCNC: 3.1 GM/DL (ref 2.4–3.5)
GLUCOSE SERPL-MCNC: 109 MG/DL (ref 74–100)
HCT VFR BLD AUTO: 31.2 % (ref 42–52)
HGB BLD-MCNC: 9.8 GM/DL (ref 14–18)
IMM GRANULOCYTES # BLD AUTO: 0.04 X10(3)/MCL (ref 0–0.04)
IMM GRANULOCYTES NFR BLD AUTO: 0.4 %
LYMPHOCYTES # BLD AUTO: 0.99 X10(3)/MCL (ref 0.6–4.6)
LYMPHOCYTES NFR BLD AUTO: 10.6 %
MCH RBC QN AUTO: 22.2 PG
MCHC RBC AUTO-ENTMCNC: 31.4 MG/DL (ref 33–36)
MCV RBC AUTO: 70.7 FL (ref 80–94)
MONOCYTES # BLD AUTO: 0.72 X10(3)/MCL (ref 0.1–1.3)
MONOCYTES NFR BLD AUTO: 7.7 %
NEUTROPHILS # BLD AUTO: 7.48 X10(3)/MCL (ref 2.1–9.2)
NEUTROPHILS NFR BLD AUTO: 80.4 %
NRBC BLD AUTO-RTO: 0 % (ref 0–1)
PLATELET # BLD AUTO: 132 X10(3)/MCL (ref 140–371)
PMV BLD AUTO: 10.5 FL (ref 9.4–12.4)
POTASSIUM SERPL-SCNC: 4 MMOL/L (ref 3.5–5.1)
PROT SERPL-MCNC: 6.6 GM/DL (ref 6.4–8.3)
RBC # BLD AUTO: 4.41 X10(6)/MCL (ref 4.7–6.1)
SODIUM SERPL-SCNC: 140 MMOL/L (ref 136–145)
SODIUM UR-SCNC: 100 MMOL/L
T3FREE SERPL-MCNC: 1.77 PG/ML (ref 1.57–3.91)
T4 FREE SERPL-MCNC: 0.59 NG/DL (ref 0.7–1.48)
TROPONIN I SERPL-MCNC: 0.07 NG/ML (ref 0–0.04)
UUN UR-MCNC: 657 MG/DL
WBC # SPEC AUTO: 9.3 X10(3)/MCL (ref 4.5–11.5)

## 2023-01-05 PROCEDURE — 84520 ASSAY OF UREA NITROGEN: CPT | Performed by: INTERNAL MEDICINE

## 2023-01-05 PROCEDURE — 84481 FREE ASSAY (FT-3): CPT | Performed by: INTERNAL MEDICINE

## 2023-01-05 PROCEDURE — 85025 COMPLETE CBC W/AUTO DIFF WBC: CPT | Performed by: INTERNAL MEDICINE

## 2023-01-05 PROCEDURE — 63600175 PHARM REV CODE 636 W HCPCS: Performed by: INTERNAL MEDICINE

## 2023-01-05 PROCEDURE — 83550 IRON BINDING TEST: CPT | Performed by: INTERNAL MEDICINE

## 2023-01-05 PROCEDURE — 21400001 HC TELEMETRY ROOM

## 2023-01-05 PROCEDURE — 82728 ASSAY OF FERRITIN: CPT | Performed by: INTERNAL MEDICINE

## 2023-01-05 PROCEDURE — 36415 COLL VENOUS BLD VENIPUNCTURE: CPT | Performed by: INTERNAL MEDICINE

## 2023-01-05 PROCEDURE — 63600175 PHARM REV CODE 636 W HCPCS: Performed by: NURSE PRACTITIONER

## 2023-01-05 PROCEDURE — 27000221 HC OXYGEN, UP TO 24 HOURS

## 2023-01-05 PROCEDURE — 84484 ASSAY OF TROPONIN QUANT: CPT | Performed by: NURSE PRACTITIONER

## 2023-01-05 PROCEDURE — 84300 ASSAY OF URINE SODIUM: CPT | Performed by: INTERNAL MEDICINE

## 2023-01-05 PROCEDURE — 93010 ELECTROCARDIOGRAM REPORT: CPT | Mod: ,,, | Performed by: INTERNAL MEDICINE

## 2023-01-05 PROCEDURE — 25000003 PHARM REV CODE 250: Performed by: NURSE PRACTITIONER

## 2023-01-05 PROCEDURE — 94640 AIRWAY INHALATION TREATMENT: CPT

## 2023-01-05 PROCEDURE — 25000003 PHARM REV CODE 250: Performed by: INTERNAL MEDICINE

## 2023-01-05 PROCEDURE — 82570 ASSAY OF URINE CREATININE: CPT | Performed by: INTERNAL MEDICINE

## 2023-01-05 PROCEDURE — 80053 COMPREHEN METABOLIC PANEL: CPT | Performed by: INTERNAL MEDICINE

## 2023-01-05 PROCEDURE — 93005 ELECTROCARDIOGRAM TRACING: CPT

## 2023-01-05 PROCEDURE — 25000242 PHARM REV CODE 250 ALT 637 W/ HCPCS: Performed by: NURSE PRACTITIONER

## 2023-01-05 PROCEDURE — 82607 VITAMIN B-12: CPT | Performed by: INTERNAL MEDICINE

## 2023-01-05 PROCEDURE — 93010 EKG 12-LEAD: ICD-10-PCS | Mod: ,,, | Performed by: INTERNAL MEDICINE

## 2023-01-05 PROCEDURE — 94761 N-INVAS EAR/PLS OXIMETRY MLT: CPT

## 2023-01-05 PROCEDURE — 93010 ELECTROCARDIOGRAM REPORT: CPT | Mod: 76,,, | Performed by: INTERNAL MEDICINE

## 2023-01-05 PROCEDURE — 84439 ASSAY OF FREE THYROXINE: CPT | Performed by: INTERNAL MEDICINE

## 2023-01-05 RX ORDER — HYDRALAZINE HYDROCHLORIDE 20 MG/ML
10 INJECTION INTRAMUSCULAR; INTRAVENOUS EVERY 4 HOURS PRN
Status: DISCONTINUED | OUTPATIENT
Start: 2023-01-05 | End: 2023-01-09 | Stop reason: HOSPADM

## 2023-01-05 RX ORDER — IPRATROPIUM BROMIDE AND ALBUTEROL SULFATE 2.5; .5 MG/3ML; MG/3ML
3 SOLUTION RESPIRATORY (INHALATION) EVERY 4 HOURS PRN
Status: DISCONTINUED | OUTPATIENT
Start: 2023-01-05 | End: 2023-01-09 | Stop reason: HOSPADM

## 2023-01-05 RX ORDER — FUROSEMIDE 40 MG/1
40 TABLET ORAL DAILY
Status: DISCONTINUED | OUTPATIENT
Start: 2023-01-06 | End: 2023-01-09 | Stop reason: HOSPADM

## 2023-01-05 RX ADMIN — BENZTROPINE MESYLATE 1 MG: 1 TABLET ORAL at 08:01

## 2023-01-05 RX ADMIN — OXCARBAZEPINE 300 MG: 300 TABLET, FILM COATED ORAL at 08:01

## 2023-01-05 RX ADMIN — METOPROLOL TARTRATE 12.5 MG: 25 TABLET, FILM COATED ORAL at 09:01

## 2023-01-05 RX ADMIN — METOPROLOL TARTRATE 12.5 MG: 25 TABLET, FILM COATED ORAL at 08:01

## 2023-01-05 RX ADMIN — IPRATROPIUM BROMIDE AND ALBUTEROL SULFATE 3 ML: 2.5; .5 SOLUTION RESPIRATORY (INHALATION) at 05:01

## 2023-01-05 RX ADMIN — OXCARBAZEPINE 300 MG: 300 TABLET, FILM COATED ORAL at 09:01

## 2023-01-05 RX ADMIN — BENZTROPINE MESYLATE 1 MG: 1 TABLET ORAL at 09:01

## 2023-01-05 RX ADMIN — ATORVASTATIN CALCIUM 80 MG: 40 TABLET, FILM COATED ORAL at 09:01

## 2023-01-05 RX ADMIN — APIXABAN 5 MG: 5 TABLET, FILM COATED ORAL at 08:01

## 2023-01-05 RX ADMIN — ARIPIPRAZOLE 10 MG: 5 TABLET ORAL at 09:01

## 2023-01-05 RX ADMIN — IPRATROPIUM BROMIDE AND ALBUTEROL SULFATE 3 ML: 2.5; .5 SOLUTION RESPIRATORY (INHALATION) at 08:01

## 2023-01-05 RX ADMIN — LEVETIRACETAM 500 MG: 500 TABLET, FILM COATED ORAL at 08:01

## 2023-01-05 RX ADMIN — FAMOTIDINE 20 MG: 20 TABLET, FILM COATED ORAL at 09:01

## 2023-01-05 RX ADMIN — OXCARBAZEPINE 300 MG: 300 TABLET, FILM COATED ORAL at 03:01

## 2023-01-05 RX ADMIN — ASPIRIN 81 MG CHEWABLE TABLET 81 MG: 81 TABLET CHEWABLE at 09:01

## 2023-01-05 RX ADMIN — APIXABAN 5 MG: 5 TABLET, FILM COATED ORAL at 09:01

## 2023-01-05 RX ADMIN — FAMOTIDINE 20 MG: 20 TABLET, FILM COATED ORAL at 08:01

## 2023-01-05 RX ADMIN — FERROUS SULFATE TAB 325 MG (65 MG ELEMENTAL FE) 1 EACH: 325 (65 FE) TAB at 09:01

## 2023-01-05 RX ADMIN — LEVETIRACETAM 500 MG: 500 TABLET, FILM COATED ORAL at 09:01

## 2023-01-05 RX ADMIN — TRAZODONE HYDROCHLORIDE 200 MG: 100 TABLET ORAL at 08:01

## 2023-01-05 RX ADMIN — FUROSEMIDE 40 MG: 10 INJECTION, SOLUTION INTRAMUSCULAR; INTRAVENOUS at 09:01

## 2023-01-05 RX ADMIN — AMIODARONE HYDROCHLORIDE 200 MG: 200 TABLET ORAL at 09:01

## 2023-01-05 RX ADMIN — ISOSORBIDE MONONITRATE 30 MG: 30 TABLET, EXTENDED RELEASE ORAL at 09:01

## 2023-01-05 RX ADMIN — HYDRALAZINE HYDROCHLORIDE 10 MG: 20 INJECTION INTRAMUSCULAR; INTRAVENOUS at 04:01

## 2023-01-05 NOTE — PLAN OF CARE
Problem: Violence Risk or Actual  Goal: Anger and Impulse Control  Outcome: Ongoing, Progressing     Problem: Adult Inpatient Plan of Care  Goal: Plan of Care Review  Outcome: Ongoing, Progressing  Goal: Patient-Specific Goal (Individualized)  Outcome: Ongoing, Progressing  Goal: Absence of Hospital-Acquired Illness or Injury  Outcome: Ongoing, Progressing  Goal: Optimal Comfort and Wellbeing  Outcome: Ongoing, Progressing  Goal: Readiness for Transition of Care  Outcome: Ongoing, Progressing     Problem: Violence Risk or Actual  Goal: Anger and Impulse Control  Outcome: Ongoing, Progressing     Problem: Adult Inpatient Plan of Care  Goal: Plan of Care Review  Outcome: Ongoing, Progressing  Goal: Patient-Specific Goal (Individualized)  Outcome: Ongoing, Progressing  Goal: Absence of Hospital-Acquired Illness or Injury  Outcome: Ongoing, Progressing  Goal: Optimal Comfort and Wellbeing  Outcome: Ongoing, Progressing  Goal: Readiness for Transition of Care  Outcome: Ongoing, Progressing

## 2023-01-05 NOTE — NURSING
Nurses Note -- 4 Eyes      1/5/2023   2:29 PM      Skin assessed during: Admit      [x] No Pressure Injuries Present    []Prevention Measures Documented      [] Yes- Altered Skin Integrity Present or Discovered   [] LDA Added if Not in Epic (Describe Wound)   [] New Altered Skin Integrity was Present on Admit and Documented in LDA   [] Wound Image Taken    Wound Care Consulted? No    Attending Nurse:  Shira Richey RN     Second RN/Staff Member:  MANOLO GUZMAN

## 2023-01-05 NOTE — PROGRESS NOTES
[facility] Hospital Medicine Progress Note        Chief Complaint: Inpatient Follow-up for     HPI:   Kendall Duff is a 57 y.o. male who has a PMH includes bipolar disorder, schizophrenia, chronic hepatitis-C, HTN, HLD, seizures, CVA, history of substance abuse, CMO with life vest; CAD s/p CABG; presents to the ED at [facility] on 1/3/2023 with complaints  of syncope at home prior to arrival with associated  shortness of breath, weakness, fatigue. Pt was recently admitted our services 12/21/2022 and d/c on 12/25/2022 for SOB, fluid overload,  cough and difficulty breathing .He now presents to the ED here with reports of worsening SOB and difficulty breathing and syncope at home. He denies any discharges from his life vest, but reports the device discharged a few days ago which he was seen in the ED at [facility] and d/c home. He denies any discharges of shock since then. He reports he continues to smoke cigarettes, he denies any illicit drug use.  He reports compliance with his medication.  Lab work done significant for H&H 10.6/34.2, BUN 42.3, creatinine 1.94; glucose 118, BNP 3 361.6, troponin 0.088; other indices unremarkable.  Chest x-ray demonstrated no acute findings.  Influenza a swab influenza B swab both negative SARs CoV 2 PCR not detected.  Initial vital signs /90 pulse 42 respirations 16 temperature 98.1° F O2 saturation 94% on room air.  Patient was placed on supplemental oxygen therapy at 2 L per nasal cannula as his O2 saturation dropped to 90%.  Patient did receive 80 mg Lasix IV push, 125 mg of Solu-Medrol, 2 g magnesium sulfate, an hour long nebulizer treatment in the ED which provided some relief.  Patient is admitted to hospital medicine services for further management.  Interval Hx:   Afebrile.  Intermittent tachycardia and fluctuating heart rate seen.  Hemodynamically stable otherwise.  Doing well on minimal nasal cannula oxygen.  When seen at  bedside he elect comfortably, does not appear to be in distress doing well from s respiratory standpoint .  Labs from this morning showing stable hemoglobin, mild thrombocytopenia, microcytosis, improving BUN and serum creatinine.  Minimally elevated troponin.  TSH was elevated.     Objective/physical exam:  Vitals:    01/05/23 0530 01/05/23 0549 01/05/23 0738 01/05/23 1142   BP:   (!) 154/105 (!) 143/103   Pulse: (!) 43  70 (!) 143   Resp: (!) 22  18    Temp:   98.5 °F (36.9 °C) 97.6 °F (36.4 °C)   TempSrc:   Oral Oral   SpO2: 100%  96% 98%   Weight:  85.3 kg (188 lb 0.8 oz)     Height:         General: In no acute distress, afebrile  Respiratory: Clear to auscultation bilaterally  Cardiovascular: S1, S2, no appreciable murmur  Abdomen: Soft, nontender, BS +  MSK: Warm, no lower extremity edema, no clubbing or cyanosis  Neurologic: Alert and oriented x4, moving all extremities with good strength     Lab Results   Component Value Date     01/05/2023    K 4.0 01/05/2023     07/21/2022    CO2 26 01/05/2023    BUN 36.6 (H) 01/05/2023    CREATININE 1.30 (H) 01/05/2023    CALCIUM 8.8 01/05/2023    ANIONGAP 9 07/21/2022    ESTGFRAFRICA >60 07/21/2022    EGFRNONAA >60 07/21/2022      Lab Results   Component Value Date    ALT 19 01/05/2023    AST 31 01/05/2023    ALKPHOS 98 01/05/2023    BILITOT 0.8 01/05/2023      Lab Results   Component Value Date    WBC 9.3 01/05/2023    HGB 9.8 (L) 01/05/2023    HCT 31.2 (L) 01/05/2023    MCV 70.7 (L) 01/05/2023     (L) 01/05/2023           Medications:   amiodarone  200 mg Oral Daily    apixaban  5 mg Oral BID    ARIPiprazole  10 mg Oral Daily    aspirin  81 mg Oral Daily    atorvastatin  80 mg Oral Daily    benztropine  1 mg Oral BID    famotidine  20 mg Oral BID    ferrous sulfate  1 tablet Oral Daily    [START ON 1/6/2023] furosemide  40 mg Oral Daily    isosorbide mononitrate  30 mg Oral Daily    levETIRAcetam  500 mg Oral BID    metoprolol tartrate  12.5 mg Oral  BID    OXcarbazepine  300 mg Oral TID    traZODone  200 mg Oral QHS      acetaminophen, albuterol-ipratropium, dextrose 10%, dextrose 10%, glucagon (human recombinant), glucose, glucose, hydrALAZINE, melatonin, naloxone, ondansetron, simethicone, sodium chloride 0.9%     Assessment/Plan:    Acute syncope with collapse- + LOC  Acute on chronic combined systolic and diastolic heart failure , EF 20-25% (ECHO 12.18.22)  Exertional dyspnea with SOB due to above  Chronic P AFib on Eliquis  Chest pain- resolved  NSTEMI type II  ANA  Abnormal TFTs  Iron-deficiency, microcytosis      HX:  CMO-status post LifeVest in place , EF 20-25% 12/18/2022, VHD, TR, MR, HTN, CAD s/p CABG, HLD, medication nonadherence, tobacco use, bipolar disorder, schizophrenia, seizures, CVA, hepatitis-C, substance abuse    Plan:   -CIS following.  Switch to p.o. Lasix.  Continue and o's, electrolytes.  Continue telemetry while inpatient.  Needs SUSIE monitor upon discharge.  Continue current beta-blocker dose.  ACE inhibitor on hold due to ANA  -GI was consulted for ascites and chronic hepatitis-C.  Follow up ultrasound abdomen  -ultrasound kidneys reviewed, showed no acute renal changes.  ANA observed.  Check FENA and further workup for ANA.  Hold nephrotoxic medications  -check T3 and T4.  TSH elevated.  Also check iron profile, stool for blood  -will review other home med      Negrito Hernandez MD

## 2023-01-05 NOTE — PROGRESS NOTES
"[facility]   Cardiology  Progress Note    Patient Name: Kendall Duff  MRN: [id number]  Admission Date: 1/3/2023  Hospital Length of Stay: 2 days  Code Status: Full Code   Attending Provider: Jael Calloway MD   Consulting Provider: Shane Vasques MD  Primary Care Physician: Primary Doctor No  Principal Problem:Acute chest pain    Patient information was obtained from patient, past medical records, and ER records.     Subjective:     Chief Complaint:  Reason for consult: CP & SOB, CHF     HPI:   Mr. Duff is a 57 year old male who is known to [facility], Dr. Wright. He presents to the ER with complains of syncope at home with associated sympotms of SOB, weakness, & fatigue. He also reports swelling to his face. Of note, he has presented to the ER multiple times with similar symptoms. He denies CP, palpitations, or discharges from his lifevest. Significant labs include Cr 1.94, BNP 3361.6, & trop 0.088. [facility] has been consulted to further manage the patient's CHF exacerbation.     1.5.23: NAD. HR 56 on tele. Reports "I don't feel good". Denies CP or palps but endorses SOB.      PMH: CAD, ICMO, HTN, schizoaffective disorder, hep C, DVT, systolic & diastolic CHF, Lifevest  PSH: CABG, LHC  Family History: Mother - CA; Father - liver disease   Social History: Current every day smoker. Former Cocaine Use. Denies alcohol use.     Previous Cardiac Diagnostics:   TTE (12.18.22):  Eccentric hypertrophy and severely decreased systolic function. The estimated ejection fraction is 20-25%.  Grade III left ventricular diastolic dysfunction.  Mild right ventricular enlargement with mildly reduced right ventricular systolic function.  Mild right atrial enlargement.  Moderate mitral regurgitation.  Severe tricuspid regurgitation.  The estimated PA systolic pressure is 33 mmHg.     Echocardiogram (10.28.22):  The left ventricle is moderately enlarged with mild eccentric hypertrophy and severely decreased systolic " function.  The estimated ejection fraction is 20%.  There is severe left ventricular global hypokinesis.  Grade II left ventricular diastolic dysfunction.  Normal right ventricular size with moderately reduced right ventricular systolic function.  Moderate mitral regurgitation.  Moderate tricuspid regurgitation.  There is mild pulmonary hypertension.  The estimated PA systolic pressure is 44 mmHg.  Elevated central venous pressure (15 mmHg).  Severe left atrial enlargement.     CABG x 4 (4/22):  LIMA-LAD, SVG-OM1, SVG-D1, SVG-PDA     Echocardiogram (6.16.22):  The left ventricle is normal in size w/ concentric hypertrophy and severely decreased systolic function.  The estimated EF is 25-30%.  There is severe left ventricular global hypokinesis.  Grade II left ventricular diastolic dysfunction.  Normal right ventricular size w/ mildly reduced right ventricular systolic function.  The estimated PA systolic pressure is 52 mmHg.  There is moderate pulmonary HTN.  Elevated CVP (15 mmHg).     RUE NIVA (5.10.22):  A deep vein thrombosis was identified in the right axillary and brachial veins. A superficial thrombosis was identified in the right basilic vein.     LHC (3.21.22):  100% LAD to 30-40% residual stenosis post PTCA.  Large diagonal system w/ severe stenosis.  CIRC - patent stent, OM1 patent, mild CIRC occluded  RCA - stents ISR 40-50%, distal 70-80%  EDP 12 EF 35-40% anterior HK     Review of patient's allergies indicates:   Allergen Reactions    Depakote [divalproex]     Divalproex sodium Other (See Comments)    Lithium     Lithium analogues     Quetiapine Other (See Comments)     Pt states had seizures       No current facility-administered medications on file prior to encounter.     Current Outpatient Medications on File Prior to Encounter   Medication Sig    amiodarone (PACERONE) 200 MG Tab Take 1 tablet (200 mg total) by mouth once daily.    apixaban (ELIQUIS) 5 mg Tab Take 1 tablet (5 mg total) by mouth 2  (two) times daily.    ARIPiprazole (ABILIFY) 10 MG Tab Take 1 tablet (10 mg total) by mouth once daily.    aspirin 81 MG Chew Chew and swallow 1 tablet (81 mg total) by mouth once daily.    atorvastatin (LIPITOR) 80 MG tablet Take 1 tablet (80 mg total) by mouth once daily.    benztropine (COGENTIN) 1 MG tablet Take 1 mg by mouth 2 (two) times daily.    famotidine (PEPCID) 20 MG tablet Take 1 tablet (20 mg total) by mouth 2 (two) times daily.    ferrous sulfate 325 (65 FE) MG EC tablet Take 1 tablet (325 mg total) by mouth once daily.    furosemide (LASIX) 40 MG tablet Take 1 tablet (40 mg total) by mouth once daily.    isosorbide mononitrate (IMDUR) 30 MG 24 hr tablet Take 1 tablet (30 mg total) by mouth once daily.    levETIRAcetam (KEPPRA) 500 MG Tab Take 1 tablet (500 mg total) by mouth 2 (two) times daily.    metoprolol tartrate (LOPRESSOR) 50 MG tablet Take 1 tablet (50 mg total) by mouth 2 (two) times daily.    OXcarbazepine (TRILEPTAL) 300 MG Tab Take 300 mg by mouth 3 (three) times daily.    sacubitriL-valsartan (ENTRESTO) 24-26 mg per tablet Take 1 tablet by mouth 2 (two) times daily.    traZODone (DESYREL) 100 MG tablet Take 1 tablet (100 mg total) by mouth every evening.    [DISCONTINUED] metoprolol succinate (TOPROL-XL) 25 MG 24 hr tablet Take 25 mg by mouth once daily.    [DISCONTINUED] pravastatin (PRAVACHOL) 40 MG tablet Take 1 tablet (40 mg total) by mouth every evening.     Family History       Problem Relation (Age of Onset)    Cancer Mother    Liver disease Father          Tobacco Use    Smoking status: Every Day     Types: Cigarettes    Smokeless tobacco: Never   Substance and Sexual Activity    Alcohol use: Never    Drug use: Not Currently     Types: Cocaine    Sexual activity: Not on file       Review of Systems   Constitutional:         Facial swelling   Respiratory:  Positive for shortness of breath.    Cardiovascular:  Negative for chest pain and palpitations.   All other systems reviewed  and are negative.    Objective:     Vital Signs (Most Recent):  Temp: 98.5 °F (36.9 °C) (01/05/23 0738)  Pulse: 70 (01/05/23 0738)  Resp: 18 (01/05/23 0738)  BP: (!) 154/105 (01/05/23 0738)  SpO2: 96 % (01/05/23 0738)   Vital Signs (24h Range):  Temp:  [97.2 °F (36.2 °C)-98.5 °F (36.9 °C)] 98.5 °F (36.9 °C)  Pulse:  [43-70] 70  Resp:  [18-22] 18  SpO2:  [96 %-100 %] 96 %  BP: (125-154)/() 154/105     Weight: 85.3 kg (188 lb 0.8 oz)  Body mass index is 29.45 kg/m².    SpO2: 96 %         Intake/Output Summary (Last 24 hours) at 1/5/2023 1137  Last data filed at 1/5/2023 0805  Gross per 24 hour   Intake --   Output 1525 ml   Net -1525 ml       Lines/Drains/Airways       Peripheral Intravenous Line  Duration                  Peripheral IV - Single Lumen 01/03/23 1435 20 G Anterior;Right Forearm 1 day                    Significant Labs:  Recent Results (from the past 72 hour(s))   COVID/FLU A&B PCR    Collection Time: 01/03/23 11:38 AM   Result Value Ref Range    Influenza A PCR Not Detected Not Detected    Influenza B PCR Not Detected Not Detected    SARS-CoV-2 PCR Not Detected Not Detected   Comprehensive metabolic panel    Collection Time: 01/03/23 11:40 AM   Result Value Ref Range    Sodium Level 139 136 - 145 mmol/L    Potassium Level 4.4 3.5 - 5.1 mmol/L    Chloride 103 98 - 107 mmol/L    Carbon Dioxide 27 22 - 29 mmol/L    Glucose Level 118 (H) 74 - 100 mg/dL    Blood Urea Nitrogen 42.3 (H) 8.4 - 25.7 mg/dL    Creatinine 1.94 (H) 0.73 - 1.18 mg/dL    Calcium Level Total 9.0 8.4 - 10.2 mg/dL    Protein Total 7.5 6.4 - 8.3 gm/dL    Albumin Level 3.7 3.5 - 5.0 g/dL    Globulin 3.8 (H) 2.4 - 3.5 gm/dL    Albumin/Globulin Ratio 1.0 (L) 1.1 - 2.0 ratio    Bilirubin Total 1.1 <=1.5 mg/dL    Alkaline Phosphatase 125 40 - 150 unit/L    Alanine Aminotransferase 20 0 - 55 unit/L    Aspartate Aminotransferase 35 (H) 5 - 34 unit/L    eGFR 39 mls/min/1.73/m2   Brain natriuretic peptide    Collection Time: 01/03/23 11:40  AM   Result Value Ref Range    Natriuretic Peptide 3,361.6 (H) <=100.0 pg/mL   Troponin I    Collection Time: 01/03/23 11:40 AM   Result Value Ref Range    Troponin-I 0.088 (H) 0.000 - 0.045 ng/mL   CBC with Differential    Collection Time: 01/03/23 11:40 AM   Result Value Ref Range    WBC 7.1 4.5 - 11.5 x10(3)/mcL    RBC 4.71 4.70 - 6.10 x10(6)/mcL    Hgb 10.6 (L) 14.0 - 18.0 gm/dL    Hct 34.2 (L) 42.0 - 52.0 %    MCV 72.6 (L) 80.0 - 94.0 fL    MCH 22.5 pg    MCHC 31.0 (L) 33.0 - 36.0 mg/dL    RDW 20.5 (H) 11.6 - 14.4 %    Platelet 117 (L) 140 - 371 x10(3)/mcL    MPV      Neut % 86.4 %    Lymph % 7.9 %    Mono % 4.7 %    Eos % 0.3 %    Basophil % 0.4 %    Lymph # 0.56 (L) 0.6 - 4.6 x10(3)/mcL    Neut # 6.12 2.1 - 9.2 x10(3)/mcL    Mono # 0.33 0.1 - 1.3 x10(3)/mcL    Eos # 0.02 0 - 0.9 x10(3)/mcL    Baso # 0.03 0 - 0.2 x10(3)/mcL    IG# 0.02 0 - 0.04 x10(3)/mcL    IG% 0.3 %    NRBC% 0.0 0 - 1 %   Troponin I    Collection Time: 01/03/23  6:04 PM   Result Value Ref Range    Troponin-I 0.069 (H) 0.000 - 0.045 ng/mL   Troponin I    Collection Time: 01/04/23 12:01 AM   Result Value Ref Range    Troponin-I 0.059 (H) 0.000 - 0.045 ng/mL   Comprehensive Metabolic Panel (CMP)    Collection Time: 01/04/23  4:34 AM   Result Value Ref Range    Sodium Level 139 136 - 145 mmol/L    Potassium Level 3.9 3.5 - 5.1 mmol/L    Chloride 102 98 - 107 mmol/L    Carbon Dioxide 26 22 - 29 mmol/L    Glucose Level 138 (H) 74 - 100 mg/dL    Blood Urea Nitrogen 43.3 (H) 8.4 - 25.7 mg/dL    Creatinine 1.70 (H) 0.73 - 1.18 mg/dL    Calcium Level Total 8.7 8.4 - 10.2 mg/dL    Protein Total 6.4 6.4 - 8.3 gm/dL    Albumin Level 3.3 (L) 3.5 - 5.0 g/dL    Globulin 3.1 2.4 - 3.5 gm/dL    Albumin/Globulin Ratio 1.1 1.1 - 2.0 ratio    Bilirubin Total 0.8 <=1.5 mg/dL    Alkaline Phosphatase 101 40 - 150 unit/L    Alanine Aminotransferase 18 0 - 55 unit/L    Aspartate Aminotransferase 30 5 - 34 unit/L    eGFR 46 mls/min/1.73/m2   CBC with Differential     Collection Time: 01/04/23  4:34 AM   Result Value Ref Range    WBC 6.7 4.5 - 11.5 x10(3)/mcL    RBC 4.43 (L) 4.70 - 6.10 x10(6)/mcL    Hgb 9.8 (L) 14.0 - 18.0 gm/dL    Hct 31.4 (L) 42.0 - 52.0 %    MCV 70.9 (L) 80.0 - 94.0 fL    MCH 22.1 pg    MCHC 31.2 (L) 33.0 - 36.0 mg/dL    RDW 20.4 (H) 11.6 - 14.4 %    Platelet 138 (L) 140 - 371 x10(3)/mcL    MPV 10.6 9.4 - 12.4 fL    Neut % 82.3 %    Lymph % 8.4 %    Mono % 8.8 %    Eos % 0.0 %    Basophil % 0.1 %    Lymph # 0.56 (L) 0.6 - 4.6 x10(3)/mcL    Neut # 5.51 2.1 - 9.2 x10(3)/mcL    Mono # 0.59 0.1 - 1.3 x10(3)/mcL    Eos # 0.00 0 - 0.9 x10(3)/mcL    Baso # 0.01 0 - 0.2 x10(3)/mcL    IG# 0.03 0 - 0.04 x10(3)/mcL    IG% 0.4 %    NRBC% 0.0 0 - 1 %   TSH    Collection Time: 01/04/23  4:24 PM   Result Value Ref Range    Thyroid Stimulating Hormone 20.748 (H) 0.350 - 4.940 uIU/mL   Comprehensive Metabolic Panel    Collection Time: 01/05/23  4:44 AM   Result Value Ref Range    Sodium Level 140 136 - 145 mmol/L    Potassium Level 4.0 3.5 - 5.1 mmol/L    Chloride 104 98 - 107 mmol/L    Carbon Dioxide 26 22 - 29 mmol/L    Glucose Level 109 (H) 74 - 100 mg/dL    Blood Urea Nitrogen 36.6 (H) 8.4 - 25.7 mg/dL    Creatinine 1.30 (H) 0.73 - 1.18 mg/dL    Calcium Level Total 8.8 8.4 - 10.2 mg/dL    Protein Total 6.6 6.4 - 8.3 gm/dL    Albumin Level 3.5 3.5 - 5.0 g/dL    Globulin 3.1 2.4 - 3.5 gm/dL    Albumin/Globulin Ratio 1.1 1.1 - 2.0 ratio    Bilirubin Total 0.8 <=1.5 mg/dL    Alkaline Phosphatase 98 40 - 150 unit/L    Alanine Aminotransferase 19 0 - 55 unit/L    Aspartate Aminotransferase 31 5 - 34 unit/L    eGFR 63 mls/min/1.73/m2   CBC with Differential    Collection Time: 01/05/23  4:44 AM   Result Value Ref Range    WBC 9.3 4.5 - 11.5 x10(3)/mcL    RBC 4.41 (L) 4.70 - 6.10 x10(6)/mcL    Hgb 9.8 (L) 14.0 - 18.0 gm/dL    Hct 31.2 (L) 42.0 - 52.0 %    MCV 70.7 (L) 80.0 - 94.0 fL    MCH 22.2 pg    MCHC 31.4 (L) 33.0 - 36.0 mg/dL    RDW 20.2 (H) 11.6 - 14.4 %    Platelet 132  (L) 140 - 371 x10(3)/mcL    MPV 10.5 9.4 - 12.4 fL    Neut % 80.4 %    Lymph % 10.6 %    Mono % 7.7 %    Eos % 0.5 %    Basophil % 0.4 %    Lymph # 0.99 0.6 - 4.6 x10(3)/mcL    Neut # 7.48 2.1 - 9.2 x10(3)/mcL    Mono # 0.72 0.1 - 1.3 x10(3)/mcL    Eos # 0.05 0 - 0.9 x10(3)/mcL    Baso # 0.04 0 - 0.2 x10(3)/mcL    IG# 0.04 0 - 0.04 x10(3)/mcL    IG% 0.4 %    NRBC% 0.0 0 - 1 %   Troponin I    Collection Time: 01/05/23  4:44 AM   Result Value Ref Range    Troponin-I 0.068 (H) 0.000 - 0.045 ng/mL       Significant Imaging:  Imaging Results              US Retroperitoneal Complete (Final result)  Result time 01/03/23 18:51:48      Final result by Sharon Cantu MD (01/03/23 18:51:48)                   Impression:      Normal sized, nonobstructed kidneys.    Free intraperitoneal fluid    Nodular surface contour of the liver.  Correlate for cirrhosis.  Thickened appearance of the gallbladder wall is nonspecific in the setting of free fluid and suspected liver disease.      Electronically signed by: Sharon Cantu  Date:    01/03/2023  Time:    18:51               Narrative:    EXAMINATION:  US RETROPERITONEAL COMPLETE    CLINICAL HISTORY:  elevated BUN/Creat;    TECHNIQUE:  Ultrasound of the kidneys and urinary bladder was performed including color flow and grayscale evaluation of the kidneys.    COMPARISON:  No relevant prior available for comparison.    FINDINGS:  RIGHT KIDNEY: The right kidney measures 10.5 cm.  No hydronephrosis. Incidental 1.5 cm cyst. No follow-up imaging is recommended as incidental lesions are likely benign.   No appreciable shadowing renal calculus.    LEFT KIDNEY: The left kidney measures 11.4 cm. No hydronephrosis.Incidental 1.7 cm renal cyst. No follow-up imaging is recommended as incidental lesions are likely benign.   No appreciable shadowing renal calculus.    BLADDER: Bladder is collapsed, limiting evaluation.    OTHER: Free intraperitoneal fluid in the upper abdomen.  Irregular  surface contour of the liver.  Thickened appearance of the gallbladder wall.                                       X-Ray Chest 1 View (Final result)  Result time 01/03/23 12:51:46      Final result by Javier Soto MD (01/03/23 12:51:46)                   Impression:      No acute findings identified.      Electronically signed by: Javier Soto  Date:    01/03/2023  Time:    12:51               Narrative:    EXAMINATION:  XR CHEST 1 VIEW    CLINICAL HISTORY:  Dyspnea, unspecified    TECHNIQUE:  One view    COMPARISON:  December 28, 2022.    FINDINGS:  Cardiopericardial silhouette enlarged appearance is similar.  Mediastinum and the lungs are partially obscured by overlying devices.  Lungs hypoventilatory changes without convincing pulmonary edema or dense consolidation.  No significant fluid within the pleural spaces.                                      EKG:        Telemetry:  SR 60's    Physical Exam  Constitutional:       Comments: Swollen face and chest   HENT:      Head: Normocephalic.      Mouth/Throat:      Mouth: Mucous membranes are dry.   Eyes:      Extraocular Movements: Extraocular movements intact.   Cardiovascular:      Rate and Rhythm: Normal rate and regular rhythm.      Pulses: Normal pulses.      Heart sounds: Normal heart sounds.   Pulmonary:      Comments: Audible wheezing  Abdominal:      Palpations: Abdomen is soft.   Skin:     General: Skin is warm and dry.   Neurological:      Mental Status: He is alert and oriented to person, place, and time.   Psychiatric:         Behavior: Behavior normal.       Home Medications:   No current facility-administered medications on file prior to encounter.     Current Outpatient Medications on File Prior to Encounter   Medication Sig Dispense Refill    amiodarone (PACERONE) 200 MG Tab Take 1 tablet (200 mg total) by mouth once daily. 30 tablet 6    apixaban (ELIQUIS) 5 mg Tab Take 1 tablet (5 mg total) by mouth 2 (two) times daily. 60 tablet 3     ARIPiprazole (ABILIFY) 10 MG Tab Take 1 tablet (10 mg total) by mouth once daily. 30 tablet 11    aspirin 81 MG Chew Chew and swallow 1 tablet (81 mg total) by mouth once daily. 360 tablet 0    atorvastatin (LIPITOR) 80 MG tablet Take 1 tablet (80 mg total) by mouth once daily. 90 tablet 3    benztropine (COGENTIN) 1 MG tablet Take 1 mg by mouth 2 (two) times daily.      famotidine (PEPCID) 20 MG tablet Take 1 tablet (20 mg total) by mouth 2 (two) times daily. 60 tablet 11    ferrous sulfate 325 (65 FE) MG EC tablet Take 1 tablet (325 mg total) by mouth once daily. 30 tablet 0    furosemide (LASIX) 40 MG tablet Take 1 tablet (40 mg total) by mouth once daily. 30 tablet 11    isosorbide mononitrate (IMDUR) 30 MG 24 hr tablet Take 1 tablet (30 mg total) by mouth once daily. 30 tablet 11    levETIRAcetam (KEPPRA) 500 MG Tab Take 1 tablet (500 mg total) by mouth 2 (two) times daily. 60 tablet 11    metoprolol tartrate (LOPRESSOR) 50 MG tablet Take 1 tablet (50 mg total) by mouth 2 (two) times daily. 60 tablet 0    OXcarbazepine (TRILEPTAL) 300 MG Tab Take 300 mg by mouth 3 (three) times daily.      sacubitriL-valsartan (ENTRESTO) 24-26 mg per tablet Take 1 tablet by mouth 2 (two) times daily. 60 tablet 0    traZODone (DESYREL) 100 MG tablet Take 1 tablet (100 mg total) by mouth every evening. 30 tablet 11    [DISCONTINUED] metoprolol succinate (TOPROL-XL) 25 MG 24 hr tablet Take 25 mg by mouth once daily.      [DISCONTINUED] pravastatin (PRAVACHOL) 40 MG tablet Take 1 tablet (40 mg total) by mouth every evening. 90 tablet 3       Current Inpatient Medications:    Current Facility-Administered Medications:     acetaminophen tablet 650 mg, 650 mg, Oral, Q4H PRN, Lina Bahena, FNP    albuterol-ipratropium 2.5 mg-0.5 mg/3 mL nebulizer solution 3 mL, 3 mL, Nebulization, Q4H PRN, Amelia Bone, AGAMENDYP-BC, 3 mL at 01/05/23 0530    amiodarone tablet 200 mg, 200 mg, Oral, Daily, Lina Bahena, FNP, 200 mg at  01/05/23 0921    apixaban tablet 5 mg, 5 mg, Oral, BID, Lina Bahena, FNP, 5 mg at 01/05/23 0921    ARIPiprazole tablet 10 mg, 10 mg, Oral, Daily, Lina Bahena, FNP, 10 mg at 01/05/23 0921    aspirin chewable tablet 81 mg, 81 mg, Oral, Daily, Lina Bahena, FNP, 81 mg at 01/05/23 0921    atorvastatin tablet 80 mg, 80 mg, Oral, Daily, Lina Bahena, FNP, 80 mg at 01/05/23 0921    benztropine tablet 1 mg, 1 mg, Oral, BID, Lina Bahena, FNP, 1 mg at 01/05/23 0921    dextrose 10% bolus 125 mL 125 mL, 12.5 g, Intravenous, PRN, Lina Bahena, FNP    dextrose 10% bolus 250 mL 250 mL, 25 g, Intravenous, PRN, Lina Bahena, FNP    famotidine tablet 20 mg, 20 mg, Oral, BID, Lina Bahena, FNP, 20 mg at 01/05/23 0932    ferrous sulfate tablet 1 each, 1 tablet, Oral, Daily, Lina Bahena, WILLYP, 1 each at 01/05/23 0921    [START ON 1/6/2023] furosemide tablet 40 mg, 40 mg, Oral, Daily, RAY Chilel    glucagon (human recombinant) injection 1 mg, 1 mg, Intramuscular, PRN, WILLY OlguinP    glucose chewable tablet 16 g, 16 g, Oral, PRN, Lina Bahena, FNP    glucose chewable tablet 24 g, 24 g, Oral, PRN, Lina Bahena, FNP    hydrALAZINE injection 10 mg, 10 mg, Intravenous, Q4H PRN, MAXWELL Lewis-BC, 10 mg at 01/05/23 0406    isosorbide mononitrate 24 hr tablet 30 mg, 30 mg, Oral, Daily, Lina Bahena, FNP, 30 mg at 01/05/23 0921    levETIRAcetam tablet 500 mg, 500 mg, Oral, BID, RAY Olguin, 500 mg at 01/05/23 0921    melatonin tablet 6 mg, 6 mg, Oral, Nightly PRN, RAY Olguin, 6 mg at 01/04/23 2006    metoprolol tartrate (LOPRESSOR) split tablet 12.5 mg, 12.5 mg, Oral, BID, Jael Calloway MD, 12.5 mg at 01/05/23 0921    naloxone 0.4 mg/mL injection 0.02 mg, 0.02 mg, Intravenous, PRN, RAY Olguin    ondansetron injection 4 mg, 4 mg, Intravenous, Q6H PRN,  Lina REYNA Josef, FNP    OXcarbazepine tablet 300 mg, 300 mg, Oral, TID, Lina CervantesMadrid, FNP, 300 mg at 01/05/23 0921    simethicone chewable tablet 80 mg, 1 tablet, Oral, QID PRN, Lina REYNA Josef, FNP    sodium chloride 0.9% flush 10 mL, 10 mL, Intravenous, Q12H PRN, Lina PozoYumiko FNP    traZODone tablet 200 mg, 200 mg, Oral, QHS, Lina CervantesMadrid, FNP, 200 mg at 01/04/23 2006         VTE Risk Mitigation (From admission, onward)           Ordered     apixaban tablet 5 mg  2 times daily         01/03/23 1801     IP VTE HIGH RISK PATIENT  Once         01/03/23 1522     Place sequential compression device  Until discontinued         01/03/23 1522                    Assessment:   Syncope w/ collapse  Acute on Chronic Combined Systolic/Diastolic HF    - Grade III DD    - EF 20-25% (ECHO 12.18.22)  NSTEMI - suspect type 2 in the setting of CHF exacerbation   PAF/PAFL (Recent New Diagnosis)- Now [place]/SR    - [id number]: 5 (LVSD/HTN/TE/NSTEMI)    - On Eliquis  ANA   Chronic Anemia - Stable  ICMO     - With Life Vest   VHD    - Moderate MR and Severe TR  Hypertension  Pulmonary Hypertension  CAD/CABG (April 2022)    - LIMA-LAD, SVG-OM1, SVG-D1, SVG-PDA  History of RUE DVT (5.10.22)    - DVT in right axillary and brachial veins. A superficial thrombosis was identified in the right basilic vein.  Schizoaffective Disorder  Hx of Hep C  Hx of Cocaine Abuse   Nicotine Dependence (Tobacco Use)  Medical noncopliance      Plan:   Pt appears euvolemic on examination. Transition to oral lasix 40 mg.   Monitor for bradycardia on tele.   Continue ASA, statin, & BB (with hold parameters).  Continue Eliquis for CVA prophylaxis in the setting of PAF.   Hold Ace/Arb/Arni in the setting of ANA.   GI following for possible ascites.  Pt will need MCT x 2 weeks on discharge.     Shane Vasques MD  Cardiology  [facility] - Emergency Dept  01/05/2023 9:02 AM

## 2023-01-05 NOTE — CONSULTS
Gastroenterology Consultation Note    Reason for Consult:  Concern for Ascites  PCP:   Primary Doctor No  The documentation recorded by the scribe/NP accurately reflects the service I personally performed and the decisions made by me.   57-year-old black male known to Dr. Castillo with bipolar disorder schizophrenia chronic hepatitis-C COPD CABG CAD previous stroke who was admitted with shortness of breath weakness and fatigue.  We are consulted because of possible ascites.  Patient was recently discovered to have hepatitis-C which has not been treated yet.  He had a previous PEG by Dr. Casitllo and this it was removed after he began eating well.  On physical exam the patient is alert he is slightly dyspneic and breathes with exhaling through pursed lips.  He has a barrel shaped chest.  The heart was a regular rate and rhythm lungs reveal decreased breath sounds throughout the abdomen is soft nontender the masses bowel sounds are normal extremities reveal no edema laboratory studies revealed platelet count is [id number].  Abdominal ultrasound reveals a nodular liver with trace ascites.  Impression is chronic hepatitis-C with probable cirrhosis recommendation is to refer him to Infectious Disease at [facility] for treatment of the hepatitis-C.  He will likely need liver biopsy or FibroScan prior to that to establish whether he does have cirrhosis which would alter his length of treatment    Jacque F Noel Iii, MD [facility]     History of Present Illness (HPI):  57 year old male known to Dr. Castillo (inpatient setting only) with Hx bipolar disorder, schizophrenia, chronic hepatitis-C, HTN, HLD, seizures, CVA, history of substance abuse, CMO with life vest; CAD s/p CABG who presented to the ED with syncope,SOB, weakness, and fatigue.  GI consulted due to concern for ascites.     On arrival, patient with saturation 94% on room air but otherwise stable with H&H 10.6/34.2. Labs significant for platelet 117,  BUN/Cr 42.3/1.94, BNP 3361.6, and elevated troponin of 0.069. His oxygen dropped to 90% and he was placed on 2L NC. Patient received 80 mg Lasix IV push, 125 mg of Solu-Medrol, 2 g magnesium sulfate, and an hour long nebulizer treatment in the ED prior to be admitted for further management.       Previous records reviewed.     Patient had PEG tube placement 05/03/2022 by Dr. Castillo secondary to dysphagia. PEG was then successfully removed by Dr. Correia after patient was cleared by speech for PO intake.     On exam, patient initially on RA showing signs of SOB with wheezing, placed on 2L O2 with improvement noted . O2 sat 96% at this time. He is endorsing upper right chest pain and states he has been SOB since his arrival, this not being an acute change.   He denies abdominal pain, N/V.   Patient states he was newly diagnosed with chronic hepatitis C a few months ago at [facility]. He denies alcohol use.     Spoke to nursing staff regarding patient care and his breathing at baseline. He has been endorsing chest pain, SOB since admission.  History of bipolar disorder and schizophrenia noted. I went back to check on the patient a few minutes after putting him on 2L and noted continued wheezing, but with improvement and he was no longer SOB.   Decreased to 1L and monitored his breathing which seemed stable. His O2 sat was 100% on 1L prior to me leaving the room and he had no complaints at that time.       ROS:  Review of Systems   Constitutional:  Positive for malaise/fatigue. Negative for chills and fever.   Respiratory:  Positive for shortness of breath and wheezing. Negative for hemoptysis.    Gastrointestinal:  Negative for abdominal pain, blood in stool and melena.   Skin:  Negative for rash.   Neurological:  Positive for weakness. Negative for seizures.     Medical History:   Past Medical History:   Diagnosis Date    Bipolar disorder, unspecified     Chronic hepatitis C     History of psychiatric  hospitalization     Hx of psychiatric care     Hypertension     Bessie     Obesity, unspecified     Psychiatric problem     Schizoaffective disorder, bipolar type     Seizures     Stroke     Substance abuse     Therapy        Surgical History:   Past Surgical History:   Procedure Laterality Date    CORONARY STENT PLACEMENT  08/14/2015    DEBRIDEMENT  04/17/2022    LEFT HEART CATHETERIZATION Left 08/13/2015    REPEAT CLOSURE OF STERNAL INCISION N/A 5/11/2022    Procedure: CLOSURE, STERNAL INCISION, REPEAT;  Surgeon: Adolfo Weiss MD;  Location: [facility] OR;  Service: Plastics;  Laterality: N/A;  STERNAL WOUND DEBRIDEMENT AND RECONSTRUCTION // MULTIPLE MUSCLE FLAPS // REQ 1400       Family History:   Family History   Problem Relation Age of Onset    Cancer Mother     Liver disease Father    .     Social History:   Social History     Tobacco Use    Smoking status: Every Day     Types: Cigarettes    Smokeless tobacco: Never   Substance Use Topics    Alcohol use: Never       Allergies:  Review of patient's allergies indicates:   Allergen Reactions    Depakote [divalproex]     Divalproex sodium Other (See Comments)    Lithium     Lithium analogues     Quetiapine Other (See Comments)     Pt states had seizures       Medications Prior to Admission   Medication Sig Dispense Refill Last Dose    amiodarone (PACERONE) 200 MG Tab Take 1 tablet (200 mg total) by mouth once daily. 30 tablet 6     apixaban (ELIQUIS) 5 mg Tab Take 1 tablet (5 mg total) by mouth 2 (two) times daily. 60 tablet 3     ARIPiprazole (ABILIFY) 10 MG Tab Take 1 tablet (10 mg total) by mouth once daily. 30 tablet 11     aspirin 81 MG Chew Chew and swallow 1 tablet (81 mg total) by mouth once daily. 360 tablet 0     atorvastatin (LIPITOR) 80 MG tablet Take 1 tablet (80 mg total) by mouth once daily. 90 tablet 3     benztropine (COGENTIN) 1 MG tablet Take 1 mg by mouth 2 (two) times daily.       famotidine (PEPCID) 20 MG tablet Take 1 tablet (20 mg total) by mouth  "2 (two) times daily. 60 tablet 11     ferrous sulfate 325 (65 FE) MG EC tablet Take 1 tablet (325 mg total) by mouth once daily. 30 tablet 0     furosemide (LASIX) 40 MG tablet Take 1 tablet (40 mg total) by mouth once daily. 30 tablet 11     isosorbide mononitrate (IMDUR) 30 MG 24 hr tablet Take 1 tablet (30 mg total) by mouth once daily. 30 tablet 11     levETIRAcetam (KEPPRA) 500 MG Tab Take 1 tablet (500 mg total) by mouth 2 (two) times daily. 60 tablet 11     metoprolol tartrate (LOPRESSOR) 50 MG tablet Take 1 tablet (50 mg total) by mouth 2 (two) times daily. 60 tablet 0     OXcarbazepine (TRILEPTAL) 300 MG Tab Take 300 mg by mouth 3 (three) times daily.       sacubitriL-valsartan (ENTRESTO) 24-26 mg per tablet Take 1 tablet by mouth 2 (two) times daily. 60 tablet 0     traZODone (DESYREL) 100 MG tablet Take 1 tablet (100 mg total) by mouth every evening. 30 tablet 11          Objective Findings:    Vital Signs:  BP (!) 154/105   Pulse 70   Temp 98.5 °F (36.9 °C) (Oral)   Resp 18   Ht 5' 7.01" (1.702 m)   Wt 85.3 kg (188 lb 0.8 oz)   SpO2 96%   BMI 29.45 kg/m²   Body mass index is 29.45 kg/m².    Physical Exam:  Physical Exam  Constitutional:       Appearance: He is ill-appearing.   HENT:      Head: Normocephalic.      Nose: Nose normal.      Comments: NC in place, on 1L  Eyes:      General: No scleral icterus.     Extraocular Movements: Extraocular movements intact.   Cardiovascular:      Rate and Rhythm: Normal rate.   Pulmonary:      Breath sounds: Wheezing present.      Comments: Increase in WOB with wheezing noted, SOB  Abdominal:      General: Abdomen is flat. Bowel sounds are normal. There is no distension.      Tenderness: There is no abdominal tenderness. There is no guarding.   Musculoskeletal:         General: No deformity.   Skin:     Coloration: Skin is not jaundiced or pale.   Neurological:      Mental Status: He is alert.       Labs:  Recent Results (from the past 24 hour(s))   TSH    " Collection Time: 01/04/23  4:24 PM   Result Value Ref Range    Thyroid Stimulating Hormone 20.748 (H) 0.350 - 4.940 uIU/mL   Comprehensive Metabolic Panel    Collection Time: 01/05/23  4:44 AM   Result Value Ref Range    Sodium Level 140 136 - 145 mmol/L    Potassium Level 4.0 3.5 - 5.1 mmol/L    Chloride 104 98 - 107 mmol/L    Carbon Dioxide 26 22 - 29 mmol/L    Glucose Level 109 (H) 74 - 100 mg/dL    Blood Urea Nitrogen 36.6 (H) 8.4 - 25.7 mg/dL    Creatinine 1.30 (H) 0.73 - 1.18 mg/dL    Calcium Level Total 8.8 8.4 - 10.2 mg/dL    Protein Total 6.6 6.4 - 8.3 gm/dL    Albumin Level 3.5 3.5 - 5.0 g/dL    Globulin 3.1 2.4 - 3.5 gm/dL    Albumin/Globulin Ratio 1.1 1.1 - 2.0 ratio    Bilirubin Total 0.8 <=1.5 mg/dL    Alkaline Phosphatase 98 40 - 150 unit/L    Alanine Aminotransferase 19 0 - 55 unit/L    Aspartate Aminotransferase 31 5 - 34 unit/L    eGFR 63 mls/min/1.73/m2   CBC with Differential    Collection Time: 01/05/23  4:44 AM   Result Value Ref Range    WBC 9.3 4.5 - 11.5 x10(3)/mcL    RBC 4.41 (L) 4.70 - 6.10 x10(6)/mcL    Hgb 9.8 (L) 14.0 - 18.0 gm/dL    Hct 31.2 (L) 42.0 - 52.0 %    MCV 70.7 (L) 80.0 - 94.0 fL    MCH 22.2 pg    MCHC 31.4 (L) 33.0 - 36.0 mg/dL    RDW 20.2 (H) 11.6 - 14.4 %    Platelet 132 (L) 140 - 371 x10(3)/mcL    MPV 10.5 9.4 - 12.4 fL    Neut % 80.4 %    Lymph % 10.6 %    Mono % 7.7 %    Eos % 0.5 %    Basophil % 0.4 %    Lymph # 0.99 0.6 - 4.6 x10(3)/mcL    Neut # 7.48 2.1 - 9.2 x10(3)/mcL    Mono # 0.72 0.1 - 1.3 x10(3)/mcL    Eos # 0.05 0 - 0.9 x10(3)/mcL    Baso # 0.04 0 - 0.2 x10(3)/mcL    IG# 0.04 0 - 0.04 x10(3)/mcL    IG% 0.4 %    NRBC% 0.0 0 - 1 %   Troponin I    Collection Time: 01/05/23  4:44 AM   Result Value Ref Range    Troponin-I 0.068 (H) 0.000 - 0.045 ng/mL       US Retroperitoneal Complete   Final Result      Normal sized, nonobstructed kidneys.      Free intraperitoneal fluid      Nodular surface contour of the liver.  Correlate for cirrhosis.  Thickened appearance of  the gallbladder wall is nonspecific in the setting of free fluid and suspected liver disease.         Electronically signed by: Sharon Cantu   Date:    01/03/2023   Time:    18:51      X-Ray Chest 1 View   Final Result      No acute findings identified.         Electronically signed by: Javier Soto   Date:    01/03/2023   Time:    12:51            Assessment/Plan:  1. Abdominal pain    2. Dyspnea    3. CHF (congestive heart failure)    4. Chest pain    5. Abnormal heart rhythm    6. Risk and functional assessment      57 year old male known to Dr. Castillo (inpatient setting only) with Hx bipolar disorder, schizophrenia, chronic hepatitis-C, HTN, HLD, seizures, CVA, history of substance abuse, CMO with life vest; CAD s/p CABG who presented to the ED with SOB, weakness, and fatigue.  GI consulted due to concern for ascites     Hx chronic hep C  - per patient report, he was diagnosed a few months ago at [facility]   - retroperitoneal US noted to show nodular surface contour of the liver- correlate for cirrhosis. Thickened appearance of the gallbladder wall is nonspecific in the setting of free fluid and suspected liver disease  - will order abomdinal ultrasound   Anemia of chronic disease, stable  - 10.6/34.2--9.8/31.4--9.8/31.2  - continue to trend and transfuse as needed  3. HF, EF 20-25% on echo 12/18/2022  4. PAF  -on eliquis   5. S/p CAD/CABG 2022  6. Hx RUE DVT  7. Hx cocaine abuse        Henna Castillo PA-C  Gastroenterology  [facility]     Thank you for allowing us to participate in the care of Kendall Duff.

## 2023-01-05 NOTE — PLAN OF CARE
Problem: Adult Inpatient Plan of Care  Goal: Plan of Care Review  Outcome: Ongoing, Progressing  Goal: Patient-Specific Goal (Individualized)  Outcome: Ongoing, Progressing  Goal: Absence of Hospital-Acquired Illness or Injury  Outcome: Ongoing, Progressing  Intervention: Identify and Manage Fall Risk  Flowsheets (Taken 1/5/2023 1600)  Safety Promotion/Fall Prevention:   assistive device/personal item within reach   high risk medications identified  Intervention: Prevent Skin Injury  Flowsheets (Taken 1/5/2023 1600)  Body Position:   position changed independently   sitting up in bed  Skin Protection:   adhesive use limited   tubing/devices free from skin contact  Goal: Optimal Comfort and Wellbeing  Outcome: Ongoing, Progressing  Intervention: Monitor Pain and Promote Comfort  Flowsheets (Taken 1/5/2023 1600)  Pain Management Interventions:   care clustered   quiet environment facilitated   relaxation techniques promoted   position adjusted  Intervention: Provide Person-Centered Care  Flowsheets (Taken 1/5/2023 1600)  Trust Relationship/Rapport:   care explained   questions answered   questions encouraged   reassurance provided  Goal: Readiness for Transition of Care  Outcome: Ongoing, Progressing

## 2023-01-05 NOTE — PROGRESS NOTES
"Inpatient Nutrition Evaluation    Admit Date: 1/3/2023   Total duration of encounter: 2 days    Nutrition Recommendation/Prescription     Continue oral diet as tolerated.    Nutrition Assessment     Chart Review    Reason Seen: continuous nutrition monitoring    Malnutrition Screening Tool Results   Have you recently lost weight without trying?: No  Have you been eating poorly because of a decreased appetite?: No   MST Score: 0     Diagnosis:  Acute syncope with collapse- + LOC  Acute on chronic combined systolic and diastolic heart failure , EF 20-25% (ECHO 12.18.22)  Exertional dyspnea with SOB due to above  Chronic P AFib on Eliquis  Chest pain- resolved  NSTEMI type II  ANA  Abnormal TFTs  Iron-deficiency, microcytosis     Relevant Medical History: bipolar disorder, schizophrenia, chronic hepatitis-C, HTN, HLD, seizures, CVA, history of substance abuse, CMO with life vest; CAD s/p CABG;    Nutrition-Related Medications: famotidine, ferrous sulfate, furosemide    Nutrition-Related Labs:  1/5/23 Glu 109    Diet Order: Diet heart healthy  Oral Supplement Order: none  Appetite/Oral Intake: good/not applicable  Factors Affecting Nutritional Intake: none identified  Food/Scientologist/Cultural Preferences: none reported  Food Allergies: none reported       Wound(s):   N/A    Comments    1/5/23 Patient confused during rounds, no decreased appetite/intake per MST.    Anthropometrics    Height: 5' 7.01" (170.2 cm) Height Method: Stated  Last Weight: 85.3 kg (188 lb 0.8 oz) (01/05/23 0549) Weight Method: Standard Scale  BMI (Calculated): 29.4  BMI Classification: overweight (BMI 25-29.9)        Ideal Body Weight (IBW), Male: 148.06 lb     % Ideal Body Weight, Male (lb): 129.54 %                          Usual Weight Provided By: patient denies unintentional weight loss    Wt Readings from Last 3 Encounters:   01/05/23 0549 85.3 kg (188 lb 0.8 oz)   01/04/23 1756 87 kg (191 lb 12.8 oz)   12/31/22 0400 84.1 kg (185 lb 6.5 oz) "   12/28/22 1754 89.6 kg (197 lb 8.5 oz)   12/27/22 2359 81.6 kg (180 lb)   12/25/22 0600 82.2 kg (181 lb 3.5 oz)   12/23/22 0558 80.9 kg (178 lb 5.6 oz)   12/23/22 0500 80.9 kg (178 lb 5.6 oz)   12/22/22 0600 83.1 kg (183 lb 3.2 oz)   12/22/22 0500 83.1 kg (183 lb 3.2 oz)   12/21/22 1937 81 kg (178 lb 9.2 oz)   12/21/22 1142 81.6 kg (180 lb)      Weight Change(s) Since Admission:  Admit Weight: 87 kg (191 lb 12.8 oz) (01/04/23 1756)  85.3 kg (1/5/23)    Patient Education    Not applicable.    Monitoring & Evaluation     Dietitian will monitor food and beverage intake.  Nutrition Risk/Follow-Up: low (follow-up in 5-7 days)  Patients assigned 'low nutrition risk' status do not qualify for a full nutritional assessment but will be monitored and re-evaluated in a 5-7 day time period. Please consult if re-evaluation needed sooner.

## 2023-01-06 LAB
ALBUMIN SERPL-MCNC: 3.5 G/DL (ref 3.5–5)
ALBUMIN/GLOB SERPL: 1.1 RATIO (ref 1.1–2)
ALP SERPL-CCNC: 102 UNIT/L (ref 40–150)
ALT SERPL-CCNC: 19 UNIT/L (ref 0–55)
ANISOCYTOSIS BLD QL SMEAR: ABNORMAL
AST SERPL-CCNC: 33 UNIT/L (ref 5–34)
BASOPHILS # BLD AUTO: 0.04 X10(3)/MCL (ref 0–0.2)
BASOPHILS NFR BLD AUTO: 0.4 %
BILIRUBIN DIRECT+TOT PNL SERPL-MCNC: 1.1 MG/DL
BUN SERPL-MCNC: 23.6 MG/DL (ref 8.4–25.7)
CALCIUM SERPL-MCNC: 9.1 MG/DL (ref 8.4–10.2)
CHLORIDE SERPL-SCNC: 107 MMOL/L (ref 98–107)
CO2 SERPL-SCNC: 25 MMOL/L (ref 22–29)
CREAT SERPL-MCNC: 1.07 MG/DL (ref 0.73–1.18)
EOSINOPHIL # BLD AUTO: 0.06 X10(3)/MCL (ref 0–0.9)
EOSINOPHIL NFR BLD AUTO: 0.6 %
ERYTHROCYTE [DISTWIDTH] IN BLOOD BY AUTOMATED COUNT: 21.1 % (ref 11.6–14.4)
FERRITIN SERPL-MCNC: 48.98 NG/ML (ref 21.81–274.66)
FOLATE SERPL-MCNC: 12.6 NG/ML (ref 7–31.4)
GFR SERPLBLD CREATININE-BSD FMLA CKD-EPI: 80 MLS/MIN/1.73/M2
GLOBULIN SER-MCNC: 3.3 GM/DL (ref 2.4–3.5)
GLUCOSE SERPL-MCNC: 110 MG/DL (ref 74–100)
HCT VFR BLD AUTO: 34 % (ref 42–52)
HGB BLD-MCNC: 10.4 GM/DL (ref 14–18)
IMM GRANULOCYTES # BLD AUTO: 0.03 X10(3)/MCL (ref 0–0.04)
IMM GRANULOCYTES NFR BLD AUTO: 0.3 %
IRON SATN MFR SERPL: 6 % (ref 20–50)
IRON SERPL-MCNC: 22 UG/DL (ref 65–175)
LEFT CCA DIST SYS: 36 CM/S
LEFT CCA PROX SYS: 42 CM/S
LEFT ECA SYS: 66 CM/S
LEFT ICA DIST DIAS: 13 CM/S
LEFT ICA DIST SYS: 44 CM/S
LEFT ICA MID DIAS: 18 CM/S
LEFT ICA MID SYS: 55 CM/S
LEFT ICA PROX DIAS: 12 CM/S
LEFT ICA PROX SYS: 39 CM/S
LEFT VERTEBRAL DIAS: 10 CM/S
LEFT VERTEBRAL SYS: 29 CM/S
LYMPHOCYTES # BLD AUTO: 0.94 X10(3)/MCL (ref 0.6–4.6)
LYMPHOCYTES NFR BLD AUTO: 9.1 %
MAGNESIUM SERPL-MCNC: 2.1 MG/DL (ref 1.6–2.6)
MCH RBC QN AUTO: 22.3 PG
MCHC RBC AUTO-ENTMCNC: 30.6 MG/DL (ref 33–36)
MCV RBC AUTO: 72.8 FL (ref 80–94)
MICROCYTES BLD QL SMEAR: ABNORMAL
MONOCYTES # BLD AUTO: 0.87 X10(3)/MCL (ref 0.1–1.3)
MONOCYTES NFR BLD AUTO: 8.4 %
NEUTROPHILS # BLD AUTO: 8.43 X10(3)/MCL (ref 2.1–9.2)
NEUTROPHILS NFR BLD AUTO: 81.2 %
NRBC BLD AUTO-RTO: 0 % (ref 0–1)
OHS CV CAROTID RIGHT ICA EDV HIGHEST: 39
OHS CV CAROTID ULTRASOUND LEFT ICA/CCA RATIO: 1.53
OHS CV CAROTID ULTRASOUND RIGHT ICA/CCA RATIO: 1.43
OHS CV PV CAROTID LEFT HIGHEST CCA: 42
OHS CV PV CAROTID LEFT HIGHEST ICA: 55
OHS CV PV CAROTID RIGHT HIGHEST CCA: 49
OHS CV PV CAROTID RIGHT HIGHEST ICA: 66
OHS CV US CAROTID LEFT HIGHEST EDV: 18
PLATELET # BLD AUTO: 139 X10(3)/MCL (ref 140–371)
PLATELET # BLD EST: NORMAL 10*3/UL
PMV BLD AUTO: 10.6 FL (ref 9.4–12.4)
POIKILOCYTOSIS BLD QL SMEAR: ABNORMAL
POTASSIUM SERPL-SCNC: 4.1 MMOL/L (ref 3.5–5.1)
PROT SERPL-MCNC: 6.8 GM/DL (ref 6.4–8.3)
RBC # BLD AUTO: 4.67 X10(6)/MCL (ref 4.7–6.1)
RBC MORPH BLD: ABNORMAL
RIGHT CCA DIST DIAS: 9 CM/S
RIGHT CCA DIST SYS: 46 CM/S
RIGHT CCA PROX DIAS: 11 CM/S
RIGHT CCA PROX SYS: 49 CM/S
RIGHT ECA DIAS: 12 CM/S
RIGHT ECA SYS: 58 CM/S
RIGHT ICA DIST DIAS: 39 CM/S
RIGHT ICA DIST SYS: 66 CM/S
RIGHT ICA MID DIAS: 15 CM/S
RIGHT ICA MID SYS: 42 CM/S
RIGHT ICA PROX DIAS: 9 CM/S
RIGHT ICA PROX SYS: 35 CM/S
RIGHT VERTEBRAL SYS: 23 CM/S
SCHISTOCYTE (OLG): ABNORMAL
SODIUM SERPL-SCNC: 139 MMOL/L (ref 136–145)
TARGETS BLD QL SMEAR: ABNORMAL
TIBC SERPL-MCNC: 361 UG/DL (ref 69–240)
TIBC SERPL-MCNC: 383 UG/DL (ref 250–450)
TRANSFERRIN SERPL-MCNC: 366 MG/DL (ref 174–364)
VIT B12 SERPL-MCNC: 952 PG/ML (ref 213–816)
WBC # SPEC AUTO: 10.4 X10(3)/MCL (ref 4.5–11.5)

## 2023-01-06 PROCEDURE — 82746 ASSAY OF FOLIC ACID SERUM: CPT | Performed by: INTERNAL MEDICINE

## 2023-01-06 PROCEDURE — 85025 COMPLETE CBC W/AUTO DIFF WBC: CPT | Performed by: INTERNAL MEDICINE

## 2023-01-06 PROCEDURE — 83735 ASSAY OF MAGNESIUM: CPT | Performed by: INTERNAL MEDICINE

## 2023-01-06 PROCEDURE — 25000003 PHARM REV CODE 250

## 2023-01-06 PROCEDURE — 25000003 PHARM REV CODE 250: Performed by: NURSE PRACTITIONER

## 2023-01-06 PROCEDURE — 25000242 PHARM REV CODE 250 ALT 637 W/ HCPCS: Performed by: NURSE PRACTITIONER

## 2023-01-06 PROCEDURE — 36415 COLL VENOUS BLD VENIPUNCTURE: CPT | Performed by: INTERNAL MEDICINE

## 2023-01-06 PROCEDURE — 94640 AIRWAY INHALATION TREATMENT: CPT

## 2023-01-06 PROCEDURE — 25000003 PHARM REV CODE 250: Performed by: INTERNAL MEDICINE

## 2023-01-06 PROCEDURE — 21400001 HC TELEMETRY ROOM

## 2023-01-06 PROCEDURE — 80053 COMPREHEN METABOLIC PANEL: CPT | Performed by: INTERNAL MEDICINE

## 2023-01-06 PROCEDURE — 94761 N-INVAS EAR/PLS OXIMETRY MLT: CPT

## 2023-01-06 RX ADMIN — METOPROLOL TARTRATE 12.5 MG: 25 TABLET, FILM COATED ORAL at 08:01

## 2023-01-06 RX ADMIN — APIXABAN 5 MG: 5 TABLET, FILM COATED ORAL at 08:01

## 2023-01-06 RX ADMIN — IPRATROPIUM BROMIDE AND ALBUTEROL SULFATE 3 ML: 2.5; .5 SOLUTION RESPIRATORY (INHALATION) at 12:01

## 2023-01-06 RX ADMIN — OXCARBAZEPINE 300 MG: 300 TABLET, FILM COATED ORAL at 09:01

## 2023-01-06 RX ADMIN — FUROSEMIDE 40 MG: 40 TABLET ORAL at 09:01

## 2023-01-06 RX ADMIN — FAMOTIDINE 20 MG: 20 TABLET, FILM COATED ORAL at 09:01

## 2023-01-06 RX ADMIN — AMIODARONE HYDROCHLORIDE 200 MG: 200 TABLET ORAL at 09:01

## 2023-01-06 RX ADMIN — IPRATROPIUM BROMIDE AND ALBUTEROL SULFATE 3 ML: 2.5; .5 SOLUTION RESPIRATORY (INHALATION) at 08:01

## 2023-01-06 RX ADMIN — IPRATROPIUM BROMIDE AND ALBUTEROL SULFATE 3 ML: 2.5; .5 SOLUTION RESPIRATORY (INHALATION) at 07:01

## 2023-01-06 RX ADMIN — APIXABAN 5 MG: 5 TABLET, FILM COATED ORAL at 09:01

## 2023-01-06 RX ADMIN — OXCARBAZEPINE 300 MG: 300 TABLET, FILM COATED ORAL at 08:01

## 2023-01-06 RX ADMIN — BENZTROPINE MESYLATE 1 MG: 1 TABLET ORAL at 08:01

## 2023-01-06 RX ADMIN — LEVETIRACETAM 500 MG: 500 TABLET, FILM COATED ORAL at 08:01

## 2023-01-06 RX ADMIN — ISOSORBIDE MONONITRATE 30 MG: 30 TABLET, EXTENDED RELEASE ORAL at 09:01

## 2023-01-06 RX ADMIN — TRAZODONE HYDROCHLORIDE 200 MG: 100 TABLET ORAL at 08:01

## 2023-01-06 RX ADMIN — BENZTROPINE MESYLATE 1 MG: 1 TABLET ORAL at 09:01

## 2023-01-06 RX ADMIN — ASPIRIN 81 MG CHEWABLE TABLET 81 MG: 81 TABLET CHEWABLE at 09:01

## 2023-01-06 RX ADMIN — OXCARBAZEPINE 300 MG: 300 TABLET, FILM COATED ORAL at 03:01

## 2023-01-06 RX ADMIN — FERROUS SULFATE TAB 325 MG (65 MG ELEMENTAL FE) 1 EACH: 325 (65 FE) TAB at 09:01

## 2023-01-06 RX ADMIN — METOPROLOL TARTRATE 12.5 MG: 25 TABLET, FILM COATED ORAL at 09:01

## 2023-01-06 RX ADMIN — LEVETIRACETAM 500 MG: 500 TABLET, FILM COATED ORAL at 09:01

## 2023-01-06 RX ADMIN — IPRATROPIUM BROMIDE AND ALBUTEROL SULFATE 3 ML: 2.5; .5 SOLUTION RESPIRATORY (INHALATION) at 04:01

## 2023-01-06 RX ADMIN — ATORVASTATIN CALCIUM 80 MG: 40 TABLET, FILM COATED ORAL at 09:01

## 2023-01-06 RX ADMIN — FAMOTIDINE 20 MG: 20 TABLET, FILM COATED ORAL at 08:01

## 2023-01-06 RX ADMIN — ARIPIPRAZOLE 10 MG: 5 TABLET ORAL at 09:01

## 2023-01-06 NOTE — PROGRESS NOTES
"Gastroenterology Progress Note  The documentation recorded by the scribe/NP accurately reflects the service I personally performed and the decisions made by me.   57-year-old black male being seen in follow-up.  He has no abdominal pain, nausea or vomiting.  He is chronic hepatitis-C and likely cirrhosis which has not been treated as of yet.  His shortness of breath is improving slowly.    On physical exam the patient is alert he does have some labor with breathing as before.  He has a barrel shaped chest the heart reveals a regular rate and rhythm lungs are clear with distant breath sounds.  The abdomen is obese soft and nontender the bowel sounds are normal .laboratory studies as detailed below .    impression is chronic hepatitis-C with likely cirrhosis plan is to refer to [facility] infectious disease clinic for treatment of chronic hepatitis-C at discharge.    Jacque F Noel Iii, MD [facility]   Subjective:  Patient feeling well this morning with no complaints. He denies abomdinal pain, N/V. Discussed following up at [facility] for continued Hep C treatment which he agreed to and voiced understanding.     Objective:    ROS:    Review of Systems   Constitutional:  Negative for chills, fever and malaise/fatigue.   HENT:  Negative for sore throat.    Respiratory:  Negative for shortness of breath and stridor.    Gastrointestinal:  Negative for abdominal pain, blood in stool, heartburn, melena, nausea and vomiting.   Neurological:  Negative for weakness.   Psychiatric/Behavioral:  The patient is not nervous/anxious.        Vital Signs:  BP (!) 166/107   Pulse 70   Temp 97.4 °F (36.3 °C) (Oral)   Resp (!) 22   Ht 5' 7.01" (1.702 m)   Wt 85.3 kg (188 lb 0.8 oz)   SpO2 97%   BMI 29.45 kg/m²   Body mass index is 29.45 kg/m².    Physical Exam:    Physical Exam  Constitutional:       General: He is not in acute distress.     Appearance: He is not ill-appearing.   HENT:      Head: Normocephalic.      Nose: Nose " normal.   Eyes:      General: No scleral icterus.     Extraocular Movements: Extraocular movements intact.   Cardiovascular:      Rate and Rhythm: Normal rate.   Pulmonary:      Effort: No respiratory distress.      Breath sounds: No stridor.   Abdominal:      General: Abdomen is flat. Bowel sounds are normal. There is no distension.      Palpations: Abdomen is soft.      Tenderness: There is no abdominal tenderness.   Skin:     Coloration: Skin is not jaundiced or pale.   Neurological:      Mental Status: He is alert. Mental status is at baseline.   Psychiatric:         Mood and Affect: Mood normal.         Thought Content: Thought content normal.       Labs:  Recent Results (from the past 24 hour(s))   Sodium, Random Urine    Collection Time: 01/05/23  3:32 PM   Result Value Ref Range    Urine Sodium 100.0 mmol/L   Urea Nitrogen, Random Urine    Collection Time: 01/05/23  3:32 PM   Result Value Ref Range    Urine Urea Nitrogen 657.0 mg/dL   Creatinine, Random Urine    Collection Time: 01/05/23  3:32 PM   Result Value Ref Range    Urine Creatinine 59.4 (L) 63.0 - 166.0 mg/dL   Comprehensive Metabolic Panel    Collection Time: 01/06/23  8:37 AM   Result Value Ref Range    Sodium Level 139 136 - 145 mmol/L    Potassium Level 4.1 3.5 - 5.1 mmol/L    Chloride 107 98 - 107 mmol/L    Carbon Dioxide 25 22 - 29 mmol/L    Glucose Level 110 (H) 74 - 100 mg/dL    Blood Urea Nitrogen 23.6 8.4 - 25.7 mg/dL    Creatinine 1.07 0.73 - 1.18 mg/dL    Calcium Level Total 9.1 8.4 - 10.2 mg/dL    Protein Total 6.8 6.4 - 8.3 gm/dL    Albumin Level 3.5 3.5 - 5.0 g/dL    Globulin 3.3 2.4 - 3.5 gm/dL    Albumin/Globulin Ratio 1.1 1.1 - 2.0 ratio    Bilirubin Total 1.1 <=1.5 mg/dL    Alkaline Phosphatase 102 40 - 150 unit/L    Alanine Aminotransferase 19 0 - 55 unit/L    Aspartate Aminotransferase 33 5 - 34 unit/L    eGFR 80 mls/min/1.73/m2   Magnesium    Collection Time: 01/06/23  8:37 AM   Result Value Ref Range    Magnesium Level 2.10  1.60 - 2.60 mg/dL       Assessment/Plan:  1. Abdominal pain    2. Dyspnea    3. CHF (congestive heart failure)    4. Chest pain    5. Abnormal heart rhythm    6. Risk and functional assessment    7. Syncope       57 year old male known to Dr. Castillo (inpatient setting only) with Hx bipolar disorder, schizophrenia, chronic hepatitis-C, HTN, HLD, seizures, CVA, history of substance abuse, CMO with life vest; CAD s/p CABG who presented to the ED with SOB, weakness, and fatigue.  GI consulted due to concern for ascites     Hx chronic hep C w/ probable cirrhosis  - per patient report, he was diagnosed a few months ago at [facility]   - retroperitoneal US noted to show nodular surface contour of the liver- correlate for cirrhosis. Thickened appearance of the gallbladder wall is nonspecific in the setting of free fluid and suspected liver disease  - US will small amount of ascites w/ no need for diagnotic paracentesis at this time. Recommend referral to ID at [facility] for further treatment of chronic Hep C  Anemia of chronic disease, stable  - 10.6/34.2--9.8/31.4--9.8/31.2  - continue to trend and transfuse as needed  3. HF, EF 20-25% on echo 12/18/2022  4. PAF  -on eliquis   5. S/p CAD/CABG 2022  6. Hx RUE DVT  7. Hx cocaine abuse      We recommend patient follow up at [facility]  with ID for further HepC treatment- will contact case management to ensure this gets set up. No further recommendations at this time and please call back with any questions      Henna Castillo PA-C  Gastroenterology  [facility]

## 2023-01-06 NOTE — HPI
Kendall Duff is a 57 y.o. male who has a PMH includes bipolar disorder, schizophrenia, chronic hepatitis-C, HTN, HLD, seizures, CVA, history of substance abuse, CMO with life vest; CAD s/p CABG; presents to the ED at [facility] on 1/3/2023 with complaints  of syncope at home prior to arrival with associated  shortness of breath, weakness, fatigue. Pt was recently admitted our services 12/21/2022 and d/c on 12/25/2022 for SOB, fluid overload,  cough and difficulty breathing .He now presents to the ED here with reports of worsening SOB and difficulty breathing and syncope at home. He denies any discharges from his life vest, but reports the device discharged a few days ago which he was seen in the ED at [facility] and d/c home. He denies any discharges of shock since then. He reports he continues to smoke cigarettes, he denies any illicit drug use.  He reports compliance with his medication.  Lab work done significant for H&H 10.6/34.2, BUN 42.3, creatinine 1.94; glucose 118, BNP 3 361.6, troponin 0.088; other indices unremarkable.  Chest x-ray demonstrated no acute findings.  Influenza a swab influenza B swab both negative SARs CoV 2 PCR not detected.  Initial vital signs /90 pulse 42 respirations 16 temperature 98.1° F O2 saturation 94% on room air.  Patient was placed on supplemental oxygen therapy at 2 L per nasal cannula as his O2 saturation dropped to 90%.  Patient did receive 80 mg Lasix IV push, 125 mg of Solu-Medrol, 2 g magnesium sulfate, an hour long nebulizer treatment in the ED which provided some relief.  Patient is admitted to hospital medicine services for further management.

## 2023-01-06 NOTE — SUBJECTIVE & OBJECTIVE
Review of Systems   Constitutional: Negative.    HENT: Negative.     Respiratory:  Positive for shortness of breath. Negative for cough, chest tightness and wheezing.    Cardiovascular: Negative.    Gastrointestinal: Negative.    Genitourinary: Negative.    Neurological: Negative.    All other systems reviewed and are negative.  Objective:     Vital Signs (Most Recent):  Temp: 97.1 °F (36.2 °C) (01/06/23 1138)  Pulse: 69 (01/06/23 1221)  Resp: (!) 21 (01/06/23 1221)  BP: (!) 150/90 (01/06/23 1221)  SpO2: 98 % (01/06/23 1221)   Vital Signs (24h Range):  Temp:  [97.1 °F (36.2 °C)-97.9 °F (36.6 °C)] 97.1 °F (36.2 °C)  Pulse:  [56-70] 69  Resp:  [18-22] 21  SpO2:  [94 %-99 %] 98 %  BP: (143-166)/() 150/90     Weight: 85.3 kg (188 lb 0.8 oz)  Body mass index is 29.45 kg/m².    Intake/Output Summary (Last 24 hours) at 1/6/2023 1515  Last data filed at 1/6/2023 0600  Gross per 24 hour   Intake 150 ml   Output 400 ml   Net -250 ml      Physical Exam  Vitals and nursing note reviewed.   Constitutional:       Appearance: Normal appearance.   HENT:      Head: Normocephalic and atraumatic.   Cardiovascular:      Rate and Rhythm: Normal rate and regular rhythm.      Heart sounds: Murmur heard.     Gallop present.   Pulmonary:      Comments: Appears to maybe have some element of JACQUELIN. Shows periods of apnea. Breathing is labored but patient is not wearing oxygen at this time.  Abdominal:      General: Bowel sounds are normal.      Palpations: Abdomen is soft.   Musculoskeletal:         General: Normal range of motion.      Cervical back: Normal range of motion.   Skin:     General: Skin is warm and dry.      Capillary Refill: Capillary refill takes less than 2 seconds.   Neurological:      General: No focal deficit present.      Mental Status: He is alert and oriented to person, place, and time.       Significant Labs: All pertinent labs within the past 24 hours have been reviewed.  BMP:   Recent Labs   Lab  01/06/23  0837      K 4.1   CO2 25   BUN 23.6   CREATININE 1.07   CALCIUM 9.1   MG 2.10     CBC:   Recent Labs   Lab 01/05/23  0444 01/06/23  1258   WBC 9.3 10.4   HGB 9.8* 10.4*   HCT 31.2* 34.0*   * 139*     CMP:   Recent Labs   Lab 01/05/23  0444 01/06/23  0837    139   K 4.0 4.1   CO2 26 25   BUN 36.6* 23.6   CREATININE 1.30* 1.07   CALCIUM 8.8 9.1   ALBUMIN 3.5 3.5   BILITOT 0.8 1.1   ALKPHOS 98 102   AST 31 33   ALT 19 19       Significant Imaging: I have reviewed all pertinent imaging results/findings within the past 24 hours.

## 2023-01-06 NOTE — PROGRESS NOTES
"[facility]   Cardiology  Progress Note    Patient Name: Kendall Duff  MRN: [id number]  Admission Date: 1/3/2023  Hospital Length of Stay: 3 days  Code Status: Full Code   Attending Provider: Jael Calloway MD   Consulting Provider: RAY Chilel  Primary Care Physician: Primary Doctor No  Principal Problem:Acute chest pain    Patient information was obtained from patient, past medical records, and ER records.     Subjective:     Chief Complaint:  Reason for consult: CP & SOB, CHF     HPI:   Mr. Duff is a 57 year old male who is known to [facility], Dr. Wright. He presents to the ER with complains of syncope at home with associated sympotms of SOB, weakness, & fatigue. He also reports swelling to his face. Of note, he has presented to the ER multiple times with similar symptoms. He denies CP, palpitations, or discharges from his life[facility]. Significant labs include Cr 1.94, BNP 3361.6, & trop 0.088. [facility] has been consulted to further manage the patient's CHF exacerbation.     1.5.23: NAD. HR 56 on tele. Reports "I don't feel good". Denies CP or palps but endorses SOB.   1.6.23: NAD. "I'm feeling much better today." Remains with wheezing/SOB but denies CP or palps.     PMH: CAD, ICMO, HTN, schizoaffective disorder, hep C, DVT, systolic & diastolic CHF, Life[facility]  PSH: CABG, LHC  Family History: Mother - CA; Father - liver disease   Social History: Current every day smoker. Former Cocaine Use. Denies alcohol use.     Previous Cardiac Diagnostics:   TTE (12.18.22):  Eccentric hypertrophy and severely decreased systolic function. The estimated ejection fraction is 20-25%.  Grade III left ventricular diastolic dysfunction.  Mild right ventricular enlargement with mildly reduced right ventricular systolic function.  Mild right atrial enlargement.  Moderate mitral regurgitation.  Severe tricuspid regurgitation.  The estimated PA systolic pressure is 33 mmHg.     Echocardiogram (10.28.22):  The left ventricle is " moderately enlarged with mild eccentric hypertrophy and severely decreased systolic function.  The estimated ejection fraction is 20%.  There is severe left ventricular global hypokinesis.  Grade II left ventricular diastolic dysfunction.  Normal right ventricular size with moderately reduced right ventricular systolic function.  Moderate mitral regurgitation.  Moderate tricuspid regurgitation.  There is mild pulmonary hypertension.  The estimated PA systolic pressure is 44 mmHg.  Elevated central venous pressure (15 mmHg).  Severe left atrial enlargement.     CABG x 4 (4/22):  LIMA-LAD, SVG-OM1, SVG-D1, SVG-PDA     Echocardiogram (6.16.22):  The left ventricle is normal in size w/ concentric hypertrophy and severely decreased systolic function.  The estimated EF is 25-30%.  There is severe left ventricular global hypokinesis.  Grade II left ventricular diastolic dysfunction.  Normal right ventricular size w/ mildly reduced right ventricular systolic function.  The estimated PA systolic pressure is 52 mmHg.  There is moderate pulmonary HTN.  Elevated CVP (15 mmHg).     RUE NIVA (5.10.22):  A deep vein thrombosis was identified in the right axillary and brachial veins. A superficial thrombosis was identified in the right basilic vein.     LHC (3.21.22):  100% LAD to 30-40% residual stenosis post PTCA.  Large diagonal system w/ severe stenosis.  CIRC - patent stent, OM1 patent, mild CIRC occluded  RCA - stents ISR 40-50%, distal 70-80%  EDP 12 EF 35-40% anterior HK     Review of patient's allergies indicates:   Allergen Reactions    Depakote [divalproex]     Divalproex sodium Other (See Comments)    Lithium     Lithium analogues     Quetiapine Other (See Comments)     Pt states had seizures       No current facility-administered medications on file prior to encounter.     Current Outpatient Medications on File Prior to Encounter   Medication Sig    amiodarone (PACERONE) 200 MG Tab Take 1 tablet (200 mg total) by mouth  once daily.    apixaban (ELIQUIS) 5 mg Tab Take 1 tablet (5 mg total) by mouth 2 (two) times daily.    ARIPiprazole (ABILIFY) 10 MG Tab Take 1 tablet (10 mg total) by mouth once daily.    aspirin 81 MG Chew Chew and swallow 1 tablet (81 mg total) by mouth once daily.    atorvastatin (LIPITOR) 80 MG tablet Take 1 tablet (80 mg total) by mouth once daily.    benztropine (COGENTIN) 1 MG tablet Take 1 mg by mouth 2 (two) times daily.    famotidine (PEPCID) 20 MG tablet Take 1 tablet (20 mg total) by mouth 2 (two) times daily.    ferrous sulfate 325 (65 FE) MG EC tablet Take 1 tablet (325 mg total) by mouth once daily.    furosemide (LASIX) 40 MG tablet Take 1 tablet (40 mg total) by mouth once daily.    isosorbide mononitrate (IMDUR) 30 MG 24 hr tablet Take 1 tablet (30 mg total) by mouth once daily.    levETIRAcetam (KEPPRA) 500 MG Tab Take 1 tablet (500 mg total) by mouth 2 (two) times daily.    metoprolol tartrate (LOPRESSOR) 50 MG tablet Take 1 tablet (50 mg total) by mouth 2 (two) times daily.    OXcarbazepine (TRILEPTAL) 300 MG Tab Take 300 mg by mouth 3 (three) times daily.    sacubitriL-valsartan (ENTRESTO) 24-26 mg per tablet Take 1 tablet by mouth 2 (two) times daily.    traZODone (DESYREL) 100 MG tablet Take 1 tablet (100 mg total) by mouth every evening.    [DISCONTINUED] metoprolol succinate (TOPROL-XL) 25 MG 24 hr tablet Take 25 mg by mouth once daily.    [DISCONTINUED] pravastatin (PRAVACHOL) 40 MG tablet Take 1 tablet (40 mg total) by mouth every evening.     Family History       Problem Relation (Age of Onset)    Cancer Mother    Liver disease Father          Tobacco Use    Smoking status: Every Day     Types: Cigarettes    Smokeless tobacco: Never   Substance and Sexual Activity    Alcohol use: Never    Drug use: Not Currently     Types: Cocaine    Sexual activity: Not on file       Review of Systems   Constitutional:         Facial swelling   Respiratory:  Positive for shortness of breath and  wheezing.    Cardiovascular:  Negative for chest pain and palpitations.   All other systems reviewed and are negative.    Objective:     Vital Signs (Most Recent):  Temp: 97.4 °F (36.3 °C) (01/06/23 0717)  Pulse: 70 (01/06/23 0843)  Resp: (!) 22 (01/06/23 0843)  BP: (!) 166/107 (01/06/23 0717)  SpO2: 97 % (01/06/23 0843)   Vital Signs (24h Range):  Temp:  [97.4 °F (36.3 °C)-97.9 °F (36.6 °C)] 97.4 °F (36.3 °C)  Pulse:  [56-92] 70  Resp:  [18-22] 22  SpO2:  [94 %-99 %] 97 %  BP: (143-166)/() 166/107     Weight: 85.3 kg (188 lb 0.8 oz)  Body mass index is 29.45 kg/m².    SpO2: 97 %         Intake/Output Summary (Last 24 hours) at 1/6/2023 0845  Last data filed at 1/6/2023 0600  Gross per 24 hour   Intake 650 ml   Output 1500 ml   Net -850 ml         Lines/Drains/Airways       Peripheral Intravenous Line  Duration                  Peripheral IV - Single Lumen 01/03/23 1435 20 G Anterior;Right Forearm 2 days                    Significant Labs:  Recent Results (from the past 72 hour(s))   COVID/FLU A&B PCR    Collection Time: 01/03/23 11:38 AM   Result Value Ref Range    Influenza A PCR Not Detected Not Detected    Influenza B PCR Not Detected Not Detected    SARS-CoV-2 PCR Not Detected Not Detected   Comprehensive metabolic panel    Collection Time: 01/03/23 11:40 AM   Result Value Ref Range    Sodium Level 139 136 - 145 mmol/L    Potassium Level 4.4 3.5 - 5.1 mmol/L    Chloride 103 98 - 107 mmol/L    Carbon Dioxide 27 22 - 29 mmol/L    Glucose Level 118 (H) 74 - 100 mg/dL    Blood Urea Nitrogen 42.3 (H) 8.4 - 25.7 mg/dL    Creatinine 1.94 (H) 0.73 - 1.18 mg/dL    Calcium Level Total 9.0 8.4 - 10.2 mg/dL    Protein Total 7.5 6.4 - 8.3 gm/dL    Albumin Level 3.7 3.5 - 5.0 g/dL    Globulin 3.8 (H) 2.4 - 3.5 gm/dL    Albumin/Globulin Ratio 1.0 (L) 1.1 - 2.0 ratio    Bilirubin Total 1.1 <=1.5 mg/dL    Alkaline Phosphatase 125 40 - 150 unit/L    Alanine Aminotransferase 20 0 - 55 unit/L    Aspartate Aminotransferase  35 (H) 5 - 34 unit/L    eGFR 39 mls/min/1.73/m2   Brain natriuretic peptide    Collection Time: 01/03/23 11:40 AM   Result Value Ref Range    Natriuretic Peptide 3,361.6 (H) <=100.0 pg/mL   Troponin I    Collection Time: 01/03/23 11:40 AM   Result Value Ref Range    Troponin-I 0.088 (H) 0.000 - 0.045 ng/mL   CBC with Differential    Collection Time: 01/03/23 11:40 AM   Result Value Ref Range    WBC 7.1 4.5 - 11.5 x10(3)/mcL    RBC 4.71 4.70 - 6.10 x10(6)/mcL    Hgb 10.6 (L) 14.0 - 18.0 gm/dL    Hct 34.2 (L) 42.0 - 52.0 %    MCV 72.6 (L) 80.0 - 94.0 fL    MCH 22.5 pg    MCHC 31.0 (L) 33.0 - 36.0 mg/dL    RDW 20.5 (H) 11.6 - 14.4 %    Platelet 117 (L) 140 - 371 x10(3)/mcL    MPV      Neut % 86.4 %    Lymph % 7.9 %    Mono % 4.7 %    Eos % 0.3 %    Basophil % 0.4 %    Lymph # 0.56 (L) 0.6 - 4.6 x10(3)/mcL    Neut # 6.12 2.1 - 9.2 x10(3)/mcL    Mono # 0.33 0.1 - 1.3 x10(3)/mcL    Eos # 0.02 0 - 0.9 x10(3)/mcL    Baso # 0.03 0 - 0.2 x10(3)/mcL    IG# 0.02 0 - 0.04 x10(3)/mcL    IG% 0.3 %    NRBC% 0.0 0 - 1 %   Troponin I    Collection Time: 01/03/23  6:04 PM   Result Value Ref Range    Troponin-I 0.069 (H) 0.000 - 0.045 ng/mL   Troponin I    Collection Time: 01/04/23 12:01 AM   Result Value Ref Range    Troponin-I 0.059 (H) 0.000 - 0.045 ng/mL   Comprehensive Metabolic Panel (CMP)    Collection Time: 01/04/23  4:34 AM   Result Value Ref Range    Sodium Level 139 136 - 145 mmol/L    Potassium Level 3.9 3.5 - 5.1 mmol/L    Chloride 102 98 - 107 mmol/L    Carbon Dioxide 26 22 - 29 mmol/L    Glucose Level 138 (H) 74 - 100 mg/dL    Blood Urea Nitrogen 43.3 (H) 8.4 - 25.7 mg/dL    Creatinine 1.70 (H) 0.73 - 1.18 mg/dL    Calcium Level Total 8.7 8.4 - 10.2 mg/dL    Protein Total 6.4 6.4 - 8.3 gm/dL    Albumin Level 3.3 (L) 3.5 - 5.0 g/dL    Globulin 3.1 2.4 - 3.5 gm/dL    Albumin/Globulin Ratio 1.1 1.1 - 2.0 ratio    Bilirubin Total 0.8 <=1.5 mg/dL    Alkaline Phosphatase 101 40 - 150 unit/L    Alanine Aminotransferase 18 0 -  55 unit/L    Aspartate Aminotransferase 30 5 - 34 unit/L    eGFR 46 mls/min/1.73/m2   CBC with Differential    Collection Time: 01/04/23  4:34 AM   Result Value Ref Range    WBC 6.7 4.5 - 11.5 x10(3)/mcL    RBC 4.43 (L) 4.70 - 6.10 x10(6)/mcL    Hgb 9.8 (L) 14.0 - 18.0 gm/dL    Hct 31.4 (L) 42.0 - 52.0 %    MCV 70.9 (L) 80.0 - 94.0 fL    MCH 22.1 pg    MCHC 31.2 (L) 33.0 - 36.0 mg/dL    RDW 20.4 (H) 11.6 - 14.4 %    Platelet 138 (L) 140 - 371 x10(3)/mcL    MPV 10.6 9.4 - 12.4 fL    Neut % 82.3 %    Lymph % 8.4 %    Mono % 8.8 %    Eos % 0.0 %    Basophil % 0.1 %    Lymph # 0.56 (L) 0.6 - 4.6 x10(3)/mcL    Neut # 5.51 2.1 - 9.2 x10(3)/mcL    Mono # 0.59 0.1 - 1.3 x10(3)/mcL    Eos # 0.00 0 - 0.9 x10(3)/mcL    Baso # 0.01 0 - 0.2 x10(3)/mcL    IG# 0.03 0 - 0.04 x10(3)/mcL    IG% 0.4 %    NRBC% 0.0 0 - 1 %   TSH    Collection Time: 01/04/23  4:24 PM   Result Value Ref Range    Thyroid Stimulating Hormone 20.748 (H) 0.350 - 4.940 uIU/mL   Comprehensive Metabolic Panel    Collection Time: 01/05/23  4:44 AM   Result Value Ref Range    Sodium Level 140 136 - 145 mmol/L    Potassium Level 4.0 3.5 - 5.1 mmol/L    Chloride 104 98 - 107 mmol/L    Carbon Dioxide 26 22 - 29 mmol/L    Glucose Level 109 (H) 74 - 100 mg/dL    Blood Urea Nitrogen 36.6 (H) 8.4 - 25.7 mg/dL    Creatinine 1.30 (H) 0.73 - 1.18 mg/dL    Calcium Level Total 8.8 8.4 - 10.2 mg/dL    Protein Total 6.6 6.4 - 8.3 gm/dL    Albumin Level 3.5 3.5 - 5.0 g/dL    Globulin 3.1 2.4 - 3.5 gm/dL    Albumin/Globulin Ratio 1.1 1.1 - 2.0 ratio    Bilirubin Total 0.8 <=1.5 mg/dL    Alkaline Phosphatase 98 40 - 150 unit/L    Alanine Aminotransferase 19 0 - 55 unit/L    Aspartate Aminotransferase 31 5 - 34 unit/L    eGFR 63 mls/min/1.73/m2   CBC with Differential    Collection Time: 01/05/23  4:44 AM   Result Value Ref Range    WBC 9.3 4.5 - 11.5 x10(3)/mcL    RBC 4.41 (L) 4.70 - 6.10 x10(6)/mcL    Hgb 9.8 (L) 14.0 - 18.0 gm/dL    Hct 31.2 (L) 42.0 - 52.0 %    MCV 70.7 (L)  80.0 - 94.0 fL    MCH 22.2 pg    MCHC 31.4 (L) 33.0 - 36.0 mg/dL    RDW 20.2 (H) 11.6 - 14.4 %    Platelet 132 (L) 140 - 371 x10(3)/mcL    MPV 10.5 9.4 - 12.4 fL    Neut % 80.4 %    Lymph % 10.6 %    Mono % 7.7 %    Eos % 0.5 %    Basophil % 0.4 %    Lymph # 0.99 0.6 - 4.6 x10(3)/mcL    Neut # 7.48 2.1 - 9.2 x10(3)/mcL    Mono # 0.72 0.1 - 1.3 x10(3)/mcL    Eos # 0.05 0 - 0.9 x10(3)/mcL    Baso # 0.04 0 - 0.2 x10(3)/mcL    IG# 0.04 0 - 0.04 x10(3)/mcL    IG% 0.4 %    NRBC% 0.0 0 - 1 %   Troponin I    Collection Time: 01/05/23  4:44 AM   Result Value Ref Range    Troponin-I 0.068 (H) 0.000 - 0.045 ng/mL   T3, Free ([facility])    Collection Time: 01/05/23  4:44 AM   Result Value Ref Range    T3 Free 1.77 1.57 - 3.91 pg/mL   T4, Free    Collection Time: 01/05/23  4:44 AM   Result Value Ref Range    Thyroxine Free 0.59 (L) 0.70 - 1.48 ng/dL   Iron and TIBC    Collection Time: 01/05/23  4:44 AM   Result Value Ref Range    Iron Binding Capacity Unsaturated 361 (H) 69 - 240 ug/dL    Iron Level 22 (L) 65 - 175 ug/dL    Transferrin 366 (H) 174 - 364 mg/dL    Iron Binding Capacity Total 383 250 - 450 ug/dL    Iron Saturation 6 (L) 20 - 50 %   Ferritin    Collection Time: 01/05/23  4:44 AM   Result Value Ref Range    Ferritin Level 48.98 21.81 - 274.66 ng/mL   Vitamin B12    Collection Time: 01/05/23  4:44 AM   Result Value Ref Range    Vitamin B12 Level 952 (H) 213 - 816 pg/mL   Sodium, Random Urine    Collection Time: 01/05/23  3:32 PM   Result Value Ref Range    Urine Sodium 100.0 mmol/L   Urea Nitrogen, Random Urine    Collection Time: 01/05/23  3:32 PM   Result Value Ref Range    Urine Urea Nitrogen 657.0 mg/dL   Creatinine, Random Urine    Collection Time: 01/05/23  3:32 PM   Result Value Ref Range    Urine Creatinine 59.4 (L) 63.0 - 166.0 mg/dL       Significant Imaging:  Imaging Results              US Retroperitoneal Complete (Final result)  Result time 01/03/23 18:51:48      Final result by Sharon Cantu MD  (01/03/23 18:51:48)                   Impression:      Normal sized, nonobstructed kidneys.    Free intraperitoneal fluid    Nodular surface contour of the liver.  Correlate for cirrhosis.  Thickened appearance of the gallbladder wall is nonspecific in the setting of free fluid and suspected liver disease.      Electronically signed by: Sharon Cantu  Date:    01/03/2023  Time:    18:51               Narrative:    EXAMINATION:  US RETROPERITONEAL COMPLETE    CLINICAL HISTORY:  elevated BUN/Creat;    TECHNIQUE:  Ultrasound of the kidneys and urinary bladder was performed including color flow and grayscale evaluation of the kidneys.    COMPARISON:  No relevant prior available for comparison.    FINDINGS:  RIGHT KIDNEY: The right kidney measures 10.5 cm.  No hydronephrosis. Incidental 1.5 cm cyst. No follow-up imaging is recommended as incidental lesions are likely benign.   No appreciable shadowing renal calculus.    LEFT KIDNEY: The left kidney measures 11.4 cm. No hydronephrosis.Incidental 1.7 cm renal cyst. No follow-up imaging is recommended as incidental lesions are likely benign.   No appreciable shadowing renal calculus.    BLADDER: Bladder is collapsed, limiting evaluation.    OTHER: Free intraperitoneal fluid in the upper abdomen.  Irregular surface contour of the liver.  Thickened appearance of the gallbladder wall.                                       X-Ray Chest 1 View (Final result)  Result time 01/03/23 12:51:46      Final result by Javier Soto MD (01/03/23 12:51:46)                   Impression:      No acute findings identified.      Electronically signed by: Javier Soto  Date:    01/03/2023  Time:    12:51               Narrative:    EXAMINATION:  XR CHEST 1 VIEW    CLINICAL HISTORY:  Dyspnea, unspecified    TECHNIQUE:  One view    COMPARISON:  December 28, 2022.    FINDINGS:  Cardiopericardial silhouette enlarged appearance is similar.  Mediastinum and the lungs are partially obscured by  overlying devices.  Lungs hypoventilatory changes without convincing pulmonary edema or dense consolidation.  No significant fluid within the pleural spaces.                                      EKG:        Telemetry:  SR 60's    Physical Exam  Constitutional:       Comments: Swollen face and chest   HENT:      Head: Normocephalic.      Mouth/Throat:      Mouth: Mucous membranes are dry.   Eyes:      Extraocular Movements: Extraocular movements intact.   Cardiovascular:      Rate and Rhythm: Normal rate and regular rhythm.      Pulses: Normal pulses.      Heart sounds: Normal heart sounds.   Pulmonary:      Comments: Audible wheezing  Abdominal:      Palpations: Abdomen is soft.   Skin:     General: Skin is warm and dry.   Neurological:      Mental Status: He is alert and oriented to person, place, and time.   Psychiatric:         Behavior: Behavior normal.       Home Medications:   No current facility-administered medications on file prior to encounter.     Current Outpatient Medications on File Prior to Encounter   Medication Sig Dispense Refill    amiodarone (PACERONE) 200 MG Tab Take 1 tablet (200 mg total) by mouth once daily. 30 tablet 6    apixaban (ELIQUIS) 5 mg Tab Take 1 tablet (5 mg total) by mouth 2 (two) times daily. 60 tablet 3    ARIPiprazole (ABILIFY) 10 MG Tab Take 1 tablet (10 mg total) by mouth once daily. 30 tablet 11    aspirin 81 MG Chew Chew and swallow 1 tablet (81 mg total) by mouth once daily. 360 tablet 0    atorvastatin (LIPITOR) 80 MG tablet Take 1 tablet (80 mg total) by mouth once daily. 90 tablet 3    benztropine (COGENTIN) 1 MG tablet Take 1 mg by mouth 2 (two) times daily.      famotidine (PEPCID) 20 MG tablet Take 1 tablet (20 mg total) by mouth 2 (two) times daily. 60 tablet 11    ferrous sulfate 325 (65 FE) MG EC tablet Take 1 tablet (325 mg total) by mouth once daily. 30 tablet 0    furosemide (LASIX) 40 MG tablet Take 1 tablet (40 mg total) by mouth once daily. 30 tablet 11     isosorbide mononitrate (IMDUR) 30 MG 24 hr tablet Take 1 tablet (30 mg total) by mouth once daily. 30 tablet 11    levETIRAcetam (KEPPRA) 500 MG Tab Take 1 tablet (500 mg total) by mouth 2 (two) times daily. 60 tablet 11    metoprolol tartrate (LOPRESSOR) 50 MG tablet Take 1 tablet (50 mg total) by mouth 2 (two) times daily. 60 tablet 0    OXcarbazepine (TRILEPTAL) 300 MG Tab Take 300 mg by mouth 3 (three) times daily.      sacubitriL-valsartan (ENTRESTO) 24-26 mg per tablet Take 1 tablet by mouth 2 (two) times daily. 60 tablet 0    traZODone (DESYREL) 100 MG tablet Take 1 tablet (100 mg total) by mouth every evening. 30 tablet 11    [DISCONTINUED] metoprolol succinate (TOPROL-XL) 25 MG 24 hr tablet Take 25 mg by mouth once daily.      [DISCONTINUED] pravastatin (PRAVACHOL) 40 MG tablet Take 1 tablet (40 mg total) by mouth every evening. 90 tablet 3       Current Inpatient Medications:    Current Facility-Administered Medications:     acetaminophen tablet 650 mg, 650 mg, Oral, Q4H PRN, Lina Bahena, WILLYP    albuterol-ipratropium 2.5 mg-0.5 mg/3 mL nebulizer solution 3 mL, 3 mL, Nebulization, Q4H PRN, Amelia Bone, AGACNP-BC, 3 mL at 01/06/23 0843    amiodarone tablet 200 mg, 200 mg, Oral, Daily, WILLY OlguinP, 200 mg at 01/05/23 0921    apixaban tablet 5 mg, 5 mg, Oral, BID, Lina Bahena, WILLYP, 5 mg at 01/05/23 2003    ARIPiprazole tablet 10 mg, 10 mg, Oral, Daily, WILLY OlguinP, 10 mg at 01/05/23 0921    aspirin chewable tablet 81 mg, 81 mg, Oral, Daily, Lina Bahena, FNP, 81 mg at 01/05/23 0921    atorvastatin tablet 80 mg, 80 mg, Oral, Daily, Lina Bahena FNP, 80 mg at 01/05/23 0921    benztropine tablet 1 mg, 1 mg, Oral, BID, Lina Bahena, RAY, 1 mg at 01/05/23 2004    dextrose 10% bolus 125 mL 125 mL, 12.5 g, Intravenous, PRN, RAY Olguin    dextrose 10% bolus 250 mL 250 mL, 25 g, Intravenous, PRN, Lina Bahena,  RAY    famotidine tablet 20 mg, 20 mg, Oral, BID, RAY Olguin, 20 mg at 01/05/23 2004    ferrous sulfate tablet 1 each, 1 tablet, Oral, Daily, RAY Olguin, 1 each at 01/05/23 0921    furosemide tablet 40 mg, 40 mg, Oral, Daily, RAY Chilel    glucagon (human recombinant) injection 1 mg, 1 mg, Intramuscular, PRN, RAY Olguin    glucose chewable tablet 16 g, 16 g, Oral, PRN, RAY Olguin    glucose chewable tablet 24 g, 24 g, Oral, PRN, RAY Olguin    hydrALAZINE injection 10 mg, 10 mg, Intravenous, Q4H PRN, Amelia Bone, AGACNP-BC, 10 mg at 01/05/23 0406    isosorbide mononitrate 24 hr tablet 30 mg, 30 mg, Oral, Daily, RAY Olguin, 30 mg at 01/05/23 0921    levETIRAcetam tablet 500 mg, 500 mg, Oral, BID, RAY Olguin, 500 mg at 01/05/23 2004    melatonin tablet 6 mg, 6 mg, Oral, Nightly PRN, RAY Olguin, 6 mg at 01/04/23 2006    metoprolol tartrate (LOPRESSOR) split tablet 12.5 mg, 12.5 mg, Oral, BID, Jael Calloway MD, 12.5 mg at 01/05/23 2004    naloxone 0.4 mg/mL injection 0.02 mg, 0.02 mg, Intravenous, PRN, RAY Olguin    ondansetron injection 4 mg, 4 mg, Intravenous, Q6H PRN, RAY Olguin    OXcarbazepine tablet 300 mg, 300 mg, Oral, TID, RAY Olguin, 300 mg at 01/05/23 2004    simethicone chewable tablet 80 mg, 1 tablet, Oral, QID PRN, RAY Olguin    sodium chloride 0.9% flush 10 mL, 10 mL, Intravenous, Q12H PRN, RAY Olguin    traZODone tablet 200 mg, 200 mg, Oral, QHS, RAY Olguin, 200 mg at 01/05/23 2004         VTE Risk Mitigation (From admission, onward)           Ordered     apixaban tablet 5 mg  2 times daily         01/03/23 1801     IP VTE HIGH RISK PATIENT  Once         01/03/23 1522     Place sequential compression device  Until discontinued         01/03/23 1522                     Assessment:   Syncope w/ collapse  Acute on Chronic Combined Systolic/Diastolic HF    - Grade III DD    - EF 20-25% (ECHO 12.18.22)  NSTEMI - suspect type 2 in the setting of CHF exacerbation   PAF/PAFL (Recent New Diagnosis)- Now [place]/SR    - [id number]: 5 (LVSD/HTN/TE/NSTEMI)    - On Eliquis  ANA   Chronic Anemia - Stable  ICMO     - With Life Vest   VHD    - Moderate MR and Severe TR  Hypertension  Pulmonary Hypertension  CAD/CABG (April 2022)    - LIMA-LAD, SVG-OM1, SVG-D1, SVG-PDA  History of RUE DVT (5.10.22)    - DVT in right axillary and brachial veins. A superficial thrombosis was identified in the right basilic vein.  Schizoaffective Disorder  Hx of Hep C  Hx of Cocaine Abuse   Nicotine Dependence (Tobacco Use)  Medical noncopliance      Plan:   Pt appears euvolemic on examination. Transition to oral lasix 40 mg today.   Continue ASA, statin, & BB.  Continue Eliquis for CVA prophylaxis in the setting of PAF.   Due to syncopal episode, will obtain carotid US.   Discussed with Dr. Williamson. Facial swelling could be angioedema s/t Entresto (unsure if pt is compliant with med). Consider resuming with caution.  GI recommending outpatient f/u with [facility] for Hep C.  Monitor for bradycardia on tele. Pt will need MCT x 2 weeks on discharge.     Megha Jurado, RAY  Cardiology  [facility] - Emergency Dept  01/06/2023 9:02 AM

## 2023-01-06 NOTE — PLAN OF CARE
Pt states he lives with his dgt Luan Bradley in a single level apt, 15 steps with b/l handrails to enter into apt. Pt states he is indep in adl's. He states his dgt Luan is home and does not work outside of home. Pt states he uses medical transportation to doctor apts and Rx pickup. He states his dgts do not have vehicles to drive, he states he will need an Uber at dc and his states he does not have money. He states he does not have a key with him but will call to verify dgt is home at time of dc.  Pt is in agreement to establishing a PCP at [facility] on [address] and in agreement to referral to [facility] hepatitis clinic. Referral sent to [facility] Hepatitis clinic via [facility]. Placed pt info in  communication binder to call for an apt with [facility] for a PCP apt.  DME: [facility]; No PCP, has a cardiologist with [facility]; No HH; Rx: Deepti Saucedo

## 2023-01-06 NOTE — PROGRESS NOTES
[facility] - 4th Floor [facility] Medicine  Progress Note    Patient Name: Kendall Duff  MRN: [id number]  Patient Class: IP- Inpatient   Admission Date: 1/3/2023  Length of Stay: 3 days  Attending Physician: Jael Calloway MD  Primary Care Provider: Primary Doctor No        Subjective:     Principal Problem:Acute chest pain        HPI:  Kendall Duff is a 57 y.o. male who has a PMH includes bipolar disorder, schizophrenia, chronic hepatitis-C, HTN, HLD, seizures, CVA, history of substance abuse, CMO with life vest; CAD s/p CABG; presents to the ED at [facility] on 1/3/2023 with complaints  of syncope at home prior to arrival with associated  shortness of breath, weakness, fatigue. Pt was recently admitted our services 12/21/2022 and d/c on 12/25/2022 for SOB, fluid overload,  cough and difficulty breathing .He now presents to the ED here with reports of worsening SOB and difficulty breathing and syncope at home. He denies any discharges from his life vest, but reports the device discharged a few days ago which he was seen in the ED at [facility] and d/c home. He denies any discharges of shock since then. He reports he continues to smoke cigarettes, he denies any illicit drug use.  He reports compliance with his medication.  Lab work done significant for H&H 10.6/34.2, BUN 42.3, creatinine 1.94; glucose 118, BNP 3 361.6, troponin 0.088; other indices unremarkable.  Chest x-ray demonstrated no acute findings.  Influenza a swab influenza B swab both negative SARs CoV 2 PCR not detected.  Initial vital signs /90 pulse 42 respirations 16 temperature 98.1° F O2 saturation 94% on room air.  Patient was placed on supplemental oxygen therapy at 2 L per nasal cannula as his O2 saturation dropped to 90%.  Patient did receive 80 mg Lasix IV push, 125 mg of Solu-Medrol, 2 g magnesium sulfate, an hour long nebulizer treatment in the ED which provided some relief.  Patient is admitted  to hospital medicine services for further management.      Overview/Hospital Course:  Patient was off and on sleeping when I saw him in the room.  He states that his breathing is not back to normal but it is getting slowly better.  His nurse reports that he frequently does not wear his oxygen like he is supposed to.  He has no complaints at this time.          Review of Systems   Constitutional: Negative.    HENT: Negative.     Respiratory:  Positive for shortness of breath. Negative for cough, chest tightness and wheezing.    Cardiovascular: Negative.    Gastrointestinal: Negative.    Genitourinary: Negative.    Neurological: Negative.    All other systems reviewed and are negative.  Objective:     Vital Signs (Most Recent):  Temp: 97.1 °F (36.2 °C) (01/06/23 1138)  Pulse: 69 (01/06/23 1221)  Resp: (!) 21 (01/06/23 1221)  BP: (!) 150/90 (01/06/23 1221)  SpO2: 98 % (01/06/23 1221)   Vital Signs (24h Range):  Temp:  [97.1 °F (36.2 °C)-97.9 °F (36.6 °C)] 97.1 °F (36.2 °C)  Pulse:  [56-70] 69  Resp:  [18-22] 21  SpO2:  [94 %-99 %] 98 %  BP: (143-166)/() 150/90     Weight: 85.3 kg (188 lb 0.8 oz)  Body mass index is 29.45 kg/m².    Intake/Output Summary (Last 24 hours) at 1/6/2023 1515  Last data filed at 1/6/2023 0600  Gross per 24 hour   Intake 150 ml   Output 400 ml   Net -250 ml      Physical Exam  Vitals and nursing note reviewed.   Constitutional:       Appearance: Normal appearance.   HENT:      Head: Normocephalic and atraumatic.   Cardiovascular:      Rate and Rhythm: Normal rate and regular rhythm.      Heart sounds: Murmur heard.     Gallop present.   Pulmonary:      Comments: Appears to maybe have some element of JACQUELIN. Shows periods of apnea. Breathing is labored but patient is not wearing oxygen at this time.  Abdominal:      General: Bowel sounds are normal.      Palpations: Abdomen is soft.   Musculoskeletal:         General: Normal range of motion.      Cervical back: Normal range of motion.    Skin:     General: Skin is warm and dry.      Capillary Refill: Capillary refill takes less than 2 seconds.   Neurological:      General: No focal deficit present.      Mental Status: He is alert and oriented to person, place, and time.       Significant Labs: All pertinent labs within the past 24 hours have been reviewed.  BMP:   Recent Labs   Lab 01/06/23  0837      K 4.1   CO2 25   BUN 23.6   CREATININE 1.07   CALCIUM 9.1   MG 2.10     CBC:   Recent Labs   Lab 01/05/23  0444 01/06/23  1258   WBC 9.3 10.4   HGB 9.8* 10.4*   HCT 31.2* 34.0*   * 139*     CMP:   Recent Labs   Lab 01/05/23  0444 01/06/23  0837    139   K 4.0 4.1   CO2 26 25   BUN 36.6* 23.6   CREATININE 1.30* 1.07   CALCIUM 8.8 9.1   ALBUMIN 3.5 3.5   BILITOT 0.8 1.1   ALKPHOS 98 102   AST 31 33   ALT 19 19       Significant Imaging: I have reviewed all pertinent imaging results/findings within the past 24 hours.      Assessment/Plan:      No notes have been filed under this hospital service.  Service: Hospital Medicine    Acute syncope with collapse- + LOC  Acute on chronic combined systolic and diastolic heart failure , EF 20-25% (ECHO 12.18.22)  Exertional dyspnea with SOB due to above  Chronic P AFib on Eliquis  Chest pain- resolved  NSTEMI type II  ANA  Abnormal TFTs  Iron-deficiency, microcytosis        HX:  CMO-status post LifeVest in place , EF 20-25% 12/18/2022, VHD, TR, MR, HTN, CAD s/p CABG, HLD, medication nonadherence, tobacco use, bipolar disorder, schizophrenia, seizures, CVA, hepatitis-C, substance abuse     Plan:   Recommendation is to transition to oral Lasix today and continue aspirin, statin, beta-blocker and Eliquis.  Carotid ultrasound has been ordered and appreciate the recommendations from [facility].  GI team recommends follow-up at [facility] with Infectious Disease for further hepatitis-C treatment.      VTE Risk Mitigation (From admission, onward)         Ordered     apixaban tablet 5 mg  2 times daily          01/03/23 1801     IP VTE HIGH RISK PATIENT  Once         01/03/23 1522     Place sequential compression device  Until discontinued         01/03/23 1522                Discharge Planning   TRACIE:      Code Status: Full Code   Is the patient medically ready for discharge?:     Reason for patient still in hospital (select all that apply): Patient unstable and Treatment  Discharge Plan A: Home with family                  Lisbeth Rosenthal MD  Department of Hospital Medicine   [facility] - 4th Floor Medical Telemetry

## 2023-01-06 NOTE — HOSPITAL COURSE
Patient was off and on sleeping when I saw him in the room.  He states that his breathing is not back to normal but it is getting slowly better.  His nurse reports that he frequently does not wear his oxygen like he is supposed to.  He has no complaints at this time.

## 2023-01-07 PROCEDURE — 21400001 HC TELEMETRY ROOM

## 2023-01-07 PROCEDURE — 93005 ELECTROCARDIOGRAM TRACING: CPT

## 2023-01-07 PROCEDURE — 25000003 PHARM REV CODE 250: Performed by: NURSE PRACTITIONER

## 2023-01-07 PROCEDURE — 93010 EKG 12-LEAD: ICD-10-PCS | Mod: ,,, | Performed by: INTERNAL MEDICINE

## 2023-01-07 PROCEDURE — 25000003 PHARM REV CODE 250: Performed by: INTERNAL MEDICINE

## 2023-01-07 PROCEDURE — 25000003 PHARM REV CODE 250

## 2023-01-07 PROCEDURE — 93010 ELECTROCARDIOGRAM REPORT: CPT | Mod: ,,, | Performed by: INTERNAL MEDICINE

## 2023-01-07 RX ORDER — CETIRIZINE HYDROCHLORIDE 10 MG/1
10 TABLET ORAL DAILY
Status: DISCONTINUED | OUTPATIENT
Start: 2023-01-07 | End: 2023-01-08

## 2023-01-07 RX ORDER — FLUTICASONE PROPIONATE 50 MCG
2 SPRAY, SUSPENSION (ML) NASAL DAILY
Status: DISCONTINUED | OUTPATIENT
Start: 2023-01-07 | End: 2023-01-09 | Stop reason: HOSPADM

## 2023-01-07 RX ORDER — LEVOTHYROXINE SODIUM 25 UG/1
25 TABLET ORAL
Status: DISCONTINUED | OUTPATIENT
Start: 2023-01-08 | End: 2023-01-09 | Stop reason: HOSPADM

## 2023-01-07 RX ADMIN — ARIPIPRAZOLE 10 MG: 5 TABLET ORAL at 08:01

## 2023-01-07 RX ADMIN — APIXABAN 5 MG: 5 TABLET, FILM COATED ORAL at 08:01

## 2023-01-07 RX ADMIN — LEVETIRACETAM 500 MG: 500 TABLET, FILM COATED ORAL at 08:01

## 2023-01-07 RX ADMIN — FAMOTIDINE 20 MG: 20 TABLET, FILM COATED ORAL at 08:01

## 2023-01-07 RX ADMIN — METOPROLOL TARTRATE 12.5 MG: 25 TABLET, FILM COATED ORAL at 08:01

## 2023-01-07 RX ADMIN — METOPROLOL TARTRATE 12.5 MG: 25 TABLET, FILM COATED ORAL at 09:01

## 2023-01-07 RX ADMIN — ISOSORBIDE MONONITRATE 30 MG: 30 TABLET, EXTENDED RELEASE ORAL at 08:01

## 2023-01-07 RX ADMIN — BENZTROPINE MESYLATE 1 MG: 1 TABLET ORAL at 08:01

## 2023-01-07 RX ADMIN — ATORVASTATIN CALCIUM 80 MG: 40 TABLET, FILM COATED ORAL at 08:01

## 2023-01-07 RX ADMIN — CETIRIZINE HYDROCHLORIDE 10 MG: 10 TABLET, FILM COATED ORAL at 03:01

## 2023-01-07 RX ADMIN — OXCARBAZEPINE 300 MG: 300 TABLET, FILM COATED ORAL at 03:01

## 2023-01-07 RX ADMIN — AMIODARONE HYDROCHLORIDE 200 MG: 200 TABLET ORAL at 08:01

## 2023-01-07 RX ADMIN — OXCARBAZEPINE 300 MG: 300 TABLET, FILM COATED ORAL at 08:01

## 2023-01-07 RX ADMIN — FUROSEMIDE 40 MG: 40 TABLET ORAL at 08:01

## 2023-01-07 RX ADMIN — FERROUS SULFATE TAB 325 MG (65 MG ELEMENTAL FE) 1 EACH: 325 (65 FE) TAB at 08:01

## 2023-01-07 RX ADMIN — ASPIRIN 81 MG CHEWABLE TABLET 81 MG: 81 TABLET CHEWABLE at 08:01

## 2023-01-07 RX ADMIN — TRAZODONE HYDROCHLORIDE 200 MG: 100 TABLET ORAL at 08:01

## 2023-01-07 NOTE — PROGRESS NOTES
[facility] Hospital Medicine Progress Note        Chief Complaint: Inpatient Follow-up for chest pain    HPI:   Kendall Duff is a 57 y.o. male who has a PMH includes bipolar disorder, schizophrenia, chronic hepatitis-C, HTN, HLD, seizures, CVA, history of substance abuse, CMO with life vest; CAD s/p CABG; presents to the ED at [facility] on 1/3/2023 with complaints  of syncope at home prior to arrival with associated  shortness of breath, weakness, fatigue. Pt was recently admitted our services 12/21/2022 and d/c on 12/25/2022 for SOB, fluid overload,  cough and difficulty breathing .He now presents to the ED here with reports of worsening SOB and difficulty breathing and syncope at home. He denies any discharges from his life vest, but reports the device discharged a few days ago which he was seen in the ED at [facility] and d/c home. He denies any discharges of shock since then. He reports he continues to smoke cigarettes, he denies any illicit drug use.  He reports compliance with his medication.  Lab work done significant for H&H 10.6/34.2, BUN 42.3, creatinine 1.94; glucose 118, BNP 3 361.6, troponin 0.088; other indices unremarkable.  Chest x-ray demonstrated no acute findings.  Influenza a swab influenza B swab both negative SARs CoV 2 PCR not detected.  Initial vital signs /90 pulse 42 respirations 16 temperature 98.1° F O2 saturation 94% on room air.  Patient was placed on supplemental oxygen therapy at 2 L per nasal cannula as his O2 saturation dropped to 90%.  Patient did receive 80 mg Lasix IV push, 125 mg of Solu-Medrol, 2 g magnesium sulfate, an hour long nebulizer treatment in the ED which provided some relief.  Patient is admitted to hospital medicine services for further management.    Interval Hx:   No new events overnight.  Alert, oriented, comfortably resting.  Except for intermittent shortness of breath and wheezing he has no new complaints or  concerns.  Responded with Lasix, tolerating diet and moving bowels.  No labs were obtained for this morning         Objective/physical exam:  Vitals:    01/06/23 1945 01/06/23 1959 01/06/23 2321 01/07/23 0332   BP: (!) 157/104  (!) 143/91 (!) 148/100   BP Location:       Patient Position:       Pulse: 73 72 66 69   Resp: 18 20 18 18   Temp: 97.7 °F (36.5 °C)  97.4 °F (36.3 °C) 98.1 °F (36.7 °C)   TempSrc: Oral  Axillary Oral   SpO2: 97% 97% 100% 95%   Weight:       Height:         General: In no acute distress, afebrile  Respiratory: Clear to auscultation bilaterally  Cardiovascular: S1, S2, no appreciable murmur  Abdomen: Soft, nontender, BS +  MSK: Warm, no lower extremity edema, no clubbing or cyanosis  Neurologic: Alert and oriented x4, moving all extremities with good strength     Lab Results   Component Value Date     01/06/2023    K 4.1 01/06/2023     07/21/2022    CO2 25 01/06/2023    BUN 23.6 01/06/2023    CREATININE 1.07 01/06/2023    CALCIUM 9.1 01/06/2023    ANIONGAP 9 07/21/2022    ESTGFRAFRICA >60 07/21/2022    EGFRNONAA >60 07/21/2022      Lab Results   Component Value Date    ALT 19 01/06/2023    AST 33 01/06/2023    ALKPHOS 102 01/06/2023    BILITOT 1.1 01/06/2023      Lab Results   Component Value Date    WBC 10.4 01/06/2023    HGB 10.4 (L) 01/06/2023    HCT 34.0 (L) 01/06/2023    MCV 72.8 (L) 01/06/2023     (L) 01/06/2023           Medications:   amiodarone  200 mg Oral Daily    apixaban  5 mg Oral BID    ARIPiprazole  10 mg Oral Daily    aspirin  81 mg Oral Daily    atorvastatin  80 mg Oral Daily    benztropine  1 mg Oral BID    famotidine  20 mg Oral BID    ferrous sulfate  1 tablet Oral Daily    furosemide  40 mg Oral Daily    isosorbide mononitrate  30 mg Oral Daily    levETIRAcetam  500 mg Oral BID    metoprolol tartrate  12.5 mg Oral BID    OXcarbazepine  300 mg Oral TID    traZODone  200 mg Oral QHS      acetaminophen, albuterol-ipratropium, dextrose 10%, dextrose 10%,  glucagon (human recombinant), glucose, glucose, hydrALAZINE, melatonin, naloxone, ondansetron, simethicone, sodium chloride 0.9%     Assessment/Plan:       Acute syncope with collapse- + LOC  Acute on chronic combined systolic and diastolic heart failure , EF 20-25% (ECHO 12.18.22)  Exertional dyspnea with SOB due to above  Chronic P AFib on Eliquis  Chest pain- resolved  NSTEMI type II  ANA  Abnormal TFTs  Iron-deficiency, microcytosis  Hypothyroidism        HX:  CMO-status post LifeVest in place , EF 20-25% 12/18/2022, VHD, TR, MR, HTN, CAD s/p CABG, HLD, medication nonadherence, tobacco use, bipolar disorder, schizophrenia, seizures, CVA, hepatitis-C, substance abuse    Plan:  -cardiology recs noted.  Continue diuresis, monitor electrolytes, I's and O's.  Needs outpatient cardiology follow-up.  Continue aspirin statin beta-blocker and Eliquis .  Needs monitor upon discharge  -GI recommending outpatient follow-up for hepatitis-C treatment.  Will continue iron therapy  -other home medications reviewed.  Will add low-dose Synthroid.  Needed needs outpatient PCP follow-up for repeating TFTs & dose adjustments  -monitor hemoglobin while inpatient.  Stool was tested negative for blood on 12/23/2022      Negrito Hernandez MD

## 2023-01-07 NOTE — PROGRESS NOTES
"[facility]   [facility]  Progress Note    Patient Name: Kendall Duff  MRN: [id number]  Admission Date: 1/3/2023  Hospital Length of Stay: 4 days  Code Status: Full Code   Attending Provider: Jael Calloway MD   Consulting Provider: RAY Henry  Primary Care Physician: Primary Doctor No  Principal Problem:Acute chest pain    Patient information was obtained from patient, past medical records, and ER records.     Subjective:   Chief Complaint:  Reason for consult: CP & SOB, CHF     HPI:   Mr. Duff is a 57 year old male who is known to [facility], Dr. Wright. He presents to the ER with complains of syncope at home with associated sympotms of SOB, weakness, & fatigue. He also reports swelling to his face. Of note, he has presented to the ER multiple times with similar symptoms. He denies CP, palpitations, or discharges from his lifevest. Significant labs include Cr 1.94, BNP 3361.6, & trop 0.088. [facility] has been consulted to further manage the patient's CHF exacerbation.     Hospital Course:  1.5.23: NAD. HR 56 on tele. Reports "I don't feel good". Denies CP or palps but endorses SOB.   1.6.23: NAD. "I'm feeling much better today." Remains with wheezing/SOB but denies CP or palps.  1.7.23: NAD Noted. On Room Air. Clinically Stable.     PMH: CAD, ICMO, HTN, schizoaffective disorder, hep C, DVT, systolic & diastolic CHF, Lifevest  PSH: CABG, LHC  Family History: Mother - CA; Father - liver disease   Social History: Current every day smoker. Former Cocaine Use. Denies alcohol use.     Previous Cardiac Diagnostics:   Carotid US (1.6.23):  The right internal carotid artery demonstrated less than 50% stenosis.  The left internal carotid artery demonstrated less than 50% stenosis.  The bilateral vertebral arteries were patent with antegrade flow.    TTE (12.18.22):  Eccentric hypertrophy and severely decreased systolic function. The estimated ejection fraction is 20-25%.  Grade III left ventricular diastolic " dysfunction.  Mild right ventricular enlargement with mildly reduced right ventricular systolic function.  Mild right atrial enlargement.  Moderate mitral regurgitation.  Severe tricuspid regurgitation.  The estimated PA systolic pressure is 33 mmHg.     Echocardiogram (10.28.22):  The left ventricle is moderately enlarged with mild eccentric hypertrophy and severely decreased systolic function.  The estimated ejection fraction is 20%.  There is severe left ventricular global hypokinesis.  Grade II left ventricular diastolic dysfunction.  Normal right ventricular size with moderately reduced right ventricular systolic function.  Moderate mitral regurgitation.  Moderate tricuspid regurgitation.  There is mild pulmonary hypertension.  The estimated PA systolic pressure is 44 mmHg.  Elevated central venous pressure (15 mmHg).  Severe left atrial enlargement.     CABG x 4 (4/22):  LIMA-LAD, SVG-OM1, SVG-D1, SVG-PDA     Echocardiogram (6.16.22):  The left ventricle is normal in size w/ concentric hypertrophy and severely decreased systolic function.  The estimated EF is 25-30%.  There is severe left ventricular global hypokinesis.  Grade II left ventricular diastolic dysfunction.  Normal right ventricular size w/ mildly reduced right ventricular systolic function.  The estimated PA systolic pressure is 52 mmHg.  There is moderate pulmonary HTN.  Elevated CVP (15 mmHg).     RUE NIVA (5.10.22):  A deep vein thrombosis was identified in the right axillary and brachial veins. A superficial thrombosis was identified in the right basilic vein.     LHC (3.21.22):  100% LAD to 30-40% residual stenosis post PTCA.  Large diagonal system w/ severe stenosis.  CIRC - patent stent, OM1 patent, mild CIRC occluded  RCA - stents ISR 40-50%, distal 70-80%  EDP 12 EF 35-40% anterior HK     Review of patient's allergies indicates:   Allergen Reactions    Depakote [divalproex]     Divalproex sodium Other (See Comments)    Lithium     Lithium  analogues     Quetiapine Other (See Comments)     Pt states had seizures       No current facility-administered medications on file prior to encounter.     Current Outpatient Medications on File Prior to Encounter   Medication Sig    amiodarone (PACERONE) 200 MG Tab Take 1 tablet (200 mg total) by mouth once daily.    apixaban (ELIQUIS) 5 mg Tab Take 1 tablet (5 mg total) by mouth 2 (two) times daily.    ARIPiprazole (ABILIFY) 10 MG Tab Take 1 tablet (10 mg total) by mouth once daily.    aspirin 81 MG Chew Chew and swallow 1 tablet (81 mg total) by mouth once daily.    atorvastatin (LIPITOR) 80 MG tablet Take 1 tablet (80 mg total) by mouth once daily.    benztropine (COGENTIN) 1 MG tablet Take 1 mg by mouth 2 (two) times daily.    famotidine (PEPCID) 20 MG tablet Take 1 tablet (20 mg total) by mouth 2 (two) times daily.    ferrous sulfate 325 (65 FE) MG EC tablet Take 1 tablet (325 mg total) by mouth once daily.    furosemide (LASIX) 40 MG tablet Take 1 tablet (40 mg total) by mouth once daily.    isosorbide mononitrate (IMDUR) 30 MG 24 hr tablet Take 1 tablet (30 mg total) by mouth once daily.    levETIRAcetam (KEPPRA) 500 MG Tab Take 1 tablet (500 mg total) by mouth 2 (two) times daily.    metoprolol tartrate (LOPRESSOR) 50 MG tablet Take 1 tablet (50 mg total) by mouth 2 (two) times daily.    OXcarbazepine (TRILEPTAL) 300 MG Tab Take 300 mg by mouth 3 (three) times daily.    sacubitriL-valsartan (ENTRESTO) 24-26 mg per tablet Take 1 tablet by mouth 2 (two) times daily.    traZODone (DESYREL) 100 MG tablet Take 1 tablet (100 mg total) by mouth every evening.    [DISCONTINUED] metoprolol succinate (TOPROL-XL) 25 MG 24 hr tablet Take 25 mg by mouth once daily.    [DISCONTINUED] pravastatin (PRAVACHOL) 40 MG tablet Take 1 tablet (40 mg total) by mouth every evening.     Family History       Problem Relation (Age of Onset)    Cancer Mother    Liver disease Father          Tobacco Use    Smoking status: Every Day      Types: Cigarettes    Smokeless tobacco: Never   Substance and Sexual Activity    Alcohol use: Never    Drug use: Not Currently     Types: Cocaine    Sexual activity: Not on file       Review of Systems   Respiratory:  Positive for cough. Negative for chest tightness and shortness of breath.    Cardiovascular:  Negative for chest pain.     Objective:     Vital Signs (Most Recent):  Temp: 97.5 °F (36.4 °C) (01/07/23 0747)  Pulse: 78 (01/07/23 0747)  Resp: 18 (01/07/23 0747)  BP: (!) 151/100 (01/07/23 0747)  SpO2: 95 % (01/07/23 0747)   Vital Signs (24h Range):  Temp:  [97.1 °F (36.2 °C)-98.5 °F (36.9 °C)] 97.5 °F (36.4 °C)  Pulse:  [65-78] 78  Resp:  [18-21] 18  SpO2:  [94 %-100 %] 95 %  BP: (139-159)/() 151/100     Weight: 85.3 kg (188 lb 0.8 oz)  Body mass index is 29.45 kg/m².    SpO2: 95 %         Intake/Output Summary (Last 24 hours) at 1/7/2023 0938  Last data filed at 1/7/2023 0701  Gross per 24 hour   Intake 2220 ml   Output 750 ml   Net 1470 ml         Lines/Drains/Airways       Peripheral Intravenous Line  Duration                  Peripheral IV - Single Lumen 01/03/23 1435 20 G Anterior;Right Forearm 3 days                    Significant Labs:  Recent Results (from the past 72 hour(s))   TSH    Collection Time: 01/04/23  4:24 PM   Result Value Ref Range    Thyroid Stimulating Hormone 20.748 (H) 0.350 - 4.940 uIU/mL   Comprehensive Metabolic Panel    Collection Time: 01/05/23  4:44 AM   Result Value Ref Range    Sodium Level 140 136 - 145 mmol/L    Potassium Level 4.0 3.5 - 5.1 mmol/L    Chloride 104 98 - 107 mmol/L    Carbon Dioxide 26 22 - 29 mmol/L    Glucose Level 109 (H) 74 - 100 mg/dL    Blood Urea Nitrogen 36.6 (H) 8.4 - 25.7 mg/dL    Creatinine 1.30 (H) 0.73 - 1.18 mg/dL    Calcium Level Total 8.8 8.4 - 10.2 mg/dL    Protein Total 6.6 6.4 - 8.3 gm/dL    Albumin Level 3.5 3.5 - 5.0 g/dL    Globulin 3.1 2.4 - 3.5 gm/dL    Albumin/Globulin Ratio 1.1 1.1 - 2.0 ratio    Bilirubin Total 0.8 <=1.5  mg/dL    Alkaline Phosphatase 98 40 - 150 unit/L    Alanine Aminotransferase 19 0 - 55 unit/L    Aspartate Aminotransferase 31 5 - 34 unit/L    eGFR 63 mls/min/1.73/m2   CBC with Differential    Collection Time: 01/05/23  4:44 AM   Result Value Ref Range    WBC 9.3 4.5 - 11.5 x10(3)/mcL    RBC 4.41 (L) 4.70 - 6.10 x10(6)/mcL    Hgb 9.8 (L) 14.0 - 18.0 gm/dL    Hct 31.2 (L) 42.0 - 52.0 %    MCV 70.7 (L) 80.0 - 94.0 fL    MCH 22.2 pg    MCHC 31.4 (L) 33.0 - 36.0 mg/dL    RDW 20.2 (H) 11.6 - 14.4 %    Platelet 132 (L) 140 - 371 x10(3)/mcL    MPV 10.5 9.4 - 12.4 fL    Neut % 80.4 %    Lymph % 10.6 %    Mono % 7.7 %    Eos % 0.5 %    Basophil % 0.4 %    Lymph # 0.99 0.6 - 4.6 x10(3)/mcL    Neut # 7.48 2.1 - 9.2 x10(3)/mcL    Mono # 0.72 0.1 - 1.3 x10(3)/mcL    Eos # 0.05 0 - 0.9 x10(3)/mcL    Baso # 0.04 0 - 0.2 x10(3)/mcL    IG# 0.04 0 - 0.04 x10(3)/mcL    IG% 0.4 %    NRBC% 0.0 0 - 1 %   Troponin I    Collection Time: 01/05/23  4:44 AM   Result Value Ref Range    Troponin-I 0.068 (H) 0.000 - 0.045 ng/mL   T3, Free (OLG)    Collection Time: 01/05/23  4:44 AM   Result Value Ref Range    T3 Free 1.77 1.57 - 3.91 pg/mL   T4, Free    Collection Time: 01/05/23  4:44 AM   Result Value Ref Range    Thyroxine Free 0.59 (L) 0.70 - 1.48 ng/dL   Iron and TIBC    Collection Time: 01/05/23  4:44 AM   Result Value Ref Range    Iron Binding Capacity Unsaturated 361 (H) 69 - 240 ug/dL    Iron Level 22 (L) 65 - 175 ug/dL    Transferrin 366 (H) 174 - 364 mg/dL    Iron Binding Capacity Total 383 250 - 450 ug/dL    Iron Saturation 6 (L) 20 - 50 %   Ferritin    Collection Time: 01/05/23  4:44 AM   Result Value Ref Range    Ferritin Level 48.98 21.81 - 274.66 ng/mL   Vitamin B12    Collection Time: 01/05/23  4:44 AM   Result Value Ref Range    Vitamin B12 Level 952 (H) 213 - 816 pg/mL   Sodium, Random Urine    Collection Time: 01/05/23  3:32 PM   Result Value Ref Range    Urine Sodium 100.0 mmol/L   Urea Nitrogen, Random Urine    Collection  Time: 01/05/23  3:32 PM   Result Value Ref Range    Urine Urea Nitrogen 657.0 mg/dL   Creatinine, Random Urine    Collection Time: 01/05/23  3:32 PM   Result Value Ref Range    Urine Creatinine 59.4 (L) 63.0 - 166.0 mg/dL   Folate    Collection Time: 01/06/23  8:37 AM   Result Value Ref Range    Folate Level 12.6 7.0 - 31.4 ng/mL   Comprehensive Metabolic Panel    Collection Time: 01/06/23  8:37 AM   Result Value Ref Range    Sodium Level 139 136 - 145 mmol/L    Potassium Level 4.1 3.5 - 5.1 mmol/L    Chloride 107 98 - 107 mmol/L    Carbon Dioxide 25 22 - 29 mmol/L    Glucose Level 110 (H) 74 - 100 mg/dL    Blood Urea Nitrogen 23.6 8.4 - 25.7 mg/dL    Creatinine 1.07 0.73 - 1.18 mg/dL    Calcium Level Total 9.1 8.4 - 10.2 mg/dL    Protein Total 6.8 6.4 - 8.3 gm/dL    Albumin Level 3.5 3.5 - 5.0 g/dL    Globulin 3.3 2.4 - 3.5 gm/dL    Albumin/Globulin Ratio 1.1 1.1 - 2.0 ratio    Bilirubin Total 1.1 <=1.5 mg/dL    Alkaline Phosphatase 102 40 - 150 unit/L    Alanine Aminotransferase 19 0 - 55 unit/L    Aspartate Aminotransferase 33 5 - 34 unit/L    eGFR 80 mls/min/1.73/m2   Magnesium    Collection Time: 01/06/23  8:37 AM   Result Value Ref Range    Magnesium Level 2.10 1.60 - 2.60 mg/dL   CV Ultrasound Bilateral Doppler Carotid    Collection Time: 01/06/23 10:54 AM   Result Value Ref Range    Left ICA/CCA ratio 1.53     Right ICA/CCA ratio 1.43     Left Highest ICA 55.00     Left Highest CCA 42     Right Highest ICA 66.00     Right Highest CCA 49     Right Highest EDV 39.00     LT Highest EDV 18.00     Right CCA prox sys 49 cm/s    Right CCA prox [place] 11 cm/s    Right CCA dist sys 46 cm/s    Right CCA dist [place] 9 cm/s    Right ICA prox sys 35 cm/s    Right ICA prox [place] 9 cm/s    Right ICA mid sys 42 cm/s    Right ICA mid [place] 15 cm/s    Right ICA dist sys 66 cm/s    Right ICA dist [place] 39 cm/s    Right ECA sys 58 cm/s    Right vertebral sys 23 cm/s    Left CCA prox sys 42 cm/s    Left CCA dist sys 36 cm/s    Left ICA  prox sys 39 cm/s    Left ICA prox [place] 12 cm/s    Left ICA mid sys 55 cm/s    Left ICA mid [place] 18 cm/s    Left ICA dist sys 44 cm/s    Left ICA dist [place] 13 cm/s    Left ECA sys 66 cm/s    Left vertebral sys 29 cm/s    Right ECA [place] 12 cm/s    Left vertebral [place] 10 cm/s   CBC with Differential    Collection Time: 01/06/23 12:58 PM   Result Value Ref Range    WBC 10.4 4.5 - 11.5 x10(3)/mcL    RBC 4.67 (L) 4.70 - 6.10 x10(6)/mcL    Hgb 10.4 (L) 14.0 - 18.0 gm/dL    Hct 34.0 (L) 42.0 - 52.0 %    MCV 72.8 (L) 80.0 - 94.0 fL    MCH 22.3 pg    MCHC 30.6 (L) 33.0 - 36.0 mg/dL    RDW 21.1 (H) 11.6 - 14.4 %    Platelet 139 (L) 140 - 371 x10(3)/mcL    MPV 10.6 9.4 - 12.4 fL    Neut % 81.2 %    Lymph % 9.1 %    Mono % 8.4 %    Eos % 0.6 %    Basophil % 0.4 %    Lymph # 0.94 0.6 - 4.6 x10(3)/mcL    Neut # 8.43 2.1 - 9.2 x10(3)/mcL    Mono # 0.87 0.1 - 1.3 x10(3)/mcL    Eos # 0.06 0 - 0.9 x10(3)/mcL    Baso # 0.04 0 - 0.2 x10(3)/mcL    IG# 0.03 0 - 0.04 x10(3)/mcL    IG% 0.3 %    NRBC% 0.0 0 - 1 %   Blood Smear Microscopic Exam    Collection Time: 01/06/23 12:58 PM   Result Value Ref Range    RBC Morph Abnormal (A) Normal    Anisocyte 1+ (A) (none)    Microcyte 1+ (A) (none)    Poik 2+ (A) (none)    Schistocyte 1+ (A) (none)    Target Cell 1+ (A) (none)    Platelet Est Normal Normal, Adequate       Significant Imaging:  Imaging Results              US Retroperitoneal Complete (Final result)  Result time 01/03/23 18:51:48      Final result by Sharon Cantu MD (01/03/23 18:51:48)                   Impression:      Normal sized, nonobstructed kidneys.    Free intraperitoneal fluid    Nodular surface contour of the liver.  Correlate for cirrhosis.  Thickened appearance of the gallbladder wall is nonspecific in the setting of free fluid and suspected liver disease.      Electronically signed by: Sharon Cantu  Date:    01/03/2023  Time:    18:51               Narrative:    EXAMINATION:  US RETROPERITONEAL  COMPLETE    CLINICAL HISTORY:  elevated BUN/Creat;    TECHNIQUE:  Ultrasound of the kidneys and urinary bladder was performed including color flow and grayscale evaluation of the kidneys.    COMPARISON:  No relevant prior available for comparison.    FINDINGS:  RIGHT KIDNEY: The right kidney measures 10.5 cm.  No hydronephrosis. Incidental 1.5 cm cyst. No follow-up imaging is recommended as incidental lesions are likely benign.   No appreciable shadowing renal calculus.    LEFT KIDNEY: The left kidney measures 11.4 cm. No hydronephrosis.Incidental 1.7 cm renal cyst. No follow-up imaging is recommended as incidental lesions are likely benign.   No appreciable shadowing renal calculus.    BLADDER: Bladder is collapsed, limiting evaluation.    OTHER: Free intraperitoneal fluid in the upper abdomen.  Irregular surface contour of the liver.  Thickened appearance of the gallbladder wall.                                       X-Ray Chest 1 View (Final result)  Result time 01/03/23 12:51:46      Final result by Javier Soto MD (01/03/23 12:51:46)                   Impression:      No acute findings identified.      Electronically signed by: Javier Soto  Date:    01/03/2023  Time:    12:51               Narrative:    EXAMINATION:  XR CHEST 1 VIEW    CLINICAL HISTORY:  Dyspnea, unspecified    TECHNIQUE:  One view    COMPARISON:  December 28, 2022.    FINDINGS:  Cardiopericardial silhouette enlarged appearance is similar.  Mediastinum and the lungs are partially obscured by overlying devices.  Lungs hypoventilatory changes without convincing pulmonary edema or dense consolidation.  No significant fluid within the pleural spaces.                                    Physical Exam  Vitals reviewed.   Constitutional:       Appearance: Normal appearance.   HENT:      Head: Normocephalic.      Mouth/Throat:      Mouth: Mucous membranes are moist.      Pharynx: Oropharynx is clear.   Cardiovascular:      Rate and Rhythm: Normal  rate and regular rhythm.      Pulses: Normal pulses.      Heart sounds: Normal heart sounds. No murmur heard.  Pulmonary:      Effort: Pulmonary effort is normal. No respiratory distress.      Breath sounds: Normal breath sounds.      Comments: On Room Air  Abdominal:      General: There is no distension.      Palpations: Abdomen is soft.      Tenderness: There is no abdominal tenderness.   Musculoskeletal:         General: Normal range of motion.      Cervical back: Neck supple.      Right lower leg: No edema.      Left lower leg: No edema.   Skin:     General: Skin is warm and dry.   Neurological:      General: No focal deficit present.      Mental Status: He is alert and oriented to person, place, and time. Mental status is at baseline.   Psychiatric:         Mood and Affect: Mood normal.         Behavior: Behavior normal.     Home Medications:   No current facility-administered medications on file prior to encounter.     Current Outpatient Medications on File Prior to Encounter   Medication Sig Dispense Refill    amiodarone (PACERONE) 200 MG Tab Take 1 tablet (200 mg total) by mouth once daily. 30 tablet 6    apixaban (ELIQUIS) 5 mg Tab Take 1 tablet (5 mg total) by mouth 2 (two) times daily. 60 tablet 3    ARIPiprazole (ABILIFY) 10 MG Tab Take 1 tablet (10 mg total) by mouth once daily. 30 tablet 11    aspirin 81 MG Chew Chew and swallow 1 tablet (81 mg total) by mouth once daily. 360 tablet 0    atorvastatin (LIPITOR) 80 MG tablet Take 1 tablet (80 mg total) by mouth once daily. 90 tablet 3    benztropine (COGENTIN) 1 MG tablet Take 1 mg by mouth 2 (two) times daily.      famotidine (PEPCID) 20 MG tablet Take 1 tablet (20 mg total) by mouth 2 (two) times daily. 60 tablet 11    ferrous sulfate 325 (65 FE) MG EC tablet Take 1 tablet (325 mg total) by mouth once daily. 30 tablet 0    furosemide (LASIX) 40 MG tablet Take 1 tablet (40 mg total) by mouth once daily. 30 tablet 11    isosorbide mononitrate (IMDUR) 30  MG 24 hr tablet Take 1 tablet (30 mg total) by mouth once daily. 30 tablet 11    levETIRAcetam (KEPPRA) 500 MG Tab Take 1 tablet (500 mg total) by mouth 2 (two) times daily. 60 tablet 11    metoprolol tartrate (LOPRESSOR) 50 MG tablet Take 1 tablet (50 mg total) by mouth 2 (two) times daily. 60 tablet 0    OXcarbazepine (TRILEPTAL) 300 MG Tab Take 300 mg by mouth 3 (three) times daily.      sacubitriL-valsartan (ENTRESTO) 24-26 mg per tablet Take 1 tablet by mouth 2 (two) times daily. 60 tablet 0    traZODone (DESYREL) 100 MG tablet Take 1 tablet (100 mg total) by mouth every evening. 30 tablet 11    [DISCONTINUED] metoprolol succinate (TOPROL-XL) 25 MG 24 hr tablet Take 25 mg by mouth once daily.      [DISCONTINUED] pravastatin (PRAVACHOL) 40 MG tablet Take 1 tablet (40 mg total) by mouth every evening. 90 tablet 3       Current Inpatient Medications:    Current Facility-Administered Medications:     acetaminophen tablet 650 mg, 650 mg, Oral, Q4H PRN, RAY Olguin    albuterol-ipratropium 2.5 mg-0.5 mg/3 mL nebulizer solution 3 mL, 3 mL, Nebulization, Q4H PRN, Amelia Bone, AGACNP-BC, 3 mL at 01/06/23 1959    amiodarone tablet 200 mg, 200 mg, Oral, Daily, Lina Bahena, WILLYP, 200 mg at 01/07/23 0806    apixaban tablet 5 mg, 5 mg, Oral, BID, Lina Bahena, FNP, 5 mg at 01/07/23 0806    ARIPiprazole tablet 10 mg, 10 mg, Oral, Daily, Lina Bahena, WILLYP, 10 mg at 01/07/23 0806    aspirin chewable tablet 81 mg, 81 mg, Oral, Daily, Lina Bahena, FNP, 81 mg at 01/07/23 0806    atorvastatin tablet 80 mg, 80 mg, Oral, Daily, Lina Bahena, FNP, 80 mg at 01/07/23 0806    benztropine tablet 1 mg, 1 mg, Oral, BID, RAY Olguin, 1 mg at 01/07/23 0806    dextrose 10% bolus 125 mL 125 mL, 12.5 g, Intravenous, PRN, RAY Olguin    dextrose 10% bolus 250 mL 250 mL, 25 g, Intravenous, PRN, RAY Olguin    famotidine tablet 20 mg, 20  mg, Oral, BID, Lina Bahena, FNP, 20 mg at 01/07/23 0806    ferrous sulfate tablet 1 each, 1 tablet, Oral, Daily, Lina Bahena, FNP, 1 each at 01/07/23 0806    furosemide tablet 40 mg, 40 mg, Oral, Daily, Megha Jurado, FNP, 40 mg at 01/07/23 0806    glucagon (human recombinant) injection 1 mg, 1 mg, Intramuscular, PRN, Lina Bahena FNP    glucose chewable tablet 16 g, 16 g, Oral, PRN, Lina Bahena, FNP    glucose chewable tablet 24 g, 24 g, Oral, PRN, Lina Bahena FNP    hydrALAZINE injection 10 mg, 10 mg, Intravenous, Q4H PRN, Amelia Bone, AGACNP-BC, 10 mg at 01/05/23 0406    isosorbide mononitrate 24 hr tablet 30 mg, 30 mg, Oral, Daily, Lina Bahena FNP, 30 mg at 01/07/23 0807    levETIRAcetam tablet 500 mg, 500 mg, Oral, BID, Lina Bahena FNP, 500 mg at 01/07/23 0806    melatonin tablet 6 mg, 6 mg, Oral, Nightly PRN, Lina Bahena FNP, 6 mg at 01/04/23 2006    metoprolol tartrate (LOPRESSOR) split tablet 12.5 mg, 12.5 mg, Oral, BID, Jael Calloway MD, 12.5 mg at 01/06/23 2028    naloxone 0.4 mg/mL injection 0.02 mg, 0.02 mg, Intravenous, PRN, Lina Bahena FNP    ondansetron injection 4 mg, 4 mg, Intravenous, Q6H PRN, WILLY OlguinP    OXcarbazepine tablet 300 mg, 300 mg, Oral, TID, Lina Bahena, FNP, 300 mg at 01/07/23 0806    simethicone chewable tablet 80 mg, 1 tablet, Oral, QID PRN, Lina Bahena FNP    sodium chloride 0.9% flush 10 mL, 10 mL, Intravenous, Q12H PRN, RAY Olguin    traZODone tablet 200 mg, 200 mg, Oral, QHS, RAY Olguin, 200 mg at 01/06/23 2029         VTE Risk Mitigation (From admission, onward)           Ordered     apixaban tablet 5 mg  2 times daily         01/03/23 1801     IP VTE HIGH RISK PATIENT  Once         01/03/23 1522     Place sequential compression device  Until discontinued         01/03/23 1522                  Assessment:    Syncope w/ collapse    - Neuro Stable    - Carotid US Negative for Obstructive Disease  Acute on Chronic Combined Systolic/Diastolic HF (Compensated)    - Grade III DD    - EF 20-25% (ECHO 12.18.22)  NSTEMI - suspect type 2 in the setting of CHF exacerbation   PAF/PAFL (Recent New Diagnosis)- Now SR    - [id number]: 5 (LVSD/HTN/TE/NSTEMI)    - On Eliquis  ANA   Chronic Anemia - Stable  ICMO     - With Life Vest   VHD    - Moderate MR and Severe TR  Hypertension  Pulmonary Hypertension  CAD/CABG (April 2022)    - LIMA-LAD, SVG-OM1, SVG-D1, SVG-PDA  History of RUE DVT (5.10.22)    - DVT in right axillary and brachial veins. A superficial thrombosis was identified in the right basilic vein.  Schizoaffective Disorder  Hx of Hep C  Hx of Cocaine Abuse   Nicotine Dependence (Tobacco Use)  Medical noncopliance      Plan:   Continue oral lasix 40 mg   Continue ASA, statin, & BB.  Continue Eliquis for CVA prophylaxis in the setting of PAF.   Monitor for bradycardia on tele. Pt will need MCT x 2 weeks on discharge.     RAY Henry  Cardiology  [facility] - Emergency Dept  01/07/2023 9:02 AM

## 2023-01-07 NOTE — PLAN OF CARE
Problem: Adult Inpatient Plan of Care  Goal: Plan of Care Review  Outcome: Ongoing, Progressing  Flowsheets (Taken 1/6/2023 1815)  Plan of Care Reviewed With: patient  Goal: Patient-Specific Goal (Individualized)  Outcome: Ongoing, Progressing  Goal: Absence of Hospital-Acquired Illness or Injury  Outcome: Ongoing, Progressing  Intervention: Identify and Manage Fall Risk  Flowsheets (Taken 1/6/2023 1815)  Safety Promotion/Fall Prevention:   assistive device/personal item within reach   nonskid shoes/socks when out of bed  Intervention: Prevent Skin Injury  Flowsheets (Taken 1/6/2023 1815)  Body Position: position changed independently  Skin Protection: adhesive use limited  Goal: Optimal Comfort and Wellbeing  Outcome: Ongoing, Progressing  Intervention: Monitor Pain and Promote Comfort  Flowsheets (Taken 1/6/2023 1815)  Pain Management Interventions:   care clustered   quiet environment facilitated   relaxation techniques promoted  Intervention: Provide Person-Centered Care  Flowsheets (Taken 1/6/2023 1815)  Trust Relationship/Rapport:   care explained   questions answered   questions encouraged  Goal: Readiness for Transition of Care  Outcome: Ongoing, Progressing

## 2023-01-08 LAB
ALBUMIN SERPL-MCNC: 3.4 G/DL (ref 3.5–5)
ALBUMIN/GLOB SERPL: 1.1 RATIO (ref 1.1–2)
ALP SERPL-CCNC: 102 UNIT/L (ref 40–150)
ALT SERPL-CCNC: 19 UNIT/L (ref 0–55)
AST SERPL-CCNC: 34 UNIT/L (ref 5–34)
BASOPHILS # BLD AUTO: 0.04 X10(3)/MCL (ref 0–0.2)
BASOPHILS NFR BLD AUTO: 0.5 %
BILIRUBIN DIRECT+TOT PNL SERPL-MCNC: 1.1 MG/DL
BUN SERPL-MCNC: 20.7 MG/DL (ref 8.4–25.7)
CALCIUM SERPL-MCNC: 8.7 MG/DL (ref 8.4–10.2)
CHLORIDE SERPL-SCNC: 106 MMOL/L (ref 98–107)
CO2 SERPL-SCNC: 25 MMOL/L (ref 22–29)
CREAT SERPL-MCNC: 1.11 MG/DL (ref 0.73–1.18)
DEPRECATED CALCIDIOL+CALCIFEROL SERPL-MC: 13.7 NG/ML (ref 30–80)
EOSINOPHIL # BLD AUTO: 0.15 X10(3)/MCL (ref 0–0.9)
EOSINOPHIL NFR BLD AUTO: 1.9 %
ERYTHROCYTE [DISTWIDTH] IN BLOOD BY AUTOMATED COUNT: 21.3 % (ref 11.6–14.4)
GFR SERPLBLD CREATININE-BSD FMLA CKD-EPI: 76 MLS/MIN/1.73/M2
GLOBULIN SER-MCNC: 3.2 GM/DL (ref 2.4–3.5)
GLUCOSE SERPL-MCNC: 93 MG/DL (ref 74–100)
HCT VFR BLD AUTO: 34.1 % (ref 42–52)
HGB BLD-MCNC: 10.3 GM/DL (ref 14–18)
IMM GRANULOCYTES # BLD AUTO: 0.03 X10(3)/MCL (ref 0–0.04)
IMM GRANULOCYTES NFR BLD AUTO: 0.4 %
LYMPHOCYTES # BLD AUTO: 0.93 X10(3)/MCL (ref 0.6–4.6)
LYMPHOCYTES NFR BLD AUTO: 11.6 %
MAGNESIUM SERPL-MCNC: 2 MG/DL (ref 1.6–2.6)
MCH RBC QN AUTO: 22.1 PG
MCHC RBC AUTO-ENTMCNC: 30.2 MG/DL (ref 33–36)
MCV RBC AUTO: 73 FL (ref 80–94)
MONOCYTES # BLD AUTO: 0.69 X10(3)/MCL (ref 0.1–1.3)
MONOCYTES NFR BLD AUTO: 8.6 %
NEUTROPHILS # BLD AUTO: 6.21 X10(3)/MCL (ref 2.1–9.2)
NEUTROPHILS NFR BLD AUTO: 77 %
NRBC BLD AUTO-RTO: 0 % (ref 0–1)
PLATELET # BLD AUTO: 123 X10(3)/MCL (ref 140–371)
PMV BLD AUTO: 9.7 FL (ref 9.4–12.4)
POTASSIUM SERPL-SCNC: 4.4 MMOL/L (ref 3.5–5.1)
PROT SERPL-MCNC: 6.6 GM/DL (ref 6.4–8.3)
RBC # BLD AUTO: 4.67 X10(6)/MCL (ref 4.7–6.1)
SODIUM SERPL-SCNC: 140 MMOL/L (ref 136–145)
WBC # SPEC AUTO: 8.1 X10(3)/MCL (ref 4.5–11.5)

## 2023-01-08 PROCEDURE — 82306 VITAMIN D 25 HYDROXY: CPT | Performed by: INTERNAL MEDICINE

## 2023-01-08 PROCEDURE — 85025 COMPLETE CBC W/AUTO DIFF WBC: CPT | Performed by: INTERNAL MEDICINE

## 2023-01-08 PROCEDURE — 36415 COLL VENOUS BLD VENIPUNCTURE: CPT | Performed by: INTERNAL MEDICINE

## 2023-01-08 PROCEDURE — 21400001 HC TELEMETRY ROOM

## 2023-01-08 PROCEDURE — 99900035 HC TECH TIME PER 15 MIN (STAT)

## 2023-01-08 PROCEDURE — 25000003 PHARM REV CODE 250: Performed by: INTERNAL MEDICINE

## 2023-01-08 PROCEDURE — 83735 ASSAY OF MAGNESIUM: CPT | Performed by: INTERNAL MEDICINE

## 2023-01-08 PROCEDURE — 80053 COMPREHEN METABOLIC PANEL: CPT | Performed by: INTERNAL MEDICINE

## 2023-01-08 PROCEDURE — 25000003 PHARM REV CODE 250: Performed by: NURSE PRACTITIONER

## 2023-01-08 PROCEDURE — 25000003 PHARM REV CODE 250

## 2023-01-08 PROCEDURE — 63600175 PHARM REV CODE 636 W HCPCS: Performed by: NURSE PRACTITIONER

## 2023-01-08 RX ORDER — HYDROXYZINE HYDROCHLORIDE 25 MG/1
25 TABLET, FILM COATED ORAL 3 TIMES DAILY
Status: DISCONTINUED | OUTPATIENT
Start: 2023-01-08 | End: 2023-01-08

## 2023-01-08 RX ORDER — ERGOCALCIFEROL 1.25 MG/1
50000 CAPSULE ORAL
Status: DISCONTINUED | OUTPATIENT
Start: 2023-01-08 | End: 2023-01-09 | Stop reason: HOSPADM

## 2023-01-08 RX ORDER — HYDROXYZINE HYDROCHLORIDE 50 MG/1
50 TABLET, FILM COATED ORAL 3 TIMES DAILY
Status: DISCONTINUED | OUTPATIENT
Start: 2023-01-08 | End: 2023-01-09 | Stop reason: HOSPADM

## 2023-01-08 RX ADMIN — APIXABAN 5 MG: 5 TABLET, FILM COATED ORAL at 08:01

## 2023-01-08 RX ADMIN — FERROUS SULFATE TAB 325 MG (65 MG ELEMENTAL FE) 1 EACH: 325 (65 FE) TAB at 08:01

## 2023-01-08 RX ADMIN — ISOSORBIDE MONONITRATE 30 MG: 30 TABLET, EXTENDED RELEASE ORAL at 08:01

## 2023-01-08 RX ADMIN — OXCARBAZEPINE 300 MG: 300 TABLET, FILM COATED ORAL at 08:01

## 2023-01-08 RX ADMIN — Medication 6 MG: at 09:01

## 2023-01-08 RX ADMIN — APIXABAN 5 MG: 5 TABLET, FILM COATED ORAL at 09:01

## 2023-01-08 RX ADMIN — HYDROXYZINE HYDROCHLORIDE 50 MG: 50 TABLET, FILM COATED ORAL at 02:01

## 2023-01-08 RX ADMIN — BENZTROPINE MESYLATE 1 MG: 1 TABLET ORAL at 08:01

## 2023-01-08 RX ADMIN — FAMOTIDINE 20 MG: 20 TABLET, FILM COATED ORAL at 08:01

## 2023-01-08 RX ADMIN — SACUBITRIL AND VALSARTAN 1 TABLET: 24; 26 TABLET, FILM COATED ORAL at 09:01

## 2023-01-08 RX ADMIN — FAMOTIDINE 20 MG: 20 TABLET, FILM COATED ORAL at 09:01

## 2023-01-08 RX ADMIN — ASPIRIN 81 MG CHEWABLE TABLET 81 MG: 81 TABLET CHEWABLE at 08:01

## 2023-01-08 RX ADMIN — OXCARBAZEPINE 300 MG: 300 TABLET, FILM COATED ORAL at 09:01

## 2023-01-08 RX ADMIN — OXCARBAZEPINE 300 MG: 300 TABLET, FILM COATED ORAL at 02:01

## 2023-01-08 RX ADMIN — LEVOTHYROXINE SODIUM 25 MCG: 25 TABLET ORAL at 06:01

## 2023-01-08 RX ADMIN — TRAZODONE HYDROCHLORIDE 200 MG: 100 TABLET ORAL at 09:01

## 2023-01-08 RX ADMIN — ATORVASTATIN CALCIUM 80 MG: 40 TABLET, FILM COATED ORAL at 08:01

## 2023-01-08 RX ADMIN — LEVETIRACETAM 500 MG: 500 TABLET, FILM COATED ORAL at 08:01

## 2023-01-08 RX ADMIN — CETIRIZINE HYDROCHLORIDE 10 MG: 10 TABLET, FILM COATED ORAL at 08:01

## 2023-01-08 RX ADMIN — METOPROLOL TARTRATE 12.5 MG: 25 TABLET, FILM COATED ORAL at 08:01

## 2023-01-08 RX ADMIN — AMIODARONE HYDROCHLORIDE 200 MG: 200 TABLET ORAL at 08:01

## 2023-01-08 RX ADMIN — ARIPIPRAZOLE 10 MG: 5 TABLET ORAL at 08:01

## 2023-01-08 RX ADMIN — LEVETIRACETAM 500 MG: 500 TABLET, FILM COATED ORAL at 09:01

## 2023-01-08 RX ADMIN — ONDANSETRON 4 MG: 2 INJECTION INTRAMUSCULAR; INTRAVENOUS at 08:01

## 2023-01-08 RX ADMIN — FUROSEMIDE 40 MG: 40 TABLET ORAL at 08:01

## 2023-01-08 RX ADMIN — HYDROXYZINE HYDROCHLORIDE 50 MG: 50 TABLET, FILM COATED ORAL at 09:01

## 2023-01-08 RX ADMIN — BENZTROPINE MESYLATE 1 MG: 1 TABLET ORAL at 09:01

## 2023-01-08 NOTE — PROGRESS NOTES
"[facility]   Cardiology  Progress Note    Patient Name: Kendall Duff  MRN: [id number]  Admission Date: 1/3/2023  Hospital Length of Stay: 5 days  Code Status: Full Code   Attending Provider: Negrito Hernandez MD   Consulting Provider: RAY Henry  Primary Care Physician: Primary Doctor No  Principal Problem:Acute chest pain    Patient information was obtained from patient, past medical records, and ER records.     Subjective:   Chief Complaint:  Reason for consult: CP & SOB, CHF     HPI:   Mr. Duff is a 57 year old male who is known to [facility], Dr. Wright. He presents to the ER with complains of syncope at home with associated sympotms of SOB, weakness, & fatigue. He also reports swelling to his face. Of note, he has presented to the ER multiple times with similar symptoms. He denies CP, palpitations, or discharges from his lifevest. Significant labs include Cr 1.94, BNP 3361.6, & trop 0.088. [facility] has been consulted to further manage the patient's CHF exacerbation.     Hospital Course:  1.5.23: NAD. HR 56 on tele. Reports "I don't feel good". Denies CP or palps but endorses SOB.   1.6.23: NAD. "I'm feeling much better today." Remains with wheezing/SOB but denies CP or palps.  1.7.23: NAD Noted. On Room Air. Clinically Stable.  1.8.23: NAD Noted. On Room Air. AF RVR Last Night. Rate Improved.      PMH: CAD, ICMO, HTN, schizoaffective disorder, hep C, DVT, systolic & diastolic CHF, Lifevest  PSH: CABG, LHC  Family History: Mother - CA; Father - liver disease   Social History: Current every day smoker. Former Cocaine Use. Denies alcohol use.     Previous Cardiac Diagnostics:   Carotid US (1.6.23):  The right internal carotid artery demonstrated less than 50% stenosis.  The left internal carotid artery demonstrated less than 50% stenosis.  The bilateral vertebral arteries were patent with antegrade flow.    TTE (12.18.22):  Eccentric hypertrophy and severely decreased systolic function. The estimated " ejection fraction is 20-25%.  Grade III left ventricular diastolic dysfunction.  Mild right ventricular enlargement with mildly reduced right ventricular systolic function.  Mild right atrial enlargement.  Moderate mitral regurgitation.  Severe tricuspid regurgitation.  The estimated PA systolic pressure is 33 mmHg.     Echocardiogram (10.28.22):  The left ventricle is moderately enlarged with mild eccentric hypertrophy and severely decreased systolic function.  The estimated ejection fraction is 20%.  There is severe left ventricular global hypokinesis.  Grade II left ventricular diastolic dysfunction.  Normal right ventricular size with moderately reduced right ventricular systolic function.  Moderate mitral regurgitation.  Moderate tricuspid regurgitation.  There is mild pulmonary hypertension.  The estimated PA systolic pressure is 44 mmHg.  Elevated central venous pressure (15 mmHg).  Severe left atrial enlargement.     CABG x 4 (4/22):  LIMA-LAD, SVG-OM1, SVG-D1, SVG-PDA     Echocardiogram (6.16.22):  The left ventricle is normal in size w/ concentric hypertrophy and severely decreased systolic function.  The estimated EF is 25-30%.  There is severe left ventricular global hypokinesis.  Grade II left ventricular diastolic dysfunction.  Normal right ventricular size w/ mildly reduced right ventricular systolic function.  The estimated PA systolic pressure is 52 mmHg.  There is moderate pulmonary HTN.  Elevated CVP (15 mmHg).     RUE NIVA (5.10.22):  A deep vein thrombosis was identified in the right axillary and brachial veins. A superficial thrombosis was identified in the right basilic vein.     LHC (3.21.22):  100% LAD to 30-40% residual stenosis post PTCA.  Large diagonal system w/ severe stenosis.  CIRC - patent stent, OM1 patent, mild CIRC occluded  RCA - stents ISR 40-50%, distal 70-80%  EDP 12 EF 35-40% anterior HK     Review of patient's allergies indicates:   Allergen Reactions    Depakote  [divalproex]     Divalproex sodium Other (See Comments)    Lithium     Lithium analogues     Quetiapine Other (See Comments)     Pt states had seizures       No current facility-administered medications on file prior to encounter.     Current Outpatient Medications on File Prior to Encounter   Medication Sig    amiodarone (PACERONE) 200 MG Tab Take 1 tablet (200 mg total) by mouth once daily.    apixaban (ELIQUIS) 5 mg Tab Take 1 tablet (5 mg total) by mouth 2 (two) times daily.    ARIPiprazole (ABILIFY) 10 MG Tab Take 1 tablet (10 mg total) by mouth once daily.    aspirin 81 MG Chew Chew and swallow 1 tablet (81 mg total) by mouth once daily.    atorvastatin (LIPITOR) 80 MG tablet Take 1 tablet (80 mg total) by mouth once daily.    benztropine (COGENTIN) 1 MG tablet Take 1 mg by mouth 2 (two) times daily.    famotidine (PEPCID) 20 MG tablet Take 1 tablet (20 mg total) by mouth 2 (two) times daily.    ferrous sulfate 325 (65 FE) MG EC tablet Take 1 tablet (325 mg total) by mouth once daily.    furosemide (LASIX) 40 MG tablet Take 1 tablet (40 mg total) by mouth once daily.    isosorbide mononitrate (IMDUR) 30 MG 24 hr tablet Take 1 tablet (30 mg total) by mouth once daily.    levETIRAcetam (KEPPRA) 500 MG Tab Take 1 tablet (500 mg total) by mouth 2 (two) times daily.    metoprolol tartrate (LOPRESSOR) 50 MG tablet Take 1 tablet (50 mg total) by mouth 2 (two) times daily.    OXcarbazepine (TRILEPTAL) 300 MG Tab Take 300 mg by mouth 3 (three) times daily.    sacubitriL-valsartan (ENTRESTO) 24-26 mg per tablet Take 1 tablet by mouth 2 (two) times daily.    traZODone (DESYREL) 100 MG tablet Take 1 tablet (100 mg total) by mouth every evening.    [DISCONTINUED] metoprolol succinate (TOPROL-XL) 25 MG 24 hr tablet Take 25 mg by mouth once daily.    [DISCONTINUED] pravastatin (PRAVACHOL) 40 MG tablet Take 1 tablet (40 mg total) by mouth every evening.     Family History       Problem Relation (Age of Onset)    Cancer Mother     Liver disease Father          Tobacco Use    Smoking status: Every Day     Types: Cigarettes    Smokeless tobacco: Never   Substance and Sexual Activity    Alcohol use: Never    Drug use: Not Currently     Types: Cocaine    Sexual activity: Not on file       Review of Systems   Respiratory:  Negative for chest tightness and shortness of breath.    Cardiovascular:  Negative for chest pain.     Objective:     Vital Signs (Most Recent):  Temp: 98.5 °F (36.9 °C) (01/08/23 0741)  Pulse: 67 (01/08/23 0741)  Resp: 18 (01/08/23 0741)  BP: (!) 157/106 (01/08/23 0741)  SpO2: 99 % (01/08/23 0741)   Vital Signs (24h Range):  Temp:  [97.6 °F (36.4 °C)-98.6 °F (37 °C)] 98.5 °F (36.9 °C)  Pulse:  [] 67  Resp:  [18-19] 18  SpO2:  [95 %-99 %] 99 %  BP: (114-157)/() 157/106     Weight: 85.3 kg (188 lb 0.8 oz)  Body mass index is 29.45 kg/m².    SpO2: 99 %         Intake/Output Summary (Last 24 hours) at 1/8/2023 1011  Last data filed at 1/8/2023 0613  Gross per 24 hour   Intake 1870 ml   Output 900 ml   Net 970 ml         Lines/Drains/Airways       Peripheral Intravenous Line  Duration                  Peripheral IV - Single Lumen 01/03/23 1435 20 G Anterior;Right Forearm 4 days                    Significant Labs:  Recent Results (from the past 72 hour(s))   Sodium, Random Urine    Collection Time: 01/05/23  3:32 PM   Result Value Ref Range    Urine Sodium 100.0 mmol/L   Urea Nitrogen, Random Urine    Collection Time: 01/05/23  3:32 PM   Result Value Ref Range    Urine Urea Nitrogen 657.0 mg/dL   Creatinine, Random Urine    Collection Time: 01/05/23  3:32 PM   Result Value Ref Range    Urine Creatinine 59.4 (L) 63.0 - 166.0 mg/dL   Folate    Collection Time: 01/06/23  8:37 AM   Result Value Ref Range    Folate Level 12.6 7.0 - 31.4 ng/mL   Comprehensive Metabolic Panel    Collection Time: 01/06/23  8:37 AM   Result Value Ref Range    Sodium Level 139 136 - 145 mmol/L    Potassium Level 4.1 3.5 - 5.1 mmol/L    Chloride  107 98 - 107 mmol/L    Carbon Dioxide 25 22 - 29 mmol/L    Glucose Level 110 (H) 74 - 100 mg/dL    Blood Urea Nitrogen 23.6 8.4 - 25.7 mg/dL    Creatinine 1.07 0.73 - 1.18 mg/dL    Calcium Level Total 9.1 8.4 - 10.2 mg/dL    Protein Total 6.8 6.4 - 8.3 gm/dL    Albumin Level 3.5 3.5 - 5.0 g/dL    Globulin 3.3 2.4 - 3.5 gm/dL    Albumin/Globulin Ratio 1.1 1.1 - 2.0 ratio    Bilirubin Total 1.1 <=1.5 mg/dL    Alkaline Phosphatase 102 40 - 150 unit/L    Alanine Aminotransferase 19 0 - 55 unit/L    Aspartate Aminotransferase 33 5 - 34 unit/L    eGFR 80 mls/min/1.73/m2   Magnesium    Collection Time: 01/06/23  8:37 AM   Result Value Ref Range    Magnesium Level 2.10 1.60 - 2.60 mg/dL   CV Ultrasound Bilateral Doppler Carotid    Collection Time: 01/06/23 10:54 AM   Result Value Ref Range    Left ICA/CCA ratio 1.53     Right ICA/CCA ratio 1.43     Left Highest ICA 55.00     Left Highest CCA 42     Right Highest ICA 66.00     Right Highest CCA 49     Right Highest EDV 39.00     LT Highest EDV 18.00     Right CCA prox sys 49 cm/s    Right CCA prox ang[place] 11 cm/s    Right CCA dist sys 46 cm/s    Right CCA dist angeles 9 cm/s    Right ICA prox sys 35 cm/s    Right ICA prox ang[place] 9 cm/s    Right ICA mid sys 42 cm/s    Right ICA mid angeles 15 cm/s    Right ICA dist sys 66 cm/s    Right ICA dist angeles 39 cm/s    Right ECA sys 58 cm/s    Right vertebral sys 23 cm/s    Left CCA prox sys 42 cm/s    Left CCA dist sys 36 cm/s    Left ICA prox sys 39 cm/s    Left ICA prox angeles 12 cm/s    Left ICA mid sys 55 cm/s    Left ICA mid ang[place] 18 cm/s    Left ICA dist sys 44 cm/s    Left ICA dist ang[place] 13 cm/s    Left ECA sys 66 cm/s    Left vertebral sys 29 cm/s    Right ECA ang[place] 12 cm/s    Left vertebral ang[place] 10 cm/s   CBC with Differential    Collection Time: 01/06/23 12:58 PM   Result Value Ref Range    WBC 10.4 4.5 - 11.5 x10(3)/mcL    RBC 4.67 (L) 4.70 - 6.10 x10(6)/mcL    Hgb 10.4 (L) 14.0 - 18.0 gm/dL    Hct 34.0 (L) 42.0 - 52.0 %    MCV 72.8 (L)  80.0 - 94.0 fL    MCH 22.3 pg    MCHC 30.6 (L) 33.0 - 36.0 mg/dL    RDW 21.1 (H) 11.6 - 14.4 %    Platelet 139 (L) 140 - 371 x10(3)/mcL    MPV 10.6 9.4 - 12.4 fL    Neut % 81.2 %    Lymph % 9.1 %    Mono % 8.4 %    Eos % 0.6 %    Basophil % 0.4 %    Lymph # 0.94 0.6 - 4.6 x10(3)/mcL    Neut # 8.43 2.1 - 9.2 x10(3)/mcL    Mono # 0.87 0.1 - 1.3 x10(3)/mcL    Eos # 0.06 0 - 0.9 x10(3)/mcL    Baso # 0.04 0 - 0.2 x10(3)/mcL    IG# 0.03 0 - 0.04 x10(3)/mcL    IG% 0.3 %    NRBC% 0.0 0 - 1 %   Blood Smear Microscopic Exam    Collection Time: 01/06/23 12:58 PM   Result Value Ref Range    RBC Morph Abnormal (A) Normal    Anisocyte 1+ (A) (none)    Microcyte 1+ (A) (none)    Poik 2+ (A) (none)    Schistocyte 1+ (A) (none)    Target Cell 1+ (A) (none)    Platelet Est Normal Normal, Adequate   Comprehensive Metabolic Panel    Collection Time: 01/08/23  5:29 AM   Result Value Ref Range    Sodium Level 140 136 - 145 mmol/L    Potassium Level 4.4 3.5 - 5.1 mmol/L    Chloride 106 98 - 107 mmol/L    Carbon Dioxide 25 22 - 29 mmol/L    Glucose Level 93 74 - 100 mg/dL    Blood Urea Nitrogen 20.7 8.4 - 25.7 mg/dL    Creatinine 1.11 0.73 - 1.18 mg/dL    Calcium Level Total 8.7 8.4 - 10.2 mg/dL    Protein Total 6.6 6.4 - 8.3 gm/dL    Albumin Level 3.4 (L) 3.5 - 5.0 g/dL    Globulin 3.2 2.4 - 3.5 gm/dL    Albumin/Globulin Ratio 1.1 1.1 - 2.0 ratio    Bilirubin Total 1.1 <=1.5 mg/dL    Alkaline Phosphatase 102 40 - 150 unit/L    Alanine Aminotransferase 19 0 - 55 unit/L    Aspartate Aminotransferase 34 5 - 34 unit/L    eGFR 76 mls/min/1.73/m2   CBC with Differential    Collection Time: 01/08/23  5:29 AM   Result Value Ref Range    WBC 8.1 4.5 - 11.5 x10(3)/mcL    RBC 4.67 (L) 4.70 - 6.10 x10(6)/mcL    Hgb 10.3 (L) 14.0 - 18.0 gm/dL    Hct 34.1 (L) 42.0 - 52.0 %    MCV 73.0 (L) 80.0 - 94.0 fL    MCH 22.1 pg    MCHC 30.2 (L) 33.0 - 36.0 mg/dL    RDW 21.3 (H) 11.6 - 14.4 %    Platelet 123 (L) 140 - 371 x10(3)/mcL    MPV 9.7 9.4 - 12.4 fL     Neut % 77.0 %    Lymph % 11.6 %    Mono % 8.6 %    Eos % 1.9 %    Basophil % 0.5 %    Lymph # 0.93 0.6 - 4.6 x10(3)/mcL    Neut # 6.21 2.1 - 9.2 x10(3)/mcL    Mono # 0.69 0.1 - 1.3 x10(3)/mcL    Eos # 0.15 0 - 0.9 x10(3)/mcL    Baso # 0.04 0 - 0.2 x10(3)/mcL    IG# 0.03 0 - 0.04 x10(3)/mcL    IG% 0.4 %    NRBC% 0.0 0 - 1 %   Magnesium    Collection Time: 01/08/23  5:29 AM   Result Value Ref Range    Magnesium Level 2.00 1.60 - 2.60 mg/dL   Vitamin D    Collection Time: 01/08/23  5:29 AM   Result Value Ref Range    Vit D 25 OH 13.7 (L) 30.0 - 80.0 ng/mL       Significant Imaging:  Imaging Results              US Retroperitoneal Complete (Final result)  Result time 01/03/23 18:51:48      Final result by Sharon Cantu MD (01/03/23 18:51:48)                   Impression:      Normal sized, nonobstructed kidneys.    Free intraperitoneal fluid    Nodular surface contour of the liver.  Correlate for cirrhosis.  Thickened appearance of the gallbladder wall is nonspecific in the setting of free fluid and suspected liver disease.      Electronically signed by: Sharon Cantu  Date:    01/03/2023  Time:    18:51               Narrative:    EXAMINATION:  US RETROPERITONEAL COMPLETE    CLINICAL HISTORY:  elevated BUN/Creat;    TECHNIQUE:  Ultrasound of the kidneys and urinary bladder was performed including color flow and grayscale evaluation of the kidneys.    COMPARISON:  No relevant prior available for comparison.    FINDINGS:  RIGHT KIDNEY: The right kidney measures 10.5 cm.  No hydronephrosis. Incidental 1.5 cm cyst. No follow-up imaging is recommended as incidental lesions are likely benign.   No appreciable shadowing renal calculus.    LEFT KIDNEY: The left kidney measures 11.4 cm. No hydronephrosis.Incidental 1.7 cm renal cyst. No follow-up imaging is recommended as incidental lesions are likely benign.   No appreciable shadowing renal calculus.    BLADDER: Bladder is collapsed, limiting evaluation.    OTHER:  Free intraperitoneal fluid in the upper abdomen.  Irregular surface contour of the liver.  Thickened appearance of the gallbladder wall.                                       X-Ray Chest 1 View (Final result)  Result time 01/03/23 12:51:46      Final result by Javier Soto MD (01/03/23 12:51:46)                   Impression:      No acute findings identified.      Electronically signed by: Javier Soto  Date:    01/03/2023  Time:    12:51               Narrative:    EXAMINATION:  XR CHEST 1 VIEW    CLINICAL HISTORY:  Dyspnea, unspecified    TECHNIQUE:  One view    COMPARISON:  December 28, 2022.    FINDINGS:  Cardiopericardial silhouette enlarged appearance is similar.  Mediastinum and the lungs are partially obscured by overlying devices.  Lungs hypoventilatory changes without convincing pulmonary edema or dense consolidation.  No significant fluid within the pleural spaces.                                    Telemetry: Refusing Telemetry    Physical Exam  Vitals reviewed.   Constitutional:       Appearance: Normal appearance.   HENT:      Head: Normocephalic.      Mouth/Throat:      Mouth: Mucous membranes are moist.      Pharynx: Oropharynx is clear.   Cardiovascular:      Rate and Rhythm: Normal rate.      Pulses: Normal pulses.      Heart sounds: Normal heart sounds. No murmur heard.  Pulmonary:      Effort: Pulmonary effort is normal. No respiratory distress.      Breath sounds: Normal breath sounds.      Comments: On Room Air  Abdominal:      General: There is no distension.      Palpations: Abdomen is soft.      Tenderness: There is no abdominal tenderness.   Musculoskeletal:         General: Normal range of motion.      Cervical back: Neck supple.      Right lower leg: No edema.      Left lower leg: No edema.   Skin:     General: Skin is warm and dry.   Neurological:      General: No focal deficit present.      Mental Status: He is alert and oriented to person, place, and time. Mental status is at  baseline.   Psychiatric:         Mood and Affect: Mood normal.         Behavior: Behavior normal.     Home Medications:   No current facility-administered medications on file prior to encounter.     Current Outpatient Medications on File Prior to Encounter   Medication Sig Dispense Refill    amiodarone (PACERONE) 200 MG Tab Take 1 tablet (200 mg total) by mouth once daily. 30 tablet 6    apixaban (ELIQUIS) 5 mg Tab Take 1 tablet (5 mg total) by mouth 2 (two) times daily. 60 tablet 3    ARIPiprazole (ABILIFY) 10 MG Tab Take 1 tablet (10 mg total) by mouth once daily. 30 tablet 11    aspirin 81 MG Chew Chew and swallow 1 tablet (81 mg total) by mouth once daily. 360 tablet 0    atorvastatin (LIPITOR) 80 MG tablet Take 1 tablet (80 mg total) by mouth once daily. 90 tablet 3    benztropine (COGENTIN) 1 MG tablet Take 1 mg by mouth 2 (two) times daily.      famotidine (PEPCID) 20 MG tablet Take 1 tablet (20 mg total) by mouth 2 (two) times daily. 60 tablet 11    ferrous sulfate 325 (65 FE) MG EC tablet Take 1 tablet (325 mg total) by mouth once daily. 30 tablet 0    furosemide (LASIX) 40 MG tablet Take 1 tablet (40 mg total) by mouth once daily. 30 tablet 11    isosorbide mononitrate (IMDUR) 30 MG 24 hr tablet Take 1 tablet (30 mg total) by mouth once daily. 30 tablet 11    levETIRAcetam (KEPPRA) 500 MG Tab Take 1 tablet (500 mg total) by mouth 2 (two) times daily. 60 tablet 11    metoprolol tartrate (LOPRESSOR) 50 MG tablet Take 1 tablet (50 mg total) by mouth 2 (two) times daily. 60 tablet 0    OXcarbazepine (TRILEPTAL) 300 MG Tab Take 300 mg by mouth 3 (three) times daily.      sacubitriL-valsartan (ENTRESTO) 24-26 mg per tablet Take 1 tablet by mouth 2 (two) times daily. 60 tablet 0    traZODone (DESYREL) 100 MG tablet Take 1 tablet (100 mg total) by mouth every evening. 30 tablet 11    [DISCONTINUED] metoprolol succinate (TOPROL-XL) 25 MG 24 hr tablet Take 25 mg by mouth once daily.      [DISCONTINUED] pravastatin  (PRAVACHOL) 40 MG tablet Take 1 tablet (40 mg total) by mouth every evening. 90 tablet 3       Current Inpatient Medications:    Current Facility-Administered Medications:     acetaminophen tablet 650 mg, 650 mg, Oral, Q4H PRN, RAY Olguin    albuterol-ipratropium 2.5 mg-0.5 mg/3 mL nebulizer solution 3 mL, 3 mL, Nebulization, Q4H PRN, Amelia Bone, AGACNP-BC, 3 mL at 01/06/23 1959    amiodarone tablet 200 mg, 200 mg, Oral, Daily, WILLY OlguinP, 200 mg at 01/08/23 0825    apixaban tablet 5 mg, 5 mg, Oral, BID, WILLY OlguinP, 5 mg at 01/08/23 0825    ARIPiprazole tablet 10 mg, 10 mg, Oral, Daily, WILLY OlguinP, 10 mg at 01/08/23 0825    aspirin chewable tablet 81 mg, 81 mg, Oral, Daily, WILLY OlguinP, 81 mg at 01/08/23 0825    atorvastatin tablet 80 mg, 80 mg, Oral, Daily, WILLY OlguinP, 80 mg at 01/08/23 0825    benztropine tablet 1 mg, 1 mg, Oral, BID, WILLY OlguinP, 1 mg at 01/08/23 0826    cetirizine tablet 10 mg, 10 mg, Oral, Daily, Negrito Hernandez MD, 10 mg at 01/08/23 0825    dextrose 10% bolus 125 mL 125 mL, 12.5 g, Intravenous, PRN, RAY Olguin    dextrose 10% bolus 250 mL 250 mL, 25 g, Intravenous, PRN, RAY Olguin    ergocalciferol capsule 50,000 Units, 50,000 Units, Oral, Q7 Days, Negrito Hernandez MD    famotidine tablet 20 mg, 20 mg, Oral, BID, RAY Olguin, 20 mg at 01/08/23 0825    ferrous sulfate tablet 1 each, 1 tablet, Oral, Daily, RAY Olguin, 1 each at 01/08/23 0826    fluticasone propionate 50 mcg/actuation nasal spray 100 mcg, 2 spray, Each Nostril, Daily, Negrito Hernandez MD    furosemide tablet 40 mg, 40 mg, Oral, Daily, RAY Chilel, 40 mg at 01/08/23 0825    glucagon (human recombinant) injection 1 mg, 1 mg, Intramuscular, PRN, RAY Olguin    glucose chewable tablet 16 g, 16 g, Oral, PRN, Lina REYNA  Black-[place], FNP    glucose chewable tablet 24 g, 24 g, Oral, PRN, RAY Olguin    hydrALAZINE injection 10 mg, 10 mg, Intravenous, Q4H PRN, Amelia Bone AGAMENDYP-BC, 10 mg at 01/05/23 0406    hydrOXYzine HCL tablet 25 mg, 25 mg, Oral, TID, Negrito Hernandez MD    isosorbide mononitrate 24 hr tablet 30 mg, 30 mg, Oral, Daily, RAY Olguin, 30 mg at 01/08/23 0825    levETIRAcetam tablet 500 mg, 500 mg, Oral, BID, RAY Olguin, 500 mg at 01/08/23 0825    levothyroxine tablet 25 mcg, 25 mcg, Oral, Before breakfast, Negrito Hernandez MD, 25 mcg at 01/08/23 0631    melatonin tablet 6 mg, 6 mg, Oral, Nightly PRN, RAY Olguin, 6 mg at 01/04/23 2006    metoprolol tartrate (LOPRESSOR) split tablet 12.5 mg, 12.5 mg, Oral, BID, Jael Calloway MD, 12.5 mg at 01/08/23 0825    naloxone 0.4 mg/mL injection 0.02 mg, 0.02 mg, Intravenous, PRN, RAY Olguin    ondansetron injection 4 mg, 4 mg, Intravenous, Q6H PRN, RAY Olguin, 4 mg at 01/08/23 0832    OXcarbazepine tablet 300 mg, 300 mg, Oral, TID, RAY Olguin, 300 mg at 01/08/23 0825    simethicone chewable tablet 80 mg, 1 tablet, Oral, QID PRN, RAY Olguin    sodium chloride 0.9% flush 10 mL, 10 mL, Intravenous, Q12H PRN, RAY Olguin    traZODone tablet 200 mg, 200 mg, Oral, QHS, RAY Olguin, 200 mg at 01/07/23 2033         VTE Risk Mitigation (From admission, onward)           Ordered     apixaban tablet 5 mg  2 times daily         01/03/23 1801     IP VTE HIGH RISK PATIENT  Once         01/03/23 1522     Place sequential compression device  Until discontinued         01/03/23 1522                  Assessment:   Syncope w/ collapse    - Neuro Stable    - Carotid US Negative for Obstructive Disease  Acute on Chronic Combined Systolic/Diastolic HF (Compensated)    - Grade III DD    - EF 20-25% (ECHO 12.18.22)  NSTEMI - suspect type 2  in the setting of CHF exacerbation   PAF/PAFL (Recent New Diagnosis)- Now HR Controlled on Physical Exam    - [id number]: 5 (LVSD/HTN/TE/NSTEMI)    - On Eliquis  ANA   Chronic Anemia - Stable  ICMO     - With Life Vest   VHD    - Moderate MR and Severe TR  Hypertension- Above Goal  Pulmonary Hypertension  CAD/CABG (April 2022)    - LIMA-LAD, SVG-OM1, SVG-D1, SVG-PDA  History of RUE DVT (5.10.22)    - DVT in right axillary and brachial veins. A superficial thrombosis was identified in the right basilic vein.  Schizoaffective Disorder  Hx of Hep C  Hx of Cocaine Abuse   Nicotine Dependence (Tobacco Use)  Medical noncopliance      Plan:   Resume Entresto  Continue oral lasix 40 mg   Continue ASA, statin, & BB.  Continue Eliquis for CVA prophylaxis in the setting of PAF.   Monitor for bradycardia on tele. Pt will need MCT x 2 weeks on discharge.     RAY Henry  Cardiology  [facility] - Emergency Dept  01/08/2023 9:02 AM

## 2023-01-08 NOTE — NURSING
Pt took off life vest for unknown reasons. Pt showed me the broken wire on his life vest, at this time it is not working. Called [employer] at [phone] and spoke to a representative whom stated they would be sending a representative out to replace. Pt still refusing telemetry at this time. Notified MD.

## 2023-01-08 NOTE — NURSING
Reena Hackett called back to inform me that she will not be able to see the pt until this evening around 2130. Pt still refusing telemetry despite attempts to reapply.

## 2023-01-08 NOTE — PROGRESS NOTES
[facility] Hospital Medicine Progress Note        Chief Complaint: Inpatient Follow-up for chest pain    HPI:   Kendall Duff is a 57 y.o. male who has a PMH includes bipolar disorder, schizophrenia, chronic hepatitis-C, HTN, HLD, seizures, CVA, history of substance abuse, CMO with life vest; CAD s/p CABG; presents to the ED at [facility] on 1/3/2023 with complaints  of syncope at home prior to arrival with associated  shortness of breath, weakness, fatigue. Pt was recently admitted our services 12/21/2022 and d/c on 12/25/2022 for SOB, fluid overload,  cough and difficulty breathing .He now presents to the ED here with reports of worsening SOB and difficulty breathing and syncope at home. He denies any discharges from his life vest, but reports the device discharged a few days ago which he was seen in the ED at [facility] and d/c home. He denies any discharges of shock since then. He reports he continues to smoke cigarettes, he denies any illicit drug use.  He reports compliance with his medication.  Lab work done significant for H&H 10.6/34.2, BUN 42.3, creatinine 1.94; glucose 118, BNP 3 361.6, troponin 0.088; other indices unremarkable.  Chest x-ray demonstrated no acute findings.  Influenza a swab influenza B swab both negative SARs CoV 2 PCR not detected.  Initial vital signs /90 pulse 42 respirations 16 temperature 98.1° F O2 saturation 94% on room air.  Patient was placed on supplemental oxygen therapy at 2 L per nasal cannula as his O2 saturation dropped to 90%.  Patient did receive 80 mg Lasix IV push, 125 mg of Solu-Medrol, 2 g magnesium sulfate, an hour long nebulizer treatment in the ED which provided some relief.  Patient is admitted to hospital medicine services for further management.    Interval Hx:   Hemodynamically stable.  Comfortably resting in the bed.  Doing well on room air.  Appeared anxious during the conversation.  Denies any new complaints or  concerns.  Labs suggesting stable hemoglobin, mild thrombocytopenia, chronic microcytosis, stable electrolytes, vitamin-D deficiency      Objective/physical exam:  Vitals:    01/07/23 2030 01/08/23 0024 01/08/23 0532 01/08/23 0741   BP: 126/80 124/86 (!) 153/109 (!) 157/106   BP Location: Left arm Left arm     Patient Position: Lying Lying     Pulse: 107 87 70 67   Resp: 19 18 18 18   Temp: 98.6 °F (37 °C) 98 °F (36.7 °C) 97.6 °F (36.4 °C) 98.5 °F (36.9 °C)   TempSrc: Oral Oral Oral Oral   SpO2: 98% 97% 96% 99%   Weight:       Height:         General: In no acute distress, afebrile  Respiratory: Clear to auscultation bilaterally  Cardiovascular: S1, S2, no appreciable murmur  Abdomen: Soft, nontender, BS +  MSK: Warm, no lower extremity edema, no clubbing or cyanosis  Neurologic: Alert and oriented x4, moving all extremities with good strength     Lab Results   Component Value Date     01/08/2023    K 4.4 01/08/2023     07/21/2022    CO2 25 01/08/2023    BUN 20.7 01/08/2023    CREATININE 1.11 01/08/2023    CALCIUM 8.7 01/08/2023    ANIONGAP 9 07/21/2022    ESTGFRAFRICA >60 07/21/2022    EGFRNONAA >60 07/21/2022      Lab Results   Component Value Date    ALT 19 01/08/2023    AST 34 01/08/2023    ALKPHOS 102 01/08/2023    BILITOT 1.1 01/08/2023      Lab Results   Component Value Date    WBC 8.1 01/08/2023    HGB 10.3 (L) 01/08/2023    HCT 34.1 (L) 01/08/2023    MCV 73.0 (L) 01/08/2023     (L) 01/08/2023           Medications:   amiodarone  200 mg Oral Daily    apixaban  5 mg Oral BID    ARIPiprazole  10 mg Oral Daily    aspirin  81 mg Oral Daily    atorvastatin  80 mg Oral Daily    benztropine  1 mg Oral BID    cetirizine  10 mg Oral Daily    ergocalciferol  50,000 Units Oral Q7 Days    famotidine  20 mg Oral BID    ferrous sulfate  1 tablet Oral Daily    fluticasone propionate  2 spray Each Nostril Daily    furosemide  40 mg Oral Daily    isosorbide mononitrate  30 mg Oral Daily    levETIRAcetam   500 mg Oral BID    levothyroxine  25 mcg Oral Before breakfast    metoprolol tartrate  12.5 mg Oral BID    OXcarbazepine  300 mg Oral TID    traZODone  200 mg Oral QHS      acetaminophen, albuterol-ipratropium, dextrose 10%, dextrose 10%, glucagon (human recombinant), glucose, glucose, hydrALAZINE, melatonin, naloxone, ondansetron, simethicone, sodium chloride 0.9%     Assessment/Plan:       Acute syncope with collapse- + LOC  Acute on chronic combined systolic and diastolic heart failure , EF 20-25% (ECHO 12.18.22)  Exertional dyspnea with SOB due to above  Chronic P AFib on Eliquis  Chest pain- resolved  NSTEMI type II  ANA  Abnormal TFTs  Iron-deficiency, microcytosis  Hypothyroidism        HX:  CMO-status post LifeVest in place , EF 20-25% 12/18/2022, VHD, TR, MR, HTN, CAD s/p CABG, HLD, medication nonadherence, tobacco use, bipolar disorder, schizophrenia, seizures, CVA, hepatitis-C, substance abuse    Plan:  -cardiology following, adjusting diuresis.  Appreciate assistance.  Continue monitor electrolytes, telemetry.  Continue aspirin statin beta-blocker and Eliquis.  Needs cardiac monitor upon discharge with cardiology follow-up  -needs outpatient GI follow-up for hepatitis-C treatment.  Continue iron therapy for microcytic anemia/iron-deficiency  -continue low-dose Synthroid.  Needs outpatient PCP to follow up for repeat TFTs and dose adjustments  -monitor hemoglobin while inpatient.  Stool was tested negative for blood on 12/23/2022  -add hydroxyzine to help with anxiety.  Other home medications were reviewed       Negrito Hernandez MD

## 2023-01-09 ENCOUNTER — HOSPITAL ENCOUNTER (EMERGENCY)
Facility: HOSPITAL | Age: 58
Discharge: HOME | End: 2023-01-09
Attending: FAMILY MEDICINE
Payer: MEDICAID

## 2023-01-09 VITALS
HEIGHT: 67 IN | OXYGEN SATURATION: 97 % | RESPIRATION RATE: 18 BRPM | SYSTOLIC BLOOD PRESSURE: 132 MMHG | BODY MASS INDEX: 29.51 KG/M2 | TEMPERATURE: 98 F | WEIGHT: 188 LBS | DIASTOLIC BLOOD PRESSURE: 81 MMHG | HEART RATE: 74 BPM

## 2023-01-09 VITALS
RESPIRATION RATE: 18 BRPM | HEIGHT: 67 IN | OXYGEN SATURATION: 95 % | TEMPERATURE: 98 F | HEART RATE: 61 BPM | SYSTOLIC BLOOD PRESSURE: 150 MMHG | WEIGHT: 188.06 LBS | DIASTOLIC BLOOD PRESSURE: 106 MMHG | BODY MASS INDEX: 29.52 KG/M2

## 2023-01-09 DIAGNOSIS — Z13.9 ENCOUNTER FOR MEDICAL SCREENING EXAMINATION: Primary | ICD-10-CM

## 2023-01-09 PROBLEM — R07.9 ACUTE CHEST PAIN: Status: RESOLVED | Noted: 2022-12-21 | Resolved: 2023-01-09

## 2023-01-09 LAB
ANION GAP SERPL CALC-SCNC: 10 MEQ/L
BASOPHILS # BLD AUTO: 0.03 X10(3)/MCL (ref 0–0.2)
BASOPHILS NFR BLD AUTO: 0.5 %
BUN SERPL-MCNC: 26.6 MG/DL (ref 8.4–25.7)
CALCIUM SERPL-MCNC: 8.6 MG/DL (ref 8.4–10.2)
CHLORIDE SERPL-SCNC: 106 MMOL/L (ref 98–107)
CO2 SERPL-SCNC: 24 MMOL/L (ref 22–29)
CREAT SERPL-MCNC: 1.29 MG/DL (ref 0.73–1.18)
CREAT/UREA NIT SERPL: 21
EOSINOPHIL # BLD AUTO: 0.16 X10(3)/MCL (ref 0–0.9)
EOSINOPHIL NFR BLD AUTO: 2.4 %
ERYTHROCYTE [DISTWIDTH] IN BLOOD BY AUTOMATED COUNT: 21.4 % (ref 11.6–14.4)
GFR SERPLBLD CREATININE-BSD FMLA CKD-EPI: 64 MLS/MIN/1.73/M2
GLUCOSE SERPL-MCNC: 115 MG/DL (ref 74–100)
HCT VFR BLD AUTO: 33.4 % (ref 42–52)
HGB BLD-MCNC: 10.2 GM/DL (ref 14–18)
IMM GRANULOCYTES # BLD AUTO: 0.03 X10(3)/MCL (ref 0–0.04)
IMM GRANULOCYTES NFR BLD AUTO: 0.5 %
LYMPHOCYTES # BLD AUTO: 1.05 X10(3)/MCL (ref 0.6–4.6)
LYMPHOCYTES NFR BLD AUTO: 16 %
MAGNESIUM SERPL-MCNC: 2 MG/DL (ref 1.6–2.6)
MCH RBC QN AUTO: 22.2 PG
MCHC RBC AUTO-ENTMCNC: 30.5 MG/DL (ref 33–36)
MCV RBC AUTO: 72.6 FL (ref 80–94)
MONOCYTES # BLD AUTO: 0.72 X10(3)/MCL (ref 0.1–1.3)
MONOCYTES NFR BLD AUTO: 10.9 %
NEUTROPHILS # BLD AUTO: 4.59 X10(3)/MCL (ref 2.1–9.2)
NEUTROPHILS NFR BLD AUTO: 69.7 %
NRBC BLD AUTO-RTO: 0 % (ref 0–1)
PLATELET # BLD AUTO: 147 X10(3)/MCL (ref 140–371)
PMV BLD AUTO: 10.1 FL (ref 9.4–12.4)
POTASSIUM SERPL-SCNC: 4.2 MMOL/L (ref 3.5–5.1)
RBC # BLD AUTO: 4.6 X10(6)/MCL (ref 4.7–6.1)
SODIUM SERPL-SCNC: 140 MMOL/L (ref 136–145)
WBC # SPEC AUTO: 6.6 X10(3)/MCL (ref 4.5–11.5)

## 2023-01-09 PROCEDURE — 25000003 PHARM REV CODE 250: Performed by: NURSE PRACTITIONER

## 2023-01-09 PROCEDURE — 85025 COMPLETE CBC W/AUTO DIFF WBC: CPT | Performed by: INTERNAL MEDICINE

## 2023-01-09 PROCEDURE — 99283 EMERGENCY DEPT VISIT LOW MDM: CPT

## 2023-01-09 PROCEDURE — 36415 COLL VENOUS BLD VENIPUNCTURE: CPT | Performed by: INTERNAL MEDICINE

## 2023-01-09 PROCEDURE — 25000003 PHARM REV CODE 250: Performed by: INTERNAL MEDICINE

## 2023-01-09 PROCEDURE — 25000003 PHARM REV CODE 250

## 2023-01-09 PROCEDURE — 80048 BASIC METABOLIC PNL TOTAL CA: CPT | Performed by: INTERNAL MEDICINE

## 2023-01-09 PROCEDURE — 83735 ASSAY OF MAGNESIUM: CPT | Performed by: INTERNAL MEDICINE

## 2023-01-09 RX ORDER — METOPROLOL TARTRATE 25 MG/1
12.5 TABLET, FILM COATED ORAL 2 TIMES DAILY
Qty: 30 TABLET | Refills: 2 | Status: ON HOLD | OUTPATIENT
Start: 2023-01-09 | End: 2023-01-14 | Stop reason: HOSPADM

## 2023-01-09 RX ORDER — FUROSEMIDE 40 MG/1
40 TABLET ORAL DAILY
Qty: 30 TABLET | Refills: 1 | Status: ON HOLD | OUTPATIENT
Start: 2023-01-09 | End: 2023-01-17 | Stop reason: SDUPTHER

## 2023-01-09 RX ORDER — HYDROXYZINE HYDROCHLORIDE 50 MG/1
50 TABLET, FILM COATED ORAL 2 TIMES DAILY
Qty: 28 TABLET | Refills: 0 | Status: SHIPPED | OUTPATIENT
Start: 2023-01-09 | End: 2023-01-23

## 2023-01-09 RX ORDER — ERGOCALCIFEROL 1.25 MG/1
50000 CAPSULE ORAL
Qty: 4 CAPSULE | Refills: 2 | Status: SHIPPED | OUTPATIENT
Start: 2023-01-15 | End: 2023-04-03

## 2023-01-09 RX ORDER — LEVOTHYROXINE SODIUM 25 UG/1
25 TABLET ORAL
Qty: 30 TABLET | Refills: 0 | Status: ON HOLD | OUTPATIENT
Start: 2023-01-10 | End: 2023-01-17 | Stop reason: SDUPTHER

## 2023-01-09 RX ADMIN — BENZTROPINE MESYLATE 1 MG: 1 TABLET ORAL at 08:01

## 2023-01-09 RX ADMIN — ATORVASTATIN CALCIUM 80 MG: 40 TABLET, FILM COATED ORAL at 08:01

## 2023-01-09 RX ADMIN — HYDROXYZINE HYDROCHLORIDE 50 MG: 50 TABLET, FILM COATED ORAL at 08:01

## 2023-01-09 RX ADMIN — ARIPIPRAZOLE 10 MG: 5 TABLET ORAL at 08:01

## 2023-01-09 RX ADMIN — APIXABAN 5 MG: 5 TABLET, FILM COATED ORAL at 08:01

## 2023-01-09 RX ADMIN — AMIODARONE HYDROCHLORIDE 200 MG: 200 TABLET ORAL at 08:01

## 2023-01-09 RX ADMIN — ISOSORBIDE MONONITRATE 30 MG: 30 TABLET, EXTENDED RELEASE ORAL at 08:01

## 2023-01-09 RX ADMIN — OXCARBAZEPINE 300 MG: 300 TABLET, FILM COATED ORAL at 08:01

## 2023-01-09 RX ADMIN — FERROUS SULFATE TAB 325 MG (65 MG ELEMENTAL FE) 1 EACH: 325 (65 FE) TAB at 08:01

## 2023-01-09 RX ADMIN — LEVOTHYROXINE SODIUM 25 MCG: 25 TABLET ORAL at 04:01

## 2023-01-09 RX ADMIN — FUROSEMIDE 40 MG: 40 TABLET ORAL at 08:01

## 2023-01-09 RX ADMIN — LEVETIRACETAM 500 MG: 500 TABLET, FILM COATED ORAL at 08:01

## 2023-01-09 RX ADMIN — ASPIRIN 81 MG CHEWABLE TABLET 81 MG: 81 TABLET CHEWABLE at 08:01

## 2023-01-09 RX ADMIN — SACUBITRIL AND VALSARTAN 1 TABLET: 24; 26 TABLET, FILM COATED ORAL at 08:01

## 2023-01-09 RX ADMIN — FAMOTIDINE 20 MG: 20 TABLET, FILM COATED ORAL at 08:01

## 2023-01-09 NOTE — PROGRESS NOTES
"[facility]   Cardiology  Progress Note    Patient Name: Kendall Duff  MRN: [id number]  Admission Date: 1/3/2023  Hospital Length of Stay: 6 days  Code Status: Full Code   Attending Provider: Negrito Hernandez MD   Consulting Provider: RAY Chilel  Primary Care Physician: Primary Doctor No  Principal Problem:Congestive heart failure    Patient information was obtained from patient, past medical records, and ER records.     Subjective:   Chief Complaint:  Reason for consult: CP & SOB, CHF     HPI:   Mr. Duff is a 57 year old male who is known to [facility], Dr. Wright. He presents to the ER with complains of syncope at home with associated sympotms of SOB, weakness, & fatigue. He also reports swelling to his face. Of note, he has presented to the ER multiple times with similar symptoms. He denies CP, palpitations, or discharges from his lifevest. Significant labs include Cr 1.94, BNP 3361.6, & trop 0.088. [facility] has been consulted to further manage the patient's CHF exacerbation.     Hospital Course:  1.5.23: NAD. HR 56 on tele. Reports "I don't feel good". Denies CP or palps but endorses SOB.   1.6.23: NAD. "I'm feeling much better today." Remains with wheezing/SOB but denies CP or palps.  1.7.23: NAD Noted. On Room Air. Clinically Stable.  1.8.23: NAD Noted. On Room Air. AF RVR Last Night. Rate Improved.   1.9.23: NAD. On RA. Denies complaints. Plans for discharge home today.      PMH: CAD, ICMO, HTN, schizoaffective disorder, hep C, DVT, systolic & diastolic CHF, Lifevest  PSH: CABG, LHC  Family History: Mother - CA; Father - liver disease   Social History: Current every day smoker. Former Cocaine Use. Denies alcohol use.     Previous Cardiac Diagnostics:   Carotid US (1.6.23):  The right internal carotid artery demonstrated less than 50% stenosis.  The left internal carotid artery demonstrated less than 50% stenosis.  The bilateral vertebral arteries were patent with antegrade flow.    TTE " (12.18.22):  Eccentric hypertrophy and severely decreased systolic function. The estimated ejection fraction is 20-25%.  Grade III left ventricular diastolic dysfunction.  Mild right ventricular enlargement with mildly reduced right ventricular systolic function.  Mild right atrial enlargement.  Moderate mitral regurgitation.  Severe tricuspid regurgitation.  The estimated PA systolic pressure is 33 mmHg.     Echocardiogram (10.28.22):  The left ventricle is moderately enlarged with mild eccentric hypertrophy and severely decreased systolic function.  The estimated ejection fraction is 20%.  There is severe left ventricular global hypokinesis.  Grade II left ventricular diastolic dysfunction.  Normal right ventricular size with moderately reduced right ventricular systolic function.  Moderate mitral regurgitation.  Moderate tricuspid regurgitation.  There is mild pulmonary hypertension.  The estimated PA systolic pressure is 44 mmHg.  Elevated central venous pressure (15 mmHg).  Severe left atrial enlargement.     CABG x 4 (4/22):  LIMA-LAD, SVG-OM1, SVG-D1, SVG-PDA     Echocardiogram (6.16.22):  The left ventricle is normal in size w/ concentric hypertrophy and severely decreased systolic function.  The estimated EF is 25-30%.  There is severe left ventricular global hypokinesis.  Grade II left ventricular diastolic dysfunction.  Normal right ventricular size w/ mildly reduced right ventricular systolic function.  The estimated PA systolic pressure is 52 mmHg.  There is moderate pulmonary HTN.  Elevated CVP (15 mmHg).     RUE NIVA (5.10.22):  A deep vein thrombosis was identified in the right axillary and brachial veins. A superficial thrombosis was identified in the right basilic vein.     LHC (3.21.22):  100% LAD to 30-40% residual stenosis post PTCA.  Large diagonal system w/ severe stenosis.  CIRC - patent stent, OM1 patent, mild CIRC occluded  RCA - stents ISR 40-50%, distal 70-80%  EDP 12 EF 35-40% anterior  HK     Review of patient's allergies indicates:   Allergen Reactions    Depakote [divalproex]     Divalproex sodium Other (See Comments)    Lithium     Lithium analogues     Quetiapine Other (See Comments)     Pt states had seizures       No current facility-administered medications on file prior to encounter.     Current Outpatient Medications on File Prior to Encounter   Medication Sig    amiodarone (PACERONE) 200 MG Tab Take 1 tablet (200 mg total) by mouth once daily.    apixaban (ELIQUIS) 5 mg Tab Take 1 tablet (5 mg total) by mouth 2 (two) times daily.    ARIPiprazole (ABILIFY) 10 MG Tab Take 1 tablet (10 mg total) by mouth once daily.    aspirin 81 MG Chew Chew and swallow 1 tablet (81 mg total) by mouth once daily.    atorvastatin (LIPITOR) 80 MG tablet Take 1 tablet (80 mg total) by mouth once daily.    benztropine (COGENTIN) 1 MG tablet Take 1 mg by mouth 2 (two) times daily.    famotidine (PEPCID) 20 MG tablet Take 1 tablet (20 mg total) by mouth 2 (two) times daily.    ferrous sulfate 325 (65 FE) MG EC tablet Take 1 tablet (325 mg total) by mouth once daily.    isosorbide mononitrate (IMDUR) 30 MG 24 hr tablet Take 1 tablet (30 mg total) by mouth once daily.    levETIRAcetam (KEPPRA) 500 MG Tab Take 1 tablet (500 mg total) by mouth 2 (two) times daily.    OXcarbazepine (TRILEPTAL) 300 MG Tab Take 300 mg by mouth 3 (three) times daily.    traZODone (DESYREL) 100 MG tablet Take 1 tablet (100 mg total) by mouth every evening.    [DISCONTINUED] furosemide (LASIX) 40 MG tablet Take 1 tablet (40 mg total) by mouth once daily.    [DISCONTINUED] metoprolol succinate (TOPROL-XL) 25 MG 24 hr tablet Take 25 mg by mouth once daily.    [DISCONTINUED] metoprolol tartrate (LOPRESSOR) 50 MG tablet Take 1 tablet (50 mg total) by mouth 2 (two) times daily.    [DISCONTINUED] pravastatin (PRAVACHOL) 40 MG tablet Take 1 tablet (40 mg total) by mouth every evening.    [DISCONTINUED] sacubitriL-valsartan (ENTRESTO) 24-26 mg  per tablet Take 1 tablet by mouth 2 (two) times daily.     Family History       Problem Relation (Age of Onset)    Cancer Mother    Liver disease Father          Tobacco Use    Smoking status: Every Day     Types: Cigarettes    Smokeless tobacco: Never   Substance and Sexual Activity    Alcohol use: Never    Drug use: Not Currently     Types: Cocaine    Sexual activity: Not on file       Review of Systems   Respiratory:  Negative for chest tightness and shortness of breath.    Cardiovascular:  Negative for chest pain.     Objective:     Vital Signs (Most Recent):  Temp: 97.5 °F (36.4 °C) (01/09/23 0746)  Pulse: 61 (01/09/23 0746)  Resp: 18 (01/09/23 0330)  BP: (!) 150/106 (01/09/23 0746)  SpO2: 95 % (01/09/23 0746)   Vital Signs (24h Range):  Temp:  [97.5 °F (36.4 °C)-98.3 °F (36.8 °C)] 97.5 °F (36.4 °C)  Pulse:  [61-69] 61  Resp:  [18] 18  SpO2:  [87 %-95 %] 95 %  BP: ()/() 150/106     Weight: 85.3 kg (188 lb 0.8 oz)  Body mass index is 29.45 kg/m².    SpO2: 95 %         Intake/Output Summary (Last 24 hours) at 1/9/2023 1146  Last data filed at 1/8/2023 2303  Gross per 24 hour   Intake 2280 ml   Output 250 ml   Net 2030 ml         Lines/Drains/Airways       None                   Significant Labs:  Recent Results (from the past 72 hour(s))   CBC with Differential    Collection Time: 01/06/23 12:58 PM   Result Value Ref Range    WBC 10.4 4.5 - 11.5 x10(3)/mcL    RBC 4.67 (L) 4.70 - 6.10 x10(6)/mcL    Hgb 10.4 (L) 14.0 - 18.0 gm/dL    Hct 34.0 (L) 42.0 - 52.0 %    MCV 72.8 (L) 80.0 - 94.0 fL    MCH 22.3 pg    MCHC 30.6 (L) 33.0 - 36.0 mg/dL    RDW 21.1 (H) 11.6 - 14.4 %    Platelet 139 (L) 140 - 371 x10(3)/mcL    MPV 10.6 9.4 - 12.4 fL    Neut % 81.2 %    Lymph % 9.1 %    Mono % 8.4 %    Eos % 0.6 %    Basophil % 0.4 %    Lymph # 0.94 0.6 - 4.6 x10(3)/mcL    Neut # 8.43 2.1 - 9.2 x10(3)/mcL    Mono # 0.87 0.1 - 1.3 x10(3)/mcL    Eos # 0.06 0 - 0.9 x10(3)/mcL    Baso # 0.04 0 - 0.2 x10(3)/mcL    IG# 0.03 0  - 0.04 x10(3)/mcL    IG% 0.3 %    NRBC% 0.0 0 - 1 %   Blood Smear Microscopic Exam    Collection Time: 01/06/23 12:58 PM   Result Value Ref Range    RBC Morph Abnormal (A) Normal    Anisocyte 1+ (A) (none)    Microcyte 1+ (A) (none)    Poik 2+ (A) (none)    Schistocyte 1+ (A) (none)    Target Cell 1+ (A) (none)    Platelet Est Normal Normal, Adequate   Comprehensive Metabolic Panel    Collection Time: 01/08/23  5:29 AM   Result Value Ref Range    Sodium Level 140 136 - 145 mmol/L    Potassium Level 4.4 3.5 - 5.1 mmol/L    Chloride 106 98 - 107 mmol/L    Carbon Dioxide 25 22 - 29 mmol/L    Glucose Level 93 74 - 100 mg/dL    Blood Urea Nitrogen 20.7 8.4 - 25.7 mg/dL    Creatinine 1.11 0.73 - 1.18 mg/dL    Calcium Level Total 8.7 8.4 - 10.2 mg/dL    Protein Total 6.6 6.4 - 8.3 gm/dL    Albumin Level 3.4 (L) 3.5 - 5.0 g/dL    Globulin 3.2 2.4 - 3.5 gm/dL    Albumin/Globulin Ratio 1.1 1.1 - 2.0 ratio    Bilirubin Total 1.1 <=1.5 mg/dL    Alkaline Phosphatase 102 40 - 150 unit/L    Alanine Aminotransferase 19 0 - 55 unit/L    Aspartate Aminotransferase 34 5 - 34 unit/L    eGFR 76 mls/min/1.73/m2   CBC with Differential    Collection Time: 01/08/23  5:29 AM   Result Value Ref Range    WBC 8.1 4.5 - 11.5 x10(3)/mcL    RBC 4.67 (L) 4.70 - 6.10 x10(6)/mcL    Hgb 10.3 (L) 14.0 - 18.0 gm/dL    Hct 34.1 (L) 42.0 - 52.0 %    MCV 73.0 (L) 80.0 - 94.0 fL    MCH 22.1 pg    MCHC 30.2 (L) 33.0 - 36.0 mg/dL    RDW 21.3 (H) 11.6 - 14.4 %    Platelet 123 (L) 140 - 371 x10(3)/mcL    MPV 9.7 9.4 - 12.4 fL    Neut % 77.0 %    Lymph % 11.6 %    Mono % 8.6 %    Eos % 1.9 %    Basophil % 0.5 %    Lymph # 0.93 0.6 - 4.6 x10(3)/mcL    Neut # 6.21 2.1 - 9.2 x10(3)/mcL    Mono # 0.69 0.1 - 1.3 x10(3)/mcL    Eos # 0.15 0 - 0.9 x10(3)/mcL    Baso # 0.04 0 - 0.2 x10(3)/mcL    IG# 0.03 0 - 0.04 x10(3)/mcL    IG% 0.4 %    NRBC% 0.0 0 - 1 %   Magnesium    Collection Time: 01/08/23  5:29 AM   Result Value Ref Range    Magnesium Level 2.00 1.60 - 2.60  mg/dL   Vitamin D    Collection Time: 01/08/23  5:29 AM   Result Value Ref Range    Vit D 25 OH 13.7 (L) 30.0 - 80.0 ng/mL   CBC with Differential    Collection Time: 01/09/23  4:28 AM   Result Value Ref Range    WBC 6.6 4.5 - 11.5 x10(3)/mcL    RBC 4.60 (L) 4.70 - 6.10 x10(6)/mcL    Hgb 10.2 (L) 14.0 - 18.0 gm/dL    Hct 33.4 (L) 42.0 - 52.0 %    MCV 72.6 (L) 80.0 - 94.0 fL    MCH 22.2 pg    MCHC 30.5 (L) 33.0 - 36.0 mg/dL    RDW 21.4 (H) 11.6 - 14.4 %    Platelet 147 140 - 371 x10(3)/mcL    MPV 10.1 9.4 - 12.4 fL    Neut % 69.7 %    Lymph % 16.0 %    Mono % 10.9 %    Eos % 2.4 %    Basophil % 0.5 %    Lymph # 1.05 0.6 - 4.6 x10(3)/mcL    Neut # 4.59 2.1 - 9.2 x10(3)/mcL    Mono # 0.72 0.1 - 1.3 x10(3)/mcL    Eos # 0.16 0 - 0.9 x10(3)/mcL    Baso # 0.03 0 - 0.2 x10(3)/mcL    IG# 0.03 0 - 0.04 x10(3)/mcL    IG% 0.5 %    NRBC% 0.0 0 - 1 %   Basic Metabolic Panel    Collection Time: 01/09/23  4:28 AM   Result Value Ref Range    Sodium Level 140 136 - 145 mmol/L    Potassium Level 4.2 3.5 - 5.1 mmol/L    Chloride 106 98 - 107 mmol/L    Carbon Dioxide 24 22 - 29 mmol/L    Glucose Level 115 (H) 74 - 100 mg/dL    Blood Urea Nitrogen 26.6 (H) 8.4 - 25.7 mg/dL    Creatinine 1.29 (H) 0.73 - 1.18 mg/dL    BUN/Creatinine Ratio 21     Calcium Level Total 8.6 8.4 - 10.2 mg/dL    Anion Gap 10.0 mEq/L    eGFR 64 mls/min/1.73/m2   Magnesium    Collection Time: 01/09/23  4:28 AM   Result Value Ref Range    Magnesium Level 2.00 1.60 - 2.60 mg/dL       Significant Imaging:  Imaging Results              US Retroperitoneal Complete (Final result)  Result time 01/03/23 18:51:48      Final result by Sharon Cantu MD (01/03/23 18:51:48)                   Impression:      Normal sized, nonobstructed kidneys.    Free intraperitoneal fluid    Nodular surface contour of the liver.  Correlate for cirrhosis.  Thickened appearance of the gallbladder wall is nonspecific in the setting of free fluid and suspected liver  disease.      Electronically signed by: Sharon Cantu  Date:    01/03/2023  Time:    18:51               Narrative:    EXAMINATION:  US RETROPERITONEAL COMPLETE    CLINICAL HISTORY:  elevated BUN/Creat;    TECHNIQUE:  Ultrasound of the kidneys and urinary bladder was performed including color flow and grayscale evaluation of the kidneys.    COMPARISON:  No relevant prior available for comparison.    FINDINGS:  RIGHT KIDNEY: The right kidney measures 10.5 cm.  No hydronephrosis. Incidental 1.5 cm cyst. No follow-up imaging is recommended as incidental lesions are likely benign.   No appreciable shadowing renal calculus.    LEFT KIDNEY: The left kidney measures 11.4 cm. No hydronephrosis.Incidental 1.7 cm renal cyst. No follow-up imaging is recommended as incidental lesions are likely benign.   No appreciable shadowing renal calculus.    BLADDER: Bladder is collapsed, limiting evaluation.    OTHER: Free intraperitoneal fluid in the upper abdomen.  Irregular surface contour of the liver.  Thickened appearance of the gallbladder wall.                                       X-Ray Chest 1 View (Final result)  Result time 01/03/23 12:51:46      Final result by Javier Soto MD (01/03/23 12:51:46)                   Impression:      No acute findings identified.      Electronically signed by: Javier Soto  Date:    01/03/2023  Time:    12:51               Narrative:    EXAMINATION:  XR CHEST 1 VIEW    CLINICAL HISTORY:  Dyspnea, unspecified    TECHNIQUE:  One view    COMPARISON:  December 28, 2022.    FINDINGS:  Cardiopericardial silhouette enlarged appearance is similar.  Mediastinum and the lungs are partially obscured by overlying devices.  Lungs hypoventilatory changes without convincing pulmonary edema or dense consolidation.  No significant fluid within the pleural spaces.                                    Telemetry: Refusing Telemetry    Physical Exam  Vitals reviewed.   Constitutional:       Appearance: Normal  appearance.   HENT:      Head: Normocephalic.      Mouth/Throat:      Mouth: Mucous membranes are moist.      Pharynx: Oropharynx is clear.   Cardiovascular:      Rate and Rhythm: Normal rate.      Pulses: Normal pulses.      Heart sounds: Normal heart sounds. No murmur heard.  Pulmonary:      Effort: Pulmonary effort is normal. No respiratory distress.      Breath sounds: Normal breath sounds.      Comments: On Room Air  Abdominal:      General: There is no distension.      Palpations: Abdomen is soft.      Tenderness: There is no abdominal tenderness.   Musculoskeletal:         General: Normal range of motion.      Cervical back: Neck supple.      Right lower leg: No edema.      Left lower leg: No edema.   Skin:     General: Skin is warm and dry.   Neurological:      General: No focal deficit present.      Mental Status: He is alert and oriented to person, place, and time. Mental status is at baseline.   Psychiatric:         Mood and Affect: Mood normal.         Behavior: Behavior normal.     Home Medications:   No current facility-administered medications on file prior to encounter.     Current Outpatient Medications on File Prior to Encounter   Medication Sig Dispense Refill    amiodarone (PACERONE) 200 MG Tab Take 1 tablet (200 mg total) by mouth once daily. 30 tablet 6    apixaban (ELIQUIS) 5 mg Tab Take 1 tablet (5 mg total) by mouth 2 (two) times daily. 60 tablet 3    ARIPiprazole (ABILIFY) 10 MG Tab Take 1 tablet (10 mg total) by mouth once daily. 30 tablet 11    aspirin 81 MG Chew Chew and swallow 1 tablet (81 mg total) by mouth once daily. 360 tablet 0    atorvastatin (LIPITOR) 80 MG tablet Take 1 tablet (80 mg total) by mouth once daily. 90 tablet 3    benztropine (COGENTIN) 1 MG tablet Take 1 mg by mouth 2 (two) times daily.      famotidine (PEPCID) 20 MG tablet Take 1 tablet (20 mg total) by mouth 2 (two) times daily. 60 tablet 11    ferrous sulfate 325 (65 FE) MG EC tablet Take 1 tablet (325 mg total)  by mouth once daily. 30 tablet 0    isosorbide mononitrate (IMDUR) 30 MG 24 hr tablet Take 1 tablet (30 mg total) by mouth once daily. 30 tablet 11    levETIRAcetam (KEPPRA) 500 MG Tab Take 1 tablet (500 mg total) by mouth 2 (two) times daily. 60 tablet 11    OXcarbazepine (TRILEPTAL) 300 MG Tab Take 300 mg by mouth 3 (three) times daily.      traZODone (DESYREL) 100 MG tablet Take 1 tablet (100 mg total) by mouth every evening. 30 tablet 11    [DISCONTINUED] furosemide (LASIX) 40 MG tablet Take 1 tablet (40 mg total) by mouth once daily. 30 tablet 11    [DISCONTINUED] metoprolol succinate (TOPROL-XL) 25 MG 24 hr tablet Take 25 mg by mouth once daily.      [DISCONTINUED] metoprolol tartrate (LOPRESSOR) 50 MG tablet Take 1 tablet (50 mg total) by mouth 2 (two) times daily. 60 tablet 0    [DISCONTINUED] pravastatin (PRAVACHOL) 40 MG tablet Take 1 tablet (40 mg total) by mouth every evening. 90 tablet 3    [DISCONTINUED] sacubitriL-valsartan (ENTRESTO) 24-26 mg per tablet Take 1 tablet by mouth 2 (two) times daily. 60 tablet 0       Current Inpatient Medications:    Current Facility-Administered Medications:     acetaminophen tablet 650 mg, 650 mg, Oral, Q4H PRN, WILLY OlguinP    albuterol-ipratropium 2.5 mg-0.5 mg/3 mL nebulizer solution 3 mL, 3 mL, Nebulization, Q4H PRN, Amelia Bone AGACN-BC, 3 mL at 01/06/23 1959    amiodarone tablet 200 mg, 200 mg, Oral, Daily, WILLY OlguinP, 200 mg at 01/09/23 0802    apixaban tablet 5 mg, 5 mg, Oral, BID, RAY Olguin, 5 mg at 01/09/23 0803    ARIPiprazole tablet 10 mg, 10 mg, Oral, Daily, WILLY OlguinP, 10 mg at 01/09/23 0802    aspirin chewable tablet 81 mg, 81 mg, Oral, Daily, WILLY OlguinP, 81 mg at 01/09/23 0803    atorvastatin tablet 80 mg, 80 mg, Oral, Daily, RAY Olguin, 80 mg at 01/09/23 0803    benztropine tablet 1 mg, 1 mg, Oral, BID, RAY Olguin, 1 mg at 01/09/23  0802    dextrose 10% bolus 125 mL 125 mL, 12.5 g, Intravenous, PRN, Lina Bahean FNP    dextrose 10% bolus 250 mL 250 mL, 25 g, Intravenous, PRN, RAY Olguin    ergocalciferol capsule 50,000 Units, 50,000 Units, Oral, Q7 Days, Negrito Hernandez MD    famotidine tablet 20 mg, 20 mg, Oral, BID, RAY Olguin, 20 mg at 01/09/23 0802    ferrous sulfate tablet 1 each, 1 tablet, Oral, Daily, WILLY OlguinP, 1 each at 01/09/23 0802    fluticasone propionate 50 mcg/actuation nasal spray 100 mcg, 2 spray, Each Nostril, Daily, Negrito Hernandez MD    furosemide tablet 40 mg, 40 mg, Oral, Daily, Megha Jurado, FNP, 40 mg at 01/09/23 0803    glucagon (human recombinant) injection 1 mg, 1 mg, Intramuscular, PRN, RAY Olguin    glucose chewable tablet 16 g, 16 g, Oral, PRN, RAY Olguin    glucose chewable tablet 24 g, 24 g, Oral, PRN, RAY Olguin    hydrALAZINE injection 10 mg, 10 mg, Intravenous, Q4H PRN, Amelia Bone, AGACNP-BC, 10 mg at 01/05/23 0406    hydrOXYzine tablet 50 mg, 50 mg, Oral, TID, Negrito Hernandez MD, 50 mg at 01/09/23 0803    isosorbide mononitrate 24 hr tablet 30 mg, 30 mg, Oral, Daily, RAY Olguin, 30 mg at 01/09/23 0803    levETIRAcetam tablet 500 mg, 500 mg, Oral, BID, RAY Olguin, 500 mg at 01/09/23 0803    levothyroxine tablet 25 mcg, 25 mcg, Oral, Before breakfast, Negrito Hernandez MD, 25 mcg at 01/09/23 0450    melatonin tablet 6 mg, 6 mg, Oral, Nightly PRN, RAY Olguin, 6 mg at 01/08/23 2135    metoprolol tartrate (LOPRESSOR) split tablet 12.5 mg, 12.5 mg, Oral, BID, Jael Calloway MD, 12.5 mg at 01/08/23 0825    naloxone 0.4 mg/mL injection 0.02 mg, 0.02 mg, Intravenous, PRN, RAY Olguin    ondansetron injection 4 mg, 4 mg, Intravenous, Q6H PRN, RAY Olguin, 4 mg at 01/08/23 0832    OXcarbazepine tablet 300 mg, 300 mg,  Oral, TID, Lina REYNA Josef, FNP, 300 mg at 01/09/23 0802    sacubitriL-valsartan 24-26 mg per tablet 1 tablet, 1 tablet, Oral, BID, Rein DALLIN Rommel, FNP, 1 tablet at 01/09/23 0802    simethicone chewable tablet 80 mg, 1 tablet, Oral, QID PRN, Lina REYNA Josef, FNP    sodium chloride 0.9% flush 10 mL, 10 mL, Intravenous, Q12H PRN, Lina REYNA Josef, FNP    traZODone tablet 200 mg, 200 mg, Oral, QHS, Lina REYNA Josef, FNP, 200 mg at 01/08/23 2138         VTE Risk Mitigation (From admission, onward)           Ordered     apixaban tablet 5 mg  2 times daily         01/03/23 1801     IP VTE HIGH RISK PATIENT  Once         01/03/23 1522     Place sequential compression device  Until discontinued         01/03/23 1522                  Assessment:   Syncope w/ collapse    - Neuro Stable    - Carotid US Negative for Obstructive Disease  Acute on Chronic Combined Systolic/Diastolic HF (Compensated)    - Grade III DD    - EF 20-25% (ECHO 12.18.22)  NSTEMI - suspect type 2 in the setting of CHF exacerbation   PAF/PAFL (Recent New Diagnosis)- Now HR Controlled on Physical Exam    - [id number]: 5 (LVSD/HTN/TE/NSTEMI)    - On Eliquis  ANA   Chronic Anemia - Stable  ICMO     - With Life Vest   VHD    - Moderate MR and Severe TR  Hypertension- Above Goal  Pulmonary Hypertension  CAD/CABG (April 2022)    - LIMA-LAD, SVG-OM1, SVG-D1, SVG-PDA  History of RUE DVT (5.10.22)    - DVT in right axillary and brachial veins. A superficial thrombosis was identified in the right basilic vein.  Schizoaffective Disorder  Hx of Hep C  Hx of Cocaine Abuse   Nicotine Dependence (Tobacco Use)  Medical noncopliance      Plan:   Entresto resumed yesterday.   Continue oral lasix 40 mg   Continue ASA, statin, & BB.  Continue Eliquis for CVA prophylaxis in the setting of PAF.   Monitor for bradycardia on tele. Pt will need MCT x 2 weeks on discharge.   F/U with Dr. Wright in 1-2 weeks.    Will be available as needed.     Megha Jurado,  P  Cardiology  [facility] Dept  01/09/2023 9:02 AM

## 2023-01-09 NOTE — NURSING
Pt adamant about going home to address on face sheet despite inability to reach family members on their cell phones. Pt discharged with belongings to [facility]. Pt in no acute distress at time of discharge.

## 2023-01-09 NOTE — NURSING
Attempted to call all numbers listed on the face sheet to notify family of discharge, numbers listed are unavailable. Will order [employer] to transport pt back to address on face sheet. Discussed with pt in regard to importance of taking medications as prescribed and following a heart healthy diet. Pt verbalized understanding.

## 2023-01-09 NOTE — DISCHARGE SUMMARY
[facility] - 4th Floor [facility] Medicine  Discharge Summary      Patient Name: Kendall Duff  MRN: [id number]  [facility] [place]: [id number]  Patient Class: IP- Inpatient  Admission Date: 1/3/2023  Hospital Length of Stay: 6 days  Discharge Date and Time:  01/09/2023 8:04 AM  Attending Physician: Negrito Hernandez MD   Discharging Provider: Negrito Hernandez MD  Primary Care Provider: Primary Doctor Mary Anne    Primary Care Team: Networked reference to record PCT     Kendall Duff is a 57 y.o. male who has a PMH includes bipolar disorder, schizophrenia, chronic hepatitis-C, HTN, HLD, seizures, CVA, history of substance abuse, CMO with life vest; CAD s/p CABG; presented to the ED at [facility] on 1/3/2023 with complaints  of syncope at home prior to arrival with associated  shortness of breath, weakness, fatigue. Pt was recently admitted our services 12/21/2022 and d/c on 12/25/2022 for SOB, fluid overload,  cough and difficulty breathing .He now presents to the ED here with reports of worsening SOB and difficulty breathing and syncope at home. He denies any discharges from his life vest, but reports the device discharged a few days ago which he was seen in the ED at [facility] and d/c home. He denies any discharges of shock since then. He reports he continues to smoke cigarettes, he denies any illicit drug use.  He reports compliance with his medication.      Initial vital signs /90 pulse 42 respirations 16 temperature 98.1° F O2 saturation 94% on room air.  Patient was placed on supplemental oxygen therapy at 2 L per nasal cannula as his O2 saturation dropped to 90%.  . Lab work done significant for H&H 10.6/34.2, BUN 42.3, creatinine 1.94; glucose 118, BNP 3 361.6, troponin 0.088; other indices unremarkable.  Chest x-ray demonstrated no acute findings.  Influenza a swab influenza B swab both negative SARs CoV 2 PCR not detected. Patient did receive 80 mg Lasix IV push, 125 mg of  Solu-Medrol, 2 g magnesium sulfate, an hour long nebulizer treatment in the ED which provided some relief.  Patient is admitted to hospital medicine services for further management.    Cardiology was consulted and Lasix was continued.  Carotid Doppler showed less than 50% stenosis bilaterally.  Recent echo done on 12/18/2022 revealed EF 20-25% with grade 3 diastolic dysfunction.  Nephrotoxic medications were held due to ANA.  Kidney ultrasound revealed ascites and GI was consulted.  GI recommended outpatient follow-up and treatment for hepatitis-C he remained hemodynamically stable since then Lasix was transitioned to p.o. eventually.  Cardiology recommended MCOT monitor at discharge and outpatient follow up.  Patient insisted on going home.  Further workup revealed abdominal TFTs for which low-dose Synthroid was added.  He needs outpatient follow up with PCP, repeat TFTs and medication dose changes.  He will be discharged to home with home health today.  Prior to discharge all his medications were reconciled necessary prescriptions were provided.  Eliquis will be continued.      Acute syncope with collapse- + LOC  Acute on chronic combined systolic and diastolic heart failure , EF 20-25% (ECHO 12.18.22)  Exertional dyspnea with SOB due to above-improving  ANA at admission   NSTEMI type 2 at admission  Abdominal TFTs    HX:  Hypertension, AFib on Eliquis, ICMO with LifeVest, VHD (MR, TR), pulmonary hypertension, CAD s/p CABG    HX: Hepatitis-C, history of cocaine use, tobacco use, medication nonadherence, history of right upper extremity DVT, chronic anemia, schizoaffective disorder, seizures, history of CVA, bipolar disorder         Goals of Care Treatment Preferences:  Code Status: Full Code    Vitals:    01/09/23 0746   BP: (!) 150/106   Pulse: 61   Resp:    Temp: 97.5 °F (36.4 °C)      General: In no acute distress, afebrile  Respiratory: Clear to auscultation bilaterally  Cardiovascular: S1, S2, no appreciable  murmur  Abdomen: Soft, nontender, BS +  MSK: Warm, no lower extremity edema, no clubbing or cyanosis  Neurologic: Alert and oriented x4, moving all extremities with good strength          Consults:   Consults (From admission, onward)          Status Ordering Provider     Inpatient consult to Social Work/Case Management  Once        Provider:  (Not yet assigned)    Acknowledged RONEY CARDONA     Inpatient consult to Gastroenterology  Once        Provider:  Elza Knight III, MD    Completed DALAI JHA     Inpatient consult to Cardiology  Once        Provider:  Shane Vasques MD    Completed MILY STARKS new Assessment & Plan notes have been filed under this hospital service since the last note was generated.  Service: Hospital Medicine    Final Active Diagnoses:    Diagnosis Date Noted POA    PRINCIPAL PROBLEM:  Congestive heart failure [I50.9] 11/25/2022 Yes      Problems Resolved During this Admission:    Diagnosis Date Noted Date Resolved POA    Acute chest pain [R07.9] 12/21/2022 01/09/2023 Yes       Discharged Condition: good    [place] [facility]    Follow Up:   Follow-up Information       [facility] Follow up.    Why: A referral was sent to Hepatitis Clinic. They will call you with an appointment.   # [phone]  Contact information:  [facility] [address]             LIDA BROTHERS MD Follow up.    Specialty: Cardiology  Why: Follow up in 7-10 days. Patient will need MCT monitor x 2 weeks at discharge.  Contact information:  [facility] [address] [address] [phone]               Marni Nguyen NP at [facility] Follow up.    Why: New PCP appointment to establish care on Tuesday, January 17, 2022 at 10:30 am. Please bring ID and insurance cards.  Contact information:  [address] [place], [place] [address] [phone]                         Patient  Instructions:      Ambulatory referral/consult to Hepatitis C Clinic   Standing Status: Future   Referral Priority: Routine Referral Type: Consultation   Number of Visits Requested: 1     HOME HEALTH ORDERS   Order Comments: Home Health evaluate and treat  Skilled nurse for management of disease process, medication management, and education.     Order Specific Question Answer Comments   What Home Health Agency is the patient currently using? Other/External        Significant Diagnostic Studies: Labs: CMP   Recent Labs   Lab 01/08/23  0529 01/09/23  0428    140   K 4.4 4.2   CO2 25 24   BUN 20.7 26.6*   CREATININE 1.11 1.29*   CALCIUM 8.7 8.6   ALBUMIN 3.4*  --    BILITOT 1.1  --    ALKPHOS 102  --    AST 34  --    ALT 19  --        Pending Diagnostic Studies:       Procedure Component Value Units Date/Time    Occult Blood, Stool, Diagnostic (1-3) [[id number]]     Order Status: Sent Lab Status: No result     Specimen: Stool     Narrative:      The following orders were created for panel order Occult Blood, Stool, Diagnostic (1-3).  Procedure                               Abnormality         Status                     ---------                               -----------         ------                     Occult blood x 3, stool[[id number]]                                                       Please view results for these tests on the individual orders.    Occult blood x 3, stool [[id number]]     Order Status: Sent Lab Status: No result     Specimen: Stool            Medications:  Reconciled Home Medications:      Medication List        START taking these medications      ergocalciferol 50,000 unit Cap  Commonly known as: ERGOCALCIFEROL  Take 1 capsule (50,000 Units total) by mouth every 7 days. for 12 doses  Start taking on: January 15, 2023     hydrOXYzine 50 MG tablet  Commonly known as: ATARAX  Take 1 tablet (50 mg total) by mouth 2 (two) times a day. for 14 days     levothyroxine 25 MCG tablet  Commonly known  as: SYNTHROID  Take 1 tablet (25 mcg total) by mouth before breakfast.  Start taking on: January 10, 2023            CHANGE how you take these medications      metoprolol tartrate 25 MG tablet  Commonly known as: LOPRESSOR  Take 0.5 tablets (12.5 mg total) by mouth 2 (two) times daily.  What changed:   medication strength  how much to take            CONTINUE taking these medications      amiodarone 200 MG Tab  Commonly known as: PACERONE  Take 1 tablet (200 mg total) by mouth once daily.     apixaban 5 mg Tab  Commonly known as: ELIQUIS  Take 1 tablet (5 mg total) by mouth 2 (two) times daily.     ARIPiprazole 10 MG Tab  Commonly known as: ABILIFY  Take 1 tablet (10 mg total) by mouth once daily.     aspirin 81 MG Chew  Chew and swallow 1 tablet (81 mg total) by mouth once daily.     atorvastatin 80 MG tablet  Commonly known as: LIPITOR  Take 1 tablet (80 mg total) by mouth once daily.     benztropine 1 MG tablet  Commonly known as: COGENTIN  Take 1 mg by mouth 2 (two) times daily.     famotidine 20 MG tablet  Commonly known as: PEPCID  Take 1 tablet (20 mg total) by mouth 2 (two) times daily.     ferrous sulfate 325 (65 FE) MG EC tablet  Take 1 tablet (325 mg total) by mouth once daily.     furosemide 40 MG tablet  Commonly known as: LASIX  Take 1 tablet (40 mg total) by mouth once daily.     isosorbide mononitrate 30 MG 24 hr tablet  Commonly known as: IMDUR  Take 1 tablet (30 mg total) by mouth once daily.     levETIRAcetam 500 MG Tab  Commonly known as: KEPPRA  Take 1 tablet (500 mg total) by mouth 2 (two) times daily.     OXcarbazepine 300 MG Tab  Commonly known as: TRILEPTAL  Take 300 mg by mouth 3 (three) times daily.     sacubitriL-valsartan 24-26 mg per tablet  Commonly known as: ENTRESTO  Take 1 tablet by mouth 2 (two) times daily.     traZODone 100 MG tablet  Commonly known as: DESYREL  Take 1 tablet (100 mg total) by mouth every evening.              Indwelling Lines/Drains at time of discharge:    Lines/Drains/Airways       None                   Time spent on the discharge of patient: 35 minutes         Negrito Hernandez MD  Department of Hospital Medicine  [facility] - 4th Floor Medical Telemetry

## 2023-01-09 NOTE — PLAN OF CARE
Patient set up [facility] on last admission but readmitted prior to being admitted to their service. Discharge information sent via [facility].

## 2023-01-10 ENCOUNTER — PATIENT MESSAGE (OUTPATIENT)
Dept: ADMINISTRATIVE | Facility: CLINIC | Age: 58
End: 2023-01-10
Payer: MEDICAID

## 2023-01-10 ENCOUNTER — PATIENT OUTREACH (OUTPATIENT)
Dept: ADMINISTRATIVE | Facility: CLINIC | Age: 58
End: 2023-01-10
Payer: MEDICAID

## 2023-01-10 NOTE — PROGRESS NOTES
C3 nurse attempted to contact Kendall Duff for a TCC post hospital discharge follow up call. No answer. No voicemail available. The patient has a scheduled HOSFU appointment with Marni Nguyen NP 1/17/23 at 10:30.

## 2023-01-10 NOTE — ED PROVIDER NOTES
Encounter Date: 1/9/2023       History     Chief Complaint   Patient presents with    Shortness of Breath       57-year-old gentleman presents emergency room,  initially with complaints of shortness of breath, but patient reports that his shortness of breath has resolved and feels a normal state of health.  Patient reports he was just recently over [facility], and left his  for his LifeVest there and currently requesting to see if we can replace it or obtain a .  Patient denies fever chills.  Denies nausea or vomiting.  Denies abdominal pain.    The history is provided by the patient.   Review of patient's allergies indicates:   Allergen Reactions    Depakote [divalproex]     Divalproex sodium Other (See Comments)    Lithium     Lithium analogues     Quetiapine Other (See Comments)     Pt states had seizures     Past Medical History:   Diagnosis Date    Bipolar disorder, unspecified     Chronic hepatitis C     History of psychiatric hospitalization     Hx of psychiatric care     Hypertension     Bessie     Obesity, unspecified     Psychiatric problem     Schizoaffective disorder, bipolar type     Seizures     Stroke     Substance abuse     Therapy      Past Surgical History:   Procedure Laterality Date    CORONARY STENT PLACEMENT  08/14/2015    DEBRIDEMENT  04/17/2022    LEFT HEART CATHETERIZATION Left 08/13/2015    REPEAT CLOSURE OF STERNAL INCISION N/A 5/11/2022    Procedure: CLOSURE, STERNAL INCISION, REPEAT;  Surgeon: Adolfo Weiss MD;  Location: [facility] OR;  Service: Plastics;  Laterality: N/A;  STERNAL WOUND DEBRIDEMENT AND RECONSTRUCTION // MULTIPLE MUSCLE FLAPS // REQ 1400     Family History   Problem Relation Age of Onset    Cancer Mother     Liver disease Father      Social History     Tobacco Use    Smoking status: Every Day     Types: Cigarettes    Smokeless tobacco: Never   Substance Use Topics    Alcohol use: Never    Drug use: Not Currently     Types: Cocaine      Review of Systems   Constitutional:  Negative for chills, fatigue and fever.   HENT:  Negative for ear pain, rhinorrhea and sore throat.    Eyes:  Negative for photophobia and pain.   Respiratory:  Negative for cough, shortness of breath and wheezing.    Cardiovascular:  Negative for chest pain.   Gastrointestinal:  Negative for abdominal pain, diarrhea, nausea and vomiting.   Genitourinary:  Negative for dysuria.   Neurological:  Negative for dizziness, weakness and headaches.   All other systems reviewed and are negative.    Physical Exam     Initial Vitals [01/09/23 2052]   BP Pulse Resp Temp SpO2   132/81 74 18 98.2 °F (36.8 °C) 97 %      MAP       --         Physical Exam    Nursing note and vitals reviewed.  Constitutional: He appears well-developed and well-nourished.   HENT:   Head: Normocephalic and atraumatic.   Eyes: EOM are normal. Pupils are equal, round, and reactive to light.   Neck: Neck supple.   Normal range of motion.  Cardiovascular:  Normal rate, regular rhythm and normal heart sounds.     Exam reveals no gallop and no friction rub.       No murmur heard.  Pulmonary/Chest: Breath sounds normal. No respiratory distress.   Abdominal: Abdomen is soft. Bowel sounds are normal. He exhibits no distension. There is no abdominal tenderness.   Musculoskeletal:         General: Normal range of motion.      Cervical back: Normal range of motion and neck supple.     Neurological: He is alert and oriented to person, place, and time. He has normal strength.   Skin: Skin is warm and dry.   Psychiatric: He has a normal mood and affect. His behavior is normal. Judgment and thought content normal.       ED Course   Procedures  Labs Reviewed - No data to display       Imaging Results    None          Medications - No data to display              ED Course as of 01/09/23 2212 Mon Jan 09, 2023 2203 Called over [facility] as the patient was recently discharged from the 4th floor.   Patient now says that his daughter probably has his , but she is not talking with him.  Currently trying to get in touch with daughter. [MW]   2211 Patient not currently here for medical complaints - will have nurses try and resolve the  issue for his lifevest.  Stable for discharge to home. [MW]      ED Course User Index  [MW] Miles Dowell MD                 Clinical Impression:   Final diagnoses:  [Z13.9] Encounter for medical screening examination (Primary)        ED Disposition Condition    Discharge Stable          ED Prescriptions    None       Follow-up Information       Follow up With Specialties Details Why Contact Info    [facility] - Emergency Dept Emergency Medicine  As needed, If symptoms worsen [address]  [facility] [id number] [phone]    Primary Care Physician  In 5 days               Miles Dowell MD  01/09/23 5382

## 2023-01-11 NOTE — PROGRESS NOTES
3rd attempt-C3 nurse attempted to contact Kendall Duff for a TCC post hospital discharge follow up call. No answer. No voicemail available. The patient has a scheduled HOSFU appointment with Marni Nguyen NP 1/17/23 at 10:30.

## 2023-01-12 ENCOUNTER — HOSPITAL ENCOUNTER (EMERGENCY)
Facility: HOSPITAL | Age: 58
Discharge: LEFT BEFORE DISCHARGE | End: 2023-01-12
Attending: EMERGENCY MEDICINE
Payer: MEDICAID

## 2023-01-12 VITALS
RESPIRATION RATE: 20 BRPM | DIASTOLIC BLOOD PRESSURE: 101 MMHG | TEMPERATURE: 98 F | HEIGHT: 67 IN | OXYGEN SATURATION: 98 % | SYSTOLIC BLOOD PRESSURE: 152 MMHG | WEIGHT: 180.75 LBS | BODY MASS INDEX: 28.37 KG/M2 | HEART RATE: 94 BPM

## 2023-01-12 DIAGNOSIS — R07.9 CHEST PAIN: ICD-10-CM

## 2023-01-12 LAB
ANION GAP SERPL CALC-SCNC: 8 MEQ/L
BASOPHILS # BLD AUTO: 0.03 X10(3)/MCL (ref 0–0.2)
BASOPHILS NFR BLD AUTO: 0.3 %
BNP BLD-MCNC: 4766.8 PG/ML
BUN SERPL-MCNC: 18.7 MG/DL (ref 8.4–25.7)
CALCIUM SERPL-MCNC: 8.3 MG/DL (ref 8.4–10.2)
CHLORIDE SERPL-SCNC: 108 MMOL/L (ref 98–107)
CO2 SERPL-SCNC: 24 MMOL/L (ref 22–29)
CREAT SERPL-MCNC: 0.95 MG/DL (ref 0.73–1.18)
CREAT/UREA NIT SERPL: 20
EOSINOPHIL # BLD AUTO: 0.01 X10(3)/MCL (ref 0–0.9)
EOSINOPHIL NFR BLD AUTO: 0.1 %
ERYTHROCYTE [DISTWIDTH] IN BLOOD BY AUTOMATED COUNT: 22.4 % (ref 11.5–17)
GFR SERPLBLD CREATININE-BSD FMLA CKD-EPI: >60 MLS/MIN/1.73/M2
GLUCOSE SERPL-MCNC: 97 MG/DL (ref 74–100)
HCT VFR BLD AUTO: 34.5 % (ref 42–52)
HGB BLD-MCNC: 10.7 GM/DL (ref 14–18)
IMM GRANULOCYTES # BLD AUTO: 0.04 X10(3)/MCL (ref 0–0.04)
IMM GRANULOCYTES NFR BLD AUTO: 0.4 %
LYMPHOCYTES # BLD AUTO: 0.99 X10(3)/MCL (ref 0.6–4.6)
LYMPHOCYTES NFR BLD AUTO: 10.7 %
MAGNESIUM SERPL-MCNC: 2 MG/DL (ref 1.6–2.6)
MCH RBC QN AUTO: 22.7 PG
MCHC RBC AUTO-ENTMCNC: 31 MG/DL (ref 33–36)
MCV RBC AUTO: 73.2 FL (ref 80–94)
MONOCYTES # BLD AUTO: 0.49 X10(3)/MCL (ref 0.1–1.3)
MONOCYTES NFR BLD AUTO: 5.3 %
NEUTROPHILS # BLD AUTO: 7.69 X10(3)/MCL (ref 2.1–9.2)
NEUTROPHILS NFR BLD AUTO: 83.2 %
NRBC BLD AUTO-RTO: 0 %
PLATELET # BLD AUTO: 224 X10(3)/MCL (ref 130–400)
PMV BLD AUTO: 9.2 FL (ref 7.4–10.4)
POTASSIUM SERPL-SCNC: 4.1 MMOL/L (ref 3.5–5.1)
RBC # BLD AUTO: 4.71 X10(6)/MCL (ref 4.7–6.1)
SODIUM SERPL-SCNC: 140 MMOL/L (ref 136–145)
TROPONIN I SERPL-MCNC: 0.09 NG/ML (ref 0–0.04)
WBC # SPEC AUTO: 9.3 X10(3)/MCL (ref 4.5–11.5)

## 2023-01-12 PROCEDURE — 85025 COMPLETE CBC W/AUTO DIFF WBC: CPT | Performed by: EMERGENCY MEDICINE

## 2023-01-12 PROCEDURE — 93005 ELECTROCARDIOGRAM TRACING: CPT

## 2023-01-12 PROCEDURE — 83880 ASSAY OF NATRIURETIC PEPTIDE: CPT | Performed by: EMERGENCY MEDICINE

## 2023-01-12 PROCEDURE — 99285 EMERGENCY DEPT VISIT HI MDM: CPT | Mod: 25

## 2023-01-12 PROCEDURE — 80048 BASIC METABOLIC PNL TOTAL CA: CPT | Performed by: EMERGENCY MEDICINE

## 2023-01-12 PROCEDURE — 83735 ASSAY OF MAGNESIUM: CPT | Performed by: EMERGENCY MEDICINE

## 2023-01-12 PROCEDURE — 84484 ASSAY OF TROPONIN QUANT: CPT | Performed by: EMERGENCY MEDICINE

## 2023-01-12 NOTE — Clinical Note
"Date: 1/12/2023  Patient: Kendall Duff  Admitted: 1/12/2023 11:12 AM  Attending Provider: Bert Castillo DO    Kendall Duff or his authorized caregiver has made the decision for the patient to leave the emergency department against the advice of the em ergency department staff. He or his authorized caregiver has been informed and understands the inherent risks, including death.  He or his authorized caregiver has decided to accept the responsibility for this decision. Kendall Duff and all necessary  parties have been advised that he may return for further evaluation or treatment. His condition at time of discharge was stable.  Kendall Duff had current vital signs as follows:  BP (!) 147/109   Pulse 87   Temp 98.1 °F (36.7 °C) (Tympanic)   Resp  20   Ht 5' 7" (1.702 m)   Wt 82 kg (180 lb 12.4 oz)   "
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
## 2023-01-12 NOTE — Clinical Note
Kirk Jimmydeena accompanied their family member to the emergency department on 1/12/2023. They may return to work on 01/13/2023.      If you have any questions or concerns, please don't hesitate to call.       RN

## 2023-01-12 NOTE — LETTER
Patient: Kendall Duff  YOB: 1965  Date: 1/12/2023 Time: 1:45 PM  Location: [facility]    Leaving the Hospital Against Medical Advice    Chart #:[id number]    This will certify that I, the undersigned,    ______________________________________________________________________    A patient in the above named medical center, having requested discharge and removal from the medical center against the advice of my attending physician(s), hereby release [facility], its physicians, officers and employees, severally and individually, from any and all liability of any nature whatsoever for any injury or harm or complication of any kind that may result directly or indirectly, by reason of my terminating my stay as a patient at [facility] and my departure from [facility], and hereby waive any and all rights of action I may now have or later acquire as a result of my voluntary departure from [facility] and the termination of my stay as a patient therein.    This release is made with the full knowledge of the danger that may result from the action which I am taking.      Date:_______________________                         ___________________________                                                                                    Patient/Legal Representative    Witness:        ____________________________                          ___________________________  Nurse                                                                        Physician

## 2023-01-13 ENCOUNTER — HOSPITAL ENCOUNTER (OUTPATIENT)
Facility: HOSPITAL | Age: 58
Discharge: HOME | End: 2023-01-17
Attending: EMERGENCY MEDICINE | Admitting: EMERGENCY MEDICINE
Payer: MEDICAID

## 2023-01-13 DIAGNOSIS — I10 BENIGN ESSENTIAL HTN: ICD-10-CM

## 2023-01-13 DIAGNOSIS — R07.9 CHEST PAIN: Primary | ICD-10-CM

## 2023-01-13 DIAGNOSIS — I21.4 NSTEMI (NON-ST ELEVATED MYOCARDIAL INFARCTION): ICD-10-CM

## 2023-01-13 DIAGNOSIS — Z95.810 USES LIFEVEST DEFIBRILLATOR: ICD-10-CM

## 2023-01-13 DIAGNOSIS — I50.21 ACUTE SYSTOLIC CONGESTIVE HEART FAILURE: ICD-10-CM

## 2023-01-13 LAB
ALBUMIN SERPL-MCNC: 3.1 G/DL (ref 3.5–5)
ALBUMIN/GLOB SERPL: 1.1 RATIO (ref 1.1–2)
ALP SERPL-CCNC: 121 UNIT/L (ref 40–150)
ALT SERPL-CCNC: 17 UNIT/L (ref 0–55)
AMPHET UR QL SCN: NEGATIVE
APTT PPP: 32 SECONDS (ref 23.2–33.7)
APTT PPP: 38.4 SECONDS (ref 23.2–33.7)
AST SERPL-CCNC: 29 UNIT/L (ref 5–34)
BARBITURATE SCN PRESENT UR: NEGATIVE
BASOPHILS # BLD AUTO: 0.05 X10(3)/MCL (ref 0–0.2)
BASOPHILS NFR BLD AUTO: 0.6 %
BENZODIAZ UR QL SCN: NEGATIVE
BILIRUBIN DIRECT+TOT PNL SERPL-MCNC: 0.7 MG/DL
BNP BLD-MCNC: 7275 PG/ML
BNP BLD-MCNC: 7422.6 PG/ML
BUN SERPL-MCNC: 25.4 MG/DL (ref 8.4–25.7)
CALCIUM SERPL-MCNC: 8.4 MG/DL (ref 8.4–10.2)
CANNABINOIDS UR QL SCN: NEGATIVE
CHLORIDE SERPL-SCNC: 103 MMOL/L (ref 98–107)
CO2 SERPL-SCNC: 23 MMOL/L (ref 22–29)
COCAINE UR QL SCN: NEGATIVE
CREAT SERPL-MCNC: 1.29 MG/DL (ref 0.73–1.18)
EOSINOPHIL # BLD AUTO: 0.07 X10(3)/MCL (ref 0–0.9)
EOSINOPHIL NFR BLD AUTO: 0.8 %
ERYTHROCYTE [DISTWIDTH] IN BLOOD BY AUTOMATED COUNT: 22.7 % (ref 11.5–17)
FENTANYL UR QL SCN: NEGATIVE
GFR SERPLBLD CREATININE-BSD FMLA CKD-EPI: >60 MLS/MIN/1.73/M2
GLOBULIN SER-MCNC: 2.9 GM/DL (ref 2.4–3.5)
GLUCOSE SERPL-MCNC: 165 MG/DL (ref 74–100)
HCT VFR BLD AUTO: 34.7 % (ref 42–52)
HGB BLD-MCNC: 10.7 GM/DL (ref 14–18)
IMM GRANULOCYTES # BLD AUTO: 0.04 X10(3)/MCL (ref 0–0.04)
IMM GRANULOCYTES NFR BLD AUTO: 0.5 %
INR BLD: 1.43 (ref 0–1.3)
LYMPHOCYTES # BLD AUTO: 1.22 X10(3)/MCL (ref 0.6–4.6)
LYMPHOCYTES NFR BLD AUTO: 14.1 %
MAGNESIUM SERPL-MCNC: 1.9 MG/DL (ref 1.6–2.6)
MCH RBC QN AUTO: 22.4 PG
MCHC RBC AUTO-ENTMCNC: 30.8 MG/DL (ref 33–36)
MCV RBC AUTO: 72.6 FL (ref 80–94)
MDMA UR QL SCN: NEGATIVE
MONOCYTES # BLD AUTO: 0.62 X10(3)/MCL (ref 0.1–1.3)
MONOCYTES NFR BLD AUTO: 7.1 %
NEUTROPHILS # BLD AUTO: 6.68 X10(3)/MCL (ref 2.1–9.2)
NEUTROPHILS NFR BLD AUTO: 76.9 %
NRBC BLD AUTO-RTO: 0 %
OPIATES UR QL SCN: NEGATIVE
PCP UR QL: NEGATIVE
PH UR: 5.5 [PH] (ref 3–11)
PLATELET # BLD AUTO: 237 X10(3)/MCL (ref 130–400)
PMV BLD AUTO: 9.8 FL (ref 7.4–10.4)
POTASSIUM SERPL-SCNC: 3.8 MMOL/L (ref 3.5–5.1)
PROT SERPL-MCNC: 6 GM/DL (ref 6.4–8.3)
PROTHROMBIN TIME: 17.3 SECONDS (ref 12.5–14.5)
RBC # BLD AUTO: 4.78 X10(6)/MCL (ref 4.7–6.1)
SODIUM SERPL-SCNC: 137 MMOL/L (ref 136–145)
TROPONIN I SERPL-MCNC: 0.09 NG/ML (ref 0–0.04)
WBC # SPEC AUTO: 8.7 X10(3)/MCL (ref 4.5–11.5)

## 2023-01-13 PROCEDURE — 85610 PROTHROMBIN TIME: CPT | Performed by: EMERGENCY MEDICINE

## 2023-01-13 PROCEDURE — 83735 ASSAY OF MAGNESIUM: CPT | Performed by: EMERGENCY MEDICINE

## 2023-01-13 PROCEDURE — 85730 THROMBOPLASTIN TIME PARTIAL: CPT | Performed by: EMERGENCY MEDICINE

## 2023-01-13 PROCEDURE — 25000003 PHARM REV CODE 250: Performed by: NURSE PRACTITIONER

## 2023-01-13 PROCEDURE — 93005 ELECTROCARDIOGRAM TRACING: CPT

## 2023-01-13 PROCEDURE — 63600175 PHARM REV CODE 636 W HCPCS: Performed by: NURSE PRACTITIONER

## 2023-01-13 PROCEDURE — G0378 HOSPITAL OBSERVATION PER HR: HCPCS

## 2023-01-13 PROCEDURE — 96365 THER/PROPH/DIAG IV INF INIT: CPT

## 2023-01-13 PROCEDURE — 84484 ASSAY OF TROPONIN QUANT: CPT | Performed by: INTERNAL MEDICINE

## 2023-01-13 PROCEDURE — 85730 THROMBOPLASTIN TIME PARTIAL: CPT | Performed by: INTERNAL MEDICINE

## 2023-01-13 PROCEDURE — 25000242 PHARM REV CODE 250 ALT 637 W/ HCPCS: Performed by: EMERGENCY MEDICINE

## 2023-01-13 PROCEDURE — 36415 COLL VENOUS BLD VENIPUNCTURE: CPT | Performed by: INTERNAL MEDICINE

## 2023-01-13 PROCEDURE — 80307 DRUG TEST PRSMV CHEM ANLYZR: CPT | Performed by: NURSE PRACTITIONER

## 2023-01-13 PROCEDURE — 96375 TX/PRO/DX INJ NEW DRUG ADDON: CPT

## 2023-01-13 PROCEDURE — 96376 TX/PRO/DX INJ SAME DRUG ADON: CPT

## 2023-01-13 PROCEDURE — 85025 COMPLETE CBC W/AUTO DIFF WBC: CPT | Performed by: EMERGENCY MEDICINE

## 2023-01-13 PROCEDURE — 96366 THER/PROPH/DIAG IV INF ADDON: CPT

## 2023-01-13 PROCEDURE — 84484 ASSAY OF TROPONIN QUANT: CPT | Performed by: EMERGENCY MEDICINE

## 2023-01-13 PROCEDURE — 99291 CRITICAL CARE FIRST HOUR: CPT | Mod: 25

## 2023-01-13 PROCEDURE — 25000003 PHARM REV CODE 250: Performed by: EMERGENCY MEDICINE

## 2023-01-13 PROCEDURE — 83880 ASSAY OF NATRIURETIC PEPTIDE: CPT | Performed by: EMERGENCY MEDICINE

## 2023-01-13 PROCEDURE — 63600175 PHARM REV CODE 636 W HCPCS: Performed by: EMERGENCY MEDICINE

## 2023-01-13 PROCEDURE — 80053 COMPREHEN METABOLIC PANEL: CPT | Performed by: EMERGENCY MEDICINE

## 2023-01-13 RX ORDER — FAMOTIDINE 20 MG/1
20 TABLET, FILM COATED ORAL 2 TIMES DAILY
Status: DISCONTINUED | OUTPATIENT
Start: 2023-01-13 | End: 2023-01-17 | Stop reason: HOSPADM

## 2023-01-13 RX ORDER — LANOLIN ALCOHOL/MO/W.PET/CERES
1 CREAM (GRAM) TOPICAL DAILY
Status: DISCONTINUED | OUTPATIENT
Start: 2023-01-13 | End: 2023-01-17 | Stop reason: HOSPADM

## 2023-01-13 RX ORDER — FUROSEMIDE 40 MG/1
40 TABLET ORAL DAILY
Status: DISCONTINUED | OUTPATIENT
Start: 2023-01-13 | End: 2023-01-17 | Stop reason: HOSPADM

## 2023-01-13 RX ORDER — HEPARIN SODIUM,PORCINE/D5W 25000/250
0-40 INTRAVENOUS SOLUTION INTRAVENOUS CONTINUOUS
Status: DISCONTINUED | OUTPATIENT
Start: 2023-01-13 | End: 2023-01-13

## 2023-01-13 RX ORDER — FUROSEMIDE 10 MG/ML
40 INJECTION INTRAMUSCULAR; INTRAVENOUS
Status: COMPLETED | OUTPATIENT
Start: 2023-01-13 | End: 2023-01-13

## 2023-01-13 RX ORDER — LEVETIRACETAM 500 MG/1
500 TABLET ORAL 2 TIMES DAILY
Status: DISCONTINUED | OUTPATIENT
Start: 2023-01-13 | End: 2023-01-17 | Stop reason: HOSPADM

## 2023-01-13 RX ORDER — ARIPIPRAZOLE 5 MG/1
10 TABLET ORAL DAILY
Status: DISCONTINUED | OUTPATIENT
Start: 2023-01-13 | End: 2023-01-17 | Stop reason: HOSPADM

## 2023-01-13 RX ORDER — ASPIRIN 325 MG
325 TABLET, DELAYED RELEASE (ENTERIC COATED) ORAL
Status: COMPLETED | OUTPATIENT
Start: 2023-01-13 | End: 2023-01-13

## 2023-01-13 RX ORDER — LEVOTHYROXINE SODIUM 25 UG/1
25 TABLET ORAL
Status: DISCONTINUED | OUTPATIENT
Start: 2023-01-14 | End: 2023-01-17 | Stop reason: HOSPADM

## 2023-01-13 RX ORDER — METOPROLOL TARTRATE 25 MG/1
12.5 TABLET ORAL 2 TIMES DAILY
Status: DISCONTINUED | OUTPATIENT
Start: 2023-01-13 | End: 2023-01-14

## 2023-01-13 RX ORDER — ISOSORBIDE MONONITRATE 30 MG/1
30 TABLET, EXTENDED RELEASE ORAL DAILY
Status: DISCONTINUED | OUTPATIENT
Start: 2023-01-13 | End: 2023-01-17 | Stop reason: HOSPADM

## 2023-01-13 RX ORDER — ATORVASTATIN CALCIUM 40 MG/1
80 TABLET, FILM COATED ORAL DAILY
Status: DISCONTINUED | OUTPATIENT
Start: 2023-01-13 | End: 2023-01-17 | Stop reason: HOSPADM

## 2023-01-13 RX ORDER — AMIODARONE HYDROCHLORIDE 200 MG/1
200 TABLET ORAL DAILY
Status: DISCONTINUED | OUTPATIENT
Start: 2023-01-13 | End: 2023-01-13

## 2023-01-13 RX ORDER — NITROGLYCERIN 0.4 MG/1
0.4 TABLET SUBLINGUAL EVERY 5 MIN PRN
Status: DISCONTINUED | OUTPATIENT
Start: 2023-01-13 | End: 2023-01-17 | Stop reason: HOSPADM

## 2023-01-13 RX ADMIN — ASPIRIN 325 MG: 325 TABLET, COATED ORAL at 04:01

## 2023-01-13 RX ADMIN — AMIODARONE HYDROCHLORIDE 200 MG: 200 TABLET ORAL at 01:01

## 2023-01-13 RX ADMIN — NITROGLYCERIN 0.4 MG: 0.4 TABLET, ORALLY DISINTEGRATING SUBLINGUAL at 04:01

## 2023-01-13 RX ADMIN — FUROSEMIDE 40 MG: 10 INJECTION, SOLUTION INTRAMUSCULAR; INTRAVENOUS at 05:01

## 2023-01-13 RX ADMIN — FERROUS SULFATE TAB 325 MG (65 MG ELEMENTAL FE) 1 EACH: 325 (65 FE) TAB at 01:01

## 2023-01-13 RX ADMIN — AMIODARONE HYDROCHLORIDE 150 MG: 1.5 INJECTION, SOLUTION INTRAVENOUS at 05:01

## 2023-01-13 RX ADMIN — ISOSORBIDE MONONITRATE 30 MG: 30 TABLET, EXTENDED RELEASE ORAL at 01:01

## 2023-01-13 RX ADMIN — FAMOTIDINE 20 MG: 20 TABLET, FILM COATED ORAL at 11:01

## 2023-01-13 RX ADMIN — AMIODARONE HYDROCHLORIDE 1 MG/MIN: 1.8 INJECTION, SOLUTION INTRAVENOUS at 05:01

## 2023-01-13 RX ADMIN — LEVETIRACETAM 500 MG: 500 TABLET, FILM COATED ORAL at 11:01

## 2023-01-13 RX ADMIN — APIXABAN 5 MG: 5 TABLET, FILM COATED ORAL at 11:01

## 2023-01-13 RX ADMIN — HEPARIN SODIUM 12 UNITS/KG/HR: 10000 INJECTION, SOLUTION INTRAVENOUS at 06:01

## 2023-01-13 RX ADMIN — NITROGLYCERIN 0.4 MG: 0.4 TABLET, ORALLY DISINTEGRATING SUBLINGUAL at 05:01

## 2023-01-13 RX ADMIN — ARIPIPRAZOLE 10 MG: 5 TABLET ORAL at 04:01

## 2023-01-13 RX ADMIN — SACUBITRIL AND VALSARTAN 1 TABLET: 24; 26 TABLET, FILM COATED ORAL at 11:01

## 2023-01-13 RX ADMIN — FUROSEMIDE 40 MG: 40 TABLET ORAL at 01:01

## 2023-01-13 RX ADMIN — AMIODARONE HYDROCHLORIDE 0.5 MG/MIN: 1.8 INJECTION, SOLUTION INTRAVENOUS at 11:01

## 2023-01-13 RX ADMIN — METOPROLOL TARTRATE 12.5 MG: 25 TABLET, FILM COATED ORAL at 11:01

## 2023-01-13 RX ADMIN — ATORVASTATIN CALCIUM 80 MG: 40 TABLET, FILM COATED ORAL at 01:01

## 2023-01-13 NOTE — ED NOTES
Pt is wearing a ZOLL vest.    HPI: 78 y.o.Male with hx of HTN, HLD, DM2, CAD s/p CABG, chronic back pain with multiple sx and history of laminectomy and fusion, currently presented to ED c/o worsening neck pain. described as dull throbbing pain 8/10, worse with movement, with radiation down his back and to b/l shoulder blades. He also reports no trauma or injury. He states that after his back sx he went to rehab and then came home on 3/11/22, since then he has been using a wheelchair, bed bound and mostly transitioning and transfer with help from his wife, who is his primary care taker. His wife unfortunately sustained a fall and broke her arm and is now unable to care for the patient. Pt also admits to chronic low back pain unchanged from baseline. and intermittent LE numbness. Pt  follows closely with Dr. Sanches/ Tiarra/ Renee. Pt evaluated in the ed, placed in ED observation and pain mx optimized and pan scan completed. Neurosx consulted for further recommendations and pt evaluated by PT and recommended to go to [facility]. Pt was accepted to multiple facilities but patients daughter works at [employer] and family was adamant about having patient be discharged there. The facility will be unable to accept the patient until monday. ED and sw requested admission for placement as the patient cannot leave until monday. Currently patient reports pain is controlled, Patient denies chest pain, SOB, abd pain, N/V, fever, chills, dysuria or any other complaints. All remainder ROS negative.     Interval history: Patient is admitted to the floor with PT w/u.     Physical Exam:  Constitutional: NAD    Neuro  * Mental Status:  GCS 15:  E(4), V(5), M(6).  Awake, alert, oriented to conversation.  * Cranial Nerves: Cnii-Cnxii grossly intact. PERRL, EOMI, tongue midline, no gaze deviation  * Motor: RUE 5/5, LUE 5/5, RLE 5/5, LLE 5/5  * Sensory: Sensation intact to light touch  * Reflexes: Not assessed  * Gait: Not assessed      Vital Signs Last 24 Hrs  T(C): 36.5 (04 Jun 2022 08:47), Max: 36.7 (03 Jun 2022 19:36)  T(F): 97.7 (04 Jun 2022 08:47), Max: 98 (03 Jun 2022 19:36)  HR: 66 (04 Jun 2022 08:47) (52 - 66)  BP: 144/74 (04 Jun 2022 08:47) (104/60 - 144/74)  BP(mean): --  RR: 18 (04 Jun 2022 08:47) (18 - 18)  SpO2: 91% (04 Jun 2022 08:47) (91% - 95%)    Labs & Radiology:                        12.7   6.86  )-----------( 254      ( 02 Jun 2022 17:59 )             38.3     139  |  99  |  14.4  ----------------------------<  96  4.0   |  25.0  |  0.53    Ca    8.5<L>      02 Jun 2022 17:59    TPro  6.6  /  Alb  3.7  /  TBili  0.4  /  DBili  x   /  AST  14  /  ALT  15  /  AlkPhos  76  06-02  LIVER FUNCTIONS - ( 02 Jun 2022 17:59 )  Alb: 3.7 g/dL / Pro: 6.6 g/dL / ALK PHOS: 76 U/L / ALT: 15 U/L / AST: 14 U/L / GGT: x           Neurosurgery Imaging:  MR Thoracic Spine No Cont (06.03.22 @ 19:17) >    IMPRESSION:  MR cervical spine: Degenerative changes as described. High-grade   multilevel central canal and neural foraminal stenosis without   significant interval change.    MR thoracicspine: There has been posterior decompression at T10 since   the prior MRI from 11/26/2021. There is hyperintense cord signal at   T10-T11 which likely represents myelomalacia. No high-grade central canal   stenosis throughout the thoracic spine.    MR lumbar spine: Postoperative and degenerative changes as described. At   T12-L1 there is moderate to severe central canal stenosis which is   stable. At L1-L2 there is moderate central canal stenosis which appears   mildly worse since the prior study.    MEDICATIONS  (STANDING):  amLODIPine   Tablet 5 milliGRAM(s) Oral daily  aspirin enteric coated 81 milliGRAM(s) Oral daily  ATENolol  Tablet 50 milliGRAM(s) Oral daily  DULoxetine 20 milliGRAM(s) Oral daily  gabapentin 300 milliGRAM(s) Oral three times a day  lidocaine   4% Patch 1 Patch Transdermal daily  lisinopril 20 milliGRAM(s) Oral daily  metFORMIN 1000 milliGRAM(s) Oral two times a day  pantoprazole    Tablet 40 milliGRAM(s) Oral before breakfast    MEDICATIONS  (PRN):  methocarbamol 750 milliGRAM(s) Oral every 8 hours PRN Muscle Spasm  oxycodone    5 mG/acetaminophen 325 mG 1 Tablet(s) Oral every 6 hours PRN Mild Pain (1 - 3)             HPI: 78 y.o.Male with hx of HTN, HLD, DM2, CAD s/p CABG, chronic back pain with multiple sx and history of laminectomy and fusion, currently presented to ED c/o worsening neck pain. described as dull throbbing pain 8/10, worse with movement, with radiation down his back and to b/l shoulder blades. He also reports no trauma or injury. He states that after his back sx he went to rehab and then came home on 3/11/22, since then he has been using a wheelchair, bed bound and mostly transitioning and transfer with help from his wife, who is his primary care taker. His wife unfortunately sustained a fall and broke her arm and is now unable to care for the patient. Pt also admits to chronic low back pain unchanged from baseline. and intermittent LE numbness. Pt  follows closely with Dr. Sanches/ Tiarra/ Renee. Pt evaluated in the ed, placed in ED observation and pain mx optimized and pan scan completed. Neurosx consulted for further recommendations and pt evaluated by PT and recommended to go to [facility]. Pt was accepted to multiple facilities but patients daughter works at [employer] and family was adamant about having patient be discharged there. The facility will be unable to accept the patient until monday. ED and sw requested admission for placement as the patient cannot leave until monday. Currently patient reports pain is controlled, Patient denies chest pain, SOB, abd pain, N/V, fever, chills, dysuria or any other complaints. All remainder ROS negative.     Interval history: Patient is admitted to the floor with PT w/u.     Physical Exam:  Constitutional: NAD    Neuro  * Mental Status:  GCS 15:  E(4), V(5), M(6).  Awake, alert, oriented to conversation.  * Cranial Nerves: Cnii-Cnxii grossly intact. PERRL, EOMI, tongue midline, no gaze deviation  * Motor: RUE 5/5, LUE 5/5, RLE 5/5, LLE 5/5, no Julieth's   * Sensory: Sensation intact to light touch  * Reflexes: Not assessed  * Gait: Not assessed  * Back: mild c-spine & paraspinal tenderness, no LB tenderness well healed surgical incision in LB      Vital Signs Last 24 Hrs  T(C): 36.5 (04 Jun 2022 08:47), Max: 36.7 (03 Jun 2022 19:36)  T(F): 97.7 (04 Jun 2022 08:47), Max: 98 (03 Jun 2022 19:36)  HR: 66 (04 Jun 2022 08:47) (52 - 66)  BP: 144/74 (04 Jun 2022 08:47) (104/60 - 144/74)  BP(mean): --  RR: 18 (04 Jun 2022 08:47) (18 - 18)  SpO2: 91% (04 Jun 2022 08:47) (91% - 95%)    Labs & Radiology:                        12.7   6.86  )-----------( 254      ( 02 Jun 2022 17:59 )             38.3     139  |  99  |  14.4  ----------------------------<  96  4.0   |  25.0  |  0.53    Ca    8.5<L>      02 Jun 2022 17:59    TPro  6.6  /  Alb  3.7  /  TBili  0.4  /  DBili  x   /  AST  14  /  ALT  15  /  AlkPhos  76  06-02  LIVER FUNCTIONS - ( 02 Jun 2022 17:59 )  Alb: 3.7 g/dL / Pro: 6.6 g/dL / ALK PHOS: 76 U/L / ALT: 15 U/L / AST: 14 U/L / GGT: x           Neurosurgery Imaging:  MR Thoracic Spine No Cont (06.03.22 @ 19:17) >    IMPRESSION:  MR cervical spine: Degenerative changes as described. High-grade   multilevel central canal and neural foraminal stenosis without   significant interval change.    MR thoracicspine: There has been posterior decompression at T10 since   the prior MRI from 11/26/2021. There is hyperintense cord signal at   T10-T11 which likely represents myelomalacia. No high-grade central canal   stenosis throughout the thoracic spine.    MR lumbar spine: Postoperative and degenerative changes as described. At   T12-L1 there is moderate to severe central canal stenosis which is   stable. At L1-L2 there is moderate central canal stenosis which appears   mildly worse since the prior study.    MEDICATIONS  (STANDING):  amLODIPine   Tablet 5 milliGRAM(s) Oral daily  aspirin enteric coated 81 milliGRAM(s) Oral daily  ATENolol  Tablet 50 milliGRAM(s) Oral daily  DULoxetine 20 milliGRAM(s) Oral daily  gabapentin 300 milliGRAM(s) Oral three times a day  lidocaine   4% Patch 1 Patch Transdermal daily  lisinopril 20 milliGRAM(s) Oral daily  metFORMIN 1000 milliGRAM(s) Oral two times a day  pantoprazole    Tablet 40 milliGRAM(s) Oral before breakfast    MEDICATIONS  (PRN):  methocarbamol 750 milliGRAM(s) Oral every 8 hours PRN Muscle Spasm  oxycodone    5 mG/acetaminophen 325 mG 1 Tablet(s) Oral every 6 hours PRN Mild Pain (1 - 3)

## 2023-01-13 NOTE — PROGRESS NOTES
Spoke with Tanisha at [facility] who stated that patient was not  appropriate prior to hospitalization due to housing; however, if pt is discharged to stable housing ex [facility], they can provide services pending a new referral. Re with Sharron is oncall this weekend and aware of situation.

## 2023-01-13 NOTE — H&P
[facility] - Emergency Dept  Cardiology  History and Physical     Patient Name: Kendall Duff  MRN: [id number]  Admission Date: 1/13/2023  Code Status: Prior   Attending Provider: Carlito Bundy MD   Primary Care Physician: Primary Doctor No  Principal Problem:<principal problem not specified>    Patient information was obtained from patient, past medical records, and ER records.     Subjective:     Chief Complaint:  lifevest discharge    HPI:   Mr. Duff is a 56 y/o male, followed by Dr. Wright presented to ED today due to c/o defibrillator discharges x 2 yesterday. Reports episodes of chest pain and racing heart prior to defibrillations. Denies loss of consciousness. Lifevest interrogations reviewed, patient appeared to be in Afib with RVR  Afib with RVR. Lab significant for BNP 4766->7422, Troponin flat 0.089-0.094. CXR consistent with mild congestive changes. EKG revealing SR with 1st degree AVB and RBBB. He was treated with aspirin, lasix, and placed on heparin drip. He has been admitted to [facility] for CP/defibrillator discharge    PMH: CAD, ICMO, HTN, schizoaffective disorder, hep C, DVT, systolic & diastolic CHF, Lifevest  PSH: CABG, LHC  Family History: Mother - CA; Father - liver disease   Social History: Current every day smoker. Former Cocaine Use. Denies alcohol use.      Previous Cardiac Diagnostics:   Carotid US (1.6.23):  The right internal carotid artery demonstrated less than 50% stenosis.  The left internal carotid artery demonstrated less than 50% stenosis.  The bilateral vertebral arteries were patent with antegrade flow.     TTE (12.18.22):  Eccentric hypertrophy and severely decreased systolic function. The estimated ejection fraction is 20-25%.  Grade III left ventricular diastolic dysfunction.  Mild right ventricular enlargement with mildly reduced right ventricular systolic function.  Mild right atrial enlargement.  Moderate mitral regurgitation.  Severe tricuspid  regurgitation.  The estimated PA systolic pressure is 33 mmHg.     Echocardiogram (10.28.22):  The left ventricle is moderately enlarged with mild eccentric hypertrophy and severely decreased systolic function.  The estimated ejection fraction is 20%.  There is severe left ventricular global hypokinesis.  Grade II left ventricular diastolic dysfunction.  Normal right ventricular size with moderately reduced right ventricular systolic function.  Moderate mitral regurgitation.  Moderate tricuspid regurgitation.  There is mild pulmonary hypertension.  The estimated PA systolic pressure is 44 mmHg.  Elevated central venous pressure (15 mmHg).  Severe left atrial enlargement.     CABG x 4 (4/22):  LIMA-LAD, SVG-OM1, SVG-D1, SVG-PDA     Echocardiogram (6.16.22):  The left ventricle is normal in size w/ concentric hypertrophy and severely decreased systolic function.  The estimated EF is 25-30%.  There is severe left ventricular global hypokinesis.  Grade II left ventricular diastolic dysfunction.  Normal right ventricular size w/ mildly reduced right ventricular systolic function.  The estimated PA systolic pressure is 52 mmHg.  There is moderate pulmonary HTN.  Elevated CVP (15 mmHg).     RUE NIVA (5.10.22):  A deep vein thrombosis was identified in the right axillary and brachial veins. A superficial thrombosis was identified in the right basilic vein.     LHC (3.21.22):  100% LAD to 30-40% residual stenosis post PTCA.  Large diagonal system w/ severe stenosis.  CIRC - patent stent, OM1 patent, mild CIRC occluded  RCA - stents ISR 40-50%, distal 70-80%  EDP 12 EF 35-40% anterior HK        Past Medical History:   Diagnosis Date    Bipolar disorder, unspecified     Chronic hepatitis C     History of psychiatric hospitalization     Hx of psychiatric care     Hypertension     Bessie     Obesity, unspecified     Psychiatric problem     Schizoaffective disorder, bipolar type     Seizures     Stroke     Substance abuse      Therapy        Past Surgical History:   Procedure Laterality Date    CORONARY STENT PLACEMENT  08/14/2015    DEBRIDEMENT  04/17/2022    LEFT HEART CATHETERIZATION Left 08/13/2015    REPEAT CLOSURE OF STERNAL INCISION N/A 5/11/2022    Procedure: CLOSURE, STERNAL INCISION, REPEAT;  Surgeon: Adolfo Weiss MD;  Location: [facility] OR;  Service: Plastics;  Laterality: N/A;  STERNAL WOUND DEBRIDEMENT AND RECONSTRUCTION // MULTIPLE MUSCLE FLAPS // REQ 1400       Review of patient's allergies indicates:   Allergen Reactions    Depakote [divalproex]     Divalproex sodium Other (See Comments)    Lithium     Lithium analogues     Quetiapine Other (See Comments)     Pt states had seizures       No current facility-administered medications on file prior to encounter.     Current Outpatient Medications on File Prior to Encounter   Medication Sig    amiodarone (PACERONE) 200 MG Tab Take 1 tablet (200 mg total) by mouth once daily.    apixaban (ELIQUIS) 5 mg Tab Take 1 tablet (5 mg total) by mouth 2 (two) times daily.    ARIPiprazole (ABILIFY) 10 MG Tab Take 1 tablet (10 mg total) by mouth once daily.    aspirin 81 MG Chew Chew and swallow 1 tablet (81 mg total) by mouth once daily.    atorvastatin (LIPITOR) 80 MG tablet Take 1 tablet (80 mg total) by mouth once daily.    benztropine (COGENTIN) 1 MG tablet Take 1 mg by mouth 2 (two) times daily.    [START ON 1/15/2023] ergocalciferol (ERGOCALCIFEROL) 50,000 unit Cap Take 1 capsule (50,000 Units total) by mouth every 7 days. for 12 doses    famotidine (PEPCID) 20 MG tablet Take 1 tablet (20 mg total) by mouth 2 (two) times daily.    ferrous sulfate 325 (65 FE) MG EC tablet Take 1 tablet (325 mg total) by mouth once daily.    furosemide (LASIX) 40 MG tablet Take 1 tablet (40 mg total) by mouth once daily.    hydrOXYzine (ATARAX) 50 MG tablet Take 1 tablet (50 mg total) by mouth 2 (two) times a day. for 14 days    isosorbide mononitrate (IMDUR) 30 MG 24 hr tablet Take 1 tablet (30 mg  total) by mouth once daily.    levETIRAcetam (KEPPRA) 500 MG Tab Take 1 tablet (500 mg total) by mouth 2 (two) times daily.    levothyroxine (SYNTHROID) 25 MCG tablet Take 1 tablet (25 mcg total) by mouth before breakfast.    metoprolol tartrate (LOPRESSOR) 25 MG tablet Take 0.5 tablets (12.5 mg total) by mouth 2 (two) times daily.    OXcarbazepine (TRILEPTAL) 300 MG Tab Take 300 mg by mouth 3 (three) times daily.    sacubitriL-valsartan (ENTRESTO) 24-26 mg per tablet Take 1 tablet by mouth 2 (two) times daily.    traZODone (DESYREL) 100 MG tablet Take 1 tablet (100 mg total) by mouth every evening.    [DISCONTINUED] metoprolol succinate (TOPROL-XL) 25 MG 24 hr tablet Take 25 mg by mouth once daily.    [DISCONTINUED] pravastatin (PRAVACHOL) 40 MG tablet Take 1 tablet (40 mg total) by mouth every evening.     Family History       Problem Relation (Age of Onset)    Cancer Mother    Liver disease Father          Tobacco Use    Smoking status: Every Day     Types: Cigarettes    Smokeless tobacco: Never   Substance and Sexual Activity    Alcohol use: Never    Drug use: Not Currently     Types: Cocaine    Sexual activity: Not on file     Review of Systems   Constitutional: Negative.   Cardiovascular:  Positive for chest pain and irregular heartbeat. Negative for dyspnea on exertion, leg swelling, near-syncope and orthopnea.   Respiratory: Negative.     Skin: Negative.    Musculoskeletal: Negative.    Gastrointestinal: Negative.    Genitourinary: Negative.    Neurological: Negative.    Objective:     Vital Signs (Most Recent):  Temp: 98.4 °F (36.9 °C) (01/13/23 0754)  Pulse: 104 (01/13/23 0754)  Resp: 16 (01/13/23 0754)  BP: (!) 138/107 (01/13/23 0754)  SpO2: 96 % (01/13/23 0754)   Vital Signs (24h Range):  Temp:  [96.8 °F (36 °C)-98.4 °F (36.9 °C)] 98.4 °F (36.9 °C)  Pulse:  [] 104  Resp:  [16-30] 16  SpO2:  [91 %-100 %] 96 %  BP: (122-176)/() 138/107     Weight: 82.6 kg (182 lb)  Body mass index is 28.51  kg/m².    SpO2: 96 %       No intake or output data in the 24 hours ending 01/13/23 0853    Lines/Drains/Airways       Peripheral Intravenous Line  Duration                  Peripheral IV - Single Lumen 01/13/23 0756 18 G Anterior;Distal;Right Upper Arm <1 day                    Physical Exam  HENT:      Mouth/Throat:      Mouth: Mucous membranes are moist.   Cardiovascular:      Rate and Rhythm: Normal rate and regular rhythm.      Pulses: Normal pulses.      Heart sounds: Normal heart sounds.   Pulmonary:      Breath sounds: Normal breath sounds.   Abdominal:      Palpations: Abdomen is soft.   Musculoskeletal:         General: Normal range of motion.   Skin:     General: Skin is warm.      Capillary Refill: Capillary refill takes less than 2 seconds.   Neurological:      General: No focal deficit present.      Mental Status: He is alert.   Psychiatric:         Mood and Affect: Mood normal.       Significant Labs: BMP:   Recent Labs   Lab 01/12/23  1309 01/13/23  0345 01/13/23  0348    137  --    K 4.1 3.8  --    CO2 24 23  --    BUN 18.7 25.4  --    CREATININE 0.95 1.29*  --    CALCIUM 8.3* 8.4  --    MG 2.00  --  1.90   , CBC   Recent Labs   Lab 01/12/23  1309 01/13/23  0345   WBC 9.3 8.7   HGB 10.7* 10.7*   HCT 34.5* 34.7*    237   , and Troponin   Recent Labs   Lab 01/12/23  1309 01/13/23  0345   TROPONINI 0.089* 0.094*       Significant Imaging:   XR CHEST 1 VIEW     CLINICAL HISTORY:  Chest pain, unspecified     TECHNIQUE:  One view     COMPARISON:  January 3, 2023.     FINDINGS:  Cardiopericardial silhouette enlargement similar.  Lungs hypoventilatory changes versus mild congestive process.  There is no focally dense consolidation or fluid accumulation within the pleural spaces.  Multiple overlying devices.     Impression:     Suspected mild congestive changes.  Assessment and Plan:   ICMO     - Zoll Life Vest     - defibrillated x 2 due to Afib RVR  NSTEMI - suspect type 2 in the setting of  CHF exacerbation and defibrillator discharge  CAD/CABG (April 2022)    - LIMA-LAD, SVG-OM1, SVG-D1, SVG-PDA  PAFib  -- ZOXYP2BQAa: 5 (LVSD/HTN/TE/NSTEMI)    - On Eliquis  Acute on Chronic Combined Systolic/Diastolic HF (Appears Compensated)    - Grade III DD    - EF 20-25% (ECHO 12.18.22)  VHD    - Moderate MR and Severe TR  Hypertension- Above Goal  Pulmonary Hypertension  Chronic Anemia - Stable  History of RUE DVT (5.10.22)    - DVT in right axillary and brachial veins. A superficial thrombosis was identified in the right basilic vein.  Schizoaffective Disorder  Hx of Hep C  Hx of Cocaine Abuse   Nicotine Dependence (Tobacco Use)  Medical noncompliance    Plan:  Resume amiodarone 200 mg daily and metoprolol tartrate 12.5 mg bid  DC heparin. Resume eliquis 5 mg bid  Appears compensated. Resume lasix 20 mg daily, Metoprolol 12.5 mg bid, and entresto 24-26 mg bid.  Obtain UDS      VTE Risk Mitigation (From admission, onward)           Ordered     heparin 25,000 units in dextrose 5% (100 units/ml) IV bolus from bag - ADDITIONAL PRN BOLUS - 60 units/kg (max bolus 4000 units)  As needed (PRN)        Question:  Heparin Infusion Adjustment (DO NOT MODIFY ANSWER)  Answer:  \\[email or web address]\epic\Images\Pharmacy\HeparinInfusions\heparin LOW INTENSITY nomogram for [facility] [id number].pdf    01/13/23 0457     heparin 25,000 units in dextrose 5% (100 units/ml) IV bolus from bag - ADDITIONAL PRN BOLUS - 30 units/kg (max bolus 4000 units)  As needed (PRN)        Question:  Heparin Infusion Adjustment (DO NOT MODIFY ANSWER)  Answer:  \\[email or web address]\epic\Images\Pharmacy\HeparinInfusions\heparin LOW INTENSITY nomogram for [facility] [id number].pdf    01/13/23 0457     heparin 25,000 units in dextrose 5% 250 mL (100 units/mL) infusion LOW INTENSITY nomogram - [facility]  Continuous        Question Answer Comment   Begin at (in units/kg/hr) 12    Heparin Infusion Adjustment (DO NOT MODIFY ANSWER) \\[email or web address]\epic\Images\Pharmacy\HeparinInfusions\heparin LOW  INTENSITY nomogram for [facility] [id number].pdf        01/13/23 0457                    RAY Patel  Cardiology  [facility] - Emergency Dept  01/13/2023 8:53 AM

## 2023-01-13 NOTE — Clinical Note
Diagnosis: NSTEMI (non-ST elevated myocardial infarction) [940599]   Future Attending Provider: KRISTA MENJIVAR [[id number]]   Admitting Provider:: KRISTA MENJIVAR [[id number]]
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
## 2023-01-13 NOTE — ED PROVIDER NOTES
Encounter Date: 1/13/2023    SCRIBE #1 NOTE: I, Salbador Reyna, am scribing for, and in the presence of,  Melody Mcelroy MD. I have scribed the following portions of the note - Other sections scribed: HPI, ROS, PE.     History     Chief Complaint   Patient presents with    Chest Pain     Intermittent cp since yesterday after getting shocked by external defibrillator.  Denies cp on arrival with aasi.      57 year old male with past medical history of hepatitis C, CAD, stroke, psychiatric hospitalization secondary to multiple psychiatric problems, and CABG presents to ED c/o defibrillator shock onset earlier to night. Pt reports that he was walking when shock occurred. Pt states that he was shocked by his defibrillator vest yesterday morning.  Pt reports he has been wearing Lifevest since april after CABG.  Pt states that he had chest pain earlier yesterday with SOB but states that he has none currently. Pt denies nausea, diaphoresis, cough and fever.     The history is provided by the patient and medical records. No  was used.   Chest Pain  The current episode started yesterday. Chest pain occurs intermittently. The chest pain is resolved. The pain is associated with exertion. Pertinent negatives for primary symptoms include no fever, no shortness of breath, no cough, no wheezing, no palpitations, no abdominal pain, no nausea, no vomiting and no dizziness.   Pertinent negatives for associated symptoms include no diaphoresis and no numbness.   His past medical history is significant for CAD and hypertension.   Review of patient's allergies indicates:   Allergen Reactions    Depakote [divalproex]     Divalproex sodium Other (See Comments)    Lithium     Lithium analogues     Quetiapine Other (See Comments)     Pt states had seizures     Past Medical History:   Diagnosis Date    Bipolar disorder, unspecified     Chronic hepatitis C     History of psychiatric hospitalization     Hx of  psychiatric care     Hypertension     Bessie     Obesity, unspecified     Psychiatric problem     Schizoaffective disorder, bipolar type     Seizures     Stroke     Substance abuse     Therapy      Past Surgical History:   Procedure Laterality Date    CORONARY STENT PLACEMENT  08/14/2015    DEBRIDEMENT  04/17/2022    LEFT HEART CATHETERIZATION Left 08/13/2015    REPEAT CLOSURE OF STERNAL INCISION N/A 5/11/2022    Procedure: CLOSURE, STERNAL INCISION, REPEAT;  Surgeon: Adolfo Weiss MD;  Location: [facility] OR;  Service: Plastics;  Laterality: N/A;  STERNAL WOUND DEBRIDEMENT AND RECONSTRUCTION // MULTIPLE MUSCLE FLAPS // REQ 1400     Family History   Problem Relation Age of Onset    Cancer Mother     Liver disease Father      Social History     Tobacco Use    Smoking status: Every Day     Types: Cigarettes    Smokeless tobacco: Never   Substance Use Topics    Alcohol use: Never    Drug use: Not Currently     Types: Cocaine     Review of Systems   Constitutional:  Negative for chills, diaphoresis and fever.   HENT:  Negative for congestion, ear pain, sinus pain and sore throat.    Eyes:  Negative for pain, discharge and visual disturbance.   Respiratory:  Negative for cough, shortness of breath, wheezing and stridor.    Cardiovascular:  Negative for chest pain and palpitations.   Gastrointestinal:  Negative for abdominal pain, constipation, diarrhea, nausea, rectal pain and vomiting.   Genitourinary:  Negative for dysuria and hematuria.   Musculoskeletal:  Negative for back pain and myalgias.   Skin:  Negative for rash.   Neurological:  Negative for dizziness, syncope, numbness and headaches.   Hematological: Negative.    Psychiatric/Behavioral: Negative.     All other systems reviewed and are negative.    Physical Exam     Initial Vitals [01/13/23 0316]   BP Pulse Resp Temp SpO2   (!) 176/119 98 18 96.8 °F (36 °C) 98 %      MAP       --         Physical Exam    Nursing note and vitals reviewed.  Constitutional: He appears  well-developed and well-nourished. He is not diaphoretic. No distress.   No blue dye on Pt   HENT:   Head: Normocephalic and atraumatic.   Nose: Nose normal.   Mouth/Throat: Oropharynx is clear and moist.   Poor dentition   Eyes: Conjunctivae and EOM are normal. Pupils are equal, round, and reactive to light.   Neck: Trachea normal. Neck supple.   Normal range of motion.  Cardiovascular:  Regular rhythm, normal heart sounds and intact distal pulses.   Tachycardia present.   Exam reveals no gallop and no friction rub.       No murmur heard.  Slightly tachycardic   Pulmonary/Chest: Breath sounds normal. No respiratory distress. He has no wheezes. He has no rhonchi. He has no rales. He exhibits no tenderness.   Well healed midline sternotomy scar   Abdominal: Abdomen is soft. Bowel sounds are normal. He exhibits no distension and no mass. There is no abdominal tenderness. There is no rebound.   Musculoskeletal:         General: No tenderness or edema. Normal range of motion.      Cervical back: Normal range of motion and neck supple.      Lumbar back: Normal. No tenderness. Normal range of motion.     Neurological: He is alert and oriented to person, place, and time. He has normal strength. No cranial nerve deficit or sensory deficit.   Skin: Skin is warm and dry. Capillary refill takes less than 2 seconds. No rash and no abscess noted. No erythema. No pallor.   Well healed midline sternotomy scar   Psychiatric: He has a normal mood and affect. His behavior is normal. Judgment and thought content normal.       ED Course   Critical Care    Date/Time: 1/13/2023 6:23 AM  Performed by: Melody Mcelroy MD  Authorized by: Melody Mcelroy MD   Direct patient critical care time: 15 minutes  Ordering / reviewing critical care time: 10 minutes  Documentation critical care time: 10 minutes  Consulting other physicians critical care time: 5 minutes  Total critical care time (exclusive of procedural time) : 40  minutes  Critical care was necessary to treat or prevent imminent or life-threatening deterioration of the following conditions: cardiac failure (nstemi).  Critical care was time spent personally by me on the following activities: development of treatment plan with patient or surrogate, discussions with consultants, evaluation of patient's response to treatment, examination of patient, obtaining history from patient or surrogate, ordering and performing treatments and interventions, ordering and review of laboratory studies, ordering and review of radiographic studies, review of old charts, re-evaluation of patient's condition and pulse oximetry.      Labs Reviewed   COMPREHENSIVE METABOLIC PANEL - Abnormal; Notable for the following components:       Result Value    Glucose Level 165 (*)     Creatinine 1.29 (*)     Protein Total 6.0 (*)     Albumin Level 3.1 (*)     All other components within normal limits   B-TYPE NATRIURETIC PEPTIDE - Abnormal; Notable for the following components:    Natriuretic Peptide 7,275.0 (*)     All other components within normal limits   TROPONIN I - Abnormal; Notable for the following components:    Troponin-I 0.094 (*)     All other components within normal limits   PROTIME-INR - Abnormal; Notable for the following components:    PT 17.3 (*)     INR 1.43 (*)     All other components within normal limits   CBC WITH DIFFERENTIAL - Abnormal; Notable for the following components:    Hgb 10.7 (*)     Hct 34.7 (*)     MCV 72.6 (*)     MCHC 30.8 (*)     RDW 22.7 (*)     All other components within normal limits   B-TYPE NATRIURETIC PEPTIDE - Abnormal; Notable for the following components:    Natriuretic Peptide 7,422.6 (*)     All other components within normal limits   APTT - Normal   MAGNESIUM - Normal   CBC W/ AUTO DIFFERENTIAL    Narrative:     The following orders were created for panel order CBC auto differential.  Procedure                               Abnormality         Status                      ---------                               -----------         ------                     CBC with Differential[[id number]]        Abnormal            Final result                 Please view results for these tests on the individual orders.          Imaging Results              X-Ray Chest PA And Lateral (In process)                 Medical Decision Making  Problems Addressed:  Acute systolic congestive heart failure: acute illness or injury that poses a threat to life or bodily functions  Benign essential HTN: chronic illness or injury with severe exacerbation, progression, or side effects of treatment  Chest pain: acute illness or injury that poses a threat to life or bodily functions  NSTEMI (non-ST elevated myocardial infarction): acute illness or injury that poses a threat to life or bodily functions  Uses LifeVest defibrillator: chronic illness or injury with severe exacerbation, progression, or side effects of treatment    Amount and/or Complexity of Data Reviewed  External Data Reviewed: labs and notes.  Labs: ordered.  Radiology: ordered and independent interpretation performed.  ECG/medicine tests: ordered and independent interpretation performed.    Risk  Prescription drug management.  Decision regarding hospitalization.        X-Rays:   Independently Interpreted Readings:   Chest X-Ray: Cardiomegaly present.  Increased vascular markings consistent with CHF are present.   Medications   heparin 25,000 units in dextrose 5% 250 mL (100 units/mL) infusion LOW INTENSITY nomogram - LAF (12 Units/kg/hr × 72.7 kg (Adjusted) Intravenous New Bag 1/13/23 0609)   heparin 25,000 units in dextrose 5% (100 units/ml) IV bolus from bag - ADDITIONAL PRN BOLUS - 60 units/kg (max bolus 4000 units) (has no administration in time range)   heparin 25,000 units in dextrose 5% (100 units/ml) IV bolus from bag - ADDITIONAL PRN BOLUS - 30 units/kg (max bolus 4000 units) (has no administration in time range)    nitroGLYCERIN SL tablet 0.4 mg (has no administration in time range)   aspirin EC tablet 325 mg (325 mg Oral Given 1/13/23 3781)   heparin 25,000 units in dextrose 5% (100 units/ml) IV bolus from bag INITIAL BOLUS (max bolus 4000 units) (4,000 Units Intravenous Bolus from Bag 1/13/23 0610)   furosemide injection 40 mg (40 mg Intravenous Given 1/13/23 0541)     Medical Decision Making:   History:   Old Medical Records: I decided to obtain old medical records.  Old Records Summarized: records from previous admission(s).       <> Summary of Records: Seen in the ED yesterday and had an elevated troponin but left AMA  Initial Assessment:   Chest pain, reported life vest discharge  Differential Diagnosis:   Dysrhythmia, ACS, CHF, anemia, hyper hypokalemia, renal dysfunction, pulmonary edema, pleural effusion, pneumonia, hypertension  Independently Interpreted Test(s):   I have ordered and independently interpreted X-rays - see prior notes.  I have ordered and independently interpreted EKG Reading(s) - see prior notes  Clinical Tests:   Lab Tests: Ordered and Reviewed  Radiological Study: Ordered and Reviewed  Medical Tests: Ordered and Reviewed  ED Management:  EKG ordered and reviewed, CBC, CMP, troponin, BNP ordered and reviewed notable for elevated BNP and elevated troponin compared to yesterday.  Remainder of labs are similar to previous.  He is an extensive history of coronary disease and had a CABG about a year ago.  Will attempt to interrogate the LifeVest however he does not have any blue dye therefore it is unclear if he truly had a firing.  Given aspirin and started on heparin given chest pain, shortness of breath and sense of impending doom.  Admit to cardiology  Other:   I have discussed this case with another health care provider.        Scribe Attestation:   Scribe #1: I performed the above scribed service and the documentation accurately describes the services I performed. I attest to the accuracy of the  note.  Comments: Attending:   Physician Attestation Statement for Scribe #1: IMelody MD, personally performed the services described in this documentation. All medical record entries made by the scribe were at my direction and in my presence.  I have reviewed the chart and agree that the record reflects my personal performance and is accurate and complete.        Attending Attestation:           Physician Attestation for Scribe:  Physician Attestation Statement for Scribe #1: IMelody MD, reviewed documentation, as scribed by HPI, ROS, PE in my presence, and it is both accurate and complete.           ED Course as of 01/13/23 0723 Fri Jan 13, 2023   0410 Ekg 318 rate 100 sinus rhythm with first degree av block and frequent pvcs, lad, rbbb, anterior and septal t wave abnoramlity [BS]   0457 Discussed with dakota, hold noac, start heparin, trend trop. Would need to call zoll to see if interriogation possible [BS]      ED Course User Index  [BS] Melody Mcelroy MD                 Clinical Impression:   Final diagnoses:  [R07.9] Chest pain  [I21.4] NSTEMI (non-ST elevated myocardial infarction)  [I10] Benign essential HTN (Primary)  [I50.21] Acute systolic congestive heart failure  [Z95.810] Uses LifeVest defibrillator        ED Disposition Condition    Observation Stable                Melody Mcelroy MD  01/13/23 0756

## 2023-01-14 LAB — TROPONIN I SERPL-MCNC: 0.08 NG/ML (ref 0–0.04)

## 2023-01-14 PROCEDURE — 25000003 PHARM REV CODE 250: Performed by: NURSE PRACTITIONER

## 2023-01-14 PROCEDURE — G0378 HOSPITAL OBSERVATION PER HR: HCPCS

## 2023-01-14 PROCEDURE — 96365 THER/PROPH/DIAG IV INF INIT: CPT | Mod: 59

## 2023-01-14 PROCEDURE — 96366 THER/PROPH/DIAG IV INF ADDON: CPT

## 2023-01-14 RX ORDER — METOPROLOL TARTRATE 25 MG/1
25 TABLET, FILM COATED ORAL 2 TIMES DAILY
Qty: 60 TABLET | Refills: 11 | Status: SHIPPED | OUTPATIENT
Start: 2023-01-14 | End: 2024-01-14

## 2023-01-14 RX ORDER — AMIODARONE HYDROCHLORIDE 200 MG/1
400 TABLET ORAL 2 TIMES DAILY
Status: DISCONTINUED | OUTPATIENT
Start: 2023-01-14 | End: 2023-01-17 | Stop reason: HOSPADM

## 2023-01-14 RX ORDER — METOPROLOL TARTRATE 25 MG/1
25 TABLET, FILM COATED ORAL 2 TIMES DAILY
Status: DISCONTINUED | OUTPATIENT
Start: 2023-01-14 | End: 2023-01-17 | Stop reason: HOSPADM

## 2023-01-14 RX ORDER — HYDROXYZINE HYDROCHLORIDE 50 MG/1
50 TABLET, FILM COATED ORAL 2 TIMES DAILY
Status: DISCONTINUED | OUTPATIENT
Start: 2023-01-14 | End: 2023-01-17 | Stop reason: HOSPADM

## 2023-01-14 RX ORDER — AMIODARONE HYDROCHLORIDE 200 MG/1
TABLET ORAL
Qty: 444 TABLET | Refills: 0 | Status: SHIPPED | OUTPATIENT
Start: 2023-01-14 | End: 2024-02-11

## 2023-01-14 RX ORDER — TRAZODONE HYDROCHLORIDE 100 MG/1
100 TABLET ORAL NIGHTLY
Status: DISCONTINUED | OUTPATIENT
Start: 2023-01-14 | End: 2023-01-17 | Stop reason: HOSPADM

## 2023-01-14 RX ORDER — OXCARBAZEPINE 300 MG/1
300 TABLET, FILM COATED ORAL 2 TIMES DAILY
Status: DISCONTINUED | OUTPATIENT
Start: 2023-01-14 | End: 2023-01-17 | Stop reason: HOSPADM

## 2023-01-14 RX ADMIN — APIXABAN 5 MG: 5 TABLET, FILM COATED ORAL at 08:01

## 2023-01-14 RX ADMIN — ATORVASTATIN CALCIUM 80 MG: 40 TABLET, FILM COATED ORAL at 09:01

## 2023-01-14 RX ADMIN — OXCARBAZEPINE 300 MG: 300 TABLET, FILM COATED ORAL at 08:01

## 2023-01-14 RX ADMIN — FAMOTIDINE 20 MG: 20 TABLET, FILM COATED ORAL at 09:01

## 2023-01-14 RX ADMIN — FAMOTIDINE 20 MG: 20 TABLET, FILM COATED ORAL at 08:01

## 2023-01-14 RX ADMIN — LEVOTHYROXINE SODIUM 25 MCG: 25 TABLET ORAL at 09:01

## 2023-01-14 RX ADMIN — AMIODARONE HYDROCHLORIDE 400 MG: 200 TABLET ORAL at 11:01

## 2023-01-14 RX ADMIN — TRAZODONE HYDROCHLORIDE 100 MG: 100 TABLET ORAL at 08:01

## 2023-01-14 RX ADMIN — FUROSEMIDE 40 MG: 40 TABLET ORAL at 09:01

## 2023-01-14 RX ADMIN — HYDROXYZINE HYDROCHLORIDE 50 MG: 50 TABLET ORAL at 08:01

## 2023-01-14 RX ADMIN — FERROUS SULFATE TAB 325 MG (65 MG ELEMENTAL FE) 1 EACH: 325 (65 FE) TAB at 09:01

## 2023-01-14 RX ADMIN — AMIODARONE HYDROCHLORIDE 400 MG: 200 TABLET ORAL at 08:01

## 2023-01-14 RX ADMIN — SACUBITRIL AND VALSARTAN 1 TABLET: 24; 26 TABLET, FILM COATED ORAL at 09:01

## 2023-01-14 RX ADMIN — SACUBITRIL AND VALSARTAN 1 TABLET: 24; 26 TABLET, FILM COATED ORAL at 08:01

## 2023-01-14 RX ADMIN — LEVETIRACETAM 500 MG: 500 TABLET, FILM COATED ORAL at 08:01

## 2023-01-14 RX ADMIN — ARIPIPRAZOLE 10 MG: 5 TABLET ORAL at 09:01

## 2023-01-14 RX ADMIN — ISOSORBIDE MONONITRATE 30 MG: 30 TABLET, EXTENDED RELEASE ORAL at 09:01

## 2023-01-14 RX ADMIN — APIXABAN 5 MG: 5 TABLET, FILM COATED ORAL at 09:01

## 2023-01-14 RX ADMIN — METOPROLOL TARTRATE 12.5 MG: 25 TABLET, FILM COATED ORAL at 09:01

## 2023-01-14 RX ADMIN — LEVETIRACETAM 500 MG: 500 TABLET, FILM COATED ORAL at 09:01

## 2023-01-14 RX ADMIN — METOPROLOL TARTRATE 25 MG: 25 TABLET, FILM COATED ORAL at 08:01

## 2023-01-14 NOTE — PLAN OF CARE
Problem: Violence Risk or Actual  Goal: Anger and Impulse Control  Outcome: Ongoing, Progressing     Problem: Adult Inpatient Plan of Care  Goal: Plan of Care Review  Outcome: Ongoing, Progressing  Goal: Patient-Specific Goal (Individualized)  Outcome: Ongoing, Progressing  Goal: Absence of Hospital-Acquired Illness or Injury  Outcome: Ongoing, Progressing  Goal: Optimal Comfort and Wellbeing  Outcome: Ongoing, Progressing  Goal: Readiness for Transition of Care  Outcome: Ongoing, Progressing     Problem: Adjustment to Illness (Delirium)  Goal: Optimal Coping  Outcome: Ongoing, Progressing     Problem: Altered Behavior (Delirium)  Goal: Improved Behavioral Control  Outcome: Ongoing, Progressing     Problem: Attention and Thought Clarity Impairment (Delirium)  Goal: Improved Attention and Thought Clarity  Outcome: Ongoing, Progressing  Intervention: Maximize Cognitive Function  Flowsheets (Taken 1/14/2023 0502)  Reorientation Measures:   clock in view   reorientation provided  Sensory Stimulation Regulation: quiet environment promoted     Problem: Sleep Disturbance (Delirium)  Goal: Improved Sleep  Outcome: Ongoing, Progressing  Intervention: Promote Sleep  Flowsheets (Taken 1/14/2023 0502)  Sleep/Rest Enhancement:   regular sleep/rest pattern promoted   room darkened   awakenings minimized

## 2023-01-14 NOTE — PLAN OF CARE
Nurse informed me that MD wants to d/c pt.  Per nurse she has called [facility] in [place], which the ED  Mary, called yesterday and the number is disconnected, as per note from COOPER Hernandez it was disconnected when she attempted to call.   This is the place where [facility] is trying to get pt to point and is an APS case.  He has no money for food or medication and no place to stay.  Unsafe to go back to fly, per COOPER note in ED on yesterday.  Pt should remain IP until COOPER can follow up Monday and confirm safe place for pt to stay and work on obtaining d/c meds, etc, since pt has no money.  [facility] is closed this weekend.   Final arrangements for d/c are not complete at d/c.   Informed nurse to let MD know this.

## 2023-01-14 NOTE — DISCHARGE SUMMARY
[facility] - 6th Floor Medical Telemetry  Cardiology  Discharge Summary      Patient Name: Kendall Duff  MRN: [id number]  Admission Date: 1/13/2023  Hospital Length of Stay: 0 days  Discharge Date and Time:  01/14/2023 11:09 AM  Attending Physician: Carlito Bundy MD    Discharging Provider: RAY Patel  Primary Care Physician: Primary Doctor No    HPI:   Mr. Duff is a 56 y/o male, followed by Dr. Wright presented to ED today due to c/o defibrillator discharges x 2 yesterday. Reports episodes of chest pain and racing heart prior to defibrillations. Denies loss of consciousness. Lifevest interrogations reviewed, patient appeared to be in Afib with RVR  Afib with RVR. Lab significant for BNP 4766->7422, Troponin flat 0.089-0.094. CXR consistent with mild congestive changes. EKG revealing SR with 1st degree AVB and RBBB. He was treated with aspirin, lasix, and placed on heparin drip. He has been admitted to [facility] for CP/defibrillator discharge  1.14.23: Patient has completed amiodarone load per protocol. Currently SR with PACS. BP Stable. Patient counseled on need to take medications as prescribed to prevent life vest discharge.       Physical Exam   Cardiovascular: Normal rate, normal heart sounds and normal pulses. An irregular rhythm present.   Life vest in place Pulmonary:      Breath sounds: Normal breath sounds.     Abdominal: Soft.   Neurological: He is alert.   Skin: Skin is warm. Capillary refill takes less than 2 seconds.        Indwelling Lines/Drains at time of discharge:  Lines/Drains/Airways     None                 Significant Diagnostic Studies: Labs:   BMP:   Recent Labs   Lab 01/12/23  1309 01/13/23  0345 01/13/23  0348    137  --    K 4.1 3.8  --    CO2 24 23  --    BUN 18.7 25.4  --    CREATININE 0.95 1.29*  --    CALCIUM 8.3* 8.4  --    MG 2.00  --  1.90    and CBC   Recent Labs   Lab 01/12/23  1309 01/13/23  0345   WBC 9.3 8.7   HGB 10.7* 10.7*   HCT 34.5* 34.7*     237     Assessment and Plan:   ICMO     - [facility] Life Vest     - defibrillated x 2 due to Afib RVR  NSTEMI - suspect type 2 in the setting of CHF exacerbation and defibrillator discharge  CAD/CABG (April 2022)    - LIMA-LAD, SVG-OM1, SVG-D1, SVG-PDA  PAFib  -- [id number]: 5 (LVSD/HTN/TE/NSTEMI)    - On Eliquis  Acute on Chronic Combined Systolic/Diastolic HF (Appears Compensated)    - Grade III DD    - EF 20-25% (ECHO 12.18.22)  VHD    - Moderate MR and Severe TR  Hypertension- Above Goal  Pulmonary Hypertension  Chronic Anemia - Stable  History of RUE DVT (5.10.22)    - DVT in right axillary and brachial veins. A superficial thrombosis was identified in the right basilic vein.  Schizoaffective Disorder  Hx of Hep C  Hx of Cocaine Abuse   Nicotine Dependence (Tobacco Use)  Medical noncompliance   UDS negative      Plan:  DC amiodarone drip. Start amiodarone 400 mg bid x 2 weeks then 200 mg bid x 2 weeks then 200 mg daily. Increase Metoprolol to 25 mg bid.   Continue eliquis 5 mg bid  Appears compensated. Resume lasix 20 mg daily, Metoprolol 25 mg bid, and entresto 24-26 mg bid.    DC home.    Discharged Condition: stable    Disposition: Home or Self Care    Follow Up:   Follow-up Information     [facility] Follow up.    Why: [facility] cardiology in 5-7 days  Contact information:  [facility] [address] [phone]                       Patient Instructions:      Diet Cardiac     Notify your health care provider if you experience any of the following:  severe uncontrolled pain     Notify your health care provider if you experience any of the following:   Order Comments: Racing heart or life vest shock     Activity as tolerated     Medications:  Reconciled Home Medications:      Medication List      CHANGE how you take these medications    amiodarone 200 MG Tab  Commonly known as: PACERONE  Take 2 tablets (400 mg total) by mouth 2 (two) times daily for 14 days, THEN 1 tablet (200 mg total)  2 (two) times daily for 14 days, THEN 1 tablet (200 mg total) once daily.  Start taking on: January 14, 2023  What changed: See the new instructions.     metoprolol tartrate 25 MG tablet  Commonly known as: LOPRESSOR  Take 1 tablet (25 mg total) by mouth 2 (two) times daily.  What changed: how much to take        CONTINUE taking these medications    apixaban 5 mg Tab  Commonly known as: ELIQUIS  Take 1 tablet (5 mg total) by mouth 2 (two) times daily.     ARIPiprazole 10 MG Tab  Commonly known as: ABILIFY  Take 1 tablet (10 mg total) by mouth once daily.     aspirin 81 MG Chew  Chew and swallow 1 tablet (81 mg total) by mouth once daily.     atorvastatin 80 MG tablet  Commonly known as: LIPITOR  Take 1 tablet (80 mg total) by mouth once daily.     benztropine 1 MG tablet  Commonly known as: COGENTIN  Take 1 mg by mouth 2 (two) times daily.     ergocalciferol 50,000 unit Cap  Commonly known as: ERGOCALCIFEROL  Take 1 capsule (50,000 Units total) by mouth every 7 days. for 12 doses  Start taking on: January 15, 2023     famotidine 20 MG tablet  Commonly known as: PEPCID  Take 1 tablet (20 mg total) by mouth 2 (two) times daily.     ferrous sulfate 325 (65 FE) MG EC tablet  Take 1 tablet (325 mg total) by mouth once daily.     furosemide 40 MG tablet  Commonly known as: LASIX  Take 1 tablet (40 mg total) by mouth once daily.     hydrOXYzine 50 MG tablet  Commonly known as: ATARAX  Take 1 tablet (50 mg total) by mouth 2 (two) times a day. for 14 days     isosorbide mononitrate 30 MG 24 hr tablet  Commonly known as: IMDUR  Take 1 tablet (30 mg total) by mouth once daily.     levETIRAcetam 500 MG Tab  Commonly known as: KEPPRA  Take 1 tablet (500 mg total) by mouth 2 (two) times daily.     levothyroxine 25 MCG tablet  Commonly known as: SYNTHROID  Take 1 tablet (25 mcg total) by mouth before breakfast.     OXcarbazepine 300 MG Tab  Commonly known as: TRILEPTAL  Take 300 mg by mouth 3 (three) times daily.      sacubitriL-valsartan 24-26 mg per tablet  Commonly known as: ENTRESTO  Take 1 tablet by mouth 2 (two) times daily.     traZODone 100 MG tablet  Commonly known as: DESYREL  Take 1 tablet (100 mg total) by mouth every evening.            Time spent on the discharge of patient: 35 minutes    RAY Patel  Cardiology  [facility] - 6th Floor Medical Telemetry

## 2023-01-14 NOTE — NURSING
Nurses Note -- 4 Eyes      1/13/23   22:00 PM      Skin assessed during: Admit      [x] No Pressure Injuries Present    [x]Prevention Measures Documented      [] Yes- Altered Skin Integrity Present or Discovered   [] LDA Added if Not in Epic (Describe Wound)   [] New Altered Skin Integrity was Present on Admit and Documented in LDA   [] Wound Image Taken    Wound Care Consulted? No    Attending Nurse:  Latoya Rollins RN     Second RN/Staff Member:  Dionicio Woods RN

## 2023-01-15 LAB
ANION GAP SERPL CALC-SCNC: 8 MEQ/L
BASOPHILS # BLD AUTO: 0.05 X10(3)/MCL (ref 0–0.2)
BASOPHILS NFR BLD AUTO: 0.6 %
BUN SERPL-MCNC: 16.3 MG/DL (ref 8.4–25.7)
CALCIUM SERPL-MCNC: 8.6 MG/DL (ref 8.4–10.2)
CHLORIDE SERPL-SCNC: 102 MMOL/L (ref 98–107)
CO2 SERPL-SCNC: 29 MMOL/L (ref 22–29)
CREAT SERPL-MCNC: 1.16 MG/DL (ref 0.73–1.18)
CREAT/UREA NIT SERPL: 14
EOSINOPHIL # BLD AUTO: 0.14 X10(3)/MCL (ref 0–0.9)
EOSINOPHIL NFR BLD AUTO: 1.8 %
ERYTHROCYTE [DISTWIDTH] IN BLOOD BY AUTOMATED COUNT: 23.2 % (ref 11.5–17)
GFR SERPLBLD CREATININE-BSD FMLA CKD-EPI: >60 MLS/MIN/1.73/M2
GLUCOSE SERPL-MCNC: 118 MG/DL (ref 74–100)
HCT VFR BLD AUTO: 36.3 % (ref 42–52)
HGB BLD-MCNC: 11.1 GM/DL (ref 14–18)
IMM GRANULOCYTES # BLD AUTO: 0.01 X10(3)/MCL (ref 0–0.04)
IMM GRANULOCYTES NFR BLD AUTO: 0.1 %
LYMPHOCYTES # BLD AUTO: 1.52 X10(3)/MCL (ref 0.6–4.6)
LYMPHOCYTES NFR BLD AUTO: 19.6 %
MCH RBC QN AUTO: 22.5 PG
MCHC RBC AUTO-ENTMCNC: 30.6 MG/DL (ref 33–36)
MCV RBC AUTO: 73.5 FL (ref 80–94)
MONOCYTES # BLD AUTO: 0.79 X10(3)/MCL (ref 0.1–1.3)
MONOCYTES NFR BLD AUTO: 10.2 %
NEUTROPHILS # BLD AUTO: 5.26 X10(3)/MCL (ref 2.1–9.2)
NEUTROPHILS NFR BLD AUTO: 67.7 %
NRBC BLD AUTO-RTO: 0 %
PLATELET # BLD AUTO: 264 X10(3)/MCL (ref 130–400)
PMV BLD AUTO: 9.3 FL (ref 7.4–10.4)
POTASSIUM SERPL-SCNC: 3.8 MMOL/L (ref 3.5–5.1)
RBC # BLD AUTO: 4.94 X10(6)/MCL (ref 4.7–6.1)
SODIUM SERPL-SCNC: 139 MMOL/L (ref 136–145)
WBC # SPEC AUTO: 7.8 X10(3)/MCL (ref 4.5–11.5)

## 2023-01-15 PROCEDURE — 80048 BASIC METABOLIC PNL TOTAL CA: CPT | Performed by: NURSE PRACTITIONER

## 2023-01-15 PROCEDURE — 25000003 PHARM REV CODE 250: Performed by: NURSE PRACTITIONER

## 2023-01-15 PROCEDURE — 36415 COLL VENOUS BLD VENIPUNCTURE: CPT | Performed by: NURSE PRACTITIONER

## 2023-01-15 PROCEDURE — 85025 COMPLETE CBC W/AUTO DIFF WBC: CPT | Performed by: NURSE PRACTITIONER

## 2023-01-15 PROCEDURE — G0378 HOSPITAL OBSERVATION PER HR: HCPCS

## 2023-01-15 RX ADMIN — AMIODARONE HYDROCHLORIDE 400 MG: 200 TABLET ORAL at 08:01

## 2023-01-15 RX ADMIN — HYDROXYZINE HYDROCHLORIDE 50 MG: 50 TABLET ORAL at 08:01

## 2023-01-15 RX ADMIN — LEVETIRACETAM 500 MG: 500 TABLET, FILM COATED ORAL at 08:01

## 2023-01-15 RX ADMIN — TRAZODONE HYDROCHLORIDE 100 MG: 100 TABLET ORAL at 08:01

## 2023-01-15 RX ADMIN — ATORVASTATIN CALCIUM 80 MG: 40 TABLET, FILM COATED ORAL at 08:01

## 2023-01-15 RX ADMIN — FUROSEMIDE 40 MG: 40 TABLET ORAL at 08:01

## 2023-01-15 RX ADMIN — ISOSORBIDE MONONITRATE 30 MG: 30 TABLET, EXTENDED RELEASE ORAL at 08:01

## 2023-01-15 RX ADMIN — ARIPIPRAZOLE 10 MG: 5 TABLET ORAL at 08:01

## 2023-01-15 RX ADMIN — METOPROLOL TARTRATE 25 MG: 25 TABLET, FILM COATED ORAL at 08:01

## 2023-01-15 RX ADMIN — APIXABAN 5 MG: 5 TABLET, FILM COATED ORAL at 08:01

## 2023-01-15 RX ADMIN — FAMOTIDINE 20 MG: 20 TABLET, FILM COATED ORAL at 08:01

## 2023-01-15 RX ADMIN — SACUBITRIL AND VALSARTAN 1 TABLET: 24; 26 TABLET, FILM COATED ORAL at 08:01

## 2023-01-15 RX ADMIN — OXCARBAZEPINE 300 MG: 300 TABLET, FILM COATED ORAL at 08:01

## 2023-01-15 RX ADMIN — FERROUS SULFATE TAB 325 MG (65 MG ELEMENTAL FE) 1 EACH: 325 (65 FE) TAB at 08:01

## 2023-01-15 NOTE — PROGRESS NOTES
[facility] -   Cardiology  Progress Note     Patient Name: Kendall Duff  MRN: [id number]  Admission Date: 1/13/2023  Code Status: Prior   Attending Provider: Carlito Bundy MD   Primary Care Physician: Primary Doctor No  Principal Problem:Atrial fibrillation    Patient information was obtained from patient, past medical records, and ER records.     Subjective:     Chief Complaint:  lifevest discharge    HPI:   Mr. Duff is a 56 y/o male, followed by Dr. Wright presented to ED today due to c/o defibrillator discharges x 2 yesterday. Reports episodes of chest pain and racing heart prior to defibrillations. Denies loss of consciousness. Lifevest interrogations reviewed, patient appeared to be in Afib with RVR  Afib with RVR. Lab significant for BNP 4766->7422, Troponin flat 0.089-0.094. CXR consistent with mild congestive changes. EKG revealing SR with 1st degree AVB and RBBB. He was treated with aspirin, lasix, and placed on heparin drip. He has been admitted to [facility] for CP/defibrillator discharge    01/15/23: Patient was to be discharged yesterday but  was unable to set up patient residency with [facility]; therefore discharge has been cancelled. VSS    PMH: CAD, ICMO, HTN, schizoaffective disorder, hep C, DVT, systolic & diastolic CHF, Lifevest  PSH: CABG, LHC  Family History: Mother - CA; Father - liver disease   Social History: Current every day smoker. Former Cocaine Use. Denies alcohol use.      Previous Cardiac Diagnostics:   Carotid US (1.6.23):  The right internal carotid artery demonstrated less than 50% stenosis.  The left internal carotid artery demonstrated less than 50% stenosis.  The bilateral vertebral arteries were patent with antegrade flow.     TTE (12.18.22):  Eccentric hypertrophy and severely decreased systolic function. The estimated ejection fraction is 20-25%.  Grade III left ventricular diastolic dysfunction.  Mild right ventricular enlargement with mildly reduced  right ventricular systolic function.  Mild right atrial enlargement.  Moderate mitral regurgitation.  Severe tricuspid regurgitation.  The estimated PA systolic pressure is 33 mmHg.     Echocardiogram (10.28.22):  The left ventricle is moderately enlarged with mild eccentric hypertrophy and severely decreased systolic function.  The estimated ejection fraction is 20%.  There is severe left ventricular global hypokinesis.  Grade II left ventricular diastolic dysfunction.  Normal right ventricular size with moderately reduced right ventricular systolic function.  Moderate mitral regurgitation.  Moderate tricuspid regurgitation.  There is mild pulmonary hypertension.  The estimated PA systolic pressure is 44 mmHg.  Elevated central venous pressure (15 mmHg).  Severe left atrial enlargement.     CABG x 4 (4/22):  LIMA-LAD, SVG-OM1, SVG-D1, SVG-PDA     Echocardiogram (6.16.22):  The left ventricle is normal in size w/ concentric hypertrophy and severely decreased systolic function.  The estimated EF is 25-30%.  There is severe left ventricular global hypokinesis.  Grade II left ventricular diastolic dysfunction.  Normal right ventricular size w/ mildly reduced right ventricular systolic function.  The estimated PA systolic pressure is 52 mmHg.  There is moderate pulmonary HTN.  Elevated CVP (15 mmHg).     RUE NIVA (5.10.22):  A deep vein thrombosis was identified in the right axillary and brachial veins. A superficial thrombosis was identified in the right basilic vein.     LHC (3.21.22):  100% LAD to 30-40% residual stenosis post PTCA.  Large diagonal system w/ severe stenosis.  CIRC - patent stent, OM1 patent, mild CIRC occluded  RCA - stents ISR 40-50%, distal 70-80%  EDP 12 EF 35-40% anterior HK      Review of patient's allergies indicates:   Allergen Reactions    Depakote [divalproex]     Divalproex sodium Other (See Comments)    Lithium     Lithium analogues     Quetiapine Other (See Comments)     Pt states had  seizures       No current facility-administered medications on file prior to encounter.     Current Outpatient Medications on File Prior to Encounter   Medication Sig    apixaban (ELIQUIS) 5 mg Tab Take 1 tablet (5 mg total) by mouth 2 (two) times daily.    ARIPiprazole (ABILIFY) 10 MG Tab Take 1 tablet (10 mg total) by mouth once daily.    aspirin 81 MG Chew Chew and swallow 1 tablet (81 mg total) by mouth once daily.    atorvastatin (LIPITOR) 80 MG tablet Take 1 tablet (80 mg total) by mouth once daily.    benztropine (COGENTIN) 1 MG tablet Take 1 mg by mouth 2 (two) times daily.    ergocalciferol (ERGOCALCIFEROL) 50,000 unit Cap Take 1 capsule (50,000 Units total) by mouth every 7 days. for 12 doses    famotidine (PEPCID) 20 MG tablet Take 1 tablet (20 mg total) by mouth 2 (two) times daily.    ferrous sulfate 325 (65 FE) MG EC tablet Take 1 tablet (325 mg total) by mouth once daily.    furosemide (LASIX) 40 MG tablet Take 1 tablet (40 mg total) by mouth once daily.    hydrOXYzine (ATARAX) 50 MG tablet Take 1 tablet (50 mg total) by mouth 2 (two) times a day. for 14 days    isosorbide mononitrate (IMDUR) 30 MG 24 hr tablet Take 1 tablet (30 mg total) by mouth once daily.    levETIRAcetam (KEPPRA) 500 MG Tab Take 1 tablet (500 mg total) by mouth 2 (two) times daily.    levothyroxine (SYNTHROID) 25 MCG tablet Take 1 tablet (25 mcg total) by mouth before breakfast.    OXcarbazepine (TRILEPTAL) 300 MG Tab Take 300 mg by mouth 3 (three) times daily.    sacubitriL-valsartan (ENTRESTO) 24-26 mg per tablet Take 1 tablet by mouth 2 (two) times daily.    traZODone (DESYREL) 100 MG tablet Take 1 tablet (100 mg total) by mouth every evening.    [DISCONTINUED] metoprolol succinate (TOPROL-XL) 25 MG 24 hr tablet Take 25 mg by mouth once daily.    [DISCONTINUED] pravastatin (PRAVACHOL) 40 MG tablet Take 1 tablet (40 mg total) by mouth every evening.       Review of Systems   Constitutional: Negative.   Cardiovascular:  Positive  for irregular heartbeat. Negative for chest pain, dyspnea on exertion, leg swelling, near-syncope and orthopnea.   Respiratory: Negative.     Skin: Negative.    Musculoskeletal: Negative.    Gastrointestinal: Negative.    Genitourinary: Negative.    Neurological: Negative.    Objective:     Vital Signs (Most Recent):  Temp: 97.3 °F (36.3 °C) (01/15/23 0832)  Pulse: 92 (01/15/23 0832)  Resp: 18 (01/15/23 0403)  BP: 121/88 (01/15/23 0832)  SpO2: 98 % (01/15/23 0832)   Vital Signs (24h Range):  Temp:  [97.3 °F (36.3 °C)-98.9 °F (37.2 °C)] 97.3 °F (36.3 °C)  Pulse:  [] 92  Resp:  [18-20] 18  SpO2:  [92 %-98 %] 98 %  BP: ()/(55-92) 121/88     Weight: 82.6 kg (182 lb)  Body mass index is 28.51 kg/m².    SpO2: 98 %         Intake/Output Summary (Last 24 hours) at 1/15/2023 1118  Last data filed at 1/15/2023 0536  Gross per 24 hour   Intake 1542 ml   Output --   Net 1542 ml       Lines/Drains/Airways       Peripheral Intravenous Line  Duration                  Peripheral IV - Single Lumen 01/13/23 0756 18 G Anterior;Distal;Right Upper Arm 2 days                    Physical Exam  HENT:      Mouth/Throat:      Mouth: Mucous membranes are moist.   Cardiovascular:      Rate and Rhythm: Normal rate and regular rhythm.      Pulses: Normal pulses.      Heart sounds: Normal heart sounds.   Pulmonary:      Breath sounds: Normal breath sounds.   Abdominal:      Palpations: Abdomen is soft.   Musculoskeletal:         General: Normal range of motion.   Skin:     General: Skin is warm.      Capillary Refill: Capillary refill takes less than 2 seconds.   Neurological:      General: No focal deficit present.      Mental Status: He is alert.   Psychiatric:         Mood and Affect: Mood normal.       Significant Labs: BMP:   No results for input(s): GLU, NA, K, CL, CO2, BUN, CREATININE, CALCIUM, MG in the last 48 hours.  , CBC   No results for input(s): WBC, HGB, HCT, PLT in the last 48 hours.  , and Troponin   Recent Labs   Lab  01/13/23  2335   TROPONINI 0.081*         Significant Imaging:   XR CHEST 1 VIEW     CLINICAL HISTORY:  Chest pain, unspecified     TECHNIQUE:  One view     COMPARISON:  January 3, 2023.     FINDINGS:  Cardiopericardial silhouette enlargement similar.  Lungs hypoventilatory changes versus mild congestive process.  There is no focally dense consolidation or fluid accumulation within the pleural spaces.  Multiple overlying devices.     Impression:     Suspected mild congestive changes.  Assessment and Plan:   ICMO     - Zoll Life Vest     - defibrillated x 2 due to Afib RVR  NSTEMI - suspect type 2 in the setting of CHF exacerbation and defibrillator discharge  CAD/CABG (April 2022)    - LIMA-LAD, SVG-OM1, SVG-D1, SVG-PDA  PAFib  -- [id number]: 5 (LVSD/HTN/TE/NSTEMI)    - On Eliquis  Acute on Chronic Combined Systolic/Diastolic HF (Appears Compensated)    - Grade III DD    - EF 20-25% (ECHO 12.18.22)  VHD    - Moderate MR and Severe TR  Hypertension- Above Goal  Pulmonary Hypertension  Chronic Anemia - Stable  History of RUE DVT (5.10.22)    - DVT in right axillary and brachial veins. A superficial thrombosis was identified in the right basilic vein.  Schizoaffective Disorder  Hx of Hep C  Hx of Cocaine Abuse   Nicotine Dependence (Tobacco Use)  Medical noncompliance  UDS negative       Plan:  Continue amiodarone 400 mg bid x 14 days then decrease to 200mg bid x 14 days then decrease to 200 mg daily thereafter. Continue metoprolol tartrate 25 mg bid  Continue eliquis 5 mg bid  Appears compensated. Continue lasix 20 mg daily, Metoprolol 25 mg bid, and entresto 24-26 mg bid.        VTE Risk Mitigation (From admission, onward)           Ordered     apixaban tablet 5 mg  2 times daily         01/13/23 1203                    RAY Patel  Cardiology  [facility] - Emergency Dept  01/15/2023 8:53 AM

## 2023-01-16 LAB
ANION GAP SERPL CALC-SCNC: 12 MEQ/L
BASOPHILS # BLD AUTO: 0.04 X10(3)/MCL (ref 0–0.2)
BASOPHILS NFR BLD AUTO: 0.6 %
BUN SERPL-MCNC: 19.6 MG/DL (ref 8.4–25.7)
CALCIUM SERPL-MCNC: 8.6 MG/DL (ref 8.4–10.2)
CHLORIDE SERPL-SCNC: 102 MMOL/L (ref 98–107)
CO2 SERPL-SCNC: 24 MMOL/L (ref 22–29)
CREAT SERPL-MCNC: 1.24 MG/DL (ref 0.73–1.18)
CREAT/UREA NIT SERPL: 16
EOSINOPHIL # BLD AUTO: 0.13 X10(3)/MCL (ref 0–0.9)
EOSINOPHIL NFR BLD AUTO: 1.8 %
ERYTHROCYTE [DISTWIDTH] IN BLOOD BY AUTOMATED COUNT: 23.4 % (ref 11.5–17)
GFR SERPLBLD CREATININE-BSD FMLA CKD-EPI: >60 MLS/MIN/1.73/M2
GLUCOSE SERPL-MCNC: 172 MG/DL (ref 74–100)
HCT VFR BLD AUTO: 37.9 % (ref 42–52)
HGB BLD-MCNC: 11.4 GM/DL (ref 14–18)
IMM GRANULOCYTES # BLD AUTO: 0.02 X10(3)/MCL (ref 0–0.04)
IMM GRANULOCYTES NFR BLD AUTO: 0.3 %
LYMPHOCYTES # BLD AUTO: 1.46 X10(3)/MCL (ref 0.6–4.6)
LYMPHOCYTES NFR BLD AUTO: 20.4 %
MCH RBC QN AUTO: 22.3 PG
MCHC RBC AUTO-ENTMCNC: 30.1 MG/DL (ref 33–36)
MCV RBC AUTO: 74.2 FL (ref 80–94)
MONOCYTES # BLD AUTO: 0.65 X10(3)/MCL (ref 0.1–1.3)
MONOCYTES NFR BLD AUTO: 9.1 %
NEUTROPHILS # BLD AUTO: 4.84 X10(3)/MCL (ref 2.1–9.2)
NEUTROPHILS NFR BLD AUTO: 67.8 %
NRBC BLD AUTO-RTO: 0 %
PLATELET # BLD AUTO: 259 X10(3)/MCL (ref 130–400)
PMV BLD AUTO: 9.9 FL (ref 7.4–10.4)
POTASSIUM SERPL-SCNC: 3.8 MMOL/L (ref 3.5–5.1)
RBC # BLD AUTO: 5.11 X10(6)/MCL (ref 4.7–6.1)
SODIUM SERPL-SCNC: 138 MMOL/L (ref 136–145)
WBC # SPEC AUTO: 7.1 X10(3)/MCL (ref 4.5–11.5)

## 2023-01-16 PROCEDURE — 25000003 PHARM REV CODE 250: Performed by: NURSE PRACTITIONER

## 2023-01-16 PROCEDURE — 80048 BASIC METABOLIC PNL TOTAL CA: CPT | Performed by: NURSE PRACTITIONER

## 2023-01-16 PROCEDURE — 85025 COMPLETE CBC W/AUTO DIFF WBC: CPT | Performed by: NURSE PRACTITIONER

## 2023-01-16 PROCEDURE — G0378 HOSPITAL OBSERVATION PER HR: HCPCS

## 2023-01-16 PROCEDURE — 36415 COLL VENOUS BLD VENIPUNCTURE: CPT | Performed by: NURSE PRACTITIONER

## 2023-01-16 RX ADMIN — FUROSEMIDE 40 MG: 40 TABLET ORAL at 09:01

## 2023-01-16 RX ADMIN — AMIODARONE HYDROCHLORIDE 400 MG: 200 TABLET ORAL at 09:01

## 2023-01-16 RX ADMIN — HYDROXYZINE HYDROCHLORIDE 50 MG: 50 TABLET ORAL at 08:01

## 2023-01-16 RX ADMIN — OXCARBAZEPINE 300 MG: 300 TABLET, FILM COATED ORAL at 09:01

## 2023-01-16 RX ADMIN — METOPROLOL TARTRATE 25 MG: 25 TABLET, FILM COATED ORAL at 08:01

## 2023-01-16 RX ADMIN — TRAZODONE HYDROCHLORIDE 100 MG: 100 TABLET ORAL at 08:01

## 2023-01-16 RX ADMIN — ARIPIPRAZOLE 10 MG: 5 TABLET ORAL at 09:01

## 2023-01-16 RX ADMIN — FAMOTIDINE 20 MG: 20 TABLET, FILM COATED ORAL at 08:01

## 2023-01-16 RX ADMIN — HYDROXYZINE HYDROCHLORIDE 50 MG: 50 TABLET ORAL at 09:01

## 2023-01-16 RX ADMIN — LEVOTHYROXINE SODIUM 25 MCG: 25 TABLET ORAL at 04:01

## 2023-01-16 RX ADMIN — APIXABAN 5 MG: 5 TABLET, FILM COATED ORAL at 08:01

## 2023-01-16 RX ADMIN — ISOSORBIDE MONONITRATE 30 MG: 30 TABLET, EXTENDED RELEASE ORAL at 09:01

## 2023-01-16 RX ADMIN — FAMOTIDINE 20 MG: 20 TABLET, FILM COATED ORAL at 09:01

## 2023-01-16 RX ADMIN — METOPROLOL TARTRATE 25 MG: 25 TABLET, FILM COATED ORAL at 09:01

## 2023-01-16 RX ADMIN — APIXABAN 5 MG: 5 TABLET, FILM COATED ORAL at 09:01

## 2023-01-16 RX ADMIN — SACUBITRIL AND VALSARTAN 1 TABLET: 24; 26 TABLET, FILM COATED ORAL at 08:01

## 2023-01-16 RX ADMIN — FERROUS SULFATE TAB 325 MG (65 MG ELEMENTAL FE) 1 EACH: 325 (65 FE) TAB at 09:01

## 2023-01-16 RX ADMIN — SACUBITRIL AND VALSARTAN 1 TABLET: 24; 26 TABLET, FILM COATED ORAL at 09:01

## 2023-01-16 RX ADMIN — LEVETIRACETAM 500 MG: 500 TABLET, FILM COATED ORAL at 08:01

## 2023-01-16 RX ADMIN — LEVETIRACETAM 500 MG: 500 TABLET, FILM COATED ORAL at 09:01

## 2023-01-16 RX ADMIN — OXCARBAZEPINE 300 MG: 300 TABLET, FILM COATED ORAL at 08:01

## 2023-01-16 RX ADMIN — AMIODARONE HYDROCHLORIDE 400 MG: 200 TABLET ORAL at 08:01

## 2023-01-16 RX ADMIN — ATORVASTATIN CALCIUM 80 MG: 40 TABLET, FILM COATED ORAL at 09:01

## 2023-01-16 NOTE — PROGRESS NOTES
[facility] -   Cardiology  Progress Note     Patient Name: Kendall Duff  MRN: [id number]  Admission Date: 1/13/2023  Code Status: Prior   Attending Provider: Carlito Bundy MD   Primary Care Physician: Primary Doctor No  Principal Problem:Atrial fibrillation    Patient information was obtained from patient, past medical records, and ER records.     Subjective:     Chief Complaint:  lifevest discharge    HPI:   Mr. Duff is a 58 y/o male, followed by Dr. Wright presented to ED today due to c/o defibrillator discharges x 2 yesterday. Reports episodes of chest pain and racing heart prior to defibrillations. Denies loss of consciousness. [facility] interrogations reviewed, patient appeared to be in Afib with RVR  Afib with RVR. Lab significant for BNP 4766->7422, Troponin flat 0.089-0.094. CXR consistent with mild congestive changes. EKG revealing SR with 1st degree AVB and RBBB. He was treated with aspirin, lasix, and placed on heparin drip. He has been admitted to [facility] for CP/defibrillator discharge    01/15/23: Patient was to be discharged yesterday but  was unable to set up patient residency with [facility]; therefore discharge has been cancelled. VSS  1/16/23: Patient sitting at side of bed. NAD. VSS. Currently Aflutter 70's      PMH: CAD, ICMO, HTN, schizoaffective disorder, hep C, DVT, systolic & diastolic CHF, Lifevest  PSH: CABG, LHC  Family History: Mother - CA; Father - liver disease   Social History: Current every day smoker. Former Cocaine Use. Denies alcohol use.      Previous Cardiac Diagnostics:   Carotid US (1.6.23):  The right internal carotid artery demonstrated less than 50% stenosis.  The left internal carotid artery demonstrated less than 50% stenosis.  The bilateral vertebral arteries were patent with antegrade flow.     TTE (12.18.22):  Eccentric hypertrophy and severely decreased systolic function. The estimated ejection fraction is 20-25%.  Grade III left ventricular  diastolic dysfunction.  Mild right ventricular enlargement with mildly reduced right ventricular systolic function.  Mild right atrial enlargement.  Moderate mitral regurgitation.  Severe tricuspid regurgitation.  The estimated PA systolic pressure is 33 mmHg.     Echocardiogram (10.28.22):  The left ventricle is moderately enlarged with mild eccentric hypertrophy and severely decreased systolic function.  The estimated ejection fraction is 20%.  There is severe left ventricular global hypokinesis.  Grade II left ventricular diastolic dysfunction.  Normal right ventricular size with moderately reduced right ventricular systolic function.  Moderate mitral regurgitation.  Moderate tricuspid regurgitation.  There is mild pulmonary hypertension.  The estimated PA systolic pressure is 44 mmHg.  Elevated central venous pressure (15 mmHg).  Severe left atrial enlargement.     CABG x 4 (4/22):  LIMA-LAD, SVG-OM1, SVG-D1, SVG-PDA     Echocardiogram (6.16.22):  The left ventricle is normal in size w/ concentric hypertrophy and severely decreased systolic function.  The estimated EF is 25-30%.  There is severe left ventricular global hypokinesis.  Grade II left ventricular diastolic dysfunction.  Normal right ventricular size w/ mildly reduced right ventricular systolic function.  The estimated PA systolic pressure is 52 mmHg.  There is moderate pulmonary HTN.  Elevated CVP (15 mmHg).     RUE NIVA (5.10.22):  A deep vein thrombosis was identified in the right axillary and brachial veins. A superficial thrombosis was identified in the right basilic vein.     LHC (3.21.22):  100% LAD to 30-40% residual stenosis post PTCA.  Large diagonal system w/ severe stenosis.  CIRC - patent stent, OM1 patent, mild CIRC occluded  RCA - stents ISR 40-50%, distal 70-80%  EDP 12 EF 35-40% anterior HK      Review of patient's allergies indicates:   Allergen Reactions    Depakote [divalproex]     Divalproex sodium Other (See Comments)    Lithium      Lithium analogues     Quetiapine Other (See Comments)     Pt states had seizures       No current facility-administered medications on file prior to encounter.     Current Outpatient Medications on File Prior to Encounter   Medication Sig    apixaban (ELIQUIS) 5 mg Tab Take 1 tablet (5 mg total) by mouth 2 (two) times daily.    ARIPiprazole (ABILIFY) 10 MG Tab Take 1 tablet (10 mg total) by mouth once daily.    aspirin 81 MG Chew Chew and swallow 1 tablet (81 mg total) by mouth once daily.    atorvastatin (LIPITOR) 80 MG tablet Take 1 tablet (80 mg total) by mouth once daily.    benztropine (COGENTIN) 1 MG tablet Take 1 mg by mouth 2 (two) times daily.    ergocalciferol (ERGOCALCIFEROL) 50,000 unit Cap Take 1 capsule (50,000 Units total) by mouth every 7 days. for 12 doses    famotidine (PEPCID) 20 MG tablet Take 1 tablet (20 mg total) by mouth 2 (two) times daily.    ferrous sulfate 325 (65 FE) MG EC tablet Take 1 tablet (325 mg total) by mouth once daily.    furosemide (LASIX) 40 MG tablet Take 1 tablet (40 mg total) by mouth once daily.    hydrOXYzine (ATARAX) 50 MG tablet Take 1 tablet (50 mg total) by mouth 2 (two) times a day. for 14 days    isosorbide mononitrate (IMDUR) 30 MG 24 hr tablet Take 1 tablet (30 mg total) by mouth once daily.    levETIRAcetam (KEPPRA) 500 MG Tab Take 1 tablet (500 mg total) by mouth 2 (two) times daily.    levothyroxine (SYNTHROID) 25 MCG tablet Take 1 tablet (25 mcg total) by mouth before breakfast.    OXcarbazepine (TRILEPTAL) 300 MG Tab Take 300 mg by mouth 3 (three) times daily.    sacubitriL-valsartan (ENTRESTO) 24-26 mg per tablet Take 1 tablet by mouth 2 (two) times daily.    traZODone (DESYREL) 100 MG tablet Take 1 tablet (100 mg total) by mouth every evening.    [DISCONTINUED] metoprolol succinate (TOPROL-XL) 25 MG 24 hr tablet Take 25 mg by mouth once daily.    [DISCONTINUED] pravastatin (PRAVACHOL) 40 MG tablet Take 1 tablet (40 mg total) by mouth every evening.        Review of Systems   Constitutional: Negative.   Cardiovascular:  Positive for irregular heartbeat. Negative for chest pain, dyspnea on exertion, leg swelling, near-syncope and orthopnea.   Respiratory: Negative.     Skin: Negative.    Musculoskeletal: Negative.    Gastrointestinal: Negative.    Genitourinary: Negative.    Neurological: Negative.    Objective:     Vital Signs (Most Recent):  Temp: 98.3 °F (36.8 °C) (01/16/23 0818)  Pulse: 93 (01/16/23 0818)  Resp: 18 (01/16/23 0355)  BP: (!) 129/93 (01/16/23 0818)  SpO2: 96 % (01/16/23 0818)   Vital Signs (24h Range):  Temp:  [97.4 °F (36.3 °C)-98.3 °F (36.8 °C)] 98.3 °F (36.8 °C)  Pulse:  [] 93  Resp:  [18] 18  SpO2:  [93 %-98 %] 96 %  BP: ()/(66-93) 129/93     Weight: 82.6 kg (182 lb)  Body mass index is 28.51 kg/m².    SpO2: 96 %         Intake/Output Summary (Last 24 hours) at 1/16/2023 1043  Last data filed at 1/16/2023 0614  Gross per 24 hour   Intake 1624 ml   Output --   Net 1624 ml         Lines/Drains/Airways       None                   Physical Exam  HENT:      Mouth/Throat:      Mouth: Mucous membranes are moist.   Cardiovascular:      Rate and Rhythm: Normal rate and regular rhythm.      Pulses: Normal pulses.      Heart sounds: Normal heart sounds.   Pulmonary:      Breath sounds: Normal breath sounds.   Abdominal:      Palpations: Abdomen is soft.   Musculoskeletal:         General: Normal range of motion.   Skin:     General: Skin is warm.      Capillary Refill: Capillary refill takes less than 2 seconds.   Neurological:      General: No focal deficit present.      Mental Status: He is alert.   Psychiatric:         Mood and Affect: Mood normal.       Significant Labs: BMP:   Recent Labs   Lab 01/15/23  1547 01/16/23 0442    138   K 3.8 3.8   CO2 29 24   BUN 16.3 19.6   CREATININE 1.16 1.24*   CALCIUM 8.6 8.6     , CBC   Recent Labs   Lab 01/15/23  1547 01/16/23 0442   WBC 7.8 7.1   HGB 11.1* 11.4*   HCT 36.3* 37.9*     259     , and Troponin   No results for input(s): TROPONINI in the last 48 hours.      Significant Imaging:   XR CHEST 1 VIEW     CLINICAL HISTORY:  Chest pain, unspecified     TECHNIQUE:  One view     COMPARISON:  January 3, 2023.     FINDINGS:  Cardiopericardial silhouette enlargement similar.  Lungs hypoventilatory changes versus mild congestive process.  There is no focally dense consolidation or fluid accumulation within the pleural spaces.  Multiple overlying devices.     Impression:     Suspected mild congestive changes.  Assessment and Plan:   ICMO     - Zoll Life Vest     - defibrillated x 2 due to Afib RVR  NSTEMI - suspect type 2 in the setting of CHF exacerbation and defibrillator discharge  CAD/CABG (April 2022)    - LIMA-LAD, SVG-OM1, SVG-D1, SVG-PDA  PAFib  -- [id number]: 5 (LVSD/HTN/TE/NSTEMI)    - On Eliquis  Acute on Chronic Combined Systolic/Diastolic HF (Appears Compensated)    - Grade III DD    - EF 20-25% (ECHO 12.18.22)  VHD    - Moderate MR and Severe TR  Hypertension- Above Goal  Pulmonary Hypertension  Chronic Anemia - Stable  History of RUE DVT (5.10.22)    - DVT in right axillary and brachial veins. A superficial thrombosis was identified in the right basilic vein.  Schizoaffective Disorder  Hx of Hep C  Hx of Cocaine Abuse   Nicotine Dependence (Tobacco Use)  Medical noncompliance  UDS negative       Plan:  Continue amiodarone 400 mg bid x 14 days then decrease to 200mg bid x 14 days then decrease to 200 mg daily thereafter. Continue metoprolol tartrate 25 mg bid  Continue eliquis 5 mg bid  Appears compensated. Continue lasix 20 mg daily, Metoprolol 25 mg bid, and entresto 24-26 mg bid.  Plans for discharge to group home once acceptance verified      VTE Risk Mitigation (From admission, onward)           Ordered     apixaban tablet 5 mg  2 times daily         01/13/23 1208                    RAY Paetl  Cardiology  [facility] - Emergency Dept  01/16/2023 8:53  AM

## 2023-01-17 VITALS
DIASTOLIC BLOOD PRESSURE: 97 MMHG | HEIGHT: 67 IN | TEMPERATURE: 98 F | RESPIRATION RATE: 20 BRPM | HEART RATE: 90 BPM | BODY MASS INDEX: 28.56 KG/M2 | WEIGHT: 182 LBS | SYSTOLIC BLOOD PRESSURE: 134 MMHG | OXYGEN SATURATION: 97 %

## 2023-01-17 PROCEDURE — 25000003 PHARM REV CODE 250: Performed by: NURSE PRACTITIONER

## 2023-01-17 PROCEDURE — G0378 HOSPITAL OBSERVATION PER HR: HCPCS

## 2023-01-17 RX ORDER — FAMOTIDINE 20 MG/1
20 TABLET, FILM COATED ORAL DAILY
Qty: 30 TABLET | Refills: 11 | Status: SHIPPED | OUTPATIENT
Start: 2023-01-17 | End: 2024-01-17

## 2023-01-17 RX ORDER — FUROSEMIDE 40 MG/1
40 TABLET ORAL DAILY
Qty: 30 TABLET | Refills: 11 | Status: SHIPPED | OUTPATIENT
Start: 2023-01-17 | End: 2023-03-18

## 2023-01-17 RX ORDER — LEVOTHYROXINE SODIUM 25 UG/1
25 TABLET ORAL
Qty: 30 TABLET | Refills: 11 | Status: SHIPPED | OUTPATIENT
Start: 2023-01-17 | End: 2023-02-16

## 2023-01-17 RX ORDER — OXCARBAZEPINE 300 MG/1
300 TABLET, FILM COATED ORAL 3 TIMES DAILY
Qty: 90 TABLET | Refills: 2 | Status: SHIPPED | OUTPATIENT
Start: 2023-01-17

## 2023-01-17 RX ORDER — TRAZODONE HYDROCHLORIDE 100 MG/1
100 TABLET ORAL NIGHTLY
Qty: 30 TABLET | Refills: 11 | Status: SHIPPED | OUTPATIENT
Start: 2023-01-17 | End: 2024-01-17

## 2023-01-17 RX ORDER — LEVETIRACETAM 500 MG/1
500 TABLET ORAL 2 TIMES DAILY
Qty: 60 TABLET | Refills: 11 | Status: SHIPPED | OUTPATIENT
Start: 2023-01-17 | End: 2024-01-17

## 2023-01-17 RX ORDER — ATORVASTATIN CALCIUM 80 MG/1
80 TABLET, FILM COATED ORAL DAILY
Qty: 90 TABLET | Refills: 3 | Status: SHIPPED | OUTPATIENT
Start: 2023-01-17 | End: 2024-01-17

## 2023-01-17 RX ORDER — ARIPIPRAZOLE 10 MG/1
10 TABLET ORAL DAILY
Qty: 30 TABLET | Refills: 11 | Status: SHIPPED | OUTPATIENT
Start: 2023-01-17 | End: 2024-01-17

## 2023-01-17 RX ADMIN — AMIODARONE HYDROCHLORIDE 400 MG: 200 TABLET ORAL at 09:01

## 2023-01-17 RX ADMIN — LEVETIRACETAM 500 MG: 500 TABLET, FILM COATED ORAL at 09:01

## 2023-01-17 RX ADMIN — HYDROXYZINE HYDROCHLORIDE 50 MG: 50 TABLET ORAL at 09:01

## 2023-01-17 RX ADMIN — ISOSORBIDE MONONITRATE 30 MG: 30 TABLET, EXTENDED RELEASE ORAL at 09:01

## 2023-01-17 RX ADMIN — ATORVASTATIN CALCIUM 80 MG: 40 TABLET, FILM COATED ORAL at 09:01

## 2023-01-17 RX ADMIN — APIXABAN 5 MG: 5 TABLET, FILM COATED ORAL at 09:01

## 2023-01-17 RX ADMIN — FERROUS SULFATE TAB 325 MG (65 MG ELEMENTAL FE) 1 EACH: 325 (65 FE) TAB at 09:01

## 2023-01-17 RX ADMIN — LEVOTHYROXINE SODIUM 25 MCG: 25 TABLET ORAL at 04:01

## 2023-01-17 RX ADMIN — ARIPIPRAZOLE 10 MG: 5 TABLET ORAL at 09:01

## 2023-01-17 RX ADMIN — SACUBITRIL AND VALSARTAN 1 TABLET: 24; 26 TABLET, FILM COATED ORAL at 09:01

## 2023-01-17 RX ADMIN — METOPROLOL TARTRATE 25 MG: 25 TABLET, FILM COATED ORAL at 09:01

## 2023-01-17 RX ADMIN — FUROSEMIDE 40 MG: 40 TABLET ORAL at 09:01

## 2023-01-17 RX ADMIN — OXCARBAZEPINE 300 MG: 300 TABLET, FILM COATED ORAL at 09:01

## 2023-01-17 RX ADMIN — FAMOTIDINE 20 MG: 20 TABLET, FILM COATED ORAL at 09:01

## 2023-01-17 NOTE — PLAN OF CARE
01/17/23 1230   Discharge Assessment   Assessment Type Discharge Planning Assessment   Source of Information patient   People in Home child(karo), adult   Do you expect to return to your current living situation? No   Equipment Currently Used at Home   (Lifevest)   Discharge Plan A Group Home   Discharge Plan B Group home   DME Needed Upon Discharge  none   Discharge Plan discussed with: Patient     Discharge [facility]. Referral sent to [facility].

## 2023-01-17 NOTE — PROGRESS NOTES
Tanisha with [facility] investigating [phone]. Provided Astrid CM number for updates as pt is admitted.

## 2023-01-17 NOTE — DISCHARGE SUMMARY
[facility] - 6th Floor Medical Telemetry  Cardiology  Discharge Summary      Patient Name: Kendall Duff  MRN: [id number]  Admission Date: 1/13/2023  Hospital Length of Stay: 0 days  Discharge Date and Time:  01/17/2023 10:21 AM  Attending Physician: Carlito Bundy MD    Discharging Provider: RAY Patel  Primary Care Physician: Primary Doctor No    HPI:   Mr. Duff is a 58 y/o male, followed by Dr. Wright presented to ED today due to c/o defibrillator discharges x 2 yesterday. Reports episodes of chest pain and racing heart prior to defibrillations. Denies loss of consciousness. Lifevest interrogations reviewed, patient appeared to be in Afib with RVR  Afib with RVR. Lab significant for BNP 4766->7422, Troponin flat 0.089-0.094. CXR consistent with mild congestive changes. EKG revealing SR with 1st degree AVB and RBBB. He was treated with aspirin, lasix, and placed on heparin drip. He has been admitted to [facility] for CP/defibrillator discharge     01/15/23: Patient was to be discharged yesterday but  was unable to set up patient residency with [facility]; therefore discharge has been cancelled. VSS  1/16/23: Patient sitting at side of bed. NAD. VSS. Currently Aflutter 70's  1/17/23: Patient has been accepted to [facility]. He is stable for discharge.     Physical Exam   HENT:   Mouth/Throat: Mucous membranes are moist.   Cardiovascular: Normal rate, normal heart sounds and normal pulses. An irregular rhythm present. Pulmonary:      Effort: Pulmonary effort is normal.      Breath sounds: Normal breath sounds.     Abdominal: Soft.   Musculoskeletal:         General: Normal range of motion.   Neurological: He is alert.   Skin: Capillary refill takes less than 2 seconds.   Psychiatric: Mood normal.       Significant Diagnostic Studies: Labs:   BMP:   Recent Labs   Lab 01/15/23  1547 01/16/23  0442    138   K 3.8 3.8   CO2 29 24   BUN 16.3 19.6   CREATININE 1.16 1.24*    CALCIUM 8.6 8.6   , CBC   Recent Labs   Lab 01/15/23  1547 01/16/23  0442   WBC 7.8 7.1   HGB 11.1* 11.4*   HCT 36.3* 37.9*    259    and Troponin   Recent Labs   Lab 01/12/23  1309 01/13/23  0345 01/13/23  2335   TROPONINI 0.089* 0.094* 0.081*     Assessment and Plan:   ICMO     - Zoll Life Vest     - defibrillated x 2 due to Afib RVR  NSTEMI - suspect type 2 in the setting of CHF exacerbation and defibrillator discharge  CAD/CABG (April 2022)    - LIMA-LAD, SVG-OM1, SVG-D1, SVG-PDA  PAFib  -- [id number]: 5 (LVSD/HTN/TE/NSTEMI)    - On Eliquis  Acute on Chronic Combined Systolic/Diastolic HF (Appears Compensated)    - Grade III DD    - EF 20-25% (ECHO 12.18.22)  VHD    - Moderate MR and Severe TR  Hypertension- Above Goal  Pulmonary Hypertension  Chronic Anemia - Stable  History of RUE DVT (5.10.22)    - DVT in right axillary and brachial veins. A superficial thrombosis was identified in the right basilic vein.  Schizoaffective Disorder  Hx of Hep C  Hx of Cocaine Abuse   Nicotine Dependence (Tobacco Use)  Medical noncompliance  UDS negative         Plan:  Continue amiodarone 400 mg bid x 14 days then decrease to 200mg bid x 14 days then decrease to 200 mg daily thereafter. Continue metoprolol tartrate 25 mg bid  Continue eliquis 5 mg bid  Appears compensated. Continue lasix 20 mg daily, Metoprolol 25 mg bid, and entresto 24-26 mg bid.  All medications will be filled per retail pharmacy prior to DC home    Discharged Condition: stable    Disposition: group home    Follow Up:   Follow-up Information     [facility] Follow up.    Why: [facility] cardiology in 5-7 days. we will call you with your apopintment date and time  Contact information:  [address] [id number] [phone]                       Patient Instructions:      Diet Cardiac     Notify your health care provider if you experience any of the following:  severe uncontrolled pain     Notify your health care provider if you  experience any of the following:   Order Comments: Racing heart or life vest shock     Activity as tolerated     Medications:  Reconciled Home Medications:      Medication List      CHANGE how you take these medications    amiodarone 200 MG Tab  Commonly known as: PACERONE  Take 2 tablets (400 mg total) by mouth 2 (two) times daily for 14 days, THEN 1 tablet (200 mg total) 2 (two) times daily for 14 days, THEN 1 tablet (200 mg total) once daily.  Start taking on: January 14, 2023  What changed: See the new instructions.     famotidine 20 MG tablet  Commonly known as: PEPCID  Take 1 tablet (20 mg total) by mouth once daily.  What changed: when to take this     metoprolol tartrate 25 MG tablet  Commonly known as: LOPRESSOR  Take 1 tablet (25 mg total) by mouth 2 (two) times daily.  What changed: how much to take        CONTINUE taking these medications    apixaban 5 mg Tab  Commonly known as: ELIQUIS  Take 1 tablet (5 mg total) by mouth 2 (two) times daily.     ARIPiprazole 10 MG Tab  Commonly known as: ABILIFY  Take 1 tablet (10 mg total) by mouth once daily.     aspirin 81 MG Chew  Chew and swallow 1 tablet (81 mg total) by mouth once daily.     atorvastatin 80 MG tablet  Commonly known as: LIPITOR  Take 1 tablet (80 mg total) by mouth once daily.     benztropine 1 MG tablet  Commonly known as: COGENTIN  Take 1 mg by mouth 2 (two) times daily.     ergocalciferol 50,000 unit Cap  Commonly known as: ERGOCALCIFEROL  Take 1 capsule (50,000 Units total) by mouth every 7 days. for 12 doses     ferrous sulfate 325 (65 FE) MG EC tablet  Take 1 tablet (325 mg total) by mouth once daily.     furosemide 40 MG tablet  Commonly known as: LASIX  Take 1 tablet (40 mg total) by mouth once daily.     hydrOXYzine 50 MG tablet  Commonly known as: ATARAX  Take 1 tablet (50 mg total) by mouth 2 (two) times a day. for 14 days     isosorbide mononitrate 30 MG 24 hr tablet  Commonly known as: IMDUR  Take 1 tablet (30 mg total) by mouth  once daily.     levETIRAcetam 500 MG Tab  Commonly known as: KEPPRA  Take 1 tablet (500 mg total) by mouth 2 (two) times daily.     levothyroxine 25 MCG tablet  Commonly known as: SYNTHROID  Take 1 tablet (25 mcg total) by mouth before breakfast.     OXcarbazepine 300 MG Tab  Commonly known as: TRILEPTAL  Take 1 tablet (300 mg total) by mouth 3 (three) times daily.     sacubitriL-valsartan 24-26 mg per tablet  Commonly known as: ENTRESTO  Take 1 tablet by mouth 2 (two) times daily.     traZODone 100 MG tablet  Commonly known as: DESYREL  Take 1 tablet (100 mg total) by mouth every evening.            Time spent on the discharge of patient: 35 minutes    RAY Patel  Cardiology  [facility] - 6th Floor Medical Telemetry

## 2023-01-17 NOTE — PLAN OF CARE
Spoke to Shelia with [facility] [phone]. Okay for patient to discharge to [facility] at [address], [place] [address]. [employer] will transport.

## 2023-01-17 NOTE — PLAN OF CARE
Problem: Violence Risk or Actual  Goal: Anger and Impulse Control  Outcome: Ongoing, Not Progressing     Problem: Adult Inpatient Plan of Care  Goal: Plan of Care Review  Outcome: Ongoing, Not Progressing  Goal: Patient-Specific Goal (Individualized)  Outcome: Ongoing, Not Progressing  Goal: Absence of Hospital-Acquired Illness or Injury  Outcome: Ongoing, Not Progressing  Goal: Optimal Comfort and Wellbeing  Outcome: Ongoing, Not Progressing  Goal: Readiness for Transition of Care  Outcome: Ongoing, Not Progressing     Problem: Adjustment to Illness (Delirium)  Goal: Optimal Coping  Outcome: Ongoing, Not Progressing     Problem: Altered Behavior (Delirium)  Goal: Improved Behavioral Control  Outcome: Ongoing, Not Progressing     Problem: Attention and Thought Clarity Impairment (Delirium)  Goal: Improved Attention and Thought Clarity  Outcome: Ongoing, Not Progressing     Problem: Sleep Disturbance (Delirium)  Goal: Improved Sleep  Outcome: Ongoing, Not Progressing

## 2023-01-18 NOTE — PHYSICIAN QUERY
PT Name: Kendall Duff  MR #: [id number]     DOCUMENTATION CLARIFICATION     CDS/: Hyun Anna RN             Contact information: [email or web address]  This form is a permanent document in the medical record.     Query Date: January 18, 2023    By submitting this query, we are merely seeking further clarification of documentation.  Please utilize your independent clinical judgment when addressing the question(s) below.  The Medical Record contains the following   Indicators   Supporting Clinical Findings   Location in Medical Record   x Documentation of Respiratory Failure, ARDS   Acute hypoxemic respiratory failure - improving  PN 12/22 HM   x SOB, BILLY, Wheezing, Productive Cough, Use of Accessory Muscles, etc. Lungs: diminished bilaterally, mildly labored, symmetrical expansion.    Labored , abdominal  muscle use      - Encourage IS use, pulm toileting. Will get PT eval tomorrow. Doing well on room air now. Repeat CXR in am   H&P 12/21 HM      RN flow sheet  12/22    PN 12/22 HM   x RR     ABGs     O2 sat Initial vital signs /86 pulse 98 respirations 18 temperature 97.5° F O2 saturation 99% on room air.     Resp  Min: 18  Max: 20   SpO2  Min: 96 %  Max: 99 % H&P 12/21 HM      H&P 12/21 HM    Hypoxia/Hypercapnia      BiPAP/Intubation/Mechanical Ventilation     x Supplemental O2 Oxygen          Room air->  2L simple face mask      2L NC       Room air  RN flow sheet  12/21 12/22 12/23- 12/25      Home O2, Oxygen Dependence     x Respiratory distress  Chronically ill-appearing looks older than stated age; nontoxic, fatigued; now respiratory distress, no family at the bedside   H&P 12/21 HM          x Radiology findings Impression:  Congestive failure.   CXR  12/21   x Acute/Chronic Illness Acute on chronic combined systolic and diastolic heart failure, has lifevest  20-25%  Acute hypoxemic respiratory failure - improving   Chest pain- resolved  NSTEMI type II- improving  ANA, likely prerenal  improving     past medical history of hypertension, hyperlipidemia, coronary artery disease status post CABG, CMO with LifeVest-EF 20-25%, bipolar disorder, schizophrenia, chronic hepatitis-C, seizures, CVA, history of substance abuse  PN 12/22 HM    Treatment      Other         The noted clinical guidelines following a diagnosis are only system guidelines and do not replace the providers clinical judgment.    Due to the conflicting clinical picture, please clinically validate the diagnosis of ___Acute hypoxemic respiratory failure ___.      If validated, please provide additional clinical support for the diagnosis.     [    ] Acute hypoxemic respiratory failure is not confirmed and/or it has been ruled out     [    ] Acute hypoxemic respiratory failure is not confirmed and/or it has been ruled out,   Acute respiratory distress with hypoxia ruled in.     [ x   ] Acute Respiratory Failure with Hypoxia (ABG pO2 < 60 mmHg or O2 sat of <91% on room air and respiratory symptoms documented) diagnosis is confirmed and additional clinical support/decision-making indicators for the diagnosis include (please specify): _______ As mentioned in the note his hypoxia is due to acute CHF exacerbation. I havent followed him until discharge. ________________________________________     [    ] Other clarification (please specify): ___________________           Please document in your progress notes daily for the duration of treatment until resolved and include in your discharge summary.       Form No. [id number]

## 2024-11-20 NOTE — PROGRESS NOTES
PRS  No complains  Incisions c/d/i  Right drain remains, not ready for removal, he self removed his left drain.  Continue drain care  Will follow   impaired balance/impaired postural control

## (undated) DEVICE — GLOVE BIOGEL ECLIPSE SZ 8.5

## (undated) DEVICE — BLADE EZ CLEAN 2 1/2

## (undated) DEVICE — SUT CTD VICRYL 0 UND BR

## (undated) DEVICE — STAPLER SKIN PROXIMATE WIDE

## (undated) DEVICE — DRESSING TELFA N ADH 3X8IN

## (undated) DEVICE — BLADE SURG STAINLESS STEEL #10

## (undated) DEVICE — KIT CANIST SUCTION 1200CC

## (undated) DEVICE — PENCIL ELECSURG ROCKER 15FT

## (undated) DEVICE — PARTICLE ARISTA AH BIONERT 5GM

## (undated) DEVICE — TRAY CATH FOL SIL URIMTR 16FR

## (undated) DEVICE — SUT SILK 2.0 BLK 18

## (undated) DEVICE — SEALANT VISTASEAL FIBRIN 10ML

## (undated) DEVICE — ADHESIVE MASTISOL VIAL 48/BX

## (undated) DEVICE — TIP SUCTION YANKAUER

## (undated) DEVICE — CONTAINER SPECIMEN SCREW 4OZ

## (undated) DEVICE — TUBING SUCTION STERILE

## (undated) DEVICE — KIT PEG PULL SAFETY 24FR

## (undated) DEVICE — Device

## (undated) DEVICE — ADHESIVE DERMABOND ADVANCED

## (undated) DEVICE — KIT SURGICAL TURNOVER

## (undated) DEVICE — BLADE SURG STAINLESS STEEL #15

## (undated) DEVICE — BINDER ABD 12IN LG 63-74IN

## (undated) DEVICE — DRESSING TRANS 4X4 TEGADERM

## (undated) DEVICE — GAUZE SPONGE 4X4 12PLY

## (undated) DEVICE — COLLECTION SPECIMEN NEPTUNE

## (undated) DEVICE — SOL NORMAL USPCA 0.9%

## (undated) DEVICE — DRAIN SURG HUBLESS 30CM 15FR

## (undated) DEVICE — GLOVE PROTEXIS LTX MICRO 8

## (undated) DEVICE — SYS CLSR DERMABOND PRINEO 22CM

## (undated) DEVICE — RESERVOIR JACKSON-PRATT 100CC

## (undated) DEVICE — ELECTRODE UTAHLOOP EXT 10CM

## (undated) DEVICE — SUT VICRYL PLUS 0 CT-1 18IN

## (undated) DEVICE — SPONGE LAP STRL 18X18IN

## (undated) DEVICE — GLOVE PROTEXIS HYDROGEL SZ8

## (undated) DEVICE — KIT SURGICAL COLON .25 1.1OZ

## (undated) DEVICE — TUBING O2 FEMALE CONN 13FT

## (undated) DEVICE — DRESSING ANTIMICROBIAL 1 INCH

## (undated) DEVICE — ELECTRODE BLADE INSULATED 1 IN

## (undated) DEVICE — PAD ABD 8X10 STERILE

## (undated) DEVICE — ELECTRODE PATIENT RETURN DISP

## (undated) DEVICE — CLOSURE SKIN STERI STRIP 1/2X4

## (undated) DEVICE — ROLL DENTAL REGULAR 3/8X1.5IN

## (undated) DEVICE — GLOVE BIOGEL SZ 8 1/2

## (undated) DEVICE — SEE MEDLINE ITEM 154981

## (undated) DEVICE — PENCIL E-Z CLEAN ROCKER 15FT

## (undated) DEVICE — SUT MONO PLUS 2-0 CP-1 27IN

## (undated) DEVICE — SUT STRATAFIX 2-0 CT-1 15CM

## (undated) DEVICE — BLANKET HYPER ADULT 24X60IN

## (undated) DEVICE — SOL IRRI STRL WATER 1000ML

## (undated) DEVICE — ELECTRODE REM PLYHSV RETURN 9

## (undated) DEVICE — SOL NACL IRR 1000ML BTL